# Patient Record
Sex: MALE | Race: WHITE | NOT HISPANIC OR LATINO | URBAN - METROPOLITAN AREA
[De-identification: names, ages, dates, MRNs, and addresses within clinical notes are randomized per-mention and may not be internally consistent; named-entity substitution may affect disease eponyms.]

---

## 2017-05-07 ENCOUNTER — INPATIENT (INPATIENT)
Facility: HOSPITAL | Age: 77
LOS: 2 days | Discharge: HOME | End: 2017-05-10
Admitting: FAMILY MEDICINE

## 2017-05-10 PROBLEM — Z00.00 ENCOUNTER FOR PREVENTIVE HEALTH EXAMINATION: Status: ACTIVE | Noted: 2017-05-10

## 2017-05-18 ENCOUNTER — APPOINTMENT (OUTPATIENT)
Dept: SURGERY | Facility: CLINIC | Age: 77
End: 2017-05-18

## 2017-06-15 ENCOUNTER — APPOINTMENT (OUTPATIENT)
Dept: SURGERY | Facility: CLINIC | Age: 77
End: 2017-06-15

## 2017-06-15 VITALS
SYSTOLIC BLOOD PRESSURE: 138 MMHG | HEIGHT: 66 IN | DIASTOLIC BLOOD PRESSURE: 78 MMHG | BODY MASS INDEX: 43.07 KG/M2 | WEIGHT: 268 LBS

## 2017-06-15 DIAGNOSIS — J44.9 CHRONIC OBSTRUCTIVE PULMONARY DISEASE, UNSPECIFIED: ICD-10-CM

## 2017-06-15 DIAGNOSIS — Z87.39 PERSONAL HISTORY OF OTHER DISEASES OF THE MUSCULOSKELETAL SYSTEM AND CONNECTIVE TISSUE: ICD-10-CM

## 2017-06-15 DIAGNOSIS — Z78.9 OTHER SPECIFIED HEALTH STATUS: ICD-10-CM

## 2017-06-15 DIAGNOSIS — Z80.9 FAMILY HISTORY OF MALIGNANT NEOPLASM, UNSPECIFIED: ICD-10-CM

## 2017-06-15 DIAGNOSIS — Z86.39 PERSONAL HISTORY OF OTHER ENDOCRINE, NUTRITIONAL AND METABOLIC DISEASE: ICD-10-CM

## 2017-06-15 DIAGNOSIS — Z86.79 PERSONAL HISTORY OF OTHER DISEASES OF THE CIRCULATORY SYSTEM: ICD-10-CM

## 2017-06-15 DIAGNOSIS — Z87.09 PERSONAL HISTORY OF OTHER DISEASES OF THE RESPIRATORY SYSTEM: ICD-10-CM

## 2017-06-15 DIAGNOSIS — Z87.891 PERSONAL HISTORY OF NICOTINE DEPENDENCE: ICD-10-CM

## 2017-06-15 DIAGNOSIS — Z87.898 PERSONAL HISTORY OF OTHER SPECIFIED CONDITIONS: ICD-10-CM

## 2017-06-15 DIAGNOSIS — H54.7 UNSPECIFIED VISUAL LOSS: ICD-10-CM

## 2017-06-15 DIAGNOSIS — H91.90 UNSPECIFIED HEARING LOSS, UNSPECIFIED EAR: ICD-10-CM

## 2017-06-15 DIAGNOSIS — R06.02 SHORTNESS OF BREATH: ICD-10-CM

## 2017-06-15 RX ORDER — DUTASTERIDE 0.5 MG/1
0.5 CAPSULE, LIQUID FILLED ORAL
Refills: 0 | Status: ACTIVE | COMMUNITY

## 2017-06-15 RX ORDER — ISOSORBIDE MONONITRATE 60 MG/1
60 TABLET, EXTENDED RELEASE ORAL
Refills: 0 | Status: ACTIVE | COMMUNITY

## 2017-06-15 RX ORDER — INSULIN DETEMIR 100 [IU]/ML
100 INJECTION, SOLUTION SUBCUTANEOUS
Refills: 0 | Status: ACTIVE | COMMUNITY

## 2017-06-28 DIAGNOSIS — Z91.14 PATIENT'S OTHER NONCOMPLIANCE WITH MEDICATION REGIMEN: ICD-10-CM

## 2017-06-28 DIAGNOSIS — R06.02 SHORTNESS OF BREATH: ICD-10-CM

## 2017-06-28 DIAGNOSIS — J96.00 ACUTE RESPIRATORY FAILURE, UNSPECIFIED WHETHER WITH HYPOXIA OR HYPERCAPNIA: ICD-10-CM

## 2017-06-28 DIAGNOSIS — Z79.84 LONG TERM (CURRENT) USE OF ORAL HYPOGLYCEMIC DRUGS: ICD-10-CM

## 2017-06-28 DIAGNOSIS — J44.9 CHRONIC OBSTRUCTIVE PULMONARY DISEASE, UNSPECIFIED: ICD-10-CM

## 2017-06-28 DIAGNOSIS — I50.33 ACUTE ON CHRONIC DIASTOLIC (CONGESTIVE) HEART FAILURE: ICD-10-CM

## 2017-06-28 DIAGNOSIS — I25.10 ATHEROSCLEROTIC HEART DISEASE OF NATIVE CORONARY ARTERY WITHOUT ANGINA PECTORIS: ICD-10-CM

## 2017-06-28 DIAGNOSIS — N40.0 BENIGN PROSTATIC HYPERPLASIA WITHOUT LOWER URINARY TRACT SYMPTOMS: ICD-10-CM

## 2017-06-28 DIAGNOSIS — Z91.11 PATIENT'S NONCOMPLIANCE WITH DIETARY REGIMEN: ICD-10-CM

## 2017-06-28 DIAGNOSIS — E78.5 HYPERLIPIDEMIA, UNSPECIFIED: ICD-10-CM

## 2017-06-28 DIAGNOSIS — M17.0 BILATERAL PRIMARY OSTEOARTHRITIS OF KNEE: ICD-10-CM

## 2017-06-28 DIAGNOSIS — I35.0 NONRHEUMATIC AORTIC (VALVE) STENOSIS: ICD-10-CM

## 2017-06-28 DIAGNOSIS — I25.2 OLD MYOCARDIAL INFARCTION: ICD-10-CM

## 2017-06-28 DIAGNOSIS — Z87.891 PERSONAL HISTORY OF NICOTINE DEPENDENCE: ICD-10-CM

## 2017-06-28 DIAGNOSIS — Z95.1 PRESENCE OF AORTOCORONARY BYPASS GRAFT: ICD-10-CM

## 2017-06-28 DIAGNOSIS — I11.0 HYPERTENSIVE HEART DISEASE WITH HEART FAILURE: ICD-10-CM

## 2017-06-28 DIAGNOSIS — E11.9 TYPE 2 DIABETES MELLITUS WITHOUT COMPLICATIONS: ICD-10-CM

## 2017-06-28 DIAGNOSIS — E66.2 MORBID (SEVERE) OBESITY WITH ALVEOLAR HYPOVENTILATION: ICD-10-CM

## 2017-06-28 DIAGNOSIS — Z99.81 DEPENDENCE ON SUPPLEMENTAL OXYGEN: ICD-10-CM

## 2017-09-21 ENCOUNTER — INPATIENT (INPATIENT)
Facility: HOSPITAL | Age: 77
LOS: 5 days | Discharge: HOME | End: 2017-09-27
Admitting: FAMILY MEDICINE

## 2017-09-21 DIAGNOSIS — I21.4 NON-ST ELEVATION (NSTEMI) MYOCARDIAL INFARCTION: ICD-10-CM

## 2017-09-21 DIAGNOSIS — E78.00 PURE HYPERCHOLESTEROLEMIA, UNSPECIFIED: ICD-10-CM

## 2017-09-21 DIAGNOSIS — I50.9 HEART FAILURE, UNSPECIFIED: ICD-10-CM

## 2017-09-21 DIAGNOSIS — I10 ESSENTIAL (PRIMARY) HYPERTENSION: ICD-10-CM

## 2017-09-21 DIAGNOSIS — E11.9 TYPE 2 DIABETES MELLITUS WITHOUT COMPLICATIONS: ICD-10-CM

## 2017-09-21 DIAGNOSIS — J96.20 ACUTE AND CHRONIC RESPIRATORY FAILURE, UNSPECIFIED WHETHER WITH HYPOXIA OR HYPERCAPNIA: ICD-10-CM

## 2017-09-21 DIAGNOSIS — R78.81 BACTEREMIA: ICD-10-CM

## 2017-09-21 DIAGNOSIS — R09.02 HYPOXEMIA: ICD-10-CM

## 2017-10-04 DIAGNOSIS — I21.4 NON-ST ELEVATION (NSTEMI) MYOCARDIAL INFARCTION: ICD-10-CM

## 2017-10-04 DIAGNOSIS — J44.9 CHRONIC OBSTRUCTIVE PULMONARY DISEASE, UNSPECIFIED: ICD-10-CM

## 2017-10-04 DIAGNOSIS — Z87.891 PERSONAL HISTORY OF NICOTINE DEPENDENCE: ICD-10-CM

## 2017-10-04 DIAGNOSIS — Z79.4 LONG TERM (CURRENT) USE OF INSULIN: ICD-10-CM

## 2017-10-04 DIAGNOSIS — Z95.1 PRESENCE OF AORTOCORONARY BYPASS GRAFT: ICD-10-CM

## 2017-10-04 DIAGNOSIS — I11.0 HYPERTENSIVE HEART DISEASE WITH HEART FAILURE: ICD-10-CM

## 2017-10-04 DIAGNOSIS — I35.0 NONRHEUMATIC AORTIC (VALVE) STENOSIS: ICD-10-CM

## 2017-10-04 DIAGNOSIS — E11.9 TYPE 2 DIABETES MELLITUS WITHOUT COMPLICATIONS: ICD-10-CM

## 2017-10-04 DIAGNOSIS — I50.33 ACUTE ON CHRONIC DIASTOLIC (CONGESTIVE) HEART FAILURE: ICD-10-CM

## 2017-10-04 DIAGNOSIS — N40.0 BENIGN PROSTATIC HYPERPLASIA WITHOUT LOWER URINARY TRACT SYMPTOMS: ICD-10-CM

## 2017-10-04 DIAGNOSIS — I25.10 ATHEROSCLEROTIC HEART DISEASE OF NATIVE CORONARY ARTERY WITHOUT ANGINA PECTORIS: ICD-10-CM

## 2017-10-04 DIAGNOSIS — G47.33 OBSTRUCTIVE SLEEP APNEA (ADULT) (PEDIATRIC): ICD-10-CM

## 2017-10-04 DIAGNOSIS — E66.01 MORBID (SEVERE) OBESITY DUE TO EXCESS CALORIES: ICD-10-CM

## 2018-03-20 ENCOUNTER — APPOINTMENT (OUTPATIENT)
Dept: SURGERY | Facility: CLINIC | Age: 78
End: 2018-03-20

## 2018-06-17 ENCOUNTER — EMERGENCY (EMERGENCY)
Facility: HOSPITAL | Age: 78
LOS: 0 days | Discharge: HOME | End: 2018-06-17
Attending: EMERGENCY MEDICINE | Admitting: EMERGENCY MEDICINE

## 2018-06-17 VITALS
SYSTOLIC BLOOD PRESSURE: 118 MMHG | OXYGEN SATURATION: 94 % | TEMPERATURE: 98 F | RESPIRATION RATE: 19 BRPM | WEIGHT: 259.93 LBS | HEIGHT: 66 IN | HEART RATE: 93 BPM | DIASTOLIC BLOOD PRESSURE: 82 MMHG

## 2018-06-17 DIAGNOSIS — Z79.899 OTHER LONG TERM (CURRENT) DRUG THERAPY: ICD-10-CM

## 2018-06-17 DIAGNOSIS — J44.9 CHRONIC OBSTRUCTIVE PULMONARY DISEASE, UNSPECIFIED: ICD-10-CM

## 2018-06-17 DIAGNOSIS — Z79.84 LONG TERM (CURRENT) USE OF ORAL HYPOGLYCEMIC DRUGS: ICD-10-CM

## 2018-06-17 DIAGNOSIS — R21 RASH AND OTHER NONSPECIFIC SKIN ERUPTION: ICD-10-CM

## 2018-06-17 DIAGNOSIS — Z79.4 LONG TERM (CURRENT) USE OF INSULIN: ICD-10-CM

## 2018-06-17 DIAGNOSIS — Z95.1 PRESENCE OF AORTOCORONARY BYPASS GRAFT: ICD-10-CM

## 2018-06-17 DIAGNOSIS — I11.0 HYPERTENSIVE HEART DISEASE WITH HEART FAILURE: ICD-10-CM

## 2018-06-17 DIAGNOSIS — E11.9 TYPE 2 DIABETES MELLITUS WITHOUT COMPLICATIONS: ICD-10-CM

## 2018-06-17 DIAGNOSIS — Z95.1 PRESENCE OF AORTOCORONARY BYPASS GRAFT: Chronic | ICD-10-CM

## 2018-06-17 DIAGNOSIS — Z79.82 LONG TERM (CURRENT) USE OF ASPIRIN: ICD-10-CM

## 2018-06-17 DIAGNOSIS — I50.9 HEART FAILURE, UNSPECIFIED: ICD-10-CM

## 2018-06-17 DIAGNOSIS — R19.7 DIARRHEA, UNSPECIFIED: ICD-10-CM

## 2018-06-17 DIAGNOSIS — Z95.5 PRESENCE OF CORONARY ANGIOPLASTY IMPLANT AND GRAFT: Chronic | ICD-10-CM

## 2018-06-17 DIAGNOSIS — D69.2 OTHER NONTHROMBOCYTOPENIC PURPURA: ICD-10-CM

## 2018-06-17 DIAGNOSIS — Z79.02 LONG TERM (CURRENT) USE OF ANTITHROMBOTICS/ANTIPLATELETS: ICD-10-CM

## 2018-06-17 LAB
ALBUMIN SERPL ELPH-MCNC: 3.6 G/DL — SIGNIFICANT CHANGE UP (ref 3.5–5.2)
ALP SERPL-CCNC: 55 U/L — SIGNIFICANT CHANGE UP (ref 30–115)
ALT FLD-CCNC: 20 U/L — SIGNIFICANT CHANGE UP (ref 0–41)
ANION GAP SERPL CALC-SCNC: 16 MMOL/L — HIGH (ref 7–14)
AST SERPL-CCNC: 25 U/L — SIGNIFICANT CHANGE UP (ref 0–41)
BILIRUB SERPL-MCNC: 0.7 MG/DL — SIGNIFICANT CHANGE UP (ref 0.2–1.2)
BUN SERPL-MCNC: 35 MG/DL — HIGH (ref 10–20)
CALCIUM SERPL-MCNC: 9.5 MG/DL — SIGNIFICANT CHANGE UP (ref 8.5–10.1)
CHLORIDE SERPL-SCNC: 101 MMOL/L — SIGNIFICANT CHANGE UP (ref 98–110)
CO2 SERPL-SCNC: 22 MMOL/L — SIGNIFICANT CHANGE UP (ref 17–32)
CREAT SERPL-MCNC: 1.9 MG/DL — HIGH (ref 0.7–1.5)
GLUCOSE SERPL-MCNC: 79 MG/DL — SIGNIFICANT CHANGE UP (ref 70–99)
HCT VFR BLD CALC: 50.8 % — SIGNIFICANT CHANGE UP (ref 42–52)
HGB BLD-MCNC: 16.8 G/DL — SIGNIFICANT CHANGE UP (ref 14–18)
MCHC RBC-ENTMCNC: 27.4 PG — SIGNIFICANT CHANGE UP (ref 27–31)
MCHC RBC-ENTMCNC: 33.1 G/DL — SIGNIFICANT CHANGE UP (ref 32–37)
MCV RBC AUTO: 82.7 FL — SIGNIFICANT CHANGE UP (ref 80–94)
NRBC # BLD: 0 /100 WBCS — SIGNIFICANT CHANGE UP (ref 0–0)
PLATELET # BLD AUTO: 259 K/UL — SIGNIFICANT CHANGE UP (ref 130–400)
POTASSIUM SERPL-MCNC: 5 MMOL/L — SIGNIFICANT CHANGE UP (ref 3.5–5)
POTASSIUM SERPL-SCNC: 5 MMOL/L — SIGNIFICANT CHANGE UP (ref 3.5–5)
PROT SERPL-MCNC: 7.4 G/DL — SIGNIFICANT CHANGE UP (ref 6–8)
RBC # BLD: 6.14 M/UL — HIGH (ref 4.7–6.1)
RBC # FLD: 14.1 % — SIGNIFICANT CHANGE UP (ref 11.5–14.5)
SODIUM SERPL-SCNC: 139 MMOL/L — SIGNIFICANT CHANGE UP (ref 135–146)
WBC # BLD: 7.31 K/UL — SIGNIFICANT CHANGE UP (ref 4.8–10.8)
WBC # FLD AUTO: 7.31 K/UL — SIGNIFICANT CHANGE UP (ref 4.8–10.8)

## 2018-06-17 RX ORDER — INSULIN LISPRO 100 [IU]/ML
0 INJECTION, SUSPENSION SUBCUTANEOUS
Qty: 0 | Refills: 0 | COMMUNITY

## 2018-06-17 NOTE — ED PROVIDER NOTE - PHYSICAL EXAMINATION
Physical Exam    Vital Signs: I have reviewed the initial vital signs.  Constitutional: well-nourished, appears stated age, no acute distress  Eyes: Conjunctiva pink, Sclera clear, PERRLA, EOMI.  Cardiovascular: S1 and S2, regular rate, regular rhythm, well-perfused extremities, radial pulses equal and 2+  Respiratory: unlabored respiratory effort, clear to auscultation bilaterally no wheezing, rales and rhonchi  Gastrointestinal: soft, non-tender abdomen, no pulsatile mass, normal bowl sounds  Musculoskeletal: supple neck, no lower extremity edema, no midline tenderness  Integumentary: Lower legs with diffuse purpura and some to lower arms and lower abd. non blanching  Neurologic: awake, alert, cranial nerves II-XII grossly intact, extremities’ motor and sensory functions grossly intact  Psychiatric: appropriate mood, appropriate affect

## 2018-06-17 NOTE — ED ADULT NURSE NOTE - PMH
BPH (benign prostatic hyperplasia)    CHF (congestive heart failure)    COPD (chronic obstructive pulmonary disease)    Diabetes    HTN (hypertension)

## 2018-06-17 NOTE — ED ADULT TRIAGE NOTE - CHIEF COMPLAINT QUOTE
As per patient "Thursday night I noticed I had a rash on my right leg and it just got worse, on my other leg, on my left arm and also on my stomach". denies fever, vomiting, nausea

## 2018-06-17 NOTE — ED PROVIDER NOTE - PROGRESS NOTE DETAILS
D/w patient all results and follow up with PMD and Derm and return to emergency room precautions given

## 2018-06-17 NOTE — ED PROVIDER NOTE - OBJECTIVE STATEMENT
78 year old male past medical history of CHF, COPD, Diabetes, Hypertension states that since last 4 days has rash spreading from legs to arm and abd that started as dots and now they are getting bigger. denies chest pain, shortness of breath, neck pain, headache, dizziness, fever/chills, nausea, vomiting, diarrhea. patient states he is on plavix.

## 2018-06-17 NOTE — ED PROVIDER NOTE - NS ED ROS FT
Constitutional: (-) fever  Eyes/ENT: (-) blurry vision, (-) epistaxis  Cardiovascular: (-) chest pain, (-) syncope  Respiratory: (-) cough, (-) shortness of breath  Gastrointestinal: (-) vomiting, (-) diarrhea  Musculoskeletal: (-) neck pain, (-) back pain, (-) joint pain  Integumentary: (+) rash, (-) edema  Neurological: (-) headache, (-) altered mental status  Psychiatric: (-) hallucinations  Allergic/Immunologic: (-) pruritus

## 2018-07-19 ENCOUNTER — APPOINTMENT (OUTPATIENT)
Dept: SURGERY | Facility: CLINIC | Age: 78
End: 2018-07-19
Payer: MEDICARE

## 2018-07-19 DIAGNOSIS — N50.89 OTHER SPECIFIED DISORDERS OF THE MALE GENITAL ORGANS: ICD-10-CM

## 2018-07-19 PROCEDURE — 99213 OFFICE O/P EST LOW 20 MIN: CPT

## 2018-07-28 PROBLEM — Z78.9 ALCOHOL USE: Status: INACTIVE | Noted: 2017-06-15

## 2019-03-22 ENCOUNTER — INPATIENT (INPATIENT)
Facility: HOSPITAL | Age: 79
LOS: 4 days | Discharge: HOME | End: 2019-03-27
Attending: HOSPITALIST | Admitting: HOSPITALIST

## 2019-03-22 VITALS
SYSTOLIC BLOOD PRESSURE: 119 MMHG | OXYGEN SATURATION: 90 % | RESPIRATION RATE: 20 BRPM | HEART RATE: 64 BPM | TEMPERATURE: 98 F | WEIGHT: 268.96 LBS | HEIGHT: 66 IN | DIASTOLIC BLOOD PRESSURE: 62 MMHG

## 2019-03-22 DIAGNOSIS — E11.9 TYPE 2 DIABETES MELLITUS WITHOUT COMPLICATIONS: ICD-10-CM

## 2019-03-22 DIAGNOSIS — Z95.5 PRESENCE OF CORONARY ANGIOPLASTY IMPLANT AND GRAFT: Chronic | ICD-10-CM

## 2019-03-22 DIAGNOSIS — R06.09 OTHER FORMS OF DYSPNEA: ICD-10-CM

## 2019-03-22 DIAGNOSIS — Z95.1 PRESENCE OF AORTOCORONARY BYPASS GRAFT: Chronic | ICD-10-CM

## 2019-03-22 DIAGNOSIS — Z79.899 OTHER LONG TERM (CURRENT) DRUG THERAPY: ICD-10-CM

## 2019-03-22 PROBLEM — N40.0 BENIGN PROSTATIC HYPERPLASIA WITHOUT LOWER URINARY TRACT SYMPTOMS: Chronic | Status: ACTIVE | Noted: 2018-06-17

## 2019-03-22 PROBLEM — J44.9 CHRONIC OBSTRUCTIVE PULMONARY DISEASE, UNSPECIFIED: Chronic | Status: ACTIVE | Noted: 2018-06-17

## 2019-03-22 PROBLEM — I10 ESSENTIAL (PRIMARY) HYPERTENSION: Chronic | Status: ACTIVE | Noted: 2018-06-17

## 2019-03-22 PROBLEM — I50.9 HEART FAILURE, UNSPECIFIED: Chronic | Status: ACTIVE | Noted: 2018-06-17

## 2019-03-22 LAB
ALBUMIN SERPL ELPH-MCNC: 3.9 G/DL — SIGNIFICANT CHANGE UP (ref 3.5–5.2)
ALP SERPL-CCNC: 46 U/L — SIGNIFICANT CHANGE UP (ref 30–115)
ALT FLD-CCNC: 17 U/L — SIGNIFICANT CHANGE UP (ref 0–41)
ANION GAP SERPL CALC-SCNC: 11 MMOL/L — SIGNIFICANT CHANGE UP (ref 7–14)
APTT BLD: 38.6 SEC — SIGNIFICANT CHANGE UP (ref 27–39.2)
AST SERPL-CCNC: 16 U/L — SIGNIFICANT CHANGE UP (ref 0–41)
BASE EXCESS BLDV CALC-SCNC: 3.2 MMOL/L — HIGH (ref -2–2)
BASOPHILS # BLD AUTO: 0.05 K/UL — SIGNIFICANT CHANGE UP (ref 0–0.2)
BASOPHILS NFR BLD AUTO: 0.6 % — SIGNIFICANT CHANGE UP (ref 0–1)
BILIRUB SERPL-MCNC: 0.8 MG/DL — SIGNIFICANT CHANGE UP (ref 0.2–1.2)
BUN SERPL-MCNC: 47 MG/DL — HIGH (ref 10–20)
CA-I SERPL-SCNC: 1.27 MMOL/L — SIGNIFICANT CHANGE UP (ref 1.12–1.3)
CALCIUM SERPL-MCNC: 9.9 MG/DL — SIGNIFICANT CHANGE UP (ref 8.5–10.1)
CHLORIDE SERPL-SCNC: 105 MMOL/L — SIGNIFICANT CHANGE UP (ref 98–110)
CO2 SERPL-SCNC: 29 MMOL/L — SIGNIFICANT CHANGE UP (ref 17–32)
CREAT SERPL-MCNC: 1.5 MG/DL — SIGNIFICANT CHANGE UP (ref 0.7–1.5)
EOSINOPHIL # BLD AUTO: 0.03 K/UL — SIGNIFICANT CHANGE UP (ref 0–0.7)
EOSINOPHIL NFR BLD AUTO: 0.4 % — SIGNIFICANT CHANGE UP (ref 0–8)
FLU A RESULT: NEGATIVE — SIGNIFICANT CHANGE UP
FLU A RESULT: NEGATIVE — SIGNIFICANT CHANGE UP
FLUAV AG NPH QL: NEGATIVE — SIGNIFICANT CHANGE UP
FLUBV AG NPH QL: NEGATIVE — SIGNIFICANT CHANGE UP
GAS PNL BLDV: 146 MMOL/L — HIGH (ref 136–145)
GAS PNL BLDV: SIGNIFICANT CHANGE UP
GLUCOSE BLDC GLUCOMTR-MCNC: 96 MG/DL — SIGNIFICANT CHANGE UP (ref 70–99)
GLUCOSE SERPL-MCNC: 92 MG/DL — SIGNIFICANT CHANGE UP (ref 70–99)
HCO3 BLDV-SCNC: 32 MMOL/L — HIGH (ref 22–29)
HCT VFR BLD CALC: 45.8 % — SIGNIFICANT CHANGE UP (ref 42–52)
HCT VFR BLDA CALC: 61.1 % — HIGH (ref 34–44)
HGB BLD CALC-MCNC: 19.9 G/DL — HIGH (ref 14–18)
HGB BLD-MCNC: 14.6 G/DL — SIGNIFICANT CHANGE UP (ref 14–18)
HOROWITZ INDEX BLDV+IHG-RTO: 21 — SIGNIFICANT CHANGE UP
IMM GRANULOCYTES NFR BLD AUTO: 0.1 % — SIGNIFICANT CHANGE UP (ref 0.1–0.3)
INR BLD: 1.24 RATIO — SIGNIFICANT CHANGE UP (ref 0.65–1.3)
LACTATE BLDV-MCNC: 2.2 MMOL/L — HIGH (ref 0.5–1.6)
LYMPHOCYTES # BLD AUTO: 0.91 K/UL — LOW (ref 1.2–3.4)
LYMPHOCYTES # BLD AUTO: 11.3 % — LOW (ref 20.5–51.1)
MAGNESIUM SERPL-MCNC: 2.1 MG/DL — SIGNIFICANT CHANGE UP (ref 1.8–2.4)
MCHC RBC-ENTMCNC: 27.7 PG — SIGNIFICANT CHANGE UP (ref 27–31)
MCHC RBC-ENTMCNC: 31.9 G/DL — LOW (ref 32–37)
MCV RBC AUTO: 86.7 FL — SIGNIFICANT CHANGE UP (ref 80–94)
MONOCYTES # BLD AUTO: 0.65 K/UL — HIGH (ref 0.1–0.6)
MONOCYTES NFR BLD AUTO: 8.1 % — SIGNIFICANT CHANGE UP (ref 1.7–9.3)
NEUTROPHILS # BLD AUTO: 6.4 K/UL — SIGNIFICANT CHANGE UP (ref 1.4–6.5)
NEUTROPHILS NFR BLD AUTO: 79.5 % — HIGH (ref 42.2–75.2)
NRBC # BLD: 0 /100 WBCS — SIGNIFICANT CHANGE UP (ref 0–0)
NT-PROBNP SERPL-SCNC: 1623 PG/ML — HIGH (ref 0–300)
PCO2 BLDV: 60 MMHG — HIGH (ref 41–51)
PH BLDV: 7.33 — SIGNIFICANT CHANGE UP (ref 7.26–7.43)
PLATELET # BLD AUTO: 188 K/UL — SIGNIFICANT CHANGE UP (ref 130–400)
PO2 BLDV: 25 MMHG — SIGNIFICANT CHANGE UP (ref 20–40)
POTASSIUM BLDV-SCNC: 4.9 MMOL/L — SIGNIFICANT CHANGE UP (ref 3.3–5.6)
POTASSIUM SERPL-MCNC: 4.6 MMOL/L — SIGNIFICANT CHANGE UP (ref 3.5–5)
POTASSIUM SERPL-SCNC: 4.6 MMOL/L — SIGNIFICANT CHANGE UP (ref 3.5–5)
PROT SERPL-MCNC: 7.2 G/DL — SIGNIFICANT CHANGE UP (ref 6–8)
PROTHROM AB SERPL-ACNC: 14.2 SEC — HIGH (ref 9.95–12.87)
RBC # BLD: 5.28 M/UL — SIGNIFICANT CHANGE UP (ref 4.7–6.1)
RBC # FLD: 13.8 % — SIGNIFICANT CHANGE UP (ref 11.5–14.5)
RSV RESULT: NEGATIVE — SIGNIFICANT CHANGE UP
RSV RNA RESP QL NAA+PROBE: NEGATIVE — SIGNIFICANT CHANGE UP
SAO2 % BLDV: 41 % — SIGNIFICANT CHANGE UP
SODIUM SERPL-SCNC: 145 MMOL/L — SIGNIFICANT CHANGE UP (ref 135–146)
TROPONIN T SERPL-MCNC: 0.04 NG/ML — CRITICAL HIGH
WBC # BLD: 8.05 K/UL — SIGNIFICANT CHANGE UP (ref 4.8–10.8)
WBC # FLD AUTO: 8.05 K/UL — SIGNIFICANT CHANGE UP (ref 4.8–10.8)

## 2019-03-22 RX ORDER — ALPRAZOLAM 0.25 MG
0.5 TABLET ORAL ONCE
Qty: 0 | Refills: 0 | Status: DISCONTINUED | OUTPATIENT
Start: 2019-03-22 | End: 2019-03-22

## 2019-03-22 RX ORDER — ALPRAZOLAM 0.25 MG
0.5 TABLET ORAL
Qty: 0 | Refills: 0 | Status: DISCONTINUED | OUTPATIENT
Start: 2019-03-22 | End: 2019-03-27

## 2019-03-22 RX ORDER — CHLORHEXIDINE GLUCONATE 213 G/1000ML
1 SOLUTION TOPICAL
Qty: 0 | Refills: 0 | Status: DISCONTINUED | OUTPATIENT
Start: 2019-03-22 | End: 2019-03-27

## 2019-03-22 RX ORDER — CLOPIDOGREL BISULFATE 75 MG/1
75 TABLET, FILM COATED ORAL DAILY
Qty: 0 | Refills: 0 | Status: DISCONTINUED | OUTPATIENT
Start: 2019-03-22 | End: 2019-03-27

## 2019-03-22 RX ORDER — METOPROLOL TARTRATE 50 MG
50 TABLET ORAL
Qty: 0 | Refills: 0 | Status: DISCONTINUED | OUTPATIENT
Start: 2019-03-22 | End: 2019-03-23

## 2019-03-22 RX ORDER — ATORVASTATIN CALCIUM 80 MG/1
80 TABLET, FILM COATED ORAL AT BEDTIME
Qty: 0 | Refills: 0 | Status: DISCONTINUED | OUTPATIENT
Start: 2019-03-22 | End: 2019-03-27

## 2019-03-22 RX ORDER — FENOFIBRATE,MICRONIZED 130 MG
145 CAPSULE ORAL DAILY
Qty: 0 | Refills: 0 | Status: DISCONTINUED | OUTPATIENT
Start: 2019-03-22 | End: 2019-03-27

## 2019-03-22 RX ORDER — TAMSULOSIN HYDROCHLORIDE 0.4 MG/1
0.4 CAPSULE ORAL AT BEDTIME
Qty: 0 | Refills: 0 | Status: DISCONTINUED | OUTPATIENT
Start: 2019-03-22 | End: 2019-03-27

## 2019-03-22 RX ORDER — ALPRAZOLAM 0.25 MG
1 TABLET ORAL
Qty: 0 | Refills: 0 | COMMUNITY

## 2019-03-22 RX ORDER — MONTELUKAST 4 MG/1
1 TABLET, CHEWABLE ORAL
Qty: 0 | Refills: 0 | COMMUNITY

## 2019-03-22 RX ORDER — ASPIRIN/CALCIUM CARB/MAGNESIUM 324 MG
81 TABLET ORAL DAILY
Qty: 0 | Refills: 0 | Status: DISCONTINUED | OUTPATIENT
Start: 2019-03-22 | End: 2019-03-24

## 2019-03-22 RX ORDER — FUROSEMIDE 40 MG
40 TABLET ORAL DAILY
Qty: 0 | Refills: 0 | Status: DISCONTINUED | OUTPATIENT
Start: 2019-03-22 | End: 2019-03-23

## 2019-03-22 RX ORDER — FUROSEMIDE 40 MG
40 TABLET ORAL ONCE
Qty: 0 | Refills: 0 | Status: COMPLETED | OUTPATIENT
Start: 2019-03-22 | End: 2019-03-22

## 2019-03-22 RX ORDER — ISOSORBIDE MONONITRATE 60 MG/1
30 TABLET, EXTENDED RELEASE ORAL DAILY
Qty: 0 | Refills: 0 | Status: DISCONTINUED | OUTPATIENT
Start: 2019-03-22 | End: 2019-03-27

## 2019-03-22 RX ORDER — AMLODIPINE BESYLATE 2.5 MG/1
2.5 TABLET ORAL DAILY
Qty: 0 | Refills: 0 | Status: DISCONTINUED | OUTPATIENT
Start: 2019-03-22 | End: 2019-03-27

## 2019-03-22 RX ORDER — CELECOXIB 200 MG/1
100 CAPSULE ORAL DAILY
Qty: 0 | Refills: 0 | Status: DISCONTINUED | OUTPATIENT
Start: 2019-03-22 | End: 2019-03-27

## 2019-03-22 RX ORDER — LOSARTAN POTASSIUM 100 MG/1
50 TABLET, FILM COATED ORAL
Qty: 0 | Refills: 0 | Status: DISCONTINUED | OUTPATIENT
Start: 2019-03-22 | End: 2019-03-27

## 2019-03-22 RX ORDER — HEPARIN SODIUM 5000 [USP'U]/ML
5000 INJECTION INTRAVENOUS; SUBCUTANEOUS EVERY 12 HOURS
Qty: 0 | Refills: 0 | Status: DISCONTINUED | OUTPATIENT
Start: 2019-03-22 | End: 2019-03-24

## 2019-03-22 RX ORDER — FINASTERIDE 5 MG/1
5 TABLET, FILM COATED ORAL DAILY
Qty: 0 | Refills: 0 | Status: DISCONTINUED | OUTPATIENT
Start: 2019-03-22 | End: 2019-03-27

## 2019-03-22 RX ORDER — ALBUTEROL 90 UG/1
2 AEROSOL, METERED ORAL EVERY 6 HOURS
Qty: 0 | Refills: 0 | Status: DISCONTINUED | OUTPATIENT
Start: 2019-03-22 | End: 2019-03-27

## 2019-03-22 RX ADMIN — Medication 0.5 MILLIGRAM(S): at 22:54

## 2019-03-22 RX ADMIN — Medication 40 MILLIGRAM(S): at 17:44

## 2019-03-22 RX ADMIN — HEPARIN SODIUM 5000 UNIT(S): 5000 INJECTION INTRAVENOUS; SUBCUTANEOUS at 22:54

## 2019-03-22 NOTE — ED PROVIDER NOTE - NS ED ROS FT
Constitutional: No fever, chills.  Eyes:  No visual changes  ENMT:  No neck pain  Cardiac:  No chest pain  Respiratory:  No cough. +SOB  GI:  No nausea, vomiting, diarrhea, abdominal pain.  :  No dysuria  MS:  No back pain. +leg swelling  Neuro:  No headache or lightheadedness  Skin:  No skin rash  Endocrine: h/o DM  Except as documented in the HPI,  all other systems are negative.

## 2019-03-22 NOTE — H&P ADULT - PROBLEM SELECTOR PLAN 1
Suspect on basis of CHF, for now continue lasix as IVP with cardiology consult Suspect on basis of CHF, for now continue lasix as IVP with cardiology consult.

## 2019-03-22 NOTE — ED PROVIDER NOTE - CLINICAL SUMMARY MEDICAL DECISION MAKING FREE TEXT BOX
Clinically CHF exacerbation with labs and imaging to support this.  Will admit for IV diuresis and trend troponin.  Pt aware of plan

## 2019-03-22 NOTE — H&P ADULT - HISTORY OF PRESENT ILLNESS
[age] male presents to the ER due to shortness of breath for 1 week. Worse with exertion. Denies fevers or cough. Given IV lasix in the ER but so far patient says little improvment

## 2019-03-22 NOTE — ED PROVIDER NOTE - ATTENDING CONTRIBUTION TO CARE
80 yo M PMHx noted presents with c/o increased weakness and SOB over the last 4 days, + WU, no chest pain.  no fever or chills.  Pt recently diagnosed with pneumonia.  On exam pt in NAD AAO x 3, on oxygen, Lungs with crackles bilateral, abd soft nt nd, + b/l LE edema, no calf tenderness, + wound to left ant leg, no signs of infection, dry skin.

## 2019-03-22 NOTE — ED PROVIDER NOTE - OBJECTIVE STATEMENT
78yo M with PMHx CHF, COPD on PRN home O2, CAD/stents/CABG, DM, HTN, BPH, afib/flutter, p/w SOB and generalized weakness for past 4 days. Denies fever, cough, CP. +chronic leg swelling. Grandson at home diagnosed with PNA recently.

## 2019-03-22 NOTE — ED PROVIDER NOTE - PHYSICAL EXAMINATION
Vital signs reviewed  GENERAL: Patient nontoxic appearing, NAD  HEAD: NCAT  EYES: Anicteric  ENT: MMM  NECK: Supple, non tender  RESPIRATORY: Normal respiratory effort. Speaking full sentences. Becomes SOB when transferring from stretcher to wheelchair. B/L crackles  CARDIOVASCULAR: Regular rate and rhythm  ABDOMEN: Soft. Nondistended. Nontender.   MUSCULOSKELETAL/EXTREMITIES: Brisk cap refill. 2+ radial pulses. B/L leg edema  SKIN:  Left leg wound x2 to anterior leg, no warmth/erythema, no purulent drainage  NEURO: AAOx3. No gross FND.  PSYCHIATRIC: Cooperative. Affect appropriate.

## 2019-03-22 NOTE — H&P ADULT - PROBLEM SELECTOR PLAN 2
monitor on insulin for now For now will hold patient's usual meds which includes Victoza, insulin mix and Amaryl and treat elevated FSBS with short acting insulin for now

## 2019-03-22 NOTE — H&P ADULT - PROBLEM SELECTOR PLAN 3
atient can use own Incruze and Breo but will substitute for Dutasteride and Alfuzosin  (hold Welchol for now)

## 2019-03-22 NOTE — ED ADULT TRIAGE NOTE - CHIEF COMPLAINT QUOTE
c/o SOB. pt states his grandson was sick with PNA and he feels as if he is getting sick.. Pt uses home oxygen

## 2019-03-22 NOTE — H&P ADULT - NSHPLABSRESULTS_GEN_ALL_CORE
14.6   8.05  )-----------( 188      ( 22 Mar 2019 16:45 )             45.8     03-22    145  |  105  |  47<H>  ----------------------------<  92  4.6   |  29  |  1.5    Ca    9.9      22 Mar 2019 16:45  Mg     2.1     03-22    TPro  7.2  /  Alb  3.9  /  TBili  0.8  /  DBili  x   /  AST  16  /  ALT  17  /  AlkPhos  46  03-22            PT/INR - ( 22 Mar 2019 16:45 )   PT: 14.20 sec;   INR: 1.24 ratio         PTT - ( 22 Mar 2019 16:45 )  PTT:38.6 sec  Lactate Trend    CARDIAC MARKERS ( 22 Mar 2019 16:45 )  x     / 0.04 ng/mL / x     / x     / x          CAPILLARY BLOOD GLUCOSE      POCT Blood Glucose.: 96 mg/dL (22 Mar 2019 21:13) 14.6   8.05  )-----------( 188      ( 22 Mar 2019 16:45 )             45.8     03-22    145  |  105  |  47<H>  ----------------------------<  92  4.6   |  29  |  1.5    Ca    9.9      22 Mar 2019 16:45  Mg     2.1     03-22    TPro  7.2  /  Alb  3.9  /  TBili  0.8  /  DBili  x   /  AST  16  /  ALT  17  /  AlkPhos  46  03-22            PT/INR - ( 22 Mar 2019 16:45 )   PT: 14.20 sec;   INR: 1.24 ratio         PTT - ( 22 Mar 2019 16:45 )  PTT:38.6 sec  Lactate Trend    CARDIAC MARKERS ( 22 Mar 2019 16:45 )  x     / 0.04 ng/mL / x     / x     / x          CAPILLARY BLOOD GLUCOSE      POCT Blood Glucose.: 96 mg/dL (22 Mar 2019 21:13)      EXAM:  XR CHEST PORTABLE URGENT 1V            PROCEDURE DATE:  03/22/2019      < from: Xray Chest 1 View- PORTABLE-Urgent (03.22.19 @ 15:12) >    Impression:      Pulmonary vascular congestion with bilateral opacities, increased.    NIR FLANNERY M.D., ATTENDING RADIOLOGIST  This document has been electronically signed. Mar 22 2019  4:31PM      < end of copied text >

## 2019-03-23 LAB
GLUCOSE BLDC GLUCOMTR-MCNC: 117 MG/DL — HIGH (ref 70–99)
GLUCOSE BLDC GLUCOMTR-MCNC: 135 MG/DL — HIGH (ref 70–99)
GLUCOSE BLDC GLUCOMTR-MCNC: 60 MG/DL — LOW (ref 70–99)
GLUCOSE BLDC GLUCOMTR-MCNC: 94 MG/DL — SIGNIFICANT CHANGE UP (ref 70–99)

## 2019-03-23 RX ORDER — FUROSEMIDE 40 MG
40 TABLET ORAL
Qty: 0 | Refills: 0 | Status: DISCONTINUED | OUTPATIENT
Start: 2019-03-23 | End: 2019-03-25

## 2019-03-23 RX ORDER — BUDESONIDE AND FORMOTEROL FUMARATE DIHYDRATE 160; 4.5 UG/1; UG/1
2 AEROSOL RESPIRATORY (INHALATION)
Qty: 0 | Refills: 0 | Status: DISCONTINUED | OUTPATIENT
Start: 2019-03-23 | End: 2019-03-27

## 2019-03-23 RX ORDER — METOPROLOL TARTRATE 50 MG
75 TABLET ORAL
Qty: 0 | Refills: 0 | Status: DISCONTINUED | OUTPATIENT
Start: 2019-03-23 | End: 2019-03-23

## 2019-03-23 RX ORDER — METOPROLOL TARTRATE 50 MG
75 TABLET ORAL
Qty: 0 | Refills: 0 | Status: DISCONTINUED | OUTPATIENT
Start: 2019-03-23 | End: 2019-03-27

## 2019-03-23 RX ADMIN — Medication 0.5 MILLIGRAM(S): at 22:01

## 2019-03-23 RX ADMIN — ATORVASTATIN CALCIUM 80 MILLIGRAM(S): 80 TABLET, FILM COATED ORAL at 21:24

## 2019-03-23 RX ADMIN — ISOSORBIDE MONONITRATE 30 MILLIGRAM(S): 60 TABLET, EXTENDED RELEASE ORAL at 09:39

## 2019-03-23 RX ADMIN — Medication 75 MILLIGRAM(S): at 17:31

## 2019-03-23 RX ADMIN — Medication 81 MILLIGRAM(S): at 09:39

## 2019-03-23 RX ADMIN — FINASTERIDE 5 MILLIGRAM(S): 5 TABLET, FILM COATED ORAL at 09:39

## 2019-03-23 RX ADMIN — LOSARTAN POTASSIUM 50 MILLIGRAM(S): 100 TABLET, FILM COATED ORAL at 17:31

## 2019-03-23 RX ADMIN — Medication 40 MILLIGRAM(S): at 17:32

## 2019-03-23 RX ADMIN — TAMSULOSIN HYDROCHLORIDE 0.4 MILLIGRAM(S): 0.4 CAPSULE ORAL at 21:24

## 2019-03-23 RX ADMIN — CLOPIDOGREL BISULFATE 75 MILLIGRAM(S): 75 TABLET, FILM COATED ORAL at 09:39

## 2019-03-23 RX ADMIN — HEPARIN SODIUM 5000 UNIT(S): 5000 INJECTION INTRAVENOUS; SUBCUTANEOUS at 17:31

## 2019-03-23 RX ADMIN — Medication 145 MILLIGRAM(S): at 09:38

## 2019-03-23 RX ADMIN — CHLORHEXIDINE GLUCONATE 1 APPLICATION(S): 213 SOLUTION TOPICAL at 09:41

## 2019-03-23 RX ADMIN — HEPARIN SODIUM 5000 UNIT(S): 5000 INJECTION INTRAVENOUS; SUBCUTANEOUS at 06:15

## 2019-03-23 RX ADMIN — Medication 40 MILLIGRAM(S): at 09:39

## 2019-03-23 NOTE — PROGRESS NOTE ADULT - SUBJECTIVE AND OBJECTIVE BOX
Patient is a 79y old  Male who presents with a chief complaint of     SUBJECTIVE / OVERNIGHT EVENTS:  no cp, abd pain, fever  sob much improved, no cough, orthopnea, wheeze    MEDICATIONS  (STANDING):  amLODIPine   Tablet 2.5 milliGRAM(s) Oral daily  aspirin  chewable 81 milliGRAM(s) Oral daily  atorvastatin 80 milliGRAM(s) Oral at bedtime  buDESOnide 160 MICROgram(s)/formoterol 4.5 MICROgram(s) Inhaler 2 Puff(s) Inhalation two times a day  chlorhexidine 4% Liquid 1 Application(s) Topical <User Schedule>  clopidogrel Tablet 75 milliGRAM(s) Oral daily  fenofibrate Tablet 145 milliGRAM(s) Oral daily  finasteride 5 milliGRAM(s) Oral daily  furosemide   Injectable 40 milliGRAM(s) IV Push two times a day  heparin  Injectable 5000 Unit(s) SubCutaneous every 12 hours  isosorbide   mononitrate ER Tablet (IMDUR) 30 milliGRAM(s) Oral daily  losartan 50 milliGRAM(s) Oral two times a day  metoprolol tartrate 50 milliGRAM(s) Oral two times a day  tamsulosin 0.4 milliGRAM(s) Oral at bedtime    MEDICATIONS  (PRN):  ALBUTerol    90 MICROgram(s) HFA Inhaler 2 Puff(s) Inhalation every 6 hours PRN Shortness of Breath and/or Wheezing  ALPRAZolam 0.5 milliGRAM(s) Oral two times a day PRN anxiety or insomnia  celecoxib 100 milliGRAM(s) Oral daily PRN Mild Pain (1 - 3)        CAPILLARY BLOOD GLUCOSE      POCT Blood Glucose.: 60 mg/dL (23 Mar 2019 07:59)  POCT Blood Glucose.: 96 mg/dL (22 Mar 2019 21:13)    I&O's Summary      PHYSICAL EXAM:  GENERAL: nad, a+o x3  EYES:  NECK:   CHEST/LUNG: ctab no w/r/r  HEART: rrr no m/g/r  ABDOMEN: soft nt nd  EXTREMITIES:  2+ pit edema bl  PSYCH:   NEUROLOGY:   SKIN:    LABS:                        14.6   8.05  )-----------( 188      ( 22 Mar 2019 16:45 )             45.8     03-22    145  |  105  |  47<H>  ----------------------------<  92  4.6   |  29  |  1.5    Ca    9.9      22 Mar 2019 16:45  Mg     2.1     03-22    TPro  7.2  /  Alb  3.9  /  TBili  0.8  /  DBili  x   /  AST  16  /  ALT  17  /  AlkPhos  46  03-22    PT/INR - ( 22 Mar 2019 16:45 )   PT: 14.20 sec;   INR: 1.24 ratio         PTT - ( 22 Mar 2019 16:45 )  PTT:38.6 sec  CARDIAC MARKERS ( 22 Mar 2019 16:45 )  x     / 0.04 ng/mL / x     / x     / x              RADIOLOGY & ADDITIONAL TESTS:    Imaging Personally Reviewed:  Yes    Consultant(s) Notes Reviewed:      Care Discussed with Consultants/Other Providers:    Assessment and Plan   PAST MEDICAL & SURGICAL HISTORY:  BPH (benign prostatic hyperplasia)  CHF (congestive heart failure)  COPD (chronic obstructive pulmonary disease)  Diabetes  HTN (hypertension)  H/O heart artery stent  S/P CABG x 3

## 2019-03-23 NOTE — PROGRESS NOTE ADULT - ASSESSMENT
79M PMHx DM2, CAD s/p 3v CABG 1995, s/p stent 2002, BPH, COPD, unspecified CHF, HTN here with acute decompensated heart failure.    1. Acute on chronic unspecified CHF  lasix 40 iv bid  check tte  suspect due to noncompliance (increased fluid intake for past week)  daily weights  strict i/o  keep spo2 >88  2. DM2  controlled off medications  3. COPD  albuterol prn  symbicort  4. CAD  asa, plavix  lipitor 80  5. HTN  norvasc 2.5  imdur 30  losartan 50 bid  lopressor 50 bid  6. BPH  finasteride 5  tamsulosin 0.4  7. DVT ppx  subq hep      #Progress Note Handoff:  Pending (specify):  Consults_________, Tests________, Test Results_______, Other_________  Family discussion:  Disposition: Home__x_/SNF___/Other________/Unknown at this time________

## 2019-03-23 NOTE — CONSULT NOTE ADULT - ASSESSMENT
Patient did not follow weight . He gained weight, He has YAMIL not use c-pap. He was drinking a lot of water. He is volume overloaded. He needs IV lasix. He needs daily weights Patient did not follow weight . He gained weight, He has YAMIL not use c-pap. He was drinking a lot of water. He is volume overloaded. He needs IV lasix. He needs daily weights. Add  more beta rate control  . If remain in flutter. He will need ac xarelto 15 qd

## 2019-03-23 NOTE — CONSULT NOTE ADULT - SUBJECTIVE AND OBJECTIVE BOX
CARDIOLOGY CONSULT NOTE     CHIEF COMPLAINT/REASON FOR CONSULT:    HPI:  78yo male presents to the ER due to shortness of breath for 1 week. Worse with exertion. Denies fevers or cough. He was not following weight at home. Drinking alot water      PAST MEDICAL & SURGICAL HISTORY:  BPH (benign prostatic hyperplasia)  CHF (congestive heart failure)  COPD (chronic obstructive pulmonary disease)  Diabetes  HTN (hypertension)  H/O heart artery stent  S/P CABG x 3      Cardiac Risks:   [ x]HTN, [ x] DM, [ ] Smoking, [ x] FH,  [ ] Lipids        MEDICATIONS:  MEDICATIONS  (STANDING):  amLODIPine   Tablet 2.5 milliGRAM(s) Oral daily  aspirin  chewable 81 milliGRAM(s) Oral daily  atorvastatin 80 milliGRAM(s) Oral at bedtime  buDESOnide 160 MICROgram(s)/formoterol 4.5 MICROgram(s) Inhaler 2 Puff(s) Inhalation two times a day  chlorhexidine 4% Liquid 1 Application(s) Topical <User Schedule>  clopidogrel Tablet 75 milliGRAM(s) Oral daily  fenofibrate Tablet 145 milliGRAM(s) Oral daily  finasteride 5 milliGRAM(s) Oral daily  furosemide   Injectable 40 milliGRAM(s) IV Push two times a day  heparin  Injectable 5000 Unit(s) SubCutaneous every 12 hours  isosorbide   mononitrate ER Tablet (IMDUR) 30 milliGRAM(s) Oral daily  losartan 50 milliGRAM(s) Oral two times a day  metoprolol tartrate 50 milliGRAM(s) Oral two times a day  tamsulosin 0.4 milliGRAM(s) Oral at bedtime      FAMILY HISTORY:      SOCIAL HISTORY:      [ ] Marital status    Allergies    No Known Allergies        	    REVIEW OF SYSTEMS:  CONSTITUTIONAL: No fever, weight loss, or fatigue  EYES: No eye pain, visual disturbances, or discharge  ENMT:  No difficulty hearing, tinnitus, vertigo; No sinus or throat pain  NECK: No pain or stiffness  RESPIRATORY: No cough, wheezing, chills or hemoptysis; No Shortness of Breath  CARDIOVASCULAR: See above  GASTROINTESTINAL: No abdominal or epigastric pain. No nausea, vomiting, or hematemesis; No diarrhea or constipation. No melena or hematochezia.  GENITOURINARY: No dysuria, frequency, hematuria, or incontinence  NEUROLOGICAL: No headaches, memory loss, loss of strength, numbness, or tremors  SKIN: No itching, burning, rashes, or lesions   	        PHYSICAL EXAM:  T(C): 35.8 (03-23-19 @ 06:06), Max: 36.4 (03-22-19 @ 12:47)  HR: 63 (03-23-19 @ 06:06) (63 - 73)  BP: 118/61 (03-23-19 @ 06:06) (105/53 - 121/56)  RR: 18 (03-23-19 @ 06:06) (18 - 20)  SpO2: 91% (03-22-19 @ 20:26) (90% - 91%)  Wt(kg): --  I&O's Summary      Appearance: Normal	  Psychiatry: A & O x 3, Mood & affect appropriate  HEENT:   Normal oral mucosa, PERRL, EOMI	  Lymphatic: No lymphadenopathy  Cardiovascular: Normal S1 S2,irreg No JVD, No murmurs  Respiratory: Lungs clear to auscultation	  Gastrointestinal:  Soft, Non-tender, + BS	  Skin: No rashes, No ecchymoses, No cyanosis	  Neurologic: Non-focal  Extremities: Normal range of motion, No clubbing, cyanosis or edema  Vascular: Peripheral pulses palpable 2+ bilaterally      ECG:  	aflutter rbbb lad     	  LABS:	 	    CARDIAC MARKERS:          Serum Pro-Brain Natriuretic Peptide: 1623 pg/mL (03-22 @ 16:45)                            14.6   8.05  )-----------( 188      ( 22 Mar 2019 16:45 )             45.8     03-22    145  |  105  |  47<H>  ----------------------------<  92  4.6   |  29  |  1.5    Ca    9.9      22 Mar 2019 16:45  Mg     2.1     03-22    TPro  7.2  /  Alb  3.9  /  TBili  0.8  /  DBili  x   /  AST  16  /  ALT  17  /  AlkPhos  46  03-22    PT/INR - ( 22 Mar 2019 16:45 )   PT: 14.20 sec;   INR: 1.24 ratio         PTT - ( 22 Mar 2019 16:45 )  PTT:38.6 sec  proBNP: Serum Pro-Brain Natriuretic Peptide: 1623 pg/mL (03-22 @ 16:45) CARDIOLOGY CONSULT NOTE     CHIEF COMPLAINT/REASON FOR CONSULT:    HPI:  78yo male presents to the ER due to shortness of breath for 1 week. Worse with exertion. Denies fevers or cough. He was not following weight at home. Drinking alot water      PAST MEDICAL & SURGICAL HISTORY:  BPH (benign prostatic hyperplasia)  CHF (congestive heart failure)  COPD (chronic obstructive pulmonary disease)  Diabetes  HTN (hypertension)  H/O heart artery stent  S/P CABG x 3      Cardiac Risks:   [ x]HTN, [ x] DM, [ ] Smoking, [ x] FH,  [ ] Lipids        MEDICATIONS:  MEDICATIONS  (STANDING):  amLODIPine   Tablet 2.5 milliGRAM(s) Oral daily  aspirin  chewable 81 milliGRAM(s) Oral daily  atorvastatin 80 milliGRAM(s) Oral at bedtime  buDESOnide 160 MICROgram(s)/formoterol 4.5 MICROgram(s) Inhaler 2 Puff(s) Inhalation two times a day  chlorhexidine 4% Liquid 1 Application(s) Topical <User Schedule>  clopidogrel Tablet 75 milliGRAM(s) Oral daily  fenofibrate Tablet 145 milliGRAM(s) Oral daily  finasteride 5 milliGRAM(s) Oral daily  furosemide   Injectable 40 milliGRAM(s) IV Push two times a day  heparin  Injectable 5000 Unit(s) SubCutaneous every 12 hours  isosorbide   mononitrate ER Tablet (IMDUR) 30 milliGRAM(s) Oral daily  losartan 50 milliGRAM(s) Oral two times a day  metoprolol tartrate 50 milliGRAM(s) Oral two times a day  tamsulosin 0.4 milliGRAM(s) Oral at bedtime      FAMILY HISTORY:      SOCIAL HISTORY:      [ ] Marital status    Allergies    No Known Allergies        	    REVIEW OF SYSTEMS:  CONSTITUTIONAL: No fever, weight loss, or fatigue  EYES: No eye pain, visual disturbances, or discharge  ENMT:  No difficulty hearing, tinnitus, vertigo; No sinus or throat pain  NECK: No pain or stiffness  RESPIRATORY: No cough, wheezing, chills or hemoptysis; No Shortness of Breath  CARDIOVASCULAR: See above  GASTROINTESTINAL: No abdominal or epigastric pain. No nausea, vomiting, or hematemesis; No diarrhea or constipation. No melena or hematochezia.  GENITOURINARY: No dysuria, frequency, hematuria, or incontinence  NEUROLOGICAL: No headaches, memory loss, loss of strength, numbness, or tremors  SKIN: No itching, burning, rashes, or lesions   	        PHYSICAL EXAM:  T(C): 35.8 (03-23-19 @ 06:06), Max: 36.4 (03-22-19 @ 12:47)  HR: 63 (03-23-19 @ 06:06) (63 - 73)  BP: 118/61 (03-23-19 @ 06:06) (105/53 - 121/56)  RR: 18 (03-23-19 @ 06:06) (18 - 20)  SpO2: 91% (03-22-19 @ 20:26) (90% - 91%)  Wt(kg): --  I&O's Summary      Appearance: Normal	  Psychiatry: A & O x 3, Mood & affect appropriate  HEENT:   Normal oral mucosa, PERRL, EOMI	  Lymphatic: No lymphadenopathy  Cardiovascular: Normal S1 S2,irreg No JVD, No murmurs  Respiratory: Lungs clear to auscultation	  Gastrointestinal:  Soft, Non-tender, + BS	  Skin: No rashes, No ecchymoses, No cyanosis	  Neurologic: Non-focal  Extremities: Normal range of motion, No clubbing, cyanosis 1+ edema  Vascular: Peripheral pulses palpable 2+ bilaterally      ECG:  	aflutter rbbb lad     	  LABS:	 	    CARDIAC MARKERS:          Serum Pro-Brain Natriuretic Peptide: 1623 pg/mL (03-22 @ 16:45)                            14.6   8.05  )-----------( 188      ( 22 Mar 2019 16:45 )             45.8     03-22    145  |  105  |  47<H>  ----------------------------<  92  4.6   |  29  |  1.5    Ca    9.9      22 Mar 2019 16:45  Mg     2.1     03-22    TPro  7.2  /  Alb  3.9  /  TBili  0.8  /  DBili  x   /  AST  16  /  ALT  17  /  AlkPhos  46  03-22    PT/INR - ( 22 Mar 2019 16:45 )   PT: 14.20 sec;   INR: 1.24 ratio         PTT - ( 22 Mar 2019 16:45 )  PTT:38.6 sec  proBNP: Serum Pro-Brain Natriuretic Peptide: 1623 pg/mL (03-22 @ 16:45)

## 2019-03-24 LAB
ANION GAP SERPL CALC-SCNC: 13 MMOL/L — SIGNIFICANT CHANGE UP (ref 7–14)
BUN SERPL-MCNC: 52 MG/DL — HIGH (ref 10–20)
CALCIUM SERPL-MCNC: 9.6 MG/DL — SIGNIFICANT CHANGE UP (ref 8.5–10.1)
CHLORIDE SERPL-SCNC: 101 MMOL/L — SIGNIFICANT CHANGE UP (ref 98–110)
CO2 SERPL-SCNC: 31 MMOL/L — SIGNIFICANT CHANGE UP (ref 17–32)
CREAT SERPL-MCNC: 1.9 MG/DL — HIGH (ref 0.7–1.5)
GLUCOSE BLDC GLUCOMTR-MCNC: 119 MG/DL — HIGH (ref 70–99)
GLUCOSE BLDC GLUCOMTR-MCNC: 172 MG/DL — HIGH (ref 70–99)
GLUCOSE BLDC GLUCOMTR-MCNC: 193 MG/DL — HIGH (ref 70–99)
GLUCOSE BLDC GLUCOMTR-MCNC: 83 MG/DL — SIGNIFICANT CHANGE UP (ref 70–99)
GLUCOSE SERPL-MCNC: 60 MG/DL — LOW (ref 70–99)
HCT VFR BLD CALC: 45.9 % — SIGNIFICANT CHANGE UP (ref 42–52)
HGB BLD-MCNC: 14.4 G/DL — SIGNIFICANT CHANGE UP (ref 14–18)
MCHC RBC-ENTMCNC: 27.1 PG — SIGNIFICANT CHANGE UP (ref 27–31)
MCHC RBC-ENTMCNC: 31.4 G/DL — LOW (ref 32–37)
MCV RBC AUTO: 86.3 FL — SIGNIFICANT CHANGE UP (ref 80–94)
NRBC # BLD: 0 /100 WBCS — SIGNIFICANT CHANGE UP (ref 0–0)
PLATELET # BLD AUTO: 182 K/UL — SIGNIFICANT CHANGE UP (ref 130–400)
POTASSIUM SERPL-MCNC: 4.4 MMOL/L — SIGNIFICANT CHANGE UP (ref 3.5–5)
POTASSIUM SERPL-SCNC: 4.4 MMOL/L — SIGNIFICANT CHANGE UP (ref 3.5–5)
RBC # BLD: 5.32 M/UL — SIGNIFICANT CHANGE UP (ref 4.7–6.1)
RBC # FLD: 14 % — SIGNIFICANT CHANGE UP (ref 11.5–14.5)
SODIUM SERPL-SCNC: 145 MMOL/L — SIGNIFICANT CHANGE UP (ref 135–146)
WBC # BLD: 6.52 K/UL — SIGNIFICANT CHANGE UP (ref 4.8–10.8)
WBC # FLD AUTO: 6.52 K/UL — SIGNIFICANT CHANGE UP (ref 4.8–10.8)

## 2019-03-24 RX ORDER — FLUTICASONE PROPIONATE 50 MCG
1 SPRAY, SUSPENSION NASAL
Qty: 0 | Refills: 0 | Status: DISCONTINUED | OUTPATIENT
Start: 2019-03-24 | End: 2019-03-27

## 2019-03-24 RX ORDER — RIVAROXABAN 15 MG-20MG
15 KIT ORAL EVERY 24 HOURS
Qty: 0 | Refills: 0 | Status: DISCONTINUED | OUTPATIENT
Start: 2019-03-24 | End: 2019-03-27

## 2019-03-24 RX ADMIN — Medication 145 MILLIGRAM(S): at 11:11

## 2019-03-24 RX ADMIN — AMLODIPINE BESYLATE 2.5 MILLIGRAM(S): 2.5 TABLET ORAL at 05:33

## 2019-03-24 RX ADMIN — FINASTERIDE 5 MILLIGRAM(S): 5 TABLET, FILM COATED ORAL at 11:11

## 2019-03-24 RX ADMIN — ISOSORBIDE MONONITRATE 30 MILLIGRAM(S): 60 TABLET, EXTENDED RELEASE ORAL at 11:12

## 2019-03-24 RX ADMIN — TAMSULOSIN HYDROCHLORIDE 0.4 MILLIGRAM(S): 0.4 CAPSULE ORAL at 21:37

## 2019-03-24 RX ADMIN — Medication 1 SPRAY(S): at 17:46

## 2019-03-24 RX ADMIN — Medication 40 MILLIGRAM(S): at 17:46

## 2019-03-24 RX ADMIN — CLOPIDOGREL BISULFATE 75 MILLIGRAM(S): 75 TABLET, FILM COATED ORAL at 11:12

## 2019-03-24 RX ADMIN — RIVAROXABAN 15 MILLIGRAM(S): KIT at 17:47

## 2019-03-24 RX ADMIN — Medication 40 MILLIGRAM(S): at 05:33

## 2019-03-24 RX ADMIN — Medication 75 MILLIGRAM(S): at 17:47

## 2019-03-24 RX ADMIN — LOSARTAN POTASSIUM 50 MILLIGRAM(S): 100 TABLET, FILM COATED ORAL at 17:47

## 2019-03-24 RX ADMIN — ATORVASTATIN CALCIUM 80 MILLIGRAM(S): 80 TABLET, FILM COATED ORAL at 21:37

## 2019-03-24 RX ADMIN — Medication 0.5 MILLIGRAM(S): at 22:43

## 2019-03-24 RX ADMIN — HEPARIN SODIUM 5000 UNIT(S): 5000 INJECTION INTRAVENOUS; SUBCUTANEOUS at 05:33

## 2019-03-24 NOTE — PROGRESS NOTE ADULT - SUBJECTIVE AND OBJECTIVE BOX
Patient is a 79y old  Male who presents with a chief complaint of     T(F): 96.6 (03-24-19 @ 04:58), Max: 96.8 (03-23-19 @ 14:21)  HR: 67 (03-24-19 @ 04:58)  BP: 122/58 (03-24-19 @ 04:58)  RR: 16 (03-24-19 @ 04:58)  SpO2: --    PHYSICAL EXAM:  GENERAL: NAD, well-groomed, well-developed  HEAD:  Atraumatic, Normocephalic  EYES: EOMI, PERRLA, conjunctiva and sclera clear  ENMT: No tonsillar erythema, exudates, or enlargement; Moist mucous membranes, Good dentition, No lesions  NECK: Supple, No JVD, Normal thyroid  NERVOUS SYSTEM:  Alert & Oriented X3,  Motor Strength 5/5 B/L upper and lower extremities  CHEST/LUNG: Clear to percussion bilaterally; No rales, rhonchi, wheezing, or rubs  HEART: Regular rate and rhythm; No murmurs, rubs, or gallops  ABDOMEN: Soft, Nontender, Nondistended; Bowel sounds present  EXTREMITIES:   No clubbing, cyanosis, tr- 1 + edema  LYMPH: No lymphadenopathy noted  SKIN: No rashes or lesions    labs  03-24    145  |  101  |  52<H>  ----------------------------<  60<L>  4.4   |  31  |  1.9<H>    Ca    9.6      24 Mar 2019 07:02  Mg     2.1     03-22    TPro  7.2  /  Alb  3.9  /  TBili  0.8  /  DBili  x   /  AST  16  /  ALT  17  /  AlkPhos  46  03-22                          14.4   6.52  )-----------( 182      ( 24 Mar 2019 07:02 )             45.9       PT/INR - ( 22 Mar 2019 16:45 )   PT: 14.20 sec;   INR: 1.24 ratio         PTT - ( 22 Mar 2019 16:45 )  PTT:38.6 sec        ALBUTerol    90 MICROgram(s) HFA Inhaler 2 Puff(s) Inhalation every 6 hours PRN  ALPRAZolam 0.5 milliGRAM(s) Oral two times a day PRN  amLODIPine   Tablet 2.5 milliGRAM(s) Oral daily  atorvastatin 80 milliGRAM(s) Oral at bedtime  buDESOnide 160 MICROgram(s)/formoterol 4.5 MICROgram(s) Inhaler 2 Puff(s) Inhalation two times a day  celecoxib 100 milliGRAM(s) Oral daily PRN  chlorhexidine 4% Liquid 1 Application(s) Topical <User Schedule>  clopidogrel Tablet 75 milliGRAM(s) Oral daily  fenofibrate Tablet 145 milliGRAM(s) Oral daily  finasteride 5 milliGRAM(s) Oral daily  fluticasone propionate 50 MICROgram(s)/spray Nasal Spray 1 Spray(s) Both Nostrils two times a day  furosemide   Injectable 40 milliGRAM(s) IV Push two times a day  isosorbide   mononitrate ER Tablet (IMDUR) 30 milliGRAM(s) Oral daily  losartan 50 milliGRAM(s) Oral two times a day  metoprolol tartrate 75 milliGRAM(s) Oral two times a day  rivaroxaban 15 milliGRAM(s) Oral every 24 hours  tamsulosin 0.4 milliGRAM(s) Oral at bedtime

## 2019-03-24 NOTE — PROGRESS NOTE ADULT - SUBJECTIVE AND OBJECTIVE BOX
Patient is a 79y old  Male who presents with a chief complaint of     SUBJECTIVE / OVERNIGHT EVENTS:  no cp, , abd pain, fever  sob improving, no orthopnea, pnd +le edema    MEDICATIONS  (STANDING):  amLODIPine   Tablet 2.5 milliGRAM(s) Oral daily  atorvastatin 80 milliGRAM(s) Oral at bedtime  buDESOnide 160 MICROgram(s)/formoterol 4.5 MICROgram(s) Inhaler 2 Puff(s) Inhalation two times a day  chlorhexidine 4% Liquid 1 Application(s) Topical <User Schedule>  clopidogrel Tablet 75 milliGRAM(s) Oral daily  fenofibrate Tablet 145 milliGRAM(s) Oral daily  finasteride 5 milliGRAM(s) Oral daily  fluticasone propionate 50 MICROgram(s)/spray Nasal Spray 1 Spray(s) Both Nostrils two times a day  furosemide   Injectable 40 milliGRAM(s) IV Push two times a day  isosorbide   mononitrate ER Tablet (IMDUR) 30 milliGRAM(s) Oral daily  losartan 50 milliGRAM(s) Oral two times a day  metoprolol tartrate 75 milliGRAM(s) Oral two times a day  rivaroxaban 15 milliGRAM(s) Oral every 24 hours  tamsulosin 0.4 milliGRAM(s) Oral at bedtime    MEDICATIONS  (PRN):  ALBUTerol    90 MICROgram(s) HFA Inhaler 2 Puff(s) Inhalation every 6 hours PRN Shortness of Breath and/or Wheezing  ALPRAZolam 0.5 milliGRAM(s) Oral two times a day PRN anxiety or insomnia  celecoxib 100 milliGRAM(s) Oral daily PRN Mild Pain (1 - 3)        CAPILLARY BLOOD GLUCOSE      POCT Blood Glucose.: 83 mg/dL (24 Mar 2019 07:16)  POCT Blood Glucose.: 94 mg/dL (23 Mar 2019 20:53)  POCT Blood Glucose.: 135 mg/dL (23 Mar 2019 16:10)  POCT Blood Glucose.: 117 mg/dL (23 Mar 2019 11:20)    I&O's Summary    23 Mar 2019 07:01  -  24 Mar 2019 07:00  --------------------------------------------------------  IN: 840 mL / OUT: 2200 mL / NET: -1360 mL        PHYSICAL EXAM:  GENERAL: nad, a+o x3  EYES:  NECK:   CHEST/LUNG: ctab no w/r/r  HEART: rrr no m/g/r  ABDOMEN: soft nt nd   EXTREMITIES:  2+ pit edema  PSYCH:   NEUROLOGY:   SKIN:    LABS:                        14.4   6.52  )-----------( 182      ( 24 Mar 2019 07:02 )             45.9     03-24    145  |  101  |  52<H>  ----------------------------<  60<L>  4.4   |  31  |  1.9<H>    Ca    9.6      24 Mar 2019 07:02  Mg     2.1     03-22    TPro  7.2  /  Alb  3.9  /  TBili  0.8  /  DBili  x   /  AST  16  /  ALT  17  /  AlkPhos  46  03-22    PT/INR - ( 22 Mar 2019 16:45 )   PT: 14.20 sec;   INR: 1.24 ratio         PTT - ( 22 Mar 2019 16:45 )  PTT:38.6 sec  CARDIAC MARKERS ( 22 Mar 2019 16:45 )  x     / 0.04 ng/mL / x     / x     / x              RADIOLOGY & ADDITIONAL TESTS:    Imaging Personally Reviewed:  Yes    Consultant(s) Notes Reviewed:      Care Discussed with Consultants/Other Providers:    Assessment and Plan   PAST MEDICAL & SURGICAL HISTORY:  BPH (benign prostatic hyperplasia)  CHF (congestive heart failure)  COPD (chronic obstructive pulmonary disease)  Diabetes  HTN (hypertension)  H/O heart artery stent  S/P CABG x 3

## 2019-03-24 NOTE — PROGRESS NOTE ADULT - ASSESSMENT
79M PMHx DM2, CAD s/p 3v CABG 1995, s/p stent 2002, BPH, COPD, unspecified CHF, HTN here with acute decompensated heart failure.    1. Acute on chronic unspecified CHF  lasix 40 iv bid  suspect due to noncompliance (increased fluid intake for past week)  daily weights  strict i/o  keep spo2 >88  f/u cxr today, may decrease lasix  2. DM2  controlled off medications  3. COPD  albuterol prn  symbicort  4. CAD  asa, plavix  lipitor 80  5. HTN  norvasc 2.5  imdur 30  losartan 50 bid  lopressor 50 bid  6. BPH  finasteride 5  tamsulosin 0.4  7. DVT ppx  subq hep      #Progress Note Handoff:  Pending (specify):  Consults_________, Tests________, Test Results_______, Other_________  Family discussion:  Disposition: Home__x_/SNF___/Other________/Unknown at this time________ 79M PMHx DM2, CAD s/p 3v CABG 1995, s/p stent 2002, BPH, COPD, unspecified CHF, HTN here with acute decompensated heart failure. BIRTTANI.    1. Acute on chronic unspecified CHF  lasix 40 iv bid  suspect due to noncompliance (increased fluid intake for past week)  daily weights  strict i/o  keep spo2 >88  f/u cxr today, may decrease lasix  # BRITTANI  suspect cardiorenal vs. prerenal  f/u bmp  decrease lasix pending cxr  2. DM2  controlled off medications  3. COPD  albuterol prn  symbicort  4. CAD  asa, plavix  lipitor 80  5. HTN  norvasc 2.5  imdur 30  losartan 50 bid  lopressor 50 bid  6. BPH  finasteride 5  tamsulosin 0.4  7. DVT ppx  subq hep      #Progress Note Handoff:  Pending (specify):  Consults_________, Tests________, Test Results_______, Other_________  Family discussion:  Disposition: Home__x_/SNF___/Other________/Unknown at this time________

## 2019-03-25 LAB
ANION GAP SERPL CALC-SCNC: 12 MMOL/L — SIGNIFICANT CHANGE UP (ref 7–14)
BUN SERPL-MCNC: 50 MG/DL — HIGH (ref 10–20)
CALCIUM SERPL-MCNC: 9.7 MG/DL — SIGNIFICANT CHANGE UP (ref 8.5–10.1)
CHLORIDE SERPL-SCNC: 99 MMOL/L — SIGNIFICANT CHANGE UP (ref 98–110)
CO2 SERPL-SCNC: 33 MMOL/L — HIGH (ref 17–32)
CREAT SERPL-MCNC: 1.7 MG/DL — HIGH (ref 0.7–1.5)
GLUCOSE BLDC GLUCOMTR-MCNC: 100 MG/DL — HIGH (ref 70–99)
GLUCOSE BLDC GLUCOMTR-MCNC: 134 MG/DL — HIGH (ref 70–99)
GLUCOSE BLDC GLUCOMTR-MCNC: 164 MG/DL — HIGH (ref 70–99)
GLUCOSE BLDC GLUCOMTR-MCNC: 210 MG/DL — HIGH (ref 70–99)
GLUCOSE SERPL-MCNC: 156 MG/DL — HIGH (ref 70–99)
MAGNESIUM SERPL-MCNC: 2.2 MG/DL — SIGNIFICANT CHANGE UP (ref 1.8–2.4)
PHOSPHATE SERPL-MCNC: 3.5 MG/DL — SIGNIFICANT CHANGE UP (ref 2.1–4.9)
POTASSIUM SERPL-MCNC: 4.6 MMOL/L — SIGNIFICANT CHANGE UP (ref 3.5–5)
POTASSIUM SERPL-SCNC: 4.6 MMOL/L — SIGNIFICANT CHANGE UP (ref 3.5–5)
SODIUM SERPL-SCNC: 144 MMOL/L — SIGNIFICANT CHANGE UP (ref 135–146)

## 2019-03-25 RX ORDER — FUROSEMIDE 40 MG
40 TABLET ORAL
Qty: 0 | Refills: 0 | Status: DISCONTINUED | OUTPATIENT
Start: 2019-03-25 | End: 2019-03-27

## 2019-03-25 RX ADMIN — BUDESONIDE AND FORMOTEROL FUMARATE DIHYDRATE 2 PUFF(S): 160; 4.5 AEROSOL RESPIRATORY (INHALATION) at 11:23

## 2019-03-25 RX ADMIN — ISOSORBIDE MONONITRATE 30 MILLIGRAM(S): 60 TABLET, EXTENDED RELEASE ORAL at 11:23

## 2019-03-25 RX ADMIN — Medication 1 SPRAY(S): at 17:21

## 2019-03-25 RX ADMIN — Medication 40 MILLIGRAM(S): at 05:25

## 2019-03-25 RX ADMIN — RIVAROXABAN 15 MILLIGRAM(S): KIT at 17:21

## 2019-03-25 RX ADMIN — LOSARTAN POTASSIUM 50 MILLIGRAM(S): 100 TABLET, FILM COATED ORAL at 05:27

## 2019-03-25 RX ADMIN — FINASTERIDE 5 MILLIGRAM(S): 5 TABLET, FILM COATED ORAL at 11:23

## 2019-03-25 RX ADMIN — LOSARTAN POTASSIUM 50 MILLIGRAM(S): 100 TABLET, FILM COATED ORAL at 17:21

## 2019-03-25 RX ADMIN — Medication 75 MILLIGRAM(S): at 17:21

## 2019-03-25 RX ADMIN — Medication 1 SPRAY(S): at 05:26

## 2019-03-25 RX ADMIN — TAMSULOSIN HYDROCHLORIDE 0.4 MILLIGRAM(S): 0.4 CAPSULE ORAL at 21:01

## 2019-03-25 RX ADMIN — Medication 1 APPLICATION(S): at 17:21

## 2019-03-25 RX ADMIN — ATORVASTATIN CALCIUM 80 MILLIGRAM(S): 80 TABLET, FILM COATED ORAL at 21:01

## 2019-03-25 RX ADMIN — CLOPIDOGREL BISULFATE 75 MILLIGRAM(S): 75 TABLET, FILM COATED ORAL at 11:23

## 2019-03-25 RX ADMIN — AMLODIPINE BESYLATE 2.5 MILLIGRAM(S): 2.5 TABLET ORAL at 05:26

## 2019-03-25 RX ADMIN — Medication 40 MILLIGRAM(S): at 17:33

## 2019-03-25 RX ADMIN — Medication 145 MILLIGRAM(S): at 11:23

## 2019-03-25 NOTE — PROGRESS NOTE ADULT - SUBJECTIVE AND OBJECTIVE BOX
Patient is a 79y old  Male who presents with a chief complaint of     SUBJECTIVE / OVERNIGHT EVENTS:  no cp, sob, abd pain, fever  no sob, orthopnea, pnd, cough    MEDICATIONS  (STANDING):  amLODIPine   Tablet 2.5 milliGRAM(s) Oral daily  atorvastatin 80 milliGRAM(s) Oral at bedtime  buDESOnide 160 MICROgram(s)/formoterol 4.5 MICROgram(s) Inhaler 2 Puff(s) Inhalation two times a day  chlorhexidine 4% Liquid 1 Application(s) Topical <User Schedule>  clopidogrel Tablet 75 milliGRAM(s) Oral daily  fenofibrate Tablet 145 milliGRAM(s) Oral daily  finasteride 5 milliGRAM(s) Oral daily  fluticasone propionate 50 MICROgram(s)/spray Nasal Spray 1 Spray(s) Both Nostrils two times a day  furosemide   Injectable 40 milliGRAM(s) IV Push two times a day  isosorbide   mononitrate ER Tablet (IMDUR) 30 milliGRAM(s) Oral daily  losartan 50 milliGRAM(s) Oral two times a day  metoprolol tartrate 75 milliGRAM(s) Oral two times a day  rivaroxaban 15 milliGRAM(s) Oral every 24 hours  silver sulfADIAZINE 1% Cream 1 Application(s) Topical two times a day  tamsulosin 0.4 milliGRAM(s) Oral at bedtime    MEDICATIONS  (PRN):  ALBUTerol    90 MICROgram(s) HFA Inhaler 2 Puff(s) Inhalation every 6 hours PRN Shortness of Breath and/or Wheezing  ALPRAZolam 0.5 milliGRAM(s) Oral two times a day PRN anxiety or insomnia  celecoxib 100 milliGRAM(s) Oral daily PRN Mild Pain (1 - 3)        CAPILLARY BLOOD GLUCOSE      POCT Blood Glucose.: 100 mg/dL (25 Mar 2019 08:06)  POCT Blood Glucose.: 193 mg/dL (24 Mar 2019 20:50)  POCT Blood Glucose.: 172 mg/dL (24 Mar 2019 16:34)  POCT Blood Glucose.: 119 mg/dL (24 Mar 2019 11:32)    I&O's Summary    24 Mar 2019 07:01  -  25 Mar 2019 07:00  --------------------------------------------------------  IN: 440 mL / OUT: 900 mL / NET: -460 mL        PHYSICAL EXAM:  GENERAL: nad, a+o x3  EYES:  NECK:   CHEST/LUNG: ctab no w/r/r  HEART: rrr no m/g/r  ABDOMEN: soft nt nd  EXTREMITIES:  1+ pit edema bl  PSYCH:   NEUROLOGY:   SKIN:    LABS:                        14.4   6.52  )-----------( 182      ( 24 Mar 2019 07:02 )             45.9     03-24    145  |  101  |  52<H>  ----------------------------<  60<L>  4.4   |  31  |  1.9<H>    Ca    9.6      24 Mar 2019 07:02                RADIOLOGY & ADDITIONAL TESTS:    Imaging Personally Reviewed:  Yes    Consultant(s) Notes Reviewed:      Care Discussed with Consultants/Other Providers:    Assessment and Plan   PAST MEDICAL & SURGICAL HISTORY:  BPH (benign prostatic hyperplasia)  CHF (congestive heart failure)  COPD (chronic obstructive pulmonary disease)  Diabetes  HTN (hypertension)  H/O heart artery stent  S/P CABG x 3

## 2019-03-25 NOTE — PROGRESS NOTE ADULT - ASSESSMENT
79M PMHx DM2, CAD s/p 3v CABG 1995, s/p stent 2002, BPH, COPD, unspecified CHF, HTN here with acute decompensated heart failure. BRITTANI.    1. Acute on chronic unspecified CHF  lasix 40 iv bid pending bmp  clinically much improved  daily weights  strict i/o  keep spo2 >88  # BRITTANI  suspect cardiorenal vs. prerenal  f/u bmp  decrease lasix pending cxr  2. DM2  controlled off medications  3. COPD  albuterol prn  symbicort  4. CAD  asa, plavix  lipitor 80  5. HTN  norvasc 2.5  imdur 30  losartan 50 bid  lopressor 50 bid  6. BPH  finasteride 5  tamsulosin 0.4  7. DVT ppx  subq hep      #Progress Note Handoff:  Pending (specify):  Consults_________, Tests________, Test Results_______, Other_________  Family discussion:  Disposition: Home__x_/SNF___/Other________/Unknown at this time________ 79M PMHx DM2, CAD s/p 3v CABG 1995, s/p stent 2002, BPH, COPD, unspecified CHF, HTN here with acute decompensated heart failure. BRITTANI.    1. Acute on chronic unspecified CHF  lasix 40 iv bid pending bmp  clinically much improved  daily weights  strict i/o  keep spo2 >88  # BRITTANI  suspect cardiorenal vs. prerenal  f/u bmp  2. DM2  controlled off medications  3. COPD  albuterol prn  symbicort  4. CAD  asa, plavix  lipitor 80  5. HTN  norvasc 2.5  imdur 30  losartan 50 bid  lopressor 50 bid  6. BPH  finasteride 5  tamsulosin 0.4  7. DVT ppx  subq hep      #Progress Note Handoff:  Pending (specify):  Consults_________, Tests________, Test Results_______, Other_________  Family discussion:  Disposition: Home__x_/SNF___/Other________/Unknown at this time________

## 2019-03-26 LAB
ANION GAP SERPL CALC-SCNC: 11 MMOL/L — SIGNIFICANT CHANGE UP (ref 7–14)
BUN SERPL-MCNC: 53 MG/DL — HIGH (ref 10–20)
CALCIUM SERPL-MCNC: 9.8 MG/DL — SIGNIFICANT CHANGE UP (ref 8.5–10.1)
CHLORIDE SERPL-SCNC: 100 MMOL/L — SIGNIFICANT CHANGE UP (ref 98–110)
CO2 SERPL-SCNC: 34 MMOL/L — HIGH (ref 17–32)
CREAT SERPL-MCNC: 1.8 MG/DL — HIGH (ref 0.7–1.5)
GLUCOSE BLDC GLUCOMTR-MCNC: 130 MG/DL — HIGH (ref 70–99)
GLUCOSE BLDC GLUCOMTR-MCNC: 155 MG/DL — HIGH (ref 70–99)
GLUCOSE BLDC GLUCOMTR-MCNC: 167 MG/DL — HIGH (ref 70–99)
GLUCOSE BLDC GLUCOMTR-MCNC: 180 MG/DL — HIGH (ref 70–99)
GLUCOSE SERPL-MCNC: 143 MG/DL — HIGH (ref 70–99)
HCT VFR BLD CALC: 44.6 % — SIGNIFICANT CHANGE UP (ref 42–52)
HGB BLD-MCNC: 13.9 G/DL — LOW (ref 14–18)
MAGNESIUM SERPL-MCNC: 2.3 MG/DL — SIGNIFICANT CHANGE UP (ref 1.8–2.4)
MCHC RBC-ENTMCNC: 27.2 PG — SIGNIFICANT CHANGE UP (ref 27–31)
MCHC RBC-ENTMCNC: 31.2 G/DL — LOW (ref 32–37)
MCV RBC AUTO: 87.3 FL — SIGNIFICANT CHANGE UP (ref 80–94)
NRBC # BLD: 0 /100 WBCS — SIGNIFICANT CHANGE UP (ref 0–0)
PHOSPHATE SERPL-MCNC: 4.5 MG/DL — SIGNIFICANT CHANGE UP (ref 2.1–4.9)
PLATELET # BLD AUTO: 169 K/UL — SIGNIFICANT CHANGE UP (ref 130–400)
POTASSIUM SERPL-MCNC: 4.9 MMOL/L — SIGNIFICANT CHANGE UP (ref 3.5–5)
POTASSIUM SERPL-SCNC: 4.9 MMOL/L — SIGNIFICANT CHANGE UP (ref 3.5–5)
RBC # BLD: 5.11 M/UL — SIGNIFICANT CHANGE UP (ref 4.7–6.1)
RBC # FLD: 13.7 % — SIGNIFICANT CHANGE UP (ref 11.5–14.5)
SODIUM SERPL-SCNC: 145 MMOL/L — SIGNIFICANT CHANGE UP (ref 135–146)
WBC # BLD: 7.89 K/UL — SIGNIFICANT CHANGE UP (ref 4.8–10.8)
WBC # FLD AUTO: 7.89 K/UL — SIGNIFICANT CHANGE UP (ref 4.8–10.8)

## 2019-03-26 RX ADMIN — AMLODIPINE BESYLATE 2.5 MILLIGRAM(S): 2.5 TABLET ORAL at 05:27

## 2019-03-26 RX ADMIN — Medication 75 MILLIGRAM(S): at 17:33

## 2019-03-26 RX ADMIN — ISOSORBIDE MONONITRATE 30 MILLIGRAM(S): 60 TABLET, EXTENDED RELEASE ORAL at 11:37

## 2019-03-26 RX ADMIN — Medication 1 SPRAY(S): at 05:27

## 2019-03-26 RX ADMIN — Medication 1 APPLICATION(S): at 05:28

## 2019-03-26 RX ADMIN — Medication 1 APPLICATION(S): at 17:34

## 2019-03-26 RX ADMIN — FINASTERIDE 5 MILLIGRAM(S): 5 TABLET, FILM COATED ORAL at 11:37

## 2019-03-26 RX ADMIN — Medication 40 MILLIGRAM(S): at 05:27

## 2019-03-26 RX ADMIN — Medication 0.5 MILLIGRAM(S): at 00:02

## 2019-03-26 RX ADMIN — Medication 40 MILLIGRAM(S): at 17:33

## 2019-03-26 RX ADMIN — LOSARTAN POTASSIUM 50 MILLIGRAM(S): 100 TABLET, FILM COATED ORAL at 05:27

## 2019-03-26 RX ADMIN — TAMSULOSIN HYDROCHLORIDE 0.4 MILLIGRAM(S): 0.4 CAPSULE ORAL at 22:28

## 2019-03-26 RX ADMIN — CLOPIDOGREL BISULFATE 75 MILLIGRAM(S): 75 TABLET, FILM COATED ORAL at 11:36

## 2019-03-26 RX ADMIN — Medication 1 SPRAY(S): at 17:34

## 2019-03-26 RX ADMIN — ATORVASTATIN CALCIUM 80 MILLIGRAM(S): 80 TABLET, FILM COATED ORAL at 22:28

## 2019-03-26 RX ADMIN — LOSARTAN POTASSIUM 50 MILLIGRAM(S): 100 TABLET, FILM COATED ORAL at 17:33

## 2019-03-26 RX ADMIN — RIVAROXABAN 15 MILLIGRAM(S): KIT at 17:33

## 2019-03-26 RX ADMIN — Medication 145 MILLIGRAM(S): at 11:36

## 2019-03-26 NOTE — PROGRESS NOTE ADULT - ASSESSMENT
79M PMHx DM2, CAD s/p 3v CABG 1995, s/p stent 2002, BPH, COPD, unspecified CHF, HTN here with acute decompensated heart failure. BRITTANI.    1. Acute on chronic unspecified CHF  lasix po 40 bid change to daily (home dose)  clinically much improved  daily weights  strict i/o  keep spo2 >88  # BRITTANI  SCr appears plateuaed  ?overdiuresed, some contraction alkalosis  check feurea  check us renal  2. DM2  controlled off medications  3. COPD  albuterol prn  symbicort  4. CAD  asa, plavix  lipitor 80  5. HTN  norvasc 2.5  imdur 30  losartan 50 bid  lopressor 50 bid  6. BPH  finasteride 5  tamsulosin 0.4  7. DVT ppx  subq hep      #Progress Note Handoff:  Pending (specify):  Consults_________, Tests________, Test Results_______, Other_________  Family discussion:  Disposition: Home__x_/SNF___/Other________/Unknown at this time________ 79M PMHx DM2, CAD s/p 3v CABG 1995, s/p stent 2002, BPH, COPD, unspecified CHF, HTN here with acute decompensated heart failure. BRITTANI.    1. Acute on chronic unspecified CHF  lasix po 40 bid change to daily (home dose) pending cxr  clinically much improved  daily weights  strict i/o  keep spo2 >88  # BRITTANI  SCr appears plateuaed  ?overdiuresed, some contraction alkalosis  check feurea  check us renal  2. DM2  controlled off medications  3. COPD  albuterol prn  symbicort  4. CAD  asa, plavix  lipitor 80  5. HTN  norvasc 2.5  imdur 30  losartan 50 bid  lopressor 50 bid  6. BPH  finasteride 5  tamsulosin 0.4  7. DVT ppx  subq hep      #Progress Note Handoff:  Pending (specify):  Consults_________, Tests________, Test Results_______, Other_________  Family discussion:  Disposition: Home__x_/SNF___/Other________/Unknown at this time________ 79M PMHx DM2, CAD s/p 3v CABG 1995, s/p stent 2002, BPH, COPD, HFpEF of 50%, HTN here with acute decompensated heart failure. BRITTANI.    1. Acute on chronic diastolic HF  lasix po 40 bid change to daily (home dose) pending cxr  clinically much improved  daily weights  strict i/o  keep spo2 >88  # BRITTANI  SCr appears plateuaed  ?overdiuresed, some contraction alkalosis  check feurea  check us renal  2. DM2  controlled off medications  3. COPD  albuterol prn  symbicort  4. CAD  asa, plavix  lipitor 80  5. HTN  norvasc 2.5  imdur 30  losartan 50 bid  lopressor 50 bid  6. BPH  finasteride 5  tamsulosin 0.4  7. DVT ppx  subq hep      #Progress Note Handoff:  Pending (specify):  Consults_________, Tests________, Test Results_______, Other_________  Family discussion:  Disposition: Home__x_/SNF___/Other________/Unknown at this time________ 79M PMHx DM2, CAD s/p 3v CABG 1995, s/p stent 2002, CKD II BPH, COPD, HFpEF of 50%, HTN here with acute decompensated heart failure. BRITTANI.    1. Acute on chronic diastolic HF  lasix po 40 bid change to daily (home dose) pending cxr  clinically much improved  daily weights  strict i/o  keep spo2 >88  # BRITTANI on CKD II  SCr appears plateuaed  ?overdiuresed, some contraction alkalosis  check feurea  check us renal  2. DM2  controlled off medications  3. COPD  albuterol prn  symbicort  4. CAD  asa, plavix  lipitor 80  5. HTN  norvasc 2.5  imdur 30  losartan 50 bid  lopressor 50 bid  6. BPH  finasteride 5  tamsulosin 0.4  7. DVT ppx  subq hep      #Progress Note Handoff:  Pending (specify):  Consults_________, Tests________, Test Results_______, Other_________  Family discussion:  Disposition: Home__x_/SNF___/Other________/Unknown at this time________

## 2019-03-27 ENCOUNTER — TRANSCRIPTION ENCOUNTER (OUTPATIENT)
Age: 79
End: 2019-03-27

## 2019-03-27 VITALS
TEMPERATURE: 98 F | HEART RATE: 62 BPM | DIASTOLIC BLOOD PRESSURE: 58 MMHG | RESPIRATION RATE: 16 BRPM | SYSTOLIC BLOOD PRESSURE: 118 MMHG

## 2019-03-27 LAB
ANION GAP SERPL CALC-SCNC: 11 MMOL/L — SIGNIFICANT CHANGE UP (ref 7–14)
BUN SERPL-MCNC: 56 MG/DL — HIGH (ref 10–20)
CALCIUM SERPL-MCNC: 9.2 MG/DL — SIGNIFICANT CHANGE UP (ref 8.5–10.1)
CHLORIDE SERPL-SCNC: 100 MMOL/L — SIGNIFICANT CHANGE UP (ref 98–110)
CO2 SERPL-SCNC: 31 MMOL/L — SIGNIFICANT CHANGE UP (ref 17–32)
CREAT SERPL-MCNC: 1.7 MG/DL — HIGH (ref 0.7–1.5)
GLUCOSE BLDC GLUCOMTR-MCNC: 136 MG/DL — HIGH (ref 70–99)
GLUCOSE BLDC GLUCOMTR-MCNC: 160 MG/DL — HIGH (ref 70–99)
GLUCOSE BLDC GLUCOMTR-MCNC: 161 MG/DL — HIGH (ref 70–99)
GLUCOSE SERPL-MCNC: 148 MG/DL — HIGH (ref 70–99)
MAGNESIUM SERPL-MCNC: 2.2 MG/DL — SIGNIFICANT CHANGE UP (ref 1.8–2.4)
PHOSPHATE SERPL-MCNC: 3.7 MG/DL — SIGNIFICANT CHANGE UP (ref 2.1–4.9)
POTASSIUM SERPL-MCNC: 4.8 MMOL/L — SIGNIFICANT CHANGE UP (ref 3.5–5)
POTASSIUM SERPL-SCNC: 4.8 MMOL/L — SIGNIFICANT CHANGE UP (ref 3.5–5)
SODIUM SERPL-SCNC: 142 MMOL/L — SIGNIFICANT CHANGE UP (ref 135–146)

## 2019-03-27 RX ORDER — FINASTERIDE 5 MG/1
1 TABLET, FILM COATED ORAL
Qty: 30 | Refills: 0 | OUTPATIENT
Start: 2019-03-27 | End: 2019-04-25

## 2019-03-27 RX ORDER — FLUTICASONE PROPIONATE 50 MCG
1 SPRAY, SUSPENSION NASAL
Qty: 0 | Refills: 0 | DISCHARGE
Start: 2019-03-27

## 2019-03-27 RX ORDER — CELECOXIB 200 MG/1
1 CAPSULE ORAL
Qty: 0 | Refills: 0 | DISCHARGE
Start: 2019-03-27

## 2019-03-27 RX ORDER — MELOXICAM 15 MG/1
1 TABLET ORAL
Qty: 0 | Refills: 0 | COMMUNITY

## 2019-03-27 RX ORDER — CLOPIDOGREL BISULFATE 75 MG/1
1 TABLET, FILM COATED ORAL
Qty: 0 | Refills: 0 | COMMUNITY

## 2019-03-27 RX ORDER — RIVAROXABAN 15 MG-20MG
1 KIT ORAL
Qty: 30 | Refills: 0
Start: 2019-03-27 | End: 2019-04-25

## 2019-03-27 RX ORDER — TAMSULOSIN HYDROCHLORIDE 0.4 MG/1
1 CAPSULE ORAL
Qty: 30 | Refills: 0
Start: 2019-03-27 | End: 2019-04-25

## 2019-03-27 RX ADMIN — Medication 75 MILLIGRAM(S): at 05:30

## 2019-03-27 RX ADMIN — AMLODIPINE BESYLATE 2.5 MILLIGRAM(S): 2.5 TABLET ORAL at 05:29

## 2019-03-27 RX ADMIN — LOSARTAN POTASSIUM 50 MILLIGRAM(S): 100 TABLET, FILM COATED ORAL at 05:30

## 2019-03-27 RX ADMIN — Medication 145 MILLIGRAM(S): at 11:51

## 2019-03-27 RX ADMIN — Medication 1 SPRAY(S): at 05:29

## 2019-03-27 RX ADMIN — Medication 40 MILLIGRAM(S): at 06:52

## 2019-03-27 RX ADMIN — ISOSORBIDE MONONITRATE 30 MILLIGRAM(S): 60 TABLET, EXTENDED RELEASE ORAL at 11:52

## 2019-03-27 RX ADMIN — FINASTERIDE 5 MILLIGRAM(S): 5 TABLET, FILM COATED ORAL at 11:51

## 2019-03-27 NOTE — DISCHARGE NOTE NURSING/CASE MANAGEMENT/SOCIAL WORK - NSDCDPATPORTLINK_GEN_ALL_CORE
You can access the [facility] Patient Portal, offered by [facility], by registering with the following website: [email or web address]

## 2019-03-27 NOTE — DISCHARGE NOTE PROVIDER - NSDCCPCAREPLAN_GEN_ALL_CORE_FT
PRINCIPAL DISCHARGE DIAGNOSIS  Diagnosis: CHF exacerbation  Assessment and Plan of Treatment: euvolemic      SECONDARY DISCHARGE DIAGNOSES  Diagnosis: Dyspnea on exertion  Assessment and Plan of Treatment: improved

## 2019-03-27 NOTE — DISCHARGE NOTE PROVIDER - HOSPITAL COURSE
80yo male presents to the ER due to shortness of breath for 1 week. Worse with exertion. Denies fevers or cough. Given IV lasix in the ER but so far patient says little     improvement. he was managed on tele floor with diuretics for acute exacerbation of CHF which was diastolic. Initially stated on IV lasix which was switched to PO after     sufficient diuresis. he was also found to have BRITTANI on ckd stage 3 , possibly hypervolemic in nature which responded to lasix but pleateaued around 1.7. His Po anti-    hypoglycemics were held inpatient. Daily I/O and weights were done. He was also found to have atrial flutter, cardiology was consulted and he was started on anticoagulation     with xarelto which will be continued as outpatient. Pt is now medically stable for discharge. he will f/u with Cardio as outpatient. 80yo male presents to the ER due to shortness of breath for 1 week. Worse with exertion. Denies fevers or cough. Given IV lasix in the ER but so far patient says little     improvement. he was managed on tele floor with diuretics for acute exacerbation of CHF which was diastolic. Initially stated on IV lasix which was switched to PO after     sufficient diuresis. he was also found to have BRITTANI on ckd stage 3 , possibly hypervolemic in nature which responded to lasix but pleateaued around 1.7. His Po anti-    hypoglycemics were held inpatient. Daily I/O and weights were done. He was also found to have atrial flutter, cardiology was consulted and he was started on anticoagulation     with xarelto which will be continued as outpatient. Pt is now medically stable for discharge. he will f/u with Cardio as outpatient.         Patient was seen & examined on bedside prior to discharge. About 38 mins spent on discharge disposition

## 2019-03-29 DIAGNOSIS — I25.10 ATHEROSCLEROTIC HEART DISEASE OF NATIVE CORONARY ARTERY WITHOUT ANGINA PECTORIS: ICD-10-CM

## 2019-03-29 DIAGNOSIS — Z95.5 PRESENCE OF CORONARY ANGIOPLASTY IMPLANT AND GRAFT: ICD-10-CM

## 2019-03-29 DIAGNOSIS — Z79.82 LONG TERM (CURRENT) USE OF ASPIRIN: ICD-10-CM

## 2019-03-29 DIAGNOSIS — Z79.84 LONG TERM (CURRENT) USE OF ORAL HYPOGLYCEMIC DRUGS: ICD-10-CM

## 2019-03-29 DIAGNOSIS — Z79.51 LONG TERM (CURRENT) USE OF INHALED STEROIDS: ICD-10-CM

## 2019-03-29 DIAGNOSIS — Z79.899 OTHER LONG TERM (CURRENT) DRUG THERAPY: ICD-10-CM

## 2019-03-29 DIAGNOSIS — G47.33 OBSTRUCTIVE SLEEP APNEA (ADULT) (PEDIATRIC): ICD-10-CM

## 2019-03-29 DIAGNOSIS — Z99.81 DEPENDENCE ON SUPPLEMENTAL OXYGEN: ICD-10-CM

## 2019-03-29 DIAGNOSIS — Z95.1 PRESENCE OF AORTOCORONARY BYPASS GRAFT: ICD-10-CM

## 2019-03-29 DIAGNOSIS — I48.91 UNSPECIFIED ATRIAL FIBRILLATION: ICD-10-CM

## 2019-03-29 DIAGNOSIS — N18.3 CHRONIC KIDNEY DISEASE, STAGE 3 (MODERATE): ICD-10-CM

## 2019-03-29 DIAGNOSIS — I50.33 ACUTE ON CHRONIC DIASTOLIC (CONGESTIVE) HEART FAILURE: ICD-10-CM

## 2019-03-29 DIAGNOSIS — I13.0 HYPERTENSIVE HEART AND CHRONIC KIDNEY DISEASE WITH HEART FAILURE AND STAGE 1 THROUGH STAGE 4 CHRONIC KIDNEY DISEASE, OR UNSPECIFIED CHRONIC KIDNEY DISEASE: ICD-10-CM

## 2019-03-29 DIAGNOSIS — N40.0 BENIGN PROSTATIC HYPERPLASIA WITHOUT LOWER URINARY TRACT SYMPTOMS: ICD-10-CM

## 2019-03-29 DIAGNOSIS — Z79.02 LONG TERM (CURRENT) USE OF ANTITHROMBOTICS/ANTIPLATELETS: ICD-10-CM

## 2019-03-29 DIAGNOSIS — I48.92 UNSPECIFIED ATRIAL FLUTTER: ICD-10-CM

## 2019-03-29 DIAGNOSIS — E11.22 TYPE 2 DIABETES MELLITUS WITH DIABETIC CHRONIC KIDNEY DISEASE: ICD-10-CM

## 2019-03-29 DIAGNOSIS — Z87.891 PERSONAL HISTORY OF NICOTINE DEPENDENCE: ICD-10-CM

## 2019-03-29 DIAGNOSIS — R06.02 SHORTNESS OF BREATH: ICD-10-CM

## 2019-03-29 DIAGNOSIS — N17.9 ACUTE KIDNEY FAILURE, UNSPECIFIED: ICD-10-CM

## 2019-03-29 DIAGNOSIS — J44.9 CHRONIC OBSTRUCTIVE PULMONARY DISEASE, UNSPECIFIED: ICD-10-CM

## 2019-04-06 ENCOUNTER — INPATIENT (INPATIENT)
Facility: HOSPITAL | Age: 79
LOS: 5 days | Discharge: HOME HEALTH | End: 2019-04-12
Attending: INTERNAL MEDICINE | Admitting: INTERNAL MEDICINE
Payer: MEDICARE

## 2019-04-06 VITALS
TEMPERATURE: 96 F | SYSTOLIC BLOOD PRESSURE: 149 MMHG | DIASTOLIC BLOOD PRESSURE: 68 MMHG | RESPIRATION RATE: 20 BRPM | WEIGHT: 259.93 LBS | OXYGEN SATURATION: 88 % | HEART RATE: 62 BPM | HEIGHT: 66 IN

## 2019-04-06 DIAGNOSIS — Z95.5 PRESENCE OF CORONARY ANGIOPLASTY IMPLANT AND GRAFT: Chronic | ICD-10-CM

## 2019-04-06 DIAGNOSIS — Z95.1 PRESENCE OF AORTOCORONARY BYPASS GRAFT: Chronic | ICD-10-CM

## 2019-04-06 LAB
ALBUMIN SERPL ELPH-MCNC: 3.9 G/DL — SIGNIFICANT CHANGE UP (ref 3.5–5.2)
ALP SERPL-CCNC: 42 U/L — SIGNIFICANT CHANGE UP (ref 30–115)
ALT FLD-CCNC: 19 U/L — SIGNIFICANT CHANGE UP (ref 0–41)
ANION GAP SERPL CALC-SCNC: 13 MMOL/L — SIGNIFICANT CHANGE UP (ref 7–14)
AST SERPL-CCNC: 19 U/L — SIGNIFICANT CHANGE UP (ref 0–41)
BASOPHILS # BLD AUTO: 0.05 K/UL — SIGNIFICANT CHANGE UP (ref 0–0.2)
BASOPHILS NFR BLD AUTO: 0.6 % — SIGNIFICANT CHANGE UP (ref 0–1)
BILIRUB SERPL-MCNC: 0.7 MG/DL — SIGNIFICANT CHANGE UP (ref 0.2–1.2)
BUN SERPL-MCNC: 37 MG/DL — HIGH (ref 10–20)
CALCIUM SERPL-MCNC: 9.8 MG/DL — SIGNIFICANT CHANGE UP (ref 8.5–10.1)
CHLORIDE SERPL-SCNC: 104 MMOL/L — SIGNIFICANT CHANGE UP (ref 98–110)
CK MB CFR SERPL CALC: 4.6 NG/ML — SIGNIFICANT CHANGE UP (ref 0.6–6.3)
CK SERPL-CCNC: 148 U/L — SIGNIFICANT CHANGE UP (ref 0–225)
CO2 SERPL-SCNC: 26 MMOL/L — SIGNIFICANT CHANGE UP (ref 17–32)
CREAT SERPL-MCNC: 1.4 MG/DL — SIGNIFICANT CHANGE UP (ref 0.7–1.5)
EOSINOPHIL # BLD AUTO: 0.08 K/UL — SIGNIFICANT CHANGE UP (ref 0–0.7)
EOSINOPHIL NFR BLD AUTO: 1 % — SIGNIFICANT CHANGE UP (ref 0–8)
GLUCOSE BLDC GLUCOMTR-MCNC: 134 MG/DL — HIGH (ref 70–99)
GLUCOSE SERPL-MCNC: 67 MG/DL — LOW (ref 70–99)
HCT VFR BLD CALC: 43.7 % — SIGNIFICANT CHANGE UP (ref 42–52)
HGB BLD-MCNC: 13.8 G/DL — LOW (ref 14–18)
IMM GRANULOCYTES NFR BLD AUTO: 0.2 % — SIGNIFICANT CHANGE UP (ref 0.1–0.3)
LACTATE SERPL-SCNC: 1.2 MMOL/L — SIGNIFICANT CHANGE UP (ref 0.5–2.2)
LYMPHOCYTES # BLD AUTO: 1.04 K/UL — LOW (ref 1.2–3.4)
LYMPHOCYTES # BLD AUTO: 12.9 % — LOW (ref 20.5–51.1)
MCHC RBC-ENTMCNC: 27.3 PG — SIGNIFICANT CHANGE UP (ref 27–31)
MCHC RBC-ENTMCNC: 31.6 G/DL — LOW (ref 32–37)
MCV RBC AUTO: 86.4 FL — SIGNIFICANT CHANGE UP (ref 80–94)
MONOCYTES # BLD AUTO: 0.73 K/UL — HIGH (ref 0.1–0.6)
MONOCYTES NFR BLD AUTO: 9.1 % — SIGNIFICANT CHANGE UP (ref 1.7–9.3)
NEUTROPHILS # BLD AUTO: 6.12 K/UL — SIGNIFICANT CHANGE UP (ref 1.4–6.5)
NEUTROPHILS NFR BLD AUTO: 76.2 % — HIGH (ref 42.2–75.2)
NRBC # BLD: 0 /100 WBCS — SIGNIFICANT CHANGE UP (ref 0–0)
NT-PROBNP SERPL-SCNC: 1743 PG/ML — HIGH (ref 0–300)
PLATELET # BLD AUTO: 204 K/UL — SIGNIFICANT CHANGE UP (ref 130–400)
POTASSIUM SERPL-MCNC: 4.3 MMOL/L — SIGNIFICANT CHANGE UP (ref 3.5–5)
POTASSIUM SERPL-SCNC: 4.3 MMOL/L — SIGNIFICANT CHANGE UP (ref 3.5–5)
PROT SERPL-MCNC: 7.2 G/DL — SIGNIFICANT CHANGE UP (ref 6–8)
RBC # BLD: 5.06 M/UL — SIGNIFICANT CHANGE UP (ref 4.7–6.1)
RBC # FLD: 13.8 % — SIGNIFICANT CHANGE UP (ref 11.5–14.5)
SODIUM SERPL-SCNC: 143 MMOL/L — SIGNIFICANT CHANGE UP (ref 135–146)
TROPONIN T SERPL-MCNC: 0.03 NG/ML — CRITICAL HIGH
TROPONIN T SERPL-MCNC: 0.04 NG/ML — CRITICAL HIGH
WBC # BLD: 8.04 K/UL — SIGNIFICANT CHANGE UP (ref 4.8–10.8)
WBC # FLD AUTO: 8.04 K/UL — SIGNIFICANT CHANGE UP (ref 4.8–10.8)

## 2019-04-06 PROCEDURE — 99285 EMERGENCY DEPT VISIT HI MDM: CPT

## 2019-04-06 PROCEDURE — 71045 X-RAY EXAM CHEST 1 VIEW: CPT | Mod: 26

## 2019-04-06 RX ORDER — DEXTROSE 50 % IN WATER 50 %
25 SYRINGE (ML) INTRAVENOUS ONCE
Qty: 0 | Refills: 0 | Status: DISCONTINUED | OUTPATIENT
Start: 2019-04-06 | End: 2019-04-12

## 2019-04-06 RX ORDER — RIVAROXABAN 15 MG-20MG
15 KIT ORAL EVERY 24 HOURS
Qty: 0 | Refills: 0 | Status: DISCONTINUED | OUTPATIENT
Start: 2019-04-06 | End: 2019-04-12

## 2019-04-06 RX ORDER — SODIUM CHLORIDE 9 MG/ML
1000 INJECTION INTRAMUSCULAR; INTRAVENOUS; SUBCUTANEOUS ONCE
Qty: 0 | Refills: 0 | Status: DISCONTINUED | OUTPATIENT
Start: 2019-04-06 | End: 2019-04-06

## 2019-04-06 RX ORDER — ALPRAZOLAM 0.25 MG
0.5 TABLET ORAL
Qty: 0 | Refills: 0 | Status: DISCONTINUED | OUTPATIENT
Start: 2019-04-06 | End: 2019-04-12

## 2019-04-06 RX ORDER — ALPRAZOLAM 0.25 MG
0.5 TABLET ORAL
Qty: 0 | Refills: 0 | Status: DISCONTINUED | OUTPATIENT
Start: 2019-04-06 | End: 2019-04-06

## 2019-04-06 RX ORDER — METOPROLOL TARTRATE 50 MG
50 TABLET ORAL
Qty: 0 | Refills: 0 | Status: DISCONTINUED | OUTPATIENT
Start: 2019-04-06 | End: 2019-04-12

## 2019-04-06 RX ORDER — DEXTROSE 50 % IN WATER 50 %
15 SYRINGE (ML) INTRAVENOUS ONCE
Qty: 0 | Refills: 0 | Status: DISCONTINUED | OUTPATIENT
Start: 2019-04-06 | End: 2019-04-12

## 2019-04-06 RX ORDER — INSULIN LISPRO 100/ML
VIAL (ML) SUBCUTANEOUS
Qty: 0 | Refills: 0 | Status: DISCONTINUED | OUTPATIENT
Start: 2019-04-06 | End: 2019-04-12

## 2019-04-06 RX ORDER — AMLODIPINE BESYLATE 2.5 MG/1
5 TABLET ORAL DAILY
Qty: 0 | Refills: 0 | Status: DISCONTINUED | OUTPATIENT
Start: 2019-04-06 | End: 2019-04-12

## 2019-04-06 RX ORDER — ALBUTEROL 90 UG/1
2 AEROSOL, METERED ORAL EVERY 6 HOURS
Qty: 0 | Refills: 0 | Status: DISCONTINUED | OUTPATIENT
Start: 2019-04-06 | End: 2019-04-12

## 2019-04-06 RX ORDER — DEXTROSE 50 % IN WATER 50 %
12.5 SYRINGE (ML) INTRAVENOUS ONCE
Qty: 0 | Refills: 0 | Status: DISCONTINUED | OUTPATIENT
Start: 2019-04-06 | End: 2019-04-12

## 2019-04-06 RX ORDER — ATORVASTATIN CALCIUM 80 MG/1
80 TABLET, FILM COATED ORAL AT BEDTIME
Qty: 0 | Refills: 0 | Status: DISCONTINUED | OUTPATIENT
Start: 2019-04-06 | End: 2019-04-07

## 2019-04-06 RX ORDER — METOCLOPRAMIDE HCL 10 MG
10 TABLET ORAL ONCE
Qty: 0 | Refills: 0 | Status: DISCONTINUED | OUTPATIENT
Start: 2019-04-06 | End: 2019-04-06

## 2019-04-06 RX ORDER — GLUCAGON INJECTION, SOLUTION 0.5 MG/.1ML
1 INJECTION, SOLUTION SUBCUTANEOUS ONCE
Qty: 0 | Refills: 0 | Status: DISCONTINUED | OUTPATIENT
Start: 2019-04-06 | End: 2019-04-12

## 2019-04-06 RX ORDER — ASPIRIN/CALCIUM CARB/MAGNESIUM 324 MG
81 TABLET ORAL DAILY
Qty: 0 | Refills: 0 | Status: DISCONTINUED | OUTPATIENT
Start: 2019-04-06 | End: 2019-04-12

## 2019-04-06 RX ORDER — ALFUZOSIN HYDROCHLORIDE 10 MG/1
1 TABLET, EXTENDED RELEASE ORAL
Qty: 0 | Refills: 0 | COMMUNITY

## 2019-04-06 RX ORDER — INSULIN LISPRO 100/ML
VIAL (ML) SUBCUTANEOUS AT BEDTIME
Qty: 0 | Refills: 0 | Status: DISCONTINUED | OUTPATIENT
Start: 2019-04-06 | End: 2019-04-12

## 2019-04-06 RX ORDER — FUROSEMIDE 40 MG
40 TABLET ORAL EVERY 12 HOURS
Qty: 0 | Refills: 0 | Status: DISCONTINUED | OUTPATIENT
Start: 2019-04-06 | End: 2019-04-11

## 2019-04-06 RX ORDER — FENOFIBRATE,MICRONIZED 130 MG
145 CAPSULE ORAL DAILY
Qty: 0 | Refills: 0 | Status: COMPLETED | OUTPATIENT
Start: 2019-04-06 | End: 2019-04-07

## 2019-04-06 RX ORDER — COLESEVELAM HYDROCHLORIDE 625 MG/1
0 TABLET, FILM COATED ORAL
Qty: 0 | Refills: 0 | COMMUNITY

## 2019-04-06 RX ORDER — IPRATROPIUM/ALBUTEROL SULFATE 18-103MCG
3 AEROSOL WITH ADAPTER (GRAM) INHALATION
Qty: 0 | Refills: 0 | Status: COMPLETED | OUTPATIENT
Start: 2019-04-06 | End: 2019-04-06

## 2019-04-06 RX ORDER — SODIUM CHLORIDE 9 MG/ML
1000 INJECTION, SOLUTION INTRAVENOUS
Qty: 0 | Refills: 0 | Status: DISCONTINUED | OUTPATIENT
Start: 2019-04-06 | End: 2019-04-12

## 2019-04-06 RX ORDER — FUROSEMIDE 40 MG
40 TABLET ORAL ONCE
Qty: 0 | Refills: 0 | Status: COMPLETED | OUTPATIENT
Start: 2019-04-06 | End: 2019-04-06

## 2019-04-06 RX ORDER — ISOSORBIDE MONONITRATE 60 MG/1
30 TABLET, EXTENDED RELEASE ORAL DAILY
Qty: 0 | Refills: 0 | Status: DISCONTINUED | OUTPATIENT
Start: 2019-04-06 | End: 2019-04-12

## 2019-04-06 RX ORDER — LOSARTAN POTASSIUM 100 MG/1
0 TABLET, FILM COATED ORAL
Qty: 0 | Refills: 0 | COMMUNITY

## 2019-04-06 RX ORDER — FLUTICASONE PROPIONATE 50 MCG
1 SPRAY, SUSPENSION NASAL
Qty: 0 | Refills: 0 | Status: DISCONTINUED | OUTPATIENT
Start: 2019-04-06 | End: 2019-04-12

## 2019-04-06 RX ORDER — TAMSULOSIN HYDROCHLORIDE 0.4 MG/1
0.4 CAPSULE ORAL AT BEDTIME
Qty: 0 | Refills: 0 | Status: DISCONTINUED | OUTPATIENT
Start: 2019-04-06 | End: 2019-04-12

## 2019-04-06 RX ORDER — CHLORHEXIDINE GLUCONATE 213 G/1000ML
1 SOLUTION TOPICAL EVERY 12 HOURS
Qty: 0 | Refills: 0 | Status: DISCONTINUED | OUTPATIENT
Start: 2019-04-06 | End: 2019-04-12

## 2019-04-06 RX ORDER — FINASTERIDE 5 MG/1
5 TABLET, FILM COATED ORAL DAILY
Qty: 0 | Refills: 0 | Status: DISCONTINUED | OUTPATIENT
Start: 2019-04-06 | End: 2019-04-12

## 2019-04-06 RX ADMIN — Medication 40 MILLIGRAM(S): at 22:25

## 2019-04-06 RX ADMIN — Medication 3 MILLILITER(S): at 15:45

## 2019-04-06 RX ADMIN — Medication 3 MILLILITER(S): at 15:11

## 2019-04-06 RX ADMIN — Medication 0.5 MILLIGRAM(S): at 22:25

## 2019-04-06 RX ADMIN — Medication 40 MILLIGRAM(S): at 15:46

## 2019-04-06 RX ADMIN — RIVAROXABAN 15 MILLIGRAM(S): KIT at 22:26

## 2019-04-06 RX ADMIN — Medication 3 MILLILITER(S): at 15:00

## 2019-04-06 NOTE — H&P ADULT - NSICDXFAMILYHX_GEN_ALL_CORE_FT
Admitted with sickle cell crisis
Patient adm w/ scc and HCAP on iv antibiotics and PCA  dilaudid for pain.
Patient admitted with sickle cell crisis.
Pt adm with SCC on PCA . IV antibiotics for acute chest syndrome
Pt admitted w Sickle Cell crisis and acute chest syndrome
Pt admitted w Sickle cell crisis, acute chest syndrome
Pt admitted w sickle cell crisis, acute chest syndrome
SCC. pt having a blood transfusion. was spiking fever prior to start of transfusion
patient admitted with sickle cell crisis   H+H of 5.2/14.7
patient admitted with sickle cell crisis  H+H=5.2/14.7 today, patient also had a nose bleed that ENT came and packed left nostril
pt. admitted with sickle cell crisis, has been running intermittent fevers
pt. came in with sickle cell crisis, and fever
No pertinent family history in first degree relatives

## 2019-04-06 NOTE — ED PROVIDER NOTE - OBJECTIVE STATEMENT
79 year old male with pmhx of BPH, CHF, COPD (home O2 as needed), DM, HTN, CAD s/p heart artery stent and CABG, recent admission for chf . presents with SOB worsened over last 4 days. pt admits worse with exertion. Pt denies CP, cough, fever, chills, or calf pain. + chronic calf swelling due to CHF and venous stasis

## 2019-04-06 NOTE — ED PROVIDER NOTE - PHYSICAL EXAMINATION
CONST: Well appearing in NAD  EYES: PERRL, EOMI, Sclera and conjunctiva clear.   ENT: No nasal discharge. TM's clear B/L without drainage. Oropharynx normal appearing, no erythema or exudates. No abscess or swelling. Uvula midline.   NECK: Non-tender, no meningeal signs. normal ROM. supple   CARD: Normal S1 S2; irregular rhythm, rate normal  RESP: Equal BS B/L, + mild rhonchi bilaterally, no respiratory distress, speaking full sentences  GI: Soft, non-tender, non-distended. no cva tenderness. normal BS  MS: Normal ROM in all extremities. No midline spinal tenderness. pulses 2 +. + chronic venous stasis to bilateral lower extremities, no calf tenderness  SKIN: Warm, dry, no acute rashes. Good turgor  NEURO: A&Ox3, No focal deficits. Strength 5/5 with no sensory deficits. Steady gait.

## 2019-04-06 NOTE — H&P ADULT - NSHPREVIEWOFSYSTEMS_GEN_ALL_CORE
REVIEW OF SYSTEMS:  CONSTITUTIONAL: No fever, weight loss, or fatigue  EYES: No eye pain, visual disturbances, or discharge  NECK: No pain or stiffness  RESPIRATORY: No cough, wheezing, chills or hemoptysis; No shortness of breath  CARDIOVASCULAR: No chest pain, palpitations, dizziness, or leg swelling  GASTROINTESTINAL: No abdominal or epigastric pain. No nausea, vomiting, or hematemesis; No diarrhea or constipation. No melena or hematochezia.  GENITOURINARY: No dysuria, frequency, hematuria, or incontinence  NEUROLOGICAL: No headaches, memory loss, loss of strength, numbness, or tremors  MUSCULOSKELETAL: No joint pain or swelling; No muscle, back, or extremity pain REVIEW OF SYSTEMS:  CONSTITUTIONAL: No fever, weight loss, or fatigue  EYES: No eye pain, visual disturbances, or discharge  NECK: No pain or stiffness  RESPIRATORY: dyspnea/SOB on exertion and at rest   CARDIOVASCULAR: No chest pain, palpitations, dizziness, Leg swelling present   GASTROINTESTINAL: No abdominal or epigastric pain. No nausea, vomiting, or hematemesis; No diarrhea or constipation. No melena or hematochezia.  GENITOURINARY: No dysuria, frequency, hematuria, or incontinence  NEUROLOGICAL: No headaches, memory loss, loss of strength, numbness, or tremors  MUSCULOSKELETAL: reports chronic LE swelling/edema/venous stasis

## 2019-04-06 NOTE — PATIENT PROFILE ADULT - NSPROPTRIGHTREPPHONE_GEN_A_NUR
Requested Prescriptions     Pending Prescriptions Disp Refills    methocarbamol (ROBAXIN) 750 MG tablet [Pharmacy Med Name: Ritu Whelan 750MG   TAB] 40 tablet 0     Sig: TAKE 1 TABLET BY MOUTH EVERY 6 HOURS FOR 10 DAYS     Patient had TLIF on 02/05/18
 /

## 2019-04-06 NOTE — H&P ADULT - NSHPSOCIALHISTORY_GEN_ALL_CORE
smoking:  alcohol:  illicit drug abuse: smoking: denies  alcohol: denies  illicit drug abuse: denies

## 2019-04-06 NOTE — H&P ADULT - NSHPPHYSICALEXAM_GEN_ALL_CORE
PHYSICAL EXAM:  GENERAL: dyspneic  HEAD:  Atraumatic, Normocephalic  EYES: EOMI, PERRLA, conjunctiva and sclera clear  NERVOUS SYSTEM:  Alert & Oriented X 4, Good concentration; Motor Strength 5/5 B/L upper and lower extremities; DTRs 2+ intact and symmetric  CHEST/LUNG: rales b/l  CV/HEART: irregular rhythm  GI/ABDOMEN: Soft, Nontender, Nondistended; Bowel sounds present  EXTREMITIES:  2+ Peripheral Pulses, No clubbing, cyanosis, or edema  SKIN: No rashes or lesions Vital Signs Last 24 Hrs  T(C): 36.4 (06 Apr 2019 18:25), Max: 36.4 (06 Apr 2019 18:25)  T(F): 97.6 (06 Apr 2019 18:25), Max: 97.6 (06 Apr 2019 18:25)  HR: 66 (06 Apr 2019 18:25) (62 - 66)  BP: 151/65 (06 Apr 2019 18:25) (117/58 - 151/65)  RR: 16 (06 Apr 2019 18:25) (16 - 20)  SpO2: 97% (06 Apr 2019 15:41) (88% - 97%)    PHYSICAL EXAM:  GENERAL: dyspneic but speaking comfortably in full sentences   HEAD:  Atraumatic, Normocephalic  EYES: EOMI, PERRLA, conjunctiva and sclera clear  NERVOUS SYSTEM:  Alert & Oriented X 3  CHEST/LUNG: rales b/l  CV/HEART: irregular rhythm  GI/ABDOMEN: Soft, Nontender, obese abdomen, Bowel sounds present  EXTREMITIES:  dressing LE since last Monday; changed now. chronic skin changes   SKIN: see extrem

## 2019-04-06 NOTE — H&P ADULT - ASSESSMENT
patient presented with dyspnea and admitted to low risk telemetry for decompensated CHF     1) Acute on Chronic Diastolic CHF exacerbation; last EF 50%  -admit to low risk tele  -CE, echocardiogram, daily weights and I and O  -Cardiology evaluation    2) COPD  -chronic/stable  -continue with home meds  -nebs prn and supplemental O2    3) Morbidly Obese  -needs to be compliant with meds/diet  -weight loss and diet modification with dietary input    4) DMII with hypoglycemia  -hold all oral hypoglycemics  -encourage oral intake     5) CAD/HTN/DL  -home meds    6) Atrial flutter  -on oral anticoagulant xarelto  -on BB    7) BPH  -home meds and outpatient  follow up     8) CKD stage III  -outpatient kidney monitoring and nephrology follow up     9) DVT and GI ppx    10) Chronic venous stasis/venous insufficiency   -keeps legs elevated  -ACE bandage and vascular follow up on the outside       # Progress Note Handoff  PENDING as follows  consults: Cardiology   Test: Echo, CE, and tele monitoring   Other:  Family discussion: discussed with patient   Disposition:  Home ____ or SNF _likely____ Other _____ Unknown at this time ____ patient presented with dyspnea and admitted to low risk telemetry for decompensated CHF     1) Acute on Chronic systolic CHF exacerbation; last EF 50%  -admit to low risk tele  -CE, echocardiogram, daily weights and I and O  -Cardiology evaluation  -IV lasix 40mg twice daily and monitor BMP     2) COPD/chronic resp failure/home O2 dependent   -chronic/stable  -continue with home meds  -nebs prn and supplemental O2    3) Morbidly Obese  -needs to be compliant with meds/diet  -weight loss and diet modification with dietary input    4) DMII with hypoglycemia  -hold all oral hypoglycemics and insulin  -encourage oral intake   -insulin sliding for hyperglycemia   -avoid sulfonylureas    5) CAD/CABGHTN/DL  -home meds    6) Atrial flutter  -on oral anticoagulant xarelto  -on BB    7) BPH  -home meds and outpatient  follow up     8) CKD stage III  -outpatient kidney monitoring and nephrology follow up     9) DVT and GI ppx    10) Chronic venous stasis/venous insufficiency   -open the LE dressing done last Monday   -keeps legs elevated  -ACE bandage and vascular follow up on the outside       # Progress Note Handoff  PENDING as follows  consults: Cardiology   Test: Echo, CE, and tele monitoring   Other:  Family discussion: discussed with patient   Disposition:  Home ____ or SNF _likely____ Other _____ Unknown at this time ____

## 2019-04-06 NOTE — ED PROVIDER NOTE - CLINICAL SUMMARY MEDICAL DECISION MAKING FREE TEXT BOX
Pt with [facility]. After nebulizer treatment and lasix pt still with [facility]. Will not be able to go home at this time. Will need parenteral lasix.

## 2019-04-06 NOTE — H&P ADULT - NSICDXPASTMEDICALHX_GEN_ALL_CORE_FT
PAST MEDICAL HISTORY:  BPH (benign prostatic hyperplasia)     CHF (congestive heart failure)     COPD (chronic obstructive pulmonary disease)     Diabetes     HTN (hypertension)

## 2019-04-06 NOTE — ED ADULT TRIAGE NOTE - MEANS OF ARRIVAL
[facility] Progress Note    Admit Date: 9/21/2018   LOS: 1 day     SUBJECTIVE:     Follow-up For:  ESRD evaluation and management    Overnight events:  Doing well today, no new issues or complaints.  She states her breathing has improved when compared to yesterday, she only has pain in the hips but no other pain, no chest pain, palpitations, abd pain, n/v, LE swelling.  Diarrhea has resolved and her appetite is fine.  Denies fatigue.  She thinks she is ok to wait for dialysis until tomorrow.    Scheduled Meds:   amLODIPine  10 mg Oral Daily    aspirin  81 mg Oral Daily    calcium acetate  667 mg Oral TID WM    furosemide  40 mg Intravenous Daily    gabapentin  100 mg Oral QHS    insulin detemir U-100  20 Units Subcutaneous QHS    isosorbide mononitrate  30 mg Oral Daily    pantoprazole  40 mg Oral Daily    pravastatin  40 mg Oral QHS     Continuous Infusions:  PRN Meds:acetaminophen, dextrose 50%, dextrose 50%, glucagon (human recombinant), glucose, glucose, influenza, insulin aspart U-100, sodium chloride 0.9%    Review of patient's allergies indicates:  No Known Allergies    Review of Systems  10 point ROS done and is neg besides what is listed above in HPI    OBJECTIVE:     Vital Signs (Most Recent)  Temp: 98 °F (36.7 °C) (09/24/18 0705)  Pulse: 76 (09/24/18 0705)  Resp: 18 (09/24/18 0705)  BP: 135/60 (09/24/18 0705)  SpO2: (!) 92 % (09/24/18 0743)    Vital Signs Range (Last 24H):  Temp:  [97.5 °F (36.4 °C)-98.8 °F (37.1 °C)]   Pulse:  [69-96]   Resp:  [14-20]   BP: (117-140)/(58-90)   SpO2:  [76 %-97 %]     I & O (Last 24H):    Intake/Output Summary (Last 24 hours) at 9/24/2018 0947  Last data filed at 9/24/2018 0837  Gross per 24 hour   Intake 485 ml   Output 200 ml   Net 285 ml     Physical Exam:  GEN:  appears well, comfortably lying in bed, in no acute distress, no nasal cannula in place  HEENT:  anicteric sclera, MMM  NECK:  no apparent JVD  CVS:  irregular rhythm with no murmurs or rubs  PULM:   CTAB  ABD:  soft, nontender, nondistended, normal bowel sounds  EXTR:  no edema  SKIN:  no rashes or lesions, warm and not diaphoretic  ACCESS:  RUE forearm loop AVG with good thrill, mild pulsatility    Laboratory:  CBC:   Recent Labs   Lab  09/24/18   0423   WBC  8.68   RBC  3.00*   HGB  8.4*   HCT  27.5*   PLT  341   MCV  92   MCH  28.0   MCHC  30.5*     CMP:   Recent Labs   Lab  09/24/18   0424   GLU  132*   CALCIUM  9.7   ALBUMIN  2.3*   PROT  6.8   NA  135*   K  3.9   CO2  26   CL  94*   BUN  42*   CREATININE  9.4*   ALKPHOS  57   ALT  8*   AST  10   BILITOT  0.3     Cardiac markers:   Recent Labs   Lab  09/22/18   0150   TROPONINI  0.026     ABGs: No results for input(s): PH, PCO2, PO2, HCO3, POCSATURATED, BE in the last 168 hours.  Microbiology Results (last 7 days)     ** No results found for the last 168 hours. **          Diagnostic Results:  Labs: Reviewed    ASSESSMENT/PLAN:     68 y.o. female with a history of ESRD (MWF at [facility] with Dr. Madden), HTN, CHF, DM, CVA who presents with weakness and fatigue, which had been ongoing for approximately one week, she is unsure what precipitated this event, but this could be due to excess volume, although she does not believe she has hypervolemia.     1. ESRD - as above  - dialysis MWF  - strict I/O, daily weights  - please avoid gadolinium, fleets, phos-based laxatives, NSAIDs     2. HTN/hypervolemia  - has had some significant elevations in her BP  - can continue home meds  - low Na diet essential  - would fluid restrict     3. Anemia ESRD  - will need to contact dialysis unit on Monday to see if/what dose of EPO she is on     4. MBD of ESRD  - can continue binders with meals        Aroldo Pak MD, 9/24/2018 9:47 AM  [facility] PGY-V  Pager: [phone]  Cell: [phone]   wheelchair

## 2019-04-06 NOTE — ED ADULT NURSE REASSESSMENT NOTE - NS ED NURSE REASSESS COMMENT FT1
Fs 55 @ 1400, patient AOx3, juice and lunch tray given repeat FS 76 @1430, nova villeda aware, pt in no acute distress will continue to monitor. Fs 55 @ 1400, patient AOx3, juice and lunch tray given repeat FS 75 @1430, nova villeda aware, pt in no acute distress will continue to monitor.

## 2019-04-06 NOTE — H&P ADULT - HISTORY OF PRESENT ILLNESS
79 year old male with pmhx of BPH, CHF, COPD (home O2 as needed), DM, HTN, CAD s/p heart artery stent and CABG, recent admission for chf . presents with SOB worsened over last 4 days. pt admits worse with exertion. Pt denies CP, cough, fever, chills, or calf pain. + chronic calf swelling due to CHF and venous stasis 79 year old male with pmhx of BPH, CHF, COPD (home O2 as needed), DM, HTN, CAD s/p heart artery stent and CABG, recent admission for chf . presents with SOB worsened over last 4 days. pt admits worse with exertion. Pt denies CP, cough, fever, chills, or calf pain. + chronic calf swelling due to CHF and venous stasis    At the time of admission, patient is sitting up in chair, comfortable and speaking in full sentences, on nasal canula O2. He denies any chest pain and or palpitations, reports dyspnea and orthopnea. He sees Dr. Ramirez/caridology.  -will admit to low risk tele for chf exacerbation

## 2019-04-06 NOTE — ED PROVIDER NOTE - NS ED ROS FT
Review of Systems:  	•	CONSTITUTIONAL - no fever, no diaphoresis, no chills  	•	SKIN - no rash  	•	EYES - no eye pain, no blurry vision  	•	ENT - no change in hearing, no sore throat, no ear pain or tinnitus  	•	RESPIRATORY - + shortness of breath, no cough  	•	CARDIAC - no chest pain, no palpitations  	•	GI - no abd pain, no nausea, no vomiting, no diarrhea, no constipation  	•	GENITO-URINARY - no discharge, no dysuria; no hematuria, no increased urinary frequency  	•	MUSCULOSKELETAL - no joint paint, no swelling, no redness  	•	NEUROLOGIC - no weakness, no headache, no paresthesias, no LOC  	•	PSYCH - no anxiety, non suicidal, non homicidal, no hallucination, no depression

## 2019-04-06 NOTE — H&P ADULT - NSHPLABSRESULTS_GEN_ALL_CORE
13.8   8.04  )-----------( 204      ( 06 Apr 2019 12:51 )             43.7   04-06    143  |  104  |  37<H>  ----------------------------<  67<L>  4.3   |  26  |  1.4    Ca    9.8      06 Apr 2019 12:51    TPro  7.2  /  Alb  3.9  /  TBili  0.7  /  DBili  x   /  AST  19  /  ALT  19  /  AlkPhos  42  04-06    Serum Pro-Brain Natriuretic Peptide: 1743 pg/mL (04.06.19 @ 12:51)    Xray Chest 1 View- PORTABLE-Urgent (04.06.19 @ 13:11)     Impression:      Stable right pleuraleffusion and pulmonary vascular congestion.    < end of copied text >

## 2019-04-07 LAB
ANION GAP SERPL CALC-SCNC: 12 MMOL/L — SIGNIFICANT CHANGE UP (ref 7–14)
BUN SERPL-MCNC: 37 MG/DL — HIGH (ref 10–20)
CALCIUM SERPL-MCNC: 9.6 MG/DL — SIGNIFICANT CHANGE UP (ref 8.5–10.1)
CHLORIDE SERPL-SCNC: 103 MMOL/L — SIGNIFICANT CHANGE UP (ref 98–110)
CO2 SERPL-SCNC: 33 MMOL/L — HIGH (ref 17–32)
CREAT SERPL-MCNC: 1.6 MG/DL — HIGH (ref 0.7–1.5)
GLUCOSE BLDC GLUCOMTR-MCNC: 152 MG/DL — HIGH (ref 70–99)
GLUCOSE BLDC GLUCOMTR-MCNC: 171 MG/DL — HIGH (ref 70–99)
GLUCOSE BLDC GLUCOMTR-MCNC: 202 MG/DL — HIGH (ref 70–99)
GLUCOSE BLDC GLUCOMTR-MCNC: 81 MG/DL — SIGNIFICANT CHANGE UP (ref 70–99)
GLUCOSE SERPL-MCNC: 77 MG/DL — SIGNIFICANT CHANGE UP (ref 70–99)
HCT VFR BLD CALC: 41.4 % — LOW (ref 42–52)
HGB BLD-MCNC: 13.1 G/DL — LOW (ref 14–18)
MCHC RBC-ENTMCNC: 27.2 PG — SIGNIFICANT CHANGE UP (ref 27–31)
MCHC RBC-ENTMCNC: 31.6 G/DL — LOW (ref 32–37)
MCV RBC AUTO: 86.1 FL — SIGNIFICANT CHANGE UP (ref 80–94)
NRBC # BLD: 0 /100 WBCS — SIGNIFICANT CHANGE UP (ref 0–0)
PLATELET # BLD AUTO: 185 K/UL — SIGNIFICANT CHANGE UP (ref 130–400)
POTASSIUM SERPL-MCNC: 4.4 MMOL/L — SIGNIFICANT CHANGE UP (ref 3.5–5)
POTASSIUM SERPL-SCNC: 4.4 MMOL/L — SIGNIFICANT CHANGE UP (ref 3.5–5)
RBC # BLD: 4.81 M/UL — SIGNIFICANT CHANGE UP (ref 4.7–6.1)
RBC # FLD: 14 % — SIGNIFICANT CHANGE UP (ref 11.5–14.5)
SODIUM SERPL-SCNC: 148 MMOL/L — HIGH (ref 135–146)
WBC # BLD: 7.41 K/UL — SIGNIFICANT CHANGE UP (ref 4.8–10.8)
WBC # FLD AUTO: 7.41 K/UL — SIGNIFICANT CHANGE UP (ref 4.8–10.8)

## 2019-04-07 RX ORDER — PETROLATUM,WHITE
1 JELLY (GRAM) TOPICAL DAILY
Qty: 0 | Refills: 0 | Status: DISCONTINUED | OUTPATIENT
Start: 2019-04-07 | End: 2019-04-07

## 2019-04-07 RX ORDER — LOSARTAN POTASSIUM 100 MG/1
50 TABLET, FILM COATED ORAL DAILY
Qty: 0 | Refills: 0 | Status: DISCONTINUED | OUTPATIENT
Start: 2019-04-08 | End: 2019-04-12

## 2019-04-07 RX ADMIN — Medication 50 MILLIGRAM(S): at 06:42

## 2019-04-07 RX ADMIN — Medication 40 MILLIGRAM(S): at 10:54

## 2019-04-07 RX ADMIN — Medication 50 MILLIGRAM(S): at 17:28

## 2019-04-07 RX ADMIN — FINASTERIDE 5 MILLIGRAM(S): 5 TABLET, FILM COATED ORAL at 10:58

## 2019-04-07 RX ADMIN — Medication 81 MILLIGRAM(S): at 10:58

## 2019-04-07 RX ADMIN — TAMSULOSIN HYDROCHLORIDE 0.4 MILLIGRAM(S): 0.4 CAPSULE ORAL at 21:18

## 2019-04-07 RX ADMIN — Medication 145 MILLIGRAM(S): at 10:58

## 2019-04-07 RX ADMIN — Medication 40 MILLIGRAM(S): at 20:35

## 2019-04-07 RX ADMIN — AMLODIPINE BESYLATE 5 MILLIGRAM(S): 2.5 TABLET ORAL at 06:41

## 2019-04-07 RX ADMIN — RIVAROXABAN 15 MILLIGRAM(S): KIT at 17:29

## 2019-04-07 RX ADMIN — Medication 0: at 21:35

## 2019-04-07 RX ADMIN — ISOSORBIDE MONONITRATE 30 MILLIGRAM(S): 60 TABLET, EXTENDED RELEASE ORAL at 10:58

## 2019-04-07 NOTE — CONSULT NOTE ADULT - SUBJECTIVE AND OBJECTIVE BOX
CARDIOLOGY CONSULT NOTE     CHIEF COMPLAINT/REASON FOR CONSULT:    HPI:  79 year old male with pmhx of BPH, CHF, COPD (home O2 as needed), DM, HTN, CAD s/p heart artery stent and CABG, recent admission for chf . presents with SOB worsened over last 4 days. pt admits worse with exertion. Pt denies CP, cough, fever, chills, or calf pain. + chronic calf swelling due to CHF and venous stasis    At the time of admission, patient is sitting up in chair, comfortable and speaking in full sentences, on nasal canula O2. He denies any chest pain and or palpitations, reports dyspnea and orthopnea.       PAST MEDICAL & SURGICAL HISTORY:  BPH (benign prostatic hyperplasia)  CHF (congestive heart failure)  COPD (chronic obstructive pulmonary disease)  Diabetes  HTN (hypertension)  H/O heart artery stent  S/P CABG x 3      Cardiac Risks:   [x ]HTN, x] DM, [ ] Smoking, [ ] FH,  [ ] Lipids        MEDICATIONS:  MEDICATIONS  (STANDING):  amLODIPine   Tablet 5 milliGRAM(s) Oral daily  aspirin  chewable 81 milliGRAM(s) Oral daily  chlorhexidine 4% Liquid 1 Application(s) Topical every 12 hours  dextrose 5%. 1000 milliLiter(s) (50 mL/Hr) IV Continuous <Continuous>  dextrose 50% Injectable 12.5 Gram(s) IV Push once  dextrose 50% Injectable 25 Gram(s) IV Push once  dextrose 50% Injectable 25 Gram(s) IV Push once  fenofibrate Tablet 145 milliGRAM(s) Oral daily  finasteride 5 milliGRAM(s) Oral daily  fluticasone propionate 50 MICROgram(s)/spray Nasal Spray 1 Spray(s) Both Nostrils two times a day  furosemide   Injectable 40 milliGRAM(s) IV Push every 12 hours  insulin lispro (HumaLOG) corrective regimen sliding scale   SubCutaneous three times a day before meals  insulin lispro (HumaLOG) corrective regimen sliding scale   SubCutaneous at bedtime  isosorbide   mononitrate ER Tablet (IMDUR) 30 milliGRAM(s) Oral daily  metoprolol tartrate 50 milliGRAM(s) Oral two times a day  rivaroxaban 15 milliGRAM(s) Oral every 24 hours  tamsulosin 0.4 milliGRAM(s) Oral at bedtime      FAMILY HISTORY:  No pertinent family history in first degree relatives      SOCIAL HISTORY:      [ ] Marital status    Allergies    No Known Allergies        	    REVIEW OF SYSTEMS:  CONSTITUTIONAL: No fever, weight loss, or fatigue  EYES: No eye pain, visual disturbances, or discharge  ENMT:  No difficulty hearing, tinnitus, vertigo; No sinus or throat pain  NECK: No pain or stiffness  RESPIRATORY: No cough, wheezing, chills or hemoptysis; No Shortness of Breath Decreased BS  CARDIOVASCULAR: See above  GASTROINTESTINAL: No abdominal or epigastric pain. No nausea, vomiting, or hematemesis; No diarrhea or constipation. No melena or hematochezia.  GENITOURINARY: No dysuria, frequency, hematuria, or incontinence  NEUROLOGICAL: No headaches, memory loss, loss of strength, numbness, or tremors  SKIN: No itching, burning, rashes, or lesions     PHYSICAL EXAM:  T(C): 35.8 (04-07-19 @ 05:38), Max: 36.4 (04-06-19 @ 18:25)  HR: 63 (04-07-19 @ 05:38) (62 - 66)  BP: 116/57 (04-07-19 @ 05:38) (109/55 - 151/65)  RR: 16 (04-07-19 @ 05:38) (16 - 20)  SpO2: 96% (04-06-19 @ 21:16) (88% - 97%)  Wt(kg): --  I&O's Summary      Appearance: Normal	  Psychiatry: A & O x 3, Mood & affect appropriate  HEENT:   Normal oral mucosa, PERRL, EOMI	  Lymphatic: No lymphadenopathy  Cardiovascular: Normal S1 S2,RRR, No JVD, No murmurs  Respiratory: Lungs clear to auscultation	  Gastrointestinal:  Soft, Non-tender, + BS	  Skin: No rashes, No ecchymoses, No cyanosis	  Neurologic: Non-focal  Extremities: Normal range of motion, No clubbing, cyanosis or edema  Vascular: Peripheral pulses palpable 2+ bilaterally      ECG:  	not available     	  LABS:	 	    CARDIAC MARKERS:          Serum Pro-Brain Natriuretic Peptide: 1743 pg/mL (04-06 @ 12:51)                            13.1   7.41  )-----------( 185      ( 07 Apr 2019 06:12 )             41.4     04-07    148<H>  |  103  |  37<H>  ----------------------------<  77  4.4   |  33<H>  |  1.6<H>    Ca    9.6      07 Apr 2019 06:12    TPro  7.2  /  Alb  3.9  /  TBili  0.7  /  DBili  x   /  AST  19  /  ALT  19  /  AlkPhos  42  04-06      proBNP: Serum Pro-Brain Natriuretic Peptide: 1743 pg/mL (04-06 @ 12:51)

## 2019-04-08 LAB
ANION GAP SERPL CALC-SCNC: 14 MMOL/L — SIGNIFICANT CHANGE UP (ref 7–14)
BUN SERPL-MCNC: 40 MG/DL — HIGH (ref 10–20)
CALCIUM SERPL-MCNC: 9.6 MG/DL — SIGNIFICANT CHANGE UP (ref 8.5–10.1)
CHLORIDE SERPL-SCNC: 99 MMOL/L — SIGNIFICANT CHANGE UP (ref 98–110)
CO2 SERPL-SCNC: 29 MMOL/L — SIGNIFICANT CHANGE UP (ref 17–32)
CREAT SERPL-MCNC: 1.7 MG/DL — HIGH (ref 0.7–1.5)
GLUCOSE BLDC GLUCOMTR-MCNC: 135 MG/DL — HIGH (ref 70–99)
GLUCOSE BLDC GLUCOMTR-MCNC: 143 MG/DL — HIGH (ref 70–99)
GLUCOSE BLDC GLUCOMTR-MCNC: 210 MG/DL — HIGH (ref 70–99)
GLUCOSE BLDC GLUCOMTR-MCNC: 95 MG/DL — SIGNIFICANT CHANGE UP (ref 70–99)
GLUCOSE SERPL-MCNC: 97 MG/DL — SIGNIFICANT CHANGE UP (ref 70–99)
HCT VFR BLD CALC: 42.8 % — SIGNIFICANT CHANGE UP (ref 42–52)
HGB BLD-MCNC: 13.4 G/DL — LOW (ref 14–18)
MCHC RBC-ENTMCNC: 27 PG — SIGNIFICANT CHANGE UP (ref 27–31)
MCHC RBC-ENTMCNC: 31.3 G/DL — LOW (ref 32–37)
MCV RBC AUTO: 86.3 FL — SIGNIFICANT CHANGE UP (ref 80–94)
NRBC # BLD: 0 /100 WBCS — SIGNIFICANT CHANGE UP (ref 0–0)
NT-PROBNP SERPL-SCNC: 1558 PG/ML — HIGH (ref 0–300)
PLATELET # BLD AUTO: 197 K/UL — SIGNIFICANT CHANGE UP (ref 130–400)
POTASSIUM SERPL-MCNC: 3.9 MMOL/L — SIGNIFICANT CHANGE UP (ref 3.5–5)
POTASSIUM SERPL-SCNC: 3.9 MMOL/L — SIGNIFICANT CHANGE UP (ref 3.5–5)
RBC # BLD: 4.96 M/UL — SIGNIFICANT CHANGE UP (ref 4.7–6.1)
RBC # FLD: 13.9 % — SIGNIFICANT CHANGE UP (ref 11.5–14.5)
SODIUM SERPL-SCNC: 142 MMOL/L — SIGNIFICANT CHANGE UP (ref 135–146)
WBC # BLD: 7.87 K/UL — SIGNIFICANT CHANGE UP (ref 4.8–10.8)
WBC # FLD AUTO: 7.87 K/UL — SIGNIFICANT CHANGE UP (ref 4.8–10.8)

## 2019-04-08 PROCEDURE — 71045 X-RAY EXAM CHEST 1 VIEW: CPT | Mod: 26

## 2019-04-08 RX ADMIN — TAMSULOSIN HYDROCHLORIDE 0.4 MILLIGRAM(S): 0.4 CAPSULE ORAL at 21:43

## 2019-04-08 RX ADMIN — ISOSORBIDE MONONITRATE 30 MILLIGRAM(S): 60 TABLET, EXTENDED RELEASE ORAL at 11:43

## 2019-04-08 RX ADMIN — Medication 81 MILLIGRAM(S): at 11:43

## 2019-04-08 RX ADMIN — Medication 4: at 11:39

## 2019-04-08 RX ADMIN — Medication 50 MILLIGRAM(S): at 21:43

## 2019-04-08 RX ADMIN — LOSARTAN POTASSIUM 50 MILLIGRAM(S): 100 TABLET, FILM COATED ORAL at 06:11

## 2019-04-08 RX ADMIN — RIVAROXABAN 15 MILLIGRAM(S): KIT at 17:02

## 2019-04-08 RX ADMIN — AMLODIPINE BESYLATE 5 MILLIGRAM(S): 2.5 TABLET ORAL at 06:11

## 2019-04-08 RX ADMIN — Medication 50 MILLIGRAM(S): at 06:11

## 2019-04-08 RX ADMIN — Medication 40 MILLIGRAM(S): at 21:43

## 2019-04-08 RX ADMIN — FINASTERIDE 5 MILLIGRAM(S): 5 TABLET, FILM COATED ORAL at 11:43

## 2019-04-08 RX ADMIN — Medication 1 SPRAY(S): at 17:03

## 2019-04-08 RX ADMIN — Medication 40 MILLIGRAM(S): at 09:08

## 2019-04-08 NOTE — PHYSICAL THERAPY INITIAL EVALUATION ADULT - GENERAL OBSERVATIONS, REHAB EVAL
08:30-08:52 Chart reviewed. Pt encountered sitting in chair, may be seen by Physical Therapist as confirmed with Nurse. Patient denied pain and would like to walk but "gets out of breath"

## 2019-04-08 NOTE — PHYSICAL THERAPY INITIAL EVALUATION ADULT - GAIT DEVIATIONS NOTED, PT EVAL
decreased weight-shifting ability/posture, base of support, robert/decreased robert/decreased step length

## 2019-04-08 NOTE — CONSULT NOTE ADULT - ASSESSMENT
IMPRESSION: Rehab of 78 y/o  m  rehab  for debility      PRECAUTIONS: [  xx] Cardiac  [ xx ] Respiratory  [  ] Seizures [  ] Contact Isolation  [  ] Droplet Isolation  [ FALL ] Other    Weight Bearing Status:     RECOMMENDATION:    Out of Bed to Chair     DVT/Decubiti Prophylaxis    REHAB PLAN:     [ x  ] Bedside P/T 3-5 times a week   [   ]   Bedside O/T  2-3 times a week             [   ] No Rehab Therapy Indicated                   [   ]  Speech Therapy   Conditioning/ROM                                    ADL  Bed Mobility                                               Conditioning/ROM  Transfers                                                     Bed Mobility  Sitting /Standing Balance                         Transfers                                        Gait Training                                               Sitting/Standing Balance  Stair Training [   ]Applicable                    Home equipment Eval                                                                        Splinting  [   ] Only      GOALS:   ADL   [ x  ]   Independent                    Transfers  [ x  ] Independent                          Ambulation  [  x ] Independent     [    x] With device                            [   ]  CG                                                         [   ]  CG                                                                  [   ] CG                            [    ] Min A                                                   [   ] Min A                                                              [   ] Min  A          DISCHARGE PLAN:   [   ]  Good candidate for Intensive Rehabilitation/Hospital based-4A [facility]                                             Will tolerate 3hrs Intensive Rehab Daily                                       [    ]  Short Term Rehab in Skilled Nursing Facility                                       [   xx ]  Home with Outpatient or VN services ptn  need rw  for ambulation                                           [    ]  Possible Candidate for Intensive Hospital based Rehab
Patient does not follow weight at home . He has YAMIL not use c-pap. He was drinking a lot of water. He is volume overloaded. Responding to iv Lasix. He feels much better. Pulmonary to see. If needed add methalazone
Impression:  CHF exacerbation   copd stable   YAMIL / [facility]         Plan:  from pulmonary can be d/c home on breo 100 daily aND INCRUSE AT NIGHT   NEED OUTPATIENT FOLLOW UP FOR FULL pfT   FOLLOW CARDIOLOGY   ADJUST LASIX   DVT PROPHYLAXIS   FOLLOW LYTES   CHECK POX ON RA REST AND EXERTION , PATIENT ALREADY HAS HOME OXYGEN

## 2019-04-08 NOTE — CONSULT NOTE ADULT - SUBJECTIVE AND OBJECTIVE BOX
HPI: 79 year old male with pmhx of BPH, CHF, COPD (home O2 as needed), DM, HTN, CAD s/p heart artery stent and CABG, obesity  recent admission for CHF  presents with SOB worsened over last 4 days. pt admits worse with exertion. Pt denies CP, cough, fever, chills, or calf pain. + chronic calf swelling due to CHF and venous stasis  At the time of admission, patient is sitting up in chair, comfortable and speaking in full sentences, on nasal canula O2. He denies any chest pain and or palpitations, reports dyspnea and orthopnea. He sees Dr. Ramirez/carmaulikogy.  -will admit to low risk tele for chf exacerbation      PTN  REFERRED TO ACUTE  REHAB  FOR  EVAL AND  TX   PAST MEDICAL & SURGICAL HISTORY:  BPH (benign prostatic hyperplasia)  CHF (congestive heart failure)  COPD (chronic obstructive pulmonary disease)  Diabetes  HTN (hypertension)  H/O heart artery stent  S/P CABG x 3      Hospital Course:    TODAY'S SUBJECTIVE & REVIEW OF SYMPTOMS:     Constitutional WNL   Cardio WNL   Resp WNL   GI WNL  Heme WNL  Endo WNL  Skin WNL  MSK WNL  Neuro WNL  Cognitive WNL  Psych WNL      MEDICATIONS  (STANDING):  amLODIPine   Tablet 5 milliGRAM(s) Oral daily  aspirin  chewable 81 milliGRAM(s) Oral daily  chlorhexidine 4% Liquid 1 Application(s) Topical every 12 hours  dextrose 5%. 1000 milliLiter(s) (50 mL/Hr) IV Continuous <Continuous>  dextrose 50% Injectable 12.5 Gram(s) IV Push once  dextrose 50% Injectable 25 Gram(s) IV Push once  dextrose 50% Injectable 25 Gram(s) IV Push once  finasteride 5 milliGRAM(s) Oral daily  fluticasone propionate 50 MICROgram(s)/spray Nasal Spray 1 Spray(s) Both Nostrils two times a day  furosemide   Injectable 40 milliGRAM(s) IV Push every 12 hours  insulin lispro (HumaLOG) corrective regimen sliding scale   SubCutaneous three times a day before meals  insulin lispro (HumaLOG) corrective regimen sliding scale   SubCutaneous at bedtime  isosorbide   mononitrate ER Tablet (IMDUR) 30 milliGRAM(s) Oral daily  losartan 50 milliGRAM(s) Oral daily  metoprolol tartrate 50 milliGRAM(s) Oral two times a day  rivaroxaban 15 milliGRAM(s) Oral every 24 hours  tamsulosin 0.4 milliGRAM(s) Oral at bedtime    MEDICATIONS  (PRN):  ALBUTerol    90 MICROgram(s) HFA Inhaler 2 Puff(s) Inhalation every 6 hours PRN Shortness of Breath and/or Wheezing  ALPRAZolam 0.5 milliGRAM(s) Oral two times a day PRN anxiety  dextrose 40% Gel 15 Gram(s) Oral once PRN Blood Glucose LESS THAN 70 milliGRAM(s)/deciliter  glucagon  Injectable 1 milliGRAM(s) IntraMuscular once PRN Glucose LESS THAN 70 milligrams/deciliter      FAMILY HISTORY:  No pertinent family history in first degree relatives      Allergies    No Known Allergies    Intolerances        SOCIAL HISTORY:    [  ] Etoh  [  ] Smoking  [  ] Substance abuse     Home Environment:  [  ] Home Alone  [ x ] Lives with Family SON  [  ] Home Health Aid    Dwelling:  [  ] Apartment  [ x ] Private House  [  ] Adult Home  [  ] Skilled Nursing Facility      [  ] Short Term  [  ] Long Term  [x] Stairs       Elevator [  ]    FUNCTIONAL STATUS PTA: (Check all that apply)  Ambulation: [  x ]Independent    [  ] Dependent     [  ] Non-Ambulatory  Assistive Device: [  ] SA Cane  [  ]  Q Cane  [  ] Walker  [  ]  Wheelchair  ADL : [ x ] Independent  [  ]  Dependent       Vital Signs Last 24 Hrs  T(C): 36.7 (08 Apr 2019 06:00), Max: 36.7 (08 Apr 2019 06:00)  T(F): 98.1 (08 Apr 2019 06:00), Max: 98.1 (08 Apr 2019 06:00)  HR: 100 (08 Apr 2019 06:00) (64 - 100)  BP: 121/60 (08 Apr 2019 06:00) (111/56 - 121/60)  BP(mean): --  RR: 16 (08 Apr 2019 06:00) (16 - 16)  SpO2: 91% (08 Apr 2019 08:09) (91% - 91%)      PHYSICAL EXAM: Alert & Oriented X3  GENERAL: NAD, well-groomed, well-developed  HEAD:  Atraumatic, Normocephalic  EYES: EOMI, PERRLA, conjunctiva and sclera clear  NECK: Supple, No JVD, Normal thyroid  CHEST/LUNG: Clear to percussion bilaterally; No rales, rhonchi, wheezing, or rubs  HEART: Regular rate and rhythm; No murmurs, rubs, or gallops  ABDOMEN: Soft, Nontender, Nondistended; Bowel sounds present  EXTREMITIES:  2+ Peripheral Pulses, No clubbing, cyanosis, or edema    NERVOUS SYSTEM:  Cranial Nerves 2-12 intact [ x ] Abnormal  [  ]  ROM: WFL all extremities [ x ]  Abnormal [  ]  Motor Strength: WFL all extremities  [ x ]  Abnormal [  ]  Sensation: intact to light touch [ x ] Abnormal [  ]  Reflexes: Symmetric [x  ]  Abnormal [  ]    FUNCTIONAL STATUS:  Bed Mobility: Independent [  ]  Supervision [ x ]  Needs Assistance [  ]  N/A [  ]  Transfers: Independent [x  ]  Supervision [  ]  Needs Assistance [  ]  N/A [  ]   Ambulation: Independent [  ]  Supervision [ x ]  Needs Assistance [  ]  N/A [  ]  ADL: Independent [ x ] Requires Assistance [  ] N/A [  ]  SEE PT/OT IE NOTES    LABS:                        13.4   7.87  )-----------( 197      ( 08 Apr 2019 07:22 )             42.8     04-08    142  |  99  |  40<H>  ----------------------------<  97  3.9   |  29  |  1.7<H>    Ca    9.6      08 Apr 2019 07:22    TPro  7.2  /  Alb  3.9  /  TBili  0.7  /  DBili  x   /  AST  19  /  ALT  19  /  AlkPhos  42  04-06          RADIOLOGY & ADDITIONAL STUDIES:    Assesment:

## 2019-04-08 NOTE — CONSULT NOTE ADULT - SUBJECTIVE AND OBJECTIVE BOX
Patient is a 79y old  Male who presents with a chief complaint of dyspnea/shortness of breath (08 Apr 2019 09:14)      HPI:  79 year old male with pmhx of BPH, CHF, COPD (home O2 as needed), DM, HTN, CAD s/p heart artery stent and CABG, recent admission for chf . presents with SOB worsened over last 4 days. pt admits worse with exertion. Pt denies CP, cough, fever, chills, or calf pain. + chronic calf swelling due to CHF and venous stasis    At the time of admission, patient is sitting up in chair, comfortable and speaking in full sentences, on nasal canula O2. He denies any chest pain and or palpitations, reports dyspnea and orthopnea. He sees Dr. Ramirez/caridology.  -will admit to low risk tele for chf exacerbation (06 Apr 2019 17:59)      PAST MEDICAL & SURGICAL HISTORY:  BPH (benign prostatic hyperplasia)  CHF (congestive heart failure)  COPD (chronic obstructive pulmonary disease)  Diabetes  HTN (hypertension)  H/O heart artery stent  S/P CABG x 3      FAMILY HISTORY:  No pertinent family history in first degree relatives    Family history: No family cardiovascular system   Occupation:  Alochol: Denied  Smoking: Denied  Drug Use: Denied  Marital Status:           Allergies    No Known Allergies    Intolerances        Home Medications:  alfuzosin 10 mg oral tablet, extended release: 1 tab(s) orally once a day (06 Apr 2019 18:30)  ALPRAZolam 0.5 mg oral tablet: 1 tab(s) orally 2 times a day, As Needed (06 Apr 2019 18:30)  amLODIPine: orally once a day (06 Apr 2019 18:30)  aspirin 81 mg oral tablet: 1 tab(s) orally once a day (06 Apr 2019 18:30)  atorvastatin 80 mg oral tablet: 1 tab(s) orally once a day (06 Apr 2019 18:30)  Breo Ellipta 100 mcg-25 mcg/inh inhalation powder: 1 puff(s) inhaled once a day (06 Apr 2019 18:30)  celecoxib 100 mg oral capsule: 1 cap(s) orally once a day, As Needed - 3) (06 Apr 2019 18:30)  dutasteride 0.5 mg oral capsule: 1 cap(s) orally once a day (06 Apr 2019 18:30)  fenofibrate 145 mg oral tablet: 1 tab(s) orally once a day (06 Apr 2019 18:30)  fluticasone 50 mcg/inh nasal spray: 1 spray(s) nasal 2 times a day (06 Apr 2019 18:30)  furosemide 40 mg oral tablet: 1 tab(s) orally once a day (06 Apr 2019 18:30)  glimepiride 4 mg oral tablet: 1 tab(s) orally once a day (06 Apr 2019 18:30)  HumaLOG Mix 50/50 Pen subcutaneous suspension: subcutaneous once a day (06 Apr 2019 18:30)  Incruse Ellipta 62.5 mcg/inh inhalation powder:  (06 Apr 2019 18:30)  isosorbide mononitrate 30 mg oral tablet, extended release: 1 tab(s) orally once a day (in the morning) (06 Apr 2019 18:30)  metoprolol tartrate 50 mg oral tablet: 1 tab(s) orally 2 times a day (06 Apr 2019 18:30)  Ventolin HFA 90 mcg/inh inhalation aerosol: 2 puff(s) inhaled (06 Apr 2019 18:30)  Victoza: subcutaneous once a day (06 Apr 2019 18:30)      ROS: as in HPI; All other systems reviewed are negative        PHYSICAL EXAM:  Vital Signs Last 24 Hrs  T(C): 36.7 (08 Apr 2019 06:00), Max: 36.7 (08 Apr 2019 06:00)  T(F): 98.1 (08 Apr 2019 06:00), Max: 98.1 (08 Apr 2019 06:00)  HR: 112 (08 Apr 2019 09:15) (64 - 112)  BP: 121/60 (08 Apr 2019 06:00) (111/56 - 121/60)  BP(mean): --  RR: 16 (08 Apr 2019 06:00) (16 - 16)  SpO2: 86% (08 Apr 2019 09:15) (86% - 91%)      GENERAL: NAD, well-groomed, well-developed  HEAD:  Atraumatic, Normocephalic  EYES: EOMI, PERRLA, conjunctiva and sclera clear  ENMT: No tonsillar erythema, exudates, or enlargement; Moist mucous membranes, Good dentition, No lesions  NECK: Supple, No JVD, Normal thyroid  NERVOUS SYSTEM:  Alert & Oriented X3, Good concentration; Motor Strength 5/5 B/L upper and lower extremities; DTRs 2+ intact and symmetric  CHEST/LUNG: Clear to percussion bilaterally; No rales, rhonchi, wheezing, or rubs  HEART: Regular rate and rhythm; No murmurs, rubs, or gallops  ABDOMEN: Soft, Nontender, Nondistended; Bowel sounds present  EXTREMITIES:  2+ Peripheral Pulses, No clubbing, cyanosis, or edema  LYMPH: No lymphadenopathy noted  SKIN: No rashes or lesions    HOSPITAL MEDICATIONS:  MEDICATIONS  (STANDING):  amLODIPine   Tablet 5 milliGRAM(s) Oral daily  aspirin  chewable 81 milliGRAM(s) Oral daily  chlorhexidine 4% Liquid 1 Application(s) Topical every 12 hours  dextrose 5%. 1000 milliLiter(s) (50 mL/Hr) IV Continuous <Continuous>  dextrose 50% Injectable 12.5 Gram(s) IV Push once  dextrose 50% Injectable 25 Gram(s) IV Push once  dextrose 50% Injectable 25 Gram(s) IV Push once  finasteride 5 milliGRAM(s) Oral daily  fluticasone propionate 50 MICROgram(s)/spray Nasal Spray 1 Spray(s) Both Nostrils two times a day  furosemide   Injectable 40 milliGRAM(s) IV Push every 12 hours  insulin lispro (HumaLOG) corrective regimen sliding scale   SubCutaneous three times a day before meals  insulin lispro (HumaLOG) corrective regimen sliding scale   SubCutaneous at bedtime  isosorbide   mononitrate ER Tablet (IMDUR) 30 milliGRAM(s) Oral daily  losartan 50 milliGRAM(s) Oral daily  metoprolol tartrate 50 milliGRAM(s) Oral two times a day  rivaroxaban 15 milliGRAM(s) Oral every 24 hours  tamsulosin 0.4 milliGRAM(s) Oral at bedtime    MEDICATIONS  (PRN):  ALBUTerol    90 MICROgram(s) HFA Inhaler 2 Puff(s) Inhalation every 6 hours PRN Shortness of Breath and/or Wheezing  ALPRAZolam 0.5 milliGRAM(s) Oral two times a day PRN anxiety  dextrose 40% Gel 15 Gram(s) Oral once PRN Blood Glucose LESS THAN 70 milliGRAM(s)/deciliter  glucagon  Injectable 1 milliGRAM(s) IntraMuscular once PRN Glucose LESS THAN 70 milligrams/deciliter      LABS:                        13.4   7.87  )-----------( 197      ( 08 Apr 2019 07:22 )             42.8     04-08    142  |  99  |  40<H>  ----------------------------<  97  3.9   |  29  |  1.7<H>    Ca    9.6      08 Apr 2019 07:22    TPro  7.2  /  Alb  3.9  /  TBili  0.7  /  DBili  x   /  AST  19  /  ALT  19  /  AlkPhos  42  04-06                  RADIOLOGY:  [ ] Reviewed and interpreted by me    ECHO:    Point of Care Ultrasound Findings;    PFT:      Impression:          Plan: Patient is a 79y old  Male who presents with a chief complaint of dyspnea/shortness of breath (08 Apr 2019 09:14)      HPI:  79 year old male with pmhx of BPH, CHF, COPD (home O2 as needed), DM, HTN, CAD s/p heart artery stent and CABG, recent admission for chf . presents with SOB worsened over last 4 days. pt admits worse with exertion. Pt denies CP, cough, fever, chills, or calf pain. + chronic calf swelling due to CHF and venous stasis    At the time of admission, patient is sitting up in chair, comfortable and speaking in full sentences, on nasal canula O2. He denies any chest pain and or palpitations, reports dyspnea and orthopnea. He sees Dr. Ramirez/caridology.  he used to be seen by Dr Holley on breo and incruse , he stopped incruse before   all did not tolerate the cpap machine for skylar   he feel good today at his baseline       PAST MEDICAL & SURGICAL HISTORY:  BPH (benign prostatic hyperplasia)  CHF (congestive heart failure)  COPD (chronic obstructive pulmonary disease)  Diabetes  HTN (hypertension)  H/O heart artery stent  S/P CABG x 3      FAMILY HISTORY:  No pertinent family history in first degree relatives    Family history: No family cardiovascular system   Occupation:  Alochol: Denied  Smoking:ex  Drug Use: Denied  Marital Status:           Allergies    No Known Allergies    Intolerances        Home Medications:  alfuzosin 10 mg oral tablet, extended release: 1 tab(s) orally once a day (06 Apr 2019 18:30)  ALPRAZolam 0.5 mg oral tablet: 1 tab(s) orally 2 times a day, As Needed (06 Apr 2019 18:30)  amLODIPine: orally once a day (06 Apr 2019 18:30)  aspirin 81 mg oral tablet: 1 tab(s) orally once a day (06 Apr 2019 18:30)  atorvastatin 80 mg oral tablet: 1 tab(s) orally once a day (06 Apr 2019 18:30)  Breo Ellipta 100 mcg-25 mcg/inh inhalation powder: 1 puff(s) inhaled once a day (06 Apr 2019 18:30)  celecoxib 100 mg oral capsule: 1 cap(s) orally once a day, As Needed - 3) (06 Apr 2019 18:30)  dutasteride 0.5 mg oral capsule: 1 cap(s) orally once a day (06 Apr 2019 18:30)  fenofibrate 145 mg oral tablet: 1 tab(s) orally once a day (06 Apr 2019 18:30)  fluticasone 50 mcg/inh nasal spray: 1 spray(s) nasal 2 times a day (06 Apr 2019 18:30)  furosemide 40 mg oral tablet: 1 tab(s) orally once a day (06 Apr 2019 18:30)  glimepiride 4 mg oral tablet: 1 tab(s) orally once a day (06 Apr 2019 18:30)  HumaLOG Mix 50/50 Pen subcutaneous suspension: subcutaneous once a day (06 Apr 2019 18:30)  Incruse Ellipta 62.5 mcg/inh inhalation powder:  (06 Apr 2019 18:30)  isosorbide mononitrate 30 mg oral tablet, extended release: 1 tab(s) orally once a day (in the morning) (06 Apr 2019 18:30)  metoprolol tartrate 50 mg oral tablet: 1 tab(s) orally 2 times a day (06 Apr 2019 18:30)  Ventolin HFA 90 mcg/inh inhalation aerosol: 2 puff(s) inhaled (06 Apr 2019 18:30)  Victoza: subcutaneous once a day (06 Apr 2019 18:30)      ROS: as in HPI; All other systems reviewed are negative        PHYSICAL EXAM:  Vital Signs Last 24 Hrs  T(C): 36.7 (08 Apr 2019 06:00), Max: 36.7 (08 Apr 2019 06:00)  T(F): 98.1 (08 Apr 2019 06:00), Max: 98.1 (08 Apr 2019 06:00)  HR: 112 (08 Apr 2019 09:15) (64 - 112)  BP: 121/60 (08 Apr 2019 06:00) (111/56 - 121/60)  BP(mean): --  RR: 16 (08 Apr 2019 06:00) (16 - 16)  SpO2: 86% (08 Apr 2019 09:15) (86% - 91%)      GENERAL: NAD, well-groomed, well-developed  HEAD:  Atraumatic, Normocephalic  EYES: EOMI, PERRLA, conjunctiva and sclera clear  ENMT: No tonsillar erythema, exudates, or enlargement; Moist mucous membranes, Good dentition, No lesions  NECK: Supple, No JVD, Normal thyroid  NERVOUS SYSTEM:  Alert & Oriented X3, Good concentration; Motor Strength 5/5 B/L upper and lower extremities; DTRs 2+ intact and symmetric  CHEST/LUNG: Clear to percussion bilaterally; No rales, rhonchi, wheezing, or rubs  HEART: Regular rate and rhythm; No murmurs, rubs, or gallops  ABDOMEN: Soft, Nontender, Nondistended; Bowel sounds present  EXTREMITIES:  2+ Peripheral Pulses, No clubbing, cyanosis, 1edema  LYMPH: No lymphadenopathy noted  SKIN: No rashes or lesions    HOSPITAL MEDICATIONS:  MEDICATIONS  (STANDING):  amLODIPine   Tablet 5 milliGRAM(s) Oral daily  aspirin  chewable 81 milliGRAM(s) Oral daily  chlorhexidine 4% Liquid 1 Application(s) Topical every 12 hours  dextrose 5%. 1000 milliLiter(s) (50 mL/Hr) IV Continuous <Continuous>  dextrose 50% Injectable 12.5 Gram(s) IV Push once  dextrose 50% Injectable 25 Gram(s) IV Push once  dextrose 50% Injectable 25 Gram(s) IV Push once  finasteride 5 milliGRAM(s) Oral daily  fluticasone propionate 50 MICROgram(s)/spray Nasal Spray 1 Spray(s) Both Nostrils two times a day  furosemide   Injectable 40 milliGRAM(s) IV Push every 12 hours  insulin lispro (HumaLOG) corrective regimen sliding scale   SubCutaneous three times a day before meals  insulin lispro (HumaLOG) corrective regimen sliding scale   SubCutaneous at bedtime  isosorbide   mononitrate ER Tablet (IMDUR) 30 milliGRAM(s) Oral daily  losartan 50 milliGRAM(s) Oral daily  metoprolol tartrate 50 milliGRAM(s) Oral two times a day  rivaroxaban 15 milliGRAM(s) Oral every 24 hours  tamsulosin 0.4 milliGRAM(s) Oral at bedtime    MEDICATIONS  (PRN):  ALBUTerol    90 MICROgram(s) HFA Inhaler 2 Puff(s) Inhalation every 6 hours PRN Shortness of Breath and/or Wheezing  ALPRAZolam 0.5 milliGRAM(s) Oral two times a day PRN anxiety  dextrose 40% Gel 15 Gram(s) Oral once PRN Blood Glucose LESS THAN 70 milliGRAM(s)/deciliter  glucagon  Injectable 1 milliGRAM(s) IntraMuscular once PRN Glucose LESS THAN 70 milligrams/deciliter      LABS:                        13.4   7.87  )-----------( 197      ( 08 Apr 2019 07:22 )             42.8     04-08    142  |  99  |  40<H>  ----------------------------<  97  3.9   |  29  |  1.7<H>    Ca    9.6      08 Apr 2019 07:22    TPro  7.2  /  Alb  3.9  /  TBili  0.7  /  DBili  x   /  AST  19  /  ALT  19  /  AlkPhos  42  04-06                  RADIOLOGY:  [ ] Reviewed and interpreted by me  mild congestion   ECHO:    Point of Care Ultrasound Findings;    PFT:

## 2019-04-08 NOTE — PHYSICAL THERAPY INITIAL EVALUATION ADULT - IMPAIRMENTS CONTRIBUTING TO GAIT DEVIATIONS, PT EVAL
decreased endurance with dec in spo2 on room air from 93% at rest to 85% after ambulation, increase back to 91% with Oxygen at   3 Li/min  via nasal cannula as put back on by patient, RN aware/decreased strength/impaired postural control/impaired balance

## 2019-04-08 NOTE — PROGRESS NOTE ADULT - ASSESSMENT
· Assessment	  patient presented with dyspnea and admitted to low risk telemetry for decompensated CHF     1) Acute on Chronic systolic CHF exacerbation; last EF 50%  -admitted to low risk tele and seen by cardiology; off tele as of yesterday  -place on fluid restriction; continue daily weights and strict I&Os  -IV lasix 40mg twice daily and monitor BMP   -on BB. ARB, Imdur. D/C norvasc in lieu of LE swelling/edema  -compliance education ongoing   -echo from 3/25 - normal EF, Mild MR and Mod AR  -follow up cxr    2) COPD/chronic resp failure/home O2 dependent/YAMIL  -chronic/stable  -continue with home meds  -nebs prn and supplemental O2  -outpatient Pulmonary follow up     3) Morbidly Obese  -needs to be compliant with meds/diet  -weight loss and diet modification with dietary input    4) DMII with hypoglycemia  -hold all oral hypoglycemics and standing insulin  -encourage oral intake   -insulin sliding for hyperglycemia   -avoid sulfonylureas    5) CAD/CABGHTN/DL  -home meds    6) Atrial flutter  -on oral anticoagulant xarelto  -on BB    7) BPH  -home meds and outpatient  follow up     8) CKD stage III  -daily BMP while on IV lasix  -outpatient kidney monitoring and nephrology follow up     9) DVT and GI ppx    10) Chronic venous stasis/venous insufficiency   -wound care as per nursing  -keeps legs elevated and wrapped    # Progress Note Handoff  PENDING as follows  consults: Cardiology noted   Test:  Other: ON IV LASIX  Family discussion: discussed with patient   Disposition:  Home _with home care services once clinically ready in the next 48 hrs___ or SNF ____ Other _____ Unknown at this time ____

## 2019-04-09 LAB
GLUCOSE BLDC GLUCOMTR-MCNC: 119 MG/DL — HIGH (ref 70–99)
GLUCOSE BLDC GLUCOMTR-MCNC: 142 MG/DL — HIGH (ref 70–99)
GLUCOSE BLDC GLUCOMTR-MCNC: 155 MG/DL — HIGH (ref 70–99)
GLUCOSE BLDC GLUCOMTR-MCNC: 165 MG/DL — HIGH (ref 70–99)

## 2019-04-09 RX ADMIN — RIVAROXABAN 15 MILLIGRAM(S): KIT at 16:56

## 2019-04-09 RX ADMIN — LOSARTAN POTASSIUM 50 MILLIGRAM(S): 100 TABLET, FILM COATED ORAL at 05:55

## 2019-04-09 RX ADMIN — AMLODIPINE BESYLATE 5 MILLIGRAM(S): 2.5 TABLET ORAL at 05:55

## 2019-04-09 RX ADMIN — TAMSULOSIN HYDROCHLORIDE 0.4 MILLIGRAM(S): 0.4 CAPSULE ORAL at 21:02

## 2019-04-09 RX ADMIN — Medication 40 MILLIGRAM(S): at 10:02

## 2019-04-09 RX ADMIN — Medication 81 MILLIGRAM(S): at 11:42

## 2019-04-09 RX ADMIN — FINASTERIDE 5 MILLIGRAM(S): 5 TABLET, FILM COATED ORAL at 11:42

## 2019-04-09 RX ADMIN — ISOSORBIDE MONONITRATE 30 MILLIGRAM(S): 60 TABLET, EXTENDED RELEASE ORAL at 11:42

## 2019-04-09 RX ADMIN — Medication 50 MILLIGRAM(S): at 16:56

## 2019-04-09 RX ADMIN — Medication 2: at 16:57

## 2019-04-09 RX ADMIN — Medication 50 MILLIGRAM(S): at 05:55

## 2019-04-09 NOTE — PROGRESS NOTE ADULT - ASSESSMENT
· Assessment	  patient presented with dyspnea and admitted to low risk telemetry for decompensated CHF     1) Acute on Chronic Diastolic CHF exacerbation  -IV lasix 40mg twice daily and monitor BMP   -on BB. ARB, Imdur. Norvasc discontinued  -Echo from last admission clarified: EF > 55% and Grade II diastolic dysfunction    2) COPD/chronic resp failure/home O2 dependent/YAMIL  -chronic/stable  -continue with home meds  -nebs prn and supplemental O2  -Pulmonary consult noted; outpatient pulm follow up     3) Morbidly Obese  -needs to be compliant with meds/diet  -weight loss and diet modification with dietary input    4) DMII with hypoglycemia  -hold all oral hypoglycemics and standing insulin  -encourage oral intake   -insulin sliding for hyperglycemia   -avoid sulfonylureas    5) CAD/CABGHTN/DL  -home meds    6) Atrial flutter  -on oral anticoagulant xarelto  -on BB    7) BPH  -home meds and outpatient  follow up     8) CKD stage III  -outpatient kidney monitoring and nephrology follow up     9) DVT and GI ppx    10) Chronic venous stasis/venous insufficiency   -wound care as per nursing  -keeps legs elevated    # Progress Note Handoff  PENDING as follows  consults: Cardiology noted   Test: Echo report from last admission clarified and pt with EF > 55% with Grade 2 Diastolic dysfunction  Other:   Family discussion: discussed with patient; wants to see Dr. Ramirez before d/c   Disposition:  Home _with home care services once clinically ready in the next 24-48 hrs___ or SNF ____ Other _____ Unknown at this time ____

## 2019-04-10 ENCOUNTER — TRANSCRIPTION ENCOUNTER (OUTPATIENT)
Age: 79
End: 2019-04-10

## 2019-04-10 LAB
ANION GAP SERPL CALC-SCNC: 11 MMOL/L — SIGNIFICANT CHANGE UP (ref 7–14)
BUN SERPL-MCNC: 49 MG/DL — HIGH (ref 10–20)
CALCIUM SERPL-MCNC: 9.9 MG/DL — SIGNIFICANT CHANGE UP (ref 8.5–10.1)
CHLORIDE SERPL-SCNC: 101 MMOL/L — SIGNIFICANT CHANGE UP (ref 98–110)
CO2 SERPL-SCNC: 33 MMOL/L — HIGH (ref 17–32)
CREAT SERPL-MCNC: 1.6 MG/DL — HIGH (ref 0.7–1.5)
GLUCOSE BLDC GLUCOMTR-MCNC: 127 MG/DL — HIGH (ref 70–99)
GLUCOSE BLDC GLUCOMTR-MCNC: 131 MG/DL — HIGH (ref 70–99)
GLUCOSE BLDC GLUCOMTR-MCNC: 133 MG/DL — HIGH (ref 70–99)
GLUCOSE BLDC GLUCOMTR-MCNC: 159 MG/DL — HIGH (ref 70–99)
GLUCOSE SERPL-MCNC: 139 MG/DL — HIGH (ref 70–99)
HCT VFR BLD CALC: 43.8 % — SIGNIFICANT CHANGE UP (ref 42–52)
HGB BLD-MCNC: 13.7 G/DL — LOW (ref 14–18)
MCHC RBC-ENTMCNC: 27.2 PG — SIGNIFICANT CHANGE UP (ref 27–31)
MCHC RBC-ENTMCNC: 31.3 G/DL — LOW (ref 32–37)
MCV RBC AUTO: 87.1 FL — SIGNIFICANT CHANGE UP (ref 80–94)
NRBC # BLD: 0 /100 WBCS — SIGNIFICANT CHANGE UP (ref 0–0)
PLATELET # BLD AUTO: 181 K/UL — SIGNIFICANT CHANGE UP (ref 130–400)
POTASSIUM SERPL-MCNC: 4.8 MMOL/L — SIGNIFICANT CHANGE UP (ref 3.5–5)
POTASSIUM SERPL-SCNC: 4.8 MMOL/L — SIGNIFICANT CHANGE UP (ref 3.5–5)
RBC # BLD: 5.03 M/UL — SIGNIFICANT CHANGE UP (ref 4.7–6.1)
RBC # FLD: 13.9 % — SIGNIFICANT CHANGE UP (ref 11.5–14.5)
SODIUM SERPL-SCNC: 145 MMOL/L — SIGNIFICANT CHANGE UP (ref 135–146)
WBC # BLD: 7.54 K/UL — SIGNIFICANT CHANGE UP (ref 4.8–10.8)
WBC # FLD AUTO: 7.54 K/UL — SIGNIFICANT CHANGE UP (ref 4.8–10.8)

## 2019-04-10 PROCEDURE — 71045 X-RAY EXAM CHEST 1 VIEW: CPT | Mod: 26

## 2019-04-10 RX ADMIN — Medication 81 MILLIGRAM(S): at 11:36

## 2019-04-10 RX ADMIN — TAMSULOSIN HYDROCHLORIDE 0.4 MILLIGRAM(S): 0.4 CAPSULE ORAL at 21:29

## 2019-04-10 RX ADMIN — RIVAROXABAN 15 MILLIGRAM(S): KIT at 17:00

## 2019-04-10 RX ADMIN — FINASTERIDE 5 MILLIGRAM(S): 5 TABLET, FILM COATED ORAL at 11:36

## 2019-04-10 RX ADMIN — Medication 40 MILLIGRAM(S): at 21:29

## 2019-04-10 RX ADMIN — Medication 2: at 11:36

## 2019-04-10 RX ADMIN — AMLODIPINE BESYLATE 5 MILLIGRAM(S): 2.5 TABLET ORAL at 05:32

## 2019-04-10 RX ADMIN — Medication 1 SPRAY(S): at 17:00

## 2019-04-10 RX ADMIN — LOSARTAN POTASSIUM 50 MILLIGRAM(S): 100 TABLET, FILM COATED ORAL at 05:32

## 2019-04-10 RX ADMIN — Medication 40 MILLIGRAM(S): at 08:37

## 2019-04-10 RX ADMIN — Medication 50 MILLIGRAM(S): at 05:32

## 2019-04-10 RX ADMIN — Medication 50 MILLIGRAM(S): at 17:00

## 2019-04-10 RX ADMIN — ISOSORBIDE MONONITRATE 30 MILLIGRAM(S): 60 TABLET, EXTENDED RELEASE ORAL at 11:36

## 2019-04-10 RX ADMIN — Medication 1 SPRAY(S): at 05:32

## 2019-04-10 NOTE — DISCHARGE NOTE NURSING/CASE MANAGEMENT/SOCIAL WORK - NSDCDPATPORTLINK_GEN_ALL_CORE
You can access the [facility] Portal, offered by [facility], by registering with the following website: [email or web address]

## 2019-04-11 LAB
ALBUMIN SERPL ELPH-MCNC: 4.1 G/DL — SIGNIFICANT CHANGE UP (ref 3.5–5.2)
ALP SERPL-CCNC: 45 U/L — SIGNIFICANT CHANGE UP (ref 30–115)
ALT FLD-CCNC: 19 U/L — SIGNIFICANT CHANGE UP (ref 0–41)
ANION GAP SERPL CALC-SCNC: 14 MMOL/L — SIGNIFICANT CHANGE UP (ref 7–14)
AST SERPL-CCNC: 16 U/L — SIGNIFICANT CHANGE UP (ref 0–41)
BILIRUB SERPL-MCNC: 0.7 MG/DL — SIGNIFICANT CHANGE UP (ref 0.2–1.2)
BUN SERPL-MCNC: 50 MG/DL — HIGH (ref 10–20)
CALCIUM SERPL-MCNC: 9.8 MG/DL — SIGNIFICANT CHANGE UP (ref 8.5–10.1)
CHLORIDE SERPL-SCNC: 100 MMOL/L — SIGNIFICANT CHANGE UP (ref 98–110)
CO2 SERPL-SCNC: 30 MMOL/L — SIGNIFICANT CHANGE UP (ref 17–32)
CREAT SERPL-MCNC: 1.6 MG/DL — HIGH (ref 0.7–1.5)
GLUCOSE BLDC GLUCOMTR-MCNC: 127 MG/DL — HIGH (ref 70–99)
GLUCOSE BLDC GLUCOMTR-MCNC: 143 MG/DL — HIGH (ref 70–99)
GLUCOSE BLDC GLUCOMTR-MCNC: 160 MG/DL — HIGH (ref 70–99)
GLUCOSE BLDC GLUCOMTR-MCNC: 177 MG/DL — HIGH (ref 70–99)
GLUCOSE SERPL-MCNC: 143 MG/DL — HIGH (ref 70–99)
HCT VFR BLD CALC: 44.7 % — SIGNIFICANT CHANGE UP (ref 42–52)
HGB BLD-MCNC: 14.2 G/DL — SIGNIFICANT CHANGE UP (ref 14–18)
MCHC RBC-ENTMCNC: 27.4 PG — SIGNIFICANT CHANGE UP (ref 27–31)
MCHC RBC-ENTMCNC: 31.8 G/DL — LOW (ref 32–37)
MCV RBC AUTO: 86.3 FL — SIGNIFICANT CHANGE UP (ref 80–94)
NRBC # BLD: 0 /100 WBCS — SIGNIFICANT CHANGE UP (ref 0–0)
PLATELET # BLD AUTO: 176 K/UL — SIGNIFICANT CHANGE UP (ref 130–400)
POTASSIUM SERPL-MCNC: 4.4 MMOL/L — SIGNIFICANT CHANGE UP (ref 3.5–5)
POTASSIUM SERPL-SCNC: 4.4 MMOL/L — SIGNIFICANT CHANGE UP (ref 3.5–5)
PROT SERPL-MCNC: 7 G/DL — SIGNIFICANT CHANGE UP (ref 6–8)
RBC # BLD: 5.18 M/UL — SIGNIFICANT CHANGE UP (ref 4.7–6.1)
RBC # FLD: 13.7 % — SIGNIFICANT CHANGE UP (ref 11.5–14.5)
SODIUM SERPL-SCNC: 144 MMOL/L — SIGNIFICANT CHANGE UP (ref 135–146)
WBC # BLD: 8.23 K/UL — SIGNIFICANT CHANGE UP (ref 4.8–10.8)
WBC # FLD AUTO: 8.23 K/UL — SIGNIFICANT CHANGE UP (ref 4.8–10.8)

## 2019-04-11 RX ORDER — FUROSEMIDE 40 MG
40 TABLET ORAL
Qty: 0 | Refills: 0 | Status: DISCONTINUED | OUTPATIENT
Start: 2019-04-11 | End: 2019-04-12

## 2019-04-11 RX ADMIN — AMLODIPINE BESYLATE 5 MILLIGRAM(S): 2.5 TABLET ORAL at 05:35

## 2019-04-11 RX ADMIN — Medication 1 SPRAY(S): at 05:35

## 2019-04-11 RX ADMIN — Medication 50 MILLIGRAM(S): at 05:35

## 2019-04-11 RX ADMIN — RIVAROXABAN 15 MILLIGRAM(S): KIT at 17:59

## 2019-04-11 RX ADMIN — Medication 40 MILLIGRAM(S): at 05:35

## 2019-04-11 RX ADMIN — Medication 81 MILLIGRAM(S): at 11:03

## 2019-04-11 RX ADMIN — FINASTERIDE 5 MILLIGRAM(S): 5 TABLET, FILM COATED ORAL at 11:03

## 2019-04-11 RX ADMIN — ISOSORBIDE MONONITRATE 30 MILLIGRAM(S): 60 TABLET, EXTENDED RELEASE ORAL at 11:03

## 2019-04-11 RX ADMIN — TAMSULOSIN HYDROCHLORIDE 0.4 MILLIGRAM(S): 0.4 CAPSULE ORAL at 21:21

## 2019-04-11 RX ADMIN — Medication 50 MILLIGRAM(S): at 17:59

## 2019-04-11 RX ADMIN — Medication 40 MILLIGRAM(S): at 17:59

## 2019-04-11 RX ADMIN — LOSARTAN POTASSIUM 50 MILLIGRAM(S): 100 TABLET, FILM COATED ORAL at 05:35

## 2019-04-11 RX ADMIN — Medication 1 SPRAY(S): at 17:58

## 2019-04-12 ENCOUNTER — TRANSCRIPTION ENCOUNTER (OUTPATIENT)
Age: 79
End: 2019-04-12

## 2019-04-12 VITALS
HEART RATE: 63 BPM | TEMPERATURE: 97 F | RESPIRATION RATE: 16 BRPM | DIASTOLIC BLOOD PRESSURE: 63 MMHG | SYSTOLIC BLOOD PRESSURE: 129 MMHG

## 2019-04-12 LAB
ANION GAP SERPL CALC-SCNC: 12 MMOL/L — SIGNIFICANT CHANGE UP (ref 7–14)
BUN SERPL-MCNC: 51 MG/DL — HIGH (ref 10–20)
CALCIUM SERPL-MCNC: 9.7 MG/DL — SIGNIFICANT CHANGE UP (ref 8.5–10.1)
CHLORIDE SERPL-SCNC: 102 MMOL/L — SIGNIFICANT CHANGE UP (ref 98–110)
CO2 SERPL-SCNC: 30 MMOL/L — SIGNIFICANT CHANGE UP (ref 17–32)
CREAT SERPL-MCNC: 1.6 MG/DL — HIGH (ref 0.7–1.5)
GLUCOSE BLDC GLUCOMTR-MCNC: 107 MG/DL — HIGH (ref 70–99)
GLUCOSE BLDC GLUCOMTR-MCNC: 171 MG/DL — HIGH (ref 70–99)
GLUCOSE BLDC GLUCOMTR-MCNC: 198 MG/DL — HIGH (ref 70–99)
GLUCOSE SERPL-MCNC: 152 MG/DL — HIGH (ref 70–99)
HCT VFR BLD CALC: 43 % — SIGNIFICANT CHANGE UP (ref 42–52)
HGB BLD-MCNC: 13.6 G/DL — LOW (ref 14–18)
MCHC RBC-ENTMCNC: 27.1 PG — SIGNIFICANT CHANGE UP (ref 27–31)
MCHC RBC-ENTMCNC: 31.6 G/DL — LOW (ref 32–37)
MCV RBC AUTO: 85.8 FL — SIGNIFICANT CHANGE UP (ref 80–94)
NRBC # BLD: 0 /100 WBCS — SIGNIFICANT CHANGE UP (ref 0–0)
PLATELET # BLD AUTO: 161 K/UL — SIGNIFICANT CHANGE UP (ref 130–400)
POTASSIUM SERPL-MCNC: 4.2 MMOL/L — SIGNIFICANT CHANGE UP (ref 3.5–5)
POTASSIUM SERPL-SCNC: 4.2 MMOL/L — SIGNIFICANT CHANGE UP (ref 3.5–5)
RBC # BLD: 5.01 M/UL — SIGNIFICANT CHANGE UP (ref 4.7–6.1)
RBC # FLD: 13.9 % — SIGNIFICANT CHANGE UP (ref 11.5–14.5)
SODIUM SERPL-SCNC: 144 MMOL/L — SIGNIFICANT CHANGE UP (ref 135–146)
WBC # BLD: 7.82 K/UL — SIGNIFICANT CHANGE UP (ref 4.8–10.8)
WBC # FLD AUTO: 7.82 K/UL — SIGNIFICANT CHANGE UP (ref 4.8–10.8)

## 2019-04-12 RX ORDER — FLUTICASONE FUROATE AND VILANTEROL TRIFENATATE 100; 25 UG/1; UG/1
1 POWDER RESPIRATORY (INHALATION)
Qty: 1 | Refills: 1
Start: 2019-04-12 | End: 2019-06-10

## 2019-04-12 RX ORDER — FLUTICASONE FUROATE AND VILANTEROL TRIFENATATE 100; 25 UG/1; UG/1
1 POWDER RESPIRATORY (INHALATION)
Qty: 0 | Refills: 0 | COMMUNITY

## 2019-04-12 RX ORDER — ATORVASTATIN CALCIUM 80 MG/1
1 TABLET, FILM COATED ORAL
Qty: 0 | Refills: 0 | COMMUNITY

## 2019-04-12 RX ORDER — LOSARTAN POTASSIUM 100 MG/1
1 TABLET, FILM COATED ORAL
Qty: 0 | Refills: 0 | DISCHARGE
Start: 2019-04-12

## 2019-04-12 RX ADMIN — LOSARTAN POTASSIUM 50 MILLIGRAM(S): 100 TABLET, FILM COATED ORAL at 06:26

## 2019-04-12 RX ADMIN — Medication 40 MILLIGRAM(S): at 06:26

## 2019-04-12 RX ADMIN — AMLODIPINE BESYLATE 5 MILLIGRAM(S): 2.5 TABLET ORAL at 06:26

## 2019-04-12 RX ADMIN — Medication 2: at 12:49

## 2019-04-12 RX ADMIN — Medication 50 MILLIGRAM(S): at 06:26

## 2019-04-12 RX ADMIN — FINASTERIDE 5 MILLIGRAM(S): 5 TABLET, FILM COATED ORAL at 11:21

## 2019-04-12 RX ADMIN — ISOSORBIDE MONONITRATE 30 MILLIGRAM(S): 60 TABLET, EXTENDED RELEASE ORAL at 11:21

## 2019-04-12 RX ADMIN — Medication 81 MILLIGRAM(S): at 11:21

## 2019-04-12 RX ADMIN — Medication 1 SPRAY(S): at 06:25

## 2019-04-12 NOTE — PROGRESS NOTE ADULT - SUBJECTIVE AND OBJECTIVE BOX
Patient is a 79y old  Male who presents with a chief complaint of dyspnea/shortness of breath (11 Apr 2019 13:20)      T(F): 96.5 (04-12-19 @ 05:00), Max: 98 (04-11-19 @ 14:06)  HR: 62 (04-12-19 @ 05:00)  BP: 134/63 (04-12-19 @ 05:00)  RR: 16 (04-12-19 @ 05:00)  SpO2: --    PHYSICAL EXAM:  GENERAL: NAD, well-groomed, well-developed  HEAD:  Atraumatic, Normocephalic  EYES: EOMI, PERRLA, conjunctiva and sclera clear  ENMT: No tonsillar erythema, exudates, or enlargement; Moist mucous membranes, Good dentition, No lesions  NECK: Supple, No JVD, Normal thyroid  NERVOUS SYSTEM:  Alert & Oriented X3,  Motor Strength 5/5 B/L upper and lower extremities  CHEST/LUNG: Clear to percussion bilaterally; No rales, rhonchi, wheezing, or rubs  HEART: Regular rate and rhythm; No murmurs, rubs, or gallops  ABDOMEN: Soft, Nontender, Nondistended; Bowel sounds present  EXTREMITIES:   No clubbing, cyanosis, or edema  LYMPH: No lymphadenopathy noted  SKIN: No rashes or lesions    labs  04-12    144  |  102  |  51<H>  ----------------------------<  152<H>  4.2   |  30  |  1.6<H>    Ca    9.7      12 Apr 2019 06:13    TPro  7.0  /  Alb  4.1  /  TBili  0.7  /  DBili  x   /  AST  16  /  ALT  19  /  AlkPhos  45  04-11                          13.6   7.82  )-----------( 161      ( 12 Apr 2019 06:13 )             43.0               ALBUTerol    90 MICROgram(s) HFA Inhaler 2 Puff(s) Inhalation every 6 hours PRN  ALPRAZolam 0.5 milliGRAM(s) Oral two times a day PRN  amLODIPine   Tablet 5 milliGRAM(s) Oral daily  aspirin  chewable 81 milliGRAM(s) Oral daily  chlorhexidine 4% Liquid 1 Application(s) Topical every 12 hours  dextrose 40% Gel 15 Gram(s) Oral once PRN  dextrose 5%. 1000 milliLiter(s) IV Continuous <Continuous>  dextrose 50% Injectable 12.5 Gram(s) IV Push once  dextrose 50% Injectable 25 Gram(s) IV Push once  dextrose 50% Injectable 25 Gram(s) IV Push once  finasteride 5 milliGRAM(s) Oral daily  fluticasone propionate 50 MICROgram(s)/spray Nasal Spray 1 Spray(s) Both Nostrils two times a day  furosemide    Tablet 40 milliGRAM(s) Oral two times a day  glucagon  Injectable 1 milliGRAM(s) IntraMuscular once PRN  insulin lispro (HumaLOG) corrective regimen sliding scale   SubCutaneous three times a day before meals  insulin lispro (HumaLOG) corrective regimen sliding scale   SubCutaneous at bedtime  isosorbide   mononitrate ER Tablet (IMDUR) 30 milliGRAM(s) Oral daily  losartan 50 milliGRAM(s) Oral daily  metoprolol tartrate 50 milliGRAM(s) Oral two times a day  rivaroxaban 15 milliGRAM(s) Oral every 24 hours  tamsulosin 0.4 milliGRAM(s) Oral at bedtime
Progress Note:  Provider Speciality                            Hospitalist      TAIWO ZAVALA [id number] 79y Male     CHIEF PRESENTING COMPLAINT:  Patient is a 79y old  Male who presents with a chief complaint of dyspnea/shortness of breath (09 Apr 2019 14:20)        SUBJECTIVE:  Patient was seen and examined at bedside. Reports some  improvement in  presenting complaint. No significant overnight events reported.     HISTORY OF PRESENTING ILLNESS:  HPI:  79 year old male with pmhx of BPH, CHF, COPD (home O2 as needed), DM, HTN, CAD s/p heart artery stent and CABG, recent admission for chf . presents with SOB worsened over last 4 days. pt admits worse with exertion. Pt denies CP, cough, fever, chills, or calf pain. + chronic calf swelling due to CHF and venous stasis    At the time of admission, patient is sitting up in chair, comfortable and speaking in full sentences, on nasal canula O2. He denies any chest pain and or palpitations, reports dyspnea and orthopnea. He sees Dr. Ramirez/caridology.  -will admit to low risk tele for chf exacerbation (06 Apr 2019 17:59)      PAST MEDICAL & SURGICAL HISTORY:  PAST MEDICAL & SURGICAL HISTORY:  BPH (benign prostatic hyperplasia)  CHF (congestive heart failure)  COPD (chronic obstructive pulmonary disease)  Diabetes  HTN (hypertension)  H/O heart artery stent  S/P CABG x 3      VITAL SIGNS:  Vital Signs Last 24 Hrs  T(C): 36.9 (09 Apr 2019 21:08), Max: 36.9 (09 Apr 2019 21:08)  T(F): 98.4 (09 Apr 2019 21:08), Max: 98.4 (09 Apr 2019 21:08)  HR: 63 (10 Apr 2019 05:44) (57 - 84)  BP: 132/63 (10 Apr 2019 05:44) (102/51 - 134/61)  BP(mean): --  RR: 16 (10 Apr 2019 05:44) (16 - 18)  SpO2: 93% (09 Apr 2019 16:57) (90% - 93%)    PHYSICAL EXAMINATION:  Not in acute distress  General: No pallor, or icterus, afebrile  HEENT:  LISA, EOMI, no JVD, no Bruit.  Heart: S1+S2 audible, no murmur, irregular  Lungs: bilateral  fair air entry, no wheezing, basal crepitations.  Abdomen: Soft, non-tender, non-distended , no  rigidity or guarding.  CNS: AAOx3, CN  grossly intact.  Extremities: bilateral rodney    , chronic venous skin changes      REVIEW OF SYSTEMS:  Patient denies any headache, any vision complaints, runny nose, fever, chills, sore throat. Denies chest pain,  palpitation. Denies nausea, vomiting, abdominal pain, diarrhoea, Denies urinary burning, urgency, frequency, dysuria. Denies weakness in any part of the body or numbness.   At least 10 systems were reviewed in ROS. All systems reviewed  are within normal limits except for the complaints as described in Subjective.    CONSULTS:  Consultant(s) Notes Reviewed by me.   Care Discussed with Consultants/Other Providers where required.        MEDICATIONS:  MEDICATIONS  (STANDING):  amLODIPine   Tablet 5 milliGRAM(s) Oral daily  aspirin  chewable 81 milliGRAM(s) Oral daily  chlorhexidine 4% Liquid 1 Application(s) Topical every 12 hours  dextrose 5%. 1000 milliLiter(s) (50 mL/Hr) IV Continuous <Continuous>  dextrose 50% Injectable 12.5 Gram(s) IV Push once  dextrose 50% Injectable 25 Gram(s) IV Push once  dextrose 50% Injectable 25 Gram(s) IV Push once  finasteride 5 milliGRAM(s) Oral daily  fluticasone propionate 50 MICROgram(s)/spray Nasal Spray 1 Spray(s) Both Nostrils two times a day  furosemide   Injectable 40 milliGRAM(s) IV Push every 12 hours  insulin lispro (HumaLOG) corrective regimen sliding scale   SubCutaneous three times a day before meals  insulin lispro (HumaLOG) corrective regimen sliding scale   SubCutaneous at bedtime  isosorbide   mononitrate ER Tablet (IMDUR) 30 milliGRAM(s) Oral daily  losartan 50 milliGRAM(s) Oral daily  metoprolol tartrate 50 milliGRAM(s) Oral two times a day  rivaroxaban 15 milliGRAM(s) Oral every 24 hours  tamsulosin 0.4 milliGRAM(s) Oral at bedtime    MEDICATIONS  (PRN):  ALBUTerol    90 MICROgram(s) HFA Inhaler 2 Puff(s) Inhalation every 6 hours PRN Shortness of Breath and/or Wheezing  ALPRAZolam 0.5 milliGRAM(s) Oral two times a day PRN anxiety  dextrose 40% Gel 15 Gram(s) Oral once PRN Blood Glucose LESS THAN 70 milliGRAM(s)/deciliter  glucagon  Injectable 1 milliGRAM(s) IntraMuscular once PRN Glucose LESS THAN 70 milligrams/deciliter      [facility] DATA/MICROBIOLOGY/I & O's:                        13.7   7.54  )-----------( 181      ( 10 Apr 2019 06:13 )             43.8     04-10    145  |  101  |  49<H>  ----------------------------<  139<H>  4.8   |  33<H>  |  1.6<H>    Ca    9.9      10 Apr 2019 06:13          CAPILLARY BLOOD GLUCOSE      POCT Blood Glucose.: 127 mg/dL (10 Apr 2019 07:43)  POCT Blood Glucose.: 142 mg/dL (09 Apr 2019 21:13)  POCT Blood Glucose.: 155 mg/dL (09 Apr 2019 16:23)  POCT Blood Glucose.: 165 mg/dL (09 Apr 2019 11:41)                        ASSESSMENT:    Patient is a 79y old  Male who presents with a chief complaint of dyspnea/shortness of breath secondary to decompensated CHF     ASSESSMENT:  Principal Diagnosis:  Acute on Chronic Diastolic CHF exacerbation  Morbid obesity    Associated Active Comorbid Conditions:  Chronic obstructive pulmonary disease-stable   Diabetes mellitis type 2   Atrial fibrillation , persistent  Benign Prostatic Hypertrophy   Chronic renal disease stage 3      PLAN:   Congestive heart failure - diastolic, acute on chronic -Lasix 40mg IVP Q12hrs. Echo from last admission clarified: EF > 55% and Grade II diastolic dysfunction  Strict input/output monitoring  Daily weight monitoring.  Fluid restriction to 1 L/24 hrs  O2 via NC@2l/min, keep sat>94% at all times  Afib- benjy controlled.  Metoprolol 50mg po q12hrs, anti-coagulation with xarelto to be continued  NTG 0.4mg SL q5mins x 3 doses PRN for chest pain  Coronary artery disease-continue with ARB, Imdur  Diabetes mellitis type 2- well controlled . Insulin sliding scale with coverage wih meals and QHS   Monitor Electrolytes Qdaily, Keep K+>4, Mg>2   Elevate Head end of bed >35*, aspiration prec  continue with home meds  GI Prophylaxis with  protonix 40 milligrams daily  DVT Prophylaxis with xarelto    #Progress Note Handoff  Pending :medical stabilization  Disposition:  Home when stable , anticipate in 2 days  Discussion: Discussed with patient       .
Progress Note:  Provider Speciality                            Hospitalist      TAIWO ZAVALA [id number] 79y Male     CHIEF PRESENTING COMPLAINT:  Patient is a 79y old  Male who presents with a chief complaint of dyspnea/shortness of breath (10 Apr 2019 09:44)        SUBJECTIVE:  Patient was seen and examined at bedside. Reports improvement in  presenting complaint. No significant overnight events reported.     HISTORY OF PRESENTING ILLNESS:  HPI:  79 year old male with pmhx of BPH, CHF, COPD (home O2 as needed), DM, HTN, CAD s/p heart artery stent and CABG, recent admission for chf . presents with SOB worsened over last 4 days. pt admits worse with exertion. Pt denies CP, cough, fever, chills, or calf pain. + chronic calf swelling due to CHF and venous stasis    At the time of admission, patient is sitting up in chair, comfortable and speaking in full sentences, on nasal canula O2. He denies any chest pain and or palpitations, reports dyspnea and orthopnea. He sees Dr. Ramirez/marisela[facility]kathleen.  -will admit to low risk tele for chf exacerbation (06 Apr 2019 17:59)      PAST MEDICAL & SURGICAL HISTORY:  PAST MEDICAL & SURGICAL HISTORY:  BPH (benign prostatic hyperplasia)  CHF (congestive heart failure)  COPD (chronic obstructive pulmonary disease)  Diabetes  HTN (hypertension)  H/O heart artery stent  S/P CABG x 3      VITAL SIGNS:  Vital Signs Last 24 Hrs  T(C): 35.9 (11 Apr 2019 05:28), Max: 36.4 (10 Apr 2019 21:10)  T(F): 96.6 (11 Apr 2019 05:28), Max: 97.6 (10 Apr 2019 21:10)  HR: 63 (11 Apr 2019 05:28) (62 - 80)  BP: 114/88 (11 Apr 2019 05:28) (110/59 - 136/57)  BP(mean): --  RR: 16 (11 Apr 2019 05:28) (16 - 16)  SpO2: --    PHYSICAL EXAMINATION:  Not in acute distress  General: No pallor, or icterus, afebrile  HEENT:  LISA, EOMI, no JVD, no Bruit.  Heart: S1+S2 audible, no murmur, irregular  Lungs: bilateral  fair air entry, no wheezing, basal crepitations.  Abdomen: Soft, non-tender, non-distended , no  rigidity or guarding.  CNS: AAOx3, CN  grossly intact.  Extremities: bilateral rodney    , chronic venous skin changes    REVIEW OF SYSTEMS:  Patient denies any headache, any vision complaints, runny nose, fever, chills, sore throat. Denies chest pain, palpitation. Denies nausea, vomiting, abdominal pain, diarrhoea, Denies urinary burning, urgency, frequency, dysuria. Denies weakness in any part of the body or numbness.   At least 10 systems were reviewed in ROS. All systems reviewed  are within normal limits except for the complaints as described in Subjective.    CONSULTS:  Consultant(s) Notes Reviewed by me.   Care Discussed with Consultants/Other Providers where required.        MEDICATIONS:  MEDICATIONS  (STANDING):  amLODIPine   Tablet 5 milliGRAM(s) Oral daily  aspirin  chewable 81 milliGRAM(s) Oral daily  chlorhexidine 4% Liquid 1 Application(s) Topical every 12 hours  dextrose 5%. 1000 milliLiter(s) (50 mL/Hr) IV Continuous <Continuous>  dextrose 50% Injectable 12.5 Gram(s) IV Push once  dextrose 50% Injectable 25 Gram(s) IV Push once  dextrose 50% Injectable 25 Gram(s) IV Push once  finasteride 5 milliGRAM(s) Oral daily  fluticasone propionate 50 MICROgram(s)/spray Nasal Spray 1 Spray(s) Both Nostrils two times a day  furosemide    Tablet 40 milliGRAM(s) Oral two times a day  insulin lispro (HumaLOG) corrective regimen sliding scale   SubCutaneous three times a day before meals  insulin lispro (HumaLOG) corrective regimen sliding scale   SubCutaneous at bedtime  isosorbide   mononitrate ER Tablet (IMDUR) 30 milliGRAM(s) Oral daily  losartan 50 milliGRAM(s) Oral daily  metoprolol tartrate 50 milliGRAM(s) Oral two times a day  rivaroxaban 15 milliGRAM(s) Oral every 24 hours  tamsulosin 0.4 milliGRAM(s) Oral at bedtime    MEDICATIONS  (PRN):  ALBUTerol    90 MICROgram(s) HFA Inhaler 2 Puff(s) Inhalation every 6 hours PRN Shortness of Breath and/or Wheezing  ALPRAZolam 0.5 milliGRAM(s) Oral two times a day PRN anxiety  dextrose 40% Gel 15 Gram(s) Oral once PRN Blood Glucose LESS THAN 70 milliGRAM(s)/deciliter  glucagon  Injectable 1 milliGRAM(s) IntraMuscular once PRN Glucose LESS THAN 70 milligrams/deciliter      [facility] DATA/MICROBIOLOGY/I & O's:                        14.2   8.23  )-----------( 176      ( 11 Apr 2019 06:23 )             44.7     04-11    144  |  100  |  50<H>  ----------------------------<  143<H>  4.4   |  30  |  1.6<H>    Ca    9.8      11 Apr 2019 06:23    TPro  7.0  /  Alb  4.1  /  TBili  0.7  /  DBili  x   /  AST  16  /  ALT  19  /  AlkPhos  45  04-11        CAPILLARY BLOOD GLUCOSE      POCT Blood Glucose.: 160 mg/dL (11 Apr 2019 11:54)  POCT Blood Glucose.: 143 mg/dL (11 Apr 2019 07:31)  POCT Blood Glucose.: 133 mg/dL (10 Apr 2019 21:10)  POCT Blood Glucose.: 131 mg/dL (10 Apr 2019 16:32)                04-10-19 @ 07:01  -  04-11-19 @ 07:00  --------------------------------------------------------  IN: 540 mL / OUT: 250 mL / NET: 290 mL              ASSESSMENT:    Patient is a 79y old  Male who presents with a chief complaint of dyspnea/shortness of breath secondary to decompensated CHF     ASSESSMENT:  Principal Diagnosis:  Acute on Chronic Diastolic CHF exacerbation  Morbid obesity  Suspected YAMIL/OHS    Associated Active Comorbid Conditions:  Chronic obstructive pulmonary disease-stable   Diabetes mellitis type 2   Atrial fibrillation , persistent  Benign Prostatic Hypertrophy   Chronic renal disease stage 3      PLAN:   Congestive heart failure - diastolic, acute on chronic -Lasix 40mg IVP Q12hrs. Echo from last admission clarified: EF > 55% and Grade II diastolic dysfunction  Strict input/output monitoring  Daily weight monitoring.  Fluid restriction to 1 L/24 hrs  O2 via NC@2l/min, keep sat>94% at all times  Afib- benjy controlled.  Metoprolol 50mg po q12hrs, anti-coagulation with xarelto to be continued  NTG 0.4mg SL q5mins x 3 doses PRN for chest pain  Coronary artery disease-continue with ARB, Imdur  Diabetes mellitis type 2- well controlled . Insulin sliding scale with coverage wih meals and QHS   Monitor Electrolytes Qdaily, Keep K+>4, Mg>2   Elevate Head end of bed >35*, aspiration prec  continue with home meds  GI Prophylaxis with  protonix 40 milligrams daily  DVT Prophylaxis with xarelto    #Progress Note Handoff  Pending :medical stabilization  Disposition:  Home when stable , anticipate fpr tomorrow
TAIWO ZAVALA  79y  Male      Patient is a 79y old  Male who presents with a chief complaint of dyspnea/shortness of breath (2019 08:24)      INTERVAL HPI/OVERNIGHT EVENTS:    pt seen and examined at bedside  -non-compliant with meds; educated on fluid restriction  -continue with IV diuresis (reviewed kidney function); pt wants ARB to be twice daily; explained that losartan will be given once a day as serum Cr increased from 1.4 to 1.6 and BMP to be monitored while on IV lasix  -Finger sticks still on the lower side (insulin sliding scale and no oral hypoglycemics)  oob to chair; rehab/pt   -cardiology consult noted   -LE wound care ordered     Vital Signs Last 24 Hrs  T(C): 35.8 (2019 05:38), Max: 36.4 (2019 18:25)  T(F): 96.5 (2019 05:38), Max: 97.6 (2019 18:25)  HR: 63 (2019 05:38) (62 - 66)  BP: 116/57 (2019 05:38) (109/55 - 151/65)  RR: 16 (2019 05:38) (16 - 20)  SpO2: 96% (2019 08:20) (88% - 97%)    PHYSICAL EXAM:  GENERAL: NAD, sitting up in chair   HEAD:  Atraumatic, Normocephalic  EYES: EOMI, PERRLA, conjunctiva and sclera clear  NERVOUS SYSTEM:  Alert & Oriented X 3  CHEST/LUNG: rales b/l  CV/HEART: irregular rhythm  GI/ABDOMEN: Soft, Nontender, obese abdomenBowel sounds present  EXTREMITIES:  LE chronic skin changes/venous stasis; +3/4 pitting edema   SKIN: see Extrem    LAB:                        13.1   7.41  )-----------( 185      ( 2019 06:12 )             41.4     04-07    148<H>  |  103  |  37<H>  ----------------------------<  77  4.4   |  33<H>  |  1.6<H>    Ca    9.6      2019 06:12    TPro  7.2  /  Alb  3.9  /  TBili  0.7  /  DBili  x   /  AST  19  /  ALT  19  /  AlkPhos  42  04-06    CARDIAC MARKERS ( 2019 18:33 )  x     / 0.04 ng/mL / 148 U/L / x     / 4.6 ng/mL  CARDIAC MARKERS ( 2019 12:51 )  x     / 0.03 ng/mL / x     / x     / x        Daily Height in cm: 167.64 (2019 18:25)    Daily Weight in k (2019 07:50)  CAPILLARY BLOOD GLUCOSE    POCT Blood Glucose.: 81 mg/dL (2019 07:50)  POCT Blood Glucose.: 134 mg/dL (2019 21:26)  POCT Blood Glucose.: 75 mg/dL (2019 14:26)  POCT Blood Glucose.: 55 mg/dL (2019 14:01)    LIVER FUNCTIONS - ( 2019 12:51 )  Alb: 3.9 g/dL / Pro: 7.2 g/dL / ALK PHOS: 42 U/L / ALT: 19 U/L / AST: 19 U/L / GGT: x           RADIOLOGY:    Imaging Personally Reviewed:  [Y ] YES  [ ] NO    HEALTH ISSUES - PROBLEM Dx:    MEDICATIONS  (STANDING):  amLODIPine   Tablet 5 milliGRAM(s) Oral daily  aspirin  chewable 81 milliGRAM(s) Oral daily  chlorhexidine 4% Liquid 1 Application(s) Topical every 12 hours  dextrose 5%. 1000 milliLiter(s) (50 mL/Hr) IV Continuous <Continuous>  dextrose 50% Injectable 12.5 Gram(s) IV Push once  dextrose 50% Injectable 25 Gram(s) IV Push once  dextrose 50% Injectable 25 Gram(s) IV Push once  fenofibrate Tablet 145 milliGRAM(s) Oral daily  finasteride 5 milliGRAM(s) Oral daily  fluticasone propionate 50 MICROgram(s)/spray Nasal Spray 1 Spray(s) Both Nostrils two times a day  furosemide   Injectable 40 milliGRAM(s) IV Push every 12 hours  insulin lispro (HumaLOG) corrective regimen sliding scale   SubCutaneous three times a day before meals  insulin lispro (HumaLOG) corrective regimen sliding scale   SubCutaneous at bedtime  isosorbide   mononitrate ER Tablet (IMDUR) 30 milliGRAM(s) Oral daily  metoprolol tartrate 50 milliGRAM(s) Oral two times a day  rivaroxaban 15 milliGRAM(s) Oral every 24 hours  tamsulosin 0.4 milliGRAM(s) Oral at bedtime    MEDICATIONS  (PRN):  ALBUTerol    90 MICROgram(s) HFA Inhaler 2 Puff(s) Inhalation every 6 hours PRN Shortness of Breath and/or Wheezing  ALPRAZolam 0.5 milliGRAM(s) Oral two times a day PRN anxiety  dextrose 40% Gel 15 Gram(s) Oral once PRN Blood Glucose LESS THAN 70 milliGRAM(s)/deciliter  glucagon  Injectable 1 milliGRAM(s) IntraMuscular once PRN Glucose LESS THAN 70 milligrams/deciliter
ZAVALATAIWO  79y  Male      Patient is a 79y old  Male who presents with a chief complaint of dyspnea/shortness of breath (07 Apr 2019 08:24)      INTERVAL HPI/OVERNIGHT EVENTS:    pt seen and examined at bedside- admitted in march for the same   -non-compliant with meds; educated on fluid restriction; placed on fluid restricted diet  -continue with IV diuresis today  -oob to chair; rehab/pt seeing pt today  -cardiology consult appreciated  -LE wound care ordered   -will be set up with close follow up at home for CHF management and compliance - discussed with CM    Vital Signs Last 24 Hrs  T(C): 36.7 (08 Apr 2019 06:00), Max: 36.7 (08 Apr 2019 06:00)  T(F): 98.1 (08 Apr 2019 06:00), Max: 98.1 (08 Apr 2019 06:00)  HR: 112 (08 Apr 2019 09:15) (64 - 112)  BP: 121/60 (08 Apr 2019 06:00) (111/56 - 121/60)  BP(mean): --  RR: 16 (08 Apr 2019 06:00) (16 - 16)  SpO2: 86% (08 Apr 2019 09:15) (86% - 91%) on RA    PHYSICAL EXAM:  GENERAL: NAD, sitting up in chair   HEAD:  Atraumatic, Normocephalic  EYES: EOMI, PERRLA, conjunctiva and sclera clear  NERVOUS SYSTEM:  Alert & Oriented X 3  CHEST/LUNG: rales b/l  CV/HEART: irregular rhythm  GI/ABDOMEN: Soft, Nontender, obese abdomen, Bowel sounds present  EXTREMITIES:  LE chronic skin changes/venous stasis; +3/4 pitting edema, LLE wrapped   SKIN: see Extrem    LAB:                        13.4   7.87  )-----------( 197      ( 08 Apr 2019 07:22 )             42.8     04-08    142  |  99  |  40<H>  ----------------------------<  97  3.9   |  29  |  1.7<H>    Ca    9.6      08 Apr 2019 07:22    TPro  7.2  /  Alb  3.9  /  TBili  0.7  /  DBili  x   /  AST  19  /  ALT  19  /  AlkPhos  42  04-06                              13.1   7.41  )-----------( 185      ( 07 Apr 2019 06:12 )             41.4     04-07    148<H>  |  103  |  37<H>  ----------------------------<  77  4.4   |  33<H>  |  1.6<H>    Ca    9.6      07 Apr 2019 06:12    TPro  7.2  /  Alb  3.9  /  TBili  0.7  /  DBili  x   /  AST  19  /  ALT  19  /  AlkPhos  42  04-06    CARDIAC MARKERS ( 06 Apr 2019 18:33 )  x     / 0.04 ng/mL / 148 U/L / x     / 4.6 ng/mL  CARDIAC MARKERS ( 06 Apr 2019 12:51 )  x     / 0.03 ng/mL / x     / x     / x          CAPILLARY BLOOD GLUCOSE  POCT Blood Glucose.: 95 mg/dL (08 Apr 2019 07:41)  POCT Blood Glucose.: 202 mg/dL (07 Apr 2019 21:21)  POCT Blood Glucose.: 171 mg/dL (07 Apr 2019 16:29)  POCT Blood Glucose.: 152 mg/dL (07 Apr 2019 11:57)    RADIOLOGY:  Imaging Personally Reviewed:  [Y ] YES  [ ] NO    HEALTH ISSUES - PROBLEM Dx:    MEDICATIONS  (STANDING):  amLODIPine   Tablet 5 milliGRAM(s) Oral daily  aspirin  chewable 81 milliGRAM(s) Oral daily  chlorhexidine 4% Liquid 1 Application(s) Topical every 12 hours  dextrose 5%. 1000 milliLiter(s) (50 mL/Hr) IV Continuous <Continuous>  dextrose 50% Injectable 12.5 Gram(s) IV Push once  dextrose 50% Injectable 25 Gram(s) IV Push once  dextrose 50% Injectable 25 Gram(s) IV Push once  finasteride 5 milliGRAM(s) Oral daily  fluticasone propionate 50 MICROgram(s)/spray Nasal Spray 1 Spray(s) Both Nostrils two times a day  furosemide   Injectable 40 milliGRAM(s) IV Push every 12 hours  insulin lispro (HumaLOG) corrective regimen sliding scale   SubCutaneous three times a day before meals  insulin lispro (HumaLOG) corrective regimen sliding scale   SubCutaneous at bedtime  isosorbide   mononitrate ER Tablet (IMDUR) 30 milliGRAM(s) Oral daily  losartan 50 milliGRAM(s) Oral daily  metoprolol tartrate 50 milliGRAM(s) Oral two times a day  rivaroxaban 15 milliGRAM(s) Oral every 24 hours  tamsulosin 0.4 milliGRAM(s) Oral at bedtime    MEDICATIONS  (PRN):  ALBUTerol    90 MICROgram(s) HFA Inhaler 2 Puff(s) Inhalation every 6 hours PRN Shortness of Breath and/or Wheezing  ALPRAZolam 0.5 milliGRAM(s) Oral two times a day PRN anxiety  dextrose 40% Gel 15 Gram(s) Oral once PRN Blood Glucose LESS THAN 70 milliGRAM(s)/deciliter  glucagon  Injectable 1 milliGRAM(s) IntraMuscular once PRN Glucose LESS THAN 70 milligrams/deciliter
ZAVALATAIWO CASPER  79y  Male      Patient is a 79y old  Male who presents with a chief complaint of dyspnea/shortness of breath (2019 08:24)      INTERVAL HPI/OVERNIGHT EVENTS:    pt seen and examined at bedside  -non-compliant with meds; educated on fluid restriction  -continue with IV diuresis (reviewed kidney function)  -Finger sticks still on the lower side (insulin sliding scale and no oral hypoglycemics)  oob to chair; rehab/pt   -cardiology consult noted   -LE wound care ordered and to be done once daily with an outpatient vascular follow up     Vital Signs Last 24 Hrs  T(C): 35.8 (2019 14:11), Max: 36.5 (2019 21:31)  T(F): 96.5 (2019 14:11), Max: 97.7 (2019 21:31)  HR: 84 (2019 14:11) (62 - 85)  BP: 118/62 (2019 14:11) (118/62 - 130/62)  RR: 18 (2019 09:58) (16 - 18)  SpO2: 90% (2019 09:58) (90% - 96%)    PHYSICAL EXAM:  GENERAL: NAD, sitting up in chair   HEAD:  Atraumatic, Normocephalic  EYES: EOMI, PERRLA, conjunctiva and sclera clear  NERVOUS SYSTEM:  Alert & Oriented X 3  CHEST/LUNG: rales b/l  CV/HEART: irregular rhythm  GI/ABDOMEN: Soft, Nontender, obese abdomen, Bowel sounds present  EXTREMITIES:  LE chronic skin changes/venous stasis; +3/4 pitting edema   SKIN: see Extrem    LAB:                             13.4   7.87  )-----------( 197      ( 2019 07:22 )             42.8   04-08    142  |  99  |  40<H>  ----------------------------<  97  3.9   |  29  |  1.7<H>    Ca    9.6      2019 07:22                     13.1   7.41  )-----------( 185      ( 2019 06:12 )             41.4     04-07    148<H>  |  103  |  37<H>  ----------------------------<  77  4.4   |  33<H>  |  1.6<H>    Ca    9.6      2019 06:12    TPro  7.2  /  Alb  3.9  /  TBili  0.7  /  DBili  x   /  AST  19  /  ALT  19  /  AlkPhos  42  04-06    CARDIAC MARKERS ( 2019 18:33 )  x     / 0.04 ng/mL / 148 U/L / x     / 4.6 ng/mL  CARDIAC MARKERS ( 2019 12:51 )  x     / 0.03 ng/mL / x     / x     / x        Daily Height in cm: 167.64 (2019 18:25)    Daily Weight in k (2019 07:50)  CAPILLARY BLOOD GLUCOSE    POCT Blood Glucose.: 81 mg/dL (2019 07:50)  POCT Blood Glucose.: 134 mg/dL (2019 21:26)  POCT Blood Glucose.: 75 mg/dL (2019 14:26)  POCT Blood Glucose.: 55 mg/dL (2019 14:01)    LIVER FUNCTIONS - ( 2019 12:51 )  Alb: 3.9 g/dL / Pro: 7.2 g/dL / ALK PHOS: 42 U/L / ALT: 19 U/L / AST: 19 U/L / GGT: x           RADIOLOGY:    Imaging Personally Reviewed:  [Y ] YES  [ ] NO    HEALTH ISSUES - PROBLEM Dx:    MEDICATIONS  (STANDING):  amLODIPine   Tablet 5 milliGRAM(s) Oral daily  aspirin  chewable 81 milliGRAM(s) Oral daily  chlorhexidine 4% Liquid 1 Application(s) Topical every 12 hours  dextrose 5%. 1000 milliLiter(s) (50 mL/Hr) IV Continuous <Continuous>  dextrose 50% Injectable 12.5 Gram(s) IV Push once  dextrose 50% Injectable 25 Gram(s) IV Push once  dextrose 50% Injectable 25 Gram(s) IV Push once  finasteride 5 milliGRAM(s) Oral daily  fluticasone propionate 50 MICROgram(s)/spray Nasal Spray 1 Spray(s) Both Nostrils two times a day  furosemide   Injectable 40 milliGRAM(s) IV Push every 12 hours  insulin lispro (HumaLOG) corrective regimen sliding scale   SubCutaneous three times a day before meals  insulin lispro (HumaLOG) corrective regimen sliding scale   SubCutaneous at bedtime  isosorbide   mononitrate ER Tablet (IMDUR) 30 milliGRAM(s) Oral daily  losartan 50 milliGRAM(s) Oral daily  metoprolol tartrate 50 milliGRAM(s) Oral two times a day  rivaroxaban 15 milliGRAM(s) Oral every 24 hours  tamsulosin 0.4 milliGRAM(s) Oral at bedtime    MEDICATIONS  (PRN):  ALBUTerol    90 MICROgram(s) HFA Inhaler 2 Puff(s) Inhalation every 6 hours PRN Shortness of Breath and/or Wheezing  ALPRAZolam 0.5 milliGRAM(s) Oral two times a day PRN anxiety  dextrose 40% Gel 15 Gram(s) Oral once PRN Blood Glucose LESS THAN 70 milliGRAM(s)/deciliter  glucagon  Injectable 1 milliGRAM(s) IntraMuscular once PRN Glucose LESS THAN 70 milligrams/deciliter

## 2019-04-12 NOTE — PROGRESS NOTE ADULT - REASON FOR ADMISSION
dyspnea/shortness of breath

## 2019-04-12 NOTE — DISCHARGE NOTE PROVIDER - HOSPITAL COURSE
79 year old male with pmhx of BPH, CHF, COPD (home O2 as needed), DM, HTN, CAD s/p heart artery stent and CABG, recent admission for chf . presents with SOB worsened over last 4     days. pt admits worse with exertion. Pt denies CP, cough, fever, chills, or calf pain. + chronic calf swelling due to CHF and venous stasis. At the time of admission, patient is     sitting up in chair, comfortable and speaking in full sentences, on nasal canula O2. He denies any chest pain and or palpitations, reports dyspnea and orthopnea. He was admitted     for management of  congestive heart failure exacerbation which was diastolic in origin. He was diuresed with Lasix and Lasix dose was adjusted according to the clinical     response. Regular telemonitoring was done to watch for any cardiac arrhythmias. Fluid intake was restricted to 1 L/24 hrs. Daily weight  monitoring was done. Beta blockers and     ARB were continued. Regular monitoring  of Input/Output was done to monitor for clinical response of diuretics and dosage adjustment. Cardiology was taken on board for consult     and their recommendations were followed. He was also seen by Pulmonology who recommended to continue breo and incruse inhalers. His Chronic obstructive pulmonary disease was at     basline and stable.  Patient is now clinically euvolemic and stable for discharge. Discharge diagnosis, medicines and follow up appointments discussed and ensured with the     patient prior to discharge. 79 year old male with pmhx of BPH, CHF, COPD (home O2 as needed), DM, HTN, CAD s/p heart artery stent and CABG, recent admission for chf . presents with SOB worsened over last 4     days. pt admits worse with exertion. Pt denies CP, cough, fever, chills, or calf pain. + chronic calf swelling due to CHF and venous stasis. At the time of admission, patient is     sitting up in chair, comfortable and speaking in full sentences, on nasal canula O2. He denies any chest pain and or palpitations, reports dyspnea and orthopnea. He was admitted     for management of  congestive heart failure exacerbation which was diastolic in origin. He was diuresed with Lasix and Lasix dose was adjusted according to the clinical     response. Regular telemonitoring was done to watch for any cardiac arrhythmias. Fluid intake was restricted to 1 L/24 hrs. Daily weight  monitoring was done. Beta blockers and     ARB were continued. Regular monitoring  of Input/Output was done to monitor for clinical response of diuretics and dosage adjustment. Cardiology was taken on board for consult     and their recommendations were followed. He was also seen by Pulmonology who recommended to continue breo and incruse inhalers. His Chronic obstructive pulmonary disease was at     basline and stable.  Patient is now clinically euvolemic and stable for discharge. Discharge diagnosis, medicines and follow up appointments discussed and ensured with the     patient prior to discharge.       Attending Attestation:    Patient was seen independently. Latest vital signs and labs were reviewed.  Patient is medically stable for discharge to home. About 32 mins spent on discharge disposition

## 2019-04-12 NOTE — DISCHARGE NOTE PROVIDER - NSDCFUADDINST_GEN_ALL_CORE_FT
Wound Care:  Local wound care for venous stasis ulcer  Wash daily with NS, pat dry.  Silver sulfadiazine topical, cover with dry dressing   or Silver Alginate non-adherent dressing q 48 hrs

## 2019-04-12 NOTE — DISCHARGE NOTE PROVIDER - CARE PROVIDER_API CALL
Carl Kruger)  Family Medicine  [address] [place] [address]  Phone: [phone]  Fax: [phone]  Follow Up Time:     Kirit Ramirez)  Cardiovascular Disease; Internal Medicine  [address] [place] [address]  Phone: [phone]  Fax: [phone]  Follow Up Time:

## 2019-04-12 NOTE — DISCHARGE NOTE PROVIDER - CARE PROVIDERS DIRECT ADDRESSES
,DirectAddress_Unknown,[email or web address]

## 2019-04-12 NOTE — DISCHARGE NOTE PROVIDER - NSDCCPCAREPLAN_GEN_ALL_CORE_FT
PRINCIPAL DISCHARGE DIAGNOSIS  Diagnosis: Dyspnea  Assessment and Plan of Treatment: Resolved at and its basline  Take all meds as prescribed.  F/U with cardiology as outpateint.

## 2019-04-17 DIAGNOSIS — I48.92 UNSPECIFIED ATRIAL FLUTTER: ICD-10-CM

## 2019-04-17 DIAGNOSIS — N18.3 CHRONIC KIDNEY DISEASE, STAGE 3 (MODERATE): ICD-10-CM

## 2019-04-17 DIAGNOSIS — Z99.81 DEPENDENCE ON SUPPLEMENTAL OXYGEN: ICD-10-CM

## 2019-04-17 DIAGNOSIS — I13.0 HYPERTENSIVE HEART AND CHRONIC KIDNEY DISEASE WITH HEART FAILURE AND STAGE 1 THROUGH STAGE 4 CHRONIC KIDNEY DISEASE, OR UNSPECIFIED CHRONIC KIDNEY DISEASE: ICD-10-CM

## 2019-04-17 DIAGNOSIS — Z95.5 PRESENCE OF CORONARY ANGIOPLASTY IMPLANT AND GRAFT: ICD-10-CM

## 2019-04-17 DIAGNOSIS — Z95.1 PRESENCE OF AORTOCORONARY BYPASS GRAFT: ICD-10-CM

## 2019-04-17 DIAGNOSIS — I25.10 ATHEROSCLEROTIC HEART DISEASE OF NATIVE CORONARY ARTERY WITHOUT ANGINA PECTORIS: ICD-10-CM

## 2019-04-17 DIAGNOSIS — E66.2 MORBID (SEVERE) OBESITY WITH ALVEOLAR HYPOVENTILATION: ICD-10-CM

## 2019-04-17 DIAGNOSIS — Z79.4 LONG TERM (CURRENT) USE OF INSULIN: ICD-10-CM

## 2019-04-17 DIAGNOSIS — Z91.14 PATIENT'S OTHER NONCOMPLIANCE WITH MEDICATION REGIMEN: ICD-10-CM

## 2019-04-17 DIAGNOSIS — R06.00 DYSPNEA, UNSPECIFIED: ICD-10-CM

## 2019-04-17 DIAGNOSIS — I48.1 PERSISTENT ATRIAL FIBRILLATION: ICD-10-CM

## 2019-04-17 DIAGNOSIS — E11.649 TYPE 2 DIABETES MELLITUS WITH HYPOGLYCEMIA WITHOUT COMA: ICD-10-CM

## 2019-04-17 DIAGNOSIS — Z79.82 LONG TERM (CURRENT) USE OF ASPIRIN: ICD-10-CM

## 2019-04-17 DIAGNOSIS — I87.2 VENOUS INSUFFICIENCY (CHRONIC) (PERIPHERAL): ICD-10-CM

## 2019-04-17 DIAGNOSIS — J96.10 CHRONIC RESPIRATORY FAILURE, UNSPECIFIED WHETHER WITH HYPOXIA OR HYPERCAPNIA: ICD-10-CM

## 2019-04-17 DIAGNOSIS — I50.23 ACUTE ON CHRONIC SYSTOLIC (CONGESTIVE) HEART FAILURE: ICD-10-CM

## 2019-04-17 DIAGNOSIS — N40.0 BENIGN PROSTATIC HYPERPLASIA WITHOUT LOWER URINARY TRACT SYMPTOMS: ICD-10-CM

## 2019-04-17 DIAGNOSIS — E11.22 TYPE 2 DIABETES MELLITUS WITH DIABETIC CHRONIC KIDNEY DISEASE: ICD-10-CM

## 2019-04-17 DIAGNOSIS — J44.9 CHRONIC OBSTRUCTIVE PULMONARY DISEASE, UNSPECIFIED: ICD-10-CM

## 2019-08-18 ENCOUNTER — INPATIENT (INPATIENT)
Facility: HOSPITAL | Age: 79
LOS: 4 days | Discharge: HOME HEALTH | End: 2019-08-23
Attending: INTERNAL MEDICINE | Admitting: INTERNAL MEDICINE
Payer: MEDICARE

## 2019-08-18 VITALS
DIASTOLIC BLOOD PRESSURE: 71 MMHG | HEART RATE: 139 BPM | WEIGHT: 240.08 LBS | OXYGEN SATURATION: 99 % | RESPIRATION RATE: 28 BRPM | SYSTOLIC BLOOD PRESSURE: 152 MMHG | HEIGHT: 66 IN

## 2019-08-18 DIAGNOSIS — Z95.1 PRESENCE OF AORTOCORONARY BYPASS GRAFT: Chronic | ICD-10-CM

## 2019-08-18 DIAGNOSIS — Z95.5 PRESENCE OF CORONARY ANGIOPLASTY IMPLANT AND GRAFT: Chronic | ICD-10-CM

## 2019-08-18 LAB
ALBUMIN SERPL ELPH-MCNC: 4 G/DL — SIGNIFICANT CHANGE UP (ref 3.5–5.2)
ALP SERPL-CCNC: 54 U/L — SIGNIFICANT CHANGE UP (ref 30–115)
ALT FLD-CCNC: 13 U/L — SIGNIFICANT CHANGE UP (ref 0–41)
ANION GAP SERPL CALC-SCNC: 12 MMOL/L — SIGNIFICANT CHANGE UP (ref 7–14)
APTT BLD: 38.4 SEC — SIGNIFICANT CHANGE UP (ref 27–39.2)
AST SERPL-CCNC: 15 U/L — SIGNIFICANT CHANGE UP (ref 0–41)
BASE EXCESS BLDV CALC-SCNC: 0.2 MMOL/L — SIGNIFICANT CHANGE UP (ref -2–2)
BASOPHILS # BLD AUTO: 0.07 K/UL — SIGNIFICANT CHANGE UP (ref 0–0.2)
BASOPHILS NFR BLD AUTO: 0.4 % — SIGNIFICANT CHANGE UP (ref 0–1)
BILIRUB SERPL-MCNC: 0.8 MG/DL — SIGNIFICANT CHANGE UP (ref 0.2–1.2)
BUN SERPL-MCNC: 43 MG/DL — HIGH (ref 10–20)
CA-I SERPL-SCNC: 1.23 MMOL/L — SIGNIFICANT CHANGE UP (ref 1.12–1.3)
CALCIUM SERPL-MCNC: 9.6 MG/DL — SIGNIFICANT CHANGE UP (ref 8.5–10.1)
CHLORIDE SERPL-SCNC: 104 MMOL/L — SIGNIFICANT CHANGE UP (ref 98–110)
CO2 SERPL-SCNC: 26 MMOL/L — SIGNIFICANT CHANGE UP (ref 17–32)
CREAT SERPL-MCNC: 1.5 MG/DL — SIGNIFICANT CHANGE UP (ref 0.7–1.5)
EOSINOPHIL # BLD AUTO: 0.25 K/UL — SIGNIFICANT CHANGE UP (ref 0–0.7)
EOSINOPHIL NFR BLD AUTO: 1.5 % — SIGNIFICANT CHANGE UP (ref 0–8)
GAS PNL BLDV: 145 MMOL/L — SIGNIFICANT CHANGE UP (ref 136–145)
GAS PNL BLDV: SIGNIFICANT CHANGE UP
GLUCOSE SERPL-MCNC: 190 MG/DL — HIGH (ref 70–99)
HCO3 BLDV-SCNC: 28 MMOL/L — SIGNIFICANT CHANGE UP (ref 22–29)
HCT VFR BLD CALC: 46.9 % — SIGNIFICANT CHANGE UP (ref 42–52)
HCT VFR BLDA CALC: 40.9 % — SIGNIFICANT CHANGE UP (ref 34–44)
HGB BLD CALC-MCNC: 13.4 G/DL — LOW (ref 14–18)
HGB BLD-MCNC: 14.2 G/DL — SIGNIFICANT CHANGE UP (ref 14–18)
HOROWITZ INDEX BLDV+IHG-RTO: 100 — SIGNIFICANT CHANGE UP
IMM GRANULOCYTES NFR BLD AUTO: 0.4 % — HIGH (ref 0.1–0.3)
INR BLD: 1.71 RATIO — HIGH (ref 0.65–1.3)
LACTATE BLDV-MCNC: 0.9 MMOL/L — SIGNIFICANT CHANGE UP (ref 0.5–1.6)
LYMPHOCYTES # BLD AUTO: 15.5 % — LOW (ref 20.5–51.1)
LYMPHOCYTES # BLD AUTO: 2.62 K/UL — SIGNIFICANT CHANGE UP (ref 1.2–3.4)
MAGNESIUM SERPL-MCNC: 2.1 MG/DL — SIGNIFICANT CHANGE UP (ref 1.8–2.4)
MCHC RBC-ENTMCNC: 25.5 PG — LOW (ref 27–31)
MCHC RBC-ENTMCNC: 30.3 G/DL — LOW (ref 32–37)
MCV RBC AUTO: 84.4 FL — SIGNIFICANT CHANGE UP (ref 80–94)
MONOCYTES # BLD AUTO: 1.48 K/UL — HIGH (ref 0.1–0.6)
MONOCYTES NFR BLD AUTO: 8.8 % — SIGNIFICANT CHANGE UP (ref 1.7–9.3)
NEUTROPHILS # BLD AUTO: 12.38 K/UL — HIGH (ref 1.4–6.5)
NEUTROPHILS NFR BLD AUTO: 73.4 % — SIGNIFICANT CHANGE UP (ref 42.2–75.2)
NRBC # BLD: 0 /100 WBCS — SIGNIFICANT CHANGE UP (ref 0–0)
NT-PROBNP SERPL-SCNC: 3797 PG/ML — HIGH (ref 0–300)
PCO2 BLDV: 58 MMHG — HIGH (ref 41–51)
PH BLDV: 7.29 — SIGNIFICANT CHANGE UP (ref 7.26–7.43)
PLATELET # BLD AUTO: 277 K/UL — SIGNIFICANT CHANGE UP (ref 130–400)
PO2 BLDV: 63 MMHG — HIGH (ref 20–40)
POTASSIUM BLDV-SCNC: 4.1 MMOL/L — SIGNIFICANT CHANGE UP (ref 3.3–5.6)
POTASSIUM SERPL-MCNC: 4.5 MMOL/L — SIGNIFICANT CHANGE UP (ref 3.5–5)
POTASSIUM SERPL-SCNC: 4.5 MMOL/L — SIGNIFICANT CHANGE UP (ref 3.5–5)
PROT SERPL-MCNC: 7.9 G/DL — SIGNIFICANT CHANGE UP (ref 6–8)
PROTHROM AB SERPL-ACNC: 19.6 SEC — HIGH (ref 9.95–12.87)
RBC # BLD: 5.56 M/UL — SIGNIFICANT CHANGE UP (ref 4.7–6.1)
RBC # FLD: 15.6 % — HIGH (ref 11.5–14.5)
SAO2 % BLDV: 88 % — SIGNIFICANT CHANGE UP
SODIUM SERPL-SCNC: 142 MMOL/L — SIGNIFICANT CHANGE UP (ref 135–146)
TROPONIN T SERPL-MCNC: 0.03 NG/ML — CRITICAL HIGH
WBC # BLD: 16.87 K/UL — HIGH (ref 4.8–10.8)
WBC # FLD AUTO: 16.87 K/UL — HIGH (ref 4.8–10.8)

## 2019-08-18 PROCEDURE — 71045 X-RAY EXAM CHEST 1 VIEW: CPT | Mod: 26

## 2019-08-18 PROCEDURE — 99291 CRITICAL CARE FIRST HOUR: CPT

## 2019-08-18 PROCEDURE — 99222 1ST HOSP IP/OBS MODERATE 55: CPT

## 2019-08-18 RX ORDER — DEXTROSE 50 % IN WATER 50 %
25 SYRINGE (ML) INTRAVENOUS ONCE
Refills: 0 | Status: DISCONTINUED | OUTPATIENT
Start: 2019-08-18 | End: 2019-08-23

## 2019-08-18 RX ORDER — METOPROLOL TARTRATE 50 MG
50 TABLET ORAL DAILY
Refills: 0 | Status: DISCONTINUED | OUTPATIENT
Start: 2019-08-18 | End: 2019-08-20

## 2019-08-18 RX ORDER — FENOFIBRATE,MICRONIZED 130 MG
145 CAPSULE ORAL DAILY
Refills: 0 | Status: DISCONTINUED | OUTPATIENT
Start: 2019-08-18 | End: 2019-08-23

## 2019-08-18 RX ORDER — DEXTROSE 50 % IN WATER 50 %
15 SYRINGE (ML) INTRAVENOUS ONCE
Refills: 0 | Status: DISCONTINUED | OUTPATIENT
Start: 2019-08-18 | End: 2019-08-23

## 2019-08-18 RX ORDER — CLOPIDOGREL BISULFATE 75 MG/1
75 TABLET, FILM COATED ORAL DAILY
Refills: 0 | Status: DISCONTINUED | OUTPATIENT
Start: 2019-08-18 | End: 2019-08-20

## 2019-08-18 RX ORDER — GLUCAGON INJECTION, SOLUTION 0.5 MG/.1ML
1 INJECTION, SOLUTION SUBCUTANEOUS ONCE
Refills: 0 | Status: DISCONTINUED | OUTPATIENT
Start: 2019-08-18 | End: 2019-08-23

## 2019-08-18 RX ORDER — LOSARTAN POTASSIUM 100 MG/1
50 TABLET, FILM COATED ORAL
Refills: 0 | Status: DISCONTINUED | OUTPATIENT
Start: 2019-08-18 | End: 2019-08-23

## 2019-08-18 RX ORDER — PANTOPRAZOLE SODIUM 20 MG/1
40 TABLET, DELAYED RELEASE ORAL
Refills: 0 | Status: DISCONTINUED | OUTPATIENT
Start: 2019-08-18 | End: 2019-08-23

## 2019-08-18 RX ORDER — HEPARIN SODIUM 5000 [USP'U]/ML
5000 INJECTION INTRAVENOUS; SUBCUTANEOUS EVERY 8 HOURS
Refills: 0 | Status: DISCONTINUED | OUTPATIENT
Start: 2019-08-18 | End: 2019-08-19

## 2019-08-18 RX ORDER — FUROSEMIDE 40 MG
1 TABLET ORAL
Qty: 0 | Refills: 0 | DISCHARGE

## 2019-08-18 RX ORDER — DEXTROSE 50 % IN WATER 50 %
12.5 SYRINGE (ML) INTRAVENOUS ONCE
Refills: 0 | Status: DISCONTINUED | OUTPATIENT
Start: 2019-08-18 | End: 2019-08-23

## 2019-08-18 RX ORDER — GLIMEPIRIDE 1 MG
1 TABLET ORAL
Qty: 0 | Refills: 0 | DISCHARGE

## 2019-08-18 RX ORDER — SODIUM CHLORIDE 9 MG/ML
1000 INJECTION, SOLUTION INTRAVENOUS
Refills: 0 | Status: DISCONTINUED | OUTPATIENT
Start: 2019-08-18 | End: 2019-08-23

## 2019-08-18 RX ORDER — METOPROLOL TARTRATE 50 MG
1 TABLET ORAL
Qty: 0 | Refills: 0 | DISCHARGE

## 2019-08-18 RX ORDER — AMLODIPINE BESYLATE 2.5 MG/1
5 TABLET ORAL DAILY
Refills: 0 | Status: DISCONTINUED | OUTPATIENT
Start: 2019-08-18 | End: 2019-08-23

## 2019-08-18 RX ORDER — ALFUZOSIN HYDROCHLORIDE 10 MG/1
1 TABLET, EXTENDED RELEASE ORAL
Qty: 0 | Refills: 0 | DISCHARGE

## 2019-08-18 RX ORDER — FINASTERIDE 5 MG/1
5 TABLET, FILM COATED ORAL DAILY
Refills: 0 | Status: DISCONTINUED | OUTPATIENT
Start: 2019-08-18 | End: 2019-08-23

## 2019-08-18 RX ORDER — ALPRAZOLAM 0.25 MG
0 TABLET ORAL
Qty: 0 | Refills: 0 | DISCHARGE

## 2019-08-18 RX ORDER — CHLORHEXIDINE GLUCONATE 213 G/1000ML
1 SOLUTION TOPICAL
Refills: 0 | Status: DISCONTINUED | OUTPATIENT
Start: 2019-08-18 | End: 2019-08-23

## 2019-08-18 RX ORDER — NITROGLYCERIN 6.5 MG
10 CAPSULE, EXTENDED RELEASE ORAL
Qty: 50 | Refills: 0 | Status: DISCONTINUED | OUTPATIENT
Start: 2019-08-18 | End: 2019-08-18

## 2019-08-18 RX ORDER — FUROSEMIDE 40 MG
40 TABLET ORAL EVERY 12 HOURS
Refills: 0 | Status: DISCONTINUED | OUTPATIENT
Start: 2019-08-18 | End: 2019-08-19

## 2019-08-18 RX ORDER — ASPIRIN/CALCIUM CARB/MAGNESIUM 324 MG
81 TABLET ORAL DAILY
Refills: 0 | Status: DISCONTINUED | OUTPATIENT
Start: 2019-08-18 | End: 2019-08-23

## 2019-08-18 RX ORDER — ISOSORBIDE MONONITRATE 60 MG/1
30 TABLET, EXTENDED RELEASE ORAL DAILY
Refills: 0 | Status: DISCONTINUED | OUTPATIENT
Start: 2019-08-18 | End: 2019-08-23

## 2019-08-18 RX ORDER — UMECLIDINIUM 62.5 UG/1
0 AEROSOL, POWDER ORAL
Qty: 0 | Refills: 0 | DISCHARGE

## 2019-08-18 RX ORDER — FUROSEMIDE 40 MG
40 TABLET ORAL ONCE
Refills: 0 | Status: COMPLETED | OUTPATIENT
Start: 2019-08-18 | End: 2019-08-18

## 2019-08-18 RX ORDER — ALBUTEROL 90 UG/1
2 AEROSOL, METERED ORAL
Qty: 0 | Refills: 0 | DISCHARGE

## 2019-08-18 RX ORDER — INSULIN LISPRO 100/ML
VIAL (ML) SUBCUTANEOUS
Refills: 0 | Status: DISCONTINUED | OUTPATIENT
Start: 2019-08-18 | End: 2019-08-23

## 2019-08-18 RX ORDER — AMLODIPINE BESYLATE 2.5 MG/1
0 TABLET ORAL
Qty: 0 | Refills: 0 | DISCHARGE

## 2019-08-18 RX ADMIN — Medication 3 MICROGRAM(S)/MIN: at 21:01

## 2019-08-18 RX ADMIN — Medication 40 MILLIGRAM(S): at 22:20

## 2019-08-18 RX ADMIN — Medication 10 MICROGRAM(S)/MIN: at 21:54

## 2019-08-18 RX ADMIN — Medication 125 MILLIGRAM(S): at 21:01

## 2019-08-18 NOTE — ED ADULT TRIAGE NOTE - NSTRIAGECARE_GEN_A_ER
IV Lock Initiated/Supplemental Oxygen (see orders)/Cardiac Monitoring/EKG/Medication (see EMAR) i.e. Neb, Tylenol, Motrin

## 2019-08-18 NOTE — ED ADULT NURSE NOTE - NSIMPLEMENTINTERV_GEN_ALL_ED
Implemented All Fall Risk Interventions:  [place] to call system. Call bell, personal items and telephone within reach. Instruct patient to call for assistance. Room bathroom lighting operational. Non-slip footwear when patient is off stretcher. Physically safe environment: no spills, clutter or unnecessary equipment. Stretcher in lowest position, wheels locked, appropriate side rails in place. Provide visual cue, wrist band, yellow gown, etc. Monitor gait and stability. Monitor for mental status changes and reorient to person, place, and time. Review medications for side effects contributing to fall risk. Reinforce activity limits and safety measures with patient and family.

## 2019-08-18 NOTE — ED PROVIDER NOTE - PHYSICAL EXAMINATION
PHYSICAL EXAM:    GENERAL: Alert, chronically ill appearing, in respiratory distress with EMS cpap in place. speaking in 1 word sentences  SKIN: Warm, pale and diaphoretic.   EYE: Normal lids/conjunctiva  ENT: Normal hearing  NECK: +supple. No meningismus  Pulm: Bilateral BS, increased resp effort, +diffuse wheezes and crackles.  CV: RRR, no M/R/G, 2+and = radial pulses  Abd: soft, non-tender, obese.   Mskel: no erythema, cyanosis. no calf tenderness 2+ pitting edema.   Neuro: AAOx3.

## 2019-08-18 NOTE — ED PROVIDER NOTE - PROGRESS NOTE DETAILS
Dr. Sanders -  marked improvement in ventilatory status with ED NIPPV, EMS CPAP max 5-10 mL H20 by design, initial NIPPV settings 15/5 with 100% FIO2. Initial SpO2 in ED was 63% with waveform, improved in ED with direct observation after initiation of ED NIPPV.  IV nitroglycerin started low dose as well with modest reduction in MAP, titrated down to avoid hypotension. Pt clinically improving in ED now making sentences and statements.

## 2019-08-18 NOTE — H&P ADULT - HISTORY OF PRESENT ILLNESS
trouble breathing x 2 days  sudden onset zully mehta talk  similar symtom, but worst  fever/chills, CP, SOB, cough, abd pain, n/v/d, calf pain     triple bypass 1995 1 stents 2002   leftkovic 2 weeks    compliant 80yo Male w/ PMH of CHF, COPD (home O2 as needed), DM, HTN, CAD s/p CABG and 1 stent, BPH, and recent admission for CHF (April 2019) BIBA dyspnea x 2 days that suddenly worsened today in the afternoon. Pt reports SOB worsened w/ exertion. Associated w/ clear sputum. As per ED physician, EMS gave IV lasix 40mg during transport. In ED, pt was in acute distress 2/2 dyspnea, O2 saturating low. Another IV lasix 40mg given. Nitro drip started. BIPAP applied w/ great relief. CXR shows pleural effusion and vascular congestion. BNP 3800s. Trop 0.03 noted. Pt reports breathing much better currently. Pt denies fever/chills, CP, palpitation, abd pain, N/V/D, dysuria, calf pain, or dizziness. + chronic calf swelling due to CHF and venous stasis. Pt sees cardiology, Dr. Ramirez, regularly. 78yo Male w/ PMH of CHF, COPD (home O2 as needed), DM, HTN, CAD s/p CABG and 1 stent, BPH, and recent admission for CHF (April 2019) BIBA dyspnea x 2 days that suddenly worsened today in the afternoon. Pt reports SOB worsened w/ exertion. Associated w/ clear sputum. As per ED physician, EMS gave IV lasix 40mg during transport. In ED, pt was in acute distress 2/2 dyspnea, O2 saturating low. Another IV lasix 40mg given. Nitro drip started. BIPAP applied w/ great relief. CXR shows pleural effusion and vascular congestion. BNP 3800s. Trop 0.03 noted. Pt reports breathing much better currently. Pt denies fever/chills, CP, palpitation, abd pain, N/V/D, dysuria, calf pain, or dizziness. + chronic calf swelling due to CHF and venous stasis. Pt sees cardiology, Dr. Ramirez, regularly.

## 2019-08-18 NOTE — ED PROVIDER NOTE - CLINICAL SUMMARY MEDICAL DECISION MAKING FREE TEXT BOX
79 male here for decompensated heart failure is minimally compliant with Rx and diet at home, also 'self regulates' his Rx per EMS. Family present to assist HPI. Pt was florina-intubation on arrival despite EMS with CPAP, required immediate NIPPV with plan for intubation after improved oxygenation; mental status improved with higher pressure NIPPV in ED and did not require intubation. Parenteral afterload reduction with nitroglycerin drip that was titrated down and is now off, patient markedly improved. Will admit to ICU setting. PMD Cardio is Dr. Ramirez.

## 2019-08-18 NOTE — H&P ADULT - ASSESSMENT
80yo Male w/ PMH of CHF, COPD (home O2 as needed), DM, HTN, CAD s/p CABG and 1 stent, BPH, and recent admission for CHF (April 2019) BIBA dyspnea x 2 days that suddenly worsened today in the afternoon. As per ED physician, EMS gave IV lasix 40mg during transport. In ED, pt was in acute distress 2/2 dyspnea, O2 saturating low. Another IV lasix 40mg given. Nitro drip started. BIPAP applied w/ great relief. CXR shows pleural effusion and vascular congestion. BNP 3800s. Trop 0.03 noted. Pt reports breathing much better currently.     Discussed w/ Dr. Torres. Admit to Critical Care    CHF exacerbation  - CXR shows     Elevated Troponin 78yo Male w/ PMH of CHF, COPD (home O2 as needed), DM, HTN, CAD s/p CABG and 1 stent, BPH, and recent admission for CHF (April 2019) BIBA dyspnea x 2 days that suddenly worsened today in the afternoon. As per ED physician, EMS gave IV lasix 40mg during transport. In ED, pt was in acute distress 2/2 dyspnea, O2 saturating low. Another IV lasix 40mg given. Nitro drip started. BIPAP applied w/ great relief. CXR shows pleural effusion and vascular congestion. BNP 3800s. Trop 0.03 noted. Pt reports breathing much better currently.     Discussed w/ Dr. Torres. Admit to Critical Care    CHF exacerbation  - CXR shows b/l pleural effusion and vascular congestion. BNP 3800s noted.  - s/p IV lasix and BIPAP w/ great relief.  - IV lasix BID  - BIPAP PRN. Keep BIPAP overnight  - NPO for now  - CXR in am   - cardiology consult    Elevated Troponin, r/o ACS  - Trop 0.03 noted. (Reviewed pt's previous troponin. Has been 0.03-0.04 throughout his recent admissions)  - EKG negative for acute ST changes  - Serial cardiac enzymes  - 2D echo  - cardiology consult     HTN  - c/w home meds  - tele monitoring     DM  - NPO for now.  - Hold home meds  - Fingerstick q6h. Sliding scale for now    BPH  - c/w alfuzosin 80yo Male w/ PMH of CHF, COPD (home O2 as needed), DM, HTN, CAD s/p CABG and 1 stent, BPH, and recent admission for CHF (April 2019) BIBA dyspnea x 2 days that suddenly worsened today in the afternoon. As per ED physician, EMS gave IV lasix 40mg during transport. In ED, pt was in acute distress 2/2 dyspnea, O2 saturating low. Another IV lasix 40mg given. Nitro drip started. BIPAP applied w/ great relief. CXR shows pleural effusion and vascular congestion. BNP 3800s. Trop 0.03 noted. Pt reports breathing much better currently.     Discussed w/ Dr. Torres. Admit to Critical Care    CHF exacerbation  - CXR shows b/l pleural effusion and vascular congestion. BNP 3800s noted.  - s/p IV lasix and BIPAP w/ great relief.  - IV lasix BID  - BIPAP PRN. Keep BIPAP overnight  - NPO for now  - CXR in am   - cardiology consult    Elevated Troponin, r/o ACS  - Trop 0.03 noted. (Reviewed pt's previous troponin. Has been 0.03-0.04 throughout his recent admissions)  - EKG negative for acute ST changes  - Serial cardiac enzymes  - 2D echo  - cardiology consult     HTN  - c/w home meds  - tele monitoring     DM  - NPO for now.  - Hold home meds  - Fingerstick q6h. Sliding scale for now    BPH  - c/w alfuzosin       DVT prophylaxis: Heparin subq  GI prophylaxis: PPI 78yo Male w/ PMH of CHF, COPD (home O2 as needed), DM, HTN, CAD s/p CABG and 1 stent, BPH, and recent admission for CHF (April 2019) BIBA dyspnea x 2 days that suddenly worsened today in the afternoon. As per ED physician, EMS gave IV lasix 40mg during transport. In ED, pt was in acute distress 2/2 dyspnea, O2 saturating low. Another IV lasix 40mg given. Nitro drip started. BIPAP applied w/ great relief. CXR shows pleural effusion and vascular congestion. BNP 3800s. Trop 0.03 noted. Pt reports breathing much better currently.     Discussed w/ Dr. Torres. Admit to Critical Care    CHF exacerbation  - CXR shows b/l pleural effusion and vascular congestion. BNP 3800s noted.  - s/p IV lasix and BIPAP w/ great relief.  - IV lasix BID  - BIPAP PRN. Keep BIPAP overnight  - NPO for now  - CXR in am   - cardiology consult    Elevated Troponin, r/o ACS  - Trop 0.03 noted. (Reviewed pt's previous troponin. Has been 0.03-0.04 throughout his recent admissions)  - EKG negative for acute ST changes  - Serial cardiac enzymes  - 2D echo  - cardiology consult     HTN  - c/w home meds  - tele monitoring     DM  - NPO for now.  - Hold home meds  - Fingerstick q6h. Sliding scale for now    BPH  - c/w home meds       DVT prophylaxis: Heparin subq  GI prophylaxis: PPI 80yo Male w/ PMH of CHF, COPD (home O2 as needed), DM, HTN, CAD s/p CABG and 1 stent, BPH, and recent admission for CHF (April 2019) BIBA dyspnea x 2 days that suddenly worsened today in the afternoon. As per ED physician, EMS gave IV lasix 40mg during transport. In ED, pt was in acute distress 2/2 dyspnea, O2 saturating low. Another IV lasix 40mg given. Nitro drip started. BIPAP applied w/ great relief. CXR shows pleural effusion and vascular congestion. BNP 3800s. Trop 0.03 noted. Pt reports breathing much better currently.     Discussed w/ Dr. Torres. Admit to Critical Care    CHF exacerbation  - CXR shows b/l pleural effusion and vascular congestion. BNP 3800s noted.  - s/p IV lasix and BIPAP w/ great relief.  - IV lasix BID  - BIPAP PRN. Keep BIPAP overnight  - Duonebs PRN   - NPO for now  - CXR in am   - cardiology consult    Elevated Troponin, r/o ACS  - Trop 0.03 noted. (Reviewed pt's previous troponin. Has been 0.03-0.04 throughout his recent admissions)  - EKG negative for acute ST changes  - Serial cardiac enzymes  - 2D echo  - cardiology consult     Leukocytosis  - reactive vs infectious?  - Will give IV levaquin for now  - PAN culture  - ID consult     HTN  - c/w home meds  - tele monitoring     DM  - NPO for now.  - Hold home meds  - Fingerstick q6h. Sliding scale for now    BPH  - c/w home meds       DVT prophylaxis: Heparin subq  GI prophylaxis: PPI 80yo Male w/ PMH of CHF, COPD (home O2 as needed), DM, HTN, CAD s/p CABG and 1 stent, BPH, and recent admission for CHF (April 2019) BIBA dyspnea x 2 days that suddenly worsened today in the afternoon. As per ED physician, EMS gave IV lasix 40mg during transport. In ED, pt was in acute distress 2/2 dyspnea, O2 saturating low. Another IV lasix 40mg given. Nitro drip started. BIPAP applied w/ great relief. CXR shows pleural effusion and vascular congestion. BNP 3800s. Trop 0.03 noted. Pt reports breathing much better currently.     Discussed w/ Dr. Torres. Admit to Critical Care    CHF exacerbation  - CXR shows b/l pleural effusion and vascular congestion. BNP 3800s noted.  - s/p IV lasix and BIPAP w/ great relief.  - IV lasix BID  - BIPAP PRN. Keep BIPAP overnight  - Strict I&Os. Keep I < O.   - Duonebs PRN   - NPO for now  - CXR in am   - cardiology consult    Elevated Troponin, r/o ACS  - Trop 0.03 noted. (Reviewed pt's previous troponin. Has been 0.03-0.04 throughout his recent admissions)  - EKG negative for acute ST changes  - Serial cardiac enzymes  - 2D echo  - cardiology consult     Leukocytosis  - reactive vs infectious?  - Will give IV levaquin for now  - PAN culture  - ID consult     HTN  - c/w home meds  - tele monitoring     DM  - NPO for now.  - Hold home meds  - Fingerstick q6h. Sliding scale for now    BPH  - c/w home meds       DVT prophylaxis: Heparin subq  GI prophylaxis: PPI 80yo Male w/ PMH of CHF, COPD (home O2 as needed), DM, HTN, CAD s/p CABG and 1 stent, BPH, and recent admission for CHF (April 2019) BIBA dyspnea x 2 days that suddenly worsened today in the afternoon. As per ED physician, EMS gave IV lasix 40mg during transport. In ED, pt was in acute distress 2/2 dyspnea, O2 saturating low. Another IV lasix 40mg given. Nitro drip started. BIPAP applied w/ great relief. CXR shows pleural effusion and vascular congestion. BNP 3800s. Trop 0.03 noted. Pt reports breathing much better currently.     Discussed w/ Dr. Torres. Admit to Critical Care    CHF exacerbation  - CXR shows b/l pleural effusion and vascular congestion. BNP 3800s noted.  - s/p IV lasix and BIPAP w/ great relief.  - IV lasix q12h  - BIPAP PRN. Keep BIPAP overnight  - nitro drip off  - Strict I&Os. Keep I < O.   - Duonebs PRN   - NPO for now  - CXR in am   - cardiology consult    Elevated Troponin, r/o ACS  - Trop 0.03 noted. (Reviewed pt's previous troponin. Has been 0.03-0.04 throughout his recent admissions)  - EKG negative for acute ST changes  - Serial cardiac enzymes  - 2D echo  - cardiology consult     Leukocytosis  - reactive vs infectious?  - Will give IV levaquin for now  - PAN culture  - ID consult     HTN  - c/w home meds  - tele monitoring     DM  - NPO for now.  - Hold home meds  - Fingerstick q6h. Sliding scale for now    BPH  - c/w home meds       DVT prophylaxis: Heparin subq  GI prophylaxis: PPI 78yo Male w/ PMH of CHF, COPD (home O2 as needed), DM, HTN, CAD s/p CABG and 1 stent, BPH, and recent admission for CHF (April 2019) BIBA dyspnea x 2 days that suddenly worsened today in the afternoon. As per ED physician, EMS gave IV lasix 40mg during transport. In ED, pt was in acute distress 2/2 dyspnea, O2 saturating low. Another IV lasix 40mg given. Nitro drip started. BIPAP applied w/ great relief. CXR shows pleural effusion and vascular congestion. BNP 3800s. Trop 0.03 noted. Pt reports breathing much better currently.     Discussed w/ Dr. Torres. Admit to Critical Care    CHF exacerbation  - CXR shows b/l pleural effusion and vascular congestion. BNP 3800s noted.  - s/p IV lasix and BIPAP w/ great relief.  - IV lasix q12h  - BIPAP PRN. Keep BIPAP overnight  - nitro drip off  - Strict I&Os. Keep I < O.   - Duonebs PRN   - NPO for now  - CXR in am   - cardiology consult    Elevated Troponin, r/o ACS  - Trop 0.03 noted. (Reviewed pt's previous troponin. Has been 0.03-0.04 throughout his recent admissions)  - EKG negative for acute ST changes  - Serial cardiac enzymes  - 2KG and 2D echo  - cardiology consult     Leukocytosis  - reactive vs infectious?  - Will give IV levaquin for now  - Urinalysis, PAN culture  - ID consult     HTN  - c/w home meds  - tele monitoring     DM  - NPO for now.  - Hold home PO meds  - Fingerstick q6h. Sliding scale for now    BPH  - c/w tamsulosin      DVT prophylaxis: Heparin subq  GI prophylaxis: PPI 80yo Male w/ PMH of CHF, COPD (home O2 as needed), DM, HTN, CAD s/p CABG and 1 stent, BPH, and recent admission for CHF (April 2019) BIBA dyspnea x 2 days that suddenly worsened today in the afternoon. As per ED physician, EMS gave IV lasix 40mg during transport. In ED, pt was in acute distress 2/2 dyspnea, O2 saturating low. Another IV lasix 40mg given. Nitro drip started. BIPAP applied w/ great relief. CXR shows pleural effusion and vascular congestion. BNP 3800s. Trop 0.03 noted. Pt reports breathing much better currently.     Discussed w/ Dr. Torres. Admit to Critical Care    CHF exacerbation  - CXR shows b/l pleural effusion and vascular congestion. BNP 3800s noted.  - s/p IV lasix and BIPAP w/ great relief.  - IV lasix q12h  - BIPAP PRN. Keep BIPAP overnight  - nitro drip off  - Strict I&Os. Keep I < O.   - Duonebs PRN   - NPO for now  - CXR in am   - cardiology consult    Elevated Troponin, r/o ACS  - Trop 0.03 noted. (Reviewed pt's previous troponin. Has been 0.03-0.04 throughout his recent admissions). Pt asymptomatic.  - EKG negative for acute ST changes  - Serial cardiac enzymes  - 2KG and 2D echo  - cardiology consult     Leukocytosis  - reactive vs infectious?  - Will give IV levaquin for now  - Urinalysis, PAN culture  - ID consult     COPD  - Solumedrol  - Duoneb PRN    HTN  - c/w home meds  - tele monitoring     DM  - NPO for now.  - Hold home PO meds  - Fingerstick q6h. Sliding scale for now    BPH  - tamsulosin      DVT prophylaxis: Heparin subq  GI prophylaxis: PPI

## 2019-08-18 NOTE — ED ADULT TRIAGE NOTE - CHIEF COMPLAINT QUOTE
Patient EMS notification as acute pulmonary edema. Patient has been having difficulty breathing for past two days. EMS gave nitro SL x 5 and 40mg of lasix IV as well as initiating CPAP in field. On arrival on CPAP Sp02 88%, patient placed on Bipap with rapid improvement.

## 2019-08-18 NOTE — ED PROVIDER NOTE - ATTENDING CONTRIBUTION TO CARE
I personally evaluated the patient. I reviewed the Resident’s or Physician Assistant’s note (as assigned above), and agree with the findings and plan except as documented in my note.     79 male here for acute dyspnea tonight, acute on chronic. States worst he has ever been. EMS verbal report received, pt is a limited historian due to dyspnea and extreme illness. Brought to ED by ALS unit with initiation of CPAP prior to arrival with limited improvement. Pt critically ill on arrival with full response by ED team for resuscitation.     ROS unable to obtain due to illness    PE: ill, toxic, overweight male in severe respiratory distress. HEENT: EOMI. lips cyanotic. CHEST: rales and wheezing bilaterally with limited chest excursion despite EMS CPAP device. SKIN: mottling noted, pale and diaphoretic. CV: pulses tachycardic and regular. ABD: pannus noted with paradoxical respirations. EXT: bilateral lower extremity venous stasis and edema. NEURO: arousable to tactile stimulus, cognition intact, deconditioned    Impression: acute decompensated heart failure    Plan: resuscitation, NIPPV, IV labs imaging, parenteral afterload reduction, admission to monitored / ICU bed I personally evaluated the patient. I reviewed the Resident’s or Physician Assistant’s note (as assigned above), and agree with the findings and plan except as documented in my note.     79 male here for acute dyspnea tonight, acute on chronic. States worst he has ever been. EMS verbal report received, pt is a limited historian due to dyspnea and extreme illness. Brought to ED by ALS unit with initiation of CPAP and lasix 40 mg prior to arrival with limited improvement. Pt critically ill on arrival with full response by ED team for resuscitation.     ROS unable to obtain due to illness    PE: ill, toxic, overweight male in severe respiratory distress. HEENT: EOMI. lips cyanotic. CHEST: rales and wheezing bilaterally with limited chest excursion despite EMS CPAP device. SKIN: mottling noted, pale and diaphoretic. CV: pulses tachycardic and regular. ABD: pannus noted with paradoxical respirations. EXT: bilateral lower extremity venous stasis and edema. NEURO: arousable to tactile stimulus, cognition intact, deconditioned    Impression: acute decompensated heart failure    Plan: resuscitation, NIPPV, IV labs imaging, parenteral afterload reduction, admission to monitored / ICU bed

## 2019-08-18 NOTE — H&P ADULT - NSHPLABSRESULTS_GEN_ALL_CORE
ABG - ( 18 Aug 2019 20:51 )  pH, Arterial: 7.21  pH, Blood: x     /  pCO2: 72    /  pO2: 50    / HCO3: 29    / Base Excess: -1.1  /  SaO2: 71              I&O's Detail        LABS:                        14.2   16.87 )-----------( 277      ( 18 Aug 2019 20:57 )             46.9     18 Aug 2019 20:57    142    |  104    |  43     ----------------------------<  190    4.5     |  26     |  1.5      Ca    9.6        18 Aug 2019 20:57  Mg     2.1       18 Aug 2019 20:57    TPro  7.9    /  Alb  4.0    /  TBili  0.8    /  DBili  x      /  AST  15     /  ALT  13     /  AlkPhos  54     18 Aug 2019 20:57  Amylase x     lipase x          CARDIAC MARKERS ( 18 Aug 2019 20:57 )  x     / 0.03 ng/mL / x     / x     / x          CAPILLARY BLOOD GLUCOSE      POCT Blood Glucose.: 167 mg/dL (18 Aug 2019 21:02)    PT/INR - ( 18 Aug 2019 20:57 )   PT: 19.60 sec;   INR: 1.71 ratio         PTT - ( 18 Aug 2019 20:57 )  PTT:38.4 sec    Culture        MEDICATIONS  (STANDING):  nitroglycerin  Infusion 10 MICROgram(s)/Min (3 mL/Hr) IV Continuous <Continuous>    MEDICATIONS  (PRN):        RADIOLOGY:     CXR:  TLC:  OG:  ET tube:          ECHO:

## 2019-08-18 NOTE — ED PROVIDER NOTE - CARE PLAN
Principal Discharge DX:	Acute decompensated heart failure  Secondary Diagnosis:	NSTEMI, initial episode of care  Secondary Diagnosis:	Acute on chronic respiratory failure with hypoxia and hypercapnia

## 2019-08-18 NOTE — H&P ADULT - NSHPPHYSICALEXAM_GEN_ALL_CORE
ICU Vital Signs Last 24 Hrs  T(C): --  T(F): --  HR: 68 (18 Aug 2019 22:21) (68 - 139)  BP: 110/53 (18 Aug 2019 22:21) (106/55 - 152/71)  BP(mean): --  ABP: --  ABP(mean): --  RR: 18 (18 Aug 2019 22:21) (18 - 28)  SpO2: 96% (18 Aug 2019 22:21) (96% - 100%)        Physical Examination:    General: No acute distress.  Alert, oriented, interactive, nonfocal    HEENT: Pupils equal, reactive to light.  Symmetric.    PULM: + rhonchi b/l    CVS: Regular rate and rhythm, no murmurs, rubs, or gallops    ABD: Soft, nondistended, nontender, normoactive bowel sounds, no masses    EXT: 4+ pitting edema b/l    SKIN: + chronic venous ulcers b/l    Neuro: AAO x 4

## 2019-08-18 NOTE — ED PROVIDER NOTE - OBJECTIVE STATEMENT
78 y/o M with PMH CHF, COPD on home O2 PRN, DM, HTN, CAD s/p CABG and PCI x 1, here in 4/19 for CHF exacerbation presents for SOB x 2 days, worse x hrs. +WU, +productive cough. EMS gave nitro x 5, 40 Lasix, placed CPAP all with minimal improvement. +chronic leg swelling. no CP/ab pain/fever/n/v.
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
## 2019-08-19 LAB
ALBUMIN SERPL ELPH-MCNC: 3.6 G/DL — SIGNIFICANT CHANGE UP (ref 3.5–5.2)
ALP SERPL-CCNC: 45 U/L — SIGNIFICANT CHANGE UP (ref 30–115)
ALT FLD-CCNC: 11 U/L — SIGNIFICANT CHANGE UP (ref 0–41)
ANION GAP SERPL CALC-SCNC: 13 MMOL/L — SIGNIFICANT CHANGE UP (ref 7–14)
APPEARANCE UR: CLEAR — SIGNIFICANT CHANGE UP
AST SERPL-CCNC: 16 U/L — SIGNIFICANT CHANGE UP (ref 0–41)
BASE EXCESS BLDA CALC-SCNC: 1.9 MMOL/L — SIGNIFICANT CHANGE UP (ref -2–2)
BASOPHILS # BLD AUTO: 0.01 K/UL — SIGNIFICANT CHANGE UP (ref 0–0.2)
BASOPHILS NFR BLD AUTO: 0.1 % — SIGNIFICANT CHANGE UP (ref 0–1)
BILIRUB SERPL-MCNC: 0.8 MG/DL — SIGNIFICANT CHANGE UP (ref 0.2–1.2)
BILIRUB UR-MCNC: NEGATIVE — SIGNIFICANT CHANGE UP
BUN SERPL-MCNC: 49 MG/DL — HIGH (ref 10–20)
CALCIUM SERPL-MCNC: 9.2 MG/DL — SIGNIFICANT CHANGE UP (ref 8.5–10.1)
CHLORIDE SERPL-SCNC: 106 MMOL/L — SIGNIFICANT CHANGE UP (ref 98–110)
CK MB CFR SERPL CALC: 11.5 NG/ML — HIGH (ref 0.6–6.3)
CK SERPL-CCNC: 88 U/L — SIGNIFICANT CHANGE UP (ref 0–225)
CO2 SERPL-SCNC: 26 MMOL/L — SIGNIFICANT CHANGE UP (ref 17–32)
COLOR SPEC: YELLOW — SIGNIFICANT CHANGE UP
CREAT SERPL-MCNC: 1.4 MG/DL — SIGNIFICANT CHANGE UP (ref 0.7–1.5)
DIFF PNL FLD: NEGATIVE — SIGNIFICANT CHANGE UP
EOSINOPHIL # BLD AUTO: 0 K/UL — SIGNIFICANT CHANGE UP (ref 0–0.7)
EOSINOPHIL NFR BLD AUTO: 0 % — SIGNIFICANT CHANGE UP (ref 0–8)
ESTIMATED AVERAGE GLUCOSE: 131 MG/DL — HIGH (ref 68–114)
GLUCOSE BLDC GLUCOMTR-MCNC: 151 MG/DL — HIGH (ref 70–99)
GLUCOSE BLDC GLUCOMTR-MCNC: 212 MG/DL — HIGH (ref 70–99)
GLUCOSE BLDC GLUCOMTR-MCNC: 220 MG/DL — HIGH (ref 70–99)
GLUCOSE BLDC GLUCOMTR-MCNC: 250 MG/DL — HIGH (ref 70–99)
GLUCOSE BLDC GLUCOMTR-MCNC: 265 MG/DL — HIGH (ref 70–99)
GLUCOSE SERPL-MCNC: 215 MG/DL — HIGH (ref 70–99)
GLUCOSE UR QL: NEGATIVE MG/DL — SIGNIFICANT CHANGE UP
HBA1C BLD-MCNC: 6.2 % — HIGH (ref 4–5.6)
HCO3 BLDA-SCNC: 28 MMOL/L — HIGH (ref 23–27)
HCT VFR BLD CALC: 42.5 % — SIGNIFICANT CHANGE UP (ref 42–52)
HGB BLD-MCNC: 12.9 G/DL — LOW (ref 14–18)
HOROWITZ INDEX BLDA+IHG-RTO: 40 — SIGNIFICANT CHANGE UP
IMM GRANULOCYTES NFR BLD AUTO: 0.4 % — HIGH (ref 0.1–0.3)
KETONES UR-MCNC: NEGATIVE — SIGNIFICANT CHANGE UP
LACTATE SERPL-SCNC: 0.9 MMOL/L — SIGNIFICANT CHANGE UP (ref 0.5–2.2)
LEUKOCYTE ESTERASE UR-ACNC: NEGATIVE — SIGNIFICANT CHANGE UP
LYMPHOCYTES # BLD AUTO: 0.48 K/UL — LOW (ref 1.2–3.4)
LYMPHOCYTES # BLD AUTO: 4.8 % — LOW (ref 20.5–51.1)
MAGNESIUM SERPL-MCNC: 2.1 MG/DL — SIGNIFICANT CHANGE UP (ref 1.8–2.4)
MCHC RBC-ENTMCNC: 25.9 PG — LOW (ref 27–31)
MCHC RBC-ENTMCNC: 30.4 G/DL — LOW (ref 32–37)
MCV RBC AUTO: 85.2 FL — SIGNIFICANT CHANGE UP (ref 80–94)
MONOCYTES # BLD AUTO: 0.17 K/UL — SIGNIFICANT CHANGE UP (ref 0.1–0.6)
MONOCYTES NFR BLD AUTO: 1.7 % — SIGNIFICANT CHANGE UP (ref 1.7–9.3)
NEUTROPHILS # BLD AUTO: 9.21 K/UL — HIGH (ref 1.4–6.5)
NEUTROPHILS NFR BLD AUTO: 93 % — HIGH (ref 42.2–75.2)
NITRITE UR-MCNC: NEGATIVE — SIGNIFICANT CHANGE UP
NRBC # BLD: 0 /100 WBCS — SIGNIFICANT CHANGE UP (ref 0–0)
PCO2 BLDA: 47 MMHG — HIGH (ref 38–42)
PH BLDA: 7.38 — SIGNIFICANT CHANGE UP (ref 7.38–7.42)
PH UR: 5.5 — SIGNIFICANT CHANGE UP (ref 5–8)
PHOSPHATE SERPL-MCNC: 4.3 MG/DL — SIGNIFICANT CHANGE UP (ref 2.1–4.9)
PLATELET # BLD AUTO: 201 K/UL — SIGNIFICANT CHANGE UP (ref 130–400)
PO2 BLDA: 82 MMHG — SIGNIFICANT CHANGE UP (ref 78–95)
POTASSIUM SERPL-MCNC: 4.6 MMOL/L — SIGNIFICANT CHANGE UP (ref 3.5–5)
POTASSIUM SERPL-SCNC: 4.6 MMOL/L — SIGNIFICANT CHANGE UP (ref 3.5–5)
PROT SERPL-MCNC: 7 G/DL — SIGNIFICANT CHANGE UP (ref 6–8)
PROT UR-MCNC: NEGATIVE MG/DL — SIGNIFICANT CHANGE UP
RBC # BLD: 4.99 M/UL — SIGNIFICANT CHANGE UP (ref 4.7–6.1)
RBC # FLD: 15.4 % — HIGH (ref 11.5–14.5)
SAO2 % BLDA: 96 % — SIGNIFICANT CHANGE UP (ref 94–98)
SODIUM SERPL-SCNC: 145 MMOL/L — SIGNIFICANT CHANGE UP (ref 135–146)
SP GR SPEC: 1.01 — SIGNIFICANT CHANGE UP (ref 1.01–1.03)
TROPONIN T SERPL-MCNC: 0.17 NG/ML — CRITICAL HIGH
TROPONIN T SERPL-MCNC: 0.21 NG/ML — CRITICAL HIGH
UROBILINOGEN FLD QL: 0.2 MG/DL — SIGNIFICANT CHANGE UP (ref 0.2–0.2)
WBC # BLD: 9.91 K/UL — SIGNIFICANT CHANGE UP (ref 4.8–10.8)
WBC # FLD AUTO: 9.91 K/UL — SIGNIFICANT CHANGE UP (ref 4.8–10.8)

## 2019-08-19 PROCEDURE — 99233 SBSQ HOSP IP/OBS HIGH 50: CPT

## 2019-08-19 PROCEDURE — 71045 X-RAY EXAM CHEST 1 VIEW: CPT | Mod: 26

## 2019-08-19 RX ORDER — COLLAGENASE CLOSTRIDIUM HIST. 250 UNIT/G
1 OINTMENT (GRAM) TOPICAL THREE TIMES A DAY
Refills: 0 | Status: DISCONTINUED | OUTPATIENT
Start: 2019-08-19 | End: 2019-08-19

## 2019-08-19 RX ORDER — RIVAROXABAN 15 MG-20MG
15 KIT ORAL
Refills: 0 | Status: DISCONTINUED | OUTPATIENT
Start: 2019-08-19 | End: 2019-08-23

## 2019-08-19 RX ORDER — INSULIN LISPRO 100/ML
9 VIAL (ML) SUBCUTANEOUS
Refills: 0 | Status: DISCONTINUED | OUTPATIENT
Start: 2019-08-19 | End: 2019-08-20

## 2019-08-19 RX ORDER — BUDESONIDE AND FORMOTEROL FUMARATE DIHYDRATE 160; 4.5 UG/1; UG/1
2 AEROSOL RESPIRATORY (INHALATION)
Refills: 0 | Status: DISCONTINUED | OUTPATIENT
Start: 2019-08-19 | End: 2019-08-23

## 2019-08-19 RX ORDER — FUROSEMIDE 40 MG
20 TABLET ORAL ONCE
Refills: 0 | Status: COMPLETED | OUTPATIENT
Start: 2019-08-19 | End: 2019-08-19

## 2019-08-19 RX ORDER — INSULIN GLARGINE 100 [IU]/ML
27 INJECTION, SOLUTION SUBCUTANEOUS AT BEDTIME
Refills: 0 | Status: DISCONTINUED | OUTPATIENT
Start: 2019-08-19 | End: 2019-08-20

## 2019-08-19 RX ORDER — FUROSEMIDE 40 MG
60 TABLET ORAL
Refills: 0 | Status: DISCONTINUED | OUTPATIENT
Start: 2019-08-19 | End: 2019-08-20

## 2019-08-19 RX ORDER — ZOLPIDEM TARTRATE 10 MG/1
5 TABLET ORAL AT BEDTIME
Refills: 0 | Status: DISCONTINUED | OUTPATIENT
Start: 2019-08-19 | End: 2019-08-20

## 2019-08-19 RX ADMIN — BUDESONIDE AND FORMOTEROL FUMARATE DIHYDRATE 2 PUFF(S): 160; 4.5 AEROSOL RESPIRATORY (INHALATION) at 19:17

## 2019-08-19 RX ADMIN — Medication 60 MILLIGRAM(S): at 18:03

## 2019-08-19 RX ADMIN — Medication 1 APPLICATION(S): at 18:02

## 2019-08-19 RX ADMIN — RIVAROXABAN 15 MILLIGRAM(S): KIT at 18:02

## 2019-08-19 RX ADMIN — Medication 20 MILLIGRAM(S): at 08:17

## 2019-08-19 RX ADMIN — CHLORHEXIDINE GLUCONATE 1 APPLICATION(S): 213 SOLUTION TOPICAL at 08:12

## 2019-08-19 RX ADMIN — Medication 9 UNIT(S): at 16:49

## 2019-08-19 RX ADMIN — Medication 40 MILLIGRAM(S): at 18:02

## 2019-08-19 RX ADMIN — INSULIN GLARGINE 27 UNIT(S): 100 INJECTION, SOLUTION SUBCUTANEOUS at 21:34

## 2019-08-19 RX ADMIN — Medication 40 MILLIGRAM(S): at 06:29

## 2019-08-19 RX ADMIN — CHLORHEXIDINE GLUCONATE 1 APPLICATION(S): 213 SOLUTION TOPICAL at 21:32

## 2019-08-19 RX ADMIN — Medication 3: at 16:49

## 2019-08-19 RX ADMIN — Medication 81 MILLIGRAM(S): at 12:02

## 2019-08-19 RX ADMIN — Medication 145 MILLIGRAM(S): at 12:03

## 2019-08-19 RX ADMIN — CLOPIDOGREL BISULFATE 75 MILLIGRAM(S): 75 TABLET, FILM COATED ORAL at 12:03

## 2019-08-19 RX ADMIN — ISOSORBIDE MONONITRATE 30 MILLIGRAM(S): 60 TABLET, EXTENDED RELEASE ORAL at 12:03

## 2019-08-19 RX ADMIN — ZOLPIDEM TARTRATE 5 MILLIGRAM(S): 10 TABLET ORAL at 22:34

## 2019-08-19 RX ADMIN — Medication 2: at 13:32

## 2019-08-19 RX ADMIN — LOSARTAN POTASSIUM 50 MILLIGRAM(S): 100 TABLET, FILM COATED ORAL at 18:02

## 2019-08-19 RX ADMIN — Medication 40 MILLIGRAM(S): at 12:02

## 2019-08-19 RX ADMIN — FINASTERIDE 5 MILLIGRAM(S): 5 TABLET, FILM COATED ORAL at 12:03

## 2019-08-19 RX ADMIN — HEPARIN SODIUM 5000 UNIT(S): 5000 INJECTION INTRAVENOUS; SUBCUTANEOUS at 06:29

## 2019-08-19 RX ADMIN — Medication 40 MILLIGRAM(S): at 06:30

## 2019-08-19 NOTE — CONSULT NOTE ADULT - ASSESSMENT
Patient long hx skylar non compliant c-pap. Also volume overload non compliant with diet. He was doing well he lost weight. He because lost weight decided. He no longer needs to follow diet. H gained weight . Hem had chinese food. He got sob. No chest pain. He has volume overload. Give IV lasix. Resume diet. R/O MI

## 2019-08-19 NOTE — PROGRESS NOTE ADULT - ASSESSMENT
78yo Male w/ PMH of CHF, COPD (home O2 as needed), DM, HTN, CAD s/p CABG and 1 stent, BPH, and recent admission for CHF (April 2019) BIBA dyspnea x 2 days that suddenly worsened today in the afternoon. As per ED physician, EMS gave IV lasix 40mg during transport. In ED, pt was in acute distress 2/2 dyspnea, O2 saturating low. Another IV lasix 40mg given. Nitro drip started. BIPAP applied w/ great relief. CXR shows pleural effusion and vascular congestion. BNP 3800s. Trop 0.03 noted. Pt reports breathing much better currently.     #Acute on Chronic Diastolic CHF exacerbation - Stable on BiPAP  - Echo from last admission clarified: EF > 55% and Grade II diastolic dysfunction  - CXR shows b/l pleural effusion and vascular congestion. BNP 3800s .  - BIPAP PRN. Keep BIPAP overnight  - Strict I&Os. Keep I < O. and dialy weight  - c/w IV lasix 60 q12h  - c/w losartan 50 bid, asa, plavix, metoprolol  - c/w fluid restricted diet  - f/u repeat echo  - f/u repat ABG    #Elevated Troponin possibly type 2 demand  - Trop 0.03 noted. -> 0.21  - repeat EKG no new changes. aflutter  - f/u echo  - f/u CE x1    #Leukocytosis - possibly reactive - resolved    #COPD  - c/w Solumedrol  - c/w breo and Duoneb PRN    #HTN  - c/w imdur, lasix, losartan and metoprolol  - tele monitoring     #Aflutter  - c/w metoprolol  - c/w xarelto    DM  - c/w carb consistnet  - will keep pt on basal bolus regimen    BPH  - tamsulosin    #Activitiy: OOBC  #DIet; carb consistent fluid restricted  #DVT/GI PPX: Xarelto/protonix  #Dispo: Remains acute  #Code; Full

## 2019-08-19 NOTE — CONSULT NOTE ADULT - SUBJECTIVE AND OBJECTIVE BOX
Patient is a 79y old  Male who presents with a chief complaint of CHF exacerbation (19 Aug 2019 08:01)      HPI:  78yo Male w/ PMH of CHF, COPD (home O2 as needed), DM, HTN, CAD s/p CABG and 1 stent, BPH, and recent admission for CHF (2019) BIBA dyspnea x 2 days that suddenly worsened today in the afternoon. Pt reports SOB worsened w/ exertion. Associated w/ clear sputum. As per ED physician, EMS gave IV lasix 40mg during transport. In ED, pt was in acute distress 2/2 dyspnea, O2 saturating low. Another IV lasix 40mg given. Nitro drip started. BIPAP applied w/ great relief. CXR shows pleural effusion and vascular congestion. BNP 3800s. Trop 0.03 noted. Pt reports breathing much better currently. Pt denies fever/chills, CP, palpitation, abd pain, N/V/D, dysuria, calf pain, or dizziness. + chronic calf swelling due to CHF and venous stasis. Pt sees cardiology, Dr. Ramirez, regularly. (18 Aug 2019 22:18)      PAST MEDICAL & SURGICAL HISTORY:  BPH (benign prostatic hyperplasia)  CHF (congestive heart failure)  COPD (chronic obstructive pulmonary disease)  Diabetes  HTN (hypertension)  H/O heart artery stent  S/P CABG x 3    Allergies    No Known Allergies    Intolerances      Family history : no cardiovscular family history   Home Medications:  alfuzosin 10 mg oral tablet, extended release: 1 tab(s) orally once a day (18 Aug 2019 23:08)  ALPRAZolam 0.5 mg oral tablet: 1 tab(s) orally 2 times a day, As Needed (18 Aug 2019 23:08)  amLODIPine 5 mg oral tablet: 1 tab(s) orally once a day (18 Aug 2019 23:08)  aspirin 81 mg oral tablet: 1 tab(s) orally once a day (18 Aug 2019 23:08)  clopidogrel 75 mg oral tablet: 1 tab(s) orally once a day (18 Aug 2019 23:08)  dutasteride 0.5 mg oral capsule: 1 cap(s) orally once a day (18 Aug 2019 23:08)  fenofibrate 145 mg oral tablet: 1 tab(s) orally once a day (18 Aug 2019 23:08)  furosemide 40 mg oral tablet: 1 tab(s) orally once a day (18 Aug 2019 23:08)  glimepiride 4 mg oral tablet: orally 2 times a day (18 Aug 2019 23:08)  HumaLOG Mix 50/50 Pen subcutaneous suspension: subcutaneous once a day (18 Aug 2019 23:08)  isosorbide mononitrate 30 mg oral tablet, extended release: 1 tab(s) orally once a day (in the morning) (18 Aug 2019 23:08)  losartan 50 mg oral tablet: orally 2 times a day (18 Aug 2019 23:08)  meloxicam 15 mg oral tablet: 1 tab(s) orally once a day (18 Aug 2019 23:08)  Metoprolol Succinate ER 50 mg oral tablet, extended release: orally 2 times a day (18 Aug 2019 23:08)  Victoza: subcutaneous once a day (18 Aug 2019 23:08)  Welchol 625 mg oral tablet: 3 tab(s) orally once a day (18 Aug 2019 23:08)    Occupation:  Alochol: Denied  Smoking: Denied  Drug Use: Denied  Marital Status:         ROS: as in HPI; All other systems reviewed are negative    ICU Vital Signs Last 24 Hrs  T(C): 35.8 (19 Aug 2019 07:05), Max: 36.7 (19 Aug 2019 00:09)  T(F): 96.4 (19 Aug 2019 07:05), Max: 98.1 (19 Aug 2019 00:09)  HR: 65 (19 Aug 2019 07:21) (65 - 139)  BP: 135/62 (19 Aug 2019 07:21) (106/55 - 152/71)  BP(mean): 89 (19 Aug 2019 07:21) (89 - 89)  ABP: --  ABP(mean): --  RR: 25 (19 Aug 2019 07:21) (18 - 28)  SpO2: 99% (19 Aug 2019 07:21) (96% - 100%)        Physical Examination:    General: No acute distress.  Alert, oriented, interactive, nonfocal    HEENT: Pupils equal, reactive to light.  Symmetric.    PULM: b/l crackles     CVS: Regular rate and rhythm, no murmurs, rubs, or gallops    ABD: Soft, nondistended, nontender, normoactive bowel sounds, no masses    EXT: 1 edema, nontender, no clubbing     SKIN: Warm and well perfused, no rashes noted.    Neurology : no motor or sensory deficit     Musculoskeletal : move all extremity     Lymphatic system: no Palpable node           ABG - ( 18 Aug 2019 20:51 )  pH, Arterial: 7.21  pH, Blood: x     /  pCO2: 72    /  pO2: 50    / HCO3: 29    / Base Excess: -1.1  /  SaO2: 71                  I&O's Detail    18 Aug 2019 07:01  -  19 Aug 2019 07:00  --------------------------------------------------------  IN:    IV PiggyBack: 150 mL  Total IN: 150 mL    OUT:    Voided: 675 mL  Total OUT: 675 mL    Total NET: -525 mL      19 Aug 2019 07:01  -  19 Aug 2019 08:43  --------------------------------------------------------  IN:  Total IN: 0 mL    OUT:    Voided: 200 mL  Total OUT: 200 mL    Total NET: -200 mL            LABS:                        12.9   9.91  )-----------( 201      ( 19 Aug 2019 05:51 )             42.5     19 Aug 2019 05:51    145    |  106    |  49     ----------------------------<  215    4.6     |  26     |  1.4      Ca    9.2        19 Aug 2019 05:51  Phos  4.3       19 Aug 2019 05:51  Mg     2.1       19 Aug 2019 05:51    TPro  7.0    /  Alb  3.6    /  TBili  0.8    /  DBili  x      /  AST  16     /  ALT  11     /  AlkPhos  45     19 Aug 2019 05:51  Amylase x     lipase x          CARDIAC MARKERS ( 19 Aug 2019 05:51 )  x     / 0.21 ng/mL / x     / x     / x      CARDIAC MARKERS ( 18 Aug 2019 20:57 )  x     / 0.03 ng/mL / x     / x     / x          CAPILLARY BLOOD GLUCOSE      POCT Blood Glucose.: 220 mg/dL (19 Aug 2019 07:17)    PT/INR - ( 18 Aug 2019 20:57 )   PT: 19.60 sec;   INR: 1.71 ratio         PTT - ( 18 Aug 2019 20:57 )  PTT:38.4 sec  Urinalysis Basic - ( 19 Aug 2019 02:20 )    Color: Yellow / Appearance: Clear / S.015 / pH: x  Gluc: x / Ketone: Negative  / Bili: Negative / Urobili: 0.2 mg/dL   Blood: x / Protein: Negative mg/dL / Nitrite: Negative   Leuk Esterase: Negative / RBC: x / WBC x   Sq Epi: x / Non Sq Epi: x / Bacteria: x      Culture    Lactate, Blood: 0.9 mmol/L (19 @ 05:51)      MEDICATIONS  (STANDING):  amLODIPine   Tablet 5 milliGRAM(s) Oral daily  aspirin  chewable 81 milliGRAM(s) Oral daily  chlorhexidine 4% Liquid 1 Application(s) Topical two times a day  clopidogrel Tablet 75 milliGRAM(s) Oral daily  collagenase Ointment 1 Application(s) Topical three times a day  dextrose 5%. 1000 milliLiter(s) (50 mL/Hr) IV Continuous <Continuous>  dextrose 50% Injectable 12.5 Gram(s) IV Push once  dextrose 50% Injectable 25 Gram(s) IV Push once  dextrose 50% Injectable 25 Gram(s) IV Push once  fenofibrate Tablet 145 milliGRAM(s) Oral daily  finasteride 5 milliGRAM(s) Oral daily  furosemide   Injectable 60 milliGRAM(s) IV Push two times a day  heparin  Injectable 5000 Unit(s) SubCutaneous every 8 hours  insulin lispro (HumaLOG) corrective regimen sliding scale   SubCutaneous three times a day before meals  isosorbide   mononitrate ER Tablet (IMDUR) 30 milliGRAM(s) Oral daily  losartan 50 milliGRAM(s) Oral two times a day  methylPREDNISolone sodium succinate Injectable 40 milliGRAM(s) IV Push every 6 hours  metoprolol succinate ER 50 milliGRAM(s) Oral daily  pantoprazole    Tablet 40 milliGRAM(s) Oral before breakfast    MEDICATIONS  (PRN):  dextrose 40% Gel 15 Gram(s) Oral once PRN Blood Glucose LESS THAN 70 milliGRAM(s)/deciliter  glucagon  Injectable 1 milliGRAM(s) IntraMuscular once PRN Glucose LESS THAN 70 milligrams/deciliter        RADIOLOGY: ***     CXR: when compare to  decrease b/l effusion and opacity   TLC:  OG:  ET tube:        CAM ICU:  ECHO:

## 2019-08-19 NOTE — PROGRESS NOTE ADULT - SUBJECTIVE AND OBJECTIVE BOX
SUBJECTIVE:    Patient is a 79y old Male who presents with a chief complaint of CHF exacerbation (19 Aug 2019 08:43)    Currently admitted to medicine with the primary diagnosis of Acute decompensated heart failure     Today is hospital day 1d. This morning he is resting comfortably in bed and reports no new issues or overnight events.     PAST MEDICAL & SURGICAL HISTORY  BPH (benign prostatic hyperplasia)  CHF (congestive heart failure)  COPD (chronic obstructive pulmonary disease)  Diabetes  HTN (hypertension)  H/O heart artery stent  S/P CABG x 3    SOCIAL HISTORY:  Negative for smoking/alcohol/drug use.     ALLERGIES:  No Known Allergies    MEDICATIONS:  STANDING MEDICATIONS  amLODIPine   Tablet 5 milliGRAM(s) Oral daily  aspirin  chewable 81 milliGRAM(s) Oral daily  chlorhexidine 4% Liquid 1 Application(s) Topical two times a day  clopidogrel Tablet 75 milliGRAM(s) Oral daily  dextrose 5%. 1000 milliLiter(s) IV Continuous <Continuous>  dextrose 50% Injectable 12.5 Gram(s) IV Push once  dextrose 50% Injectable 25 Gram(s) IV Push once  dextrose 50% Injectable 25 Gram(s) IV Push once  fenofibrate Tablet 145 milliGRAM(s) Oral daily  finasteride 5 milliGRAM(s) Oral daily  furosemide   Injectable 60 milliGRAM(s) IV Push two times a day  insulin lispro (HumaLOG) corrective regimen sliding scale   SubCutaneous three times a day before meals  isosorbide   mononitrate ER Tablet (IMDUR) 30 milliGRAM(s) Oral daily  losartan 50 milliGRAM(s) Oral two times a day  methylPREDNISolone sodium succinate Injectable 40 milliGRAM(s) IV Push every 6 hours  metoprolol succinate ER 50 milliGRAM(s) Oral daily  pantoprazole    Tablet 40 milliGRAM(s) Oral before breakfast  rivaroxaban 15 milliGRAM(s) Oral with dinner  silver sulfADIAZINE 1% Cream 1 Application(s) Topical two times a day    PRN MEDICATIONS  dextrose 40% Gel 15 Gram(s) Oral once PRN  glucagon  Injectable 1 milliGRAM(s) IntraMuscular once PRN    VITALS:   T(F): 96.4  HR: 65  BP: 120/60  RR: 33  SpO2: 100%    CAPILLARY BLOOD GLUCOSE      LABS:                        12.9   9.91  )-----------( 201      ( 19 Aug 2019 05:51 )             42.5     08-    145  |  106  |  49<H>  ----------------------------<  215<H>  4.6   |  26  |  1.4    Ca    9.2      19 Aug 2019 05:51  Phos  4.3       Mg     2.1         TPro  7.0  /  Alb  3.6  /  TBili  0.8  /  DBili  x   /  AST  16  /  ALT  11  /  AlkPhos  45      PT/INR - ( 18 Aug 2019 20:57 )   PT: 19.60 sec;   INR: 1.71 ratio         PTT - ( 18 Aug 2019 20:57 )  PTT:38.4 sec  Urinalysis Basic - ( 19 Aug 2019 02:20 )    Color: Yellow / Appearance: Clear / S.015 / pH: x  Gluc: x / Ketone: Negative  / Bili: Negative / Urobili: 0.2 mg/dL   Blood: x / Protein: Negative mg/dL / Nitrite: Negative   Leuk Esterase: Negative / RBC: x / WBC x   Sq Epi: x / Non Sq Epi: x / Bacteria: x      ABG - ( 19 Aug 2019 10:08 )  pH, Arterial: 7.38  pH, Blood: x     /  pCO2: 47    /  pO2: 82    / HCO3: 28    / Base Excess: 1.9   /  SaO2: 96                Lactate, Blood: 0.9 mmol/L (19 @ 05:51)  Troponin T, Serum: 0.21 ng/mL <HH> (19 @ 05:51)  Troponin T, Serum: 0.03 ng/mL <HH> (19 @ 20:57)      CARDIAC MARKERS ( 19 Aug 2019 05:51 )  x     / 0.21 ng/mL / x     / x     / x      CARDIAC MARKERS ( 18 Aug 2019 20:57 )  x     / 0.03 ng/mL / x     / x     / x          RADIOLOGY:    PHYSICAL EXAM:  GEN: No acute distress  LUNGS: reduced breath soudns b/l. b/l expiratory wheezes. bibasilar crackles  HEART: S1/S2 present. RRR.   ABD: Soft, non-tender, non-distended. Bowel sounds present  EXT: NC/NC/NE/2+PP/PARKER. +2 pitting edema w/ venous stasis changes  NEURO: AAOX3

## 2019-08-19 NOTE — CONSULT NOTE ADULT - ASSESSMENT
IMPRESSION:  ARF on CRF hypercapnia and hypoxic   CHF exacerbation   YAMIL   CAD       PLAN:    CNS: no sedation     HEENT: oral care     PULMONARY: keep on bipap repeat abg now   keep pox 92%  cxr malachi   CARDIOVASCULAR: continue lasix follow cardiology   echo   cardiology follow up   GI: GI prophylaxis.  NPO now while on bipap      RENAL: follow is and os lytes bun , cr     INFECTIOUS DISEASE: pan cx     HEMATOLOGICAL:  DVT prophylaxis.    ENDOCRINE:  Follow up FS.  Insulin protocol if needed.    MUSCULOSKELETAL:        CRITICAL CARE TIME SPENT: ***

## 2019-08-19 NOTE — CONSULT NOTE ADULT - ASSESSMENT
ASSESSMENT  79y M admitted with ACUTE DECOMPENSATED HEART FAILURE      PROBLEMS  1. Leukocytosis resolved  Off antibiotics  On methylPREDNISolone sodium succinate Injectable 40  New problem with additional W/U    2. Acute HF    3. Lactic acidosis    4. DM with normal LFTs (doubt silent cholecystiis)    PLAN  Observe off antibiotics  Rx HF

## 2019-08-19 NOTE — CONSULT NOTE ADULT - SUBJECTIVE AND OBJECTIVE BOX
CARDIOLOGY CONSULT NOTE     CHIEF COMPLAINT/REASON FOR CONSULT:    HPI:  80yo Male w/ PMH of CHF, COPD (home O2 as needed), DM, HTN, CAD s/p CABG and 1 stent, BPH, and recent admission for CHF (April 2019) BIBA dyspnea x 2 days that suddenly worsened today in the afternoon. Pt reports SOB worsened w/ exertion. Associated w/ clear sputum. As per ED physician, EMS gave IV lasix 40mg during transport. In ED, pt was in acute distress 2/2 dyspnea, O2 saturating low. Another IV lasix 40mg given. Nitro drip started. BIPAP applied w/ great relief. CXR shows pleural effusion and vascular congestion. BNP 3800s. Trop 0.03 noted. Pt reports breathing much better currently. Pt denies fever/chills, CP, palpitation, abd pain, N/V/D, dysuria, calf pain, or dizziness. + chronic calf swelling due to CHF and venous stasis.       PAST MEDICAL & SURGICAL HISTORY:  BPH (benign prostatic hyperplasia)  CHF (congestive heart failure)  COPD (chronic obstructive pulmonary disease)  Diabetes  HTN (hypertension)  H/O heart artery stent  S/P CABG x 3      Cardiac Risks:   [X ]HTN, [ ] DM, [ ] Smoking, [ ] FH,  [ ] Lipids        MEDICATIONS:  MEDICATIONS  (STANDING):  amLODIPine   Tablet 5 milliGRAM(s) Oral daily  aspirin  chewable 81 milliGRAM(s) Oral daily  chlorhexidine 4% Liquid 1 Application(s) Topical two times a day  clopidogrel Tablet 75 milliGRAM(s) Oral daily  collagenase Ointment 1 Application(s) Topical three times a day  dextrose 5%. 1000 milliLiter(s) (50 mL/Hr) IV Continuous <Continuous>  dextrose 50% Injectable 12.5 Gram(s) IV Push once  dextrose 50% Injectable 25 Gram(s) IV Push once  dextrose 50% Injectable 25 Gram(s) IV Push once  fenofibrate Tablet 145 milliGRAM(s) Oral daily  finasteride 5 milliGRAM(s) Oral daily  furosemide   Injectable 60 milliGRAM(s) IV Push two times a day  furosemide   Injectable 20 milliGRAM(s) IV Push once  heparin  Injectable 5000 Unit(s) SubCutaneous every 8 hours  insulin lispro (HumaLOG) corrective regimen sliding scale   SubCutaneous three times a day before meals  isosorbide   mononitrate ER Tablet (IMDUR) 30 milliGRAM(s) Oral daily  losartan 50 milliGRAM(s) Oral two times a day  methylPREDNISolone sodium succinate Injectable 40 milliGRAM(s) IV Push every 6 hours  metoprolol succinate ER 50 milliGRAM(s) Oral daily  pantoprazole    Tablet 40 milliGRAM(s) Oral before breakfast      FAMILY HISTORY:  No pertinent family history in first degree relatives      SOCIAL HISTORY:      [ ] Marital status   Allergies    No Known Allergies        	    REVIEW OF SYSTEMS:  CONSTITUTIONAL: No fever, weight loss, or fatigue  EYES: No eye pain, visual disturbances, or discharge  ENMT:  No difficulty hearing, tinnitus, vertigo; No sinus or throat pain  NECK: No pain or stiffness  RESPIRATORY: No cough, wheezing, chills or hemoptysis; No Shortness of Breath  CARDIOVASCULAR: No chest pain, palpitations, passing out, dizziness, tr edema  GASTROINTESTINAL: No abdominal or epigastric pain. No nausea, vomiting, or hematemesis; No diarrhea or constipation. No melena or hematochezia.  GENITOURINARY: No dysuria, frequency, hematuria, or incontinence  NEUROLOGICAL: No headaches, memory loss, loss of strength, numbness, or tremors  SKIN: No itching, burning, rashes, or lesions   	        PHYSICAL EXAM:  T(C): 35.8 (08-19-19 @ 07:05), Max: 36.7 (08-19-19 @ 00:09)  HR: 65 (08-19-19 @ 07:21) (65 - 139)  BP: 135/62 (08-19-19 @ 07:21) (106/55 - 152/71)  RR: 25 (08-19-19 @ 07:21) (18 - 28)  SpO2: 99% (08-19-19 @ 07:21) (96% - 100%)  Wt(kg): --  I&O's Summary    18 Aug 2019 07:01  -  19 Aug 2019 07:00  --------------------------------------------------------  IN: 150 mL / OUT: 675 mL / NET: -525 mL        Appearance: Normal	  Psychiatry: A & O x 3, Mood & affect appropriate  HEENT:   Normal oral mucosa, PERRL, EOMI	  Lymphatic: No lymphadenopathy  Cardiovascular: Normal S1 S2,RRR, No JVD, No murmurs  Respiratory: Lungs clear to auscultation	  Gastrointestinal:  Soft, Non-tender, + BS	  Skin: No rashes, No ecchymoses, No cyanosis	  Neurologic: Non-focal  Extremities: Normal range of motion, No clubbing, cyanosis or edema  Vascular: Peripheral pulses palpable 2+ bilaterally      ECG:  	not available    	  LABS:	 	    CARDIAC MARKERS:          Serum Pro-Brain Natriuretic Peptide: 3797 pg/mL (08-18 @ 20:57)                            12.9   9.91  )-----------( 201      ( 19 Aug 2019 05:51 )             42.5     08-19    145  |  106  |  49<H>  ----------------------------<  215<H>  4.6   |  26  |  1.4    Ca    9.2      19 Aug 2019 05:51  Phos  4.3     08-19  Mg     2.1     08-19    TPro  7.0  /  Alb  3.6  /  TBili  0.8  /  DBili  x   /  AST  16  /  ALT  11  /  AlkPhos  45  08-19    PT/INR - ( 18 Aug 2019 20:57 )   PT: 19.60 sec;   INR: 1.71 ratio         PTT - ( 18 Aug 2019 20:57 )  PTT:38.4 sec  proBNP: Serum Pro-Brain Natriuretic Peptide: 3797 pg/mL (08-18 @ 20:57)

## 2019-08-19 NOTE — PROGRESS NOTE ADULT - ATTENDING COMMENTS
Patient seen and evaluated independently medical resident note reviewed, I agree with plan and management, except as I have noted. Taper/dc  steroids

## 2019-08-19 NOTE — CONSULT NOTE ADULT - SUBJECTIVE AND OBJECTIVE BOX
TAIWO ZAVALA  79y, Male  Allergy: No Known Allergies      CHIEF COMPLAINT: CHF exacerbation (18 Aug 2019 22:18)      HPI:  78yo Male w/ PMH of CHF, COPD (home O2 as needed), DM, HTN, CAD s/p CABG and 1 stent, BPH, and recent admission for CHF (2019) BIBA dyspnea x 2 days that suddenly worsened today in the afternoon. Pt reports SOB worsened w/ exertion. Associated w/ clear sputum. As per ED physician, EMS gave IV lasix 40mg during transport. In ED, pt was in acute distress 2/2 dyspnea, O2 saturating low. Another IV lasix 40mg given. Nitro drip started. BIPAP applied w/ great relief. CXR shows pleural effusion and vascular congestion. BNP 3800s. Trop 0.03 noted. Pt reports breathing much better currently. Pt denies fever/chills, CP, palpitation, abd pain, N/V/D, dysuria, calf pain, or dizziness. + chronic calf swelling due to CHF and venous stasis. Pt sees cardiology, Dr. Ramirez, regularly. (18 Aug 2019 22:18)    FAMILY HISTORY:  No pertinent family history in first degree relatives    PAST MEDICAL & SURGICAL HISTORY:  BPH (benign prostatic hyperplasia)  CHF (congestive heart failure)  COPD (chronic obstructive pulmonary disease)  Diabetes  HTN (hypertension)  H/O heart artery stent  S/P CABG x 3      Substance Use ( x ) never used  (  ) IVDU (  ) Other:  Tobacco Usage:  (x   ) never smoked   (   ) former smoker   (   ) current smoker   Alcohol Usage: (   ) social  (   ) daily use (x   ) denies  Sexual History: NA      ROS  General: Denies fevers, chills, nightsweats, weight loss  HEENT: Denies headache, rhinorrhea, sore throat, eye pain  CV: Denies CP, palpitations  PULM: Denies SOB, cough  GI: Denies abdominal pain, diarrhea  : Denies dysuria, hematuria  MSK: Denies arthralgias  SKIN: Denies rash   NEURO: Denies paresthesias, weakness  PSYCH: Denies depression    VITALS:  T(F): 96.4, Max: 98.1 (19 @ 00:09)  HR: 65  BP: 135/62  RR: 25Vital Signs Last 24 Hrs  T(C): 35.8 (19 Aug 2019 07:05), Max: 36.7 (19 Aug 2019 00:09)  T(F): 96.4 (19 Aug 2019 07:05), Max: 98.1 (19 Aug 2019 00:09)  HR: 65 (19 Aug 2019 07:21) (65 - 139)  BP: 135/62 (19 Aug 2019 07:21) (106/55 - 152/71)  BP(mean): 89 (19 Aug 2019 07:21) (89 - 89)  RR: 25 (19 Aug 2019 07:21) (18 - 28)  SpO2: 99% (19 Aug 2019 07:21) (96% - 100%)    PHYSICAL EXAM:  Gen: NAD, resting in bed  HEENT: Normocephalic, atraumatic  Neck: supple, no lymphadenopathy  CV: Regular rate & regular rhythm  Lungs: decreased BS at bases, no fremitus  Abdomen: Soft, BS present  Ext: Warm, well perfused  Neuro: non focal, awake  Skin: no rash, no erythema    TESTS & MEASUREMENTS:                        12.9   9.91  )-----------( 201      ( 19 Aug 2019 05:51 )             42.5         145  |  106  |  49<H>  ----------------------------<  215<H>  4.6   |  26  |  1.4    Ca    9.2      19 Aug 2019 05:51  Phos  4.3       Mg     2.1         TPro  7.0  /  Alb  3.6  /  TBili  0.8  /  DBili  x   /  AST  16  /  ALT  11  /  AlkPhos  45      eGFR if Non African American: 48 mL/min/1.73M2 (19 @ 05:51)  eGFR if African American: 55 mL/min/1.73M2 (19 @ 05:51)  eGFR if Non African American: 44 mL/min/1.73M2 (19 @ 20:57)  eGFR if : 51 mL/min/1.73M2 (19 @ 20:57)    LIVER FUNCTIONS - ( 19 Aug 2019 05:51 )  Alb: 3.6 g/dL / Pro: 7.0 g/dL / ALK PHOS: 45 U/L / ALT: 11 U/L / AST: 16 U/L / GGT: x           Urinalysis Basic - ( 19 Aug 2019 02:20 )    Color: Yellow / Appearance: Clear / S.015 / pH: x  Gluc: x / Ketone: Negative  / Bili: Negative / Urobili: 0.2 mg/dL   Blood: x / Protein: Negative mg/dL / Nitrite: Negative   Leuk Esterase: Negative / RBC: x / WBC x   Sq Epi: x / Non Sq Epi: x / Bacteria: x          Lactate, Blood: 0.9 mmol/L (19 @ 05:51)  Blood Gas Venous - Lactate: 0.9 mmoL/L (19 @ 22:08)      INFECTIOUS DISEASES TESTING      RADIOLOGY & ADDITIONAL TESTS:  I have personally reviewed the last Chest xray  CXR      CT      CARDIOLOGY TESTING      MEDICATIONS  amLODIPine   Tablet 5  aspirin  chewable 81  chlorhexidine 4% Liquid 1  clopidogrel Tablet 75  collagenase Ointment 1  dextrose 5%. 1000  dextrose 50% Injectable 12.5  dextrose 50% Injectable 25  dextrose 50% Injectable 25  fenofibrate Tablet 145  finasteride 5  furosemide   Injectable 60  furosemide   Injectable 20  heparin  Injectable 5000  insulin lispro (HumaLOG) corrective regimen sliding scale   isosorbide   mononitrate ER Tablet (IMDUR) 30  losartan 50  methylPREDNISolone sodium succinate Injectable 40  metoprolol succinate ER 50  pantoprazole    Tablet 40      ANTIBIOTICS:      All available historical data has been reviewed

## 2019-08-20 DIAGNOSIS — I83.019 VARICOSE VEINS OF RIGHT LOWER EXTREMITY WITH ULCER OF UNSPECIFIED SITE: ICD-10-CM

## 2019-08-20 LAB
ANION GAP SERPL CALC-SCNC: 12 MMOL/L — SIGNIFICANT CHANGE UP (ref 7–14)
BASOPHILS # BLD AUTO: 0.01 K/UL — SIGNIFICANT CHANGE UP (ref 0–0.2)
BASOPHILS NFR BLD AUTO: 0.1 % — SIGNIFICANT CHANGE UP (ref 0–1)
BUN SERPL-MCNC: 59 MG/DL — HIGH (ref 10–20)
CALCIUM SERPL-MCNC: 9.5 MG/DL — SIGNIFICANT CHANGE UP (ref 8.5–10.1)
CHLORIDE SERPL-SCNC: 104 MMOL/L — SIGNIFICANT CHANGE UP (ref 98–110)
CK MB CFR SERPL CALC: 4.1 NG/ML — SIGNIFICANT CHANGE UP (ref 0.6–6.3)
CK SERPL-CCNC: 64 U/L — SIGNIFICANT CHANGE UP (ref 0–225)
CO2 SERPL-SCNC: 29 MMOL/L — SIGNIFICANT CHANGE UP (ref 17–32)
CREAT SERPL-MCNC: 1.5 MG/DL — SIGNIFICANT CHANGE UP (ref 0.7–1.5)
CULTURE RESULTS: SIGNIFICANT CHANGE UP
EOSINOPHIL # BLD AUTO: 0 K/UL — SIGNIFICANT CHANGE UP (ref 0–0.7)
EOSINOPHIL NFR BLD AUTO: 0 % — SIGNIFICANT CHANGE UP (ref 0–8)
ESTIMATED AVERAGE GLUCOSE: 140 MG/DL — HIGH (ref 68–114)
GLUCOSE BLDC GLUCOMTR-MCNC: 111 MG/DL — HIGH (ref 70–99)
GLUCOSE BLDC GLUCOMTR-MCNC: 228 MG/DL — HIGH (ref 70–99)
GLUCOSE BLDC GLUCOMTR-MCNC: 234 MG/DL — HIGH (ref 70–99)
GLUCOSE BLDC GLUCOMTR-MCNC: 258 MG/DL — HIGH (ref 70–99)
GLUCOSE SERPL-MCNC: 255 MG/DL — HIGH (ref 70–99)
HBA1C BLD-MCNC: 6.5 % — HIGH (ref 4–5.6)
HCT VFR BLD CALC: 42.7 % — SIGNIFICANT CHANGE UP (ref 42–52)
HGB BLD-MCNC: 13.4 G/DL — LOW (ref 14–18)
IMM GRANULOCYTES NFR BLD AUTO: 0.6 % — HIGH (ref 0.1–0.3)
LYMPHOCYTES # BLD AUTO: 0.6 K/UL — LOW (ref 1.2–3.4)
LYMPHOCYTES # BLD AUTO: 4.2 % — LOW (ref 20.5–51.1)
MAGNESIUM SERPL-MCNC: 2.3 MG/DL — SIGNIFICANT CHANGE UP (ref 1.8–2.4)
MCHC RBC-ENTMCNC: 26.2 PG — LOW (ref 27–31)
MCHC RBC-ENTMCNC: 31.4 G/DL — LOW (ref 32–37)
MCV RBC AUTO: 83.6 FL — SIGNIFICANT CHANGE UP (ref 80–94)
MONOCYTES # BLD AUTO: 0.84 K/UL — HIGH (ref 0.1–0.6)
MONOCYTES NFR BLD AUTO: 5.9 % — SIGNIFICANT CHANGE UP (ref 1.7–9.3)
NEUTROPHILS # BLD AUTO: 12.58 K/UL — HIGH (ref 1.4–6.5)
NEUTROPHILS NFR BLD AUTO: 89.2 % — HIGH (ref 42.2–75.2)
NRBC # BLD: 0 /100 WBCS — SIGNIFICANT CHANGE UP (ref 0–0)
PLATELET # BLD AUTO: 233 K/UL — SIGNIFICANT CHANGE UP (ref 130–400)
POTASSIUM SERPL-MCNC: 4.3 MMOL/L — SIGNIFICANT CHANGE UP (ref 3.5–5)
POTASSIUM SERPL-SCNC: 4.3 MMOL/L — SIGNIFICANT CHANGE UP (ref 3.5–5)
RBC # BLD: 5.11 M/UL — SIGNIFICANT CHANGE UP (ref 4.7–6.1)
RBC # FLD: 15.5 % — HIGH (ref 11.5–14.5)
SODIUM SERPL-SCNC: 145 MMOL/L — SIGNIFICANT CHANGE UP (ref 135–146)
SPECIMEN SOURCE: SIGNIFICANT CHANGE UP
TROPONIN T SERPL-MCNC: 0.11 NG/ML — CRITICAL HIGH
WBC # BLD: 14.12 K/UL — HIGH (ref 4.8–10.8)
WBC # FLD AUTO: 14.12 K/UL — HIGH (ref 4.8–10.8)

## 2019-08-20 PROCEDURE — 99233 SBSQ HOSP IP/OBS HIGH 50: CPT

## 2019-08-20 PROCEDURE — 71045 X-RAY EXAM CHEST 1 VIEW: CPT | Mod: 26

## 2019-08-20 RX ORDER — INSULIN GLARGINE 100 [IU]/ML
30 INJECTION, SOLUTION SUBCUTANEOUS AT BEDTIME
Refills: 0 | Status: DISCONTINUED | OUTPATIENT
Start: 2019-08-20 | End: 2019-08-23

## 2019-08-20 RX ORDER — FONDAPARINUX SODIUM 2.5 MG/.5ML
10 INJECTION, SOLUTION SUBCUTANEOUS DAILY
Refills: 0 | Status: DISCONTINUED | OUTPATIENT
Start: 2019-08-20 | End: 2019-08-20

## 2019-08-20 RX ORDER — METOPROLOL TARTRATE 50 MG
25 TABLET ORAL ONCE
Refills: 0 | Status: COMPLETED | OUTPATIENT
Start: 2019-08-20 | End: 2019-08-20

## 2019-08-20 RX ORDER — INSULIN LISPRO 100/ML
11 VIAL (ML) SUBCUTANEOUS
Refills: 0 | Status: DISCONTINUED | OUTPATIENT
Start: 2019-08-20 | End: 2019-08-23

## 2019-08-20 RX ORDER — FUROSEMIDE 40 MG
40 TABLET ORAL
Refills: 0 | Status: DISCONTINUED | OUTPATIENT
Start: 2019-08-20 | End: 2019-08-21

## 2019-08-20 RX ORDER — METOPROLOL TARTRATE 50 MG
75 TABLET ORAL DAILY
Refills: 0 | Status: DISCONTINUED | OUTPATIENT
Start: 2019-08-20 | End: 2019-08-23

## 2019-08-20 RX ADMIN — INSULIN GLARGINE 30 UNIT(S): 100 INJECTION, SOLUTION SUBCUTANEOUS at 21:24

## 2019-08-20 RX ADMIN — Medication 81 MILLIGRAM(S): at 11:08

## 2019-08-20 RX ADMIN — BUDESONIDE AND FORMOTEROL FUMARATE DIHYDRATE 2 PUFF(S): 160; 4.5 AEROSOL RESPIRATORY (INHALATION) at 07:39

## 2019-08-20 RX ADMIN — Medication 50 MILLIGRAM(S): at 05:21

## 2019-08-20 RX ADMIN — Medication 9 UNIT(S): at 07:37

## 2019-08-20 RX ADMIN — Medication 1 APPLICATION(S): at 05:22

## 2019-08-20 RX ADMIN — Medication 2: at 07:37

## 2019-08-20 RX ADMIN — Medication 40 MILLIGRAM(S): at 00:19

## 2019-08-20 RX ADMIN — Medication 2: at 16:36

## 2019-08-20 RX ADMIN — Medication 11 UNIT(S): at 16:36

## 2019-08-20 RX ADMIN — ZOLPIDEM TARTRATE 5 MILLIGRAM(S): 10 TABLET ORAL at 22:23

## 2019-08-20 RX ADMIN — BUDESONIDE AND FORMOTEROL FUMARATE DIHYDRATE 2 PUFF(S): 160; 4.5 AEROSOL RESPIRATORY (INHALATION) at 20:18

## 2019-08-20 RX ADMIN — PANTOPRAZOLE SODIUM 40 MILLIGRAM(S): 20 TABLET, DELAYED RELEASE ORAL at 06:16

## 2019-08-20 RX ADMIN — Medication 11 UNIT(S): at 11:40

## 2019-08-20 RX ADMIN — Medication 145 MILLIGRAM(S): at 11:07

## 2019-08-20 RX ADMIN — Medication 60 MILLIGRAM(S): at 06:17

## 2019-08-20 RX ADMIN — AMLODIPINE BESYLATE 5 MILLIGRAM(S): 2.5 TABLET ORAL at 07:22

## 2019-08-20 RX ADMIN — LOSARTAN POTASSIUM 50 MILLIGRAM(S): 100 TABLET, FILM COATED ORAL at 17:52

## 2019-08-20 RX ADMIN — LOSARTAN POTASSIUM 50 MILLIGRAM(S): 100 TABLET, FILM COATED ORAL at 05:21

## 2019-08-20 RX ADMIN — RIVAROXABAN 15 MILLIGRAM(S): KIT at 16:25

## 2019-08-20 RX ADMIN — CHLORHEXIDINE GLUCONATE 1 APPLICATION(S): 213 SOLUTION TOPICAL at 05:21

## 2019-08-20 RX ADMIN — Medication 40 MILLIGRAM(S): at 05:20

## 2019-08-20 RX ADMIN — FINASTERIDE 5 MILLIGRAM(S): 5 TABLET, FILM COATED ORAL at 11:07

## 2019-08-20 RX ADMIN — Medication 1 APPLICATION(S): at 17:56

## 2019-08-20 RX ADMIN — CHLORHEXIDINE GLUCONATE 1 APPLICATION(S): 213 SOLUTION TOPICAL at 17:52

## 2019-08-20 RX ADMIN — ISOSORBIDE MONONITRATE 30 MILLIGRAM(S): 60 TABLET, EXTENDED RELEASE ORAL at 11:08

## 2019-08-20 RX ADMIN — Medication 40 MILLIGRAM(S): at 17:53

## 2019-08-20 RX ADMIN — Medication 75 MILLIGRAM(S): at 10:45

## 2019-08-20 RX ADMIN — Medication 3: at 11:40

## 2019-08-20 NOTE — PROGRESS NOTE ADULT - SUBJECTIVE AND OBJECTIVE BOX
Patient is a 79y old  Male who presents with a chief complaint of CHF exacerbation (20 Aug 2019 07:31)      T(F): 95.6 (08-20-19 @ 07:00), Max: 98.3 (08-19-19 @ 15:01)  HR: 68 (08-20-19 @ 05:00)  BP: 157/70 (08-20-19 @ 05:00)  RR: 20 (08-20-19 @ 07:00)  SpO2: 92% (08-20-19 @ 07:58) (86% - 100%)    PHYSICAL EXAM:  GENERAL: NAD, well-groomed, well-developed  HEAD:  Atraumatic, Normocephalic  EYES: EOMI, PERRLA, conjunctiva and sclera clear  ENMT: No tonsillar erythema, exudates, or enlargement; Moist mucous membranes, Good dentition, No lesions  NECK: Supple, No JVD, Normal thyroid  NERVOUS SYSTEM:  Alert & Oriented X3,  Motor Strength 5/5 B/L upper and lower extremities  CHEST/LUNG: Clear to percussion bilaterally; No rales, rhonchi, wheezing, or rubs  HEART: IRREG No murmurs, rubs, or gallops  ABDOMEN: Soft, Nontender, Nondistended; Bowel sounds present  EXTREMITIES:   No clubbing, cyanosis, or edema  LYMPH: No lymphadenopathy noted  SKIN: No rashes or lesions    labs  08-20    145  |  104  |  59<H>  ----------------------------<  255<H>  4.3   |  29  |  1.5    Ca    9.5      20 Aug 2019 05:42  Phos  4.3     08-19  Mg     2.3     08-20    TPro  7.0  /  Alb  3.6  /  TBili  0.8  /  DBili  x   /  AST  16  /  ALT  11  /  AlkPhos  45  08-19                          13.4   14.12 )-----------( 233      ( 20 Aug 2019 05:42 )             42.7       PT/INR - ( 18 Aug 2019 20:57 )   PT: 19.60 sec;   INR: 1.71 ratio         PTT - ( 18 Aug 2019 20:57 )  PTT:38.4 sec    Creatine Kinase, Serum: 64 U/L (08-20-19 @ 05:42)  Creatine Kinase, Serum: 88 U/L (08-19-19 @ 11:27)  Troponin T, Serum: 0.17 ng/mL <HH> (08-19-19 @ 11:27)      amLODIPine   Tablet 5 milliGRAM(s) Oral daily  aspirin  chewable 81 milliGRAM(s) Oral daily  buDESOnide 160 MICROgram(s)/formoterol 4.5 MICROgram(s) Inhaler 2 Puff(s) Inhalation two times a day  chlorhexidine 4% Liquid 1 Application(s) Topical two times a day  clopidogrel Tablet 75 milliGRAM(s) Oral daily  dextrose 40% Gel 15 Gram(s) Oral once PRN  dextrose 5%. 1000 milliLiter(s) IV Continuous <Continuous>  dextrose 50% Injectable 12.5 Gram(s) IV Push once  dextrose 50% Injectable 25 Gram(s) IV Push once  dextrose 50% Injectable 25 Gram(s) IV Push once  fenofibrate Tablet 145 milliGRAM(s) Oral daily  finasteride 5 milliGRAM(s) Oral daily  furosemide   Injectable 60 milliGRAM(s) IV Push two times a day  glucagon  Injectable 1 milliGRAM(s) IntraMuscular once PRN  insulin glargine Injectable (LANTUS) 30 Unit(s) SubCutaneous at bedtime  insulin lispro (HumaLOG) corrective regimen sliding scale   SubCutaneous three times a day before meals  insulin lispro Injectable (HumaLOG) 11 Unit(s) SubCutaneous three times a day before meals  isosorbide   mononitrate ER Tablet (IMDUR) 30 milliGRAM(s) Oral daily  losartan 50 milliGRAM(s) Oral two times a day  methylPREDNISolone sodium succinate Injectable 40 milliGRAM(s) IV Push every 6 hours  metoprolol succinate ER 50 milliGRAM(s) Oral daily  pantoprazole    Tablet 40 milliGRAM(s) Oral before breakfast  rivaroxaban 15 milliGRAM(s) Oral with dinner  silver sulfADIAZINE 1% Cream 1 Application(s) Topical two times a day  zolpidem 5 milliGRAM(s) Oral at bedtime PRN

## 2019-08-20 NOTE — PROGRESS NOTE ADULT - ASSESSMENT
Patient feels much better. Now off c-pap. Chf better . Check cxr lytes. PO lasix soon. Reviewed diet again. Continue xarelto

## 2019-08-20 NOTE — PROGRESS NOTE ADULT - SUBJECTIVE AND OBJECTIVE BOX
Patient is a 79y old  Male who presents with a chief complaint of CHF exacerbation (20 Aug 2019 08:06)      Over Night Events:  Patient seen and examined feel better sitting in bed eating breakfast        ROS:  See HPI    PHYSICAL EXAM    ICU Vital Signs Last 24 Hrs  T(C): 35.3 (20 Aug 2019 07:00), Max: 36.8 (19 Aug 2019 15:01)  T(F): 95.6 (20 Aug 2019 07:00), Max: 98.3 (19 Aug 2019 15:01)  HR: 68 (20 Aug 2019 05:00) (65 - 78)  BP: 157/70 (20 Aug 2019 05:00) (113/57 - 157/70)  BP(mean): 100 (20 Aug 2019 05:00) (81 - 108)  ABP: --  ABP(mean): --  RR: 20 (20 Aug 2019 07:00) (20 - 37)  SpO2: 92% (20 Aug 2019 07:58) (86% - 100%)      General: Aox3  HEENT:    darell            Lymph Nodes: NO cervical LN   Lungs: Bilateral BS  Cardiovascular: Regular   Abdomen: Soft, Positive BS  Extremities: No clubbing   Skin: warm   Neurological:  no focal deficit   Musculoskeletal: move all ext     I&O's Detail    19 Aug 2019 07:01  -  20 Aug 2019 07:00  --------------------------------------------------------  IN:    Oral Fluid: 460 mL  Total IN: 460 mL    OUT:    Voided: 2760 mL  Total OUT: 2760 mL    Total NET: -2300 mL          LABS:                          13.4   14.12 )-----------( 233      ( 20 Aug 2019 05:42 )             42.7         20 Aug 2019 05:42    145    |  104    |  59     ----------------------------<  255    4.3     |  29     |  1.5      Ca    9.5        20 Aug 2019 05:42  Phos  4.3       19 Aug 2019 05:51  Mg     2.3       20 Aug 2019 05:42                                               PT/INR - ( 18 Aug 2019 20:57 )   PT: 19.60 sec;   INR: 1.71 ratio         PTT - ( 18 Aug 2019 20:57 )  PTT:38.4 sec                                       Urinalysis Basic - ( 19 Aug 2019 02:20 )    Color: Yellow / Appearance: Clear / S.015 / pH: x  Gluc: x / Ketone: Negative  / Bili: Negative / Urobili: 0.2 mg/dL   Blood: x / Protein: Negative mg/dL / Nitrite: Negative   Leuk Esterase: Negative / RBC: x / WBC x   Sq Epi: x / Non Sq Epi: x / Bacteria: x        Lactate, Blood: 0.9 mmol/L (19 @ 05:51)    CARDIAC MARKERS ( 20 Aug 2019 05:42 )  x     / x     / 64 U/L / x     / x      CARDIAC MARKERS ( 19 Aug 2019 11:27 )  x     / 0.17 ng/mL / 88 U/L / x     / 11.5 ng/mL  CARDIAC MARKERS ( 19 Aug 2019 05:51 )  x     / 0.21 ng/mL / x     / x     / x      CARDIAC MARKERS ( 18 Aug 2019 20:57 )  x     / 0.03 ng/mL / x     / x     / x                                                                                                                                             ABG - ( 19 Aug 2019 10:08 )  pH, Arterial: 7.38  pH, Blood: x     /  pCO2: 47    /  pO2: 82    / HCO3: 28    / Base Excess: 1.9   /  SaO2: 96                  MEDICATIONS  (STANDING):  amLODIPine   Tablet 5 milliGRAM(s) Oral daily  aspirin  chewable 81 milliGRAM(s) Oral daily  buDESOnide 160 MICROgram(s)/formoterol 4.5 MICROgram(s) Inhaler 2 Puff(s) Inhalation two times a day  chlorhexidine 4% Liquid 1 Application(s) Topical two times a day  clopidogrel Tablet 75 milliGRAM(s) Oral daily  dextrose 5%. 1000 milliLiter(s) (50 mL/Hr) IV Continuous <Continuous>  dextrose 50% Injectable 12.5 Gram(s) IV Push once  dextrose 50% Injectable 25 Gram(s) IV Push once  dextrose 50% Injectable 25 Gram(s) IV Push once  fenofibrate Tablet 145 milliGRAM(s) Oral daily  finasteride 5 milliGRAM(s) Oral daily  furosemide   Injectable 60 milliGRAM(s) IV Push two times a day  insulin glargine Injectable (LANTUS) 30 Unit(s) SubCutaneous at bedtime  insulin lispro (HumaLOG) corrective regimen sliding scale   SubCutaneous three times a day before meals  insulin lispro Injectable (HumaLOG) 11 Unit(s) SubCutaneous three times a day before meals  isosorbide   mononitrate ER Tablet (IMDUR) 30 milliGRAM(s) Oral daily  losartan 50 milliGRAM(s) Oral two times a day  methylPREDNISolone sodium succinate Injectable 40 milliGRAM(s) IV Push every 6 hours  metoprolol succinate ER 50 milliGRAM(s) Oral daily  pantoprazole    Tablet 40 milliGRAM(s) Oral before breakfast  rivaroxaban 15 milliGRAM(s) Oral with dinner  silver sulfADIAZINE 1% Cream 1 Application(s) Topical two times a day    MEDICATIONS  (PRN):  dextrose 40% Gel 15 Gram(s) Oral once PRN Blood Glucose LESS THAN 70 milliGRAM(s)/deciliter  glucagon  Injectable 1 milliGRAM(s) IntraMuscular once PRN Glucose LESS THAN 70 milligrams/deciliter  zolpidem 5 milliGRAM(s) Oral at bedtime PRN Insomnia          Xrays:  TLC:  OG:  ET tube:                                                                                    pending    ECHO:  [facility] ICU:

## 2019-08-20 NOTE — PROGRESS NOTE ADULT - ASSESSMENT
IMPRESSION:  ARF on CRF hypercapnia and hypoxic   CHF exacerbation   YAMIL   CAD       PLAN:    CNS: no sedation     HEENT: oral care     PULMONARY: keep on bipap repeat abg now   keep pox 92%  cxr today     CARDIOVASCULAR: lower lasix to 40 mg Q 12 hrs for today follow cardiology   echo   cardiology follow up   GI: GI prophylaxis.  bipap at night and as needed     RENAL: follow is and os lytes bun , cr     INFECTIOUS DISEASE: pan cx     HEMATOLOGICAL:  DVT prophylaxis.    ENDOCRINE:  Follow up FS.  Insulin protocol if needed.    MUSCULOSKELETAL:        CRITICAL CARE TIME SPENT: ***

## 2019-08-20 NOTE — PHYSICAL THERAPY INITIAL EVALUATION ADULT - GENERAL OBSERVATIONS, REHAB EVAL
09:50-10:15 Chart reviewed. Pt encountered semireclined in bed, may be seen by Physical Therapist as confirmed with Nurse. Patient denied pain at rest and ready to get up now but "hear rate goes up"; +tele; +O2 via NC; +redness/swelling BLE

## 2019-08-20 NOTE — PROGRESS NOTE ADULT - PROBLEM SELECTOR PLAN 1
needs silvadene dressings and Unaboot - follow up with Vascular Surgeon out pt  No abx needed  weight loss and Diuresis   Recall if needed

## 2019-08-20 NOTE — PROGRESS NOTE ADULT - ASSESSMENT
78yo Male w/ PMH of CHF, COPD (home O2 as needed), DM, HTN, CAD s/p CABG and 1 stent, BPH, and recent admission for CHF (April 2019) BIBA dyspnea x 2 days that suddenly worsened today in the afternoon. As per ED physician, EMS gave IV lasix 40mg during transport. In ED, pt was in acute distress 2/2 dyspnea, O2 saturating low. Another IV lasix 40mg given. Nitro drip started. BIPAP applied w/ great relief. CXR shows pleural effusion and vascular congestion. BNP 3800s. Trop 0.03 noted. Pt reports breathing much better currently.     #Acute on Chronic Diastolic CHF exacerbation - Stable on 3.5L (home O2 requirements)  - Echo from last admission clarified: EF > 55% and Grade II diastolic dysfunction  - CXR shows b/l pleural effusion and vascular congestion. BNP 3800s .  - BIPAP PRN. Keep BIPAP overnight  - Strict I&Os. Keep I < O. and dialy weight  - c/w IV lasix 60 q12h  - c/w losartan 50 bid, asa, plavix, metoprolol  - c/w fluid restricted diets  - f/u repeat echo - EF 55%. Moderate AS (AV 1.5, peak gradient 36    #Elevated Troponin possibly type 2 demand  - Trop 0.03 noted. -> 0.21  - repeat EKG no new changes. aflutter    #Leukocytosis - possibly reactive - resolved    #COPD  - c/w Solumedrol  - c/w breo and Duoneb PRN    #HTN  - c/w imdur, lasix, losartan and metoprolol  - tele monitoring     #Aflutter  - c/w metoprolol  - c/w xarelto    DM  - c/w carb consistnet  - will keep pt on basal bolus regimen    BPH  - tamsulosin    #Activitiy: OOBC  #DIet; carb consistent fluid restricted  #DVT/GI PPX: Xarelto/protonix  #Dispo: will need Pt/R consulted  #Code; Full 78yo Male w/ PMH of CHF, COPD (home O2 as needed), DM, HTN, CAD s/p CABG and 1 stent, BPH, and recent admission for CHF (April 2019) BIBA dyspnea x 2 days that suddenly worsened today in the afternoon. As per ED physician, EMS gave IV lasix 40mg during transport. In ED, pt was in acute distress 2/2 dyspnea, O2 saturating low. Another IV lasix 40mg given. Nitro drip started. BIPAP applied w/ great relief. CXR shows pleural effusion and vascular congestion. BNP 3800s. Trop 0.03 noted. Pt reports breathing much better currently.     #Acute on Chronic Diastolic CHF exacerbation - Stable on 3.5L (home O2 requirements)  - Echo from last admission clarified: EF > 55% and Grade II diastolic dysfunction  - CXR shows b/l pleural effusion and vascular congestion. BNP 3800s .  - BIPAP PRN. Keep BIPAP overnight  - Strict I&Os. Keep I < O. and dialy weight  - c/w IV lasix 40 q12h  - c/w losartan 50 bid, asa, plavix, metoprolol  - c/w fluid restricted diets  - f/u repeat echo - EF 55%. Moderate AS (AV 1.5, peak gradient 36    #Elevated Troponin possibly type 2 demand  - Trop 0.03 noted. -> 0.21  - repeat EKG no new changes. aflutter    #Leukocytosis - possibly reactive - resolved    #COPD  - d/c solumedrol  - c/w breo and Duoneb PRN    #HTN  - c/w imdur, lasix, losartan and metoprolol  - tele monitoring     #Aflutter  - c/w metoprolol  - c/w xarelto    DM  - c/w carb consistnet  - will keep pt on basal bolus regimen    BPH  - tamsulosin    #Activitiy: OOBC  #DIet; carb consistent fluid restricted  #DVT/GI PPX: Xarelto/protonix  #Dispo: will need Pt/R consulted  #Code; Full

## 2019-08-20 NOTE — PHYSICAL THERAPY INITIAL EVALUATION ADULT - GAIT DEVIATIONS NOTED, PT EVAL
decreased robert/stooped posture, dec heel strike/pushoff/decreased step length/decreased weight-shifting ability

## 2019-08-20 NOTE — PROGRESS NOTE ADULT - SUBJECTIVE AND OBJECTIVE BOX
TAIWO ZAVALA  79y, Male  Allergy: No Known Allergies      CHIEF COMPLAINT: CHF exacerbation (19 Aug 2019 10:53)      INTERVAL EVENTS/HPI  - No acute events overnight  - T(F): , Max: 98.3 (19 @ 15:01)  - Denies any worsening symptoms      ROS  10 system review - mild SOB     SOCIAL HISTORY    Substance Use ( X ) never used  (  ) IVDU (  ) Other:  Tobacco Usage:  (   ) never smoked   ( X ) former smoker   (   ) current smoker   Alcohol Usage: (X   ) social  (   ) daily use (   ) denies  Sexual History: not relevant       FH noncontributory     VITALS:  T(F): 97.9, Max: 98.3 (19 @ 15:01)  HR: 68  BP: 157/70  RR: 20Vital Signs Last 24 Hrs  T(C): 36.6 (19 Aug 2019 23:04), Max: 36.8 (19 Aug 2019 15:01)  T(F): 97.9 (19 Aug 2019 23:04), Max: 98.3 (19 Aug 2019 15:01)  HR: 68 (20 Aug 2019 05:00) (65 - 78)  BP: 157/70 (20 Aug 2019 05:00) (113/57 - 157/70)  BP(mean): 100 (20 Aug 2019 05:00) (81 - 108)  RR: 20 (20 Aug 2019 05:04) (20 - 37)  SpO2: 91% (20 Aug 2019 05:04) (86% - 100%)    PHYSICAL EXAM:  Gen: NAD, resting in bed  HEENT: Normocephalic, atraumatic  Neck: supple, no lymphadenopathy  CV: s1 s2 +  Lungs: decreased BS   Abdomen: Soft, BS present  Ext: Warm, well perfused  Neuro: non focal, awake  Skin: LE swollen with stasis changes and ulcers       TESTS & MEASUREMENTS:                        12.9   9.91  )-----------( 201      ( 19 Aug 2019 05:51 )             42.5         145  |  106  |  49<H>  ----------------------------<  215<H>  4.6   |  26  |  1.4    Ca    9.2      19 Aug 2019 05:51  Phos  4.3       Mg     2.1         TPro  7.0  /  Alb  3.6  /  TBili  0.8  /  DBili  x   /  AST  16  /  ALT  11  /  AlkPhos  45      eGFR if Non African American: 48 mL/min/1.73M2 (19 @ 05:51)  eGFR if African American: 55 mL/min/1.73M2 (19 @ 05:51)    LIVER FUNCTIONS - ( 19 Aug 2019 05:51 )  Alb: 3.6 g/dL / Pro: 7.0 g/dL / ALK PHOS: 45 U/L / ALT: 11 U/L / AST: 16 U/L / GGT: x           Urinalysis Basic - ( 19 Aug 2019 02:20 )    Color: Yellow / Appearance: Clear / S.015 / pH: x  Gluc: x / Ketone: Negative  / Bili: Negative / Urobili: 0.2 mg/dL   Blood: x / Protein: Negative mg/dL / Nitrite: Negative   Leuk Esterase: Negative / RBC: x / WBC x   Sq Epi: x / Non Sq Epi: x / Bacteria: x          Lactate, Blood: 0.9 mmol/L (19 @ 05:51)  Blood Gas Venous - Lactate: 0.9 mmoL/L (19 @ 22:08)      INFECTIOUS DISEASES TESTING      RADIOLOGY & ADDITIONAL TESTS:        CARDIOLOGY TESTING  12 Lead ECG:   Ventricular Rate 66 BPM    Atrial Rate 264 BPM    QRS Duration 142 ms    Q-T Interval 406 ms    QTC Calculation(Bezet) 425 ms    P Axis 242 degrees    R Axis -71 degrees    T Axis 248 degrees    Diagnosis Line Atrial flutter with 4:1 A-V conduction  Right bundle branch block  Left anterior fascicular block  *** Bifascicular block ***  T wave abnormality, consider inferolateral ischemia  Abnormal ECG    Confirmed by STEVE YAO MD ([id number]) on 2019 11:37:52 AM (19 @ 08:30)  12 Lead ECG:   Ventricular Rate 140 BPM    Atrial Rate 140 BPM    P-R Interval 184 ms    QRS Duration 140 ms    Q-T Interval 274 ms    QTC Calculation(Bezet) 418 ms    P Axis 79 degrees    R Axis -86 degrees    T Axis 63 degrees    Diagnosis Line Sinus tachycardia  Left axis deviation  Right bundle branch block  Abnormal ECG    Confirmed by STEVE YAO MD ([id number]) on 2019 11:51:27 AM (19 @ 20:53)      MEDICATIONS  amLODIPine   Tablet 5  aspirin  chewable 81  buDESOnide 160 MICROgram(s)/formoterol 4.5 MICROgram(s) Inhaler 2  chlorhexidine 4% Liquid 1  clopidogrel Tablet 75  dextrose 5%. 1000  dextrose 50% Injectable 12.5  dextrose 50% Injectable 25  dextrose 50% Injectable 25  fenofibrate Tablet 145  finasteride 5  furosemide   Injectable 60  insulin glargine Injectable (LANTUS) 27  insulin lispro (HumaLOG) corrective regimen sliding scale   insulin lispro Injectable (HumaLOG) 9  isosorbide   mononitrate ER Tablet (IMDUR) 30  losartan 50  methylPREDNISolone sodium succinate Injectable 40  metoprolol succinate ER 50  pantoprazole    Tablet 40  rivaroxaban 15  silver sulfADIAZINE 1% Cream 1      ANTIBIOTICS:

## 2019-08-20 NOTE — PROGRESS NOTE ADULT - SUBJECTIVE AND OBJECTIVE BOX
SUBJECTIVE:    Patient is a 79y old Male who presents with a chief complaint of CHF exacerbation (20 Aug 2019 05:43)    Currently admitted to medicine with the primary diagnosis of Acute decompensated heart failure     Today is hospital day 2d. This morning he is resting comfortably in bed and reports no new issues or overnight events. Pt tolerated bipap well overnight    PAST MEDICAL & SURGICAL HISTORY  BPH (benign prostatic hyperplasia)  CHF (congestive heart failure)  COPD (chronic obstructive pulmonary disease)  Diabetes  HTN (hypertension)  H/O heart artery stent  S/P CABG x 3    SOCIAL HISTORY:  Negative for smoking/alcohol/drug use.     ALLERGIES:  No Known Allergies    MEDICATIONS:  STANDING MEDICATIONS  amLODIPine   Tablet 5 milliGRAM(s) Oral daily  aspirin  chewable 81 milliGRAM(s) Oral daily  buDESOnide 160 MICROgram(s)/formoterol 4.5 MICROgram(s) Inhaler 2 Puff(s) Inhalation two times a day  chlorhexidine 4% Liquid 1 Application(s) Topical two times a day  clopidogrel Tablet 75 milliGRAM(s) Oral daily  dextrose 5%. 1000 milliLiter(s) IV Continuous <Continuous>  dextrose 50% Injectable 12.5 Gram(s) IV Push once  dextrose 50% Injectable 25 Gram(s) IV Push once  dextrose 50% Injectable 25 Gram(s) IV Push once  fenofibrate Tablet 145 milliGRAM(s) Oral daily  finasteride 5 milliGRAM(s) Oral daily  furosemide   Injectable 60 milliGRAM(s) IV Push two times a day  insulin glargine Injectable (LANTUS) 27 Unit(s) SubCutaneous at bedtime  insulin lispro (HumaLOG) corrective regimen sliding scale   SubCutaneous three times a day before meals  insulin lispro Injectable (HumaLOG) 9 Unit(s) SubCutaneous three times a day before meals  isosorbide   mononitrate ER Tablet (IMDUR) 30 milliGRAM(s) Oral daily  losartan 50 milliGRAM(s) Oral two times a day  methylPREDNISolone sodium succinate Injectable 40 milliGRAM(s) IV Push every 6 hours  metoprolol succinate ER 50 milliGRAM(s) Oral daily  pantoprazole    Tablet 40 milliGRAM(s) Oral before breakfast  rivaroxaban 15 milliGRAM(s) Oral with dinner  silver sulfADIAZINE 1% Cream 1 Application(s) Topical two times a day    PRN MEDICATIONS  dextrose 40% Gel 15 Gram(s) Oral once PRN  glucagon  Injectable 1 milliGRAM(s) IntraMuscular once PRN  zolpidem 5 milliGRAM(s) Oral at bedtime PRN    VITALS:   T(F): 95.6  HR: 68  BP: 157/70  RR: 20  SpO2: 91%    CAPILLARY BLOOD GLUCOSE      LABS:                        12.9   9.91  )-----------( 201      ( 19 Aug 2019 05:51 )             42.5         145  |  106  |  49<H>  ----------------------------<  215<H>  4.6   |  26  |  1.4    Ca    9.2      19 Aug 2019 05:51  Phos  4.3       Mg     2.1         TPro  7.0  /  Alb  3.6  /  TBili  0.8  /  DBili  x   /  AST  16  /  ALT  11  /  AlkPhos  45      PT/INR - ( 18 Aug 2019 20:57 )   PT: 19.60 sec;   INR: 1.71 ratio         PTT - ( 18 Aug 2019 20:57 )  PTT:38.4 sec  Urinalysis Basic - ( 19 Aug 2019 02:20 )    Color: Yellow / Appearance: Clear / S.015 / pH: x  Gluc: x / Ketone: Negative  / Bili: Negative / Urobili: 0.2 mg/dL   Blood: x / Protein: Negative mg/dL / Nitrite: Negative   Leuk Esterase: Negative / RBC: x / WBC x   Sq Epi: x / Non Sq Epi: x / Bacteria: x      ABG - ( 19 Aug 2019 10:08 )  pH, Arterial: 7.38  pH, Blood: x     /  pCO2: 47    /  pO2: 82    / HCO3: 28    / Base Excess: 1.9   /  SaO2: 96                Creatine Kinase, Serum: 88 U/L (19 @ 11:27)  Troponin T, Serum: 0.17 ng/mL <HH> (19 @ 11:27)      CARDIAC MARKERS ( 19 Aug 2019 11:27 )  x     / 0.17 ng/mL / 88 U/L / x     / 11.5 ng/mL  CARDIAC MARKERS ( 19 Aug 2019 05:51 )  x     / 0.21 ng/mL / x     / x     / x      CARDIAC MARKERS ( 18 Aug 2019 20:57 )  x     / 0.03 ng/mL / x     / x     / x          RADIOLOGY:    PHYSICAL EXAM:  GEN: No acute distress  LUNGS: reduced breath sounds b/l. no crackles or wheezes  HEART: S1/S2 present. RRR.   ABD: Soft, non-tender, non-distended. Bowel sounds present  EXT: NC/NC/NE/2+PP/PARKER. +1 pitting edema w/ venous stasis changes  NEURO: AAOX3

## 2019-08-21 LAB
ANION GAP SERPL CALC-SCNC: 11 MMOL/L — SIGNIFICANT CHANGE UP (ref 7–14)
BASOPHILS # BLD AUTO: 0.02 K/UL — SIGNIFICANT CHANGE UP (ref 0–0.2)
BASOPHILS NFR BLD AUTO: 0.1 % — SIGNIFICANT CHANGE UP (ref 0–1)
BUN SERPL-MCNC: 56 MG/DL — HIGH (ref 10–20)
CALCIUM SERPL-MCNC: 9.3 MG/DL — SIGNIFICANT CHANGE UP (ref 8.5–10.1)
CHLORIDE SERPL-SCNC: 102 MMOL/L — SIGNIFICANT CHANGE UP (ref 98–110)
CO2 SERPL-SCNC: 32 MMOL/L — SIGNIFICANT CHANGE UP (ref 17–32)
CREAT SERPL-MCNC: 1.3 MG/DL — SIGNIFICANT CHANGE UP (ref 0.7–1.5)
EOSINOPHIL # BLD AUTO: 0.02 K/UL — SIGNIFICANT CHANGE UP (ref 0–0.7)
EOSINOPHIL NFR BLD AUTO: 0.1 % — SIGNIFICANT CHANGE UP (ref 0–8)
GLUCOSE BLDC GLUCOMTR-MCNC: 113 MG/DL — HIGH (ref 70–99)
GLUCOSE BLDC GLUCOMTR-MCNC: 128 MG/DL — HIGH (ref 70–99)
GLUCOSE BLDC GLUCOMTR-MCNC: 143 MG/DL — HIGH (ref 70–99)
GLUCOSE BLDC GLUCOMTR-MCNC: 94 MG/DL — SIGNIFICANT CHANGE UP (ref 70–99)
GLUCOSE SERPL-MCNC: 98 MG/DL — SIGNIFICANT CHANGE UP (ref 70–99)
HCT VFR BLD CALC: 43.8 % — SIGNIFICANT CHANGE UP (ref 42–52)
HGB BLD-MCNC: 13.7 G/DL — LOW (ref 14–18)
IMM GRANULOCYTES NFR BLD AUTO: 0.4 % — HIGH (ref 0.1–0.3)
LYMPHOCYTES # BLD AUTO: 1.27 K/UL — SIGNIFICANT CHANGE UP (ref 1.2–3.4)
LYMPHOCYTES # BLD AUTO: 9 % — LOW (ref 20.5–51.1)
MAGNESIUM SERPL-MCNC: 2.3 MG/DL — SIGNIFICANT CHANGE UP (ref 1.8–2.4)
MCHC RBC-ENTMCNC: 25.8 PG — LOW (ref 27–31)
MCHC RBC-ENTMCNC: 31.3 G/DL — LOW (ref 32–37)
MCV RBC AUTO: 82.6 FL — SIGNIFICANT CHANGE UP (ref 80–94)
MONOCYTES # BLD AUTO: 1.17 K/UL — HIGH (ref 0.1–0.6)
MONOCYTES NFR BLD AUTO: 8.3 % — SIGNIFICANT CHANGE UP (ref 1.7–9.3)
NEUTROPHILS # BLD AUTO: 11.55 K/UL — HIGH (ref 1.4–6.5)
NEUTROPHILS NFR BLD AUTO: 82.1 % — HIGH (ref 42.2–75.2)
NRBC # BLD: 0 /100 WBCS — SIGNIFICANT CHANGE UP (ref 0–0)
PLATELET # BLD AUTO: 244 K/UL — SIGNIFICANT CHANGE UP (ref 130–400)
POTASSIUM SERPL-MCNC: 4.1 MMOL/L — SIGNIFICANT CHANGE UP (ref 3.5–5)
POTASSIUM SERPL-SCNC: 4.1 MMOL/L — SIGNIFICANT CHANGE UP (ref 3.5–5)
RBC # BLD: 5.3 M/UL — SIGNIFICANT CHANGE UP (ref 4.7–6.1)
RBC # FLD: 15.4 % — HIGH (ref 11.5–14.5)
SODIUM SERPL-SCNC: 145 MMOL/L — SIGNIFICANT CHANGE UP (ref 135–146)
WBC # BLD: 14.09 K/UL — HIGH (ref 4.8–10.8)
WBC # FLD AUTO: 14.09 K/UL — HIGH (ref 4.8–10.8)

## 2019-08-21 PROCEDURE — 71045 X-RAY EXAM CHEST 1 VIEW: CPT | Mod: 26

## 2019-08-21 PROCEDURE — 99233 SBSQ HOSP IP/OBS HIGH 50: CPT

## 2019-08-21 RX ORDER — FUROSEMIDE 40 MG
40 TABLET ORAL DAILY
Refills: 0 | Status: DISCONTINUED | OUTPATIENT
Start: 2019-08-21 | End: 2019-08-23

## 2019-08-21 RX ORDER — ZOLPIDEM TARTRATE 10 MG/1
5 TABLET ORAL ONCE
Refills: 0 | Status: DISCONTINUED | OUTPATIENT
Start: 2019-08-21 | End: 2019-08-22

## 2019-08-21 RX ADMIN — Medication 145 MILLIGRAM(S): at 11:06

## 2019-08-21 RX ADMIN — Medication 1 APPLICATION(S): at 05:32

## 2019-08-21 RX ADMIN — BUDESONIDE AND FORMOTEROL FUMARATE DIHYDRATE 2 PUFF(S): 160; 4.5 AEROSOL RESPIRATORY (INHALATION) at 07:43

## 2019-08-21 RX ADMIN — RIVAROXABAN 15 MILLIGRAM(S): KIT at 17:02

## 2019-08-21 RX ADMIN — INSULIN GLARGINE 30 UNIT(S): 100 INJECTION, SOLUTION SUBCUTANEOUS at 23:04

## 2019-08-21 RX ADMIN — Medication 1 APPLICATION(S): at 17:02

## 2019-08-21 RX ADMIN — Medication 40 MILLIGRAM(S): at 05:31

## 2019-08-21 RX ADMIN — Medication 11 UNIT(S): at 17:00

## 2019-08-21 RX ADMIN — ISOSORBIDE MONONITRATE 30 MILLIGRAM(S): 60 TABLET, EXTENDED RELEASE ORAL at 11:06

## 2019-08-21 RX ADMIN — FINASTERIDE 5 MILLIGRAM(S): 5 TABLET, FILM COATED ORAL at 11:06

## 2019-08-21 RX ADMIN — Medication 11 UNIT(S): at 08:12

## 2019-08-21 RX ADMIN — LOSARTAN POTASSIUM 50 MILLIGRAM(S): 100 TABLET, FILM COATED ORAL at 05:31

## 2019-08-21 RX ADMIN — CHLORHEXIDINE GLUCONATE 1 APPLICATION(S): 213 SOLUTION TOPICAL at 09:28

## 2019-08-21 RX ADMIN — BUDESONIDE AND FORMOTEROL FUMARATE DIHYDRATE 2 PUFF(S): 160; 4.5 AEROSOL RESPIRATORY (INHALATION) at 20:56

## 2019-08-21 RX ADMIN — Medication 11 UNIT(S): at 11:36

## 2019-08-21 RX ADMIN — LOSARTAN POTASSIUM 50 MILLIGRAM(S): 100 TABLET, FILM COATED ORAL at 17:04

## 2019-08-21 RX ADMIN — Medication 75 MILLIGRAM(S): at 05:31

## 2019-08-21 RX ADMIN — CHLORHEXIDINE GLUCONATE 1 APPLICATION(S): 213 SOLUTION TOPICAL at 17:02

## 2019-08-21 RX ADMIN — PANTOPRAZOLE SODIUM 40 MILLIGRAM(S): 20 TABLET, DELAYED RELEASE ORAL at 05:31

## 2019-08-21 RX ADMIN — Medication 81 MILLIGRAM(S): at 11:06

## 2019-08-21 RX ADMIN — AMLODIPINE BESYLATE 5 MILLIGRAM(S): 2.5 TABLET ORAL at 05:31

## 2019-08-21 NOTE — PROGRESS NOTE ADULT - SUBJECTIVE AND OBJECTIVE BOX
Patient is less sob today, no complaints      T(F): 97.5 (08-21-19 @ 11:01), Max: 97.5 (08-21-19 @ 11:01)  HR: 102 (08-21-19 @ 06:00)  BP: 154/103 (08-21-19 @ 06:00)  RR: 20  SpO2: 99% (08-21-19 @ 13:00) (94% - 99%)    PHYSICAL EXAM:  GENERAL: NAD  HEAD:  Atraumatic, Normocephalic  NERVOUS SYSTEM:  Alert & Oriented X3, no focal deficits  CHEST/LUNG:  bilateral rhonchi  HEART: Regular rate and rhythm; No murmurs, rubs, or gallops  ABDOMEN: Soft, Nontender, Nondistended; Bowel sounds present  EXTREMITIES:  b/l edema  LYMPH: No lymphadenopathy noted  SKIN: No rashes or lesions    LABS  08-21    145  |  102  |  56<H>  ----------------------------<  98  4.1   |  32  |  1.3    Ca    9.3      21 Aug 2019 05:30  Mg     2.3     08-21                            13.7   14.09 )-----------( 244      ( 21 Aug 2019 05:30 )             43.8         CARDIAC ENZYMES  Creatine Kinase, Serum: 64 (08-20 @ 05:42)  Creatine Kinase, Serum: 88 (08-19 @ 11:27)    CKMB Units: 4.1 (08-20 @ 05:42)  CKMB Units: 11.5 (08-19 @ 11:27)    Troponin T, Serum: 0.11 ng/mL (08-20-19 @ 05:42)  Troponin T, Serum: 0.17 ng/mL (08-19-19 @ 11:27)  Troponin T, Serum: 0.21 ng/mL (08-19-19 @ 05:51)  Troponin T, Serum: 0.03 ng/mL (08-18-19 @ 20:57)      Culture Results:   <10,000 CFU/mL Normal Urogenital Felipa (08-19-19)  Culture Results:   No growth to date. (08-19-19)  Culture Results:   No growth to date. (08-19-19)    RADIOLOGY  < from: Xray Chest 1 View- PORTABLE-Routine (08.21.19 @ 07:03) >  Impression:    Improved aeration of bilateral lungs. Bibasilar atelectasis.      < end of copied text >    MEDICATIONS  (STANDING):  amLODIPine   Tablet 5 milliGRAM(s) Oral daily  aspirin  chewable 81 milliGRAM(s) Oral daily  buDESOnide 160 MICROgram(s)/formoterol 4.5 MICROgram(s) Inhaler 2 Puff(s) Inhalation two times a day  chlorhexidine 4% Liquid 1 Application(s) Topical two times a day  fenofibrate Tablet 145 milliGRAM(s) Oral daily  finasteride 5 milliGRAM(s) Oral daily  furosemide    Tablet 40 milliGRAM(s) Oral daily  insulin glargine Injectable (LANTUS) 30 Unit(s) SubCutaneous at bedtime  insulin lispro (HumaLOG) corrective regimen sliding scale   SubCutaneous three times a day before meals  insulin lispro Injectable (HumaLOG) 11 Unit(s) SubCutaneous three times a day before meals  isosorbide   mononitrate ER Tablet (IMDUR) 30 milliGRAM(s) Oral daily  losartan 50 milliGRAM(s) Oral two times a day  metoprolol succinate ER 75 milliGRAM(s) Oral daily  pantoprazole    Tablet 40 milliGRAM(s) Oral before breakfast  rivaroxaban 15 milliGRAM(s) Oral with dinner  silver sulfADIAZINE 1% Cream 1 Application(s) Topical two times a day    MEDICATIONS  (PRN):  dextrose 40% Gel 15 Gram(s) Oral once PRN Blood Glucose LESS THAN 70 milliGRAM(s)/deciliter  glucagon  Injectable 1 milliGRAM(s) IntraMuscular once PRN Glucose LESS THAN 70 milligrams/deciliter

## 2019-08-21 NOTE — PROGRESS NOTE ADULT - SUBJECTIVE AND OBJECTIVE BOX
Patient is a 79y old  Male who presents with a chief complaint of CHF exacerbation (20 Aug 2019 08:13)      T(F): 96.1 (08-20-19 @ 23:04), Max: 96.6 (08-20-19 @ 11:01)  HR: 102 (08-21-19 @ 06:00)  BP: 154/103 (08-21-19 @ 06:00)  RR: 20 (08-21-19 @ 06:00)  SpO2: 97% (08-21-19 @ 06:00) (86% - 98%)    PHYSICAL EXAM:  GENERAL: NAD, well-groomed, well-developed  HEAD:  Atraumatic, Normocephalic  EYES: EOMI, PERRLA, conjunctiva and sclera clear  ENMT: No tonsillar erythema, exudates, or enlargement; Moist mucous membranes, Good dentition, No lesions  NECK: Supple, No JVD, Normal thyroid  NERVOUS SYSTEM:  Alert & Oriented X3,  Motor Strength 5/5 B/L upper and lower extremities  CHEST/LUNG: Clear to percussion bilaterally; No rales, rhonchi, wheezing, or rubs  HEART: Regular rate and rhythm; No murmurs, rubs, or gallops  ABDOMEN: Soft, Nontender, Nondistended; Bowel sounds present  EXTREMITIES:   No clubbing, cyanosis, or edema  LYMPH: No lymphadenopathy noted  SKIN: No rashes or lesions    labs  08-20    145  |  104  |  59<H>  ----------------------------<  255<H>  4.3   |  29  |  1.5    Ca    9.5      20 Aug 2019 05:42  Mg     2.3     08-20                            13.4   14.12 )-----------( 233      ( 20 Aug 2019 05:42 )             42.7       Culture - Urine (collected 19 Aug 2019 02:20)  Source: .Urine Clean Catch (Midstream)  Final Report (20 Aug 2019 14:21):    <10,000 CFU/mL Normal Urogenital Felipa    Culture - Blood (collected 19 Aug 2019 00:15)  Source: .Blood Blood  Preliminary Report (20 Aug 2019 19:01):    No growth to date.    Culture - Blood (collected 19 Aug 2019 00:15)  Source: .Blood Blood  Preliminary Report (20 Aug 2019 19:01):    No growth to date.              amLODIPine   Tablet 5 milliGRAM(s) Oral daily  aspirin  chewable 81 milliGRAM(s) Oral daily  buDESOnide 160 MICROgram(s)/formoterol 4.5 MICROgram(s) Inhaler 2 Puff(s) Inhalation two times a day  chlorhexidine 4% Liquid 1 Application(s) Topical two times a day  dextrose 40% Gel 15 Gram(s) Oral once PRN  dextrose 5%. 1000 milliLiter(s) IV Continuous <Continuous>  dextrose 50% Injectable 12.5 Gram(s) IV Push once  dextrose 50% Injectable 25 Gram(s) IV Push once  dextrose 50% Injectable 25 Gram(s) IV Push once  fenofibrate Tablet 145 milliGRAM(s) Oral daily  finasteride 5 milliGRAM(s) Oral daily  furosemide   Injectable 40 milliGRAM(s) IV Push two times a day  glucagon  Injectable 1 milliGRAM(s) IntraMuscular once PRN  insulin glargine Injectable (LANTUS) 30 Unit(s) SubCutaneous at bedtime  insulin lispro (HumaLOG) corrective regimen sliding scale   SubCutaneous three times a day before meals  insulin lispro Injectable (HumaLOG) 11 Unit(s) SubCutaneous three times a day before meals  isosorbide   mononitrate ER Tablet (IMDUR) 30 milliGRAM(s) Oral daily  losartan 50 milliGRAM(s) Oral two times a day  metoprolol succinate ER 75 milliGRAM(s) Oral daily  pantoprazole    Tablet 40 milliGRAM(s) Oral before breakfast  rivaroxaban 15 milliGRAM(s) Oral with dinner  silver sulfADIAZINE 1% Cream 1 Application(s) Topical two times a day

## 2019-08-21 NOTE — PROGRESS NOTE ADULT - ASSESSMENT
Patient feels much better. Chf better . Weight decreased PO lasix soon. Reviewed diet again. Aware no chinese food/. Ambulate if stable. Continue xarelto

## 2019-08-21 NOTE — PROGRESS NOTE ADULT - SUBJECTIVE AND OBJECTIVE BOX
SUBJECTIVE:    Patient is a 79y old Male who presents with a chief complaint of CHF exacerbation (21 Aug 2019 07:03)    Currently admitted to medicine with the primary diagnosis of Acute decompensated heart failure     Today is hospital day 3d. This morning he is resting comfortably in bed and reports no new issues or overnight events.     PAST MEDICAL & SURGICAL HISTORY  BPH (benign prostatic hyperplasia)  CHF (congestive heart failure)  COPD (chronic obstructive pulmonary disease)  Diabetes  HTN (hypertension)  H/O heart artery stent  S/P CABG x 3    SOCIAL HISTORY:  Negative for smoking/alcohol/drug use.     ALLERGIES:  No Known Allergies    MEDICATIONS:  STANDING MEDICATIONS  amLODIPine   Tablet 5 milliGRAM(s) Oral daily  aspirin  chewable 81 milliGRAM(s) Oral daily  buDESOnide 160 MICROgram(s)/formoterol 4.5 MICROgram(s) Inhaler 2 Puff(s) Inhalation two times a day  chlorhexidine 4% Liquid 1 Application(s) Topical two times a day  dextrose 5%. 1000 milliLiter(s) IV Continuous <Continuous>  dextrose 50% Injectable 12.5 Gram(s) IV Push once  dextrose 50% Injectable 25 Gram(s) IV Push once  dextrose 50% Injectable 25 Gram(s) IV Push once  fenofibrate Tablet 145 milliGRAM(s) Oral daily  finasteride 5 milliGRAM(s) Oral daily  furosemide   Injectable 40 milliGRAM(s) IV Push two times a day  insulin glargine Injectable (LANTUS) 30 Unit(s) SubCutaneous at bedtime  insulin lispro (HumaLOG) corrective regimen sliding scale   SubCutaneous three times a day before meals  insulin lispro Injectable (HumaLOG) 11 Unit(s) SubCutaneous three times a day before meals  isosorbide   mononitrate ER Tablet (IMDUR) 30 milliGRAM(s) Oral daily  losartan 50 milliGRAM(s) Oral two times a day  metoprolol succinate ER 75 milliGRAM(s) Oral daily  pantoprazole    Tablet 40 milliGRAM(s) Oral before breakfast  rivaroxaban 15 milliGRAM(s) Oral with dinner  silver sulfADIAZINE 1% Cream 1 Application(s) Topical two times a day    PRN MEDICATIONS  dextrose 40% Gel 15 Gram(s) Oral once PRN  glucagon  Injectable 1 milliGRAM(s) IntraMuscular once PRN    VITALS:   T(F): 96.7  HR: 102  BP: 154/103  RR: 20  SpO2: 97%    CAPILLARY BLOOD GLUCOSE      LABS:                        13.4   14.12 )-----------( 233      ( 20 Aug 2019 05:42 )             42.7     08-20    145  |  104  |  59<H>  ----------------------------<  255<H>  4.3   |  29  |  1.5    Ca    9.5      20 Aug 2019 05:42  Mg     2.3     08-20          ABG - ( 19 Aug 2019 10:08 )  pH, Arterial: 7.38  pH, Blood: x     /  pCO2: 47    /  pO2: 82    / HCO3: 28    / Base Excess: 1.9   /  SaO2: 96                    Culture - Urine (collected 19 Aug 2019 02:20)  Source: .Urine Clean Catch (Midstream)  Final Report (20 Aug 2019 14:21):    <10,000 CFU/mL Normal Urogenital Felipa    Culture - Blood (collected 19 Aug 2019 00:15)  Source: .Blood Blood  Preliminary Report (20 Aug 2019 19:01):    No growth to date.    Culture - Blood (collected 19 Aug 2019 00:15)  Source: .Blood Blood  Preliminary Report (20 Aug 2019 19:01):    No growth to date.      CARDIAC MARKERS ( 20 Aug 2019 05:42 )  x     / 0.11 ng/mL / 64 U/L / x     / 4.1 ng/mL  CARDIAC MARKERS ( 19 Aug 2019 11:27 )  x     / 0.17 ng/mL / 88 U/L / x     / 11.5 ng/mL      RADIOLOGY:    PHYSICAL EXAM:  GEN: No acute distress  LUNGS: Clear to auscultation bilaterally   HEART: S1/S2 present. RRR.   ABD: Soft, non-tender, non-distended. Bowel sounds present  EXT: NC/NC/NE/2+PP/PARKER  NEURO: AAOX3 SUBJECTIVE:    Patient is a 79y old Male who presents with a chief complaint of CHF exacerbation (21 Aug 2019 07:03)    Currently admitted to medicine with the primary diagnosis of Acute decompensated heart failure     Today is hospital day 3d. This morning he is resting comfortably in bed and reports no new issues or overnight events.     PAST MEDICAL & SURGICAL HISTORY  BPH (benign prostatic hyperplasia)  CHF (congestive heart failure)  COPD (chronic obstructive pulmonary disease)  Diabetes  HTN (hypertension)  H/O heart artery stent  S/P CABG x 3    SOCIAL HISTORY:  Negative for smoking/alcohol/drug use.     ALLERGIES:  No Known Allergies    MEDICATIONS:  STANDING MEDICATIONS  amLODIPine   Tablet 5 milliGRAM(s) Oral daily  aspirin  chewable 81 milliGRAM(s) Oral daily  buDESOnide 160 MICROgram(s)/formoterol 4.5 MICROgram(s) Inhaler 2 Puff(s) Inhalation two times a day  chlorhexidine 4% Liquid 1 Application(s) Topical two times a day  dextrose 5%. 1000 milliLiter(s) IV Continuous <Continuous>  dextrose 50% Injectable 12.5 Gram(s) IV Push once  dextrose 50% Injectable 25 Gram(s) IV Push once  dextrose 50% Injectable 25 Gram(s) IV Push once  fenofibrate Tablet 145 milliGRAM(s) Oral daily  finasteride 5 milliGRAM(s) Oral daily  furosemide   Injectable 40 milliGRAM(s) IV Push two times a day  insulin glargine Injectable (LANTUS) 30 Unit(s) SubCutaneous at bedtime  insulin lispro (HumaLOG) corrective regimen sliding scale   SubCutaneous three times a day before meals  insulin lispro Injectable (HumaLOG) 11 Unit(s) SubCutaneous three times a day before meals  isosorbide   mononitrate ER Tablet (IMDUR) 30 milliGRAM(s) Oral daily  losartan 50 milliGRAM(s) Oral two times a day  metoprolol succinate ER 75 milliGRAM(s) Oral daily  pantoprazole    Tablet 40 milliGRAM(s) Oral before breakfast  rivaroxaban 15 milliGRAM(s) Oral with dinner  silver sulfADIAZINE 1% Cream 1 Application(s) Topical two times a day    PRN MEDICATIONS  dextrose 40% Gel 15 Gram(s) Oral once PRN  glucagon  Injectable 1 milliGRAM(s) IntraMuscular once PRN    VITALS:   T(F): 96.7  HR: 102  BP: 154/103  RR: 20  SpO2: 97%    CAPILLARY BLOOD GLUCOSE      LABS:                        13.4   14.12 )-----------( 233      ( 20 Aug 2019 05:42 )             42.7     08-20    145  |  104  |  59<H>  ----------------------------<  255<H>  4.3   |  29  |  1.5    Ca    9.5      20 Aug 2019 05:42  Mg     2.3     08-20          ABG - ( 19 Aug 2019 10:08 )  pH, Arterial: 7.38  pH, Blood: x     /  pCO2: 47    /  pO2: 82    / HCO3: 28    / Base Excess: 1.9   /  SaO2: 96                    Culture - Urine (collected 19 Aug 2019 02:20)  Source: .Urine Clean Catch (Midstream)  Final Report (20 Aug 2019 14:21):    <10,000 CFU/mL Normal Urogenital Felipa    Culture - Blood (collected 19 Aug 2019 00:15)  Source: .Blood Blood  Preliminary Report (20 Aug 2019 19:01):    No growth to date.    Culture - Blood (collected 19 Aug 2019 00:15)  Source: .Blood Blood  Preliminary Report (20 Aug 2019 19:01):    No growth to date.      CARDIAC MARKERS ( 20 Aug 2019 05:42 )  x     / 0.11 ng/mL / 64 U/L / x     / 4.1 ng/mL  CARDIAC MARKERS ( 19 Aug 2019 11:27 )  x     / 0.17 ng/mL / 88 U/L / x     / 11.5 ng/mL      RADIOLOGY:    PHYSICAL EXAM:  GEN: No acute distress  LUNGS: reduced breath sounds b/l. no crackles or wheezes  HEART: S1/S2 present. RRR.   ABD: Soft, non-tender, non-distended. Bowel sounds present  EXT: NC/NC/NE/2+PP/PARKER. +1 pitting edema w/ venous stasis changes  NEURO: AAOX3

## 2019-08-21 NOTE — PROGRESS NOTE ADULT - SUBJECTIVE AND OBJECTIVE BOX
Patient is a 79y old  Male who presents with a chief complaint of CHF exacerbation (21 Aug 2019 07:31)      OVER NIGHT EVENTS:      PHYSICAL EXAM    ICU Vital Signs Last 24 Hrs  T(C): 35.9 (21 Aug 2019 07:00), Max: 35.9 (20 Aug 2019 11:01)  T(F): 96.7 (21 Aug 2019 07:00), Max: 96.7 (21 Aug 2019 07:00)  HR: 102 (21 Aug 2019 06:00) (67 - 139)  BP: 154/103 (21 Aug 2019 06:00) (127/63 - 154/103)  BP(mean): 122 (21 Aug 2019 06:00) (87 - 122)  RR: 20 (21 Aug 2019 07:00) (20 - 47)  SpO2: 97% (21 Aug 2019 07:00) (90% - 98%)    I&O's Detail    20 Aug 2019 07:01  -  21 Aug 2019 07:00  --------------------------------------------------------  IN:  Total IN: 0 mL    OUT:    Voided: 3750 mL  Total OUT: 3750 mL    Total NET: -3750 mL      21 Aug 2019 07:01  -  21 Aug 2019 08:04  --------------------------------------------------------  IN:  Total IN: 0 mL    OUT:    Voided: 380 mL  Total OUT: 380 mL    Total NET: -380 mL      General: Comfortable in bed  HEENT:    Lymph node: No palpable LN             Lungs:   Cardiovascular: RRR, S1S2  Abdomen: BS+ve; soft non tender  Extremities: No LE edema  Skin:  No evident Rash  Neurological:  AAOx3; No focal deficit      LABS:                          13.7   14.09 )-----------( 244      ( 21 Aug 2019 05:30 )             43.8   08-20    145  |  104  |  59<H>  ----------------------------<  255<H>  4.3   |  29  |  1.5        Ca    9.5      20 Aug 2019 05:42  Mg     2.3     08-20                                 CARDIAC MARKERS ( 20 Aug 2019 05:42 )  x     / 0.11 ng/mL / 64 U/L / x     / 4.1 ng/mL  CARDIAC MARKERS ( 19 Aug 2019 11:27 )  x     / 0.17 ng/mL / 88 U/L / x     / 11.5 ng/mL    Lactate (08-19-19 @ 05:51): 0.9  Lactate (08-18-19 @ 22:08): 0.9      Culture - Urine (collected 19 Aug 2019 02:20)  Source: .Urine Clean Catch (Midstream)  Final Report (20 Aug 2019 14:21):    <10,000 CFU/mL Normal Urogenital Felipa    Culture - Blood (collected 19 Aug 2019 00:15)  Source: .Blood Blood  Preliminary Report (20 Aug 2019 19:01):    No growth to date.    Culture - Blood (collected 19 Aug 2019 00:15)  Source: .Blood Blood  Preliminary Report (20 Aug 2019 19:01):    No growth to date.     ABG - ( 19 Aug 2019 10:08 )  pH, Arterial: 7.38  pH, Blood: x     /  pCO2: 47    /  pO2: 82    / HCO3: 28    / Base Excess: 1.9   /  SaO2: 96        Blood Gas Profile - Venous (08.18.19 @ 22:08)    pH, Venous: 7.29    pCO2, Venous: 58 mmHg    pO2, Venous: 63 mmHg    HCO3, Venous: 28 mmoL/L    Base Excess, Venous: 0.2 mmoL/L    Oxygen Saturation, Venous: 88 %    FIO2, Venous: 100      MEDICATIONS  (STANDING):  amLODIPine   Tablet 5 milliGRAM(s) Oral daily  aspirin  chewable 81 milliGRAM(s) Oral daily  buDESOnide 160 MICROgram(s)/formoterol 4.5 MICROgram(s) Inhaler 2 Puff(s) Inhalation two times a day  chlorhexidine 4% Liquid 1 Application(s) Topical two times a day  dextrose 5%. 1000 milliLiter(s) (50 mL/Hr) IV Continuous <Continuous>  dextrose 50% Injectable 12.5 Gram(s) IV Push once  dextrose 50% Injectable 25 Gram(s) IV Push once  dextrose 50% Injectable 25 Gram(s) IV Push once  fenofibrate Tablet 145 milliGRAM(s) Oral daily  finasteride 5 milliGRAM(s) Oral daily  furosemide   Injectable 40 milliGRAM(s) IV Push two times a day  insulin glargine Injectable (LANTUS) 30 Unit(s) SubCutaneous at bedtime  insulin lispro (HumaLOG) corrective regimen sliding scale   SubCutaneous three times a day before meals  insulin lispro Injectable (HumaLOG) 11 Unit(s) SubCutaneous three times a day before meals  isosorbide   mononitrate ER Tablet (IMDUR) 30 milliGRAM(s) Oral daily  losartan 50 milliGRAM(s) Oral two times a day  metoprolol succinate ER 75 milliGRAM(s) Oral daily  pantoprazole    Tablet 40 milliGRAM(s) Oral before breakfast  rivaroxaban 15 milliGRAM(s) Oral with dinner  silver sulfADIAZINE 1% Cream 1 Application(s) Topical two times a day    MEDICATIONS  (PRN):  dextrose 40% Gel 15 Gram(s) Oral once PRN Blood Glucose LESS THAN 70 milliGRAM(s)/deciliter  glucagon  Injectable 1 milliGRAM(s) IntraMuscular once PRN Glucose LESS THAN 70 milligrams/deciliter      Radiology:  Cxr: Small bilateral effusion; Increased interstitial markings Patient is a 79y old  Male who presents with a chief complaint of CHF exacerbation (21 Aug 2019 07:31)    OVER NIGHT EVENTS:  No overnight events.     PHYSICAL EXAM    ICU Vital Signs Last 24 Hrs  T(C): 35.9 (21 Aug 2019 07:00), Max: 35.9 (20 Aug 2019 11:01)  T(F): 96.7 (21 Aug 2019 07:00), Max: 96.7 (21 Aug 2019 07:00)  HR: 102 (21 Aug 2019 06:00) (67 - 139)  BP: 154/103 (21 Aug 2019 06:00) (127/63 - 154/103)  BP(mean): 122 (21 Aug 2019 06:00) (87 - 122)  RR: 20 (21 Aug 2019 07:00) (20 - 47)  SpO2: 97% (21 Aug 2019 07:00) (90% - 98%)    I&O's Detail    20 Aug 2019 07:01  -  21 Aug 2019 07:00  --------------------------------------------------------  IN:  Total IN: 0 mL    OUT:    Voided: 3750 mL  Total OUT: 3750 mL    Total NET: -3750 mL      21 Aug 2019 07:01  -  21 Aug 2019 08:04  --------------------------------------------------------  IN:  Total IN: 0 mL    OUT:    Voided: 380 mL  Total OUT: 380 mL    Total NET: -380 mL      General: Comfortable in bed  HEENT:  Oral care   Lymph node: No palpable LN             Lungs: JASON over bases   Cardiovascular: RRR, S1S2  Abdomen: BS+ve; soft non tender  Extremities: LE edema 1+   Neurological:  AAOx3; No focal deficit      LABS:                        13.7   14.09 )-----------( 244      ( 21 Aug 2019 05:30 )             43.8   08-20    145  |  104  |  59<H>  ----------------------------<  255<H>  4.3   |  29  |  1.5      Ca    9.5      20 Aug 2019 05:42  Mg     2.3     08-20                              CARDIAC MARKERS ( 20 Aug 2019 05:42 )  x     / 0.11 ng/mL / 64 U/L / x     / 4.1 ng/mL  CARDIAC MARKERS ( 19 Aug 2019 11:27 )  x     / 0.17 ng/mL / 88 U/L / x     / 11.5 ng/mL    Lactate (08-19-19 @ 05:51): 0.9  Lactate (08-18-19 @ 22:08): 0.9    Culture - Urine (collected 19 Aug 2019 02:20)  Source: .Urine Clean Catch (Midstream)  Final Report (20 Aug 2019 14:21):    <10,000 CFU/mL Normal Urogenital Felipa    Culture - Blood (collected 19 Aug 2019 00:15)  Source: .Blood Blood  Preliminary Report (20 Aug 2019 19:01):    No growth to date.    Culture - Blood (collected 19 Aug 2019 00:15)  Source: .Blood Blood  Preliminary Report (20 Aug 2019 19:01):    No growth to date.     ABG - ( 19 Aug 2019 10:08 )  pH, Arterial: 7.38  pH, Blood: x     /  pCO2: 47    /  pO2: 82    / HCO3: 28    / Base Excess: 1.9   /  SaO2: 96        Blood Gas Profile - Venous (08.18.19 @ 22:08)    pH, Venous: 7.29    pCO2, Venous: 58 mmHg    pO2, Venous: 63 mmHg    HCO3, Venous: 28 mmoL/L    Base Excess, Venous: 0.2 mmoL/L    Oxygen Saturation, Venous: 88 %    FIO2, Venous: 100    MEDICATIONS  (STANDING):  amLODIPine   Tablet 5 milliGRAM(s) Oral daily  aspirin  chewable 81 milliGRAM(s) Oral daily  buDESOnide 160 MICROgram(s)/formoterol 4.5 MICROgram(s) Inhaler 2 Puff(s) Inhalation two times a day  chlorhexidine 4% Liquid 1 Application(s) Topical two times a day  dextrose 5%. 1000 milliLiter(s) (50 mL/Hr) IV Continuous <Continuous>  dextrose 50% Injectable 12.5 Gram(s) IV Push once  dextrose 50% Injectable 25 Gram(s) IV Push once  dextrose 50% Injectable 25 Gram(s) IV Push once  fenofibrate Tablet 145 milliGRAM(s) Oral daily  finasteride 5 milliGRAM(s) Oral daily  furosemide   Injectable 40 milliGRAM(s) IV Push two times a day  insulin glargine Injectable (LANTUS) 30 Unit(s) SubCutaneous at bedtime  insulin lispro (HumaLOG) corrective regimen sliding scale   SubCutaneous three times a day before meals  insulin lispro Injectable (HumaLOG) 11 Unit(s) SubCutaneous three times a day before meals  isosorbide   mononitrate ER Tablet (IMDUR) 30 milliGRAM(s) Oral daily  losartan 50 milliGRAM(s) Oral two times a day  metoprolol succinate ER 75 milliGRAM(s) Oral daily  pantoprazole    Tablet 40 milliGRAM(s) Oral before breakfast  rivaroxaban 15 milliGRAM(s) Oral with dinner  silver sulfADIAZINE 1% Cream 1 Application(s) Topical two times a day    MEDICATIONS  (PRN):  dextrose 40% Gel 15 Gram(s) Oral once PRN Blood Glucose LESS THAN 70 milliGRAM(s)/deciliter  glucagon  Injectable 1 milliGRAM(s) IntraMuscular once PRN Glucose LESS THAN 70 milligrams/deciliter    Radiology:  Cxr: Small bilateral effusion; Increased interstitial markings

## 2019-08-21 NOTE — PROGRESS NOTE ADULT - ASSESSMENT
78yo Male w/ PMH of CHF, COPD (home O2 as needed), DM, HTN, CAD s/p CABG and 1 stent, BPH, and recent admission for CHF (April 2019) BIBA dyspnea x 2 days that suddenly worsened today in the afternoon. As per ED physician, EMS gave IV lasix 40mg during transport. In ED, pt was in acute distress 2/2 dyspnea, O2 saturating low. Another IV lasix 40mg given. Nitro drip started. BIPAP applied w/ great relief. CXR shows pleural effusion and vascular congestion. BNP 3800s. Trop 0.03 noted. Pt reports breathing much better currently.     #Acute on Chronic Diastolic CHF exacerbation - Stable on 5L (home O2 requirements si 3.5L)  - Echo from last admission clarified: EF > 55% and Grade II diastolic dysfunction  - CXR shows b/l pleural effusion and vascular congestion. BNP 3800s .  - BIPAP PRN. Keep BIPAP overnight  - Strict I&Os. Keep I < O. and dialy weight  - may transition to PO lasix today  - c/w losartan 50 bid, asa, plavix, metoprolol  - c/w fluid restricted diets  - f/u repeat echo - EF 55%. Moderate AS (AV 1.5, peak gradient 36    #Elevated Troponin possibly type 2 demand  - Trop 0.03 noted. -> 0.21  - repeat EKG no new changes. aflutter    #Leukocytosis - possibly reactive - resolved    #COPD  - d/c solumedrol  - c/w breo and Duoneb PRN    #HTN  - c/w imdur, lasix, losartan and metoprolol  - tele monitoring     #Aflutter  - c/w metoprolol  - c/w xarelto    DM  - c/w carb consistnet  - will keep pt on basal bolus regimen    BPH  - tamsulosin    #Activitiy: OOBC  #DIet; carb consistent fluid restricted  #DVT/GI PPX: Xarelto/protonix  #Dispo: PT/R onboard. Consultant pending  #Code; Full 78yo Male w/ PMH of CHF, COPD (home O2 as needed), DM, HTN, CAD s/p CABG and 1 stent, BPH, and recent admission for CHF (April 2019) BIBA dyspnea x 2 days that suddenly worsened today in the afternoon. As per ED physician, EMS gave IV lasix 40mg during transport. In ED, pt was in acute distress 2/2 dyspnea, O2 saturating low. Another IV lasix 40mg given. Nitro drip started. BIPAP applied w/ great relief. CXR shows pleural effusion and vascular congestion. BNP 3800s. Trop 0.03 noted. Pt reports breathing much better currently.     #Acute on Chronic Diastolic CHF exacerbation - Stable on 3L  - Echo from last admission clarified: EF > 55% and Grade II diastolic dysfunction  - CXR shows b/l pleural effusion and vascular congestion. BNP 3800s .  - BIPAP PRN. Keep BIPAP overnight  - Strict I&Os. Keep I < O. and dialy weight  - may transition to PO lasix today  - c/w losartan 50 bid, asa, plavix, metoprolol  - c/w fluid restricted diets  - f/u repeat echo - EF 55%. Moderate AS (AV 1.5, peak gradient 36    #Elevated Troponin possibly type 2 demand  - Trop 0.03 noted. -> 0.21  - repeat EKG no new changes. aflutter    #Leukocytosis - possibly reactive - resolved    #COPD  - d/c solumedrol  - c/w breo and Duoneb PRN    #HTN  - c/w imdur, lasix, losartan and metoprolol  - tele monitoring     #Aflutter  - c/w metoprolol  - c/w xarelto    DM  - c/w carb consistnet  - will keep pt on basal bolus regimen    BPH  - tamsulosin    #Activitiy: OOBC  #DIet; carb consistent fluid restricted  #DVT/GI PPX: Xarelto/protonix  #Dispo: PT/R onboard. Consultant pending  #Code; Full. Possible d/c tomorrow

## 2019-08-21 NOTE — PROGRESS NOTE ADULT - ASSESSMENT
80yo Male w/ PMH of CHF, COPD (home O2 as needed), DM, HTN, CAD s/p CABG and 1 stent, BPH, and recent admission for CHF (April 2019) BIBA dyspnea x 2 days that suddenly worsened today in the afternoon. As per ED physician, EMS gave IV lasix 40mg during transport. In ED, pt was in acute distress 2/2 dyspnea, O2 saturating low. Another IV lasix 40mg given. Nitro drip started. BIPAP applied w/ great relief. CXR shows pleural effusion and vascular congestion. BNP 3800s. Trop 0.03 noted. Pt reports breathing much better currently.     #Acute on Chronic Diastolic CHF exacerbation - Stable on 3L  - Echo from last admission clarified: EF > 55% and Grade II diastolic dysfunction  - CXR is improving  - may transition to PO lasix today  -  losartan 50 bid, asa, plavix, metoprolol  -  echo - EF 55%. Moderate AS (AV 1.5, peak gradient 36    #Elevated Troponin possibly type 2 demand  - Trop 0.03 noted. -> 0.21  - repeat EKG no new changes. aflutter  - cardio following    #COPD  - stable  -  Breo and Duoneb PRN    #HTN  - tatiana Lasix, losartan and metoprolol     #Aflutter  -  Metoprolol,  Xarelto    DM  -  hospital insulin protocol     BPH  - tamsulosin

## 2019-08-21 NOTE — PROGRESS NOTE ADULT - ASSESSMENT
IMPRESSION:  Acute on chronic hypercapnic respiratory failure   Pulmonary edema secondary to CHF exacerbation   YAMIL   CAD       PLAN:    CNS: no sedation     HEENT: oral care     PULMONARY: NIV PRN and qHS  keep pox > 92%    CARDIOVASCULAR: Keep I<O; follow cardiology; bipap at night and as needed     GI: GI prophylaxis.      RENAL: FU BMP today     INFECTIOUS DISEASE: pan cx     HEMATOLOGICAL:  DVT prophylaxis. on xarelto     ENDOCRINE:  Follow up FS.  Insulin protocol if needed.    MUSCULOSKELETAL: OOB to chair    Tele per cardio IMPRESSION:  Acute on chronic hypercapnic respiratory failure   Chronic hypoxic respiratory failure   Pulmonary edema secondary to CHF exacerbation   YAMIL   CAD     PLAN:    CNS: no sedation     HEENT: oral care     PULMONARY: NIV PRN and qHS; keep pox > 88%;     CARDIOVASCULAR: Keep I<O; follow cardiology; bipap at night and as needed     GI: GI prophylaxis.      RENAL: FU BMP today     INFECTIOUS DISEASE: pan cx     HEMATOLOGICAL:  DVT prophylaxis. on xarelto     ENDOCRINE:  Follow up FS.  Insulin protocol if needed.    MUSCULOSKELETAL: OOB to chair    Tele per cardio IMPRESSION:  Acute on chronic hypercapnic respiratory failure   Chronic hypoxic respiratory failure- COPD  Pulmonary edema secondary to CHF exacerbation   YAMIL   CAD     PLAN:    CNS: no sedation     HEENT: oral care     PULMONARY: NIV PRN and qHS; keep pox > 88%; c/w Symbicort     CARDIOVASCULAR: Keep I<O; follow cardiology; bipap at night and as needed     GI: GI prophylaxis.      RENAL: FU BMP today     INFECTIOUS DISEASE: pan cx     HEMATOLOGICAL:  DVT prophylaxis. on xarelto     ENDOCRINE:  Follow up FS.  Insulin protocol if needed.    MUSCULOSKELETAL: OOB to chair    Tele per cardio

## 2019-08-22 ENCOUNTER — TRANSCRIPTION ENCOUNTER (OUTPATIENT)
Age: 79
End: 2019-08-22

## 2019-08-22 LAB
GLUCOSE BLDC GLUCOMTR-MCNC: 108 MG/DL — HIGH (ref 70–99)
GLUCOSE BLDC GLUCOMTR-MCNC: 60 MG/DL — LOW (ref 70–99)
GLUCOSE BLDC GLUCOMTR-MCNC: 71 MG/DL — SIGNIFICANT CHANGE UP (ref 70–99)
GLUCOSE BLDC GLUCOMTR-MCNC: 85 MG/DL — SIGNIFICANT CHANGE UP (ref 70–99)
GLUCOSE BLDC GLUCOMTR-MCNC: 97 MG/DL — SIGNIFICANT CHANGE UP (ref 70–99)
SRA INTERP SER-IMP: SIGNIFICANT CHANGE UP

## 2019-08-22 PROCEDURE — 99233 SBSQ HOSP IP/OBS HIGH 50: CPT

## 2019-08-22 PROCEDURE — 71045 X-RAY EXAM CHEST 1 VIEW: CPT | Mod: 26

## 2019-08-22 RX ORDER — FUROSEMIDE 40 MG
1 TABLET ORAL
Qty: 30 | Refills: 0
Start: 2019-08-22 | End: 2019-09-20

## 2019-08-22 RX ORDER — METOPROLOL TARTRATE 50 MG
0 TABLET ORAL
Qty: 0 | Refills: 0 | DISCHARGE

## 2019-08-22 RX ORDER — FUROSEMIDE 40 MG
1 TABLET ORAL
Qty: 0 | Refills: 0 | DISCHARGE

## 2019-08-22 RX ORDER — CLOPIDOGREL BISULFATE 75 MG/1
1 TABLET, FILM COATED ORAL
Qty: 0 | Refills: 0 | DISCHARGE

## 2019-08-22 RX ORDER — METOPROLOL TARTRATE 50 MG
3 TABLET ORAL
Qty: 90 | Refills: 0
Start: 2019-08-22 | End: 2019-09-20

## 2019-08-22 RX ORDER — RIVAROXABAN 15 MG-20MG
1 KIT ORAL
Qty: 0 | Refills: 0 | DISCHARGE
Start: 2019-08-22

## 2019-08-22 RX ORDER — MELOXICAM 15 MG/1
1 TABLET ORAL
Qty: 0 | Refills: 0 | DISCHARGE

## 2019-08-22 RX ADMIN — Medication 81 MILLIGRAM(S): at 11:04

## 2019-08-22 RX ADMIN — AMLODIPINE BESYLATE 5 MILLIGRAM(S): 2.5 TABLET ORAL at 06:28

## 2019-08-22 RX ADMIN — Medication 145 MILLIGRAM(S): at 11:04

## 2019-08-22 RX ADMIN — Medication 1 APPLICATION(S): at 06:30

## 2019-08-22 RX ADMIN — Medication 11 UNIT(S): at 11:33

## 2019-08-22 RX ADMIN — RIVAROXABAN 15 MILLIGRAM(S): KIT at 17:00

## 2019-08-22 RX ADMIN — CHLORHEXIDINE GLUCONATE 1 APPLICATION(S): 213 SOLUTION TOPICAL at 17:00

## 2019-08-22 RX ADMIN — BUDESONIDE AND FORMOTEROL FUMARATE DIHYDRATE 2 PUFF(S): 160; 4.5 AEROSOL RESPIRATORY (INHALATION) at 07:36

## 2019-08-22 RX ADMIN — FINASTERIDE 5 MILLIGRAM(S): 5 TABLET, FILM COATED ORAL at 11:04

## 2019-08-22 RX ADMIN — LOSARTAN POTASSIUM 50 MILLIGRAM(S): 100 TABLET, FILM COATED ORAL at 17:00

## 2019-08-22 RX ADMIN — Medication 1 APPLICATION(S): at 17:01

## 2019-08-22 RX ADMIN — ZOLPIDEM TARTRATE 5 MILLIGRAM(S): 10 TABLET ORAL at 22:17

## 2019-08-22 RX ADMIN — BUDESONIDE AND FORMOTEROL FUMARATE DIHYDRATE 2 PUFF(S): 160; 4.5 AEROSOL RESPIRATORY (INHALATION) at 20:31

## 2019-08-22 RX ADMIN — LOSARTAN POTASSIUM 50 MILLIGRAM(S): 100 TABLET, FILM COATED ORAL at 06:28

## 2019-08-22 RX ADMIN — ISOSORBIDE MONONITRATE 30 MILLIGRAM(S): 60 TABLET, EXTENDED RELEASE ORAL at 11:04

## 2019-08-22 RX ADMIN — PANTOPRAZOLE SODIUM 40 MILLIGRAM(S): 20 TABLET, DELAYED RELEASE ORAL at 06:28

## 2019-08-22 RX ADMIN — Medication 75 MILLIGRAM(S): at 06:29

## 2019-08-22 RX ADMIN — Medication 40 MILLIGRAM(S): at 06:28

## 2019-08-22 NOTE — DISCHARGE NOTE PROVIDER - CARE PROVIDER_API CALL
Carl Kruger)  Family Medicine  [address] [place] [address]  Phone: [phone]  Fax: [phone]  Follow Up Time: 1 week    Kirit Ramirez)  Cardiovascular Disease; Internal Medicine  [address] [place] [address]  Phone: [phone]  Fax: [phone]  Follow Up Time: 1 week

## 2019-08-22 NOTE — PROGRESS NOTE ADULT - ASSESSMENT
Patient feels much better. Chf better . Weight decreased PO lasix . Reviewed diet again. Aware no chinese food.   Ambulate if stable. Continue xarelto. If hr increased may increase metoprolol er to 100

## 2019-08-22 NOTE — CONSULT NOTE ADULT - ASSESSMENT
IMPRESSION: Rehab of   debility, CHF, COPD    PRECAUTIONS: [ x ] Cardiac  [x  ] Respiratory  [  ] Seizures [  ] Contact Isolation  [  ] Droplet Isolation  [  ] Other    Weight Bearing Status:     RECOMMENDATION:    Out of Bed to Chair     DVT/Decubiti Prophylaxis    REHAB PLAN:     [ xx  ] Bedside P/T 3-5 times a week   [   ]   Bedside O/T  2-3 times a week             [   ] No Rehab Therapy Indicated                   [   ]  Speech Therapy   Conditioning/ROM                                    ADL  Bed Mobility                                               Conditioning/ROM  Transfers                                                     Bed Mobility  Sitting /Standing Balance                         Transfers                                        Gait Training                                               Sitting/Standing Balance  Stair Training [   ]Applicable                    Home equipment Eval                                                                        Splinting  [   ] Only      GOALS:   ADL   [ x  ]   Independent                    Transfers  [ x  ] Independent                          Ambulation  [ x  ] Independent     [ x   ] With device                            [   ]  CG                                                         [   ]  CG                                                                  [   ] CG                            [    ] Min A                                                   [   ] Min A                                                              [   ] Min  A          DISCHARGE PLAN:   [   ]  Good candidate for Intensive Rehabilitation/Hospital based-[facility]A [facility]                                             Will tolerate 3hrs Intensive Rehab Daily                                       [    ]  Short Term Rehab in Skilled Nursing Facility                                       [  xx  ]  Home with Outpatient or  services--Fri vs Sat if continues to improve.                                         [    ]  Possible Candidate for Intensive Hospital based Rehab

## 2019-08-22 NOTE — PROGRESS NOTE ADULT - SUBJECTIVE AND OBJECTIVE BOX
Patient is a 79y old  Male who presents with a chief complaint of CHF exacerbation (22 Aug 2019 09:23)      OVER NIGHT EVENTS:  No events overnight     PHYSICAL EXAM    ICU Vital Signs Last 24 Hrs  T(C): 36 (22 Aug 2019 07:33), Max: 36.4 (21 Aug 2019 11:01)  T(F): 96.8 (22 Aug 2019 07:33), Max: 97.5 (21 Aug 2019 11:01)  HR: 69 (22 Aug 2019 09:09) (69 - 76)  BP: 112/56 (22 Aug 2019 09:09) (112/56 - 160/81)  BP(mean): 98 (22 Aug 2019 07:15) (98 - 101)  RR: 18 (22 Aug 2019 09:09) (14 - 50)  SpO2: 99% (22 Aug 2019 09:09) (89% - 100%)    I&O's Detail    21 Aug 2019 07:01  -  22 Aug 2019 07:00  --------------------------------------------------------  IN:    Oral Fluid: 240 mL  Total IN: 240 mL    OUT:    Voided: 2530 mL  Total OUT: 2530 mL    Total NET: -2290 mL      22 Aug 2019 07:01  -  22 Aug 2019 09:42  --------------------------------------------------------  IN:  Total IN: 0 mL    OUT:    Voided: 40 mL  Total OUT: 40 mL    Total NET: -40 mL    General: Comfortable in bed  HEENT:  On NC  Lymph node: No palpable LN             Lungs: CTA  Cardiovascular: RRR, S1S2  Abdomen: BS+ve; soft non tender  Extremities: venous stasis changes   Skin:  No evident Rash  Neurological:  AAOx3; No focal deficit      LABS:                          13.7   14.09 )-----------( 244      ( 21 Aug 2019 05:30 )             43.8    08-21    145  |  102  |  56<H>  ----------------------------<  98  4.1   |  32  |  1.3    Ca    9.3      21 Aug 2019 05:30  Mg     2.3     08-21    Lactate (08-19-19 @ 05:51): 0.9  Lactate (08-18-19 @ 22:08): 0.9      MEDICATIONS  (STANDING):  amLODIPine   Tablet 5 milliGRAM(s) Oral daily  aspirin  chewable 81 milliGRAM(s) Oral daily  buDESOnide 160 MICROgram(s)/formoterol 4.5 MICROgram(s) Inhaler 2 Puff(s) Inhalation two times a day  chlorhexidine 4% Liquid 1 Application(s) Topical two times a day  dextrose 5%. 1000 milliLiter(s) (50 mL/Hr) IV Continuous <Continuous>  dextrose 50% Injectable 12.5 Gram(s) IV Push once  dextrose 50% Injectable 25 Gram(s) IV Push once  dextrose 50% Injectable 25 Gram(s) IV Push once  fenofibrate Tablet 145 milliGRAM(s) Oral daily  finasteride 5 milliGRAM(s) Oral daily  furosemide    Tablet 40 milliGRAM(s) Oral daily  insulin glargine Injectable (LANTUS) 30 Unit(s) SubCutaneous at bedtime  insulin lispro (HumaLOG) corrective regimen sliding scale   SubCutaneous three times a day before meals  insulin lispro Injectable (HumaLOG) 11 Unit(s) SubCutaneous three times a day before meals  isosorbide   mononitrate ER Tablet (IMDUR) 30 milliGRAM(s) Oral daily  losartan 50 milliGRAM(s) Oral two times a day  metoprolol succinate ER 75 milliGRAM(s) Oral daily  pantoprazole    Tablet 40 milliGRAM(s) Oral before breakfast  rivaroxaban 15 milliGRAM(s) Oral with dinner  silver sulfADIAZINE 1% Cream 1 Application(s) Topical two times a day    MEDICATIONS  (PRN):  dextrose 40% Gel 15 Gram(s) Oral once PRN Blood Glucose LESS THAN 70 milliGRAM(s)/deciliter  glucagon  Injectable 1 milliGRAM(s) IntraMuscular once PRN Glucose LESS THAN 70 milligrams/deciliter  zolpidem 5 milliGRAM(s) Oral once PRN Insomnia      Radiology:  Cxr: Bilateral basilar opacities stable

## 2019-08-22 NOTE — DISCHARGE NOTE PROVIDER - HOSPITAL COURSE
78yo Male w/ PMH of CHF, COPD (home O2 as needed), DM, HTN, CAD s/p CABG and 1 stent, BPH, and recent admission for CHF (April 2019) BIBA dyspnea x 2 days that suddenly worsened today in the afternoon. As per ED physician, EMS gave IV lasix 40mg during transport. In ED, pt was in acute distress 2/2 dyspnea, O2 saturating low. Another IV lasix 40mg given. Nitro drip started. BIPAP applied w/ great relief. CXR shows pleural effusion and vascular congestion. BNP 3800s. Trop 0.03 noted. Pt reports breathing much better currently.         #Acute on Chronic Diastolic CHF exacerbation - Stable on 3L    - Echo from last admission clarified: EF > 55% and Grade II diastolic dysfunction    - CXR shows b/l pleural effusion and vascular congestion. BNP 3800s .    - BIPAP PRN. Keep BIPAP overnight    - Strict I&Os. Keep I < O. and dialy weight    - may transition to PO lasix today    - c/w losartan 50 bid, asa, plavix, metoprolol    - c/w fluid restricted diets    - f/u repeat echo - EF 55%. Moderate AS (AV 1.5, peak gradient 36        #Elevated Troponin possibly type 2 demand    - Trop 0.03 noted. -> 0.21    - repeat EKG no new changes. aflutter        #Leukocytosis - possibly reactive - resolved

## 2019-08-22 NOTE — DISCHARGE NOTE PROVIDER - PROVIDER TOKENS
PROVIDER:[[id number]],FOLLOWUP:[1 week]],PROVIDER:[[id number]],FOLLOWUP:[1 week]]

## 2019-08-22 NOTE — CONSULT NOTE ADULT - SUBJECTIVE AND OBJECTIVE BOX
HPI:  78yo Male w/ PMH of CHF, COPD (home O2 as needed), DM, HTN, CAD s/p CABG and 1 stent, BPH, and recent admission for CHF (April 2019) BIBA dyspnea x 2 days that suddenly worsened today in the afternoon. Pt reports SOB worsened w/ exertion. Associated w/ clear sputum. As per ED physician, EMS gave IV lasix 40mg during transport. In ED, pt was in acute distress 2/2 dyspnea, O2 saturating low. Another IV lasix 40mg given. Nitro drip started. BIPAP applied w/ great relief. CXR shows pleural effusion and vascular congestion. BNP 3800s. Trop 0.03 noted. Pt reports breathing much better currently. Pt denies fever/chills, CP, palpitation, abd pain, N/V/D, dysuria, calf pain, or dizziness. + chronic calf swelling due to CHF and venous stasis. Pt sees cardiology, Dr. Ramirez, regularly. (18 Aug 2019 22:18)      PAST MEDICAL & SURGICAL HISTORY:  BPH (benign prostatic hyperplasia)  CHF (congestive heart failure)  COPD (chronic obstructive pulmonary disease)  Diabetes  HTN (hypertension)  H/O heart artery stent  S/P CABG x 3      Hospital Course:  Improving nicely. amb. with Pt with a walker. He admits to take out dietary salt  noncompliance with chinese food which led to his admit.  TODAY'S SUBJECTIVE & REVIEW OF SYMPTOMS:     Constitutional WNL   Cardio WNL   Resp WNL   GI WNL  Heme WNL  Endo WNL  Skin WNL  MSK WNL  Neuro WNL  Cognitive WNL  Psych WNL      MEDICATIONS  (STANDING):  amLODIPine   Tablet 5 milliGRAM(s) Oral daily  aspirin  chewable 81 milliGRAM(s) Oral daily  buDESOnide 160 MICROgram(s)/formoterol 4.5 MICROgram(s) Inhaler 2 Puff(s) Inhalation two times a day  chlorhexidine 4% Liquid 1 Application(s) Topical two times a day  dextrose 5%. 1000 milliLiter(s) (50 mL/Hr) IV Continuous <Continuous>  dextrose 50% Injectable 12.5 Gram(s) IV Push once  dextrose 50% Injectable 25 Gram(s) IV Push once  dextrose 50% Injectable 25 Gram(s) IV Push once  fenofibrate Tablet 145 milliGRAM(s) Oral daily  finasteride 5 milliGRAM(s) Oral daily  furosemide    Tablet 40 milliGRAM(s) Oral daily  insulin glargine Injectable (LANTUS) 30 Unit(s) SubCutaneous at bedtime  insulin lispro (HumaLOG) corrective regimen sliding scale   SubCutaneous three times a day before meals  insulin lispro Injectable (HumaLOG) 11 Unit(s) SubCutaneous three times a day before meals  isosorbide   mononitrate ER Tablet (IMDUR) 30 milliGRAM(s) Oral daily  losartan 50 milliGRAM(s) Oral two times a day  metoprolol succinate ER 75 milliGRAM(s) Oral daily  pantoprazole    Tablet 40 milliGRAM(s) Oral before breakfast  rivaroxaban 15 milliGRAM(s) Oral with dinner  silver sulfADIAZINE 1% Cream 1 Application(s) Topical two times a day    MEDICATIONS  (PRN):  dextrose 40% Gel 15 Gram(s) Oral once PRN Blood Glucose LESS THAN 70 milliGRAM(s)/deciliter  glucagon  Injectable 1 milliGRAM(s) IntraMuscular once PRN Glucose LESS THAN 70 milligrams/deciliter  zolpidem 5 milliGRAM(s) Oral once PRN Insomnia      FAMILY HISTORY:  No pertinent family history in first degree relatives      Allergies    No Known Allergies    Intolerances        SOCIAL HISTORY:    [  ] Etoh  [  ] Smoking  [  ] Substance abuse     Home Environment:  [  ] Home Alone  [ x ] Lives with Family-son  [  ] Home Health Aid    Dwelling:  [  ] Apartment  [  ] Private House  [  ] Adult Home  [  ] Skilled Nursing Facility      [  ] Short Term  [  ] Long Term  [  ] Stairs       Elevator [  ]    FUNCTIONAL STATUS PTA: (Check all that apply)  Ambulation: [ x  ]Independent    [  ] Dependent     [  ] Non-Ambulatory  Assistive Device: [  ] SA Cane  [  ]  Q Cane  [ x ] Walker  [  ]  Wheelchair  ADL : [  ] Independent  [  ]  Dependent       Vital Signs Last 24 Hrs  T(C): 35.9 (22 Aug 2019 15:00), Max: 36 (21 Aug 2019 23:00)  T(F): 96.6 (22 Aug 2019 15:00), Max: 96.8 (21 Aug 2019 23:00)  HR: 70 (22 Aug 2019 15:04) (69 - 71)  BP: 119/59 (22 Aug 2019 15:04) (112/56 - 149/68)  BP(mean): 82 (22 Aug 2019 15:04) (82 - 98)  RR: 18 (22 Aug 2019 15:04) (16 - 20)  SpO2: 99% (22 Aug 2019 15:04) (93% - 99%)      PHYSICAL EXAM: Alert & Oriented X3  GENERAL: NAD, well-groomed, well-developed  HEAD:  Atraumatic, Normocephalic  EYES: EOMI, PERRLA, conjunctiva and sclera clear  NECK: Supple, No JVD, Normal thyroid  CHEST/LUNG: Clear to percussion bilaterally; No rales, rhonchi, wheezing, or rubs  HEART: Regular rate and rhythm; No murmurs, rubs, or gallops  ABDOMEN: Soft, Nontender, Nondistended; Bowel sounds present  EXTREMITIES:  trace bipedal edema    NERVOUS SYSTEM:  Cranial Nerves 2-12 intact [  ] Abnormal  [  ]  ROM: WFL all extremities [  ]  Abnormal [  ]  Motor Strength: WFL all extremities  [  ]  Abnormal [  ]  Sensation: intact to light touch [  ] Abnormal [  ]  Reflexes: Symmetric [  ]  Abnormal [  ]    FUNCTIONAL STATUS:  Bed Mobility: Independent [  ]  Supervision [  ]  Needs Assistance [  ]  N/A [  ]  Transfers: Independent [  ]  Supervision [  ]  Needs Assistance [  ]  N/A [  ]   Ambulation: Independent [  ]  Supervision [  ]  Needs Assistance [  ]  N/A [  ]  ADL: Independent [  ] Requires Assistance [  ] N/A [  ]      LABS:                        13.7   14.09 )-----------( 244      ( 21 Aug 2019 05:30 )             43.8     08-21    145  |  102  |  56<H>  ----------------------------<  98  4.1   |  32  |  1.3    Ca    9.3      21 Aug 2019 05:30  Mg     2.3     08-21            RADIOLOGY & ADDITIONAL STUDIES:    Assesment:

## 2019-08-22 NOTE — PROGRESS NOTE ADULT - SUBJECTIVE AND OBJECTIVE BOX
Patient is a 79y old  Male who presents with a chief complaint of CHF exacerbation (21 Aug 2019 14:54)      T(F): 96.8 (08-22-19 @ 07:33), Max: 97.5 (08-21-19 @ 11:01)  HR: 69 (08-22-19 @ 07:15)  BP: 149/68 (08-22-19 @ 07:15)  RR: 16 (08-22-19 @ 07:33)  SpO2: 97% (08-22-19 @ 07:15) (89% - 100%)    PHYSICAL EXAM:  GENERAL: NAD, well-groomed, well-developed  HEAD:  Atraumatic, Normocephalic  EYES: EOMI, PERRLA, conjunctiva and sclera clear  ENMT: No tonsillar erythema, exudates, or enlargement; Moist mucous membranes, Good dentition, No lesions  NECK: Supple, No JVD, Normal thyroid  NERVOUS SYSTEM:  Alert & Oriented X3,  Motor Strength 5/5 B/L upper and lower extremities  CHEST/LUNG: Clear to percussion bilaterally; No rales, rhonchi, wheezing, or rubs  HEART: Regular rate and rhythm; No murmurs, rubs, or gallops  ABDOMEN: Soft, Nontender, Nondistended; Bowel sounds present  EXTREMITIES:   No clubbing, cyanosis, tr edema  LYMPH: No lymphadenopathy noted  SKIN: No rashes or lesions    labs  08-21    145  |  102  |  56<H>  ----------------------------<  98  4.1   |  32  |  1.3    Ca    9.3      21 Aug 2019 05:30  Mg     2.3     08-21                            13.7   14.09 )-----------( 244      ( 21 Aug 2019 05:30 )             43.8               amLODIPine   Tablet 5 milliGRAM(s) Oral daily  aspirin  chewable 81 milliGRAM(s) Oral daily  buDESOnide 160 MICROgram(s)/formoterol 4.5 MICROgram(s) Inhaler 2 Puff(s) Inhalation two times a day  chlorhexidine 4% Liquid 1 Application(s) Topical two times a day  dextrose 40% Gel 15 Gram(s) Oral once PRN  dextrose 5%. 1000 milliLiter(s) IV Continuous <Continuous>  dextrose 50% Injectable 12.5 Gram(s) IV Push once  dextrose 50% Injectable 25 Gram(s) IV Push once  dextrose 50% Injectable 25 Gram(s) IV Push once  fenofibrate Tablet 145 milliGRAM(s) Oral daily  finasteride 5 milliGRAM(s) Oral daily  furosemide    Tablet 40 milliGRAM(s) Oral daily  glucagon  Injectable 1 milliGRAM(s) IntraMuscular once PRN  insulin glargine Injectable (LANTUS) 30 Unit(s) SubCutaneous at bedtime  insulin lispro (HumaLOG) corrective regimen sliding scale   SubCutaneous three times a day before meals  insulin lispro Injectable (HumaLOG) 11 Unit(s) SubCutaneous three times a day before meals  isosorbide   mononitrate ER Tablet (IMDUR) 30 milliGRAM(s) Oral daily  losartan 50 milliGRAM(s) Oral two times a day  metoprolol succinate ER 75 milliGRAM(s) Oral daily  pantoprazole    Tablet 40 milliGRAM(s) Oral before breakfast  rivaroxaban 15 milliGRAM(s) Oral with dinner  silver sulfADIAZINE 1% Cream 1 Application(s) Topical two times a day  zolpidem 5 milliGRAM(s) Oral once PRN

## 2019-08-22 NOTE — PROGRESS NOTE ADULT - ASSESSMENT
78yo Male w/ PMH of CHF, COPD (home O2 as needed), DM, HTN, CAD s/p CABG and 1 stent, BPH, and recent admission for CHF (April 2019) BIBA dyspnea x 2 days that suddenly worsened today in the afternoon. As per ED physician, EMS gave IV lasix 40mg during transport. In ED, pt was in acute distress 2/2 dyspnea, O2 saturating low. Another IV lasix 40mg given. Nitro drip started. BIPAP applied w/ great relief. CXR shows pleural effusion and vascular congestion. BNP 3800s. Trop 0.03 noted. Pt  currently not sob.     #Acute on Chronic Diastolic CHF exacerbation - Stable on 3L  - Echo from last admission clarified: EF > 55% and Grade II diastolic dysfunction  - CXR is improving  - now on  PO Lasix without symptoms  -  losartan 50 bid, asa, Plavix metoprolol  -  echo - EF 55%. Moderate AS (AV 1.5, peak gradient 36  - may dc home later today after ambulation if HR is controlled, 40min spent on dc    #Elevated Troponin possibly type 2 demand  - Trop 0.03 noted. -> 0.21  - repeat EKG no new changes. aflutter  - cardio aware    #COPD  - stable  -  Breo and Duoneb PRN    #HTN  - tatiana Lasix, losartan and metoprolol     #Aflutter  -  Metoprolol,  Xarelto    DM  -  hospital insulin protocol     BPH  - tamsulosin

## 2019-08-22 NOTE — PROGRESS NOTE ADULT - SUBJECTIVE AND OBJECTIVE BOX
Patient is sitting comfortably in chair eating, no sob      T(F): 96.8 (08-22-19 @ 07:33), Max: 97.5 (08-21-19 @ 11:01)  HR: 69 (08-22-19 @ 09:09)  BP: 112/56 (08-22-19 @ 09:09)  RR: 18 (08-22-19 @ 09:09)  SpO2: 99% (08-22-19 @ 09:09) (89% - 100%)    PHYSICAL EXAM:  GENERAL: NAD  HEAD:  Atraumatic, Normocephalic  NERVOUS SYSTEM:  Alert & Oriented X3, no focal deficits  CHEST/LUNG: Clear to percussion bilaterally; No rales, rhonchi, wheezing, or rubs  HEART: Regular rate and rhythm; No murmurs, rubs, or gallops  ABDOMEN: Soft, Nontender, Nondistended; Bowel sounds present  EXTREMITIES:  b/l edema  LYMPH: No lymphadenopathy noted  SKIN: No rashes or lesions    LABS  08-21    145  |  102  |  56<H>  ----------------------------<  98  4.1   |  32  |  1.3    Ca    9.3      21 Aug 2019 05:30  Mg     2.3     08-21                            13.7   14.09 )-----------( 244      ( 21 Aug 2019 05:30 )             43.8         CARDIAC ENZYMES  Creatine Kinase, Serum: 64 (08-20 @ 05:42)  Creatine Kinase, Serum: 88 (08-19 @ 11:27)    CKMB Units: 4.1 (08-20 @ 05:42)  CKMB Units: 11.5 (08-19 @ 11:27)    Troponin T, Serum: 0.11 ng/mL (08-20-19 @ 05:42)  Troponin T, Serum: 0.17 ng/mL (08-19-19 @ 11:27)      Culture Results:   <10,000 CFU/mL Normal Urogenital Felipa (08-19-19)  Culture Results:   No growth to date. (08-19-19)  Culture Results:   No growth to date. (08-19-19)    RADIOLOGY  < from: Xray Chest 1 View- PORTABLE-Routine (08.21.19 @ 07:03) >  Impression:    Improved aeration of bilateral lungs. Bibasilar atelectasis.    < end of copied text >    MEDICATIONS  (STANDING):  amLODIPine   Tablet 5 milliGRAM(s) Oral daily  aspirin  chewable 81 milliGRAM(s) Oral daily  buDESOnide 160 MICROgram(s)/formoterol 4.5 MICROgram(s) Inhaler 2 Puff(s) Inhalation two times a day  chlorhexidine 4% Liquid 1 Application(s) Topical two times a day  fenofibrate Tablet 145 milliGRAM(s) Oral daily  finasteride 5 milliGRAM(s) Oral daily  furosemide    Tablet 40 milliGRAM(s) Oral daily  insulin glargine Injectable (LANTUS) 30 Unit(s) SubCutaneous at bedtime  insulin lispro (HumaLOG) corrective regimen sliding scale   SubCutaneous three times a day before meals  insulin lispro Injectable (HumaLOG) 11 Unit(s) SubCutaneous three times a day before meals  isosorbide   mononitrate ER Tablet (IMDUR) 30 milliGRAM(s) Oral daily  losartan 50 milliGRAM(s) Oral two times a day  metoprolol succinate ER 75 milliGRAM(s) Oral daily  pantoprazole    Tablet 40 milliGRAM(s) Oral before breakfast  rivaroxaban 15 milliGRAM(s) Oral with dinner  silver sulfADIAZINE 1% Cream 1 Application(s) Topical two times a day    MEDICATIONS  (PRN):  dextrose 40% Gel 15 Gram(s) Oral once PRN Blood Glucose LESS THAN 70 milliGRAM(s)/deciliter  glucagon  Injectable 1 milliGRAM(s) IntraMuscular once PRN Glucose LESS THAN 70 milligrams/deciliter  zolpidem 5 milliGRAM(s) Oral once PRN Insomnia

## 2019-08-22 NOTE — PROGRESS NOTE ADULT - ASSESSMENT
IMPRESSION:  Acute on chronic hypercapnic respiratory failure   Chronic hypoxic respiratory failure- COPD  Pulmonary edema secondary to CHF exacerbation   YAMIL   CAD     PLAN:    CNS: no sedation     HEENT: oral care     PULMONARY: NIV PRN and qHS; keep pox > 88%; c/w Symbicort     CARDIOVASCULAR: Keep I<O; follow cardiology; bipap at night and as needed     GI: GI prophylaxis.      RENAL: FU lytes     INFECTIOUS DISEASE: pan cx     HEMATOLOGICAL:  DVT prophylaxis. on xarelto     ENDOCRINE:  Follow up FS.  Insulin protocol if needed.    MUSCULOSKELETAL: OOB to chair    Tele or D/C home per cardio

## 2019-08-22 NOTE — DISCHARGE NOTE PROVIDER - NSDCCPCAREPLAN_GEN_ALL_CORE_FT
PRINCIPAL DISCHARGE DIAGNOSIS  Diagnosis: Acute decompensated heart failure  Assessment and Plan of Treatment: please continue taking your medications as prescribed. please avoid chinese food which caused your acute exacerbation. please monitor your weight daily and if you notice >3lb weight gain or you require higher oxygen lvels please take an extra po of lasix. If symptoms worsen please return to the ED

## 2019-08-23 ENCOUNTER — TRANSCRIPTION ENCOUNTER (OUTPATIENT)
Age: 79
End: 2019-08-23

## 2019-08-23 VITALS — OXYGEN SATURATION: 98 % | RESPIRATION RATE: 22 BRPM | HEART RATE: 68 BPM

## 2019-08-23 LAB
GLUCOSE BLDC GLUCOMTR-MCNC: 119 MG/DL — HIGH (ref 70–99)
GLUCOSE BLDC GLUCOMTR-MCNC: 122 MG/DL — HIGH (ref 70–99)
GLUCOSE BLDC GLUCOMTR-MCNC: 145 MG/DL — HIGH (ref 70–99)
GLUCOSE BLDC GLUCOMTR-MCNC: 210 MG/DL — HIGH (ref 70–99)
GLUCOSE BLDC GLUCOMTR-MCNC: 97 MG/DL — SIGNIFICANT CHANGE UP (ref 70–99)

## 2019-08-23 PROCEDURE — 99239 HOSP IP/OBS DSCHRG MGMT >30: CPT

## 2019-08-23 RX ADMIN — CHLORHEXIDINE GLUCONATE 1 APPLICATION(S): 213 SOLUTION TOPICAL at 06:19

## 2019-08-23 RX ADMIN — Medication 1 APPLICATION(S): at 17:47

## 2019-08-23 RX ADMIN — RIVAROXABAN 15 MILLIGRAM(S): KIT at 17:46

## 2019-08-23 RX ADMIN — BUDESONIDE AND FORMOTEROL FUMARATE DIHYDRATE 2 PUFF(S): 160; 4.5 AEROSOL RESPIRATORY (INHALATION) at 20:27

## 2019-08-23 RX ADMIN — Medication 145 MILLIGRAM(S): at 12:09

## 2019-08-23 RX ADMIN — LOSARTAN POTASSIUM 50 MILLIGRAM(S): 100 TABLET, FILM COATED ORAL at 06:17

## 2019-08-23 RX ADMIN — INSULIN GLARGINE 30 UNIT(S): 100 INJECTION, SOLUTION SUBCUTANEOUS at 21:11

## 2019-08-23 RX ADMIN — Medication 75 MILLIGRAM(S): at 06:17

## 2019-08-23 RX ADMIN — Medication 11 UNIT(S): at 12:14

## 2019-08-23 RX ADMIN — Medication 1 APPLICATION(S): at 06:19

## 2019-08-23 RX ADMIN — PANTOPRAZOLE SODIUM 40 MILLIGRAM(S): 20 TABLET, DELAYED RELEASE ORAL at 06:17

## 2019-08-23 RX ADMIN — Medication 40 MILLIGRAM(S): at 06:17

## 2019-08-23 RX ADMIN — CHLORHEXIDINE GLUCONATE 1 APPLICATION(S): 213 SOLUTION TOPICAL at 17:47

## 2019-08-23 RX ADMIN — BUDESONIDE AND FORMOTEROL FUMARATE DIHYDRATE 2 PUFF(S): 160; 4.5 AEROSOL RESPIRATORY (INHALATION) at 08:36

## 2019-08-23 RX ADMIN — AMLODIPINE BESYLATE 5 MILLIGRAM(S): 2.5 TABLET ORAL at 06:17

## 2019-08-23 RX ADMIN — Medication 81 MILLIGRAM(S): at 12:09

## 2019-08-23 RX ADMIN — LOSARTAN POTASSIUM 50 MILLIGRAM(S): 100 TABLET, FILM COATED ORAL at 17:46

## 2019-08-23 RX ADMIN — ISOSORBIDE MONONITRATE 30 MILLIGRAM(S): 60 TABLET, EXTENDED RELEASE ORAL at 12:09

## 2019-08-23 RX ADMIN — FINASTERIDE 5 MILLIGRAM(S): 5 TABLET, FILM COATED ORAL at 12:09

## 2019-08-23 NOTE — PROGRESS NOTE ADULT - SUBJECTIVE AND OBJECTIVE BOX
Patient is a 79y old  Male who presents with a chief complaint of CHF exacerbation (22 Aug 2019 16:44)      T(F): 96.3 (08-23-19 @ 07:10), Max: 97.3 (08-22-19 @ 23:04)  HR: 68 (08-23-19 @ 07:10)  BP: 130/65 (08-23-19 @ 07:10)  RR: 20 (08-23-19 @ 07:10)  SpO2: 97% (08-23-19 @ 07:10) (97% - 100%)    PHYSICAL EXAM:  GENERAL: NAD, well-groomed, well-developed  HEAD:  Atraumatic, Normocephalic  EYES: EOMI, PERRLA, conjunctiva and sclera clear  ENMT: No tonsillar erythema, exudates, or enlargement; Moist mucous membranes, Good dentition, No lesions  NECK: Supple, No JVD, Normal thyroid  NERVOUS SYSTEM:  Alert & Oriented X3,  Motor Strength 5/5 B/L upper and lower extremities  CHEST/LUNG: Clear to percussion bilaterally; No rales, rhonchi, wheezing, or rubs  HEART: Regular rate and rhythm; No murmurs, rubs, or gallops  ABDOMEN: Soft, Nontender, Nondistended; Bowel sounds present  EXTREMITIES:   No clubbing, cyanosis, or edema  LYMPH: No lymphadenopathy noted  SKIN: No rashes or lesions    labs                    amLODIPine   Tablet 5 milliGRAM(s) Oral daily  aspirin  chewable 81 milliGRAM(s) Oral daily  buDESOnide 160 MICROgram(s)/formoterol 4.5 MICROgram(s) Inhaler 2 Puff(s) Inhalation two times a day  chlorhexidine 4% Liquid 1 Application(s) Topical two times a day  dextrose 40% Gel 15 Gram(s) Oral once PRN  dextrose 5%. 1000 milliLiter(s) IV Continuous <Continuous>  dextrose 50% Injectable 12.5 Gram(s) IV Push once  dextrose 50% Injectable 25 Gram(s) IV Push once  dextrose 50% Injectable 25 Gram(s) IV Push once  fenofibrate Tablet 145 milliGRAM(s) Oral daily  finasteride 5 milliGRAM(s) Oral daily  furosemide    Tablet 40 milliGRAM(s) Oral daily  glucagon  Injectable 1 milliGRAM(s) IntraMuscular once PRN  insulin glargine Injectable (LANTUS) 30 Unit(s) SubCutaneous at bedtime  insulin lispro (HumaLOG) corrective regimen sliding scale   SubCutaneous three times a day before meals  insulin lispro Injectable (HumaLOG) 11 Unit(s) SubCutaneous three times a day before meals  isosorbide   mononitrate ER Tablet (IMDUR) 30 milliGRAM(s) Oral daily  losartan 50 milliGRAM(s) Oral two times a day  metoprolol succinate ER 75 milliGRAM(s) Oral daily  pantoprazole    Tablet 40 milliGRAM(s) Oral before breakfast  rivaroxaban 15 milliGRAM(s) Oral with dinner  silver sulfADIAZINE 1% Cream 1 Application(s) Topical two times a day

## 2019-08-23 NOTE — PROGRESS NOTE ADULT - ASSESSMENT
IMPRESSION:  Acute on chronic hypercapnic respiratory failure   Chronic hypoxic respiratory failure- COPD  Pulmonary edema secondary to CHF exacerbation   YAMIL   CAD     PLAN:    CNS: no sedation     HEENT: oral care     PULMONARY: NIV PRN and qHS; keep pox > 88%; c/w Symbicort     CARDIOVASCULAR: Keep I<O; follow cardiology; bipap at night and as needed     GI: GI prophylaxis.      RENAL: FU lytes     INFECTIOUS DISEASE: FU cx     HEMATOLOGICAL:  DVT prophylaxis. on xarelto     ENDOCRINE:  Follow up FS.  Insulin protocol if needed.    MUSCULOSKELETAL: OOB to chair    Tele or D/C home per cardio

## 2019-08-23 NOTE — PROGRESS NOTE ADULT - ASSESSMENT
80yo Male w/ PMH of CHF, COPD (home O2 as needed), DM, HTN, CAD s/p CABG and 1 stent, BPH, and recent admission for CHF (April 2019) BIBA dyspnea x 2 days that suddenly worsened today in the afternoon. As per ED physician, EMS gave IV lasix 40mg during transport. In ED, pt was in acute distress 2/2 dyspnea, O2 saturating low. Another IV lasix 40mg given. Nitro drip started. BIPAP applied w/ great relief. CXR shows pleural effusion and vascular congestion. BNP 3800s. Trop 0.03 noted. Pt  currently not sob.     #Acute on Chronic Diastolic CHF exacerbation - Stable on 3L  - Echo from last admission clarified: EF > 55% and Grade II diastolic dysfunction  - now on  PO Lasix without symptoms  -  losartan 50 bid, asa, Plavix metoprolol  -  echo - EF 55%. Moderate AS (AV 1.5, peak gradient 36  - may dc home later today after ambulation if HR is controlled, 40min spent on dc    #Elevated Troponin possibly type 2 demand  - Trop 0.03 noted. -> 0.21  - repeat EKG no new changes. aflutter  - cardio aware    #COPD  - stable  -  Breo and Duoneb PRN    #HTN  - Imdur Lasix, losartan and metoprolol     #Aflutter  -  Metoprolol,  Xarelto    DM  -  hospital insulin protocol     BPH  - tamsulosin

## 2019-08-23 NOTE — PROGRESS NOTE ADULT - PROVIDER SPECIALTY LIST ADULT
Cardiology
Hospitalist
Infectious Disease
Internal Medicine
Internal Medicine
Pulmonology
Internal Medicine

## 2019-08-23 NOTE — PROGRESS NOTE ADULT - SUBJECTIVE AND OBJECTIVE BOX
Patient denies sob, no complaints       T(F): 96.3 (08-23-19 @ 07:10), Max: 97.3 (08-22-19 @ 23:04)  HR: 68 (08-23-19 @ 07:10)  BP: 130/65 (08-23-19 @ 07:10)  RR: 18  SpO2: 97% (08-23-19 @ 07:10) (97% - 100%)    PHYSICAL EXAM:  GENERAL: NAD  HEAD:  Atraumatic, Normocephalic  NERVOUS SYSTEM:  Alert & Oriented X3, no focal deficits  CHEST/LUNG: Clear to percussion bilaterally  HEART: Regular rate and rhythm; No murmurs, rubs, or gallops  ABDOMEN: Soft, Nontender, Nondistended; Bowel sounds present  EXTREMITIES:  2+ Peripheral Pulses, No clubbing, cyanosis, or edema      Culture Results:   <10,000 CFU/mL Normal Urogenital Felipa (08-19-19)  Culture Results:   No growth to date. (08-19-19)  Culture Results:   No growth to date. (08-19-19)      MEDICATIONS  (STANDING):  amLODIPine   Tablet 5 milliGRAM(s) Oral daily  aspirin  chewable 81 milliGRAM(s) Oral daily  buDESOnide 160 MICROgram(s)/formoterol 4.5 MICROgram(s) Inhaler 2 Puff(s) Inhalation two times a day  chlorhexidine 4% Liquid 1 Application(s) Topical two times a day  fenofibrate Tablet 145 milliGRAM(s) Oral daily  finasteride 5 milliGRAM(s) Oral daily  furosemide    Tablet 40 milliGRAM(s) Oral daily  insulin glargine Injectable (LANTUS) 30 Unit(s) SubCutaneous at bedtime  insulin lispro (HumaLOG) corrective regimen sliding scale   SubCutaneous three times a day before meals  insulin lispro Injectable (HumaLOG) 11 Unit(s) SubCutaneous three times a day before meals  isosorbide   mononitrate ER Tablet (IMDUR) 30 milliGRAM(s) Oral daily  losartan 50 milliGRAM(s) Oral two times a day  metoprolol succinate ER 75 milliGRAM(s) Oral daily  pantoprazole    Tablet 40 milliGRAM(s) Oral before breakfast  rivaroxaban 15 milliGRAM(s) Oral with dinner  silver sulfADIAZINE 1% Cream 1 Application(s) Topical two times a day    MEDICATIONS  (PRN):  dextrose 40% Gel 15 Gram(s) Oral once PRN Blood Glucose LESS THAN 70 milliGRAM(s)/deciliter  glucagon  Injectable 1 milliGRAM(s) IntraMuscular once PRN Glucose LESS THAN 70 milligrams/deciliter

## 2019-08-23 NOTE — PROGRESS NOTE ADULT - ASSESSMENT
Patient feels much better. Chf better . Weight decreased PO lasix . Reviewed diet again. Aware no chinese food.   Ambulate if stable. Continue xarelto. If hr increased may increase metoprolol er to 100  . Follow Weight out patient none

## 2019-08-23 NOTE — PROGRESS NOTE ADULT - REASON FOR ADMISSION
CHF exacerbation

## 2019-08-23 NOTE — PROGRESS NOTE ADULT - SUBJECTIVE AND OBJECTIVE BOX
Patient is a 79y old  Male who presents with a chief complaint of CHF exacerbation (23 Aug 2019 07:51)    OVER NIGHT EVENTS:  No events    PHYSICAL EXAM    ICU Vital Signs Last 24 Hrs  T(C): 35.7 (23 Aug 2019 07:10), Max: 36.3 (22 Aug 2019 23:04)  T(F): 96.3 (23 Aug 2019 07:10), Max: 97.3 (22 Aug 2019 23:04)  HR: 68 (23 Aug 2019 07:10) (68 - 70)  BP: 130/65 (23 Aug 2019 07:10) (112/56 - 156/92)  BP(mean): 118 (23 Aug 2019 06:15) (78 - 118)  RR: 20 (23 Aug 2019 07:10) (18 - 20)  SpO2: 97% (23 Aug 2019 07:10) (97% - 100%)    I&O's Detail    22 Aug 2019 07:01  -  23 Aug 2019 07:00  --------------------------------------------------------  IN:  Total IN: 0 mL    OUT:    Voided: 40 mL  Total OUT: 40 mL    Total NET: -40 mL    General: Comfortable in bed  HEENT:  On NC  Lymph node: No palpable LN             Lungs: CTA  Cardiovascular: RRR, S1S2  Abdomen: BS+ve; soft non tender  Extremities: No LE edema  Skin: venous stasis changes   Neurological:  AAOx3; No focal deficit    LABS:        Lactate (08-19-19 @ 05:51): 0.9  Lactate (08-18-19 @ 22:08): 0.9      MEDICATIONS  (STANDING):  amLODIPine   Tablet 5 milliGRAM(s) Oral daily  aspirin  chewable 81 milliGRAM(s) Oral daily  buDESOnide 160 MICROgram(s)/formoterol 4.5 MICROgram(s) Inhaler 2 Puff(s) Inhalation two times a day  chlorhexidine 4% Liquid 1 Application(s) Topical two times a day  dextrose 5%. 1000 milliLiter(s) (50 mL/Hr) IV Continuous <Continuous>  dextrose 50% Injectable 12.5 Gram(s) IV Push once  dextrose 50% Injectable 25 Gram(s) IV Push once  dextrose 50% Injectable 25 Gram(s) IV Push once  fenofibrate Tablet 145 milliGRAM(s) Oral daily  finasteride 5 milliGRAM(s) Oral daily  furosemide    Tablet 40 milliGRAM(s) Oral daily  insulin glargine Injectable (LANTUS) 30 Unit(s) SubCutaneous at bedtime  insulin lispro (HumaLOG) corrective regimen sliding scale   SubCutaneous three times a day before meals  insulin lispro Injectable (HumaLOG) 11 Unit(s) SubCutaneous three times a day before meals  isosorbide   mononitrate ER Tablet (IMDUR) 30 milliGRAM(s) Oral daily  losartan 50 milliGRAM(s) Oral two times a day  metoprolol succinate ER 75 milliGRAM(s) Oral daily  pantoprazole    Tablet 40 milliGRAM(s) Oral before breakfast  rivaroxaban 15 milliGRAM(s) Oral with dinner  silver sulfADIAZINE 1% Cream 1 Application(s) Topical two times a day    MEDICATIONS  (PRN):  dextrose 40% Gel 15 Gram(s) Oral once PRN Blood Glucose LESS THAN 70 milliGRAM(s)/deciliter  glucagon  Injectable 1 milliGRAM(s) IntraMuscular once PRN Glucose LESS THAN 70 milligrams/deciliter      Radiology:

## 2019-08-23 NOTE — DISCHARGE NOTE NURSING/CASE MANAGEMENT/SOCIAL WORK - NSDCDPATPORTLINK_GEN_ALL_CORE
You can access the [facility] Portal, offered by [facility], by registering with the following website: [email or web address]

## 2019-08-24 LAB
CULTURE RESULTS: SIGNIFICANT CHANGE UP
CULTURE RESULTS: SIGNIFICANT CHANGE UP
SPECIMEN SOURCE: SIGNIFICANT CHANGE UP
SPECIMEN SOURCE: SIGNIFICANT CHANGE UP

## 2019-08-27 ENCOUNTER — INPATIENT (INPATIENT)
Facility: HOSPITAL | Age: 79
LOS: 2 days | Discharge: HOME HEALTH | End: 2019-08-30
Attending: INTERNAL MEDICINE | Admitting: INTERNAL MEDICINE
Payer: MEDICARE

## 2019-08-27 VITALS
RESPIRATION RATE: 19 BRPM | OXYGEN SATURATION: 99 % | SYSTOLIC BLOOD PRESSURE: 147 MMHG | HEART RATE: 63 BPM | WEIGHT: 233.91 LBS | TEMPERATURE: 95 F | DIASTOLIC BLOOD PRESSURE: 67 MMHG

## 2019-08-27 DIAGNOSIS — Z95.1 PRESENCE OF AORTOCORONARY BYPASS GRAFT: Chronic | ICD-10-CM

## 2019-08-27 DIAGNOSIS — E16.2 HYPOGLYCEMIA, UNSPECIFIED: ICD-10-CM

## 2019-08-27 DIAGNOSIS — Z95.5 PRESENCE OF CORONARY ANGIOPLASTY IMPLANT AND GRAFT: Chronic | ICD-10-CM

## 2019-08-27 LAB
ALBUMIN SERPL ELPH-MCNC: 3.9 G/DL — SIGNIFICANT CHANGE UP (ref 3.5–5.2)
ALP SERPL-CCNC: 41 U/L — SIGNIFICANT CHANGE UP (ref 30–115)
ALT FLD-CCNC: 23 U/L — SIGNIFICANT CHANGE UP (ref 0–41)
ANION GAP SERPL CALC-SCNC: 9 MMOL/L — SIGNIFICANT CHANGE UP (ref 7–14)
APPEARANCE UR: CLEAR — SIGNIFICANT CHANGE UP
APTT BLD: 27.4 SEC — SIGNIFICANT CHANGE UP (ref 27–39.2)
AST SERPL-CCNC: 42 U/L — HIGH (ref 0–41)
BASE EXCESS BLDV CALC-SCNC: 7.1 MMOL/L — HIGH (ref -2–2)
BASOPHILS # BLD AUTO: 0.05 K/UL — SIGNIFICANT CHANGE UP (ref 0–0.2)
BASOPHILS NFR BLD AUTO: 0.5 % — SIGNIFICANT CHANGE UP (ref 0–1)
BILIRUB SERPL-MCNC: 0.6 MG/DL — SIGNIFICANT CHANGE UP (ref 0.2–1.2)
BILIRUB UR-MCNC: NEGATIVE — SIGNIFICANT CHANGE UP
BUN SERPL-MCNC: 48 MG/DL — HIGH (ref 10–20)
CA-I SERPL-SCNC: 1.25 MMOL/L — SIGNIFICANT CHANGE UP (ref 1.12–1.3)
CALCIUM SERPL-MCNC: 9.4 MG/DL — SIGNIFICANT CHANGE UP (ref 8.5–10.1)
CHLORIDE SERPL-SCNC: 100 MMOL/L — SIGNIFICANT CHANGE UP (ref 98–110)
CO2 SERPL-SCNC: 33 MMOL/L — HIGH (ref 17–32)
COLOR SPEC: YELLOW — SIGNIFICANT CHANGE UP
CREAT SERPL-MCNC: 1.5 MG/DL — SIGNIFICANT CHANGE UP (ref 0.7–1.5)
DIFF PNL FLD: NEGATIVE — SIGNIFICANT CHANGE UP
EOSINOPHIL # BLD AUTO: 0.12 K/UL — SIGNIFICANT CHANGE UP (ref 0–0.7)
EOSINOPHIL NFR BLD AUTO: 1.2 % — SIGNIFICANT CHANGE UP (ref 0–8)
EPI CELLS # UR: ABNORMAL /HPF
GAS PNL BLDV: 143 MMOL/L — SIGNIFICANT CHANGE UP (ref 136–145)
GAS PNL BLDV: SIGNIFICANT CHANGE UP
GLUCOSE BLDC GLUCOMTR-MCNC: 160 MG/DL — HIGH (ref 70–99)
GLUCOSE BLDC GLUCOMTR-MCNC: 244 MG/DL — HIGH (ref 70–99)
GLUCOSE BLDC GLUCOMTR-MCNC: 250 MG/DL — HIGH (ref 70–99)
GLUCOSE BLDC GLUCOMTR-MCNC: 257 MG/DL — HIGH (ref 70–99)
GLUCOSE BLDC GLUCOMTR-MCNC: 274 MG/DL — HIGH (ref 70–99)
GLUCOSE BLDC GLUCOMTR-MCNC: 293 MG/DL — HIGH (ref 70–99)
GLUCOSE BLDC GLUCOMTR-MCNC: 297 MG/DL — HIGH (ref 70–99)
GLUCOSE BLDC GLUCOMTR-MCNC: 333 MG/DL — HIGH (ref 70–99)
GLUCOSE SERPL-MCNC: 65 MG/DL — LOW (ref 70–99)
GLUCOSE UR QL: 100 MG/DL
HCO3 BLDV-SCNC: 35 MMOL/L — HIGH (ref 22–29)
HCT VFR BLD CALC: 44.2 % — SIGNIFICANT CHANGE UP (ref 42–52)
HCT VFR BLDA CALC: 42.7 % — SIGNIFICANT CHANGE UP (ref 34–44)
HGB BLD CALC-MCNC: 13.9 G/DL — LOW (ref 14–18)
HGB BLD-MCNC: 14.1 G/DL — SIGNIFICANT CHANGE UP (ref 14–18)
HOROWITZ INDEX BLDV+IHG-RTO: 21 — SIGNIFICANT CHANGE UP
IMM GRANULOCYTES NFR BLD AUTO: 0.8 % — HIGH (ref 0.1–0.3)
INR BLD: 1.15 RATIO — SIGNIFICANT CHANGE UP (ref 0.65–1.3)
KETONES UR-MCNC: NEGATIVE — SIGNIFICANT CHANGE UP
LACTATE BLDV-MCNC: 1.5 MMOL/L — SIGNIFICANT CHANGE UP (ref 0.5–1.6)
LACTATE SERPL-SCNC: 1.3 MMOL/L — SIGNIFICANT CHANGE UP (ref 0.5–2.2)
LEUKOCYTE ESTERASE UR-ACNC: ABNORMAL
LIDOCAIN IGE QN: 14 U/L — SIGNIFICANT CHANGE UP (ref 7–60)
LYMPHOCYTES # BLD AUTO: 0.82 K/UL — LOW (ref 1.2–3.4)
LYMPHOCYTES # BLD AUTO: 8.1 % — LOW (ref 20.5–51.1)
MCHC RBC-ENTMCNC: 26.8 PG — LOW (ref 27–31)
MCHC RBC-ENTMCNC: 31.9 G/DL — LOW (ref 32–37)
MCV RBC AUTO: 83.9 FL — SIGNIFICANT CHANGE UP (ref 80–94)
MONOCYTES # BLD AUTO: 1.28 K/UL — HIGH (ref 0.1–0.6)
MONOCYTES NFR BLD AUTO: 12.7 % — HIGH (ref 1.7–9.3)
NEUTROPHILS # BLD AUTO: 7.73 K/UL — HIGH (ref 1.4–6.5)
NEUTROPHILS NFR BLD AUTO: 76.7 % — HIGH (ref 42.2–75.2)
NITRITE UR-MCNC: NEGATIVE — SIGNIFICANT CHANGE UP
NRBC # BLD: 0 /100 WBCS — SIGNIFICANT CHANGE UP (ref 0–0)
PCO2 BLDV: 65 MMHG — HIGH (ref 41–51)
PH BLDV: 7.34 — SIGNIFICANT CHANGE UP (ref 7.26–7.43)
PH UR: 7 — SIGNIFICANT CHANGE UP (ref 5–8)
PLATELET # BLD AUTO: 209 K/UL — SIGNIFICANT CHANGE UP (ref 130–400)
PO2 BLDV: 38 MMHG — SIGNIFICANT CHANGE UP (ref 20–40)
POTASSIUM BLDV-SCNC: 4.5 MMOL/L — SIGNIFICANT CHANGE UP (ref 3.3–5.6)
POTASSIUM SERPL-MCNC: 5.7 MMOL/L — HIGH (ref 3.5–5)
POTASSIUM SERPL-SCNC: 5.7 MMOL/L — HIGH (ref 3.5–5)
PROT SERPL-MCNC: 7.5 G/DL — SIGNIFICANT CHANGE UP (ref 6–8)
PROT UR-MCNC: NEGATIVE MG/DL — SIGNIFICANT CHANGE UP
PROTHROM AB SERPL-ACNC: 13.2 SEC — HIGH (ref 9.95–12.87)
RBC # BLD: 5.27 M/UL — SIGNIFICANT CHANGE UP (ref 4.7–6.1)
RBC # FLD: 15.3 % — HIGH (ref 11.5–14.5)
RBC CASTS # UR COMP ASSIST: NEGATIVE — SIGNIFICANT CHANGE UP
SAO2 % BLDV: 64 % — SIGNIFICANT CHANGE UP
SODIUM SERPL-SCNC: 142 MMOL/L — SIGNIFICANT CHANGE UP (ref 135–146)
SP GR SPEC: 1.02 — SIGNIFICANT CHANGE UP (ref 1.01–1.03)
UROBILINOGEN FLD QL: 0.2 MG/DL — SIGNIFICANT CHANGE UP (ref 0.2–0.2)
WBC # BLD: 10.08 K/UL — SIGNIFICANT CHANGE UP (ref 4.8–10.8)
WBC # FLD AUTO: 10.08 K/UL — SIGNIFICANT CHANGE UP (ref 4.8–10.8)
WBC UR QL: SIGNIFICANT CHANGE UP /HPF

## 2019-08-27 PROCEDURE — 70450 CT HEAD/BRAIN W/O DYE: CPT | Mod: 26

## 2019-08-27 PROCEDURE — 71045 X-RAY EXAM CHEST 1 VIEW: CPT | Mod: 26

## 2019-08-27 PROCEDURE — 72125 CT NECK SPINE W/O DYE: CPT | Mod: 26

## 2019-08-27 PROCEDURE — 99223 1ST HOSP IP/OBS HIGH 75: CPT

## 2019-08-27 PROCEDURE — 74177 CT ABD & PELVIS W/CONTRAST: CPT | Mod: 26

## 2019-08-27 PROCEDURE — 71260 CT THORAX DX C+: CPT | Mod: 26

## 2019-08-27 PROCEDURE — 99291 CRITICAL CARE FIRST HOUR: CPT

## 2019-08-27 RX ORDER — SODIUM CHLORIDE 9 MG/ML
1000 INJECTION, SOLUTION INTRAVENOUS
Refills: 0 | Status: DISCONTINUED | OUTPATIENT
Start: 2019-08-27 | End: 2019-08-27

## 2019-08-27 RX ORDER — LOSARTAN POTASSIUM 100 MG/1
50 TABLET, FILM COATED ORAL DAILY
Refills: 0 | Status: DISCONTINUED | OUTPATIENT
Start: 2019-08-27 | End: 2019-08-30

## 2019-08-27 RX ORDER — ZOLPIDEM TARTRATE 10 MG/1
5 TABLET ORAL AT BEDTIME
Refills: 0 | Status: DISCONTINUED | OUTPATIENT
Start: 2019-08-27 | End: 2019-08-30

## 2019-08-27 RX ORDER — FENOFIBRATE,MICRONIZED 130 MG
145 CAPSULE ORAL DAILY
Refills: 0 | Status: DISCONTINUED | OUTPATIENT
Start: 2019-08-27 | End: 2019-08-30

## 2019-08-27 RX ORDER — DEXTROSE 10 % IN WATER 10 %
1000 INTRAVENOUS SOLUTION INTRAVENOUS
Refills: 0 | Status: DISCONTINUED | OUTPATIENT
Start: 2019-08-27 | End: 2019-08-27

## 2019-08-27 RX ORDER — METOPROLOL TARTRATE 50 MG
75 TABLET ORAL DAILY
Refills: 0 | Status: DISCONTINUED | OUTPATIENT
Start: 2019-08-27 | End: 2019-08-28

## 2019-08-27 RX ORDER — INSULIN HUMAN 100 [IU]/ML
5 INJECTION, SOLUTION SUBCUTANEOUS ONCE
Refills: 0 | Status: COMPLETED | OUTPATIENT
Start: 2019-08-27 | End: 2019-08-27

## 2019-08-27 RX ORDER — AMLODIPINE BESYLATE 2.5 MG/1
5 TABLET ORAL DAILY
Refills: 0 | Status: DISCONTINUED | OUTPATIENT
Start: 2019-08-27 | End: 2019-08-30

## 2019-08-27 RX ORDER — ASPIRIN/CALCIUM CARB/MAGNESIUM 324 MG
81 TABLET ORAL DAILY
Refills: 0 | Status: DISCONTINUED | OUTPATIENT
Start: 2019-08-27 | End: 2019-08-30

## 2019-08-27 RX ORDER — FINASTERIDE 5 MG/1
5 TABLET, FILM COATED ORAL DAILY
Refills: 0 | Status: DISCONTINUED | OUTPATIENT
Start: 2019-08-27 | End: 2019-08-30

## 2019-08-27 RX ORDER — ISOSORBIDE MONONITRATE 60 MG/1
30 TABLET, EXTENDED RELEASE ORAL DAILY
Refills: 0 | Status: DISCONTINUED | OUTPATIENT
Start: 2019-08-27 | End: 2019-08-30

## 2019-08-27 RX ORDER — DEXTROSE 50 % IN WATER 50 %
25 SYRINGE (ML) INTRAVENOUS ONCE
Refills: 0 | Status: COMPLETED | OUTPATIENT
Start: 2019-08-27 | End: 2019-08-27

## 2019-08-27 RX ORDER — FUROSEMIDE 40 MG
40 TABLET ORAL DAILY
Refills: 0 | Status: DISCONTINUED | OUTPATIENT
Start: 2019-08-27 | End: 2019-08-30

## 2019-08-27 RX ORDER — ALPRAZOLAM 0.25 MG
0.5 TABLET ORAL
Refills: 0 | Status: DISCONTINUED | OUTPATIENT
Start: 2019-08-27 | End: 2019-08-30

## 2019-08-27 RX ORDER — OCTREOTIDE ACETATE 200 UG/ML
50 INJECTION, SOLUTION INTRAVENOUS; SUBCUTANEOUS ONCE
Refills: 0 | Status: COMPLETED | OUTPATIENT
Start: 2019-08-27 | End: 2019-08-27

## 2019-08-27 RX ORDER — RIVAROXABAN 15 MG-20MG
15 KIT ORAL
Refills: 0 | Status: DISCONTINUED | OUTPATIENT
Start: 2019-08-27 | End: 2019-08-30

## 2019-08-27 RX ADMIN — SODIUM CHLORIDE 75 MILLILITER(S): 9 INJECTION, SOLUTION INTRAVENOUS at 10:10

## 2019-08-27 RX ADMIN — OCTREOTIDE ACETATE 50 MICROGRAM(S): 200 INJECTION, SOLUTION INTRAVENOUS; SUBCUTANEOUS at 12:21

## 2019-08-27 RX ADMIN — AMLODIPINE BESYLATE 5 MILLIGRAM(S): 2.5 TABLET ORAL at 17:47

## 2019-08-27 RX ADMIN — Medication 145 MILLIGRAM(S): at 17:46

## 2019-08-27 RX ADMIN — INSULIN HUMAN 5 UNIT(S): 100 INJECTION, SOLUTION SUBCUTANEOUS at 23:33

## 2019-08-27 RX ADMIN — Medication 50 MILLILITER(S): at 14:14

## 2019-08-27 RX ADMIN — Medication 40 MILLIGRAM(S): at 17:46

## 2019-08-27 RX ADMIN — ISOSORBIDE MONONITRATE 30 MILLIGRAM(S): 60 TABLET, EXTENDED RELEASE ORAL at 17:46

## 2019-08-27 RX ADMIN — Medication 25 GRAM(S): at 10:17

## 2019-08-27 RX ADMIN — Medication 75 MILLIGRAM(S): at 17:47

## 2019-08-27 RX ADMIN — SODIUM CHLORIDE 50 MILLILITER(S): 9 INJECTION, SOLUTION INTRAVENOUS at 16:23

## 2019-08-27 RX ADMIN — FINASTERIDE 5 MILLIGRAM(S): 5 TABLET, FILM COATED ORAL at 17:46

## 2019-08-27 RX ADMIN — Medication 81 MILLIGRAM(S): at 17:46

## 2019-08-27 RX ADMIN — LOSARTAN POTASSIUM 50 MILLIGRAM(S): 100 TABLET, FILM COATED ORAL at 17:47

## 2019-08-27 RX ADMIN — RIVAROXABAN 15 MILLIGRAM(S): KIT at 17:47

## 2019-08-27 RX ADMIN — Medication 50 MILLILITER(S): at 11:07

## 2019-08-27 NOTE — H&P ADULT - NSHPPHYSICALEXAM_GEN_ALL_CORE
PHYSICAL EXAM:  Vital Signs Last 24 Hrs  T(C): 36.4 (27 Aug 2019 15:12), Max: 36.4 (27 Aug 2019 15:12)  T(F): 97.5 (27 Aug 2019 15:12), Max: 97.5 (27 Aug 2019 15:12)  HR: 68 (27 Aug 2019 15:12) (61 - 68)  BP: 128/64 (27 Aug 2019 15:12) (128/64 - 147/67)  RR: 18 (27 Aug 2019 15:12) (18 - 19)  SpO2: 97% (27 Aug 2019 15:12) (96% - 99%)    GENERAL: NAD  HEAD:  Atraumatic, Normocephalic  EYES: EOMI, PERRLA, conjunctiva and sclera clear  NECK: Supple, No JVD, Normal thyroid  NERVOUS SYSTEM:  Alert & Oriented X3  CHEST/LUNG: Clear to percussion bilaterally; No rales, rhonchi, wheezing, or rubs  HEART: Regular rate and rhythm; No murmurs, rubs, or gallops  ABDOMEN: Soft, Nontender, Nondistended; Bowel sounds present  EXTREMITIES:  b/l edema

## 2019-08-27 NOTE — H&P ADULT - PROBLEM SELECTOR PLAN 1
probably secondary to sulfonylurea   change D10->D5 as fs now elevated  monitor fs q1h  endocrinology consult    chronic diastolic CHF/afib-flutter/BPH/COPD/HTN/CAD  continue home meds probably secondary to sulfonylurea   change D10->D5 as fs now elevated  monitor fs q1h  family requests phone call to Dr Beasley pts endocrinologist prior to dc     chronic diastolic CHF/afib-flutter/BPH/COPD/HTN/CAD  continue home meds

## 2019-08-27 NOTE — ED PROVIDER NOTE - CLINICAL SUMMARY MEDICAL DECISION MAKING FREE TEXT BOX
Patient presented with frequent episodes of hypoglycemia, previously on sulfonylurea as originally endorsed to us, but patient later confirmed he was only on half dose. Otherwise on arrival patient neuro intact, afebrile, Hd stable. FS in 150s initially but dropped to 110s within 30 minutes of presentation, and then to 60s requiring additional D50 amp and D10 gtt. Obtained labs which were grossly unremarkable. Given concern for sulfonylurea toxicity, given subcutaneous octreotide and then spoke with toxicology who recommended q1h FS, observation in ICU, complex carbohydrate meals and they will be on consult. Patient given meal in ED with significant improvement of glucose levels. Spoke with ICU Dr. Ayers who approved admission to ICU. Patient and family agreeable with plan. HD stable at time of admission.

## 2019-08-27 NOTE — PATIENT PROFILE ADULT - HAVE YOU EXPERIENCED A TRAUMATIC EVENT?
[f rep st]



                                                                    CONSULTATION





DATE OF CONSULTATION:  04/05/2019



CHIEF COMPLAINT:  Fall.



HISTORY OF PRESENT ILLNESS:  This is a 24-year-old female who arrives at the emergency department via
 private vehicle after sustaining a fall at the [facility].  Briefly, the patient was trying a s
olo attempt, fell about 40 feet, some of which was free fall.  She states that she struck her head, d
id not lose consciousness and landed ultimately on her right ankle.  Given the significant pain and d
eformity of the right ankle, she was able to self extricate.  Rescue team was subsequently called.  S
he was taken to the base of the [place] and declined EMS transport and was subsequently brought here
 via private vehicle.  Given the mechanism, she was immediately made a full trauma activation.  I met
 the patient in the emergency department.  She was alert, oriented, protecting her airway, breathing 
appropriately with appropriate circulation in all major distributions, complaining of right ankle eugenia
n.



PAST MEDICAL HISTORY:  None.



PAST SURGICAL HISTORY:  None.



MEDICATIONS:  None current.  She does have an IUD in place.



ALLERGIES:  None.



SOCIAL HISTORY:  Denies any illicit drug use.  Recent graduate from [employer].  Is a sub
stitute teacher.  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



PHYSICAL EXAMINATION:  VITAL SIGNS:  Temperature 36.6, blood pressure 116/82, heart rate 84, she is 9
9% on room air.  CONSTITUTIONAL:  She is in mild distress and appears uncomfortable.  HEENT:  Eyes:  
Her pupils are equal, round, and reactive to light and accommodation.  Her extraocular movements are 
intact.  She has anicteric sclerae.  She has a very mild abrasion to her left temple.  Ears, nose, mo
uth, throat:  She has moist mucous membranes.  Her hearing is normal.  She has normal dentition.  CAR
DIOVASCULAR:  She has a regular rate and rhythm without any murmurs, rubs, or gallops.  RESPIRATORY: 
 She has no respiratory distress, rales or rhonchi.  She is, otherwise, clear to auscultation bilater
ally.  GI:  Her abdomen is soft, nondistended, nontender.  She has normoactive bowel sounds.  SKIN:  
She has an abrasion on her left buttock, left temple and an open laceration on her proximal right tib
ia.  MUSCULOSKELETAL:  She has a clear deformity of her right ankle.  Otherwise has full muscle stren
gth without tenderness or weakness.  NEUROLOGIC:  She is alert and oriented x3.  Her cranial nerves 2
-12 are intact.  She has no weakness, no numbness.  PSYCHIATRIC:  She is interacting appropriately.  
She is not anxious or encephalopathic.  LYMPH/HEME/IMMUNOLOGIC:  She has no cervical, groin, or supra
clavicular lymphadenopathy identified.



LABORATORY DATA:  Leukocytosis to 10.3 with a left shift.  H and H stable at 15 and 45.  Chemistry is
 unremarkable.  Beta HCG is negative.



IMAGING:  Ankle and tib-fib x-ray, the images of which were personally reviewed, which show intact an
kle mortise.  However, there is a derangement of the midfoot with several fractures including the cub
oid base of the 5th metatarsal, likely 3rd metatarsal and navicular bone.  The remainder of the tib-f
ib is within normal limits.



ASSESSMENT AND PLAN:  24-year-old female status post fall while climbing with what appears to be an i
solated right ankle injury.  Given the patient's stability and overall appropriateness, the decision 
was made to immediately downgrade this from a full trauma activation.  I see no other injury patterns
 on the patient.  She will be evaluated in the emergency department.  Orthopedics will be consulted f
or her foot injury.  Trauma services will be available if required.





Job #:  [id number]

## 2019-08-27 NOTE — ED PROVIDER NOTE - ATTENDING CONTRIBUTION TO CARE
79 year old male, pmhx CHF, COPD on home O2, HTN, CAD with CABG, BPH, afib on xarelto, presenting for recurrent episodes of hypoglycemia. Patient has been having frequent falls secondary to these episodes of hypoglycemia, most recently this AM when family found him slumped against the couch. Patient states he was admitted to ICU a few days ago for CHF exacerbation at which time he was on a sulfonylurea which was discontinued. On EMS arrival patient was found to be hypoglycemic to 30s and was given amp of D50 with improvement. On arrival to ED, patient feeling better, and denies active complaints including  fevers, headache, vision changes, weakness/numbness, confusion, URI symptoms, neck pain, chest pain, back pain, dyspnea, cough, palpitations, nausea, vomiting, abdominal pain, diarrhea, constipation, blood in stool/dark stools, urinary symptoms, penile discharge/testicular pain, leg swelling, rash, recent travel or sick contacts.    Vital Signs: I have reviewed the initial vital signs.  Constitutional: NAD, well-nourished, appears stated age, no acute distress.  HEENT: Airway patent, moist MM, no erythema/swelling/deformity of oral structures. EOMI, PERRLA.  CV: regular rate, regular rhythm, well-perfused extremities, 2+ b/l DP and radial pulses equal.  Lungs: BCTA, no increased WOB.  ABD: NTND, no guarding or rebound, no pulsatile mass, no hernias.   MSK: Neck supple, nontender, nl ROM, no stepoff. Chest nontender. Back nontender in TLS spine or to b/l bony structures or flanks. Ext nontender, nl rom, no deformity.   INTEG: Skin warm, dry, no rash.  NEURO: A&Ox3, normal strength, nl sensation throughout, normal speech.   PSYCH: Calm, cooperative, normal affect and interaction.    Neuro intact, FS in ED initially 150s. Will monitor, get labs, re-eval.

## 2019-08-27 NOTE — ED PROVIDER NOTE - PHYSICAL EXAMINATION
VITAL SIGNS: I have reviewed nursing notes and confirm.  CONSTITUTIONAL: Well-developed; well-nourished; in no acute distress.  SKIN: Skin exam is warm and dry, no acute rash. No petechiae  HEAD: Normocephalic; atraumatic.  NECK: No meningeal signs, full ROM, supple, non-tender  EYES: PERRL, EOM intact; conjunctiva and sclera clear.  ENT: No nasal discharge; airway clear. TMs clear. No exudate, petechiae or significant erythema.  CARD: S1, S2 normal; no murmurs, gallops, or rubs. Regular rate and rhythm.  RESP: No wheezes, rales or rhonchi.  ABD: Normal bowel sounds; soft; non-distended; non-tender; no hepatosplenomegaly.  EXT: Normal ROM. No clubbing, cyanosis or edema.  LYMPH: No acute cervical adenopathy.  NEURO: Grossly unremarkable. No focal deficits.  PSYCH: Cooperative, appropriate.

## 2019-08-27 NOTE — ED ADULT NURSE NOTE - NSIMPLEMENTINTERV_GEN_ALL_ED
Implemented All Fall with Harm Risk Interventions:  [place] to call system. Call bell, personal items and telephone within reach. Instruct patient to call for assistance. Room bathroom lighting operational. Non-slip footwear when patient is off stretcher. Physically safe environment: no spills, clutter or unnecessary equipment. Stretcher in lowest position, wheels locked, appropriate side rails in place. Provide visual cue, wrist band, yellow gown, etc. Monitor gait and stability. Monitor for mental status changes and reorient to person, place, and time. Review medications for side effects contributing to fall risk. Reinforce activity limits and safety measures with patient and family. Provide visual clues: red socks.

## 2019-08-27 NOTE — H&P ADULT - HISTORY OF PRESENT ILLNESS
biba for hypoglycemia.  fs 31 at scene-amp d50 given.In ED repeat fs low and given another amp of d50 and started on D10 drip. Pt reports 2 days of hypoglycemic events. He is taking Glimepiride and victoza at home for his DM.

## 2019-08-27 NOTE — ED PROVIDER NOTE - OBJECTIVE STATEMENT
pt is a 80 yo M  w/ PMH of CHF, COPD (home O2 as needed), DM, HTN, CAD s/p CABG and 1 stent, BPH, and recent admission for CHF exacerbation presenting with two days of "falls" . Per patients family in the past 2 days patient has been found down on the floor after unwitnessed falls. First episode was yesterday when son came to find him on the floor. Unclear how long he was down. Unknown if there was any head trauma .  Patient had similar episode today where son found him on the floor in front of the couch.   Patient does not remember events surrounding either of these falls.  He reports this morning he remembers waking up at 5am, he went to lie down on the couch and that is all he remembers. pt is a 78 yo M  w/ PMH of CHF, COPD (home O2 as needed), DM, HTN, CAD s/p CABG and 1 stent, BPH, and recent admission for CHF exacerbation presenting with two days of "falls" and recurrent episodes of hypoglycemia on home BG monitor. Per patients family in the past 2 days patient has been found down on the floor after unwitnessed falls. First episode was yesterday when son came to find him on the floor. Unclear how long he was down. Unknown head trauma .  Patient had similar episode today where son found him on the floor in front of the couch.   Patient does not remember events surrounding either of these falls.  He reports this morning he remembers waking up at 5am, he went to lie down on the couch and that is all he remembers.   In this same time period family notes at home patient's BG has been low on finger sticks shortly after meals ( as low as 40's).  He has been eating his usual meals. The only significant change to diet since being discharged from hospital has fluid restriction. Patient is currently on Victoza 1.8mg 1 a day. He reports he used to also be on Glimepiride , lantus and lispro but due to recent weightloss PCP stopped all other diabetic medications. pt is a 78 yo M  w/ PMH of CHF, COPD (on home O2 3.5 L), DM, HTN, CAD s/p CABG and 1 stent, BPH, and recent admission for CHF exacerbation (08/2019)  presenting with two days of recurrent episodes of hypoglycemia on home BG monitor and syncopal episodes. Per patients family, in the past 2 days patient's BG has been low on finger sticks shortly after meals ( as low as 40's). He has also had 2 episodes of unwitnessed syncopal falls, occurring in the morning for the past 2 days.  Per son at bedside, yesterday morning he came upstairs to find patient on the ground. Unclear how long he was down. Unknown head trauma .  (+) blood thinner. Patient had similar episode again this AM  and son found him on the floor in front of the couch.  Patient does not remember events surrounding either of these falls.  He reports this morning he remembers waking up at 5am,  went to lie down on the couch and that is all he remembers surrounding the event.   He reports he has been eating his usual meals. The only significant change to diet since being discharged from hospital has fluid restriction. Patient is currently on Victoza 1x a day. He reports he used to be on other diabetic medications but due to recent weightloss PCP stopped all other diabetic medications. Per chart review, patient has previously been on Glimepiride , lantus and lispro.    Denies any n/v/f/c, acute vision changes, HA , dizziness . pt is a 78 yo M  w/ PMH of CHF, COPD (on home O2 3.5 L), DM, HTN, CAD s/p CABG and 1 stent, BPH, and recent admission for CHF exacerbation (08/2019)  presenting with recurrent episodes of hypoglycemia on home BG monitor and syncopal episodes x2. Per patients family, for the past few days patient's BG has been low on finger sticks shortly after meals ( as low as 40's). He has also had 2 episodes of unwitnessed syncopal falls, occurring in the morning for the past 2 days.  Per son at bedside, yesterday morning he came upstairs to find patient on the ground. Unclear how long he was down. Unknown head trauma .  (+) blood thinner. Patient had similar episode again this AM  and son found him on the floor in front of the couch.  Patient does not remember events surrounding either of these falls.  He reports this morning he remembers waking up at 5am,  went to lie down on the couch and that is all he remembers surrounding the event.   Pt and family reports he has been eating his usual meals. The only significant change to diet since being discharged from hospital 4 days ago is oral fluid restriction. Patient is currently on Victoza 1x a day. He reports he used to be on multiple diabetic medications but due to recent weightloss PCP stopped all other diabetic medications. Per chart review, patient has previously been on Glimepiride , lantus and lispro.    Denies any n/v/f/c, acute vision changes, HA , dizziness . pt is a 78 yo M  w/ PMH of CHF, COPD (on home O2 3.5 L), DM, HTN, CAD s/p CABG and 1 stent, BPH, and recent admission for CHF exacerbation (08/2019)  presenting with recurrent episodes of hypoglycemia on home BG monitor and syncopal episodes x2. Per patients family, for the past few days patient's BG has been low on finger sticks shortly after meals ( as low as 40's). He has also had 2 episodes of unwitnessed syncopal falls, occurring in the morning for the past 2 days.  Per son at bedside, yesterday morning he came upstairs to find patient on the ground. Pt appeared confused and altered initially but returned to baseline shortly. Unclear how long he was down. Unknown if there was head trauma . Patient had similar episode again this AM  and son found him on the floor in front of the couch.  Patient does not remember events surrounding either of these falls.  He reports this morning he remembers waking up at 5am,  went to lie down on the couch and that is all he remembers surrounding the event.   Pt and family reports he has been eating his usual meals. The only significant change to diet since being discharged from hospital 4 days ago is oral fluid restriction. Patient is currently on Victoza 1x a day. He reports he used to be on multiple diabetic medications but due to recent weightloss PCP stopped all other diabetic medications. Per chart review, patient has previously been on Glimepiride , lantus and lispro.    Denies any n/v/f/c, acute vision changes, HA , dizziness . pt is a 80 yo M  w/ PMH of CHF, COPD (on home O2 3.5 L), DM, HTN, CAD s/p CABG and 1 stent, BPH, and recent admission for CHF exacerbation (08/2019)  presenting with recurrent episodes of hypoglycemia on home BG monitor and syncopal episodes x2. Per patients family, for the past few days patient's BG has been low on finger sticks shortly after meals ( as low as 40's). He has also had 2 episodes of unwitnessed syncopal falls, occurring in the morning for the past 2 days.  Per son at bedside, yesterday morning he came upstairs to find patient on the ground. Pt appeared confused and altered initially but returned to baseline shortly. Unclear how long he was down. Unknown if there was head trauma . Patient had similar episode again this AM  and son found him on the floor in front of the couch.  Patient does not remember events surrounding either of these falls.  He reports this morning he remembers waking up at 5am,  went to lie down on the couch and that is all he remembers surrounding the event.   Pt and family reports he has been eating his usual meals. The only significant change to diet since being discharged from hospital 4 days ago is oral fluid restriction. Patient is currently on Victoza 1x a day and Glimepride 4mg a day. He reports he used to be on multiple diabetic medications but was told by a physician he was ok to continue on just Victoza and half his usual dose of Glimepride. Per chart review, patient has previously been on Glimepiride , lantus and lispro.    Denies any n/v/f/c, acute vision changes, HA , dizziness . pt is a 80 yo M  w/ PMH of CHF, COPD (on home O2 3.5 L), DM, HTN, CAD s/p CABG and 1 stent, BPH, and recent admission for CHF exacerbation (08/2019)  presenting with recurrent episodes of hypoglycemia on home BG monitor and syncopal episodes x2. Per patients family, for the past few days patient's BG has been low on finger sticks shortly after meals ( as low as 40's). He has also had 2 episodes of unwitnessed syncopal falls, occurring in the morning for the past 2 days.  Per son at bedside, yesterday morning he came upstairs to find patient on the ground. Pt appeared confused and altered initially but returned to baseline shortly. Unclear how long he was down. Unknown if there was head trauma . Patient had similar episode again this AM  and son found him on the floor in front of the couch.  Patient does not remember events surrounding either of these falls.  He reports this morning he remembers waking up at 5am,  went to lie down on the couch and that is all he remembers surrounding the event.   Pt and family reports he has been eating his usual meals. The only significant change to diet since being discharged from hospital 4 days ago is oral fluid restriction. Patient is currently on Victoza 1x a day and Glimepride 4mg a day. Last took his dosage today. He reports he used to be on multiple diabetic medications but was told by a physician he was ok to continue on just Victoza and half his usual dose of Glimepride. Per chart review, patient has previously been on Victoza, Glimepiride , lantus and lispro recently     Denies any n/v/f/c, acute vision changes, HA , dizziness .

## 2019-08-27 NOTE — H&P ADULT - NSHPLABSRESULTS_GEN_ALL_CORE
LABS  08-27    142  |  100  |  48<H>  ----------------------------<  65<L>  5.7<H>   |  33<H>  |  1.5    Ca    9.4      27 Aug 2019 10:00    TPro  7.5  /  Alb  3.9  /  TBili  0.6  /  DBili  x   /  AST  42<H>  /  ALT  23  /  AlkPhos  41  08-27                          14.1   10.08 )-----------( 209      ( 27 Aug 2019 10:00 )             44.2       PT/INR - ( 27 Aug 2019 10:00 )   PT: 13.20 sec;   INR: 1.15 ratio         PTT - ( 27 Aug 2019 10:00 )  PTT:27.4 sec    EKG - A-flutter @ 64    < from: CT Chest w/ IV Cont (08.27.19 @ 11:51) >    IMPRESSION:    No evidence for acute findings in the abdomen or pelvis.    Small right pleural effusion.      < end of copied text >    < from: CT Cervical Spine No Cont (08.27.19 @ 11:54) >    IMPRESSION:    1.  No evidence of acute cervical spine fracture or subluxation.    < end of copied text >    < from: CT Abdomen and Pelvis w/ IV Cont (08.27.19 @ 11:51) >    CHEST:    LUNGS, PLEURA, AIRWAYS: Small right pleural effusion is seen. There is no   pneumothorax. No focal consolidation is seen. There is mild bibasilar   dependent atelectasis.     < end of copied text >

## 2019-08-27 NOTE — ED PROVIDER NOTE - PROGRESS NOTE DETAILS
patient refusing pelvic xray. Spoke with . Recommends monitoring him on Octreotide for 24 hrs, q 1 hr BG checks . Once cleared for scans, stop D10 , feed him complex carb meals and continue finger sticks. patient fed complex Carb diet. Tolerating well. SELENA: Most recent FS as of now 123. Was 86 despite D10 drip. Spoke with Dr. Ayers who approved admission ICU. Reconciled medication list with patient to clarify . Per patient he is taking victoza and glimepiride currently. Took it as recently as this morning. Recently cut his medication dosage in g Reconciled medication list with patient to clarify . Patient now endorsing he is taking victoza and glimepiride currently. He reports he was told to stop taking all other medications by his doctor and told to cut his usual glimepiride dose in half ( from 8 to 4). During previous interview patient reported he was only taking victoza. recent  post-prandial BG @ 244. Likely 2/2 to complex Carb meal,  Octreotide and with D10 drip.  DC'd D 10 . patient refusing pelvic xray. Patient with recurrent episodes of hypoglycemia in ED - given D50 1amp and then started on D10 gtt. Will continue to monitor.

## 2019-08-28 LAB
ANION GAP SERPL CALC-SCNC: 12 MMOL/L — SIGNIFICANT CHANGE UP (ref 7–14)
BUN SERPL-MCNC: 42 MG/DL — HIGH (ref 10–20)
CALCIUM SERPL-MCNC: 9.2 MG/DL — SIGNIFICANT CHANGE UP (ref 8.5–10.1)
CHLORIDE SERPL-SCNC: 101 MMOL/L — SIGNIFICANT CHANGE UP (ref 98–110)
CO2 SERPL-SCNC: 29 MMOL/L — SIGNIFICANT CHANGE UP (ref 17–32)
CREAT SERPL-MCNC: 1.5 MG/DL — SIGNIFICANT CHANGE UP (ref 0.7–1.5)
GLUCOSE BLDC GLUCOMTR-MCNC: 112 MG/DL — HIGH (ref 70–99)
GLUCOSE BLDC GLUCOMTR-MCNC: 113 MG/DL — HIGH (ref 70–99)
GLUCOSE BLDC GLUCOMTR-MCNC: 155 MG/DL — HIGH (ref 70–99)
GLUCOSE BLDC GLUCOMTR-MCNC: 174 MG/DL — HIGH (ref 70–99)
GLUCOSE BLDC GLUCOMTR-MCNC: 235 MG/DL — HIGH (ref 70–99)
GLUCOSE BLDC GLUCOMTR-MCNC: 236 MG/DL — HIGH (ref 70–99)
GLUCOSE BLDC GLUCOMTR-MCNC: 334 MG/DL — HIGH (ref 70–99)
GLUCOSE BLDC GLUCOMTR-MCNC: 355 MG/DL — HIGH (ref 70–99)
GLUCOSE SERPL-MCNC: 144 MG/DL — HIGH (ref 70–99)
POTASSIUM SERPL-MCNC: 4.5 MMOL/L — SIGNIFICANT CHANGE UP (ref 3.5–5)
POTASSIUM SERPL-SCNC: 4.5 MMOL/L — SIGNIFICANT CHANGE UP (ref 3.5–5)
SODIUM SERPL-SCNC: 142 MMOL/L — SIGNIFICANT CHANGE UP (ref 135–146)

## 2019-08-28 PROCEDURE — 99233 SBSQ HOSP IP/OBS HIGH 50: CPT

## 2019-08-28 PROCEDURE — 99223 1ST HOSP IP/OBS HIGH 75: CPT

## 2019-08-28 RX ORDER — DEXTROSE 50 % IN WATER 50 %
25 SYRINGE (ML) INTRAVENOUS ONCE
Refills: 0 | Status: DISCONTINUED | OUTPATIENT
Start: 2019-08-28 | End: 2019-08-30

## 2019-08-28 RX ORDER — DEXTROSE 50 % IN WATER 50 %
15 SYRINGE (ML) INTRAVENOUS ONCE
Refills: 0 | Status: DISCONTINUED | OUTPATIENT
Start: 2019-08-28 | End: 2019-08-30

## 2019-08-28 RX ORDER — GLUCAGON INJECTION, SOLUTION 0.5 MG/.1ML
1 INJECTION, SOLUTION SUBCUTANEOUS ONCE
Refills: 0 | Status: DISCONTINUED | OUTPATIENT
Start: 2019-08-28 | End: 2019-08-30

## 2019-08-28 RX ORDER — DEXTROSE 50 % IN WATER 50 %
12.5 SYRINGE (ML) INTRAVENOUS ONCE
Refills: 0 | Status: DISCONTINUED | OUTPATIENT
Start: 2019-08-28 | End: 2019-08-30

## 2019-08-28 RX ORDER — INSULIN HUMAN 100 [IU]/ML
5 INJECTION, SOLUTION SUBCUTANEOUS ONCE
Refills: 0 | Status: COMPLETED | OUTPATIENT
Start: 2019-08-28 | End: 2019-08-28

## 2019-08-28 RX ORDER — SODIUM CHLORIDE 9 MG/ML
1000 INJECTION, SOLUTION INTRAVENOUS
Refills: 0 | Status: DISCONTINUED | OUTPATIENT
Start: 2019-08-28 | End: 2019-08-30

## 2019-08-28 RX ORDER — INSULIN LISPRO 100/ML
4 VIAL (ML) SUBCUTANEOUS
Refills: 0 | Status: DISCONTINUED | OUTPATIENT
Start: 2019-08-28 | End: 2019-08-30

## 2019-08-28 RX ORDER — METOPROLOL TARTRATE 50 MG
50 TABLET ORAL DAILY
Refills: 0 | Status: DISCONTINUED | OUTPATIENT
Start: 2019-08-28 | End: 2019-08-30

## 2019-08-28 RX ORDER — INSULIN GLARGINE 100 [IU]/ML
12 INJECTION, SOLUTION SUBCUTANEOUS AT BEDTIME
Refills: 0 | Status: DISCONTINUED | OUTPATIENT
Start: 2019-08-28 | End: 2019-08-30

## 2019-08-28 RX ORDER — INSULIN LISPRO 100/ML
VIAL (ML) SUBCUTANEOUS
Refills: 0 | Status: DISCONTINUED | OUTPATIENT
Start: 2019-08-28 | End: 2019-08-30

## 2019-08-28 RX ADMIN — Medication 145 MILLIGRAM(S): at 11:14

## 2019-08-28 RX ADMIN — Medication 4 UNIT(S): at 17:19

## 2019-08-28 RX ADMIN — Medication 81 MILLIGRAM(S): at 11:14

## 2019-08-28 RX ADMIN — Medication 1 APPLICATION(S): at 21:26

## 2019-08-28 RX ADMIN — INSULIN HUMAN 5 UNIT(S): 100 INJECTION, SOLUTION SUBCUTANEOUS at 03:30

## 2019-08-28 RX ADMIN — LOSARTAN POTASSIUM 50 MILLIGRAM(S): 100 TABLET, FILM COATED ORAL at 05:39

## 2019-08-28 RX ADMIN — FINASTERIDE 5 MILLIGRAM(S): 5 TABLET, FILM COATED ORAL at 11:14

## 2019-08-28 RX ADMIN — Medication 40 MILLIGRAM(S): at 07:01

## 2019-08-28 RX ADMIN — ISOSORBIDE MONONITRATE 30 MILLIGRAM(S): 60 TABLET, EXTENDED RELEASE ORAL at 11:14

## 2019-08-28 RX ADMIN — AMLODIPINE BESYLATE 5 MILLIGRAM(S): 2.5 TABLET ORAL at 05:39

## 2019-08-28 RX ADMIN — Medication 8: at 17:21

## 2019-08-28 RX ADMIN — RIVAROXABAN 15 MILLIGRAM(S): KIT at 17:16

## 2019-08-28 NOTE — CONSULT NOTE ADULT - ASSESSMENT
79m w DM, HTN, COPD, BPH, & CAD/CABG/CHF. Pt w recurrent episodes of hypoglycemia after recent medication adjustment and currently taking Glimepiride & Victoza.    --Continue supportive care & monitoring.  --Hold oral hypoglycemic agents and diabetes medications including patient's Glimepiride & Victoza. Can restart medications 24 hours after hypoglycemia has resolved.  --Phone consultation w ED recommended feeding patient at this time and throughout hospital course. No indication for dextrose infusion as long as patient is eating. Dextrose infusion does not provide adequate caloric intake.  --Please check for possible infections as source of recurrent hypoglycemia. Please check an EKG as well.  --Check capillary blood glucose q1hr or sooner if any symptoms of hypoglycemia arise.  --If patient becomes hypoglycemic, feed patient if mental status adequate and protecting airway. If patient has altered mental status or cannot protect airway, please give d50 1-2g/kg (4 ampules in this patient)  --Since patient has had recurrence of hypoglycemia, patient already given Octreotide 50mcg SQx1 in ED. Pt requires capillary blood glucose q1hr or sooner if any symptoms of hypoglycemia arise for the next 24 hours after receiving Octreotide. If hypoglycemia recurs, Octreotide 50mcg SQ can be given as needed q6hr for up to 24 hours.  --No episodes of hypoglycemia during overnight observation.  --Electrolytes, renal function, & LFT's ok.   --Consider alternate medications for glycemic control on discharge planning as both glimepiride and victoza can both cause hypoglycemia.  --Will continue to follow. Please call with any further questions or changes in status.    Chava    [phone] (pager) 79m w DM, HTN, COPD, BPH, & CAD/CABG/CHF. Pt w recurrent episodes of hypoglycemia after recent medication adjustment and currently taking Glimepiride & Victoza.    --Continue supportive care & monitoring.  --Hold oral hypoglycemic agents and diabetes medications including patient's Glimepiride & Victoza. Can restart medications 24 hours after hypoglycemia has resolved.  --Phone consultation w ED recommended feeding patient at this time and throughout hospital course. No indication for dextrose infusion as long as patient is eating. Dextrose infusion does not provide adequate caloric intake.  --Please check for possible infections as source of recurrent hypoglycemia. Please check an EKG as well.  --Check capillary blood glucose q1hr or sooner if any symptoms of hypoglycemia arise.  --If patient becomes hypoglycemic, feed patient if mental status adequate and protecting airway. If patient has altered mental status or cannot protect airway, please give d50 1-2g/kg (4 ampules in this patient)  --Since patient has had recurrence of hypoglycemia, patient already given Octreotide 50mcg SQx1 in ED. Pt requires capillary blood glucose q1hr or sooner if any symptoms of hypoglycemia arise for the next 24 hours after receiving Octreotide. If hypoglycemia recurs, Octreotide 50mcg SQ can be given as needed q6hr for up to 24 hours.  --No episodes of hypoglycemia during overnight observation. No further need for Octreotide.  --Electrolytes, renal function, & LFT's ok.   --Consider alternate medications for glycemic control on discharge planning as both glimepiride and victoza can both cause hypoglycemia.  --Will continue to follow. Please call with any further questions or changes in status.    Chava    [phone] (pager)

## 2019-08-28 NOTE — PROGRESS NOTE ADULT - SUBJECTIVE AND OBJECTIVE BOX
SUBJECTIVE:    Patient is a 79y old Male who presents with a chief complaint of hypoglycemia (28 Aug 2019 10:28)    Currently admitted to medicine with the primary diagnosis of Hypoglycemia     Today is hospital day 1d. This morning he is resting comfortably in bed. No hypoglycemic event    PAST MEDICAL & SURGICAL HISTORY  BPH (benign prostatic hyperplasia)  CHF (congestive heart failure)  COPD (chronic obstructive pulmonary disease)  Diabetes  HTN (hypertension)  H/O heart artery stent  S/P CABG x 3    SOCIAL HISTORY:  Negative for smoking/alcohol/drug use.     Home Medications:  Home Medications:  alfuzosin 10 mg oral tablet, extended release: 1 tab(s) orally once a day (18 Aug 2019 23:08)  ALPRAZolam 0.5 mg oral tablet: 1 tab(s) orally 2 times a day, As Needed (18 Aug 2019 23:08)  amLODIPine 5 mg oral tablet: 1 tab(s) orally once a day (18 Aug 2019 23:08)  aspirin 81 mg oral tablet: 1 tab(s) orally once a day (18 Aug 2019 23:08)  dutasteride 0.5 mg oral capsule: 1 cap(s) orally once a day (18 Aug 2019 23:08)  fenofibrate 145 mg oral tablet: 1 tab(s) orally once a day (18 Aug 2019 23:08)  glimepiride 4 mg oral tablet: orally 2 times a day (18 Aug 2019 23:08)  HumaLOG Mix 50/50 Pen subcutaneous suspension: subcutaneous once a day (18 Aug 2019 23:08)  isosorbide mononitrate 30 mg oral tablet, extended release: 1 tab(s) orally once a day (in the morning) (18 Aug 2019 23:08)  losartan 50 mg oral tablet: orally 2 times a day (18 Aug 2019 23:08)  rivaroxaban 15 mg oral tablet: 1 tab(s) orally once a day (before a meal) (22 Aug 2019 07:48)  Victoza: subcutaneous once a day (18 Aug 2019 23:08)  Welchol 625 mg oral tablet: 3 tab(s) orally once a day (18 Aug 2019 23:08)      ALLERGIES:  No Known Allergies    MEDICATIONS:  STANDING MEDICATIONS  amLODIPine   Tablet 5 milliGRAM(s) Oral daily  aspirin  chewable 81 milliGRAM(s) Oral daily  dextrose 5%. 1000 milliLiter(s) IV Continuous <Continuous>  dextrose 50% Injectable 12.5 Gram(s) IV Push once  dextrose 50% Injectable 25 Gram(s) IV Push once  dextrose 50% Injectable 25 Gram(s) IV Push once  fenofibrate Tablet 145 milliGRAM(s) Oral daily  finasteride 5 milliGRAM(s) Oral daily  furosemide    Tablet 40 milliGRAM(s) Oral daily  insulin glargine Injectable (LANTUS) 12 Unit(s) SubCutaneous at bedtime  insulin lispro (HumaLOG) corrective regimen sliding scale   SubCutaneous three times a day before meals  insulin lispro Injectable (HumaLOG) 4 Unit(s) SubCutaneous three times a day before meals  isosorbide   mononitrate ER Tablet (IMDUR) 30 milliGRAM(s) Oral daily  losartan 50 milliGRAM(s) Oral daily  metoprolol succinate ER 50 milliGRAM(s) Oral daily  rivaroxaban 15 milliGRAM(s) Oral with dinner    PRN MEDICATIONS  ALPRAZolam 0.5 milliGRAM(s) Oral two times a day PRN  dextrose 40% Gel 15 Gram(s) Oral once PRN  glucagon  Injectable 1 milliGRAM(s) IntraMuscular once PRN  zolpidem 5 milliGRAM(s) Oral at bedtime PRN    VITALS:   Vital Signs Last 24 Hrs  T(C): 35.9 (28 Aug 2019 13:00), Max: 36.6 (27 Aug 2019 19:00)  T(F): 96.7 (28 Aug 2019 13:00), Max: 97.9 (27 Aug 2019 19:00)  HR: 73 (28 Aug 2019 12:59) (43 - 73)  BP: 126/62 (28 Aug 2019 12:59) (102/60 - 145/67)  BP(mean): 89 (28 Aug 2019 12:59) (74 - 90)  RR: 18 (28 Aug 2019 09:00) (16 - 22)  SpO2: 97% (28 Aug 2019 11:04) (91% - 99%)  CAPILLARY BLOOD GLUCOSE      POCT Blood Glucose.: 235 mg/dL (28 Aug 2019 11:57)  POCT Blood Glucose.: 155 mg/dL (28 Aug 2019 09:51)  POCT Blood Glucose.: 174 mg/dL (28 Aug 2019 07:05)  POCT Blood Glucose.: 113 mg/dL (28 Aug 2019 05:44)  POCT Blood Glucose.: 236 mg/dL (28 Aug 2019 03:07)  POCT Blood Glucose.: 355 mg/dL (28 Aug 2019 01:06)  POCT Blood Glucose.: 257 mg/dL (27 Aug 2019 23:10)  POCT Blood Glucose.: 274 mg/dL (27 Aug 2019 21:12)  POCT Blood Glucose.: 293 mg/dL (27 Aug 2019 20:13)  POCT Blood Glucose.: 297 mg/dL (27 Aug 2019 19:19)  POCT Blood Glucose.: 333 mg/dL (27 Aug 2019 18:11)  POCT Blood Glucose.: 250 mg/dL (27 Aug 2019 16:40)  POCT Blood Glucose.: 244 mg/dL (27 Aug 2019 15:02)      LABS:                        14.1   10.08 )-----------( 209      ( 27 Aug 2019 10:00 )             44.2     08-28    142  |  101  |  42<H>  ----------------------------<  144<H>  4.5   |  29  |  1.5    Ca    9.2      28 Aug 2019 06:06    TPro  7.5  /  Alb  3.9  /  TBili  0.6  /  DBili  x   /  AST  42<H>  /  ALT  23  /  AlkPhos  41  08-27    PT/INR - ( 27 Aug 2019 10:00 )   PT: 13.20 sec;   INR: 1.15 ratio         PTT - ( 27 Aug 2019 10:00 )  PTT:27.4 sec  Urinalysis Basic - ( 27 Aug 2019 10:36 )    Color: Yellow / Appearance: Clear / S.020 / pH: x  Gluc: x / Ketone: Negative  / Bili: Negative / Urobili: 0.2 mg/dL   Blood: x / Protein: Negative mg/dL / Nitrite: Negative   Leuk Esterase: Trace / RBC: Negative / WBC 1-2 /HPF   Sq Epi: x / Non Sq Epi: Occasional /HPF / Bacteria: x                RADIOLOGY:    PHYSICAL EXAM:  GEN: No acute distress  LUNGS: Clear to auscultation bilaterally   HEART: S1/S2 present. RRR.   ABD: Soft, non-tender, non-distended. Bowel sounds present  EXT: NC/NC/NE/2+PP/PARKER  NEURO: AAOX3 SUBJECTIVE:    Patient is a 79y old Male who presents with a chief complaint of hypoglycemia (28 Aug 2019 10:28)    Currently admitted to medicine with the primary diagnosis of Hypoglycemia     Today is hospital day 1d. This morning he is resting comfortably in bed. No hypoglycemic event    PAST MEDICAL & SURGICAL HISTORY  BPH (benign prostatic hyperplasia)  CHF (congestive heart failure)  COPD (chronic obstructive pulmonary disease)  Diabetes  HTN (hypertension)  H/O heart artery stent  S/P CABG x 3    SOCIAL HISTORY:  Negative for smoking/alcohol/drug use.     Home Medications:  Home Medications:  alfuzosin 10 mg oral tablet, extended release: 1 tab(s) orally once a day (18 Aug 2019 23:08)  ALPRAZolam 0.5 mg oral tablet: 1 tab(s) orally 2 times a day, As Needed (18 Aug 2019 23:08)  amLODIPine 5 mg oral tablet: 1 tab(s) orally once a day (18 Aug 2019 23:08)  aspirin 81 mg oral tablet: 1 tab(s) orally once a day (18 Aug 2019 23:08)  dutasteride 0.5 mg oral capsule: 1 cap(s) orally once a day (18 Aug 2019 23:08)  fenofibrate 145 mg oral tablet: 1 tab(s) orally once a day (18 Aug 2019 23:08)  glimepiride 4 mg oral tablet: orally 2 times a day (18 Aug 2019 23:08)  HumaLOG Mix 50/50 Pen subcutaneous suspension: subcutaneous once a day (18 Aug 2019 23:08)  isosorbide mononitrate 30 mg oral tablet, extended release: 1 tab(s) orally once a day (in the morning) (18 Aug 2019 23:08)  losartan 50 mg oral tablet: orally 2 times a day (18 Aug 2019 23:08)  rivaroxaban 15 mg oral tablet: 1 tab(s) orally once a day (before a meal) (22 Aug 2019 07:48)  Victoza: subcutaneous once a day (18 Aug 2019 23:08)  Welchol 625 mg oral tablet: 3 tab(s) orally once a day (18 Aug 2019 23:08)      ALLERGIES:  No Known Allergies    MEDICATIONS:  STANDING MEDICATIONS  amLODIPine   Tablet 5 milliGRAM(s) Oral daily  aspirin  chewable 81 milliGRAM(s) Oral daily  dextrose 5%. 1000 milliLiter(s) IV Continuous <Continuous>  dextrose 50% Injectable 12.5 Gram(s) IV Push once  dextrose 50% Injectable 25 Gram(s) IV Push once  dextrose 50% Injectable 25 Gram(s) IV Push once  fenofibrate Tablet 145 milliGRAM(s) Oral daily  finasteride 5 milliGRAM(s) Oral daily  furosemide    Tablet 40 milliGRAM(s) Oral daily  insulin glargine Injectable (LANTUS) 12 Unit(s) SubCutaneous at bedtime  insulin lispro (HumaLOG) corrective regimen sliding scale   SubCutaneous three times a day before meals  insulin lispro Injectable (HumaLOG) 4 Unit(s) SubCutaneous three times a day before meals  isosorbide   mononitrate ER Tablet (IMDUR) 30 milliGRAM(s) Oral daily  losartan 50 milliGRAM(s) Oral daily  metoprolol succinate ER 50 milliGRAM(s) Oral daily  rivaroxaban 15 milliGRAM(s) Oral with dinner    PRN MEDICATIONS  ALPRAZolam 0.5 milliGRAM(s) Oral two times a day PRN  dextrose 40% Gel 15 Gram(s) Oral once PRN  glucagon  Injectable 1 milliGRAM(s) IntraMuscular once PRN  zolpidem 5 milliGRAM(s) Oral at bedtime PRN    VITALS:   Vital Signs Last 24 Hrs  T(C): 35.9 (28 Aug 2019 13:00), Max: 36.6 (27 Aug 2019 19:00)  T(F): 96.7 (28 Aug 2019 13:00), Max: 97.9 (27 Aug 2019 19:00)  HR: 73 (28 Aug 2019 12:59) (43 - 73)  BP: 126/62 (28 Aug 2019 12:59) (102/60 - 145/67)  BP(mean): 89 (28 Aug 2019 12:59) (74 - 90)  RR: 18 (28 Aug 2019 09:00) (16 - 22)  SpO2: 97% (28 Aug 2019 11:04) (91% - 99%)  CAPILLARY BLOOD GLUCOSE      POCT Blood Glucose.: 235 mg/dL (28 Aug 2019 11:57)  POCT Blood Glucose.: 155 mg/dL (28 Aug 2019 09:51)  POCT Blood Glucose.: 174 mg/dL (28 Aug 2019 07:05)  POCT Blood Glucose.: 113 mg/dL (28 Aug 2019 05:44)  POCT Blood Glucose.: 236 mg/dL (28 Aug 2019 03:07)  POCT Blood Glucose.: 355 mg/dL (28 Aug 2019 01:06)  POCT Blood Glucose.: 257 mg/dL (27 Aug 2019 23:10)  POCT Blood Glucose.: 274 mg/dL (27 Aug 2019 21:12)  POCT Blood Glucose.: 293 mg/dL (27 Aug 2019 20:13)  POCT Blood Glucose.: 297 mg/dL (27 Aug 2019 19:19)  POCT Blood Glucose.: 333 mg/dL (27 Aug 2019 18:11)  POCT Blood Glucose.: 250 mg/dL (27 Aug 2019 16:40)  POCT Blood Glucose.: 244 mg/dL (27 Aug 2019 15:02)      LABS:                        14.1   10.08 )-----------( 209      ( 27 Aug 2019 10:00 )             44.2     08-28    142  |  101  |  42<H>  ----------------------------<  144<H>  4.5   |  29  |  1.5    Ca    9.2      28 Aug 2019 06:06    TPro  7.5  /  Alb  3.9  /  TBili  0.6  /  DBili  x   /  AST  42<H>  /  ALT  23  /  AlkPhos  41  08-27    PT/INR - ( 27 Aug 2019 10:00 )   PT: 13.20 sec;   INR: 1.15 ratio         PTT - ( 27 Aug 2019 10:00 )  PTT:27.4 sec  Urinalysis Basic - ( 27 Aug 2019 10:36 )    Color: Yellow / Appearance: Clear / S.020 / pH: x  Gluc: x / Ketone: Negative  / Bili: Negative / Urobili: 0.2 mg/dL   Blood: x / Protein: Negative mg/dL / Nitrite: Negative   Leuk Esterase: Trace / RBC: Negative / WBC 1-2 /HPF   Sq Epi: x / Non Sq Epi: Occasional /HPF / Bacteria: x      RADIOLOGY:    PHYSICAL EXAM:  GEN: No acute distress  LUNGS: rhonchi appreciated b/l  HEART: irregular   ABD: Soft, non-tender, non-distended. Bowel sounds present  EXT: 1+ pedal edema at baseline  NEURO: AAOX3

## 2019-08-28 NOTE — CONSULT NOTE ADULT - ASSESSMENT
IMPRESSION:  hypoglycemia   DM   CHF   CKD     PLAN:    CNS: juan carlos sedation     HEENT: oral care     PULMONARY: keep pox >92%     CARDIOVASCULAR: continue lasix and cardiac meds     GI: GI prophylaxis.  Feeding     RENAL: follow lytes     INFECTIOUS DISEASE: no abx     HEMATOLOGICAL:  DVT prophylaxis.    ENDOCRINE:  Follow up FS. off IV fluid off oral hyperglycemic meds   might start lantus will evaluate   endo consult   MUSCULOSKELETAL:        CRITICAL CARE TIME SPENT: ***

## 2019-08-28 NOTE — CONSULT NOTE ADULT - SUBJECTIVE AND OBJECTIVE BOX
Time:19 @ 10:28  [facility] 3[facility]- 1  Routine    Chief Complaint: Hypoglycemia    History of Present Illness:  79m w a hx of DM, HTN, COPD, BPH, & CAD/CABG/CHF. Pt w recent admit for CHF exacerbation. Pt presents w recurrent episodes of hypoglycemia & falls for the past few days since discharge from hospital. Symptoms are moderate, intermittent, no exacerbating/alleviating. Pt had fall & confusion on day of ED presentation. Pt reports eating usual meals but decreased liquid intake. Pt currently taking Glimepiride & Victoza but previously was on insulin as well. During ED stay patient was given Octreotide & D10 until after CT's performed to r/o Traumatic injury. Pt denies any self-harm attempt or OD, denies taking extras of his medications.      Past Medical History:  BPH (benign prostatic hyperplasia)  CHF (congestive heart failure)  COPD (chronic obstructive pulmonary disease)  Diabetes  HTN (hypertension)  CAD, H/O heart artery stent, S/P CABG x 3      Home Medications:  alfuzosin 10 mg oral tablet, extended release: 1 tab(s) orally once a day (18 Aug 2019 23:08)  ALPRAZolam 0.5 mg oral tablet: 1 tab(s) orally 2 times a day, As Needed (18 Aug 2019 23:08)  amLODIPine 5 mg oral tablet: 1 tab(s) orally once a day (18 Aug 2019 23:08)  aspirin 81 mg oral tablet: 1 tab(s) orally once a day (18 Aug 2019 23:08)  dutasteride 0.5 mg oral capsule: 1 cap(s) orally once a day (18 Aug 2019 23:08)  fenofibrate 145 mg oral tablet: 1 tab(s) orally once a day (18 Aug 2019 23:08)  glimepiride 4 mg oral tablet: orally 2 times a day (18 Aug 2019 23:08)  HumaLOG Mix 50/50 Pen subcutaneous suspension: subcutaneous once a day (18 Aug 2019 23:08)  isosorbide mononitrate 30 mg oral tablet, extended release: 1 tab(s) orally once a day (in the morning) (18 Aug 2019 23:08)  losartan 50 mg oral tablet: orally 2 times a day (18 Aug 2019 23:08)  rivaroxaban 15 mg oral tablet: 1 tab(s) orally once a day (before a meal) (22 Aug 2019 07:48)  Victoza: subcutaneous once a day (18 Aug 2019 23:08)  Welchol 625 mg oral tablet: 3 tab(s) orally once a day (18 Aug 2019 23:08)      Active Medications:  MEDICATIONS  (STANDING):  amLODIPine   Tablet 5 milliGRAM(s) Oral daily  aspirin  chewable 81 milliGRAM(s) Oral daily  fenofibrate Tablet 145 milliGRAM(s) Oral daily  finasteride 5 milliGRAM(s) Oral daily  furosemide    Tablet 40 milliGRAM(s) Oral daily  isosorbide   mononitrate ER Tablet (IMDUR) 30 milliGRAM(s) Oral daily  losartan 50 milliGRAM(s) Oral daily  metoprolol succinate ER 50 milliGRAM(s) Oral daily  rivaroxaban 15 milliGRAM(s) Oral with dinner    MEDICATIONS  (PRN):  ALPRAZolam 0.5 milliGRAM(s) Oral two times a day PRN anxiety  zolpidem 5 milliGRAM(s) Oral at bedtime PRN Insomnia      Social History:  Tobacco:   Former  EtOH:   Denies  Drugs: Denies      Review of Systems:  Constitutional:  No fever or chills.   Eyes:  Negative.   ENMT:  No nasal congestion, discharge, or throat pain.   Cardiac:  No chest pain or palpitations. Chronic b/l LE edema improved.  Respiratory:  No dyspnea, wheezing, or cough. No hemoptysis.  GI:  No vomiting, diarrhea, or abdominal pain. No melena or hematochezia.  :  No dysuria or hematuria.   Musculoskeletal:  No joint swelling, joint pain, or back pain.  Skin:  No skin rash, jaundice, or lesions.  Neuro:  No headache, loss of sensation, or focal weakness.        Vital Signs Last 24 Hrs  T(C): 36 (28 Aug 2019 09:00), Max: 36.6 (27 Aug 2019 19:00)  T(F): 96.8 (28 Aug 2019 09:00), Max: 97.9 (27 Aug 2019 19:00)  HR: 44 (28 Aug 2019 06:57) (43 - 69)  BP: 125/59 (28 Aug 2019 06:57) (102/60 - 145/67)  BP(mean): 85 (28 Aug 2019 06:57) (76 - 90)  RR: 18 (28 Aug 2019 09:00) (16 - 22)  SpO2: 98% (28 Aug 2019 06:57) (94% - 99%)    CAPILLARY BLOOD GLUCOSE  POCT Blood Glucose.: 155 mg/dL (28 Aug 2019 09:51)  POCT Blood Glucose.: 174 mg/dL (28 Aug 2019 07:05)  POCT Blood Glucose.: 113 mg/dL (28 Aug 2019 05:44)  POCT Blood Glucose.: 236 mg/dL (28 Aug 2019 03:07)  POCT Blood Glucose.: 355 mg/dL (28 Aug 2019 01:06)  POCT Blood Glucose.: 257 mg/dL (27 Aug 2019 23:10)  POCT Blood Glucose.: 274 mg/dL (27 Aug 2019 21:12)  POCT Blood Glucose.: 293 mg/dL (27 Aug 2019 20:13)  POCT Blood Glucose.: 297 mg/dL (27 Aug 2019 19:19)  POCT Blood Glucose.: 333 mg/dL (27 Aug 2019 18:11)  POCT Blood Glucose.: 250 mg/dL (27 Aug 2019 16:40)  POCT Blood Glucose.: 244 mg/dL (27 Aug 2019 15:02)  POCT Blood Glucose.: 160 mg/dL (27 Aug 2019 13:59)  POCT Blood Glucose.: 123 mg/dL (27 Aug 2019 13:05)  POCT Blood Glucose.: 84 mg/dL (27 Aug 2019 12:19)  POCT Blood Glucose.: 116 mg/dL (27 Aug 2019 10:46)      Physical Exam:  General: Awake, alert, NAD, WDWN, non-toxic appearing, NCAT  Eyes: PERRL 3mm, EOMI, no icterus, lids and conjunctivae are normal  ENT: External inspection normal, pink/moist membranes, pharynx normal  CV: S1S2, regular rate and rhythm, no murmur/gallops/rubs, no JVD, 2+ pulses b/l, b/l mild edema, no cords/homans, warm/well-perfused  Respiratory: Normal respiratory rate/effort, no respiratory distress, normal voice, speaking full sentences, lungs clear to auscultation b/l, no wheezing/rales/rhonchi, no retractions, no stridor  Abdomen: Soft abdomen, no tender/distended/guarding/rebound, no CVA tender  Musculoskeletal: FROM all 4 extremities, N/V intact, normal tone.  Neck: FROM neck, supple, no meningismus, trachea midline, no JVD  Integumentary: Color normal for race, warm and dry, no rash  Neuro: Oriented x3, CN 2-12 intact, normal motor, normal sensory  Psych: Oriented x3, mood normal, affect normal, denies SI/HI, denies AV hallucinations    GCS:  E:   4/4  V:   5/5  M:   6/6    EKG: a-flutter @64, QRS/QTC: 126/425  Labs:                        14.1   10.08 )-----------( 209      ( 27 Aug 2019 10:00 )             44.2     08-28    142  |  101  |  42<H>  ----------------------------<  144<H>  4.5   |  29  |  1.5    Ca    9.2      28 Aug 2019 06:06    TPro  7.5  /  Alb  3.9  /  TBili  0.6  /  DBili  x   /  AST  42<H>  /  ALT  23  /  AlkPhos  41  08-27    PT/INR - ( 27 Aug 2019 10:00 )   PT: 13.20 sec;   INR: 1.15 ratio    PTT - ( 27 Aug 2019 10:00 )  PTT:27.4 sec    Urinalysis Basic - ( 27 Aug 2019 10:36 )  Color: Yellow / Appearance: Clear / S.020 / pH: x  Gluc: x / Ketone: Negative  / Bili: Negative / Urobili: 0.2 mg/dL   Blood: x / Protein: Negative mg/dL / Nitrite: Negative   Leuk Esterase: Trace / RBC: Negative / WBC 1-2 /HPF   Sq Epi: x / Non Sq Epi: Occasional /HPF / Bacteria: x      Blood Gas: 7.34/65  Lactate: 1.5 Time:19 @ 10:28  [facility]- 1  Routine    Chief Complaint: Hypoglycemia    History of Present Illness:  79m w a hx of DM, HTN, COPD, BPH, & CAD/CABG/CHF. Pt w recent admit for CHF exacerbation. Pt presents w recurrent episodes of hypoglycemia & falls for the past few days since discharge from hospital. Symptoms are moderate, intermittent, no exacerbating/alleviating. Pt had fall & confusion on day of ED presentation. Pt reports eating usual meals but decreased liquid intake. Pt currently taking Glimepiride & Victoza but previously was on insulin as well. During ED stay patient was given Octreotide & D10 until after CT's performed to r/o Traumatic injury. Pt denies any self-harm attempt or OD, denies taking extras of his medications.      Past Medical History:  BPH (benign prostatic hyperplasia)  CHF (congestive heart failure)  COPD (chronic obstructive pulmonary disease)  Diabetes  HTN (hypertension)  CAD, H/O heart artery stent, S/P CABG x 3      Home Medications:  alfuzosin 10 mg oral tablet, extended release: 1 tab(s) orally once a day (18 Aug 2019 23:08)  ALPRAZolam 0.5 mg oral tablet: 1 tab(s) orally 2 times a day, As Needed (18 Aug 2019 23:08)  amLODIPine 5 mg oral tablet: 1 tab(s) orally once a day (18 Aug 2019 23:08)  aspirin 81 mg oral tablet: 1 tab(s) orally once a day (18 Aug 2019 23:08)  dutasteride 0.5 mg oral capsule: 1 cap(s) orally once a day (18 Aug 2019 23:08)  fenofibrate 145 mg oral tablet: 1 tab(s) orally once a day (18 Aug 2019 23:08)  glimepiride 4 mg oral tablet: orally 2 times a day (18 Aug 2019 23:08)  HumaLOG Mix 50/50 Pen subcutaneous suspension: subcutaneous once a day (18 Aug 2019 23:08)  isosorbide mononitrate 30 mg oral tablet, extended release: 1 tab(s) orally once a day (in the morning) (18 Aug 2019 23:08)  losartan 50 mg oral tablet: orally 2 times a day (18 Aug 2019 23:08)  rivaroxaban 15 mg oral tablet: 1 tab(s) orally once a day (before a meal) (22 Aug 2019 07:48)  Victoza: subcutaneous once a day (18 Aug 2019 23:08)  Welchol 625 mg oral tablet: 3 tab(s) orally once a day (18 Aug 2019 23:08)      Active Medications:  MEDICATIONS  (STANDING):  amLODIPine   Tablet 5 milliGRAM(s) Oral daily  aspirin  chewable 81 milliGRAM(s) Oral daily  fenofibrate Tablet 145 milliGRAM(s) Oral daily  finasteride 5 milliGRAM(s) Oral daily  furosemide    Tablet 40 milliGRAM(s) Oral daily  isosorbide   mononitrate ER Tablet (IMDUR) 30 milliGRAM(s) Oral daily  losartan 50 milliGRAM(s) Oral daily  metoprolol succinate ER 50 milliGRAM(s) Oral daily  rivaroxaban 15 milliGRAM(s) Oral with dinner    MEDICATIONS  (PRN):  ALPRAZolam 0.5 milliGRAM(s) Oral two times a day PRN anxiety  zolpidem 5 milliGRAM(s) Oral at bedtime PRN Insomnia      Social History:  Tobacco:   Former  EtOH:   Denies  Drugs: Denies      Review of Systems:  Constitutional:  No fever or chills.   Eyes:  Negative.   ENMT:  No nasal congestion, discharge, or throat pain.   Cardiac:  No chest pain or palpitations. Chronic b/l LE edema improved.  Respiratory:  No dyspnea, wheezing, or cough. No hemoptysis.  GI:  No vomiting, diarrhea, or abdominal pain. No melena or hematochezia.  :  No dysuria or hematuria.   Musculoskeletal:  No joint swelling, joint pain, or back pain.  Skin:  No skin rash, jaundice, or lesions.  Neuro:  No headache, loss of sensation, or focal weakness.        Vital Signs Last 24 Hrs  T(C): 36 (28 Aug 2019 09:00), Max: 36.6 (27 Aug 2019 19:00)  T(F): 96.8 (28 Aug 2019 09:00), Max: 97.9 (27 Aug 2019 19:00)  HR: 44 (28 Aug 2019 06:57) (43 - 69)  BP: 125/59 (28 Aug 2019 06:57) (102/60 - 145/67)  BP(mean): 85 (28 Aug 2019 06:57) (76 - 90)  RR: 18 (28 Aug 2019 09:00) (16 - 22)  SpO2: 98% (28 Aug 2019 06:57) (94% - 99%)    CAPILLARY BLOOD GLUCOSE  POCT Blood Glucose.: 155 mg/dL (28 Aug 2019 09:51)  POCT Blood Glucose.: 174 mg/dL (28 Aug 2019 07:05)  POCT Blood Glucose.: 113 mg/dL (28 Aug 2019 05:44)  POCT Blood Glucose.: 236 mg/dL (28 Aug 2019 03:07)  POCT Blood Glucose.: 355 mg/dL (28 Aug 2019 01:06)  POCT Blood Glucose.: 257 mg/dL (27 Aug 2019 23:10)  POCT Blood Glucose.: 274 mg/dL (27 Aug 2019 21:12)  POCT Blood Glucose.: 293 mg/dL (27 Aug 2019 20:13)  POCT Blood Glucose.: 297 mg/dL (27 Aug 2019 19:19)  POCT Blood Glucose.: 333 mg/dL (27 Aug 2019 18:11)  POCT Blood Glucose.: 250 mg/dL (27 Aug 2019 16:40)  POCT Blood Glucose.: 244 mg/dL (27 Aug 2019 15:02)  POCT Blood Glucose.: 160 mg/dL (27 Aug 2019 13:59)  POCT Blood Glucose.: 123 mg/dL (27 Aug 2019 13:05)  POCT Blood Glucose.: 84 mg/dL (27 Aug 2019 12:19)  POCT Blood Glucose.: 116 mg/dL (27 Aug 2019 10:46)      Physical Exam:  General: Awake, alert, NAD, WDWN, non-toxic appearing, NCAT  Eyes: PERRL 3mm, EOMI, no icterus, lids and conjunctivae are normal  ENT: External inspection normal, pink/moist membranes, pharynx normal  CV: S1S2, regular rate and rhythm, no murmur/gallops/rubs, no JVD, 2+ pulses b/l, b/l LE mild edema, no cords/homans, warm/well-perfused  Respiratory: Normal respiratory rate/effort, no respiratory distress, normal voice, speaking full sentences, lungs clear to auscultation b/l, no wheezing/rales/rhonchi, no retractions, no stridor  Abdomen: Soft abdomen, no tender/distended/guarding/rebound, no CVA tender  Musculoskeletal: FROM all 4 extremities, N/V intact, normal tone.  Neck: FROM neck, supple, no meningismus, trachea midline, no JVD  Integumentary: Color normal for race, warm and dry, no rash  Neuro: Oriented x3, CN 2-12 intact, normal motor, normal sensory  Psych: Oriented x3, mood normal, affect normal, denies SI/HI, denies AV hallucinations    GCS:  E:   4/4  V:   5/5  M:   6/6    EKG: a-flutter @64, QRS/QTC: 126/425  Labs:                        14.1   10.08 )-----------( 209      ( 27 Aug 2019 10:00 )             44.2     08-28    142  |  101  |  42<H>  ----------------------------<  144<H>  4.5   |  29  |  1.5    Ca    9.2      28 Aug 2019 06:06    TPro  7.5  /  Alb  3.9  /  TBili  0.6  /  DBili  x   /  AST  42<H>  /  ALT  23  /  AlkPhos  41  08-27    PT/INR - ( 27 Aug 2019 10:00 )   PT: 13.20 sec;   INR: 1.15 ratio    PTT - ( 27 Aug 2019 10:00 )  PTT:27.4 sec    Urinalysis Basic - ( 27 Aug 2019 10:36 )  Color: Yellow / Appearance: Clear / S.020 / pH: x  Gluc: x / Ketone: Negative  / Bili: Negative / Urobili: 0.2 mg/dL   Blood: x / Protein: Negative mg/dL / Nitrite: Negative   Leuk Esterase: Trace / RBC: Negative / WBC 1-2 /HPF   Sq Epi: x / Non Sq Epi: Occasional /HPF / Bacteria: x      Blood Gas: 7.34/65  Lactate: 1.5

## 2019-08-28 NOTE — CONSULT NOTE ADULT - SUBJECTIVE AND OBJECTIVE BOX
Patient is a 79y old  Male who presents with a chief complaint of hypoglycemia (27 Aug 2019 15:39)      HPI:  biba for hypoglycemia.  fs 31 at scene-amp d50 given.In ED repeat fs low and given another amp of d50 and started on D10 drip. Pt reports 2 days of hypoglycemic events. He is taking Glimepiride and victoza at home for his DM. (27 Aug 2019 15:39)  patient was recently d/c after been admitted for CHF exacerbation and at home glyburide ?/ patient not sure about the dose if once or twice of fluid since 8 pm FS been good to high     PAST MEDICAL & SURGICAL HISTORY:  BPH (benign prostatic hyperplasia)  CHF (congestive heart failure)  COPD (chronic obstructive pulmonary disease)  Diabetes  HTN (hypertension)  H/O heart artery stent  S/P CABG x 3    Allergies    No Known Allergies    Intolerances      Family history : no cardiovscular family history   Home Medications:  alfuzosin 10 mg oral tablet, extended release: 1 tab(s) orally once a day (18 Aug 2019 23:08)  ALPRAZolam 0.5 mg oral tablet: 1 tab(s) orally 2 times a day, As Needed (18 Aug 2019 23:08)  amLODIPine 5 mg oral tablet: 1 tab(s) orally once a day (18 Aug 2019 23:08)  aspirin 81 mg oral tablet: 1 tab(s) orally once a day (18 Aug 2019 23:08)  dutasteride 0.5 mg oral capsule: 1 cap(s) orally once a day (18 Aug 2019 23:08)  fenofibrate 145 mg oral tablet: 1 tab(s) orally once a day (18 Aug 2019 23:08)  glimepiride 4 mg oral tablet: orally 2 times a day (18 Aug 2019 23:08)  HumaLOG Mix 50/50 Pen subcutaneous suspension: subcutaneous once a day (18 Aug 2019 23:08)  isosorbide mononitrate 30 mg oral tablet, extended release: 1 tab(s) orally once a day (in the morning) (18 Aug 2019 23:08)  losartan 50 mg oral tablet: orally 2 times a day (18 Aug 2019 23:08)  rivaroxaban 15 mg oral tablet: 1 tab(s) orally once a day (before a meal) (22 Aug 2019 07:48)  Victoza: subcutaneous once a day (18 Aug 2019 23:08)  Welchol 625 mg oral tablet: 3 tab(s) orally once a day (18 Aug 2019 23:08)    Occupation:  Alochol: Denied  Smoking: Denied  Drug Use: Denied  Marital Status:         ROS: as in HPI; All other systems reviewed are negative    ICU Vital Signs Last 24 Hrs  T(C): 36 (28 Aug 2019 07:00), Max: 36.6 (27 Aug 2019 19:00)  T(F): 96.8 (28 Aug 2019 07:00), Max: 97.9 (27 Aug 2019 19:00)  HR: 44 (28 Aug 2019 06:57) (43 - 69)  BP: 125/59 (28 Aug 2019 06:57) (102/60 - 147/67)  BP(mean): 85 (28 Aug 2019 06:57) (76 - 90)  ABP: --  ABP(mean): --  RR: 18 (28 Aug 2019 04:59) (16 - 22)  SpO2: 98% (28 Aug 2019 06:57) (94% - 99%)        Physical Examination:    General: No acute distress.  Alert, oriented, interactive, nonfocal    HEENT: Pupils equal, reactive to light.  Symmetric.    PULM: Clear to auscultation bilaterally, no significant sputum production    CVS: Regular rate and rhythm, no murmurs, rubs, or gallops    ABD: Soft, nondistended, nontender, normoactive bowel sounds, no masses    EXT: No edema, nontender, no clubbing     SKIN: Warm and well perfused, no rashes noted.    Neurology : no motor or sensory deficit     Musculoskeletal : move all extremity     Lymphatic system: no Palpable node               I&O's Detail    27 Aug 2019 07:01  -  28 Aug 2019 07:00  --------------------------------------------------------  IN:    dextrose 5%.: 175 mL    Oral Fluid: 240 mL  Total IN: 415 mL    OUT:    Voided: 100 mL  Total OUT: 100 mL    Total NET: 315 mL            LABS:                        14.1   10.08 )-----------( 209      ( 27 Aug 2019 10:00 )             44.2     28 Aug 2019 06:06    142    |  101    |  42     ----------------------------<  144    4.5     |  29     |  1.5      Ca    9.2        28 Aug 2019 06:06    TPro  7.5    /  Alb  3.9    /  TBili  0.6    /  DBili  x      /  AST  42     /  ALT  23     /  AlkPhos  41     27 Aug 2019 10:00  Amylase x     lipase 14             CAPILLARY BLOOD GLUCOSE      POCT Blood Glucose.: 174 mg/dL (28 Aug 2019 07:05)    PT/INR - ( 27 Aug 2019 10:00 )   PT: 13.20 sec;   INR: 1.15 ratio         PTT - ( 27 Aug 2019 10:00 )  PTT:27.4 sec  Urinalysis Basic - ( 27 Aug 2019 10:36 )    Color: Yellow / Appearance: Clear / S.020 / pH: x  Gluc: x / Ketone: Negative  / Bili: Negative / Urobili: 0.2 mg/dL   Blood: x / Protein: Negative mg/dL / Nitrite: Negative   Leuk Esterase: Trace / RBC: Negative / WBC 1-2 /HPF   Sq Epi: x / Non Sq Epi: Occasional /HPF / Bacteria: x      Culture    Lactate, Blood: 1.3 mmol/L (19 @ 10:00)      MEDICATIONS  (STANDING):  amLODIPine   Tablet 5 milliGRAM(s) Oral daily  aspirin  chewable 81 milliGRAM(s) Oral daily  fenofibrate Tablet 145 milliGRAM(s) Oral daily  finasteride 5 milliGRAM(s) Oral daily  furosemide    Tablet 40 milliGRAM(s) Oral daily  isosorbide   mononitrate ER Tablet (IMDUR) 30 milliGRAM(s) Oral daily  losartan 50 milliGRAM(s) Oral daily  metoprolol succinate ER 75 milliGRAM(s) Oral daily  rivaroxaban 15 milliGRAM(s) Oral with dinner    MEDICATIONS  (PRN):  ALPRAZolam 0.5 milliGRAM(s) Oral two times a day PRN anxiety  zolpidem 5 milliGRAM(s) Oral at bedtime PRN Insomnia        RADIOLOGY: ***     CXR: mild congestion decreased   TLC:  OG:  ET tube:        CAM ICU:  ECHO:

## 2019-08-28 NOTE — PROGRESS NOTE ADULT - ATTENDING COMMENTS
Pt seen and examined at bedside independently. No comlaints. ON was on dextrose, became hyperglycemic and stopped. No blood sugar stable.   Bradicardia on tele 40s off BB  PHYSICAL EXAM:  GENERAL: NAD, well-developed  HEAD:  Atraumatic, Normocephalic  EYES: EOMI, PERRLA, conjunctiva and sclera clear  NECK: Supple, No JVD  CHEST/LUNG: Clear to auscultation bilaterally; No wheeze  HEART: Regular rate and rhythm; rubs, or gallops--+murmurs,   ABDOMEN: Soft, Nontender, Nondistended; Bowel sounds present  EXTREMITIES:  2+ Peripheral Pulses, No clubbing, cyanosis, +2 edema   PSYCH: AAOx3  NEUROLOGY: non-focal    I agree with the above plan   #Progress Note Handoff  Pending (specify):  pt glucose stable, pending endo, monitor tele for bradycardia (asymptomatic)   Family discussion: dw pt and agreed to plan   Disposition: Home_x__/SNF___/Other________/Unknown at this time________

## 2019-08-29 DIAGNOSIS — G47.33 OBSTRUCTIVE SLEEP APNEA (ADULT) (PEDIATRIC): ICD-10-CM

## 2019-08-29 DIAGNOSIS — R06.02 SHORTNESS OF BREATH: ICD-10-CM

## 2019-08-29 DIAGNOSIS — I25.10 ATHEROSCLEROTIC HEART DISEASE OF NATIVE CORONARY ARTERY WITHOUT ANGINA PECTORIS: ICD-10-CM

## 2019-08-29 DIAGNOSIS — Z95.5 PRESENCE OF CORONARY ANGIOPLASTY IMPLANT AND GRAFT: ICD-10-CM

## 2019-08-29 DIAGNOSIS — E87.2 ACIDOSIS: ICD-10-CM

## 2019-08-29 DIAGNOSIS — E11.622 TYPE 2 DIABETES MELLITUS WITH OTHER SKIN ULCER: ICD-10-CM

## 2019-08-29 DIAGNOSIS — J44.9 CHRONIC OBSTRUCTIVE PULMONARY DISEASE, UNSPECIFIED: ICD-10-CM

## 2019-08-29 DIAGNOSIS — Z79.4 LONG TERM (CURRENT) USE OF INSULIN: ICD-10-CM

## 2019-08-29 DIAGNOSIS — N40.0 BENIGN PROSTATIC HYPERPLASIA WITHOUT LOWER URINARY TRACT SYMPTOMS: ICD-10-CM

## 2019-08-29 DIAGNOSIS — I11.0 HYPERTENSIVE HEART DISEASE WITH HEART FAILURE: ICD-10-CM

## 2019-08-29 DIAGNOSIS — J96.21 ACUTE AND CHRONIC RESPIRATORY FAILURE WITH HYPOXIA: ICD-10-CM

## 2019-08-29 DIAGNOSIS — Z91.19 PATIENT'S NONCOMPLIANCE WITH OTHER MEDICAL TREATMENT AND REGIMEN: ICD-10-CM

## 2019-08-29 DIAGNOSIS — I48.92 UNSPECIFIED ATRIAL FLUTTER: ICD-10-CM

## 2019-08-29 DIAGNOSIS — J81.0 ACUTE PULMONARY EDEMA: ICD-10-CM

## 2019-08-29 DIAGNOSIS — Z79.82 LONG TERM (CURRENT) USE OF ASPIRIN: ICD-10-CM

## 2019-08-29 DIAGNOSIS — J96.22 ACUTE AND CHRONIC RESPIRATORY FAILURE WITH HYPERCAPNIA: ICD-10-CM

## 2019-08-29 DIAGNOSIS — Z91.11 PATIENT'S NONCOMPLIANCE WITH DIETARY REGIMEN: ICD-10-CM

## 2019-08-29 DIAGNOSIS — Z99.81 DEPENDENCE ON SUPPLEMENTAL OXYGEN: ICD-10-CM

## 2019-08-29 DIAGNOSIS — I87.8 OTHER SPECIFIED DISORDERS OF VEINS: ICD-10-CM

## 2019-08-29 DIAGNOSIS — I50.33 ACUTE ON CHRONIC DIASTOLIC (CONGESTIVE) HEART FAILURE: ICD-10-CM

## 2019-08-29 DIAGNOSIS — Z87.891 PERSONAL HISTORY OF NICOTINE DEPENDENCE: ICD-10-CM

## 2019-08-29 DIAGNOSIS — D72.829 ELEVATED WHITE BLOOD CELL COUNT, UNSPECIFIED: ICD-10-CM

## 2019-08-29 DIAGNOSIS — Z95.1 PRESENCE OF AORTOCORONARY BYPASS GRAFT: ICD-10-CM

## 2019-08-29 DIAGNOSIS — I21.A1 MYOCARDIAL INFARCTION TYPE 2: ICD-10-CM

## 2019-08-29 LAB
ANION GAP SERPL CALC-SCNC: 11 MMOL/L — SIGNIFICANT CHANGE UP (ref 7–14)
BUN SERPL-MCNC: 47 MG/DL — HIGH (ref 10–20)
CALCIUM SERPL-MCNC: 9.4 MG/DL — SIGNIFICANT CHANGE UP (ref 8.5–10.1)
CHLORIDE SERPL-SCNC: 103 MMOL/L — SIGNIFICANT CHANGE UP (ref 98–110)
CO2 SERPL-SCNC: 29 MMOL/L — SIGNIFICANT CHANGE UP (ref 17–32)
CREAT SERPL-MCNC: 1.5 MG/DL — SIGNIFICANT CHANGE UP (ref 0.7–1.5)
GLUCOSE BLDC GLUCOMTR-MCNC: 162 MG/DL — HIGH (ref 70–99)
GLUCOSE BLDC GLUCOMTR-MCNC: 172 MG/DL — HIGH (ref 70–99)
GLUCOSE BLDC GLUCOMTR-MCNC: 195 MG/DL — HIGH (ref 70–99)
GLUCOSE BLDC GLUCOMTR-MCNC: 235 MG/DL — HIGH (ref 70–99)
GLUCOSE BLDC GLUCOMTR-MCNC: 239 MG/DL — HIGH (ref 70–99)
GLUCOSE SERPL-MCNC: 194 MG/DL — HIGH (ref 70–99)
HCT VFR BLD CALC: 42.1 % — SIGNIFICANT CHANGE UP (ref 42–52)
HGB BLD-MCNC: 13.1 G/DL — LOW (ref 14–18)
MAGNESIUM SERPL-MCNC: 2.2 MG/DL — SIGNIFICANT CHANGE UP (ref 1.8–2.4)
MCHC RBC-ENTMCNC: 26.1 PG — LOW (ref 27–31)
MCHC RBC-ENTMCNC: 31.1 G/DL — LOW (ref 32–37)
MCV RBC AUTO: 83.9 FL — SIGNIFICANT CHANGE UP (ref 80–94)
NRBC # BLD: 0 /100 WBCS — SIGNIFICANT CHANGE UP (ref 0–0)
PLATELET # BLD AUTO: 183 K/UL — SIGNIFICANT CHANGE UP (ref 130–400)
POTASSIUM SERPL-MCNC: 4.5 MMOL/L — SIGNIFICANT CHANGE UP (ref 3.5–5)
POTASSIUM SERPL-SCNC: 4.5 MMOL/L — SIGNIFICANT CHANGE UP (ref 3.5–5)
RBC # BLD: 5.02 M/UL — SIGNIFICANT CHANGE UP (ref 4.7–6.1)
RBC # FLD: 15.2 % — HIGH (ref 11.5–14.5)
SODIUM SERPL-SCNC: 143 MMOL/L — SIGNIFICANT CHANGE UP (ref 135–146)
TSH SERPL-MCNC: 0.84 UIU/ML — SIGNIFICANT CHANGE UP (ref 0.27–4.2)
WBC # BLD: 7.97 K/UL — SIGNIFICANT CHANGE UP (ref 4.8–10.8)
WBC # FLD AUTO: 7.97 K/UL — SIGNIFICANT CHANGE UP (ref 4.8–10.8)

## 2019-08-29 PROCEDURE — 99233 SBSQ HOSP IP/OBS HIGH 50: CPT

## 2019-08-29 RX ORDER — BUDESONIDE AND FORMOTEROL FUMARATE DIHYDRATE 160; 4.5 UG/1; UG/1
2 AEROSOL RESPIRATORY (INHALATION)
Refills: 0 | Status: DISCONTINUED | OUTPATIENT
Start: 2019-08-29 | End: 2019-08-30

## 2019-08-29 RX ADMIN — Medication 4 UNIT(S): at 17:13

## 2019-08-29 RX ADMIN — RIVAROXABAN 15 MILLIGRAM(S): KIT at 17:16

## 2019-08-29 RX ADMIN — Medication 1 APPLICATION(S): at 17:17

## 2019-08-29 RX ADMIN — Medication 50 MILLIGRAM(S): at 05:43

## 2019-08-29 RX ADMIN — INSULIN GLARGINE 12 UNIT(S): 100 INJECTION, SOLUTION SUBCUTANEOUS at 21:18

## 2019-08-29 RX ADMIN — FINASTERIDE 5 MILLIGRAM(S): 5 TABLET, FILM COATED ORAL at 11:36

## 2019-08-29 RX ADMIN — AMLODIPINE BESYLATE 5 MILLIGRAM(S): 2.5 TABLET ORAL at 05:41

## 2019-08-29 RX ADMIN — BUDESONIDE AND FORMOTEROL FUMARATE DIHYDRATE 2 PUFF(S): 160; 4.5 AEROSOL RESPIRATORY (INHALATION) at 21:18

## 2019-08-29 RX ADMIN — ISOSORBIDE MONONITRATE 30 MILLIGRAM(S): 60 TABLET, EXTENDED RELEASE ORAL at 11:36

## 2019-08-29 RX ADMIN — Medication 2: at 08:00

## 2019-08-29 RX ADMIN — Medication 4: at 17:13

## 2019-08-29 RX ADMIN — Medication 1 APPLICATION(S): at 05:41

## 2019-08-29 RX ADMIN — Medication 81 MILLIGRAM(S): at 11:36

## 2019-08-29 RX ADMIN — Medication 145 MILLIGRAM(S): at 11:36

## 2019-08-29 RX ADMIN — Medication 4 UNIT(S): at 11:40

## 2019-08-29 RX ADMIN — Medication 2: at 11:41

## 2019-08-29 RX ADMIN — LOSARTAN POTASSIUM 50 MILLIGRAM(S): 100 TABLET, FILM COATED ORAL at 05:41

## 2019-08-29 RX ADMIN — Medication 40 MILLIGRAM(S): at 05:41

## 2019-08-29 RX ADMIN — Medication 4 UNIT(S): at 07:59

## 2019-08-29 NOTE — PROGRESS NOTE ADULT - SUBJECTIVE AND OBJECTIVE BOX
Patient is comfortable in bed, no complaints, no more hypoglycemia      T(F): 96.3 (08-29-19 @ 08:59), Max: 97.4 (08-28-19 @ 23:04)  HR: 66 (08-29-19 @ 08:59)  BP: 119/59 (08-29-19 @ 08:59)  RR: 18 (08-29-19 @ 08:59)  SpO2: 97% (08-28-19 @ 17:19) (97% - 99%)    PHYSICAL EXAM:  GENERAL: NAD  HEAD:  Atraumatic, Normocephalic  NERVOUS SYSTEM:  Alert & Oriented X3, no foal deficits  CHEST/LUNG: Clear to percussion bilaterally; No rales, rhonchi, wheezing, or rubs  HEART: Regular rate and rhythm; No murmurs, rubs, or gallops  ABDOMEN: Soft, Nontender, Nondistended; Bowel sounds present  EXTREMITIES:  2+ Peripheral Pulses, No clubbing, cyanosis, or edema  LYMPH: No lymphadenopathy noted  SKIN: No rashes or lesions    LABS  08-29    143  |  103  |  47<H>  ----------------------------<  194<H>  4.5   |  29  |  1.5    Ca    9.4      29 Aug 2019 06:15  Mg     2.2     08-29    TPro  7.5  /  Alb  3.9  /  TBili  0.6  /  DBili  x   /  AST  42<H>  /  ALT  23  /  AlkPhos  41  08-27                          13.1   7.97  )-----------( 183      ( 29 Aug 2019 06:15 )             42.1     PT/INR - ( 27 Aug 2019 10:00 )   PT: 13.20 sec;   INR: 1.15 ratio         PTT - ( 27 Aug 2019 10:00 )  PTT:27.4 sec      Culture Results:   <10,000 CFU/mL Normal Urogenital Felipa (08-19-19)  Culture Results:   No growth at 5 days. (08-19-19)  Culture Results:   No growth at 5 days. (08-19-19)    MEDICATIONS  (STANDING):  amLODIPine   Tablet 5 milliGRAM(s) Oral daily  aspirin  chewable 81 milliGRAM(s) Oral daily  buDESOnide 160 MICROgram(s)/formoterol 4.5 MICROgram(s) Inhaler 2 Puff(s) Inhalation two times a day  fenofibrate Tablet 145 milliGRAM(s) Oral daily  finasteride 5 milliGRAM(s) Oral daily  furosemide    Tablet 40 milliGRAM(s) Oral daily  insulin glargine Injectable (LANTUS) 12 Unit(s) SubCutaneous at bedtime  insulin lispro (HumaLOG) corrective regimen sliding scale   SubCutaneous three times a day before meals  insulin lispro Injectable (HumaLOG) 4 Unit(s) SubCutaneous three times a day before meals  isosorbide   mononitrate ER Tablet (IMDUR) 30 milliGRAM(s) Oral daily  losartan 50 milliGRAM(s) Oral daily  metoprolol succinate ER 50 milliGRAM(s) Oral daily  rivaroxaban 15 milliGRAM(s) Oral with dinner  silver sulfADIAZINE 1% Cream 1 Application(s) Topical two times a day    MEDICATIONS  (PRN):  ALPRAZolam 0.5 milliGRAM(s) Oral two times a day PRN anxiety  dextrose 40% Gel 15 Gram(s) Oral once PRN Blood Glucose LESS THAN 70 milliGRAM(s)/deciliter  glucagon  Injectable 1 milliGRAM(s) IntraMuscular once PRN Glucose LESS THAN 70 milligrams/deciliter  zolpidem 5 milliGRAM(s) Oral at bedtime PRN Insomnia

## 2019-08-29 NOTE — PROGRESS NOTE ADULT - PROBLEM SELECTOR PLAN 1
secondary to sulfonylurea   glimepiride DC'd  I spoke with  Dr Beasley (pts endocrinologist) today  may dc home on Victoza only and he will see pt in his office next week  pt understands plan, however he is  refusing dc today because of home situation but will be ok to leave tomorrow    will anticipate dc for am    chronic diastolic CHF/afib-flutter/BPH/COPD/HTN/CAD  continue home meds

## 2019-08-29 NOTE — PHYSICAL THERAPY INITIAL EVALUATION ADULT - IMPAIRMENTS FOUND, PT EVAL
muscle strength/poor safety awareness/posture/ROM/aerobic capacity/endurance/gait, locomotion, and balance

## 2019-08-29 NOTE — PROGRESS NOTE ADULT - SUBJECTIVE AND OBJECTIVE BOX
SUBJECTIVE:    Patient is a 79y old Male who presents with a chief complaint of hypoglycemia (29 Aug 2019 09:45)    Currently admitted to medicine with the primary diagnosis of Hypoglycemia     Today is hospital day 2d. This morning he is resting comfortably in bed and reports no new issues or overnight events.     PAST MEDICAL & SURGICAL HISTORY  BPH (benign prostatic hyperplasia)  CHF (congestive heart failure)  COPD (chronic obstructive pulmonary disease)  Diabetes  HTN (hypertension)  H/O heart artery stent  S/P CABG x 3    SOCIAL HISTORY:  Negative for smoking/alcohol/drug use.     Home Medications:  Home Medications:  alfuzosin 10 mg oral tablet, extended release: 1 tab(s) orally once a day (18 Aug 2019 23:08)  ALPRAZolam 0.5 mg oral tablet: 1 tab(s) orally 2 times a day, As Needed (18 Aug 2019 23:08)  amLODIPine 5 mg oral tablet: 1 tab(s) orally once a day (18 Aug 2019 23:08)  aspirin 81 mg oral tablet: 1 tab(s) orally once a day (18 Aug 2019 23:08)  dutasteride 0.5 mg oral capsule: 1 cap(s) orally once a day (18 Aug 2019 23:08)  fenofibrate 145 mg oral tablet: 1 tab(s) orally once a day (18 Aug 2019 23:08)  glimepiride 4 mg oral tablet: orally 2 times a day (18 Aug 2019 23:08)  HumaLOG Mix 50/50 Pen subcutaneous suspension: subcutaneous once a day (18 Aug 2019 23:08)  isosorbide mononitrate 30 mg oral tablet, extended release: 1 tab(s) orally once a day (in the morning) (18 Aug 2019 23:08)  losartan 50 mg oral tablet: orally 2 times a day (18 Aug 2019 23:08)  rivaroxaban 15 mg oral tablet: 1 tab(s) orally once a day (before a meal) (22 Aug 2019 07:48)  Victoza: subcutaneous once a day (18 Aug 2019 23:08)  Welchol 625 mg oral tablet: 3 tab(s) orally once a day (18 Aug 2019 23:08)      ALLERGIES:  No Known Allergies    MEDICATIONS:  STANDING MEDICATIONS  amLODIPine   Tablet 5 milliGRAM(s) Oral daily  aspirin  chewable 81 milliGRAM(s) Oral daily  buDESOnide 160 MICROgram(s)/formoterol 4.5 MICROgram(s) Inhaler 2 Puff(s) Inhalation two times a day  dextrose 5%. 1000 milliLiter(s) IV Continuous <Continuous>  dextrose 50% Injectable 12.5 Gram(s) IV Push once  dextrose 50% Injectable 25 Gram(s) IV Push once  dextrose 50% Injectable 25 Gram(s) IV Push once  fenofibrate Tablet 145 milliGRAM(s) Oral daily  finasteride 5 milliGRAM(s) Oral daily  furosemide    Tablet 40 milliGRAM(s) Oral daily  insulin glargine Injectable (LANTUS) 12 Unit(s) SubCutaneous at bedtime  insulin lispro (HumaLOG) corrective regimen sliding scale   SubCutaneous three times a day before meals  insulin lispro Injectable (HumaLOG) 4 Unit(s) SubCutaneous three times a day before meals  isosorbide   mononitrate ER Tablet (IMDUR) 30 milliGRAM(s) Oral daily  losartan 50 milliGRAM(s) Oral daily  metoprolol succinate ER 50 milliGRAM(s) Oral daily  rivaroxaban 15 milliGRAM(s) Oral with dinner  silver sulfADIAZINE 1% Cream 1 Application(s) Topical two times a day    PRN MEDICATIONS  ALPRAZolam 0.5 milliGRAM(s) Oral two times a day PRN  dextrose 40% Gel 15 Gram(s) Oral once PRN  glucagon  Injectable 1 milliGRAM(s) IntraMuscular once PRN  zolpidem 5 milliGRAM(s) Oral at bedtime PRN    VITALS:   Vital Signs Last 24 Hrs  T(C): 36.3 (29 Aug 2019 14:08), Max: 36.3 (28 Aug 2019 23:04)  T(F): 97.4 (29 Aug 2019 14:08), Max: 97.4 (28 Aug 2019 23:04)  HR: 67 (29 Aug 2019 14:08) (64 - 76)  BP: 103/61 (29 Aug 2019 14:08) (103/61 - 132/63)  BP(mean): 82 (29 Aug 2019 07:06) (78 - 95)  RR: 16 (29 Aug 2019 14:08) (16 - 20)  SpO2: 97% (28 Aug 2019 17:19) (97% - 97%)  CAPILLARY BLOOD GLUCOSE      POCT Blood Glucose.: 235 mg/dL (29 Aug 2019 16:46)  POCT Blood Glucose.: 162 mg/dL (29 Aug 2019 11:29)  POCT Blood Glucose.: 195 mg/dL (29 Aug 2019 07:53)  POCT Blood Glucose.: 239 mg/dL (29 Aug 2019 06:30)  POCT Blood Glucose.: 112 mg/dL (28 Aug 2019 21:07)      LABS:                        13.1   7.97  )-----------( 183      ( 29 Aug 2019 06:15 )             42.1     08-29    143  |  103  |  47<H>  ----------------------------<  194<H>  4.5   |  29  |  1.5    Ca    9.4      29 Aug 2019 06:15  Mg     2.2     08-29      RADIOLOGY:    PHYSICAL EXAM:  GEN: No acute distress  LUNGS: rhonchi appreciated b/l  HEART: irregular   ABD: Soft, non-tender, non-distended. Bowel sounds present  EXT: 1+ pedal edema at baseline  NEURO: AAOX3

## 2019-08-29 NOTE — PROGRESS NOTE ADULT - SUBJECTIVE AND OBJECTIVE BOX
Patient is a 79y old  Male who presents with a chief complaint of hypoglycemia (28 Aug 2019 14:26)      Over Night Events:  Patient seen and examined feel good not on distress FS stable no hypoglycemia       ROS:  See HPI    PHYSICAL EXAM    ICU Vital Signs Last 24 Hrs  T(C): 35.7 (29 Aug 2019 07:05), Max: 36.3 (28 Aug 2019 23:04)  T(F): 96.2 (29 Aug 2019 07:05), Max: 97.4 (28 Aug 2019 23:04)  HR: 64 (29 Aug 2019 07:06) (60 - 76)  BP: 121/57 (29 Aug 2019 07:06) (104/60 - 137/63)  BP(mean): 82 (29 Aug 2019 07:06) (74 - 95)  ABP: --  ABP(mean): --  RR: 20 (29 Aug 2019 07:05) (18 - 20)  SpO2: 97% (28 Aug 2019 17:19) (91% - 99%)      General: Aox3  HEENT:        darell        Lymph Nodes: NO cervical LN   Lungs: Bilateral BS  Cardiovascular: Regular   Abdomen: Soft, Positive BS  Extremities: No clubbing   Skin: warm   Neurological: no focal deficit   Musculoskeletal: move all ext     I&O's Detail      LABS:                          13.1   7.97  )-----------( 183      ( 29 Aug 2019 06:15 )             42.1         29 Aug 2019 06:15    143    |  103    |  47     ----------------------------<  194    4.5     |  29     |  1.5      Ca    9.4        29 Aug 2019 06:15  Mg     2.2       29 Aug 2019 06:15                                               PT/INR - ( 27 Aug 2019 10:00 )   PT: 13.20 sec;   INR: 1.15 ratio         PTT - ( 27 Aug 2019 10:00 )  PTT:27.4 sec                                       Urinalysis Basic - ( 27 Aug 2019 10:36 )    Color: Yellow / Appearance: Clear / S.020 / pH: x  Gluc: x / Ketone: Negative  / Bili: Negative / Urobili: 0.2 mg/dL   Blood: x / Protein: Negative mg/dL / Nitrite: Negative   Leuk Esterase: Trace / RBC: Negative / WBC 1-2 /HPF   Sq Epi: x / Non Sq Epi: Occasional /HPF / Bacteria: x        Lactate, Blood: 1.3 mmol/L (19 @ 10:00)                                                                                                                                               MEDICATIONS  (STANDING):  amLODIPine   Tablet 5 milliGRAM(s) Oral daily  aspirin  chewable 81 milliGRAM(s) Oral daily  dextrose 5%. 1000 milliLiter(s) (50 mL/Hr) IV Continuous <Continuous>  dextrose 50% Injectable 12.5 Gram(s) IV Push once  dextrose 50% Injectable 25 Gram(s) IV Push once  dextrose 50% Injectable 25 Gram(s) IV Push once  fenofibrate Tablet 145 milliGRAM(s) Oral daily  finasteride 5 milliGRAM(s) Oral daily  furosemide    Tablet 40 milliGRAM(s) Oral daily  insulin glargine Injectable (LANTUS) 12 Unit(s) SubCutaneous at bedtime  insulin lispro (HumaLOG) corrective regimen sliding scale   SubCutaneous three times a day before meals  insulin lispro Injectable (HumaLOG) 4 Unit(s) SubCutaneous three times a day before meals  isosorbide   mononitrate ER Tablet (IMDUR) 30 milliGRAM(s) Oral daily  losartan 50 milliGRAM(s) Oral daily  metoprolol succinate ER 50 milliGRAM(s) Oral daily  rivaroxaban 15 milliGRAM(s) Oral with dinner  silver sulfADIAZINE 1% Cream 1 Application(s) Topical two times a day    MEDICATIONS  (PRN):  ALPRAZolam 0.5 milliGRAM(s) Oral two times a day PRN anxiety  dextrose 40% Gel 15 Gram(s) Oral once PRN Blood Glucose LESS THAN 70 milliGRAM(s)/deciliter  glucagon  Injectable 1 milliGRAM(s) IntraMuscular once PRN Glucose LESS THAN 70 milligrams/deciliter  zolpidem 5 milliGRAM(s) Oral at bedtime PRN Insomnia          Xrays:  TLC:  OG:  ET tube:                                                                                       ECHO:  CAM ICU:

## 2019-08-29 NOTE — PROGRESS NOTE ADULT - ASSESSMENT
#) Hypoglycemia medication induced  - Downgrade to floor  - No Hypoglycemic episodes for 48 hour  - Check fingerstick before meals and QHS  - Cont oral feeding.  - Hold all oral hypoglycemic agents  - Pt is hyperglycemic now, will start Insulin 12/4-4-4  - Pt can be discharged on Victoza only    #) Bradycardia  - Decrease metoprolol to 50mg daily  - Monitor BP and HR    #) Hx Afib  - Cont Metoprolol and Xarelto   - HR controlled at this time    #) hx Chronic Congestive Heart failure diastolic dysfunction  - Recent admission for Decompensated HF, back to baseline now  - Cont Lasix, Aspirin and losartan    #COPD  - c/w breo and Duoneb PRN    #HTN  - c/w imdur, lasix, losartan and metoprolol  - tele monitoring     DM  - c/w carb consistnet  - will keep pt on basal bolus regimen    BPH  - tamsulosin    #Activitiy: OOBC  #DIet; carb consistent fluid restricted  #DVT/GI PPX: Xarelto/protonix  #Dispo: PT/R onboard. Consultant pending  #Code; Full. Possible d/c tomorrow

## 2019-08-29 NOTE — PROGRESS NOTE ADULT - ASSESSMENT
IMPRESSION:  hypoglycemia   DM   CHF   CKD     PLAN:    CNS: juan carlos sedation     HEENT: oral care     PULMONARY: keep pox >92%     CARDIOVASCULAR: continue lasix and cardiac meds     GI: GI prophylaxis.  Feeding     RENAL: follow lytes     INFECTIOUS DISEASE: no abx     HEMATOLOGICAL:  DVT prophylaxis.    ENDOCRINE:  Follow up FS. on lantus     endo consult for discharge medication and follow up   MUSCULOSKELETAL:  transfer to floor       CRITICAL CARE TIME SPENT: ***

## 2019-08-29 NOTE — PHYSICAL THERAPY INITIAL EVALUATION ADULT - GENERAL OBSERVATIONS, REHAB EVAL
11:10-11:30. Chart reviewed; confirmed with RN to see the pt for PT. Pt ready for PT; received sitting at the edge of the bed with no complain of pain and in no apparent distress. + hep lock, +O2 via NC. Agreeable for PT evaluation

## 2019-08-30 ENCOUNTER — TRANSCRIPTION ENCOUNTER (OUTPATIENT)
Age: 79
End: 2019-08-30

## 2019-08-30 VITALS
HEART RATE: 67 BPM | SYSTOLIC BLOOD PRESSURE: 125 MMHG | DIASTOLIC BLOOD PRESSURE: 61 MMHG | RESPIRATION RATE: 16 BRPM | TEMPERATURE: 98 F

## 2019-08-30 LAB
GLUCOSE BLDC GLUCOMTR-MCNC: 167 MG/DL — HIGH (ref 70–99)
GLUCOSE BLDC GLUCOMTR-MCNC: 213 MG/DL — HIGH (ref 70–99)
GLUCOSE BLDC GLUCOMTR-MCNC: 247 MG/DL — HIGH (ref 70–99)

## 2019-08-30 PROCEDURE — 99239 HOSP IP/OBS DSCHRG MGMT >30: CPT

## 2019-08-30 RX ORDER — INSULIN LISPRO 100 [IU]/ML
0 INJECTION, SUSPENSION SUBCUTANEOUS
Qty: 0 | Refills: 0 | DISCHARGE

## 2019-08-30 RX ORDER — GLIMEPIRIDE 1 MG
0 TABLET ORAL
Qty: 0 | Refills: 0 | DISCHARGE

## 2019-08-30 RX ADMIN — AMLODIPINE BESYLATE 5 MILLIGRAM(S): 2.5 TABLET ORAL at 05:32

## 2019-08-30 RX ADMIN — Medication 4 UNIT(S): at 08:21

## 2019-08-30 RX ADMIN — ISOSORBIDE MONONITRATE 30 MILLIGRAM(S): 60 TABLET, EXTENDED RELEASE ORAL at 11:43

## 2019-08-30 RX ADMIN — Medication 40 MILLIGRAM(S): at 05:32

## 2019-08-30 RX ADMIN — Medication 145 MILLIGRAM(S): at 11:43

## 2019-08-30 RX ADMIN — FINASTERIDE 5 MILLIGRAM(S): 5 TABLET, FILM COATED ORAL at 11:43

## 2019-08-30 RX ADMIN — Medication 4: at 16:39

## 2019-08-30 RX ADMIN — LOSARTAN POTASSIUM 50 MILLIGRAM(S): 100 TABLET, FILM COATED ORAL at 05:32

## 2019-08-30 RX ADMIN — Medication 50 MILLIGRAM(S): at 05:32

## 2019-08-30 RX ADMIN — Medication 1 APPLICATION(S): at 16:41

## 2019-08-30 RX ADMIN — Medication 4 UNIT(S): at 16:39

## 2019-08-30 RX ADMIN — Medication 4 UNIT(S): at 11:45

## 2019-08-30 RX ADMIN — Medication 4: at 11:46

## 2019-08-30 RX ADMIN — Medication 1 APPLICATION(S): at 05:51

## 2019-08-30 RX ADMIN — RIVAROXABAN 15 MILLIGRAM(S): KIT at 16:39

## 2019-08-30 RX ADMIN — Medication 2: at 08:21

## 2019-08-30 RX ADMIN — Medication 81 MILLIGRAM(S): at 11:43

## 2019-08-30 NOTE — CONSULT NOTE ADULT - SUBJECTIVE AND OBJECTIVE BOX
HPI:  80 y/o  m  biba for hypoglycemia.  fs 31 at scene-amp d50 given.In ED repeat fs low and given another amp of d50 and started on D10 drip. Pt reports 2 days of hypoglycemic events. He is taking Glimepiride and victoza at home for his DM.    PTN  REFERRED TO ACUTE  REHAB  FOR  EVAL AND  TX   PAST MEDICAL & SURGICAL HISTORY:  BPH (benign prostatic hyperplasia)  CHF (congestive heart failure)  COPD (chronic obstructive pulmonary disease)  Diabetes  HTN (hypertension)  H/O heart artery stent  S/P CABG x 3      Hospital Course:    TODAY'S SUBJECTIVE & REVIEW OF SYMPTOMS:     Constitutional WNL   Cardio WNL   Resp WNL   GI WNL  Heme WNL  Endo WNL  Skin WNL  MSK WNL  Neuro WNL  Cognitive WNL  Psych WNL      MEDICATIONS  (STANDING):  amLODIPine   Tablet 5 milliGRAM(s) Oral daily  aspirin  chewable 81 milliGRAM(s) Oral daily  buDESOnide 160 MICROgram(s)/formoterol 4.5 MICROgram(s) Inhaler 2 Puff(s) Inhalation two times a day  dextrose 5%. 1000 milliLiter(s) (50 mL/Hr) IV Continuous <Continuous>  dextrose 50% Injectable 12.5 Gram(s) IV Push once  dextrose 50% Injectable 25 Gram(s) IV Push once  dextrose 50% Injectable 25 Gram(s) IV Push once  fenofibrate Tablet 145 milliGRAM(s) Oral daily  finasteride 5 milliGRAM(s) Oral daily  furosemide    Tablet 40 milliGRAM(s) Oral daily  insulin glargine Injectable (LANTUS) 12 Unit(s) SubCutaneous at bedtime  insulin lispro (HumaLOG) corrective regimen sliding scale   SubCutaneous three times a day before meals  insulin lispro Injectable (HumaLOG) 4 Unit(s) SubCutaneous three times a day before meals  isosorbide   mononitrate ER Tablet (IMDUR) 30 milliGRAM(s) Oral daily  losartan 50 milliGRAM(s) Oral daily  metoprolol succinate ER 50 milliGRAM(s) Oral daily  rivaroxaban 15 milliGRAM(s) Oral with dinner  silver sulfADIAZINE 1% Cream 1 Application(s) Topical two times a day    MEDICATIONS  (PRN):  ALPRAZolam 0.5 milliGRAM(s) Oral two times a day PRN anxiety  dextrose 40% Gel 15 Gram(s) Oral once PRN Blood Glucose LESS THAN 70 milliGRAM(s)/deciliter  glucagon  Injectable 1 milliGRAM(s) IntraMuscular once PRN Glucose LESS THAN 70 milligrams/deciliter  zolpidem 5 milliGRAM(s) Oral at bedtime PRN Insomnia      FAMILY HISTORY:  No pertinent family history in first degree relatives      Allergies    No Known Allergies    Intolerances        SOCIAL HISTORY:    [  ] Etoh  [  ] Smoking  [  ] Substance abuse     Home Environment:  [  ] Home Alone  [ x ] Lives with Family wife  [  ] Home Health Aid    Dwelling:  [  ] Apartment  [x  ] Private House  [  ] Adult Home  [  ] Skilled Nursing Facility      [  ] Short Term  [  ] Long Term  [  x] Stairs       Elevator [  ]    FUNCTIONAL STATUS PTA: (Check all that apply)  Ambulation: [ x  ]Independent    [  ] Dependent     [  ] Non-Ambulatory  Assistive Device: [  ] SA Cane  [  ]  Q Cane  [ x ] Walker  [  ]  Wheelchair  ADL : [x  ] Independent  [  ]  Dependent       Vital Signs Last 24 Hrs  T(C): 37.2 (30 Aug 2019 06:20), Max: 37.2 (30 Aug 2019 06:20)  T(F): 99 (30 Aug 2019 06:20), Max: 99 (30 Aug 2019 06:20)  HR: 65 (30 Aug 2019 06:20) (56 - 67)  BP: 156/69 (30 Aug 2019 06:20) (103/61 - 156/69)  BP(mean): --  RR: 16 (30 Aug 2019 06:20) (16 - 18)  SpO2: 99% (29 Aug 2019 22:00) (99% - 99%)      PHYSICAL EXAM: Alert & Oriented X3  GENERAL: NAD, well-groomed, well-developed  HEAD:  Atraumatic, Normocephalic  EYES: EOMI, PERRLA, conjunctiva and sclera clear  NECK: Supple, No JVD, Normal thyroid  CHEST/LUNG: Clear to percussion bilaterally; No rales, rhonchi, wheezing, or rubs  HEART: Regular rate and rhythm; No murmurs, rubs, or gallops  ABDOMEN: Soft, Nontender, Nondistended; Bowel sounds present  EXTREMITIES:  2+ Peripheral Pulses, No clubbing, cyanosis, or edema    NERVOUS SYSTEM:  Cranial Nerves 2-12 intact [ x ] Abnormal  [  ]  ROM: WFL all extremities [ x ]  Abnormal [  ]  Motor Strength: WFL all extremities  [x  ]  Abnormal [  ]  Sensation: intact to light touch [x  ] Abnormal [  ]  Reflexes: Symmetric [ x ]  Abnormal [  ]    FUNCTIONAL STATUS:  Bed Mobility: Independent [  ]  Supervision [x  ]  Needs Assistance [  ]  N/A [  ]  Transfers: Independent [  ]  Supervision [ x ]  Needs Assistance [  ]  N/A [  ]   Ambulation: Independent [  ]  Supervision [ xx ]  Needs Assistance [  ]  N/A [  ]  ADL: Independent [x  ] Requires Assistance [  ] N/A [  ]  SEE PT/OT IE NOTES    LABS:                        13.1   7.97  )-----------( 183      ( 29 Aug 2019 06:15 )             42.1     08-29    143  |  103  |  47<H>  ----------------------------<  194<H>  4.5   |  29  |  1.5    Ca    9.4      29 Aug 2019 06:15  Mg     2.2     08-29            RADIOLOGY & ADDITIONAL STUDIES:    Assesment:

## 2019-08-30 NOTE — DISCHARGE NOTE PROVIDER - HOSPITAL COURSE
79 year old male was    biba for hypoglycemia.  fs 31 at scene-amp d50 given.In ED repeat fs low and given another amp of d50 and started on D10 drip. Pt reports 2 days of hypoglycemic events. He is taking Glimepiride and Victoza at home for his DM.         In hospital the pt was admitted to ICU on D10 drip and fs monitored q1h.     I spoke with pt endocrinologist Dr Beasley who recommended stopping Glimepiride and DCing home on Victoza only and he will see pt in his office next week

## 2019-08-30 NOTE — PROGRESS NOTE ADULT - SUBJECTIVE AND OBJECTIVE BOX
Patient is comfortable in chair no complaints      T(F): 99 (08-30-19 @ 06:20), Max: 99 (08-30-19 @ 06:20)  HR: 65 (08-30-19 @ 06:20)  BP: 156/69 (08-30-19 @ 06:20)  RR: 16 (08-30-19 @ 06:20)  SpO2: 97% (08-30-19 @ 10:05) (96% - 99%)    PHYSICAL EXAM:  GENERAL: NAD  HEAD:  Atraumatic, Normocephalic  NERVOUS SYSTEM:  Alert & Oriented X3, no focal deficits  CHEST/LUNG: Clear to percussion bilaterally; No rales, rhonchi, wheezing, or rubs  HEART: Regular rate and rhythm; No murmurs, rubs, or gallops  ABDOMEN: Soft, Nontender, Nondistended; Bowel sounds present  EXTREMITIES:  2+ Peripheral Pulses, No clubbing, cyanosis, or edema  LYMPH: No lymphadenopathy noted  SKIN: No rashes or lesions    LABS  08-29    143  |  103  |  47<H>  ----------------------------<  194<H>  4.5   |  29  |  1.5    Ca    9.4      29 Aug 2019 06:15  Mg     2.2     08-29                            13.1   7.97  )-----------( 183      ( 29 Aug 2019 06:15 )             42.1       Culture Results:   <10,000 CFU/mL Normal Urogenital Felipa (08-19-19)  Culture Results:   No growth at 5 days. (08-19-19)  Culture Results:   No growth at 5 days. (08-19-19)      MEDICATIONS  (STANDING):  amLODIPine   Tablet 5 milliGRAM(s) Oral daily  aspirin  chewable 81 milliGRAM(s) Oral daily  buDESOnide 160 MICROgram(s)/formoterol 4.5 MICROgram(s) Inhaler 2 Puff(s) Inhalation two times a day  fenofibrate Tablet 145 milliGRAM(s) Oral daily  finasteride 5 milliGRAM(s) Oral daily  furosemide    Tablet 40 milliGRAM(s) Oral daily  insulin glargine Injectable (LANTUS) 12 Unit(s) SubCutaneous at bedtime  insulin lispro (HumaLOG) corrective regimen sliding scale   SubCutaneous three times a day before meals  insulin lispro Injectable (HumaLOG) 4 Unit(s) SubCutaneous three times a day before meals  isosorbide   mononitrate ER Tablet (IMDUR) 30 milliGRAM(s) Oral daily  losartan 50 milliGRAM(s) Oral daily  metoprolol succinate ER 50 milliGRAM(s) Oral daily  rivaroxaban 15 milliGRAM(s) Oral with dinner  silver sulfADIAZINE 1% Cream 1 Application(s) Topical two times a day    MEDICATIONS  (PRN):  ALPRAZolam 0.5 milliGRAM(s) Oral two times a day PRN anxiety  dextrose 40% Gel 15 Gram(s) Oral once PRN Blood Glucose LESS THAN 70 milliGRAM(s)/deciliter  glucagon  Injectable 1 milliGRAM(s) IntraMuscular once PRN Glucose LESS THAN 70 milligrams/deciliter  zolpidem 5 milliGRAM(s) Oral at bedtime PRN Insomnia

## 2019-08-30 NOTE — PROGRESS NOTE ADULT - PROBLEM SELECTOR PLAN 1
secondary to sulfonylurea   glimepiride DC'd  I spoke with  Dr Beasley (pts endocrinologist)   may dc home on Victoza only and he will see pt in his office next week  pt understands plan,  45 min spent on DC     chronic diastolic CHF/afib-flutter/BPH/COPD/HTN/CAD  continue home meds

## 2019-08-30 NOTE — DISCHARGE NOTE NURSING/CASE MANAGEMENT/SOCIAL WORK - PATIENT PORTAL LINK FT
You can access the FollowMyHealth Patient Portal offered by [facility] by registering at the following website: [email or web address]. By joining [facility]’s FollowMyHealth portal, you will also be able to view your health information using other applications (apps) compatible with our system.

## 2019-08-30 NOTE — DISCHARGE NOTE PROVIDER - NSDCCPCAREPLAN_GEN_ALL_CORE_FT
PRINCIPAL DISCHARGE DIAGNOSIS  Diagnosis: Hypoglycemia  Assessment and Plan of Treatment:       SECONDARY DISCHARGE DIAGNOSES  Diagnosis: Frequent falls  Assessment and Plan of Treatment:

## 2019-08-30 NOTE — DISCHARGE NOTE PROVIDER - CARE PROVIDER_API CALL
Cameron Beasley)  EndocrinologyMetabDiabetes; Internal Medicine  [address] [place] [address]  Phone: [phone]  Fax: [phone]  Follow Up Time:     Carl Kruger)  Family Medicine  [address] [place] [address]  Phone: [phone]  Fax: [phone]  Follow Up Time:

## 2019-08-30 NOTE — CONSULT NOTE ADULT - ASSESSMENT
IMPRESSION: Rehab of 80 y/o  m rehab  for  debility      PRECAUTIONS: [  ] Cardiac  [  ] Respiratory  [  ] Seizures [  ] Contact Isolation  [  ] Droplet Isolation  [ FALL ] Other    Weight Bearing Status:     RECOMMENDATION:    Out of Bed to Chair     DVT/Decubiti Prophylaxis    REHAB PLAN:     [ x  ] Bedside P/T 3-5 times a week   [   ]   Bedside O/T  2-3 times a week             [   ] No Rehab Therapy Indicated                   [   ]  Speech Therapy   Conditioning/ROM                                    ADL  Bed Mobility                                               Conditioning/ROM  Transfers                                                     Bed Mobility  Sitting /Standing Balance                         Transfers                                        Gait Training                                               Sitting/Standing Balance  Stair Training [   ]Applicable                    Home equipment Eval                                                                        Splinting  [   ] Only      GOALS:   ADL   [  x ]   Independent                    Transfers  [  x ] Independent                          Ambulation  [   x] Independent     [    ] With device                            [   ]  CG                                                         [   ]  CG                                                                  [   ] CG                            [    ] Min A                                                   [   ] Min A                                                              [   ] Min  A          DISCHARGE PLAN:   [   ]  Good candidate for Intensive Rehabilitation/Hospital based-[facility]A [facility]                                             Will tolerate 3hrs Intensive Rehab Daily                                       [    ]  Short Term Rehab in Skilled Nursing Facility                                       [  xx  ]  Home with Outpatient or  services                                         [    ]  Possible Candidate for Intensive Hospital based Rehab

## 2019-09-05 NOTE — CDI QUERY NOTE - NSCDIOTHERTXTBX_GEN_ALL_CORE_HH
80yo male admitted 8/27/19 due to hypoglycemia.     Critical care consult on 8/28 documents a diagnosis of "CKD".     8/27: BUN 48, creatinine 1.5, GFR 44  8/28: BUN 42, creatinine 1.5, GFR 44  8/29: BUN 47, creatinine 1.5, GFR 44    Based on the information, could the patient's condition be further specified as:     > Chronic kidney disease, stage III  > Chronic kidney disease, stage II  > Chronic kidney disease ruled out  > Other ________________________  > Unable to determine

## 2019-09-10 DIAGNOSIS — N18.2 CHRONIC KIDNEY DISEASE, STAGE 2 (MILD): ICD-10-CM

## 2019-09-10 DIAGNOSIS — I13.0 HYPERTENSIVE HEART AND CHRONIC KIDNEY DISEASE WITH HEART FAILURE AND STAGE 1 THROUGH STAGE 4 CHRONIC KIDNEY DISEASE, OR UNSPECIFIED CHRONIC KIDNEY DISEASE: ICD-10-CM
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
## 2019-09-10 DIAGNOSIS — J44.9 CHRONIC OBSTRUCTIVE PULMONARY DISEASE, UNSPECIFIED: ICD-10-CM

## 2019-09-10 DIAGNOSIS — Z91.81 HISTORY OF FALLING: ICD-10-CM

## 2019-09-10 DIAGNOSIS — I50.32 CHRONIC DIASTOLIC (CONGESTIVE) HEART FAILURE: ICD-10-CM

## 2019-09-10 DIAGNOSIS — E11.22 TYPE 2 DIABETES MELLITUS WITH DIABETIC CHRONIC KIDNEY DISEASE: ICD-10-CM

## 2019-09-10 DIAGNOSIS — I48.91 UNSPECIFIED ATRIAL FIBRILLATION: ICD-10-CM

## 2019-09-10 DIAGNOSIS — E11.649 TYPE 2 DIABETES MELLITUS WITH HYPOGLYCEMIA WITHOUT COMA: ICD-10-CM

## 2019-09-10 DIAGNOSIS — N40.0 BENIGN PROSTATIC HYPERPLASIA WITHOUT LOWER URINARY TRACT SYMPTOMS: ICD-10-CM

## 2019-09-10 DIAGNOSIS — R29.6 REPEATED FALLS: ICD-10-CM

## 2019-09-10 DIAGNOSIS — Z95.5 PRESENCE OF CORONARY ANGIOPLASTY IMPLANT AND GRAFT: ICD-10-CM
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
## 2019-09-10 DIAGNOSIS — Z79.4 LONG TERM (CURRENT) USE OF INSULIN: ICD-10-CM

## 2019-09-10 DIAGNOSIS — E16.0 DRUG-INDUCED HYPOGLYCEMIA WITHOUT COMA: ICD-10-CM

## 2019-09-10 DIAGNOSIS — T38.3X5A ADVERSE EFFECT OF INSULIN AND ORAL HYPOGLYCEMIC [ANTIDIABETIC] DRUGS, INITIAL ENCOUNTER: ICD-10-CM

## 2019-09-10 DIAGNOSIS — R00.1 BRADYCARDIA, UNSPECIFIED: ICD-10-CM

## 2019-09-10 DIAGNOSIS — Z95.1 PRESENCE OF AORTOCORONARY BYPASS GRAFT: ICD-10-CM

## 2019-10-10 ENCOUNTER — INPATIENT (INPATIENT)
Facility: HOSPITAL | Age: 79
LOS: 20 days | Discharge: NURSING, REHAB OR LONG-TERM CARE | End: 2019-10-31
Attending: INTERNAL MEDICINE | Admitting: INTERNAL MEDICINE
Payer: MEDICARE

## 2019-10-10 VITALS
OXYGEN SATURATION: 99 % | SYSTOLIC BLOOD PRESSURE: 123 MMHG | HEART RATE: 92 BPM | RESPIRATION RATE: 40 BRPM | DIASTOLIC BLOOD PRESSURE: 75 MMHG

## 2019-10-10 DIAGNOSIS — Z95.1 PRESENCE OF AORTOCORONARY BYPASS GRAFT: Chronic | ICD-10-CM

## 2019-10-10 DIAGNOSIS — Z95.5 PRESENCE OF CORONARY ANGIOPLASTY IMPLANT AND GRAFT: Chronic | ICD-10-CM

## 2019-10-10 LAB
ALBUMIN SERPL ELPH-MCNC: 3.9 G/DL — SIGNIFICANT CHANGE UP (ref 3.5–5.2)
ALP SERPL-CCNC: 57 U/L — SIGNIFICANT CHANGE UP (ref 30–115)
ALT FLD-CCNC: 12 U/L — SIGNIFICANT CHANGE UP (ref 0–41)
ANION GAP SERPL CALC-SCNC: 14 MMOL/L — SIGNIFICANT CHANGE UP (ref 7–14)
APTT BLD: 37.9 SEC — SIGNIFICANT CHANGE UP (ref 27–39.2)
AST SERPL-CCNC: 17 U/L — SIGNIFICANT CHANGE UP (ref 0–41)
BASE EXCESS BLDV CALC-SCNC: 1.5 MMOL/L — SIGNIFICANT CHANGE UP (ref -2–2)
BASOPHILS # BLD AUTO: 0.05 K/UL — SIGNIFICANT CHANGE UP (ref 0–0.2)
BASOPHILS NFR BLD AUTO: 0.4 % — SIGNIFICANT CHANGE UP (ref 0–1)
BILIRUB SERPL-MCNC: 1.3 MG/DL — HIGH (ref 0.2–1.2)
BUN SERPL-MCNC: 28 MG/DL — HIGH (ref 10–20)
CA-I SERPL-SCNC: 1.23 MMOL/L — SIGNIFICANT CHANGE UP (ref 1.12–1.3)
CALCIUM SERPL-MCNC: 9.7 MG/DL — SIGNIFICANT CHANGE UP (ref 8.5–10.1)
CHLORIDE SERPL-SCNC: 103 MMOL/L — SIGNIFICANT CHANGE UP (ref 98–110)
CK MB CFR SERPL CALC: 6.2 NG/ML — SIGNIFICANT CHANGE UP (ref 0.6–6.3)
CK MB CFR SERPL CALC: 6.6 NG/ML — HIGH (ref 0.6–6.3)
CK SERPL-CCNC: 80 U/L — SIGNIFICANT CHANGE UP (ref 0–225)
CO2 SERPL-SCNC: 28 MMOL/L — SIGNIFICANT CHANGE UP (ref 17–32)
CREAT SERPL-MCNC: 1.3 MG/DL — SIGNIFICANT CHANGE UP (ref 0.7–1.5)
EOSINOPHIL # BLD AUTO: 0.07 K/UL — SIGNIFICANT CHANGE UP (ref 0–0.7)
EOSINOPHIL NFR BLD AUTO: 0.5 % — SIGNIFICANT CHANGE UP (ref 0–8)
GAS PNL BLDV: 144 MMOL/L — SIGNIFICANT CHANGE UP (ref 136–145)
GAS PNL BLDV: SIGNIFICANT CHANGE UP
GLUCOSE BLDC GLUCOMTR-MCNC: 139 MG/DL — HIGH (ref 70–99)
GLUCOSE BLDC GLUCOMTR-MCNC: 222 MG/DL — HIGH (ref 70–99)
GLUCOSE SERPL-MCNC: 127 MG/DL — HIGH (ref 70–99)
HCO3 BLDV-SCNC: 30 MMOL/L — HIGH (ref 22–29)
HCT VFR BLD CALC: 42.6 % — SIGNIFICANT CHANGE UP (ref 42–52)
HCT VFR BLDA CALC: 41.7 % — SIGNIFICANT CHANGE UP (ref 34–44)
HGB BLD CALC-MCNC: 13.6 G/DL — LOW (ref 14–18)
HGB BLD-MCNC: 13.1 G/DL — LOW (ref 14–18)
HOROWITZ INDEX BLDV+IHG-RTO: 100 — SIGNIFICANT CHANGE UP
IMM GRANULOCYTES NFR BLD AUTO: 0.5 % — HIGH (ref 0.1–0.3)
INR BLD: 1.48 RATIO — HIGH (ref 0.65–1.3)
LACTATE BLDV-MCNC: 1.6 MMOL/L — SIGNIFICANT CHANGE UP (ref 0.5–1.6)
LYMPHOCYTES # BLD AUTO: 1.53 K/UL — SIGNIFICANT CHANGE UP (ref 1.2–3.4)
LYMPHOCYTES # BLD AUTO: 11.9 % — LOW (ref 20.5–51.1)
MAGNESIUM SERPL-MCNC: 2 MG/DL — SIGNIFICANT CHANGE UP (ref 1.8–2.4)
MCHC RBC-ENTMCNC: 25.6 PG — LOW (ref 27–31)
MCHC RBC-ENTMCNC: 30.8 G/DL — LOW (ref 32–37)
MCV RBC AUTO: 83.4 FL — SIGNIFICANT CHANGE UP (ref 80–94)
MONOCYTES # BLD AUTO: 1.33 K/UL — HIGH (ref 0.1–0.6)
MONOCYTES NFR BLD AUTO: 10.3 % — HIGH (ref 1.7–9.3)
NEUTROPHILS # BLD AUTO: 9.86 K/UL — HIGH (ref 1.4–6.5)
NEUTROPHILS NFR BLD AUTO: 76.4 % — HIGH (ref 42.2–75.2)
NRBC # BLD: 0 /100 WBCS — SIGNIFICANT CHANGE UP (ref 0–0)
NT-PROBNP SERPL-SCNC: 5283 PG/ML — HIGH (ref 0–300)
PCO2 BLDV: 61 MMHG — HIGH (ref 41–51)
PH BLDV: 7.29 — SIGNIFICANT CHANGE UP (ref 7.26–7.43)
PLATELET # BLD AUTO: 256 K/UL — SIGNIFICANT CHANGE UP (ref 130–400)
PO2 BLDV: 26 MMHG — SIGNIFICANT CHANGE UP (ref 20–40)
POTASSIUM BLDV-SCNC: 4.6 MMOL/L — SIGNIFICANT CHANGE UP (ref 3.3–5.6)
POTASSIUM SERPL-MCNC: 4.4 MMOL/L — SIGNIFICANT CHANGE UP (ref 3.5–5)
POTASSIUM SERPL-SCNC: 4.4 MMOL/L — SIGNIFICANT CHANGE UP (ref 3.5–5)
PROT SERPL-MCNC: 7.9 G/DL — SIGNIFICANT CHANGE UP (ref 6–8)
PROTHROM AB SERPL-ACNC: 17 SEC — HIGH (ref 9.95–12.87)
RBC # BLD: 5.11 M/UL — SIGNIFICANT CHANGE UP (ref 4.7–6.1)
RBC # FLD: 15.9 % — HIGH (ref 11.5–14.5)
SAO2 % BLDV: 36 % — SIGNIFICANT CHANGE UP
SODIUM SERPL-SCNC: 145 MMOL/L — SIGNIFICANT CHANGE UP (ref 135–146)
TROPONIN T SERPL-MCNC: 0.09 NG/ML — CRITICAL HIGH
TROPONIN T SERPL-MCNC: 0.11 NG/ML — CRITICAL HIGH
TROPONIN T SERPL-MCNC: 0.14 NG/ML — CRITICAL HIGH
WBC # BLD: 12.9 K/UL — HIGH (ref 4.8–10.8)
WBC # FLD AUTO: 12.9 K/UL — HIGH (ref 4.8–10.8)

## 2019-10-10 PROCEDURE — 71045 X-RAY EXAM CHEST 1 VIEW: CPT | Mod: 26

## 2019-10-10 PROCEDURE — 99291 CRITICAL CARE FIRST HOUR: CPT

## 2019-10-10 PROCEDURE — 99223 1ST HOSP IP/OBS HIGH 75: CPT

## 2019-10-10 RX ORDER — FINASTERIDE 5 MG/1
5 TABLET, FILM COATED ORAL DAILY
Refills: 0 | Status: DISCONTINUED | OUTPATIENT
Start: 2019-10-10 | End: 2019-10-29

## 2019-10-10 RX ORDER — DEXTROSE 50 % IN WATER 50 %
25 SYRINGE (ML) INTRAVENOUS ONCE
Refills: 0 | Status: DISCONTINUED | OUTPATIENT
Start: 2019-10-10 | End: 2019-10-29

## 2019-10-10 RX ORDER — INFLUENZA VIRUS VACCINE 15; 15; 15; 15 UG/.5ML; UG/.5ML; UG/.5ML; UG/.5ML
0.5 SUSPENSION INTRAMUSCULAR ONCE
Refills: 0 | Status: DISCONTINUED | OUTPATIENT
Start: 2019-10-10 | End: 2019-10-31

## 2019-10-10 RX ORDER — AMLODIPINE BESYLATE 2.5 MG/1
5 TABLET ORAL DAILY
Refills: 0 | Status: DISCONTINUED | OUTPATIENT
Start: 2019-10-10 | End: 2019-10-17

## 2019-10-10 RX ORDER — ALPRAZOLAM 0.25 MG
0.5 TABLET ORAL ONCE
Refills: 0 | Status: DISCONTINUED | OUTPATIENT
Start: 2019-10-10 | End: 2019-10-10

## 2019-10-10 RX ORDER — TAMSULOSIN HYDROCHLORIDE 0.4 MG/1
0.4 CAPSULE ORAL AT BEDTIME
Refills: 0 | Status: DISCONTINUED | OUTPATIENT
Start: 2019-10-10 | End: 2019-10-29

## 2019-10-10 RX ORDER — ASPIRIN/CALCIUM CARB/MAGNESIUM 324 MG
81 TABLET ORAL DAILY
Refills: 0 | Status: DISCONTINUED | OUTPATIENT
Start: 2019-10-10 | End: 2019-10-29

## 2019-10-10 RX ORDER — SODIUM CHLORIDE 9 MG/ML
1000 INJECTION, SOLUTION INTRAVENOUS
Refills: 0 | Status: DISCONTINUED | OUTPATIENT
Start: 2019-10-10 | End: 2019-10-10

## 2019-10-10 RX ORDER — ENOXAPARIN SODIUM 100 MG/ML
100 INJECTION SUBCUTANEOUS
Refills: 0 | Status: DISCONTINUED | OUTPATIENT
Start: 2019-10-10 | End: 2019-10-14

## 2019-10-10 RX ORDER — BUDESONIDE AND FORMOTEROL FUMARATE DIHYDRATE 160; 4.5 UG/1; UG/1
2 AEROSOL RESPIRATORY (INHALATION)
Refills: 0 | Status: DISCONTINUED | OUTPATIENT
Start: 2019-10-10 | End: 2019-10-29

## 2019-10-10 RX ORDER — LOSARTAN POTASSIUM 100 MG/1
50 TABLET, FILM COATED ORAL DAILY
Refills: 0 | Status: DISCONTINUED | OUTPATIENT
Start: 2019-10-10 | End: 2019-10-11

## 2019-10-10 RX ORDER — DEXTROSE 50 % IN WATER 50 %
12.5 SYRINGE (ML) INTRAVENOUS ONCE
Refills: 0 | Status: DISCONTINUED | OUTPATIENT
Start: 2019-10-10 | End: 2019-10-29

## 2019-10-10 RX ORDER — METOPROLOL TARTRATE 50 MG
75 TABLET ORAL DAILY
Refills: 0 | Status: DISCONTINUED | OUTPATIENT
Start: 2019-10-10 | End: 2019-10-29

## 2019-10-10 RX ORDER — SODIUM CHLORIDE 9 MG/ML
1000 INJECTION, SOLUTION INTRAVENOUS
Refills: 0 | Status: DISCONTINUED | OUTPATIENT
Start: 2019-10-10 | End: 2019-10-29

## 2019-10-10 RX ORDER — ISOSORBIDE MONONITRATE 60 MG/1
30 TABLET, EXTENDED RELEASE ORAL DAILY
Refills: 0 | Status: DISCONTINUED | OUTPATIENT
Start: 2019-10-10 | End: 2019-10-29

## 2019-10-10 RX ORDER — ALPRAZOLAM 0.25 MG
0.5 TABLET ORAL
Refills: 0 | Status: DISCONTINUED | OUTPATIENT
Start: 2019-10-11 | End: 2019-10-18

## 2019-10-10 RX ORDER — FENOFIBRATE,MICRONIZED 130 MG
145 CAPSULE ORAL DAILY
Refills: 0 | Status: DISCONTINUED | OUTPATIENT
Start: 2019-10-10 | End: 2019-10-29

## 2019-10-10 RX ORDER — INSULIN LISPRO 100/ML
VIAL (ML) SUBCUTANEOUS
Refills: 0 | Status: DISCONTINUED | OUTPATIENT
Start: 2019-10-10 | End: 2019-10-16

## 2019-10-10 RX ORDER — LOSARTAN POTASSIUM 100 MG/1
50 TABLET, FILM COATED ORAL
Refills: 0 | Status: DISCONTINUED | OUTPATIENT
Start: 2019-10-10 | End: 2019-10-10

## 2019-10-10 RX ORDER — FUROSEMIDE 40 MG
40 TABLET ORAL
Refills: 0 | Status: DISCONTINUED | OUTPATIENT
Start: 2019-10-10 | End: 2019-10-13

## 2019-10-10 RX ORDER — LOSARTAN POTASSIUM 100 MG/1
0 TABLET, FILM COATED ORAL
Qty: 0 | Refills: 0 | DISCHARGE

## 2019-10-10 RX ORDER — DEXTROSE 50 % IN WATER 50 %
15 SYRINGE (ML) INTRAVENOUS ONCE
Refills: 0 | Status: DISCONTINUED | OUTPATIENT
Start: 2019-10-10 | End: 2019-10-29

## 2019-10-10 RX ORDER — LOSARTAN POTASSIUM 100 MG/1
25 TABLET, FILM COATED ORAL AT BEDTIME
Refills: 0 | Status: DISCONTINUED | OUTPATIENT
Start: 2019-10-10 | End: 2019-10-11

## 2019-10-10 RX ORDER — SIMVASTATIN 20 MG/1
40 TABLET, FILM COATED ORAL AT BEDTIME
Refills: 0 | Status: DISCONTINUED | OUTPATIENT
Start: 2019-10-10 | End: 2019-10-29

## 2019-10-10 RX ORDER — GLUCAGON INJECTION, SOLUTION 0.5 MG/.1ML
1 INJECTION, SOLUTION SUBCUTANEOUS ONCE
Refills: 0 | Status: DISCONTINUED | OUTPATIENT
Start: 2019-10-10 | End: 2019-10-29

## 2019-10-10 RX ORDER — FUROSEMIDE 40 MG
40 TABLET ORAL ONCE
Refills: 0 | Status: COMPLETED | OUTPATIENT
Start: 2019-10-10 | End: 2019-10-10

## 2019-10-10 RX ADMIN — LOSARTAN POTASSIUM 25 MILLIGRAM(S): 100 TABLET, FILM COATED ORAL at 21:25

## 2019-10-10 RX ADMIN — Medication 4: at 21:53

## 2019-10-10 RX ADMIN — Medication 40 MILLIGRAM(S): at 17:47

## 2019-10-10 RX ADMIN — Medication 81 MILLIGRAM(S): at 16:02

## 2019-10-10 RX ADMIN — ISOSORBIDE MONONITRATE 30 MILLIGRAM(S): 60 TABLET, EXTENDED RELEASE ORAL at 16:03

## 2019-10-10 RX ADMIN — Medication 145 MILLIGRAM(S): at 16:02

## 2019-10-10 RX ADMIN — BUDESONIDE AND FORMOTEROL FUMARATE DIHYDRATE 2 PUFF(S): 160; 4.5 AEROSOL RESPIRATORY (INHALATION) at 21:31

## 2019-10-10 RX ADMIN — AMLODIPINE BESYLATE 5 MILLIGRAM(S): 2.5 TABLET ORAL at 16:02

## 2019-10-10 RX ADMIN — Medication 75 MILLIGRAM(S): at 16:01

## 2019-10-10 RX ADMIN — LOSARTAN POTASSIUM 50 MILLIGRAM(S): 100 TABLET, FILM COATED ORAL at 16:03

## 2019-10-10 RX ADMIN — Medication 40 MILLIGRAM(S): at 11:17

## 2019-10-10 RX ADMIN — ENOXAPARIN SODIUM 100 MILLIGRAM(S): 100 INJECTION SUBCUTANEOUS at 21:25

## 2019-10-10 RX ADMIN — TAMSULOSIN HYDROCHLORIDE 0.4 MILLIGRAM(S): 0.4 CAPSULE ORAL at 21:25

## 2019-10-10 RX ADMIN — FINASTERIDE 5 MILLIGRAM(S): 5 TABLET, FILM COATED ORAL at 16:01

## 2019-10-10 RX ADMIN — Medication 1 APPLICATION(S): at 17:47

## 2019-10-10 RX ADMIN — SIMVASTATIN 40 MILLIGRAM(S): 20 TABLET, FILM COATED ORAL at 21:25

## 2019-10-10 NOTE — H&P ADULT - NSHPREVIEWOFSYSTEMS_GEN_ALL_CORE
CONSTITUTIONAL: No weakness, fevers or chills  EYES/ENT: No visual changes;  No vertigo or throat pain   NECK: No pain or stiffness  RESPIRATORY: No cough or wheezing; +ve shortness of breath  CARDIOVASCULAR: +VE chest pain; No palpitations  GASTROINTESTINAL: No abdominal or epigastric pain. No nausea, vomiting; No diarrhea or constipation  GENITOURINARY: No dysuria, frequency or hematuria  NEUROLOGICAL: No numbness or weakness  SKIN: No itching, rashes

## 2019-10-10 NOTE — ED PROVIDER NOTE - PHYSICAL EXAMINATION
CONSTITUTIONAL: Well-developed; well-nourished; in no acute distress.   SKIN: warm, dry.  HEAD: Normocephalic; atraumatic.  EYES: PERRL, EOMI, no conjunctival erythema  ENT: No nasal discharge; airway clear.  NECK: Supple; non tender.  CARD: S1, S2 normal; no murmurs, gallops, or rubs. irregular and tachycardic.   RESP: Crackles diffusely with poor air movement; RR 40s, with subcostal and intercostal retractions.   ABD: soft nondistended, nontender to palpation.   EXT: chronic venous stasis changes to lower extremities; oozing from skin, both extremities mildly erythematous.   NEURO: Alert, oriented, grossly unremarkable.  PSYCH: Cooperative, appropriate.

## 2019-10-10 NOTE — H&P ADULT - HISTORY OF PRESENT ILLNESS
Patient is a 80yo Male w/ PMH of HFpEF (EF of 55% with Grade II Diastolic dysfunction), COPD (home O2 3-3.5L as needed), DM, HTN, CAD s/p CABG and 1 stent, BPH, and recent admission for CHF (August 2019) presenting to [facility] with complaints of shortness of breath and chest pain. Patient reports he has been progressively worsening shortness of breath for the last 4-5 days prior to admission. He  Patient reports he has been compliant with all of his medications as well with a low salt diet. He denies any recent history of fevers, chills, cough. On day of admission, had severe dyspnea on exertion associated with a pressure like sensation in his chest that radiated to his left arm. He tried sublingual nitro for relief but it didn't help. Therefore, he came in for further evaluation. He was given Solumedrol 125mg by EMS. In the ED, patient was found to have evidence of bilateral pleural effusions on bedside sono done by ED staff. He received IV Lasix 40mg and was placed on BiPAP with major improvement in his symptoms. On my assessment, patient was speaking to me in full sentences on BiPAP and his chest pain has resolved. Patient is a 78yo Male w/ PMH of HFpEF (EF of 55% with Grade II Diastolic dysfunction), COPD (home O2 3-3.5L as needed), DM, HTN, CAD s/p CABG and 1 stent, BPH, and recent admission for CHF (August 2019) presenting to [facility] with complaints of shortness of breath and chest pain. Patient reports he has been progressively worsening shortness of breath for the last 4-5 days prior to admission. He also reports associated chest pain on exertion.  Patient reports he has been compliant with all of his medications as well with a low salt diet. He denies any recent history of fevers, chills, cough. On day of admission, had severe dyspnea on exertion associated with a pressure like sensation in his chest that radiated to his left arm. He tried sublingual nitro for relief but it didn't help. Therefore, he came in for further evaluation. He was given Solumedrol 125mg by EMS. In the ED, patient was found to have evidence of bilateral pleural effusions on bedside sono done by ED staff. He received IV Lasix 40mg and was placed on BiPAP with major improvement in his symptoms. On my assessment, patient was speaking to me in full sentences on BiPAP and his chest pain has resolved.

## 2019-10-10 NOTE — ED PROVIDER NOTE - OBJECTIVE STATEMENT
Patient is a 80 yo M w/ hx of a-fib on xarelto, CHF on lasix 40mg, COPD (on home O2 3.5 L), DM, HTN, CAD s/p CABG followed by Dr. Ashley WANG for SOB. Patient has had exertional SOB x 3-4 days associated with minimal cough; today he developed chest pain on exertion. Patient took one sublingual nitro at home with relief in chest pain; denies fever, denies N/V, denies abdominal pain, denies hx of blood clots. Patient did not take home meds today. EMS gave him 125mg solumedrol and one nebs treatment.

## 2019-10-10 NOTE — H&P ADULT - NSHPLABSRESULTS_GEN_ALL_CORE
13.1   12.90  )----------(  256       ( 10 Oct 2019 11:00 )               42.6    10-10    145  |  103  |  28<H>  ----------------------------<  127<H>  4.4   |  28  |  1.3    Ca    9.7      10 Oct 2019 11:00  Mg     2.0     10-10    TPro  7.9  /  Alb  3.9  /  TBili  1.3<H>  /  DBili  x   /  AST  17  /  ALT  12  /  AlkPhos  57  10-10    CARDIAC MARKERS ( 10 Oct 2019 11:00 )  x     / 0.09 ng/mL / x     / x     / x        BNP [id number]

## 2019-10-10 NOTE — H&P ADULT - ATTENDING COMMENTS
Patient seen and evaluated independently medical resident note reviewed, I agree with plan and management, except as I have noted. Acute respiratory failure secondary to acute diastolic CHF and angina

## 2019-10-10 NOTE — H&P ADULT - ASSESSMENT
Patient is a 78yo Male w/ PMH of HFpEF (EF of 55% with Grade II Diastolic dysfunction), COPD (home O2 3-3.5L as needed), DM, HTN, CAD s/p CABG and 1 stent, BPH, and recent admission for CHF (August 2019) presenting to [facility] with complaints of shortness of breath and chest pain.    #) Acute on Chronic HFpEF Exacerbation   - Patient with elevated BNP Patient is a 78yo Male w/ PMH of HFpEF (EF of 55% with Grade II Diastolic dysfunction), COPD (home O2 3-3.5L as needed), DM, HTN, CAD s/p CABG and 1 stent, BPH, and recent admission for CHF (August 2019) presenting to [facility] with complaints of shortness of breath and chest pain.    #) Acute on Chronic Diastolic CHF exacerbation - Stable on 3L  - Per previous admission, last known echo showed an EF > 55% and Grade II diastolic dysfunction  - Repeat 2D Echo  - CXR shows b/l pleural effusion and vascular congestion with elevated BNP (>5000)  - BIPAP 4 hours on/off and at night during sleep   - Strict I&Os. Keep I < O. and daily weights  - IV Lasix 40mg IV BID   - c/w fluid restricted diets    #) Chest Pain, rule out ACS in patient with hx of CABG and stent placement  - c/w losartan asa, metoprolol; patient cannot tolerate statins   - Will start on therapeutic Lovenox   - Trend Cardiac Enzymes, first set shows 0.09    #) Hx of A Fib  - On Xarelto, 15mg at home; will hold for now and start on Lovenox Patient is a 80yo Male w/ PMH of HFpEF (EF of 55% with Grade II Diastolic dysfunction), COPD (home O2 3-3.5L as needed), DM, HTN, CAD s/p CABG and 1 stent, BPH, and recent admission for CHF (August 2019) presenting to [facility] with complaints of shortness of breath and chest pain.    #) Acute on Chronic Diastolic CHF exacerbation   - Last 2D Echo on 8/19/2019: showed an EF > 55% with moderate aortic stenosis and moderate concentric left ventricular hypertrophy   - Repeat 2D Echo  - CXR shows b/l pleural effusions with evidence of vascular congestion with elevated BNP (>5000)  - BIPAP 4 hours on/off and at night during sleep   - Strict I&Os and daily weights;  Keep I < O.  - IV Lasix 40mg IV BID   - c/w fluid restricted, low sodium diet     #) Chest Pain, rule out ACS in patient with hx of CABG and stent placement  - c/w losartan asa, metoprolol; Will try patient on Simvastatin (has not tolerated Atorvastatin in past)   - Will start on therapeutic Lovenox   - Trend Cardiac Enzymes, first set shows 0.09    #) Hx of A Fib  - On Xarelto, 15mg at home; will hold for now and start on Lovenox   - Cont with Metoprolol for rate control     #) Hx of COPD  - Stable at this time, with no evidence of acute exacerbation  - Cont with Symbicort (in place of Breo)    #) Hx of Diabetes Type 2  - Fingersticks AC + HS  - Carb consistent diet Patient is a 78yo Male w/ PMH of HFpEF (EF of 55% with Grade II Diastolic dysfunction), COPD (home O2 3-3.5L as needed), DM, HTN, CAD s/p CABG and 1 stent, BPH, and recent admission for CHF (August 2019) presenting to [facility] with complaints of shortness of breath and chest pain.    #) Acute on Chronic Diastolic CHF exacerbation   - Last 2D Echo on 8/19/2019: showed an EF > 55% with moderate aortic stenosis and moderate concentric left ventricular hypertrophy   - Repeat 2D Echo  - CXR shows b/l pleural effusions with evidence of vascular congestion with elevated BNP (>5000)  - BIPAP 4 hours on/off and at night during sleep   - Strict I&Os and daily weights;  Keep I < O.  - IV Lasix 40mg IV BID   - c/w fluid restricted, low sodium diet     #) Chest Pain, rule out ACS in patient with hx of CABG and stent placement  - c/w losartan asa, metoprolol; Will try patient on Simvastatin (has not tolerated Atorvastatin in past)   - Will start on therapeutic Lovenox   - Trend Cardiac Enzymes, first set shows 0.09    #) Hx of A Fib  - On Xarelto, 15mg at home; will hold for now and start on Lovenox   - Cont with Metoprolol for rate control     #) Hx of COPD  - Stable at this time, with no evidence of acute exacerbation  - Cont with Symbicort (in place of Breo)    #) Hx of Diabetes Type 2  - Fingersticks AC + HS  - Carb consistent diet    #) Hx of BPH  - Cont with Flomax and Finasteride     #Diet: Carb consistent/DASH/Fluid restricted  #Activity: Bedrest for now  #DVT PPx: Lovenox therapeutic  #GI PPx: Not indicated  #Dispo: Acute, pending

## 2019-10-10 NOTE — ED PROVIDER NOTE - ATTENDING CONTRIBUTION TO CARE
79yr old male with  PMH of CHF, COPD (home O2 as needed), DM, HTN, CAD s/p CABG and 1 stent, BPH, and recent admission for CHF here for eval of sob. hx obtained from son and ems. pt with sob X 1 day. associated with chest pain relieved with nitro. no fever, chills. on exam pt in nodistress, s1s2 irregular, tachy, decreae bl, with poor air mvt and crackles bl, soft nt/nd, no calf swelling.

## 2019-10-10 NOTE — ED ADULT TRIAGE NOTE - CHIEF COMPLAINT QUOTE
Pt BIBA on Bipap from home for shortness of breath.  Pt took his own Nitro at home with relief.  EMS placed #20 right wrist.  Pt received one treatment and 125 solumedrol.

## 2019-10-10 NOTE — ED PROVIDER NOTE - NS ED ROS FT
Constitutional: No fevers.   Eyes:  No visual changes, eye pain or discharge.  ENMT:  No hearing changes, pain, no sore throat or runny nose, no difficulty swallowing  Cardiac:  +chest pain.   Respiratory:  +SOB. +cough.   GI:  No nausea, vomiting, diarrhea or abdominal pain.  :  No dysuria, frequency or burning.  MS:  No myalgia, muscle weakness, joint pain or back pain.  Neuro:  No headache or weakness.  No LOC.  Skin:  +chronic venous stasis ulcers on LEs.   Endocrine: No history of thyroid disease or diabetes.

## 2019-10-10 NOTE — H&P ADULT - NSHPPHYSICALEXAM_GEN_ALL_CORE
Vital Signs Last 24 Hrs  T(C): 37.4 (10 Oct 2019 11:29), Max: 37.4 (10 Oct 2019 11:29)  T(F): 99.4 (10 Oct 2019 11:29), Max: 99.4 (10 Oct 2019 11:29)  HR: 67 (10 Oct 2019 12:40) (67 - 104)  BP: 103/52 (10 Oct 2019 12:40) (103/52 - 136/60)  RR: 25 (10 Oct 2019 12:40) (25 - 40)  SpO2: 97% (10 Oct 2019 12:40) (97% - 99%)    General: elderly male in NAD; On BiPAP, speaking comfortably in full sentences   Neuro: alert and oriented, no focal deficits, moves all extremities spontaneously  HEENT: NCAT, EOMI, anicteric, mucosa moist  Respiratory: Decreased breath sounds bilaterally with slight crackling at the bases   CVS: Irregularly irregular rhythm   Abdomen: soft, nontender, nondistended  Extremities: 1+ pedal edema, sensation and movement grossly intact  Skin: venous static changes in bilateral lower ext with areas of weeping

## 2019-10-10 NOTE — H&P ADULT - NSHPSOCIALHISTORY_GEN_ALL_CORE
Ex smoker, 1/2 ppd for 4 years; quit 60 years ago  Denies EtOH use  Denies illicit drug use   Lives at home with his son

## 2019-10-10 NOTE — ED PROVIDER NOTE - CARE PLAN
Principal Discharge DX:	CHF (congestive heart failure)  Secondary Diagnosis:	SOB (shortness of breath) on exertion  Secondary Diagnosis:	Exertional chest pain

## 2019-10-10 NOTE — ED PROVIDER NOTE - PROGRESS NOTE DETAILS
pt more comftorable on bipap Bedside sono shows moderate pleural effusions bilaterally.   RR and tachycardia improved on BIPAP; patient denies chest pain. Breathing is much improved; patient comfortable. RR and tachycardia improved on BIPAP; patient denies chest pain. Breathing is much improved; patient comfortable. pt presented with acute sob, after lasix, bipap now breathing more comftorably. discussed with ICU Dr. Ayers and approved.

## 2019-10-11 LAB
ALBUMIN SERPL ELPH-MCNC: 3.5 G/DL — SIGNIFICANT CHANGE UP (ref 3.5–5.2)
ALP SERPL-CCNC: 50 U/L — SIGNIFICANT CHANGE UP (ref 30–115)
ALT FLD-CCNC: 10 U/L — SIGNIFICANT CHANGE UP (ref 0–41)
ANION GAP SERPL CALC-SCNC: 13 MMOL/L — SIGNIFICANT CHANGE UP (ref 7–14)
APTT BLD: 40.1 SEC — HIGH (ref 27–39.2)
AST SERPL-CCNC: 14 U/L — SIGNIFICANT CHANGE UP (ref 0–41)
BASOPHILS # BLD AUTO: 0.01 K/UL — SIGNIFICANT CHANGE UP (ref 0–0.2)
BASOPHILS NFR BLD AUTO: 0.1 % — SIGNIFICANT CHANGE UP (ref 0–1)
BILIRUB SERPL-MCNC: 0.8 MG/DL — SIGNIFICANT CHANGE UP (ref 0.2–1.2)
BUN SERPL-MCNC: 44 MG/DL — HIGH (ref 10–20)
CALCIUM SERPL-MCNC: 9.3 MG/DL — SIGNIFICANT CHANGE UP (ref 8.5–10.1)
CHLORIDE SERPL-SCNC: 103 MMOL/L — SIGNIFICANT CHANGE UP (ref 98–110)
CK MB CFR SERPL CALC: 4.9 NG/ML — SIGNIFICANT CHANGE UP (ref 0.6–6.3)
CK SERPL-CCNC: 46 U/L — SIGNIFICANT CHANGE UP (ref 0–225)
CO2 SERPL-SCNC: 27 MMOL/L — SIGNIFICANT CHANGE UP (ref 17–32)
CREAT SERPL-MCNC: 1.6 MG/DL — HIGH (ref 0.7–1.5)
EOSINOPHIL # BLD AUTO: 0 K/UL — SIGNIFICANT CHANGE UP (ref 0–0.7)
EOSINOPHIL NFR BLD AUTO: 0 % — SIGNIFICANT CHANGE UP (ref 0–8)
GLUCOSE BLDC GLUCOMTR-MCNC: 205 MG/DL — HIGH (ref 70–99)
GLUCOSE BLDC GLUCOMTR-MCNC: 222 MG/DL — HIGH (ref 70–99)
GLUCOSE BLDC GLUCOMTR-MCNC: 241 MG/DL — HIGH (ref 70–99)
GLUCOSE BLDC GLUCOMTR-MCNC: 245 MG/DL — HIGH (ref 70–99)
GLUCOSE BLDC GLUCOMTR-MCNC: 328 MG/DL — HIGH (ref 70–99)
GLUCOSE SERPL-MCNC: 247 MG/DL — HIGH (ref 70–99)
HCT VFR BLD CALC: 38.2 % — LOW (ref 42–52)
HGB BLD-MCNC: 11.9 G/DL — LOW (ref 14–18)
IMM GRANULOCYTES NFR BLD AUTO: 0.4 % — HIGH (ref 0.1–0.3)
INR BLD: 1.45 RATIO — HIGH (ref 0.65–1.3)
LYMPHOCYTES # BLD AUTO: 0.65 K/UL — LOW (ref 1.2–3.4)
LYMPHOCYTES # BLD AUTO: 7.6 % — LOW (ref 20.5–51.1)
MAGNESIUM SERPL-MCNC: 2.1 MG/DL — SIGNIFICANT CHANGE UP (ref 1.8–2.4)
MCHC RBC-ENTMCNC: 26 PG — LOW (ref 27–31)
MCHC RBC-ENTMCNC: 31.2 G/DL — LOW (ref 32–37)
MCV RBC AUTO: 83.4 FL — SIGNIFICANT CHANGE UP (ref 80–94)
MONOCYTES # BLD AUTO: 0.64 K/UL — HIGH (ref 0.1–0.6)
MONOCYTES NFR BLD AUTO: 7.5 % — SIGNIFICANT CHANGE UP (ref 1.7–9.3)
NEUTROPHILS # BLD AUTO: 7.21 K/UL — HIGH (ref 1.4–6.5)
NEUTROPHILS NFR BLD AUTO: 84.4 % — HIGH (ref 42.2–75.2)
NRBC # BLD: 0 /100 WBCS — SIGNIFICANT CHANGE UP (ref 0–0)
PLATELET # BLD AUTO: 222 K/UL — SIGNIFICANT CHANGE UP (ref 130–400)
POTASSIUM SERPL-MCNC: 5 MMOL/L — SIGNIFICANT CHANGE UP (ref 3.5–5)
POTASSIUM SERPL-SCNC: 5 MMOL/L — SIGNIFICANT CHANGE UP (ref 3.5–5)
PROT SERPL-MCNC: 6.8 G/DL — SIGNIFICANT CHANGE UP (ref 6–8)
PROTHROM AB SERPL-ACNC: 16.6 SEC — HIGH (ref 9.95–12.87)
RBC # BLD: 4.58 M/UL — LOW (ref 4.7–6.1)
RBC # FLD: 15.7 % — HIGH (ref 11.5–14.5)
SODIUM SERPL-SCNC: 143 MMOL/L — SIGNIFICANT CHANGE UP (ref 135–146)
TROPONIN T SERPL-MCNC: 0.09 NG/ML — CRITICAL HIGH
WBC # BLD: 8.54 K/UL — SIGNIFICANT CHANGE UP (ref 4.8–10.8)
WBC # FLD AUTO: 8.54 K/UL — SIGNIFICANT CHANGE UP (ref 4.8–10.8)

## 2019-10-11 PROCEDURE — 99233 SBSQ HOSP IP/OBS HIGH 50: CPT | Mod: 25

## 2019-10-11 PROCEDURE — 71045 X-RAY EXAM CHEST 1 VIEW: CPT | Mod: 26

## 2019-10-11 RX ADMIN — Medication 8: at 16:33

## 2019-10-11 RX ADMIN — Medication 40 MILLIGRAM(S): at 17:14

## 2019-10-11 RX ADMIN — BUDESONIDE AND FORMOTEROL FUMARATE DIHYDRATE 2 PUFF(S): 160; 4.5 AEROSOL RESPIRATORY (INHALATION) at 20:55

## 2019-10-11 RX ADMIN — ISOSORBIDE MONONITRATE 30 MILLIGRAM(S): 60 TABLET, EXTENDED RELEASE ORAL at 11:06

## 2019-10-11 RX ADMIN — Medication 1 APPLICATION(S): at 05:55

## 2019-10-11 RX ADMIN — FINASTERIDE 5 MILLIGRAM(S): 5 TABLET, FILM COATED ORAL at 17:18

## 2019-10-11 RX ADMIN — ENOXAPARIN SODIUM 100 MILLIGRAM(S): 100 INJECTION SUBCUTANEOUS at 11:05

## 2019-10-11 RX ADMIN — Medication 40 MILLIGRAM(S): at 05:54

## 2019-10-11 RX ADMIN — LOSARTAN POTASSIUM 50 MILLIGRAM(S): 100 TABLET, FILM COATED ORAL at 05:53

## 2019-10-11 RX ADMIN — AMLODIPINE BESYLATE 5 MILLIGRAM(S): 2.5 TABLET ORAL at 05:54

## 2019-10-11 RX ADMIN — Medication 145 MILLIGRAM(S): at 11:05

## 2019-10-11 RX ADMIN — TAMSULOSIN HYDROCHLORIDE 0.4 MILLIGRAM(S): 0.4 CAPSULE ORAL at 21:42

## 2019-10-11 RX ADMIN — ENOXAPARIN SODIUM 100 MILLIGRAM(S): 100 INJECTION SUBCUTANEOUS at 22:30

## 2019-10-11 RX ADMIN — Medication 1 APPLICATION(S): at 17:15

## 2019-10-11 RX ADMIN — Medication 0.5 MILLIGRAM(S): at 17:16

## 2019-10-11 RX ADMIN — Medication 75 MILLIGRAM(S): at 05:55

## 2019-10-11 RX ADMIN — BUDESONIDE AND FORMOTEROL FUMARATE DIHYDRATE 2 PUFF(S): 160; 4.5 AEROSOL RESPIRATORY (INHALATION) at 08:21

## 2019-10-11 RX ADMIN — Medication 0.5 MILLIGRAM(S): at 00:02

## 2019-10-11 RX ADMIN — Medication 81 MILLIGRAM(S): at 11:05

## 2019-10-11 RX ADMIN — SIMVASTATIN 40 MILLIGRAM(S): 20 TABLET, FILM COATED ORAL at 21:42

## 2019-10-11 RX ADMIN — Medication 4: at 08:20

## 2019-10-11 RX ADMIN — Medication 4: at 11:53

## 2019-10-11 NOTE — CONSULT NOTE ADULT - SUBJECTIVE AND OBJECTIVE BOX
CARDIOLOGY CONSULT NOTE     CHIEF COMPLAINT/REASON FOR CONSULT:    HPI:  Patient is a 80yo Male w/ PMH of HFpEF (EF of 55% with Grade II Diastolic dysfunction), COPD (home O2 3-3.5L as needed), DM, HTN, CAD s/p CABG and 1 stent, BPH, and recent admission for CHF (August 2019) presenting to [facility] with complaints of shortness of breath and chest pain. Patient reports he has been progressively worsening shortness of breath for the last 4-5 days prior to admission. He also reports associated chest pain on exertion.  Patient reports he has been compliant with all of his medications as well with a low salt diet. He denies any recent history of fevers, chills, cough. On day of admission, had severe dyspnea on exertion associated with a pressure like sensation in his chest that radiated to his left arm. He tried sublingual nitro for relief but it didn't help. Therefore, he came in for further evaluation. He was given Solumedrol 125mg by EMS. In the ED, patient was found to have evidence of bilateral pleural effusions on bedside sono done by ED staff. He received IV Lasix 40mg and was placed on BiPAP with major improvement in his symptoms. On my assessment, patient was speaking to me in full sentences on BiPAP and his chest pain has resolved. (10 Oct 2019 13:27)      PAST MEDICAL & SURGICAL HISTORY:  BPH (benign prostatic hyperplasia)  CHF (congestive heart failure)  COPD (chronic obstructive pulmonary disease)  Diabetes  HTN (hypertension)  H/O heart artery stent  S/P CABG x 3      Cardiac Risks:   [x ]HTN, [x ] DM, [ ] Smoking, [ ] FH,  [ ] Lipids        MEDICATIONS:  MEDICATIONS  (STANDING):  ALPRAZolam 0.5 milliGRAM(s) Oral two times a day  amLODIPine   Tablet 5 milliGRAM(s) Oral daily  aspirin  chewable 81 milliGRAM(s) Oral daily  buDESOnide 160 MICROgram(s)/formoterol 4.5 MICROgram(s) Inhaler 2 Puff(s) Inhalation two times a day  dextrose 5%. 1000 milliLiter(s) (50 mL/Hr) IV Continuous <Continuous>  dextrose 50% Injectable 12.5 Gram(s) IV Push once  dextrose 50% Injectable 25 Gram(s) IV Push once  dextrose 50% Injectable 25 Gram(s) IV Push once  enoxaparin Injectable 100 milliGRAM(s) SubCutaneous two times a day  fenofibrate Tablet 145 milliGRAM(s) Oral daily  finasteride 5 milliGRAM(s) Oral daily  furosemide   Injectable 40 milliGRAM(s) IV Push two times a day  influenza   Vaccine 0.5 milliLiter(s) IntraMuscular once  insulin lispro (HumaLOG) corrective regimen sliding scale   SubCutaneous three times a day before meals  isosorbide   mononitrate ER Tablet (IMDUR) 30 milliGRAM(s) Oral daily  losartan 25 milliGRAM(s) Oral at bedtime  losartan 50 milliGRAM(s) Oral daily  metoprolol succinate ER 75 milliGRAM(s) Oral daily  silver sulfADIAZINE 1% Cream 1 Application(s) Topical two times a day  simvastatin 40 milliGRAM(s) Oral at bedtime  tamsulosin 0.4 milliGRAM(s) Oral at bedtime      FAMILY HISTORY:  No pertinent family history in first degree relatives      SOCIAL HISTORY:      [ ] Marital status    Allergies    No Known Allergies    Intolerances    	    REVIEW OF SYSTEMS:  CONSTITUTIONAL: No fever, weight loss, or fatigue  EYES: No eye pain, visual disturbances, or discharge  ENMT:  No difficulty hearing, tinnitus, vertigo; No sinus or throat pain  NECK: No pain or stiffness  RESPIRATORY: No cough, wheezing, chills or hemoptysis; No Shortness of Breath  CARDIOVASCULAR: see above  GASTROINTESTINAL: No abdominal or epigastric pain. No nausea, vomiting, or hematemesis; No diarrhea or constipation. No melena or hematochezia.  GENITOURINARY: No dysuria, frequency, hematuria, or incontinence  NEUROLOGICAL: No headaches, memory loss, loss of strength, numbness, or tremors  SKIN: No itching, burning, rashes, or lesions       PHYSICAL EXAM:  T(C): 35.5 (10-11-19 @ 07:06), Max: 37.4 (10-10-19 @ 11:29)  HR: 63 (10-11-19 @ 06:00) (63 - 130)  BP: 134/66 (10-11-19 @ 06:00) (100/50 - 155/88)  RR: 22 (10-11-19 @ 07:06) (18 - 65)  SpO2: 98% (10-11-19 @ 06:00) (89% - 99%)  Wt(kg): --  I&O's Summary    10 Oct 2019 07:01  -  11 Oct 2019 07:00  --------------------------------------------------------  IN: 0 mL / OUT: 1425 mL / NET: -1425 mL    11 Oct 2019 07:01  -  11 Oct 2019 07:41  --------------------------------------------------------  IN: 0 mL / OUT: 250 mL / NET: -250 mL        Appearance: Normal	  Psychiatry: A & O x 3, Mood & affect appropriate  HEENT:   Normal oral mucosa, PERRL, EOMI	  Lymphatic: No lymphadenopathy  Cardiovascular: Normal S1 S2,irreg No JVD, No murmurs  Respiratory: Lungs clear to auscultation	  Gastrointestinal:  Soft, Non-tender, + BS	  Skin: No rashes, No ecchymoses, No cyanosis	  Neurologic: Non-focal  Extremities: Normal range of motion, No clubbing, cyanosis tr edema  Vascular: Peripheral pulses palpable 2+ bilaterally      ECG:  	afib inc rbbb lad    	  LABS:	 	    CARDIAC MARKERS:          Serum Pro-Brain Natriuretic Peptide: 5283 pg/mL (10-10 @ 11:00)                            11.9   8.54  )-----------( 222      ( 11 Oct 2019 05:45 )             38.2     10-11    143  |  103  |  44<H>  ----------------------------<  247<H>  5.0   |  27  |  1.6<H>    Ca    9.3      11 Oct 2019 05:45  Mg     2.1     10-11    TPro  6.8  /  Alb  3.5  /  TBili  0.8  /  DBili  x   /  AST  14  /  ALT  10  /  AlkPhos  50  10-11    PT/INR - ( 11 Oct 2019 05:45 )   PT: 16.60 sec;   INR: 1.45 ratio         PTT - ( 11 Oct 2019 05:45 )  PTT:40.1 sec  proBNP: Serum Pro-Brain Natriuretic Peptide: 5283 pg/mL (10-10 @ 11:00)

## 2019-10-11 NOTE — PROGRESS NOTE ADULT - SUBJECTIVE AND OBJECTIVE BOX
Patient is not sob in bed and has no chest pain, significantly improved from yesterday       T(F): 96.4 (10-11-19 @ 11:10), Max: 97.5 (10-10-19 @ 19:00)  HR: 63 (10-11-19 @ 08:59)  BP: 105/51 (10-11-19 @ 08:59)  RR: 20  SpO2: 90% (10-11-19 @ 08:59) (89% - 99%)    PHYSICAL EXAM:  GENERAL: NAD  HEAD:  Atraumatic, Normocephalic  NECK: Supple, No JVD, Normal thyroid  NERVOUS SYSTEM:  Alert & Oriented X3, no focal deficit  CHEST/LUNG: bilateral rales  HEART: Regular rate and rhythm; No murmurs, rubs, or gallops  ABDOMEN: Soft, Nontender, Nondistended; Bowel sounds present  EXTREMITIES:  b/l  edema      LABS  10-11    143  |  103  |  44<H>  ----------------------------<  247<H>  5.0   |  27  |  1.6<H>    Ca    9.3      11 Oct 2019 05:45  Mg     2.1     10-11    TPro  6.8  /  Alb  3.5  /  TBili  0.8  /  DBili  x   /  AST  14  /  ALT  10  /  AlkPhos  50  10-11                          11.9   8.54  )-----------( 222      ( 11 Oct 2019 05:45 )             38.2     PT/INR - ( 11 Oct 2019 05:45 )   PT: 16.60 sec;   INR: 1.45 ratio         PTT - ( 11 Oct 2019 05:45 )  PTT:40.1 sec    CARDIAC ENZYMES  Creatine Kinase, Serum: 46 (10-11 @ 05:45)  Creatine Kinase, Serum: 80 (10-10 @ 19:03)    CKMB Units: 4.9 (10-11 @ 05:45)  CKMB Units: 6.6 (10-10 @ 19:03)  CKMB Units: 6.2 (10-10 @ 15:20)    Troponin T, Serum: 0.09 ng/mL (10-11-19 @ 05:45)  Troponin T, Serum: 0.11 ng/mL (10-10-19 @ 19:03)  Troponin T, Serum: 0.14 ng/mL (10-10-19 @ 15:20)  Troponin T, Serum: 0.09 ng/mL (10-10-19 @ 11:00)      < from: 12 Lead ECG (10.10.19 @ 10:59) >  Diagnosis Line Atrial fibrillation with rapid ventricular response with a competing  junctional pacemaker  Pulmonary disease pattern  Incomplete right bundle branch block  Left anterior fascicular block    < end of copied text >    RADIOLOGY  < from: Xray Chest 1 View- PORTABLE-Routine (10.11.19 @ 06:26) >  Impression:      Stable bilateral opacities and effusions.    < end of copied text >    MEDICATIONS  (STANDING):  ALPRAZolam 0.5 milliGRAM(s) Oral two times a day  amLODIPine   Tablet 5 milliGRAM(s) Oral daily  aspirin  chewable 81 milliGRAM(s) Oral daily  buDESOnide 160 MICROgram(s)/formoterol 4.5 MICROgram(s) Inhaler 2 Puff(s) Inhalation two times a day  enoxaparin Injectable 100 milliGRAM(s) SubCutaneous two times a day  fenofibrate Tablet 145 milliGRAM(s) Oral daily  finasteride 5 milliGRAM(s) Oral daily  furosemide   Injectable 40 milliGRAM(s) IV Push two times a day  influenza   Vaccine 0.5 milliLiter(s) IntraMuscular once  insulin lispro (HumaLOG) corrective regimen sliding scale   SubCutaneous three times a day before meals  isosorbide   mononitrate ER Tablet (IMDUR) 30 milliGRAM(s) Oral daily  metoprolol succinate ER 75 milliGRAM(s) Oral daily  silver sulfADIAZINE 1% Cream 1 Application(s) Topical two times a day  simvastatin 40 milliGRAM(s) Oral at bedtime  tamsulosin 0.4 milliGRAM(s) Oral at bedtime    MEDICATIONS  (PRN):  dextrose 40% Gel 15 Gram(s) Oral once PRN Blood Glucose LESS THAN 70 milliGRAM(s)/deciliter  glucagon  Injectable 1 milliGRAM(s) IntraMuscular once PRN Glucose LESS THAN 70 milligrams/deciliter

## 2019-10-11 NOTE — CONSULT NOTE ADULT - SUBJECTIVE AND OBJECTIVE BOX
Patient is a 79y old  Male who presents with a chief complaint of     HPI:  Patient is a 80yo Male w/ PMH of HFpEF (EF of 55% with Grade II Diastolic dysfunction), COPD (home O2 3-3.5L as needed), DM, HTN, CAD s/p CABG and 1 stent, BPH, and recent admission for CHF (August 2019) presenting to [facility] with complaints of shortness of breath and chest pain. Patient reports he has been progressively worsening shortness of breath for the last 4-5 days prior to admission. He also reports associated chest pain on exertion.  Patient reports he has been compliant with all of his medications as well with a low salt diet. He denies any recent history of fevers, chills, cough. On day of admission, had severe dyspnea on exertion associated with a pressure like sensation in his chest that radiated to his left arm. He tried sublingual nitro for relief but it didn't help. Therefore, he came in for further evaluation. He was given Solumedrol 125mg by EMS. In the ED, patient was found to have evidence of bilateral pleural effusions on bedside sono done by ED staff. He received IV Lasix 40mg and was placed on BiPAP with major improvement in his symptoms. On my assessment, patient was speaking to me in full sentences on BiPAP and his chest pain has resolved. (10 Oct 2019 13:27)  today off bipap feel better not on distress     PAST MEDICAL & SURGICAL HISTORY:  BPH (benign prostatic hyperplasia)  CHF (congestive heart failure)  COPD (chronic obstructive pulmonary disease)  Diabetes  HTN (hypertension)  H/O heart artery stent  S/P CABG x 3    Allergies    No Known Allergies    Intolerances      Family history : no cardiovscular family history   Home Medications:  alfuzosin 10 mg oral tablet, extended release: 1 tab(s) orally once a day (10 Oct 2019 14:44)  ALPRAZolam 0.5 mg oral tablet: 1 tab(s) orally 2 times a day, As Needed (10 Oct 2019 14:44)  amLODIPine 5 mg oral tablet: 1 tab(s) orally once a day (10 Oct 2019 14:44)  aspirin 81 mg oral tablet: 1 tab(s) orally once a day (10 Oct 2019 14:44)  dutasteride 0.5 mg oral capsule: 1 cap(s) orally once a day (10 Oct 2019 14:44)  fenofibrate 145 mg oral tablet: 1 tab(s) orally once a day (10 Oct 2019 14:44)  isosorbide mononitrate 30 mg oral tablet, extended release: 1 tab(s) orally once a day (in the morning) (10 Oct 2019 14:44)  losartan 25 mg oral tablet: 1 tab(s) orally once a day (at bedtime) (10 Oct 2019 14:44)  losartan 50 mg oral tablet: 1 tab(s) orally once a day (10 Oct 2019 14:44)  rivaroxaban 15 mg oral tablet: 1 tab(s) orally once a day (before a meal) (10 Oct 2019 14:44)  Victoza: subcutaneous once a day (10 Oct 2019 14:44)  Welchol 625 mg oral tablet: 3 tab(s) orally once a day (10 Oct 2019 14:44)    Occupation:  Alochol: Denied  Smoking: Denied  Drug Use: Denied  Marital Status:         ROS: as in HPI; All other systems reviewed are negative    ICU Vital Signs Last 24 Hrs  T(C): 35.5 (11 Oct 2019 07:06), Max: 37.4 (10 Oct 2019 11:29)  T(F): 95.9 (11 Oct 2019 07:06), Max: 99.4 (10 Oct 2019 11:29)  HR: 63 (11 Oct 2019 07:06) (63 - 130)  BP: 116/58 (11 Oct 2019 07:06) (100/50 - 155/88)  BP(mean): 82 (11 Oct 2019 07:06) (71 - 94)  ABP: --  ABP(mean): --  RR: 22 (11 Oct 2019 07:06) (18 - 65)  SpO2: 95% (11 Oct 2019 07:06) (89% - 99%)        Physical Examination:    General: No acute distress.  Alert, oriented, interactive, nonfocal    HEENT: Pupils equal, reactive to light.  Symmetric.    PULM: b/l crackles     CVS: Regular rate and rhythm, no murmurs, rubs, or gallops    ABD: Soft, nondistended, nontender, normoactive bowel sounds, no masses    EXT: 1 edema, nontender, no clubbing     SKIN: Warm and well perfused, no rashes noted.    Neurology : no motor or sensory deficit     Musculoskeletal : move all extremity     Lymphatic system: no Palpable node               I&O's Detail    10 Oct 2019 07:01  -  11 Oct 2019 07:00  --------------------------------------------------------  IN:  Total IN: 0 mL    OUT:    Voided: 1425 mL  Total OUT: 1425 mL    Total NET: -1425 mL      11 Oct 2019 07:01  -  11 Oct 2019 08:30  --------------------------------------------------------  IN:  Total IN: 0 mL    OUT:    Voided: 250 mL  Total OUT: 250 mL    Total NET: -250 mL            LABS:                        11.9   8.54  )-----------( 222      ( 11 Oct 2019 05:45 )             38.2     11 Oct 2019 05:45    143    |  103    |  44     ----------------------------<  247    5.0     |  27     |  1.6      Ca    9.3        11 Oct 2019 05:45  Mg     2.1       11 Oct 2019 05:45    TPro  6.8    /  Alb  3.5    /  TBili  0.8    /  DBili  x      /  AST  14     /  ALT  10     /  AlkPhos  50     11 Oct 2019 05:45  Amylase x     lipase x          CARDIAC MARKERS ( 11 Oct 2019 05:45 )  x     / 0.09 ng/mL / 46 U/L / x     / 4.9 ng/mL  CARDIAC MARKERS ( 10 Oct 2019 19:03 )  x     / 0.11 ng/mL / 80 U/L / x     / 6.6 ng/mL  CARDIAC MARKERS ( 10 Oct 2019 15:20 )  x     / 0.14 ng/mL / x     / x     / 6.2 ng/mL  CARDIAC MARKERS ( 10 Oct 2019 11:00 )  x     / 0.09 ng/mL / x     / x     / x          CAPILLARY BLOOD GLUCOSE      POCT Blood Glucose.: 241 mg/dL (11 Oct 2019 07:35)    PT/INR - ( 11 Oct 2019 05:45 )   PT: 16.60 sec;   INR: 1.45 ratio         PTT - ( 11 Oct 2019 05:45 )  PTT:40.1 sec    Culture        MEDICATIONS  (STANDING):  ALPRAZolam 0.5 milliGRAM(s) Oral two times a day  amLODIPine   Tablet 5 milliGRAM(s) Oral daily  aspirin  chewable 81 milliGRAM(s) Oral daily  buDESOnide 160 MICROgram(s)/formoterol 4.5 MICROgram(s) Inhaler 2 Puff(s) Inhalation two times a day  dextrose 5%. 1000 milliLiter(s) (50 mL/Hr) IV Continuous <Continuous>  dextrose 50% Injectable 12.5 Gram(s) IV Push once  dextrose 50% Injectable 25 Gram(s) IV Push once  dextrose 50% Injectable 25 Gram(s) IV Push once  enoxaparin Injectable 100 milliGRAM(s) SubCutaneous two times a day  fenofibrate Tablet 145 milliGRAM(s) Oral daily  finasteride 5 milliGRAM(s) Oral daily  furosemide   Injectable 40 milliGRAM(s) IV Push two times a day  influenza   Vaccine 0.5 milliLiter(s) IntraMuscular once  insulin lispro (HumaLOG) corrective regimen sliding scale   SubCutaneous three times a day before meals  isosorbide   mononitrate ER Tablet (IMDUR) 30 milliGRAM(s) Oral daily  losartan 50 milliGRAM(s) Oral daily  metoprolol succinate ER 75 milliGRAM(s) Oral daily  silver sulfADIAZINE 1% Cream 1 Application(s) Topical two times a day  simvastatin 40 milliGRAM(s) Oral at bedtime  tamsulosin 0.4 milliGRAM(s) Oral at bedtime    MEDICATIONS  (PRN):  dextrose 40% Gel 15 Gram(s) Oral once PRN Blood Glucose LESS THAN 70 milliGRAM(s)/deciliter  glucagon  Injectable 1 milliGRAM(s) IntraMuscular once PRN Glucose LESS THAN 70 milligrams/deciliter        RADIOLOGY: ***     CXR: b/l effusion and congestion   TLC:  OG:  ET tube:        CAM ICU:  ECHO:        CRITICAL CARE TIME SPENT: ***

## 2019-10-11 NOTE — PROGRESS NOTE ADULT - SUBJECTIVE AND OBJECTIVE BOX
SUBJECTIVE:    Patient is a 79y old Male who presents with a chief complaint of   Currently admitted to medicine with the primary diagnosis of CHF (congestive heart failure)     Today is hospital day 1d. Patient feels better this morning, less short of breath. He has not had chest pain.     PAST MEDICAL & SURGICAL HISTORY  BPH (benign prostatic hyperplasia)  CHF (congestive heart failure)  COPD (chronic obstructive pulmonary disease)  Diabetes  HTN (hypertension)  H/O heart artery stent  S/P CABG x 3    SOCIAL HISTORY:  Negative for smoking/alcohol/drug use.     ALLERGIES:  No Known Allergies    MEDICATIONS:  STANDING MEDICATIONS  ALPRAZolam 0.5 milliGRAM(s) Oral two times a day  amLODIPine   Tablet 5 milliGRAM(s) Oral daily  aspirin  chewable 81 milliGRAM(s) Oral daily  buDESOnide 160 MICROgram(s)/formoterol 4.5 MICROgram(s) Inhaler 2 Puff(s) Inhalation two times a day  dextrose 5%. 1000 milliLiter(s) IV Continuous <Continuous>  dextrose 50% Injectable 12.5 Gram(s) IV Push once  dextrose 50% Injectable 25 Gram(s) IV Push once  dextrose 50% Injectable 25 Gram(s) IV Push once  enoxaparin Injectable 100 milliGRAM(s) SubCutaneous two times a day  fenofibrate Tablet 145 milliGRAM(s) Oral daily  finasteride 5 milliGRAM(s) Oral daily  furosemide   Injectable 40 milliGRAM(s) IV Push two times a day  influenza   Vaccine 0.5 milliLiter(s) IntraMuscular once  insulin lispro (HumaLOG) corrective regimen sliding scale   SubCutaneous three times a day before meals  isosorbide   mononitrate ER Tablet (IMDUR) 30 milliGRAM(s) Oral daily  losartan 50 milliGRAM(s) Oral daily  metoprolol succinate ER 75 milliGRAM(s) Oral daily  silver sulfADIAZINE 1% Cream 1 Application(s) Topical two times a day  simvastatin 40 milliGRAM(s) Oral at bedtime  tamsulosin 0.4 milliGRAM(s) Oral at bedtime    PRN MEDICATIONS  dextrose 40% Gel 15 Gram(s) Oral once PRN  glucagon  Injectable 1 milliGRAM(s) IntraMuscular once PRN    VITALS:   T(F): 95.9  HR: 63  BP: 116/58  RR: 22  SpO2: 95%    LABS:                        11.9   8.54  )-----------( 222      ( 11 Oct 2019 05:45 )             38.2     10-11    143  |  103  |  44<H>  ----------------------------<  247<H>  5.0   |  27  |  1.6<H>    Ca    9.3      11 Oct 2019 05:45  Mg     2.1     10-11    TPro  6.8  /  Alb  3.5  /  TBili  0.8  /  DBili  x   /  AST  14  /  ALT  10  /  AlkPhos  50  10-11    PT/INR - ( 11 Oct 2019 05:45 )   PT: 16.60 sec;   INR: 1.45 ratio         PTT - ( 11 Oct 2019 05:45 )  PTT:40.1 sec      Creatine Kinase, Serum: 46 U/L (10-11-19 @ 05:45)  Troponin T, Serum: 0.09 ng/mL <HH> (10-11-19 @ 05:45)  Creatine Kinase, Serum: 80 U/L (10-10-19 @ 19:03)  Troponin T, Serum: 0.11 ng/mL <HH> (10-10-19 @ 19:03)  Troponin T, Serum: 0.14 ng/mL <HH> (10-10-19 @ 15:20)  Troponin T, Serum: 0.09 ng/mL <HH> (10-10-19 @ 11:00)      CARDIAC MARKERS ( 11 Oct 2019 05:45 )  x     / 0.09 ng/mL / 46 U/L / x     / 4.9 ng/mL  CARDIAC MARKERS ( 10 Oct 2019 19:03 )  x     / 0.11 ng/mL / 80 U/L / x     / 6.6 ng/mL  CARDIAC MARKERS ( 10 Oct 2019 15:20 )  x     / 0.14 ng/mL / x     / x     / 6.2 ng/mL  CARDIAC MARKERS ( 10 Oct 2019 11:00 )  x     / 0.09 ng/mL / x     / x     / x          RADIOLOGY:    PHYSICAL EXAM:  GEN: No acute distress  LUNGS: Decreased breath sounds bilaterally   HEART: S1/S2 present. Irregularly irregular rhythm   ABD: Soft, non-tender, non-distended. Bowel sounds present  EXT: 1+ pedal edema, sensation and movement grossly intact with venous static changes in bilateral lower ext with areas of weeping  NEURO: AAOX3

## 2019-10-11 NOTE — PROGRESS NOTE ADULT - ASSESSMENT
Patient is a 80yo Male w/ PMH of HFpEF (EF of 55% with Grade II Diastolic dysfunction), COPD (home O2 3-3.5L as needed), DM, HTN, CAD s/p CABG and 1 stent, BPH, and recent admission for CHF (August 2019) presented yesterday  to [facility] with complaints of shortness of breath and exertional chest pain.    #) Acute on Chronic Diastolic CHF exacerbation / moderate  aortic stenosis   - Resolved acute respiratory failure   - BIPAP PRN  - continue IV Lasix 40mg IV q12h and negative fluid balance  - c/w fluid restricted, low sodium diet     #) Chest Pain,  in patient with hx of CABG and stent placement  - c/w losartan asa, metoprolol; Simvastatin   -  Lovenox   - cardiology following    #) Hx of A Fib  - On Xarelto, 15mg at home; will hold for now and continue  Lovenox   -  Metoprolol      #) Hx of COPD  - Stable at this time, with no evidence of acute exacerbation  - Cont with Symbicort (in place of Breo)    #) Hx of Diabetes Type 2  - Insulin sq hospital protocol   - Carb consistent diet    #) Hx of BPH  - Cont with Flomax and Finasteride     #Progress Note Handoff  Pending (specify):  Consults_________, Tests________, Test Results_______, Other_________  Family discussion:  Disposition: Home___/SNF___/Other________/Unknown at this time________

## 2019-10-11 NOTE — CONSULT NOTE ADULT - ASSESSMENT
IMPRESSION:  ARF hypoxic seconday to fluid overload CHF exacerbation   copd stable   CKD       PLAN:    CNS: no sedation     HEENT: oral care     PULMONARY: taper oxygen to keep pox > 92 %   keep on bipap at night and prn   continue symbicort   CARDIOVASCULAR:  follow cardiology , check BNP malachi   continue lasix   keep Is < OS   daily weight     GI: GI prophylaxis.  Feeding     RENAL: follow IS and OS monitor lytes bun, cr     INFECTIOUS DISEASE: no abx follow cx     HEMATOLOGICAL:  DVT prophylaxisc.on lovenox therapeutic clarify     ENDOCRINE:  Follow up FS.  Insulin protocol if needed.    tele monitoring

## 2019-10-11 NOTE — CONSULT NOTE ADULT - ASSESSMENT
Patient with history above . Severe skylar not using c-pap. Chronic increased troponin. Need r/o mi . Ambulate . Continue meds. If more pain add renaxa. If mi r/o ambulate

## 2019-10-11 NOTE — PROGRESS NOTE ADULT - ASSESSMENT
Patient is a 78yo Male w/ PMH of HFpEF (EF of 55% with Grade II Diastolic dysfunction), COPD (home O2 3-3.5L as needed), DM, HTN, CAD s/p CABG and 1 stent, BPH, and recent admission for CHF (August 2019) presenting to [facility] with complaints of shortness of breath and chest pain.    #) Acute on Chronic Diastolic CHF exacerbation   - Last 2D Echo on 8/19/2019: showed an EF > 55% with moderate aortic stenosis and moderate concentric left ventricular hypertrophy   - Repeat 2D Echo pending   - CXR with some improvement this AM   - BIPAP 4 hours on/off and at night during sleep   - Strict I&Os and daily weights;  Keep I < O.  - Cont lasix IV BID for today, will try switching to oral tomorrow   - c/w fluid restricted, low sodium diet     #) Chest Pain, rule out ACS in patient with hx of CABG and stent placement  - c/w losartan asa, metoprolol; Will try patient on Simvastatin (has not tolerated Atorvastatin in past)   - Cont with therapeutic Lovenox for now   - Cardiac Enzymes with peak at 0.14, now downtrending     #) BRITTANI on CKD stage 3  - Patient with baseline Cr of 1.3, today 1.6  - Likely in relation to diuresis  - Cont to monitor Cr  - Hold ARB for now     #) Hx of A Fib  - Last dose Xarelto on morning of 10/9; On therapeutic lovenox for now   - Cont with Metoprolol for rate control     #) Hx of COPD  - Stable at this time, with no evidence of acute exacerbation  - Cont with Symbicort (in place of Breo)    #) Hx of Diabetes Type 2  - Fingersticks AC + HS  - Carb consistent diet    #) Hx of BPH  - Cont with Flomax and Finasteride     #Diet: Carb consistent/DASH/Fluid restricted  #Activity: Bedrest for now  #DVT PPx: Lovenox therapeutic  #GI PPx: Not indicated  #Dispo: Acute, pending Patient is a 80yo Male w/ PMH of HFpEF (EF of 55% with Grade II Diastolic dysfunction), COPD (home O2 3-3.5L as needed), DM, HTN, CAD s/p CABG and 1 stent, BPH, and recent admission for CHF (August 2019) presenting to [facility] with complaints of shortness of breath and chest pain.    #) Acute on Chronic Diastolic CHF exacerbation   - Last 2D Echo on 8/19/2019: showed an EF > 55% with moderate aortic stenosis and moderate concentric left ventricular hypertrophy   - Repeat 2D Echo pending   - CXR with some improvement this AM   - BIPAP 4 hours on/off and at night during sleep   - Strict I&Os and daily weights;  Keep I < O.  - Cont lasix IV BID for today, will try switching to oral tomorrow   - c/w fluid restricted, low sodium diet     #) Chest Pain, rule out ACS in patient with hx of CABG and stent placement  - c/w losartan asa, metoprolol; Will try patient on Simvastatin (has not tolerated Atorvastatin in past)   - Cont with therapeutic Lovenox for now   - Cardiac Enzymes with peak at 0.14, now downtrending     #) BRITTANI on CKD stage 3 vs CKD stage 3  - Patient with baseline Cr of 1.3-1.5, today 1.6  - Will cont to monitor Cr  - Hold ARB for now     #) Hx of A Fib  - Last dose Xarelto on morning of 10/9; On therapeutic lovenox for now   - Cont with Metoprolol for rate control     #) Hx of COPD  - Stable at this time, with no evidence of acute exacerbation  - Cont with Symbicort (in place of Breo)    #) Hx of Diabetes Type 2  - Fingersticks AC + HS  - Carb consistent diet    #) Hx of BPH  - Cont with Flomax and Finasteride     #Diet: Carb consistent/DASH/Fluid restricted  #Activity: Bedrest for now  #DVT PPx: Lovenox therapeutic  #GI PPx: Not indicated  #Dispo: Acute, pending

## 2019-10-12 LAB
ALBUMIN SERPL ELPH-MCNC: 3.6 G/DL — SIGNIFICANT CHANGE UP (ref 3.5–5.2)
ALP SERPL-CCNC: 55 U/L — SIGNIFICANT CHANGE UP (ref 30–115)
ALT FLD-CCNC: 12 U/L — SIGNIFICANT CHANGE UP (ref 0–41)
ANION GAP SERPL CALC-SCNC: 15 MMOL/L — HIGH (ref 7–14)
AST SERPL-CCNC: 16 U/L — SIGNIFICANT CHANGE UP (ref 0–41)
BASOPHILS # BLD AUTO: 0.04 K/UL — SIGNIFICANT CHANGE UP (ref 0–0.2)
BASOPHILS NFR BLD AUTO: 0.5 % — SIGNIFICANT CHANGE UP (ref 0–1)
BILIRUB SERPL-MCNC: 0.5 MG/DL — SIGNIFICANT CHANGE UP (ref 0.2–1.2)
BUN SERPL-MCNC: 57 MG/DL — HIGH (ref 10–20)
CALCIUM SERPL-MCNC: 9.7 MG/DL — SIGNIFICANT CHANGE UP (ref 8.5–10.1)
CHLORIDE SERPL-SCNC: 101 MMOL/L — SIGNIFICANT CHANGE UP (ref 98–110)
CO2 SERPL-SCNC: 25 MMOL/L — SIGNIFICANT CHANGE UP (ref 17–32)
CREAT SERPL-MCNC: 1.6 MG/DL — HIGH (ref 0.7–1.5)
EOSINOPHIL # BLD AUTO: 0.09 K/UL — SIGNIFICANT CHANGE UP (ref 0–0.7)
EOSINOPHIL NFR BLD AUTO: 1.1 % — SIGNIFICANT CHANGE UP (ref 0–8)
GLUCOSE BLDC GLUCOMTR-MCNC: 162 MG/DL — HIGH (ref 70–99)
GLUCOSE BLDC GLUCOMTR-MCNC: 172 MG/DL — HIGH (ref 70–99)
GLUCOSE BLDC GLUCOMTR-MCNC: 215 MG/DL — HIGH (ref 70–99)
GLUCOSE BLDC GLUCOMTR-MCNC: 245 MG/DL — HIGH (ref 70–99)
GLUCOSE BLDC GLUCOMTR-MCNC: 288 MG/DL — HIGH (ref 70–99)
GLUCOSE SERPL-MCNC: 211 MG/DL — HIGH (ref 70–99)
HCT VFR BLD CALC: 43.7 % — SIGNIFICANT CHANGE UP (ref 42–52)
HGB BLD-MCNC: 13.2 G/DL — LOW (ref 14–18)
IMM GRANULOCYTES NFR BLD AUTO: 0.4 % — HIGH (ref 0.1–0.3)
LYMPHOCYTES # BLD AUTO: 1.54 K/UL — SIGNIFICANT CHANGE UP (ref 1.2–3.4)
LYMPHOCYTES # BLD AUTO: 18.3 % — LOW (ref 20.5–51.1)
MAGNESIUM SERPL-MCNC: 2.2 MG/DL — SIGNIFICANT CHANGE UP (ref 1.8–2.4)
MCHC RBC-ENTMCNC: 25.5 PG — LOW (ref 27–31)
MCHC RBC-ENTMCNC: 30.2 G/DL — LOW (ref 32–37)
MCV RBC AUTO: 84.5 FL — SIGNIFICANT CHANGE UP (ref 80–94)
MONOCYTES # BLD AUTO: 0.83 K/UL — HIGH (ref 0.1–0.6)
MONOCYTES NFR BLD AUTO: 9.9 % — HIGH (ref 1.7–9.3)
NEUTROPHILS # BLD AUTO: 5.88 K/UL — SIGNIFICANT CHANGE UP (ref 1.4–6.5)
NEUTROPHILS NFR BLD AUTO: 69.8 % — SIGNIFICANT CHANGE UP (ref 42.2–75.2)
NRBC # BLD: 0 /100 WBCS — SIGNIFICANT CHANGE UP (ref 0–0)
NT-PROBNP SERPL-SCNC: 2567 PG/ML — HIGH (ref 0–300)
PLATELET # BLD AUTO: 196 K/UL — SIGNIFICANT CHANGE UP (ref 130–400)
POTASSIUM SERPL-MCNC: 5 MMOL/L — SIGNIFICANT CHANGE UP (ref 3.5–5)
POTASSIUM SERPL-SCNC: 5 MMOL/L — SIGNIFICANT CHANGE UP (ref 3.5–5)
PROT SERPL-MCNC: 7.3 G/DL — SIGNIFICANT CHANGE UP (ref 6–8)
RBC # BLD: 5.17 M/UL — SIGNIFICANT CHANGE UP (ref 4.7–6.1)
RBC # FLD: 15.8 % — HIGH (ref 11.5–14.5)
SODIUM SERPL-SCNC: 141 MMOL/L — SIGNIFICANT CHANGE UP (ref 135–146)
WBC # BLD: 8.41 K/UL — SIGNIFICANT CHANGE UP (ref 4.8–10.8)
WBC # FLD AUTO: 8.41 K/UL — SIGNIFICANT CHANGE UP (ref 4.8–10.8)

## 2019-10-12 PROCEDURE — 99233 SBSQ HOSP IP/OBS HIGH 50: CPT

## 2019-10-12 RX ADMIN — Medication 75 MILLIGRAM(S): at 05:22

## 2019-10-12 RX ADMIN — Medication 1 APPLICATION(S): at 05:41

## 2019-10-12 RX ADMIN — Medication 40 MILLIGRAM(S): at 21:26

## 2019-10-12 RX ADMIN — BUDESONIDE AND FORMOTEROL FUMARATE DIHYDRATE 2 PUFF(S): 160; 4.5 AEROSOL RESPIRATORY (INHALATION) at 07:37

## 2019-10-12 RX ADMIN — TAMSULOSIN HYDROCHLORIDE 0.4 MILLIGRAM(S): 0.4 CAPSULE ORAL at 21:26

## 2019-10-12 RX ADMIN — Medication 1 APPLICATION(S): at 18:16

## 2019-10-12 RX ADMIN — SIMVASTATIN 40 MILLIGRAM(S): 20 TABLET, FILM COATED ORAL at 21:26

## 2019-10-12 RX ADMIN — AMLODIPINE BESYLATE 5 MILLIGRAM(S): 2.5 TABLET ORAL at 05:21

## 2019-10-12 RX ADMIN — ENOXAPARIN SODIUM 100 MILLIGRAM(S): 100 INJECTION SUBCUTANEOUS at 18:15

## 2019-10-12 RX ADMIN — Medication 0.5 MILLIGRAM(S): at 05:20

## 2019-10-12 RX ADMIN — ENOXAPARIN SODIUM 100 MILLIGRAM(S): 100 INJECTION SUBCUTANEOUS at 05:40

## 2019-10-12 RX ADMIN — FINASTERIDE 5 MILLIGRAM(S): 5 TABLET, FILM COATED ORAL at 13:06

## 2019-10-12 RX ADMIN — ISOSORBIDE MONONITRATE 30 MILLIGRAM(S): 60 TABLET, EXTENDED RELEASE ORAL at 13:05

## 2019-10-12 RX ADMIN — Medication 2: at 16:53

## 2019-10-12 RX ADMIN — Medication 0.5 MILLIGRAM(S): at 18:16

## 2019-10-12 RX ADMIN — Medication 81 MILLIGRAM(S): at 13:06

## 2019-10-12 RX ADMIN — Medication 145 MILLIGRAM(S): at 13:06

## 2019-10-12 RX ADMIN — Medication 4: at 14:16

## 2019-10-12 RX ADMIN — Medication 2: at 08:53

## 2019-10-12 RX ADMIN — BUDESONIDE AND FORMOTEROL FUMARATE DIHYDRATE 2 PUFF(S): 160; 4.5 AEROSOL RESPIRATORY (INHALATION) at 20:55

## 2019-10-12 RX ADMIN — Medication 40 MILLIGRAM(S): at 05:21

## 2019-10-12 NOTE — PROGRESS NOTE ADULT - SUBJECTIVE AND OBJECTIVE BOX
SUBJ:No chest pain or shortness of breath      MEDICATIONS  (STANDING):  ALPRAZolam 0.5 milliGRAM(s) Oral two times a day  amLODIPine   Tablet 5 milliGRAM(s) Oral daily  aspirin  chewable 81 milliGRAM(s) Oral daily  buDESOnide 160 MICROgram(s)/formoterol 4.5 MICROgram(s) Inhaler 2 Puff(s) Inhalation two times a day  dextrose 5%. 1000 milliLiter(s) (50 mL/Hr) IV Continuous <Continuous>  dextrose 50% Injectable 12.5 Gram(s) IV Push once  dextrose 50% Injectable 25 Gram(s) IV Push once  dextrose 50% Injectable 25 Gram(s) IV Push once  enoxaparin Injectable 100 milliGRAM(s) SubCutaneous two times a day  fenofibrate Tablet 145 milliGRAM(s) Oral daily  finasteride 5 milliGRAM(s) Oral daily  furosemide   Injectable 40 milliGRAM(s) IV Push two times a day  influenza   Vaccine 0.5 milliLiter(s) IntraMuscular once  insulin lispro (HumaLOG) corrective regimen sliding scale   SubCutaneous three times a day before meals  isosorbide   mononitrate ER Tablet (IMDUR) 30 milliGRAM(s) Oral daily  metoprolol succinate ER 75 milliGRAM(s) Oral daily  silver sulfADIAZINE 1% Cream 1 Application(s) Topical two times a day  simvastatin 40 milliGRAM(s) Oral at bedtime  tamsulosin 0.4 milliGRAM(s) Oral at bedtime    MEDICATIONS  (PRN):  dextrose 40% Gel 15 Gram(s) Oral once PRN Blood Glucose LESS THAN 70 milliGRAM(s)/deciliter  glucagon  Injectable 1 milliGRAM(s) IntraMuscular once PRN Glucose LESS THAN 70 milligrams/deciliter            Vital Signs Last 24 Hrs  T(C): 35.3 (12 Oct 2019 11:00), Max: 35.6 (12 Oct 2019 06:20)  T(F): 95.5 (12 Oct 2019 11:00), Max: 96 (12 Oct 2019 06:20)  HR: 118 (12 Oct 2019 07:10) (64 - 118)  BP: 130/62 (12 Oct 2019 07:10) (98/54 - 147/79)  BP(mean): 89 (12 Oct 2019 07:10) (71 - 99)  RR: 18 (12 Oct 2019 11:00) (0 - 28)  SpO2: 99% (12 Oct 2019 07:10) (96% - 100%)      ECG:NML    TTE:    LABS:                        13.2   8.41  )-----------( 196      ( 12 Oct 2019 05:56 )             43.7     10-12    141  |  101  |  57<H>  ----------------------------<  211<H>  5.0   |  25  |  1.6<H>    Ca    9.7      12 Oct 2019 05:56  Mg     2.2     10-12    TPro  7.3  /  Alb  3.6  /  TBili  0.5  /  DBili  x   /  AST  16  /  ALT  12  /  AlkPhos  55  10-12    CARDIAC MARKERS ( 11 Oct 2019 05:45 )  x     / 0.09 ng/mL / 46 U/L / x     / 4.9 ng/mL  CARDIAC MARKERS ( 10 Oct 2019 19:03 )  x     / 0.11 ng/mL / 80 U/L / x     / 6.6 ng/mL  CARDIAC MARKERS ( 10 Oct 2019 15:20 )  x     / 0.14 ng/mL / x     / x     / 6.2 ng/mL      PT/INR - ( 11 Oct 2019 05:45 )   PT: 16.60 sec;   INR: 1.45 ratio         PTT - ( 11 Oct 2019 05:45 )  PTT:40.1 sec    I&O's Summary    11 Oct 2019 07:01  -  12 Oct 2019 07:00  --------------------------------------------------------  IN: 620 mL / OUT: 1790 mL / NET: -1170 mL    12 Oct 2019 07:01  -  12 Oct 2019 12:39  --------------------------------------------------------  IN: 330 mL / OUT: 750 mL / NET: -420 mL      BNPSerum Pro-Brain Natriuretic Peptide: 2567 pg/mL (10-12 @ 11:39) 8

## 2019-10-12 NOTE — PROGRESS NOTE ADULT - ASSESSMENT
IMPRESSION:  ARF hypoxic seconday to fluid overload CHF exacerbation   copd stable   CKD       PLAN:    CNS: no sedation     HEENT: oral care     PULMONARY: taper oxygen to keep pox > 92 %   keep on bipap at night and prn   continue symbicort   CARDIOVASCULAR:  follow cardiology , check BNP malachi   continue lasix as per cardiology   keep Is < OS   daily weight     GI: GI prophylaxis.  Feeding     RENAL: follow IS and OS monitor lytes bun, cr     INFECTIOUS DISEASE: no abx follow cx     HEMATOLOGICAL:  DVT prophylaxisc.on lovenox therapeutic     ENDOCRINE:  Follow up FS.  Insulin protocol if needed.    tele monitoring

## 2019-10-12 NOTE — PROGRESS NOTE ADULT - SUBJECTIVE AND OBJECTIVE BOX
TAIWO ZAVALA  79y  Male      Patient is a 79y old  Male who presents with a chief complaint of CHF (12 Oct 2019 07:22)      INTERVAL HPI/OVERNIGHT EVENTS: patient reports marked improvement in dyspnea. urinating quite a bit after iv diuretic. not tolerating nippv well secondary to claustrophobia       REVIEW OF SYSTEMS:  as above  All other review of systems negative    T(C): 35.3 (10-12-19 @ 11:00), Max: 35.6 (10-12-19 @ 06:20)  HR: 118 (10-12-19 @ 07:10) (64 - 118)  BP: 130/62 (10-12-19 @ 07:10) (98/54 - 147/79)  RR: 18 (10-12-19 @ 11:00) (0 - 28)  SpO2: 99% (10-12-19 @ 07:10) (96% - 100%)  Wt(kg): --Vital Signs Last 24 Hrs  T(C): 35.3 (12 Oct 2019 11:00), Max: 35.6 (12 Oct 2019 06:20)  T(F): 95.5 (12 Oct 2019 11:00), Max: 96 (12 Oct 2019 06:20)  HR: 118 (12 Oct 2019 07:10) (64 - 118)  BP: 130/62 (12 Oct 2019 07:10) (98/54 - 147/79)  BP(mean): 89 (12 Oct 2019 07:10) (71 - 99)  RR: 18 (12 Oct 2019 11:00) (0 - 28)  SpO2: 99% (12 Oct 2019 07:10) (96% - 100%)      10-11-19 @ 07:01  -  10-12-19 @ 07:00  --------------------------------------------------------  IN: 620 mL / OUT: 1790 mL / NET: -1170 mL    10-12-19 @ 07:01  -  10-12-19 @ 11:36  --------------------------------------------------------  IN: 330 mL / OUT: 750 mL / NET: -420 mL        PHYSICAL EXAM:  GENERAL: NAD  PSYCH: no agitation, baseline mentation  NERVOUS SYSTEM:  Alert & Oriented X3, no new focal deficits  PULMONARY: comfortable on NC, no crackles or wheeze, chronic sounding coarse breath sounds  CARDIOVASCULAR: Regular rate and rhythm; No murmurs, rubs, or gallops  GI: Soft, Nontender, Nondistended; Bowel sounds present obese  EXTREMITIES:  2+ Peripheral Pulses, No clubbing, cyanosis, very subtle pedal edema reportedly improving, chronic pvd hyperpigmentation changes on b/l LE    Consultant(s) Notes Reviewed:  [x ] YES  [ ] NO    Discussed with Consultants/Other Providers [ x] YES     LABS                          13.2   8.41  )-----------( 196      ( 12 Oct 2019 05:56 )             43.7     10-12    141  |  101  |  57<H>  ----------------------------<  211<H>  5.0   |  25  |  1.6<H>    Ca    9.7      12 Oct 2019 05:56  Mg     2.2     10-12    TPro  7.3  /  Alb  3.6  /  TBili  0.5  /  DBili  x   /  AST  16  /  ALT  12  /  AlkPhos  55  10-12        PT/INR - ( 11 Oct 2019 05:45 )   PT: 16.60 sec;   INR: 1.45 ratio         PTT - ( 11 Oct 2019 05:45 )  PTT:40.1 sec  Lactate Trend    CARDIAC MARKERS ( 11 Oct 2019 05:45 )  x     / 0.09 ng/mL / 46 U/L / x     / 4.9 ng/mL  CARDIAC MARKERS ( 10 Oct 2019 19:03 )  x     / 0.11 ng/mL / 80 U/L / x     / 6.6 ng/mL  CARDIAC MARKERS ( 10 Oct 2019 15:20 )  x     / 0.14 ng/mL / x     / x     / 6.2 ng/mL      CAPILLARY BLOOD GLUCOSE      POCT Blood Glucose.: 288 mg/dL (12 Oct 2019 11:32)        RADIOLOGY & ADDITIONAL TESTS:    Imaging Personally Reviewed:  [ ] YES  [ ] NO    HEALTH ISSUES - PROBLEM Dx:

## 2019-10-12 NOTE — PROGRESS NOTE ADULT - SUBJECTIVE AND OBJECTIVE BOX
Patient is a 79y old  Male who presents with a chief complaint of ARF (11 Oct 2019 08:30)      Over Night Events:  Patient seen and examined feel better require less oxygen       ROS:  See HPI    PHYSICAL EXAM    ICU Vital Signs Last 24 Hrs  T(C): 35 (12 Oct 2019 07:01), Max: 35.8 (11 Oct 2019 11:10)  T(F): 95 (12 Oct 2019 07:01), Max: 96.4 (11 Oct 2019 11:10)  HR: 64 (12 Oct 2019 05:11) (63 - 66)  BP: 147/79 (12 Oct 2019 05:11) (98/54 - 147/79)  BP(mean): 99 (12 Oct 2019 05:11) (71 - 99)  ABP: --  ABP(mean): --  RR: 18 (12 Oct 2019 07:01) (0 - 32)  SpO2: 99% (12 Oct 2019 05:11) (90% - 100%)      General:Aox3  HEENT:   darell             Lymph Nodes: NO cervical LN   Lungs: Bilateral mild crackles  Cardiovascular: Regular   Abdomen: Soft, Positive BS  Extremities: No clubbing   Skin: warm   Neurological: no focal deficit   Musculoskeletal: move all ext     I&O's Detail    11 Oct 2019 07:01  -  12 Oct 2019 07:00  --------------------------------------------------------  IN:    Oral Fluid: 620 mL  Total IN: 620 mL    OUT:    Voided: 1790 mL  Total OUT: 1790 mL    Total NET: -1170 mL      12 Oct 2019 07:01  -  12 Oct 2019 07:22  --------------------------------------------------------  IN:  Total IN: 0 mL    OUT:    Voided: 300 mL  Total OUT: 300 mL    Total NET: -300 mL          LABS:                          13.2   8.41  )-----------( 196      ( 12 Oct 2019 05:56 )             43.7         11 Oct 2019 05:45    143    |  103    |  44     ----------------------------<  247    5.0     |  27     |  1.6      Ca    9.3        11 Oct 2019 05:45  Mg     2.1       11 Oct 2019 05:45                                               PT/INR - ( 11 Oct 2019 05:45 )   PT: 16.60 sec;   INR: 1.45 ratio         PTT - ( 11 Oct 2019 05:45 )  PTT:40.1 sec                                             CARDIAC MARKERS ( 11 Oct 2019 05:45 )  x     / 0.09 ng/mL / 46 U/L / x     / 4.9 ng/mL  CARDIAC MARKERS ( 10 Oct 2019 19:03 )  x     / 0.11 ng/mL / 80 U/L / x     / 6.6 ng/mL  CARDIAC MARKERS ( 10 Oct 2019 15:20 )  x     / 0.14 ng/mL / x     / x     / 6.2 ng/mL  CARDIAC MARKERS ( 10 Oct 2019 11:00 )  x     / 0.09 ng/mL / x     / x     / x                                                                                                                                                 MEDICATIONS  (STANDING):  ALPRAZolam 0.5 milliGRAM(s) Oral two times a day  amLODIPine   Tablet 5 milliGRAM(s) Oral daily  aspirin  chewable 81 milliGRAM(s) Oral daily  buDESOnide 160 MICROgram(s)/formoterol 4.5 MICROgram(s) Inhaler 2 Puff(s) Inhalation two times a day  dextrose 5%. 1000 milliLiter(s) (50 mL/Hr) IV Continuous <Continuous>  dextrose 50% Injectable 12.5 Gram(s) IV Push once  dextrose 50% Injectable 25 Gram(s) IV Push once  dextrose 50% Injectable 25 Gram(s) IV Push once  enoxaparin Injectable 100 milliGRAM(s) SubCutaneous two times a day  fenofibrate Tablet 145 milliGRAM(s) Oral daily  finasteride 5 milliGRAM(s) Oral daily  furosemide   Injectable 40 milliGRAM(s) IV Push two times a day  influenza   Vaccine 0.5 milliLiter(s) IntraMuscular once  insulin lispro (HumaLOG) corrective regimen sliding scale   SubCutaneous three times a day before meals  isosorbide   mononitrate ER Tablet (IMDUR) 30 milliGRAM(s) Oral daily  metoprolol succinate ER 75 milliGRAM(s) Oral daily  silver sulfADIAZINE 1% Cream 1 Application(s) Topical two times a day  simvastatin 40 milliGRAM(s) Oral at bedtime  tamsulosin 0.4 milliGRAM(s) Oral at bedtime    MEDICATIONS  (PRN):  dextrose 40% Gel 15 Gram(s) Oral once PRN Blood Glucose LESS THAN 70 milliGRAM(s)/deciliter  glucagon  Injectable 1 milliGRAM(s) IntraMuscular once PRN Glucose LESS THAN 70 milligrams/deciliter          Xrays:  TLC:  OG:  ET tube:                                                                                    decrease b/l effusion    ECHO:  [facility] ICU:

## 2019-10-12 NOTE — PROGRESS NOTE ADULT - ATTENDING COMMENTS
Patient seen and examined independently of resident. My addendum supersedes resident notation. Case discussed with housestaff, nursing and patient Patient seen and examined independently of resident. My addendum supersedes resident notation. Case discussed with housestaff, nursing and patient    plan discussed with icu resident, patient, and nursing- recommendations as above shared with primary icu team

## 2019-10-12 NOTE — PROGRESS NOTE ADULT - ASSESSMENT
78yo Male w/ PMH of chronic diastolic HF, COPD (home O2 3-3.5L as needed), DM2, HTN, CAD s/p CABG and 1 stent, BPH, obesity, here with WU and chest pressure, admitted with acute respiratory failure with hypoxia secondary to fluid overload from acute on chronic diastolic heart failure exacerbation    #) Acute on Chronic Diastolic CHF exacerbation / moderate  aortic stenosis   -much improved acute respiratory failure  - BIPAP PRN and qhs, although not tolerating this much  - recommend decreasing IV diuretic at this point- and transitioning to patient's prior home doseage of 40mg po lasix qam, and 20mg po lasix midday  - c/w fluid restricted, low sodium diet     #) Chest Pain,  in patient with hx of CABG and stent placement  - c/w losartan asa, metoprolol; Simvastatin   -  Lovenox   - cardiology following- reportedly 2015 cath did not demonstrate and instent restenosis or failing graft  - patient does not however that until day prior to this hospitalization he didn't have chest pressure normally associated with his HF exacerbations, will be up to discretion of cardiology team regarding further invasive diagnostic workup of underlying CAD  -patient to discuss this further with Ashley/Jose team    #) Hx of A Fib  - On Xarelto, 15mg at home; will hold for now and continue  Lovenox   -  Metoprolol   - may be reasonable to resume home noac soon from therapeutic lovenox if crcl >40    #) Hx of COPD  - Stable at this time, with no evidence of acute exacerbation  - Cont with Symbicort (in place of Breo)    #) Hx of Diabetes Type 2  - Insulin sq hospital protocol   - Carb consistent diet    #) Hx of BPH  - Cont with Flomax and Finasteride     #Functional debility  PT for gait rehab    #Progress Note Handoff  Pending (specify):  Consults_________, Tests________, Test Results_______, Other____PT eval to guage level of debility- if SNF candidate then dispo planning once transitioned to po diuretic and noac vs home care with heart failure program if going home_____  Family discussion: plan of care discussed with patient  Disposition: Unknown at this time________; likely discharge within 48-72 hours 80yo Male w/ PMH of chronic diastolic HF, COPD (home O2 3-3.5L as needed), DM2, HTN, CAD s/p CABG and 1 stent, BPH, obesity, here with WU and chest pressure, admitted with acute respiratory failure with hypoxia secondary to fluid overload from acute on chronic diastolic heart failure exacerbation    #) Acute on Chronic Diastolic CHF exacerbation / moderate  aortic stenosis   -much improved acute respiratory failure  - BIPAP PRN and qhs, although not tolerating this much  - recommend decreasing IV diuretic at this point- and transitioning to patient's prior home doseage of 40mg po lasix qam, and 20mg po lasix midday  - c/w fluid restricted, low sodium diet     #) Chest Pain,  in patient with hx of CABG and stent placement  - c/w losartan asa, metoprolol; Simvastatin   -  Lovenox   - cardiology following- reportedly 2015 cath did not demonstrate and instent restenosis or failing graft  - patient does note however that until day prior to this hospitalization he didn't have chest pressure normally associated with his HF exacerbations   will be up to discretion of cardiology team regarding further invasive diagnostic workup of underlying CAD  -patient to discuss this further with Ashley/Jose team    #) Hx of A Fib  - On Xarelto, 15mg at home; will hold for now and continue  Lovenox   -  Metoprolol   - may be reasonable to resume home noac soon from therapeutic lovenox if crcl >40    #) Hx of COPD  - Stable at this time, with no evidence of acute exacerbation  - Cont with Symbicort (in place of Breo)    #) Hx of Diabetes Type 2  - Insulin sq hospital protocol   - Carb consistent diet    #) Hx of BPH  - Cont with Flomax and Finasteride     #Functional debility  PT for gait rehab    #Progress Note Handoff  Pending (specify):  Consults_________, Tests________, Test Results_______, Other____PT eval to guage level of debility- if SNF candidate then dispo planning once transitioned to po diuretic and noac vs home care with heart failure program if going home_____  Family discussion: plan of care discussed with patient  Disposition: Unknown at this time________; likely discharge within 48-72 hours

## 2019-10-13 LAB
ALBUMIN SERPL ELPH-MCNC: 3.6 G/DL — SIGNIFICANT CHANGE UP (ref 3.5–5.2)
ALP SERPL-CCNC: 55 U/L — SIGNIFICANT CHANGE UP (ref 30–115)
ALT FLD-CCNC: 12 U/L — SIGNIFICANT CHANGE UP (ref 0–41)
ANION GAP SERPL CALC-SCNC: 11 MMOL/L — SIGNIFICANT CHANGE UP (ref 7–14)
AST SERPL-CCNC: 13 U/L — SIGNIFICANT CHANGE UP (ref 0–41)
BASOPHILS # BLD AUTO: 0.06 K/UL — SIGNIFICANT CHANGE UP (ref 0–0.2)
BASOPHILS NFR BLD AUTO: 0.7 % — SIGNIFICANT CHANGE UP (ref 0–1)
BILIRUB SERPL-MCNC: 0.4 MG/DL — SIGNIFICANT CHANGE UP (ref 0.2–1.2)
BUN SERPL-MCNC: 48 MG/DL — HIGH (ref 10–20)
CALCIUM SERPL-MCNC: 9.5 MG/DL — SIGNIFICANT CHANGE UP (ref 8.5–10.1)
CHLORIDE SERPL-SCNC: 96 MMOL/L — LOW (ref 98–110)
CO2 SERPL-SCNC: 35 MMOL/L — HIGH (ref 17–32)
CREAT SERPL-MCNC: 1.3 MG/DL — SIGNIFICANT CHANGE UP (ref 0.7–1.5)
EOSINOPHIL # BLD AUTO: 0.38 K/UL — SIGNIFICANT CHANGE UP (ref 0–0.7)
EOSINOPHIL NFR BLD AUTO: 4.6 % — SIGNIFICANT CHANGE UP (ref 0–8)
GLUCOSE BLDC GLUCOMTR-MCNC: 175 MG/DL — HIGH (ref 70–99)
GLUCOSE BLDC GLUCOMTR-MCNC: 203 MG/DL — HIGH (ref 70–99)
GLUCOSE BLDC GLUCOMTR-MCNC: 240 MG/DL — HIGH (ref 70–99)
GLUCOSE BLDC GLUCOMTR-MCNC: 268 MG/DL — HIGH (ref 70–99)
GLUCOSE BLDC GLUCOMTR-MCNC: 273 MG/DL — HIGH (ref 70–99)
GLUCOSE SERPL-MCNC: 170 MG/DL — HIGH (ref 70–99)
HCT VFR BLD CALC: 42.4 % — SIGNIFICANT CHANGE UP (ref 42–52)
HGB BLD-MCNC: 13 G/DL — LOW (ref 14–18)
IMM GRANULOCYTES NFR BLD AUTO: 0.4 % — HIGH (ref 0.1–0.3)
LYMPHOCYTES # BLD AUTO: 1.3 K/UL — SIGNIFICANT CHANGE UP (ref 1.2–3.4)
LYMPHOCYTES # BLD AUTO: 15.6 % — LOW (ref 20.5–51.1)
MAGNESIUM SERPL-MCNC: 1.9 MG/DL — SIGNIFICANT CHANGE UP (ref 1.8–2.4)
MCHC RBC-ENTMCNC: 25.6 PG — LOW (ref 27–31)
MCHC RBC-ENTMCNC: 30.7 G/DL — LOW (ref 32–37)
MCV RBC AUTO: 83.6 FL — SIGNIFICANT CHANGE UP (ref 80–94)
MONOCYTES # BLD AUTO: 1.01 K/UL — HIGH (ref 0.1–0.6)
MONOCYTES NFR BLD AUTO: 12.1 % — HIGH (ref 1.7–9.3)
NEUTROPHILS # BLD AUTO: 5.56 K/UL — SIGNIFICANT CHANGE UP (ref 1.4–6.5)
NEUTROPHILS NFR BLD AUTO: 66.6 % — SIGNIFICANT CHANGE UP (ref 42.2–75.2)
NRBC # BLD: 0 /100 WBCS — SIGNIFICANT CHANGE UP (ref 0–0)
PLATELET # BLD AUTO: 241 K/UL — SIGNIFICANT CHANGE UP (ref 130–400)
POTASSIUM SERPL-MCNC: 4.2 MMOL/L — SIGNIFICANT CHANGE UP (ref 3.5–5)
POTASSIUM SERPL-SCNC: 4.2 MMOL/L — SIGNIFICANT CHANGE UP (ref 3.5–5)
PROT SERPL-MCNC: 7 G/DL — SIGNIFICANT CHANGE UP (ref 6–8)
RBC # BLD: 5.07 M/UL — SIGNIFICANT CHANGE UP (ref 4.7–6.1)
RBC # FLD: 15.4 % — HIGH (ref 11.5–14.5)
SODIUM SERPL-SCNC: 142 MMOL/L — SIGNIFICANT CHANGE UP (ref 135–146)
WBC # BLD: 8.34 K/UL — SIGNIFICANT CHANGE UP (ref 4.8–10.8)
WBC # FLD AUTO: 8.34 K/UL — SIGNIFICANT CHANGE UP (ref 4.8–10.8)

## 2019-10-13 PROCEDURE — 99233 SBSQ HOSP IP/OBS HIGH 50: CPT

## 2019-10-13 RX ORDER — FUROSEMIDE 40 MG
20 TABLET ORAL AT BEDTIME
Refills: 0 | Status: DISCONTINUED | OUTPATIENT
Start: 2019-10-13 | End: 2019-10-16

## 2019-10-13 RX ORDER — FUROSEMIDE 40 MG
40 TABLET ORAL DAILY
Refills: 0 | Status: DISCONTINUED | OUTPATIENT
Start: 2019-10-13 | End: 2019-10-16

## 2019-10-13 RX ADMIN — ENOXAPARIN SODIUM 100 MILLIGRAM(S): 100 INJECTION SUBCUTANEOUS at 05:51

## 2019-10-13 RX ADMIN — ENOXAPARIN SODIUM 100 MILLIGRAM(S): 100 INJECTION SUBCUTANEOUS at 17:34

## 2019-10-13 RX ADMIN — ISOSORBIDE MONONITRATE 30 MILLIGRAM(S): 60 TABLET, EXTENDED RELEASE ORAL at 12:38

## 2019-10-13 RX ADMIN — Medication 0.5 MILLIGRAM(S): at 05:50

## 2019-10-13 RX ADMIN — Medication 1 APPLICATION(S): at 17:35

## 2019-10-13 RX ADMIN — Medication 81 MILLIGRAM(S): at 12:38

## 2019-10-13 RX ADMIN — Medication 75 MILLIGRAM(S): at 05:51

## 2019-10-13 RX ADMIN — FINASTERIDE 5 MILLIGRAM(S): 5 TABLET, FILM COATED ORAL at 12:38

## 2019-10-13 RX ADMIN — Medication 6: at 12:38

## 2019-10-13 RX ADMIN — TAMSULOSIN HYDROCHLORIDE 0.4 MILLIGRAM(S): 0.4 CAPSULE ORAL at 21:31

## 2019-10-13 RX ADMIN — BUDESONIDE AND FORMOTEROL FUMARATE DIHYDRATE 2 PUFF(S): 160; 4.5 AEROSOL RESPIRATORY (INHALATION) at 20:55

## 2019-10-13 RX ADMIN — Medication 2: at 08:35

## 2019-10-13 RX ADMIN — Medication 20 MILLIGRAM(S): at 21:33

## 2019-10-13 RX ADMIN — SIMVASTATIN 40 MILLIGRAM(S): 20 TABLET, FILM COATED ORAL at 21:30

## 2019-10-13 RX ADMIN — Medication 145 MILLIGRAM(S): at 12:38

## 2019-10-13 RX ADMIN — Medication 1 APPLICATION(S): at 05:51

## 2019-10-13 RX ADMIN — Medication 0.5 MILLIGRAM(S): at 17:33

## 2019-10-13 RX ADMIN — BUDESONIDE AND FORMOTEROL FUMARATE DIHYDRATE 2 PUFF(S): 160; 4.5 AEROSOL RESPIRATORY (INHALATION) at 08:35

## 2019-10-13 RX ADMIN — Medication 4: at 17:34

## 2019-10-13 RX ADMIN — AMLODIPINE BESYLATE 5 MILLIGRAM(S): 2.5 TABLET ORAL at 05:50

## 2019-10-13 RX ADMIN — Medication 40 MILLIGRAM(S): at 05:51

## 2019-10-13 NOTE — PROGRESS NOTE ADULT - ASSESSMENT
80yo Male w/ PMH of chronic diastolic HF, COPD (home O2 3-3.5L as needed), DM2, HTN, CAD s/p CABG and 1 stent, BPH, obesity, here with WU and chest pressure, admitted with acute respiratory failure with hypoxia secondary to fluid overload from acute on chronic diastolic heart failure exacerbation    #) Acute on Chronic Diastolic CHF exacerbation / moderate  aortic stenosis   -much improved acute respiratory failure  - BIPAP PRN and qhs, although not tolerating this much  - recommend decreasing IV diuretic at this point- and transitioning to patient's prior home doseage of 40mg po lasix qam, and 20mg po lasix midday  - c/w fluid restricted, low sodium diet     #) Chest Pain,  in patient with hx of CABG and stent placement  - c/w losartan asa, metoprolol; Simvastatin   -  Lovenox   - cardiology following- reportedly 2015 cath did not demonstrate and instent restenosis or failing graft  - patient does note however that until day prior to this hospitalization he didn't have chest pressure normally associated with his HF exacerbations   will be up to discretion of cardiology team regarding further invasive diagnostic workup of underlying CAD  -patient to discuss this further with Ashley/Jose team    #) Hx of A Fib  - On Xarelto, 15mg at home; will hold for now and continue  Lovenox   -  Metoprolol   - may be reasonable to resume home noac soon from therapeutic lovenox if crcl >40    #) Hx of COPD  - Stable at this time, with no evidence of acute exacerbation  - Cont with Symbicort (in place of Breo)    #) Hx of Diabetes Type 2  - Insulin sq hospital protocol   - Carb consistent diet    #) Hx of BPH  - Cont with Flomax and Finasteride     #Functional debility  PT for gait rehab    #Progress Note Handoff  Pending (specify):  Consults_________, Tests________, Test Results_______, Other____PT eval to guage level of debility- if SNF candidate then dispo planning once transitioned to po diuretic and noac vs home care with heart failure program if going home_____  Family discussion: plan of care discussed with patient  Disposition: Unknown at this time________; likely discharge within 24-48 hours if no ischemic w/u planned as inpatient

## 2019-10-13 NOTE — PROGRESS NOTE ADULT - ASSESSMENT
IMPRESSION:  ARF hypoxic seconday to fluid overload CHF exacerbation   copd stable   CKD       PLAN:    CNS: no sedation     HEENT: oral care     PULMONARY: taper oxygen to keep pox > 92 %   keep on bipap at night and prn   continue symbicort     CARDIOVASCULAR:  follow cardiology , check BNP malachi   lower  lasix to Q 24 hrs worsening kidney function    keep Is < OS   daily weight     GI: GI prophylaxis.  Feeding     RENAL: follow IS and OS monitor lytes bun, cr     INFECTIOUS DISEASE: no abx follow cx     HEMATOLOGICAL:  DVT prophylaxisc.on lovenox therapeutic     ENDOCRINE:  Follow up FS.  Insulin protocol if needed.    tele monitoring   follow morning lab

## 2019-10-13 NOTE — PROGRESS NOTE ADULT - SUBJECTIVE AND OBJECTIVE BOX
TAIWO ZAVALA  79y  Male      Patient is a 79y old  Male who presents with a chief complaint of CHF (13 Oct 2019 07:33)      INTERVAL HPI/OVERNIGHT EVENTS: currently no chest pressure. dyspnea improving. no really tolerating nippv. urinating well      REVIEW OF SYSTEMS:  as above  All other review of systems negative    T(C): 35.6 (10-13-19 @ 07:01), Max: 35.9 (10-13-19 @ 02:29)  HR: 66 (10-13-19 @ 07:01) (65 - 67)  BP: 125/59 (10-13-19 @ 07:01) (93/45 - 132/60)  RR: 18 (10-13-19 @ 07:01) (0 - 36)  SpO2: 94% (10-13-19 @ 07:01) (94% - 100%)  Wt(kg): --Vital Signs Last 24 Hrs  T(C): 35.6 (13 Oct 2019 07:01), Max: 35.9 (13 Oct 2019 02:29)  T(F): 96.1 (13 Oct 2019 07:01), Max: 96.7 (13 Oct 2019 02:29)  HR: 66 (13 Oct 2019 07:01) (65 - 67)  BP: 125/59 (13 Oct 2019 07:01) (93/45 - 132/60)  BP(mean): 85 (13 Oct 2019 07:01) (65 - 88)  RR: 18 (13 Oct 2019 07:01) (0 - 36)  SpO2: 94% (13 Oct 2019 07:01) (94% - 100%)      10-12-19 @ 07:01  -  10-13-19 @ 07:00  --------------------------------------------------------  IN: 330 mL / OUT: 2670 mL / NET: -2340 mL    10-13-19 @ 07:01  -  10-13-19 @ 10:27  --------------------------------------------------------  IN: 240 mL / OUT: 550 mL / NET: -310 mL        PHYSICAL EXAM:  GENERAL: NAD  PSYCH: no agitation, baseline mentation  HEENT thick neck  NERVOUS SYSTEM:  Alert & Oriented X3, no new focal deficits  PULMONARY: subtle basilar crackles, comfortable on NC  CARDIOVASCULAR: Regular rate and rhythm; No murmurs, rubs, or gallops CABG scar  GI: Soft, Nontender, Nondistended; Bowel sounds present obese  EXTREMITIES:  2+ Peripheral Pulses, No clubbing, cyanosis, pedal edema improving, chronic hyperpigmentation b/l LE's PVD changes    Consultant(s) Notes Reviewed:  [x ] YES  [ ] NO    Discussed with Consultants/Other Providers [ x] YES     LABS                          13.0   8.34  )-----------( 241      ( 13 Oct 2019 06:36 )             42.4     10-13    142  |  96<L>  |  48<H>  ----------------------------<  170<H>  4.2   |  35<H>  |  1.3    Ca    9.5      13 Oct 2019 06:36  Mg     1.9     10-13    TPro  7.0  /  Alb  3.6  /  TBili  0.4  /  DBili  x   /  AST  13  /  ALT  12  /  AlkPhos  55  10-13          Lactate Trend        CAPILLARY BLOOD GLUCOSE      POCT Blood Glucose.: 175 mg/dL (13 Oct 2019 08:02)        RADIOLOGY & ADDITIONAL TESTS:    Imaging Personally Reviewed:  [ ] YES  [ ] NO    HEALTH ISSUES - PROBLEM Dx:

## 2019-10-14 LAB
ALBUMIN SERPL ELPH-MCNC: 3.5 G/DL — SIGNIFICANT CHANGE UP (ref 3.5–5.2)
ALP SERPL-CCNC: 54 U/L — SIGNIFICANT CHANGE UP (ref 30–115)
ALT FLD-CCNC: 12 U/L — SIGNIFICANT CHANGE UP (ref 0–41)
ANION GAP SERPL CALC-SCNC: 9 MMOL/L — SIGNIFICANT CHANGE UP (ref 7–14)
AST SERPL-CCNC: 12 U/L — SIGNIFICANT CHANGE UP (ref 0–41)
BASOPHILS # BLD AUTO: 0.04 K/UL — SIGNIFICANT CHANGE UP (ref 0–0.2)
BASOPHILS NFR BLD AUTO: 0.5 % — SIGNIFICANT CHANGE UP (ref 0–1)
BILIRUB SERPL-MCNC: 0.5 MG/DL — SIGNIFICANT CHANGE UP (ref 0.2–1.2)
BUN SERPL-MCNC: 44 MG/DL — HIGH (ref 10–20)
CALCIUM SERPL-MCNC: 9.2 MG/DL — SIGNIFICANT CHANGE UP (ref 8.5–10.1)
CHLORIDE SERPL-SCNC: 97 MMOL/L — LOW (ref 98–110)
CO2 SERPL-SCNC: 36 MMOL/L — HIGH (ref 17–32)
CREAT SERPL-MCNC: 1.2 MG/DL — SIGNIFICANT CHANGE UP (ref 0.7–1.5)
EOSINOPHIL # BLD AUTO: 0.35 K/UL — SIGNIFICANT CHANGE UP (ref 0–0.7)
EOSINOPHIL NFR BLD AUTO: 4.8 % — SIGNIFICANT CHANGE UP (ref 0–8)
GLUCOSE BLDC GLUCOMTR-MCNC: 167 MG/DL — HIGH (ref 70–99)
GLUCOSE BLDC GLUCOMTR-MCNC: 192 MG/DL — HIGH (ref 70–99)
GLUCOSE BLDC GLUCOMTR-MCNC: 259 MG/DL — HIGH (ref 70–99)
GLUCOSE BLDC GLUCOMTR-MCNC: 259 MG/DL — HIGH (ref 70–99)
GLUCOSE SERPL-MCNC: 185 MG/DL — HIGH (ref 70–99)
HCT VFR BLD CALC: 40.1 % — LOW (ref 42–52)
HGB BLD-MCNC: 12.3 G/DL — LOW (ref 14–18)
IMM GRANULOCYTES NFR BLD AUTO: 0.3 % — SIGNIFICANT CHANGE UP (ref 0.1–0.3)
LYMPHOCYTES # BLD AUTO: 1.41 K/UL — SIGNIFICANT CHANGE UP (ref 1.2–3.4)
LYMPHOCYTES # BLD AUTO: 19.3 % — LOW (ref 20.5–51.1)
MAGNESIUM SERPL-MCNC: 2 MG/DL — SIGNIFICANT CHANGE UP (ref 1.8–2.4)
MCHC RBC-ENTMCNC: 25.7 PG — LOW (ref 27–31)
MCHC RBC-ENTMCNC: 30.7 G/DL — LOW (ref 32–37)
MCV RBC AUTO: 83.9 FL — SIGNIFICANT CHANGE UP (ref 80–94)
MONOCYTES # BLD AUTO: 0.84 K/UL — HIGH (ref 0.1–0.6)
MONOCYTES NFR BLD AUTO: 11.5 % — HIGH (ref 1.7–9.3)
NEUTROPHILS # BLD AUTO: 4.64 K/UL — SIGNIFICANT CHANGE UP (ref 1.4–6.5)
NEUTROPHILS NFR BLD AUTO: 63.6 % — SIGNIFICANT CHANGE UP (ref 42.2–75.2)
NRBC # BLD: 0 /100 WBCS — SIGNIFICANT CHANGE UP (ref 0–0)
PHOSPHATE SERPL-MCNC: 3.1 MG/DL — SIGNIFICANT CHANGE UP (ref 2.1–4.9)
PLATELET # BLD AUTO: 222 K/UL — SIGNIFICANT CHANGE UP (ref 130–400)
POTASSIUM SERPL-MCNC: 4.6 MMOL/L — SIGNIFICANT CHANGE UP (ref 3.5–5)
POTASSIUM SERPL-SCNC: 4.6 MMOL/L — SIGNIFICANT CHANGE UP (ref 3.5–5)
PROT SERPL-MCNC: 6.8 G/DL — SIGNIFICANT CHANGE UP (ref 6–8)
RBC # BLD: 4.78 M/UL — SIGNIFICANT CHANGE UP (ref 4.7–6.1)
RBC # FLD: 15.1 % — HIGH (ref 11.5–14.5)
SODIUM SERPL-SCNC: 142 MMOL/L — SIGNIFICANT CHANGE UP (ref 135–146)
WBC # BLD: 7.3 K/UL — SIGNIFICANT CHANGE UP (ref 4.8–10.8)
WBC # FLD AUTO: 7.3 K/UL — SIGNIFICANT CHANGE UP (ref 4.8–10.8)

## 2019-10-14 PROCEDURE — 71045 X-RAY EXAM CHEST 1 VIEW: CPT | Mod: 26

## 2019-10-14 PROCEDURE — 99233 SBSQ HOSP IP/OBS HIGH 50: CPT

## 2019-10-14 RX ORDER — CLOPIDOGREL BISULFATE 75 MG/1
300 TABLET, FILM COATED ORAL ONCE
Refills: 0 | Status: COMPLETED | OUTPATIENT
Start: 2019-10-14 | End: 2019-10-14

## 2019-10-14 RX ORDER — HEPARIN SODIUM 5000 [USP'U]/ML
900 INJECTION INTRAVENOUS; SUBCUTANEOUS
Qty: 25000 | Refills: 0 | Status: DISCONTINUED | OUTPATIENT
Start: 2019-10-14 | End: 2019-10-17

## 2019-10-14 RX ADMIN — Medication 81 MILLIGRAM(S): at 12:20

## 2019-10-14 RX ADMIN — ISOSORBIDE MONONITRATE 30 MILLIGRAM(S): 60 TABLET, EXTENDED RELEASE ORAL at 12:20

## 2019-10-14 RX ADMIN — HEPARIN SODIUM 9 UNIT(S)/HR: 5000 INJECTION INTRAVENOUS; SUBCUTANEOUS at 18:01

## 2019-10-14 RX ADMIN — Medication 20 MILLIGRAM(S): at 21:20

## 2019-10-14 RX ADMIN — Medication 0.5 MILLIGRAM(S): at 05:53

## 2019-10-14 RX ADMIN — BUDESONIDE AND FORMOTEROL FUMARATE DIHYDRATE 2 PUFF(S): 160; 4.5 AEROSOL RESPIRATORY (INHALATION) at 08:17

## 2019-10-14 RX ADMIN — Medication 1 APPLICATION(S): at 17:32

## 2019-10-14 RX ADMIN — AMLODIPINE BESYLATE 5 MILLIGRAM(S): 2.5 TABLET ORAL at 05:53

## 2019-10-14 RX ADMIN — Medication 0.5 MILLIGRAM(S): at 17:31

## 2019-10-14 RX ADMIN — SIMVASTATIN 40 MILLIGRAM(S): 20 TABLET, FILM COATED ORAL at 21:19

## 2019-10-14 RX ADMIN — Medication 1 APPLICATION(S): at 05:54

## 2019-10-14 RX ADMIN — CLOPIDOGREL BISULFATE 300 MILLIGRAM(S): 75 TABLET, FILM COATED ORAL at 12:20

## 2019-10-14 RX ADMIN — Medication 2: at 08:16

## 2019-10-14 RX ADMIN — Medication 75 MILLIGRAM(S): at 05:53

## 2019-10-14 RX ADMIN — Medication 40 MILLIGRAM(S): at 05:53

## 2019-10-14 RX ADMIN — Medication 6: at 12:19

## 2019-10-14 RX ADMIN — BUDESONIDE AND FORMOTEROL FUMARATE DIHYDRATE 2 PUFF(S): 160; 4.5 AEROSOL RESPIRATORY (INHALATION) at 19:35

## 2019-10-14 RX ADMIN — FINASTERIDE 5 MILLIGRAM(S): 5 TABLET, FILM COATED ORAL at 12:20

## 2019-10-14 RX ADMIN — Medication 145 MILLIGRAM(S): at 12:20

## 2019-10-14 RX ADMIN — Medication 2: at 17:07

## 2019-10-14 RX ADMIN — ENOXAPARIN SODIUM 100 MILLIGRAM(S): 100 INJECTION SUBCUTANEOUS at 06:16

## 2019-10-14 RX ADMIN — TAMSULOSIN HYDROCHLORIDE 0.4 MILLIGRAM(S): 0.4 CAPSULE ORAL at 21:19

## 2019-10-14 NOTE — PROGRESS NOTE ADULT - SUBJECTIVE AND OBJECTIVE BOX
HPI:  Patient is a 78yo Male w/ PMH of HFpEF (EF of 55% with Grade II Diastolic dysfunction), COPD (home O2 3-3.5L as needed), DM, HTN, CAD s/p CABG and 1 stent, BPH, and recent admission for CHF (August 2019) presenting to [facility] with complaints of shortness of breath and chest pain. Patient reports he has been progressively worsening shortness of breath for the last 4-5 days prior to admission. He also reports associated chest pain on exertion.  Patient reports he has been compliant with all of his medications as well with a low salt diet. He denies any recent history of fevers, chills, cough. On day of admission, had severe dyspnea on exertion associated with a pressure like sensation in his chest that radiated to his left arm. He tried sublingual nitro for relief but it didn't help. Therefore, he came in for further evaluation. He was given Solumedrol 125mg by EMS. In the ED, patient was found to have evidence of bilateral pleural effusions on bedside sono done by ED staff. He received IV Lasix 40mg and was placed on BiPAP with major improvement in his symptoms. On my assessment, patient was speaking to me in full sentences on BiPAP and his chest pain has resolved. (10 Oct 2019 13:27)        INTERVAL HPI/OVERNIGHT EVENTS:  ICU Vital Signs Last 24 Hrs  T(C): 36.2 (14 Oct 2019 11:00), Max: 36.2 (14 Oct 2019 11:00)  T(F): 97.1 (14 Oct 2019 11:00), Max: 97.1 (14 Oct 2019 11:00)  HR: 65 (14 Oct 2019 08:51) (64 - 66)  BP: 96/46 (14 Oct 2019 08:51) (91/44 - 137/65)  BP(mean): 67 (14 Oct 2019 08:51) (63 - 93)  ABP: --  ABP(mean): --  RR: 20 (14 Oct 2019 11:00) (14 - 32)  SpO2: 97% (14 Oct 2019 08:51) (94% - 100%)    I&O's Summary    13 Oct 2019 07:01  -  14 Oct 2019 07:00  --------------------------------------------------------  IN: 240 mL / OUT: 750 mL / NET: -510 mL    14 Oct 2019 07:01  -  14 Oct 2019 11:09  --------------------------------------------------------  IN: 240 mL / OUT: 500 mL / NET: -260 mL          LABS:                        12.3   7.30  )-----------( 222      ( 14 Oct 2019 05:52 )             40.1     10-14    142  |  97<L>  |  44<H>  ----------------------------<  185<H>  4.6   |  36<H>  |  1.2    Ca    9.2      14 Oct 2019 05:52  Phos  3.1     10-14  Mg     2.0     10-14    TPro  6.8  /  Alb  3.5  /  TBili  0.5  /  DBili  x   /  AST  12  /  ALT  12  /  AlkPhos  54  10-14        CAPILLARY BLOOD GLUCOSE      POCT Blood Glucose.: 167 mg/dL (14 Oct 2019 08:01)  POCT Blood Glucose.: 240 mg/dL (13 Oct 2019 23:35)  POCT Blood Glucose.: 268 mg/dL (13 Oct 2019 21:38)  POCT Blood Glucose.: 203 mg/dL (13 Oct 2019 16:55)  POCT Blood Glucose.: 273 mg/dL (13 Oct 2019 12:15)        RADIOLOGY & ADDITIONAL TESTS:    Consultant(s) Notes Reviewed:  [x ] YES  [ ] NO    MEDICATIONS  (STANDING):  ALPRAZolam 0.5 milliGRAM(s) Oral two times a day  amLODIPine   Tablet 5 milliGRAM(s) Oral daily  aspirin  chewable 81 milliGRAM(s) Oral daily  buDESOnide 160 MICROgram(s)/formoterol 4.5 MICROgram(s) Inhaler 2 Puff(s) Inhalation two times a day  clopidogrel Tablet 300 milliGRAM(s) Oral once  dextrose 5%. 1000 milliLiter(s) (50 mL/Hr) IV Continuous <Continuous>  dextrose 50% Injectable 12.5 Gram(s) IV Push once  dextrose 50% Injectable 25 Gram(s) IV Push once  dextrose 50% Injectable 25 Gram(s) IV Push once  fenofibrate Tablet 145 milliGRAM(s) Oral daily  finasteride 5 milliGRAM(s) Oral daily  furosemide    Tablet 40 milliGRAM(s) Oral daily  furosemide    Tablet 20 milliGRAM(s) Oral at bedtime  influenza   Vaccine 0.5 milliLiter(s) IntraMuscular once  insulin lispro (HumaLOG) corrective regimen sliding scale   SubCutaneous three times a day before meals  isosorbide   mononitrate ER Tablet (IMDUR) 30 milliGRAM(s) Oral daily  metoprolol succinate ER 75 milliGRAM(s) Oral daily  silver sulfADIAZINE 1% Cream 1 Application(s) Topical two times a day  simvastatin 40 milliGRAM(s) Oral at bedtime  tamsulosin 0.4 milliGRAM(s) Oral at bedtime    MEDICATIONS  (PRN):  dextrose 40% Gel 15 Gram(s) Oral once PRN Blood Glucose LESS THAN 70 milliGRAM(s)/deciliter  glucagon  Injectable 1 milliGRAM(s) IntraMuscular once PRN Glucose LESS THAN 70 milligrams/deciliter      PHYSICAL EXAM:  GENERAL: NOT IN ANY ACUTE DISTRESS   HEAD:  Atraumatic, Normocephalic  EYES: EOMI, PERRLA, conjunctiva and sclera clear  ENT: No tonsillar erythema, exudates, or enlargement; Moist mucous membranes, Good dentition, No lesions  NECK: Supple, No JVD, Normal thyroid, no enlarged nodes  NERVOUS SYSTEM:  Alert & Oriented X3, Good concentration.   CHEST/LUNG: B/L good air entry; No rales, rhonchi, or wheezing  HEART: S1S2 normal, no rubs noted.   ABDOMEN: Soft, Nontender, Nondistended; Bowel sounds present  EXTREMITIES:  2+ Peripheral Pulses, No clubbing, cyanosis, or edema  LYMPH: No lymphadenopathy noted  SKIN: No rashes or lesions    Care Discussed with Consultants/Other Providers [ x] YES  [ ] NO HPI:  Patient is a 78yo Male w/ PMH of HFpEF (EF of 55% with Grade II Diastolic dysfunction), COPD (home O2 3-3.5L as needed), DM, HTN, CAD s/p CABG and 1 stent, BPH, and recent admission for CHF (August 2019) presenting to [facility] with complaints of shortness of breath and chest pain. Patient reports he has been progressively worsening shortness of breath for the last 4-5 days prior to admission. He also reports associated chest pain on exertion.  Patient reports he has been compliant with all of his medications as well with a low salt diet. He denies any recent history of fevers, chills, cough. On day of admission, had severe dyspnea on exertion associated with a pressure like sensation in his chest that radiated to his left arm. He tried sublingual nitro for relief but it didn't help. Therefore, he came in for further evaluation. He was given Solumedrol 125mg by EMS. In the ED, patient was found to have evidence of bilateral pleural effusions on bedside sono done by ED staff. He received IV Lasix 40mg and was placed on BiPAP with major improvement in his symptoms. On my assessment, patient was speaking to me in full sentences on BiPAP and his chest pain has resolved. (10 Oct 2019 13:27)      INTERVAL HPI/OVERNIGHT EVENTS:  Seen and evaluated earlier today, has no chest, sob or palpitations and has no new complaint.     ICU Vital Signs Last 24 Hrs  T(C): 36.2 (14 Oct 2019 11:00), Max: 36.2 (14 Oct 2019 11:00)  T(F): 97.1 (14 Oct 2019 11:00), Max: 97.1 (14 Oct 2019 11:00)  HR: 65 (14 Oct 2019 08:51) (64 - 66)  BP: 96/46 (14 Oct 2019 08:51) (91/44 - 137/65)  BP(mean): 67 (14 Oct 2019 08:51) (63 - 93)  ABP: --  ABP(mean): --  RR: 20 (14 Oct 2019 11:00) (14 - 32)  SpO2: 97% (14 Oct 2019 08:51) (94% - 100%)    I&O's Summary    13 Oct 2019 07:01  -  14 Oct 2019 07:00  --------------------------------------------------------  IN: 240 mL / OUT: 750 mL / NET: -510 mL    14 Oct 2019 07:01  -  14 Oct 2019 11:09  --------------------------------------------------------  IN: 240 mL / OUT: 500 mL / NET: -260 mL          LABS:                        12.3   7.30  )-----------( 222      ( 14 Oct 2019 05:52 )             40.1     10-14    142  |  97<L>  |  44<H>  ----------------------------<  185<H>  4.6   |  36<H>  |  1.2    Ca    9.2      14 Oct 2019 05:52  Phos  3.1     10-14  Mg     2.0     10-14    TPro  6.8  /  Alb  3.5  /  TBili  0.5  /  DBili  x   /  AST  12  /  ALT  12  /  AlkPhos  54  10-14        CAPILLARY BLOOD GLUCOSE      POCT Blood Glucose.: 167 mg/dL (14 Oct 2019 08:01)  POCT Blood Glucose.: 240 mg/dL (13 Oct 2019 23:35)  POCT Blood Glucose.: 268 mg/dL (13 Oct 2019 21:38)  POCT Blood Glucose.: 203 mg/dL (13 Oct 2019 16:55)  POCT Blood Glucose.: 273 mg/dL (13 Oct 2019 12:15)        RADIOLOGY & ADDITIONAL TESTS:    Consultant(s) Notes Reviewed:  [x ] YES  [ ] NO    MEDICATIONS  (STANDING):  ALPRAZolam 0.5 milliGRAM(s) Oral two times a day  amLODIPine   Tablet 5 milliGRAM(s) Oral daily  aspirin  chewable 81 milliGRAM(s) Oral daily  buDESOnide 160 MICROgram(s)/formoterol 4.5 MICROgram(s) Inhaler 2 Puff(s) Inhalation two times a day  clopidogrel Tablet 300 milliGRAM(s) Oral once  dextrose 5%. 1000 milliLiter(s) (50 mL/Hr) IV Continuous <Continuous>  dextrose 50% Injectable 12.5 Gram(s) IV Push once  dextrose 50% Injectable 25 Gram(s) IV Push once  dextrose 50% Injectable 25 Gram(s) IV Push once  fenofibrate Tablet 145 milliGRAM(s) Oral daily  finasteride 5 milliGRAM(s) Oral daily  furosemide    Tablet 40 milliGRAM(s) Oral daily  furosemide    Tablet 20 milliGRAM(s) Oral at bedtime  influenza   Vaccine 0.5 milliLiter(s) IntraMuscular once  insulin lispro (HumaLOG) corrective regimen sliding scale   SubCutaneous three times a day before meals  isosorbide   mononitrate ER Tablet (IMDUR) 30 milliGRAM(s) Oral daily  metoprolol succinate ER 75 milliGRAM(s) Oral daily  silver sulfADIAZINE 1% Cream 1 Application(s) Topical two times a day  simvastatin 40 milliGRAM(s) Oral at bedtime  tamsulosin 0.4 milliGRAM(s) Oral at bedtime    MEDICATIONS  (PRN):  dextrose 40% Gel 15 Gram(s) Oral once PRN Blood Glucose LESS THAN 70 milliGRAM(s)/deciliter  glucagon  Injectable 1 milliGRAM(s) IntraMuscular once PRN Glucose LESS THAN 70 milligrams/deciliter      PHYSICAL EXAM:  GENERAL: NOT IN ANY ACUTE DISTRESS   HEAD:  Atraumatic, Normocephalic  EYES: EOMI, PERRLA, conjunctiva and sclera clear  ENT: No tonsillar erythema, exudates, or enlargement; Moist mucous membranes, Good dentition, No lesions  NECK: Supple, No JVD, Normal thyroid, no enlarged nodes  NERVOUS SYSTEM:  Alert & Oriented X3, Good concentration.   CHEST/LUNG: B/L good air entry; No rales, rhonchi, or wheezing  HEART: S1S2 normal, no rubs noted.   ABDOMEN: Soft, Nontender, Nondistended; Bowel sounds present  EXTREMITIES:  2+ Peripheral Pulses, No clubbing, cyanosis, or edema  LYMPH: No lymphadenopathy noted  SKIN: No rashes or lesions    Care Discussed with Consultants/Other Providers [ x] YES  [ ] NO

## 2019-10-14 NOTE — PROGRESS NOTE ADULT - ASSESSMENT
Patient is a 80yo Male w/ PMH of HFpEF (EF of 55% with Grade II Diastolic dysfunction), COPD (home O2 3-3.5L as needed), DM, HTN, CAD s/p CABG and 1 stent, BPH, and recent admission for CHF (August 2019) presenting to [facility] with complaints of shortness of breath and chest pain. Patient reports he has been progressively worsening shortness of breath for the last 4-5 days prior to admission.    1) CHF Exacerbation   Continue with Lasix 40 mg po in am and 20 mg PO in Pm  Lovenox to be discontinued now will start Heparin Drip at 6pm as per attending Dr. Christine.   Patient going for cath tomorrow in am   Follow results of Repeat Echo performed   Continue with Metoprolol ER 75 along with Aspirin 81.     2) DVT Prophylaxis   Heparin Drip     3) Disposition   Continue with CCU Monitoring   Pending Cath Tomorrow   Goals of care discussed patient is Full Code Patient is a 80yo Male w/ PMH of HFpEF (EF of 55% with Grade II Diastolic dysfunction), COPD (home O2 3-3.5L as needed), DM, HTN, CAD s/p CABG and 1 stent, BPH, and recent admission for CHF (August 2019) presenting to [facility] with complaints of shortness of breath and chest pain. Patient reports he has been progressively worsening shortness of breath for the last 4-5 days prior to admission.    1) Acute on chronic diastolic CHF Exacerbation   Continue with Lasix 40 mg po in am and 20 mg PO in Pm  Lovenox to be discontinued now will start Heparin Drip at 6pm and loading dose of plavix ordered as recommended by cardiologist.   Patient going for cath tomorrow in am   Follow results of Repeat Echo performed   Continue with Metoprolol ER 75 along with Aspirin 81. c/w plavix 75mg daily after loading does    2. HTN  c/w medications and monitor BP    3. DM2.   Continue current medications and monitor FSG    4. COPD on as needed home O2 not in exacerbation  -Monitor and c/w home med    5. BPH  C/w med    6. CAD s/p CABG  - continue cardiac med and plan for cath tomorrow NPO after midnight except for meds    DVT Prophylaxis   Heparin Drip     Disposition   Continue with CCU Monitoring   Pending Cath Tomorrow   Goals of care discussed patient is Full Code

## 2019-10-14 NOTE — PROGRESS NOTE ADULT - SUBJECTIVE AND OBJECTIVE BOX
Patient is a 79y old  Male who presents with a chief complaint of cleopatra gimenez (13 Oct 2019 10:27)        Interval Events: No overnight events. Feels much better.    REVIEW OF SYSTEMS:  Constitutional: No fevers or chills. No weight loss. No fatigue or generalized malaise.  Eyes: No itching or discharge from the eyes  ENT: No ear pain. No ear discharge. No nasal congestion. No post nasal drip. No epistaxis. No throat pain. No sore throat. No difficulty swallowing.   CV: No chest pain. No palpitations. No lightheadedness or dizziness.   Resp: No dyspnea at rest. No dyspnea on exertion. No orthopnea. No wheezing. No cough. No stridor. No sputum production. No chest pain with respiration.  GI: No nausea. No vomiting. No diarrhea.  MSK: No joint pain or pain in any extremities  Integumentary: No skin lesions. No pedal edema.  Neurological: No gross motor weakness. No sensory changes.      OBJECTIVE:  ICU Vital Signs Last 24 Hrs  T(C): 35.9 (14 Oct 2019 07:01), Max: 36.1 (13 Oct 2019 11:00)  T(F): 96.6 (14 Oct 2019 07:01), Max: 97 (13 Oct 2019 11:00)  HR: 65 (14 Oct 2019 08:51) (64 - 66)  BP: 96/46 (14 Oct 2019 08:51) (91/44 - 137/65)  BP(mean): 67 (14 Oct 2019 08:51) (63 - 93)    RR: 28 (14 Oct 2019 08:51) (14 - 32)  SpO2: 97% (14 Oct 2019 08:51) (94% - 100%)        10-13 @ 07:01  -  10-14 @ 07:00  --------------------------------------------------------  IN: 240 mL / OUT: 750 mL / NET: -510 mL    10-14 @ 07:01  -  10-14 @ 10:46  --------------------------------------------------------  IN: 240 mL / OUT: 500 mL / NET: -260 mL      CAPILLARY BLOOD GLUCOSE      POCT Blood Glucose.: 167 mg/dL (14 Oct 2019 08:01)      PHYSICAL EXAM:  General: Awake, alert, oriented X 3.   HEENT: Atraumatic, normocephalic.                 Mallampatti Grade                 No nasal congestion.                No tonsillar or pharyngeal exudates.  Lymph Nodes: No palpable lymphadenopathy neck, supraclavicular region  Neck: No JVD. No carotid bruit, no thyromegally  Respiratory: Normal chest expansion                         Normal percussion                         Normal and equal air entry                         scant rales bases.  Cardiovascular: S1 S2 normal. No murmurs, rubs or gallops.   Abdomen: Soft, non-tender, non-distended. No organomegaly.  Extremities: Warm to touch. Peripheral pulse palpable. No pedal edema.   Skin: No rashes or skin lesions, warm dry  Neurological: Motor and sensory examination equal and normal in all four extremities.  Psychiatry: Appropriate mood and affect.    HOSPITAL MEDICATIONS:  MEDICATIONS  (STANDING):  ALPRAZolam 0.5 milliGRAM(s) Oral two times a day  amLODIPine   Tablet 5 milliGRAM(s) Oral daily  aspirin  chewable 81 milliGRAM(s) Oral daily  buDESOnide 160 MICROgram(s)/formoterol 4.5 MICROgram(s) Inhaler 2 Puff(s) Inhalation two times a day  clopidogrel Tablet 300 milliGRAM(s) Oral once  dextrose 5%. 1000 milliLiter(s) (50 mL/Hr) IV Continuous <Continuous>  dextrose 50% Injectable 12.5 Gram(s) IV Push once  dextrose 50% Injectable 25 Gram(s) IV Push once  dextrose 50% Injectable 25 Gram(s) IV Push once  fenofibrate Tablet 145 milliGRAM(s) Oral daily  finasteride 5 milliGRAM(s) Oral daily  furosemide    Tablet 40 milliGRAM(s) Oral daily  furosemide    Tablet 20 milliGRAM(s) Oral at bedtime  influenza   Vaccine 0.5 milliLiter(s) IntraMuscular once  insulin lispro (HumaLOG) corrective regimen sliding scale   SubCutaneous three times a day before meals  isosorbide   mononitrate ER Tablet (IMDUR) 30 milliGRAM(s) Oral daily  metoprolol succinate ER 75 milliGRAM(s) Oral daily  silver sulfADIAZINE 1% Cream 1 Application(s) Topical two times a day  simvastatin 40 milliGRAM(s) Oral at bedtime  tamsulosin 0.4 milliGRAM(s) Oral at bedtime    MEDICATIONS  (PRN):  dextrose 40% Gel 15 Gram(s) Oral once PRN Blood Glucose LESS THAN 70 milliGRAM(s)/deciliter  glucagon  Injectable 1 milliGRAM(s) IntraMuscular once PRN Glucose LESS THAN 70 milligrams/deciliter      LABS:                        12.3   7.30  )-----------( 222      ( 14 Oct 2019 05:52 )             40.1     10-14    142  |  97<L>  |  44<H>  ----------------------------<  185<H>  4.6   |  36<H>  |  1.2    Ca    9.2      14 Oct 2019 05:52  Phos  3.1     10-14  Mg     2.0     10-14    TPro  6.8  /  Alb  3.5  /  TBili  0.5  /  DBili  x   /  AST  12  /  ALT  12  /  AlkPhos  54  10-14                      RADIOLOGY:Radiology personally reviewed.

## 2019-10-14 NOTE — PROGRESS NOTE ADULT - ASSESSMENT
Pulmonary edema  hx diastolic heart failure      Suggest:  cardio eval  repeat echo  cont diuretics  taper O2  OOB  DVT prophylaxis  disposition as per cardio

## 2019-10-14 NOTE — PROGRESS NOTE ADULT - SUBJECTIVE AND OBJECTIVE BOX
SUBJ:No chest pain or shortness of breath      MEDICATIONS  (STANDING):  ALPRAZolam 0.5 milliGRAM(s) Oral two times a day  amLODIPine   Tablet 5 milliGRAM(s) Oral daily  aspirin  chewable 81 milliGRAM(s) Oral daily  buDESOnide 160 MICROgram(s)/formoterol 4.5 MICROgram(s) Inhaler 2 Puff(s) Inhalation two times a day  dextrose 5%. 1000 milliLiter(s) (50 mL/Hr) IV Continuous <Continuous>  dextrose 50% Injectable 12.5 Gram(s) IV Push once  dextrose 50% Injectable 25 Gram(s) IV Push once  dextrose 50% Injectable 25 Gram(s) IV Push once  fenofibrate Tablet 145 milliGRAM(s) Oral daily  finasteride 5 milliGRAM(s) Oral daily  furosemide    Tablet 40 milliGRAM(s) Oral daily  furosemide    Tablet 20 milliGRAM(s) Oral at bedtime  influenza   Vaccine 0.5 milliLiter(s) IntraMuscular once  insulin lispro (HumaLOG) corrective regimen sliding scale   SubCutaneous three times a day before meals  isosorbide   mononitrate ER Tablet (IMDUR) 30 milliGRAM(s) Oral daily  metoprolol succinate ER 75 milliGRAM(s) Oral daily  silver sulfADIAZINE 1% Cream 1 Application(s) Topical two times a day  simvastatin 40 milliGRAM(s) Oral at bedtime  tamsulosin 0.4 milliGRAM(s) Oral at bedtime    MEDICATIONS  (PRN):  dextrose 40% Gel 15 Gram(s) Oral once PRN Blood Glucose LESS THAN 70 milliGRAM(s)/deciliter  glucagon  Injectable 1 milliGRAM(s) IntraMuscular once PRN Glucose LESS THAN 70 milligrams/deciliter            Vital Signs Last 24 Hrs  T(C): 36.2 (14 Oct 2019 11:00), Max: 36.2 (14 Oct 2019 11:00)  T(F): 97.1 (14 Oct 2019 11:00), Max: 97.1 (14 Oct 2019 11:00)  HR: 64 (14 Oct 2019 12:55) (64 - 66)  BP: 119/56 (14 Oct 2019 12:55) (91/44 - 137/65)  BP(mean): 80 (14 Oct 2019 12:55) (63 - 93)  RR: 29 (14 Oct 2019 12:55) (14 - 32)  SpO2: 97% (14 Oct 2019 08:51) (94% - 100%)      ECG:NML    TTE:    LABS:                        12.3   7.30  )-----------( 222      ( 14 Oct 2019 05:52 )             40.1     10-14    142  |  97<L>  |  44<H>  ----------------------------<  185<H>  4.6   |  36<H>  |  1.2    Ca    9.2      14 Oct 2019 05:52  Phos  3.1     10-14  Mg     2.0     10-14    TPro  6.8  /  Alb  3.5  /  TBili  0.5  /  DBili  x   /  AST  12  /  ALT  12  /  AlkPhos  54  10-14            I&O's Summary    13 Oct 2019 07:01  -  14 Oct 2019 07:00  --------------------------------------------------------  IN: 240 mL / OUT: 750 mL / NET: -510 mL    14 Oct 2019 07:01  -  14 Oct 2019 14:28  --------------------------------------------------------  IN: 480 mL / OUT: 700 mL / NET: -220 mL      BNP

## 2019-10-14 NOTE — PROGRESS NOTE ADULT - ATTENDING COMMENTS
Patient seen and evaluated with CCU team, plan of care discussed with patient, cardiologist, patient's son and resident. Agree with plan.

## 2019-10-15 LAB
ALBUMIN SERPL ELPH-MCNC: 3.3 G/DL — LOW (ref 3.5–5.2)
ALBUMIN SERPL ELPH-MCNC: 3.5 G/DL — SIGNIFICANT CHANGE UP (ref 3.5–5.2)
ALP SERPL-CCNC: 49 U/L — SIGNIFICANT CHANGE UP (ref 30–115)
ALP SERPL-CCNC: 50 U/L — SIGNIFICANT CHANGE UP (ref 30–115)
ALT FLD-CCNC: 12 U/L — SIGNIFICANT CHANGE UP (ref 0–41)
ALT FLD-CCNC: 17 U/L — SIGNIFICANT CHANGE UP (ref 0–41)
ANION GAP SERPL CALC-SCNC: 12 MMOL/L — SIGNIFICANT CHANGE UP (ref 7–14)
APPEARANCE UR: CLEAR — SIGNIFICANT CHANGE UP
APTT BLD: 44 SEC — HIGH (ref 27–39.2)
APTT BLD: 46.8 SEC — HIGH (ref 27–39.2)
APTT BLD: 47.7 SEC — HIGH (ref 27–39.2)
APTT BLD: 57.5 SEC — HIGH (ref 27–39.2)
AST SERPL-CCNC: 14 U/L — SIGNIFICANT CHANGE UP (ref 0–41)
AST SERPL-CCNC: 41 U/L — SIGNIFICANT CHANGE UP (ref 0–41)
BASOPHILS # BLD AUTO: 0.02 K/UL — SIGNIFICANT CHANGE UP (ref 0–0.2)
BASOPHILS NFR BLD AUTO: 0.2 % — SIGNIFICANT CHANGE UP (ref 0–1)
BILIRUB DIRECT SERPL-MCNC: 0.3 MG/DL — HIGH (ref 0–0.2)
BILIRUB INDIRECT FLD-MCNC: 0.5 MG/DL — SIGNIFICANT CHANGE UP (ref 0.2–1.2)
BILIRUB SERPL-MCNC: 0.7 MG/DL — SIGNIFICANT CHANGE UP (ref 0.2–1.2)
BILIRUB SERPL-MCNC: 0.8 MG/DL — SIGNIFICANT CHANGE UP (ref 0.2–1.2)
BILIRUB UR-MCNC: NEGATIVE — SIGNIFICANT CHANGE UP
BUN SERPL-MCNC: 42 MG/DL — HIGH (ref 10–20)
CALCIUM SERPL-MCNC: 9 MG/DL — SIGNIFICANT CHANGE UP (ref 8.5–10.1)
CHLORIDE SERPL-SCNC: 96 MMOL/L — LOW (ref 98–110)
CO2 SERPL-SCNC: 32 MMOL/L — SIGNIFICANT CHANGE UP (ref 17–32)
COLOR SPEC: YELLOW — SIGNIFICANT CHANGE UP
CREAT SERPL-MCNC: 1.3 MG/DL — SIGNIFICANT CHANGE UP (ref 0.7–1.5)
DIFF PNL FLD: NEGATIVE — SIGNIFICANT CHANGE UP
EOSINOPHIL # BLD AUTO: 0.05 K/UL — SIGNIFICANT CHANGE UP (ref 0–0.7)
EOSINOPHIL NFR BLD AUTO: 0.5 % — SIGNIFICANT CHANGE UP (ref 0–8)
ERYTHROCYTE [SEDIMENTATION RATE] IN BLOOD: 48 MM/HR — HIGH (ref 0–10)
GLUCOSE BLDC GLUCOMTR-MCNC: 176 MG/DL — HIGH (ref 70–99)
GLUCOSE BLDC GLUCOMTR-MCNC: 213 MG/DL — HIGH (ref 70–99)
GLUCOSE BLDC GLUCOMTR-MCNC: 251 MG/DL — HIGH (ref 70–99)
GLUCOSE BLDC GLUCOMTR-MCNC: 255 MG/DL — HIGH (ref 70–99)
GLUCOSE SERPL-MCNC: 194 MG/DL — HIGH (ref 70–99)
GLUCOSE UR QL: NEGATIVE MG/DL — SIGNIFICANT CHANGE UP
HCT VFR BLD CALC: 38.6 % — LOW (ref 42–52)
HCT VFR BLD CALC: 40.1 % — LOW (ref 42–52)
HGB BLD-MCNC: 12 G/DL — LOW (ref 14–18)
HGB BLD-MCNC: 12.5 G/DL — LOW (ref 14–18)
IMM GRANULOCYTES NFR BLD AUTO: 0.4 % — HIGH (ref 0.1–0.3)
INR BLD: 1.21 RATIO — SIGNIFICANT CHANGE UP (ref 0.65–1.3)
INR BLD: 1.36 RATIO — HIGH (ref 0.65–1.3)
KETONES UR-MCNC: NEGATIVE — SIGNIFICANT CHANGE UP
LEUKOCYTE ESTERASE UR-ACNC: NEGATIVE — SIGNIFICANT CHANGE UP
LYMPHOCYTES # BLD AUTO: 0.41 K/UL — LOW (ref 1.2–3.4)
LYMPHOCYTES # BLD AUTO: 4.2 % — LOW (ref 20.5–51.1)
MAGNESIUM SERPL-MCNC: 1.7 MG/DL — LOW (ref 1.8–2.4)
MCHC RBC-ENTMCNC: 25.5 PG — LOW (ref 27–31)
MCHC RBC-ENTMCNC: 26.2 PG — LOW (ref 27–31)
MCHC RBC-ENTMCNC: 31.1 G/DL — LOW (ref 32–37)
MCHC RBC-ENTMCNC: 31.2 G/DL — LOW (ref 32–37)
MCV RBC AUTO: 82 FL — SIGNIFICANT CHANGE UP (ref 80–94)
MCV RBC AUTO: 84.1 FL — SIGNIFICANT CHANGE UP (ref 80–94)
MONOCYTES # BLD AUTO: 0.65 K/UL — HIGH (ref 0.1–0.6)
MONOCYTES NFR BLD AUTO: 6.6 % — SIGNIFICANT CHANGE UP (ref 1.7–9.3)
NEUTROPHILS # BLD AUTO: 8.65 K/UL — HIGH (ref 1.4–6.5)
NEUTROPHILS NFR BLD AUTO: 88.1 % — HIGH (ref 42.2–75.2)
NITRITE UR-MCNC: NEGATIVE — SIGNIFICANT CHANGE UP
NRBC # BLD: 0 /100 WBCS — SIGNIFICANT CHANGE UP (ref 0–0)
NRBC # BLD: 0 /100 WBCS — SIGNIFICANT CHANGE UP (ref 0–0)
PH UR: 5.5 — SIGNIFICANT CHANGE UP (ref 5–8)
PLATELET # BLD AUTO: 163 K/UL — SIGNIFICANT CHANGE UP (ref 130–400)
PLATELET # BLD AUTO: 202 K/UL — SIGNIFICANT CHANGE UP (ref 130–400)
POTASSIUM SERPL-MCNC: 4.3 MMOL/L — SIGNIFICANT CHANGE UP (ref 3.5–5)
POTASSIUM SERPL-SCNC: 4.3 MMOL/L — SIGNIFICANT CHANGE UP (ref 3.5–5)
PROT SERPL-MCNC: 6.2 G/DL — SIGNIFICANT CHANGE UP (ref 6–8)
PROT SERPL-MCNC: 7.1 G/DL — SIGNIFICANT CHANGE UP (ref 6–8)
PROT UR-MCNC: NEGATIVE MG/DL — SIGNIFICANT CHANGE UP
PROTHROM AB SERPL-ACNC: 13.9 SEC — HIGH (ref 9.95–12.87)
PROTHROM AB SERPL-ACNC: 15.6 SEC — HIGH (ref 9.95–12.87)
RBC # BLD: 4.71 M/UL — SIGNIFICANT CHANGE UP (ref 4.7–6.1)
RBC # BLD: 4.77 M/UL — SIGNIFICANT CHANGE UP (ref 4.7–6.1)
RBC # FLD: 14.9 % — HIGH (ref 11.5–14.5)
RBC # FLD: 15 % — HIGH (ref 11.5–14.5)
SODIUM SERPL-SCNC: 140 MMOL/L — SIGNIFICANT CHANGE UP (ref 135–146)
SP GR SPEC: 1.01 — SIGNIFICANT CHANGE UP (ref 1.01–1.03)
UROBILINOGEN FLD QL: 0.2 MG/DL — SIGNIFICANT CHANGE UP (ref 0.2–0.2)
WBC # BLD: 8.81 K/UL — SIGNIFICANT CHANGE UP (ref 4.8–10.8)
WBC # BLD: 9.82 K/UL — SIGNIFICANT CHANGE UP (ref 4.8–10.8)
WBC # FLD AUTO: 8.81 K/UL — SIGNIFICANT CHANGE UP (ref 4.8–10.8)
WBC # FLD AUTO: 9.82 K/UL — SIGNIFICANT CHANGE UP (ref 4.8–10.8)

## 2019-10-15 PROCEDURE — 71045 X-RAY EXAM CHEST 1 VIEW: CPT | Mod: 26

## 2019-10-15 PROCEDURE — 99233 SBSQ HOSP IP/OBS HIGH 50: CPT

## 2019-10-15 PROCEDURE — 93970 EXTREMITY STUDY: CPT | Mod: 26

## 2019-10-15 RX ORDER — ACETAMINOPHEN 500 MG
650 TABLET ORAL EVERY 6 HOURS
Refills: 0 | Status: DISCONTINUED | OUTPATIENT
Start: 2019-10-15 | End: 2019-10-29

## 2019-10-15 RX ORDER — IBUPROFEN 200 MG
600 TABLET ORAL EVERY 6 HOURS
Refills: 0 | Status: DISCONTINUED | OUTPATIENT
Start: 2019-10-15 | End: 2019-10-18

## 2019-10-15 RX ORDER — NITROGLYCERIN 6.5 MG
1 CAPSULE, EXTENDED RELEASE ORAL DAILY
Refills: 0 | Status: DISCONTINUED | OUTPATIENT
Start: 2019-10-15 | End: 2019-10-29

## 2019-10-15 RX ADMIN — Medication 0.5 MILLIGRAM(S): at 05:30

## 2019-10-15 RX ADMIN — Medication 40 MILLIGRAM(S): at 05:31

## 2019-10-15 RX ADMIN — Medication 0.5 MILLIGRAM(S): at 18:10

## 2019-10-15 RX ADMIN — ISOSORBIDE MONONITRATE 30 MILLIGRAM(S): 60 TABLET, EXTENDED RELEASE ORAL at 11:03

## 2019-10-15 RX ADMIN — SIMVASTATIN 40 MILLIGRAM(S): 20 TABLET, FILM COATED ORAL at 21:50

## 2019-10-15 RX ADMIN — Medication 1 APPLICATION(S): at 18:03

## 2019-10-15 RX ADMIN — Medication 2: at 12:32

## 2019-10-15 RX ADMIN — Medication 650 MILLIGRAM(S): at 05:15

## 2019-10-15 RX ADMIN — Medication 650 MILLIGRAM(S): at 10:54

## 2019-10-15 RX ADMIN — Medication 1 APPLICATION(S): at 05:31

## 2019-10-15 RX ADMIN — Medication 145 MILLIGRAM(S): at 11:03

## 2019-10-15 RX ADMIN — Medication 81 MILLIGRAM(S): at 11:03

## 2019-10-15 RX ADMIN — Medication 1 PATCH: at 20:00

## 2019-10-15 RX ADMIN — Medication 600 MILLIGRAM(S): at 23:04

## 2019-10-15 RX ADMIN — BUDESONIDE AND FORMOTEROL FUMARATE DIHYDRATE 2 PUFF(S): 160; 4.5 AEROSOL RESPIRATORY (INHALATION) at 08:37

## 2019-10-15 RX ADMIN — Medication 20 MILLIGRAM(S): at 21:50

## 2019-10-15 RX ADMIN — Medication 650 MILLIGRAM(S): at 04:30

## 2019-10-15 RX ADMIN — Medication 1 PATCH: at 18:02

## 2019-10-15 RX ADMIN — Medication 650 MILLIGRAM(S): at 17:12

## 2019-10-15 RX ADMIN — FINASTERIDE 5 MILLIGRAM(S): 5 TABLET, FILM COATED ORAL at 11:03

## 2019-10-15 RX ADMIN — TAMSULOSIN HYDROCHLORIDE 0.4 MILLIGRAM(S): 0.4 CAPSULE ORAL at 21:50

## 2019-10-15 RX ADMIN — BUDESONIDE AND FORMOTEROL FUMARATE DIHYDRATE 2 PUFF(S): 160; 4.5 AEROSOL RESPIRATORY (INHALATION) at 21:49

## 2019-10-15 NOTE — PROGRESS NOTE ADULT - SUBJECTIVE AND OBJECTIVE BOX
Patient is a 79y old  Male who presents with a chief complaint of CHF Exacerbation (14 Oct 2019 11:09)      INTERVAL HPI/OVERNIGHT EVENTS: patient had fever of 101.87 overnight   ICU Vital Signs Last 24 Hrs  T(C): 37.7 (15 Oct 2019 07:00), Max: 38.8 (15 Oct 2019 03:07)  T(F): 99.9 (15 Oct 2019 07:00), Max: 101.8 (15 Oct 2019 03:07)  HR: 68 (15 Oct 2019 10:20) (64 - 102)  BP: 117/59 (15 Oct 2019 10:20) (87/51 - 144/66)  BP(mean): 80 (15 Oct 2019 10:20) (64 - 93)  ABP: --  ABP(mean): --  RR: 20 (15 Oct 2019 07:00) (0 - 29)  SpO2: 98% (15 Oct 2019 10:20) (92% - 100%)    I&O's Summary    14 Oct 2019 07:01  -  15 Oct 2019 07:00  --------------------------------------------------------  IN: 797 mL / OUT: 1350 mL / NET: -553 mL          LABS:                        12.0   9.82  )-----------( 163      ( 15 Oct 2019 06:06 )             38.6     10-15    140  |  96<L>  |  42<H>  ----------------------------<  194<H>  4.3   |  32  |  1.3    Ca    9.0      15 Oct 2019 06:06  Phos  3.1     10-14  Mg     1.7     10-15    TPro  6.2  /  Alb  3.3<L>  /  TBili  0.7  /  DBili  x   /  AST  14  /  ALT  12  /  AlkPhos  49  10-15    PT/INR - ( 15 Oct 2019 06:06 )   PT: 15.60 sec;   INR: 1.36 ratio         PTT - ( 15 Oct 2019 06:06 )  PTT:44.0 sec    CAPILLARY BLOOD GLUCOSE      POCT Blood Glucose.: 213 mg/dL (15 Oct 2019 07:57)  POCT Blood Glucose.: 259 mg/dL (14 Oct 2019 21:23)  POCT Blood Glucose.: 192 mg/dL (14 Oct 2019 17:03)  POCT Blood Glucose.: 259 mg/dL (14 Oct 2019 12:15)        RADIOLOGY & ADDITIONAL TESTS:    Consultant(s) Notes Reviewed:  [x ] YES  [ ] NO    MEDICATIONS  (STANDING):  ALPRAZolam 0.5 milliGRAM(s) Oral two times a day  amLODIPine   Tablet 5 milliGRAM(s) Oral daily  aspirin  chewable 81 milliGRAM(s) Oral daily  buDESOnide 160 MICROgram(s)/formoterol 4.5 MICROgram(s) Inhaler 2 Puff(s) Inhalation two times a day  dextrose 5%. 1000 milliLiter(s) (50 mL/Hr) IV Continuous <Continuous>  dextrose 50% Injectable 12.5 Gram(s) IV Push once  dextrose 50% Injectable 25 Gram(s) IV Push once  dextrose 50% Injectable 25 Gram(s) IV Push once  fenofibrate Tablet 145 milliGRAM(s) Oral daily  finasteride 5 milliGRAM(s) Oral daily  furosemide    Tablet 40 milliGRAM(s) Oral daily  furosemide    Tablet 20 milliGRAM(s) Oral at bedtime  heparin  Infusion 900 Unit(s)/Hr (10 mL/Hr) IV Continuous <Continuous>  influenza   Vaccine 0.5 milliLiter(s) IntraMuscular once  insulin lispro (HumaLOG) corrective regimen sliding scale   SubCutaneous three times a day before meals  isosorbide   mononitrate ER Tablet (IMDUR) 30 milliGRAM(s) Oral daily  metoprolol succinate ER 75 milliGRAM(s) Oral daily  silver sulfADIAZINE 1% Cream 1 Application(s) Topical two times a day  simvastatin 40 milliGRAM(s) Oral at bedtime  tamsulosin 0.4 milliGRAM(s) Oral at bedtime    MEDICATIONS  (PRN):  acetaminophen   Tablet .. 650 milliGRAM(s) Oral every 6 hours PRN Temp greater or equal to 38C (100.4F), Mild Pain (1 - 3)  dextrose 40% Gel 15 Gram(s) Oral once PRN Blood Glucose LESS THAN 70 milliGRAM(s)/deciliter  glucagon  Injectable 1 milliGRAM(s) IntraMuscular once PRN Glucose LESS THAN 70 milligrams/deciliter      PHYSICAL EXAM:  GENERAL: not in any acute distress   HEAD:  Atraumatic, Normocephalic  EYES: EOMI, PERRLA, conjunctiva and sclera clear  ENT: No tonsillar erythema, exudates, or enlargement; Moist mucous membranes, Good dentition, No lesions  NECK: Supple, No JVD, Normal thyroid, no enlarged nodes  NERVOUS SYSTEM:  Alert & Oriented X3  CHEST/LUNG: B/L good air entry; No rales, rhonchi, or wheezing  HEART: S1S2 normal, Regular rate and rhythm  ABDOMEN: Soft, Nontender, Nondistended; Bowel sounds present  EXTREMITIES:  2+ Peripheral Pulses, No clubbing, cyanosis, or edema  LYMPH: No lymphadenopathy noted  SKIN: No rashes or lesions    Care Discussed with Consultants/Other Providers [ x] YES  [ ] NO

## 2019-10-15 NOTE — PROGRESS NOTE ADULT - ASSESSMENT
Patient is a 78yo Male w/ PMH of HFpEF (EF of 55% with Grade II Diastolic dysfunction), COPD (home O2 3-3.5L as needed), DM, HTN, CAD s/p CABG and 1 stent, BPH, and recent admission for CHF (August 2019) presenting to [facility] with complaints of shortness of breath and chest pain. Patient reports he has been progressively worsening shortness of breath for the last 4-5 days prior to admission.    1) Acute on chronic diastolic CHF Exacerbation   Continue with Lasix 40 mg po in am and 20 mg PO in Pm  Patient on Heparin Drip  Cath for today was cancelled due to fever will re-visit this once fever free for 24 hours   Follow results of Repeat Echo performed   Continue with Metoprolol ER 75 along with Aspirin 81. c/w plavix 75mg daily after loading does    2. HTN  c/w medications and monitor BP    3. DM2.   Continue current medications and monitor FSG    4. COPD on as needed home O2 not in exacerbation  -Monitor and c/w home med    5. BPH  C/w med    6. CAD s/p CABG  - continue cardiac med and plan for cath once afebrile    7. Febrile   Follow with blood and urine cultures   One incident   If repeat fever will start on antibiotics pending cultures.     DVT Prophylaxis   Heparin Drip     Disposition   Continue with CCU Monitoring   Pending Cath

## 2019-10-15 NOTE — PROGRESS NOTE ADULT - SUBJECTIVE AND OBJECTIVE BOX
SUBJ:No chest pain or shortness of breath      MEDICATIONS  (STANDING):  ALPRAZolam 0.5 milliGRAM(s) Oral two times a day  amLODIPine   Tablet 5 milliGRAM(s) Oral daily  aspirin  chewable 81 milliGRAM(s) Oral daily  buDESOnide 160 MICROgram(s)/formoterol 4.5 MICROgram(s) Inhaler 2 Puff(s) Inhalation two times a day  dextrose 5%. 1000 milliLiter(s) (50 mL/Hr) IV Continuous <Continuous>  dextrose 50% Injectable 12.5 Gram(s) IV Push once  dextrose 50% Injectable 25 Gram(s) IV Push once  dextrose 50% Injectable 25 Gram(s) IV Push once  fenofibrate Tablet 145 milliGRAM(s) Oral daily  finasteride 5 milliGRAM(s) Oral daily  furosemide    Tablet 40 milliGRAM(s) Oral daily  furosemide    Tablet 20 milliGRAM(s) Oral at bedtime  heparin  Infusion 900 Unit(s)/Hr (10 mL/Hr) IV Continuous <Continuous>  ibuprofen  Tablet. 600 milliGRAM(s) Oral every 6 hours  influenza   Vaccine 0.5 milliLiter(s) IntraMuscular once  insulin lispro (HumaLOG) corrective regimen sliding scale   SubCutaneous three times a day before meals  isosorbide   mononitrate ER Tablet (IMDUR) 30 milliGRAM(s) Oral daily  metoprolol succinate ER 75 milliGRAM(s) Oral daily  nitroglycerin    Patch 0.2 mG/Hr(s) 1 patch Transdermal daily  silver sulfADIAZINE 1% Cream 1 Application(s) Topical two times a day  simvastatin 40 milliGRAM(s) Oral at bedtime  tamsulosin 0.4 milliGRAM(s) Oral at bedtime    MEDICATIONS  (PRN):  acetaminophen   Tablet .. 650 milliGRAM(s) Oral every 6 hours PRN Temp greater or equal to 38C (100.4F), Mild Pain (1 - 3)  dextrose 40% Gel 15 Gram(s) Oral once PRN Blood Glucose LESS THAN 70 milliGRAM(s)/deciliter  glucagon  Injectable 1 milliGRAM(s) IntraMuscular once PRN Glucose LESS THAN 70 milligrams/deciliter            Vital Signs Last 24 Hrs  T(C): 39.4 (15 Oct 2019 16:17), Max: 39.4 (15 Oct 2019 16:17)  T(F): 102.9 (15 Oct 2019 16:17), Max: 102.9 (15 Oct 2019 16:17)  HR: 137 (15 Oct 2019 17:02) (68 - 137)  BP: 119/61 (15 Oct 2019 17:02) (87/51 - 154/57)  BP(mean): 83 (15 Oct 2019 17:02) (64 - 93)  RR: 40 (15 Oct 2019 17:02) (0 - 43)  SpO2: 95% (15 Oct 2019 17:02) (91% - 100%)      ECG:NML    TTE:    LABS:                        12.0   9.82  )-----------( 163      ( 15 Oct 2019 06:06 )             38.6     10-15    140  |  96<L>  |  42<H>  ----------------------------<  194<H>  4.3   |  32  |  1.3    Ca    9.0      15 Oct 2019 06:06  Phos  3.1     10-14  Mg     1.7     10-15    TPro  6.2  /  Alb  3.3<L>  /  TBili  0.7  /  DBili  x   /  AST  14  /  ALT  12  /  AlkPhos  49  10-15        PT/INR - ( 15 Oct 2019 06:06 )   PT: 15.60 sec;   INR: 1.36 ratio         PTT - ( 15 Oct 2019 16:00 )  PTT:46.8 sec    I&O's Summary    14 Oct 2019 07:01  -  15 Oct 2019 07:00  --------------------------------------------------------  IN: 807 mL / OUT: 1350 mL / NET: -543 mL    15 Oct 2019 07:01  -  15 Oct 2019 18:13  --------------------------------------------------------  IN: 110 mL / OUT: 0 mL / NET: 110 mL      BNP

## 2019-10-15 NOTE — PROGRESS NOTE ADULT - SUBJECTIVE AND OBJECTIVE BOX
Patient is a 79y old  Male who presents with a chief complaint of CHF Exacerbation (15 Oct 2019 10:24)        Interval Events: Spiked fever overnight .    REVIEW OF SYSTEMS:  Constitutional: +fevers +chills. No weight loss. No fatigue or generalized malaise.  Eyes: No itching or discharge from the eyes  ENT: No ear pain. No ear discharge. No nasal congestion. No post nasal drip. No epistaxis. No throat pain. No sore throat. No difficulty swallowing.   CV: No chest pain. No palpitations. No lightheadedness or dizziness.   Resp: No dyspnea at rest. No dyspnea on exertion. No orthopnea. No wheezing. No cough. No stridor. No sputum production. No chest pain with respiration.  GI: No nausea. No vomiting. No diarrhea.  MSK: No joint pain or pain in any extremities  Integumentary: No skin lesions. No pedal edema.  Neurological: No gross motor weakness. No sensory changes.      OBJECTIVE:  ICU Vital Signs Last 24 Hrs  T(C): 37.7 (15 Oct 2019 07:00), Max: 38.8 (15 Oct 2019 03:07)  T(F): 99.9 (15 Oct 2019 07:00), Max: 101.8 (15 Oct 2019 03:07)  HR: 68 (15 Oct 2019 10:20) (64 - 102)  BP: 117/59 (15 Oct 2019 10:20) (87/51 - 144/66)  BP(mean): 80 (15 Oct 2019 10:20) (64 - 93)    RR: 20 (15 Oct 2019 07:00) (0 - 29)  SpO2: 98% (15 Oct 2019 10:20) (92% - 100%)        10-14 @ 07:01  -  10-15 @ 07:00  --------------------------------------------------------  IN: 797 mL / OUT: 1350 mL / NET: -553 mL      CAPILLARY BLOOD GLUCOSE      POCT Blood Glucose.: 213 mg/dL (15 Oct 2019 07:57)      PHYSICAL EXAM:  General: Awake, alert, oriented X 3.   HEENT: Atraumatic, normocephalic.                 Mallampatti Grade                 No nasal congestion.                No tonsillar or pharyngeal exudates.  Lymph Nodes: No palpable lymphadenopathy neck, supraclavicular region  Neck: No JVD. No carotid bruit, no thyromegally  Respiratory: Normal chest expansion                         Normal percussion                         Normal and equal air entry                         + rales.  Cardiovascular: S1 S2 normal. No murmurs, rubs or gallops.   Abdomen: Soft, non-tender, non-distended. No organomegaly.  Extremities: Warm to touch. Peripheral pulse palpable. No pedal edema.   Skin: No rashes or skin lesions, warm dry  Neurological: Motor and sensory examination equal and normal in all four extremities.  Psychiatry: Appropriate mood and affect.    HOSPITAL MEDICATIONS:  MEDICATIONS  (STANDING):  ALPRAZolam 0.5 milliGRAM(s) Oral two times a day  amLODIPine   Tablet 5 milliGRAM(s) Oral daily  aspirin  chewable 81 milliGRAM(s) Oral daily  buDESOnide 160 MICROgram(s)/formoterol 4.5 MICROgram(s) Inhaler 2 Puff(s) Inhalation two times a day  dextrose 5%. 1000 milliLiter(s) (50 mL/Hr) IV Continuous <Continuous>  dextrose 50% Injectable 12.5 Gram(s) IV Push once  dextrose 50% Injectable 25 Gram(s) IV Push once  dextrose 50% Injectable 25 Gram(s) IV Push once  fenofibrate Tablet 145 milliGRAM(s) Oral daily  finasteride 5 milliGRAM(s) Oral daily  furosemide    Tablet 40 milliGRAM(s) Oral daily  furosemide    Tablet 20 milliGRAM(s) Oral at bedtime  heparin  Infusion 900 Unit(s)/Hr (10 mL/Hr) IV Continuous <Continuous>  influenza   Vaccine 0.5 milliLiter(s) IntraMuscular once  insulin lispro (HumaLOG) corrective regimen sliding scale   SubCutaneous three times a day before meals  isosorbide   mononitrate ER Tablet (IMDUR) 30 milliGRAM(s) Oral daily  metoprolol succinate ER 75 milliGRAM(s) Oral daily  silver sulfADIAZINE 1% Cream 1 Application(s) Topical two times a day  simvastatin 40 milliGRAM(s) Oral at bedtime  tamsulosin 0.4 milliGRAM(s) Oral at bedtime    MEDICATIONS  (PRN):  acetaminophen   Tablet .. 650 milliGRAM(s) Oral every 6 hours PRN Temp greater or equal to 38C (100.4F), Mild Pain (1 - 3)  dextrose 40% Gel 15 Gram(s) Oral once PRN Blood Glucose LESS THAN 70 milliGRAM(s)/deciliter  glucagon  Injectable 1 milliGRAM(s) IntraMuscular once PRN Glucose LESS THAN 70 milligrams/deciliter      LABS:                        12.0   9.82  )-----------( 163      ( 15 Oct 2019 06:06 )             38.6     10-15    140  |  96<L>  |  42<H>  ----------------------------<  194<H>  4.3   |  32  |  1.3    Ca    9.0      15 Oct 2019 06:06  Phos  3.1     10-14  Mg     1.7     10-15    TPro  6.2  /  Alb  3.3<L>  /  TBili  0.7  /  DBili  x   /  AST  14  /  ALT  12  /  AlkPhos  49  10-15    PT/INR - ( 15 Oct 2019 06:06 )   PT: 15.60 sec;   INR: 1.36 ratio         PTT - ( 15 Oct 2019 06:06 )  PTT:44.0 sec                  RADIOLOGY:Radiology personally reviewed.

## 2019-10-15 NOTE — PROGRESS NOTE ADULT - ASSESSMENT
Pulmonary edema  hx diastolic heart failure  fever      Suggest:  fever w/u  full cultures  cardio management CATH cancelled due to temps  f/u echo  cont diuretics  taper O2  OOB  DVT prophylaxis  disposition as per cardio

## 2019-10-16 LAB
ALBUMIN SERPL ELPH-MCNC: 3.3 G/DL — LOW (ref 3.5–5.2)
ALP SERPL-CCNC: 44 U/L — SIGNIFICANT CHANGE UP (ref 30–115)
ALT FLD-CCNC: 39 U/L — SIGNIFICANT CHANGE UP (ref 0–41)
ANION GAP SERPL CALC-SCNC: 15 MMOL/L — HIGH (ref 7–14)
APTT BLD: 43.3 SEC — HIGH (ref 27–39.2)
APTT BLD: 52.9 SEC — HIGH (ref 27–39.2)
AST SERPL-CCNC: 221 U/L — HIGH (ref 0–41)
BASOPHILS # BLD AUTO: 0.02 K/UL — SIGNIFICANT CHANGE UP (ref 0–0.2)
BASOPHILS NFR BLD AUTO: 0.2 % — SIGNIFICANT CHANGE UP (ref 0–1)
BILIRUB SERPL-MCNC: 0.7 MG/DL — SIGNIFICANT CHANGE UP (ref 0.2–1.2)
BUN SERPL-MCNC: 48 MG/DL — HIGH (ref 10–20)
CALCIUM SERPL-MCNC: 8.9 MG/DL — SIGNIFICANT CHANGE UP (ref 8.5–10.1)
CHLORIDE SERPL-SCNC: 95 MMOL/L — LOW (ref 98–110)
CO2 SERPL-SCNC: 28 MMOL/L — SIGNIFICANT CHANGE UP (ref 17–32)
CREAT SERPL-MCNC: 1.4 MG/DL — SIGNIFICANT CHANGE UP (ref 0.7–1.5)
CRP SERPL-MCNC: 4.21 MG/DL — HIGH (ref 0–0.4)
CULTURE RESULTS: SIGNIFICANT CHANGE UP
EOSINOPHIL # BLD AUTO: 0 K/UL — SIGNIFICANT CHANGE UP (ref 0–0.7)
EOSINOPHIL NFR BLD AUTO: 0 % — SIGNIFICANT CHANGE UP (ref 0–8)
GLUCOSE BLDC GLUCOMTR-MCNC: 214 MG/DL — HIGH (ref 70–99)
GLUCOSE BLDC GLUCOMTR-MCNC: 261 MG/DL — HIGH (ref 70–99)
GLUCOSE BLDC GLUCOMTR-MCNC: 286 MG/DL — HIGH (ref 70–99)
GLUCOSE BLDC GLUCOMTR-MCNC: 312 MG/DL — HIGH (ref 70–99)
GLUCOSE SERPL-MCNC: 270 MG/DL — HIGH (ref 70–99)
GRAM STN FLD: SIGNIFICANT CHANGE UP
GRAM STN FLD: SIGNIFICANT CHANGE UP
HCT VFR BLD CALC: 41.6 % — LOW (ref 42–52)
HGB BLD-MCNC: 13.2 G/DL — LOW (ref 14–18)
IMM GRANULOCYTES NFR BLD AUTO: 0.7 % — HIGH (ref 0.1–0.3)
LYMPHOCYTES # BLD AUTO: 0.79 K/UL — LOW (ref 1.2–3.4)
LYMPHOCYTES # BLD AUTO: 7.8 % — LOW (ref 20.5–51.1)
MAGNESIUM SERPL-MCNC: 1.8 MG/DL — SIGNIFICANT CHANGE UP (ref 1.8–2.4)
MCHC RBC-ENTMCNC: 25.7 PG — LOW (ref 27–31)
MCHC RBC-ENTMCNC: 31.7 G/DL — LOW (ref 32–37)
MCV RBC AUTO: 80.9 FL — SIGNIFICANT CHANGE UP (ref 80–94)
METHOD TYPE: SIGNIFICANT CHANGE UP
MONOCYTES # BLD AUTO: 0.73 K/UL — HIGH (ref 0.1–0.6)
MONOCYTES NFR BLD AUTO: 7.2 % — SIGNIFICANT CHANGE UP (ref 1.7–9.3)
MSSA DNA SPEC QL NAA+PROBE: SIGNIFICANT CHANGE UP
NEUTROPHILS # BLD AUTO: 8.47 K/UL — HIGH (ref 1.4–6.5)
NEUTROPHILS NFR BLD AUTO: 84.1 % — HIGH (ref 42.2–75.2)
NRBC # BLD: 0 /100 WBCS — SIGNIFICANT CHANGE UP (ref 0–0)
PLATELET # BLD AUTO: 148 K/UL — SIGNIFICANT CHANGE UP (ref 130–400)
POTASSIUM SERPL-MCNC: 4.1 MMOL/L — SIGNIFICANT CHANGE UP (ref 3.5–5)
POTASSIUM SERPL-SCNC: 4.1 MMOL/L — SIGNIFICANT CHANGE UP (ref 3.5–5)
PROCALCITONIN SERPL-MCNC: 1.56 NG/ML — HIGH (ref 0.02–0.1)
PROT SERPL-MCNC: 6.7 G/DL — SIGNIFICANT CHANGE UP (ref 6–8)
RBC # BLD: 5.14 M/UL — SIGNIFICANT CHANGE UP (ref 4.7–6.1)
RBC # FLD: 15.8 % — HIGH (ref 11.5–14.5)
SODIUM SERPL-SCNC: 138 MMOL/L — SIGNIFICANT CHANGE UP (ref 135–146)
SPECIMEN SOURCE: SIGNIFICANT CHANGE UP
SPECIMEN SOURCE: SIGNIFICANT CHANGE UP
WBC # BLD: 10.08 K/UL — SIGNIFICANT CHANGE UP (ref 4.8–10.8)
WBC # FLD AUTO: 10.08 K/UL — SIGNIFICANT CHANGE UP (ref 4.8–10.8)

## 2019-10-16 PROCEDURE — 76705 ECHO EXAM OF ABDOMEN: CPT | Mod: 26

## 2019-10-16 PROCEDURE — 71045 X-RAY EXAM CHEST 1 VIEW: CPT | Mod: 26,76

## 2019-10-16 PROCEDURE — 99233 SBSQ HOSP IP/OBS HIGH 50: CPT

## 2019-10-16 RX ORDER — ACETAMINOPHEN 500 MG
650 TABLET ORAL EVERY 6 HOURS
Refills: 0 | Status: DISCONTINUED | OUTPATIENT
Start: 2019-10-16 | End: 2019-10-29

## 2019-10-16 RX ORDER — INSULIN LISPRO 100/ML
7 VIAL (ML) SUBCUTANEOUS
Refills: 0 | Status: DISCONTINUED | OUTPATIENT
Start: 2019-10-16 | End: 2019-10-29

## 2019-10-16 RX ORDER — VANCOMYCIN HCL 1 G
1500 VIAL (EA) INTRAVENOUS EVERY 12 HOURS
Refills: 0 | Status: DISCONTINUED | OUTPATIENT
Start: 2019-10-16 | End: 2019-10-17

## 2019-10-16 RX ORDER — VANCOMYCIN HCL 1 G
1500 VIAL (EA) INTRAVENOUS ONCE
Refills: 0 | Status: COMPLETED | OUTPATIENT
Start: 2019-10-16 | End: 2019-10-16

## 2019-10-16 RX ORDER — FUROSEMIDE 40 MG
60 TABLET ORAL EVERY 12 HOURS
Refills: 0 | Status: DISCONTINUED | OUTPATIENT
Start: 2019-10-16 | End: 2019-10-17

## 2019-10-16 RX ORDER — VANCOMYCIN HCL 1 G
VIAL (EA) INTRAVENOUS
Refills: 0 | Status: DISCONTINUED | OUTPATIENT
Start: 2019-10-16 | End: 2019-10-17

## 2019-10-16 RX ORDER — INSULIN GLARGINE 100 [IU]/ML
21 INJECTION, SOLUTION SUBCUTANEOUS AT BEDTIME
Refills: 0 | Status: DISCONTINUED | OUTPATIENT
Start: 2019-10-16 | End: 2019-10-24

## 2019-10-16 RX ORDER — CEFEPIME 1 G/1
1000 INJECTION, POWDER, FOR SOLUTION INTRAMUSCULAR; INTRAVENOUS EVERY 8 HOURS
Refills: 0 | Status: DISCONTINUED | OUTPATIENT
Start: 2019-10-16 | End: 2019-10-17

## 2019-10-16 RX ADMIN — Medication 1 APPLICATION(S): at 05:48

## 2019-10-16 RX ADMIN — CEFEPIME 100 MILLIGRAM(S): 1 INJECTION, POWDER, FOR SOLUTION INTRAMUSCULAR; INTRAVENOUS at 14:37

## 2019-10-16 RX ADMIN — Medication 300 MILLIGRAM(S): at 08:49

## 2019-10-16 RX ADMIN — AMLODIPINE BESYLATE 5 MILLIGRAM(S): 2.5 TABLET ORAL at 05:48

## 2019-10-16 RX ADMIN — Medication 1 PATCH: at 06:44

## 2019-10-16 RX ADMIN — Medication 300 MILLIGRAM(S): at 17:26

## 2019-10-16 RX ADMIN — Medication 600 MILLIGRAM(S): at 05:47

## 2019-10-16 RX ADMIN — HEPARIN SODIUM 10 UNIT(S)/HR: 5000 INJECTION INTRAVENOUS; SUBCUTANEOUS at 18:24

## 2019-10-16 RX ADMIN — SIMVASTATIN 40 MILLIGRAM(S): 20 TABLET, FILM COATED ORAL at 21:11

## 2019-10-16 RX ADMIN — Medication 0.5 MILLIGRAM(S): at 05:48

## 2019-10-16 RX ADMIN — Medication 8: at 12:00

## 2019-10-16 RX ADMIN — Medication 60 MILLIGRAM(S): at 17:26

## 2019-10-16 RX ADMIN — Medication 75 MILLIGRAM(S): at 05:47

## 2019-10-16 RX ADMIN — Medication 1 APPLICATION(S): at 17:28

## 2019-10-16 RX ADMIN — INSULIN GLARGINE 21 UNIT(S): 100 INJECTION, SOLUTION SUBCUTANEOUS at 21:53

## 2019-10-16 RX ADMIN — Medication 600 MILLIGRAM(S): at 06:45

## 2019-10-16 RX ADMIN — Medication 40 MILLIGRAM(S): at 05:48

## 2019-10-16 RX ADMIN — Medication 60 MILLIGRAM(S): at 11:04

## 2019-10-16 RX ADMIN — TAMSULOSIN HYDROCHLORIDE 0.4 MILLIGRAM(S): 0.4 CAPSULE ORAL at 21:11

## 2019-10-16 RX ADMIN — Medication 600 MILLIGRAM(S): at 00:06

## 2019-10-16 RX ADMIN — CEFEPIME 100 MILLIGRAM(S): 1 INJECTION, POWDER, FOR SOLUTION INTRAMUSCULAR; INTRAVENOUS at 21:11

## 2019-10-16 RX ADMIN — CEFEPIME 100 MILLIGRAM(S): 1 INJECTION, POWDER, FOR SOLUTION INTRAMUSCULAR; INTRAVENOUS at 08:49

## 2019-10-16 NOTE — PROGRESS NOTE ADULT - ASSESSMENT
Patient is a 80yo Male w/ PMH of HFpEF (EF of 55% with Grade II Diastolic dysfunction), COPD (home O2 3-3.5L as needed), DM, HTN, CAD s/p CABG and 1 stent, BPH, and recent admission for CHF (August 2019) presenting to [facility] with complaints of shortness of breath and chest pain. Patient reports he has been progressively worsening shortness of breath for the last 4-5 days prior to admission.    1) Acute on chronic diastolic CHF Exacerbation   Lasix Increased to 60 IV q12h   Patient on Heparin Drip  Follow results of Repeat Echo performed   Continue with Metoprolol ER 75 along with Aspirin 81. c/w plavix 75mg daily after loading does  Monitor I and O     2. HTN  stable at this time Hold Meds and monitor BP    3. DM2.   Start Insulin Follow FBG    4. COPD on as needed home O2 not in exacerbation  -Monitor and c/w home med    5. BPH  C/w med    6. CAD s/p CABG  - continue cardiac med and plan for cath once afebrile    7. Febrile   Blood Culture prelim Gram positive Cocci in Clusters   Continue with Vancomycin and Cefepime   Follow with daily blood cultures until negative   Consider antibiotic de-escalation once patient improved and Sensitivities are back.       DVT Prophylaxis   Heparin Drip     Disposition   Continue with CCU Monitoring

## 2019-10-16 NOTE — PROGRESS NOTE ADULT - SUBJECTIVE AND OBJECTIVE BOX
Patient is a 79y old  Male who presents with a chief complaint of dyspnea (15 Oct 2019 10:36)      T(F): 101.3 (10-16-19 @ 07:01), Max: 102.9 (10-15-19 @ 16:17)  HR: 77 (10-16-19 @ 07:26)  BP: 142/75 (10-16-19 @ 05:25)  RR: 30 (10-16-19 @ 07:01)  SpO2: 94% (10-16-19 @ 07:26) (85% - 100%)    PHYSICAL EXAM:  GENERAL: NAD, well-groomed, well-developed  HEAD:  Atraumatic, Normocephalic  EYES: EOMI, PERRLA, conjunctiva and sclera clear  ENMT: No tonsillar erythema, exudates, or enlargement; Moist mucous membranes, Good dentition, No lesions  NECK: Supple, No JVD, Normal thyroid  NERVOUS SYSTEM:  Alert & Oriented X3,  Motor Strength 5/5 B/L upper and lower extremities  CHEST/LUNG: Clear to percussion bilaterally; No rales, rhonchi, wheezing, or rubs  HEART: Regular rate and rhythm; No murmurs, rubs, or gallops  ABDOMEN: Soft, Nontender, Nondistended; Bowel sounds present  EXTREMITIES:   No clubbing, cyanosis, or edema  LYMPH: No lymphadenopathy noted  SKIN: No rashes or lesions    labs  10-16    138  |  95<L>  |  48<H>  ----------------------------<  270<H>  4.1   |  28  |  1.4    Ca    8.9      16 Oct 2019 05:52  Mg     1.8     10-16    TPro  6.7  /  Alb  3.3<L>  /  TBili  0.7  /  DBili  x   /  AST  221<H>  /  ALT  39  /  AlkPhos  44  10-16                          13.2   10.08 )-----------( 148      ( 16 Oct 2019 05:52 )             41.6       PT/INR - ( 15 Oct 2019 06:06 )   PT: 15.60 sec;   INR: 1.36 ratio         PTT - ( 16 Oct 2019 05:52 )  PTT:52.9 sec        acetaminophen   Tablet .. 650 milliGRAM(s) Oral every 6 hours PRN  ALPRAZolam 0.5 milliGRAM(s) Oral two times a day  amLODIPine   Tablet 5 milliGRAM(s) Oral daily  aspirin  chewable 81 milliGRAM(s) Oral daily  buDESOnide 160 MICROgram(s)/formoterol 4.5 MICROgram(s) Inhaler 2 Puff(s) Inhalation two times a day  cefepime   IVPB 1000 milliGRAM(s) IV Intermittent every 8 hours  dextrose 40% Gel 15 Gram(s) Oral once PRN  dextrose 5%. 1000 milliLiter(s) IV Continuous <Continuous>  dextrose 50% Injectable 12.5 Gram(s) IV Push once  dextrose 50% Injectable 25 Gram(s) IV Push once  dextrose 50% Injectable 25 Gram(s) IV Push once  fenofibrate Tablet 145 milliGRAM(s) Oral daily  finasteride 5 milliGRAM(s) Oral daily  furosemide    Tablet 40 milliGRAM(s) Oral daily  furosemide    Tablet 20 milliGRAM(s) Oral at bedtime  glucagon  Injectable 1 milliGRAM(s) IntraMuscular once PRN  heparin  Infusion 900 Unit(s)/Hr IV Continuous <Continuous>  ibuprofen  Tablet. 600 milliGRAM(s) Oral every 6 hours  influenza   Vaccine 0.5 milliLiter(s) IntraMuscular once  insulin lispro (HumaLOG) corrective regimen sliding scale   SubCutaneous three times a day before meals  isosorbide   mononitrate ER Tablet (IMDUR) 30 milliGRAM(s) Oral daily  metoprolol succinate ER 75 milliGRAM(s) Oral daily  nitroglycerin    Patch 0.2 mG/Hr(s) 1 patch Transdermal daily  silver sulfADIAZINE 1% Cream 1 Application(s) Topical two times a day  simvastatin 40 milliGRAM(s) Oral at bedtime  tamsulosin 0.4 milliGRAM(s) Oral at bedtime  vancomycin  IVPB 1500 milliGRAM(s) IV Intermittent once  vancomycin  IVPB 1500 milliGRAM(s) IV Intermittent every 12 hours  vancomycin  IVPB

## 2019-10-16 NOTE — PROGRESS NOTE ADULT - ASSESSMENT
Patient is a 80yo Male w/ PMH of HFpEF (EF of 55% with Grade II Diastolic dysfunction), COPD (home O2 3-3.5L as needed), DM, HTN, CAD s/p CABG and 1 stent, BPH, and recent admission for CHF (August 2019) presented  to [facility] with complaints of shortness of breath and exertional chest pain.    #) Acute on Chronic Diastolic CHF exacerbation / moderate  aortic stenosis   -  acute respiratory failure   - back on BIPAP  - continue with even to  negative fluid balance  - now with fevers and positive blood cultures, with possible gram negative pneumonia   - continue Vanco, Cefepime and check repeat BC   - no cardiac cath for now    #) Chest Pain,  in patient with hx of CABG and stent placement  - c/w losartan asa, metoprolol; Simvastatin   -  Lovenox   - cardiology following    #) Hx of A Fib  - On Xarelto, 15mg at home; will hold for now and continue  Lovenox   -  Metoprolol      #) Hx of COPD  - Stable at this time, with no evidence of acute exacerbation  - Cont with Symbicort (in place of Breo)    #) Hx of Diabetes Type 2  - Insulin sq hospital protocol   - Carb consistent diet    #) Hx of BPH  - Cont with Flomax and Finasteride

## 2019-10-16 NOTE — PROGRESS NOTE ADULT - SUBJECTIVE AND OBJECTIVE BOX
Patient is a 79y old  Male who presents with a chief complaint of dyspnea (15 Oct 2019 10:36)        Interval Events: Temps continue overnight .    REVIEW OF SYSTEMS:  Constitutional: +fevers +chills. No weight loss. No fatigue or generalized malaise.  Eyes: No itching or discharge from the eyes  ENT: No ear pain. No ear discharge. No nasal congestion. No post nasal drip. No epistaxis. No throat pain. No sore throat. No difficulty swallowing.   CV: No chest pain. No palpitations. No lightheadedness or dizziness.   Resp: No dyspnea at rest. No dyspnea on exertion. No orthopnea. No wheezing. No cough. No stridor. No sputum production. No chest pain with respiration.  GI: No nausea. No vomiting. No diarrhea.  MSK: No joint pain or pain in any extremities  Integumentary: No skin lesions. No pedal edema.  Neurological: No gross motor weakness. No sensory changes.      OBJECTIVE:  ICU Vital Signs Last 24 Hrs  T(C): 38.5 (16 Oct 2019 07:01), Max: 39.4 (15 Oct 2019 16:17)  T(F): 101.3 (16 Oct 2019 07:01), Max: 102.9 (15 Oct 2019 16:17)  HR: 77 (16 Oct 2019 07:26) (68 - 139)  BP: 95/54 (16 Oct 2019 07:26) (95/54 - 154/57)  BP(mean): 72 (16 Oct 2019 07:26) (67 - 103)    RR: 34 (16 Oct 2019 07:26) (20 - 59)  SpO2: 94% (16 Oct 2019 07:26) (85% - 100%)        10-15 @ 07:  -  10-16 @ 07:00  --------------------------------------------------------  IN: 300 mL / OUT: 400 mL / NET: -100 mL    10-16 @ 07:  -  10-16 @ 08:00  --------------------------------------------------------  IN: 10 mL / OUT: 0 mL / NET: 10 mL      CAPILLARY BLOOD GLUCOSE      POCT Blood Glucose.: 261 mg/dL (16 Oct 2019 07:58)      PHYSICAL EXAM:  General: Awake, alert, oriented X 3.   HEENT: Atraumatic, normocephalic.                 Mallampatti Grade                 No nasal congestion.                No tonsillar or pharyngeal exudates.  Lymph Nodes: No palpable lymphadenopathy neck, supraclavicular region  Neck: No JVD. No carotid bruit, no thyromegally  Respiratory: Normal chest expansion                         Normal percussion                         Normal and equal air entry                        + rales L.  Cardiovascular: S1 S2 normal. No murmurs, rubs or gallops.   Abdomen: Soft, non-tender, non-distended. No organomegaly.  Extremities: Warm to touch. Peripheral pulse palpable. No pedal edema.   Skin: No rashes or skin lesions, warm dry  Neurological: Motor and sensory examination equal and normal in all four extremities.  Psychiatry: Appropriate mood and affect.    HOSPITAL MEDICATIONS:  MEDICATIONS  (STANDING):  ALPRAZolam 0.5 milliGRAM(s) Oral two times a day  amLODIPine   Tablet 5 milliGRAM(s) Oral daily  aspirin  chewable 81 milliGRAM(s) Oral daily  buDESOnide 160 MICROgram(s)/formoterol 4.5 MICROgram(s) Inhaler 2 Puff(s) Inhalation two times a day  cefepime   IVPB 1000 milliGRAM(s) IV Intermittent every 8 hours  dextrose 5%. 1000 milliLiter(s) (50 mL/Hr) IV Continuous <Continuous>  dextrose 50% Injectable 12.5 Gram(s) IV Push once  dextrose 50% Injectable 25 Gram(s) IV Push once  dextrose 50% Injectable 25 Gram(s) IV Push once  fenofibrate Tablet 145 milliGRAM(s) Oral daily  finasteride 5 milliGRAM(s) Oral daily  furosemide    Tablet 40 milliGRAM(s) Oral daily  furosemide    Tablet 20 milliGRAM(s) Oral at bedtime  heparin  Infusion 900 Unit(s)/Hr (10 mL/Hr) IV Continuous <Continuous>  ibuprofen  Tablet. 600 milliGRAM(s) Oral every 6 hours  influenza   Vaccine 0.5 milliLiter(s) IntraMuscular once  insulin lispro (HumaLOG) corrective regimen sliding scale   SubCutaneous three times a day before meals  isosorbide   mononitrate ER Tablet (IMDUR) 30 milliGRAM(s) Oral daily  metoprolol succinate ER 75 milliGRAM(s) Oral daily  nitroglycerin    Patch 0.2 mG/Hr(s) 1 patch Transdermal daily  silver sulfADIAZINE 1% Cream 1 Application(s) Topical two times a day  simvastatin 40 milliGRAM(s) Oral at bedtime  tamsulosin 0.4 milliGRAM(s) Oral at bedtime  vancomycin  IVPB 1500 milliGRAM(s) IV Intermittent once  vancomycin  IVPB 1500 milliGRAM(s) IV Intermittent every 12 hours  vancomycin  IVPB        MEDICATIONS  (PRN):  acetaminophen   Tablet .. 650 milliGRAM(s) Oral every 6 hours PRN Temp greater or equal to 38C (100.4F), Mild Pain (1 - 3)  dextrose 40% Gel 15 Gram(s) Oral once PRN Blood Glucose LESS THAN 70 milliGRAM(s)/deciliter  glucagon  Injectable 1 milliGRAM(s) IntraMuscular once PRN Glucose LESS THAN 70 milligrams/deciliter      LABS:                        13.2   10.08 )-----------( 148      ( 16 Oct 2019 05:52 )             41.6     10-16    138  |  95<L>  |  48<H>  ----------------------------<  270<H>  4.1   |  28  |  1.4    Ca    8.9      16 Oct 2019 05:52  Mg     1.8     10-16    TPro  6.7  /  Alb  3.3<L>  /  TBili  0.7  /  DBili  x   /  AST  221<H>  /  ALT  39  /  AlkPhos  44  10-16    PT/INR - ( 15 Oct 2019 06:06 )   PT: 15.60 sec;   INR: 1.36 ratio         PTT - ( 16 Oct 2019 05:52 )  PTT:52.9 sec  Urinalysis Basic - ( 15 Oct 2019 11:30 )    Color: Yellow / Appearance: Clear / S.015 / pH: x  Gluc: x / Ketone: Negative  / Bili: Negative / Urobili: 0.2 mg/dL   Blood: x / Protein: Negative mg/dL / Nitrite: Negative   Leuk Esterase: Negative / RBC: x / WBC x   Sq Epi: x / Non Sq Epi: x / Bacteria: x                    RADIOLOGY:Radiology personally reviewed.

## 2019-10-16 NOTE — PROGRESS NOTE ADULT - SUBJECTIVE AND OBJECTIVE BOX
HPI:  Patient is a 80yo Male w/ PMH of HFpEF (EF of 55% with Grade II Diastolic dysfunction), COPD (home O2 3-3.5L as needed), DM, HTN, CAD s/p CABG and 1 stent, BPH, and recent admission for CHF (2019) presenting to [facility] with complaints of shortness of breath and chest pain. Patient reports he has been progressively worsening shortness of breath for the last 4-5 days prior to admission. He also reports associated chest pain on exertion.  Patient reports he has been compliant with all of his medications as well with a low salt diet. He denies any recent history of fevers, chills, cough. On day of admission, had severe dyspnea on exertion associated with a pressure like sensation in his chest that radiated to his left arm. He tried sublingual nitro for relief but it didn't help. Therefore, he came in for further evaluation. He was given Solumedrol 125mg by EMS. In the ED, patient was found to have evidence of bilateral pleural effusions on bedside sono done by ED staff. He received IV Lasix 40mg and was placed on BiPAP with major improvement in his symptoms. On my assessment, patient was speaking to me in full sentences on BiPAP and his chest pain has resolved. (10 Oct 2019 13:27)        INTERVAL HPI/OVERNIGHT EVENTS: Febrile over night   ICU Vital Signs Last 24 Hrs  T(C): 36.5 (16 Oct 2019 11:27), Max: 39.4 (15 Oct 2019 16:17)  T(F): 97.7 (16 Oct 2019 11:27), Max: 102.9 (15 Oct 2019 16:17)  HR: 66 (16 Oct 2019 11:27) (66 - 139)  BP: 93/54 (16 Oct 2019 11:27) (93/50 - 154/57)  BP(mean): 69 (16 Oct 2019 11:27) (69 - 103)  ABP: --  ABP(mean): --  RR: 25 (16 Oct 2019 11:27) (20 - 59)  SpO2: 96% (16 Oct 2019 11:27) (85% - 100%)    I&O's Summary    15 Oct 2019 07:01  -  16 Oct 2019 07:00  --------------------------------------------------------  IN: 300 mL / OUT: 400 mL / NET: -100 mL    16 Oct 2019 07:01  -  16 Oct 2019 12:18  --------------------------------------------------------  IN: 600 mL / OUT: 0 mL / NET: 600 mL          LABS:                        13.2   10.08 )-----------( 148      ( 16 Oct 2019 05:52 )             41.6     10-16    138  |  95<L>  |  48<H>  ----------------------------<  270<H>  4.1   |  28  |  1.4    Ca    8.9      16 Oct 2019 05:52  Mg     1.8     10-16    TPro  6.7  /  Alb  3.3<L>  /  TBili  0.7  /  DBili  x   /  AST  221<H>  /  ALT  39  /  AlkPhos  44  10-16    PT/INR - ( 15 Oct 2019 06:06 )   PT: 15.60 sec;   INR: 1.36 ratio         PTT - ( 16 Oct 2019 05:52 )  PTT:52.9 sec  Urinalysis Basic - ( 15 Oct 2019 11:30 )    Color: Yellow / Appearance: Clear / S.015 / pH: x  Gluc: x / Ketone: Negative  / Bili: Negative / Urobili: 0.2 mg/dL   Blood: x / Protein: Negative mg/dL / Nitrite: Negative   Leuk Esterase: Negative / RBC: x / WBC x   Sq Epi: x / Non Sq Epi: x / Bacteria: x      CAPILLARY BLOOD GLUCOSE      POCT Blood Glucose.: 312 mg/dL (16 Oct 2019 11:39)  POCT Blood Glucose.: 261 mg/dL (16 Oct 2019 07:58)  POCT Blood Glucose.: 251 mg/dL (15 Oct 2019 23:22)  POCT Blood Glucose.: 255 mg/dL (15 Oct 2019 16:44)  POCT Blood Glucose.: 176 mg/dL (15 Oct 2019 12:25)        RADIOLOGY & ADDITIONAL TESTS:    Consultant(s) Notes Reviewed:  [x ] YES  [ ] NO    MEDICATIONS  (STANDING):  ALPRAZolam 0.5 milliGRAM(s) Oral two times a day  amLODIPine   Tablet 5 milliGRAM(s) Oral daily  aspirin  chewable 81 milliGRAM(s) Oral daily  buDESOnide 160 MICROgram(s)/formoterol 4.5 MICROgram(s) Inhaler 2 Puff(s) Inhalation two times a day  cefepime   IVPB 1000 milliGRAM(s) IV Intermittent every 8 hours  dextrose 5%. 1000 milliLiter(s) (50 mL/Hr) IV Continuous <Continuous>  dextrose 50% Injectable 12.5 Gram(s) IV Push once  dextrose 50% Injectable 25 Gram(s) IV Push once  dextrose 50% Injectable 25 Gram(s) IV Push once  fenofibrate Tablet 145 milliGRAM(s) Oral daily  finasteride 5 milliGRAM(s) Oral daily  furosemide   Injectable 60 milliGRAM(s) IV Push every 12 hours  heparin  Infusion 900 Unit(s)/Hr (10 mL/Hr) IV Continuous <Continuous>  ibuprofen  Tablet. 600 milliGRAM(s) Oral every 6 hours  influenza   Vaccine 0.5 milliLiter(s) IntraMuscular once  insulin glargine Injectable (LANTUS) 21 Unit(s) SubCutaneous at bedtime  insulin lispro Injectable (HumaLOG) 7 Unit(s) SubCutaneous before breakfast  insulin lispro Injectable (HumaLOG) 7 Unit(s) SubCutaneous before lunch  insulin lispro Injectable (HumaLOG) 7 Unit(s) SubCutaneous before dinner  isosorbide   mononitrate ER Tablet (IMDUR) 30 milliGRAM(s) Oral daily  metoprolol succinate ER 75 milliGRAM(s) Oral daily  nitroglycerin    Patch 0.2 mG/Hr(s) 1 patch Transdermal daily  silver sulfADIAZINE 1% Cream 1 Application(s) Topical two times a day  simvastatin 40 milliGRAM(s) Oral at bedtime  tamsulosin 0.4 milliGRAM(s) Oral at bedtime  vancomycin  IVPB 1500 milliGRAM(s) IV Intermittent every 12 hours  vancomycin  IVPB        MEDICATIONS  (PRN):  acetaminophen   Tablet .. 650 milliGRAM(s) Oral every 6 hours PRN Temp greater or equal to 38C (100.4F), Mild Pain (1 - 3)  dextrose 40% Gel 15 Gram(s) Oral once PRN Blood Glucose LESS THAN 70 milliGRAM(s)/deciliter  glucagon  Injectable 1 milliGRAM(s) IntraMuscular once PRN Glucose LESS THAN 70 milligrams/deciliter      PHYSICAL EXAM:  GENERAL: Patient on BIPAP, due to respiratory distress in AM    HEAD:  Atraumatic, Normocephalic  EYES: EOMI, PERRLA, conjunctiva and sclera clear  ENT: No tonsillar erythema, exudates, or enlargement; Moist mucous membranes, Good dentition, No lesions  NECK: Supple, No JVD, Normal thyroid, no enlarged nodes  NERVOUS SYSTEM:  Alert & Oriented X3,   CHEST/LUNG: B/L good air entry, slight Crackles appreciated bilaterally at bases   HEART: S1S2 normal, no murmur   ABDOMEN: Soft, Nontender, Nondistended; Bowel sounds present  EXTREMITIES:  2+ Peripheral Pulses, No clubbing, cyanosis, or edema  LYMPH: No lymphadenopathy noted  SKIN: Left Leg chronic venous ulceration     Care Discussed with Consultants/Other Providers [ x] YES  [ ] NO

## 2019-10-16 NOTE — CONSULT NOTE ADULT - SUBJECTIVE AND OBJECTIVE BOX
Patient is a 79y old  Male who presents with a chief complaint of CHF Exacerbation (16 Oct 2019 12:17)          REVIEW OF SYSTEMS: Total of twelve systems have been reviewed with patient and found to be negative unless mentioned in HPI      PAST MEDICAL & SURGICAL HISTORY:  BPH (benign prostatic hyperplasia)  CHF (congestive heart failure)  COPD (chronic obstructive pulmonary disease)  Diabetes  HTN (hypertension)  H/O heart artery stent  S/P CABG x 3        SOCIAL HISTORY  Alcohol: Does not drink  Tobacco: Does not smoke  Illicit substance use: None      FAMILY HISTORY: Non contributory to the present illness        ALLERGIES: No Known Allergies      ICU Vital Signs Last 24 Hrs  T(C): 38.8 (16 Oct 2019 19:00), Max: 39.3 (16 Oct 2019 00:57)  T(F): 101.9 (16 Oct 2019 19:00), Max: 102.7 (16 Oct 2019 00:57)  HR: 135 (16 Oct 2019 17:27) (65 - 141)  BP: 115/68 (16 Oct 2019 17:27) (87/56 - 144/87)  BP(mean): 84 (16 Oct 2019 17:27) (65 - 111)  ABP: --  ABP(mean): --  RR: 20 (16 Oct 2019 21:00) (20 - 59)  SpO2: 94% (16 Oct 2019 17:27) (83% - 97%)        PHYSICAL EXAM:  GENERAL: Not in distress   CHEST/LUNG:  Aire ntry bilaterally  HEART: s1 and s2 present  ABDOMEN:  Nontender and  Nondistended  EXTREMITIES: No pedal  edema  CNS: Awake and Alert      LABS:                        13.2   10.08 )-----------( 148      ( 16 Oct 2019 05:52 )             41.6       10-16    138  |  95<L>  |  48<H>  ----------------------------<  270<H>  4.1   |  28  |  1.4    Ca    8.9      16 Oct 2019 05:52  Mg     1.8     10-16    TPro  6.7  /  Alb  3.3<L>  /  TBili  0.7  /  DBili  x   /  AST  221<H>  /  ALT  39  /  AlkPhos  44  10-16    PT/INR - ( 15 Oct 2019 06:06 )   PT: 15.60 sec;   INR: 1.36 ratio         PTT - ( 16 Oct 2019 19:26 )  PTT:43.3 sec        CAPILLARY BLOOD GLUCOS  POCT Blood Glucose.: 214 mg/dL (16 Oct 2019 16:17)  POCT Blood Glucose.: 312 mg/dL (16 Oct 2019 11:39)  POCT Blood Glucose.: 261 mg/dL (16 Oct 2019 07:58)  POCT Blood Glucose.: 251 mg/dL (15 Oct 2019 23:22)        Urinalysis Basic - ( 15 Oct 2019 11:30 )    Color: Yellow / Appearance: Clear / S.015 / pH: x  Gluc: x / Ketone: Negative  / Bili: Negative / Urobili: 0.2 mg/dL   Blood: x / Protein: Negative mg/dL / Nitrite: Negative   Leuk Esterase: Negative / RBC: x / WBC x   Sq Epi: x / Non Sq Epi: x / Bacteria: x        MEDICATIONS  (STANDING):  ALPRAZolam 0.5 milliGRAM(s) Oral two times a day  amLODIPine   Tablet 5 milliGRAM(s) Oral daily  aspirin  chewable 81 milliGRAM(s) Oral daily  buDESOnide 160 MICROgram(s)/formoterol 4.5 MICROgram(s) Inhaler 2 Puff(s) Inhalation two times a day  cefepime   IVPB 1000 milliGRAM(s) IV Intermittent every 8 hours  dextrose 5%. 1000 milliLiter(s) (50 mL/Hr) IV Continuous <Continuous>  dextrose 50% Injectable 12.5 Gram(s) IV Push once  dextrose 50% Injectable 25 Gram(s) IV Push once  dextrose 50% Injectable 25 Gram(s) IV Push once  fenofibrate Tablet 145 milliGRAM(s) Oral daily  finasteride 5 milliGRAM(s) Oral daily  furosemide   Injectable 60 milliGRAM(s) IV Push every 12 hours  heparin  Infusion 900 Unit(s)/Hr (10 mL/Hr) IV Continuous <Continuous>  ibuprofen  Tablet. 600 milliGRAM(s) Oral every 6 hours  influenza   Vaccine 0.5 milliLiter(s) IntraMuscular once  insulin glargine Injectable (LANTUS) 21 Unit(s) SubCutaneous at bedtime  insulin lispro Injectable (HumaLOG) 7 Unit(s) SubCutaneous before breakfast  insulin lispro Injectable (HumaLOG) 7 Unit(s) SubCutaneous before lunch  insulin lispro Injectable (HumaLOG) 7 Unit(s) SubCutaneous before dinner  isosorbide   mononitrate ER Tablet (IMDUR) 30 milliGRAM(s) Oral daily  metoprolol succinate ER 75 milliGRAM(s) Oral daily  nitroglycerin    Patch 0.2 mG/Hr(s) 1 patch Transdermal daily  silver sulfADIAZINE 1% Cream 1 Application(s) Topical two times a day  simvastatin 40 milliGRAM(s) Oral at bedtime  tamsulosin 0.4 milliGRAM(s) Oral at bedtime  vancomycin  IVPB 1500 milliGRAM(s) IV Intermittent every 12 hours  vancomycin  IVPB        MEDICATIONS  (PRN):  acetaminophen   Tablet .. 650 milliGRAM(s) Oral every 6 hours PRN Temp greater or equal to 38C (100.4F), Mild Pain (1 - 3)  acetaminophen  Suppository .. 650 milliGRAM(s) Rectal every 6 hours PRN Temp greater or equal to 38C (100.4F), Mild Pain (1 - 3)  dextrose 40% Gel 15 Gram(s) Oral once PRN Blood Glucose LESS THAN 70 milliGRAM(s)/deciliter  glucagon  Injectable 1 milliGRAM(s) IntraMuscular once PRN Glucose LESS THAN 70 milligrams/deciliter          RADIOLOGY & ADDITIONAL TESTS:    < from: Xray Chest 1 View-PORTABLE IMMEDIATE (10.16.19 @ 16:56) >  Stable bilateral airspace opacities.    < end of copied text >        FLU A B RSV Detection by PCR (10.15.19 @ 21:10)    Flu A Result: Negative: Negative results do not preclude influenza infection and  should not be used as the sole basis for treatment or  other patient management decisions.  A positive result may occur in the absence of viable virus.  By: [employer] Xpert Flu viral assay by Reverse Transcriptase  Polymerase Chain Reaction (RT-PCR).    Flu B Result: Negative    RSV Result: Negative      Culture - Urine (10.15.19 @ 11:30)    Specimen Source: .Urine Clean Catch (Midstream)    Culture Results:   <10,000 CFU/mL Normal Urogenital Felipa        Culture - Blood (10.15.19 @ 07:00)    -  Staphylococcus aureus: Detec Any isolate of Staphylococcus aureus from a blood culture is NOT considered a contaminant.    Gram Stain:   Growth in aerobic bottle: Gram Positive Cocci in Clusters  Growth in anaerobic bottle: Gram Positive Cocci in Clusters    Specimen Source: .Blood Blood    Organism: Blood Culture PCR    Culture Results:   Growth in aerobic bottle: Gram Positive Cocci in Clusters  Growth in anaerobic bottle: Gram Positive Cocci in Clusters  "Due to technical problems, Proteus sp. will Not be reported as part of  the BCID panel until further notice"  ***Blood Panel PCR results on this specimen are available  approximately 3 hours after the Gram stain result.***  Gram stain, PCR, and/or culture results may not always  correspond due to difference in methodologies.  ************************************************************  This PCR assay was performed using [employer].  The following targets are tested for: Enterococcus,  vancomycin resistant enterococci, Listeria monocytogenes,  coagulase negative staphylococci, S. aureus,  methicillin resistant S. aureus, Streptococcus agalactiae  (Group B), S. pneumoniae, S. pyogenes (Group A),  Acinetobacter baumannii, Enterobacter cloacae, E. coli,  Klebsiella oxytoca, K. pneumoniae, Proteus sp.,  Serratia marcescens, Haemophilus influenzae,  Neisseria meningitidis, Pseudomonas aeruginosa, Candida  albicans, C. glabrata, C krusei, C parapsilosis,  C. tropicalis and the KPC resistance gene.    Organism Identification: Blood Culture PCR    Method Type: PCR Patient is a 79y old  Male  w/ PMH of HFpEF (EF of 55% with Grade II Diastolic dysfunction), COPD (home O2 3-3.5L as needed), DM, HTN, CAD s/p CABG and 1 stent, BPH, and recent admission for CHF (2019) presenting to [facility] on 10/10 with complaints of shortness of breath and chest pain.  On day of admission, had severe dyspnea on exertion associated with a pressure like sensation in his chest that radiated to his left arm. He tried sublingual nitro for relief but it didn't help. patient was found to have evidence of bilateral pleural effusions, admitted to CCU for further management, He started to have fever today, tachycardia, 10/16/19, and The ID consult requested , to assist with further evaluation of sepsis and antibiotic management.        REVIEW OF SYSTEMS: Total of twelve systems have been reviewed with patient and found to be negative unless mentioned in HPI      PAST MEDICAL & SURGICAL HISTORY:  BPH (benign prostatic hyperplasia)  CHF (congestive heart failure)  COPD (chronic obstructive pulmonary disease)  Diabetes  HTN (hypertension)  H/O heart artery stent  S/P CABG x 3        SOCIAL HISTORY  Alcohol: Does not drink  Tobacco: Does not smoke  Illicit substance use: None      FAMILY HISTORY: Non contributory to the present illness        ALLERGIES: No Known Allergies      ICU Vital Signs Last 24 Hrs  T(C): 38.8 (16 Oct 2019 19:00), Max: 39.3 (16 Oct 2019 00:57)  T(F): 101.9 (16 Oct 2019 19:00), Max: 102.7 (16 Oct 2019 00:57)  HR: 135 (16 Oct 2019 17:27) (65 - 141)  BP: 115/68 (16 Oct 2019 17:27) (87/56 - 144/87)  BP(mean): 84 (16 Oct 2019 17:27) (65 - 111)  ABP: --  ABP(mean): --  RR: 20 (16 Oct 2019 21:00) (20 - 59)  SpO2: 94% (16 Oct 2019 17:27) (83% - 97%)        PHYSICAL EXAM:  GENERAL: Not in distress , on BIPAP  CHEST/LUNG:  Air entry bilaterally  HEART: s1 and s2 present  ABDOMEN:  Nontender and  Nondistended  EXTREMITIES: trace pedal  edema, excoriation marks on LE  CNS: Awake and Alert        LABS:                        13.2   10.08 )-----------( 148      ( 16 Oct 2019 05:52 )             41.6       10-16    138  |  95<L>  |  48<H>  ----------------------------<  270<H>  4.1   |  28  |  1.4    Ca    8.9      16 Oct 2019 05:52  Mg     1.8     10-16    TPro  6.7  /  Alb  3.3<L>  /  TBili  0.7  /  DBili  x   /  AST  221<H>  /  ALT  39  /  AlkPhos  44  10-16    PT/INR - ( 15 Oct 2019 06:06 )   PT: 15.60 sec;   INR: 1.36 ratio         PTT - ( 16 Oct 2019 19:26 )  PTT:43.3 sec        CAPILLARY BLOOD GLUCOS  POCT Blood Glucose.: 214 mg/dL (16 Oct 2019 16:17)  POCT Blood Glucose.: 312 mg/dL (16 Oct 2019 11:39)  POCT Blood Glucose.: 261 mg/dL (16 Oct 2019 07:58)  POCT Blood Glucose.: 251 mg/dL (15 Oct 2019 23:22)        Urinalysis Basic - ( 15 Oct 2019 11:30 )  Color: Yellow / Appearance: Clear / S.015 / pH: x  Gluc: x / Ketone: Negative  / Bili: Negative / Urobili: 0.2 mg/dL   Blood: x / Protein: Negative mg/dL / Nitrite: Negative   Leuk Esterase: Negative / RBC: x / WBC x   Sq Epi: x / Non Sq Epi: x / Bacteria: x        MEDICATIONS  (STANDING):  ALPRAZolam 0.5 milliGRAM(s) Oral two times a day  amLODIPine   Tablet 5 milliGRAM(s) Oral daily  aspirin  chewable 81 milliGRAM(s) Oral daily  buDESOnide 160 MICROgram(s)/formoterol 4.5 MICROgram(s) Inhaler 2 Puff(s) Inhalation two times a day  cefepime   IVPB 1000 milliGRAM(s) IV Intermittent every 8 hours  dextrose 5%. 1000 milliLiter(s) (50 mL/Hr) IV Continuous <Continuous>  dextrose 50% Injectable 12.5 Gram(s) IV Push once  dextrose 50% Injectable 25 Gram(s) IV Push once  dextrose 50% Injectable 25 Gram(s) IV Push once  fenofibrate Tablet 145 milliGRAM(s) Oral daily  finasteride 5 milliGRAM(s) Oral daily  furosemide   Injectable 60 milliGRAM(s) IV Push every 12 hours  heparin  Infusion 900 Unit(s)/Hr (10 mL/Hr) IV Continuous <Continuous>  ibuprofen  Tablet. 600 milliGRAM(s) Oral every 6 hours  influenza   Vaccine 0.5 milliLiter(s) IntraMuscular once  insulin glargine Injectable (LANTUS) 21 Unit(s) SubCutaneous at bedtime  insulin lispro Injectable (HumaLOG) 7 Unit(s) SubCutaneous before breakfast  insulin lispro Injectable (HumaLOG) 7 Unit(s) SubCutaneous before lunch  insulin lispro Injectable (HumaLOG) 7 Unit(s) SubCutaneous before dinner  isosorbide   mononitrate ER Tablet (IMDUR) 30 milliGRAM(s) Oral daily  metoprolol succinate ER 75 milliGRAM(s) Oral daily  nitroglycerin    Patch 0.2 mG/Hr(s) 1 patch Transdermal daily  silver sulfADIAZINE 1% Cream 1 Application(s) Topical two times a day  simvastatin 40 milliGRAM(s) Oral at bedtime  tamsulosin 0.4 milliGRAM(s) Oral at bedtime  vancomycin  IVPB 1500 milliGRAM(s) IV Intermittent every 12 hours  vancomycin  IVPB        MEDICATIONS  (PRN):  acetaminophen   Tablet .. 650 milliGRAM(s) Oral every 6 hours PRN Temp greater or equal to 38C (100.4F), Mild Pain (1 - 3)  acetaminophen  Suppository .. 650 milliGRAM(s) Rectal every 6 hours PRN Temp greater or equal to 38C (100.4F), Mild Pain (1 - 3)  dextrose 40% Gel 15 Gram(s) Oral once PRN Blood Glucose LESS THAN 70 milliGRAM(s)/deciliter  glucagon  Injectable 1 milliGRAM(s) IntraMuscular once PRN Glucose LESS THAN 70 milligrams/deciliter          RADIOLOGY & ADDITIONAL TESTS:    < from: Xray Chest 1 View-PORTABLE IMMEDIATE (10.16.19 @ 16:56) >  Stable bilateral airspace opacities.    < end of copied text >        MICROBIOLOGY DATA:      FLU A B RSV Detection by PCR (10.15.19 @ 21:10)    Flu A Result: Negative: Negative results do not preclude influenza infection and  should not be used as the sole basis for treatment or  other patient management decisions.  A positive result may occur in the absence of viable virus.  By: [employer] Xpert Flu viral assay by Reverse Transcriptase  Polymerase Chain Reaction (RT-PCR).    Flu B Result: Negative    RSV Result: Negative      Culture - Urine (10.15.19 @ 11:30)    Specimen Source: .Urine Clean Catch (Midstream)    Culture Results:   <10,000 CFU/mL Normal Urogenital Felipa        Culture - Blood (10.15.19 @ 07:00)    -  Staphylococcus aureus: Detec Any isolate of Staphylococcus aureus from a blood culture is NOT considered a contaminant.    Gram Stain:   Growth in aerobic bottle: Gram Positive Cocci in Clusters  Growth in anaerobic bottle: Gram Positive Cocci in Clusters    Specimen Source: .Blood Blood    Organism: Blood Culture PCR    Culture Results:   Growth in aerobic bottle: Gram Positive Cocci in Clusters  Growth in anaerobic bottle: Gram Positive Cocci in Clusters  "Due to technical problems, Proteus sp. will Not be reported as part of  the BCID panel until further notice"  ***Blood Panel PCR results on this specimen are available  approximately 3 hours after the Gram stain result.***  Gram stain, PCR, and/or culture results may not always  correspond due to difference in methodologies.  ************************************************************  This PCR assay was performed using [employer].  The following targets are tested for: Enterococcus,  vancomycin resistant enterococci, Listeria monocytogenes,  coagulase negative staphylococci, S. aureus,  methicillin resistant S. aureus, Streptococcus agalactiae  (Group B), S. pneumoniae, S. pyogenes (Group A),  Acinetobacter baumannii, Enterobacter cloacae, E. coli,  Klebsiella oxytoca, K. pneumoniae, Proteus sp.,  Serratia marcescens, Haemophilus influenzae,  Neisseria meningitidis, Pseudomonas aeruginosa, Candida  albicans, C. glabrata, C krusei, C parapsilosis,  C. tropicalis and the KPC resistance gene.    Organism Identification: Blood Culture PCR    Method Type: PCR

## 2019-10-16 NOTE — PROGRESS NOTE ADULT - SUBJECTIVE AND OBJECTIVE BOX
Patient has been spiking fevers, now on bipap       T(F): 101.3 (10-16-19 @ 07:01), Max: 102.9 (10-15-19 @ 16:17)  HR: 66 (10-16-19 @ 10:27)  BP: 93/50 (10-16-19 @ 10:27)  RR: 22  SpO2: 94% (10-16-19 @ 10:27) (85% - 100%)    PHYSICAL EXAM:  GENERAL: NAD, on Bipap  HEAD:  Atraumatic, Normocephalic  NERVOUS SYSTEM:  Alert & Oriented X3, no focal deficits  CHEST/LUNG:  bilateral rales  HEART: Regular rate and rhythm; No murmurs, rubs, or gallops  ABDOMEN: Soft, Nontender, Nondistended; Bowel sounds present  EXTREMITIES:  2+ Peripheral Pulses, No clubbing, cyanosis, or edema  LYMPH: No lymphadenopathy noted  SKIN: No rashes or lesions    LABS  10-16    138  |  95<L>  |  48<H>  ----------------------------<  270<H>  4.1   |  28  |  1.4    Ca    8.9      16 Oct 2019 05:52  Mg     1.8     10-16    TPro  6.7  /  Alb  3.3<L>  /  TBili  0.7  /  DBili  x   /  AST  221<H>  /  ALT  39  /  AlkPhos  44  10-16                          13.2   10.08 )-----------( 148      ( 16 Oct 2019 05:52 )             41.6     PT/INR - ( 15 Oct 2019 06:06 )   PT: 15.60 sec;   INR: 1.36 ratio         PTT - ( 16 Oct 2019 05:52 )  PTT:52.9 sec      Culture Results:   Growth in aerobic bottle: Gram Positive Cocci in Clusters  "Due to technical problems, Proteus sp. will Not be reported as part of  the BCID panel until further notice"  ***Blood Panel PCR results on this specimen are available  approximately 3 hours after the Gram stain result.***  Gram stain, PCR, and/or culture results may not always  correspond due to difference in methodologies.  ************************************************************  This PCR assay was performed using [employer].  The following targets are tested for: Enterococcus,  vancomycin resistant enterococci, Listeria monocytogenes,  coagulase negative staphylococci, S. aureus,  methicillin resistant S. aureus, Streptococcus agalactiae  (Group B), S. pneumoniae, S.pyogenes (Group A),  Acinetobacter baumannii, Enterobacter cloacae, E. coli,  Klebsiella oxytoca, K. pneumoniae, Proteus sp.,  Serratia marcescens, Haemophilus influenzae,  Neisseria meningitidis, Pseudomonas aeruginosa, Candida  albicans, C. glabrata, C krusei, C parapsilosis,  C. tropicalis and the KPC resistance gene. (10-15-19)    RADIOLOGY  < from: Xray Chest 1 View- PORTABLE-Routine (10.16.19 @ 06:59) >  Impression:      Stable bilateral opacities.      < end of copied text >    MEDICATIONS  (STANDING):  ALPRAZolam 0.5 milliGRAM(s) Oral two times a day  amLODIPine   Tablet 5 milliGRAM(s) Oral daily  aspirin  chewable 81 milliGRAM(s) Oral daily  buDESOnide 160 MICROgram(s)/formoterol 4.5 MICROgram(s) Inhaler 2 Puff(s) Inhalation two times a day  cefepime   IVPB 1000 milliGRAM(s) IV Intermittent every 8 hours  fenofibrate Tablet 145 milliGRAM(s) Oral daily  finasteride 5 milliGRAM(s) Oral daily  furosemide   Injectable 60 milliGRAM(s) IV Push every 12 hours  heparin  Infusion 900 Unit(s)/Hr (10 mL/Hr) IV Continuous <Continuous>  ibuprofen  Tablet. 600 milliGRAM(s) Oral every 6 hours  influenza   Vaccine 0.5 milliLiter(s) IntraMuscular once  insulin lispro (HumaLOG) corrective regimen sliding scale   SubCutaneous three times a day before meals  isosorbide   mononitrate ER Tablet (IMDUR) 30 milliGRAM(s) Oral daily  metoprolol succinate ER 75 milliGRAM(s) Oral daily  nitroglycerin    Patch 0.2 mG/Hr(s) 1 patch Transdermal daily  silver sulfADIAZINE 1% Cream 1 Application(s) Topical two times a day  simvastatin 40 milliGRAM(s) Oral at bedtime  tamsulosin 0.4 milliGRAM(s) Oral at bedtime  vancomycin  IVPB 1500 milliGRAM(s) IV Intermittent every 12 hours       MEDICATIONS  (PRN):  acetaminophen   Tablet .. 650 milliGRAM(s) Oral every 6 hours PRN Temp greater or equal to 38C (100.4F), Mild Pain (1 - 3)  dextrose 40% Gel 15 Gram(s) Oral once PRN Blood Glucose LESS THAN 70 milliGRAM(s)/deciliter  glucagon  Injectable 1 milliGRAM(s) IntraMuscular once PRN Glucose LESS THAN 70 milligrams/deciliter

## 2019-10-16 NOTE — CONSULT NOTE ADULT - ASSESSMENT
Patient is a 79y old  Male  w/ PMH of HFpEF (EF of 55% with Grade II Diastolic dysfunction), COPD (home O2 3-3.5L as needed), DM, HTN, CAD s/p CABG and 1 stent, BPH, and recent admission for CHF (August 2019) presenting to [facility] on 10/10 with complaints of shortness of breath and chest pain.  On day of admission, had severe dyspnea on exertion associated with a pressure like sensation in his chest that radiated to his left arm. He tried sublingual nitro for relief but it didn't help. patient was found to have evidence of bilateral pleural effusions, admitted to CCU for further management, He started to have fever today, tachycardia, 10/16/19, and The ID consult requested , to assist with further evaluation of sepsis and antibiotic management.      # Sepsis ( fever + tachycardia )  # Staph. bacteremia/septicemia - ? source    would recommend:    1. Follow up  final blood culture to check sensitivity of staph. aureus  2. Repeat Blood cultures X 2 to document clearing the blood stream  3. TTE followed by [facility] to rule out vegetation  4. Management of BIPAP as per CCU protocol  5. Continue Vancomycin and Cefepime until work up is done, keep vancomycin level between 15 to 20  d/w CCU team and patient    will follow the patient with you and make further recommendation based on the clinical course and Lab results  Thank you for the opportunity to participate in Mr. ZAVALA's care

## 2019-10-16 NOTE — PROGRESS NOTE ADULT - ASSESSMENT
Pulmonary edema  hx diastolic heart failure  fever developing LLL infiltrate       Suggest:  fever w/u  full cultures  empiric abx for GNR coverage  cardio management CATH cancelled due to temps  f/u echo  cont diuretics  taper O2  OOB  DVT prophylaxis    monitor in ICU

## 2019-10-17 DIAGNOSIS — R78.81 BACTEREMIA: ICD-10-CM

## 2019-10-17 DIAGNOSIS — A41.9 SEPSIS, UNSPECIFIED ORGANISM: ICD-10-CM

## 2019-10-17 LAB
ALBUMIN SERPL ELPH-MCNC: 3 G/DL — LOW (ref 3.5–5.2)
ALP SERPL-CCNC: 38 U/L — SIGNIFICANT CHANGE UP (ref 30–115)
ALT FLD-CCNC: 38 U/L — SIGNIFICANT CHANGE UP (ref 0–41)
ANION GAP SERPL CALC-SCNC: 10 MMOL/L — SIGNIFICANT CHANGE UP (ref 7–14)
APTT BLD: 39 SEC — SIGNIFICANT CHANGE UP (ref 27–39.2)
APTT BLD: 49.6 SEC — HIGH (ref 27–39.2)
AST SERPL-CCNC: 115 U/L — HIGH (ref 0–41)
BASE EXCESS BLDA CALC-SCNC: 7.4 MMOL/L — HIGH (ref -2–2)
BASOPHILS # BLD AUTO: 0.01 K/UL — SIGNIFICANT CHANGE UP (ref 0–0.2)
BASOPHILS NFR BLD AUTO: 0.1 % — SIGNIFICANT CHANGE UP (ref 0–1)
BILIRUB SERPL-MCNC: 0.6 MG/DL — SIGNIFICANT CHANGE UP (ref 0.2–1.2)
BUN SERPL-MCNC: 62 MG/DL — CRITICAL HIGH (ref 10–20)
C DIFF BY PCR RESULT: NEGATIVE — SIGNIFICANT CHANGE UP
C DIFF TOX GENS STL QL NAA+PROBE: SIGNIFICANT CHANGE UP
CALCIUM SERPL-MCNC: 8.5 MG/DL — SIGNIFICANT CHANGE UP (ref 8.5–10.1)
CHLORIDE SERPL-SCNC: 96 MMOL/L — LOW (ref 98–110)
CO2 SERPL-SCNC: 32 MMOL/L — SIGNIFICANT CHANGE UP (ref 17–32)
CREAT SERPL-MCNC: 2 MG/DL — HIGH (ref 0.7–1.5)
EOSINOPHIL # BLD AUTO: 0.01 K/UL — SIGNIFICANT CHANGE UP (ref 0–0.7)
EOSINOPHIL NFR BLD AUTO: 0.1 % — SIGNIFICANT CHANGE UP (ref 0–8)
GLUCOSE BLDC GLUCOMTR-MCNC: 175 MG/DL — HIGH (ref 70–99)
GLUCOSE BLDC GLUCOMTR-MCNC: 180 MG/DL — HIGH (ref 70–99)
GLUCOSE BLDC GLUCOMTR-MCNC: 226 MG/DL — HIGH (ref 70–99)
GLUCOSE BLDC GLUCOMTR-MCNC: 257 MG/DL — HIGH (ref 70–99)
GLUCOSE SERPL-MCNC: 264 MG/DL — HIGH (ref 70–99)
GRAM STN FLD: SIGNIFICANT CHANGE UP
HCO3 BLDA-SCNC: 34 MMOL/L — HIGH (ref 23–27)
HCT VFR BLD CALC: 37.6 % — LOW (ref 42–52)
HGB BLD-MCNC: 11.7 G/DL — LOW (ref 14–18)
HOROWITZ INDEX BLDA+IHG-RTO: 100 — SIGNIFICANT CHANGE UP
IMM GRANULOCYTES NFR BLD AUTO: 0.8 % — HIGH (ref 0.1–0.3)
LG PLATELETS BLD QL AUTO: SIGNIFICANT CHANGE UP
LYMPHOCYTES # BLD AUTO: 0.58 K/UL — LOW (ref 1.2–3.4)
LYMPHOCYTES # BLD AUTO: 7.4 % — LOW (ref 20.5–51.1)
MCHC RBC-ENTMCNC: 25.4 PG — LOW (ref 27–31)
MCHC RBC-ENTMCNC: 31.1 G/DL — LOW (ref 32–37)
MCV RBC AUTO: 81.7 FL — SIGNIFICANT CHANGE UP (ref 80–94)
MONOCYTES # BLD AUTO: 0.74 K/UL — HIGH (ref 0.1–0.6)
MONOCYTES NFR BLD AUTO: 9.5 % — HIGH (ref 1.7–9.3)
NEUTROPHILS # BLD AUTO: 6.41 K/UL — SIGNIFICANT CHANGE UP (ref 1.4–6.5)
NEUTROPHILS NFR BLD AUTO: 82.1 % — HIGH (ref 42.2–75.2)
NRBC # BLD: 0 /100 WBCS — SIGNIFICANT CHANGE UP (ref 0–0)
PCO2 BLDA: 53 MMHG — HIGH (ref 38–42)
PH BLDA: 7.42 — SIGNIFICANT CHANGE UP (ref 7.38–7.42)
PLAT MORPH BLD: ABNORMAL
PLATELET # BLD AUTO: 98 K/UL — LOW (ref 130–400)
PLATELET COUNT - ESTIMATE: NORMAL — SIGNIFICANT CHANGE UP
PO2 BLDA: 303 MMHG — HIGH (ref 78–95)
POTASSIUM SERPL-MCNC: 4 MMOL/L — SIGNIFICANT CHANGE UP (ref 3.5–5)
POTASSIUM SERPL-SCNC: 4 MMOL/L — SIGNIFICANT CHANGE UP (ref 3.5–5)
PROT SERPL-MCNC: 6 G/DL — SIGNIFICANT CHANGE UP (ref 6–8)
RBC # BLD: 4.6 M/UL — LOW (ref 4.7–6.1)
RBC # FLD: 15.8 % — HIGH (ref 11.5–14.5)
RBC BLD AUTO: NORMAL — SIGNIFICANT CHANGE UP
SODIUM SERPL-SCNC: 138 MMOL/L — SIGNIFICANT CHANGE UP (ref 135–146)
SPECIMEN SOURCE: SIGNIFICANT CHANGE UP
WBC # BLD: 7.81 K/UL — SIGNIFICANT CHANGE UP (ref 4.8–10.8)
WBC # FLD AUTO: 7.81 K/UL — SIGNIFICANT CHANGE UP (ref 4.8–10.8)

## 2019-10-17 PROCEDURE — 71045 X-RAY EXAM CHEST 1 VIEW: CPT | Mod: 26

## 2019-10-17 PROCEDURE — 99233 SBSQ HOSP IP/OBS HIGH 50: CPT

## 2019-10-17 RX ORDER — NAFCILLIN 10 G/100ML
2 INJECTION, POWDER, FOR SOLUTION INTRAVENOUS ONCE
Refills: 0 | Status: COMPLETED | OUTPATIENT
Start: 2019-10-17 | End: 2019-10-17

## 2019-10-17 RX ORDER — HEPARIN SODIUM 5000 [USP'U]/ML
2500 INJECTION INTRAVENOUS; SUBCUTANEOUS ONCE
Refills: 0 | Status: COMPLETED | OUTPATIENT
Start: 2019-10-17 | End: 2019-10-17

## 2019-10-17 RX ORDER — HEPARIN SODIUM 5000 [USP'U]/ML
1100 INJECTION INTRAVENOUS; SUBCUTANEOUS
Qty: 25000 | Refills: 0 | Status: DISCONTINUED | OUTPATIENT
Start: 2019-10-17 | End: 2019-10-18

## 2019-10-17 RX ORDER — MORPHINE SULFATE 50 MG/1
4 CAPSULE, EXTENDED RELEASE ORAL ONCE
Refills: 0 | Status: DISCONTINUED | OUTPATIENT
Start: 2019-10-17 | End: 2019-10-17

## 2019-10-17 RX ORDER — SODIUM CHLORIDE 9 MG/ML
500 INJECTION INTRAMUSCULAR; INTRAVENOUS; SUBCUTANEOUS ONCE
Refills: 0 | Status: DISCONTINUED | OUTPATIENT
Start: 2019-10-17 | End: 2019-10-17

## 2019-10-17 RX ORDER — NAFCILLIN 10 G/100ML
2 INJECTION, POWDER, FOR SOLUTION INTRAVENOUS EVERY 4 HOURS
Refills: 0 | Status: DISCONTINUED | OUTPATIENT
Start: 2019-10-17 | End: 2019-10-29

## 2019-10-17 RX ORDER — CLOPIDOGREL BISULFATE 75 MG/1
75 TABLET, FILM COATED ORAL DAILY
Refills: 0 | Status: DISCONTINUED | OUTPATIENT
Start: 2019-10-17 | End: 2019-10-29

## 2019-10-17 RX ORDER — NAFCILLIN 10 G/100ML
INJECTION, POWDER, FOR SOLUTION INTRAVENOUS
Refills: 0 | Status: DISCONTINUED | OUTPATIENT
Start: 2019-10-17 | End: 2019-10-29

## 2019-10-17 RX ADMIN — TAMSULOSIN HYDROCHLORIDE 0.4 MILLIGRAM(S): 0.4 CAPSULE ORAL at 21:43

## 2019-10-17 RX ADMIN — NAFCILLIN 200 GRAM(S): 10 INJECTION, POWDER, FOR SOLUTION INTRAVENOUS at 21:43

## 2019-10-17 RX ADMIN — Medication 1 APPLICATION(S): at 18:44

## 2019-10-17 RX ADMIN — Medication 145 MILLIGRAM(S): at 12:10

## 2019-10-17 RX ADMIN — Medication 0.5 MILLIGRAM(S): at 17:53

## 2019-10-17 RX ADMIN — HEPARIN SODIUM 2500 UNIT(S): 5000 INJECTION INTRAVENOUS; SUBCUTANEOUS at 20:07

## 2019-10-17 RX ADMIN — Medication 300 MILLIGRAM(S): at 05:21

## 2019-10-17 RX ADMIN — CLOPIDOGREL BISULFATE 75 MILLIGRAM(S): 75 TABLET, FILM COATED ORAL at 17:53

## 2019-10-17 RX ADMIN — Medication 1 APPLICATION(S): at 05:22

## 2019-10-17 RX ADMIN — BUDESONIDE AND FORMOTEROL FUMARATE DIHYDRATE 2 PUFF(S): 160; 4.5 AEROSOL RESPIRATORY (INHALATION) at 20:49

## 2019-10-17 RX ADMIN — CEFEPIME 100 MILLIGRAM(S): 1 INJECTION, POWDER, FOR SOLUTION INTRAMUSCULAR; INTRAVENOUS at 05:21

## 2019-10-17 RX ADMIN — NAFCILLIN 200 GRAM(S): 10 INJECTION, POWDER, FOR SOLUTION INTRAVENOUS at 17:55

## 2019-10-17 RX ADMIN — Medication 7 UNIT(S): at 12:10

## 2019-10-17 RX ADMIN — HEPARIN SODIUM 11 UNIT(S)/HR: 5000 INJECTION INTRAVENOUS; SUBCUTANEOUS at 20:10

## 2019-10-17 RX ADMIN — INSULIN GLARGINE 21 UNIT(S): 100 INJECTION, SOLUTION SUBCUTANEOUS at 21:46

## 2019-10-17 RX ADMIN — Medication 81 MILLIGRAM(S): at 12:10

## 2019-10-17 RX ADMIN — Medication 600 MILLIGRAM(S): at 00:00

## 2019-10-17 RX ADMIN — Medication 600 MILLIGRAM(S): at 17:54

## 2019-10-17 RX ADMIN — Medication 600 MILLIGRAM(S): at 00:45

## 2019-10-17 RX ADMIN — Medication 60 MILLIGRAM(S): at 05:21

## 2019-10-17 RX ADMIN — BUDESONIDE AND FORMOTEROL FUMARATE DIHYDRATE 2 PUFF(S): 160; 4.5 AEROSOL RESPIRATORY (INHALATION) at 08:04

## 2019-10-17 RX ADMIN — NAFCILLIN 200 GRAM(S): 10 INJECTION, POWDER, FOR SOLUTION INTRAVENOUS at 08:43

## 2019-10-17 RX ADMIN — FINASTERIDE 5 MILLIGRAM(S): 5 TABLET, FILM COATED ORAL at 12:10

## 2019-10-17 RX ADMIN — NAFCILLIN 200 GRAM(S): 10 INJECTION, POWDER, FOR SOLUTION INTRAVENOUS at 14:51

## 2019-10-17 RX ADMIN — SIMVASTATIN 40 MILLIGRAM(S): 20 TABLET, FILM COATED ORAL at 21:43

## 2019-10-17 NOTE — PROGRESS NOTE ADULT - SUBJECTIVE AND OBJECTIVE BOX
Patient remains on bipap, still febrile      T(F): 97.2 (10-17-19 @ 07:05), Max: 102.8 (10-16-19 @ 21:30)  HR: 66 (10-17-19 @ 07:48)  BP: 99/56 (10-17-19 @ 05:09)  RR: 20 (10-17-19 @ 07:05)  SpO2: 98% (10-17-19 @ 07:48) (83% - 99%)    PHYSICAL EXAM:  GENERAL: NAD  HEAD:  Atraumatic, Normocephalic  NERVOUS SYSTEM:  no focal deficits  CHEST/LUNG:  bilateral rales  HEART: Regular rate and rhythm; No murmurs, rubs, or gallops  ABDOMEN: Soft, Nontender, Nondistended; Bowel sounds present  EXTREMITIES:  2+ Peripheral Pulses, No clubbing, cyanosis, or edema      LABS  10-17    138  |  96<L>  |  62<HH>  ----------------------------<  264<H>  4.0   |  32  |  2.0<H>    Ca    8.5      17 Oct 2019 05:30  Mg     1.8     10-16    TPro  6.0  /  Alb  3.0<L>  /  TBili  0.6  /  DBili  x   /  AST  115<H>  /  ALT  38  /  AlkPhos  38  10-17                          11.7   7.81  )-----------( 98       ( 17 Oct 2019 05:30 )             37.6     PTT - ( 17 Oct 2019 05:30 )  PTT:49.6 sec      Culture Results:   <10,000 CFU/mL Normal Urogenital Felipa (10-15-19)  Culture Results:   Growth in aerobic and anaerobic bottles: Staphylococcus aureus  "Due to technical problems, Proteus sp. will Not be reported as part of  the BCID panel until further notice"  ***Blood Panel PCR results on this specimen are available  approximately 3 hours after the Gram stain result.***  Gram stain, PCR, and/or culture results may not always  correspond due to difference in methodologies.  ************************************************************  This PCR assay was performed using [employer].  The following targets are tested for: Enterococcus,  vancomycin resistant enterococci, Listeria monocytogenes,  coagulase negative staphylococci, S. aureus,  methicillin resistant S. aureus, Streptococcus agalactiae  (Group B), S. pneumoniae, S. pyogenes (Group A),  Acinetobacter baumannii, Enterobacter cloacae, E. coli,  Klebsiella oxytoca, K. pneumoniae, Proteus sp.,  Serratia marcescens, Haemophilus influenzae,  Neisseria meningitidis, Pseudomonas aeruginosa, Candida  albicans, C. glabrata, C krusei, C parapsilosis,  C. tropicalis and the KPC resistance gene. (10-15-19)    RADIOLOGY  < from: Xray Chest 1 View- PORTABLE-Routine (10.17.19 @ 04:37) >  IMPRESSION:      Mild worsening of bilateral opacities/effusions.    < end of copied text >    MEDICATIONS  (STANDING):  ALPRAZolam 0.5 milliGRAM(s) Oral two times a day  amLODIPine   Tablet 5 milliGRAM(s) Oral daily  aspirin  chewable 81 milliGRAM(s) Oral daily  buDESOnide 160 MICROgram(s)/formoterol 4.5 MICROgram(s) Inhaler 2 Puff(s) Inhalation two times a day  fenofibrate Tablet 145 milliGRAM(s) Oral daily  finasteride 5 milliGRAM(s) Oral daily  furosemide   Injectable 60 milliGRAM(s) IV Push every 12 hours  heparin  Infusion 900 Unit(s)/Hr (10 mL/Hr) IV Continuous <Continuous>  ibuprofen  Tablet. 600 milliGRAM(s) Oral every 6 hours  influenza   Vaccine 0.5 milliLiter(s) IntraMuscular once  insulin glargine Injectable (LANTUS) 21 Unit(s) SubCutaneous at bedtime  insulin lispro Injectable (HumaLOG) 7 Unit(s) SubCutaneous before breakfast  insulin lispro Injectable (HumaLOG) 7 Unit(s) SubCutaneous before lunch  insulin lispro Injectable (HumaLOG) 7 Unit(s) SubCutaneous before dinner  isosorbide   mononitrate ER Tablet (IMDUR) 30 milliGRAM(s) Oral daily  metoprolol succinate ER 75 milliGRAM(s) Oral daily  nafcillin  IVPB 2 Gram(s) IV Intermittent every 4 hours  nitroglycerin    Patch 0.2 mG/Hr(s) 1 patch Transdermal daily  silver sulfADIAZINE 1% Cream 1 Application(s) Topical two times a day  simvastatin 40 milliGRAM(s) Oral at bedtime  tamsulosin 0.4 milliGRAM(s) Oral at bedtime    MEDICATIONS  (PRN):  acetaminophen   Tablet .. 650 milliGRAM(s) Oral every 6 hours PRN Temp greater or equal to 38C (100.4F), Mild Pain (1 - 3)  acetaminophen  Suppository .. 650 milliGRAM(s) Rectal every 6 hours PRN Temp greater or equal to 38C (100.4F), Mild Pain (1 - 3)  dextrose 40% Gel 15 Gram(s) Oral once PRN Blood Glucose LESS THAN 70 milliGRAM(s)/deciliter  glucagon  Injectable 1 milliGRAM(s) IntraMuscular once PRN Glucose LESS THAN 70 milligrams/deciliter

## 2019-10-17 NOTE — PROGRESS NOTE ADULT - ASSESSMENT
Patient is a 78yo Male w/ PMH of HFpEF (EF of 55% with Grade II Diastolic dysfunction), COPD (home O2 3-3.5L as needed), DM, HTN, CAD s/p CABG and 1 stent, BPH, and recent admission for CHF (August 2019) presented  to [facility] with complaints of shortness of breath and exertional chest pain.    #) Acute on Chronic Diastolic CHF exacerbation / moderate  aortic stenosis   -  acute respiratory failure   - back on BIPAP  -  even  fluid balance  - now with fevers and positive blood cultures, with possible gram negative pneumonia   - continue Vanco, Cefepime and check repeat BC   - no cardiac cath for now    #) Chest Pain,  in patient with hx of CABG and stent placement  - c/w losartan asa, metoprolol; Simvastatin   -  Lovenox   - cardiology following    #) Hx of A Fib  - On Xarelto, 15mg at home; will hold for now and continue  Lovenox   -  Metoprolol      #) Hx of COPD  - Stable at this time, with no evidence of acute exacerbation  - Cont with Symbicort (in place of Breo)    #) Hx of Diabetes Type 2  - Insulin sq hospital protocol   - Carb consistent diet    #) Hx of BPH  - Cont with Flomax and Finasteride     #Progress Note Handoff  Pending (specify):  Consults_________, Tests________, Test Results___BC____, Other_________  Family discussion:  Disposition: Home___/SNF___/Other________/Unknown at this time________

## 2019-10-17 NOTE — DIETITIAN INITIAL EVALUATION ADULT. - RD TO REMAIN AVAILABLE
yes/maintain on DASH diet with 800 ml fluid restriction, add CHO consistent restriction 2/2 hx of DM

## 2019-10-17 NOTE — PROGRESS NOTE ADULT - SUBJECTIVE AND OBJECTIVE BOX
TAIWO ZAVALA  79y, Male  Allergy: No Known Allergies      CHIEF COMPLAINT: CHF Exacerbation (16 Oct 2019 12:17)      INTERVAL EVENTS/HPI  - No acute events overnight  - T(F): , Max: 102.8 (10-16-19 @ 21:30)  - Denies any worsening symptoms  - Tolerating medication    ROS  UTO     SOCIAL HISTORY - not relevant     Substance Use (  ) never used  (  ) IVDU (  ) Other:  Tobacco Usage:  (   ) never smoked   (   ) former smoker   (   ) current smoker   Alcohol Usage: (   ) social  (   ) daily use (   ) denies  Sexual History:       FH noncontributory     VITALS:  T(F): 97.9, Max: 102.8 (10-16-19 @ 21:30)  HR: 65  BP: 99/56  RR: 22Vital Signs Last 24 Hrs  T(C): 36.6 (17 Oct 2019 03:09), Max: 39.3 (16 Oct 2019 21:30)  T(F): 97.9 (17 Oct 2019 03:09), Max: 102.8 (16 Oct 2019 21:30)  HR: 65 (17 Oct 2019 05:09) (65 - 141)  BP: 99/56 (17 Oct 2019 05:09) (87/56 - 144/87)  BP(mean): 72 (17 Oct 2019 05:09) (65 - 111)  RR: 22 (17 Oct 2019 03:09) (20 - 52)  SpO2: 99% (17 Oct 2019 05:09) (83% - 99%)    PHYSICAL EXAM:  Gen: NAD, resting in bed  HEENT: Normocephalic, atraumatic  Neck: supple, no lymphadenopathy  CV: s1 s 2 +   Lungs: decreased BS at bases  Abdomen: Soft, BS present  Ext: Warm, well perfused. chronic stasis changes  Neuro: non focal  Skin: no phlebitis       TESTS & MEASUREMENTS:                        13.2   10.08 )-----------( 148      ( 16 Oct 2019 05:52 )             41.6     10-16    138  |  95<L>  |  48<H>  ----------------------------<  270<H>  4.1   |  28  |  1.4    Ca    8.9      16 Oct 2019 05:52  Mg     1.8     10-16    TPro  6.7  /  Alb  3.3<L>  /  TBili  0.7  /  DBili  x   /  AST  221<H>  /  ALT  39  /  AlkPhos  44  10-16      LIVER FUNCTIONS - ( 16 Oct 2019 05:52 )  Alb: 3.3 g/dL / Pro: 6.7 g/dL / ALK PHOS: 44 U/L / ALT: 39 U/L / AST: 221 U/L / GGT: x           Urinalysis Basic - ( 15 Oct 2019 11:30 )    Color: Yellow / Appearance: Clear / S.015 / pH: x  Gluc: x / Ketone: Negative  / Bili: Negative / Urobili: 0.2 mg/dL   Blood: x / Protein: Negative mg/dL / Nitrite: Negative   Leuk Esterase: Negative / RBC: x / WBC x   Sq Epi: x / Non Sq Epi: x / Bacteria: x        Culture - Urine (collected 10-15-19 @ 11:30)  Source: .Urine Clean Catch (Midstream)  Final Report (10-16-19 @ 21:23):    <10,000 CFU/mL Normal Urogenital Felipa    Culture - Blood (collected 10-15-19 @ 07:00)  Source: .Blood Blood  Gram Stain (10-16-19 @ 11:23):    Growth in aerobic bottle: Gram Positive Cocci in Clusters    Growth in anaerobic bottle: Gram Positive Cocci in Clusters  Preliminary Report (10-16-19 @ 11:26):    Growth in aerobic bottle: Gram Positive Cocci in Clusters    Growth in anaerobic bottle: Gram Positive Cocci in Clusters    "Due to technical problems, Proteus sp. will Not be reported as part of    the BCID panel until further notice"    ***Blood Panel PCR results on this specimen are available    approximately 3 hours after the Gram stain result.***    Gram stain, PCR, and/or culture results may not always    correspond due to difference in methodologies.    ************************************************************    This PCR assay was performed using [employer].    The following targets are tested for: Enterococcus,    vancomycin resistant enterococci, Listeria monocytogenes,    coagulase negative staphylococci, S. aureus,    methicillin resistant S. aureus, Streptococcus agalactiae    (Group B), S. pneumoniae, S. pyogenes (Group A),    Acinetobacter baumannii, Enterobacter cloacae, E. coli,    Klebsiella oxytoca, K. pneumoniae, Proteus sp.,    Serratia marcescens, Haemophilus influenzae,    Neisseria meningitidis, Pseudomonas aeruginosa, Candida    albicans, C. glabrata, C krusei, C parapsilosis,    C. tropicalis and the KPC resistance gene.  Organism: Blood Culture PCR (10-16-19 @ 12:58)  Organism: Blood Culture PCR (10-16-19 @ 12:58)      -  Staphylococcus aureus: Detec Any isolate of Staphylococcus aureus from a blood culture is NOT considered a contaminant.      Method Type: PCR            INFECTIOUS DISEASES TESTING      RADIOLOGY & ADDITIONAL TESTS:  I have personally reviewed the last Chest xray  CXR  Xray Chest 1 View- PORTABLE-Urgent:   EXAM:  XR CHEST PORTABLE URGENT 1V            PROCEDURE DATE:  10/15/2019            INTERPRETATION:  Clinical History / Reason for exam: CHF    Comparison : Chest radiograph earlier the same day.    Technique/Positioning: Portable AP view.    Findings:    Support devices: None.    Cardiac/mediastinum/hilum: Median sternotomy.    Lung parenchyma/Pleura: Bilateral airspace and interstitial opacities,   worse from the prior x-ray of the same day. Blunted bilateral   costophrenic angles.    Skeleton/soft tissues: Stable.    Impression:      Findings as above suggestive of worsening CHF compared to the prior x-ray   of the same day                  JULIANO FARNSWORTH M.D., ATTENDING RADIOLOGIST  This document has been electronically signed. Oct 16 2019  8:45AM             (10-15-19 @ 23:46)      CT      CARDIOLOGY TESTING  12 Lead ECG:   Ventricular Rate 92 BPM    Atrial Rate 277 BPM    QRS Duration 114 ms    Q-T Interval 356 ms    QTC Calculation(Bezet) 440 ms    R Axis -86 degrees    T Axis -85 degrees    Diagnosis Line Atrial flutter with variable A-V block  Left axis deviation  Incomplete right bundle branch block  Nonspecific ST-T changes    Confirmed by STEVE YAO MD ([id number]) on 10/16/2019 2:25:19 PM (10-16-19 @ 08:01)  12 Lead ECG:   Ventricular Rate 138 BPM    Atrial Rate 138 BPM    P-R Interval 152 ms    QRS Duration 120 ms    Q-T Interval 302 ms    QTC Calculation(Bezet) 457 ms    R Axis 255 degrees    T Axis 52 degrees    Diagnosis Line Sinus tachycardia with occasional Premature ventricular complexes  Right bundle branch block      Confirmed by STEVE YAO MD ([id number]) on 10/16/2019 2:25:00 PM (10-15-19 @ 17:35)      MEDICATIONS  ALPRAZolam 0.5  amLODIPine   Tablet 5  aspirin  chewable 81  buDESOnide 160 MICROgram(s)/formoterol 4.5 MICROgram(s) Inhaler 2  cefepime   IVPB 1000  dextrose 5%. 1000  dextrose 50% Injectable 12.5  dextrose 50% Injectable 25  dextrose 50% Injectable 25  fenofibrate Tablet 145  finasteride 5  furosemide   Injectable 60  heparin  Infusion 900  ibuprofen  Tablet. 600  influenza   Vaccine 0.5  insulin glargine Injectable (LANTUS) 21  insulin lispro Injectable (HumaLOG) 7  insulin lispro Injectable (HumaLOG) 7  insulin lispro Injectable (HumaLOG) 7  isosorbide   mononitrate ER Tablet (IMDUR) 30  metoprolol succinate ER 75  nitroglycerin    Patch 0.2 mG/Hr(s) 1  silver sulfADIAZINE 1% Cream 1  simvastatin 40  tamsulosin 0.4  vancomycin  IVPB 1500  vancomycin  IVPB       ANTIBIOTICS:  cefepime   IVPB 1000 milliGRAM(s) IV Intermittent every 8 hours  vancomycin  IVPB 1500 milliGRAM(s) IV Intermittent every 12 hours  vancomycin  IVPB

## 2019-10-17 NOTE — PROGRESS NOTE ADULT - ASSESSMENT
Patient is a 80yo Male w/ PMH of HFpEF (EF of 55% with Grade II Diastolic dysfunction), COPD (home O2 3-3.5L as needed), DM, HTN, CAD s/p CABG and 1 stent, BPH, and recent admission for CHF (August 2019) presenting to [facility] with complaints of shortness of breath and chest pain. Patient reports he has been progressively worsening shortness of breath for the last 4-5 days prior to admission.    1) Acute on chronic diastolic CHF Exacerbation   Lasix held for now as patient appears to be maximally diuresed   Patient on Heparin Drip  Continue with Metoprolol ER 75 along with Aspirin 81. c/w plavix 75mg daily after loading does  Monitor I and O     2. HTN  stable at this time Hold Meds and monitor BP    3. DM2.   Start Insulin Follow FBG    4. COPD on as needed home O2 not in exacerbation  -Monitor and c/w home med    5. BPH  C/w med    6. CAD s/p CABG  - continue cardiac med and plan for cath once afebrile    7. Febrile   Blood Culture prelim Gram positive Cocci in Clusters   Nafcillin as per ID recommendations   Follow with daily blood cultures until negative   Consider antibiotic de-escalation once patient improved and Sensitivities are back.       DVT Prophylaxis   Heparin Drip     Disposition   Continue with CCU Monitoring

## 2019-10-17 NOTE — PROGRESS NOTE ADULT - ASSESSMENT
Pulmonary edema  hx diastolic heart failure  fever multilobar  pneumonia from GNR      Suggest:  fever w/u  full cultures  abx per ID  cardio management CATH cancelled due to temps  f/u echo  cont diuretics as needed  taper O2  NIPPV as needed  OOB  DVT prophylaxis    monitor in ICU Pulmonary edema improved  hx diastolic heart failure  fever multilobar  pneumonia from [facility] MSSS      Suggest:  fever w/u  full cultures  abx per ID  cardio management CATH cancelled due to temps  f/u echo  hold diuretics for 24H  taper O2  NIPPV as needed  OOB  DVT prophylaxis    monitor in ICU

## 2019-10-17 NOTE — PROGRESS NOTE ADULT - SUBJECTIVE AND OBJECTIVE BOX
HPI:  Patient is a 80yo Male w/ PMH of HFpEF (EF of 55% with Grade II Diastolic dysfunction), COPD (home O2 3-3.5L as needed), DM, HTN, CAD s/p CABG and 1 stent, BPH, and recent admission for CHF (2019) presenting to [facility] with complaints of shortness of breath and chest pain. Patient reports he has been progressively worsening shortness of breath for the last 4-5 days prior to admission. He also reports associated chest pain on exertion.  Patient reports he has been compliant with all of his medications as well with a low salt diet. He denies any recent history of fevers, chills, cough. On day of admission, had severe dyspnea on exertion associated with a pressure like sensation in his chest that radiated to his left arm. He tried sublingual nitro for relief but it didn't help. Therefore, he came in for further evaluation. He was given Solumedrol 125mg by EMS. In the ED, patient was found to have evidence of bilateral pleural effusions on bedside sono done by ED staff. He received IV Lasix 40mg and was placed on BiPAP with major improvement in his symptoms. On my assessment, patient was speaking to me in full sentences on BiPAP and his chest pain has resolved. (10 Oct 2019 13:27)        INTERVAL HPI/OVERNIGHT EVENTS: no overnight events  ICU Vital Signs Last 24 Hrs  T(C): 36.2 (17 Oct 2019 07:05), Max: 39.3 (16 Oct 2019 21:30)  T(F): 97.2 (17 Oct 2019 07:05), Max: 102.8 (16 Oct 2019 21:30)  HR: 67 (17 Oct 2019 09:09) (65 - 141)  BP: 92/52 (17 Oct 2019 09:09) (87/56 - 144/87)  BP(mean): 67 (17 Oct 2019 09:09) (65 - 111)  ABP: --  ABP(mean): --  RR: 20 (17 Oct 2019 07:05) (20 - 52)  SpO2: 99% (17 Oct 2019 09:09) (83% - 99%)    I&O's Summary    16 Oct 2019 07:01  -  17 Oct 2019 07:00  --------------------------------------------------------  IN: 1370 mL / OUT: 775 mL / NET: 595 mL    17 Oct 2019 07:01  -  17 Oct 2019 10:34  --------------------------------------------------------  IN: 130 mL / OUT: 275 mL / NET: -145 mL          LABS:                        11.7   7.81  )-----------( 98       ( 17 Oct 2019 05:30 )             37.6     10-17    138  |  96<L>  |  62<HH>  ----------------------------<  264<H>  4.0   |  32  |  2.0<H>    Ca    8.5      17 Oct 2019 05:30  Mg     1.8     10-16    TPro  6.0  /  Alb  3.0<L>  /  TBili  0.6  /  DBili  x   /  AST  115<H>  /  ALT  38  /  AlkPhos  38  10-17    PTT - ( 17 Oct 2019 05:30 )  PTT:49.6 sec  Urinalysis Basic - ( 15 Oct 2019 11:30 )    Color: Yellow / Appearance: Clear / S.015 / pH: x  Gluc: x / Ketone: Negative  / Bili: Negative / Urobili: 0.2 mg/dL   Blood: x / Protein: Negative mg/dL / Nitrite: Negative   Leuk Esterase: Negative / RBC: x / WBC x   Sq Epi: x / Non Sq Epi: x / Bacteria: x      CAPILLARY BLOOD GLUCOSE      POCT Blood Glucose.: 226 mg/dL (17 Oct 2019 07:50)  POCT Blood Glucose.: 286 mg/dL (16 Oct 2019 21:51)  POCT Blood Glucose.: 214 mg/dL (16 Oct 2019 16:17)  POCT Blood Glucose.: 312 mg/dL (16 Oct 2019 11:39)    ABG - ( 17 Oct 2019 05:55 )  pH, Arterial: 7.42  pH, Blood: x     /  pCO2: 53    /  pO2: 303   / HCO3: 34    / Base Excess: 7.4   /  SaO2: x         Consultant(s) Notes Reviewed:  [x ] YES  [ ] NO    MEDICATIONS  (STANDING):  ALPRAZolam 0.5 milliGRAM(s) Oral two times a day  aspirin  chewable 81 milliGRAM(s) Oral daily  buDESOnide 160 MICROgram(s)/formoterol 4.5 MICROgram(s) Inhaler 2 Puff(s) Inhalation two times a day  dextrose 5%. 1000 milliLiter(s) (50 mL/Hr) IV Continuous <Continuous>  dextrose 50% Injectable 12.5 Gram(s) IV Push once  dextrose 50% Injectable 25 Gram(s) IV Push once  dextrose 50% Injectable 25 Gram(s) IV Push once  fenofibrate Tablet 145 milliGRAM(s) Oral daily  finasteride 5 milliGRAM(s) Oral daily  heparin  Infusion 900 Unit(s)/Hr (10 mL/Hr) IV Continuous <Continuous>  ibuprofen  Tablet. 600 milliGRAM(s) Oral every 6 hours  influenza   Vaccine 0.5 milliLiter(s) IntraMuscular once  insulin glargine Injectable (LANTUS) 21 Unit(s) SubCutaneous at bedtime  insulin lispro Injectable (HumaLOG) 7 Unit(s) SubCutaneous before breakfast  insulin lispro Injectable (HumaLOG) 7 Unit(s) SubCutaneous before lunch  insulin lispro Injectable (HumaLOG) 7 Unit(s) SubCutaneous before dinner  isosorbide   mononitrate ER Tablet (IMDUR) 30 milliGRAM(s) Oral daily  metoprolol succinate ER 75 milliGRAM(s) Oral daily  nafcillin  IVPB      nafcillin  IVPB 2 Gram(s) IV Intermittent every 4 hours  nitroglycerin    Patch 0.2 mG/Hr(s) 1 patch Transdermal daily  silver sulfADIAZINE 1% Cream 1 Application(s) Topical two times a day  simvastatin 40 milliGRAM(s) Oral at bedtime  tamsulosin 0.4 milliGRAM(s) Oral at bedtime    MEDICATIONS  (PRN):  acetaminophen   Tablet .. 650 milliGRAM(s) Oral every 6 hours PRN Temp greater or equal to 38C (100.4F), Mild Pain (1 - 3)  acetaminophen  Suppository .. 650 milliGRAM(s) Rectal every 6 hours PRN Temp greater or equal to 38C (100.4F), Mild Pain (1 - 3)  dextrose 40% Gel 15 Gram(s) Oral once PRN Blood Glucose LESS THAN 70 milliGRAM(s)/deciliter  glucagon  Injectable 1 milliGRAM(s) IntraMuscular once PRN Glucose LESS THAN 70 milligrams/deciliter      PHYSICAL EXAM:  GENERAL: not in any acute distress.   HEAD:  Atraumatic, Normocephalic  EYES: EOMI, PERRLA, conjunctiva and sclera clear  ENT: No tonsillar erythema, exudates, or enlargement; Moist mucous membranes, Good dentition, No lesions  NECK: Supple, No JVD, Normal thyroid, no enlarged nodes  NERVOUS SYSTEM:  Alert & Oriented X3.  CHEST/LUNG: Bilateral Crackles noted bilateral bases.   HEART: S1S2 normal, no murmur   ABDOMEN: Soft, Nontender, Nondistended; Bowel sounds present  EXTREMITIES:  2+ Peripheral Pulses, No clubbing, cyanosis, or edema  LYMPH: No lymphadenopathy noted  SKIN: No rashes or lesions    Care Discussed with Consultants/Other Providers [ x] YES  [ ] NO

## 2019-10-17 NOTE — PROGRESS NOTE ADULT - SUBJECTIVE AND OBJECTIVE BOX
Patient is a 79y old  Male who presents with a chief complaint of CHF Exacerbation (16 Oct 2019 12:17)      T(F): 97.9 (10-17-19 @ 03:09), Max: 102.8 (10-16-19 @ 21:30)  HR: 65 (10-17-19 @ 05:09)  BP: 99/56 (10-17-19 @ 05:09)  RR: 22 (10-17-19 @ 03:09)  SpO2: 99% (10-17-19 @ 05:09) (83% - 99%)    PHYSICAL EXAM:  GENERAL: NAD, well-groomed, well-developed  HEAD:  Atraumatic, Normocephalic  EYES: EOMI, PERRLA, conjunctiva and sclera clear  ENMT: No tonsillar erythema, exudates, or enlargement; Moist mucous membranes, Good dentition, No lesions  NECK: Supple, No JVD, Normal thyroid  NERVOUS SYSTEM:  Alert & Oriented X3,  Motor Strength 5/5 B/L upper and lower extremities  CHEST/LUNG: Clear to percussion bilaterally; No rales, rhonchi, wheezing, or rubs  HEART: Regular rate and rhythm; No murmurs, rubs, or gallops  ABDOMEN: Soft, Nontender, Nondistended; Bowel sounds present  EXTREMITIES:   No clubbing, cyanosis, or edema  LYMPH: No lymphadenopathy noted  SKIN: No rashes or lesions    labs  10-16    138  |  95<L>  |  48<H>  ----------------------------<  270<H>  4.1   |  28  |  1.4    Ca    8.9      16 Oct 2019 05:52  Mg     1.8     10-16    TPro  6.7  /  Alb  3.3<L>  /  TBili  0.7  /  DBili  x   /  AST  221<H>  /  ALT  39  /  AlkPhos  44  10-16                          13.2   10.08 )-----------( 148      ( 16 Oct 2019 05:52 )             41.6       Culture - Urine (collected 15 Oct 2019 11:30)  Source: .Urine Clean Catch (Midstream)  Final Report (16 Oct 2019 21:23):    <10,000 CFU/mL Normal Urogenital Felipa    Culture - Blood (collected 15 Oct 2019 07:00)  Source: .Blood Blood  Gram Stain (16 Oct 2019 11:23):    Growth in aerobic bottle: Gram Positive Cocci in Clusters    Growth in anaerobic bottle: Gram Positive Cocci in Clusters  Preliminary Report (16 Oct 2019 11:26):    Growth in aerobic bottle: Gram Positive Cocci in Clusters    Growth in anaerobic bottle: Gram Positive Cocci in Clusters    "Due to technical problems, Proteus sp. will Not be reported as part of    the BCID panel until further notice"    ***Blood Panel PCR results on this specimen are available    approximately 3 hours after the Gram stain result.***    Gram stain, PCR, and/or culture results may not always    correspond due to difference in methodologies.    ************************************************************    This PCR assay was performed using [employer].    The following targets are tested for: Enterococcus,    vancomycin resistant enterococci, Listeria monocytogenes,    coagulase negative staphylococci, S. aureus,    methicillin resistant S. aureus, Streptococcus agalactiae    (Group B), S. pneumoniae, S. pyogenes (Group A),    Acinetobacter baumannii, Enterobacter cloacae, E. coli,    Klebsiella oxytoca, K. pneumoniae, Proteus sp.,    Serratia marcescens, Haemophilus influenzae,    Neisseria meningitidis, Pseudomonas aeruginosa, Candida    albicans, C. glabrata, C krusei, C parapsilosis,    C. tropicalis and the KPC resistance gene.  Organism: Blood Culture PCR (16 Oct 2019 12:58)  Organism: Blood Culture PCR (16 Oct 2019 12:58)      PTT - ( 17 Oct 2019 05:30 )  PTT:49.6 sec        acetaminophen   Tablet .. 650 milliGRAM(s) Oral every 6 hours PRN  acetaminophen  Suppository .. 650 milliGRAM(s) Rectal every 6 hours PRN  ALPRAZolam 0.5 milliGRAM(s) Oral two times a day  amLODIPine   Tablet 5 milliGRAM(s) Oral daily  aspirin  chewable 81 milliGRAM(s) Oral daily  buDESOnide 160 MICROgram(s)/formoterol 4.5 MICROgram(s) Inhaler 2 Puff(s) Inhalation two times a day  cefepime   IVPB 1000 milliGRAM(s) IV Intermittent every 8 hours  dextrose 40% Gel 15 Gram(s) Oral once PRN  dextrose 5%. 1000 milliLiter(s) IV Continuous <Continuous>  dextrose 50% Injectable 12.5 Gram(s) IV Push once  dextrose 50% Injectable 25 Gram(s) IV Push once  dextrose 50% Injectable 25 Gram(s) IV Push once  fenofibrate Tablet 145 milliGRAM(s) Oral daily  finasteride 5 milliGRAM(s) Oral daily  furosemide   Injectable 60 milliGRAM(s) IV Push every 12 hours  glucagon  Injectable 1 milliGRAM(s) IntraMuscular once PRN  heparin  Infusion 900 Unit(s)/Hr IV Continuous <Continuous>  ibuprofen  Tablet. 600 milliGRAM(s) Oral every 6 hours  influenza   Vaccine 0.5 milliLiter(s) IntraMuscular once  insulin glargine Injectable (LANTUS) 21 Unit(s) SubCutaneous at bedtime  insulin lispro Injectable (HumaLOG) 7 Unit(s) SubCutaneous before breakfast  insulin lispro Injectable (HumaLOG) 7 Unit(s) SubCutaneous before lunch  insulin lispro Injectable (HumaLOG) 7 Unit(s) SubCutaneous before dinner  isosorbide   mononitrate ER Tablet (IMDUR) 30 milliGRAM(s) Oral daily  metoprolol succinate ER 75 milliGRAM(s) Oral daily  nitroglycerin    Patch 0.2 mG/Hr(s) 1 patch Transdermal daily  silver sulfADIAZINE 1% Cream 1 Application(s) Topical two times a day  simvastatin 40 milliGRAM(s) Oral at bedtime  tamsulosin 0.4 milliGRAM(s) Oral at bedtime  vancomycin  IVPB 1500 milliGRAM(s) IV Intermittent every 12 hours  vancomycin  IVPB

## 2019-10-17 NOTE — PROGRESS NOTE ADULT - SUBJECTIVE AND OBJECTIVE BOX
Patient is a 79y old  Male who presents with a chief complaint of CHF Exacerbation (16 Oct 2019 12:17)        Interval Events: Fevers overnight .    REVIEW OF SYSTEMS:  Constitutional: +fevers +chills. No weight loss. No fatigue or generalized malaise.  Eyes: No itching or discharge from the eyes  ENT: No ear pain. No ear discharge. No nasal congestion. No post nasal drip. No epistaxis. No throat pain. No sore throat. No difficulty swallowing.   CV: No chest pain. No palpitations. No lightheadedness or dizziness.   Resp: +dyspnea at rest. +dyspnea on exertion. No orthopnea. No wheezing. +cough. No stridor. No sputum production. No chest pain with respiration.  GI: No nausea. No vomiting. No diarrhea.  MSK: No joint pain or pain in any extremities  Integumentary: No skin lesions. No pedal edema.  Neurological: No gross motor weakness. No sensory changes.      OBJECTIVE:  ICU Vital Signs Last 24 Hrs  T(C): 36.2 (17 Oct 2019 07:05), Max: 39.3 (16 Oct 2019 21:30)  T(F): 97.2 (17 Oct 2019 07:05), Max: 102.8 (16 Oct 2019 21:30)  HR: 66 (17 Oct 2019 07:48) (65 - 141)  BP: 99/56 (17 Oct 2019 05:09) (87/56 - 144/87)  BP(mean): 72 (17 Oct 2019 05:09) (65 - 111)    RR: 20 (17 Oct 2019 07:05) (20 - 52)  SpO2: 98% (17 Oct 2019 07:48) (83% - 99%)        10-16 @ 07:01  -  10-17 @ 07:00  --------------------------------------------------------  IN: 1360 mL / OUT: 775 mL / NET: 585 mL      CAPILLARY BLOOD GLUCOSE      POCT Blood Glucose.: 226 mg/dL (17 Oct 2019 07:50)      PHYSICAL EXAM:  General: Awake, alert, oriented X 3.   HEENT: Atraumatic, normocephalic.                 Mallampatti Grade                 No nasal congestion.                No tonsillar or pharyngeal exudates.  Lymph Nodes: No palpable lymphadenopathy neck, supraclavicular region  Neck: No JVD. No carotid bruit, no thyromegally  Respiratory: Normal chest expansion                         Normal percussion                         Normal and equal air entry                         rales.  Cardiovascular: S1 S2 normal. No murmurs, rubs or gallops.   Abdomen: Soft, non-tender, non-distended. No organomegaly.  Extremities: Warm to touch. Peripheral pulse palpable. No pedal edema.   Skin: No rashes or skin lesions, warm dry  Neurological: Motor and sensory examination equal and normal in all four extremities.  Psychiatry: Appropriate mood and affect.    HOSPITAL MEDICATIONS:  MEDICATIONS  (STANDING):  ALPRAZolam 0.5 milliGRAM(s) Oral two times a day  amLODIPine   Tablet 5 milliGRAM(s) Oral daily  aspirin  chewable 81 milliGRAM(s) Oral daily  buDESOnide 160 MICROgram(s)/formoterol 4.5 MICROgram(s) Inhaler 2 Puff(s) Inhalation two times a day  cefepime   IVPB 1000 milliGRAM(s) IV Intermittent every 8 hours  dextrose 5%. 1000 milliLiter(s) (50 mL/Hr) IV Continuous <Continuous>  dextrose 50% Injectable 12.5 Gram(s) IV Push once  dextrose 50% Injectable 25 Gram(s) IV Push once  dextrose 50% Injectable 25 Gram(s) IV Push once  fenofibrate Tablet 145 milliGRAM(s) Oral daily  finasteride 5 milliGRAM(s) Oral daily  furosemide   Injectable 60 milliGRAM(s) IV Push every 12 hours  heparin  Infusion 900 Unit(s)/Hr (10 mL/Hr) IV Continuous <Continuous>  ibuprofen  Tablet. 600 milliGRAM(s) Oral every 6 hours  influenza   Vaccine 0.5 milliLiter(s) IntraMuscular once  insulin glargine Injectable (LANTUS) 21 Unit(s) SubCutaneous at bedtime  insulin lispro Injectable (HumaLOG) 7 Unit(s) SubCutaneous before breakfast  insulin lispro Injectable (HumaLOG) 7 Unit(s) SubCutaneous before lunch  insulin lispro Injectable (HumaLOG) 7 Unit(s) SubCutaneous before dinner  isosorbide   mononitrate ER Tablet (IMDUR) 30 milliGRAM(s) Oral daily  metoprolol succinate ER 75 milliGRAM(s) Oral daily  nitroglycerin    Patch 0.2 mG/Hr(s) 1 patch Transdermal daily  silver sulfADIAZINE 1% Cream 1 Application(s) Topical two times a day  simvastatin 40 milliGRAM(s) Oral at bedtime  tamsulosin 0.4 milliGRAM(s) Oral at bedtime  vancomycin  IVPB 1500 milliGRAM(s) IV Intermittent every 12 hours  vancomycin  IVPB        MEDICATIONS  (PRN):  acetaminophen   Tablet .. 650 milliGRAM(s) Oral every 6 hours PRN Temp greater or equal to 38C (100.4F), Mild Pain (1 - 3)  acetaminophen  Suppository .. 650 milliGRAM(s) Rectal every 6 hours PRN Temp greater or equal to 38C (100.4F), Mild Pain (1 - 3)  dextrose 40% Gel 15 Gram(s) Oral once PRN Blood Glucose LESS THAN 70 milliGRAM(s)/deciliter  glucagon  Injectable 1 milliGRAM(s) IntraMuscular once PRN Glucose LESS THAN 70 milligrams/deciliter      LABS:                        11.7   7.81  )-----------( 98       ( 17 Oct 2019 05:30 )             37.6     10-    138  |  96<L>  |  62<HH>  ----------------------------<  264<H>  4.0   |  32  |  2.0<H>    Ca    8.5      17 Oct 2019 05:30  Mg     1.8     10-16    TPro  6.0  /  Alb  3.0<L>  /  TBili  0.6  /  DBili  x   /  AST  115<H>  /  ALT  38  /  AlkPhos  38  10-    PTT - ( 17 Oct 2019 05:30 )  PTT:49.6 sec  Urinalysis Basic - ( 15 Oct 2019 11:30 )    Color: Yellow / Appearance: Clear / S.015 / pH: x  Gluc: x / Ketone: Negative  / Bili: Negative / Urobili: 0.2 mg/dL   Blood: x / Protein: Negative mg/dL / Nitrite: Negative   Leuk Esterase: Negative / RBC: x / WBC x   Sq Epi: x / Non Sq Epi: x / Bacteria: x      Arterial Blood Gas:  10-17 @ 05:55  7.42/53/303/34/--/7.4  ABG lactate: --            ABG - ( 17 Oct 2019 05:55 )  pH, Arterial: 7.42  pH, Blood: x     /  pCO2: 53    /  pO2: 303   / HCO3: 34    / Base Excess: 7.4   /  SaO2: x                   RADIOLOGY:Radiology personally reviewed. Patient is a 79y old  Male who presents with a chief complaint of CHF Exacerbation (16 Oct 2019 12:17)        Interval Events: Fevers overnight, needed increase in FIO2 on NIPPV.    REVIEW OF SYSTEMS:  Constitutional: +fevers +chills. No weight loss. No fatigue or generalized malaise.  Eyes: No itching or discharge from the eyes  ENT: No ear pain. No ear discharge. No nasal congestion. No post nasal drip. No epistaxis. No throat pain. No sore throat. No difficulty swallowing.   CV: No chest pain. No palpitations. No lightheadedness or dizziness.   Resp: +dyspnea at rest. +dyspnea on exertion. No orthopnea. No wheezing. +cough. No stridor. No sputum production. No chest pain with respiration.  GI: No nausea. No vomiting. No diarrhea.  MSK: No joint pain or pain in any extremities  Integumentary: No skin lesions. No pedal edema.  Neurological: No gross motor weakness. No sensory changes.      OBJECTIVE:  ICU Vital Signs Last 24 Hrs  T(C): 36.2 (17 Oct 2019 07:05), Max: 39.3 (16 Oct 2019 21:30)  T(F): 97.2 (17 Oct 2019 07:05), Max: 102.8 (16 Oct 2019 21:30)  HR: 66 (17 Oct 2019 07:48) (65 - 141)  BP: 99/56 (17 Oct 2019 05:09) (87/56 - 144/87)  BP(mean): 72 (17 Oct 2019 05:09) (65 - 111)    RR: 20 (17 Oct 2019 07:05) (20 - 52)  SpO2: 98% (17 Oct 2019 07:48) (83% - 99%)        10-16 @ 07:  -  10-17 @ 07:00  --------------------------------------------------------  IN: 1360 mL / OUT: 775 mL / NET: 585 mL      CAPILLARY BLOOD GLUCOSE      POCT Blood Glucose.: 226 mg/dL (17 Oct 2019 07:50)      PHYSICAL EXAM:  General: Awake, alert, oriented X 3.   HEENT: Atraumatic, normocephalic.                 Mallampatti Grade                 No nasal congestion.                No tonsillar or pharyngeal exudates.  Lymph Nodes: No palpable lymphadenopathy neck, supraclavicular region  Neck: No JVD. No carotid bruit, no thyromegally  Respiratory: Normal chest expansion                         Normal percussion                         Normal and equal air entry                         rales b/l  Cardiovascular: S1 S2 normal. No murmurs, rubs or gallops.   Abdomen: Soft, non-tender, non-distended. No organomegaly.  Extremities: Warm to touch. Peripheral pulse palpable. No pedal edema.   Skin: No rashes or skin lesions, warm dry  Neurological: Motor and sensory examination equal and normal in all four extremities.  Psychiatry: Appropriate mood and affect.    HOSPITAL MEDICATIONS:  MEDICATIONS  (STANDING):  ALPRAZolam 0.5 milliGRAM(s) Oral two times a day  amLODIPine   Tablet 5 milliGRAM(s) Oral daily  aspirin  chewable 81 milliGRAM(s) Oral daily  buDESOnide 160 MICROgram(s)/formoterol 4.5 MICROgram(s) Inhaler 2 Puff(s) Inhalation two times a day  cefepime   IVPB 1000 milliGRAM(s) IV Intermittent every 8 hours  dextrose 5%. 1000 milliLiter(s) (50 mL/Hr) IV Continuous <Continuous>  dextrose 50% Injectable 12.5 Gram(s) IV Push once  dextrose 50% Injectable 25 Gram(s) IV Push once  dextrose 50% Injectable 25 Gram(s) IV Push once  fenofibrate Tablet 145 milliGRAM(s) Oral daily  finasteride 5 milliGRAM(s) Oral daily  furosemide   Injectable 60 milliGRAM(s) IV Push every 12 hours  heparin  Infusion 900 Unit(s)/Hr (10 mL/Hr) IV Continuous <Continuous>  ibuprofen  Tablet. 600 milliGRAM(s) Oral every 6 hours  influenza   Vaccine 0.5 milliLiter(s) IntraMuscular once  insulin glargine Injectable (LANTUS) 21 Unit(s) SubCutaneous at bedtime  insulin lispro Injectable (HumaLOG) 7 Unit(s) SubCutaneous before breakfast  insulin lispro Injectable (HumaLOG) 7 Unit(s) SubCutaneous before lunch  insulin lispro Injectable (HumaLOG) 7 Unit(s) SubCutaneous before dinner  isosorbide   mononitrate ER Tablet (IMDUR) 30 milliGRAM(s) Oral daily  metoprolol succinate ER 75 milliGRAM(s) Oral daily  nitroglycerin    Patch 0.2 mG/Hr(s) 1 patch Transdermal daily  silver sulfADIAZINE 1% Cream 1 Application(s) Topical two times a day  simvastatin 40 milliGRAM(s) Oral at bedtime  tamsulosin 0.4 milliGRAM(s) Oral at bedtime  vancomycin  IVPB 1500 milliGRAM(s) IV Intermittent every 12 hours  vancomycin  IVPB        MEDICATIONS  (PRN):  acetaminophen   Tablet .. 650 milliGRAM(s) Oral every 6 hours PRN Temp greater or equal to 38C (100.4F), Mild Pain (1 - 3)  acetaminophen  Suppository .. 650 milliGRAM(s) Rectal every 6 hours PRN Temp greater or equal to 38C (100.4F), Mild Pain (1 - 3)  dextrose 40% Gel 15 Gram(s) Oral once PRN Blood Glucose LESS THAN 70 milliGRAM(s)/deciliter  glucagon  Injectable 1 milliGRAM(s) IntraMuscular once PRN Glucose LESS THAN 70 milligrams/deciliter      LABS:                        11.7   7.81  )-----------( 98       ( 17 Oct 2019 05:30 )             37.6     10-17    138  |  96<L>  |  62<HH>  ----------------------------<  264<H>  4.0   |  32  |  2.0<H>    Ca    8.5      17 Oct 2019 05:30  Mg     1.8     10-16    TPro  6.0  /  Alb  3.0<L>  /  TBili  0.6  /  DBili  x   /  AST  115<H>  /  ALT  38  /  AlkPhos  38  10-17    PTT - ( 17 Oct 2019 05:30 )  PTT:49.6 sec  Urinalysis Basic - ( 15 Oct 2019 11:30 )    Color: Yellow / Appearance: Clear / S.015 / pH: x  Gluc: x / Ketone: Negative  / Bili: Negative / Urobili: 0.2 mg/dL   Blood: x / Protein: Negative mg/dL / Nitrite: Negative   Leuk Esterase: Negative / RBC: x / WBC x   Sq Epi: x / Non Sq Epi: x / Bacteria: x      Arterial Blood Gas:  10-17 @ 05:55  7.42/53/303/34/--/7.4  ABG lactate: --            ABG - ( 17 Oct 2019 05:55 )  pH, Arterial: 7.42  pH, Blood: x     /  pCO2: 53    /  pO2: 303   / HCO3: 34    / Base Excess: 7.4   /  SaO2: x                   RADIOLOGY:Radiology personally reviewed.

## 2019-10-17 NOTE — DIETITIAN INITIAL EVALUATION ADULT. - OTHER INFO
79 year old male with hx of DM, COPD, CAD, CABG, CHF, BPH presents with SOB, chest pain on exertion found to have b/l pleural effusions, s/p IV lasix and bipap in Ed with improvement. admitted to CCU with CHF exacerbation. 10/14/19 spiked temps, cardiac cath cancelled. + blood cultures, possible gram negative PNA. pt presently tolerating po diet well. remains on bipap as needed. pt follows a low salt diabetic diet at home, son does all the food shopping and prepares all the meals. pt also weighs himself daily.

## 2019-10-18 DIAGNOSIS — A41.01 SEPSIS DUE TO METHICILLIN SUSCEPTIBLE STAPHYLOCOCCUS AUREUS: ICD-10-CM

## 2019-10-18 LAB
-  AMPICILLIN/SULBACTAM: SIGNIFICANT CHANGE UP
-  CEFAZOLIN: SIGNIFICANT CHANGE UP
-  CLINDAMYCIN: SIGNIFICANT CHANGE UP
-  ERYTHROMYCIN: SIGNIFICANT CHANGE UP
-  GENTAMICIN: SIGNIFICANT CHANGE UP
-  OXACILLIN: SIGNIFICANT CHANGE UP
-  PENICILLIN: SIGNIFICANT CHANGE UP
-  RIFAMPIN: SIGNIFICANT CHANGE UP
-  TETRACYCLINE: SIGNIFICANT CHANGE UP
-  TRIMETHOPRIM/SULFAMETHOXAZOLE: SIGNIFICANT CHANGE UP
-  VANCOMYCIN: SIGNIFICANT CHANGE UP
ALBUMIN SERPL ELPH-MCNC: 2.7 G/DL — LOW (ref 3.5–5.2)
ALP SERPL-CCNC: 38 U/L — SIGNIFICANT CHANGE UP (ref 30–115)
ALT FLD-CCNC: 30 U/L — SIGNIFICANT CHANGE UP (ref 0–41)
ANION GAP SERPL CALC-SCNC: 15 MMOL/L — HIGH (ref 7–14)
APTT BLD: 41 SEC — HIGH (ref 27–39.2)
APTT BLD: 42.7 SEC — HIGH (ref 27–39.2)
APTT BLD: 50.6 SEC — HIGH (ref 27–39.2)
AST SERPL-CCNC: 55 U/L — HIGH (ref 0–41)
BASOPHILS # BLD AUTO: 0.02 K/UL — SIGNIFICANT CHANGE UP (ref 0–0.2)
BASOPHILS NFR BLD AUTO: 0.3 % — SIGNIFICANT CHANGE UP (ref 0–1)
BILIRUB SERPL-MCNC: 1.7 MG/DL — HIGH (ref 0.2–1.2)
BUN SERPL-MCNC: 80 MG/DL — CRITICAL HIGH (ref 10–20)
CALCIUM SERPL-MCNC: 8.3 MG/DL — LOW (ref 8.5–10.1)
CHLORIDE SERPL-SCNC: 93 MMOL/L — LOW (ref 98–110)
CO2 SERPL-SCNC: 30 MMOL/L — SIGNIFICANT CHANGE UP (ref 17–32)
CREAT SERPL-MCNC: 3.2 MG/DL — HIGH (ref 0.7–1.5)
CULTURE RESULTS: SIGNIFICANT CHANGE UP
CULTURE RESULTS: SIGNIFICANT CHANGE UP
EOSINOPHIL # BLD AUTO: 0.09 K/UL — SIGNIFICANT CHANGE UP (ref 0–0.7)
EOSINOPHIL NFR BLD AUTO: 1.5 % — SIGNIFICANT CHANGE UP (ref 0–8)
GLUCOSE BLDC GLUCOMTR-MCNC: 114 MG/DL — HIGH (ref 70–99)
GLUCOSE BLDC GLUCOMTR-MCNC: 118 MG/DL — HIGH (ref 70–99)
GLUCOSE BLDC GLUCOMTR-MCNC: 121 MG/DL — HIGH (ref 70–99)
GLUCOSE BLDC GLUCOMTR-MCNC: 146 MG/DL — HIGH (ref 70–99)
GLUCOSE BLDC GLUCOMTR-MCNC: 148 MG/DL — HIGH (ref 70–99)
GLUCOSE SERPL-MCNC: 160 MG/DL — HIGH (ref 70–99)
GRAM STN FLD: SIGNIFICANT CHANGE UP
HCT VFR BLD CALC: 35.4 % — LOW (ref 42–52)
HGB BLD-MCNC: 11.3 G/DL — LOW (ref 14–18)
IMM GRANULOCYTES NFR BLD AUTO: 0.3 % — SIGNIFICANT CHANGE UP (ref 0.1–0.3)
LYMPHOCYTES # BLD AUTO: 1.01 K/UL — LOW (ref 1.2–3.4)
LYMPHOCYTES # BLD AUTO: 16.7 % — LOW (ref 20.5–51.1)
MCHC RBC-ENTMCNC: 25.6 PG — LOW (ref 27–31)
MCHC RBC-ENTMCNC: 31.9 G/DL — LOW (ref 32–37)
MCV RBC AUTO: 80.3 FL — SIGNIFICANT CHANGE UP (ref 80–94)
METHOD TYPE: SIGNIFICANT CHANGE UP
MONOCYTES # BLD AUTO: 0.8 K/UL — HIGH (ref 0.1–0.6)
MONOCYTES NFR BLD AUTO: 13.2 % — HIGH (ref 1.7–9.3)
NEUTROPHILS # BLD AUTO: 4.12 K/UL — SIGNIFICANT CHANGE UP (ref 1.4–6.5)
NEUTROPHILS NFR BLD AUTO: 68 % — SIGNIFICANT CHANGE UP (ref 42.2–75.2)
NRBC # BLD: 0 /100 WBCS — SIGNIFICANT CHANGE UP (ref 0–0)
ORGANISM # SPEC MICROSCOPIC CNT: SIGNIFICANT CHANGE UP
PLATELET # BLD AUTO: 109 K/UL — LOW (ref 130–400)
POTASSIUM SERPL-MCNC: 3.8 MMOL/L — SIGNIFICANT CHANGE UP (ref 3.5–5)
POTASSIUM SERPL-SCNC: 3.8 MMOL/L — SIGNIFICANT CHANGE UP (ref 3.5–5)
PROT SERPL-MCNC: 5.8 G/DL — LOW (ref 6–8)
RBC # BLD: 4.41 M/UL — LOW (ref 4.7–6.1)
RBC # FLD: 15.7 % — HIGH (ref 11.5–14.5)
SODIUM SERPL-SCNC: 138 MMOL/L — SIGNIFICANT CHANGE UP (ref 135–146)
SPECIMEN SOURCE: SIGNIFICANT CHANGE UP
SPECIMEN SOURCE: SIGNIFICANT CHANGE UP
WBC # BLD: 6.06 K/UL — SIGNIFICANT CHANGE UP (ref 4.8–10.8)
WBC # FLD AUTO: 6.06 K/UL — SIGNIFICANT CHANGE UP (ref 4.8–10.8)

## 2019-10-18 PROCEDURE — 99233 SBSQ HOSP IP/OBS HIGH 50: CPT

## 2019-10-18 PROCEDURE — 71045 X-RAY EXAM CHEST 1 VIEW: CPT | Mod: 26

## 2019-10-18 RX ORDER — SODIUM CHLORIDE 9 MG/ML
250 INJECTION INTRAMUSCULAR; INTRAVENOUS; SUBCUTANEOUS ONCE
Refills: 0 | Status: COMPLETED | OUTPATIENT
Start: 2019-10-18 | End: 2019-10-18

## 2019-10-18 RX ORDER — ALPRAZOLAM 0.25 MG
0.5 TABLET ORAL AT BEDTIME
Refills: 0 | Status: DISCONTINUED | OUTPATIENT
Start: 2019-10-18 | End: 2019-10-25

## 2019-10-18 RX ORDER — IBUPROFEN 200 MG
600 TABLET ORAL EVERY 6 HOURS
Refills: 0 | Status: DISCONTINUED | OUTPATIENT
Start: 2019-10-18 | End: 2019-10-18

## 2019-10-18 RX ORDER — HEPARIN SODIUM 5000 [USP'U]/ML
1300 INJECTION INTRAVENOUS; SUBCUTANEOUS
Qty: 25000 | Refills: 0 | Status: DISCONTINUED | OUTPATIENT
Start: 2019-10-18 | End: 2019-10-19

## 2019-10-18 RX ORDER — IBUPROFEN 200 MG
400 TABLET ORAL EVERY 6 HOURS
Refills: 0 | Status: DISCONTINUED | OUTPATIENT
Start: 2019-10-18 | End: 2019-10-20

## 2019-10-18 RX ORDER — FLUTICASONE PROPIONATE 50 MCG
1 SPRAY, SUSPENSION NASAL
Refills: 0 | Status: DISCONTINUED | OUTPATIENT
Start: 2019-10-18 | End: 2019-10-29

## 2019-10-18 RX ORDER — INSULIN GLARGINE 100 [IU]/ML
7 INJECTION, SOLUTION SUBCUTANEOUS ONCE
Refills: 0 | Status: COMPLETED | OUTPATIENT
Start: 2019-10-18 | End: 2019-10-18

## 2019-10-18 RX ADMIN — NAFCILLIN 200 GRAM(S): 10 INJECTION, POWDER, FOR SOLUTION INTRAVENOUS at 10:13

## 2019-10-18 RX ADMIN — NAFCILLIN 200 GRAM(S): 10 INJECTION, POWDER, FOR SOLUTION INTRAVENOUS at 15:33

## 2019-10-18 RX ADMIN — HEPARIN SODIUM 13 UNIT(S)/HR: 5000 INJECTION INTRAVENOUS; SUBCUTANEOUS at 17:32

## 2019-10-18 RX ADMIN — INSULIN GLARGINE 7 UNIT(S): 100 INJECTION, SOLUTION SUBCUTANEOUS at 23:32

## 2019-10-18 RX ADMIN — NAFCILLIN 200 GRAM(S): 10 INJECTION, POWDER, FOR SOLUTION INTRAVENOUS at 17:34

## 2019-10-18 RX ADMIN — Medication 145 MILLIGRAM(S): at 11:40

## 2019-10-18 RX ADMIN — NAFCILLIN 200 GRAM(S): 10 INJECTION, POWDER, FOR SOLUTION INTRAVENOUS at 21:54

## 2019-10-18 RX ADMIN — NAFCILLIN 200 GRAM(S): 10 INJECTION, POWDER, FOR SOLUTION INTRAVENOUS at 01:17

## 2019-10-18 RX ADMIN — Medication 0.5 MILLIGRAM(S): at 23:34

## 2019-10-18 RX ADMIN — CLOPIDOGREL BISULFATE 75 MILLIGRAM(S): 75 TABLET, FILM COATED ORAL at 11:42

## 2019-10-18 RX ADMIN — Medication 1 APPLICATION(S): at 17:44

## 2019-10-18 RX ADMIN — HEPARIN SODIUM 11 UNIT(S)/HR: 5000 INJECTION INTRAVENOUS; SUBCUTANEOUS at 01:19

## 2019-10-18 RX ADMIN — Medication 1 PATCH: at 11:39

## 2019-10-18 RX ADMIN — HEPARIN SODIUM 11 UNIT(S)/HR: 5000 INJECTION INTRAVENOUS; SUBCUTANEOUS at 06:03

## 2019-10-18 RX ADMIN — SIMVASTATIN 40 MILLIGRAM(S): 20 TABLET, FILM COATED ORAL at 21:55

## 2019-10-18 RX ADMIN — Medication 1 SPRAY(S): at 21:54

## 2019-10-18 RX ADMIN — Medication 1 PATCH: at 23:00

## 2019-10-18 RX ADMIN — Medication 1 APPLICATION(S): at 06:02

## 2019-10-18 RX ADMIN — FINASTERIDE 5 MILLIGRAM(S): 5 TABLET, FILM COATED ORAL at 11:40

## 2019-10-18 RX ADMIN — BUDESONIDE AND FORMOTEROL FUMARATE DIHYDRATE 2 PUFF(S): 160; 4.5 AEROSOL RESPIRATORY (INHALATION) at 20:00

## 2019-10-18 RX ADMIN — ISOSORBIDE MONONITRATE 30 MILLIGRAM(S): 60 TABLET, EXTENDED RELEASE ORAL at 11:39

## 2019-10-18 RX ADMIN — TAMSULOSIN HYDROCHLORIDE 0.4 MILLIGRAM(S): 0.4 CAPSULE ORAL at 21:55

## 2019-10-18 RX ADMIN — NAFCILLIN 200 GRAM(S): 10 INJECTION, POWDER, FOR SOLUTION INTRAVENOUS at 06:01

## 2019-10-18 RX ADMIN — SODIUM CHLORIDE 500 MILLILITER(S): 9 INJECTION INTRAMUSCULAR; INTRAVENOUS; SUBCUTANEOUS at 13:00

## 2019-10-18 RX ADMIN — Medication 1 PATCH: at 19:30

## 2019-10-18 RX ADMIN — Medication 75 MILLIGRAM(S): at 06:14

## 2019-10-18 RX ADMIN — Medication 81 MILLIGRAM(S): at 11:39

## 2019-10-18 NOTE — PROGRESS NOTE ADULT - ASSESSMENT
Patient is a 80yo Male w/ PMH of HFpEF (EF of 55% with Grade II Diastolic dysfunction), COPD (home O2 3-3.5L as needed), DM, HTN, CAD s/p CABG and 1 stent, BPH, and recent admission for CHF (August 2019) presenting to [facility] with complaints of shortness of breath and chest pain. Patient reports he has been progressively worsening shortness of breath for the last 4-5 days prior to admission.    1) Acute on chronic diastolic CHF Exacerbation   Lasix held for now as patient appears to be maximally diuresed   Patient on Heparin Drip adjusted today as PTT was 40's  Continue with Metoprolol ER 75 along with Aspirin 81. c/w plavix 75mg daily after loading does  Monitor I and O   Follow with Repeat PTT    2. HTN  stable at this time Hold Meds and monitor BP    3. BRITTANI   Diuretics held for 48 hours   Continue to hold  Follow with Creatinine in AM    4. DM2.   Start Insulin Follow FBG    5. COPD on as needed home O2 not in exacerbation  -Monitor and c/w home med    6. BPH  C/w med    7. CAD s/p CABG  - continue cardiac med and plan for cath once afebrile    8. Febrile   Blood Culture prelim Gram positive Cocci in Clusters   Nafcillin as per ID recommendations   Follow with daily blood cultures until negative   Consider antibiotic de-escalation once patient improved and Sensitivities are back.       DVT Prophylaxis   Heparin Drip     Disposition   Continue with CCU Monitoring

## 2019-10-18 NOTE — PROGRESS NOTE ADULT - ASSESSMENT
Patient feels better .  Much more alert. Needs o2. In greater out.  Antibiotics. In greater out. Give lasix.

## 2019-10-18 NOTE — PROGRESS NOTE ADULT - SUBJECTIVE AND OBJECTIVE BOX
Patient is a 79y old  Male who presents with a chief complaint of CHF Exacerbation (17 Oct 2019 10:33)      T(F): 97.9 (10-18-19 @ 07:10), Max: 100.2 (10-17-19 @ 17:10)  HR: 67 (10-18-19 @ 07:28)  BP: 101/58 (10-18-19 @ 06:00)  RR: 20 (10-18-19 @ 07:10)  SpO2: 100% (10-18-19 @ 07:28) (90% - 100%)    PHYSICAL EXAM:  GENERAL: NAD, well-groomed, well-developed  HEAD:  Atraumatic, Normocephalic  EYES: EOMI, PERRLA, conjunctiva and sclera clear  ENMT: No tonsillar erythema, exudates, or enlargement; Moist mucous membranes, Good dentition, No lesions  NECK: Supple, No JVD, Normal thyroid  NERVOUS SYSTEM:  Alert & Oriented X3,  Motor Strength 5/5 B/L upper and lower extremities  CHEST/LUNG: Clear to percussion bilaterally; No rales, rhonchi, wheezing, or rubs Decreased BS  HEART: Regular rate and rhythm; No murmurs, rubs, or gallops  ABDOMEN: Soft, Nontender, Nondistended; Bowel sounds present  EXTREMITIES:   No clubbing, cyanosis, or edema  LYMPH: No lymphadenopathy noted  SKIN: No rashes or lesions    labs  10-17    138  |  96<L>  |  62<HH>  ----------------------------<  264<H>  4.0   |  32  |  2.0<H>    Ca    8.5      17 Oct 2019 05:30    TPro  6.0  /  Alb  3.0<L>  /  TBili  0.6  /  DBili  x   /  AST  115<H>  /  ALT  38  /  AlkPhos  38  10-17                          11.3   6.06  )-----------( 109      ( 18 Oct 2019 05:54 )             35.4       Culture - Blood (collected 16 Oct 2019 15:11)  Source: .Blood None  Gram Stain (18 Oct 2019 00:08):    Growth in aerobic bottle: Gram Positive Cocci in Clusters    Growth in anaerobic bottle: Gram Positive Cocci in Clusters  Preliminary Report (18 Oct 2019 00:08):    Growth in aerobic bottle: Gram Positive Cocci in Clusters    Growth in anaerobic bottle: Gram Positive Cocci in Clusters    Culture - Urine (collected 15 Oct 2019 11:30)  Source: .Urine Clean Catch (Midstream)  Final Report (16 Oct 2019 21:23):    <10,000 CFU/mL Normal Urogenital Felipa      PTT - ( 18 Oct 2019 01:30 )  PTT:50.6 sec        acetaminophen   Tablet .. 650 milliGRAM(s) Oral every 6 hours PRN  acetaminophen  Suppository .. 650 milliGRAM(s) Rectal every 6 hours PRN  ALPRAZolam 0.5 milliGRAM(s) Oral two times a day  aspirin  chewable 81 milliGRAM(s) Oral daily  buDESOnide 160 MICROgram(s)/formoterol 4.5 MICROgram(s) Inhaler 2 Puff(s) Inhalation two times a day  clopidogrel Tablet 75 milliGRAM(s) Oral daily  dextrose 40% Gel 15 Gram(s) Oral once PRN  dextrose 5%. 1000 milliLiter(s) IV Continuous <Continuous>  dextrose 50% Injectable 12.5 Gram(s) IV Push once  dextrose 50% Injectable 25 Gram(s) IV Push once  dextrose 50% Injectable 25 Gram(s) IV Push once  fenofibrate Tablet 145 milliGRAM(s) Oral daily  finasteride 5 milliGRAM(s) Oral daily  glucagon  Injectable 1 milliGRAM(s) IntraMuscular once PRN  heparin  Infusion 1100 Unit(s)/Hr IV Continuous <Continuous>  ibuprofen  Tablet. 400 milliGRAM(s) Oral every 6 hours PRN  influenza   Vaccine 0.5 milliLiter(s) IntraMuscular once  insulin glargine Injectable (LANTUS) 21 Unit(s) SubCutaneous at bedtime  insulin lispro Injectable (HumaLOG) 7 Unit(s) SubCutaneous before breakfast  insulin lispro Injectable (HumaLOG) 7 Unit(s) SubCutaneous before lunch  insulin lispro Injectable (HumaLOG) 7 Unit(s) SubCutaneous before dinner  isosorbide   mononitrate ER Tablet (IMDUR) 30 milliGRAM(s) Oral daily  metoprolol succinate ER 75 milliGRAM(s) Oral daily  nafcillin  IVPB      nafcillin  IVPB 2 Gram(s) IV Intermittent every 4 hours  nitroglycerin    Patch 0.2 mG/Hr(s) 1 patch Transdermal daily  silver sulfADIAZINE 1% Cream 1 Application(s) Topical two times a day  simvastatin 40 milliGRAM(s) Oral at bedtime  tamsulosin 0.4 milliGRAM(s) Oral at bedtime

## 2019-10-18 NOTE — CHART NOTE - NSCHARTNOTEFT_GEN_A_CORE
pt 's  10/17 blood cx- gram Positive cocci in anerobic  bottle.  pt's already on proper coverage (nafcillin) , sensitivites pending

## 2019-10-18 NOTE — PROGRESS NOTE ADULT - ATTENDING COMMENTS
Pt seen and examined at bedside independently. Pt stable on Venti mask.   Exam stable. Labs stable. Pending blood cultures to be neg. May need JAYCOB and then cath.   I agree with the above plan

## 2019-10-18 NOTE — PROGRESS NOTE ADULT - SUBJECTIVE AND OBJECTIVE BOX
HPI:  Patient is a 78yo Male w/ PMH of HFpEF (EF of 55% with Grade II Diastolic dysfunction), COPD (home O2 3-3.5L as needed), DM, HTN, CAD s/p CABG and 1 stent, BPH, and recent admission for CHF (August 2019) presenting to [facility] with complaints of shortness of breath and chest pain. Patient reports he has been progressively worsening shortness of breath for the last 4-5 days prior to admission. He also reports associated chest pain on exertion.  Patient reports he has been compliant with all of his medications as well with a low salt diet. He denies any recent history of fevers, chills, cough. On day of admission, had severe dyspnea on exertion associated with a pressure like sensation in his chest that radiated to his left arm. He tried sublingual nitro for relief but it didn't help. Therefore, he came in for further evaluation. He was given Solumedrol 125mg by EMS. In the ED, patient was found to have evidence of bilateral pleural effusions on bedside sono done by ED staff. He received IV Lasix 40mg and was placed on BiPAP with major improvement in his symptoms. On my assessment, patient was speaking to me in full sentences on BiPAP and his chest pain has resolved. (10 Oct 2019 13:27)        INTERVAL HPI/OVERNIGHT EVENTS: no overnight events.   ICU Vital Signs Last 24 Hrs  T(C): 36.9 (18 Oct 2019 11:01), Max: 37.9 (17 Oct 2019 17:10)  T(F): 98.4 (18 Oct 2019 11:01), Max: 100.2 (17 Oct 2019 17:10)  HR: 67 (18 Oct 2019 11:00) (67 - 101)  BP: 103/57 (18 Oct 2019 11:00) (86/51 - 115/71)  BP(mean): 74 (18 Oct 2019 11:00) (60 - 88)  ABP: --  ABP(mean): --  RR: 20 (18 Oct 2019 11:01) (20 - 32)  SpO2: 99% (18 Oct 2019 11:00) (91% - 100%)    I&O's Summary    17 Oct 2019 07:01  -  18 Oct 2019 07:00  --------------------------------------------------------  IN: 1321 mL / OUT: 585 mL / NET: 736 mL    18 Oct 2019 07:01  -  18 Oct 2019 13:42  --------------------------------------------------------  IN: 33 mL / OUT: 0 mL / NET: 33 mL          LABS:                        11.3   6.06  )-----------( 109      ( 18 Oct 2019 05:54 )             35.4     10-18    138  |  93<L>  |  80<HH>  ----------------------------<  160<H>  3.8   |  30  |  3.2<H>    Ca    8.3<L>      18 Oct 2019 05:54    TPro  5.8<L>  /  Alb  2.7<L>  /  TBili  1.7<H>  /  DBili  x   /  AST  55<H>  /  ALT  30  /  AlkPhos  38  10-18    PT/INR - ( 18 Oct 2019 05:54 )   PT: 14.30 sec;   INR: 1.25 ratio         PTT - ( 18 Oct 2019 05:54 )  PTT:41.0 sec    CAPILLARY BLOOD GLUCOSE      POCT Blood Glucose.: 148 mg/dL (18 Oct 2019 11:28)  POCT Blood Glucose.: 146 mg/dL (18 Oct 2019 07:45)  POCT Blood Glucose.: 180 mg/dL (17 Oct 2019 21:41)  POCT Blood Glucose.: 175 mg/dL (17 Oct 2019 16:40)    ABG - ( 17 Oct 2019 05:55 )  pH, Arterial: 7.42  pH, Blood: x     /  pCO2: 53    /  pO2: 303   / HCO3: 34    / Base Excess: 7.4   /  SaO2: x                   RADIOLOGY & ADDITIONAL TESTS:    Consultant(s) Notes Reviewed:  [x ] YES  [ ] NO    MEDICATIONS  (STANDING):  ALPRAZolam 0.5 milliGRAM(s) Oral two times a day  aspirin  chewable 81 milliGRAM(s) Oral daily  buDESOnide 160 MICROgram(s)/formoterol 4.5 MICROgram(s) Inhaler 2 Puff(s) Inhalation two times a day  clopidogrel Tablet 75 milliGRAM(s) Oral daily  dextrose 5%. 1000 milliLiter(s) (50 mL/Hr) IV Continuous <Continuous>  dextrose 50% Injectable 12.5 Gram(s) IV Push once  dextrose 50% Injectable 25 Gram(s) IV Push once  dextrose 50% Injectable 25 Gram(s) IV Push once  fenofibrate Tablet 145 milliGRAM(s) Oral daily  finasteride 5 milliGRAM(s) Oral daily  heparin  Infusion 1300 Unit(s)/Hr (13 mL/Hr) IV Continuous <Continuous>  influenza   Vaccine 0.5 milliLiter(s) IntraMuscular once  insulin glargine Injectable (LANTUS) 21 Unit(s) SubCutaneous at bedtime  insulin lispro Injectable (HumaLOG) 7 Unit(s) SubCutaneous before breakfast  insulin lispro Injectable (HumaLOG) 7 Unit(s) SubCutaneous before lunch  insulin lispro Injectable (HumaLOG) 7 Unit(s) SubCutaneous before dinner  isosorbide   mononitrate ER Tablet (IMDUR) 30 milliGRAM(s) Oral daily  metoprolol succinate ER 75 milliGRAM(s) Oral daily  nafcillin  IVPB      nafcillin  IVPB 2 Gram(s) IV Intermittent every 4 hours  nitroglycerin    Patch 0.2 mG/Hr(s) 1 patch Transdermal daily  silver sulfADIAZINE 1% Cream 1 Application(s) Topical two times a day  simvastatin 40 milliGRAM(s) Oral at bedtime  sodium chloride 0.9% Bolus 250 milliLiter(s) IV Bolus once  tamsulosin 0.4 milliGRAM(s) Oral at bedtime    MEDICATIONS  (PRN):  acetaminophen   Tablet .. 650 milliGRAM(s) Oral every 6 hours PRN Temp greater or equal to 38C (100.4F), Mild Pain (1 - 3)  acetaminophen  Suppository .. 650 milliGRAM(s) Rectal every 6 hours PRN Temp greater or equal to 38C (100.4F), Mild Pain (1 - 3)  dextrose 40% Gel 15 Gram(s) Oral once PRN Blood Glucose LESS THAN 70 milliGRAM(s)/deciliter  glucagon  Injectable 1 milliGRAM(s) IntraMuscular once PRN Glucose LESS THAN 70 milligrams/deciliter  ibuprofen  Tablet. 400 milliGRAM(s) Oral every 6 hours PRN Temp greater or equal to 38C (100.4F), Mild Pain (1 - 3)      PHYSICAL EXAM:  GENERAL: no acute distress.   HEAD:  Atraumatic, Normocephalic  EYES: EOMI, PERRLA, conjunctiva and sclera clear  ENT: No tonsillar erythema, exudates, or enlargement; Moist mucous membranes, Good dentition, No lesions  NECK: Supple, No JVD, Normal thyroid, no enlarged nodes  NERVOUS SYSTEM:  Alert & Oriented X3.   CHEST/LUNG: good air entry slight crackles noted at bases.   HEART: S1S2 normal, no murmur appreciated  ABDOMEN: Soft, Nontender, Nondistended; Bowel sounds present  EXTREMITIES:  left shin venous ulceration.     Care Discussed with Consultants/Other Providers [ x] YES  [ ] NO

## 2019-10-18 NOTE — PROGRESS NOTE ADULT - SUBJECTIVE AND OBJECTIVE BOX
TAIWO ZAVALA  79y, Male  Allergy: No Known Allergies      CHIEF COMPLAINT: CHF Exacerbation (17 Oct 2019 10:33)      INTERVAL EVENTS/HPI  - No acute events overnight  - T(F): , Max: 100.2 (10-17-19 @ 17:10)  - Denies any worsening symptoms  - Tolerating medication    ROS  10 system review- neg    SOCIAL HISTORY - not relevant     Substance Use (  ) never used  (  ) IVDU (  ) Other:  Tobacco Usage:  (   ) never smoked   (   ) former smoker   (   ) current smoker   Alcohol Usage: (   ) social  (   ) daily use (   ) denies  Sexual History:       FH noncontributory     VITALS:  T(F): 97.9, Max: 100.2 (10-17-19 @ 17:10)  HR: 67  BP: 101/58  RR: 24Vital Signs Last 24 Hrs  T(C): 36.6 (18 Oct 2019 03:00), Max: 37.9 (17 Oct 2019 17:10)  T(F): 97.9 (18 Oct 2019 03:00), Max: 100.2 (17 Oct 2019 17:10)  HR: 67 (18 Oct 2019 06:00) (66 - 101)  BP: 101/58 (18 Oct 2019 06:00) (86/51 - 115/71)  BP(mean): 76 (18 Oct 2019 06:00) (60 - 88)  RR: 24 (18 Oct 2019 06:00) (14 - 27)  SpO2: 100% (18 Oct 2019 06:00) (90% - 100%)    PHYSICAL EXAM:  Gen: NAD, resting in bed  HEENT: Normocephalic, atraumatic  Neck: supple, no lymphadenopathy  CV: s1 s 2+   Lungs: decreased BS   Abdomen: Soft, BS present  Ext: Warm, well perfused  Neuro: non focal, awake  Skin: no rash, no erythema      TESTS & MEASUREMENTS:                        11.7   7.81  )-----------( 98       ( 17 Oct 2019 05:30 )             37.6     10-17    138  |  96<L>  |  62<HH>  ----------------------------<  264<H>  4.0   |  32  |  2.0<H>    Ca    8.5      17 Oct 2019 05:30    TPro  6.0  /  Alb  3.0<L>  /  TBili  0.6  /  DBili  x   /  AST  115<H>  /  ALT  38  /  AlkPhos  38  10-17      LIVER FUNCTIONS - ( 17 Oct 2019 05:30 )  Alb: 3.0 g/dL / Pro: 6.0 g/dL / ALK PHOS: 38 U/L / ALT: 38 U/L / AST: 115 U/L / GGT: x               Culture - Blood (collected 10-16-19 @ 15:11)  Source: .Blood None  Gram Stain (10-18-19 @ 00:08):    Growth in aerobic bottle: Gram Positive Cocci in Clusters    Growth in anaerobic bottle: Gram Positive Cocci in Clusters  Preliminary Report (10-18-19 @ 00:08):    Growth in aerobic bottle: Gram Positive Cocci in Clusters    Growth in anaerobic bottle: Gram Positive Cocci in Clusters    Culture - Urine (collected 10-15-19 @ 11:30)  Source: .Urine Clean Catch (Midstream)  Final Report (10-16-19 @ 21:23):    <10,000 CFU/mL Normal Urogenital Felipa    Culture - Blood (collected 10-15-19 @ 07:00)  Source: .Blood Blood  Gram Stain (10-16-19 @ 11:23):    Growth in aerobic bottle: Gram Positive Cocci in Clusters    Growth in anaerobic bottle: Gram Positive Cocci in Clusters  Preliminary Report (10-17-19 @ 09:40):    Growth in aerobic and anaerobic bottles: Staphylococcus aureus    "Due to technical problems, Proteus sp. will Not be reported as part of    the BCID panel until further notice"    ***Blood Panel PCR results on this specimen are available    approximately 3 hours after the Gram stain result.***    Gram stain, PCR, and/or culture results may not always    correspond due to difference in methodologies.    ************************************************************    This PCR assay was performed using "[employer].    The following targets are tested for: Enterococcus,    vancomycin resistant enterococci, Listeria monocytogenes,    coagulase negative staphylococci, S. aureus,    methicillin resistant S. aureus, Streptococcus agalactiae    (Group B), S. pneumoniae, S. pyogenes (Group A),    Acinetobacter baumannii, Enterobacter cloacae, E. coli,    Klebsiella oxytoca, K. pneumoniae, Proteus sp.,    Serratia marcescens, Haemophilus influenzae,    Neisseria meningitidis, Pseudomonas aeruginosa, Candida    albicans, C. glabrata, C krusei, C parapsilosis,    C. tropicalis and the KPC resistance gene.  Organism: Blood Culture PCR (10-16-19 @ 12:58)  Organism: Blood Culture PCR (10-16-19 @ 12:58)      -  Staphylococcus aureus: Detec Any isolate of Staphylococcus aureus from a blood culture is NOT considered a contaminant.      Method Type: PCR            INFECTIOUS DISEASES TESTING      RADIOLOGY & ADDITIONAL TESTS:  I have personally reviewed the last Chest xray  CXR  Xray Chest 1 View- PORTABLE-Urgent:   EXAM:  XR CHEST PORTABLE URGENT 1V            PROCEDURE DATE:  10/15/2019            INTERPRETATION:  Clinical History / Reason for exam: CHF    Comparison : Chest radiograph earlier the same day.    Technique/Positioning: Portable AP view.    Findings:    Support devices: None.    Cardiac/mediastinum/hilum: Median sternotomy.    Lung parenchyma/Pleura: Bilateral airspace and interstitial opacities,   worse from the prior x-ray of the same day. Blunted bilateral   costophrenic angles.    Skeleton/soft tissues: Stable.    Impression:      Findings as above suggestive of worsening CHF compared to the prior x-ray   of the same day                  JULIANO FARNSWORTH M.D., ATTENDING RADIOLOGIST  This document has been electronically signed. Oct 16 2019  8:45AM             (10-15-19 @ 23:46)      CT      CARDIOLOGY TESTING  12 Lead ECG:   Ventricular Rate 67 BPM    Atrial Rate 67 BPM    P-R Interval 116 ms    QRS Duration 124 ms    Q-T Interval 446 ms    QTC Calculation(Bezet) 471 ms    R Axis -74 degrees    T Axis 258 degrees    Diagnosis Line Atrial flutter with 2 to 1 block  Leftaxis deviation  Right bundle branch block  Marked ST abnormality, possible inferior subendocardial injury  Abnormal ECG    Confirmed by STEVE YAO MD ([id number]) on 10/17/2019 11:16:12 AM (10-17-19 @ 07:49)  12 Lead ECG:   Ventricular Rate 92 BPM    Atrial Rate 277 BPM    QRS Duration 114 ms    Q-T Interval 356 ms    QTC Calculation(Bezet) 440 ms    R Axis -86 degrees    T Axis -85 degrees    Diagnosis Line Atrial flutter with variable A-V block  Left axis deviation  Incomplete right bundle branch block  Nonspecific ST-T changes    Confirmed by STEVE YAO MD ([id number]) on 10/16/2019 2:25:19 PM (10-16-19 @ 08:01)      MEDICATIONS  ALPRAZolam 0.5  aspirin  chewable 81  buDESOnide 160 MICROgram(s)/formoterol 4.5 MICROgram(s) Inhaler 2  clopidogrel Tablet 75  dextrose 5%. 1000  dextrose 50% Injectable 12.5  dextrose 50% Injectable 25  dextrose 50% Injectable 25  fenofibrate Tablet 145  finasteride 5  heparin  Infusion 1100  influenza   Vaccine 0.5  insulin glargine Injectable (LANTUS) 21  insulin lispro Injectable (HumaLOG) 7  insulin lispro Injectable (HumaLOG) 7  insulin lispro Injectable (HumaLOG) 7  isosorbide   mononitrate ER Tablet (IMDUR) 30  metoprolol succinate ER 75  nafcillin  IVPB   nafcillin  IVPB 2  nitroglycerin    Patch 0.2 mG/Hr(s) 1  silver sulfADIAZINE 1% Cream 1  simvastatin 40  tamsulosin 0.4      ANTIBIOTICS:  nafcillin  IVPB      nafcillin  IVPB 2 Gram(s) IV Intermittent every 4 hours

## 2019-10-18 NOTE — PHARMACOTHERAPY INTERVENTION NOTE - COMMENTS
Angel pedro aware of cr=2mg/d, will continue motrin 600mg prn
SHAUNA pedro aware of cr=2mg/dl. PA will continue motrin 600mg q6hprn
As per MD patient on losartan q12h. Dose confirmed by MD from outside pharmacy
As per MD patient on losartan q12h. Dose confirmed by MD from outside pharmacy

## 2019-10-19 LAB
ALBUMIN SERPL ELPH-MCNC: 3.1 G/DL — LOW (ref 3.5–5.2)
ALP SERPL-CCNC: 46 U/L — SIGNIFICANT CHANGE UP (ref 30–115)
ALT FLD-CCNC: 26 U/L — SIGNIFICANT CHANGE UP (ref 0–41)
ANION GAP SERPL CALC-SCNC: 19 MMOL/L — HIGH (ref 7–14)
ANION GAP SERPL CALC-SCNC: 26 MMOL/L — HIGH (ref 7–14)
APTT BLD: 54.4 SEC — HIGH (ref 27–39.2)
APTT BLD: 66.9 SEC — HIGH (ref 27–39.2)
AST SERPL-CCNC: 40 U/L — SIGNIFICANT CHANGE UP (ref 0–41)
BASOPHILS # BLD AUTO: 0.03 K/UL — SIGNIFICANT CHANGE UP (ref 0–0.2)
BASOPHILS NFR BLD AUTO: 0.3 % — SIGNIFICANT CHANGE UP (ref 0–1)
BILIRUB SERPL-MCNC: 1.8 MG/DL — HIGH (ref 0.2–1.2)
BUN SERPL-MCNC: 99 MG/DL — CRITICAL HIGH (ref 10–20)
BUN SERPL-MCNC: 99 MG/DL — CRITICAL HIGH (ref 10–20)
CALCIUM SERPL-MCNC: 8.3 MG/DL — LOW (ref 8.5–10.1)
CALCIUM SERPL-MCNC: 8.7 MG/DL — SIGNIFICANT CHANGE UP (ref 8.5–10.1)
CHLORIDE SERPL-SCNC: 90 MMOL/L — LOW (ref 98–110)
CHLORIDE SERPL-SCNC: 91 MMOL/L — LOW (ref 98–110)
CO2 SERPL-SCNC: 20 MMOL/L — SIGNIFICANT CHANGE UP (ref 17–32)
CO2 SERPL-SCNC: 28 MMOL/L — SIGNIFICANT CHANGE UP (ref 17–32)
CREAT SERPL-MCNC: 4.8 MG/DL — CRITICAL HIGH (ref 0.7–1.5)
CREAT SERPL-MCNC: 6.2 MG/DL — CRITICAL HIGH (ref 0.7–1.5)
CULTURE RESULTS: SIGNIFICANT CHANGE UP
EOSINOPHIL # BLD AUTO: 0.14 K/UL — SIGNIFICANT CHANGE UP (ref 0–0.7)
EOSINOPHIL NFR BLD AUTO: 1.6 % — SIGNIFICANT CHANGE UP (ref 0–8)
GLUCOSE BLDC GLUCOMTR-MCNC: 104 MG/DL — HIGH (ref 70–99)
GLUCOSE BLDC GLUCOMTR-MCNC: 129 MG/DL — HIGH (ref 70–99)
GLUCOSE BLDC GLUCOMTR-MCNC: 151 MG/DL — HIGH (ref 70–99)
GLUCOSE BLDC GLUCOMTR-MCNC: 76 MG/DL — SIGNIFICANT CHANGE UP (ref 70–99)
GLUCOSE SERPL-MCNC: 128 MG/DL — HIGH (ref 70–99)
GLUCOSE SERPL-MCNC: 164 MG/DL — HIGH (ref 70–99)
HCT VFR BLD CALC: 39.1 % — LOW (ref 42–52)
HGB BLD-MCNC: 12.6 G/DL — LOW (ref 14–18)
IMM GRANULOCYTES NFR BLD AUTO: 0.3 % — SIGNIFICANT CHANGE UP (ref 0.1–0.3)
LYMPHOCYTES # BLD AUTO: 1.63 K/UL — SIGNIFICANT CHANGE UP (ref 1.2–3.4)
LYMPHOCYTES # BLD AUTO: 18.7 % — LOW (ref 20.5–51.1)
MAGNESIUM SERPL-MCNC: 2.6 MG/DL — HIGH (ref 1.8–2.4)
MCHC RBC-ENTMCNC: 25.5 PG — LOW (ref 27–31)
MCHC RBC-ENTMCNC: 32.2 G/DL — SIGNIFICANT CHANGE UP (ref 32–37)
MCV RBC AUTO: 79.1 FL — LOW (ref 80–94)
MONOCYTES # BLD AUTO: 0.96 K/UL — HIGH (ref 0.1–0.6)
MONOCYTES NFR BLD AUTO: 11 % — HIGH (ref 1.7–9.3)
NEUTROPHILS # BLD AUTO: 5.93 K/UL — SIGNIFICANT CHANGE UP (ref 1.4–6.5)
NEUTROPHILS NFR BLD AUTO: 68.1 % — SIGNIFICANT CHANGE UP (ref 42.2–75.2)
NRBC # BLD: 0 /100 WBCS — SIGNIFICANT CHANGE UP (ref 0–0)
PHOSPHATE SERPL-MCNC: 5.9 MG/DL — HIGH (ref 2.1–4.9)
PLATELET # BLD AUTO: 149 K/UL — SIGNIFICANT CHANGE UP (ref 130–400)
POTASSIUM SERPL-MCNC: 4.2 MMOL/L — SIGNIFICANT CHANGE UP (ref 3.5–5)
POTASSIUM SERPL-MCNC: 4.3 MMOL/L — SIGNIFICANT CHANGE UP (ref 3.5–5)
POTASSIUM SERPL-SCNC: 4.2 MMOL/L — SIGNIFICANT CHANGE UP (ref 3.5–5)
POTASSIUM SERPL-SCNC: 4.3 MMOL/L — SIGNIFICANT CHANGE UP (ref 3.5–5)
PROT SERPL-MCNC: 6.4 G/DL — SIGNIFICANT CHANGE UP (ref 6–8)
RBC # BLD: 4.94 M/UL — SIGNIFICANT CHANGE UP (ref 4.7–6.1)
RBC # FLD: 15.9 % — HIGH (ref 11.5–14.5)
SODIUM SERPL-SCNC: 137 MMOL/L — SIGNIFICANT CHANGE UP (ref 135–146)
SODIUM SERPL-SCNC: 137 MMOL/L — SIGNIFICANT CHANGE UP (ref 135–146)
SPECIMEN SOURCE: SIGNIFICANT CHANGE UP
WBC # BLD: 8.72 K/UL — SIGNIFICANT CHANGE UP (ref 4.8–10.8)
WBC # FLD AUTO: 8.72 K/UL — SIGNIFICANT CHANGE UP (ref 4.8–10.8)

## 2019-10-19 PROCEDURE — 76775 US EXAM ABDO BACK WALL LIM: CPT | Mod: 26

## 2019-10-19 PROCEDURE — 71045 X-RAY EXAM CHEST 1 VIEW: CPT | Mod: 26

## 2019-10-19 PROCEDURE — 99232 SBSQ HOSP IP/OBS MODERATE 35: CPT

## 2019-10-19 RX ORDER — SODIUM CHLORIDE 9 MG/ML
1000 INJECTION INTRAMUSCULAR; INTRAVENOUS; SUBCUTANEOUS
Refills: 0 | Status: DISCONTINUED | OUTPATIENT
Start: 2019-10-19 | End: 2019-10-23

## 2019-10-19 RX ORDER — HEPARIN SODIUM 5000 [USP'U]/ML
1550 INJECTION INTRAVENOUS; SUBCUTANEOUS
Qty: 25000 | Refills: 0 | Status: DISCONTINUED | OUTPATIENT
Start: 2019-10-19 | End: 2019-10-21

## 2019-10-19 RX ADMIN — BUDESONIDE AND FORMOTEROL FUMARATE DIHYDRATE 2 PUFF(S): 160; 4.5 AEROSOL RESPIRATORY (INHALATION) at 21:37

## 2019-10-19 RX ADMIN — NAFCILLIN 200 GRAM(S): 10 INJECTION, POWDER, FOR SOLUTION INTRAVENOUS at 09:15

## 2019-10-19 RX ADMIN — Medication 145 MILLIGRAM(S): at 11:19

## 2019-10-19 RX ADMIN — Medication 1 PATCH: at 19:19

## 2019-10-19 RX ADMIN — NAFCILLIN 200 GRAM(S): 10 INJECTION, POWDER, FOR SOLUTION INTRAVENOUS at 05:58

## 2019-10-19 RX ADMIN — NAFCILLIN 200 GRAM(S): 10 INJECTION, POWDER, FOR SOLUTION INTRAVENOUS at 17:33

## 2019-10-19 RX ADMIN — ISOSORBIDE MONONITRATE 30 MILLIGRAM(S): 60 TABLET, EXTENDED RELEASE ORAL at 11:19

## 2019-10-19 RX ADMIN — SODIUM CHLORIDE 50 MILLILITER(S): 9 INJECTION INTRAMUSCULAR; INTRAVENOUS; SUBCUTANEOUS at 09:48

## 2019-10-19 RX ADMIN — Medication 81 MILLIGRAM(S): at 11:19

## 2019-10-19 RX ADMIN — BUDESONIDE AND FORMOTEROL FUMARATE DIHYDRATE 2 PUFF(S): 160; 4.5 AEROSOL RESPIRATORY (INHALATION) at 08:03

## 2019-10-19 RX ADMIN — CLOPIDOGREL BISULFATE 75 MILLIGRAM(S): 75 TABLET, FILM COATED ORAL at 11:19

## 2019-10-19 RX ADMIN — Medication 1 APPLICATION(S): at 05:58

## 2019-10-19 RX ADMIN — Medication 1 PATCH: at 11:20

## 2019-10-19 RX ADMIN — NAFCILLIN 200 GRAM(S): 10 INJECTION, POWDER, FOR SOLUTION INTRAVENOUS at 13:02

## 2019-10-19 RX ADMIN — Medication 1 SPRAY(S): at 17:32

## 2019-10-19 RX ADMIN — NAFCILLIN 200 GRAM(S): 10 INJECTION, POWDER, FOR SOLUTION INTRAVENOUS at 21:37

## 2019-10-19 RX ADMIN — TAMSULOSIN HYDROCHLORIDE 0.4 MILLIGRAM(S): 0.4 CAPSULE ORAL at 21:37

## 2019-10-19 RX ADMIN — NAFCILLIN 200 GRAM(S): 10 INJECTION, POWDER, FOR SOLUTION INTRAVENOUS at 02:00

## 2019-10-19 RX ADMIN — SIMVASTATIN 40 MILLIGRAM(S): 20 TABLET, FILM COATED ORAL at 21:37

## 2019-10-19 RX ADMIN — HEPARIN SODIUM 14 UNIT(S)/HR: 5000 INJECTION INTRAVENOUS; SUBCUTANEOUS at 01:17

## 2019-10-19 RX ADMIN — Medication 7 UNIT(S): at 08:03

## 2019-10-19 RX ADMIN — Medication 1 APPLICATION(S): at 17:32

## 2019-10-19 RX ADMIN — Medication 650 MILLIGRAM(S): at 01:15

## 2019-10-19 RX ADMIN — FINASTERIDE 5 MILLIGRAM(S): 5 TABLET, FILM COATED ORAL at 11:19

## 2019-10-19 RX ADMIN — HEPARIN SODIUM 15.5 UNIT(S)/HR: 5000 INJECTION INTRAVENOUS; SUBCUTANEOUS at 09:11

## 2019-10-19 NOTE — PROGRESS NOTE ADULT - ASSESSMENT
Pulmonary edema improved  hx diastolic heart failure  fever multilobar  pneumonia from GPC MSSS  renal failure      Suggest:  abx per ID  cardio management CATH cancelled due to temps  f/u echo  hold diuretics   taper O2  NIPPV as needed  OOB  DVT prophylaxis  gentle hydration    monitor in ICU

## 2019-10-19 NOTE — CHART NOTE - NSCHARTNOTEFT_GEN_A_CORE
Spoke with Nephrology, consult was not placed specifically to Dr Rivera.  Patient is currently intravascularly euvolemic but has significant extravascular edema.  VS are stable but BP on the lower side with a MAP of 73  Creatinine worsening over 24 hours with very minimal urine output since this morning.  Will place julian and repeat labs at 7pm.  If worsening clinical status, nephro will dialyze urgently.  Nephrology aware of patient now.

## 2019-10-19 NOTE — PROGRESS NOTE ADULT - SUBJECTIVE AND OBJECTIVE BOX
Patient is a 79y old  Male who presents with a chief complaint of CHF Exacerbation (18 Oct 2019 13:38)        Interval Events: No overnight events. Feeling better in general    REVIEW OF SYSTEMS:  Constitutional: No fevers or chills. No weight loss. No fatigue or generalized malaise.  Eyes: No itching or discharge from the eyes  ENT: No ear pain. No ear discharge. No nasal congestion. No post nasal drip. No epistaxis. No throat pain. No sore throat. No difficulty swallowing.   CV: No chest pain. No palpitations. No lightheadedness or dizziness.   Resp: No dyspnea at rest. No dyspnea on exertion. No orthopnea. No wheezing. No cough. No stridor. No sputum production. No chest pain with respiration.  GI: No nausea. No vomiting. No diarrhea.  MSK: No joint pain or pain in any extremities  Integumentary: No skin lesions. No pedal edema.  Neurological: No gross motor weakness. No sensory changes.      OBJECTIVE:  ICU Vital Signs Last 24 Hrs  T(C): 36.4 (19 Oct 2019 07:05), Max: 37.3 (18 Oct 2019 23:17)  T(F): 97.5 (19 Oct 2019 07:05), Max: 99.1 (18 Oct 2019 23:17)  HR: 66 (19 Oct 2019 09:12) (65 - 68)  BP: 102/50 (19 Oct 2019 09:12) (90/54 - 120/55)  BP(mean): 72 (19 Oct 2019 09:12) (67 - 83)  RR: 26 (19 Oct 2019 09:12) (14 - 43)  SpO2: 99% (19 Oct 2019 09:12) (95% - 100%)        10-18 @ 07:01  -  10-19 @ 07:00  --------------------------------------------------------  IN: 723 mL / OUT: 225 mL / NET: 498 mL    10-19 @ 07:01  -  10-19 @ 09:31  --------------------------------------------------------  IN: 157.5 mL / OUT: 150 mL / NET: 7.5 mL      CAPILLARY BLOOD GLUCOSE      POCT Blood Glucose.: 129 mg/dL (19 Oct 2019 07:35)      PHYSICAL EXAM:  General: Awake, alert, oriented X 3.   HEENT: Atraumatic, normocephalic.                 Mallampatti Grade                 No nasal congestion.                No tonsillar or pharyngeal exudates.  Lymph Nodes: No palpable lymphadenopathy neck, supraclavicular region  Neck: No JVD. No carotid bruit, no thyromegally  Respiratory: Normal chest expansion                         Normal percussion                         Normal and equal air entry                          rales at bases  Cardiovascular: S1 S2 normal. No murmurs, rubs or gallops.   Abdomen: Soft, non-tender, non-distended. No organomegaly.  Extremities: Warm to touch. Peripheral pulse palpable. No pedal edema.   Skin: No rashes or skin lesions, warm dry  Neurological: Motor and sensory examination equal and normal in all four extremities.  Psychiatry: Appropriate mood and affect.    HOSPITAL MEDICATIONS:  MEDICATIONS  (STANDING):  aspirin  chewable 81 milliGRAM(s) Oral daily  buDESOnide 160 MICROgram(s)/formoterol 4.5 MICROgram(s) Inhaler 2 Puff(s) Inhalation two times a day  clopidogrel Tablet 75 milliGRAM(s) Oral daily  dextrose 5%. 1000 milliLiter(s) (50 mL/Hr) IV Continuous <Continuous>  dextrose 50% Injectable 12.5 Gram(s) IV Push once  dextrose 50% Injectable 25 Gram(s) IV Push once  dextrose 50% Injectable 25 Gram(s) IV Push once  fenofibrate Tablet 145 milliGRAM(s) Oral daily  finasteride 5 milliGRAM(s) Oral daily  fluticasone propionate 50 MICROgram(s)/spray Nasal Spray 1 Spray(s) Both Nostrils two times a day  heparin  Infusion 1550 Unit(s)/Hr (15.5 mL/Hr) IV Continuous <Continuous>  influenza   Vaccine 0.5 milliLiter(s) IntraMuscular once  insulin glargine Injectable (LANTUS) 21 Unit(s) SubCutaneous at bedtime  insulin lispro Injectable (HumaLOG) 7 Unit(s) SubCutaneous before breakfast  insulin lispro Injectable (HumaLOG) 7 Unit(s) SubCutaneous before lunch  insulin lispro Injectable (HumaLOG) 7 Unit(s) SubCutaneous before dinner  isosorbide   mononitrate ER Tablet (IMDUR) 30 milliGRAM(s) Oral daily  metoprolol succinate ER 75 milliGRAM(s) Oral daily  nafcillin  IVPB      nafcillin  IVPB 2 Gram(s) IV Intermittent every 4 hours  nitroglycerin    Patch 0.2 mG/Hr(s) 1 patch Transdermal daily  silver sulfADIAZINE 1% Cream 1 Application(s) Topical two times a day  simvastatin 40 milliGRAM(s) Oral at bedtime  tamsulosin 0.4 milliGRAM(s) Oral at bedtime    MEDICATIONS  (PRN):  acetaminophen   Tablet .. 650 milliGRAM(s) Oral every 6 hours PRN Temp greater or equal to 38C (100.4F), Mild Pain (1 - 3)  acetaminophen  Suppository .. 650 milliGRAM(s) Rectal every 6 hours PRN Temp greater or equal to 38C (100.4F), Mild Pain (1 - 3)  ALPRAZolam 0.5 milliGRAM(s) Oral at bedtime PRN anxiety  dextrose 40% Gel 15 Gram(s) Oral once PRN Blood Glucose LESS THAN 70 milliGRAM(s)/deciliter  glucagon  Injectable 1 milliGRAM(s) IntraMuscular once PRN Glucose LESS THAN 70 milligrams/deciliter  ibuprofen  Tablet. 400 milliGRAM(s) Oral every 6 hours PRN Temp greater or equal to 38C (100.4F), Mild Pain (1 - 3)      LABS:                        12.6   8.72  )-----------( 149      ( 19 Oct 2019 06:36 )             39.1     10-19    137  |  90<L>  |  99<HH>  ----------------------------<  164<H>  4.2   |  28  |  4.8<HH>    Ca    8.3<L>      19 Oct 2019 06:36    TPro  6.4  /  Alb  3.1<L>  /  TBili  1.8<H>  /  DBili  x   /  AST  40  /  ALT  26  /  AlkPhos  46  10-19    PT/INR - ( 18 Oct 2019 05:54 )   PT: 14.30 sec;   INR: 1.25 ratio         PTT - ( 19 Oct 2019 06:36 )  PTT:54.4 sec                  RADIOLOGY:Radiology personally reviewed.

## 2019-10-19 NOTE — PROGRESS NOTE ADULT - ASSESSMENT
Patient feels better . He is oob to chair. Needs o2.   Antibiotics. In greater out. When stable ambulate.

## 2019-10-19 NOTE — PROGRESS NOTE ADULT - SUBJECTIVE AND OBJECTIVE BOX
Patient is a 79y old  Male who presents with a chief complaint of CHF Exacerbation (18 Oct 2019 13:38)      T(F): 99.1 (10-18-19 @ 23:17), Max: 99.1 (10-18-19 @ 23:17)  HR: 66 (10-19-19 @ 05:14)  BP: 99/51 (10-19-19 @ 05:14)  RR: 22 (10-19-19 @ 05:14)  SpO2: 99% (10-19-19 @ 05:14) (95% - 100%)    PHYSICAL EXAM:  GENERAL: NAD, well-groomed, well-developed  HEAD:  Atraumatic, Normocephalic  EYES: EOMI, PERRLA, conjunctiva and sclera clear  ENMT: No tonsillar erythema, exudates, or enlargement; Moist mucous membranes, Good dentition, No lesions  NECK: Supple, No JVD, Normal thyroid  NERVOUS SYSTEM:  Alert & Oriented X3,  Motor Strength 5/5 B/L upper and lower extremities  CHEST/LUNG: Clear to percussion bilaterally; No rales, rhonchi, wheezing, or rubs Decreased BS  HEART: Regular rate and rhythm; No murmurs, rubs, or gallops  ABDOMEN: Soft, Nontender, Nondistended; Bowel sounds present  EXTREMITIES:   No clubbing, cyanosis, or edema  LYMPH: No lymphadenopathy noted  SKIN: No rashes or lesions    labs  10-18    138  |  93<L>  |  80<HH>  ----------------------------<  160<H>  3.8   |  30  |  3.2<H>    Ca    8.3<L>      18 Oct 2019 05:54    TPro  5.8<L>  /  Alb  2.7<L>  /  TBili  1.7<H>  /  DBili  x   /  AST  55<H>  /  ALT  30  /  AlkPhos  38  10-18                          12.6   8.72  )-----------( 149      ( 19 Oct 2019 06:36 )             39.1       Culture - Blood (collected 17 Oct 2019 05:15)  Source: .Blood Blood  Gram Stain (18 Oct 2019 22:50):    Growth in anaerobic bottle: Gram Positive Cocci in Clusters    Growth in aerobic bottle: Gram Positive Cocci in Clusters  Preliminary Report (18 Oct 2019 22:50):    Growth in anaerobic bottle: Gram Positive Cocci in Clusters    Growth in aerobic bottle: Gram Positive Cocci in Clusters    Culture - Blood (collected 16 Oct 2019 15:11)  Source: .Blood None  Gram Stain (18 Oct 2019 00:08):    Growth in aerobic bottle: Gram Positive Cocci in Clusters    Growth in anaerobic bottle: Gram Positive Cocci in Clusters  Final Report (18 Oct 2019 19:59):    Growth in aerobic and anaerobic bottles: Staphylococcus aureus    See previous culture [id number]      PT/INR - ( 18 Oct 2019 05:54 )   PT: 14.30 sec;   INR: 1.25 ratio         PTT - ( 19 Oct 2019 06:36 )  PTT:54.4 sec        acetaminophen   Tablet .. 650 milliGRAM(s) Oral every 6 hours PRN  acetaminophen  Suppository .. 650 milliGRAM(s) Rectal every 6 hours PRN  ALPRAZolam 0.5 milliGRAM(s) Oral at bedtime PRN  aspirin  chewable 81 milliGRAM(s) Oral daily  buDESOnide 160 MICROgram(s)/formoterol 4.5 MICROgram(s) Inhaler 2 Puff(s) Inhalation two times a day  clopidogrel Tablet 75 milliGRAM(s) Oral daily  dextrose 40% Gel 15 Gram(s) Oral once PRN  dextrose 5%. 1000 milliLiter(s) IV Continuous <Continuous>  dextrose 50% Injectable 12.5 Gram(s) IV Push once  dextrose 50% Injectable 25 Gram(s) IV Push once  dextrose 50% Injectable 25 Gram(s) IV Push once  fenofibrate Tablet 145 milliGRAM(s) Oral daily  finasteride 5 milliGRAM(s) Oral daily  fluticasone propionate 50 MICROgram(s)/spray Nasal Spray 1 Spray(s) Both Nostrils two times a day  glucagon  Injectable 1 milliGRAM(s) IntraMuscular once PRN  heparin  Infusion 1300 Unit(s)/Hr IV Continuous <Continuous>  ibuprofen  Tablet. 400 milliGRAM(s) Oral every 6 hours PRN  influenza   Vaccine 0.5 milliLiter(s) IntraMuscular once  insulin glargine Injectable (LANTUS) 21 Unit(s) SubCutaneous at bedtime  insulin lispro Injectable (HumaLOG) 7 Unit(s) SubCutaneous before breakfast  insulin lispro Injectable (HumaLOG) 7 Unit(s) SubCutaneous before lunch  insulin lispro Injectable (HumaLOG) 7 Unit(s) SubCutaneous before dinner  isosorbide   mononitrate ER Tablet (IMDUR) 30 milliGRAM(s) Oral daily  metoprolol succinate ER 75 milliGRAM(s) Oral daily  nafcillin  IVPB      nafcillin  IVPB 2 Gram(s) IV Intermittent every 4 hours  nitroglycerin    Patch 0.2 mG/Hr(s) 1 patch Transdermal daily  silver sulfADIAZINE 1% Cream 1 Application(s) Topical two times a day  simvastatin 40 milliGRAM(s) Oral at bedtime  tamsulosin 0.4 milliGRAM(s) Oral at bedtime

## 2019-10-19 NOTE — PROGRESS NOTE ADULT - SUBJECTIVE AND OBJECTIVE BOX
Chief Complaint:  Patient is a 79y old  Male who presents with a chief complaint of dyspnea (19 Oct 2019 09:31)      Interval Events:     Allergies:  No Known Allergies      Home Medications:    Hospital Medications:  acetaminophen   Tablet .. 650 milliGRAM(s) Oral every 6 hours PRN  acetaminophen  Suppository .. 650 milliGRAM(s) Rectal every 6 hours PRN  ALPRAZolam 0.5 milliGRAM(s) Oral at bedtime PRN  aspirin  chewable 81 milliGRAM(s) Oral daily  buDESOnide 160 MICROgram(s)/formoterol 4.5 MICROgram(s) Inhaler 2 Puff(s) Inhalation two times a day  clopidogrel Tablet 75 milliGRAM(s) Oral daily  dextrose 40% Gel 15 Gram(s) Oral once PRN  dextrose 5%. 1000 milliLiter(s) IV Continuous <Continuous>  dextrose 50% Injectable 12.5 Gram(s) IV Push once  dextrose 50% Injectable 25 Gram(s) IV Push once  dextrose 50% Injectable 25 Gram(s) IV Push once  fenofibrate Tablet 145 milliGRAM(s) Oral daily  finasteride 5 milliGRAM(s) Oral daily  fluticasone propionate 50 MICROgram(s)/spray Nasal Spray 1 Spray(s) Both Nostrils two times a day  glucagon  Injectable 1 milliGRAM(s) IntraMuscular once PRN  heparin  Infusion 1550 Unit(s)/Hr IV Continuous <Continuous>  ibuprofen  Tablet. 400 milliGRAM(s) Oral every 6 hours PRN  influenza   Vaccine 0.5 milliLiter(s) IntraMuscular once  insulin glargine Injectable (LANTUS) 21 Unit(s) SubCutaneous at bedtime  insulin lispro Injectable (HumaLOG) 7 Unit(s) SubCutaneous before breakfast  insulin lispro Injectable (HumaLOG) 7 Unit(s) SubCutaneous before lunch  insulin lispro Injectable (HumaLOG) 7 Unit(s) SubCutaneous before dinner  isosorbide   mononitrate ER Tablet (IMDUR) 30 milliGRAM(s) Oral daily  metoprolol succinate ER 75 milliGRAM(s) Oral daily  nafcillin  IVPB      nafcillin  IVPB 2 Gram(s) IV Intermittent every 4 hours  nitroglycerin    Patch 0.2 mG/Hr(s) 1 patch Transdermal daily  silver sulfADIAZINE 1% Cream 1 Application(s) Topical two times a day  simvastatin 40 milliGRAM(s) Oral at bedtime  sodium chloride 0.9%. 1000 milliLiter(s) IV Continuous <Continuous>  tamsulosin 0.4 milliGRAM(s) Oral at bedtime      PMHX/PSHX:  BPH (benign prostatic hyperplasia)  CHF (congestive heart failure)  COPD (chronic obstructive pulmonary disease)  Diabetes  HTN (hypertension)  H/O heart artery stent  S/P CABG x 3      Family history:  No pertinent family history in first degree relatives      ROS:   As mentioned below      PHYSICAL EXAM:   Vital Signs:  Vital Signs Last 24 Hrs  T(C): 36.4 (19 Oct 2019 07:05), Max: 37.3 (18 Oct 2019 23:17)  T(F): 97.5 (19 Oct 2019 07:05), Max: 99.1 (18 Oct 2019 23:17)  HR: 66 (19 Oct 2019 09:12) (65 - 68)  BP: 102/50 (19 Oct 2019 09:12) (90/54 - 120/55)  BP(mean): 72 (19 Oct 2019 09:12) (67 - 83)  RR: 26 (19 Oct 2019 09:12) (14 - 43)  SpO2: 99% (19 Oct 2019 09:12) (95% - 100%)  Daily     Daily     GENERAL:  NAD  HEENT:  NC/AT,  No Thyromegaly  CHEST:  CTA B/L  HEART:  S1, S2- No M, R, G  ABDOMEN:  Soft, NT, ND  EXTEREMITIES:  no cyanosis,clubbing or edema  SKIN:  NAD  NEURO:  Grossly Nr.    LABS:                        12.6   8.72  )-----------( 149      ( 19 Oct 2019 06:36 )             39.1     10-19    137  |  90<L>  |  99<HH>  ----------------------------<  164<H>  4.2   |  28  |  4.8<HH>    Ca    8.3<L>      19 Oct 2019 06:36    TPro  6.4  /  Alb  3.1<L>  /  TBili  1.8<H>  /  DBili  x   /  AST  40  /  ALT  26  /  AlkPhos  46  10-19    LIVER FUNCTIONS - ( 19 Oct 2019 06:36 )  Alb: 3.1 g/dL / Pro: 6.4 g/dL / ALK PHOS: 46 U/L / ALT: 26 U/L / AST: 40 U/L / GGT: x           PT/INR - ( 18 Oct 2019 05:54 )   PT: 14.30 sec;   INR: 1.25 ratio         PTT - ( 19 Oct 2019 06:36 )  PTT:54.4 sec        Imaging:

## 2019-10-19 NOTE — PROGRESS NOTE ADULT - ASSESSMENT
80yo Male w/ PMH of HFpEF (EF of 55% with Grade II Diastolic dysfunction), COPD (home O2 3-3.5L as needed), DM, HTN, CAD s/p CABG and 1 stent, BPH, and recent admission for CHF (August 2019) presenting to [facility] with complaints of shortness of breath and chest pain. Patient reports he has been progressively worsening shortness of breath for the last 4-5 days prior to admission.    1) Acute on chronic diastolic CHF Exacerbation   Lasix held for now as patient appears to be maximally diuresed   Patient on Heparin Drip adjusted today as PTT was 40's  Continue with Metoprolol ER 75 along with Aspirin 81. c/w plavix 75mg daily after loading does  Monitor I and O   Follow with Repeat PTT    2. HTN  stable at this time Hold Meds and monitor BP    3. BRITTANI   Diuretics held for 48 hours   Continue to hold  Follow with Creatinine in AM    4. DM2.   Start Insulin Follow FBG    5. COPD on as needed home O2 not in exacerbation  -Monitor and c/w home med    6. BPH  C/w med    7. CAD s/p CABG  - continue cardiac med and plan for cath once afebrile    8. Febrile   Blood Culture prelim Gram positive Cocci in Clusters   Nafcillin as per ID recommendations   Follow with daily blood cultures until negative   Consider antibiotic de-escalation once patient improved and Sensitivities are back.     DVT, GI prophylaxis  Disposition Planning :Continue with CCU Monitoring     Pager No.  [phone]

## 2019-10-20 LAB
ALBUMIN SERPL ELPH-MCNC: 2.8 G/DL — LOW (ref 3.5–5.2)
ALP SERPL-CCNC: 43 U/L — SIGNIFICANT CHANGE UP (ref 30–115)
ALT FLD-CCNC: 19 U/L — SIGNIFICANT CHANGE UP (ref 0–41)
ANION GAP SERPL CALC-SCNC: 23 MMOL/L — HIGH (ref 7–14)
APTT BLD: 67 SEC — HIGH (ref 27–39.2)
APTT BLD: 72.3 SEC — CRITICAL HIGH (ref 27–39.2)
AST SERPL-CCNC: 28 U/L — SIGNIFICANT CHANGE UP (ref 0–41)
BASOPHILS # BLD AUTO: 0.04 K/UL — SIGNIFICANT CHANGE UP (ref 0–0.2)
BASOPHILS NFR BLD AUTO: 0.4 % — SIGNIFICANT CHANGE UP (ref 0–1)
BILIRUB SERPL-MCNC: 1.3 MG/DL — HIGH (ref 0.2–1.2)
BUN SERPL-MCNC: 100 MG/DL — CRITICAL HIGH (ref 10–20)
CALCIUM SERPL-MCNC: 7.8 MG/DL — LOW (ref 8.5–10.1)
CHLORIDE SERPL-SCNC: 87 MMOL/L — LOW (ref 98–110)
CO2 SERPL-SCNC: 25 MMOL/L — SIGNIFICANT CHANGE UP (ref 17–32)
CREAT SERPL-MCNC: 6.4 MG/DL — CRITICAL HIGH (ref 0.7–1.5)
EOSINOPHIL # BLD AUTO: 0.14 K/UL — SIGNIFICANT CHANGE UP (ref 0–0.7)
EOSINOPHIL NFR BLD AUTO: 1.6 % — SIGNIFICANT CHANGE UP (ref 0–8)
GLUCOSE BLDC GLUCOMTR-MCNC: 111 MG/DL — HIGH (ref 70–99)
GLUCOSE BLDC GLUCOMTR-MCNC: 129 MG/DL — HIGH (ref 70–99)
GLUCOSE BLDC GLUCOMTR-MCNC: 140 MG/DL — HIGH (ref 70–99)
GLUCOSE BLDC GLUCOMTR-MCNC: 164 MG/DL — HIGH (ref 70–99)
GLUCOSE SERPL-MCNC: 119 MG/DL — HIGH (ref 70–99)
GRAM STN FLD: SIGNIFICANT CHANGE UP
HCT VFR BLD CALC: 35.5 % — LOW (ref 42–52)
HGB BLD-MCNC: 11.7 G/DL — LOW (ref 14–18)
IMM GRANULOCYTES NFR BLD AUTO: 0.7 % — HIGH (ref 0.1–0.3)
INR BLD: 1.37 RATIO — HIGH (ref 0.65–1.3)
LYMPHOCYTES # BLD AUTO: 1.48 K/UL — SIGNIFICANT CHANGE UP (ref 1.2–3.4)
LYMPHOCYTES # BLD AUTO: 16.4 % — LOW (ref 20.5–51.1)
MAGNESIUM SERPL-MCNC: 2.3 MG/DL — SIGNIFICANT CHANGE UP (ref 1.8–2.4)
MCHC RBC-ENTMCNC: 25.9 PG — LOW (ref 27–31)
MCHC RBC-ENTMCNC: 33 G/DL — SIGNIFICANT CHANGE UP (ref 32–37)
MCV RBC AUTO: 78.5 FL — LOW (ref 80–94)
MONOCYTES # BLD AUTO: 0.98 K/UL — HIGH (ref 0.1–0.6)
MONOCYTES NFR BLD AUTO: 10.9 % — HIGH (ref 1.7–9.3)
NEUTROPHILS # BLD AUTO: 6.32 K/UL — SIGNIFICANT CHANGE UP (ref 1.4–6.5)
NEUTROPHILS NFR BLD AUTO: 70 % — SIGNIFICANT CHANGE UP (ref 42.2–75.2)
NRBC # BLD: 0 /100 WBCS — SIGNIFICANT CHANGE UP (ref 0–0)
PHOSPHATE SERPL-MCNC: 5.9 MG/DL — HIGH (ref 2.1–4.9)
PLATELET # BLD AUTO: 153 K/UL — SIGNIFICANT CHANGE UP (ref 130–400)
POTASSIUM SERPL-MCNC: 3.6 MMOL/L — SIGNIFICANT CHANGE UP (ref 3.5–5)
POTASSIUM SERPL-SCNC: 3.6 MMOL/L — SIGNIFICANT CHANGE UP (ref 3.5–5)
PROT SERPL-MCNC: 5.6 G/DL — LOW (ref 6–8)
PROTHROM AB SERPL-ACNC: 15.7 SEC — HIGH (ref 9.95–12.87)
RBC # BLD: 4.52 M/UL — LOW (ref 4.7–6.1)
RBC # FLD: 15.7 % — HIGH (ref 11.5–14.5)
SODIUM SERPL-SCNC: 135 MMOL/L — SIGNIFICANT CHANGE UP (ref 135–146)
SPECIMEN SOURCE: SIGNIFICANT CHANGE UP
WBC # BLD: 9.02 K/UL — SIGNIFICANT CHANGE UP (ref 4.8–10.8)
WBC # FLD AUTO: 9.02 K/UL — SIGNIFICANT CHANGE UP (ref 4.8–10.8)

## 2019-10-20 PROCEDURE — 71045 X-RAY EXAM CHEST 1 VIEW: CPT | Mod: 26

## 2019-10-20 PROCEDURE — 99232 SBSQ HOSP IP/OBS MODERATE 35: CPT

## 2019-10-20 RX ADMIN — Medication 7 UNIT(S): at 17:17

## 2019-10-20 RX ADMIN — BUDESONIDE AND FORMOTEROL FUMARATE DIHYDRATE 2 PUFF(S): 160; 4.5 AEROSOL RESPIRATORY (INHALATION) at 21:50

## 2019-10-20 RX ADMIN — CLOPIDOGREL BISULFATE 75 MILLIGRAM(S): 75 TABLET, FILM COATED ORAL at 11:11

## 2019-10-20 RX ADMIN — SODIUM CHLORIDE 50 MILLILITER(S): 9 INJECTION INTRAMUSCULAR; INTRAVENOUS; SUBCUTANEOUS at 04:04

## 2019-10-20 RX ADMIN — NAFCILLIN 200 GRAM(S): 10 INJECTION, POWDER, FOR SOLUTION INTRAVENOUS at 21:49

## 2019-10-20 RX ADMIN — Medication 0.5 MILLIGRAM(S): at 23:35

## 2019-10-20 RX ADMIN — Medication 1 PATCH: at 23:34

## 2019-10-20 RX ADMIN — SIMVASTATIN 40 MILLIGRAM(S): 20 TABLET, FILM COATED ORAL at 21:49

## 2019-10-20 RX ADMIN — Medication 145 MILLIGRAM(S): at 11:11

## 2019-10-20 RX ADMIN — Medication 1 SPRAY(S): at 05:31

## 2019-10-20 RX ADMIN — Medication 1 PATCH: at 11:11

## 2019-10-20 RX ADMIN — Medication 1 PATCH: at 19:15

## 2019-10-20 RX ADMIN — NAFCILLIN 200 GRAM(S): 10 INJECTION, POWDER, FOR SOLUTION INTRAVENOUS at 05:30

## 2019-10-20 RX ADMIN — NAFCILLIN 200 GRAM(S): 10 INJECTION, POWDER, FOR SOLUTION INTRAVENOUS at 13:32

## 2019-10-20 RX ADMIN — Medication 75 MILLIGRAM(S): at 05:31

## 2019-10-20 RX ADMIN — Medication 0.5 MILLIGRAM(S): at 02:12

## 2019-10-20 RX ADMIN — Medication 1 APPLICATION(S): at 05:31

## 2019-10-20 RX ADMIN — NAFCILLIN 200 GRAM(S): 10 INJECTION, POWDER, FOR SOLUTION INTRAVENOUS at 17:17

## 2019-10-20 RX ADMIN — FINASTERIDE 5 MILLIGRAM(S): 5 TABLET, FILM COATED ORAL at 11:11

## 2019-10-20 RX ADMIN — TAMSULOSIN HYDROCHLORIDE 0.4 MILLIGRAM(S): 0.4 CAPSULE ORAL at 21:49

## 2019-10-20 RX ADMIN — ISOSORBIDE MONONITRATE 30 MILLIGRAM(S): 60 TABLET, EXTENDED RELEASE ORAL at 11:11

## 2019-10-20 RX ADMIN — NAFCILLIN 200 GRAM(S): 10 INJECTION, POWDER, FOR SOLUTION INTRAVENOUS at 09:52

## 2019-10-20 RX ADMIN — SODIUM CHLORIDE 50 MILLILITER(S): 9 INJECTION INTRAMUSCULAR; INTRAVENOUS; SUBCUTANEOUS at 23:03

## 2019-10-20 RX ADMIN — Medication 1 APPLICATION(S): at 17:16

## 2019-10-20 RX ADMIN — Medication 7 UNIT(S): at 12:36

## 2019-10-20 RX ADMIN — Medication 1 PATCH: at 00:36

## 2019-10-20 RX ADMIN — HEPARIN SODIUM 15.5 UNIT(S)/HR: 5000 INJECTION INTRAVENOUS; SUBCUTANEOUS at 18:41

## 2019-10-20 RX ADMIN — Medication 1 SPRAY(S): at 17:16

## 2019-10-20 RX ADMIN — BUDESONIDE AND FORMOTEROL FUMARATE DIHYDRATE 2 PUFF(S): 160; 4.5 AEROSOL RESPIRATORY (INHALATION) at 08:03

## 2019-10-20 RX ADMIN — Medication 81 MILLIGRAM(S): at 11:11

## 2019-10-20 RX ADMIN — NAFCILLIN 200 GRAM(S): 10 INJECTION, POWDER, FOR SOLUTION INTRAVENOUS at 02:12

## 2019-10-20 NOTE — CONSULT NOTE ADULT - ASSESSMENT
1)  Severe oliguric BRITTANI superimposed on what appears to be Stage 3 CKD.  Suspect due to sepsis from PNA and bacteremia, drop in BP.  Acute lung injury from PNA in and of itself can occur secondary to inflammatory injury in lungs.  Currently no evidence of volume overload, encephalopathy; nl K+ and HCO30-    2)  Likely underlying CKD.  Pt states his outpt Endo has commented that his "numbers are a little up."    Recommendations:    1)  Agree w/ gentle volume expansion    2)  Send spot urine for Na+, creat, urea nitrogen to calc FE sodium and urea.  If low, challenge w/ IVF's.  However, I suspect ATN    3)  All medications dosed appropriately for pt's renal function    4)  No indication for HD today, but may soon arise.  Encouraging the rise in Screat seems to have leveled off last 24hrs.  This may herald on upcoming recovery of renal fxn

## 2019-10-20 NOTE — PROGRESS NOTE ADULT - ASSESSMENT
No complaints. Renal function worse. In greater  than out. D/C advil , Renal to se. prognosis guarded

## 2019-10-20 NOTE — PROGRESS NOTE ADULT - ASSESSMENT
Pulmonary edema improved  hx diastolic heart failure  fever multilobar  pneumonia from GPC MSSS  renal failure with oliguria      Suggest:  abx per ID  cardio management CATH cancelled due to sepsis  renal management will need RRT  hold diuretics   taper O2  NIPPV as needed  OOB  DVT prophylaxis  gentle hydration    monitor in ICU

## 2019-10-20 NOTE — PROGRESS NOTE ADULT - SUBJECTIVE AND OBJECTIVE BOX
Patient is a 79y old  Male who presents with a chief complaint of dyspnea (19 Oct 2019 09:31)      T(F): 98 (10-19-19 @ 23:18), Max: 98.4 (10-19-19 @ 19:00)  HR: 65 (10-20-19 @ 07:08)  BP: 104/58 (10-20-19 @ 07:08)  RR: 29 (10-20-19 @ 07:08)  SpO2: 97% (10-20-19 @ 07:08) (95% - 99%)    PHYSICAL EXAM:  GENERAL: NAD, well-groomed, well-developed  HEAD:  Atraumatic, Normocephalic  EYES: EOMI, PERRLA, conjunctiva and sclera clear  ENMT: No tonsillar erythema, exudates, or enlargement; Moist mucous membranes, Good dentition, No lesions  NECK: Supple, No JVD, Normal thyroid  NERVOUS SYSTEM:  Alert & Oriented X3,  Motor Strength 5/5 B/L upper and lower extremities  CHEST/LUNG: Clear to percussion bilaterally; No rales, rhonchi, wheezing, or rubs  HEART: Regular rate and rhythm; No murmurs, rubs, or gallops  ABDOMEN: Soft, Nontender, Nondistended; Bowel sounds present  EXTREMITIES:   No clubbing, cyanosis, or edema  LYMPH: No lymphadenopathy noted  SKIN: No rashes or lesions    labs  10-19    137  |  91<L>  |  99<HH>  ----------------------------<  128<H>  4.3   |  20  |  6.2<HH>    Ca    8.7      19 Oct 2019 19:26  Phos  5.9     10-19  Mg     2.6     10-19    TPro  6.4  /  Alb  3.1<L>  /  TBili  1.8<H>  /  DBili  x   /  AST  40  /  ALT  26  /  AlkPhos  46  10-19                          11.7   9.02  )-----------( 153      ( 20 Oct 2019 05:55 )             35.5       PT/INR - ( 20 Oct 2019 05:55 )   PT: 15.70 sec;   INR: 1.37 ratio         PTT - ( 19 Oct 2019 21:58 )  PTT:66.9 sec        acetaminophen   Tablet .. 650 milliGRAM(s) Oral every 6 hours PRN  acetaminophen  Suppository .. 650 milliGRAM(s) Rectal every 6 hours PRN  ALPRAZolam 0.5 milliGRAM(s) Oral at bedtime PRN  aspirin  chewable 81 milliGRAM(s) Oral daily  buDESOnide 160 MICROgram(s)/formoterol 4.5 MICROgram(s) Inhaler 2 Puff(s) Inhalation two times a day  clopidogrel Tablet 75 milliGRAM(s) Oral daily  dextrose 40% Gel 15 Gram(s) Oral once PRN  dextrose 5%. 1000 milliLiter(s) IV Continuous <Continuous>  dextrose 50% Injectable 12.5 Gram(s) IV Push once  dextrose 50% Injectable 25 Gram(s) IV Push once  dextrose 50% Injectable 25 Gram(s) IV Push once  fenofibrate Tablet 145 milliGRAM(s) Oral daily  finasteride 5 milliGRAM(s) Oral daily  fluticasone propionate 50 MICROgram(s)/spray Nasal Spray 1 Spray(s) Both Nostrils two times a day  glucagon  Injectable 1 milliGRAM(s) IntraMuscular once PRN  heparin  Infusion 1550 Unit(s)/Hr IV Continuous <Continuous>  ibuprofen  Tablet. 400 milliGRAM(s) Oral every 6 hours PRN  influenza   Vaccine 0.5 milliLiter(s) IntraMuscular once  insulin glargine Injectable (LANTUS) 21 Unit(s) SubCutaneous at bedtime  insulin lispro Injectable (HumaLOG) 7 Unit(s) SubCutaneous before breakfast  insulin lispro Injectable (HumaLOG) 7 Unit(s) SubCutaneous before lunch  insulin lispro Injectable (HumaLOG) 7 Unit(s) SubCutaneous before dinner  isosorbide   mononitrate ER Tablet (IMDUR) 30 milliGRAM(s) Oral daily  metoprolol succinate ER 75 milliGRAM(s) Oral daily  nafcillin  IVPB      nafcillin  IVPB 2 Gram(s) IV Intermittent every 4 hours  nitroglycerin    Patch 0.2 mG/Hr(s) 1 patch Transdermal daily  silver sulfADIAZINE 1% Cream 1 Application(s) Topical two times a day  simvastatin 40 milliGRAM(s) Oral at bedtime  sodium chloride 0.9%. 1000 milliLiter(s) IV Continuous <Continuous>  tamsulosin 0.4 milliGRAM(s) Oral at bedtime

## 2019-10-20 NOTE — CONSULT NOTE ADULT - SUBJECTIVE AND OBJECTIVE BOX
[facility]  INITIAL CONSULT NOTE  --------------------------------------------------------------------------------  HPI:  78 yo white male w/ extensive PMHx as below including ASHD, DM, HTN, CHF, COPD.  Came to hospital 10/10 w/ SOB, treated for decompensated diastolic CHF.  Initial creat briefly went up to 1.6, went down to 1.2.  Was 1.5 this past August when pt admitted for CHF.  However, on 10/17, Screat started to rise markedly.  At same time, pt developed high fever, and BP dropped to 80's-90's systolic.  Eventually found to have HCAP.  Was supposed to have cardiac cath last week, but cancelled due to febrile illness.  Pt also eventually developed MSSA bacteremia        PAST HISTORY  --------------------------------------------------------------------------------  PAST MEDICAL & SURGICAL HISTORY:  BPH (benign prostatic hyperplasia)  CHF (congestive heart failure)  COPD (chronic obstructive pulmonary disease)  Diabetes  HTN (hypertension)  H/O heart artery stent  S/P CABG x 3    FAMILY HISTORY:  No pertinent family history in first degree relatives    PAST SOCIAL HISTORY:    ALLERGIES & MEDICATIONS  --------------------------------------------------------------------------------  Allergies    No Known Allergies    Intolerances      Standing Inpatient Medications  aspirin  chewable 81 milliGRAM(s) Oral daily  buDESOnide 160 MICROgram(s)/formoterol 4.5 MICROgram(s) Inhaler 2 Puff(s) Inhalation two times a day  clopidogrel Tablet 75 milliGRAM(s) Oral daily  dextrose 5%. 1000 milliLiter(s) IV Continuous <Continuous>  dextrose 50% Injectable 12.5 Gram(s) IV Push once  dextrose 50% Injectable 25 Gram(s) IV Push once  dextrose 50% Injectable 25 Gram(s) IV Push once  fenofibrate Tablet 145 milliGRAM(s) Oral daily  finasteride 5 milliGRAM(s) Oral daily  fluticasone propionate 50 MICROgram(s)/spray Nasal Spray 1 Spray(s) Both Nostrils two times a day  heparin  Infusion 1550 Unit(s)/Hr IV Continuous <Continuous>  influenza   Vaccine 0.5 milliLiter(s) IntraMuscular once  insulin glargine Injectable (LANTUS) 21 Unit(s) SubCutaneous at bedtime  insulin lispro Injectable (HumaLOG) 7 Unit(s) SubCutaneous before breakfast  insulin lispro Injectable (HumaLOG) 7 Unit(s) SubCutaneous before lunch  insulin lispro Injectable (HumaLOG) 7 Unit(s) SubCutaneous before dinner  isosorbide   mononitrate ER Tablet (IMDUR) 30 milliGRAM(s) Oral daily  metoprolol succinate ER 75 milliGRAM(s) Oral daily  nafcillin  IVPB      nafcillin  IVPB 2 Gram(s) IV Intermittent every 4 hours  nitroglycerin    Patch 0.2 mG/Hr(s) 1 patch Transdermal daily  silver sulfADIAZINE 1% Cream 1 Application(s) Topical two times a day  simvastatin 40 milliGRAM(s) Oral at bedtime  sodium chloride 0.9%. 1000 milliLiter(s) IV Continuous <Continuous>  tamsulosin 0.4 milliGRAM(s) Oral at bedtime    PRN Inpatient Medications  acetaminophen   Tablet .. 650 milliGRAM(s) Oral every 6 hours PRN  acetaminophen  Suppository .. 650 milliGRAM(s) Rectal every 6 hours PRN  ALPRAZolam 0.5 milliGRAM(s) Oral at bedtime PRN  dextrose 40% Gel 15 Gram(s) Oral once PRN  glucagon  Injectable 1 milliGRAM(s) IntraMuscular once PRN      REVIEW OF SYSTEMS  --------------------------------------------------------------------------------  Gen: No weight changes, fatigue, fevers/chills, weakness  Skin: No rashes  Head/Eyes/Ears/Mouth: No headache; Normal hearing; Normal vision w/o blurriness; No sinus pain/discomfort, sore throat  Respiratory: No dyspnea, cough, wheezing, hemoptysis  CV: No chest pain, PND, orthopnea  GI: No abdominal pain, diarrhea, constipation, nausea, vomiting, melena, hematochezia  : No increased frequency, dysuria, hematuria, nocturia  MSK: No joint pain/swelling; no back pain; no edema  Neuro: No dizziness/lightheadedness, weakness, seizures, numbness, tingling  Heme: No easy bruising or bleeding  Endo: No heat/cold intolerance  Psych: No significant nervousness, anxiety, stress, depression    All other systems were reviewed and are negative, except as noted.    VITALS/PHYSICAL EXAM  --------------------------------------------------------------------------------  T(C): 36.4 (10-20-19 @ 11:00), Max: 36.9 (10-19-19 @ 19:00)  HR: 65 (10-20-19 @ 13:47) (65 - 76)  BP: 105/86 (10-20-19 @ 12:59) (97/55 - 129/60)  RR: 20 (10-20-19 @ 11:00) (20 - 44)  SpO2: 99% (10-20-19 @ 13:47) (95% - 99%)  Wt(kg): --        10-19-19 @ 07:01  -  10-20-19 @ 07:00  --------------------------------------------------------  IN: 2087.5 mL / OUT: 335 mL / NET: 1752.5 mL    10-20-19 @ 07:01  -  10-20-19 @ 14:33  --------------------------------------------------------  IN: 844 mL / OUT: 90 mL / NET: 754 mL      Physical Exam:  	Gen: NAD, well-appearing, obese, no respiratory distress  	HEENT: PERRL, supple neck, clear oropharynx  	Pulm: CTA B/L  	CV: RRR, S1S2; no rub  	Back: No spinal or CVA tenderness; no sacral edema  	Abd: +BS, soft, nontender/nondistended  	: No suprapubic tenderness  	UE: Warm, FROM, no clubbing, intact strength; no edema  	LE: Warm, FROM, no clubbing, intact strength; no edema  	Neuro: No focal deficits  	Psych: Normal affect and mood  	Skin: Warm, without rashes  	Vascular access:    LABS/STUDIES  --------------------------------------------------------------------------------              11.7   9.02  >-----------<  153      [10-20-19 @ 05:55]              35.5     135  |  87  |  100  ----------------------------<  119      [10-20-19 @ 05:55]  3.6   |  25  |  6.4        Ca     7.8     [10-20-19 @ 05:55]      Mg     2.3     [10-20-19 @ 05:55]      Phos  5.9     [10-20-19 @ 05:55]    TPro  5.6  /  Alb  2.8  /  TBili  1.3  /  DBili  x   /  AST  28  /  ALT  19  /  AlkPhos  43  [10-20-19 @ 05:55]    PT/INR: PT 15.70, INR 1.37       [10-20-19 @ 05:55]  PTT: 72.3       [10-20-19 @ 05:55]      Creatinine Trend:  SCr 6.4 [10-20 @ 05:55]  SCr 6.2 [10-19 @ 19:26]  SCr 4.8 [10-19 @ 06:36]  SCr 3.2 [10-18 @ 05:54]  SCr 2.0 [10-17 @ 05:30]    Urinalysis - [10-15-19 @ 11:30]      Color Yellow / Appearance Clear / SG 1.015 / pH 5.5      Gluc Negative / Ketone Negative  / Bili Negative / Urobili 0.2       Blood Negative / Protein Negative / Leuk Est Negative / Nitrite Negative      RBC  / WBC  / Hyaline  / Gran  / Sq Epi  / Non Sq Epi  / Bacteria       HbA1c 6.5      [08-20-19 @ 05:42]  TSH 0.84      [08-29-19 @ 06:15]

## 2019-10-20 NOTE — PROGRESS NOTE ADULT - SUBJECTIVE AND OBJECTIVE BOX
Patient is a 79y old  Male who presents with a chief complaint of dyspnea (19 Oct 2019 09:31)        Interval Events: No overnight events.    REVIEW OF SYSTEMS:  Constitutional: No fevers or chills. No weight loss. No fatigue or generalized malaise.  Eyes: No itching or discharge from the eyes  ENT: No ear pain. No ear discharge. No nasal congestion. No post nasal drip. No epistaxis. No throat pain. No sore throat. No difficulty swallowing.   CV: No chest pain. No palpitations. No lightheadedness or dizziness.   Resp: No dyspnea at rest. No dyspnea on exertion. No orthopnea. No wheezing. No cough. No stridor. No sputum production. No chest pain with respiration.  GI: No nausea. No vomiting. No diarrhea.  MSK: No joint pain or pain in any extremities  Integumentary: No skin lesions. No pedal edema.  Neurological: No gross motor weakness. No sensory changes.      OBJECTIVE:  ICU Vital Signs Last 24 Hrs  T(C): 36 (20 Oct 2019 07:06), Max: 36.9 (19 Oct 2019 19:00)  T(F): 96.8 (20 Oct 2019 07:06), Max: 98.4 (19 Oct 2019 19:00)  HR: 65 (20 Oct 2019 07:08) (65 - 96)  BP: 104/58 (20 Oct 2019 07:08) (97/55 - 121/55)  BP(mean): 76 (20 Oct 2019 07:08) (72 - 87)  RR: 29 (20 Oct 2019 07:08) (20 - 44)  SpO2: 97% (20 Oct 2019 07:08) (95% - 99%)        10-19 @ 07:01  -  10-20 @ 07:00  --------------------------------------------------------  IN: 2087.5 mL / OUT: 335 mL / NET: 1752.5 mL    10-20 @ 07:01  -  10-20 @ 08:32  --------------------------------------------------------  IN: 0 mL / OUT: 20 mL / NET: -20 mL      CAPILLARY BLOOD GLUCOSE      POCT Blood Glucose.: 111 mg/dL (20 Oct 2019 08:00)      PHYSICAL EXAM:  General: Awake, alert, oriented X 3.   HEENT: Atraumatic, normocephalic.                 Mallampatti Grade                 No nasal congestion.                No tonsillar or pharyngeal exudates.  Lymph Nodes: No palpable lymphadenopathy neck, supraclavicular region  Neck: No JVD. No carotid bruit, no thyromegally  Respiratory: Normal chest expansion                         Normal percussion                         Normal and equal air entry                          rales bases  Cardiovascular: S1 S2 normal. No murmurs, rubs or gallops.   Abdomen: Soft, non-tender, non-distended. No organomegaly.  Extremities: Warm to touch. Peripheral pulse palpable. No pedal edema.   Skin: No rashes or skin lesions, warm dry  Neurological: Motor and sensory examination equal and normal in all four extremities.  Psychiatry: Appropriate mood and affect.    HOSPITAL MEDICATIONS:  MEDICATIONS  (STANDING):  aspirin  chewable 81 milliGRAM(s) Oral daily  buDESOnide 160 MICROgram(s)/formoterol 4.5 MICROgram(s) Inhaler 2 Puff(s) Inhalation two times a day  clopidogrel Tablet 75 milliGRAM(s) Oral daily  dextrose 5%. 1000 milliLiter(s) (50 mL/Hr) IV Continuous <Continuous>  dextrose 50% Injectable 12.5 Gram(s) IV Push once  dextrose 50% Injectable 25 Gram(s) IV Push once  dextrose 50% Injectable 25 Gram(s) IV Push once  fenofibrate Tablet 145 milliGRAM(s) Oral daily  finasteride 5 milliGRAM(s) Oral daily  fluticasone propionate 50 MICROgram(s)/spray Nasal Spray 1 Spray(s) Both Nostrils two times a day  heparin  Infusion 1550 Unit(s)/Hr (15.5 mL/Hr) IV Continuous <Continuous>  influenza   Vaccine 0.5 milliLiter(s) IntraMuscular once  insulin glargine Injectable (LANTUS) 21 Unit(s) SubCutaneous at bedtime  insulin lispro Injectable (HumaLOG) 7 Unit(s) SubCutaneous before breakfast  insulin lispro Injectable (HumaLOG) 7 Unit(s) SubCutaneous before lunch  insulin lispro Injectable (HumaLOG) 7 Unit(s) SubCutaneous before dinner  isosorbide   mononitrate ER Tablet (IMDUR) 30 milliGRAM(s) Oral daily  metoprolol succinate ER 75 milliGRAM(s) Oral daily  nafcillin  IVPB      nafcillin  IVPB 2 Gram(s) IV Intermittent every 4 hours  nitroglycerin    Patch 0.2 mG/Hr(s) 1 patch Transdermal daily  silver sulfADIAZINE 1% Cream 1 Application(s) Topical two times a day  simvastatin 40 milliGRAM(s) Oral at bedtime  sodium chloride 0.9%. 1000 milliLiter(s) (50 mL/Hr) IV Continuous <Continuous>  tamsulosin 0.4 milliGRAM(s) Oral at bedtime    MEDICATIONS  (PRN):  acetaminophen   Tablet .. 650 milliGRAM(s) Oral every 6 hours PRN Temp greater or equal to 38C (100.4F), Mild Pain (1 - 3)  acetaminophen  Suppository .. 650 milliGRAM(s) Rectal every 6 hours PRN Temp greater or equal to 38C (100.4F), Mild Pain (1 - 3)  ALPRAZolam 0.5 milliGRAM(s) Oral at bedtime PRN anxiety  dextrose 40% Gel 15 Gram(s) Oral once PRN Blood Glucose LESS THAN 70 milliGRAM(s)/deciliter  glucagon  Injectable 1 milliGRAM(s) IntraMuscular once PRN Glucose LESS THAN 70 milligrams/deciliter      LABS:                        11.7   9.02  )-----------( 153      ( 20 Oct 2019 05:55 )             35.5     10-20    135  |  87<L>  |  100<HH>  ----------------------------<  119<H>  3.6   |  25  |  6.4<HH>    Ca    7.8<L>      20 Oct 2019 05:55  Phos  5.9     10-20  Mg     2.3     10-20    TPro  5.6<L>  /  Alb  2.8<L>  /  TBili  1.3<H>  /  DBili  x   /  AST  28  /  ALT  19  /  AlkPhos  43  10-20    PT/INR - ( 20 Oct 2019 05:55 )   PT: 15.70 sec;   INR: 1.37 ratio         PTT - ( 19 Oct 2019 21:58 )  PTT:66.9 sec                  RADIOLOGY:Radiology personally reviewed. CXR pending today

## 2019-10-21 LAB
ALBUMIN SERPL ELPH-MCNC: 2.8 G/DL — LOW (ref 3.5–5.2)
ALBUMIN SERPL ELPH-MCNC: 2.9 G/DL — LOW (ref 3.5–5.2)
ALP SERPL-CCNC: 41 U/L — SIGNIFICANT CHANGE UP (ref 30–115)
ALP SERPL-CCNC: 42 U/L — SIGNIFICANT CHANGE UP (ref 30–115)
ALT FLD-CCNC: 14 U/L — SIGNIFICANT CHANGE UP (ref 0–41)
ALT FLD-CCNC: 17 U/L — SIGNIFICANT CHANGE UP (ref 0–41)
ANION GAP SERPL CALC-SCNC: 25 MMOL/L — HIGH (ref 7–14)
ANION GAP SERPL CALC-SCNC: 26 MMOL/L — HIGH (ref 7–14)
APTT BLD: 89 SEC — CRITICAL HIGH (ref 27–39.2)
APTT BLD: 90.3 SEC — CRITICAL HIGH (ref 27–39.2)
AST SERPL-CCNC: 22 U/L — SIGNIFICANT CHANGE UP (ref 0–41)
AST SERPL-CCNC: 24 U/L — SIGNIFICANT CHANGE UP (ref 0–41)
BASOPHILS # BLD AUTO: 0.04 K/UL — SIGNIFICANT CHANGE UP (ref 0–0.2)
BASOPHILS # BLD AUTO: 0.04 K/UL — SIGNIFICANT CHANGE UP (ref 0–0.2)
BASOPHILS NFR BLD AUTO: 0.4 % — SIGNIFICANT CHANGE UP (ref 0–1)
BASOPHILS NFR BLD AUTO: 0.5 % — SIGNIFICANT CHANGE UP (ref 0–1)
BILIRUB SERPL-MCNC: 1.2 MG/DL — SIGNIFICANT CHANGE UP (ref 0.2–1.2)
BILIRUB SERPL-MCNC: 1.3 MG/DL — HIGH (ref 0.2–1.2)
BUN SERPL-MCNC: 112 MG/DL — CRITICAL HIGH (ref 10–20)
BUN SERPL-MCNC: 116 MG/DL — SIGNIFICANT CHANGE UP (ref 10–20)
CALCIUM SERPL-MCNC: 7.5 MG/DL — LOW (ref 8.5–10.1)
CALCIUM SERPL-MCNC: 7.8 MG/DL — LOW (ref 8.5–10.1)
CHLORIDE SERPL-SCNC: 90 MMOL/L — LOW (ref 98–110)
CHLORIDE SERPL-SCNC: 91 MMOL/L — LOW (ref 98–110)
CK MB CFR SERPL CALC: 1.2 NG/ML — SIGNIFICANT CHANGE UP (ref 0.6–6.3)
CK SERPL-CCNC: 73 U/L — SIGNIFICANT CHANGE UP (ref 0–225)
CO2 SERPL-SCNC: 19 MMOL/L — SIGNIFICANT CHANGE UP (ref 17–32)
CO2 SERPL-SCNC: 21 MMOL/L — SIGNIFICANT CHANGE UP (ref 17–32)
CREAT SERPL-MCNC: 6.9 MG/DL — CRITICAL HIGH (ref 0.7–1.5)
CREAT SERPL-MCNC: 8.1 MG/DL — CRITICAL HIGH (ref 0.7–1.5)
EOSINOPHIL # BLD AUTO: 0.17 K/UL — SIGNIFICANT CHANGE UP (ref 0–0.7)
EOSINOPHIL # BLD AUTO: 0.21 K/UL — SIGNIFICANT CHANGE UP (ref 0–0.7)
EOSINOPHIL NFR BLD AUTO: 1.7 % — SIGNIFICANT CHANGE UP (ref 0–8)
EOSINOPHIL NFR BLD AUTO: 2.5 % — SIGNIFICANT CHANGE UP (ref 0–8)
GLUCOSE BLDC GLUCOMTR-MCNC: 117 MG/DL — HIGH (ref 70–99)
GLUCOSE BLDC GLUCOMTR-MCNC: 182 MG/DL — HIGH (ref 70–99)
GLUCOSE BLDC GLUCOMTR-MCNC: 86 MG/DL — SIGNIFICANT CHANGE UP (ref 70–99)
GLUCOSE SERPL-MCNC: 108 MG/DL — HIGH (ref 70–99)
GLUCOSE SERPL-MCNC: 53 MG/DL — LOW (ref 70–99)
GRAM STN FLD: SIGNIFICANT CHANGE UP
HCT VFR BLD CALC: 35.3 % — LOW (ref 42–52)
HCT VFR BLD CALC: 36.7 % — LOW (ref 42–52)
HGB BLD-MCNC: 11.4 G/DL — LOW (ref 14–18)
HGB BLD-MCNC: 11.9 G/DL — LOW (ref 14–18)
IMM GRANULOCYTES NFR BLD AUTO: 0.8 % — HIGH (ref 0.1–0.3)
IMM GRANULOCYTES NFR BLD AUTO: 1 % — HIGH (ref 0.1–0.3)
LYMPHOCYTES # BLD AUTO: 1.39 K/UL — SIGNIFICANT CHANGE UP (ref 1.2–3.4)
LYMPHOCYTES # BLD AUTO: 1.67 K/UL — SIGNIFICANT CHANGE UP (ref 1.2–3.4)
LYMPHOCYTES # BLD AUTO: 16.6 % — LOW (ref 20.5–51.1)
LYMPHOCYTES # BLD AUTO: 16.7 % — LOW (ref 20.5–51.1)
MAGNESIUM SERPL-MCNC: 2.5 MG/DL — HIGH (ref 1.8–2.4)
MCHC RBC-ENTMCNC: 25.3 PG — LOW (ref 27–31)
MCHC RBC-ENTMCNC: 25.5 PG — LOW (ref 27–31)
MCHC RBC-ENTMCNC: 32.3 G/DL — SIGNIFICANT CHANGE UP (ref 32–37)
MCHC RBC-ENTMCNC: 32.4 G/DL — SIGNIFICANT CHANGE UP (ref 32–37)
MCV RBC AUTO: 78.4 FL — LOW (ref 80–94)
MCV RBC AUTO: 78.6 FL — LOW (ref 80–94)
MONOCYTES # BLD AUTO: 0.88 K/UL — HIGH (ref 0.1–0.6)
MONOCYTES # BLD AUTO: 1.15 K/UL — HIGH (ref 0.1–0.6)
MONOCYTES NFR BLD AUTO: 10.6 % — HIGH (ref 1.7–9.3)
MONOCYTES NFR BLD AUTO: 11.5 % — HIGH (ref 1.7–9.3)
NEUTROPHILS # BLD AUTO: 5.75 K/UL — SIGNIFICANT CHANGE UP (ref 1.4–6.5)
NEUTROPHILS # BLD AUTO: 6.91 K/UL — HIGH (ref 1.4–6.5)
NEUTROPHILS NFR BLD AUTO: 68.8 % — SIGNIFICANT CHANGE UP (ref 42.2–75.2)
NEUTROPHILS NFR BLD AUTO: 68.9 % — SIGNIFICANT CHANGE UP (ref 42.2–75.2)
NRBC # BLD: 0 /100 WBCS — SIGNIFICANT CHANGE UP (ref 0–0)
NRBC # BLD: 0 /100 WBCS — SIGNIFICANT CHANGE UP (ref 0–0)
PHOSPHATE SERPL-MCNC: 7.8 MG/DL — HIGH (ref 2.1–4.9)
PLATELET # BLD AUTO: 180 K/UL — SIGNIFICANT CHANGE UP (ref 130–400)
PLATELET # BLD AUTO: 224 K/UL — SIGNIFICANT CHANGE UP (ref 130–400)
POTASSIUM SERPL-MCNC: 3.7 MMOL/L — SIGNIFICANT CHANGE UP (ref 3.5–5)
POTASSIUM SERPL-MCNC: 4 MMOL/L — SIGNIFICANT CHANGE UP (ref 3.5–5)
POTASSIUM SERPL-SCNC: 3.7 MMOL/L — SIGNIFICANT CHANGE UP (ref 3.5–5)
POTASSIUM SERPL-SCNC: 4 MMOL/L — SIGNIFICANT CHANGE UP (ref 3.5–5)
PROT SERPL-MCNC: 5.8 G/DL — LOW (ref 6–8)
PROT SERPL-MCNC: 6 G/DL — SIGNIFICANT CHANGE UP (ref 6–8)
RBC # BLD: 4.5 M/UL — LOW (ref 4.7–6.1)
RBC # BLD: 4.67 M/UL — LOW (ref 4.7–6.1)
RBC # FLD: 16.3 % — HIGH (ref 11.5–14.5)
RBC # FLD: 16.5 % — HIGH (ref 11.5–14.5)
SODIUM SERPL-SCNC: 136 MMOL/L — SIGNIFICANT CHANGE UP (ref 135–146)
SODIUM SERPL-SCNC: 136 MMOL/L — SIGNIFICANT CHANGE UP (ref 135–146)
WBC # BLD: 10.04 K/UL — SIGNIFICANT CHANGE UP (ref 4.8–10.8)
WBC # BLD: 8.34 K/UL — SIGNIFICANT CHANGE UP (ref 4.8–10.8)
WBC # FLD AUTO: 10.04 K/UL — SIGNIFICANT CHANGE UP (ref 4.8–10.8)
WBC # FLD AUTO: 8.34 K/UL — SIGNIFICANT CHANGE UP (ref 4.8–10.8)

## 2019-10-21 PROCEDURE — 99233 SBSQ HOSP IP/OBS HIGH 50: CPT

## 2019-10-21 PROCEDURE — 71046 X-RAY EXAM CHEST 2 VIEWS: CPT | Mod: 26

## 2019-10-21 PROCEDURE — 71045 X-RAY EXAM CHEST 1 VIEW: CPT | Mod: 26

## 2019-10-21 RX ORDER — HEPARIN SODIUM 5000 [USP'U]/ML
1500 INJECTION INTRAVENOUS; SUBCUTANEOUS
Qty: 25000 | Refills: 0 | Status: DISCONTINUED | OUTPATIENT
Start: 2019-10-21 | End: 2019-10-23

## 2019-10-21 RX ADMIN — ISOSORBIDE MONONITRATE 30 MILLIGRAM(S): 60 TABLET, EXTENDED RELEASE ORAL at 11:01

## 2019-10-21 RX ADMIN — CLOPIDOGREL BISULFATE 75 MILLIGRAM(S): 75 TABLET, FILM COATED ORAL at 11:01

## 2019-10-21 RX ADMIN — Medication 81 MILLIGRAM(S): at 11:01

## 2019-10-21 RX ADMIN — Medication 7 UNIT(S): at 11:37

## 2019-10-21 RX ADMIN — Medication 1 PATCH: at 11:01

## 2019-10-21 RX ADMIN — NAFCILLIN 200 GRAM(S): 10 INJECTION, POWDER, FOR SOLUTION INTRAVENOUS at 17:05

## 2019-10-21 RX ADMIN — Medication 1 SPRAY(S): at 17:05

## 2019-10-21 RX ADMIN — SODIUM CHLORIDE 50 MILLILITER(S): 9 INJECTION INTRAMUSCULAR; INTRAVENOUS; SUBCUTANEOUS at 21:17

## 2019-10-21 RX ADMIN — BUDESONIDE AND FORMOTEROL FUMARATE DIHYDRATE 2 PUFF(S): 160; 4.5 AEROSOL RESPIRATORY (INHALATION) at 09:38

## 2019-10-21 RX ADMIN — Medication 7 UNIT(S): at 16:33

## 2019-10-21 RX ADMIN — NAFCILLIN 200 GRAM(S): 10 INJECTION, POWDER, FOR SOLUTION INTRAVENOUS at 05:10

## 2019-10-21 RX ADMIN — TAMSULOSIN HYDROCHLORIDE 0.4 MILLIGRAM(S): 0.4 CAPSULE ORAL at 21:19

## 2019-10-21 RX ADMIN — Medication 1 APPLICATION(S): at 17:01

## 2019-10-21 RX ADMIN — SIMVASTATIN 40 MILLIGRAM(S): 20 TABLET, FILM COATED ORAL at 21:19

## 2019-10-21 RX ADMIN — Medication 1 PATCH: at 22:55

## 2019-10-21 RX ADMIN — Medication 75 MILLIGRAM(S): at 05:09

## 2019-10-21 RX ADMIN — NAFCILLIN 200 GRAM(S): 10 INJECTION, POWDER, FOR SOLUTION INTRAVENOUS at 11:01

## 2019-10-21 RX ADMIN — Medication 145 MILLIGRAM(S): at 11:01

## 2019-10-21 RX ADMIN — BUDESONIDE AND FORMOTEROL FUMARATE DIHYDRATE 2 PUFF(S): 160; 4.5 AEROSOL RESPIRATORY (INHALATION) at 20:24

## 2019-10-21 RX ADMIN — Medication 1 SPRAY(S): at 05:10

## 2019-10-21 RX ADMIN — Medication 1 APPLICATION(S): at 05:10

## 2019-10-21 RX ADMIN — NAFCILLIN 200 GRAM(S): 10 INJECTION, POWDER, FOR SOLUTION INTRAVENOUS at 02:11

## 2019-10-21 RX ADMIN — NAFCILLIN 200 GRAM(S): 10 INJECTION, POWDER, FOR SOLUTION INTRAVENOUS at 21:19

## 2019-10-21 RX ADMIN — NAFCILLIN 200 GRAM(S): 10 INJECTION, POWDER, FOR SOLUTION INTRAVENOUS at 13:51

## 2019-10-21 RX ADMIN — FINASTERIDE 5 MILLIGRAM(S): 5 TABLET, FILM COATED ORAL at 11:01

## 2019-10-21 NOTE — PROGRESS NOTE ADULT - ASSESSMENT
Patient is a 79y old  Male  w/ PMH of HFpEF (EF of 55% with Grade II Diastolic dysfunction), COPD (home O2 3-3.5L as needed), DM, HTN, CAD s/p CABG and 1 stent, BPH, and recent admission for CHF (August 2019) presenting to [facility] on 10/10 with complaints of shortness of breath and chest pain.  On day of admission, had severe dyspnea on exertion associated with a pressure like sensation in his chest that radiated to his left arm. He tried sublingual nitro for relief but it didn't help. patient was found to have evidence of bilateral pleural effusions, admitted to CCU for further management, He started to have fever today, tachycardia, 10/16/19, and The ID consult requested , to assist with further evaluation of sepsis and antibiotic management.      # Sepsis ( fever + tachycardia )  # Persistent Staph. bacteremia/septicemia - MSSA,  ? source    would recommend:    1. Repeat blood culture to document clearing the blood stream  2. Continue Nafcillin  3. TTE followed by JAYCOB to rule out vegetation  4. Management of BIPAP as per CCU protocol  5. Consider Imaging of Abd/pelvis and spine      d/w CCU team and patient      will follow the patient with you Patient is a 79y old  Male  w/ PMH of HFpEF (EF of 55% with Grade II Diastolic dysfunction), COPD (home O2 3-3.5L as needed), DM, HTN, CAD s/p CABG and 1 stent, BPH, and recent admission for CHF (August 2019) presenting to [facility] on 10/10 with complaints of shortness of breath and chest pain.  On day of admission, had severe dyspnea on exertion associated with a pressure like sensation in his chest that radiated to his left arm. He tried sublingual nitro for relief but it didn't help. patient was found to have evidence of bilateral pleural effusions, admitted to CCU for further management, He started to have fever today, tachycardia, 10/16/19, and The ID consult requested , to assist with further evaluation of sepsis and antibiotic management.      # Sepsis ( fever + tachycardia )  # Persistent Staph. bacteremia/septicemia - MSSA,  ? NO source Control  # Acute Renal failure    would recommend:    1. Repeat blood culture to document clearing the blood stream  2. Continue Nafcillin 2 g q 4  3. Please Transfer patient to [facility] for  JAYCOB to rule out vegetation in the setting of Persistent bacteremia with No implant prosthesis  4. Monitor kidney function, renal on board  5. Consider Imaging of Abd/pelvis and spine to rule out deep infection in the setting of persistent bacteremia      d/w CCU team and patient      will follow the patient with you

## 2019-10-21 NOTE — PROGRESS NOTE ADULT - SUBJECTIVE AND OBJECTIVE BOX
Patient is a 79y old  Male who presents with a chief complaint of dyspnea (20 Oct 2019 08:32)      T(F): 96.1 (10-21-19 @ 07:03), Max: 98.2 (10-20-19 @ 15:00)  HR: 63 (10-21-19 @ 05:00)  BP: 138/81 (10-21-19 @ 05:00)  RR: 21 (10-21-19 @ 07:03)  SpO2: 99% (10-21-19 @ 05:00) (95% - 99%)    PHYSICAL EXAM:  GENERAL: NAD, well-groomed, well-developed  HEAD:  Atraumatic, Normocephalic  EYES: EOMI, PERRLA, conjunctiva and sclera clear  ENMT: No tonsillar erythema, exudates, or enlargement; Moist mucous membranes, Good dentition, No lesions  NECK: Supple, No JVD, Normal thyroid  NERVOUS SYSTEM:  Alert & Oriented X3,  Motor Strength 5/5 B/L upper and lower extremities  CHEST/LUNG: Clear to percussion bilaterally; No rales, rhonchi, wheezing, or rubs  HEART: Regular rate and rhythm; No murmurs, rubs, or gallops  ABDOMEN: Soft, Nontender, Nondistended; Bowel sounds present  EXTREMITIES:   No clubbing, cyanosis, or edema  LYMPH: No lymphadenopathy noted  SKIN: No rashes or lesions    labs  10-20    135  |  87<L>  |  100<HH>  ----------------------------<  119<H>  3.6   |  25  |  6.4<HH>    Ca    7.8<L>      20 Oct 2019 05:55  Phos  5.9     10-20  Mg     2.3     10-20    TPro  5.6<L>  /  Alb  2.8<L>  /  TBili  1.3<H>  /  DBili  x   /  AST  28  /  ALT  19  /  AlkPhos  43  10-20                          11.4   8.34  )-----------( 180      ( 21 Oct 2019 05:56 )             35.3       Culture - Blood (collected 19 Oct 2019 06:36)  Source: .Blood None  Gram Stain (21 Oct 2019 02:16):    Growth in aerobic bottle: Gram Positive Cocci in Clusters    Growth in anaerobic bottle: Gram Positive Cocci in Clusters  Preliminary Report (21 Oct 2019 02:16):    Growth in aerobic bottle: Gram Positive Cocci in Clusters    Growth in anaerobic bottle: Gram Positive Cocci in Clusters      PT/INR - ( 20 Oct 2019 05:55 )   PT: 15.70 sec;   INR: 1.37 ratio         PTT - ( 20 Oct 2019 15:37 )  PTT:67.0 sec        acetaminophen   Tablet .. 650 milliGRAM(s) Oral every 6 hours PRN  acetaminophen  Suppository .. 650 milliGRAM(s) Rectal every 6 hours PRN  ALPRAZolam 0.5 milliGRAM(s) Oral at bedtime PRN  aspirin  chewable 81 milliGRAM(s) Oral daily  buDESOnide 160 MICROgram(s)/formoterol 4.5 MICROgram(s) Inhaler 2 Puff(s) Inhalation two times a day  clopidogrel Tablet 75 milliGRAM(s) Oral daily  dextrose 40% Gel 15 Gram(s) Oral once PRN  dextrose 5%. 1000 milliLiter(s) IV Continuous <Continuous>  dextrose 50% Injectable 12.5 Gram(s) IV Push once  dextrose 50% Injectable 25 Gram(s) IV Push once  dextrose 50% Injectable 25 Gram(s) IV Push once  fenofibrate Tablet 145 milliGRAM(s) Oral daily  finasteride 5 milliGRAM(s) Oral daily  fluticasone propionate 50 MICROgram(s)/spray Nasal Spray 1 Spray(s) Both Nostrils two times a day  glucagon  Injectable 1 milliGRAM(s) IntraMuscular once PRN  heparin  Infusion 1550 Unit(s)/Hr IV Continuous <Continuous>  influenza   Vaccine 0.5 milliLiter(s) IntraMuscular once  insulin glargine Injectable (LANTUS) 21 Unit(s) SubCutaneous at bedtime  insulin lispro Injectable (HumaLOG) 7 Unit(s) SubCutaneous before breakfast  insulin lispro Injectable (HumaLOG) 7 Unit(s) SubCutaneous before lunch  insulin lispro Injectable (HumaLOG) 7 Unit(s) SubCutaneous before dinner  isosorbide   mononitrate ER Tablet (IMDUR) 30 milliGRAM(s) Oral daily  metoprolol succinate ER 75 milliGRAM(s) Oral daily  nafcillin  IVPB      nafcillin  IVPB 2 Gram(s) IV Intermittent every 4 hours  nitroglycerin    Patch 0.2 mG/Hr(s) 1 patch Transdermal daily  silver sulfADIAZINE 1% Cream 1 Application(s) Topical two times a day  simvastatin 40 milliGRAM(s) Oral at bedtime  sodium chloride 0.9%. 1000 milliLiter(s) IV Continuous <Continuous>  tamsulosin 0.4 milliGRAM(s) Oral at bedtime

## 2019-10-21 NOTE — PROGRESS NOTE ADULT - ASSESSMENT
Pulmonary edema improved  hx diastolic heart failure  fever multilobar  pneumonia from GPC MSSS  renal failure with oliguria      Suggest:  abx per ID  cardio management CATH cancelled due to sepsis  follow renal?? need RRT  on gentle hydration as per renal   taper O2  NIPPV as needed  OOB  DVT prophylaxis  gentle hydration  downgrade to tele if ok by cardiology

## 2019-10-21 NOTE — PROGRESS NOTE ADULT - ASSESSMENT
1)  Severe oliguric BRITTANI superimposed on what appears to be Stage 3 CKD (creat was 1.5 mg% in August 2019).PMH: DM, HFpEF,  Suspect due to sepsis from PNA and bacteremia, drop in BP; tehrefore ATN likely vs postinfectious GN. Although the lack of proteinuria speaks against it. Currently no evidence of volume overload, encephalopathy; nl K+ and HCO30-    2)  MSSA bacteremia - likely due to PNA vs endocarditis, on NAfcillin    Recommendations:    1)  Agree w/ gentle volume expansion    2)  Send spot urine for Na+, creat, urea nitrogen to calc FE sodium and urea.  If low, challenge w/ IVF's.  However, I suspect ATN  - check C3C4, CPK, phos, iPTH  - stop Tricor    3)  All medications dosed appropriately for pt's renal function    4)  No indication for HD today, but may soon arise.     D/W Primary team

## 2019-10-21 NOTE — PROGRESS NOTE ADULT - SUBJECTIVE AND OBJECTIVE BOX
Patient is a 79y old  Male who presents with a chief complaint of dyspnea (20 Oct 2019 08:32)      Over Night Events:  Patient seen and examined still on high flow feel good pox 99%      ROS:  See HPI    PHYSICAL EXAM    ICU Vital Signs Last 24 Hrs  T(C): 35.6 (21 Oct 2019 07:03), Max: 36.8 (20 Oct 2019 15:00)  T(F): 96.1 (21 Oct 2019 07:03), Max: 98.2 (20 Oct 2019 15:00)  HR: 65 (21 Oct 2019 08:00) (63 - 76)  BP: 138/81 (21 Oct 2019 05:00) (91/55 - 138/81)  BP(mean): 102 (21 Oct 2019 05:00) (68 - 102)  ABP: --  ABP(mean): --  RR: 23 (21 Oct 2019 09:00) (20 - 23)  SpO2: 94% (21 Oct 2019 08:00) (94% - 99%)      General: AOx3  HEENT:     darell           Lymph Nodes: NO cervical LN   Lungs: Bilateral BS  Cardiovascular: Regular   Abdomen: Soft, Positive BS  Extremities: No clubbing 1 edema   Skin: warm   Neurological: no focal deficit   Musculoskeletal: move all ext     I&O's Detail    20 Oct 2019 07:01  -  21 Oct 2019 07:00  --------------------------------------------------------  IN:    heparin Infusion: 372 mL    IV PiggyBack: 600 mL    Oral Fluid: 120 mL    sodium chloride 0.9%.: 1200 mL  Total IN: 2292 mL    OUT:    Indwelling Catheter - Urethral: 370 mL  Total OUT: 370 mL    Total NET: 1922 mL      21 Oct 2019 07:01  -  21 Oct 2019 09:52  --------------------------------------------------------  IN:  Total IN: 0 mL    OUT:    Indwelling Catheter - Urethral: 65 mL  Total OUT: 65 mL    Total NET: -65 mL          LABS:                          11.4   8.34  )-----------( 180      ( 21 Oct 2019 05:56 )             35.3         21 Oct 2019 05:56    136    |  90     |  112    ----------------------------<  108    3.7     |  21     |  6.9      Ca    7.5        21 Oct 2019 05:56  Phos  5.9       20 Oct 2019 05:55  Mg     2.5       21 Oct 2019 05:56    TPro  5.8    /  Alb  2.8    /  TBili  1.3    /  DBili  x      /  AST  22     /  ALT  17     /  AlkPhos  42     21 Oct 2019 05:56  Amylase x     lipase x                                                 PT/INR - ( 20 Oct 2019 05:55 )   PT: 15.70 sec;   INR: 1.37 ratio         PTT - ( 21 Oct 2019 05:56 )  PTT:90.3 sec                                             CARDIAC MARKERS ( 21 Oct 2019 05:56 )  x     / x     / x     / x     / 1.2 ng/mL                                                        Culture - Blood (collected 19 Oct 2019 06:36)  Source: .Blood None  Gram Stain (21 Oct 2019 02:16):    Growth in aerobic bottle: Gram Positive Cocci in Clusters    Growth in anaerobic bottle: Gram Positive Cocci in Clusters  Preliminary Report (21 Oct 2019 02:16):    Growth in aerobic bottle: Gram Positive Cocci in Clusters    Growth in anaerobic bottle: Gram Positive Cocci in Clusters                                                                                           MEDICATIONS  (STANDING):  aspirin  chewable 81 milliGRAM(s) Oral daily  buDESOnide 160 MICROgram(s)/formoterol 4.5 MICROgram(s) Inhaler 2 Puff(s) Inhalation two times a day  clopidogrel Tablet 75 milliGRAM(s) Oral daily  dextrose 5%. 1000 milliLiter(s) (50 mL/Hr) IV Continuous <Continuous>  dextrose 50% Injectable 12.5 Gram(s) IV Push once  dextrose 50% Injectable 25 Gram(s) IV Push once  dextrose 50% Injectable 25 Gram(s) IV Push once  fenofibrate Tablet 145 milliGRAM(s) Oral daily  finasteride 5 milliGRAM(s) Oral daily  fluticasone propionate 50 MICROgram(s)/spray Nasal Spray 1 Spray(s) Both Nostrils two times a day  heparin  Infusion 1550 Unit(s)/Hr (15.5 mL/Hr) IV Continuous <Continuous>  influenza   Vaccine 0.5 milliLiter(s) IntraMuscular once  insulin glargine Injectable (LANTUS) 21 Unit(s) SubCutaneous at bedtime  insulin lispro Injectable (HumaLOG) 7 Unit(s) SubCutaneous before breakfast  insulin lispro Injectable (HumaLOG) 7 Unit(s) SubCutaneous before lunch  insulin lispro Injectable (HumaLOG) 7 Unit(s) SubCutaneous before dinner  isosorbide   mononitrate ER Tablet (IMDUR) 30 milliGRAM(s) Oral daily  metoprolol succinate ER 75 milliGRAM(s) Oral daily  nafcillin  IVPB      nafcillin  IVPB 2 Gram(s) IV Intermittent every 4 hours  nitroglycerin    Patch 0.2 mG/Hr(s) 1 patch Transdermal daily  silver sulfADIAZINE 1% Cream 1 Application(s) Topical two times a day  simvastatin 40 milliGRAM(s) Oral at bedtime  sodium chloride 0.9%. 1000 milliLiter(s) (50 mL/Hr) IV Continuous <Continuous>  tamsulosin 0.4 milliGRAM(s) Oral at bedtime    MEDICATIONS  (PRN):  acetaminophen   Tablet .. 650 milliGRAM(s) Oral every 6 hours PRN Temp greater or equal to 38C (100.4F), Mild Pain (1 - 3)  acetaminophen  Suppository .. 650 milliGRAM(s) Rectal every 6 hours PRN Temp greater or equal to 38C (100.4F), Mild Pain (1 - 3)  ALPRAZolam 0.5 milliGRAM(s) Oral at bedtime PRN anxiety  dextrose 40% Gel 15 Gram(s) Oral once PRN Blood Glucose LESS THAN 70 milliGRAM(s)/deciliter  glucagon  Injectable 1 milliGRAM(s) IntraMuscular once PRN Glucose LESS THAN 70 milligrams/deciliter          Xrays:  TLC:  OG:  ET tube:                                                                                    stable small b/l effusion   ECHO:  CAM ICU:

## 2019-10-21 NOTE — PROGRESS NOTE ADULT - SUBJECTIVE AND OBJECTIVE BOX
Pt seen and examined at bedside. No CP or SOB.    PAST MEDICAL & SURGICAL HISTORY:  BPH (benign prostatic hyperplasia)  CHF (congestive heart failure)  COPD (chronic obstructive pulmonary disease)  Diabetes  HTN (hypertension)  H/O heart artery stent  S/P CABG x 3      VITAL SIGNS (Last 24 hrs):  T(C): 35.6 (10-21-19 @ 07:03), Max: 36.8 (10-20-19 @ 15:00)  HR: 64 (10-21-19 @ 09:04) (63 - 66)  BP: 123/53 (10-21-19 @ 09:04) (91/55 - 138/81)  RR: 20 (10-21-19 @ 09:56) (20 - 23)  SpO2: 97% (10-21-19 @ 09:56) (94% - 99%)  Wt(kg): --  Daily     Daily     I&O's Summary    20 Oct 2019 07:01  -  21 Oct 2019 07:00  --------------------------------------------------------  IN: 2292 mL / OUT: 370 mL / NET: 1922 mL    21 Oct 2019 07:01  -  21 Oct 2019 14:16  --------------------------------------------------------  IN: 0 mL / OUT: 100 mL / NET: -100 mL        PHYSICAL EXAM:  GENERAL: NAD, well-developed  HEAD:  Atraumatic, Normocephalic  EYES: EOMI, PERRLA, conjunctiva and sclera clear  NECK: Supple, No JVD  CHEST/LUNG: mild rales BL  HEART: Regular rate and rhythm; No murmurs, rubs, or gallops  ABDOMEN: Soft, Nontender, Nondistended; Bowel sounds present  EXTREMITIES:  2+ Peripheral Pulses, No clubbing, cyanosis, or edema  PSYCH: AAOx3  NEUROLOGY: non-focal  SKIN: No rashes or lesions    Labs Reviewed  Spoke to patient in regards to abnormal labs.    CBC Full  -  ( 21 Oct 2019 05:56 )  WBC Count : 8.34 K/uL  Hemoglobin : 11.4 g/dL  Hematocrit : 35.3 %  Platelet Count - Automated : 180 K/uL  Mean Cell Volume : 78.4 fL  Mean Cell Hemoglobin : 25.3 pg  Mean Cell Hemoglobin Concentration : 32.3 g/dL  Auto Neutrophil # : 5.75 K/uL  Auto Lymphocyte # : 1.39 K/uL  Auto Monocyte # : 0.88 K/uL  Auto Eosinophil # : 0.21 K/uL  Auto Basophil # : 0.04 K/uL  Auto Neutrophil % : 68.9 %  Auto Lymphocyte % : 16.7 %  Auto Monocyte % : 10.6 %  Auto Eosinophil % : 2.5 %  Auto Basophil % : 0.5 %    BMP:    10-21 @ 05:56    Blood Urea Nitrogen - 112  Calcium - 7.5  Carbond Dioxide - 21  Chloride - 90  Creatinine - 6.9  Glucose - 108  Potassium - 3.7  Sodium - 136      Hemoglobin A1c -   PT/INR - ( 20 Oct 2019 05:55 )   PT: 15.70 sec;   INR: 1.37 ratio         PTT - ( 21 Oct 2019 05:56 )  PTT:90.3 sec  Urine Culture:  10-19 @ 06:36 Urine culture: --    Culture Results:   Growth in aerobic bottle: Staphylococcus aureus  Susceptibility to follow.  Growth in anaerobic bottle: Gram Positive Cocci in Clusters  Method Type: --  Organism: --  Organism Identification: --  Specimen Source: .Blood None  10-17 @ 05:15 Urine culture: --    Culture Results:   Growth in aerobic and anaerobic bottles: Staphylococcus aureus  See previous culture [id number]  Method Type: --  Organism: --  Organism Identification: --  Specimen Source: .Blood Blood  10-16 @ 15:11 Urine culture: --    Culture Results:   Growth in aerobic and anaerobic bottles: Staphylococcus aureus  See previous culture [id number]  Method Type: --  Organism: --  Organism Identification: --  Specimen Source: .Blood None        Imaging reviewed < from: Xray Chest 1 View- PORTABLE-Routine (10.21.19 @ 06:25) >  Cardiomegaly, bilateral opacities/pleural effusions, stable.    < end of copied text >        MEDICATIONS  (STANDING):  aspirin  chewable 81 milliGRAM(s) Oral daily  buDESOnide 160 MICROgram(s)/formoterol 4.5 MICROgram(s) Inhaler 2 Puff(s) Inhalation two times a day  clopidogrel Tablet 75 milliGRAM(s) Oral daily  dextrose 5%. 1000 milliLiter(s) (50 mL/Hr) IV Continuous <Continuous>  dextrose 50% Injectable 12.5 Gram(s) IV Push once  dextrose 50% Injectable 25 Gram(s) IV Push once  dextrose 50% Injectable 25 Gram(s) IV Push once  fenofibrate Tablet 145 milliGRAM(s) Oral daily  finasteride 5 milliGRAM(s) Oral daily  fluticasone propionate 50 MICROgram(s)/spray Nasal Spray 1 Spray(s) Both Nostrils two times a day  heparin  Infusion 1500 Unit(s)/Hr (15 mL/Hr) IV Continuous <Continuous>  influenza   Vaccine 0.5 milliLiter(s) IntraMuscular once  insulin glargine Injectable (LANTUS) 21 Unit(s) SubCutaneous at bedtime  insulin lispro Injectable (HumaLOG) 7 Unit(s) SubCutaneous before breakfast  insulin lispro Injectable (HumaLOG) 7 Unit(s) SubCutaneous before lunch  insulin lispro Injectable (HumaLOG) 7 Unit(s) SubCutaneous before dinner  isosorbide   mononitrate ER Tablet (IMDUR) 30 milliGRAM(s) Oral daily  metoprolol succinate ER 75 milliGRAM(s) Oral daily  nafcillin  IVPB      nafcillin  IVPB 2 Gram(s) IV Intermittent every 4 hours  nitroglycerin    Patch 0.2 mG/Hr(s) 1 patch Transdermal daily  silver sulfADIAZINE 1% Cream 1 Application(s) Topical two times a day  simvastatin 40 milliGRAM(s) Oral at bedtime  sodium chloride 0.9%. 1000 milliLiter(s) (50 mL/Hr) IV Continuous <Continuous>  tamsulosin 0.4 milliGRAM(s) Oral at bedtime    MEDICATIONS  (PRN):  acetaminophen   Tablet .. 650 milliGRAM(s) Oral every 6 hours PRN Temp greater or equal to 38C (100.4F), Mild Pain (1 - 3)  acetaminophen  Suppository .. 650 milliGRAM(s) Rectal every 6 hours PRN Temp greater or equal to 38C (100.4F), Mild Pain (1 - 3)  ALPRAZolam 0.5 milliGRAM(s) Oral at bedtime PRN anxiety  dextrose 40% Gel 15 Gram(s) Oral once PRN Blood Glucose LESS THAN 70 milliGRAM(s)/deciliter  glucagon  Injectable 1 milliGRAM(s) IntraMuscular once PRN Glucose LESS THAN 70 milligrams/deciliter

## 2019-10-21 NOTE — PROGRESS NOTE ADULT - SUBJECTIVE AND OBJECTIVE BOX
HPI:  Patient is a 78yo Male w/ PMH of HFpEF (EF of 55% with Grade II Diastolic dysfunction), COPD (home O2 3-3.5L as needed), DM, HTN, CAD s/p CABG and 1 stent, BPH, and recent admission for CHF (August 2019) presenting to [facility] with complaints of shortness of breath and chest pain. Patient reports he has been progressively worsening shortness of breath for the last 4-5 days prior to admission. He also reports associated chest pain on exertion.  Patient reports he has been compliant with all of his medications as well with a low salt diet. He denies any recent history of fevers, chills, cough. On day of admission, had severe dyspnea on exertion associated with a pressure like sensation in his chest that radiated to his left arm. He tried sublingual nitro for relief but it didn't help. Therefore, he came in for further evaluation. He was given Solumedrol 125mg by EMS. In the ED, patient was found to have evidence of bilateral pleural effusions on bedside sono done by ED staff. He received IV Lasix 40mg and was placed on BiPAP with major improvement in his symptoms. On my assessment, patient was speaking to me in full sentences on BiPAP and his chest pain has resolved. (10 Oct 2019 13:27)        INTERVAL HPI/OVERNIGHT EVENTS: no overnight events   ICU Vital Signs Last 24 Hrs  T(C): 35.6 (21 Oct 2019 07:03), Max: 36.8 (20 Oct 2019 15:00)  T(F): 96.1 (21 Oct 2019 07:03), Max: 98.2 (20 Oct 2019 15:00)  HR: 62 (21 Oct 2019 11:00) (62 - 66)  BP: 125/50 (21 Oct 2019 11:00) (91/55 - 138/81)  BP(mean): 76 (21 Oct 2019 09:04) (68 - 102)  ABP: --  ABP(mean): --  RR: 20 (21 Oct 2019 11:00) (20 - 23)  SpO2: 98% (21 Oct 2019 11:00) (94% - 99%)    I&O's Summary    20 Oct 2019 07:01  -  21 Oct 2019 07:00  --------------------------------------------------------  IN: 2292 mL / OUT: 370 mL / NET: 1922 mL    21 Oct 2019 07:01  -  21 Oct 2019 14:53  --------------------------------------------------------  IN: 0 mL / OUT: 150 mL / NET: -150 mL          LABS:                        11.4   8.34  )-----------( 180      ( 21 Oct 2019 05:56 )             35.3     10-21    136  |  90<L>  |  112<HH>  ----------------------------<  108<H>  3.7   |  21  |  6.9<HH>    Ca    7.5<L>      21 Oct 2019 05:56  Phos  5.9     10-20  Mg     2.5     10-21    TPro  5.8<L>  /  Alb  2.8<L>  /  TBili  1.3<H>  /  DBili  x   /  AST  22  /  ALT  17  /  AlkPhos  42  10-21    PT/INR - ( 20 Oct 2019 05:55 )   PT: 15.70 sec;   INR: 1.37 ratio         PTT - ( 21 Oct 2019 05:56 )  PTT:90.3 sec    CAPILLARY BLOOD GLUCOSE      POCT Blood Glucose.: 182 mg/dL (21 Oct 2019 11:34)  POCT Blood Glucose.: 86 mg/dL (21 Oct 2019 07:16)  POCT Blood Glucose.: 129 mg/dL (20 Oct 2019 22:37)  POCT Blood Glucose.: 140 mg/dL (20 Oct 2019 16:41)        RADIOLOGY & ADDITIONAL TESTS:    Consultant(s) Notes Reviewed:  [x ] YES  [ ] NO    MEDICATIONS  (STANDING):  aspirin  chewable 81 milliGRAM(s) Oral daily  buDESOnide 160 MICROgram(s)/formoterol 4.5 MICROgram(s) Inhaler 2 Puff(s) Inhalation two times a day  clopidogrel Tablet 75 milliGRAM(s) Oral daily  dextrose 5%. 1000 milliLiter(s) (50 mL/Hr) IV Continuous <Continuous>  dextrose 50% Injectable 12.5 Gram(s) IV Push once  dextrose 50% Injectable 25 Gram(s) IV Push once  dextrose 50% Injectable 25 Gram(s) IV Push once  fenofibrate Tablet 145 milliGRAM(s) Oral daily  finasteride 5 milliGRAM(s) Oral daily  fluticasone propionate 50 MICROgram(s)/spray Nasal Spray 1 Spray(s) Both Nostrils two times a day  heparin  Infusion 1500 Unit(s)/Hr (15 mL/Hr) IV Continuous <Continuous>  influenza   Vaccine 0.5 milliLiter(s) IntraMuscular once  insulin glargine Injectable (LANTUS) 21 Unit(s) SubCutaneous at bedtime  insulin lispro Injectable (HumaLOG) 7 Unit(s) SubCutaneous before breakfast  insulin lispro Injectable (HumaLOG) 7 Unit(s) SubCutaneous before lunch  insulin lispro Injectable (HumaLOG) 7 Unit(s) SubCutaneous before dinner  isosorbide   mononitrate ER Tablet (IMDUR) 30 milliGRAM(s) Oral daily  metoprolol succinate ER 75 milliGRAM(s) Oral daily  nafcillin  IVPB      nafcillin  IVPB 2 Gram(s) IV Intermittent every 4 hours  nitroglycerin    Patch 0.2 mG/Hr(s) 1 patch Transdermal daily  silver sulfADIAZINE 1% Cream 1 Application(s) Topical two times a day  simvastatin 40 milliGRAM(s) Oral at bedtime  sodium chloride 0.9%. 1000 milliLiter(s) (50 mL/Hr) IV Continuous <Continuous>  tamsulosin 0.4 milliGRAM(s) Oral at bedtime    MEDICATIONS  (PRN):  acetaminophen   Tablet .. 650 milliGRAM(s) Oral every 6 hours PRN Temp greater or equal to 38C (100.4F), Mild Pain (1 - 3)  acetaminophen  Suppository .. 650 milliGRAM(s) Rectal every 6 hours PRN Temp greater or equal to 38C (100.4F), Mild Pain (1 - 3)  ALPRAZolam 0.5 milliGRAM(s) Oral at bedtime PRN anxiety  dextrose 40% Gel 15 Gram(s) Oral once PRN Blood Glucose LESS THAN 70 milliGRAM(s)/deciliter  glucagon  Injectable 1 milliGRAM(s) IntraMuscular once PRN Glucose LESS THAN 70 milligrams/deciliter      PHYSICAL EXAM:  GENERAL: not in any acute distress   HEAD:  Atraumatic, Normocephalic  EYES: EOMI, PERRLA, conjunctiva and sclera clear  ENT: No tonsillar erythema, exudates, or enlargement; Moist mucous membranes, Good dentition, No lesions  NECK: Supple, No JVD, Normal thyroid, no enlarged nodes  NERVOUS SYSTEM:  Alert & Oriented X3  CHEST/LUNG: bilateral crackles noted   HEART: S1S2 normal no murmur.   ABDOMEN: Soft, Nontender, Nondistended; Bowel sounds present  EXTREMITIES:  left leg venous ulcer.   LYMPH: No lymphadenopathy noted  SKIN: No rashes or lesions    Care Discussed with Consultants/Other Providers [ x] YES  [ ] NO

## 2019-10-21 NOTE — PROGRESS NOTE ADULT - SUBJECTIVE AND OBJECTIVE BOX
Nephrology progress note    Patient is seen and examined, events over the last 24 h noted .  On genle hydration, creat cont to rise, denies SOB  Allergies:  No Known Allergies    Hospital Medications:   MEDICATIONS  (STANDING):  aspirin  chewable 81 milliGRAM(s) Oral daily  buDESOnide 160 MICROgram(s)/formoterol 4.5 MICROgram(s) Inhaler 2 Puff(s) Inhalation two times a day  clopidogrel Tablet 75 milliGRAM(s) Oral daily  dextrose 5%. 1000 milliLiter(s) (50 mL/Hr) IV Continuous <Continuous>  dextrose 50% Injectable 12.5 Gram(s) IV Push once  dextrose 50% Injectable 25 Gram(s) IV Push once  dextrose 50% Injectable 25 Gram(s) IV Push once  fenofibrate Tablet 145 milliGRAM(s) Oral daily  finasteride 5 milliGRAM(s) Oral daily  fluticasone propionate 50 MICROgram(s)/spray Nasal Spray 1 Spray(s) Both Nostrils two times a day  heparin  Infusion 1550 Unit(s)/Hr (15.5 mL/Hr) IV Continuous <Continuous>  influenza   Vaccine 0.5 milliLiter(s) IntraMuscular once  insulin glargine Injectable (LANTUS) 21 Unit(s) SubCutaneous at bedtime  insulin lispro Injectable (HumaLOG) 7 Unit(s) SubCutaneous before breakfast  insulin lispro Injectable (HumaLOG) 7 Unit(s) SubCutaneous before lunch  insulin lispro Injectable (HumaLOG) 7 Unit(s) SubCutaneous before dinner  isosorbide   mononitrate ER Tablet (IMDUR) 30 milliGRAM(s) Oral daily  metoprolol succinate ER 75 milliGRAM(s) Oral daily  nafcillin  IVPB      nafcillin  IVPB 2 Gram(s) IV Intermittent every 4 hours  nitroglycerin    Patch 0.2 mG/Hr(s) 1 patch Transdermal daily  silver sulfADIAZINE 1% Cream 1 Application(s) Topical two times a day  simvastatin 40 milliGRAM(s) Oral at bedtime  sodium chloride 0.9%. 1000 milliLiter(s) (50 mL/Hr) IV Continuous <Continuous>  tamsulosin 0.4 milliGRAM(s) Oral at bedtime        VITALS:  T(F): 96.1 (10-21-19 @ 07:03), Max: 98.2 (10-20-19 @ 15:00)  HR: 65 (10-21-19 @ 08:00)  BP: 138/81 (10-21-19 @ 05:00)  RR: 20 (10-21-19 @ 09:56)  SpO2: 97% (10-21-19 @ 09:56)  Wt(kg): --    10-19 @ :  -  10-20 @ 07:00  --------------------------------------------------------  IN: 2087.5 mL / OUT: 335 mL / NET: 1752.5 mL    10-20 @ :  -  10-21 @ 07:00  --------------------------------------------------------  IN: 2292 mL / OUT: 370 mL / NET: 1922 mL    10-21 @ 07:  -  10-21 @ 10:52  --------------------------------------------------------  IN: 0 mL / OUT: 65 mL / NET: -65 mL          PHYSICAL EXAM:  Constitutional: NAD, obese  HEENT: anicteric sclera  Respiratory: CTAB,  Cardiovascular: S1, S2, RRR  Gastrointestinal: BS+, soft, NT/ND  Extremities: 1+ peripheral edema  Neurological: A/O x 3  : Oliva julian.   Skin: No rashes  Vascular Access:    LABS:  10-21    136  |  90<L>  |  112<HH>  ----------------------------<  108<H>  3.7   |  21  |  6.9<HH>  Creatinine Trend: 6.9<--, 6.4<--, 6.2<--, 4.8<--, 3.2<--, 2.0<--    Ca    7.5<L>      21 Oct 2019 05:56  Phos  5.9     10-20  Mg     2.5     10-21    TPro  5.8<L>  /  Alb  2.8<L>  /  TBili  1.3<H>  /  DBili      /  AST  22  /  ALT  17  /  AlkPhos  42  10-                          11.4   8.34  )-----------( 180      ( 21 Oct 2019 05:56 )             35.3       Urine Studies:  Urinalysis Basic - ( 15 Oct 2019 11:30 )    Color: Yellow / Appearance: Clear / S.015 / pH:   Gluc:  / Ketone: Negative  / Bili: Negative / Urobili: 0.2 mg/dL   Blood:  / Protein: Negative mg/dL / Nitrite: Negative   Leuk Esterase: Negative / RBC:  / WBC    Sq Epi:  / Non Sq Epi:  / Bacteria:         RADIOLOGY & ADDITIONAL STUDIES:  < from: Xray Chest 1 View- PORTABLE-Routine (10.21.19 @ 06:25) >  Cardiomegaly, bilateral opacities/pleural effusions, stable.    < end of copied text >  < from: US Renal (10.19.19 @ 10:22) >    RIGHT KIDNEY: Increased in echogenicity, size measuring 11.2 cm in   length. No evidence of hydronephrosis, calculus or solid mass. Vascular   flow is demonstrated at the hilum.    LEFT KIDNEY: Increased in echogenicity, size measuring 12.6 cm in length.   No evidence of hydronephrosis, calculus or solid mass. Vascular flow is   demonstrated at the hilum. Exophytic left renal 3 cm cyst.      IMPRESSION:  No hydronephrosis bilaterally.  Left renal 3 cm cyst.    < end of copied text >

## 2019-10-21 NOTE — PROGRESS NOTE ADULT - ASSESSMENT
80yo Male w/ PMH of HFpEF (EF of 55% with Grade II Diastolic dysfunction), COPD (home O2 3-3.5L as needed), DM, HTN, CAD s/p CABG and 1 stent, BPH, and recent admission for CHF (August 2019) presenting to [facility] with complaints of shortness of breath and chest pain. Patient reports he has been progressively worsening shortness of breath for the last 4-5 days prior to admission.    1) Acute on chronic diastolic CHF Exacerbation   Pt euvolemic, effusions stable on cxr, lasix on hold given worsening renal function  Patient on Heparin Drip PTT 90s  Continue with Metoprolol ER 75 along with Aspirin 81. c/w plavix 75mg daily after loading does  Monitor I and O   Follow with Repeat PTT    2. HTN  stable     3. BRITTANI worsended since friday  today 6.9  Nephro following, possible over diaeresis vs ATN- gentle hydration for now, follow up Cr.     4. DM2.   Start Insulin Follow [facility]BG    5. COPD on as needed home O2 not in exacerbation  -Monitor and c/w home med    6. BPH  C/w med    7. CAD s/p CABG  - continue cardiac med and plan for cath once afebrile    8. Febrile   Blood Culture prelim Gram positive Cocci in Clusters   Nafcillin as per ID recommendations   Follow with daily blood cultures until negative   Consider antibiotic de-escalation once patient improved and Sensitivities are back.     DVT, GI prophylaxis  Disposition Planning :Continue with CCU Monitoring

## 2019-10-21 NOTE — PROGRESS NOTE ADULT - ASSESSMENT
Patient is a 78yo Male w/ PMH of HFpEF (EF of 55% with Grade II Diastolic dysfunction), COPD (home O2 3-3.5L as needed), DM, HTN, CAD s/p CABG and 1 stent, BPH, and recent admission for CHF (August 2019) presenting to [facility] with complaints of shortness of breath and chest pain. Patient reports he has been progressively worsening shortness of breath for the last 4-5 days prior to admission.    1) Acute on chronic diastolic CHF Exacerbation   Lasix held for now as patient appears to be maximally diuresed   Patient on Heparin Drip adjusted today. follow with PTT   Continue with Metoprolol ER 75 along with Aspirin 81. c/w plavix 75mg daily after loading does  Monitor I and O   Follow with Repeat PTT    2. HTN  stable at this time Hold Meds and monitor BP    3. BRITTANI worsening  Diuretics held for 48 hours   Continue to hold  Follow with Creatinine in AM  Renal on board for possible dialysis   Renal studies ordered.     4. DM2.   Start Insulin Follow FBG    5. COPD on as needed home O2 not in exacerbation  -Monitor and c/w home med    6. BPH  C/w med    7. CAD s/p CABG  - continue cardiac med and plan for cath once afebrile    8. Febrile   Blood Culture prelim Gram positive Cocci in Clusters   Nafcillin as per ID recommendations   Follow with daily blood cultures until negative   Consider antibiotic de-escalation once patient improved and Sensitivities are back.       DVT Prophylaxis   Heparin Drip     Disposition   Continue with CCU Monitoring

## 2019-10-21 NOTE — PROGRESS NOTE ADULT - SUBJECTIVE AND OBJECTIVE BOX
Patient is seen and examined at the bed side, is afebrile.        REVIEW OF SYSTEMS: All other review systems are negative          ICU Vital Signs Last 24 Hrs  T(C): 35.8 (21 Oct 2019 15:00), Max: 36.8 (20 Oct 2019 23:11)  T(F): 96.5 (21 Oct 2019 15:00), Max: 98.2 (20 Oct 2019 23:11)  HR: 75 (21 Oct 2019 18:30) (62 - 75)  BP: 118/55 (21 Oct 2019 15:00) (100/55 - 138/81)  BP(mean): 78 (21 Oct 2019 15:00) (74 - 102)  ABP: --  ABP(mean): --  RR: 20 (21 Oct 2019 15:00) (20 - 23)  SpO2: 93% (21 Oct 2019 18:30) (93% - 99%)          PHYSICAL EXAM:  GENERAL: Not in distress , on BIPAP  CHEST/LUNG:  Air entry bilaterally  HEART: s1 and s2 present  ABDOMEN:  Nontender and  Nondistended  EXTREMITIES: trace pedal  edema, excoriation marks on LE  CNS: Awake and Alert        LABS:                        11.9   10.04 )-----------( 224      ( 21 Oct 2019 19:08 )             36.7                           13.2   10.08 )-----------( 148      ( 16 Oct 2019 05:52 )             41.6         10-21    136  |  91<L>  |  x   ----------------------------<  53<L>  4.0   |  19  |  8.1<HH>    Ca    7.8<L>      21 Oct 2019 19:08  Phos  7.8     10-21  Mg     2.5     10-21    TPro  6.0  /  Alb  2.9<L>  /  TBili  1.2  /  DBili  x   /  AST  24  /  ALT  14  /  AlkPhos  41  10-21    10-16    138  |  95<L>  |  48<H>  ----------------------------<  270<H>  4.1   |  28  |  1.4    Ca    8.9      16 Oct 2019 05:52  Mg     1.8     10-16    TPro  6.7  /  Alb  3.3<L>  /  TBili  0.7  /  DBili  x   /  AST  221<H>  /  ALT  39  /  AlkPhos  44  10-    PT/INR - ( 15 Oct 2019 06:06 )   PT: 15.60 sec;   INR: 1.36 ratio         PTT - ( 16 Oct 2019 19:26 )  PTT:43.3 sec        CAPILLARY BLOOD GLUCOS  POCT Blood Glucose.: 214 mg/dL (16 Oct 2019 16:17)  POCT Blood Glucose.: 312 mg/dL (16 Oct 2019 11:39)  POCT Blood Glucose.: 261 mg/dL (16 Oct 2019 07:58)  POCT Blood Glucose.: 251 mg/dL (15 Oct 2019 23:22)        Urinalysis Basic - ( 15 Oct 2019 11:30 )  Color: Yellow / Appearance: Clear / S.015 / pH: x  Gluc: x / Ketone: Negative  / Bili: Negative / Urobili: 0.2 mg/dL   Blood: x / Protein: Negative mg/dL / Nitrite: Negative   Leuk Esterase: Negative / RBC: x / WBC x   Sq Epi: x / Non Sq Epi: x / Bacteria: x        MEDICATIONS  (STANDING):  aspirin  chewable 81 milliGRAM(s) Oral daily  buDESOnide 160 MICROgram(s)/formoterol 4.5 MICROgram(s) Inhaler 2 Puff(s) Inhalation two times a day  clopidogrel Tablet 75 milliGRAM(s) Oral daily  dextrose 5%. 1000 milliLiter(s) (50 mL/Hr) IV Continuous <Continuous>  dextrose 50% Injectable 12.5 Gram(s) IV Push once  dextrose 50% Injectable 25 Gram(s) IV Push once  dextrose 50% Injectable 25 Gram(s) IV Push once  fenofibrate Tablet 145 milliGRAM(s) Oral daily  finasteride 5 milliGRAM(s) Oral daily  fluticasone propionate 50 MICROgram(s)/spray Nasal Spray 1 Spray(s) Both Nostrils two times a day  heparin  Infusion 1500 Unit(s)/Hr (15 mL/Hr) IV Continuous <Continuous>  influenza   Vaccine 0.5 milliLiter(s) IntraMuscular once  insulin glargine Injectable (LANTUS) 21 Unit(s) SubCutaneous at bedtime  insulin lispro Injectable (HumaLOG) 7 Unit(s) SubCutaneous before breakfast  insulin lispro Injectable (HumaLOG) 7 Unit(s) SubCutaneous before lunch  insulin lispro Injectable (HumaLOG) 7 Unit(s) SubCutaneous before dinner  isosorbide   mononitrate ER Tablet (IMDUR) 30 milliGRAM(s) Oral daily  metoprolol succinate ER 75 milliGRAM(s) Oral daily  nafcillin  IVPB      nafcillin  IVPB 2 Gram(s) IV Intermittent every 4 hours  nitroglycerin    Patch 0.2 mG/Hr(s) 1 patch Transdermal daily  silver sulfADIAZINE 1% Cream 1 Application(s) Topical two times a day  simvastatin 40 milliGRAM(s) Oral at bedtime  sodium chloride 0.9%. 1000 milliLiter(s) (50 mL/Hr) IV Continuous <Continuous>  tamsulosin 0.4 milliGRAM(s) Oral at bedtime    MEDICATIONS  (PRN):  acetaminophen   Tablet .. 650 milliGRAM(s) Oral every 6 hours PRN Temp greater or equal to 38C (100.4F), Mild Pain (1 - 3)  acetaminophen  Suppository .. 650 milliGRAM(s) Rectal every 6 hours PRN Temp greater or equal to 38C (100.4F), Mild Pain (1 - 3)  ALPRAZolam 0.5 milliGRAM(s) Oral at bedtime PRN anxiety  dextrose 40% Gel 15 Gram(s) Oral once PRN Blood Glucose LESS THAN 70 milliGRAM(s)/deciliter  glucagon  Injectable 1 milliGRAM(s) IntraMuscular once PRN Glucose LESS THAN 70 milligrams/deciliter        RADIOLOGY & ADDITIONAL TESTS:      < from: Xray Chest 1 View- PORTABLE-Routine (10.21.19 @ 06:25) >  Cardiomegaly, bilateral opacities/pleural effusions, stable.      < end of copied text >      < from: US Renal (10.19.19 @ 10:22) >  No hydronephrosis bilaterally.  Left renal 3 cm cyst.          < end of copied text >      < from: Xray Chest 1 View-PORTABLE IMMEDIATE (10.16.19 @ 16:56) >  Stable bilateral airspace opacities.    < end of copied text >        MICROBIOLOGY DATA:    Culture - Blood in AM (10.19.19 @ 06:36)    Gram Stain:   Growth in aerobic bottle: Gram Positive Cocci in Clusters  Growth in anaerobic bottle: Gram Positive Cocci in Clusters    Specimen Source: .Blood None    Culture Results:   Growth in aerobic bottle: Staphylococcus aureus  Susceptibility to follow.  Growth in anaerobic bottle: Gram Positive Cocci in Clusters        Culture - Blood in AM (10.17.19 @ 05:15)    Gram Stain:   Growth in anaerobic bottle: Gram Positive Cocci in Clusters  Growth in aerobic bottle: Gram Positive Cocci in Clusters    Specimen Source: .Blood Blood    Culture Results:   Growth in aerobic and anaerobic bottles: Staphylococcus aureus  See previous culture [id number]        FLU A B RSV Detection by PCR (10.15.19 @ 21:10)    Flu A Result: Negative: Negative results do not preclude influenza infection and  should not be used as the sole basis for treatment or  other patient management decisions.  A positive result may occur in the absence of viable virus.  By: [employer] Flu viral assay by Reverse Transcriptase  Polymerase Chain Reaction (RT-PCR).    Flu B Result: Negative    RSV Result: Negative      Culture - Urine (10.15.19 @ 11:30)    Specimen Source: .Urine Clean Catch (Midstream)    Culture Results:   <10,000 CFU/mL Normal Urogenital Felipa        Culture - Blood (10.15.19 @ 07:00)    -  Staphylococcus aureus: Detec Any isolate of Staphylococcus aureus from a blood culture is NOT considered a contaminant.    Gram Stain:   Growth in aerobic bottle: Gram Positive Cocci in Clusters  Growth in anaerobic bottle: Gram Positive Cocci in Clusters    Specimen Source: .Blood Blood    Organism: Blood Culture PCR    Culture Results:   Growth in aerobic bottle: Gram Positive Cocci in Clusters  Growth in anaerobic bottle: Gram Positive Cocci in Clusters  "Due to technical problems, Proteus sp. will Not be reported as part of  the BCID panel until further notice"  ***Blood Panel PCR results on this specimen are available  approximately 3 hours after the Gram stain result.***  Gram stain, PCR, and/or culture results may not always  correspond due to difference in methodologies.  ************************************************************  This PCR assay was performed using [employer].  The following targets are tested for: Enterococcus,  vancomycin resistant enterococci, Listeria monocytogenes,  coagulase negative staphylococci, S. aureus,  methicillin resistant S. aureus, Streptococcus agalactiae  (Group B), S. pneumoniae, S. pyogenes (Group A),  Acinetobacter baumannii, Enterobacter cloacae, E. coli,  Klebsiella oxytoca, K. pneumoniae, Proteus sp.,  Serratia marcescens, Haemophilus influenzae,  Neisseria meningitidis, Pseudomonas aeruginosa, Candida  albicans, C. glabrata, C krusei, C parapsilosis,  C. tropicalis and the KPC resistance gene.    Organism Identification: Blood Culture PCR    Method Type: PCR Patient is seen and examined at the bed side, is afebrile. He is feeling okay, no new complaints. The repeat Blood cultures from 10/19/19 still positive and NO REPEAT BLOOD cultures are in the system to document clearing the blood stream. The kidney function worsened.         REVIEW OF SYSTEMS: All other review systems are negative          ICU Vital Signs Last 24 Hrs  T(C): 35.8 (21 Oct 2019 15:00), Max: 36.8 (20 Oct 2019 23:11)  T(F): 96.5 (21 Oct 2019 15:00), Max: 98.2 (20 Oct 2019 23:11)  HR: 75 (21 Oct 2019 18:30) (62 - 75)  BP: 118/55 (21 Oct 2019 15:00) (100/55 - 138/81)  BP(mean): 78 (21 Oct 2019 15:00) (74 - 102)  ABP: --  ABP(mean): --  RR: 20 (21 Oct 2019 15:00) (20 - 23)  SpO2: 93% (21 Oct 2019 18:30) (93% - 99%)          PHYSICAL EXAM:  GENERAL: Not in distress , on oxygen via NC  CHEST/LUNG:  Air entry bilaterally  HEART: s1 and s2 present  ABDOMEN:  Nontender and  Nondistended  EXTREMITIES: trace pedal  edema, excoriation marks on LE  CNS: Awake and Alert        LABS:                        11.9   10.04 )-----------( 224      ( 21 Oct 2019 19:08 )             36.7                           13.2   10.08 )-----------( 148      ( 16 Oct 2019 05:52 )             41.6         10-21    136  |  91<L>  |  x   ----------------------------<  53<L>  4.0   |  19  |  8.1<HH>    Ca    7.8<L>      21 Oct 2019 19:08  Phos  7.8     10-21  Mg     2.5     10    TPro  6.0  /  Alb  2.9<L>  /  TBili  1.2  /  DBili  x   /  AST  24  /  ALT  14  /  AlkPhos  41  10-21    10-16    138  |  95<L>  |  48<H>  ----------------------------<  270<H>  4.1   |  28  |  1.4    Ca    8.9      16 Oct 2019 05:52  Mg     1.8     10-16    TPro  6.7  /  Alb  3.3<L>  /  TBili  0.7  /  DBili  x   /  AST  221<H>  /  ALT  39  /  AlkPhos  44  10-16    PT/INR - ( 15 Oct 2019 06:06 )   PT: 15.60 sec;   INR: 1.36 ratio         PTT - ( 16 Oct 2019 19:26 )  PTT:43.3 sec        CAPILLARY BLOOD GLUCOS  POCT Blood Glucose.: 214 mg/dL (16 Oct 2019 16:17)  POCT Blood Glucose.: 312 mg/dL (16 Oct 2019 11:39)  POCT Blood Glucose.: 261 mg/dL (16 Oct 2019 07:58)  POCT Blood Glucose.: 251 mg/dL (15 Oct 2019 23:22)        Urinalysis Basic - ( 15 Oct 2019 11:30 )  Color: Yellow / Appearance: Clear / S.015 / pH: x  Gluc: x / Ketone: Negative  / Bili: Negative / Urobili: 0.2 mg/dL   Blood: x / Protein: Negative mg/dL / Nitrite: Negative   Leuk Esterase: Negative / RBC: x / WBC x   Sq Epi: x / Non Sq Epi: x / Bacteria: x        MEDICATIONS  (STANDING):  aspirin  chewable 81 milliGRAM(s) Oral daily  buDESOnide 160 MICROgram(s)/formoterol 4.5 MICROgram(s) Inhaler 2 Puff(s) Inhalation two times a day  clopidogrel Tablet 75 milliGRAM(s) Oral daily  dextrose 5%. 1000 milliLiter(s) (50 mL/Hr) IV Continuous <Continuous>  dextrose 50% Injectable 12.5 Gram(s) IV Push once  dextrose 50% Injectable 25 Gram(s) IV Push once  dextrose 50% Injectable 25 Gram(s) IV Push once  fenofibrate Tablet 145 milliGRAM(s) Oral daily  finasteride 5 milliGRAM(s) Oral daily  fluticasone propionate 50 MICROgram(s)/spray Nasal Spray 1 Spray(s) Both Nostrils two times a day  heparin  Infusion 1500 Unit(s)/Hr (15 mL/Hr) IV Continuous <Continuous>  influenza   Vaccine 0.5 milliLiter(s) IntraMuscular once  insulin glargine Injectable (LANTUS) 21 Unit(s) SubCutaneous at bedtime  insulin lispro Injectable (HumaLOG) 7 Unit(s) SubCutaneous before breakfast  insulin lispro Injectable (HumaLOG) 7 Unit(s) SubCutaneous before lunch  insulin lispro Injectable (HumaLOG) 7 Unit(s) SubCutaneous before dinner  isosorbide   mononitrate ER Tablet (IMDUR) 30 milliGRAM(s) Oral daily  metoprolol succinate ER 75 milliGRAM(s) Oral daily  nafcillin  IVPB      nafcillin  IVPB 2 Gram(s) IV Intermittent every 4 hours  nitroglycerin    Patch 0.2 mG/Hr(s) 1 patch Transdermal daily  silver sulfADIAZINE 1% Cream 1 Application(s) Topical two times a day  simvastatin 40 milliGRAM(s) Oral at bedtime  sodium chloride 0.9%. 1000 milliLiter(s) (50 mL/Hr) IV Continuous <Continuous>  tamsulosin 0.4 milliGRAM(s) Oral at bedtime    MEDICATIONS  (PRN):  acetaminophen   Tablet .. 650 milliGRAM(s) Oral every 6 hours PRN Temp greater or equal to 38C (100.4F), Mild Pain (1 - 3)  acetaminophen  Suppository .. 650 milliGRAM(s) Rectal every 6 hours PRN Temp greater or equal to 38C (100.4F), Mild Pain (1 - 3)  ALPRAZolam 0.5 milliGRAM(s) Oral at bedtime PRN anxiety  dextrose 40% Gel 15 Gram(s) Oral once PRN Blood Glucose LESS THAN 70 milliGRAM(s)/deciliter  glucagon  Injectable 1 milliGRAM(s) IntraMuscular once PRN Glucose LESS THAN 70 milligrams/deciliter        RADIOLOGY & ADDITIONAL TESTS:      < from: Xray Chest 1 View- PORTABLE-Routine (10.21.19 @ 06:25) >  Cardiomegaly, bilateral opacities/pleural effusions, stable.      < end of copied text >      < from: US Renal (10.19.19 @ 10:22) >  No hydronephrosis bilaterally.  Left renal 3 cm cyst.          < end of copied text >      < from: Xray Chest 1 View-PORTABLE IMMEDIATE (10.16.19 @ 16:56) >  Stable bilateral airspace opacities.    < end of copied text >        MICROBIOLOGY DATA:    Culture - Blood in AM (10.19.19 @ 06:36)    Gram Stain:   Growth in aerobic bottle: Gram Positive Cocci in Clusters  Growth in anaerobic bottle: Gram Positive Cocci in Clusters    Specimen Source: .Blood None    Culture Results:   Growth in aerobic bottle: Staphylococcus aureus  Susceptibility to follow.  Growth in anaerobic bottle: Gram Positive Cocci in Clusters        Culture - Blood in AM (10.17.19 @ 05:15)    Gram Stain:   Growth in anaerobic bottle: Gram Positive Cocci in Clusters  Growth in aerobic bottle: Gram Positive Cocci in Clusters    Specimen Source: .Blood Blood    Culture Results:   Growth in aerobic and anaerobic bottles: Staphylococcus aureus  See previous culture [id number]        FLU A B RSV Detection by PCR (10.15.19 @ 21:10)    Flu A Result: Negative: Negative results do not preclude influenza infection and  should not be used as the sole basis for treatment or  other patient management decisions.  A positive result may occur in the absence of viable virus.  By: [employer] Flu viral assay by Reverse Transcriptase  Polymerase Chain Reaction (RT-PCR).    Flu B Result: Negative    RSV Result: Negative      Culture - Urine (10.15.19 @ 11:30)    Specimen Source: .Urine Clean Catch (Midstream)    Culture Results:   <10,000 CFU/mL Normal Urogenital Felipa        Culture - Blood (10.15.19 @ 07:00)    -  Staphylococcus aureus: Detec Any isolate of Staphylococcus aureus from a blood culture is NOT considered a contaminant.    Gram Stain:   Growth in aerobic bottle: Gram Positive Cocci in Clusters  Growth in anaerobic bottle: Gram Positive Cocci in Clusters    Specimen Source: .Blood Blood    Organism: Blood Culture PCR    Culture Results:   Growth in aerobic bottle: Gram Positive Cocci in Clusters  Growth in anaerobic bottle: Gram Positive Cocci in Clusters  "Due to technical problems, Proteus sp. will Not be reported as part of  the BCID panel until further notice"  ***Blood Panel PCR results on this specimen are available  approximately 3 hours after the Gram stain result.***  Gram stain, PCR, and/or culture results may not always  correspond due to difference in methodologies.  ************************************************************  This PCR assay was performed using [employer].  The following targets are tested for: Enterococcus,  vancomycin resistant enterococci, Listeria monocytogenes,  coagulase negative staphylococci, S. aureus,  methicillin resistant S. aureus, Streptococcus agalactiae  (Group B), S. pneumoniae, S. pyogenes (Group A),  Acinetobacter baumannii, Enterobacter cloacae, E. coli,  Klebsiella oxytoca, K. pneumoniae, Proteus sp.,  Serratia marcescens, Haemophilus influenzae,  Neisseria meningitidis, Pseudomonas aeruginosa, Candida  albicans, C. glabrata, C krusei, C parapsilosis,  C. tropicalis and the KPC resistance gene.    Organism Identification: Blood Culture PCR    Method Type: PCR

## 2019-10-22 LAB
-  AMPICILLIN/SULBACTAM: SIGNIFICANT CHANGE UP
-  CEFAZOLIN: SIGNIFICANT CHANGE UP
-  CLINDAMYCIN: SIGNIFICANT CHANGE UP
-  ERYTHROMYCIN: SIGNIFICANT CHANGE UP
-  GENTAMICIN: SIGNIFICANT CHANGE UP
-  OXACILLIN: SIGNIFICANT CHANGE UP
-  PENICILLIN: SIGNIFICANT CHANGE UP
-  RIFAMPIN: SIGNIFICANT CHANGE UP
-  TETRACYCLINE: SIGNIFICANT CHANGE UP
-  TRIMETHOPRIM/SULFAMETHOXAZOLE: SIGNIFICANT CHANGE UP
-  VANCOMYCIN: SIGNIFICANT CHANGE UP
ALBUMIN SERPL ELPH-MCNC: 2.7 G/DL — LOW (ref 3.5–5.2)
ALP SERPL-CCNC: 39 U/L — SIGNIFICANT CHANGE UP (ref 30–115)
ALT FLD-CCNC: 14 U/L — SIGNIFICANT CHANGE UP (ref 0–41)
ANION GAP SERPL CALC-SCNC: 27 MMOL/L — HIGH (ref 7–14)
APTT BLD: 75.1 SEC — CRITICAL HIGH (ref 27–39.2)
AST SERPL-CCNC: 20 U/L — SIGNIFICANT CHANGE UP (ref 0–41)
BASOPHILS # BLD AUTO: 0.05 K/UL — SIGNIFICANT CHANGE UP (ref 0–0.2)
BASOPHILS NFR BLD AUTO: 0.6 % — SIGNIFICANT CHANGE UP (ref 0–1)
BILIRUB SERPL-MCNC: 1 MG/DL — SIGNIFICANT CHANGE UP (ref 0.2–1.2)
BUN SERPL-MCNC: 112 MG/DL — CRITICAL HIGH (ref 10–20)
C3 SERPL-MCNC: 132 MG/DL — SIGNIFICANT CHANGE UP (ref 81–157)
C4 SERPL-MCNC: 25 MG/DL — SIGNIFICANT CHANGE UP (ref 13–39)
CALCIUM SERPL-MCNC: 7.5 MG/DL — LOW (ref 8.5–10.1)
CALCIUM SERPL-MCNC: 7.8 MG/DL — LOW (ref 8.4–10.5)
CHLORIDE SERPL-SCNC: 90 MMOL/L — LOW (ref 98–110)
CO2 SERPL-SCNC: 20 MMOL/L — SIGNIFICANT CHANGE UP (ref 17–32)
CREAT ?TM UR-MCNC: 87 MG/DL — SIGNIFICANT CHANGE UP
CREAT SERPL-MCNC: 7.6 MG/DL — CRITICAL HIGH (ref 0.7–1.5)
CULTURE RESULTS: SIGNIFICANT CHANGE UP
EOSINOPHIL # BLD AUTO: 0.25 K/UL — SIGNIFICANT CHANGE UP (ref 0–0.7)
EOSINOPHIL NFR BLD AUTO: 3.1 % — SIGNIFICANT CHANGE UP (ref 0–8)
GLUCOSE BLDC GLUCOMTR-MCNC: 106 MG/DL — HIGH (ref 70–99)
GLUCOSE BLDC GLUCOMTR-MCNC: 110 MG/DL — HIGH (ref 70–99)
GLUCOSE BLDC GLUCOMTR-MCNC: 152 MG/DL — HIGH (ref 70–99)
GLUCOSE BLDC GLUCOMTR-MCNC: 159 MG/DL — HIGH (ref 70–99)
GLUCOSE BLDC GLUCOMTR-MCNC: 96 MG/DL — SIGNIFICANT CHANGE UP (ref 70–99)
GLUCOSE BLDC GLUCOMTR-MCNC: 99 MG/DL — SIGNIFICANT CHANGE UP (ref 70–99)
GLUCOSE SERPL-MCNC: 86 MG/DL — SIGNIFICANT CHANGE UP (ref 70–99)
HCT VFR BLD CALC: 34.9 % — LOW (ref 42–52)
HGB BLD-MCNC: 11.5 G/DL — LOW (ref 14–18)
IMM GRANULOCYTES NFR BLD AUTO: 1.2 % — HIGH (ref 0.1–0.3)
INR BLD: 1.44 RATIO — HIGH (ref 0.65–1.3)
LYMPHOCYTES # BLD AUTO: 1.13 K/UL — LOW (ref 1.2–3.4)
LYMPHOCYTES # BLD AUTO: 13.9 % — LOW (ref 20.5–51.1)
MCHC RBC-ENTMCNC: 25.8 PG — LOW (ref 27–31)
MCHC RBC-ENTMCNC: 33 G/DL — SIGNIFICANT CHANGE UP (ref 32–37)
MCV RBC AUTO: 78.3 FL — LOW (ref 80–94)
METHOD TYPE: SIGNIFICANT CHANGE UP
MONOCYTES # BLD AUTO: 0.95 K/UL — HIGH (ref 0.1–0.6)
MONOCYTES NFR BLD AUTO: 11.7 % — HIGH (ref 1.7–9.3)
NEUTROPHILS # BLD AUTO: 5.63 K/UL — SIGNIFICANT CHANGE UP (ref 1.4–6.5)
NEUTROPHILS NFR BLD AUTO: 69.5 % — SIGNIFICANT CHANGE UP (ref 42.2–75.2)
NRBC # BLD: 0 /100 WBCS — SIGNIFICANT CHANGE UP (ref 0–0)
ORGANISM # SPEC MICROSCOPIC CNT: SIGNIFICANT CHANGE UP
ORGANISM # SPEC MICROSCOPIC CNT: SIGNIFICANT CHANGE UP
OSMOLALITY UR: 319 MOS/KG — SIGNIFICANT CHANGE UP (ref 50–1400)
PLATELET # BLD AUTO: 205 K/UL — SIGNIFICANT CHANGE UP (ref 130–400)
POTASSIUM SERPL-MCNC: 3.8 MMOL/L — SIGNIFICANT CHANGE UP (ref 3.5–5)
POTASSIUM SERPL-SCNC: 3.8 MMOL/L — SIGNIFICANT CHANGE UP (ref 3.5–5)
PROT SERPL-MCNC: 6 G/DL — SIGNIFICANT CHANGE UP (ref 6–8)
PROTHROM AB SERPL-ACNC: 16.5 SEC — HIGH (ref 9.95–12.87)
PTH-INTACT FLD-MCNC: 278 PG/ML — HIGH (ref 15–65)
RBC # BLD: 4.46 M/UL — LOW (ref 4.7–6.1)
RBC # FLD: 16.3 % — HIGH (ref 11.5–14.5)
SODIUM SERPL-SCNC: 137 MMOL/L — SIGNIFICANT CHANGE UP (ref 135–146)
SODIUM UR-SCNC: 46 MMOL/L — SIGNIFICANT CHANGE UP
SPECIMEN SOURCE: SIGNIFICANT CHANGE UP
WBC # BLD: 8.11 K/UL — SIGNIFICANT CHANGE UP (ref 4.8–10.8)
WBC # FLD AUTO: 8.11 K/UL — SIGNIFICANT CHANGE UP (ref 4.8–10.8)

## 2019-10-22 PROCEDURE — 99233 SBSQ HOSP IP/OBS HIGH 50: CPT

## 2019-10-22 PROCEDURE — 71045 X-RAY EXAM CHEST 1 VIEW: CPT | Mod: 26

## 2019-10-22 RX ADMIN — NAFCILLIN 200 GRAM(S): 10 INJECTION, POWDER, FOR SOLUTION INTRAVENOUS at 05:50

## 2019-10-22 RX ADMIN — Medication 1 PATCH: at 19:51

## 2019-10-22 RX ADMIN — SODIUM CHLORIDE 50 MILLILITER(S): 9 INJECTION INTRAMUSCULAR; INTRAVENOUS; SUBCUTANEOUS at 20:00

## 2019-10-22 RX ADMIN — Medication 81 MILLIGRAM(S): at 11:01

## 2019-10-22 RX ADMIN — Medication 0.5 MILLIGRAM(S): at 23:46

## 2019-10-22 RX ADMIN — BUDESONIDE AND FORMOTEROL FUMARATE DIHYDRATE 2 PUFF(S): 160; 4.5 AEROSOL RESPIRATORY (INHALATION) at 11:29

## 2019-10-22 RX ADMIN — Medication 7 UNIT(S): at 16:42

## 2019-10-22 RX ADMIN — FINASTERIDE 5 MILLIGRAM(S): 5 TABLET, FILM COATED ORAL at 11:01

## 2019-10-22 RX ADMIN — Medication 1 APPLICATION(S): at 05:55

## 2019-10-22 RX ADMIN — NAFCILLIN 200 GRAM(S): 10 INJECTION, POWDER, FOR SOLUTION INTRAVENOUS at 17:33

## 2019-10-22 RX ADMIN — NAFCILLIN 200 GRAM(S): 10 INJECTION, POWDER, FOR SOLUTION INTRAVENOUS at 13:25

## 2019-10-22 RX ADMIN — BUDESONIDE AND FORMOTEROL FUMARATE DIHYDRATE 2 PUFF(S): 160; 4.5 AEROSOL RESPIRATORY (INHALATION) at 20:00

## 2019-10-22 RX ADMIN — Medication 1 PATCH: at 23:47

## 2019-10-22 RX ADMIN — Medication 7 UNIT(S): at 12:03

## 2019-10-22 RX ADMIN — Medication 75 MILLIGRAM(S): at 05:50

## 2019-10-22 RX ADMIN — Medication 145 MILLIGRAM(S): at 11:01

## 2019-10-22 RX ADMIN — Medication 1 PATCH: at 11:01

## 2019-10-22 RX ADMIN — SIMVASTATIN 40 MILLIGRAM(S): 20 TABLET, FILM COATED ORAL at 21:53

## 2019-10-22 RX ADMIN — CLOPIDOGREL BISULFATE 75 MILLIGRAM(S): 75 TABLET, FILM COATED ORAL at 11:01

## 2019-10-22 RX ADMIN — TAMSULOSIN HYDROCHLORIDE 0.4 MILLIGRAM(S): 0.4 CAPSULE ORAL at 21:53

## 2019-10-22 RX ADMIN — NAFCILLIN 200 GRAM(S): 10 INJECTION, POWDER, FOR SOLUTION INTRAVENOUS at 21:53

## 2019-10-22 RX ADMIN — NAFCILLIN 200 GRAM(S): 10 INJECTION, POWDER, FOR SOLUTION INTRAVENOUS at 10:54

## 2019-10-22 RX ADMIN — ISOSORBIDE MONONITRATE 30 MILLIGRAM(S): 60 TABLET, EXTENDED RELEASE ORAL at 11:01

## 2019-10-22 RX ADMIN — Medication 1 APPLICATION(S): at 17:33

## 2019-10-22 RX ADMIN — Medication 1 SPRAY(S): at 05:51

## 2019-10-22 RX ADMIN — NAFCILLIN 200 GRAM(S): 10 INJECTION, POWDER, FOR SOLUTION INTRAVENOUS at 01:43

## 2019-10-22 RX ADMIN — Medication 1 SPRAY(S): at 16:32

## 2019-10-22 NOTE — PROGRESS NOTE ADULT - SUBJECTIVE AND OBJECTIVE BOX
TAIWO ZAVALA  79y, Male  Allergy: No Known Allergies      CHIEF COMPLAINT: CHF Exacerbation (21 Oct 2019 14:52)      INTERVAL EVENTS/HPI  - No acute events overnight  - T(F): , Max: 96.5 (10-21-19 @ 15:00)  - Denies any worsening symptoms  - Tolerating medication    ROS  10 system review- neg     SOCIAL HISTORY - not relevant     Substance Use (  ) never used  (  ) IVDU (  ) Other:  Tobacco Usage:  (   ) never smoked   (   ) former smoker   (   ) current smoker   Alcohol Usage: (   ) social  (   ) daily use (   ) denies  Sexual History:       FH noncontributory     VITALS:  T(F): 96.1, Max: 96.5 (10-21-19 @ 15:00)  HR: 62  BP: 111/60  RR: 20Vital Signs Last 24 Hrs  T(C): 35.6 (21 Oct 2019 23:01), Max: 35.8 (21 Oct 2019 15:00)  T(F): 96.1 (21 Oct 2019 23:01), Max: 96.5 (21 Oct 2019 15:00)  HR: 62 (21 Oct 2019 23:13) (62 - 75)  BP: 111/60 (21 Oct 2019 23:13) (105/57 - 125/50)  BP(mean): 79 (21 Oct 2019 23:13) (75 - 79)  RR: 20 (21 Oct 2019 23:01) (20 - 23)  SpO2: 100% (21 Oct 2019 23:13) (93% - 100%)    PHYSICAL EXAM:  Gen: NAD, resting in bed  HEENT: Normocephalic, atraumatic  Neck: supple, no lymphadenopathy  CV: s1 s 2 +   Lungs: decreased BS   Abdomen: Soft, BS present. obese  Ext: Warm, well perfused  Neuro: non focal, awake  Skin: no rash, no erythema      TESTS & MEASUREMENTS:                        11.9   10.04 )-----------( 224      ( 21 Oct 2019 19:08 )             36.7     10-21    136  |  91<L>  |  116  ----------------------------<  53<L>  4.0   |  19  |  8.1<HH>    Ca    7.8<L>      21 Oct 2019 19:08  Phos  7.8     10-21  Mg     2.5     10-21    TPro  6.0  /  Alb  2.9<L>  /  TBili  1.2  /  DBili  x   /  AST  24  /  ALT  14  /  AlkPhos  41  10-21    eGFR if Non African American: 6 mL/min/1.73M2 (10-21-19 @ 19:08)  eGFR if : 7 mL/min/1.73M2 (10-21-19 @ 19:08)    LIVER FUNCTIONS - ( 21 Oct 2019 19:08 )  Alb: 2.9 g/dL / Pro: 6.0 g/dL / ALK PHOS: 41 U/L / ALT: 14 U/L / AST: 24 U/L / GGT: x               Culture - Blood (collected 10-19-19 @ 06:36)  Source: .Blood None  Gram Stain (10-21-19 @ 02:16):    Growth in aerobic bottle: Gram Positive Cocci in Clusters    Growth in anaerobic bottle: Gram Positive Cocci in Clusters  Preliminary Report (10-21-19 @ 21:18):    Growth in aerobic and anaerobic bottles: Staphylococcus aureus    Susceptibility to follow.    Culture - Blood (collected 10-17-19 @ 05:15)  Source: .Blood Blood  Gram Stain (10-18-19 @ 22:50):    Growth in anaerobic bottle: Gram Positive Cocci in Clusters    Growth in aerobic bottle: Gram Positive Cocci in Clusters  Final Report (10-19-19 @ 19:35):    Growth in aerobic and anaerobic bottles: Staphylococcus aureus    See previous culture [id number]    Culture - Blood (collected 10-16-19 @ 15:11)  Source: .Blood None  Gram Stain (10-18-19 @ 00:08):    Growth in aerobic bottle: Gram Positive Cocci in Clusters    Growth in anaerobic bottle: Gram Positive Cocci in Clusters  Final Report (10-18-19 @ 19:59):    Growth in aerobic and anaerobic bottles: Staphylococcus aureus    See previous culture [id number]    Culture - Urine (collected 10-15-19 @ 11:30)  Source: .Urine Clean Catch (Midstream)  Final Report (10-16-19 @ 21:23):    <10,000 CFU/mL Normal Urogenital Felipa    Culture - Blood (collected 10-15-19 @ 07:00)  Source: .Blood Blood  Gram Stain (10-16-19 @ 11:23):    Growth in aerobic bottle: Gram Positive Cocci in Clusters    Growth in anaerobic bottle: Gram Positive Cocci in Clusters  Final Report (10-18-19 @ 09:55):    Growth in aerobic bottle: Staphylococcus aureus    "Due to technical problems, Proteus sp. will Not be reported as part of    the BCID panel until further notice"    ***Blood Panel PCR results on this specimen are available    approximately 3 hours after the Gram stain result.***    Gram stain, PCR, and/or culture results may not always    correspond due to difference in methodologies.    ************************************************************    This PCR assay was performed using [employer].    The following targets are tested for: Enterococcus,    vancomycin resistant enterococci, Listeria monocytogenes,    coagulase negative staphylococci, S. aureus,    methicillin resistant S. aureus, Streptococcus agalactiae    (Group B), S. pneumoniae, S. pyogenes (Group A),    Acinetobacter baumannii, Enterobacter cloacae, E. coli,    Klebsiella oxytoca, K. pneumoniae, Proteus sp.,    Serratia marcescens, Haemophilus influenzae,    Neisseria meningitidis, Pseudomonas aeruginosa, Candida    albicans, C. glabrata, C krusei, C parapsilosis,    C. tropicalis and the KPC resistance gene.  Organism: Blood Culture PCR  Staphylococcus aureus (10-18-19 @ 09:55)  Organism: Staphylococcus aureus (10-18-19 @ 09:55)      -  Ampicillin/Sulbactam: S <=8/4      -  Cefazolin: S <=4      -  Clindamycin: R 0.5 This isolate is presumed to be clindamycin resistant based on detection of inducible resistance. Clindamycin may still be effective in some patients.      -  Erythromycin: R >4      -  Gentamicin: S <=1 Should not be used as monotherapy      -  Oxacillin: S <=0.25      -  Penicillin: R 8      -  RIF- Rifampin: S <=1 Should not be used as monotherapy      -  Tetra/Doxy: S <=1      -  Trimethoprim/Sulfamethoxazole: S <=0.5/9.5      -  Vancomycin: S 1      Method Type: HAYLEE  Organism: Blood Culture PCR (10-18-19 @ 09:55)      -  Staphylococcus aureus: Detec Any isolate of Staphylococcus aureus from a blood culture is NOT considered a contaminant.      Method Type: PCR            INFECTIOUS DISEASES TESTING      RADIOLOGY & ADDITIONAL TESTS:  I have personally reviewed the last Chest xray  CXR  Xray Chest 1 View- PORTABLE-Urgent:   EXAM:  XR CHEST PORTABLE URGENT 1V            PROCEDURE DATE:  10/20/2019            INTERPRETATION:  Clinical History / Reason for exam: Pneumonia    Comparison : Chest radiograph October 19, 2019.    Technique/Positioning: Single AP view of the chest.    Findings:    Support devices: None.    Cardiac/mediastinum/hilum: Stable cardiomegaly post sternotomy and CABG.    Lung parenchyma/Pleura: Improving bibasilar opacities and effusions. No   pneumothorax.    Skeleton/soft tissues: Unchanged.    Impression:      Improving bibasilar opacities and effusions.                      ELLIOT LANDAU M.D., ATTENDING RADIOLOGIST  This document has been electronically signed. Oct 20 2019 11:44AM             (10-20-19 @ 10:19)      CT      CARDIOLOGY TESTING  12 Lead ECG:   Ventricular Rate 65 BPM    Atrial Rate 260 BPM    QRS Duration 116 ms    Q-T Interval 480 ms    QTC Calculation(Bezet) 499 ms    P Axis 266 degrees    R Axis -86 degrees    T Axis 225 degrees    Diagnosis Line Atrial flutter with 4:1 A-V conduction  Left axis deviation  Pulmonary disease pattern  Right bundle branch block  ST & T wave abnormality, consider inferolateral ischemia  Abnormal ECG    Confirmed by STEVE YAO MD ([id number]) on 10/21/2019 11:11:49 AM (10-21-19 @ 07:59)  12 Lead ECG:   Ventricular Rate 65 BPM    Atrial Rate 260 BPM    QRS Duration 122 ms    Q-T Interval 484 ms    QTC Calculation(Bezet) 503 ms    P Axis 255 degrees    R Axis -87 degrees    T Axis 228 degrees    Diagnosis Line Atrial flutter with 4:1 A-V conduction  Left axis deviation  Right bundle branch block  T wave abnormality, consider inferolateral ischemia  Abnormal ECG    Confirmed by STEVE YAO MD [id number]) on 10/21/2019 11:11:41 AM (10-21-19 @ 07:59)      MEDICATIONS  aspirin  chewable 81  buDESOnide 160 MICROgram(s)/formoterol 4.5 MICROgram(s) Inhaler 2  clopidogrel Tablet 75  dextrose 5%. 1000  dextrose 50% Injectable 12.5  dextrose 50% Injectable 25  dextrose 50% Injectable 25  fenofibrate Tablet 145  finasteride 5  fluticasone propionate 50 MICROgram(s)/spray Nasal Spray 1  heparin  Infusion 1500  influenza   Vaccine 0.5  insulin glargine Injectable (LANTUS) 21  insulin lispro Injectable (HumaLOG) 7  insulin lispro Injectable (HumaLOG) 7  insulin lispro Injectable (HumaLOG) 7  isosorbide   mononitrate ER Tablet (IMDUR) 30  metoprolol succinate ER 75  nafcillin  IVPB   nafcillin  IVPB 2  nitroglycerin    Patch 0.2 mG/Hr(s) 1  silver sulfADIAZINE 1% Cream 1  simvastatin 40  sodium chloride 0.9%. 1000  tamsulosin 0.4      ANTIBIOTICS:  nafcillin  IVPB      nafcillin  IVPB 2 Gram(s) IV Intermittent every 4 hours

## 2019-10-22 NOTE — PROGRESS NOTE ADULT - ASSESSMENT
1)  Severe oliguric BRITTANI superimposed on  Stage 3 CKD (creat was 1.5 mg% in August 2019).    PMH: DM, HFpEF,    BRITTANI - Suspect due to sepsis from PNA and bacteremia, drop in BP; therefore ATN likely vs postinfectious GN. Although the lack of proteinuria speaks against it. Currently no evidence of volume overload, encephalopathy;   May need HD tomorrow, but creat is trending down today    2)  MSSA bacteremia - likely due to PNA vs endocarditis, on NAfcillin    Recommendations:    1)  Agree w/ gentle volume expansion    2)  Send spot urine for Na+, creat, urea nitrogen to calc FE sodium and urea.  If low, challenge w/ IVF's.   - check C3C4, CPK, phos, iPTH  - stop Fenofibrate    3) Start Ca Acetate 667 mg tid with meal    4) Start NA bicarb 650 po bid    4)  No indication for HD today, but probably tomorrow if kidney function cont to deteriorate    D/W Primary team

## 2019-10-22 NOTE — PROGRESS NOTE ADULT - ASSESSMENT
Pulmonary edema improved  hx diastolic heart failure  fever multilobar  pneumonia from GPC MSSS  renal failure with oliguria      Suggest:  feel comfortable   abx per ID  repeat bld cx   need JAYCOB   cardio management CATH cancelled due to sepsis  follow renal?? need RRT today bun cr decrease   on gentle hydration as per renal   taper O2  NIPPV as needed  OOB  DVT prophylaxis  gentle hydration  tele monitoring   care as per cardiology and renal   recall as needed Clear bilaterally, pupils equal, round and reactive to light.

## 2019-10-22 NOTE — PROGRESS NOTE ADULT - ASSESSMENT
No complaints.  Check labs. Watch for CHF. If stable try ambulate . REpeat echo after about 7 days. Repeat bc. Will consider JAYCOB when pulmonary status better.

## 2019-10-22 NOTE — PROGRESS NOTE ADULT - SUBJECTIVE AND OBJECTIVE BOX
HPI:  Patient is a 78yo Male w/ PMH of HFpEF (EF of 55% with Grade II Diastolic dysfunction), COPD (home O2 3-3.5L as needed), DM, HTN, CAD s/p CABG and 1 stent, BPH, and recent admission for CHF (August 2019) presenting to [facility] with complaints of shortness of breath and chest pain. Patient reports he has been progressively worsening shortness of breath for the last 4-5 days prior to admission. He also reports associated chest pain on exertion.  Patient reports he has been compliant with all of his medications as well with a low salt diet. He denies any recent history of fevers, chills, cough. On day of admission, had severe dyspnea on exertion associated with a pressure like sensation in his chest that radiated to his left arm. He tried sublingual nitro for relief but it didn't help. Therefore, he came in for further evaluation. He was given Solumedrol 125mg by EMS. In the ED, patient was found to have evidence of bilateral pleural effusions on bedside sono done by ED staff. He received IV Lasix 40mg and was placed on BiPAP with major improvement in his symptoms. On my assessment, patient was speaking to me in full sentences on BiPAP and his chest pain has resolved. (10 Oct 2019 13:27)        INTERVAL HPI/OVERNIGHT EVENTS:  Decreased Urine Output overall overnight   Patient has no other complaints sitting up in his chair in the morning watching TV.     ICU Vital Signs Last 24 Hrs  T(C): 35.4 (22 Oct 2019 07:10), Max: 35.8 (21 Oct 2019 15:00)  T(F): 95.8 (22 Oct 2019 07:10), Max: 96.5 (21 Oct 2019 15:00)  HR: 82 (22 Oct 2019 07:10) (62 - 82)  BP: 110/55 (22 Oct 2019 07:10) (103/59 - 118/55)  BP(mean): 76 (22 Oct 2019 07:10) (76 - 79)  ABP: --  ABP(mean): --  RR: 22 (22 Oct 2019 07:10) (20 - 22)  SpO2: 95% (22 Oct 2019 07:10) (93% - 100%)    I&O's Summary    21 Oct 2019 07:01  -  22 Oct 2019 07:00  --------------------------------------------------------  IN: 1876.5 mL / OUT: 400 mL / NET: 1476.5 mL    22 Oct 2019 07:01  -  22 Oct 2019 11:18  --------------------------------------------------------  IN: 192 mL / OUT: 0 mL / NET: 192 mL          LABS:                        11.5   8.11  )-----------( 205      ( 22 Oct 2019 05:33 )             34.9     10-22    137  |  90<L>  |  112<HH>  ----------------------------<  86  3.8   |  20  |  7.6<HH>    Ca    7.5<L>      22 Oct 2019 05:33  Phos  7.8     10-21  Mg     2.5     10-21    TPro  6.0  /  Alb  2.7<L>  /  TBili  1.0  /  DBili  x   /  AST  20  /  ALT  14  /  AlkPhos  39  10-22    PT/INR - ( 22 Oct 2019 05:33 )   PT: 16.50 sec;   INR: 1.44 ratio         PTT - ( 22 Oct 2019 05:33 )  PTT:75.1 sec    CAPILLARY BLOOD GLUCOSE      POCT Blood Glucose.: 99 mg/dL (22 Oct 2019 07:41)  POCT Blood Glucose.: 117 mg/dL (21 Oct 2019 21:24)  POCT Blood Glucose.: 110 mg/dL (21 Oct 2019 16:09)  POCT Blood Glucose.: 182 mg/dL (21 Oct 2019 11:34)        RADIOLOGY & ADDITIONAL TESTS:    Consultant(s) Notes Reviewed:  [x ] YES  [ ] NO    MEDICATIONS  (STANDING):  aspirin  chewable 81 milliGRAM(s) Oral daily  buDESOnide 160 MICROgram(s)/formoterol 4.5 MICROgram(s) Inhaler 2 Puff(s) Inhalation two times a day  clopidogrel Tablet 75 milliGRAM(s) Oral daily  dextrose 5%. 1000 milliLiter(s) (50 mL/Hr) IV Continuous <Continuous>  dextrose 50% Injectable 12.5 Gram(s) IV Push once  dextrose 50% Injectable 25 Gram(s) IV Push once  dextrose 50% Injectable 25 Gram(s) IV Push once  fenofibrate Tablet 145 milliGRAM(s) Oral daily  finasteride 5 milliGRAM(s) Oral daily  fluticasone propionate 50 MICROgram(s)/spray Nasal Spray 1 Spray(s) Both Nostrils two times a day  heparin  Infusion 1500 Unit(s)/Hr (14 mL/Hr) IV Continuous <Continuous>  influenza   Vaccine 0.5 milliLiter(s) IntraMuscular once  insulin glargine Injectable (LANTUS) 21 Unit(s) SubCutaneous at bedtime  insulin lispro Injectable (HumaLOG) 7 Unit(s) SubCutaneous before breakfast  insulin lispro Injectable (HumaLOG) 7 Unit(s) SubCutaneous before lunch  insulin lispro Injectable (HumaLOG) 7 Unit(s) SubCutaneous before dinner  isosorbide   mononitrate ER Tablet (IMDUR) 30 milliGRAM(s) Oral daily  metoprolol succinate ER 75 milliGRAM(s) Oral daily  nafcillin  IVPB      nafcillin  IVPB 2 Gram(s) IV Intermittent every 4 hours  nitroglycerin    Patch 0.2 mG/Hr(s) 1 patch Transdermal daily  silver sulfADIAZINE 1% Cream 1 Application(s) Topical two times a day  simvastatin 40 milliGRAM(s) Oral at bedtime  sodium chloride 0.9%. 1000 milliLiter(s) (50 mL/Hr) IV Continuous <Continuous>  tamsulosin 0.4 milliGRAM(s) Oral at bedtime    MEDICATIONS  (PRN):  acetaminophen   Tablet .. 650 milliGRAM(s) Oral every 6 hours PRN Temp greater or equal to 38C (100.4F), Mild Pain (1 - 3)  acetaminophen  Suppository .. 650 milliGRAM(s) Rectal every 6 hours PRN Temp greater or equal to 38C (100.4F), Mild Pain (1 - 3)  ALPRAZolam 0.5 milliGRAM(s) Oral at bedtime PRN anxiety  dextrose 40% Gel 15 Gram(s) Oral once PRN Blood Glucose LESS THAN 70 milliGRAM(s)/deciliter  glucagon  Injectable 1 milliGRAM(s) IntraMuscular once PRN Glucose LESS THAN 70 milligrams/deciliter      PHYSICAL EXAM:  GENERAL: not in any acute distress sitting up in a chair watching TV.   HEAD:  Atraumatic, Normocephalic  EYES: EOMI, PERRLA, conjunctiva and sclera clear  ENT: No tonsillar erythema, exudates, or enlargement; Moist mucous membranes, Good dentition, No lesions  NECK: Supple, No JVD, Normal thyroid, no enlarged nodes  NERVOUS SYSTEM:  Alert & Oriented X3.   CHEST/LUNG: good air entry bilaterally, no wheeze appreciated.   HEART: Systolic ejection murmur appreciated. Patient in Afib rate controlled   ABDOMEN: Soft, Nontender, Nondistended; Bowel sounds present  EXTREMITIES:  Left leg healing venous ulcer.   LYMPH: No lymphadenopathy noted  SKIN: No rashes or lesions    Care Discussed with Consultants/Other Providers [ x] YES  [ ] NO HPI:  Patient is a 78yo Male w/ PMH of HFpEF (EF of 55% with Grade II Diastolic dysfunction), COPD (home O2 3-3.5L as needed), DM, HTN, CAD s/p CABG and 1 stent, BPH, and recent admission for CHF (August 2019) presenting to [facility] with complaints of shortness of breath and chest pain. Patient reports he has been progressively worsening shortness of breath for the last 4-5 days prior to admission. He also reports associated chest pain on exertion.  Patient reports he has been compliant with all of his medications as well with a low salt diet. He denies any recent history of fevers, chills, cough. On day of admission, had severe dyspnea on exertion associated with a pressure like sensation in his chest that radiated to his left arm. He tried sublingual nitro for relief but it didn't help. Therefore, he came in for further evaluation. He was given Solumedrol 125mg by EMS. In the ED, patient was found to have evidence of bilateral pleural effusions on bedside sono done by ED staff. He received IV Lasix 40mg and was placed on BiPAP with major improvement in his symptoms. On my assessment, patient was speaking to me in full sentences on BiPAP and his chest pain has resolved. (10 Oct 2019 13:27)        INTERVAL HPI/OVERNIGHT EVENTS:  Seen and evaluated at bedside with son  Decreased Urine Output overall overnight   Patient has no new complaint  Denied chest pain, sob, palpitations and is afebrile    ICU Vital Signs Last 24 Hrs  T(C): 35.4 (22 Oct 2019 07:10), Max: 35.8 (21 Oct 2019 15:00)  T(F): 95.8 (22 Oct 2019 07:10), Max: 96.5 (21 Oct 2019 15:00)  HR: 82 (22 Oct 2019 07:10) (62 - 82)  BP: 110/55 (22 Oct 2019 07:10) (103/59 - 118/55)  BP(mean): 76 (22 Oct 2019 07:10) (76 - 79)  ABP: --  ABP(mean): --  RR: 22 (22 Oct 2019 07:10) (20 - 22)  SpO2: 95% (22 Oct 2019 07:10) (93% - 100%)    I&O's Summary    21 Oct 2019 07:01  -  22 Oct 2019 07:00  --------------------------------------------------------  IN: 1876.5 mL / OUT: 400 mL / NET: 1476.5 mL    22 Oct 2019 07:01  -  22 Oct 2019 11:18  --------------------------------------------------------  IN: 192 mL / OUT: 0 mL / NET: 192 mL          LABS:                        11.5   8.11  )-----------( 205      ( 22 Oct 2019 05:33 )             34.9     10-22    137  |  90<L>  |  112<HH>  ----------------------------<  86  3.8   |  20  |  7.6<HH>    Ca    7.5<L>      22 Oct 2019 05:33  Phos  7.8     10-21  Mg     2.5     10-21    TPro  6.0  /  Alb  2.7<L>  /  TBili  1.0  /  DBili  x   /  AST  20  /  ALT  14  /  AlkPhos  39  10-22    PT/INR - ( 22 Oct 2019 05:33 )   PT: 16.50 sec;   INR: 1.44 ratio         PTT - ( 22 Oct 2019 05:33 )  PTT:75.1 sec    CAPILLARY BLOOD GLUCOSE      POCT Blood Glucose.: 99 mg/dL (22 Oct 2019 07:41)  POCT Blood Glucose.: 117 mg/dL (21 Oct 2019 21:24)  POCT Blood Glucose.: 110 mg/dL (21 Oct 2019 16:09)  POCT Blood Glucose.: 182 mg/dL (21 Oct 2019 11:34)        RADIOLOGY & ADDITIONAL TESTS:    Consultant(s) Notes Reviewed:  [x ] YES  [ ] NO    MEDICATIONS  (STANDING):  aspirin  chewable 81 milliGRAM(s) Oral daily  buDESOnide 160 MICROgram(s)/formoterol 4.5 MICROgram(s) Inhaler 2 Puff(s) Inhalation two times a day  clopidogrel Tablet 75 milliGRAM(s) Oral daily  dextrose 5%. 1000 milliLiter(s) (50 mL/Hr) IV Continuous <Continuous>  dextrose 50% Injectable 12.5 Gram(s) IV Push once  dextrose 50% Injectable 25 Gram(s) IV Push once  dextrose 50% Injectable 25 Gram(s) IV Push once  fenofibrate Tablet 145 milliGRAM(s) Oral daily  finasteride 5 milliGRAM(s) Oral daily  fluticasone propionate 50 MICROgram(s)/spray Nasal Spray 1 Spray(s) Both Nostrils two times a day  heparin  Infusion 1500 Unit(s)/Hr (14 mL/Hr) IV Continuous <Continuous>  influenza   Vaccine 0.5 milliLiter(s) IntraMuscular once  insulin glargine Injectable (LANTUS) 21 Unit(s) SubCutaneous at bedtime  insulin lispro Injectable (HumaLOG) 7 Unit(s) SubCutaneous before breakfast  insulin lispro Injectable (HumaLOG) 7 Unit(s) SubCutaneous before lunch  insulin lispro Injectable (HumaLOG) 7 Unit(s) SubCutaneous before dinner  isosorbide   mononitrate ER Tablet (IMDUR) 30 milliGRAM(s) Oral daily  metoprolol succinate ER 75 milliGRAM(s) Oral daily  nafcillin  IVPB      nafcillin  IVPB 2 Gram(s) IV Intermittent every 4 hours  nitroglycerin    Patch 0.2 mG/Hr(s) 1 patch Transdermal daily  silver sulfADIAZINE 1% Cream 1 Application(s) Topical two times a day  simvastatin 40 milliGRAM(s) Oral at bedtime  sodium chloride 0.9%. 1000 milliLiter(s) (50 mL/Hr) IV Continuous <Continuous>  tamsulosin 0.4 milliGRAM(s) Oral at bedtime    MEDICATIONS  (PRN):  acetaminophen   Tablet .. 650 milliGRAM(s) Oral every 6 hours PRN Temp greater or equal to 38C (100.4F), Mild Pain (1 - 3)  acetaminophen  Suppository .. 650 milliGRAM(s) Rectal every 6 hours PRN Temp greater or equal to 38C (100.4F), Mild Pain (1 - 3)  ALPRAZolam 0.5 milliGRAM(s) Oral at bedtime PRN anxiety  dextrose 40% Gel 15 Gram(s) Oral once PRN Blood Glucose LESS THAN 70 milliGRAM(s)/deciliter  glucagon  Injectable 1 milliGRAM(s) IntraMuscular once PRN Glucose LESS THAN 70 milligrams/deciliter      PHYSICAL EXAM:  GENERAL: not in any acute distress sitting up in a chair watching TV.   HEAD:  Atraumatic, Normocephalic  EYES: EOMI, PERRLA, conjunctiva and sclera clear  ENT: No tonsillar erythema, exudates, or enlargement; Moist mucous membranes, Good dentition, No lesions  NECK: Supple, No JVD, Normal thyroid, no enlarged nodes  NERVOUS SYSTEM:  Alert & Oriented X3.   CHEST/LUNG: good air entry bilaterally, no wheeze appreciated.   HEART: Systolic ejection murmur appreciated. Patient in Afib rate controlled   ABDOMEN: Soft, Nontender, Nondistended; Bowel sounds present  EXTREMITIES:  Left leg healing venous ulcer.   LYMPH: No lymphadenopathy noted  SKIN: No rashes or lesions    Care Discussed with Consultants/Other Providers [ x] YES  [ ] NO

## 2019-10-22 NOTE — PROGRESS NOTE ADULT - ASSESSMENT
Patient is a 78yo Male w/ PMH of HFpEF (EF of 55% with Grade II Diastolic dysfunction), COPD (home O2 3-3.5L as needed), DM, HTN, CAD s/p CABG and 1 stent, BPH, and recent admission for CHF (August 2019) presenting to [facility] with complaints of shortness of breath and chest pain. Patient reports he has been progressively worsening shortness of breath for the last 4-5 days prior to admission.    1) Acute on chronic diastolic CHF Exacerbation   Lasix held for now as patient appears to be maximally diuresed   Patient on Heparin Drip adjusted today. follow with PTT   Continue with Metoprolol ER 75 along with Aspirin 81. c/w plavix 75mg daily after loading does  Monitor I and O   Follow with Repeat PTT    2. HTN  stable at this time Hold Meds and monitor BP    3. BRITTANI worsening  Diuretics held for 48 hours   Continue to hold  Follow with Creatinine in AM  Renal on board for possible dialysis tomorrow  Further renal studies ordered and pending.   Sodium Bicarbonate 650 Twice daily   Calcium acetate 667 Three times daily     4. DM2.   Start Insulin Follow FBG    5. COPD on as needed home O2 not in exacerbation  -Monitor and c/w home med    6. BPH  C/w med    7. CAD s/p CABG  - continue cardiac med and plan for cath once afebrile    8. Febrile   Blood Culture prelim Gram positive Cocci in Clusters   Nafcillin as per ID recommendations   Follow with daily blood cultures until negative   Consider antibiotic de-escalation once patient improved and Sensitivities are back.  Patient needs JAYCOB, Cardiology feels risk is greater than benefit at this moment in time.        DVT Prophylaxis   Heparin Drip     Disposition   Continue with CCU Monitoring Patient is a 80yo Male w/ PMH of HFpEF (EF of 55% with Grade II Diastolic dysfunction), COPD (home O2 3-3.5L as needed), DM, HTN, CAD s/p CABG and 1 stent, BPH, and recent admission for CHF (August 2019) presenting to [facility] with complaints of shortness of breath and chest pain. Patient reports he has been progressively worsening shortness of breath for the last 4-5 days prior to admission.    1) Acute on chronic diastolic CHF Exacerbation   Lasix held for now as patient appears to be maximally diuresed   Patient on Heparin Drip adjusted today. follow with PTT   Continue with Metoprolol ER 75 along with Aspirin 81. c/w plavix 75mg daily after loading does  Monitor I and O   Follow with Repeat PTT    2. HTN  stable at this time Hold Meds and monitor BP    3. BRITTANI worsening  Diuretics held for 48 hours   Continue to hold  Follow with Creatinine in AM  Renal on board for possible dialysis tomorrow  Further renal studies ordered and pending.   Sodium Bicarbonate 650 Twice daily   Calcium acetate 667 Three times daily     4. DM2.   Start Insulin Follow FBG    5. COPD on as needed home O2 not in exacerbation  -Monitor and c/w home med    6. BPH  C/w med    7. CAD s/p CABG  - continue cardiac med and plan for cath once afebrile    8. Bacteremia - gram positive cocci in clusters  Nafcillin as per ID recommendations   Follow with daily blood cultures until negative   Consider antibiotic de-escalation once patient improved and Sensitivities are back.  Patient needs JAYCOB, Cardiology feels risk is greater than benefit at this moment in time.        DVT Prophylaxis   Heparin Drip     Disposition   Continue with CCU Monitoring

## 2019-10-22 NOTE — PROGRESS NOTE ADULT - SUBJECTIVE AND OBJECTIVE BOX
Nephrology progress note    Patient is seen and examined, events over the last 24 h noted .  No SOB, no nausea, vomiting  Allergies:  No Known Allergies    Hospital Medications:   MEDICATIONS  (STANDING):  aspirin  chewable 81 milliGRAM(s) Oral daily  buDESOnide 160 MICROgram(s)/formoterol 4.5 MICROgram(s) Inhaler 2 Puff(s) Inhalation two times a day  clopidogrel Tablet 75 milliGRAM(s) Oral daily  dextrose 5%. 1000 milliLiter(s) (50 mL/Hr) IV Continuous <Continuous>  dextrose 50% Injectable 12.5 Gram(s) IV Push once  dextrose 50% Injectable 25 Gram(s) IV Push once  dextrose 50% Injectable 25 Gram(s) IV Push once  fenofibrate Tablet 145 milliGRAM(s) Oral daily  finasteride 5 milliGRAM(s) Oral daily  fluticasone propionate 50 MICROgram(s)/spray Nasal Spray 1 Spray(s) Both Nostrils two times a day  heparin  Infusion 1500 Unit(s)/Hr (14 mL/Hr) IV Continuous <Continuous>  influenza   Vaccine 0.5 milliLiter(s) IntraMuscular once  insulin glargine Injectable (LANTUS) 21 Unit(s) SubCutaneous at bedtime  insulin lispro Injectable (HumaLOG) 7 Unit(s) SubCutaneous before breakfast  insulin lispro Injectable (HumaLOG) 7 Unit(s) SubCutaneous before lunch  insulin lispro Injectable (HumaLOG) 7 Unit(s) SubCutaneous before dinner  isosorbide   mononitrate ER Tablet (IMDUR) 30 milliGRAM(s) Oral daily  metoprolol succinate ER 75 milliGRAM(s) Oral daily  nafcillin  IVPB      nafcillin  IVPB 2 Gram(s) IV Intermittent every 4 hours  nitroglycerin    Patch 0.2 mG/Hr(s) 1 patch Transdermal daily  silver sulfADIAZINE 1% Cream 1 Application(s) Topical two times a day  simvastatin 40 milliGRAM(s) Oral at bedtime  sodium chloride 0.9%. 1000 milliLiter(s) (50 mL/Hr) IV Continuous <Continuous>  tamsulosin 0.4 milliGRAM(s) Oral at bedtime        VITALS:  T(F): 95.8 (10-22-19 @ 07:10), Max: 96.5 (10-21-19 @ 15:00)  HR: 82 (10-22-19 @ 07:10)  BP: 110/55 (10-22-19 @ 07:10)  RR: 22 (10-22-19 @ 07:10)  SpO2: 95% (10-22-19 @ 07:10)  Wt(kg): --    10-20 @ 07:01  -  10-21 @ 07:00  --------------------------------------------------------  IN: 2292 mL / OUT: 370 mL / NET: 1922 mL    10-21 @ 07:01  -  10-22 @ 07:00  --------------------------------------------------------  IN: 1876.5 mL / OUT: 400 mL / NET: 1476.5 mL    10-22 @ 07:01  -  10-22 @ 10:33  --------------------------------------------------------  IN: 192 mL / OUT: 0 mL / NET: 192 mL          PHYSICAL EXAM:  Constitutional: NAD  HEENT: anicteric sclera  Respiratory: CTAB,  Cardiovascular: S1, S2, RRR 3/6 ESM  Gastrointestinal: BS+, soft, NT/ND  Extremities: 1+peripheral edema  Neurological: A/O x 3, no focal deficits  :  julian with hematuria  Skin: No rashes  Vascular Access:    LABS:  10-22    137  |  90<L>  |  112<HH>  ----------------------------<  86  3.8   |  20  |  7.6<HH>  Creatinine Trend: 7.6<--, 8.1<--, 6.9<--, 6.4<--, 6.2<--, 4.8<--    Ca    7.5<L>      22 Oct 2019 05:33  Phos  7.8     10-  Mg     2.5     10-    TPro  6.0  /  Alb  2.7<L>  /  TBili  1.0  /  DBili      /  AST  20  /  ALT  14  /  AlkPhos  39  10-                          11.5   8.11  )-----------( 205      ( 22 Oct 2019 05:33 )             34.9       Urine Studies:  Urinalysis Basic - ( 15 Oct 2019 11:30 )    Color: Yellow / Appearance: Clear / S.015 / pH:   Gluc:  / Ketone: Negative  / Bili: Negative / Urobili: 0.2 mg/dL   Blood:  / Protein: Negative mg/dL / Nitrite: Negative   Leuk Esterase: Negative / RBC:  / WBC    Sq Epi:  / Non Sq Epi:  / Bacteria:         RADIOLOGY & ADDITIONAL STUDIES:

## 2019-10-22 NOTE — PROGRESS NOTE ADULT - SUBJECTIVE AND OBJECTIVE BOX
Patient is a 79y old  Male who presents with a chief complaint of CHF Exacerbation (21 Oct 2019 14:52)      Over Night Events:  Patient seen and examined feel better not on distress       ROS:  See HPI    PHYSICAL EXAM    ICU Vital Signs Last 24 Hrs  T(C): 35.4 (22 Oct 2019 07:10), Max: 35.8 (21 Oct 2019 15:00)  T(F): 95.8 (22 Oct 2019 07:10), Max: 96.5 (21 Oct 2019 15:00)  HR: 82 (22 Oct 2019 07:10) (62 - 82)  BP: 110/55 (22 Oct 2019 07:10) (103/59 - 125/50)  BP(mean): 76 (22 Oct 2019 07:10) (76 - 79)  ABP: --  ABP(mean): --  RR: 22 (22 Oct 2019 07:10) (20 - 22)  SpO2: 95% (22 Oct 2019 07:10) (93% - 100%)      General: AOx3  HEENT:      darell          Lymph Nodes: NO cervical LN   Lungs: Bilateral BS  Cardiovascular: Regular   Abdomen: Soft, Positive BS  Extremities: No clubbing   Skin: warm   Neurological: no focal deficit   Musculoskeletal: move all ext     I&O's Detail    21 Oct 2019 07:01  -  22 Oct 2019 07:00  --------------------------------------------------------  IN:    heparin Infusion: 77.5 mL    heparin Infusion: 249 mL    IV PiggyBack: 400 mL    sodium chloride 0.9%.: 1150 mL  Total IN: 1876.5 mL    OUT:    Indwelling Catheter - Urethral: 400 mL  Total OUT: 400 mL    Total NET: 1476.5 mL          LABS:                          11.5   8.11  )-----------( 205      ( 22 Oct 2019 05:33 )             34.9         22 Oct 2019 05:33    137    |  90     |  112    ----------------------------<  86     3.8     |  20     |  7.6      Ca    7.5        22 Oct 2019 05:33  Phos  7.8       21 Oct 2019 19:08  Mg     2.5       21 Oct 2019 05:56    TPro  6.0    /  Alb  2.7    /  TBili  1.0    /  DBili  x      /  AST  20     /  ALT  14     /  AlkPhos  39     22 Oct 2019 05:33  Amylase x     lipase x                                                 PT/INR - ( 22 Oct 2019 05:33 )   PT: 16.50 sec;   INR: 1.44 ratio         PTT - ( 22 Oct 2019 05:33 )  PTT:75.1 sec                                             CARDIAC MARKERS ( 21 Oct 2019 19:08 )  x     / x     / 73 U/L / x     / x      CARDIAC MARKERS ( 21 Oct 2019 05:56 )  x     / x     / x     / x     / 1.2 ng/mL                                                                                                                                             MEDICATIONS  (STANDING):  aspirin  chewable 81 milliGRAM(s) Oral daily  buDESOnide 160 MICROgram(s)/formoterol 4.5 MICROgram(s) Inhaler 2 Puff(s) Inhalation two times a day  clopidogrel Tablet 75 milliGRAM(s) Oral daily  dextrose 5%. 1000 milliLiter(s) (50 mL/Hr) IV Continuous <Continuous>  dextrose 50% Injectable 12.5 Gram(s) IV Push once  dextrose 50% Injectable 25 Gram(s) IV Push once  dextrose 50% Injectable 25 Gram(s) IV Push once  fenofibrate Tablet 145 milliGRAM(s) Oral daily  finasteride 5 milliGRAM(s) Oral daily  fluticasone propionate 50 MICROgram(s)/spray Nasal Spray 1 Spray(s) Both Nostrils two times a day  heparin  Infusion 1500 Unit(s)/Hr (14 mL/Hr) IV Continuous <Continuous>  influenza   Vaccine 0.5 milliLiter(s) IntraMuscular once  insulin glargine Injectable (LANTUS) 21 Unit(s) SubCutaneous at bedtime  insulin lispro Injectable (HumaLOG) 7 Unit(s) SubCutaneous before breakfast  insulin lispro Injectable (HumaLOG) 7 Unit(s) SubCutaneous before lunch  insulin lispro Injectable (HumaLOG) 7 Unit(s) SubCutaneous before dinner  isosorbide   mononitrate ER Tablet (IMDUR) 30 milliGRAM(s) Oral daily  metoprolol succinate ER 75 milliGRAM(s) Oral daily  nafcillin  IVPB      nafcillin  IVPB 2 Gram(s) IV Intermittent every 4 hours  nitroglycerin    Patch 0.2 mG/Hr(s) 1 patch Transdermal daily  silver sulfADIAZINE 1% Cream 1 Application(s) Topical two times a day  simvastatin 40 milliGRAM(s) Oral at bedtime  sodium chloride 0.9%. 1000 milliLiter(s) (50 mL/Hr) IV Continuous <Continuous>  tamsulosin 0.4 milliGRAM(s) Oral at bedtime    MEDICATIONS  (PRN):  acetaminophen   Tablet .. 650 milliGRAM(s) Oral every 6 hours PRN Temp greater or equal to 38C (100.4F), Mild Pain (1 - 3)  acetaminophen  Suppository .. 650 milliGRAM(s) Rectal every 6 hours PRN Temp greater or equal to 38C (100.4F), Mild Pain (1 - 3)  ALPRAZolam 0.5 milliGRAM(s) Oral at bedtime PRN anxiety  dextrose 40% Gel 15 Gram(s) Oral once PRN Blood Glucose LESS THAN 70 milliGRAM(s)/deciliter  glucagon  Injectable 1 milliGRAM(s) IntraMuscular once PRN Glucose LESS THAN 70 milligrams/deciliter          Xrays:  TLC:  OG:  ET tube:                                                                                    decrease b/l effusion    ECHO:  CAM ICU:

## 2019-10-22 NOTE — PROGRESS NOTE ADULT - SUBJECTIVE AND OBJECTIVE BOX
Patient is a 79y old  Male who presents with a chief complaint of CHF Exacerbation (21 Oct 2019 14:52)      T(F): 96.1 (10-21-19 @ 23:01), Max: 96.5 (10-21-19 @ 15:00)  HR: 67 (10-22-19 @ 05:55)  BP: 103/59 (10-22-19 @ 05:55)  RR: 20 (10-21-19 @ 23:01)  SpO2: 100% (10-21-19 @ 23:13) (93% - 100%)    PHYSICAL EXAM:  GENERAL: NAD, well-groomed, well-developed  HEAD:  Atraumatic, Normocephalic  EYES: EOMI, PERRLA, conjunctiva and sclera clear  ENMT: No tonsillar erythema, exudates, or enlargement; Moist mucous membranes, Good dentition, No lesions  NECK: Supple, No JVD, Normal thyroid  NERVOUS SYSTEM:  Alert & Oriented X3,  Motor Strength 5/5 B/L upper and lower extremities  CHEST/LUNG: Clear to percussion bilaterally; No rales, rhonchi, wheezing, or rubs  HEART: Regular rate and rhythm; No murmurs, rubs, or gallops  ABDOMEN: Soft, Nontender, Nondistended; Bowel sounds present  EXTREMITIES:   No clubbing, cyanosis, or edema  LYMPH: No lymphadenopathy noted  SKIN: No rashes or lesions    labs  10-21    136  |  91<L>  |  116  ----------------------------<  53<L>  4.0   |  19  |  8.1<HH>    Ca    7.8<L>      21 Oct 2019 19:08  Phos  7.8     10-21  Mg     2.5     10-21    TPro  6.0  /  Alb  2.9<L>  /  TBili  1.2  /  DBili  x   /  AST  24  /  ALT  14  /  AlkPhos  41  10-21                          11.5   8.11  )-----------( 205      ( 22 Oct 2019 05:33 )             34.9       PT/INR - ( 22 Oct 2019 05:33 )   PT: 16.50 sec;   INR: 1.44 ratio         PTT - ( 21 Oct 2019 19:08 )  PTT:89.0 sec    Creatine Kinase, Serum: 73 U/L (10-21-19 @ 19:08)      acetaminophen   Tablet .. 650 milliGRAM(s) Oral every 6 hours PRN  acetaminophen  Suppository .. 650 milliGRAM(s) Rectal every 6 hours PRN  ALPRAZolam 0.5 milliGRAM(s) Oral at bedtime PRN  aspirin  chewable 81 milliGRAM(s) Oral daily  buDESOnide 160 MICROgram(s)/formoterol 4.5 MICROgram(s) Inhaler 2 Puff(s) Inhalation two times a day  clopidogrel Tablet 75 milliGRAM(s) Oral daily  dextrose 40% Gel 15 Gram(s) Oral once PRN  dextrose 5%. 1000 milliLiter(s) IV Continuous <Continuous>  dextrose 50% Injectable 12.5 Gram(s) IV Push once  dextrose 50% Injectable 25 Gram(s) IV Push once  dextrose 50% Injectable 25 Gram(s) IV Push once  fenofibrate Tablet 145 milliGRAM(s) Oral daily  finasteride 5 milliGRAM(s) Oral daily  fluticasone propionate 50 MICROgram(s)/spray Nasal Spray 1 Spray(s) Both Nostrils two times a day  glucagon  Injectable 1 milliGRAM(s) IntraMuscular once PRN  heparin  Infusion 1500 Unit(s)/Hr IV Continuous <Continuous>  influenza   Vaccine 0.5 milliLiter(s) IntraMuscular once  insulin glargine Injectable (LANTUS) 21 Unit(s) SubCutaneous at bedtime  insulin lispro Injectable (HumaLOG) 7 Unit(s) SubCutaneous before breakfast  insulin lispro Injectable (HumaLOG) 7 Unit(s) SubCutaneous before lunch  insulin lispro Injectable (HumaLOG) 7 Unit(s) SubCutaneous before dinner  isosorbide   mononitrate ER Tablet (IMDUR) 30 milliGRAM(s) Oral daily  metoprolol succinate ER 75 milliGRAM(s) Oral daily  nafcillin  IVPB      nafcillin  IVPB 2 Gram(s) IV Intermittent every 4 hours  nitroglycerin    Patch 0.2 mG/Hr(s) 1 patch Transdermal daily  silver sulfADIAZINE 1% Cream 1 Application(s) Topical two times a day  simvastatin 40 milliGRAM(s) Oral at bedtime  sodium chloride 0.9%. 1000 milliLiter(s) IV Continuous <Continuous>  tamsulosin 0.4 milliGRAM(s) Oral at bedtime

## 2019-10-23 LAB
ALBUMIN SERPL ELPH-MCNC: 3 G/DL — LOW (ref 3.5–5.2)
ALBUMIN SERPL ELPH-MCNC: 3 G/DL — LOW (ref 3.5–5.2)
ALP SERPL-CCNC: 43 U/L — SIGNIFICANT CHANGE UP (ref 30–115)
ALP SERPL-CCNC: 44 U/L — SIGNIFICANT CHANGE UP (ref 30–115)
ALT FLD-CCNC: 14 U/L — SIGNIFICANT CHANGE UP (ref 0–41)
ALT FLD-CCNC: 15 U/L — SIGNIFICANT CHANGE UP (ref 0–41)
ANION GAP SERPL CALC-SCNC: 25 MMOL/L — HIGH (ref 7–14)
ANION GAP SERPL CALC-SCNC: 29 MMOL/L — HIGH (ref 7–14)
APTT BLD: 72.6 SEC — CRITICAL HIGH (ref 27–39.2)
APTT BLD: 95.4 SEC — CRITICAL HIGH (ref 27–39.2)
APTT BLD: >200 SEC — CRITICAL HIGH (ref 27–39.2)
AST SERPL-CCNC: 19 U/L — SIGNIFICANT CHANGE UP (ref 0–41)
AST SERPL-CCNC: 20 U/L — SIGNIFICANT CHANGE UP (ref 0–41)
BASOPHILS # BLD AUTO: 0.05 K/UL — SIGNIFICANT CHANGE UP (ref 0–0.2)
BASOPHILS NFR BLD AUTO: 0.6 % — SIGNIFICANT CHANGE UP (ref 0–1)
BILIRUB SERPL-MCNC: 1 MG/DL — SIGNIFICANT CHANGE UP (ref 0.2–1.2)
BILIRUB SERPL-MCNC: 1.1 MG/DL — SIGNIFICANT CHANGE UP (ref 0.2–1.2)
BUN SERPL-MCNC: 127 MG/DL — SIGNIFICANT CHANGE UP (ref 10–20)
BUN SERPL-MCNC: 132 MG/DL — CRITICAL HIGH (ref 10–20)
CALCIUM SERPL-MCNC: 7.7 MG/DL — LOW (ref 8.5–10.1)
CALCIUM SERPL-MCNC: 7.7 MG/DL — LOW (ref 8.5–10.1)
CHLORIDE SERPL-SCNC: 89 MMOL/L — LOW (ref 98–110)
CHLORIDE SERPL-SCNC: 90 MMOL/L — LOW (ref 98–110)
CO2 SERPL-SCNC: 17 MMOL/L — SIGNIFICANT CHANGE UP (ref 17–32)
CO2 SERPL-SCNC: 22 MMOL/L — SIGNIFICANT CHANGE UP (ref 17–32)
CREAT SERPL-MCNC: 8.1 MG/DL — CRITICAL HIGH (ref 0.7–1.5)
CREAT SERPL-MCNC: 8.3 MG/DL — CRITICAL HIGH (ref 0.7–1.5)
EOSINOPHIL # BLD AUTO: 0.29 K/UL — SIGNIFICANT CHANGE UP (ref 0–0.7)
EOSINOPHIL NFR BLD AUTO: 3.3 % — SIGNIFICANT CHANGE UP (ref 0–8)
GLUCOSE BLDC GLUCOMTR-MCNC: 126 MG/DL — HIGH (ref 70–99)
GLUCOSE BLDC GLUCOMTR-MCNC: 165 MG/DL — HIGH (ref 70–99)
GLUCOSE BLDC GLUCOMTR-MCNC: 213 MG/DL — HIGH (ref 70–99)
GLUCOSE BLDC GLUCOMTR-MCNC: 98 MG/DL — SIGNIFICANT CHANGE UP (ref 70–99)
GLUCOSE SERPL-MCNC: 112 MG/DL — HIGH (ref 70–99)
GLUCOSE SERPL-MCNC: 208 MG/DL — HIGH (ref 70–99)
HCT VFR BLD CALC: 36.4 % — LOW (ref 42–52)
HGB BLD-MCNC: 11.9 G/DL — LOW (ref 14–18)
IMM GRANULOCYTES NFR BLD AUTO: 1.1 % — HIGH (ref 0.1–0.3)
LYMPHOCYTES # BLD AUTO: 1.46 K/UL — SIGNIFICANT CHANGE UP (ref 1.2–3.4)
LYMPHOCYTES # BLD AUTO: 16.6 % — LOW (ref 20.5–51.1)
MCHC RBC-ENTMCNC: 25.5 PG — LOW (ref 27–31)
MCHC RBC-ENTMCNC: 32.7 G/DL — SIGNIFICANT CHANGE UP (ref 32–37)
MCV RBC AUTO: 77.9 FL — LOW (ref 80–94)
MONOCYTES # BLD AUTO: 1.01 K/UL — HIGH (ref 0.1–0.6)
MONOCYTES NFR BLD AUTO: 11.5 % — HIGH (ref 1.7–9.3)
NEUTROPHILS # BLD AUTO: 5.91 K/UL — SIGNIFICANT CHANGE UP (ref 1.4–6.5)
NEUTROPHILS NFR BLD AUTO: 66.9 % — SIGNIFICANT CHANGE UP (ref 42.2–75.2)
NRBC # BLD: 0 /100 WBCS — SIGNIFICANT CHANGE UP (ref 0–0)
PLATELET # BLD AUTO: 245 K/UL — SIGNIFICANT CHANGE UP (ref 130–400)
POTASSIUM SERPL-MCNC: 4 MMOL/L — SIGNIFICANT CHANGE UP (ref 3.5–5)
POTASSIUM SERPL-MCNC: 4.2 MMOL/L — SIGNIFICANT CHANGE UP (ref 3.5–5)
POTASSIUM SERPL-SCNC: 4 MMOL/L — SIGNIFICANT CHANGE UP (ref 3.5–5)
POTASSIUM SERPL-SCNC: 4.2 MMOL/L — SIGNIFICANT CHANGE UP (ref 3.5–5)
PROT SERPL-MCNC: 6.4 G/DL — SIGNIFICANT CHANGE UP (ref 6–8)
PROT SERPL-MCNC: 6.4 G/DL — SIGNIFICANT CHANGE UP (ref 6–8)
RBC # BLD: 4.67 M/UL — LOW (ref 4.7–6.1)
RBC # FLD: 16.7 % — HIGH (ref 11.5–14.5)
SODIUM SERPL-SCNC: 136 MMOL/L — SIGNIFICANT CHANGE UP (ref 135–146)
SODIUM SERPL-SCNC: 136 MMOL/L — SIGNIFICANT CHANGE UP (ref 135–146)
WBC # BLD: 8.82 K/UL — SIGNIFICANT CHANGE UP (ref 4.8–10.8)
WBC # FLD AUTO: 8.82 K/UL — SIGNIFICANT CHANGE UP (ref 4.8–10.8)

## 2019-10-23 PROCEDURE — 36556 INSERT NON-TUNNEL CV CATH: CPT

## 2019-10-23 PROCEDURE — 99233 SBSQ HOSP IP/OBS HIGH 50: CPT | Mod: 25

## 2019-10-23 PROCEDURE — 71045 X-RAY EXAM CHEST 1 VIEW: CPT | Mod: 26,76

## 2019-10-23 RX ORDER — HEPARIN SODIUM 5000 [USP'U]/ML
1300 INJECTION INTRAVENOUS; SUBCUTANEOUS
Qty: 25000 | Refills: 0 | Status: DISCONTINUED | OUTPATIENT
Start: 2019-10-23 | End: 2019-10-23

## 2019-10-23 RX ORDER — ALPRAZOLAM 0.25 MG
0.5 TABLET ORAL ONCE
Refills: 0 | Status: DISCONTINUED | OUTPATIENT
Start: 2019-10-23 | End: 2019-10-23

## 2019-10-23 RX ORDER — HEPARIN SODIUM 5000 [USP'U]/ML
1200 INJECTION INTRAVENOUS; SUBCUTANEOUS
Qty: 25000 | Refills: 0 | Status: DISCONTINUED | OUTPATIENT
Start: 2019-10-23 | End: 2019-10-29

## 2019-10-23 RX ORDER — HEPARIN SODIUM 5000 [USP'U]/ML
1200 INJECTION INTRAVENOUS; SUBCUTANEOUS
Qty: 25000 | Refills: 0 | Status: DISCONTINUED | OUTPATIENT
Start: 2019-10-23 | End: 2019-10-23

## 2019-10-23 RX ORDER — INSULIN GLARGINE 100 [IU]/ML
10 INJECTION, SOLUTION SUBCUTANEOUS ONCE
Refills: 0 | Status: COMPLETED | OUTPATIENT
Start: 2019-10-23 | End: 2019-10-23

## 2019-10-23 RX ADMIN — Medication 75 MILLIGRAM(S): at 06:24

## 2019-10-23 RX ADMIN — Medication 1 APPLICATION(S): at 06:12

## 2019-10-23 RX ADMIN — SIMVASTATIN 40 MILLIGRAM(S): 20 TABLET, FILM COATED ORAL at 21:26

## 2019-10-23 RX ADMIN — FINASTERIDE 5 MILLIGRAM(S): 5 TABLET, FILM COATED ORAL at 12:34

## 2019-10-23 RX ADMIN — Medication 7 UNIT(S): at 12:35

## 2019-10-23 RX ADMIN — Medication 1 APPLICATION(S): at 18:40

## 2019-10-23 RX ADMIN — INSULIN GLARGINE 10 UNIT(S): 100 INJECTION, SOLUTION SUBCUTANEOUS at 22:09

## 2019-10-23 RX ADMIN — Medication 0.5 MILLIGRAM(S): at 10:30

## 2019-10-23 RX ADMIN — HEPARIN SODIUM 12 UNIT(S)/HR: 5000 INJECTION INTRAVENOUS; SUBCUTANEOUS at 12:00

## 2019-10-23 RX ADMIN — NAFCILLIN 200 GRAM(S): 10 INJECTION, POWDER, FOR SOLUTION INTRAVENOUS at 21:26

## 2019-10-23 RX ADMIN — Medication 1 PATCH: at 21:21

## 2019-10-23 RX ADMIN — CLOPIDOGREL BISULFATE 75 MILLIGRAM(S): 75 TABLET, FILM COATED ORAL at 12:34

## 2019-10-23 RX ADMIN — Medication 1 PATCH: at 12:34

## 2019-10-23 RX ADMIN — NAFCILLIN 200 GRAM(S): 10 INJECTION, POWDER, FOR SOLUTION INTRAVENOUS at 15:23

## 2019-10-23 RX ADMIN — BUDESONIDE AND FORMOTEROL FUMARATE DIHYDRATE 2 PUFF(S): 160; 4.5 AEROSOL RESPIRATORY (INHALATION) at 08:12

## 2019-10-23 RX ADMIN — NAFCILLIN 200 GRAM(S): 10 INJECTION, POWDER, FOR SOLUTION INTRAVENOUS at 06:12

## 2019-10-23 RX ADMIN — NAFCILLIN 200 GRAM(S): 10 INJECTION, POWDER, FOR SOLUTION INTRAVENOUS at 10:31

## 2019-10-23 RX ADMIN — BUDESONIDE AND FORMOTEROL FUMARATE DIHYDRATE 2 PUFF(S): 160; 4.5 AEROSOL RESPIRATORY (INHALATION) at 20:02

## 2019-10-23 RX ADMIN — NAFCILLIN 200 GRAM(S): 10 INJECTION, POWDER, FOR SOLUTION INTRAVENOUS at 18:39

## 2019-10-23 RX ADMIN — NAFCILLIN 200 GRAM(S): 10 INJECTION, POWDER, FOR SOLUTION INTRAVENOUS at 02:51

## 2019-10-23 RX ADMIN — Medication 1 SPRAY(S): at 06:10

## 2019-10-23 RX ADMIN — Medication 1 SPRAY(S): at 18:40

## 2019-10-23 RX ADMIN — Medication 81 MILLIGRAM(S): at 12:34

## 2019-10-23 RX ADMIN — Medication 145 MILLIGRAM(S): at 12:34

## 2019-10-23 RX ADMIN — TAMSULOSIN HYDROCHLORIDE 0.4 MILLIGRAM(S): 0.4 CAPSULE ORAL at 21:26

## 2019-10-23 RX ADMIN — ISOSORBIDE MONONITRATE 30 MILLIGRAM(S): 60 TABLET, EXTENDED RELEASE ORAL at 12:34

## 2019-10-23 NOTE — PROGRESS NOTE ADULT - ASSESSMENT
Patient is a 80yo Male w/ PMH of HFpEF (EF of 55% with Grade II Diastolic dysfunction), COPD (home O2 3-3.5L as needed), DM, HTN, CAD s/p CABG and 1 stent, BPH, and recent admission for CHF (August 2019) presented  to [facility] with complaints of shortness of breath and exertional chest pain.    #) Acute respiratory failure secondary to Acute on Chronic Diastolic CHF exacerbation / moderate  aortic stenosis   -  resolved  - continue with even to  negative fluid balance    #) PABLO Bacteremia   -  BC still positive check repeat today and q48h   - continue Nafcillin, ID following   - no cardiac cath / JAYCOB for now; cardiology following     #) BRITTANI   - probably ATN   - initiation of HD today   - nephrology following    #) Chest Pain (resolved) ,  in patient with hx of CABG and stent placement  - c/w losartan asa, metoprolol; Simvastatin   -  Lovenox   - cardiology following    #) Hx of A Fib  - On Xarelto, 15mg at home; IV heparin for now, check ptt and adjust   -  Metoprolol      #) Hx of COPD  - Stable at this time, with no evidence of acute exacerbation  - Cont with Symbicort (in place of Breo)    #) Hx of Diabetes Type 2  - Insulin sq hospital protocol   - Carb consistent diet    #) Hx of BPH  - Cont with Flomax and Finasteride

## 2019-10-23 NOTE — PROGRESS NOTE ADULT - SUBJECTIVE AND OBJECTIVE BOX
Patient is a 79y old  Male who presents with a chief complaint of CHF Exacerbation (22 Oct 2019 11:16)      T(F): 96.6 (10-22-19 @ 23:00), Max: 96.6 (10-22-19 @ 23:00)  HR: 63 (10-23-19 @ 06:11)  BP: 135/58 (10-23-19 @ 06:11)  RR: 37 (10-23-19 @ 06:11)  SpO2: 98% (10-23-19 @ 06:11) (95% - 100%)    PHYSICAL EXAM:  GENERAL: NAD, well-groomed, well-developed  HEAD:  Atraumatic, Normocephalic  EYES: EOMI, PERRLA, conjunctiva and sclera clear  ENMT: No tonsillar erythema, exudates, or enlargement; Moist mucous membranes, Good dentition, No lesions  NECK: Supple, No JVD, Normal thyroid  NERVOUS SYSTEM:  Alert & Oriented X3,  Motor Strength 5/5 B/L upper and lower extremities  CHEST/LUNG: Clear to percussion bilaterally; No rales, rhonchi, wheezing, or rubs  HEART: Regular rate and rhythm; No murmurs, rubs, or gallops  ABDOMEN: Soft, Nontender, Nondistended; Bowel sounds present  EXTREMITIES:   No clubbing, cyanosis, or edema  LYMPH: No lymphadenopathy noted  SKIN: No rashes or lesions    labs  10-22    137  |  90<L>  |  112<HH>  ----------------------------<  86  3.8   |  20  |  7.6<HH>    Ca    7.5<L>      22 Oct 2019 05:33  Phos  7.8     10-21    TPro  6.0  /  Alb  2.7<L>  /  TBili  1.0  /  DBili  x   /  AST  20  /  ALT  14  /  AlkPhos  39  10-22                          11.9   8.82  )-----------( 245      ( 23 Oct 2019 05:57 )             36.4       PT/INR - ( 22 Oct 2019 05:33 )   PT: 16.50 sec;   INR: 1.44 ratio         PTT - ( 22 Oct 2019 05:33 )  PTT:75.1 sec        acetaminophen   Tablet .. 650 milliGRAM(s) Oral every 6 hours PRN  acetaminophen  Suppository .. 650 milliGRAM(s) Rectal every 6 hours PRN  ALPRAZolam 0.5 milliGRAM(s) Oral at bedtime PRN  aspirin  chewable 81 milliGRAM(s) Oral daily  buDESOnide 160 MICROgram(s)/formoterol 4.5 MICROgram(s) Inhaler 2 Puff(s) Inhalation two times a day  clopidogrel Tablet 75 milliGRAM(s) Oral daily  dextrose 40% Gel 15 Gram(s) Oral once PRN  dextrose 5%. 1000 milliLiter(s) IV Continuous <Continuous>  dextrose 50% Injectable 12.5 Gram(s) IV Push once  dextrose 50% Injectable 25 Gram(s) IV Push once  dextrose 50% Injectable 25 Gram(s) IV Push once  fenofibrate Tablet 145 milliGRAM(s) Oral daily  finasteride 5 milliGRAM(s) Oral daily  fluticasone propionate 50 MICROgram(s)/spray Nasal Spray 1 Spray(s) Both Nostrils two times a day  glucagon  Injectable 1 milliGRAM(s) IntraMuscular once PRN  heparin  Infusion 1500 Unit(s)/Hr IV Continuous <Continuous>  influenza   Vaccine 0.5 milliLiter(s) IntraMuscular once  insulin glargine Injectable (LANTUS) 21 Unit(s) SubCutaneous at bedtime  insulin lispro Injectable (HumaLOG) 7 Unit(s) SubCutaneous before breakfast  insulin lispro Injectable (HumaLOG) 7 Unit(s) SubCutaneous before lunch  insulin lispro Injectable (HumaLOG) 7 Unit(s) SubCutaneous before dinner  isosorbide   mononitrate ER Tablet (IMDUR) 30 milliGRAM(s) Oral daily  metoprolol succinate ER 75 milliGRAM(s) Oral daily  nafcillin  IVPB      nafcillin  IVPB 2 Gram(s) IV Intermittent every 4 hours  nitroglycerin    Patch 0.2 mG/Hr(s) 1 patch Transdermal daily  silver sulfADIAZINE 1% Cream 1 Application(s) Topical two times a day  simvastatin 40 milliGRAM(s) Oral at bedtime  sodium chloride 0.9%. 1000 milliLiter(s) IV Continuous <Continuous>  tamsulosin 0.4 milliGRAM(s) Oral at bedtime

## 2019-10-23 NOTE — PROGRESS NOTE ADULT - ASSESSMENT
Patient is a 79y old  Male  w/ PMH of HFpEF (EF of 55% with Grade II Diastolic dysfunction), COPD (home O2 3-3.5L as needed), DM, HTN, CAD s/p CABG and 1 stent, BPH, and recent admission for CHF (August 2019) presenting to [facility] on 10/10 with complaints of shortness of breath and chest pain.  On day of admission, had severe dyspnea on exertion associated with a pressure like sensation in his chest that radiated to his left arm. He tried sublingual nitro for relief but it didn't help. patient was found to have evidence of bilateral pleural effusions, admitted to CCU for further management, He started to have fever today, tachycardia, 10/16/19, and The ID consult requested , to assist with further evaluation of sepsis and antibiotic management.      # Sepsis ( fever + tachycardia )  # Persistent Staph. bacteremia/septicemia - MSSA,  ? NO source Control -- repeat BCx NGTD as of 10/22  # Acute Renal failure    would recommend:    1. Please Transfer patient to [facility] for  JAYCOB to rule out vegetation in the setting of Persistent bacteremia with No implant prosthesis  2. Continue Nafcillin 2 g q 4  3. Continue dialysis as tolerated   4. Consider Imaging of Abd/pelvis and spine to rule out deep infection in the setting of persistent bacteremia    d/w Patient and CCU team

## 2019-10-23 NOTE — PROGRESS NOTE ADULT - SUBJECTIVE AND OBJECTIVE BOX
HPI:  Patient is a 80yo Male w/ PMH of HFpEF (EF of 55% with Grade II Diastolic dysfunction), COPD (home O2 3-3.5L as needed), DM, HTN, CAD s/p CABG and 1 stent, BPH, and recent admission for CHF (August 2019) presenting to [facility] with complaints of shortness of breath and chest pain. Patient reports he has been progressively worsening shortness of breath for the last 4-5 days prior to admission. He also reports associated chest pain on exertion.  Patient reports he has been compliant with all of his medications as well with a low salt diet. He denies any recent history of fevers, chills, cough. On day of admission, had severe dyspnea on exertion associated with a pressure like sensation in his chest that radiated to his left arm. He tried sublingual nitro for relief but it didn't help. Therefore, he came in for further evaluation. He was given Solumedrol 125mg by EMS. In the ED, patient was found to have evidence of bilateral pleural effusions on bedside sono done by ED staff. He received IV Lasix 40mg and was placed on BiPAP with major improvement in his symptoms. On my assessment, patient was speaking to me in full sentences on BiPAP and his chest pain has resolved. (10 Oct 2019 13:27)        INTERVAL HPI/OVERNIGHT EVENTS:  ICU Vital Signs Last 24 Hrs  T(C): 35.7 (23 Oct 2019 07:10), Max: 35.9 (22 Oct 2019 23:00)  T(F): 96.3 (23 Oct 2019 07:10), Max: 96.6 (22 Oct 2019 23:00)  HR: 63 (23 Oct 2019 06:11) (62 - 73)  BP: 135/58 (23 Oct 2019 06:11) (98/55 - 135/58)  BP(mean): 84 (23 Oct 2019 06:11) (84 - 84)  ABP: --  ABP(mean): --  RR: 27 (23 Oct 2019 07:10) (16 - 37)  SpO2: 98% (23 Oct 2019 06:11) (98% - 100%)    I&O's Summary    22 Oct 2019 07:01  -  23 Oct 2019 07:00  --------------------------------------------------------  IN: 2158 mL / OUT: 401 mL / NET: 1757 mL          LABS:                        11.9   8.82  )-----------( 245      ( 23 Oct 2019 05:57 )             36.4     10-23    136  |  90<L>  |  127  ----------------------------<  112<H>  4.0   |  17  |  8.1<HH>    Ca    7.7<L>      23 Oct 2019 05:57  Phos  7.8     10-21    TPro  6.4  /  Alb  3.0<L>  /  TBili  1.0  /  DBili  x   /  AST  20  /  ALT  14  /  AlkPhos  44  10-23    PT/INR - ( 22 Oct 2019 05:33 )   PT: 16.50 sec;   INR: 1.44 ratio         PTT - ( 23 Oct 2019 05:57 )  PTT:95.4 sec    CAPILLARY BLOOD GLUCOSE      POCT Blood Glucose.: 126 mg/dL (23 Oct 2019 07:44)  POCT Blood Glucose.: 96 mg/dL (22 Oct 2019 21:42)  POCT Blood Glucose.: 152 mg/dL (22 Oct 2019 18:38)  POCT Blood Glucose.: 106 mg/dL (22 Oct 2019 16:20)  POCT Blood Glucose.: 159 mg/dL (22 Oct 2019 11:51)        RADIOLOGY & ADDITIONAL TESTS:    Consultant(s) Notes Reviewed:  [x ] YES  [ ] NO    MEDICATIONS  (STANDING):  ALPRAZolam 0.5 milliGRAM(s) Oral once  aspirin  chewable 81 milliGRAM(s) Oral daily  buDESOnide 160 MICROgram(s)/formoterol 4.5 MICROgram(s) Inhaler 2 Puff(s) Inhalation two times a day  clopidogrel Tablet 75 milliGRAM(s) Oral daily  dextrose 5%. 1000 milliLiter(s) (50 mL/Hr) IV Continuous <Continuous>  dextrose 50% Injectable 12.5 Gram(s) IV Push once  dextrose 50% Injectable 25 Gram(s) IV Push once  dextrose 50% Injectable 25 Gram(s) IV Push once  fenofibrate Tablet 145 milliGRAM(s) Oral daily  finasteride 5 milliGRAM(s) Oral daily  fluticasone propionate 50 MICROgram(s)/spray Nasal Spray 1 Spray(s) Both Nostrils two times a day  heparin  Infusion 1300 Unit(s)/Hr (13 mL/Hr) IV Continuous <Continuous>  influenza   Vaccine 0.5 milliLiter(s) IntraMuscular once  insulin glargine Injectable (LANTUS) 21 Unit(s) SubCutaneous at bedtime  insulin lispro Injectable (HumaLOG) 7 Unit(s) SubCutaneous before breakfast  insulin lispro Injectable (HumaLOG) 7 Unit(s) SubCutaneous before lunch  insulin lispro Injectable (HumaLOG) 7 Unit(s) SubCutaneous before dinner  isosorbide   mononitrate ER Tablet (IMDUR) 30 milliGRAM(s) Oral daily  metoprolol succinate ER 75 milliGRAM(s) Oral daily  nafcillin  IVPB      nafcillin  IVPB 2 Gram(s) IV Intermittent every 4 hours  nitroglycerin    Patch 0.2 mG/Hr(s) 1 patch Transdermal daily  silver sulfADIAZINE 1% Cream 1 Application(s) Topical two times a day  simvastatin 40 milliGRAM(s) Oral at bedtime  sodium chloride 0.9%. 1000 milliLiter(s) (50 mL/Hr) IV Continuous <Continuous>  tamsulosin 0.4 milliGRAM(s) Oral at bedtime    MEDICATIONS  (PRN):  acetaminophen   Tablet .. 650 milliGRAM(s) Oral every 6 hours PRN Temp greater or equal to 38C (100.4F), Mild Pain (1 - 3)  acetaminophen  Suppository .. 650 milliGRAM(s) Rectal every 6 hours PRN Temp greater or equal to 38C (100.4F), Mild Pain (1 - 3)  ALPRAZolam 0.5 milliGRAM(s) Oral at bedtime PRN anxiety  dextrose 40% Gel 15 Gram(s) Oral once PRN Blood Glucose LESS THAN 70 milliGRAM(s)/deciliter  glucagon  Injectable 1 milliGRAM(s) IntraMuscular once PRN Glucose LESS THAN 70 milligrams/deciliter      PHYSICAL EXAM:  GENERAL: not in acute distress.   HEAD:  Atraumatic, Normocephalic  EYES: EOMI, PERRLA, conjunctiva and sclera clear  ENT: No tonsillar erythema, exudates, or enlargement; Moist mucous membranes, Good dentition, No lesions  NECK: Supple, No JVD, Normal thyroid, no enlarged nodes  NERVOUS SYSTEM:  Alert & Oriented X3.   CHEST/LUNG: good air entry bilaterally.   HEART: S1S2 normal afib rate controlled.   ABDOMEN: Soft, Nontender, Nondistended; Bowel sounds present  EXTREMITIES:  Left leg healing venous ulcer.   LYMPH: No lymphadenopathy noted  SKIN: No rashes or lesions    Care Discussed with Consultants/Other Providers [ x] YES  [ ] NO

## 2019-10-23 NOTE — PROGRESS NOTE ADULT - ASSESSMENT
Patient is a 80yo Male w/ PMH of HFpEF (EF of 55% with Grade II Diastolic dysfunction), COPD (home O2 3-3.5L as needed), DM, HTN, CAD s/p CABG and 1 stent, BPH, and recent admission for CHF (August 2019) presenting to [facility] with complaints of shortness of breath and chest pain. Patient reports he has been progressively worsening shortness of breath for the last 4-5 days prior to admission.    1) Acute on chronic diastolic CHF Exacerbation   Lasix held for now as patient appears to be maximally diuresed   Patient on Heparin Drip adjusted today. follow with PTT   Continue with Metoprolol ER 75 along with Aspirin 81. c/w plavix 75mg daily after loading does  Monitor I and O   Follow with Repeat PTT    2. HTN  stable at this time Hold Meds and monitor BP    3. BRITTANI worsening  Diuretics held for >48 hours   Continue to hold  Follow with Creatinine in AM  Renal on board for possible dialysis tomorrow   Sodium Bicarbonate 650 Twice daily   Calcium acetate 667 Three times daily   Will need access if decision for dialysis made.   4. DM2.   Start Insulin Follow [facility]    5. COPD on as needed home O2 not in exacerbation  -Monitor and c/w home med    6. BPH  C/w med    7. CAD s/p CABG  - continue cardiac med and plan for cath once afebrile    8. Bacteremia - gram positive cocci in clusters  Nafcillin as per ID recommendations   Follow with daily blood cultures until negative   Consider antibiotic de-escalation once patient improved and Sensitivities are back.  Patient needs JAYCOB, Cardiology feels risk is greater than benefit at this moment in time.        DVT Prophylaxis   Heparin Drip     Disposition   Continue with CCU Monitoring

## 2019-10-23 NOTE — PROGRESS NOTE ADULT - SUBJECTIVE AND OBJECTIVE BOX
Patient is sitting in chair with no sob, cp, fevers or chills      T(F): 96.3 (10-23-19 @ 07:10), Max: 96.6 (10-22-19 @ 23:00)  HR: 63 (10-23-19 @ 06:11)  BP: 135/58 (10-23-19 @ 06:11)  RR: 20  SpO2: 98% (10-23-19 @ 06:11) (98% - 100%)    PHYSICAL EXAM:  GENERAL: NAD  HEAD:  Atraumatic, Normocephalic  NERVOUS SYSTEM:  Alert & Oriented X3, no focal deficits   CHEST/LUNG:  bilateral rhonchi  HEART: Regular rate and rhythm; No murmurs, rubs, or gallops  ABDOMEN: Soft, Nontender, Nondistended; Bowel sounds present  EXTREMITIES:  b/l  edema      LABS  10-23    136  |  89<L>  |  132<HH>  ----------------------------<  208<H>  4.2   |  22  |  8.3<HH>    Ca    7.7<L>      23 Oct 2019 11:34  Phos  7.8     10-21    TPro  6.4  /  Alb  3.0<L>  /  TBili  1.1  /  DBili  x   /  AST  19  /  ALT  15  /  AlkPhos  43  10-23                          11.9   8.82  )-----------( 245      ( 23 Oct 2019 05:57 )             36.4     PT/INR - ( 22 Oct 2019 05:33 )   PT: 16.50 sec;   INR: 1.44 ratio         PTT - ( 23 Oct 2019 05:57 )  PTT:95.4 sec    CARDIAC ENZYMES  Creatine Kinase, Serum: 73 (10-21 @ 19:08)    CKMB Units: 1.2 (10-21 @ 05:56)        Culture Results:   Growth in aerobic and anaerobic bottles: Staphylococcus aureus (10-19-19)  Culture Results:   Growth in aerobic and anaerobic bottles: Staphylococcus aureus  See previous culture [id number] (10-17-19)  Culture Results:   Growth in aerobic and anaerobic bottles: Staphylococcus aureus  See previous culture [id number] (10-16-19)  Culture Results:   <10,000 CFU/mL Normal Urogenital Felipa (10-15-19)  Culture Results:   Growth in aerobic bottle: Staphylococcus aureus  "Due to technical problems, Proteus sp. will Not be reported as part of  the BCID panel until further notice"  ***Blood Panel PCR results on this specimen are available  approximately 3 hours after the Gram stain result.***  Gram stain, PCR, and/or culture results may not always  correspond due to difference in methodologies.  ************************************************************  This PCR assay was performed using [employer].  The following targets are tested for: Enterococcus,  vancomycin resistant enterococci, Listeria monocytogenes,  coagulase negative staphylococci, S. aureus,  methicillin resistant S. aureus, Streptococcus agalactiae  (Group B), S. pneumoniae, S. pyogenes (Group A),  Acinetobacter baumannii, Enterobacter cloacae, E. coli,  Klebsiella oxytoca, K. pneumoniae, Proteus sp.,  Serratia marcescens, Haemophilus influenzae,  Neisseria meningitidis, Pseudomonas aeruginosa, Candida  albicans, C. glabrata, C krusei, C parapsilosis,  C. tropicalis and the KPC resistance gene. (10-15-19)    RADIOLOGY  < from: Xray Chest 1 View- PORTABLE-Urgent (10.23.19 @ 11:51) >  Impression:      Stable small effusions and interstitial edema.    < end of copied text >    MEDICATIONS  (STANDING):  aspirin  chewable 81 milliGRAM(s) Oral daily  buDESOnide 160 MICROgram(s)/formoterol 4.5 MICROgram(s) Inhaler 2 Puff(s) Inhalation two times a day  clopidogrel Tablet 75 milliGRAM(s) Oral daily  fenofibrate Tablet 145 milliGRAM(s) Oral daily  finasteride 5 milliGRAM(s) Oral daily  fluticasone propionate 50 MICROgram(s)/spray Nasal Spray 1 Spray(s) Both Nostrils two times a day  heparin  Infusion 1200 Unit(s)/Hr (12 mL/Hr) IV Continuous <Continuous>  influenza   Vaccine 0.5 milliLiter(s) IntraMuscular once  insulin glargine Injectable (LANTUS) 21 Unit(s) SubCutaneous at bedtime  insulin lispro Injectable (HumaLOG) 7 Unit(s) SubCutaneous before breakfast  insulin lispro Injectable (HumaLOG) 7 Unit(s) SubCutaneous before lunch  insulin lispro Injectable (HumaLOG) 7 Unit(s) SubCutaneous before dinner  isosorbide   mononitrate ER Tablet (IMDUR) 30 milliGRAM(s) Oral daily  metoprolol succinate ER 75 milliGRAM(s) Oral daily  nafcillin  IVPB 2 Gram(s) IV Intermittent every 4 hours  nitroglycerin    Patch 0.2 mG/Hr(s) 1 patch Transdermal daily  silver sulfADIAZINE 1% Cream 1 Application(s) Topical two times a day  simvastatin 40 milliGRAM(s) Oral at bedtime  tamsulosin 0.4 milliGRAM(s) Oral at bedtime    MEDICATIONS  (PRN):  acetaminophen   Tablet .. 650 milliGRAM(s) Oral every 6 hours PRN Temp greater or equal to 38C (100.4F), Mild Pain (1 - 3)  acetaminophen  Suppository .. 650 milliGRAM(s) Rectal every 6 hours PRN Temp greater or equal to 38C (100.4F), Mild Pain (1 - 3)  ALPRAZolam 0.5 milliGRAM(s) Oral at bedtime PRN anxiety  dextrose 40% Gel 15 Gram(s) Oral once PRN Blood Glucose LESS THAN 70 milliGRAM(s)/deciliter  glucagon  Injectable 1 milliGRAM(s) IntraMuscular once PRN Glucose LESS THAN 70 milligrams/deciliter

## 2019-10-23 NOTE — PROGRESS NOTE ADULT - SUBJECTIVE AND OBJECTIVE BOX
Patient is seen and examined at the bed side, is afebrile. He is feeling okay, no new complaints. The repeat Blood cultures from 10/19/19 still positive and NO REPEAT BLOOD cultures are in the system to document clearing the blood stream. The kidney function worsened.         REVIEW OF SYSTEMS: All other review systems are negative          ICU Vital Signs Last 24 Hrs  T(C): 35.4 (23 Oct 2019 15:07), Max: 35.9 (22 Oct 2019 23:00)  T(F): 95.7 (23 Oct 2019 15:07), Max: 96.6 (22 Oct 2019 23:00)  HR: 64 (23 Oct 2019 18:34) (62 - 64)  BP: 136/66 (23 Oct 2019 18:34) (94/52 - 136/66)  BP(mean): 85 (23 Oct 2019 18:00) (67 - 87)  ABP: --  ABP(mean): --  RR: 18 (23 Oct 2019 18:34) (16 - 37)  SpO2: 100% (23 Oct 2019 18:34) (97% - 100%)          PHYSICAL EXAM:  GENERAL: Not in distress , on oxygen via NC  CHEST/LUNG:  Air entry bilaterally  HEART: s1 and s2 present  ABDOMEN:  Nontender and  Nondistended  EXTREMITIES: trace pedal  edema, excoriation marks on LE  CNS: Awake and Alert        LABS:                        11.9   8.82  )-----------( 245      ( 23 Oct 2019 05:57 )             36.4                           11.9   10.04 )-----------( 224      ( 21 Oct 2019 19:08 )             36.7           10    136  |  89<L>  |  132<HH>  ----------------------------<  208<H>  4.2   |  22  |  8.3<HH>    Ca    7.7<L>      23 Oct 2019 11:34    TPro  6.4  /  Alb  3.0<L>  /  TBili  1.1  /  DBili  x   /  AST  19  /  ALT  15  /  AlkPhos  43  10-    10    136  |  91<L>  |  x   ----------------------------<  53<L>  4.0   |  19  |  8.1<HH>    Ca    7.8<L>      21 Oct 2019 19:08  Phos  7.8     10-  Mg     2.5     10-    TPro  6.0  /  Alb  2.9<L>  /  TBili  1.2  /  DBili  x   /  AST  24  /  ALT  14  /  AlkPhos  41  10-    10-16    138  |  95<L>  |  48<H>  ----------------------------<  270<H>  4.1   |  28  |  1.4    Ca    8.9      16 Oct 2019 05:52  Mg     1.8     10-    TPro  6.7  /  Alb  3.3<L>  /  TBili  0.7  /  DBili  x   /  AST  221<H>  /  ALT  39  /  AlkPhos  44  10-16    PT/INR - ( 15 Oct 2019 06:06 )   PT: 15.60 sec;   INR: 1.36 ratio         PTT - ( 16 Oct 2019 19:26 )  PTT:43.3 sec        CAPILLARY BLOOD GLUCOS  POCT Blood Glucose.: 214 mg/dL (16 Oct 2019 16:17)  POCT Blood Glucose.: 312 mg/dL (16 Oct 2019 11:39)  POCT Blood Glucose.: 261 mg/dL (16 Oct 2019 07:58)  POCT Blood Glucose.: 251 mg/dL (15 Oct 2019 23:22)        Urinalysis Basic - ( 15 Oct 2019 11:30 )  Color: Yellow / Appearance: Clear / S.015 / pH: x  Gluc: x / Ketone: Negative  / Bili: Negative / Urobili: 0.2 mg/dL   Blood: x / Protein: Negative mg/dL / Nitrite: Negative   Leuk Esterase: Negative / RBC: x / WBC x   Sq Epi: x / Non Sq Epi: x / Bacteria: x      MEDICATIONS  (STANDING):  aspirin  chewable 81 milliGRAM(s) Oral daily  buDESOnide 160 MICROgram(s)/formoterol 4.5 MICROgram(s) Inhaler 2 Puff(s) Inhalation two times a day  clopidogrel Tablet 75 milliGRAM(s) Oral daily  dextrose 5%. 1000 milliLiter(s) (50 mL/Hr) IV Continuous <Continuous>  dextrose 50% Injectable 12.5 Gram(s) IV Push once  dextrose 50% Injectable 25 Gram(s) IV Push once  dextrose 50% Injectable 25 Gram(s) IV Push once  fenofibrate Tablet 145 milliGRAM(s) Oral daily  finasteride 5 milliGRAM(s) Oral daily  fluticasone propionate 50 MICROgram(s)/spray Nasal Spray 1 Spray(s) Both Nostrils two times a day  heparin  Infusion 1200 Unit(s)/Hr (12 mL/Hr) IV Continuous <Continuous>  influenza   Vaccine 0.5 milliLiter(s) IntraMuscular once  insulin glargine Injectable (LANTUS) 21 Unit(s) SubCutaneous at bedtime  insulin lispro Injectable (HumaLOG) 7 Unit(s) SubCutaneous before breakfast  insulin lispro Injectable (HumaLOG) 7 Unit(s) SubCutaneous before lunch  insulin lispro Injectable (HumaLOG) 7 Unit(s) SubCutaneous before dinner  isosorbide   mononitrate ER Tablet (IMDUR) 30 milliGRAM(s) Oral daily  metoprolol succinate ER 75 milliGRAM(s) Oral daily  nafcillin  IVPB      nafcillin  IVPB 2 Gram(s) IV Intermittent every 4 hours  nitroglycerin    Patch 0.2 mG/Hr(s) 1 patch Transdermal daily  silver sulfADIAZINE 1% Cream 1 Application(s) Topical two times a day  simvastatin 40 milliGRAM(s) Oral at bedtime  tamsulosin 0.4 milliGRAM(s) Oral at bedtime    MEDICATIONS  (PRN):  acetaminophen   Tablet .. 650 milliGRAM(s) Oral every 6 hours PRN Temp greater or equal to 38C (100.4F), Mild Pain (1 - 3)  acetaminophen  Suppository .. 650 milliGRAM(s) Rectal every 6 hours PRN Temp greater or equal to 38C (100.4F), Mild Pain (1 - 3)  ALPRAZolam 0.5 milliGRAM(s) Oral at bedtime PRN anxiety  dextrose 40% Gel 15 Gram(s) Oral once PRN Blood Glucose LESS THAN 70 milliGRAM(s)/deciliter  glucagon  Injectable 1 milliGRAM(s) IntraMuscular once PRN Glucose LESS THAN 70 milligrams/deciliter      RADIOLOGY & ADDITIONAL TESTS:      < from: Xray Chest 1 View- PORTABLE-Routine (10.21.19 @ 06:25) >  Cardiomegaly, bilateral opacities/pleural effusions, stable.      < end of copied text >      < from: US Renal (10.19.19 @ 10:22) >  No hydronephrosis bilaterally.  Left renal 3 cm cyst.          < end of copied text >      < from: Xray Chest 1 View-PORTABLE IMMEDIATE (10.16.19 @ 16:56) >  Stable bilateral airspace opacities.    < end of copied text >        MICROBIOLOGY DATA:    Culture - Blood (10.22.19 @ 05:33)    Specimen Source: .Blood None    Culture Results:   No growth to date.        Culture - Blood in AM (10.19.19 @ 06:36)    Gram Stain:   Growth in aerobic bottle: Gram Positive Cocci in Clusters  Growth in anaerobic bottle: Gram Positive Cocci in Clusters    Specimen Source: .Blood None    Culture Results:   Growth in aerobic bottle: Staphylococcus aureus  Susceptibility to follow.  Growth in anaerobic bottle: Gram Positive Cocci in Clusters        Culture - Blood in AM (10.17.19 @ 05:15)    Gram Stain:   Growth in anaerobic bottle: Gram Positive Cocci in Clusters  Growth in aerobic bottle: Gram Positive Cocci in Clusters    Specimen Source: .Blood Blood    Culture Results:   Growth in aerobic and anaerobic bottles: Staphylococcus aureus  See previous culture [id number]        FLU A B RSV Detection by PCR (10.15.19 @ 21:10)    Flu A Result: Negative: Negative results do not preclude influenza infection and  should not be used as the sole basis for treatment or  other patient management decisions.  A positive result may occur in the absence of viable virus.  By: [employer] Flu viral assay by Reverse Transcriptase  Polymerase Chain Reaction (RT-PCR).    Flu B Result: Negative    RSV Result: Negative      Culture - Urine (10.15.19 @ 11:30)    Specimen Source: .Urine Clean Catch (Midstream)    Culture Results:   <10,000 CFU/mL Normal Urogenital Felipa        Culture - Blood (10.15.19 @ 07:00)    -  Staphylococcus aureus: Detec Any isolate of Staphylococcus aureus from a blood culture is NOT considered a contaminant.    Gram Stain:   Growth in aerobic bottle: Gram Positive Cocci in Clusters  Growth in anaerobic bottle: Gram Positive Cocci in Clusters    Specimen Source: .Blood Blood    Organism: Blood Culture PCR    Culture Results:   Growth in aerobic bottle: Gram Positive Cocci in Clusters  Growth in anaerobic bottle: Gram Positive Cocci in Clusters  "Due to technical problems, Proteus sp. will Not be reported as part of  the BCID panel until further notice"  ***Blood Panel PCR results on this specimen are available  approximately 3 hours after the Gram stain result.***  Gram stain, PCR, and/or culture results may not always  correspond due to difference in methodologies.  ************************************************************  This PCR assay was performed using [employer].  The following targets are tested for: Enterococcus,  vancomycin resistant enterococci, Listeria monocytogenes,  coagulase negative staphylococci, S. aureus,  methicillin resistant S. aureus, Streptococcus agalactiae  (Group B), S. pneumoniae, S. pyogenes (Group A),  Acinetobacter baumannii, Enterobacter cloacae, E. coli,  Klebsiella oxytoca, K. pneumoniae, Proteus sp.,  Serratia marcescens, Haemophilus influenzae,  Neisseria meningitidis, Pseudomonas aeruginosa, Candida  albicans, C. glabrata, C krusei, C parapsilosis,  C. tropicalis and the KPC resistance gene.    Organism Identification: Blood Culture PCR    Method Type: PCR        Assessment and Plan:   · Assessment		  Patient is a 79y old  Male  w/ PMH of HFpEF (EF of 55% with Grade II Diastolic dysfunction), COPD (home O2 3-3.5L as needed), DM, HTN, CAD s/p CABG and 1 stent, BPH, and recent admission for CHF (2019) presenting to [facility] on 10/10 with complaints of shortness of breath and chest pain.  On day of admission, had severe dyspnea on exertion associated with a pressure like sensation in his chest that radiated to his left arm. He tried sublingual nitro for relief but it didn't help. patient was found to have evidence of bilateral pleural effusions, admitted to CCU for further management, He started to have fever today, tachycardia, 10/16/19, and The ID consult requested , to assist with further evaluation of sepsis and antibiotic management.      # Sepsis ( fever + tachycardia )  # Persistent Staph. bacteremia/septicemia - MSSA,  ? NO source Control  # Acute Renal failure    would recommend:    1. Repeat blood culture to document clearing the blood stream  2. Continue Nafcillin 2 g q 4  3. Please Transfer patient to [facility] for  JAYCOB to rule out vegetation in the setting of Persistent bacteremia with No implant prosthesis  4. Monitor kidney function, renal on board  5. Consider Imaging of Abd/pelvis and spine to rule out deep infection in the setting of persistent bacteremia Patient is seen and examined at the bed side, is afebrile. He is feeling okay, no complaints, started on dialysis, tolerating well. The repeat Blood cultures from 10/22/19 has no growth to date.      REVIEW OF SYSTEMS: All other review systems are negative          ICU Vital Signs Last 24 Hrs  T(C): 35.4 (23 Oct 2019 15:07), Max: 35.9 (22 Oct 2019 23:00)  T(F): 95.7 (23 Oct 2019 15:07), Max: 96.6 (22 Oct 2019 23:00)  HR: 64 (23 Oct 2019 18:34) (62 - 64)  BP: 136/66 (23 Oct 2019 18:34) (94/52 - 136/66)  BP(mean): 85 (23 Oct 2019 18:00) (67 - 87)  ABP: --  ABP(mean): --  RR: 18 (23 Oct 2019 18:34) (16 - 37)  SpO2: 100% (23 Oct 2019 18:34) (97% - 100%)          PHYSICAL EXAM:  GENERAL: Not in distress , on oxygen via NC  CHEST/LUNG:  Air entry bilaterally  HEART: s1 and s2 present  ABDOMEN:  Nontender and  Nondistended  EXTREMITIES: B/L pedal  edema, excoriation marks on LE  CNS: Awake and Alert        LABS:                        11.9   8.82  )-----------( 245      ( 23 Oct 2019 05:57 )             36.4                           11.9   10.04 )-----------( 224      ( 21 Oct 2019 19:08 )             36.7           10-23    136  |  89<L>  |  132<HH>  ----------------------------<  208<H>  4.2   |  22  |  8.3<HH>    Ca    7.7<L>      23 Oct 2019 11:34    TPro  6.4  /  Alb  3.0<L>  /  TBili  1.1  /  DBili  x   /  AST  19  /  ALT  15  /  AlkPhos  43  10-23    10-21    136  |  91<L>  |  x   ----------------------------<  53<L>  4.0   |  19  |  8.1<HH>    Ca    7.8<L>      21 Oct 2019 19:08  Phos  7.8     10-  Mg     2.5     10-21    TPro  6.0  /  Alb  2.9<L>  /  TBili  1.2  /  DBili  x   /  AST  24  /  ALT  14  /  AlkPhos  41  10-    10-    138  |  95<L>  |  48<H>  ----------------------------<  270<H>  4.1   |  28  |  1.4    Ca    8.9      16 Oct 2019 05:52  Mg     1.8     10-    TPro  6.7  /  Alb  3.3<L>  /  TBili  0.7  /  DBili  x   /  AST  221<H>  /  ALT  39  /  AlkPhos  44  10-    PT/INR - ( 15 Oct 2019 06:06 )   PT: 15.60 sec;   INR: 1.36 ratio         PTT - ( 16 Oct 2019 19:26 )  PTT:43.3 sec        CAPILLARY BLOOD GLUCOS  POCT Blood Glucose.: 214 mg/dL (16 Oct 2019 16:17)  POCT Blood Glucose.: 312 mg/dL (16 Oct 2019 11:39)  POCT Blood Glucose.: 261 mg/dL (16 Oct 2019 07:58)  POCT Blood Glucose.: 251 mg/dL (15 Oct 2019 23:22)        Urinalysis Basic - ( 15 Oct 2019 11:30 )  Color: Yellow / Appearance: Clear / S.015 / pH: x  Gluc: x / Ketone: Negative  / Bili: Negative / Urobili: 0.2 mg/dL   Blood: x / Protein: Negative mg/dL / Nitrite: Negative   Leuk Esterase: Negative / RBC: x / WBC x   Sq Epi: x / Non Sq Epi: x / Bacteria: x      MEDICATIONS  (STANDING):  aspirin  chewable 81 milliGRAM(s) Oral daily  buDESOnide 160 MICROgram(s)/formoterol 4.5 MICROgram(s) Inhaler 2 Puff(s) Inhalation two times a day  clopidogrel Tablet 75 milliGRAM(s) Oral daily  fenofibrate Tablet 145 milliGRAM(s) Oral daily  finasteride 5 milliGRAM(s) Oral daily  fluticasone propionate 50 MICROgram(s)/spray Nasal Spray 1 Spray(s) Both Nostrils two times a day  heparin  Infusion 1200 Unit(s)/Hr (12 mL/Hr) IV Continuous <Continuous>  influenza   Vaccine 0.5 milliLiter(s) IntraMuscular once  insulin glargine Injectable (LANTUS) 21 Unit(s) SubCutaneous at bedtime  insulin lispro Injectable (HumaLOG) 7 Unit(s) SubCutaneous before breakfast  insulin lispro Injectable (HumaLOG) 7 Unit(s) SubCutaneous before lunch  insulin lispro Injectable (HumaLOG) 7 Unit(s) SubCutaneous before dinner  isosorbide   mononitrate ER Tablet (IMDUR) 30 milliGRAM(s) Oral daily  metoprolol succinate ER 75 milliGRAM(s) Oral daily  nafcillin  IVPB      nafcillin  IVPB 2 Gram(s) IV Intermittent every 4 hours  nitroglycerin    Patch 0.2 mG/Hr(s) 1 patch Transdermal daily  silver sulfADIAZINE 1% Cream 1 Application(s) Topical two times a day  simvastatin 40 milliGRAM(s) Oral at bedtime  tamsulosin 0.4 milliGRAM(s) Oral at bedtime    MEDICATIONS  (PRN):  acetaminophen   Tablet .. 650 milliGRAM(s) Oral every 6 hours PRN Temp greater or equal to 38C (100.4F), Mild Pain (1 - 3)  acetaminophen  Suppository .. 650 milliGRAM(s) Rectal every 6 hours PRN Temp greater or equal to 38C (100.4F), Mild Pain (1 - 3)  ALPRAZolam 0.5 milliGRAM(s) Oral at bedtime PRN anxiety  dextrose 40% Gel 15 Gram(s) Oral once PRN Blood Glucose LESS THAN 70 milliGRAM(s)/deciliter  glucagon  Injectable 1 milliGRAM(s) IntraMuscular once PRN Glucose LESS THAN 70 milligrams/deciliter      RADIOLOGY & ADDITIONAL TESTS:    < from: Xray Chest 1 View- PORTABLE-Urgent (10.23.19 @ 11:51) >  Stable small effusions and interstitial edema.    < end of copied text >      < from: Xray Chest 1 View- PORTABLE-Routine (10.21.19 @ 06:25) >  Cardiomegaly, bilateral opacities/pleural effusions, stable.      < end of copied text >      < from: US Renal (10.19.19 @ 10:22) >  No hydronephrosis bilaterally.  Left renal 3 cm cyst.          < end of copied text >      < from: Xray Chest 1 View-PORTABLE IMMEDIATE (10.16.19 @ 16:56) >  Stable bilateral airspace opacities.    < end of copied text >            MICROBIOLOGY DATA:    Culture - Blood (10.22.19 @ 05:33)    Specimen Source: .Blood None    Culture Results:   No growth to date.        Culture - Blood in AM (10.19.19 @ 06:36)    Gram Stain:   Growth in aerobic bottle: Gram Positive Cocci in Clusters  Growth in anaerobic bottle: Gram Positive Cocci in Clusters    Specimen Source: .Blood None    Culture Results:   Growth in aerobic bottle: Staphylococcus aureus  Susceptibility to follow.  Growth in anaerobic bottle: Gram Positive Cocci in Clusters        Culture - Blood in AM (10.17.19 @ 05:15)    Gram Stain:   Growth in anaerobic bottle: Gram Positive Cocci in Clusters  Growth in aerobic bottle: Gram Positive Cocci in Clusters    Specimen Source: .Blood Blood    Culture Results:   Growth in aerobic and anaerobic bottles: Staphylococcus aureus  See previous culture [id number]        FLU A B RSV Detection by PCR (10.15.19 @ 21:10)    Flu A Result: Negative: Negative results do not preclude influenza infection and  should not be used as the sole basis for treatment or  other patient management decisions.  A positive result may occur in the absence of viable virus.  By: [employer] Flu viral assay by Reverse Transcriptase  Polymerase Chain Reaction (RT-PCR).    Flu B Result: Negative    RSV Result: Negative      Culture - Urine (10.15.19 @ 11:30)    Specimen Source: .Urine Clean Catch (Midstream)    Culture Results:   <10,000 CFU/mL Normal Urogenital Felipa        Culture - Blood (10.15.19 @ 07:00)    -  Staphylococcus aureus: Detec Any isolate of Staphylococcus aureus from a blood culture is NOT considered a contaminant.    Gram Stain:   Growth in aerobic bottle: Gram Positive Cocci in Clusters  Growth in anaerobic bottle: Gram Positive Cocci in Clusters    Specimen Source: .Blood Blood    Organism: Blood Culture PCR    Culture Results:   Growth in aerobic bottle: Gram Positive Cocci in Clusters  Growth in anaerobic bottle: Gram Positive Cocci in Clusters  "Due to technical problems, Proteus sp. will Not be reported as part of  the BCID panel until further notice"  ***Blood Panel PCR results on this specimen are available  approximately 3 hours after the Gram stain result.***  Gram stain, PCR, and/or culture results may not always  correspond due to difference in methodologies.  ************************************************************  This PCR assay was performed using [employer].  The following targets are tested for: Enterococcus,  vancomycin resistant enterococci, Listeria monocytogenes,  coagulase negative staphylococci, S. aureus,  methicillin resistant S. aureus, Streptococcus agalactiae  (Group B), S. pneumoniae, S. pyogenes (Group A),  Acinetobacter baumannii, Enterobacter cloacae, E. coli,  Klebsiella oxytoca, K. pneumoniae, Proteus sp.,  Serratia marcescens, Haemophilus influenzae,  Neisseria meningitidis, Pseudomonas aeruginosa, Candida  albicans, C. glabrata, C krusei, C parapsilosis,  C. tropicalis and the KPC resistance gene.    Organism Identification: Blood Culture PCR    Method Type: PCR

## 2019-10-23 NOTE — PROGRESS NOTE ADULT - ASSESSMENT
No complaints.  Check labs. Watch for CHF. If stable try ambulate . Repeat bc. Will consider JAYCOB when pulmonary status better. Possible dialysis. Continue heparin

## 2019-10-23 NOTE — PROGRESS NOTE ADULT - ASSESSMENT
1)  Oliguric ATN w/ Screat yet to peak.  No overt uremic signs or symptoms    2)  Diastolic CHF, now compensated    3)  MSSA bacteremia on Nafcillin    Recommendations:    1)  Due to severe azotemia and ongoing oliguria, will initiate acute HD    2)  [place] catheter placed in right IJ    3)  Will do 2hrs HD, 3K+ bath, Optiflux dialyzer, , 1L UF    4)  D/C IVF's as pt eating well, BRITTANI due to ATN

## 2019-10-23 NOTE — PROGRESS NOTE ADULT - SUBJECTIVE AND OBJECTIVE BOX
[facility] FOLLOW UP NOTE  --------------------------------------------------------------------------------  Chief Complaint:    24 hour events/subjective:  Events over last 24hrs noted.  Pt states he feels well, no SOB, N/V; eating well.  Remains oliguric w/ 400cc U.O. last 24hrs        PAST HISTORY  --------------------------------------------------------------------------------  No significant changes to PMH, PSH, FHx, SHx, unless otherwise noted    ALLERGIES & MEDICATIONS  --------------------------------------------------------------------------------  Allergies    No Known Allergies    Intolerances      Standing Inpatient Medications  aspirin  chewable 81 milliGRAM(s) Oral daily  buDESOnide 160 MICROgram(s)/formoterol 4.5 MICROgram(s) Inhaler 2 Puff(s) Inhalation two times a day  clopidogrel Tablet 75 milliGRAM(s) Oral daily  dextrose 5%. 1000 milliLiter(s) IV Continuous <Continuous>  dextrose 50% Injectable 12.5 Gram(s) IV Push once  dextrose 50% Injectable 25 Gram(s) IV Push once  dextrose 50% Injectable 25 Gram(s) IV Push once  fenofibrate Tablet 145 milliGRAM(s) Oral daily  finasteride 5 milliGRAM(s) Oral daily  fluticasone propionate 50 MICROgram(s)/spray Nasal Spray 1 Spray(s) Both Nostrils two times a day  heparin  Infusion 1200 Unit(s)/Hr IV Continuous <Continuous>  influenza   Vaccine 0.5 milliLiter(s) IntraMuscular once  insulin glargine Injectable (LANTUS) 21 Unit(s) SubCutaneous at bedtime  insulin lispro Injectable (HumaLOG) 7 Unit(s) SubCutaneous before breakfast  insulin lispro Injectable (HumaLOG) 7 Unit(s) SubCutaneous before lunch  insulin lispro Injectable (HumaLOG) 7 Unit(s) SubCutaneous before dinner  isosorbide   mononitrate ER Tablet (IMDUR) 30 milliGRAM(s) Oral daily  metoprolol succinate ER 75 milliGRAM(s) Oral daily  nafcillin  IVPB      nafcillin  IVPB 2 Gram(s) IV Intermittent every 4 hours  nitroglycerin    Patch 0.2 mG/Hr(s) 1 patch Transdermal daily  silver sulfADIAZINE 1% Cream 1 Application(s) Topical two times a day  simvastatin 40 milliGRAM(s) Oral at bedtime  sodium chloride 0.9%. 1000 milliLiter(s) IV Continuous <Continuous>  tamsulosin 0.4 milliGRAM(s) Oral at bedtime    PRN Inpatient Medications  acetaminophen   Tablet .. 650 milliGRAM(s) Oral every 6 hours PRN  acetaminophen  Suppository .. 650 milliGRAM(s) Rectal every 6 hours PRN  ALPRAZolam 0.5 milliGRAM(s) Oral at bedtime PRN  dextrose 40% Gel 15 Gram(s) Oral once PRN  glucagon  Injectable 1 milliGRAM(s) IntraMuscular once PRN      REVIEW OF SYSTEMS  --------------------------------------------------------------------------------  Gen: No weight changes, fatigue, fevers/chills, weakness  Skin: No rashes  Head/Eyes/Ears/Mouth: No headache; Normal hearing; Normal vision w/o blurriness; No sinus pain/discomfort, sore throat  Respiratory: No dyspnea, cough, wheezing, hemoptysis  CV: No chest pain, PND, orthopnea  GI: No abdominal pain, diarrhea, constipation, nausea, vomiting, melena, hematochezia  : No increased frequency, dysuria, hematuria, nocturia  MSK: No joint pain/swelling; no back pain; no edema  Neuro: No dizziness/lightheadedness, weakness, seizures, numbness, tingling  Heme: No easy bruising or bleeding  Endo: No heat/cold intolerance  Psych: No significant nervousness, anxiety, stress, depression    All other systems were reviewed and are negative, except as noted.    VITALS/PHYSICAL EXAM  --------------------------------------------------------------------------------  T(C): 35.7 (10-23-19 @ 07:10), Max: 35.9 (10-22-19 @ 23:00)  HR: 63 (10-23-19 @ 06:11) (62 - 73)  BP: 135/58 (10-23-19 @ 06:11) (98/55 - 135/58)  RR: 27 (10-23-19 @ 07:10) (16 - 37)  SpO2: 98% (10-23-19 @ 06:11) (98% - 100%)  Wt(kg): --        10-22-19 @ 07:01  -  10-23-19 @ 07:00  --------------------------------------------------------  IN: 2158 mL / OUT: 401 mL / NET: 1757 mL      Physical Exam:  	Gen: NAD, well-appearing  	HEENT: PERRL, supple neck, clear oropharynx  	Pulm: CTA B/L  	CV: RRR, S1S2; no rub  	Back: No spinal or CVA tenderness; no sacral edema  	Abd: +BS, soft, nontender/nondistended  	: No suprapubic tenderness  	UE: Warm, FROM, no clubbing, intact strength; no edema  	LE: Warm, FROM, no clubbing, intact strength; 1+ edema  	Neuro: No focal deficits  	Psych: Normal affect and mood  	Skin: Warm, without rashes  	Vascular access:    LABS/STUDIES  --------------------------------------------------------------------------------              11.9   8.82  >-----------<  245      [10-23-19 @ 05:57]              36.4     136  |  89  |  x   ----------------------------<  208      [10-23-19 @ 11:34]  4.2   |  22  |  8.3        Ca     7.7     [10-23-19 @ 11:34]      Phos  7.8     [10-21-19 @ 19:08]    TPro  6.4  /  Alb  3.0  /  TBili  1.1  /  DBili  x   /  AST  19  /  ALT  15  /  AlkPhos  43  [10-23-19 @ 11:34]    PT/INR: PT 16.50, INR 1.44       [10-22-19 @ 05:33]  PTT: 95.4       [10-23-19 @ 05:57]    CK 73      [10-21-19 @ 19:08]    Creatinine Trend:  SCr 8.3 [10-23 @ 11:34]  SCr 8.1 [10-23 @ 05:57]  SCr 7.6 [10-22 @ 05:33]  SCr 8.1 [10-21 @ 19:08]  SCr 6.9 [10-21 @ 05:56]    Urinalysis - [10-15-19 @ 11:30]      Color Yellow / Appearance Clear / SG 1.015 / pH 5.5      Gluc Negative / Ketone Negative  / Bili Negative / Urobili 0.2       Blood Negative / Protein Negative / Leuk Est Negative / Nitrite Negative      RBC  / WBC  / Hyaline  / Gran  / Sq Epi  / Non Sq Epi  / Bacteria     Urine Creatinine 87      [10-22-19 @ 14:31]  Urine Sodium 46.0      [10-22-19 @ 14:31]  Urine Osmolality 319      [10-22-19 @ 14:31]    PTH -- (Ca 7.8)      [10-21-19 @ 19:08]   278  HbA1c 6.5      [08-20-19 @ 05:42]  TSH 0.84      [08-29-19 @ 06:15]      C3 Complement 132      [10-21-19 @ 19:08]  C4 Complement 25      [10-21-19 @ 19:08]

## 2019-10-24 LAB
ALBUMIN SERPL ELPH-MCNC: 3.1 G/DL — LOW (ref 3.5–5.2)
ALP SERPL-CCNC: 40 U/L — SIGNIFICANT CHANGE UP (ref 30–115)
ALT FLD-CCNC: 15 U/L — SIGNIFICANT CHANGE UP (ref 0–41)
ANION GAP SERPL CALC-SCNC: 25 MMOL/L — HIGH (ref 7–14)
APTT BLD: 72.9 SEC — CRITICAL HIGH (ref 27–39.2)
APTT BLD: 73.5 SEC — CRITICAL HIGH (ref 27–39.2)
AST SERPL-CCNC: 18 U/L — SIGNIFICANT CHANGE UP (ref 0–41)
BASOPHILS # BLD AUTO: 0.04 K/UL — SIGNIFICANT CHANGE UP (ref 0–0.2)
BASOPHILS NFR BLD AUTO: 0.5 % — SIGNIFICANT CHANGE UP (ref 0–1)
BILIRUB SERPL-MCNC: 1 MG/DL — SIGNIFICANT CHANGE UP (ref 0.2–1.2)
BUN SERPL-MCNC: 101 MG/DL — CRITICAL HIGH (ref 10–20)
CALCIUM SERPL-MCNC: 8.1 MG/DL — LOW (ref 8.5–10.1)
CHLORIDE SERPL-SCNC: 93 MMOL/L — LOW (ref 98–110)
CO2 SERPL-SCNC: 22 MMOL/L — SIGNIFICANT CHANGE UP (ref 17–32)
CREAT SERPL-MCNC: 7.3 MG/DL — CRITICAL HIGH (ref 0.7–1.5)
EOSINOPHIL # BLD AUTO: 0.26 K/UL — SIGNIFICANT CHANGE UP (ref 0–0.7)
EOSINOPHIL NFR BLD AUTO: 3.1 % — SIGNIFICANT CHANGE UP (ref 0–8)
GLUCOSE BLDC GLUCOMTR-MCNC: 130 MG/DL — HIGH (ref 70–99)
GLUCOSE BLDC GLUCOMTR-MCNC: 162 MG/DL — HIGH (ref 70–99)
GLUCOSE BLDC GLUCOMTR-MCNC: 69 MG/DL — LOW (ref 70–99)
GLUCOSE BLDC GLUCOMTR-MCNC: 84 MG/DL — SIGNIFICANT CHANGE UP (ref 70–99)
GLUCOSE BLDC GLUCOMTR-MCNC: 99 MG/DL — SIGNIFICANT CHANGE UP (ref 70–99)
GLUCOSE SERPL-MCNC: 117 MG/DL — HIGH (ref 70–99)
HCT VFR BLD CALC: 33.6 % — LOW (ref 42–52)
HGB BLD-MCNC: 11.1 G/DL — LOW (ref 14–18)
IMM GRANULOCYTES NFR BLD AUTO: 1.2 % — HIGH (ref 0.1–0.3)
LYMPHOCYTES # BLD AUTO: 1.34 K/UL — SIGNIFICANT CHANGE UP (ref 1.2–3.4)
LYMPHOCYTES # BLD AUTO: 16.2 % — LOW (ref 20.5–51.1)
MCHC RBC-ENTMCNC: 25.3 PG — LOW (ref 27–31)
MCHC RBC-ENTMCNC: 33 G/DL — SIGNIFICANT CHANGE UP (ref 32–37)
MCV RBC AUTO: 76.7 FL — LOW (ref 80–94)
MONOCYTES # BLD AUTO: 1.1 K/UL — HIGH (ref 0.1–0.6)
MONOCYTES NFR BLD AUTO: 13.3 % — HIGH (ref 1.7–9.3)
MRSA PCR RESULT.: NEGATIVE — SIGNIFICANT CHANGE UP
NEUTROPHILS # BLD AUTO: 5.45 K/UL — SIGNIFICANT CHANGE UP (ref 1.4–6.5)
NEUTROPHILS NFR BLD AUTO: 65.7 % — SIGNIFICANT CHANGE UP (ref 42.2–75.2)
NRBC # BLD: 0 /100 WBCS — SIGNIFICANT CHANGE UP (ref 0–0)
PLATELET # BLD AUTO: 225 K/UL — SIGNIFICANT CHANGE UP (ref 130–400)
POTASSIUM SERPL-MCNC: 3.7 MMOL/L — SIGNIFICANT CHANGE UP (ref 3.5–5)
POTASSIUM SERPL-SCNC: 3.7 MMOL/L — SIGNIFICANT CHANGE UP (ref 3.5–5)
PROT SERPL-MCNC: 6.2 G/DL — SIGNIFICANT CHANGE UP (ref 6–8)
RBC # BLD: 4.38 M/UL — LOW (ref 4.7–6.1)
RBC # FLD: 16.7 % — HIGH (ref 11.5–14.5)
SODIUM SERPL-SCNC: 140 MMOL/L — SIGNIFICANT CHANGE UP (ref 135–146)
WBC # BLD: 8.29 K/UL — SIGNIFICANT CHANGE UP (ref 4.8–10.8)
WBC # FLD AUTO: 8.29 K/UL — SIGNIFICANT CHANGE UP (ref 4.8–10.8)

## 2019-10-24 PROCEDURE — 71045 X-RAY EXAM CHEST 1 VIEW: CPT | Mod: 26

## 2019-10-24 PROCEDURE — 99233 SBSQ HOSP IP/OBS HIGH 50: CPT

## 2019-10-24 RX ORDER — CHLORHEXIDINE GLUCONATE 213 G/1000ML
1 SOLUTION TOPICAL DAILY
Refills: 0 | Status: DISCONTINUED | OUTPATIENT
Start: 2019-10-24 | End: 2019-10-29

## 2019-10-24 RX ORDER — INSULIN GLARGINE 100 [IU]/ML
12 INJECTION, SOLUTION SUBCUTANEOUS AT BEDTIME
Refills: 0 | Status: DISCONTINUED | OUTPATIENT
Start: 2019-10-24 | End: 2019-10-29

## 2019-10-24 RX ADMIN — ISOSORBIDE MONONITRATE 30 MILLIGRAM(S): 60 TABLET, EXTENDED RELEASE ORAL at 11:42

## 2019-10-24 RX ADMIN — HEPARIN SODIUM 12 UNIT(S)/HR: 5000 INJECTION INTRAVENOUS; SUBCUTANEOUS at 11:46

## 2019-10-24 RX ADMIN — Medication 1 SPRAY(S): at 17:31

## 2019-10-24 RX ADMIN — NAFCILLIN 200 GRAM(S): 10 INJECTION, POWDER, FOR SOLUTION INTRAVENOUS at 05:31

## 2019-10-24 RX ADMIN — SIMVASTATIN 40 MILLIGRAM(S): 20 TABLET, FILM COATED ORAL at 21:24

## 2019-10-24 RX ADMIN — Medication 75 MILLIGRAM(S): at 05:32

## 2019-10-24 RX ADMIN — Medication 7 UNIT(S): at 11:44

## 2019-10-24 RX ADMIN — CLOPIDOGREL BISULFATE 75 MILLIGRAM(S): 75 TABLET, FILM COATED ORAL at 11:42

## 2019-10-24 RX ADMIN — BUDESONIDE AND FORMOTEROL FUMARATE DIHYDRATE 2 PUFF(S): 160; 4.5 AEROSOL RESPIRATORY (INHALATION) at 07:59

## 2019-10-24 RX ADMIN — NAFCILLIN 200 GRAM(S): 10 INJECTION, POWDER, FOR SOLUTION INTRAVENOUS at 21:24

## 2019-10-24 RX ADMIN — NAFCILLIN 200 GRAM(S): 10 INJECTION, POWDER, FOR SOLUTION INTRAVENOUS at 09:02

## 2019-10-24 RX ADMIN — NAFCILLIN 200 GRAM(S): 10 INJECTION, POWDER, FOR SOLUTION INTRAVENOUS at 14:12

## 2019-10-24 RX ADMIN — Medication 81 MILLIGRAM(S): at 11:41

## 2019-10-24 RX ADMIN — Medication 145 MILLIGRAM(S): at 11:42

## 2019-10-24 RX ADMIN — Medication 7 UNIT(S): at 07:58

## 2019-10-24 RX ADMIN — Medication 1 PATCH: at 11:42

## 2019-10-24 RX ADMIN — NAFCILLIN 200 GRAM(S): 10 INJECTION, POWDER, FOR SOLUTION INTRAVENOUS at 17:30

## 2019-10-24 RX ADMIN — NAFCILLIN 200 GRAM(S): 10 INJECTION, POWDER, FOR SOLUTION INTRAVENOUS at 02:52

## 2019-10-24 RX ADMIN — Medication 0.5 MILLIGRAM(S): at 21:24

## 2019-10-24 RX ADMIN — TAMSULOSIN HYDROCHLORIDE 0.4 MILLIGRAM(S): 0.4 CAPSULE ORAL at 21:24

## 2019-10-24 RX ADMIN — Medication 1 APPLICATION(S): at 06:28

## 2019-10-24 RX ADMIN — Medication 1 PATCH: at 22:00

## 2019-10-24 RX ADMIN — Medication 1 APPLICATION(S): at 17:31

## 2019-10-24 RX ADMIN — HEPARIN SODIUM 12 UNIT(S)/HR: 5000 INJECTION INTRAVENOUS; SUBCUTANEOUS at 00:00

## 2019-10-24 RX ADMIN — Medication 1 PATCH: at 21:41

## 2019-10-24 RX ADMIN — INSULIN GLARGINE 12 UNIT(S): 100 INJECTION, SOLUTION SUBCUTANEOUS at 22:09

## 2019-10-24 RX ADMIN — BUDESONIDE AND FORMOTEROL FUMARATE DIHYDRATE 2 PUFF(S): 160; 4.5 AEROSOL RESPIRATORY (INHALATION) at 19:41

## 2019-10-24 RX ADMIN — Medication 1 PATCH: at 00:18

## 2019-10-24 RX ADMIN — CHLORHEXIDINE GLUCONATE 1 APPLICATION(S): 213 SOLUTION TOPICAL at 11:42

## 2019-10-24 RX ADMIN — Medication 1 SPRAY(S): at 05:33

## 2019-10-24 RX ADMIN — FINASTERIDE 5 MILLIGRAM(S): 5 TABLET, FILM COATED ORAL at 11:42

## 2019-10-24 NOTE — CHART NOTE - NSCHARTNOTEFT_GEN_A_CORE
Registered Dietitian Limited Follow-Up:    Acute on chronic diastolic CHF Exacerbation - Lasix held for now as patient appears to be maximally diuresed. BRITTANI worsening. Receiving Renal no protein restriction diet with DASH/TLC diet modifier. Reports fair appetite with 75% po intake at this time. Labs/meds reviewed. No unintentional wt loss demonstrated this admit. No additional nutrition intervention at this time.

## 2019-10-24 NOTE — PROGRESS NOTE ADULT - ASSESSMENT
Patient is a 78yo Male w/ PMH of HFpEF (EF of 55% with Grade II Diastolic dysfunction), COPD (home O2 3-3.5L as needed), DM, HTN, CAD s/p CABG and 1 stent, BPH, and recent admission for CHF (August 2019) presenting to [facility] with complaints of shortness of breath and chest pain. Patient reports he has been progressively worsening shortness of breath for the last 4-5 days prior to admission.    1) Acute on chronic diastolic CHF Exacerbation   Lasix held for now as patient appears to be maximally diuresed   Patient on Heparin Drip adjusted today. follow with PTT   Continue with Metoprolol ER 75 along with Aspirin 81. c/w plavix 75mg daily after loading does  Monitor I and O   Follow with Repeat PTT    2. HTN  stable at this time Hold Meds and monitor BP    3. BRITTANI worsening  Diuretics held for >48 hours   Continue to hold  Follow with Creatinine in AM  Renal on board patient getting Hemodialysis.   Sodium Bicarbonate 650 Twice daily   Calcium acetate 667 Three times daily     4. DM2.   Start Insulin Follow FBG    5. COPD on as needed home O2 not in exacerbation  -Monitor and c/w home med    6. BPH  C/w med    7. CAD s/p CABG  - continue cardiac med and plan for cath once afebrile    8. Bacteremia - gram positive cocci in clusters  Nafcillin as per ID recommendations   Follow with blood cultures every 48 hours until 3 negative   Patient needs JAYCOB, Cardiology feels risk is greater than benefit at this moment in time.        DVT Prophylaxis   Heparin Drip     Disposition   Continue with CCU Monitoring

## 2019-10-24 NOTE — PROGRESS NOTE ADULT - SUBJECTIVE AND OBJECTIVE BOX
Nephrology progress note    Patient is seen and examined, events over the last 24 h noted .  Had 1st Hd yesterday - tolerated well 1L UF  Allergies:  No Known Allergies    Hospital Medications:   MEDICATIONS  (STANDING):  aspirin  chewable 81 milliGRAM(s) Oral daily  buDESOnide 160 MICROgram(s)/formoterol 4.5 MICROgram(s) Inhaler 2 Puff(s) Inhalation two times a day  chlorhexidine 4% Liquid 1 Application(s) Topical daily  clopidogrel Tablet 75 milliGRAM(s) Oral daily  dextrose 5%. 1000 milliLiter(s) (50 mL/Hr) IV Continuous <Continuous>  dextrose 50% Injectable 12.5 Gram(s) IV Push once  dextrose 50% Injectable 25 Gram(s) IV Push once  dextrose 50% Injectable 25 Gram(s) IV Push once  fenofibrate Tablet 145 milliGRAM(s) Oral daily  finasteride 5 milliGRAM(s) Oral daily  fluticasone propionate 50 MICROgram(s)/spray Nasal Spray 1 Spray(s) Both Nostrils two times a day  heparin  Infusion 1200 Unit(s)/Hr (12 mL/Hr) IV Continuous <Continuous>  influenza   Vaccine 0.5 milliLiter(s) IntraMuscular once  insulin glargine Injectable (LANTUS) 21 Unit(s) SubCutaneous at bedtime  insulin lispro Injectable (HumaLOG) 7 Unit(s) SubCutaneous before breakfast  insulin lispro Injectable (HumaLOG) 7 Unit(s) SubCutaneous before lunch  insulin lispro Injectable (HumaLOG) 7 Unit(s) SubCutaneous before dinner  isosorbide   mononitrate ER Tablet (IMDUR) 30 milliGRAM(s) Oral daily  metoprolol succinate ER 75 milliGRAM(s) Oral daily  nafcillin  IVPB      nafcillin  IVPB 2 Gram(s) IV Intermittent every 4 hours  nitroglycerin    Patch 0.2 mG/Hr(s) 1 patch Transdermal daily  silver sulfADIAZINE 1% Cream 1 Application(s) Topical two times a day  simvastatin 40 milliGRAM(s) Oral at bedtime  tamsulosin 0.4 milliGRAM(s) Oral at bedtime        VITALS:  T(F): 96.5 (10-24-19 @ 07:05), Max: 96.5 (10-24-19 @ 07:05)  HR: 77 (10-24-19 @ 07:12)  BP: 109/49 (10-24-19 @ 07:12)  RR: 65 (10-24-19 @ 07:12)  SpO2: 93% (10-24-19 @ 07:12)  Wt(kg): --    10-22 @ 07:01  -  10-23 @ 07:00  --------------------------------------------------------  IN: 2172 mL / OUT: 401 mL / NET: 1771 mL    10-23 @ 07:01  -  10-24 @ 07:00  --------------------------------------------------------  IN: 1104 mL / OUT: 1440 mL / NET: -336 mL    10-24 @ 07:01  -  10-24 @ 11:53  --------------------------------------------------------  IN: 244 mL / OUT: 0 mL / NET: 244 mL          PHYSICAL EXAM:  Constitutional: NAD  HEENT: anicteric sclera, oropharynx clear, MMM  Neck: No JVD  Respiratory: CTAB  Cardiovascular: S1, S2, RRR  Gastrointestinal: BS+, soft, NT/ND  Extremities: 1+ pedal edema  Neurological: A/O x 3, no focal deficits  : + julian.   Skin: No rashes  Vascular Access: [place] IJ    LABS:  10-24    140  |  93<L>  |  101<HH>  ----------------------------<  117<H>  3.7   |  22  |  7.3<HH>    Ca    8.1<L>      24 Oct 2019 05:53    TPro  6.2  /  Alb  3.1<L>  /  TBili  1.0  /  DBili      /  AST  18  /  ALT  15  /  AlkPhos  40  10-24                          11.1   8.29  )-----------( 225      ( 24 Oct 2019 05:53 )             33.6       Urine Studies:    Osmolality, Random Urine: 319 mos/kg (10-22 @ 14:31)  Sodium, Random Urine: 46.0 mmoL/L (10-22 @ 14:31)  Creatinine, Random Urine: 87 mg/dL (10-22 @ 14:31)    RADIOLOGY & ADDITIONAL STUDIES:  < from: Xray Chest 1 View- PORTABLE-Routine (10.24.19 @ 06:21) >    No radiographic evidence of acute cardiopulmonary disease.    < end of copied text >

## 2019-10-24 NOTE — PROGRESS NOTE ADULT - ASSESSMENT
1)  Severe oliguric BRITTANI superimposed on  Stage 3 CKD (creat was 1.5 mg% in August 2019).    PMH: DM, HFpEF,      BRITTANI - due to ATN from sepsis from PNA and bacteremia, drop in BP; therefore ATN likely vs postinfectious GN.   c3c4 normal - unlikely PIGN  2)  MSSA bacteremia - likely due to PNA vs endocarditis, on NAfcillin    -started on HD 10/23, next tx 10/25, oliguric  no need for IVF, repeat Phos, cont Phoslo    will follow

## 2019-10-24 NOTE — PROGRESS NOTE ADULT - ASSESSMENT
No complaints. He feels better.  Check labs. He tolerated dialysis. Possible dialysis today. Last BC thus far negative.   If stable try ambulate .  Physical thearpy. Continue heparin

## 2019-10-24 NOTE — PROGRESS NOTE ADULT - SUBJECTIVE AND OBJECTIVE BOX
SALLYTAIWO  79y, Male  Allergy: No Known Allergies      CHIEF COMPLAINT: CHF Exacerbation (23 Oct 2019 10:29)      INTERVAL EVENTS/HPI  - No acute events overnight  - T(F): , Max: 96.3 (10-23-19 @ 07:10)  - Denies any worsening symptoms  - Tolerating medication    ROS  10 system review - neg   SOCIAL HISTORY - not relevant     Substance Use (  ) never used  (  ) IVDU (  ) Other:  Tobacco Usage:  (   ) never smoked   (   ) former smoker   (   ) current smoker   Alcohol Usage: (   ) social  (   ) daily use (   ) denies  Sexual History:       FH noncontributory     VITALS:  T(F): 95.2, Max: 96.3 (10-23-19 @ 07:10)  HR: 64  BP: 118/57  RR: 22Vital Signs Last 24 Hrs  T(C): 35.1 (23 Oct 2019 23:07), Max: 35.7 (23 Oct 2019 07:10)  T(F): 95.2 (23 Oct 2019 23:07), Max: 96.3 (23 Oct 2019 07:10)  HR: 64 (24 Oct 2019 05:30) (62 - 64)  BP: 118/57 (24 Oct 2019 05:30) (94/52 - 136/66)  BP(mean): 80 (24 Oct 2019 05:30) (67 - 87)  RR: 22 (24 Oct 2019 05:30) (16 - 37)  SpO2: 99% (24 Oct 2019 05:30) (97% - 100%)    PHYSICAL EXAM:  Gen: NAD, resting in bed  HEENT: Normocephalic, atraumatic. R neck catheter   Neck: supple, no lymphadenopathy  CV: s1 s 2 +  Lungs: decreased BS  Abdomen: Soft, BS present  Ext: Warm, well perfused. edema ++  Neuro: non focal, awake  Skin: no rash, no erythema      TESTS & MEASUREMENTS:                        11.9   8.82  )-----------( 245      ( 23 Oct 2019 05:57 )             36.4     10-23    136  |  89<L>  |  132<HH>  ----------------------------<  208<H>  4.2   |  22  |  8.3<HH>    Ca    7.7<L>      23 Oct 2019 11:34    TPro  6.4  /  Alb  3.0<L>  /  TBili  1.1  /  DBili  x   /  AST  19  /  ALT  15  /  AlkPhos  43  10-23    eGFR if : 6 mL/min/1.73M2 (10-23-19 @ 11:34)  eGFR if Non African American: 6 mL/min/1.73M2 (10-23-19 @ 11:34)  eGFR if Non African American: 6 mL/min/1.73M2 (10-23-19 @ 05:57)  eGFR if : 7 mL/min/1.73M2 (10-23-19 @ 05:57)    LIVER FUNCTIONS - ( 23 Oct 2019 11:34 )  Alb: 3.0 g/dL / Pro: 6.4 g/dL / ALK PHOS: 43 U/L / ALT: 15 U/L / AST: 19 U/L / GGT: x               Culture - Blood (collected 10-22-19 @ 05:33)  Source: .Blood None  Preliminary Report (10-23-19 @ 18:01):    No growth to date.    Culture - Blood (collected 10-19-19 @ 06:36)  Source: .Blood None  Gram Stain (10-21-19 @ 02:16):    Growth in aerobic bottle: Gram Positive Cocci in Clusters    Growth in anaerobic bottle: Gram Positive Cocci in Clusters  Final Report (10-22-19 @ 09:54):    Growth in aerobic and anaerobic bottles: Staphylococcus aureus  Organism: Staphylococcus aureus (10-22-19 @ 09:54)  Organism: Staphylococcus aureus (10-22-19 @ 09:54)      -  Ampicillin/Sulbactam: S <=8/4      -  Cefazolin: S <=4      -  Clindamycin: R 0.5 This isolate is presumed to be clindamycin resistant based on detection of inducible resistance. Clindamycin may still be effective in some patients.      -  Erythromycin: R >4      -  Gentamicin: S <=1 Should not be used as monotherapy      -  Oxacillin: S <=0.25      -  Penicillin: R >8      -  RIF- Rifampin: S <=1 Should not be used as monotherapy      -  Tetra/Doxy: S <=1      -  Trimethoprim/Sulfamethoxazole: S <=0.5/9.5      -  Vancomycin: S 2      Method Type: HAYLEE            INFECTIOUS DISEASES TESTING      RADIOLOGY & ADDITIONAL TESTS:  I have personally reviewed the last Chest xray  CXR  Xray Chest 1 View- PORTABLE-Urgent:   EXAM:  XR CHEST PORTABLE URGENT 1V            PROCEDURE DATE:  10/23/2019            INTERPRETATION:  Clinical History / Reason for exam: Line placement    Comparison : Chest radiograph October 23, 2019 at 6:14 AM.    Technique/Positioning: Single AP view of the chest.    Findings:    Support devices: New right IJ central venous catheter in appropriate   position    Cardiac/mediastinum/hilum: Poststernotomy CABG, unchanged.    Lung parenchyma/Pleura: Stable small effusions and interstitial edema. No   pneumothorax.    Skeleton/soft tissues: Unchanged.    Impression:      Stable small effusions and interstitial edema.                      ELLIOT LANDAU M.D., ATTENDING RADIOLOGIST  This document has been electronically signed. Oct 23 2019 12:02PM             (10-23-19 @ 11:51)      CT      CARDIOLOGY TESTING  12 Lead ECG:   Ventricular Rate 62 BPM    Atrial Rate 248 BPM    QRS Duration 122 ms    Q-T Interval 506 ms    QTC Calculation(Bezet) 513 ms    P Axis 267 degrees    R Axis -76 degrees    T Axis 223 degrees    Diagnosis Line Atrial flutter with 4:1 A-V conduction  Left axis deviation  Right bundle branch block  T wave abnormality, consider inferolateral ischemia  Abnormal ECG    Confirmed by STEVE YAO MD ([id number]) on 10/23/2019 11:40:04 AM (10-23-19 @ 07:27)  12 Lead ECG:   Ventricular Rate 62 BPM    Atrial Rate 248 BPM    QRS Duration 120 ms    Q-T Interval 532 ms    QTC Calculation(Bezet) 539 ms    P Axis -89 degrees    R Axis -75 degrees    T Axis 220 degrees    Diagnosis Line Atrial flutter with 4:1 A-V conduction  Left axis deviation  Right bundle branch block  T wave abnormality, consider inferolateral ischemia  Abnormal ECG    Confirmed by STEVE YAO MD [id number]) on 10/22/2019 12:10:46 PM (10-22-19 @ 07:40)      MEDICATIONS  aspirin  chewable 81  buDESOnide 160 MICROgram(s)/formoterol 4.5 MICROgram(s) Inhaler 2  clopidogrel Tablet 75  dextrose 5%. 1000  dextrose 50% Injectable 12.5  dextrose 50% Injectable 25  dextrose 50% Injectable 25  fenofibrate Tablet 145  finasteride 5  fluticasone propionate 50 MICROgram(s)/spray Nasal Spray 1  heparin  Infusion 1200  influenza   Vaccine 0.5  insulin glargine Injectable (LANTUS) 21  insulin lispro Injectable (HumaLOG) 7  insulin lispro Injectable (HumaLOG) 7  insulin lispro Injectable (HumaLOG) 7  isosorbide   mononitrate ER Tablet (IMDUR) 30  metoprolol succinate ER 75  nafcillin  IVPB   nafcillin  IVPB 2  nitroglycerin    Patch 0.2 mG/Hr(s) 1  silver sulfADIAZINE 1% Cream 1  simvastatin 40  tamsulosin 0.4      ANTIBIOTICS:  nafcillin  IVPB      nafcillin  IVPB 2 Gram(s) IV Intermittent every 4 hours

## 2019-10-24 NOTE — PROGRESS NOTE ADULT - SUBJECTIVE AND OBJECTIVE BOX
Patient is a 79y old  Male who presents with a chief complaint of CHF exacerbation (24 Oct 2019 10:10)      T(F): 96.5 (10-24-19 @ 07:05), Max: 96.5 (10-24-19 @ 07:05)  HR: 63 (10-24-19 @ 11:52)  BP: 122/57 (10-24-19 @ 11:52)  RR: 65 (10-24-19 @ 07:12)  SpO2: 100% (10-24-19 @ 11:52) (93% - 100%)    PHYSICAL EXAM:  GENERAL: NAD  HEAD:  Atraumatic, Normocephalic  NERVOUS SYSTEM:  Alert & Oriented X3, no focal deficits   CHEST/LUNG:  bilateral rhonchi  HEART: Regular rate and rhythm; No murmurs, rubs, or gallops  ABDOMEN: Soft, Nontender, Nondistended; Bowel sounds present  EXTREMITIES:  b/l  edema    LABS  10-24    140  |  93<L>  |  101<HH>  ----------------------------<  117<H>  3.7   |  22  |  7.3<HH>    Ca    8.1<L>      24 Oct 2019 05:53    TPro  6.2  /  Alb  3.1<L>  /  TBili  1.0  /  DBili  x   /  AST  18  /  ALT  15  /  AlkPhos  40  10-24                          11.1   8.29  )-----------( 225      ( 24 Oct 2019 05:53 )             33.6     PTT - ( 24 Oct 2019 05:53 )  PTT:73.5 sec    CARDIAC ENZYMES  Creatine Kinase, Serum: 73 (10-21 @ 19:08)      Culture Results:   No growth to date. (10-22-19)  Culture Results:   Growth in aerobic and anaerobic bottles: Staphylococcus aureus (10-19-19)  Culture Results:   Growth in aerobic and anaerobic bottles: Staphylococcus aureus  See previous culture [id number] (10-17-19)  Culture Results:   Growth in aerobic and anaerobic bottles: Staphylococcus aureus  See previous culture [id number] (10-16-19)  Culture Results:   <10,000 CFU/mL Normal Urogenital Felipa (10-15-19)  Culture Results:   Growth in aerobic bottle: Staphylococcus aureus  "Due to technical problems, Proteus sp. will Not be reported as part of  the BCID panel until further notice"  ***Blood Panel PCR results on this specimen are available  approximately 3 hours after the Gram stain result.***  Gram stain, PCR, and/or culture results may not always  correspond due to difference in methodologies.  ************************************************************  This PCR assay was performed using [employer].  The following targets are tested for: Enterococcus,  vancomycin resistant enterococci, Listeria monocytogenes,  coagulase negative staphylococci, S. aureus,  methicillin resistant S. aureus, Streptococcus agalactiae  (Group B), S. pneumoniae, S. pyogenes (Group A),  Acinetobacter baumannii, Enterobacter cloacae, E. coli,  Klebsiella oxytoca, K. pneumoniae, Proteus sp.,  Serratia marcescens, Haemophilus influenzae,  Neisseria meningitidis, Pseudomonas aeruginosa, Candida  albicans, C. glabrata, C krusei, C parapsilosis,  C. tropicalis and the KPC resistance gene. (10-15-19)    RADIOLOGY  < from: Xray Chest 1 View- PORTABLE-Routine (10.24.19 @ 06:21) >  EXAM:  XR CHEST PORTABLE ROUTINE 1V            PROCEDURE DATE:  10/24/2019            INTERPRETATION:  Clinical History / Reason for exam: Dyspnea    Comparison : Chest radiograph 10/23/2019.    Technique/Positioning: Frontal.    Findings:    Support devices: Central venous line superior vena cava    Cardiac/mediastinum/hilum: Indeterminate    Lung parenchyma/Pleura: Within normal limits.  Right pleural scarring  Skeleton/soft tissues: Unremarkable.    Impression:      No radiographic evidence of acute cardiopulmonary disease.      < end of copied text >    MEDICATIONS  (STANDING):  aspirin  chewable 81 milliGRAM(s) Oral daily  buDESOnide 160 MICROgram(s)/formoterol 4.5 MICROgram(s) Inhaler 2 Puff(s) Inhalation two times a day  chlorhexidine 4% Liquid 1 Application(s) Topical daily  clopidogrel Tablet 75 milliGRAM(s) Oral daily  fenofibrate Tablet 145 milliGRAM(s) Oral daily  finasteride 5 milliGRAM(s) Oral daily  fluticasone propionate 50 MICROgram(s)/spray Nasal Spray 1 Spray(s) Both Nostrils two times a day  heparin  Infusion 1200 Unit(s)/Hr (12 mL/Hr) IV Continuous <Continuous>  influenza   Vaccine 0.5 milliLiter(s) IntraMuscular once  insulin glargine Injectable (LANTUS) 21 Unit(s) SubCutaneous at bedtime  insulin lispro Injectable (HumaLOG) 7 Unit(s) SubCutaneous before breakfast  insulin lispro Injectable (HumaLOG) 7 Unit(s) SubCutaneous before lunch  insulin lispro Injectable (HumaLOG) 7 Unit(s) SubCutaneous before dinner  isosorbide   mononitrate ER Tablet (IMDUR) 30 milliGRAM(s) Oral daily  metoprolol succinate ER 75 milliGRAM(s) Oral daily      nafcillin  IVPB 2 Gram(s) IV Intermittent every 4 hours  nitroglycerin    Patch 0.2 mG/Hr(s) 1 patch Transdermal daily  silver sulfADIAZINE 1% Cream 1 Application(s) Topical two times a day  simvastatin 40 milliGRAM(s) Oral at bedtime  tamsulosin 0.4 milliGRAM(s) Oral at bedtime    MEDICATIONS  (PRN):  acetaminophen   Tablet .. 650 milliGRAM(s) Oral every 6 hours PRN Temp greater or equal to 38C (100.4F), Mild Pain (1 - 3)  acetaminophen  Suppository .. 650 milliGRAM(s) Rectal every 6 hours PRN Temp greater or equal to 38C (100.4F), Mild Pain (1 - 3)  ALPRAZolam 0.5 milliGRAM(s) Oral at bedtime PRN anxiety  dextrose 40% Gel 15 Gram(s) Oral once PRN Blood Glucose LESS THAN 70 milliGRAM(s)/deciliter  glucagon  Injectable 1 milliGRAM(s) IntraMuscular once PRN Glucose LESS THAN 70 milligrams/deciliter

## 2019-10-24 NOTE — PROGRESS NOTE ADULT - SUBJECTIVE AND OBJECTIVE BOX
Patient is a 79y old  Male who presents with a chief complaint of CHF Exacerbation (23 Oct 2019 10:29)      Over Night Events:  Patient seen and examined no resp distress started on HD yesterday       ROS:  See HPI    PHYSICAL EXAM    ICU Vital Signs Last 24 Hrs  T(C): 35.8 (24 Oct 2019 07:05), Max: 35.8 (24 Oct 2019 07:05)  T(F): 96.5 (24 Oct 2019 07:05), Max: 96.5 (24 Oct 2019 07:05)  HR: 77 (24 Oct 2019 07:12) (62 - 77)  BP: 109/49 (24 Oct 2019 07:12) (104/53 - 136/66)  BP(mean): 70 (24 Oct 2019 07:12) (70 - 87)  ABP: --  ABP(mean): --  RR: 65 (24 Oct 2019 07:12) (16 - 65)  SpO2: 93% (24 Oct 2019 07:12) (93% - 100%)      General: AOX3  HEENT:   darell             Lymph Nodes: NO cervical LN   Lungs: Bilateral BS  Cardiovascular: Regular   Abdomen: Soft, Positive BS  Extremities: No clubbing   Skin: warm   Neurological: no focal deficit   Musculoskeletal: move all ext     I&O's Detail    23 Oct 2019 07:01  -  24 Oct 2019 07:00  --------------------------------------------------------  IN:    heparin Infusion: 84 mL    heparin Infusion: 108 mL    IV PiggyBack: 600 mL    sodium chloride 0.9%: 300 mL  Total IN: 1092 mL    OUT:    Indwelling Catheter - Urethral: 440 mL    Other: 1000 mL  Total OUT: 1440 mL    Total NET: -348 mL          LABS:                          11.1   8.29  )-----------( 225      ( 24 Oct 2019 05:53 )             33.6         24 Oct 2019 05:53    140    |  93     |  101    ----------------------------<  117    3.7     |  22     |  7.3      Ca    8.1        24 Oct 2019 05:53    TPro  6.2    /  Alb  3.1    /  TBili  1.0    /  DBili  x      /  AST  18     /  ALT  15     /  AlkPhos  40     24 Oct 2019 05:53  Amylase x     lipase x                                                 PTT - ( 24 Oct 2019 05:53 )  PTT:73.5 sec                                                                                                   Culture - Blood (collected 22 Oct 2019 05:33)  Source: .Blood None  Preliminary Report (23 Oct 2019 18:01):    No growth to date.                                                                                           MEDICATIONS  (STANDING):  aspirin  chewable 81 milliGRAM(s) Oral daily  buDESOnide 160 MICROgram(s)/formoterol 4.5 MICROgram(s) Inhaler 2 Puff(s) Inhalation two times a day  chlorhexidine 4% Liquid 1 Application(s) Topical daily  clopidogrel Tablet 75 milliGRAM(s) Oral daily  dextrose 5%. 1000 milliLiter(s) (50 mL/Hr) IV Continuous <Continuous>  dextrose 50% Injectable 12.5 Gram(s) IV Push once  dextrose 50% Injectable 25 Gram(s) IV Push once  dextrose 50% Injectable 25 Gram(s) IV Push once  fenofibrate Tablet 145 milliGRAM(s) Oral daily  finasteride 5 milliGRAM(s) Oral daily  fluticasone propionate 50 MICROgram(s)/spray Nasal Spray 1 Spray(s) Both Nostrils two times a day  heparin  Infusion 1200 Unit(s)/Hr (12 mL/Hr) IV Continuous <Continuous>  influenza   Vaccine 0.5 milliLiter(s) IntraMuscular once  insulin glargine Injectable (LANTUS) 21 Unit(s) SubCutaneous at bedtime  insulin lispro Injectable (HumaLOG) 7 Unit(s) SubCutaneous before breakfast  insulin lispro Injectable (HumaLOG) 7 Unit(s) SubCutaneous before lunch  insulin lispro Injectable (HumaLOG) 7 Unit(s) SubCutaneous before dinner  isosorbide   mononitrate ER Tablet (IMDUR) 30 milliGRAM(s) Oral daily  metoprolol succinate ER 75 milliGRAM(s) Oral daily  nafcillin  IVPB      nafcillin  IVPB 2 Gram(s) IV Intermittent every 4 hours  nitroglycerin    Patch 0.2 mG/Hr(s) 1 patch Transdermal daily  silver sulfADIAZINE 1% Cream 1 Application(s) Topical two times a day  simvastatin 40 milliGRAM(s) Oral at bedtime  tamsulosin 0.4 milliGRAM(s) Oral at bedtime    MEDICATIONS  (PRN):  acetaminophen   Tablet .. 650 milliGRAM(s) Oral every 6 hours PRN Temp greater or equal to 38C (100.4F), Mild Pain (1 - 3)  acetaminophen  Suppository .. 650 milliGRAM(s) Rectal every 6 hours PRN Temp greater or equal to 38C (100.4F), Mild Pain (1 - 3)  ALPRAZolam 0.5 milliGRAM(s) Oral at bedtime PRN anxiety  dextrose 40% Gel 15 Gram(s) Oral once PRN Blood Glucose LESS THAN 70 milliGRAM(s)/deciliter  glucagon  Injectable 1 milliGRAM(s) IntraMuscular once PRN Glucose LESS THAN 70 milligrams/deciliter          Xrays:  TLC:  OG:  ET tube:                                                                                    not able to review system down    ECHO:  CAM ICU:

## 2019-10-24 NOTE — PROGRESS NOTE ADULT - ASSESSMENT
Patient is a 80yo Male w/ PMH of HFpEF (EF of 55% with Grade II Diastolic dysfunction), COPD (home O2 3-3.5L as needed), DM, HTN, CAD s/p CABG and 1 stent, BPH, and recent admission for CHF (August 2019) presented  to [facility] with complaints of shortness of breath and exertional chest pain.    #) Acute respiratory failure secondary to Acute on Chronic Diastolic CHF exacerbation / moderate  aortic stenosis   -  resolved  - now on HD to continue with even to  negative fluid balance    #) PABLO Bacteremia   -  BC 10/22 prelim negative,  check repeat q48h   - continue Nafcillin, ID following   -  cardiology following     #) BRITTANI   - probably ATN   - initiation of HD yesterday   - nephrology following    #) Chest Pain (resolved) ,  in patient with hx of CABG and stent placement  - c/w losartan asa, metoprolol; Simvastatin   -  Lovenox   - cardiology following    #) Hx of A Fib  - On Xarelto, 15mg at home; IV heparin for now, check ptt and adjust   -  Metoprolol      #) Hx of COPD  - Stable at this time, with no evidence of acute exacerbation  - Cont with Symbicort (in place of Breo)    #) Hx of Diabetes Type 2  - Insulin sq hospital protocol   - Carb consistent diet    #) Hx of BPH  - Cont with Flomax and Finasteride

## 2019-10-24 NOTE — PROGRESS NOTE ADULT - ASSESSMENT
Pulmonary edema improved  hx diastolic heart failure  fever multilobar  pneumonia from GPC MSSS  renal failure with oliguria      Suggest:  BLD cx from 10/22 no growth for now  feel comfortable   abx per ID  follow cx    need JAYCOB   cardio management CATH cancelled due to sepsis  follow renal on HD     taper O2  NIPPV as needed  OOB  DVT prophylaxis  gentle hydration  tele monitoring   care as per cardiology and renal   recall as needed Pulmonary edema improved  hx diastolic heart failure  fever multilobar  pneumonia from [facility] MSSS  renal failure with oliguria      Suggest:  BLD cx from 10/22 no growth for now  feel comfortable   abx per ID  follow cx    need JAYCOB   cardio management CATH cancelled due to sepsis  follow renal on HD     taper O2  NIPPV as needed  OOB  DVT prophylaxis  gentle hydration  tele monitoring   care as per cardiology and renal   recall as needed   d/c julian

## 2019-10-24 NOTE — PROGRESS NOTE ADULT - SUBJECTIVE AND OBJECTIVE BOX
HPI:  Patient is a 78yo Male w/ PMH of HFpEF (EF of 55% with Grade II Diastolic dysfunction), COPD (home O2 3-3.5L as needed), DM, HTN, CAD s/p CABG and 1 stent, BPH, and recent admission for CHF (August 2019) presenting to [facility] with complaints of shortness of breath and chest pain. Patient reports he has been progressively worsening shortness of breath for the last 4-5 days prior to admission. He also reports associated chest pain on exertion.  Patient reports he has been compliant with all of his medications as well with a low salt diet. He denies any recent history of fevers, chills, cough. On day of admission, had severe dyspnea on exertion associated with a pressure like sensation in his chest that radiated to his left arm. He tried sublingual nitro for relief but it didn't help. Therefore, he came in for further evaluation. He was given Solumedrol 125mg by EMS. In the ED, patient was found to have evidence of bilateral pleural effusions on bedside sono done by ED staff. He received IV Lasix 40mg and was placed on BiPAP with major improvement in his symptoms. On my assessment, patient was speaking to me in full sentences on BiPAP and his chest pain has resolved. (10 Oct 2019 13:27)        INTERVAL HPI/OVERNIGHT EVENTS:  No overnight events patient doing well. no Complaints sitting in a chair.     ICU Vital Signs Last 24 Hrs  T(C): 35.8 (24 Oct 2019 07:05), Max: 35.8 (24 Oct 2019 07:05)  T(F): 96.5 (24 Oct 2019 07:05), Max: 96.5 (24 Oct 2019 07:05)  HR: 77 (24 Oct 2019 07:12) (62 - 77)  BP: 109/49 (24 Oct 2019 07:12) (104/53 - 136/66)  BP(mean): 70 (24 Oct 2019 07:12) (70 - 87)  ABP: --  ABP(mean): --  RR: 65 (24 Oct 2019 07:12) (16 - 65)  SpO2: 93% (24 Oct 2019 07:12) (93% - 100%)    I&O's Summary    23 Oct 2019 07:01  -  24 Oct 2019 07:00  --------------------------------------------------------  IN: 1104 mL / OUT: 1440 mL / NET: -336 mL    24 Oct 2019 07:01  -  24 Oct 2019 10:13  --------------------------------------------------------  IN: 244 mL / OUT: 0 mL / NET: 244 mL          LABS:                        11.1   8.29  )-----------( 225      ( 24 Oct 2019 05:53 )             33.6     10-24    140  |  93<L>  |  101<HH>  ----------------------------<  117<H>  3.7   |  22  |  7.3<HH>    Ca    8.1<L>      24 Oct 2019 05:53    TPro  6.2  /  Alb  3.1<L>  /  TBili  1.0  /  DBili  x   /  AST  18  /  ALT  15  /  AlkPhos  40  10-24    PTT - ( 24 Oct 2019 05:53 )  PTT:73.5 sec    CAPILLARY BLOOD GLUCOSE      POCT Blood Glucose.: 162 mg/dL (24 Oct 2019 07:38)  POCT Blood Glucose.: 165 mg/dL (23 Oct 2019 21:27)  POCT Blood Glucose.: 98 mg/dL (23 Oct 2019 16:14)  POCT Blood Glucose.: 213 mg/dL (23 Oct 2019 11:31)        RADIOLOGY & ADDITIONAL TESTS:    Consultant(s) Notes Reviewed:  [x ] YES  [ ] NO    MEDICATIONS  (STANDING):  aspirin  chewable 81 milliGRAM(s) Oral daily  buDESOnide 160 MICROgram(s)/formoterol 4.5 MICROgram(s) Inhaler 2 Puff(s) Inhalation two times a day  chlorhexidine 4% Liquid 1 Application(s) Topical daily  clopidogrel Tablet 75 milliGRAM(s) Oral daily  dextrose 5%. 1000 milliLiter(s) (50 mL/Hr) IV Continuous <Continuous>  dextrose 50% Injectable 12.5 Gram(s) IV Push once  dextrose 50% Injectable 25 Gram(s) IV Push once  dextrose 50% Injectable 25 Gram(s) IV Push once  fenofibrate Tablet 145 milliGRAM(s) Oral daily  finasteride 5 milliGRAM(s) Oral daily  fluticasone propionate 50 MICROgram(s)/spray Nasal Spray 1 Spray(s) Both Nostrils two times a day  heparin  Infusion 1200 Unit(s)/Hr (12 mL/Hr) IV Continuous <Continuous>  influenza   Vaccine 0.5 milliLiter(s) IntraMuscular once  insulin glargine Injectable (LANTUS) 21 Unit(s) SubCutaneous at bedtime  insulin lispro Injectable (HumaLOG) 7 Unit(s) SubCutaneous before breakfast  insulin lispro Injectable (HumaLOG) 7 Unit(s) SubCutaneous before lunch  insulin lispro Injectable (HumaLOG) 7 Unit(s) SubCutaneous before dinner  isosorbide   mononitrate ER Tablet (IMDUR) 30 milliGRAM(s) Oral daily  metoprolol succinate ER 75 milliGRAM(s) Oral daily  nafcillin  IVPB      nafcillin  IVPB 2 Gram(s) IV Intermittent every 4 hours  nitroglycerin    Patch 0.2 mG/Hr(s) 1 patch Transdermal daily  silver sulfADIAZINE 1% Cream 1 Application(s) Topical two times a day  simvastatin 40 milliGRAM(s) Oral at bedtime  tamsulosin 0.4 milliGRAM(s) Oral at bedtime    MEDICATIONS  (PRN):  acetaminophen   Tablet .. 650 milliGRAM(s) Oral every 6 hours PRN Temp greater or equal to 38C (100.4F), Mild Pain (1 - 3)  acetaminophen  Suppository .. 650 milliGRAM(s) Rectal every 6 hours PRN Temp greater or equal to 38C (100.4F), Mild Pain (1 - 3)  ALPRAZolam 0.5 milliGRAM(s) Oral at bedtime PRN anxiety  dextrose 40% Gel 15 Gram(s) Oral once PRN Blood Glucose LESS THAN 70 milliGRAM(s)/deciliter  glucagon  Injectable 1 milliGRAM(s) IntraMuscular once PRN Glucose LESS THAN 70 milligrams/deciliter      PHYSICAL EXAM:  GENERAL: not in any acute distress sitting up in his chair.   HEAD:  Atraumatic, Normocephalic  EYES: EOMI, PERRLA, conjunctiva and sclera clear  ENT: No tonsillar erythema, exudates, or enlargement; Moist mucous membranes, Good dentition, No lesions  NECK: Supple, No JVD, Normal thyroid, no enlarged nodes  NERVOUS SYSTEM:  Alert & Oriented X3.  CHEST/LUNG:good air entry no wheeze today   HEART: S1S2 normal, Systolic ejection murmur noted. Afib rate controlled.   ABDOMEN: Soft, Nontender, Nondistended; Bowel sounds present  EXTREMITIES: Left leg chronic venous ulcer.   LYMPH: No lymphadenopathy noted  SKIN: No rashes or lesions    Care Discussed with Consultants/Other Providers [ x] YES  [ ] NO

## 2019-10-25 LAB
ALBUMIN SERPL ELPH-MCNC: 3 G/DL — LOW (ref 3.5–5.2)
ALP SERPL-CCNC: 37 U/L — SIGNIFICANT CHANGE UP (ref 30–115)
ALT FLD-CCNC: 16 U/L — SIGNIFICANT CHANGE UP (ref 0–41)
ANION GAP SERPL CALC-SCNC: 24 MMOL/L — HIGH (ref 7–14)
APTT BLD: 77.9 SEC — CRITICAL HIGH (ref 27–39.2)
AST SERPL-CCNC: 13 U/L — SIGNIFICANT CHANGE UP (ref 0–41)
BASOPHILS # BLD AUTO: 0.06 K/UL — SIGNIFICANT CHANGE UP (ref 0–0.2)
BASOPHILS NFR BLD AUTO: 0.8 % — SIGNIFICANT CHANGE UP (ref 0–1)
BILIRUB SERPL-MCNC: 1 MG/DL — SIGNIFICANT CHANGE UP (ref 0.2–1.2)
BUN SERPL-MCNC: 101 MG/DL — CRITICAL HIGH (ref 10–20)
CALCIUM SERPL-MCNC: 8.3 MG/DL — LOW (ref 8.5–10.1)
CHLORIDE SERPL-SCNC: 93 MMOL/L — LOW (ref 98–110)
CO2 SERPL-SCNC: 22 MMOL/L — SIGNIFICANT CHANGE UP (ref 17–32)
CREAT SERPL-MCNC: 8.2 MG/DL — CRITICAL HIGH (ref 0.7–1.5)
EOSINOPHIL # BLD AUTO: 0.29 K/UL — SIGNIFICANT CHANGE UP (ref 0–0.7)
EOSINOPHIL NFR BLD AUTO: 3.7 % — SIGNIFICANT CHANGE UP (ref 0–8)
GLUCOSE BLDC GLUCOMTR-MCNC: 121 MG/DL — HIGH (ref 70–99)
GLUCOSE BLDC GLUCOMTR-MCNC: 123 MG/DL — HIGH (ref 70–99)
GLUCOSE BLDC GLUCOMTR-MCNC: 70 MG/DL — SIGNIFICANT CHANGE UP (ref 70–99)
GLUCOSE BLDC GLUCOMTR-MCNC: 87 MG/DL — SIGNIFICANT CHANGE UP (ref 70–99)
GLUCOSE BLDC GLUCOMTR-MCNC: 94 MG/DL — SIGNIFICANT CHANGE UP (ref 70–99)
GLUCOSE SERPL-MCNC: 102 MG/DL — HIGH (ref 70–99)
HAV IGM SER-ACNC: SIGNIFICANT CHANGE UP
HBV CORE AB SER-ACNC: SIGNIFICANT CHANGE UP
HBV CORE IGM SER-ACNC: SIGNIFICANT CHANGE UP
HBV CORE IGM SER-ACNC: SIGNIFICANT CHANGE UP
HBV SURFACE AB SER-ACNC: <3 MIU/ML — LOW
HBV SURFACE AB SER-ACNC: SIGNIFICANT CHANGE UP
HBV SURFACE AG SER-ACNC: SIGNIFICANT CHANGE UP
HBV SURFACE AG SER-ACNC: SIGNIFICANT CHANGE UP
HCT VFR BLD CALC: 33.1 % — LOW (ref 42–52)
HCV AB S/CO SERPL IA: 0.16 S/CO — SIGNIFICANT CHANGE UP (ref 0–0.99)
HCV AB S/CO SERPL IA: 0.16 S/CO — SIGNIFICANT CHANGE UP (ref 0–0.99)
HCV AB SERPL-IMP: SIGNIFICANT CHANGE UP
HCV AB SERPL-IMP: SIGNIFICANT CHANGE UP
HGB BLD-MCNC: 10.9 G/DL — LOW (ref 14–18)
IMM GRANULOCYTES NFR BLD AUTO: 1.4 % — HIGH (ref 0.1–0.3)
LYMPHOCYTES # BLD AUTO: 1.41 K/UL — SIGNIFICANT CHANGE UP (ref 1.2–3.4)
LYMPHOCYTES # BLD AUTO: 18.2 % — LOW (ref 20.5–51.1)
MCHC RBC-ENTMCNC: 25.5 PG — LOW (ref 27–31)
MCHC RBC-ENTMCNC: 32.9 G/DL — SIGNIFICANT CHANGE UP (ref 32–37)
MCV RBC AUTO: 77.3 FL — LOW (ref 80–94)
MONOCYTES # BLD AUTO: 1.18 K/UL — HIGH (ref 0.1–0.6)
MONOCYTES NFR BLD AUTO: 15.2 % — HIGH (ref 1.7–9.3)
NEUTROPHILS # BLD AUTO: 4.71 K/UL — SIGNIFICANT CHANGE UP (ref 1.4–6.5)
NEUTROPHILS NFR BLD AUTO: 60.7 % — SIGNIFICANT CHANGE UP (ref 42.2–75.2)
NRBC # BLD: 0 /100 WBCS — SIGNIFICANT CHANGE UP (ref 0–0)
PLATELET # BLD AUTO: 234 K/UL — SIGNIFICANT CHANGE UP (ref 130–400)
POTASSIUM SERPL-MCNC: 4.2 MMOL/L — SIGNIFICANT CHANGE UP (ref 3.5–5)
POTASSIUM SERPL-SCNC: 4.2 MMOL/L — SIGNIFICANT CHANGE UP (ref 3.5–5)
PROT SERPL-MCNC: 6.2 G/DL — SIGNIFICANT CHANGE UP (ref 6–8)
RBC # BLD: 4.28 M/UL — LOW (ref 4.7–6.1)
RBC # FLD: 17.1 % — HIGH (ref 11.5–14.5)
SODIUM SERPL-SCNC: 139 MMOL/L — SIGNIFICANT CHANGE UP (ref 135–146)
WBC # BLD: 7.76 K/UL — SIGNIFICANT CHANGE UP (ref 4.8–10.8)
WBC # FLD AUTO: 7.76 K/UL — SIGNIFICANT CHANGE UP (ref 4.8–10.8)

## 2019-10-25 PROCEDURE — 99233 SBSQ HOSP IP/OBS HIGH 50: CPT

## 2019-10-25 PROCEDURE — 71045 X-RAY EXAM CHEST 1 VIEW: CPT | Mod: 26

## 2019-10-25 RX ORDER — ALPRAZOLAM 0.25 MG
0.5 TABLET ORAL AT BEDTIME
Refills: 0 | Status: DISCONTINUED | OUTPATIENT
Start: 2019-10-25 | End: 2019-10-29

## 2019-10-25 RX ADMIN — SIMVASTATIN 40 MILLIGRAM(S): 20 TABLET, FILM COATED ORAL at 21:48

## 2019-10-25 RX ADMIN — Medication 1 PATCH: at 12:39

## 2019-10-25 RX ADMIN — Medication 7 UNIT(S): at 08:10

## 2019-10-25 RX ADMIN — NAFCILLIN 200 GRAM(S): 10 INJECTION, POWDER, FOR SOLUTION INTRAVENOUS at 02:04

## 2019-10-25 RX ADMIN — Medication 1 APPLICATION(S): at 05:46

## 2019-10-25 RX ADMIN — ISOSORBIDE MONONITRATE 30 MILLIGRAM(S): 60 TABLET, EXTENDED RELEASE ORAL at 12:39

## 2019-10-25 RX ADMIN — NAFCILLIN 200 GRAM(S): 10 INJECTION, POWDER, FOR SOLUTION INTRAVENOUS at 13:56

## 2019-10-25 RX ADMIN — NAFCILLIN 200 GRAM(S): 10 INJECTION, POWDER, FOR SOLUTION INTRAVENOUS at 17:10

## 2019-10-25 RX ADMIN — Medication 1 SPRAY(S): at 05:45

## 2019-10-25 RX ADMIN — NAFCILLIN 200 GRAM(S): 10 INJECTION, POWDER, FOR SOLUTION INTRAVENOUS at 05:44

## 2019-10-25 RX ADMIN — CLOPIDOGREL BISULFATE 75 MILLIGRAM(S): 75 TABLET, FILM COATED ORAL at 12:39

## 2019-10-25 RX ADMIN — Medication 81 MILLIGRAM(S): at 12:38

## 2019-10-25 RX ADMIN — HEPARIN SODIUM 12 UNIT(S)/HR: 5000 INJECTION INTRAVENOUS; SUBCUTANEOUS at 18:24

## 2019-10-25 RX ADMIN — NAFCILLIN 200 GRAM(S): 10 INJECTION, POWDER, FOR SOLUTION INTRAVENOUS at 09:14

## 2019-10-25 RX ADMIN — Medication 145 MILLIGRAM(S): at 12:39

## 2019-10-25 RX ADMIN — Medication 75 MILLIGRAM(S): at 05:44

## 2019-10-25 RX ADMIN — TAMSULOSIN HYDROCHLORIDE 0.4 MILLIGRAM(S): 0.4 CAPSULE ORAL at 21:48

## 2019-10-25 RX ADMIN — FINASTERIDE 5 MILLIGRAM(S): 5 TABLET, FILM COATED ORAL at 12:39

## 2019-10-25 RX ADMIN — Medication 7 UNIT(S): at 12:37

## 2019-10-25 RX ADMIN — Medication 1 SPRAY(S): at 17:14

## 2019-10-25 RX ADMIN — Medication 1 APPLICATION(S): at 17:14

## 2019-10-25 RX ADMIN — CHLORHEXIDINE GLUCONATE 1 APPLICATION(S): 213 SOLUTION TOPICAL at 12:38

## 2019-10-25 RX ADMIN — BUDESONIDE AND FORMOTEROL FUMARATE DIHYDRATE 2 PUFF(S): 160; 4.5 AEROSOL RESPIRATORY (INHALATION) at 08:11

## 2019-10-25 RX ADMIN — Medication 0.5 MILLIGRAM(S): at 09:13

## 2019-10-25 RX ADMIN — Medication 1 PATCH: at 21:41

## 2019-10-25 RX ADMIN — Medication 0.5 MILLIGRAM(S): at 22:38

## 2019-10-25 RX ADMIN — BUDESONIDE AND FORMOTEROL FUMARATE DIHYDRATE 2 PUFF(S): 160; 4.5 AEROSOL RESPIRATORY (INHALATION) at 21:49

## 2019-10-25 RX ADMIN — HEPARIN SODIUM 12 UNIT(S)/HR: 5000 INJECTION INTRAVENOUS; SUBCUTANEOUS at 05:45

## 2019-10-25 RX ADMIN — NAFCILLIN 200 GRAM(S): 10 INJECTION, POWDER, FOR SOLUTION INTRAVENOUS at 21:49

## 2019-10-25 NOTE — PROGRESS NOTE ADULT - ASSESSMENT
Pulmonary edema improved  hx diastolic heart failure  fever multilobar  pneumonia from [facility] MSSS  renal failure with oliguria      Suggest:  BLD cx from 10/22 no growth for now  feel comfortable   abx per ID  follow cx    need JAYCOB   cardio management CATH cancelled due to sepsis  follow renal on HD     taper O2  NIPPV as needed  OOB  DVT prophylaxis  gentle hydration  tele monitoring   care as per cardiology and renal   recall as needed   follow PTT

## 2019-10-25 NOTE — CONSULT NOTE ADULT - SUBJECTIVE AND OBJECTIVE BOX
HPI:  Patient is a 80yo Male w/ PMH of HFpEF (EF of 55% with Grade II Diastolic dysfunction), COPD (home O2 3-3.5L as needed), DM, HTN, CAD s/p CABG and 1 stent, BPH, and recent admission for CHF (August 2019) presenting to [facility] with complaints of shortness of breath and chest pain. Patient reports he has been progressively worsening shortness of breath for the last 4-5 days prior to admission. He also reports associated chest pain on exertion.  Patient reports he has been compliant with all of his medications as well with a low salt diet. He denies any recent history of fevers, chills, cough. On day of admission, had severe dyspnea on exertion associated with a pressure like sensation in his chest that radiated to his left arm. He tried sublingual nitro for relief but it didn't help. Therefore, he came in for further evaluation. He was given Solumedrol 125mg by EMS. In the ED, patient was found to have evidence of bilateral pleural effusions on bedside sono done by ED staff. He received IV Lasix 40mg and was placed on BiPAP with major improvement in his symptoms. On my assessment, patient was speaking to me in full sentences on BiPAP and his chest pain has resolved.     PTN  REFERRED TO ACUTE  REHAB  FOR  EVAL AND  TX   PAST MEDICAL & SURGICAL HISTORY:  BPH (benign prostatic hyperplasia)  CHF (congestive heart failure)  COPD (chronic obstructive pulmonary disease)  Diabetes  HTN (hypertension)  H/O heart artery stent  S/P CABG x 3      Hospital Course:    TODAY'S SUBJECTIVE & REVIEW OF SYMPTOMS:     Constitutional WNL   Cardio WNL   Resp WNL   GI WNL  Heme WNL  Endo WNL  Skin WNL  MSK WNL  Neuro WNL  Cognitive WNL  Psych WNL      MEDICATIONS  (STANDING):  aspirin  chewable 81 milliGRAM(s) Oral daily  buDESOnide 160 MICROgram(s)/formoterol 4.5 MICROgram(s) Inhaler 2 Puff(s) Inhalation two times a day  chlorhexidine 4% Liquid 1 Application(s) Topical daily  clopidogrel Tablet 75 milliGRAM(s) Oral daily  dextrose 5%. 1000 milliLiter(s) (50 mL/Hr) IV Continuous <Continuous>  dextrose 50% Injectable 12.5 Gram(s) IV Push once  dextrose 50% Injectable 25 Gram(s) IV Push once  dextrose 50% Injectable 25 Gram(s) IV Push once  fenofibrate Tablet 145 milliGRAM(s) Oral daily  finasteride 5 milliGRAM(s) Oral daily  fluticasone propionate 50 MICROgram(s)/spray Nasal Spray 1 Spray(s) Both Nostrils two times a day  heparin  Infusion 1200 Unit(s)/Hr (12 mL/Hr) IV Continuous <Continuous>  influenza   Vaccine 0.5 milliLiter(s) IntraMuscular once  insulin glargine Injectable (LANTUS) 12 Unit(s) SubCutaneous at bedtime  insulin lispro Injectable (HumaLOG) 7 Unit(s) SubCutaneous before breakfast  insulin lispro Injectable (HumaLOG) 7 Unit(s) SubCutaneous before lunch  insulin lispro Injectable (HumaLOG) 7 Unit(s) SubCutaneous before dinner  isosorbide   mononitrate ER Tablet (IMDUR) 30 milliGRAM(s) Oral daily  metoprolol succinate ER 75 milliGRAM(s) Oral daily  nafcillin  IVPB      nafcillin  IVPB 2 Gram(s) IV Intermittent every 4 hours  nitroglycerin    Patch 0.2 mG/Hr(s) 1 patch Transdermal daily  silver sulfADIAZINE 1% Cream 1 Application(s) Topical two times a day  simvastatin 40 milliGRAM(s) Oral at bedtime  tamsulosin 0.4 milliGRAM(s) Oral at bedtime    MEDICATIONS  (PRN):  acetaminophen   Tablet .. 650 milliGRAM(s) Oral every 6 hours PRN Temp greater or equal to 38C (100.4F), Mild Pain (1 - 3)  acetaminophen  Suppository .. 650 milliGRAM(s) Rectal every 6 hours PRN Temp greater or equal to 38C (100.4F), Mild Pain (1 - 3)  dextrose 40% Gel 15 Gram(s) Oral once PRN Blood Glucose LESS THAN 70 milliGRAM(s)/deciliter  glucagon  Injectable 1 milliGRAM(s) IntraMuscular once PRN Glucose LESS THAN 70 milligrams/deciliter      FAMILY HISTORY:  No pertinent family history in first degree relatives      Allergies    No Known Allergies    Intolerances        SOCIAL HISTORY:    [  ] Etoh  [  ] Smoking  [  ] Substance abuse     Home Environment:  [  ] Home Alone  [ xx ] Lives with Family son   [  ] Home Health Aid    Dwelling:  [  ] Apartment  [ x ] Private House  [  ] Adult Home  [  ] Skilled Nursing Facility      [  ] Short Term  [  ] Long Term  [ x ] Stairs       Elevator [  ]    FUNCTIONAL STATUS PTA: (Check all that apply)  Ambulation: [ x  ]Independent    [  ] Dependent     [  ] Non-Ambulatory  Assistive Device: [  ] SA Cane  [  ]  Q Cane  [x  ] Walker  [  ]  Wheelchair  ADL : [  x] Independent  [  ]  Dependent       Vital Signs Last 24 Hrs  T(C): 35.6 (25 Oct 2019 07:05), Max: 36.2 (24 Oct 2019 23:00)  T(F): 96 (25 Oct 2019 07:05), Max: 97.2 (24 Oct 2019 23:00)  HR: 64 (25 Oct 2019 11:16) (64 - 69)  BP: 118/64 (25 Oct 2019 11:16) (111/58 - 121/68)  BP(mean): 87 (25 Oct 2019 07:05) (86 - 87)  RR: 18 (25 Oct 2019 07:05) (18 - 18)  SpO2: 99% (25 Oct 2019 07:05) (93% - 99%)      PHYSICAL EXAM: Alert & Oriented X3 [place]  GENERAL: NAD, well-groomed, well-developed  HEAD:  Atraumatic, Normocephalic  EYES: EOMI, PERRLA, conjunctiva and sclera clear  NECK: Supple, No JVD, Normal thyroid  CHEST/LUNG: Clear to percussion bilaterally; No rales, rhonchi, wheezing, or rubs  HEART: Regular rate and rhythm; No murmurs, rubs, or gallops  ABDOMEN: Soft, Nontender, Nondistended; Bowel sounds present  EXTREMITIES:  2+ Peripheral Pulses, No clubbing, cyanosis, or edema    NERVOUS SYSTEM:  Cranial Nerves 2-12 intact [ x ] Abnormal  [  ]  ROM: WFL all extremities [  ]  Abnormal [ x ]  Motor Strength: WFL all extremities  [  ]  Abnormal [  4+/5 all ext  ]  Sensation: intact to light touch [  ] Abnormal [ x ]  Reflexes: Symmetric [  ]  Abnormal [  x]    FUNCTIONAL STATUS:  Bed Mobility: Independent [  ]  Supervision [  ]  Needs Assistance [ x ]  N/A [  ]  Transfers: Independent [  ]  Supervision [  ]  Needs Assistance [ x ]  N/A [  ]   Ambulation: Independent [  ]  Supervision [  ]  Needs Assistance [ x ]  N/A [  ]  ADL: Independent [  ] Requires Assistance [  ] N/A x ]  SEE PT/OT IE NOTES    LABS:                        10.9   7.76  )-----------( 234      ( 25 Oct 2019 05:32 )             33.1     10-25    139  |  93<L>  |  101<HH>  ----------------------------<  102<H>  4.2   |  22  |  8.2<HH>    Ca    8.3<L>      25 Oct 2019 05:32    TPro  6.2  /  Alb  3.0<L>  /  TBili  1.0  /  DBili  x   /  AST  13  /  ALT  16  /  AlkPhos  37  10-25    PTT - ( 25 Oct 2019 05:32 )  PTT:77.9 sec      RADIOLOGY & ADDITIONAL STUDIES:    Assesment:

## 2019-10-25 NOTE — PROGRESS NOTE ADULT - SUBJECTIVE AND OBJECTIVE BOX
Patient is receiving HD again today, afebrile      T(F): 96 (10-25-19 @ 07:05), Max: 97.2 (10-24-19 @ 23:00)  HR: 64 (10-25-19 @ 11:16)  BP: 118/64 (10-25-19 @ 11:16)  RR: 18 (10-25-19 @ 07:05)  SpO2: 99% (10-25-19 @ 07:05) (93% - 99%)    PHYSICAL EXAM:  GENERAL: NAD  HEAD:  Atraumatic, Normocephalic  NERVOUS SYSTEM:  Alert & Oriented X3, no focal deficits  CHEST/LUNG:  bilateral rales at basses   HEART: IRegular rate and rhythm; No murmurs, rubs, or gallops  ABDOMEN: Soft, Nontender, Nondistended; Bowel sounds present  EXTREMITIES:  b/l  edema      LABS  10-25    139  |  93<L>  |  101<HH>  ----------------------------<  102<H>  4.2   |  22  |  8.2<HH>    Ca    8.3<L>      25 Oct 2019 05:32    TPro  6.2  /  Alb  3.0<L>  /  TBili  1.0  /  DBili  x   /  AST  13  /  ALT  16  /  AlkPhos  37  10-25                          10.9   7.76  )-----------( 234      ( 25 Oct 2019 05:32 )             33.1     PTT - ( 25 Oct 2019 05:32 )  PTT:77.9 sec    Culture Results:   No growth to date. (10-23-19)  Culture Results:   No growth to date. (10-22-19)  Culture Results:   Growth in aerobic and anaerobic bottles: Staphylococcus aureus (10-19-19)  Culture Results:   Growth in aerobic and anaerobic bottles: Staphylococcus aureus  See previous culture [id number] (10-17-19)  Culture Results:   Growth in aerobic and anaerobic bottles: Staphylococcus aureus  See previous culture [id number] (10-16-19)  Culture Results:   <10,000 CFU/mL Normal Urogenital Felipa (10-15-19)  Culture Results:   Growth in aerobic bottle: Staphylococcus aureus  "Due to technical problems, Proteus sp. will Not be reported as part of  the BCID panel until further notice"  ***Blood Panel PCR results on this specimen are available  approximately 3 hours after the Gram stain result.***  Gram stain, PCR, and/or culture results may not always  correspond due to difference in methodologies.  ************************************************************  This PCR assay was performed using [employer].  The following targets are tested for: Enterococcus,  vancomycin resistant enterococci, Listeria monocytogenes,  coagulase negative staphylococci, S. aureus,  methicillin resistant S. aureus, Streptococcus agalactiae  (Group B), S. pneumoniae, S. pyogenes (Group A),  Acinetobacter baumannii, Enterobacter cloacae, E. coli,  Klebsiella oxytoca, K. pneumoniae, Proteus sp.,  Serratia marcescens, Haemophilus influenzae,  Neisseria meningitidis, Pseudomonas aeruginosa, Candida  albicans, C. glabrata, C krusei, C parapsilosis,  C. tropicalis and the KPC resistance gene. (10-15-19)    RADIOLOGY  < from: Xray Chest 1 View- PORTABLE-Routine (10.25.19 @ 07:02) >  Impression:      Bilateral opacities/pleural effusions, worsened. Stable cardiomegaly.    < from: 12 Lead ECG (10.24.19 @ 08:46) >  Diagnosis Line Atrial flutter with 4:1 A-V conduction    < end of copied text >    MEDICATIONS  (STANDING):  aspirin  chewable 81 milliGRAM(s) Oral daily  buDESOnide 160 MICROgram(s)/formoterol 4.5 MICROgram(s) Inhaler 2 Puff(s) Inhalation two times a day  chlorhexidine 4% Liquid 1 Application(s) Topical daily  clopidogrel Tablet 75 milliGRAM(s) Oral daily  fenofibrate Tablet 145 milliGRAM(s) Oral daily  finasteride 5 milliGRAM(s) Oral daily  fluticasone propionate 50 MICROgram(s)/spray Nasal Spray 1 Spray(s) Both Nostrils two times a day  heparin  Infusion 1200 Unit(s)/Hr (12 mL/Hr) IV Continuous <Continuous>  influenza   Vaccine 0.5 milliLiter(s) IntraMuscular once  insulin glargine Injectable (LANTUS) 12 Unit(s) SubCutaneous at bedtime  insulin lispro Injectable (HumaLOG) 7 Unit(s) SubCutaneous before breakfast  insulin lispro Injectable (HumaLOG) 7 Unit(s) SubCutaneous before lunch  insulin lispro Injectable (HumaLOG) 7 Unit(s) SubCutaneous before dinner  isosorbide   mononitrate ER Tablet (IMDUR) 30 milliGRAM(s) Oral daily  metoprolol succinate ER 75 milliGRAM(s) Oral daily  nafcillin  IVPB 2 Gram(s) IV Intermittent every 4 hours  nitroglycerin    Patch 0.2 mG/Hr(s) 1 patch Transdermal daily  silver sulfADIAZINE 1% Cream 1 Application(s) Topical two times a day  simvastatin 40 milliGRAM(s) Oral at bedtime  tamsulosin 0.4 milliGRAM(s) Oral at bedtime    MEDICATIONS  (PRN):  acetaminophen   Tablet .. 650 milliGRAM(s) Oral every 6 hours PRN Temp greater or equal to 38C (100.4F), Mild Pain (1 - 3)  acetaminophen  Suppository .. 650 milliGRAM(s) Rectal every 6 hours PRN Temp greater or equal to 38C (100.4F), Mild Pain (1 - 3)  dextrose 40% Gel 15 Gram(s) Oral once PRN Blood Glucose LESS THAN 70 milliGRAM(s)/deciliter  glucagon  Injectable 1 milliGRAM(s) IntraMuscular once PRN Glucose LESS THAN 70 milligrams/deciliter

## 2019-10-25 NOTE — PHYSICAL THERAPY INITIAL EVALUATION ADULT - GAIT DEVIATIONS NOTED, PT EVAL
decreased weight-shifting ability/stooped posture, dec heel strike/pushoff/decreased robert/decreased step length

## 2019-10-25 NOTE — PROGRESS NOTE ADULT - SUBJECTIVE AND OBJECTIVE BOX
HPI:  Patient is a 80yo Male w/ PMH of HFpEF (EF of 55% with Grade II Diastolic dysfunction), COPD (home O2 3-3.5L as needed), DM, HTN, CAD s/p CABG and 1 stent, BPH, and recent admission for CHF (August 2019) presenting to [facility] with complaints of shortness of breath and chest pain. Patient reports he has been progressively worsening shortness of breath for the last 4-5 days prior to admission. He also reports associated chest pain on exertion.  Patient reports he has been compliant with all of his medications as well with a low salt diet. He denies any recent history of fevers, chills, cough. On day of admission, had severe dyspnea on exertion associated with a pressure like sensation in his chest that radiated to his left arm. He tried sublingual nitro for relief but it didn't help. Therefore, he came in for further evaluation. He was given Solumedrol 125mg by EMS. In the ED, patient was found to have evidence of bilateral pleural effusions on bedside sono done by ED staff. He received IV Lasix 40mg and was placed on BiPAP with major improvement in his symptoms. On my assessment, patient was speaking to me in full sentences on BiPAP and his chest pain has resolved. (10 Oct 2019 13:27)        INTERVAL HPI/OVERNIGHT EVENTS:  No overnight Events     ICU Vital Signs Last 24 Hrs  T(C): 35.6 (25 Oct 2019 07:05), Max: 36.2 (24 Oct 2019 23:00)  T(F): 96 (25 Oct 2019 07:05), Max: 97.2 (24 Oct 2019 23:00)  HR: 69 (25 Oct 2019 08:45) (63 - 69)  BP: 121/68 (25 Oct 2019 08:45) (111/58 - 122/57)  BP(mean): 87 (25 Oct 2019 07:05) (82 - 87)  ABP: --  ABP(mean): --  RR: 18 (25 Oct 2019 07:05) (18 - 18)  SpO2: 99% (25 Oct 2019 07:05) (93% - 100%)    I&O's Summary    24 Oct 2019 07:01  -  25 Oct 2019 07:00  --------------------------------------------------------  IN: 1388 mL / OUT: 440 mL / NET: 948 mL    25 Oct 2019 07:01  -  25 Oct 2019 11:12  --------------------------------------------------------  IN: 74 mL / OUT: 0 mL / NET: 74 mL          LABS:                        10.9   7.76  )-----------( 234      ( 25 Oct 2019 05:32 )             33.1     10-25    139  |  93<L>  |  101<HH>  ----------------------------<  102<H>  4.2   |  22  |  8.2<HH>    Ca    8.3<L>      25 Oct 2019 05:32    TPro  6.2  /  Alb  3.0<L>  /  TBili  1.0  /  DBili  x   /  AST  13  /  ALT  16  /  AlkPhos  37  10-25    PTT - ( 25 Oct 2019 05:32 )  PTT:77.9 sec    CAPILLARY BLOOD GLUCOSE      POCT Blood Glucose.: 121 mg/dL (25 Oct 2019 07:38)  POCT Blood Glucose.: 94 mg/dL (25 Oct 2019 05:47)  POCT Blood Glucose.: 130 mg/dL (24 Oct 2019 21:41)  POCT Blood Glucose.: 84 mg/dL (24 Oct 2019 16:46)  POCT Blood Glucose.: 69 mg/dL (24 Oct 2019 16:34)  POCT Blood Glucose.: 99 mg/dL (24 Oct 2019 11:23)        RADIOLOGY & ADDITIONAL TESTS:    Consultant(s) Notes Reviewed:  [x ] YES  [ ] NO    MEDICATIONS  (STANDING):  aspirin  chewable 81 milliGRAM(s) Oral daily  buDESOnide 160 MICROgram(s)/formoterol 4.5 MICROgram(s) Inhaler 2 Puff(s) Inhalation two times a day  chlorhexidine 4% Liquid 1 Application(s) Topical daily  clopidogrel Tablet 75 milliGRAM(s) Oral daily  dextrose 5%. 1000 milliLiter(s) (50 mL/Hr) IV Continuous <Continuous>  dextrose 50% Injectable 12.5 Gram(s) IV Push once  dextrose 50% Injectable 25 Gram(s) IV Push once  dextrose 50% Injectable 25 Gram(s) IV Push once  fenofibrate Tablet 145 milliGRAM(s) Oral daily  finasteride 5 milliGRAM(s) Oral daily  fluticasone propionate 50 MICROgram(s)/spray Nasal Spray 1 Spray(s) Both Nostrils two times a day  heparin  Infusion 1200 Unit(s)/Hr (12 mL/Hr) IV Continuous <Continuous>  influenza   Vaccine 0.5 milliLiter(s) IntraMuscular once  insulin glargine Injectable (LANTUS) 12 Unit(s) SubCutaneous at bedtime  insulin lispro Injectable (HumaLOG) 7 Unit(s) SubCutaneous before breakfast  insulin lispro Injectable (HumaLOG) 7 Unit(s) SubCutaneous before lunch  insulin lispro Injectable (HumaLOG) 7 Unit(s) SubCutaneous before dinner  isosorbide   mononitrate ER Tablet (IMDUR) 30 milliGRAM(s) Oral daily  metoprolol succinate ER 75 milliGRAM(s) Oral daily  nafcillin  IVPB      nafcillin  IVPB 2 Gram(s) IV Intermittent every 4 hours  nitroglycerin    Patch 0.2 mG/Hr(s) 1 patch Transdermal daily  silver sulfADIAZINE 1% Cream 1 Application(s) Topical two times a day  simvastatin 40 milliGRAM(s) Oral at bedtime  tamsulosin 0.4 milliGRAM(s) Oral at bedtime    MEDICATIONS  (PRN):  acetaminophen   Tablet .. 650 milliGRAM(s) Oral every 6 hours PRN Temp greater or equal to 38C (100.4F), Mild Pain (1 - 3)  acetaminophen  Suppository .. 650 milliGRAM(s) Rectal every 6 hours PRN Temp greater or equal to 38C (100.4F), Mild Pain (1 - 3)  dextrose 40% Gel 15 Gram(s) Oral once PRN Blood Glucose LESS THAN 70 milliGRAM(s)/deciliter  glucagon  Injectable 1 milliGRAM(s) IntraMuscular once PRN Glucose LESS THAN 70 milligrams/deciliter      PHYSICAL EXAM:  GENERAL: no acute distress.   HEAD:  Atraumatic, Normocephalic  EYES: EOMI, PERRLA, conjunctiva and sclera clear  ENT: No tonsillar erythema, exudates, or enlargement; Moist mucous membranes, Good dentition, No lesions  NECK: Supple, No JVD, Normal thyroid, no enlarged nodes  NERVOUS SYSTEM:  Alert & Oriented X3.  CHEST/LUNG: B/L good air entry; No rales, rhonchi, or wheezing  HEART: S1S2 normal systolic ejection murmur noted. afib rate controlled   ABDOMEN: Soft, Nontender, Nondistended; Bowel sounds present  EXTREMITIES:  2+ Peripheral Pulses, No clubbing, cyanosis, or edema  LYMPH: No lymphadenopathy noted  SKIN: No rashes or lesions    Care Discussed with Consultants/Other Providers [ x] YES  [ ] NO

## 2019-10-25 NOTE — PROGRESS NOTE ADULT - SUBJECTIVE AND OBJECTIVE BOX
Patient is a 79y old  Male who presents with a chief complaint of CHF exacerbation (24 Oct 2019 10:10)      INTERVAL HISTORY/overnight events feel good now on HD         Vital Signs Last 24 Hrs  T(C): 35.6 (25 Oct 2019 07:05), Max: 36.2 (24 Oct 2019 23:00)  T(F): 96 (25 Oct 2019 07:05), Max: 97.2 (24 Oct 2019 23:00)  HR: 69 (25 Oct 2019 08:45) (63 - 69)  BP: 121/68 (25 Oct 2019 08:45) (111/58 - 122/57)  BP(mean): 87 (25 Oct 2019 07:05) (82 - 87)  RR: 18 (25 Oct 2019 07:05) (18 - 18)  SpO2: 99% (25 Oct 2019 07:05) (93% - 100%)  Daily     Daily Weight in k.6 (25 Oct 2019 07:05)  I&O's Summary    24 Oct 2019 07:01  -  25 Oct 2019 07:00  --------------------------------------------------------  IN: 1388 mL / OUT: 440 mL / NET: 948 mL    25 Oct 2019 07:01  -  25 Oct 2019 09:59  --------------------------------------------------------  IN: 74 mL / OUT: 0 mL / NET: 74 mL        Physical Examination:    General: No acute distress.  Alert, oriented, interactive, nonfocal    HEENT: Pupils equal, reactive to light.  Symmetric.    PULM: mild crackles     CVS: Regular rate and rhythm, no murmurs, rubs, or gallops    ABD: Soft, nondistended, nontender, normoactive bowel sounds, no masses    EXT: No edema, nontender    SKIN: Warm and well perfused, no rashes noted.    Neurology: no focal deficit     Musculoskeletal : Move all extremity         Lab Results:                        10.9   7.76  )-----------( 234      ( 25 Oct 2019 05:32 )             33.1     25 Oct 2019 05:32    139    |  93     |  101    ----------------------------<  102    4.2     |  22     |  8.2      Ca    8.3        25 Oct 2019 05:32    TPro  6.2    /  Alb  3.0    /  TBili  1.0    /  DBili  x      /  AST  13     /  ALT  16     /  AlkPhos  37     25 Oct 2019 05:32  Amylase x     lipase x        PTT - ( 25 Oct 2019 05:32 )  PTT:77.9 sec          Microbiology    Culture - Blood (collected 23 Oct 2019 15:30)  Source: .Blood None  Preliminary Report (24 Oct 2019 23:01):    No growth to date.        Medication:  MEDICATIONS  (STANDING):  aspirin  chewable 81 milliGRAM(s) Oral daily  buDESOnide 160 MICROgram(s)/formoterol 4.5 MICROgram(s) Inhaler 2 Puff(s) Inhalation two times a day  chlorhexidine 4% Liquid 1 Application(s) Topical daily  clopidogrel Tablet 75 milliGRAM(s) Oral daily  dextrose 5%. 1000 milliLiter(s) (50 mL/Hr) IV Continuous <Continuous>  dextrose 50% Injectable 12.5 Gram(s) IV Push once  dextrose 50% Injectable 25 Gram(s) IV Push once  dextrose 50% Injectable 25 Gram(s) IV Push once  fenofibrate Tablet 145 milliGRAM(s) Oral daily  finasteride 5 milliGRAM(s) Oral daily  fluticasone propionate 50 MICROgram(s)/spray Nasal Spray 1 Spray(s) Both Nostrils two times a day  heparin  Infusion 1200 Unit(s)/Hr (12 mL/Hr) IV Continuous <Continuous>  influenza   Vaccine 0.5 milliLiter(s) IntraMuscular once  insulin glargine Injectable (LANTUS) 12 Unit(s) SubCutaneous at bedtime  insulin lispro Injectable (HumaLOG) 7 Unit(s) SubCutaneous before breakfast  insulin lispro Injectable (HumaLOG) 7 Unit(s) SubCutaneous before lunch  insulin lispro Injectable (HumaLOG) 7 Unit(s) SubCutaneous before dinner  isosorbide   mononitrate ER Tablet (IMDUR) 30 milliGRAM(s) Oral daily  metoprolol succinate ER 75 milliGRAM(s) Oral daily  nafcillin  IVPB      nafcillin  IVPB 2 Gram(s) IV Intermittent every 4 hours  nitroglycerin    Patch 0.2 mG/Hr(s) 1 patch Transdermal daily  silver sulfADIAZINE 1% Cream 1 Application(s) Topical two times a day  simvastatin 40 milliGRAM(s) Oral at bedtime  tamsulosin 0.4 milliGRAM(s) Oral at bedtime    MEDICATIONS  (PRN):  acetaminophen   Tablet .. 650 milliGRAM(s) Oral every 6 hours PRN Temp greater or equal to 38C (100.4F), Mild Pain (1 - 3)  acetaminophen  Suppository .. 650 milliGRAM(s) Rectal every 6 hours PRN Temp greater or equal to 38C (100.4F), Mild Pain (1 - 3)  dextrose 40% Gel 15 Gram(s) Oral once PRN Blood Glucose LESS THAN 70 milliGRAM(s)/deciliter  glucagon  Injectable 1 milliGRAM(s) IntraMuscular once PRN Glucose LESS THAN 70 milligrams/deciliter        IMAGING STUDIES:  decrease b/l effusion

## 2019-10-25 NOTE — PROGRESS NOTE ADULT - SUBJECTIVE AND OBJECTIVE BOX
Nephrology progress note    Patient is seen and examined, events over the last 24 h noted .  Pt on HD 2nd tx. Remians oliguric. 450 cc urine yesterday  Allergies:  No Known Allergies    Hospital Medications:   MEDICATIONS  (STANDING):  aspirin  chewable 81 milliGRAM(s) Oral daily  buDESOnide 160 MICROgram(s)/formoterol 4.5 MICROgram(s) Inhaler 2 Puff(s) Inhalation two times a day  chlorhexidine 4% Liquid 1 Application(s) Topical daily  clopidogrel Tablet 75 milliGRAM(s) Oral daily  dextrose 5%. 1000 milliLiter(s) (50 mL/Hr) IV Continuous <Continuous>  dextrose 50% Injectable 12.5 Gram(s) IV Push once  dextrose 50% Injectable 25 Gram(s) IV Push once  dextrose 50% Injectable 25 Gram(s) IV Push once  fenofibrate Tablet 145 milliGRAM(s) Oral daily  finasteride 5 milliGRAM(s) Oral daily  fluticasone propionate 50 MICROgram(s)/spray Nasal Spray 1 Spray(s) Both Nostrils two times a day  heparin  Infusion 1200 Unit(s)/Hr (12 mL/Hr) IV Continuous <Continuous>  influenza   Vaccine 0.5 milliLiter(s) IntraMuscular once  insulin glargine Injectable (LANTUS) 12 Unit(s) SubCutaneous at bedtime  insulin lispro Injectable (HumaLOG) 7 Unit(s) SubCutaneous before breakfast  insulin lispro Injectable (HumaLOG) 7 Unit(s) SubCutaneous before lunch  insulin lispro Injectable (HumaLOG) 7 Unit(s) SubCutaneous before dinner  isosorbide   mononitrate ER Tablet (IMDUR) 30 milliGRAM(s) Oral daily  metoprolol succinate ER 75 milliGRAM(s) Oral daily  nafcillin  IVPB      nafcillin  IVPB 2 Gram(s) IV Intermittent every 4 hours  nitroglycerin    Patch 0.2 mG/Hr(s) 1 patch Transdermal daily  silver sulfADIAZINE 1% Cream 1 Application(s) Topical two times a day  simvastatin 40 milliGRAM(s) Oral at bedtime  tamsulosin 0.4 milliGRAM(s) Oral at bedtime        VITALS:  T(F): 96 (10-25-19 @ 07:05), Max: 97.2 (10-24-19 @ 23:00)  HR: 65 (10-25-19 @ 07:05)  BP: 121/60 (10-25-19 @ 07:05)  RR: 18 (10-25-19 @ 07:05)  SpO2: 99% (10-25-19 @ 07:05)  Wt(kg): --    10-23 @ 07:01  -  10-24 @ 07:00  --------------------------------------------------------  IN: 1104 mL / OUT: 1440 mL / NET: -336 mL    10-24 @ 07:01  -  10-25 @ 07:00  --------------------------------------------------------  IN: 1388 mL / OUT: 440 mL / NET: 948 mL    10-25 @ 07:01  -  10-25 @ 09:10  --------------------------------------------------------  IN: 24 mL / OUT: 0 mL / NET: 24 mL          PHYSICAL EXAM:  Constitutional: NAD  HEENT: anicteric sclera, oropharynx clear, MMM  Neck: No JVD  Respiratory: diminished BS at bases  Cardiovascular: S1, S2, RRR  Gastrointestinal: BS+, soft, NT/ND  Extremities: 1+ peripheral edema  Neurological: A/O x 3  : No CVA tenderness. No julian.   Skin: No rashes  Vascular Access:    LABS:  10-25    139  |  93<L>  |  101<HH>  ----------------------------<  102<H>  4.2   |  22  |  8.2<HH>  Creatinine Trend: 8.2<--, 7.3<--, 8.3<--, 8.1<--, 7.6<--, 8.1<--    Ca    8.3<L>      25 Oct 2019 05:32    TPro  6.2  /  Alb  3.0<L>  /  TBili  1.0  /  DBili      /  AST  13  /  ALT  16  /  AlkPhos  37  10-25                          10.9   7.76  )-----------( 234      ( 25 Oct 2019 05:32 )             33.1       Urine Studies:    Osmolality, Random Urine: 319 mos/kg (10-22 @ 14:31)  Sodium, Random Urine: 46.0 mmoL/L (10-22 @ 14:31)  Creatinine, Random Urine: 87 mg/dL (10-22 @ 14:31)    RADIOLOGY & ADDITIONAL STUDIES:

## 2019-10-25 NOTE — CONSULT NOTE ADULT - ASSESSMENT
IMPRESSION: Rehab of 79  y/ o m  rehab  for  debility     PRECAUTIONS: [  x] Cardiac  [ xx ] Respiratory  [  ] Seizures [  ] Contact Isolation  [  ] Droplet Isolation  [ FALL ] Other    Weight Bearing Status:     RECOMMENDATION:    Out of Bed to Chair     DVT/Decubiti Prophylaxis    REHAB PLAN:     [ xx  ] Bedside P/T 3-5 times a week   [   ]   Bedside O/T  2-3 times a week             [   ] No Rehab Therapy Indicated                   [   ]  Speech Therapy   Conditioning/ROM                                    ADL  Bed Mobility                                               Conditioning/ROM  Transfers                                                     Bed Mobility  Sitting /Standing Balance                         Transfers                                        Gait Training                                               Sitting/Standing Balance  Stair Training [   ]Applicable                    Home equipment Eval                                                                        Splinting  [   ] Only      GOALS:   ADL   [   ]   Independent                    Transfers  [   ] Independent                          Ambulation  [   ] Independent     [    ] With device                            [   ]  CG                                                         [   ]  CG                                                                  [   ] CG                            [    ] Min A                                                   [   ] Min A                                                              [   ] Min  A          DISCHARGE PLAN:   [   ]  Good candidate for Intensive Rehabilitation/Hospital based-[facility]A [facility]                                             Will tolerate 3hrs Intensive Rehab Daily                                       [    ]  Short Term Rehab in Skilled Nursing Facility                                       [  xx  ]  Home with Outpatient or VN services ptn wish  to  dc home with   home pt                                           [    ]  Possible Candidate for Intensive Hospital based Rehab

## 2019-10-25 NOTE — PROGRESS NOTE ADULT - ASSESSMENT
1)  Severe oliguric BRITTANI superimposed on  Stage 3 CKD (creat was 1.5 mg% in August 2019).    PMH: DM, HFpEF      1) BRITTANI - due to ATN from sepsis from PNA and bacteremia,   c3c4 normal - unlikely PIGN  Remains oliguric   HD today  3 hrs  UF 1.5-2 L as bobbi congested on CXR)  - may start LASix 40 IV on off HD days  Will probably need a TEsio cath if cleared by ID and BCx neg    2)  MSSA bacteremia - likely due to PNA vs endocarditis, on NAfcillin      will follow

## 2019-10-25 NOTE — PROGRESS NOTE ADULT - ASSESSMENT
Patient is a 78yo Male w/ PMH of HFpEF (EF of 55% with Grade II Diastolic dysfunction), COPD (home O2 3-3.5L as needed), DM, HTN, CAD s/p CABG and 1 stent, BPH, and recent admission for CHF (August 2019) presenting to [facility] with complaints of shortness of breath and chest pain. Patient reports he has been progressively worsening shortness of breath for the last 4-5 days prior to admission.    1) Acute on chronic diastolic CHF Exacerbation   Lasix held for now as patient appears to be maximally diuresed   Patient on Heparin Drip stable follow PTT   Continue with Metoprolol ER 75 along with Aspirin 81. c/w plavix 75mg daily after loading does  Monitor I and O   Follow with Repeat PTT    2. HTN  stable at this time Hold Meds and monitor BP    3. BRITTANI worsening  Diuretics held for >48 hours   Continue to hold  Follow with Creatinine in AM  Renal on board patient getting Hemodialysis.   Sodium Bicarbonate 650 Twice daily   Calcium acetate 667 Three times daily     4. DM2.   Start Insulin Follow FBG    5. COPD on as needed home O2 not in exacerbation  -Monitor and c/w home med    6. BPH  C/w med    7. CAD s/p CABG  - continue cardiac med and plan for cath once afebrile    8. Bacteremia - gram positive cocci in clusters  Nafcillin as per ID recommendations   Follow with blood cultures every 48 hours until 3 negative   Patient needs JAYCOB, Cardiology feels risk is greater than benefit at this moment in time.        DVT Prophylaxis   Heparin Drip     Disposition   Continue with CCU Monitoring

## 2019-10-25 NOTE — PROGRESS NOTE ADULT - ASSESSMENT
Patient is a 78yo Male w/ PMH of HFpEF (EF of 55% with Grade II Diastolic dysfunction), COPD (home O2 3-3.5L as needed), DM, HTN, CAD s/p CABG and 1 stent, BPH, and recent admission for CHF (August 2019) presented  to [facility] with complaints of shortness of breath and exertional chest pain.    #) Acute respiratory failure secondary to Acute on Chronic Diastolic CHF exacerbation / moderate  aortic stenosis   -  resolved  - now on HD to continue with even to  negative fluid balance  - possible cardiac cath after infection resolved    #) PABLO Bacteremia   -  BC 10/22and 10/23 prelim negative   - continue Nafcillin, ID following   -  cardiology following     #) BRITTANI   - probably ATN   - initiation of HD session #2 today   - continue to remove fluid during HD   - nephrology following    #) Chest Pain (resolved) ,  in patient with hx of CABG and stent placement  - c/w losartan asa, metoprolol; Simvastatin   -  Lovenox   - cardiology following    #) Hx of A Fib  - On Xarelto, 15mg at home; IV heparin for now, check ptt and adjust   -  Metoprolol      #) Hx of COPD  - Stable at this time, with no evidence of acute exacerbation  - Cont with Symbicort (in place of Breo)    #) Hx of Diabetes Type 2  - Insulin sq hospital protocol   - Carb consistent diet    #) Hx of BPH  - Cont with Flomax and Finasteride       #Progress Note Handoff  Pending (specify):  Consults____cardio/ID_____, Tests________, Test Results___BC____, Other_________  Family discussion:  Disposition: Home___/SNF___/Other________/Unknown at this time________

## 2019-10-25 NOTE — PHYSICAL THERAPY INITIAL EVALUATION ADULT - GENERAL OBSERVATIONS, REHAB EVAL
13:05-13:30 Chart reviewed. Pt encountered sitting in chair,  may be seen by Physical Therapist as confirmed with Nurse. Patient denied pain at rest and ready to walk; +tele; +Udalll (R) neck; O2 via NC; +IV RUE

## 2019-10-25 NOTE — PROGRESS NOTE ADULT - ASSESSMENT
No complaints. In greater out  Possible dialysis today. Need to take off fluid.  Last BC thus far still  negative.   Ambulate.  Physical thearpy. Continue heparin

## 2019-10-25 NOTE — PROGRESS NOTE ADULT - SUBJECTIVE AND OBJECTIVE BOX
Patient is a 79y old  Male who presents with a chief complaint of CHF exacerbation (24 Oct 2019 10:10)      T(F): 97.2 (10-24-19 @ 23:00), Max: 97.2 (10-24-19 @ 23:00)  HR: 64 (10-24-19 @ 23:00)  BP: 118/65 (10-24-19 @ 23:00)  RR: 18 (10-24-19 @ 15:00)  SpO2: 93% (10-24-19 @ 23:00) (93% - 100%)    PHYSICAL EXAM:  GENERAL: NAD, well-groomed, well-developed  HEAD:  Atraumatic, Normocephalic  EYES: EOMI, PERRLA, conjunctiva and sclera clear  ENMT: No tonsillar erythema, exudates, or enlargement; Moist mucous membranes, Good dentition, No lesions  NECK: Supple, No JVD, Normal thyroid  NERVOUS SYSTEM:  Alert & Oriented X3,  Motor Strength 5/5 B/L upper and lower extremities  CHEST/LUNG: Clear to percussion bilaterally; No rales, rhonchi, wheezing, or rubs  HEART: Regular rate and rhythm; No murmurs, rubs, or gallops  ABDOMEN: Soft, Nontender, Nondistended; Bowel sounds present  EXTREMITIES:   No clubbing, cyanosis, tr edema  LYMPH: No lymphadenopathy noted  SKIN: No rashes or lesions    labs  10-24    140  |  93<L>  |  101<HH>  ----------------------------<  117<H>  3.7   |  22  |  7.3<HH>    Ca    8.1<L>      24 Oct 2019 05:53    TPro  6.2  /  Alb  3.1<L>  /  TBili  1.0  /  DBili  x   /  AST  18  /  ALT  15  /  AlkPhos  40  10-24                          10.9   7.76  )-----------( 234      ( 25 Oct 2019 05:32 )             33.1       Culture - Blood (collected 23 Oct 2019 15:30)  Source: .Blood None  Preliminary Report (24 Oct 2019 23:01):    No growth to date.      PTT - ( 24 Oct 2019 15:49 )  PTT:72.9 sec        acetaminophen   Tablet .. 650 milliGRAM(s) Oral every 6 hours PRN  acetaminophen  Suppository .. 650 milliGRAM(s) Rectal every 6 hours PRN  ALPRAZolam 0.5 milliGRAM(s) Oral at bedtime PRN  aspirin  chewable 81 milliGRAM(s) Oral daily  buDESOnide 160 MICROgram(s)/formoterol 4.5 MICROgram(s) Inhaler 2 Puff(s) Inhalation two times a day  chlorhexidine 4% Liquid 1 Application(s) Topical daily  clopidogrel Tablet 75 milliGRAM(s) Oral daily  dextrose 40% Gel 15 Gram(s) Oral once PRN  dextrose 5%. 1000 milliLiter(s) IV Continuous <Continuous>  dextrose 50% Injectable 12.5 Gram(s) IV Push once  dextrose 50% Injectable 25 Gram(s) IV Push once  dextrose 50% Injectable 25 Gram(s) IV Push once  fenofibrate Tablet 145 milliGRAM(s) Oral daily  finasteride 5 milliGRAM(s) Oral daily  fluticasone propionate 50 MICROgram(s)/spray Nasal Spray 1 Spray(s) Both Nostrils two times a day  glucagon  Injectable 1 milliGRAM(s) IntraMuscular once PRN  heparin  Infusion 1200 Unit(s)/Hr IV Continuous <Continuous>  influenza   Vaccine 0.5 milliLiter(s) IntraMuscular once  insulin glargine Injectable (LANTUS) 12 Unit(s) SubCutaneous at bedtime  insulin lispro Injectable (HumaLOG) 7 Unit(s) SubCutaneous before breakfast  insulin lispro Injectable (HumaLOG) 7 Unit(s) SubCutaneous before lunch  insulin lispro Injectable (HumaLOG) 7 Unit(s) SubCutaneous before dinner  isosorbide   mononitrate ER Tablet (IMDUR) 30 milliGRAM(s) Oral daily  metoprolol succinate ER 75 milliGRAM(s) Oral daily  nafcillin  IVPB      nafcillin  IVPB 2 Gram(s) IV Intermittent every 4 hours  nitroglycerin    Patch 0.2 mG/Hr(s) 1 patch Transdermal daily  silver sulfADIAZINE 1% Cream 1 Application(s) Topical two times a day  simvastatin 40 milliGRAM(s) Oral at bedtime  tamsulosin 0.4 milliGRAM(s) Oral at bedtime

## 2019-10-26 LAB
ALBUMIN SERPL ELPH-MCNC: 3 G/DL — LOW (ref 3.5–5.2)
ALP SERPL-CCNC: 36 U/L — SIGNIFICANT CHANGE UP (ref 30–115)
ALT FLD-CCNC: 12 U/L — SIGNIFICANT CHANGE UP (ref 0–41)
ANION GAP SERPL CALC-SCNC: 21 MMOL/L — HIGH (ref 7–14)
APTT BLD: 62 SEC — HIGH (ref 27–39.2)
AST SERPL-CCNC: 15 U/L — SIGNIFICANT CHANGE UP (ref 0–41)
BASOPHILS # BLD AUTO: 0.06 K/UL — SIGNIFICANT CHANGE UP (ref 0–0.2)
BASOPHILS NFR BLD AUTO: 0.8 % — SIGNIFICANT CHANGE UP (ref 0–1)
BILIRUB SERPL-MCNC: 1 MG/DL — SIGNIFICANT CHANGE UP (ref 0.2–1.2)
BUN SERPL-MCNC: 75 MG/DL — CRITICAL HIGH (ref 10–20)
CALCIUM SERPL-MCNC: 8.6 MG/DL — SIGNIFICANT CHANGE UP (ref 8.5–10.1)
CHLORIDE SERPL-SCNC: 96 MMOL/L — LOW (ref 98–110)
CO2 SERPL-SCNC: 23 MMOL/L — SIGNIFICANT CHANGE UP (ref 17–32)
CREAT SERPL-MCNC: 6.7 MG/DL — CRITICAL HIGH (ref 0.7–1.5)
EOSINOPHIL # BLD AUTO: 0.2 K/UL — SIGNIFICANT CHANGE UP (ref 0–0.7)
EOSINOPHIL NFR BLD AUTO: 2.7 % — SIGNIFICANT CHANGE UP (ref 0–8)
GLUCOSE BLDC GLUCOMTR-MCNC: 133 MG/DL — HIGH (ref 70–99)
GLUCOSE BLDC GLUCOMTR-MCNC: 135 MG/DL — HIGH (ref 70–99)
GLUCOSE BLDC GLUCOMTR-MCNC: 167 MG/DL — HIGH (ref 70–99)
GLUCOSE BLDC GLUCOMTR-MCNC: 204 MG/DL — HIGH (ref 70–99)
GLUCOSE SERPL-MCNC: 121 MG/DL — HIGH (ref 70–99)
HCT VFR BLD CALC: 32.8 % — LOW (ref 42–52)
HGB BLD-MCNC: 10.8 G/DL — LOW (ref 14–18)
IMM GRANULOCYTES NFR BLD AUTO: 1.3 % — HIGH (ref 0.1–0.3)
LYMPHOCYTES # BLD AUTO: 1.28 K/UL — SIGNIFICANT CHANGE UP (ref 1.2–3.4)
LYMPHOCYTES # BLD AUTO: 17.3 % — LOW (ref 20.5–51.1)
MCHC RBC-ENTMCNC: 25.4 PG — LOW (ref 27–31)
MCHC RBC-ENTMCNC: 32.9 G/DL — SIGNIFICANT CHANGE UP (ref 32–37)
MCV RBC AUTO: 77.2 FL — LOW (ref 80–94)
MONOCYTES # BLD AUTO: 0.97 K/UL — HIGH (ref 0.1–0.6)
MONOCYTES NFR BLD AUTO: 13.1 % — HIGH (ref 1.7–9.3)
NEUTROPHILS # BLD AUTO: 4.8 K/UL — SIGNIFICANT CHANGE UP (ref 1.4–6.5)
NEUTROPHILS NFR BLD AUTO: 64.8 % — SIGNIFICANT CHANGE UP (ref 42.2–75.2)
NRBC # BLD: 0 /100 WBCS — SIGNIFICANT CHANGE UP (ref 0–0)
PLATELET # BLD AUTO: 241 K/UL — SIGNIFICANT CHANGE UP (ref 130–400)
POTASSIUM SERPL-MCNC: 3.7 MMOL/L — SIGNIFICANT CHANGE UP (ref 3.5–5)
POTASSIUM SERPL-SCNC: 3.7 MMOL/L — SIGNIFICANT CHANGE UP (ref 3.5–5)
PROT SERPL-MCNC: 6.1 G/DL — SIGNIFICANT CHANGE UP (ref 6–8)
RBC # BLD: 4.25 M/UL — LOW (ref 4.7–6.1)
RBC # FLD: 17.2 % — HIGH (ref 11.5–14.5)
SODIUM SERPL-SCNC: 140 MMOL/L — SIGNIFICANT CHANGE UP (ref 135–146)
WBC # BLD: 7.41 K/UL — SIGNIFICANT CHANGE UP (ref 4.8–10.8)
WBC # FLD AUTO: 7.41 K/UL — SIGNIFICANT CHANGE UP (ref 4.8–10.8)

## 2019-10-26 PROCEDURE — 71045 X-RAY EXAM CHEST 1 VIEW: CPT | Mod: 26

## 2019-10-26 PROCEDURE — 99232 SBSQ HOSP IP/OBS MODERATE 35: CPT

## 2019-10-26 RX ORDER — FUROSEMIDE 40 MG
40 TABLET ORAL ONCE
Refills: 0 | Status: COMPLETED | OUTPATIENT
Start: 2019-10-26 | End: 2019-10-26

## 2019-10-26 RX ADMIN — Medication 1 PATCH: at 00:15

## 2019-10-26 RX ADMIN — NAFCILLIN 200 GRAM(S): 10 INJECTION, POWDER, FOR SOLUTION INTRAVENOUS at 02:32

## 2019-10-26 RX ADMIN — Medication 40 MILLIGRAM(S): at 18:11

## 2019-10-26 RX ADMIN — Medication 75 MILLIGRAM(S): at 05:12

## 2019-10-26 RX ADMIN — Medication 1 SPRAY(S): at 05:12

## 2019-10-26 RX ADMIN — Medication 0.5 MILLIGRAM(S): at 22:22

## 2019-10-26 RX ADMIN — FINASTERIDE 5 MILLIGRAM(S): 5 TABLET, FILM COATED ORAL at 11:30

## 2019-10-26 RX ADMIN — Medication 7 UNIT(S): at 11:31

## 2019-10-26 RX ADMIN — Medication 1 PATCH: at 22:43

## 2019-10-26 RX ADMIN — NAFCILLIN 200 GRAM(S): 10 INJECTION, POWDER, FOR SOLUTION INTRAVENOUS at 09:25

## 2019-10-26 RX ADMIN — BUDESONIDE AND FORMOTEROL FUMARATE DIHYDRATE 2 PUFF(S): 160; 4.5 AEROSOL RESPIRATORY (INHALATION) at 07:51

## 2019-10-26 RX ADMIN — NAFCILLIN 200 GRAM(S): 10 INJECTION, POWDER, FOR SOLUTION INTRAVENOUS at 22:22

## 2019-10-26 RX ADMIN — NAFCILLIN 200 GRAM(S): 10 INJECTION, POWDER, FOR SOLUTION INTRAVENOUS at 05:12

## 2019-10-26 RX ADMIN — Medication 1 PATCH: at 11:30

## 2019-10-26 RX ADMIN — Medication 1 SPRAY(S): at 18:00

## 2019-10-26 RX ADMIN — CLOPIDOGREL BISULFATE 75 MILLIGRAM(S): 75 TABLET, FILM COATED ORAL at 11:30

## 2019-10-26 RX ADMIN — INSULIN GLARGINE 12 UNIT(S): 100 INJECTION, SOLUTION SUBCUTANEOUS at 22:00

## 2019-10-26 RX ADMIN — Medication 1 PATCH: at 20:18

## 2019-10-26 RX ADMIN — Medication 1 APPLICATION(S): at 05:13

## 2019-10-26 RX ADMIN — NAFCILLIN 200 GRAM(S): 10 INJECTION, POWDER, FOR SOLUTION INTRAVENOUS at 18:00

## 2019-10-26 RX ADMIN — ISOSORBIDE MONONITRATE 30 MILLIGRAM(S): 60 TABLET, EXTENDED RELEASE ORAL at 11:30

## 2019-10-26 RX ADMIN — Medication 145 MILLIGRAM(S): at 11:30

## 2019-10-26 RX ADMIN — CHLORHEXIDINE GLUCONATE 1 APPLICATION(S): 213 SOLUTION TOPICAL at 11:32

## 2019-10-26 RX ADMIN — TAMSULOSIN HYDROCHLORIDE 0.4 MILLIGRAM(S): 0.4 CAPSULE ORAL at 22:22

## 2019-10-26 RX ADMIN — Medication 81 MILLIGRAM(S): at 11:30

## 2019-10-26 RX ADMIN — BUDESONIDE AND FORMOTEROL FUMARATE DIHYDRATE 2 PUFF(S): 160; 4.5 AEROSOL RESPIRATORY (INHALATION) at 20:01

## 2019-10-26 RX ADMIN — NAFCILLIN 200 GRAM(S): 10 INJECTION, POWDER, FOR SOLUTION INTRAVENOUS at 13:33

## 2019-10-26 RX ADMIN — SIMVASTATIN 40 MILLIGRAM(S): 20 TABLET, FILM COATED ORAL at 22:22

## 2019-10-26 RX ADMIN — Medication 1 APPLICATION(S): at 18:00

## 2019-10-26 NOTE — PROGRESS NOTE ADULT - ASSESSMENT
Patient is a 80yo Male w/ PMH of HFpEF (EF of 55% with Grade II Diastolic dysfunction), COPD (home O2 3-3.5L as needed), DM, HTN, CAD s/p CABG and 1 stent, BPH, and recent admission for CHF (August 2019) presented  to [facility] with complaints of shortness of breath and exertional chest pain.    #) Acute respiratory failure secondary to Acute on Chronic Diastolic CHF exacerbation / moderate  aortic stenosis   -  resolved  - now on HD to continue with even to  negative fluid balance  - possible cardiac cath after infection resolved    #) PABLO Bacteremia   -  BC 10/22and 10/23 prelim negative   - continue Nafcillin, ID following   -  cardiology following     #) BRITTANI   - probably ATN   - initiation of HD session #2 today   - continue to remove fluid during HD   - nephrology following    #) Chest Pain (resolved) ,  in patient with hx of CABG and stent placement  - c/w losartan asa, metoprolol; Simvastatin   - heparin drip  - cardiology following    #) Hx of A Fib  - On Xarelto, 15mg at home; IV heparin for now, check ptt and adjust   -  Metoprolol      #) Hx of COPD  - Stable at this time, with no evidence of acute exacerbation  - Cont with Symbicort (in place of Breo)    #) Hx of Diabetes Type 2  - Insulin sq hospital protocol   - Carb consistent diet    #) Hx of BPH  - Cont with Flomax and Finasteride        Further care as per critical care

## 2019-10-26 NOTE — PROGRESS NOTE ADULT - ASSESSMENT
a/p:     CKD,   CHF  MSSA sepsis  multilobar pneumonia    Pt comfortable , continue HD as per renal.  continue nafcillin for MSSA, cath held in light of sepsis. May need JAYCOB.

## 2019-10-26 NOTE — PROGRESS NOTE ADULT - SUBJECTIVE AND OBJECTIVE BOX
Nephrology progress note    Patient is seen and examined, events over the last 24 h noted .  s/p HD yesterday, denies complaints  Allergies:  No Known Allergies    Hospital Medications:   MEDICATIONS  (STANDING):  ALPRAZolam 0.5 milliGRAM(s) Oral at bedtime  aspirin  chewable 81 milliGRAM(s) Oral daily  buDESOnide 160 MICROgram(s)/formoterol 4.5 MICROgram(s) Inhaler 2 Puff(s) Inhalation two times a day  chlorhexidine 4% Liquid 1 Application(s) Topical daily  clopidogrel Tablet 75 milliGRAM(s) Oral daily  dextrose 5%. 1000 milliLiter(s) (50 mL/Hr) IV Continuous <Continuous>  dextrose 50% Injectable 12.5 Gram(s) IV Push once  dextrose 50% Injectable 25 Gram(s) IV Push once  dextrose 50% Injectable 25 Gram(s) IV Push once  fenofibrate Tablet 145 milliGRAM(s) Oral daily  finasteride 5 milliGRAM(s) Oral daily  fluticasone propionate 50 MICROgram(s)/spray Nasal Spray 1 Spray(s) Both Nostrils two times a day  heparin  Infusion 1200 Unit(s)/Hr (12 mL/Hr) IV Continuous <Continuous>  influenza   Vaccine 0.5 milliLiter(s) IntraMuscular once  insulin glargine Injectable (LANTUS) 12 Unit(s) SubCutaneous at bedtime  insulin lispro Injectable (HumaLOG) 7 Unit(s) SubCutaneous before breakfast  insulin lispro Injectable (HumaLOG) 7 Unit(s) SubCutaneous before lunch  insulin lispro Injectable (HumaLOG) 7 Unit(s) SubCutaneous before dinner  isosorbide   mononitrate ER Tablet (IMDUR) 30 milliGRAM(s) Oral daily  metoprolol succinate ER 75 milliGRAM(s) Oral daily  nafcillin  IVPB      nafcillin  IVPB 2 Gram(s) IV Intermittent every 4 hours  nitroglycerin    Patch 0.2 mG/Hr(s) 1 patch Transdermal daily  silver sulfADIAZINE 1% Cream 1 Application(s) Topical two times a day  simvastatin 40 milliGRAM(s) Oral at bedtime  tamsulosin 0.4 milliGRAM(s) Oral at bedtime        VITALS:  T(F): 97.5 (10-26-19 @ 07:01), Max: 97.5 (10-26-19 @ 07:01)  HR: 65 (10-26-19 @ 07:07)  BP: 120/58 (10-26-19 @ 07:07)  RR: 20 (10-26-19 @ 07:07)  SpO2: 96% (10-25-19 @ 16:20)  Wt(kg): --    10-24 @ 07:01  -  10-25 @ 07:00  --------------------------------------------------------  IN: 1388 mL / OUT: 440 mL / NET: 948 mL    10-25 @ 07:01  -  10-26 @ 07:00  --------------------------------------------------------  IN: 1178 mL / OUT: 1620 mL / NET: -442 mL          PHYSICAL EXAM:  Constitutional: NAD  HEENT: anicteric sclera, oropharynx clear, MMM  Neck: No JVD  Respiratory: CTAB, no wheezes, rales or rhonchi  Cardiovascular: S1, S2, RRR  Gastrointestinal: BS+, soft, NT/ND  Extremities: mild peripheral edema  Neurological: A/O x 3  : No CVA tenderness. No julian.   Skin: No rashes  Vascular Access:    LABS:  10-25    139  |  93<L>  |  101<HH>  ----------------------------<  102<H>  4.2   |  22  |  8.2<HH>    Ca    8.3<L>      25 Oct 2019 05:32    TPro  6.2  /  Alb  3.0<L>  /  TBili  1.0  /  DBili      /  AST  13  /  ALT  16  /  AlkPhos  37  10-25                          10.8   7.41  )-----------( 241      ( 26 Oct 2019 05:49 )             32.8       Urine Studies:    Osmolality, Random Urine: 319 mos/kg (10-22 @ 14:31)  Sodium, Random Urine: 46.0 mmoL/L (10-22 @ 14:31)  Creatinine, Random Urine: 87 mg/dL (10-22 @ 14:31)    RADIOLOGY & ADDITIONAL STUDIES:

## 2019-10-26 NOTE — PROGRESS NOTE ADULT - SUBJECTIVE AND OBJECTIVE BOX
Patient is a 79y old  Male who presents with a chief complaint of CHF Exacerbation (25 Oct 2019 11:11)      T(F): 97.5 (10-26-19 @ 07:01), Max: 97.5 (10-26-19 @ 07:01)  HR: 83 (10-26-19 @ 05:51)  BP: 113/57 (10-26-19 @ 05:51)  RR: 20 (10-26-19 @ 07:01)  SpO2: 96% (10-25-19 @ 16:20) (96% - 96%)    PHYSICAL EXAM:  GENERAL: NAD, well-groomed, well-developed  HEAD:  Atraumatic, Normocephalic  EYES: EOMI, PERRLA, conjunctiva and sclera clear  ENMT: No tonsillar erythema, exudates, or enlargement; Moist mucous membranes, Good dentition, No lesions  NECK: Supple, No JVD, Normal thyroid  NERVOUS SYSTEM:  Alert & Oriented X3,  Motor Strength 5/5 B/L upper and lower extremities  CHEST/LUNG: Clear to percussion bilaterally; No rales, rhonchi, wheezing, or rubs  HEART: Regular rate and rhythm; No murmurs, rubs, or gallops  ABDOMEN: Soft, Nontender, Nondistended; Bowel sounds present  EXTREMITIES:   No clubbing, cyanosis, or edema  LYMPH: No lymphadenopathy noted  SKIN: No rashes or lesions    labs  10-25    139  |  93<L>  |  101<HH>  ----------------------------<  102<H>  4.2   |  22  |  8.2<HH>    Ca    8.3<L>      25 Oct 2019 05:32    TPro  6.2  /  Alb  3.0<L>  /  TBili  1.0  /  DBili  x   /  AST  13  /  ALT  16  /  AlkPhos  37  10-25                          10.8   7.41  )-----------( 241      ( 26 Oct 2019 05:49 )             32.8       Culture - Blood (collected 24 Oct 2019 05:53)  Source: .Blood None  Preliminary Report (25 Oct 2019 18:01):    No growth to date.    Culture - Blood (collected 23 Oct 2019 15:30)  Source: .Blood None  Preliminary Report (24 Oct 2019 23:01):    No growth to date.      PTT - ( 25 Oct 2019 05:32 )  PTT:77.9 sec        acetaminophen   Tablet .. 650 milliGRAM(s) Oral every 6 hours PRN  acetaminophen  Suppository .. 650 milliGRAM(s) Rectal every 6 hours PRN  ALPRAZolam 0.5 milliGRAM(s) Oral at bedtime  aspirin  chewable 81 milliGRAM(s) Oral daily  buDESOnide 160 MICROgram(s)/formoterol 4.5 MICROgram(s) Inhaler 2 Puff(s) Inhalation two times a day  chlorhexidine 4% Liquid 1 Application(s) Topical daily  clopidogrel Tablet 75 milliGRAM(s) Oral daily  dextrose 40% Gel 15 Gram(s) Oral once PRN  dextrose 5%. 1000 milliLiter(s) IV Continuous <Continuous>  dextrose 50% Injectable 12.5 Gram(s) IV Push once  dextrose 50% Injectable 25 Gram(s) IV Push once  dextrose 50% Injectable 25 Gram(s) IV Push once  fenofibrate Tablet 145 milliGRAM(s) Oral daily  finasteride 5 milliGRAM(s) Oral daily  fluticasone propionate 50 MICROgram(s)/spray Nasal Spray 1 Spray(s) Both Nostrils two times a day  glucagon  Injectable 1 milliGRAM(s) IntraMuscular once PRN  heparin  Infusion 1200 Unit(s)/Hr IV Continuous <Continuous>  influenza   Vaccine 0.5 milliLiter(s) IntraMuscular once  insulin glargine Injectable (LANTUS) 12 Unit(s) SubCutaneous at bedtime  insulin lispro Injectable (HumaLOG) 7 Unit(s) SubCutaneous before breakfast  insulin lispro Injectable (HumaLOG) 7 Unit(s) SubCutaneous before lunch  insulin lispro Injectable (HumaLOG) 7 Unit(s) SubCutaneous before dinner  isosorbide   mononitrate ER Tablet (IMDUR) 30 milliGRAM(s) Oral daily  metoprolol succinate ER 75 milliGRAM(s) Oral daily  nafcillin  IVPB      nafcillin  IVPB 2 Gram(s) IV Intermittent every 4 hours  nitroglycerin    Patch 0.2 mG/Hr(s) 1 patch Transdermal daily  silver sulfADIAZINE 1% Cream 1 Application(s) Topical two times a day  simvastatin 40 milliGRAM(s) Oral at bedtime  tamsulosin 0.4 milliGRAM(s) Oral at bedtime

## 2019-10-26 NOTE — PROGRESS NOTE ADULT - SUBJECTIVE AND OBJECTIVE BOX
CC.  CHF    HPI.  Patient reports that he feels better.  Offers no new complaints    Constitutional: No fever, fatigue or weight loss.  Skin: No rash.  Eyes: No recent vision problems or eye pain.  ENT: No congestion, ear pain, or sore throat.  Endocrine: No thyroid problems.  Cardiovascular: No chest pain or palpation.  Respiratory: No cough, shortness of breath, congestion, or wheezing.  Gastrointestinal: No abdominal pain, nausea, vomiting, or diarrhea.  Genitourinary: No dysuria.  Musculoskeletal: No joint swelling.  Neurologic: No headache.      Vital Signs Last 24 Hrs  T(C): 36.4 (26 Oct 2019 07:01), Max: 36.4 (26 Oct 2019 07:01)  T(F): 97.5 (26 Oct 2019 07:01), Max: 97.5 (26 Oct 2019 07:01)  HR: 65 (26 Oct 2019 07:07) (64 - 83)  BP: 120/58 (26 Oct 2019 07:07) (102/57 - 120/58)  BP(mean): 83 (26 Oct 2019 07:07) (74 - 83)  RR: 20 (26 Oct 2019 07:07) (20 - 25)  SpO2: 96% (25 Oct 2019 16:20) (96% - 96%)     PHYSICAL EXAM:  GENERAL: NAD  HEAD:  Atraumatic, Normocephalic  NERVOUS SYSTEM:  Alert & Oriented X3, no focal deficits  CHEST/LUNG:  bilateral rales at basses   HEART: IRegular rate and rhythm; No murmurs, rubs, or gallops  ABDOMEN: Soft, Nontender, Nondistended; Bowel sounds present  EXTREMITIES:  b/l  edema                                10.8   7.41  )-----------( 241      ( 26 Oct 2019 05:49 )             32.8     10-26    140  |  96<L>  |  75<HH>  ----------------------------<  121<H>  3.7   |  23  |  6.7<HH>    Ca    8.6      26 Oct 2019 05:49    TPro  6.1  /  Alb  3.0<L>  /  TBili  1.0  /  DBili  x   /  AST  15  /  ALT  12  /  AlkPhos  36  10-26            PTT - ( 26 Oct 2019 05:49 )  PTT:62.0 sec    MEDICATIONS  (STANDING):  ALPRAZolam 0.5 milliGRAM(s) Oral at bedtime  aspirin  chewable 81 milliGRAM(s) Oral daily  buDESOnide 160 MICROgram(s)/formoterol 4.5 MICROgram(s) Inhaler 2 Puff(s) Inhalation two times a day  chlorhexidine 4% Liquid 1 Application(s) Topical daily  clopidogrel Tablet 75 milliGRAM(s) Oral daily  dextrose 5%. 1000 milliLiter(s) (50 mL/Hr) IV Continuous <Continuous>  dextrose 50% Injectable 12.5 Gram(s) IV Push once  dextrose 50% Injectable 25 Gram(s) IV Push once  dextrose 50% Injectable 25 Gram(s) IV Push once  fenofibrate Tablet 145 milliGRAM(s) Oral daily  finasteride 5 milliGRAM(s) Oral daily  fluticasone propionate 50 MICROgram(s)/spray Nasal Spray 1 Spray(s) Both Nostrils two times a day  heparin  Infusion 1200 Unit(s)/Hr (12 mL/Hr) IV Continuous <Continuous>  influenza   Vaccine 0.5 milliLiter(s) IntraMuscular once  insulin glargine Injectable (LANTUS) 12 Unit(s) SubCutaneous at bedtime  insulin lispro Injectable (HumaLOG) 7 Unit(s) SubCutaneous before breakfast  insulin lispro Injectable (HumaLOG) 7 Unit(s) SubCutaneous before lunch  insulin lispro Injectable (HumaLOG) 7 Unit(s) SubCutaneous before dinner  isosorbide   mononitrate ER Tablet (IMDUR) 30 milliGRAM(s) Oral daily  metoprolol succinate ER 75 milliGRAM(s) Oral daily  nafcillin  IVPB      nafcillin  IVPB 2 Gram(s) IV Intermittent every 4 hours  nitroglycerin    Patch 0.2 mG/Hr(s) 1 patch Transdermal daily  silver sulfADIAZINE 1% Cream 1 Application(s) Topical two times a day  simvastatin 40 milliGRAM(s) Oral at bedtime  tamsulosin 0.4 milliGRAM(s) Oral at bedtime    MEDICATIONS  (PRN):  acetaminophen   Tablet .. 650 milliGRAM(s) Oral every 6 hours PRN Temp greater or equal to 38C (100.4F), Mild Pain (1 - 3)  acetaminophen  Suppository .. 650 milliGRAM(s) Rectal every 6 hours PRN Temp greater or equal to 38C (100.4F), Mild Pain (1 - 3)  dextrose 40% Gel 15 Gram(s) Oral once PRN Blood Glucose LESS THAN 70 milliGRAM(s)/deciliter  glucagon  Injectable 1 milliGRAM(s) IntraMuscular once PRN Glucose LESS THAN 70 milligrams/deciliter

## 2019-10-26 NOTE — PROGRESS NOTE ADULT - ASSESSMENT
1)  Severe oliguric BRITTANI superimposed on  Stage 3 CKD (creat was 1.5 mg% in August 2019).    PMH: DM, HFpEF      1) BRITTANI - due to ATN from sepsis from PNA and bacteremia,   c3c4 normal - unlikely PIGN  Remains oliguric 150 cc yesterday  HD was Friday, next on Monday    - may start LASix 40 IV on off HD days  Will need a TEsio cath if cleared by ID and BCx neg    2)  MSSA bacteremia - likely due to PNA vs endocarditis, on NAfcillin      will follow

## 2019-10-26 NOTE — PROGRESS NOTE ADULT - ASSESSMENT
No complaints. Out greater than in.  Ambulate.  Physical thearpy. Continue heparin. Ambulate  Transfer when stable

## 2019-10-27 LAB
ANION GAP SERPL CALC-SCNC: 22 MMOL/L — HIGH (ref 7–14)
APTT BLD: 60 SEC — HIGH (ref 27–39.2)
BASOPHILS # BLD AUTO: 0.07 K/UL — SIGNIFICANT CHANGE UP (ref 0–0.2)
BASOPHILS NFR BLD AUTO: 0.9 % — SIGNIFICANT CHANGE UP (ref 0–1)
BUN SERPL-MCNC: 73 MG/DL — CRITICAL HIGH (ref 10–20)
CALCIUM SERPL-MCNC: 8.7 MG/DL — SIGNIFICANT CHANGE UP (ref 8.5–10.1)
CHLORIDE SERPL-SCNC: 95 MMOL/L — LOW (ref 98–110)
CO2 SERPL-SCNC: 22 MMOL/L — SIGNIFICANT CHANGE UP (ref 17–32)
CREAT SERPL-MCNC: 7.9 MG/DL — CRITICAL HIGH (ref 0.7–1.5)
CULTURE RESULTS: SIGNIFICANT CHANGE UP
EOSINOPHIL # BLD AUTO: 0.21 K/UL — SIGNIFICANT CHANGE UP (ref 0–0.7)
EOSINOPHIL NFR BLD AUTO: 2.7 % — SIGNIFICANT CHANGE UP (ref 0–8)
GLUCOSE BLDC GLUCOMTR-MCNC: 107 MG/DL — HIGH (ref 70–99)
GLUCOSE BLDC GLUCOMTR-MCNC: 134 MG/DL — HIGH (ref 70–99)
GLUCOSE BLDC GLUCOMTR-MCNC: 150 MG/DL — HIGH (ref 70–99)
GLUCOSE BLDC GLUCOMTR-MCNC: 95 MG/DL — SIGNIFICANT CHANGE UP (ref 70–99)
GLUCOSE SERPL-MCNC: 125 MG/DL — HIGH (ref 70–99)
HCT VFR BLD CALC: 31.7 % — LOW (ref 42–52)
HGB BLD-MCNC: 10.3 G/DL — LOW (ref 14–18)
IMM GRANULOCYTES NFR BLD AUTO: 1.2 % — HIGH (ref 0.1–0.3)
INR BLD: 1.44 RATIO — HIGH (ref 0.65–1.3)
LYMPHOCYTES # BLD AUTO: 1.49 K/UL — SIGNIFICANT CHANGE UP (ref 1.2–3.4)
LYMPHOCYTES # BLD AUTO: 19.3 % — LOW (ref 20.5–51.1)
MCHC RBC-ENTMCNC: 25.2 PG — LOW (ref 27–31)
MCHC RBC-ENTMCNC: 32.5 G/DL — SIGNIFICANT CHANGE UP (ref 32–37)
MCV RBC AUTO: 77.7 FL — LOW (ref 80–94)
MONOCYTES # BLD AUTO: 1.02 K/UL — HIGH (ref 0.1–0.6)
MONOCYTES NFR BLD AUTO: 13.2 % — HIGH (ref 1.7–9.3)
NEUTROPHILS # BLD AUTO: 4.83 K/UL — SIGNIFICANT CHANGE UP (ref 1.4–6.5)
NEUTROPHILS NFR BLD AUTO: 62.7 % — SIGNIFICANT CHANGE UP (ref 42.2–75.2)
NRBC # BLD: 0 /100 WBCS — SIGNIFICANT CHANGE UP (ref 0–0)
PLATELET # BLD AUTO: 216 K/UL — SIGNIFICANT CHANGE UP (ref 130–400)
POTASSIUM SERPL-MCNC: 3.4 MMOL/L — LOW (ref 3.5–5)
POTASSIUM SERPL-SCNC: 3.4 MMOL/L — LOW (ref 3.5–5)
PROTHROM AB SERPL-ACNC: 16.5 SEC — HIGH (ref 9.95–12.87)
RBC # BLD: 4.08 M/UL — LOW (ref 4.7–6.1)
RBC # FLD: 17.5 % — HIGH (ref 11.5–14.5)
SODIUM SERPL-SCNC: 139 MMOL/L — SIGNIFICANT CHANGE UP (ref 135–146)
SPECIMEN SOURCE: SIGNIFICANT CHANGE UP
WBC # BLD: 7.71 K/UL — SIGNIFICANT CHANGE UP (ref 4.8–10.8)
WBC # FLD AUTO: 7.71 K/UL — SIGNIFICANT CHANGE UP (ref 4.8–10.8)

## 2019-10-27 PROCEDURE — 99232 SBSQ HOSP IP/OBS MODERATE 35: CPT

## 2019-10-27 PROCEDURE — 71045 X-RAY EXAM CHEST 1 VIEW: CPT | Mod: 26

## 2019-10-27 RX ADMIN — Medication 1 APPLICATION(S): at 06:25

## 2019-10-27 RX ADMIN — Medication 7 UNIT(S): at 07:48

## 2019-10-27 RX ADMIN — Medication 7 UNIT(S): at 16:54

## 2019-10-27 RX ADMIN — Medication 81 MILLIGRAM(S): at 11:32

## 2019-10-27 RX ADMIN — Medication 7 UNIT(S): at 11:43

## 2019-10-27 RX ADMIN — Medication 1 PATCH: at 23:00

## 2019-10-27 RX ADMIN — Medication 0.5 MILLIGRAM(S): at 21:54

## 2019-10-27 RX ADMIN — BUDESONIDE AND FORMOTEROL FUMARATE DIHYDRATE 2 PUFF(S): 160; 4.5 AEROSOL RESPIRATORY (INHALATION) at 07:47

## 2019-10-27 RX ADMIN — ISOSORBIDE MONONITRATE 30 MILLIGRAM(S): 60 TABLET, EXTENDED RELEASE ORAL at 11:31

## 2019-10-27 RX ADMIN — NAFCILLIN 200 GRAM(S): 10 INJECTION, POWDER, FOR SOLUTION INTRAVENOUS at 15:19

## 2019-10-27 RX ADMIN — Medication 145 MILLIGRAM(S): at 11:31

## 2019-10-27 RX ADMIN — CLOPIDOGREL BISULFATE 75 MILLIGRAM(S): 75 TABLET, FILM COATED ORAL at 11:35

## 2019-10-27 RX ADMIN — CHLORHEXIDINE GLUCONATE 1 APPLICATION(S): 213 SOLUTION TOPICAL at 11:33

## 2019-10-27 RX ADMIN — Medication 75 MILLIGRAM(S): at 06:24

## 2019-10-27 RX ADMIN — TAMSULOSIN HYDROCHLORIDE 0.4 MILLIGRAM(S): 0.4 CAPSULE ORAL at 21:55

## 2019-10-27 RX ADMIN — NAFCILLIN 200 GRAM(S): 10 INJECTION, POWDER, FOR SOLUTION INTRAVENOUS at 21:54

## 2019-10-27 RX ADMIN — BUDESONIDE AND FORMOTEROL FUMARATE DIHYDRATE 2 PUFF(S): 160; 4.5 AEROSOL RESPIRATORY (INHALATION) at 21:54

## 2019-10-27 RX ADMIN — NAFCILLIN 200 GRAM(S): 10 INJECTION, POWDER, FOR SOLUTION INTRAVENOUS at 06:23

## 2019-10-27 RX ADMIN — Medication 1 PATCH: at 11:31

## 2019-10-27 RX ADMIN — Medication 1 PATCH: at 19:30

## 2019-10-27 RX ADMIN — Medication 1 APPLICATION(S): at 17:32

## 2019-10-27 RX ADMIN — NAFCILLIN 200 GRAM(S): 10 INJECTION, POWDER, FOR SOLUTION INTRAVENOUS at 02:21

## 2019-10-27 RX ADMIN — NAFCILLIN 200 GRAM(S): 10 INJECTION, POWDER, FOR SOLUTION INTRAVENOUS at 11:04

## 2019-10-27 RX ADMIN — FINASTERIDE 5 MILLIGRAM(S): 5 TABLET, FILM COATED ORAL at 11:34

## 2019-10-27 RX ADMIN — HEPARIN SODIUM 12 UNIT(S)/HR: 5000 INJECTION INTRAVENOUS; SUBCUTANEOUS at 11:05

## 2019-10-27 RX ADMIN — Medication 1 SPRAY(S): at 06:24

## 2019-10-27 RX ADMIN — NAFCILLIN 200 GRAM(S): 10 INJECTION, POWDER, FOR SOLUTION INTRAVENOUS at 17:29

## 2019-10-27 RX ADMIN — SIMVASTATIN 40 MILLIGRAM(S): 20 TABLET, FILM COATED ORAL at 21:54

## 2019-10-27 NOTE — PROGRESS NOTE ADULT - ASSESSMENT
Patient is a 80yo Male w/ PMH of HFpEF (EF of 55% with Grade II Diastolic dysfunction), COPD (home O2 3-3.5L as needed), DM, HTN, CAD s/p CABG and 1 stent, BPH, and recent admission for CHF (August 2019) presented  to [facility] with complaints of shortness of breath and exertional chest pain.    #) Acute respiratory failure secondary to Acute on Chronic Diastolic CHF exacerbation / moderate  aortic stenosis   -  resolved  - now on HD to continue with even to  negative fluid balance  - possible cardiac cath after infection resolved    #) PABLO Bacteremia   -  BC 10/22and 10/23 prelim negative   - continue Nafcillin, ID following   -  cardiology following     #) BRITTANI   - probably ATN   - initiation of HD session #2 today   - continue to remove fluid during HD   - nephrology following    #) Chest Pain (resolved) ,  in patient with hx of CABG and stent placement  - c/w losartan asa, metoprolol; Simvastatin   - heparin drip  - cardiology following    #) Hx of A Fib  - On Xarelto, 15mg at home; IV heparin for now, check ptt and adjust   -  Metoprolol      #) Hx of COPD  - Stable at this time, with no evidence of acute exacerbation  - Cont with Symbicort (in place of Breo)    #) Hx of Diabetes Type 2  - Insulin sq hospital protocol   - Carb consistent diet    #) Hx of BPH  - Cont with Flomax and Finasteride        Further care as per critical care

## 2019-10-27 NOTE — PROGRESS NOTE ADULT - SUBJECTIVE AND OBJECTIVE BOX
Patient is a 79y old  Male who presents with a chief complaint of shortness of breath (26 Oct 2019 12:49)      T(F): 95.7 (10-27-19 @ 07:01), Max: 97.9 (10-26-19 @ 23:37)  HR: 66 (10-27-19 @ 06:25)  BP: 108/57 (10-27-19 @ 06:25)  RR: 20 (10-27-19 @ 07:01)  SpO2: 99% (10-27-19 @ 00:33) (99% - 99%)    PHYSICAL EXAM:  GENERAL: NAD, well-groomed, well-developed  HEAD:  Atraumatic, Normocephalic  EYES: EOMI, PERRLA, conjunctiva and sclera clear  ENMT: No tonsillar erythema, exudates, or enlargement; Moist mucous membranes, Good dentition, No lesions  NECK: Supple, No JVD, Normal thyroid  NERVOUS SYSTEM:  Alert & Oriented X3,  Motor Strength 5/5 B/L upper and lower extremities  CHEST/LUNG: Clear to percussion bilaterally; No rales, rhonchi, wheezing, or rubs  HEART: Irreg No murmurs, rubs, or gallops  ABDOMEN: Soft, Nontender, Nondistended; Bowel sounds present  EXTREMITIES:   No clubbing, cyanosis, tr edema  LYMPH: No lymphadenopathy noted  SKIN: No rashes or lesions    labs  10-26    140  |  96<L>  |  75<HH>  ----------------------------<  121<H>  3.7   |  23  |  6.7<HH>    Ca    8.6      26 Oct 2019 05:49    TPro  6.1  /  Alb  3.0<L>  /  TBili  1.0  /  DBili  x   /  AST  15  /  ALT  12  /  AlkPhos  36  10-26                          10.3   7.71  )-----------( 216      ( 27 Oct 2019 06:07 )             31.7       PTT - ( 27 Oct 2019 06:07 )  PTT:60.0 sec        acetaminophen   Tablet .. 650 milliGRAM(s) Oral every 6 hours PRN  acetaminophen  Suppository .. 650 milliGRAM(s) Rectal every 6 hours PRN  ALPRAZolam 0.5 milliGRAM(s) Oral at bedtime  aspirin  chewable 81 milliGRAM(s) Oral daily  buDESOnide 160 MICROgram(s)/formoterol 4.5 MICROgram(s) Inhaler 2 Puff(s) Inhalation two times a day  chlorhexidine 4% Liquid 1 Application(s) Topical daily  clopidogrel Tablet 75 milliGRAM(s) Oral daily  dextrose 40% Gel 15 Gram(s) Oral once PRN  dextrose 5%. 1000 milliLiter(s) IV Continuous <Continuous>  dextrose 50% Injectable 12.5 Gram(s) IV Push once  dextrose 50% Injectable 25 Gram(s) IV Push once  dextrose 50% Injectable 25 Gram(s) IV Push once  fenofibrate Tablet 145 milliGRAM(s) Oral daily  finasteride 5 milliGRAM(s) Oral daily  fluticasone propionate 50 MICROgram(s)/spray Nasal Spray 1 Spray(s) Both Nostrils two times a day  glucagon  Injectable 1 milliGRAM(s) IntraMuscular once PRN  heparin  Infusion 1200 Unit(s)/Hr IV Continuous <Continuous>  influenza   Vaccine 0.5 milliLiter(s) IntraMuscular once  insulin glargine Injectable (LANTUS) 12 Unit(s) SubCutaneous at bedtime  insulin lispro Injectable (HumaLOG) 7 Unit(s) SubCutaneous before breakfast  insulin lispro Injectable (HumaLOG) 7 Unit(s) SubCutaneous before lunch  insulin lispro Injectable (HumaLOG) 7 Unit(s) SubCutaneous before dinner  isosorbide   mononitrate ER Tablet (IMDUR) 30 milliGRAM(s) Oral daily  metoprolol succinate ER 75 milliGRAM(s) Oral daily  nafcillin  IVPB      nafcillin  IVPB 2 Gram(s) IV Intermittent every 4 hours  nitroglycerin    Patch 0.2 mG/Hr(s) 1 patch Transdermal daily  silver sulfADIAZINE 1% Cream 1 Application(s) Topical two times a day  simvastatin 40 milliGRAM(s) Oral at bedtime  tamsulosin 0.4 milliGRAM(s) Oral at bedtime

## 2019-10-27 NOTE — PROGRESS NOTE ADULT - SUBJECTIVE AND OBJECTIVE BOX
CC.  CHF    HPI.  Patient reports that he feels better.  Offers no new complaints    Constitutional: No fever, fatigue or weight loss.  Skin: No rash.  Eyes: No recent vision problems or eye pain.  ENT: No congestion, ear pain, or sore throat.  Endocrine: No thyroid problems.  Cardiovascular: No chest pain or palpation.  Respiratory: No cough, shortness of breath, congestion, or wheezing.  Gastrointestinal: No abdominal pain, nausea, vomiting, or diarrhea.  Genitourinary: No dysuria.  Musculoskeletal: No joint swelling.  Neurologic: No headache.      Vital Signs Last 24 Hrs  T(C): 35.4 (27 Oct 2019 07:01), Max: 36.6 (26 Oct 2019 23:37)  T(F): 95.7 (27 Oct 2019 07:01), Max: 97.9 (26 Oct 2019 23:37)  HR: 65 (27 Oct 2019 07:08) (65 - 80)  BP: 117/56 (27 Oct 2019 07:08) (108/57 - 117/56)  BP(mean): 81 (27 Oct 2019 07:08) (77 - 81)  RR: 20 (27 Oct 2019 07:01) (20 - 22)  SpO2: 97% (27 Oct 2019 07:08) (97% - 99%)     PHYSICAL EXAM:  GENERAL: NAD  HEAD:  Atraumatic, Normocephalic  NERVOUS SYSTEM:  Alert & Oriented X3, no focal deficits  CHEST/LUNG:  bilateral rales at basses   HEART: IRegular rate and rhythm; No murmurs, rubs, or gallops  ABDOMEN: Soft, Nontender, Nondistended; Bowel sounds present  EXTREMITIES:  b/l  edema                MEDICATIONS  (STANDING):  ALPRAZolam 0.5 milliGRAM(s) Oral at bedtime  aspirin  chewable 81 milliGRAM(s) Oral daily  buDESOnide 160 MICROgram(s)/formoterol 4.5 MICROgram(s) Inhaler 2 Puff(s) Inhalation two times a day  chlorhexidine 4% Liquid 1 Application(s) Topical daily  clopidogrel Tablet 75 milliGRAM(s) Oral daily  dextrose 5%. 1000 milliLiter(s) (50 mL/Hr) IV Continuous <Continuous>  dextrose 50% Injectable 12.5 Gram(s) IV Push once  dextrose 50% Injectable 25 Gram(s) IV Push once  dextrose 50% Injectable 25 Gram(s) IV Push once  fenofibrate Tablet 145 milliGRAM(s) Oral daily  finasteride 5 milliGRAM(s) Oral daily  fluticasone propionate 50 MICROgram(s)/spray Nasal Spray 1 Spray(s) Both Nostrils two times a day  heparin  Infusion 1200 Unit(s)/Hr (12 mL/Hr) IV Continuous <Continuous>  influenza   Vaccine 0.5 milliLiter(s) IntraMuscular once  insulin glargine Injectable (LANTUS) 12 Unit(s) SubCutaneous at bedtime  insulin lispro Injectable (HumaLOG) 7 Unit(s) SubCutaneous before breakfast  insulin lispro Injectable (HumaLOG) 7 Unit(s) SubCutaneous before lunch  insulin lispro Injectable (HumaLOG) 7 Unit(s) SubCutaneous before dinner  isosorbide   mononitrate ER Tablet (IMDUR) 30 milliGRAM(s) Oral daily  metoprolol succinate ER 75 milliGRAM(s) Oral daily  nafcillin  IVPB      nafcillin  IVPB 2 Gram(s) IV Intermittent every 4 hours  nitroglycerin    Patch 0.2 mG/Hr(s) 1 patch Transdermal daily  silver sulfADIAZINE 1% Cream 1 Application(s) Topical two times a day  simvastatin 40 milliGRAM(s) Oral at bedtime  tamsulosin 0.4 milliGRAM(s) Oral at bedtime    MEDICATIONS  (PRN):  acetaminophen   Tablet .. 650 milliGRAM(s) Oral every 6 hours PRN Temp greater or equal to 38C (100.4F), Mild Pain (1 - 3)  acetaminophen  Suppository .. 650 milliGRAM(s) Rectal every 6 hours PRN Temp greater or equal to 38C (100.4F), Mild Pain (1 - 3)  dextrose 40% Gel 15 Gram(s) Oral once PRN Blood Glucose LESS THAN 70 milliGRAM(s)/deciliter  glucagon  Injectable 1 milliGRAM(s) IntraMuscular once PRN Glucose LESS THAN 70 milligrams/deciliter

## 2019-10-27 NOTE — PROGRESS NOTE ADULT - SUBJECTIVE AND OBJECTIVE BOX
· Subjective and Objective: 	  CC.  CHF    HPI.  Patient reports that he feels better.  Offers no new complaints    Constitutional: No fever, fatigue or weight loss.  Skin: No rash.  Eyes: No recent vision problems or eye pain.  ENT: No congestion, ear pain, or sore throat.  Endocrine: No thyroid problems.  Cardiovascular: No chest pain or palpation.  Respiratory: No cough, shortness of breath, congestion, or wheezing.  Gastrointestinal: No abdominal pain, nausea, vomiting, or diarrhea.  Genitourinary: No dysuria.  Musculoskeletal: No joint swelling.  Neurologic: No headache.      Vital Signs Last 24 Hrs  T(C): 35.4 (27 Oct 2019 07:01), Max: 36.6 (26 Oct 2019 23:37)  T(F): 95.7 (27 Oct 2019 07:01), Max: 97.9 (26 Oct 2019 23:37)  HR: 65 (27 Oct 2019 07:08) (65 - 80)  BP: 117/56 (27 Oct 2019 07:08) (108/57 - 117/56)  BP(mean): 81 (27 Oct 2019 07:08) (77 - 81)  RR: 20 (27 Oct 2019 07:01) (20 - 22)  SpO2: 97% (27 Oct 2019 07:08) (97% - 99%)     PHYSICAL EXAM:  GENERAL: NAD  HEAD:  Atraumatic, Normocephalic  NERVOUS SYSTEM:  Alert & Oriented X3, no focal deficits  CHEST/LUNG:  bilateral rales at basses   HEART: IRegular rate and rhythm; No murmurs, rubs, or gallops  ABDOMEN: Soft, Nontender, Nondistended; Bowel sounds present  EXTREMITIES:  b/l  edema                MEDICATIONS  (STANDING):  ALPRAZolam 0.5 milliGRAM(s) Oral at bedtime  aspirin  chewable 81 milliGRAM(s) Oral daily  buDESOnide 160 MICROgram(s)/formoterol 4.5 MICROgram(s) Inhaler 2 Puff(s) Inhalation two times a day  chlorhexidine 4% Liquid 1 Application(s) Topical daily  clopidogrel Tablet 75 milliGRAM(s) Oral daily  dextrose 5%. 1000 milliLiter(s) (50 mL/Hr) IV Continuous <Continuous>  dextrose 50% Injectable 12.5 Gram(s) IV Push once  dextrose 50% Injectable 25 Gram(s) IV Push once  dextrose 50% Injectable 25 Gram(s) IV Push once  fenofibrate Tablet 145 milliGRAM(s) Oral daily  finasteride 5 milliGRAM(s) Oral daily  fluticasone propionate 50 MICROgram(s)/spray Nasal Spray 1 Spray(s) Both Nostrils two times a day  heparin  Infusion 1200 Unit(s)/Hr (12 mL/Hr) IV Continuous <Continuous>  influenza   Vaccine 0.5 milliLiter(s) IntraMuscular once  insulin glargine Injectable (LANTUS) 12 Unit(s) SubCutaneous at bedtime  insulin lispro Injectable (HumaLOG) 7 Unit(s) SubCutaneous before breakfast  insulin lispro Injectable (HumaLOG) 7 Unit(s) SubCutaneous before lunch  insulin lispro Injectable (HumaLOG) 7 Unit(s) SubCutaneous before dinner  isosorbide   mononitrate ER Tablet (IMDUR) 30 milliGRAM(s) Oral daily  metoprolol succinate ER 75 milliGRAM(s) Oral daily  nafcillin  IVPB      nafcillin  IVPB 2 Gram(s) IV Intermittent every 4 hours  nitroglycerin    Patch 0.2 mG/Hr(s) 1 patch Transdermal daily  silver sulfADIAZINE 1% Cream 1 Application(s) Topical two times a day  simvastatin 40 milliGRAM(s) Oral at bedtime  tamsulosin 0.4 milliGRAM(s) Oral at bedtime    MEDICATIONS  (PRN):  acetaminophen   Tablet .. 650 milliGRAM(s) Oral every 6 hours PRN Temp greater or equal to 38C (100.4F), Mild Pain (1 - 3)  acetaminophen  Suppository .. 650 milliGRAM(s) Rectal every 6 hours PRN Temp greater or equal to 38C (100.4F), Mild Pain (1 - 3)  dextrose 40% Gel 15 Gram(s) Oral once PRN Blood Glucose LESS THAN 70 milliGRAM(s)/deciliter  glucagon  Injectable 1 milliGRAM(s) IntraMuscular once PRN Glucose LESS THAN 70 milligrams/deciliter          Assessment and Plan:   · Assessment		  Patient is a 80yo Male w/ PMH of HFpEF (EF of 55% with Grade II Diastolic dysfunction), COPD (home O2 3-3.5L as needed), DM, HTN, CAD s/p CABG and 1 stent, BPH, and recent admission for CHF (August 2019) presented  to [facility] with complaints of shortness of breath and exertional chest pain.    #) Acute respiratory failure secondary to Acute on Chronic Diastolic CHF exacerbation / moderate  aortic stenosis   -  resolved  - now on HD to continue with even to  negative fluid balance  - possible cardiac cath after infection resolved    #) PABLO Bacteremia   -  BC 10/22and 10/23 prelim negative   - continue Nafcillin, ID following   -  cardiology following     #) BRITTANI   - probably ATN   - initiation of HD session #2 today   - continue to remove fluid during HD   - nephrology following    #) Chest Pain (resolved) ,  in patient with hx of CABG and stent placement  - c/w losartan asa, metoprolol; Simvastatin   - heparin drip  - cardiology following    #) Hx of A Fib  - On Xarelto, 15mg at home; IV heparin for now, check ptt and adjust   -  Metoprolol      #) Hx of COPD  - Stable at this time, with no evidence of acute exacerbation  - Cont with Symbicort (in place of Breo)    #) Hx of Diabetes Type 2  - Insulin sq hospital protocol   - Carb consistent diet    #) Hx of BPH  - Cont with Flomax and Finasteride

## 2019-10-27 NOTE — PROGRESS NOTE ADULT - ASSESSMENT
No complaints. In greater  Ambulate.  Physical thearpy. Continue heparin. Ambulate. Probable dialysis Monday.  Transfer when stable

## 2019-10-27 NOTE — PROGRESS NOTE ADULT - SUBJECTIVE AND OBJECTIVE BOX
Nephrology progress note    Patient was seen and examined, events over the last 24 h noted .  cr up today  Hd friday    Allergies:  No Known Allergies    Hospital Medications:   MEDICATIONS  (STANDING):  ALPRAZolam 0.5 milliGRAM(s) Oral at bedtime  aspirin  chewable 81 milliGRAM(s) Oral daily  buDESOnide 160 MICROgram(s)/formoterol 4.5 MICROgram(s) Inhaler 2 Puff(s) Inhalation two times a day  chlorhexidine 4% Liquid 1 Application(s) Topical daily  clopidogrel Tablet 75 milliGRAM(s) Oral daily  dextrose 5%. 1000 milliLiter(s) (50 mL/Hr) IV Continuous <Continuous>  dextrose 50% Injectable 12.5 Gram(s) IV Push once  dextrose 50% Injectable 25 Gram(s) IV Push once  dextrose 50% Injectable 25 Gram(s) IV Push once  fenofibrate Tablet 145 milliGRAM(s) Oral daily  finasteride 5 milliGRAM(s) Oral daily  fluticasone propionate 50 MICROgram(s)/spray Nasal Spray 1 Spray(s) Both Nostrils two times a day  heparin  Infusion 1200 Unit(s)/Hr (12 mL/Hr) IV Continuous <Continuous>  influenza   Vaccine 0.5 milliLiter(s) IntraMuscular once  insulin glargine Injectable (LANTUS) 12 Unit(s) SubCutaneous at bedtime  insulin lispro Injectable (HumaLOG) 7 Unit(s) SubCutaneous before breakfast  insulin lispro Injectable (HumaLOG) 7 Unit(s) SubCutaneous before lunch  insulin lispro Injectable (HumaLOG) 7 Unit(s) SubCutaneous before dinner  isosorbide   mononitrate ER Tablet (IMDUR) 30 milliGRAM(s) Oral daily  metoprolol succinate ER 75 milliGRAM(s) Oral daily  nafcillin  IVPB      nafcillin  IVPB 2 Gram(s) IV Intermittent every 4 hours  nitroglycerin    Patch 0.2 mG/Hr(s) 1 patch Transdermal daily  silver sulfADIAZINE 1% Cream 1 Application(s) Topical two times a day  simvastatin 40 milliGRAM(s) Oral at bedtime  tamsulosin 0.4 milliGRAM(s) Oral at bedtime        VITALS:  T(F): 95.7 (10-27-19 @ 07:01), Max: 97.9 (10-26-19 @ 23:37)  HR: 65 (10-27-19 @ 07:08)  BP: 117/56 (10-27-19 @ 07:08)  RR: 20 (10-27-19 @ 07:01)  SpO2: 97% (10-27-19 @ 07:08)  Wt(kg): --    10-25 @ 07:01  -  10-26 @ 07:00  --------------------------------------------------------  IN: 1178 mL / OUT: 1620 mL / NET: -442 mL    10-26 @ 07:01  -  10-27 @ 07:00  --------------------------------------------------------  IN: 776 mL / OUT: 100 mL / NET: 676 mL    10-27 @ 07:01  -  10-27 @ 10:14  --------------------------------------------------------  IN: 276 mL / OUT: 0 mL / NET: 276 mL          PHYSICAL EXAM:  Constitutional: NAD  HEENT: anicteric sclera, oropharynx clear, MMM  Neck: No JVD  Respiratory: CTAB, no wheezes, rales or rhonchi  Cardiovascular: S1, S2, RRR  Gastrointestinal: BS+, soft, NT/ND  Extremities: No cyanosis or clubbing. No peripheral edema  :  No julian.   Skin: No rashes    LABS:  10-27    139  |  95<L>  |  73<HH>  ----------------------------<  125<H>  3.4<L>   |  22  |  7.9<HH>    Ca    8.7      27 Oct 2019 06:07    TPro  6.1  /  Alb  3.0<L>  /  TBili  1.0  /  DBili      /  AST  15  /  ALT  12  /  AlkPhos  36  10-26                          10.3   7.71  )-----------( 216      ( 27 Oct 2019 06:07 )             31.7       Urine Studies:    Osmolality, Random Urine: 319 mos/kg (10-22 @ 14:31)  Sodium, Random Urine: 46.0 mmoL/L (10-22 @ 14:31)  Creatinine, Random Urine: 87 mg/dL (10-22 @ 14:31)    RADIOLOGY & ADDITIONAL STUDIES:

## 2019-10-28 LAB
ALBUMIN SERPL ELPH-MCNC: 3.1 G/DL — LOW (ref 3.5–5.2)
ALP SERPL-CCNC: 44 U/L — SIGNIFICANT CHANGE UP (ref 30–115)
ALT FLD-CCNC: 10 U/L — SIGNIFICANT CHANGE UP (ref 0–41)
ANION GAP SERPL CALC-SCNC: 25 MMOL/L — HIGH (ref 7–14)
APTT BLD: 65.3 SEC — HIGH (ref 27–39.2)
AST SERPL-CCNC: 15 U/L — SIGNIFICANT CHANGE UP (ref 0–41)
BASOPHILS # BLD AUTO: 0.06 K/UL — SIGNIFICANT CHANGE UP (ref 0–0.2)
BASOPHILS NFR BLD AUTO: 0.7 % — SIGNIFICANT CHANGE UP (ref 0–1)
BILIRUB SERPL-MCNC: 0.9 MG/DL — SIGNIFICANT CHANGE UP (ref 0.2–1.2)
BUN SERPL-MCNC: 80 MG/DL — CRITICAL HIGH (ref 10–20)
CALCIUM SERPL-MCNC: 9 MG/DL — SIGNIFICANT CHANGE UP (ref 8.5–10.1)
CHLORIDE SERPL-SCNC: 95 MMOL/L — LOW (ref 98–110)
CO2 SERPL-SCNC: 21 MMOL/L — SIGNIFICANT CHANGE UP (ref 17–32)
CREAT SERPL-MCNC: 8.4 MG/DL — CRITICAL HIGH (ref 0.7–1.5)
CULTURE RESULTS: SIGNIFICANT CHANGE UP
EOSINOPHIL # BLD AUTO: 0.27 K/UL — SIGNIFICANT CHANGE UP (ref 0–0.7)
EOSINOPHIL NFR BLD AUTO: 3 % — SIGNIFICANT CHANGE UP (ref 0–8)
GLUCOSE BLDC GLUCOMTR-MCNC: 103 MG/DL — HIGH (ref 70–99)
GLUCOSE BLDC GLUCOMTR-MCNC: 123 MG/DL — HIGH (ref 70–99)
GLUCOSE BLDC GLUCOMTR-MCNC: 145 MG/DL — HIGH (ref 70–99)
GLUCOSE BLDC GLUCOMTR-MCNC: 96 MG/DL — SIGNIFICANT CHANGE UP (ref 70–99)
GLUCOSE SERPL-MCNC: 114 MG/DL — HIGH (ref 70–99)
HCT VFR BLD CALC: 33.8 % — LOW (ref 42–52)
HGB BLD-MCNC: 11.2 G/DL — LOW (ref 14–18)
IMM GRANULOCYTES NFR BLD AUTO: 1.3 % — HIGH (ref 0.1–0.3)
LYMPHOCYTES # BLD AUTO: 1.71 K/UL — SIGNIFICANT CHANGE UP (ref 1.2–3.4)
LYMPHOCYTES # BLD AUTO: 19.1 % — LOW (ref 20.5–51.1)
MCHC RBC-ENTMCNC: 25.8 PG — LOW (ref 27–31)
MCHC RBC-ENTMCNC: 33.1 G/DL — SIGNIFICANT CHANGE UP (ref 32–37)
MCV RBC AUTO: 77.9 FL — LOW (ref 80–94)
MONOCYTES # BLD AUTO: 0.96 K/UL — HIGH (ref 0.1–0.6)
MONOCYTES NFR BLD AUTO: 10.7 % — HIGH (ref 1.7–9.3)
NEUTROPHILS # BLD AUTO: 5.84 K/UL — SIGNIFICANT CHANGE UP (ref 1.4–6.5)
NEUTROPHILS NFR BLD AUTO: 65.2 % — SIGNIFICANT CHANGE UP (ref 42.2–75.2)
NRBC # BLD: 0 /100 WBCS — SIGNIFICANT CHANGE UP (ref 0–0)
PLATELET # BLD AUTO: 229 K/UL — SIGNIFICANT CHANGE UP (ref 130–400)
POTASSIUM SERPL-MCNC: 3.8 MMOL/L — SIGNIFICANT CHANGE UP (ref 3.5–5)
POTASSIUM SERPL-SCNC: 3.8 MMOL/L — SIGNIFICANT CHANGE UP (ref 3.5–5)
PROT SERPL-MCNC: 6.5 G/DL — SIGNIFICANT CHANGE UP (ref 6–8)
RBC # BLD: 4.34 M/UL — LOW (ref 4.7–6.1)
RBC # FLD: 18.1 % — HIGH (ref 11.5–14.5)
SODIUM SERPL-SCNC: 141 MMOL/L — SIGNIFICANT CHANGE UP (ref 135–146)
SPECIMEN SOURCE: SIGNIFICANT CHANGE UP
WBC # BLD: 8.96 K/UL — SIGNIFICANT CHANGE UP (ref 4.8–10.8)
WBC # FLD AUTO: 8.96 K/UL — SIGNIFICANT CHANGE UP (ref 4.8–10.8)

## 2019-10-28 PROCEDURE — 99233 SBSQ HOSP IP/OBS HIGH 50: CPT

## 2019-10-28 PROCEDURE — 71045 X-RAY EXAM CHEST 1 VIEW: CPT | Mod: 26

## 2019-10-28 RX ADMIN — Medication 1 APPLICATION(S): at 05:48

## 2019-10-28 RX ADMIN — Medication 7 UNIT(S): at 12:16

## 2019-10-28 RX ADMIN — Medication 0.5 MILLIGRAM(S): at 21:47

## 2019-10-28 RX ADMIN — CHLORHEXIDINE GLUCONATE 1 APPLICATION(S): 213 SOLUTION TOPICAL at 11:22

## 2019-10-28 RX ADMIN — NAFCILLIN 200 GRAM(S): 10 INJECTION, POWDER, FOR SOLUTION INTRAVENOUS at 17:11

## 2019-10-28 RX ADMIN — BUDESONIDE AND FORMOTEROL FUMARATE DIHYDRATE 2 PUFF(S): 160; 4.5 AEROSOL RESPIRATORY (INHALATION) at 08:19

## 2019-10-28 RX ADMIN — Medication 7 UNIT(S): at 08:19

## 2019-10-28 RX ADMIN — FINASTERIDE 5 MILLIGRAM(S): 5 TABLET, FILM COATED ORAL at 11:22

## 2019-10-28 RX ADMIN — TAMSULOSIN HYDROCHLORIDE 0.4 MILLIGRAM(S): 0.4 CAPSULE ORAL at 21:48

## 2019-10-28 RX ADMIN — Medication 1 PATCH: at 19:00

## 2019-10-28 RX ADMIN — ISOSORBIDE MONONITRATE 30 MILLIGRAM(S): 60 TABLET, EXTENDED RELEASE ORAL at 11:22

## 2019-10-28 RX ADMIN — Medication 1 PATCH: at 11:22

## 2019-10-28 RX ADMIN — NAFCILLIN 200 GRAM(S): 10 INJECTION, POWDER, FOR SOLUTION INTRAVENOUS at 13:17

## 2019-10-28 RX ADMIN — NAFCILLIN 200 GRAM(S): 10 INJECTION, POWDER, FOR SOLUTION INTRAVENOUS at 21:48

## 2019-10-28 RX ADMIN — CLOPIDOGREL BISULFATE 75 MILLIGRAM(S): 75 TABLET, FILM COATED ORAL at 11:23

## 2019-10-28 RX ADMIN — Medication 1 APPLICATION(S): at 17:11

## 2019-10-28 RX ADMIN — NAFCILLIN 200 GRAM(S): 10 INJECTION, POWDER, FOR SOLUTION INTRAVENOUS at 02:19

## 2019-10-28 RX ADMIN — Medication 145 MILLIGRAM(S): at 11:23

## 2019-10-28 RX ADMIN — Medication 81 MILLIGRAM(S): at 11:22

## 2019-10-28 RX ADMIN — BUDESONIDE AND FORMOTEROL FUMARATE DIHYDRATE 2 PUFF(S): 160; 4.5 AEROSOL RESPIRATORY (INHALATION) at 20:55

## 2019-10-28 RX ADMIN — SIMVASTATIN 40 MILLIGRAM(S): 20 TABLET, FILM COATED ORAL at 21:48

## 2019-10-28 RX ADMIN — Medication 1 PATCH: at 22:22

## 2019-10-28 RX ADMIN — NAFCILLIN 200 GRAM(S): 10 INJECTION, POWDER, FOR SOLUTION INTRAVENOUS at 09:25

## 2019-10-28 RX ADMIN — Medication 75 MILLIGRAM(S): at 05:49

## 2019-10-28 RX ADMIN — NAFCILLIN 200 GRAM(S): 10 INJECTION, POWDER, FOR SOLUTION INTRAVENOUS at 05:48

## 2019-10-28 NOTE — PROGRESS NOTE ADULT - SUBJECTIVE AND OBJECTIVE BOX
SALLY TAIWO  79y, Male  Allergy: No Known Allergies      CHIEF COMPLAINT: CHF (27 Oct 2019 09:15)      INTERVAL EVENTS/HPI  - No acute events overnight  - T(F): , Max: 96.5 (10-27-19 @ 15:15)  - Denies any worsening symptoms  - Tolerating medication  - WBC Count: 8.96 K/uL (10-28-19 @ 05:42)      ROS  General: Denies fevers, chills, nightsweats, weight loss  HEENT: Denies headache, rhinorrhea, sore throat, eye pain  CV: Denies CP, palpitations  PULM: Denies SOB, cough  GI: Denies abdominal pain, diarrhea  : Denies dysuria, hematuria  MSK: Denies arthralgias  SKIN: Denies rash   NEURO: Denies paresthesias, weakness  PSYCH: Denies depression    FH non-contributory   Social Hx non-contributory    VITALS:  T(F): 95, Max: 96.5 (10-27-19 @ 15:15)  HR: 66  BP: 110/57  RR: 99Vital Signs Last 24 Hrs  T(C): 35 (28 Oct 2019 07:01), Max: 35.8 (27 Oct 2019 15:15)  T(F): 95 (28 Oct 2019 07:01), Max: 96.5 (27 Oct 2019 15:15)  HR: 66 (28 Oct 2019 08:05) (65 - 96)  BP: 110/57 (28 Oct 2019 08:05) (106/56 - 122/58)  BP(mean): 81 (28 Oct 2019 07:10) (75 - 84)  RR: 99 (28 Oct 2019 08:05) (18 - 99)  SpO2: 97% (28 Oct 2019 00:23) (97% - 98%)    PHYSICAL EXAM:  Gen: NAD, resting in bed  HEENT: Normocephalic, atraumatic  Neck: supple, no lymphadenopathy  CV: Regular rate & regular rhythm  Lungs: decreased BS at bases, no fremitus  Abdomen: Soft, BS present  Ext: Warm, well perfused  Neuro: non focal, awake  Skin: no rash, no erythema      TESTS & MEASUREMENTS:                        11.2   8.96  )-----------( 229      ( 28 Oct 2019 05:42 )             33.8     10-28    141  |  95<L>  |  80<HH>  ----------------------------<  114<H>  3.8   |  21  |  8.4<HH>    Ca    9.0      28 Oct 2019 05:42    TPro  6.5  /  Alb  3.1<L>  /  TBili  0.9  /  DBili  x   /  AST  15  /  ALT  10  /  AlkPhos  44  10-28    eGFR if Non African American: 5 mL/min/1.73M2 (10-28-19 @ 05:42)  eGFR if African American: 6 mL/min/1.73M2 (10-28-19 @ 05:42)    LIVER FUNCTIONS - ( 28 Oct 2019 05:42 )  Alb: 3.1 g/dL / Pro: 6.5 g/dL / ALK PHOS: 44 U/L / ALT: 10 U/L / AST: 15 U/L / GGT: x               Culture - Blood (collected 10-24-19 @ 05:53)  Source: .Blood None  Preliminary Report (10-25-19 @ 18:01):    No growth to date.    Culture - Blood (collected 10-23-19 @ 15:30)  Source: .Blood None  Preliminary Report (10-24-19 @ 23:01):    No growth to date.    Culture - Blood (collected 10-22-19 @ 05:33)  Source: .Blood None  Final Report (10-27-19 @ 18:00):    No growth at 5 days.            INFECTIOUS DISEASES TESTING  Hepatitis B Surface Antigen: Nonreact (10-25-19 @ 10:12)  Hepatitis C Virus Interpretation: Nonreact (10-25-19 @ 10:12)  Hepatitis B Surface Antigen: Nonreact (10-24-19 @ 15:49)  Hepatitis C Virus Interpretation: Nonreact (10-24-19 @ 15:49)  MRSA PCR Result.: Negative (10-24-19 @ 12:54)      RADIOLOGY & ADDITIONAL TESTS:  I have personally reviewed the last Chest xray  CXR      CT      CARDIOLOGY TESTING  12 Lead ECG:   Ventricular Rate 65 BPM    Atrial Rate 260 BPM    QRS Duration 124 ms    Q-T Interval 450 ms    QTC Calculation(Bezet) 468 ms    P Axis 259 degrees    R Axis -60 degrees    T Axis 215 degrees    Diagnosis Line Atrial flutter with variable A-V blockwith premature ventricular or  aberrantly conducted complexes  Left axis deviation  Right bundle branch block  T wave abnormality, consider inferolateral ischemia  Abnormal ECG    Confirmed by STEVE YAO MD ([id number]) on 10/27/2019 9:56:58 AM (10-27-19 @ 07:41)  12 Lead ECG:   Ventricular Rate 65 BPM    Atrial Rate 260 BPM    QRS Duration 124 ms    Q-T Interval 498 ms    QTC Calculation(Bezet) 517 ms    P Axis 270 degrees    R Axis -73 degrees    T Axis 238 degrees    Diagnosis Line Atrial flutter with 4:1 A-V conduction  Left axis deviation  Right bundle branch block  T wave abnormality, consider inferolateral ischemia  Abnormal ECG    Confirmed by STEVE YAO MD ([id number]) on 10/24/2019 11:27:59 AM (10-24-19 @ 08:46)      MEDICATIONS  ALPRAZolam 0.5  aspirin  chewable 81  buDESOnide 160 MICROgram(s)/formoterol 4.5 MICROgram(s) Inhaler 2  chlorhexidine 4% Liquid 1  clopidogrel Tablet 75  dextrose 5%. 1000  dextrose 50% Injectable 12.5  dextrose 50% Injectable 25  dextrose 50% Injectable 25  fenofibrate Tablet 145  finasteride 5  fluticasone propionate 50 MICROgram(s)/spray Nasal Spray 1  heparin  Infusion 1200  influenza   Vaccine 0.5  insulin glargine Injectable (LANTUS) 12  insulin lispro Injectable (HumaLOG) 7  insulin lispro Injectable (HumaLOG) 7  insulin lispro Injectable (HumaLOG) 7  isosorbide   mononitrate ER Tablet (IMDUR) 30  metoprolol succinate ER 75  nafcillin  IVPB   nafcillin  IVPB 2  nitroglycerin    Patch 0.2 mG/Hr(s) 1  silver sulfADIAZINE 1% Cream 1  simvastatin 40  tamsulosin 0.4      ANTIBIOTICS:  nafcillin  IVPB      nafcillin  IVPB 2 Gram(s) IV Intermittent every 4 hours      All available historical data has been reviewed

## 2019-10-28 NOTE — PROGRESS NOTE ADULT - SUBJECTIVE AND OBJECTIVE BOX
Nephrology progress note    Patient is seen and examined, events over the last 24 h noted .  Pt seen on HD today, no SOB, not measuring UO.  Allergies:  No Known Allergies    Hospital Medications:   MEDICATIONS  (STANDING):  ALPRAZolam 0.5 milliGRAM(s) Oral at bedtime  aspirin  chewable 81 milliGRAM(s) Oral daily  buDESOnide 160 MICROgram(s)/formoterol 4.5 MICROgram(s) Inhaler 2 Puff(s) Inhalation two times a day  chlorhexidine 4% Liquid 1 Application(s) Topical daily  clopidogrel Tablet 75 milliGRAM(s) Oral daily  dextrose 5%. 1000 milliLiter(s) (50 mL/Hr) IV Continuous <Continuous>  dextrose 50% Injectable 12.5 Gram(s) IV Push once  dextrose 50% Injectable 25 Gram(s) IV Push once  dextrose 50% Injectable 25 Gram(s) IV Push once  fenofibrate Tablet 145 milliGRAM(s) Oral daily  finasteride 5 milliGRAM(s) Oral daily  fluticasone propionate 50 MICROgram(s)/spray Nasal Spray 1 Spray(s) Both Nostrils two times a day  heparin  Infusion 1200 Unit(s)/Hr (12 mL/Hr) IV Continuous <Continuous>  influenza   Vaccine 0.5 milliLiter(s) IntraMuscular once  insulin glargine Injectable (LANTUS) 12 Unit(s) SubCutaneous at bedtime  insulin lispro Injectable (HumaLOG) 7 Unit(s) SubCutaneous before breakfast  insulin lispro Injectable (HumaLOG) 7 Unit(s) SubCutaneous before lunch  insulin lispro Injectable (HumaLOG) 7 Unit(s) SubCutaneous before dinner  isosorbide   mononitrate ER Tablet (IMDUR) 30 milliGRAM(s) Oral daily  metoprolol succinate ER 75 milliGRAM(s) Oral daily  nafcillin  IVPB      nafcillin  IVPB 2 Gram(s) IV Intermittent every 4 hours  nitroglycerin    Patch 0.2 mG/Hr(s) 1 patch Transdermal daily  silver sulfADIAZINE 1% Cream 1 Application(s) Topical two times a day  simvastatin 40 milliGRAM(s) Oral at bedtime  tamsulosin 0.4 milliGRAM(s) Oral at bedtime        VITALS:  T(F): 95 (10-28-19 @ 07:01), Max: 96.5 (10-27-19 @ 15:15)  HR: 66 (10-28-19 @ 08:05)  BP: 110/57 (10-28-19 @ 08:05)  RR: 99 (10-28-19 @ 08:05)  SpO2: 97% (10-28-19 @ 00:23)  Wt(kg): --    10-26 @ 07:01  -  10-27 @ 07:00  --------------------------------------------------------  IN: 776 mL / OUT: 100 mL / NET: 676 mL    10-27 @ 07:01  -  10-28 @ 07:00  --------------------------------------------------------  IN: 1564 mL / OUT: 0 mL / NET: 1564 mL    10-28 @ 07:01  -  10-28 @ 10:19  --------------------------------------------------------  IN: 388 mL / OUT: 0 mL / NET: 388 mL          PHYSICAL EXAM:  Constitutional: NAD  HEENT: anicteric sclera, oropharynx clear, MMM  Neck: No JVD  Respiratory: CTAB,  Cardiovascular: S1, S2, RRR  Gastrointestinal: BS+, soft, NT/ND  Extremities: Mild peripheral edema  Neurological: A/O x 3  : No CVA tenderness. No julian.   Skin: No rashes  Vascular Access:    LABS:  10-28    141  |  95<L>  |  80<HH>  ----------------------------<  114<H>  3.8   |  21  |  8.4<HH>    Ca    9.0      28 Oct 2019 05:42    TPro  6.5  /  Alb  3.1<L>  /  TBili  0.9  /  DBili      /  AST  15  /  ALT  10  /  AlkPhos  44  10-28                          11.2   8.96  )-----------( 229      ( 28 Oct 2019 05:42 )             33.8       Urine Studies:    Osmolality, Random Urine: 319 mos/kg (10-22 @ 14:31)  Sodium, Random Urine: 46.0 mmoL/L (10-22 @ 14:31)  Creatinine, Random Urine: 87 mg/dL (10-22 @ 14:31)    RADIOLOGY & ADDITIONAL STUDIES:  < from: Xray Chest 1 View- PORTABLE-Routine (10.27.19 @ 05:52) >  Improving bibasilar opacities and interstitial edema.    < end of copied text >

## 2019-10-28 NOTE — PROGRESS NOTE ADULT - ASSESSMENT
Impression     Acute chronic renal disease  CHF  r/o cardiac ischemia  MSSA sepsis  multilobar pneumonia    Suggest:  continue HD as per renal, currently running  transfuse keep HH >8.0   continue nafcillin for MSSA total 6 weeks   cath held in light of sepsis.   May need JAYCOB.

## 2019-10-28 NOTE — CONSULT NOTE ADULT - SUBJECTIVE AND OBJECTIVE BOX
ZAVALA TAIWO [id number]  79y Male    HPI:  Patient is a 78yo Male w/ PMH of HFpEF (EF of 55% with Grade II Diastolic dysfunction), COPD (home O2 3-3.5L as needed), DM, HTN, CAD s/p CABG and 1 stent, BPH, and recent admission for CHF (August 2019) presenting to [facility] with complaints of shortness of breath and chest pain. Patient reports he has been progressively worsening shortness of breath for the last 4-5 days prior to admission. He also reports associated chest pain on exertion.  Patient reports he has been compliant with all of his medications as well with a low salt diet. He denies any recent history of fevers, chills, cough. On day of admission, had severe dyspnea on exertion associated with a pressure like sensation in his chest that radiated to his left arm. He tried sublingual nitro for relief but it didn't help. Therefore, he came in for further evaluation. He was given Solumedrol 125mg by EMS. In the ED, patient was found to have evidence of bilateral pleural effusions on bedside sono done by ED staff. He received IV Lasix 40mg and was placed on BiPAP with major improvement in his symptoms. On my assessment, patient was speaking to me in full sentences on BiPAP and his chest pain has resolved. (10 Oct 2019 13:27)    Presently, pt in [facility] due to ATN from sepsis from PNA and bacteremia. Surgery consulted as per nephrology for tesio placement.      PAST MEDICAL & SURGICAL HISTORY:  BPH (benign prostatic hyperplasia)  CHF (congestive heart failure)  COPD (chronic obstructive pulmonary disease)  Diabetes  HTN (hypertension)  H/O heart artery stent  S/P CABG x 3      MEDICATIONS  (STANDING):  ALPRAZolam 0.5 milliGRAM(s) Oral at bedtime  aspirin  chewable 81 milliGRAM(s) Oral daily  buDESOnide 160 MICROgram(s)/formoterol 4.5 MICROgram(s) Inhaler 2 Puff(s) Inhalation two times a day  chlorhexidine 4% Liquid 1 Application(s) Topical daily  clopidogrel Tablet 75 milliGRAM(s) Oral daily  dextrose 5%. 1000 milliLiter(s) (50 mL/Hr) IV Continuous <Continuous>  dextrose 50% Injectable 12.5 Gram(s) IV Push once  dextrose 50% Injectable 25 Gram(s) IV Push once  dextrose 50% Injectable 25 Gram(s) IV Push once  fenofibrate Tablet 145 milliGRAM(s) Oral daily  finasteride 5 milliGRAM(s) Oral daily  fluticasone propionate 50 MICROgram(s)/spray Nasal Spray 1 Spray(s) Both Nostrils two times a day  heparin  Infusion 1200 Unit(s)/Hr (12 mL/Hr) IV Continuous <Continuous>  influenza   Vaccine 0.5 milliLiter(s) IntraMuscular once  insulin glargine Injectable (LANTUS) 12 Unit(s) SubCutaneous at bedtime  insulin lispro Injectable (HumaLOG) 7 Unit(s) SubCutaneous before breakfast  insulin lispro Injectable (HumaLOG) 7 Unit(s) SubCutaneous before lunch  insulin lispro Injectable (HumaLOG) 7 Unit(s) SubCutaneous before dinner  isosorbide   mononitrate ER Tablet (IMDUR) 30 milliGRAM(s) Oral daily  metoprolol succinate ER 75 milliGRAM(s) Oral daily  nafcillin  IVPB      nafcillin  IVPB 2 Gram(s) IV Intermittent every 4 hours  nitroglycerin    Patch 0.2 mG/Hr(s) 1 patch Transdermal daily  silver sulfADIAZINE 1% Cream 1 Application(s) Topical two times a day  simvastatin 40 milliGRAM(s) Oral at bedtime  tamsulosin 0.4 milliGRAM(s) Oral at bedtime    MEDICATIONS  (PRN):  acetaminophen   Tablet .. 650 milliGRAM(s) Oral every 6 hours PRN Temp greater or equal to 38C (100.4F), Mild Pain (1 - 3)  acetaminophen  Suppository .. 650 milliGRAM(s) Rectal every 6 hours PRN Temp greater or equal to 38C (100.4F), Mild Pain (1 - 3)  dextrose 40% Gel 15 Gram(s) Oral once PRN Blood Glucose LESS THAN 70 milliGRAM(s)/deciliter  glucagon  Injectable 1 milliGRAM(s) IntraMuscular once PRN Glucose LESS THAN 70 milligrams/deciliter      Allergies    No Known Allergies    REVIEW OF SYSTEMS    [x] A ten-point review of systems was otherwise negative except as noted.  [ ] Due to altered mental status/intubation, subjective information were not able to be obtained from the patient. History was obtained, to the extent possible, from review of the chart and collateral sources of information.      Vital Signs Last 24 Hrs  T(C): 35 (28 Oct 2019 07:01), Max: 35.8 (27 Oct 2019 15:15)  T(F): 95 (28 Oct 2019 07:01), Max: 96.5 (27 Oct 2019 15:15)  HR: 66 (28 Oct 2019 08:05) (65 - 96)  BP: 110/57 (28 Oct 2019 08:05) (106/56 - 122/58)  BP(mean): 81 (28 Oct 2019 07:10) (75 - 84)  RR: 99 (28 Oct 2019 08:05) (18 - 99)  SpO2: 97% (28 Oct 2019 00:23) (97% - 98%)    PHYSICAL EXAM:  GENERAL: NAD, well-appearing  CHEST/LUNG: Clear to auscultation bilaterally  HEART: Regular rate and rhythm  ABDOMEN: Soft, Nontender, Nondistended;   EXTREMITIES:  No clubbing, cyanosis, or edema      LABS:  Labs:  CAPILLARY BLOOD GLUCOSE      POCT Blood Glucose.: 145 mg/dL (28 Oct 2019 11:33)  POCT Blood Glucose.: 123 mg/dL (28 Oct 2019 07:31)  POCT Blood Glucose.: 95 mg/dL (27 Oct 2019 21:20)  POCT Blood Glucose.: 107 mg/dL (27 Oct 2019 16:32)                          11.2   8.96  )-----------( 229      ( 28 Oct 2019 05:42 )             33.8       Auto Immature Granulocyte %: 1.3 % (10-28-19 @ 05:42)  Auto Neutrophil %: 65.2 % (10-28-19 @ 05:42)    10-28    141  |  95<L>  |  80<HH>  ----------------------------<  114<H>  3.8   |  21  |  8.4<HH>      Calcium, Total Serum: 9.0 mg/dL (10-28-19 @ 05:42)      LFTs:             6.5  | 0.9  | 15       ------------------[44      ( 28 Oct 2019 05:42 )  3.1  | x    | 10          Lipase:x      Amylase:x             Coags:     x      ----< x       ( 28 Oct 2019 05:42 )     65.3        RADIOLOGY & ADDITIONAL STUDIES:

## 2019-10-28 NOTE — PROGRESS NOTE ADULT - SUBJECTIVE AND OBJECTIVE BOX
PGY I NOTE    LENGTH OF HOSPITAL STAY:  18d    CHIEF COMPLAINT:Patient is a 79y old  Male who presents with a chief complaint of dyspnea (28 Oct 2019 10:37)    HPI:HPI:  Patient is a 80yo Male w/ PMH of HFpEF (EF of 55% with Grade II Diastolic dysfunction), COPD (home O2 3-3.5L as needed), DM, HTN, CAD s/p CABG and 1 stent, BPH, and recent admission for CHF (August 2019) presenting to [facility] with complaints of shortness of breath and chest pain. Patient reports he has been progressively worsening shortness of breath for the last 4-5 days prior to admission. He also reports associated chest pain on exertion.  Patient reports he has been compliant with all of his medications as well with a low salt diet. He denies any recent history of fevers, chills, cough. On day of admission, had severe dyspnea on exertion associated with a pressure like sensation in his chest that radiated to his left arm. He tried sublingual nitro for relief but it didn't help. Therefore, he came in for further evaluation. He was given Solumedrol 125mg by EMS. In the ED, patient was found to have evidence of bilateral pleural effusions on bedside sono done by ED staff. He received IV Lasix 40mg and was placed on BiPAP with major improvement in his symptoms. On my assessment, patient was speaking to me in full sentences on BiPAP and his chest pain has resolved. (10 Oct 2019 13:27)    OVERNIGHT EVENTS/INTERVAL UPDATES: no overnight events. Patient receiving Hemodialysis.     PMH & PSH  PAST MEDICAL & SURGICAL HISTORY:  BPH (benign prostatic hyperplasia)  CHF (congestive heart failure)  COPD (chronic obstructive pulmonary disease)  Diabetes  HTN (hypertension)  H/O heart artery stent  S/P CABG x 3    SOCIAL HISTORY: Negative    ALLERGIES: No Known Allergies    HOME MEDICATIONS  Home Medications:  alfuzosin 10 mg oral tablet, extended release: 1 tab(s) orally once a day (10 Oct 2019 14:44)  ALPRAZolam 0.5 mg oral tablet: 1 tab(s) orally 2 times a day, As Needed (10 Oct 2019 14:44)  amLODIPine 5 mg oral tablet: 1 tab(s) orally once a day (10 Oct 2019 14:44)  aspirin 81 mg oral tablet: 1 tab(s) orally once a day (10 Oct 2019 14:44)  dutasteride 0.5 mg oral capsule: 1 cap(s) orally once a day (10 Oct 2019 14:44)  fenofibrate 145 mg oral tablet: 1 tab(s) orally once a day (10 Oct 2019 14:44)  isosorbide mononitrate 30 mg oral tablet, extended release: 1 tab(s) orally once a day (in the morning) (10 Oct 2019 14:44)  losartan 25 mg oral tablet: 1 tab(s) orally once a day (at bedtime) (10 Oct 2019 14:44)  losartan 50 mg oral tablet: 1 tab(s) orally once a day (10 Oct 2019 14:44)  rivaroxaban 15 mg oral tablet: 1 tab(s) orally once a day (before a meal) (10 Oct 2019 14:44)  Victoza: subcutaneous once a day (10 Oct 2019 14:44)  Welchol 625 mg oral tablet: 3 tab(s) orally once a day (10 Oct 2019 14:44)    PHYSICAL EXAM:  T(F): 95 (10-28-19 @ 07:01), Max: 96.5 (10-27-19 @ 15:15)  HR: 66 (10-28-19 @ 08:05)  BP: 110/57 (10-28-19 @ 08:05)  RR: 99 (10-28-19 @ 08:05)  SpO2: 97% (10-28-19 @ 00:23)  CAPILLARY BLOOD GLUCOSE      POCT Blood Glucose.: 123 mg/dL (28 Oct 2019 07:31)  POCT Blood Glucose.: 95 mg/dL (27 Oct 2019 21:20)  POCT Blood Glucose.: 107 mg/dL (27 Oct 2019 16:32)  POCT Blood Glucose.: 134 mg/dL (27 Oct 2019 11:34)      10-27-19 @ 07:01  -  10-28-19 @ 07:00  --------------------------------------------------------  IN:    heparin Infusion: 264 mL    Oral Fluid: 700 mL    Solution: 600 mL  Total IN: 1564 mL    OUT:  Total OUT: 0 mL    Total NET: 1564 mL      10-28-19 @ 07:01  -  10-28-19 @ 11:22  --------------------------------------------------------  IN:    heparin Infusion: 48 mL    Oral Fluid: 240 mL    Solution: 100 mL  Total IN: 388 mL    OUT:  Total OUT: 0 mL    Total NET: 388 mL    MEDICATIONS  STANDING MEDICATIONS  ALPRAZolam 0.5 milliGRAM(s) Oral at bedtime  aspirin  chewable 81 milliGRAM(s) Oral daily  buDESOnide 160 MICROgram(s)/formoterol 4.5 MICROgram(s) Inhaler 2 Puff(s) Inhalation two times a day  chlorhexidine 4% Liquid 1 Application(s) Topical daily  clopidogrel Tablet 75 milliGRAM(s) Oral daily  dextrose 5%. 1000 milliLiter(s) IV Continuous <Continuous>  dextrose 50% Injectable 12.5 Gram(s) IV Push once  dextrose 50% Injectable 25 Gram(s) IV Push once  dextrose 50% Injectable 25 Gram(s) IV Push once  fenofibrate Tablet 145 milliGRAM(s) Oral daily  finasteride 5 milliGRAM(s) Oral daily  fluticasone propionate 50 MICROgram(s)/spray Nasal Spray 1 Spray(s) Both Nostrils two times a day  heparin  Infusion 1200 Unit(s)/Hr IV Continuous <Continuous>  influenza   Vaccine 0.5 milliLiter(s) IntraMuscular once  insulin glargine Injectable (LANTUS) 12 Unit(s) SubCutaneous at bedtime  insulin lispro Injectable (HumaLOG) 7 Unit(s) SubCutaneous before breakfast  insulin lispro Injectable (HumaLOG) 7 Unit(s) SubCutaneous before lunch  insulin lispro Injectable (HumaLOG) 7 Unit(s) SubCutaneous before dinner  isosorbide   mononitrate ER Tablet (IMDUR) 30 milliGRAM(s) Oral daily  metoprolol succinate ER 75 milliGRAM(s) Oral daily  nafcillin  IVPB      nafcillin  IVPB 2 Gram(s) IV Intermittent every 4 hours  nitroglycerin    Patch 0.2 mG/Hr(s) 1 patch Transdermal daily  silver sulfADIAZINE 1% Cream 1 Application(s) Topical two times a day  simvastatin 40 milliGRAM(s) Oral at bedtime  tamsulosin 0.4 milliGRAM(s) Oral at bedtime    PRN MEDICATIONS  acetaminophen   Tablet .. 650 milliGRAM(s) Oral every 6 hours PRN  acetaminophen  Suppository .. 650 milliGRAM(s) Rectal every 6 hours PRN  dextrose 40% Gel 15 Gram(s) Oral once PRN  glucagon  Injectable 1 milliGRAM(s) IntraMuscular once PRN    LABS:                        11.2   8.96  )-----------( 229      ( 28 Oct 2019 05:42 )             33.8              10-28    141  |  95<L>  |  80<HH>  ----------------------------<  114<H>  3.8   |  21  |  8.4<HH>    Ca    9.0      28 Oct 2019 05:42    TPro  6.5  /  Alb  3.1<L>  /  TBili  0.9  /  DBili  x   /  AST  15  /  ALT  10  /  AlkPhos  44  10-28    LIVER FUNCTIONS - ( 28 Oct 2019 05:42 )  Alb: 3.1 g/dL / Pro: 6.5 g/dL / ALK PHOS: 44 U/L / ALT: 10 U/L / AST: 15 U/L / GGT: x                      PT/INR - ( 27 Oct 2019 06:00 )   PT: 16.50 sec;   INR: 1.44 ratio         PTT - ( 28 Oct 2019 05:42 )  PTT:65.3 sec                    PHYSICAL EXAM:  GENERAL: NAD, speaks in full sentences, no signs of respiratory distress  HEAD:  Atraumatic, Normocephalic  EYES: EOMI, PERRLA, conjunctiva and sclera clear  NECK: Supple, No JVD  CHEST/LUNG: Clear to auscultation bilaterally; No wheeze; No crackles; No accessory muscles used  HEART: afib rate controlled.   ABDOMEN: Soft, Nontender, Nondistended; Bowel sounds present; No guarding  EXTREMITIES:  Left leg healing venous ulcer.   PSYCH: AAOx3  NEUROLOGY: non-focal  SKIN: No rashes or lesions

## 2019-10-28 NOTE — PROGRESS NOTE ADULT - SUBJECTIVE AND OBJECTIVE BOX
Patient is a 79y old  Male who presents with a chief complaint of MSSA bacteremia (28 Oct 2019 09:49)        HPI:  Patient is a 80yo Male w/ PMH of HFpEF (EF of 55% with Grade II Diastolic dysfunction), COPD (home O2 3-3.5L as needed), DM, HTN, CAD s/p CABG and 1 stent, BPH, and recent admission for CHF (August 2019) presenting to [facility] with complaints of shortness of breath and chest pain. Patient reports he has been progressively worsening shortness of breath for the last 4-5 days prior to admission. He also reports associated chest pain on exertion.  Patient reports he has been compliant with all of his medications as well with a low salt diet. He denies any recent history of fevers, chills, cough. On day of admission, had severe dyspnea on exertion associated with a pressure like sensation in his chest that radiated to his left arm.  (10 Oct 2019 13:27)      Interval Events: No overnight events.    REVIEW OF SYSTEMS:     · CONSTITUTIONAL:   no fever   no chills.  no weight gain   no weight loss    · EYES:   no discharge,   no irritation,   no pain,   no redness,   no visual changes.    · ENMT:   Ears: no ear pain and no hearing problems.  Nose: no nasal congestion and no nasal drainage.  Mouth/Throat: no dysphagia,  no hoarseness and no throat pain.  Neck: no lumps, no pain, no stiffness and no swollen glands.    · CARDIOVASCULAR:   no chest pain,   +swelling  no palpitaions  no syncope    · RESPIRATORYy:   +SOB,   no wheezing ,   no respiratory difficulty  no sputum production    · GASTROINTESTINAL:   no abdominal pain,   no bloating,   no constipation,   no diarrhea,   no nausea   no vomiting.    · GENITOURINARY:   no dysuria,   no frequency,   no urgency  no hematuria.    · MUSCULOSKELETAL:   no back pain,   no gout,   no musculoskeletal pain,  no neck pain,   no weakness.    · SKIN:   no abrasions,   no jaundice,   no lesions,   no pruritis,   no rashes.    · NEURO:   no loss of consciousness,   no gait abnormality,   no headache,   no sensory deficits,   no weakness.    · PSYCHIATRIC:   no known mental health issues  no anxiety  no depression  no suicidal thoughts  no aggressive thoughts towards others      ALLERGIC/IMMUNOLOGIC:   No active allergic or immunologic issues      all other systems are negative      OBJECTIVE:  ICU Vital Signs Last 24 Hrs  T(C): 35 (28 Oct 2019 07:01), Max: 35.8 (27 Oct 2019 15:15)  T(F): 95 (28 Oct 2019 07:01), Max: 96.5 (27 Oct 2019 15:15)  HR: 66 (28 Oct 2019 08:05) (65 - 96)  BP: 110/57 (28 Oct 2019 08:05) (106/56 - 122/58)  BP(mean): 81 (28 Oct 2019 07:10) (75 - 84)    RR: 99 (28 Oct 2019 08:05) (18 - 99)  SpO2: 97% (28 Oct 2019 00:23) (97% - 98%)        10-27 @ 07:01  -  10-28 @ 07:00  --------------------------------------------------------  IN: 1564 mL / OUT: 0 mL / NET: 1564 mL    10-28 @ 07:01  -  10-28 @ 10:38  --------------------------------------------------------  IN: 388 mL / OUT: 0 mL / NET: 388 mL      CAPILLARY BLOOD GLUCOSE      POCT Blood Glucose.: 123 mg/dL (28 Oct 2019 07:31)        PHYSICAL EXAM:     · CONSTITUTIONAL:   ill appearing,   well nourished,   NAD    · ENMT:   Airway patent,   Nasal mucosa clear.  Mouth with normal mucosa.   Throat has no vesicles,   no oropharyngeal exudates and uvula is midline.   No thrush    · EYES:   Clear bilaterally,   pupils equal,   round and reactive to light.    · CARDIAC:   Normal rate,   regular rhythm.    Heart sounds S1, S2.   no thrills or bruits on palpitation  normal  cardiac impulse  normal palpable location of the heart   No murmurs, no rubs or gallops on auscultation  +edema  no significant varices  femoral arteries palpable with normal impulse, no bruits  aorta palpable with normal impuls and no bruits      CAROTID:   normal systolic impulse  no bruits    · RESPIRATORY: clear,  + rales at bases,   normal chest expansion  not tachypneic,  no retractions  use of accessory muscles  palpation of chest is normal with no fremitus  percussion of chest no hyperresonance or dullness    · GASTROINTESTINAL:  Abdomen soft,   non-tender,   no masses  no guarding,   + BS  liver normal size  spleen not palpable    GENITOURINARY  normal genitalia for sex  no edema    · MUSCULOSKELETAL:   Spine appears normal,   range of motion is not limited,  no muscle or joint tenderness  no clubbing, cyanosis  no petichiae    · NEUROLOGICAL:   Alert and oriented   no focal deficits in cranial nerve areas  no motor or sensory deficits.  pertinant DTRs normal    · SKIN:   Skin normal color for race,   warm,   dry and intact.   No evidence of rash.    · PSYCHIATRIC:   Alert and oriented to person,   place, time/situation.   normal mood and affect.   no apparent risk to self or others.    · HEME LYMPH:   no splenomegaly.  No cervical  lymphadenopathy.  no inguinal lymphadenopathy    HOSPITAL MEDICATIONS:  MEDICATIONS  (STANDING):  ALPRAZolam 0.5 milliGRAM(s) Oral at bedtime  aspirin  chewable 81 milliGRAM(s) Oral daily  buDESOnide 160 MICROgram(s)/formoterol 4.5 MICROgram(s) Inhaler 2 Puff(s) Inhalation two times a day  chlorhexidine 4% Liquid 1 Application(s) Topical daily  clopidogrel Tablet 75 milliGRAM(s) Oral daily  dextrose 5%. 1000 milliLiter(s) (50 mL/Hr) IV Continuous <Continuous>  dextrose 50% Injectable 12.5 Gram(s) IV Push once  dextrose 50% Injectable 25 Gram(s) IV Push once  dextrose 50% Injectable 25 Gram(s) IV Push once  fenofibrate Tablet 145 milliGRAM(s) Oral daily  finasteride 5 milliGRAM(s) Oral daily  fluticasone propionate 50 MICROgram(s)/spray Nasal Spray 1 Spray(s) Both Nostrils two times a day  heparin  Infusion 1200 Unit(s)/Hr (12 mL/Hr) IV Continuous <Continuous>  influenza   Vaccine 0.5 milliLiter(s) IntraMuscular once  insulin glargine Injectable (LANTUS) 12 Unit(s) SubCutaneous at bedtime  insulin lispro Injectable (HumaLOG) 7 Unit(s) SubCutaneous before breakfast  insulin lispro Injectable (HumaLOG) 7 Unit(s) SubCutaneous before lunch  insulin lispro Injectable (HumaLOG) 7 Unit(s) SubCutaneous before dinner  isosorbide   mononitrate ER Tablet (IMDUR) 30 milliGRAM(s) Oral daily  metoprolol succinate ER 75 milliGRAM(s) Oral daily  nafcillin  IVPB      nafcillin  IVPB 2 Gram(s) IV Intermittent every 4 hours  nitroglycerin    Patch 0.2 mG/Hr(s) 1 patch Transdermal daily  silver sulfADIAZINE 1% Cream 1 Application(s) Topical two times a day  simvastatin 40 milliGRAM(s) Oral at bedtime  tamsulosin 0.4 milliGRAM(s) Oral at bedtime    MEDICATIONS  (PRN):  acetaminophen   Tablet .. 650 milliGRAM(s) Oral every 6 hours PRN Temp greater or equal to 38C (100.4F), Mild Pain (1 - 3)  acetaminophen  Suppository .. 650 milliGRAM(s) Rectal every 6 hours PRN Temp greater or equal to 38C (100.4F), Mild Pain (1 - 3)  dextrose 40% Gel 15 Gram(s) Oral once PRN Blood Glucose LESS THAN 70 milliGRAM(s)/deciliter  glucagon  Injectable 1 milliGRAM(s) IntraMuscular once PRN Glucose LESS THAN 70 milligrams/deciliter      LABS:                        11.2   8.96  )-----------( 229      ( 28 Oct 2019 05:42 )             33.8     10-28    141  |  95<L>  |  80<HH>  ----------------------------<  114<H>  3.8   |  21  |  8.4<HH>    Ca    9.0      28 Oct 2019 05:42    TPro  6.5  /  Alb  3.1<L>  /  TBili  0.9  /  DBili  x   /  AST  15  /  ALT  10  /  AlkPhos  44  10-28    PT/INR - ( 27 Oct 2019 06:00 )   PT: 16.50 sec;   INR: 1.44 ratio         PTT - ( 28 Oct 2019 05:42 )  PTT:65.3 sec                  RADIOLOGY:Radiology personally reviewed.

## 2019-10-28 NOTE — CONSULT NOTE ADULT - ATTENDING COMMENTS
above noted abdomen soft neck no mass/infection pt fully examined by me and agree with above for wojciechsio in am

## 2019-10-28 NOTE — PROGRESS NOTE ADULT - SUBJECTIVE AND OBJECTIVE BOX
Patient is sitting comfortably in chair, no complaints s/p HD today      T(F): 96.5 (10-28-19 @ 15:00), Max: 96.5 (10-27-19 @ 23:13)  HR: 67 (10-28-19 @ 15:06)  BP: 115/64 (10-28-19 @ 15:06)  RR: 20 (10-28-19 @ 15:00)  SpO2: 97% (10-28-19 @ 00:23) (97% - 98%)    PHYSICAL EXAM:  GENERAL: NAD  HEAD:  Atraumatic, Normocephalic  NERVOUS SYSTEM:  Alert & Oriented X3, no focal deficit  CHEST/LUNG:  bilateral rhonchi  HEART: Regular rate and rhythm; No murmurs, rubs, or gallops  ABDOMEN: Soft, Nontender, Nondistended; Bowel sounds present  EXTREMITIES:  b/l  edema    LABS  10-28    141  |  95<L>  |  80<HH>  ----------------------------<  114<H>  3.8   |  21  |  8.4<HH>    Ca    9.0      28 Oct 2019 05:42    TPro  6.5  /  Alb  3.1<L>  /  TBili  0.9  /  DBili  x   /  AST  15  /  ALT  10  /  AlkPhos  44  10-28                          11.2   8.96  )-----------( 229      ( 28 Oct 2019 05:42 )             33.8     PT/INR - ( 27 Oct 2019 06:00 )   PT: 16.50 sec;   INR: 1.44 ratio         PTT - ( 28 Oct 2019 05:42 )  PTT:65.3 sec      Culture Results:   No growth to date. (10-24-19)  Culture Results:   No growth to date. (10-23-19)  Culture Results:   No growth at 5 days. (10-22-19)  Culture Results:   Growth in aerobic and anaerobic bottles: Staphylococcus aureus (10-19-19)  Culture Results:   Growth in aerobic and anaerobic bottles: Staphylococcus aureus  See previous culture [id number] (10-17-19)  Culture Results:   Growth in aerobic and anaerobic bottles: Staphylococcus aureus  See previous culture [id number] (10-16-19)  Culture Results:   <10,000 CFU/mL Normal Urogenital Felipa (10-15-19)  Culture Results:   Growth in aerobic bottle: Staphylococcus aureus  "Due to technical problems, Proteus sp. will Not be reported as part of  the BCID panel until further notice"  ***Blood Panel PCR results on this specimen are available  approximately 3 hours after the Gram stain result.***  Gram stain, PCR, and/or culture results may not always  correspond due to difference in methodologies.  ************************************************************  This PCR assay was performed using [employer].  The following targets are tested for: Enterococcus,  vancomycin resistant enterococci, Listeria monocytogenes,  coagulase negative staphylococci, S. aureus,  methicillin resistant S. aureus, Streptococcus agalactiae  (Group B), S. pneumoniae, S. pyogenes (Group A),  Acinetobacter baumannii, Enterobacter cloacae, E. coli,  Klebsiella oxytoca, K. pneumoniae, Proteus sp.,  Serratia marcescens, Haemophilus influenzae,  Neisseria meningitidis, Pseudomonas aeruginosa, Candida  albicans, C. glabrata, C krusei, C parapsilosis,  C. tropicalis and the KPC resistance gene. (10-15-19)    RADIOLOGY  < from: Xray Chest 1 View- PORTABLE-Routine (10.28.19 @ 06:17) >  Impression:      Bibasilar opacities and pleural effusions, unchanged.    < end of copied text >    MEDICATIONS  (STANDING):  ALPRAZolam 0.5 milliGRAM(s) Oral at bedtime  aspirin  chewable 81 milliGRAM(s) Oral daily  buDESOnide 160 MICROgram(s)/formoterol 4.5 MICROgram(s) Inhaler 2 Puff(s) Inhalation two times a day  chlorhexidine 4% Liquid 1 Application(s) Topical daily  clopidogrel Tablet 75 milliGRAM(s) Oral daily  fenofibrate Tablet 145 milliGRAM(s) Oral daily  finasteride 5 milliGRAM(s) Oral daily  fluticasone propionate 50 MICROgram(s)/spray Nasal Spray 1 Spray(s) Both Nostrils two times a day  heparin  Infusion 1200 Unit(s)/Hr (12 mL/Hr) IV Continuous <Continuous>  influenza   Vaccine 0.5 milliLiter(s) IntraMuscular once  insulin glargine Injectable (LANTUS) 12 Unit(s) SubCutaneous at bedtime  insulin lispro Injectable (HumaLOG) 7 Unit(s) SubCutaneous before breakfast  insulin lispro Injectable (HumaLOG) 7 Unit(s) SubCutaneous before lunch  insulin lispro Injectable (HumaLOG) 7 Unit(s) SubCutaneous before dinner  isosorbide   mononitrate ER Tablet (IMDUR) 30 milliGRAM(s) Oral daily  metoprolol succinate ER 75 milliGRAM(s) Oral daily  nafcillin  IVPB 2 Gram(s) IV Intermittent every 4 hours  nitroglycerin    Patch 0.2 mG/Hr(s) 1 patch Transdermal daily  silver sulfADIAZINE 1% Cream 1 Application(s) Topical two times a day  simvastatin 40 milliGRAM(s) Oral at bedtime  tamsulosin 0.4 milliGRAM(s) Oral at bedtime    MEDICATIONS  (PRN):  acetaminophen   Tablet .. 650 milliGRAM(s) Oral every 6 hours PRN Temp greater or equal to 38C (100.4F), Mild Pain (1 - 3)  acetaminophen  Suppository .. 650 milliGRAM(s) Rectal every 6 hours PRN Temp greater or equal to 38C (100.4F), Mild Pain (1 - 3)  dextrose 40% Gel 15 Gram(s) Oral once PRN Blood Glucose LESS THAN 70 milliGRAM(s)/deciliter  glucagon  Injectable 1 milliGRAM(s) IntraMuscular once PRN Glucose LESS THAN 70 milligrams/deciliter

## 2019-10-28 NOTE — CONSULT NOTE ADULT - ASSESSMENT
ASSESSMENT:  79M in severe oliguric BRITTANI superimposed on  Stage 3 CKD     PLAN:  Planning tesio placement for tomorrow  NPO @ 00:00  Preop labs   Hold hep gtt 6 hr prior to procedure  Please obtain cardiac clearance and risk stratification ASSESSMENT:  79M in severe oliguric BRITTANI superimposed on  Stage 3 CKD     PLAN:  Planning tesio placement for tomorrow  NPO @ 00:00  Preop labs   Hold hep gtt @ 02:00 prior to procedure  Please obtain cardiac clearance and risk stratification

## 2019-10-28 NOTE — PROGRESS NOTE ADULT - ASSESSMENT
No complaints. In greater out.  Ambulate.  Physical thearpy. Continue heparin. Ambulate. Probable dialysis .  BC neg. Antibiotics per ID. Transfer when stable

## 2019-10-28 NOTE — PROGRESS NOTE ADULT - ASSESSMENT
PROBLEMS & PLAN    1. Sepsis due to methicillin susceptible Staphylococcus aureus (MSSA) with SIRS  wbc 8.96  Tmax 96.5    2.  Staphylococcus aureus (MSSA) bacteremia  On nafcillin  IVPB 2 Gram(s) IV Intermittent every 4 hours  Need 6 weeks of Rx  Discussed with Cardiology & Renal: Could use Post hemodialysis Ancef 2gm Tues & Thurs; 3gm on Saturday after discharge     3. Resolved hypomagnesemia

## 2019-10-28 NOTE — PROGRESS NOTE ADULT - ASSESSMENT
Patient is a 78yo Male w/ PMH of HFpEF (EF of 55% with Grade II Diastolic dysfunction), COPD (home O2 3-3.5L as needed), DM, HTN, CAD s/p CABG and 1 stent, BPH, and recent admission for CHF (August 2019) presenting to [facility] with complaints of shortness of breath and chest pain. Patient reports he has been progressively worsening shortness of breath for the last 4-5 days prior to admission.    1) Acute on chronic diastolic CHF Exacerbation   Lasix held for now as patient appears to be maximally diuresed   Patient on Heparin Drip stable follow PTT   Continue with Metoprolol ER 75 along with Aspirin 81. c/w plavix 75mg daily after loading does  Monitor I and O   Follow with Repeat PTT    2. HTN  stable at this time Hold Meds and monitor BP    3. BRITTANI worsening  Diuretics held for >48 hours   Continue to hold  Follow with Creatinine in AM  Renal on board patient getting Hemodialysis.   Sodium Bicarbonate 650 Twice daily   Calcium acetate 667 Three times daily   Will need tessio placement for Hemodialysis     4. DM2.   Start Insulin Follow FBG    5. COPD on as needed home O2 not in exacerbation  -Monitor and c/w home med    6. BPH  C/w med    7. CAD s/p CABG  - continue cardiac med and plan for cath once afebrile    8. Bacteremia - gram positive cocci in clusters  Nafcillin as per ID recommendations   Blood cultures negative    Patient needs JAYCOB, Cardiology feels risk is greater than benefit at this moment in time.    Will likely need PICC line for continued antibiotic use.       DVT Prophylaxis   Heparin Drip     Disposition   Continue with CCU Monitoring

## 2019-10-28 NOTE — PROGRESS NOTE ADULT - SUBJECTIVE AND OBJECTIVE BOX
Patient is a 79y old  Male who presents with a chief complaint of CHF (27 Oct 2019 09:15)      T(F): 96.5 (10-27-19 @ 23:13), Max: 96.5 (10-27-19 @ 15:15)  HR: 65 (10-28-19 @ 05:47)  BP: 122/58 (10-28-19 @ 05:47)  RR: 18 (10-28-19 @ 05:47)  SpO2: 97% (10-28-19 @ 00:23) (97% - 98%)    PHYSICAL EXAM:  GENERAL: NAD, well-groomed, well-developed  HEAD:  Atraumatic, Normocephalic  EYES: EOMI, PERRLA, conjunctiva and sclera clear  ENMT: No tonsillar erythema, exudates, or enlargement; Moist mucous membranes, Good dentition, No lesions  NECK: Supple, No JVD, Normal thyroid  NERVOUS SYSTEM:  Alert & Oriented X3,  Motor Strength 5/5 B/L upper and lower extremities  CHEST/LUNG: Clear to percussion bilaterally; No rales, rhonchi, wheezing, or rubs  HEART: Regular rate and rhythm; No murmurs, rubs, or gallops  ABDOMEN: Soft, Nontender, Nondistended; Bowel sounds present  EXTREMITIES:   No clubbing, cyanosis, or edema  LYMPH: No lymphadenopathy noted  SKIN: No rashes or lesions    labs  10-27    139  |  95<L>  |  73<HH>  ----------------------------<  125<H>  3.4<L>   |  22  |  7.9<HH>    Ca    8.7      27 Oct 2019 06:07                            11.2   8.96  )-----------( 229      ( 28 Oct 2019 05:42 )             33.8       PT/INR - ( 27 Oct 2019 06:00 )   PT: 16.50 sec;   INR: 1.44 ratio         PTT - ( 28 Oct 2019 05:42 )  PTT:65.3 sec        acetaminophen   Tablet .. 650 milliGRAM(s) Oral every 6 hours PRN  acetaminophen  Suppository .. 650 milliGRAM(s) Rectal every 6 hours PRN  ALPRAZolam 0.5 milliGRAM(s) Oral at bedtime  aspirin  chewable 81 milliGRAM(s) Oral daily  buDESOnide 160 MICROgram(s)/formoterol 4.5 MICROgram(s) Inhaler 2 Puff(s) Inhalation two times a day  chlorhexidine 4% Liquid 1 Application(s) Topical daily  clopidogrel Tablet 75 milliGRAM(s) Oral daily  dextrose 40% Gel 15 Gram(s) Oral once PRN  dextrose 5%. 1000 milliLiter(s) IV Continuous <Continuous>  dextrose 50% Injectable 12.5 Gram(s) IV Push once  dextrose 50% Injectable 25 Gram(s) IV Push once  dextrose 50% Injectable 25 Gram(s) IV Push once  fenofibrate Tablet 145 milliGRAM(s) Oral daily  finasteride 5 milliGRAM(s) Oral daily  fluticasone propionate 50 MICROgram(s)/spray Nasal Spray 1 Spray(s) Both Nostrils two times a day  glucagon  Injectable 1 milliGRAM(s) IntraMuscular once PRN  heparin  Infusion 1200 Unit(s)/Hr IV Continuous <Continuous>  influenza   Vaccine 0.5 milliLiter(s) IntraMuscular once  insulin glargine Injectable (LANTUS) 12 Unit(s) SubCutaneous at bedtime  insulin lispro Injectable (HumaLOG) 7 Unit(s) SubCutaneous before breakfast  insulin lispro Injectable (HumaLOG) 7 Unit(s) SubCutaneous before lunch  insulin lispro Injectable (HumaLOG) 7 Unit(s) SubCutaneous before dinner  isosorbide   mononitrate ER Tablet (IMDUR) 30 milliGRAM(s) Oral daily  metoprolol succinate ER 75 milliGRAM(s) Oral daily  nafcillin  IVPB      nafcillin  IVPB 2 Gram(s) IV Intermittent every 4 hours  nitroglycerin    Patch 0.2 mG/Hr(s) 1 patch Transdermal daily  silver sulfADIAZINE 1% Cream 1 Application(s) Topical two times a day  simvastatin 40 milliGRAM(s) Oral at bedtime  tamsulosin 0.4 milliGRAM(s) Oral at bedtime

## 2019-10-28 NOTE — PROGRESS NOTE ADULT - ASSESSMENT
Patient is a 78yo Male w/ PMH of HFpEF (EF of 55% with Grade II Diastolic dysfunction), COPD (home O2 3-3.5L as needed), DM, HTN, CAD s/p CABG and 1 stent, BPH, and recent admission for CHF (August 2019) presented  to [facility] with complaints of shortness of breath and exertional chest pain.    #) Acute respiratory failure secondary to Acute on Chronic Diastolic CHF exacerbation / moderate  aortic stenosis   -  resolved  - now on HD to continue with even to  negative fluid balance      #) PABLO Bacteremia   -  BC 10/22and 10/23 prelim negative   - continue Nafcillin, ID following   -  cardiology following     #) BRITTANI   - probably ATN   -  HD session #3 today   - continue to remove fluid during HD   - npo after midnight for Tesio catheter placement in am   - hold IV heparin in AM for procedure     #) Chest Pain (resolved) ,  in patient with hx of CABG and stent placement  - c/w losartan asa, metoprolol; Simvastatin   -  Lovenox   - cardiology following    #) Hx of A Fib  - On Xarelto, 15mg at home; IV heparin for now, check ptt and adjust   -  Metoprolol      #) Hx of COPD  - Stable at this time, with no evidence of acute exacerbation  - Cont with Symbicort (in place of Breo)    #) Hx of Diabetes Type 2  - Insulin sq hospital protocol   - Carb consistent diet    #) Hx of BPH  - Cont with Flomax and Finasteride

## 2019-10-29 LAB
ALBUMIN SERPL ELPH-MCNC: 3.2 G/DL — LOW (ref 3.5–5.2)
ALP SERPL-CCNC: 37 U/L — SIGNIFICANT CHANGE UP (ref 30–115)
ALT FLD-CCNC: 10 U/L — SIGNIFICANT CHANGE UP (ref 0–41)
ANION GAP SERPL CALC-SCNC: 19 MMOL/L — HIGH (ref 7–14)
APTT BLD: 42.6 SEC — HIGH (ref 27–39.2)
APTT BLD: 43.4 SEC — HIGH (ref 27–39.2)
AST SERPL-CCNC: 14 U/L — SIGNIFICANT CHANGE UP (ref 0–41)
BASOPHILS # BLD AUTO: 0.07 K/UL — SIGNIFICANT CHANGE UP (ref 0–0.2)
BASOPHILS NFR BLD AUTO: 0.9 % — SIGNIFICANT CHANGE UP (ref 0–1)
BILIRUB SERPL-MCNC: 1 MG/DL — SIGNIFICANT CHANGE UP (ref 0.2–1.2)
BUN SERPL-MCNC: 56 MG/DL — HIGH (ref 10–20)
CALCIUM SERPL-MCNC: 9.1 MG/DL — SIGNIFICANT CHANGE UP (ref 8.5–10.1)
CHLORIDE SERPL-SCNC: 96 MMOL/L — LOW (ref 98–110)
CO2 SERPL-SCNC: 25 MMOL/L — SIGNIFICANT CHANGE UP (ref 17–32)
CREAT SERPL-MCNC: 7 MG/DL — CRITICAL HIGH (ref 0.7–1.5)
CULTURE RESULTS: SIGNIFICANT CHANGE UP
EOSINOPHIL # BLD AUTO: 0.25 K/UL — SIGNIFICANT CHANGE UP (ref 0–0.7)
EOSINOPHIL NFR BLD AUTO: 3.2 % — SIGNIFICANT CHANGE UP (ref 0–8)
GLUCOSE BLDC GLUCOMTR-MCNC: 103 MG/DL — HIGH (ref 70–99)
GLUCOSE BLDC GLUCOMTR-MCNC: 120 MG/DL — HIGH (ref 70–99)
GLUCOSE BLDC GLUCOMTR-MCNC: 253 MG/DL — HIGH (ref 70–99)
GLUCOSE BLDC GLUCOMTR-MCNC: 87 MG/DL — SIGNIFICANT CHANGE UP (ref 70–99)
GLUCOSE SERPL-MCNC: 119 MG/DL — HIGH (ref 70–99)
HCT VFR BLD CALC: 34.1 % — LOW (ref 42–52)
HGB BLD-MCNC: 11.1 G/DL — LOW (ref 14–18)
IMM GRANULOCYTES NFR BLD AUTO: 1.3 % — HIGH (ref 0.1–0.3)
LYMPHOCYTES # BLD AUTO: 1.21 K/UL — SIGNIFICANT CHANGE UP (ref 1.2–3.4)
LYMPHOCYTES # BLD AUTO: 15.3 % — LOW (ref 20.5–51.1)
MCHC RBC-ENTMCNC: 25.6 PG — LOW (ref 27–31)
MCHC RBC-ENTMCNC: 32.6 G/DL — SIGNIFICANT CHANGE UP (ref 32–37)
MCV RBC AUTO: 78.6 FL — LOW (ref 80–94)
MONOCYTES # BLD AUTO: 0.94 K/UL — HIGH (ref 0.1–0.6)
MONOCYTES NFR BLD AUTO: 11.9 % — HIGH (ref 1.7–9.3)
NEUTROPHILS # BLD AUTO: 5.35 K/UL — SIGNIFICANT CHANGE UP (ref 1.4–6.5)
NEUTROPHILS NFR BLD AUTO: 67.4 % — SIGNIFICANT CHANGE UP (ref 42.2–75.2)
NRBC # BLD: 0 /100 WBCS — SIGNIFICANT CHANGE UP (ref 0–0)
PLATELET # BLD AUTO: 202 K/UL — SIGNIFICANT CHANGE UP (ref 130–400)
POTASSIUM SERPL-MCNC: 4 MMOL/L — SIGNIFICANT CHANGE UP (ref 3.5–5)
POTASSIUM SERPL-SCNC: 4 MMOL/L — SIGNIFICANT CHANGE UP (ref 3.5–5)
PROT SERPL-MCNC: 6.6 G/DL — SIGNIFICANT CHANGE UP (ref 6–8)
RBC # BLD: 4.34 M/UL — LOW (ref 4.7–6.1)
RBC # FLD: 18.4 % — HIGH (ref 11.5–14.5)
SODIUM SERPL-SCNC: 140 MMOL/L — SIGNIFICANT CHANGE UP (ref 135–146)
SPECIMEN SOURCE: SIGNIFICANT CHANGE UP
WBC # BLD: 7.92 K/UL — SIGNIFICANT CHANGE UP (ref 4.8–10.8)
WBC # FLD AUTO: 7.92 K/UL — SIGNIFICANT CHANGE UP (ref 4.8–10.8)

## 2019-10-29 PROCEDURE — 71045 X-RAY EXAM CHEST 1 VIEW: CPT | Mod: 26,76

## 2019-10-29 PROCEDURE — 99233 SBSQ HOSP IP/OBS HIGH 50: CPT

## 2019-10-29 RX ORDER — ISOSORBIDE MONONITRATE 60 MG/1
30 TABLET, EXTENDED RELEASE ORAL DAILY
Refills: 0 | Status: DISCONTINUED | OUTPATIENT
Start: 2019-10-29 | End: 2019-10-31

## 2019-10-29 RX ORDER — CLOPIDOGREL BISULFATE 75 MG/1
75 TABLET, FILM COATED ORAL DAILY
Refills: 0 | Status: DISCONTINUED | OUTPATIENT
Start: 2019-10-29 | End: 2019-10-31

## 2019-10-29 RX ORDER — SODIUM CHLORIDE 9 MG/ML
1000 INJECTION, SOLUTION INTRAVENOUS
Refills: 0 | Status: DISCONTINUED | OUTPATIENT
Start: 2019-10-29 | End: 2019-10-31

## 2019-10-29 RX ORDER — ASPIRIN/CALCIUM CARB/MAGNESIUM 324 MG
81 TABLET ORAL DAILY
Refills: 0 | Status: DISCONTINUED | OUTPATIENT
Start: 2019-10-29 | End: 2019-10-31

## 2019-10-29 RX ORDER — SODIUM CHLORIDE 9 MG/ML
1000 INJECTION INTRAMUSCULAR; INTRAVENOUS; SUBCUTANEOUS
Refills: 0 | Status: DISCONTINUED | OUTPATIENT
Start: 2019-10-29 | End: 2019-10-29

## 2019-10-29 RX ORDER — SIMVASTATIN 20 MG/1
40 TABLET, FILM COATED ORAL AT BEDTIME
Refills: 0 | Status: DISCONTINUED | OUTPATIENT
Start: 2019-10-29 | End: 2019-10-31

## 2019-10-29 RX ORDER — POTASSIUM CHLORIDE 20 MEQ
20 PACKET (EA) ORAL
Refills: 0 | Status: COMPLETED | OUTPATIENT
Start: 2019-10-29 | End: 2019-10-29

## 2019-10-29 RX ORDER — INSULIN LISPRO 100/ML
7 VIAL (ML) SUBCUTANEOUS
Refills: 0 | Status: DISCONTINUED | OUTPATIENT
Start: 2019-10-29 | End: 2019-10-31

## 2019-10-29 RX ORDER — NAFCILLIN 10 G/100ML
2 INJECTION, POWDER, FOR SOLUTION INTRAVENOUS EVERY 4 HOURS
Refills: 0 | Status: DISCONTINUED | OUTPATIENT
Start: 2019-10-29 | End: 2019-10-31

## 2019-10-29 RX ORDER — FLUTICASONE PROPIONATE 50 MCG
1 SPRAY, SUSPENSION NASAL
Refills: 0 | Status: DISCONTINUED | OUTPATIENT
Start: 2019-10-29 | End: 2019-10-31

## 2019-10-29 RX ORDER — INSULIN GLARGINE 100 [IU]/ML
12 INJECTION, SOLUTION SUBCUTANEOUS AT BEDTIME
Refills: 0 | Status: DISCONTINUED | OUTPATIENT
Start: 2019-10-29 | End: 2019-10-31

## 2019-10-29 RX ORDER — BUDESONIDE AND FORMOTEROL FUMARATE DIHYDRATE 160; 4.5 UG/1; UG/1
2 AEROSOL RESPIRATORY (INHALATION)
Refills: 0 | Status: DISCONTINUED | OUTPATIENT
Start: 2019-10-29 | End: 2019-10-31

## 2019-10-29 RX ORDER — ACETAMINOPHEN 500 MG
650 TABLET ORAL EVERY 6 HOURS
Refills: 0 | Status: DISCONTINUED | OUTPATIENT
Start: 2019-10-29 | End: 2019-10-31

## 2019-10-29 RX ORDER — GLUCAGON INJECTION, SOLUTION 0.5 MG/.1ML
1 INJECTION, SOLUTION SUBCUTANEOUS ONCE
Refills: 0 | Status: DISCONTINUED | OUTPATIENT
Start: 2019-10-29 | End: 2019-10-31

## 2019-10-29 RX ORDER — CHLORHEXIDINE GLUCONATE 213 G/1000ML
1 SOLUTION TOPICAL DAILY
Refills: 0 | Status: DISCONTINUED | OUTPATIENT
Start: 2019-10-29 | End: 2019-10-31

## 2019-10-29 RX ORDER — DEXTROSE 50 % IN WATER 50 %
15 SYRINGE (ML) INTRAVENOUS ONCE
Refills: 0 | Status: DISCONTINUED | OUTPATIENT
Start: 2019-10-29 | End: 2019-10-31

## 2019-10-29 RX ORDER — TAMSULOSIN HYDROCHLORIDE 0.4 MG/1
0.4 CAPSULE ORAL AT BEDTIME
Refills: 0 | Status: DISCONTINUED | OUTPATIENT
Start: 2019-10-29 | End: 2019-10-31

## 2019-10-29 RX ORDER — DEXTROSE 50 % IN WATER 50 %
25 SYRINGE (ML) INTRAVENOUS ONCE
Refills: 0 | Status: DISCONTINUED | OUTPATIENT
Start: 2019-10-29 | End: 2019-10-31

## 2019-10-29 RX ORDER — DEXTROSE 50 % IN WATER 50 %
12.5 SYRINGE (ML) INTRAVENOUS ONCE
Refills: 0 | Status: DISCONTINUED | OUTPATIENT
Start: 2019-10-29 | End: 2019-10-31

## 2019-10-29 RX ORDER — METOPROLOL TARTRATE 50 MG
75 TABLET ORAL DAILY
Refills: 0 | Status: DISCONTINUED | OUTPATIENT
Start: 2019-10-29 | End: 2019-10-31

## 2019-10-29 RX ORDER — HEPARIN SODIUM 5000 [USP'U]/ML
1200 INJECTION INTRAVENOUS; SUBCUTANEOUS
Qty: 25000 | Refills: 0 | Status: DISCONTINUED | OUTPATIENT
Start: 2019-10-29 | End: 2019-10-31

## 2019-10-29 RX ORDER — FINASTERIDE 5 MG/1
5 TABLET, FILM COATED ORAL DAILY
Refills: 0 | Status: DISCONTINUED | OUTPATIENT
Start: 2019-10-29 | End: 2019-10-31

## 2019-10-29 RX ORDER — NITROGLYCERIN 6.5 MG
1 CAPSULE, EXTENDED RELEASE ORAL DAILY
Refills: 0 | Status: DISCONTINUED | OUTPATIENT
Start: 2019-10-29 | End: 2019-10-31

## 2019-10-29 RX ORDER — FENOFIBRATE,MICRONIZED 130 MG
145 CAPSULE ORAL DAILY
Refills: 0 | Status: DISCONTINUED | OUTPATIENT
Start: 2019-10-29 | End: 2019-10-31

## 2019-10-29 RX ORDER — ALPRAZOLAM 0.25 MG
0.5 TABLET ORAL AT BEDTIME
Refills: 0 | Status: DISCONTINUED | OUTPATIENT
Start: 2019-10-29 | End: 2019-10-31

## 2019-10-29 RX ADMIN — Medication 7 UNIT(S): at 16:28

## 2019-10-29 RX ADMIN — Medication 50 MILLIEQUIVALENT(S): at 01:33

## 2019-10-29 RX ADMIN — NAFCILLIN 200 GRAM(S): 10 INJECTION, POWDER, FOR SOLUTION INTRAVENOUS at 01:34

## 2019-10-29 RX ADMIN — HEPARIN SODIUM 12 UNIT(S)/HR: 5000 INJECTION INTRAVENOUS; SUBCUTANEOUS at 15:14

## 2019-10-29 RX ADMIN — CLOPIDOGREL BISULFATE 75 MILLIGRAM(S): 75 TABLET, FILM COATED ORAL at 12:10

## 2019-10-29 RX ADMIN — BUDESONIDE AND FORMOTEROL FUMARATE DIHYDRATE 2 PUFF(S): 160; 4.5 AEROSOL RESPIRATORY (INHALATION) at 21:10

## 2019-10-29 RX ADMIN — Medication 1 APPLICATION(S): at 06:09

## 2019-10-29 RX ADMIN — FINASTERIDE 5 MILLIGRAM(S): 5 TABLET, FILM COATED ORAL at 12:10

## 2019-10-29 RX ADMIN — NAFCILLIN 200 GRAM(S): 10 INJECTION, POWDER, FOR SOLUTION INTRAVENOUS at 17:59

## 2019-10-29 RX ADMIN — TAMSULOSIN HYDROCHLORIDE 0.4 MILLIGRAM(S): 0.4 CAPSULE ORAL at 21:37

## 2019-10-29 RX ADMIN — NAFCILLIN 200 GRAM(S): 10 INJECTION, POWDER, FOR SOLUTION INTRAVENOUS at 21:13

## 2019-10-29 RX ADMIN — ISOSORBIDE MONONITRATE 30 MILLIGRAM(S): 60 TABLET, EXTENDED RELEASE ORAL at 14:01

## 2019-10-29 RX ADMIN — SODIUM CHLORIDE 40 MILLILITER(S): 9 INJECTION INTRAMUSCULAR; INTRAVENOUS; SUBCUTANEOUS at 10:05

## 2019-10-29 RX ADMIN — Medication 1 APPLICATION(S): at 18:54

## 2019-10-29 RX ADMIN — Medication 145 MILLIGRAM(S): at 12:10

## 2019-10-29 RX ADMIN — NAFCILLIN 200 GRAM(S): 10 INJECTION, POWDER, FOR SOLUTION INTRAVENOUS at 14:02

## 2019-10-29 RX ADMIN — Medication 75 MILLIGRAM(S): at 06:08

## 2019-10-29 RX ADMIN — Medication 1 PATCH: at 13:59

## 2019-10-29 RX ADMIN — Medication 50 MILLIEQUIVALENT(S): at 04:35

## 2019-10-29 RX ADMIN — SIMVASTATIN 40 MILLIGRAM(S): 20 TABLET, FILM COATED ORAL at 21:37

## 2019-10-29 RX ADMIN — NAFCILLIN 200 GRAM(S): 10 INJECTION, POWDER, FOR SOLUTION INTRAVENOUS at 06:08

## 2019-10-29 RX ADMIN — CHLORHEXIDINE GLUCONATE 1 APPLICATION(S): 213 SOLUTION TOPICAL at 12:10

## 2019-10-29 RX ADMIN — Medication 81 MILLIGRAM(S): at 12:10

## 2019-10-29 NOTE — PROGRESS NOTE ADULT - SUBJECTIVE AND OBJECTIVE BOX
Patient is a 79y old  Male who presents with a chief complaint of CHF Exacerbation (28 Oct 2019 11:22)      T(F): 96.1 (10-29-19 @ 07:02), Max: 96.5 (10-28-19 @ 15:00)  HR: 66 (10-28-19 @ 23:15)  BP: 117/63 (10-28-19 @ 23:15)  RR: 21 (10-29-19 @ 07:02)  SpO2: 99% (10-28-19 @ 23:15) (99% - 99%)    PHYSICAL EXAM:  GENERAL: NAD, well-groomed, well-developed  HEAD:  Atraumatic, Normocephalic  EYES: EOMI, PERRLA, conjunctiva and sclera clear  ENMT: No tonsillar erythema, exudates, or enlargement; Moist mucous membranes, Good dentition, No lesions  NECK: Supple, No JVD, Normal thyroid  NERVOUS SYSTEM:  Alert & Oriented X3,  Motor Strength 5/5 B/L upper and lower extremities  CHEST/LUNG: Clear to percussion bilaterally; No rales, rhonchi, wheezing, or rubs  HEART: irreg No murmurs, rubs, or gallops  ABDOMEN: Soft, Nontender, Nondistended; Bowel sounds present  EXTREMITIES:   No clubbing, cyanosis, tr edema  LYMPH: No lymphadenopathy noted  SKIN: No rashes or lesions    labs  10-28    141  |  95<L>  |  80<HH>  ----------------------------<  114<H>  3.8   |  21  |  8.4<HH>    Ca    9.0      28 Oct 2019 05:42    TPro  6.5  /  Alb  3.1<L>  /  TBili  0.9  /  DBili  x   /  AST  15  /  ALT  10  /  AlkPhos  44  10-28                          11.1   7.92  )-----------( 202      ( 29 Oct 2019 05:56 )             34.1       PTT - ( 29 Oct 2019 05:56 )  PTT:42.6 sec        acetaminophen   Tablet .. 650 milliGRAM(s) Oral every 6 hours PRN  acetaminophen  Suppository .. 650 milliGRAM(s) Rectal every 6 hours PRN  ALPRAZolam 0.5 milliGRAM(s) Oral at bedtime  aspirin  chewable 81 milliGRAM(s) Oral daily  buDESOnide 160 MICROgram(s)/formoterol 4.5 MICROgram(s) Inhaler 2 Puff(s) Inhalation two times a day  chlorhexidine 4% Liquid 1 Application(s) Topical daily  clopidogrel Tablet 75 milliGRAM(s) Oral daily  dextrose 40% Gel 15 Gram(s) Oral once PRN  dextrose 5%. 1000 milliLiter(s) IV Continuous <Continuous>  dextrose 50% Injectable 12.5 Gram(s) IV Push once  dextrose 50% Injectable 25 Gram(s) IV Push once  dextrose 50% Injectable 25 Gram(s) IV Push once  fenofibrate Tablet 145 milliGRAM(s) Oral daily  finasteride 5 milliGRAM(s) Oral daily  fluticasone propionate 50 MICROgram(s)/spray Nasal Spray 1 Spray(s) Both Nostrils two times a day  glucagon  Injectable 1 milliGRAM(s) IntraMuscular once PRN  heparin  Infusion 1200 Unit(s)/Hr IV Continuous <Continuous>  influenza   Vaccine 0.5 milliLiter(s) IntraMuscular once  insulin glargine Injectable (LANTUS) 12 Unit(s) SubCutaneous at bedtime  insulin lispro Injectable (HumaLOG) 7 Unit(s) SubCutaneous before breakfast  insulin lispro Injectable (HumaLOG) 7 Unit(s) SubCutaneous before lunch  insulin lispro Injectable (HumaLOG) 7 Unit(s) SubCutaneous before dinner  isosorbide   mononitrate ER Tablet (IMDUR) 30 milliGRAM(s) Oral daily  metoprolol succinate ER 75 milliGRAM(s) Oral daily  nafcillin  IVPB      nafcillin  IVPB 2 Gram(s) IV Intermittent every 4 hours  nitroglycerin    Patch 0.2 mG/Hr(s) 1 patch Transdermal daily  silver sulfADIAZINE 1% Cream 1 Application(s) Topical two times a day  simvastatin 40 milliGRAM(s) Oral at bedtime  tamsulosin 0.4 milliGRAM(s) Oral at bedtime

## 2019-10-29 NOTE — PROGRESS NOTE ADULT - SUBJECTIVE AND OBJECTIVE BOX
TAIWO ZAVALA  79y, Male  Allergy: No Known Allergies      CHIEF COMPLAINT: CHF Exacerbation (28 Oct 2019 11:22)      INTERVAL EVENTS/HPI  - No acute events overnight  - T(F): , Max: 96.5 (10-28-19 @ 15:00)  - Denies any worsening symptoms  - Tolerating medication    ROS  10 system review- neg   SOCIAL HISTORY - not relevant     Substance Use (  ) never used  (  ) IVDU (  ) Other:  Tobacco Usage:  (   ) never smoked   (   ) former smoker   (   ) current smoker   Alcohol Usage: (   ) social  (   ) daily use (   ) denies  Sexual History:       FH noncontributory     VITALS:  T(F): 96.1, Max: 96.5 (10-28-19 @ 15:00)  HR: 66  BP: 117/63  RR: 18Vital Signs Last 24 Hrs  T(C): 35.6 (28 Oct 2019 23:15), Max: 35.8 (28 Oct 2019 15:00)  T(F): 96.1 (28 Oct 2019 23:15), Max: 96.5 (28 Oct 2019 15:00)  HR: 66 (28 Oct 2019 23:15) (66 - 96)  BP: 117/63 (28 Oct 2019 23:15) (110/57 - 117/63)  BP(mean): 85 (28 Oct 2019 23:15) (81 - 85)  RR: 18 (28 Oct 2019 23:15) (18 - 99)  SpO2: 99% (28 Oct 2019 23:15) (99% - 99%)    PHYSICAL EXAM:  Gen: NAD, resting in bed  HEENT: Normocephalic, atraumatic  Neck: supple, no lymphadenopathy  CV: s1s2 +   Lungs: decreased BS   Abdomen: Soft, BS present  Ext: Warm, well perfused  Neuro: non focal, awake  Skin: no rash, no erythema      TESTS & MEASUREMENTS:                        11.2   8.96  )-----------( 229      ( 28 Oct 2019 05:42 )             33.8     10-28    141  |  95<L>  |  80<HH>  ----------------------------<  114<H>  3.8   |  21  |  8.4<HH>    Ca    9.0      28 Oct 2019 05:42    TPro  6.5  /  Alb  3.1<L>  /  TBili  0.9  /  DBili  x   /  AST  15  /  ALT  10  /  AlkPhos  44  10-28      LIVER FUNCTIONS - ( 28 Oct 2019 05:42 )  Alb: 3.1 g/dL / Pro: 6.5 g/dL / ALK PHOS: 44 U/L / ALT: 10 U/L / AST: 15 U/L / GGT: x               Culture - Blood (collected 10-24-19 @ 05:53)  Source: .Blood None  Preliminary Report (10-25-19 @ 18:01):    No growth to date.    Culture - Blood (collected 10-23-19 @ 15:30)  Source: .Blood None  Final Report (10-28-19 @ 23:00):    No growth at 5 days.            INFECTIOUS DISEASES TESTING  Hepatitis B Surface Antigen: Nonreact (10-25-19 @ 10:12)  Hepatitis C Virus Interpretation: Nonreact (10-25-19 @ 10:12)  Hepatitis B Surface Antigen: Nonreact (10-24-19 @ 15:49)  Hepatitis C Virus Interpretation: Nonreact (10-24-19 @ 15:49)  MRSA PCR Result.: Negative (10-24-19 @ 12:54)      RADIOLOGY & ADDITIONAL TESTS:  I have personally reviewed the last Chest xray  CXR      CT      CARDIOLOGY TESTING  12 Lead ECG:   Ventricular Rate 65 BPM    Atrial Rate 260 BPM    QRS Duration 130 ms    Q-T Interval 502 ms    QTC Calculation(Bezet) 522 ms    P Axis -89 degrees    R Axis -55 degrees    T Axis 230 degrees    Diagnosis Line Atrial flutter with 4:1 A-V conduction  Right bundle branch block  Left anterior fascicular block  *** Bifascicular block ***  T wave abnormality, consider inferolateral ischemia  Abnormal ECG    Confirmed by STEVE YAO MD ([id number]) on 10/28/2019 10:50:11 AM (10-28-19 @ 10:42)  12 Lead ECG:   Ventricular Rate 65 BPM    Atrial Rate 260 BPM    QRS Duration 124 ms    Q-T Interval 450 ms    QTC Calculation(Bezet) 468 ms    P Axis 259 degrees    R Axis -60 degrees    T Axis 215 degrees    Diagnosis Line Atrial flutter with variable A-V blockwith premature ventricular or  aberrantly conducted complexes  Left axis deviation  Right bundle branch block  T wave abnormality, consider inferolateral ischemia  Abnormal ECG    Confirmed by STEVE YAO MD ([id number]) on 10/27/2019 9:56:58 AM (10-27-19 @ 07:41)      MEDICATIONS  ALPRAZolam 0.5  aspirin  chewable 81  buDESOnide 160 MICROgram(s)/formoterol 4.5 MICROgram(s) Inhaler 2  chlorhexidine 4% Liquid 1  clopidogrel Tablet 75  dextrose 5%. 1000  dextrose 50% Injectable 12.5  dextrose 50% Injectable 25  dextrose 50% Injectable 25  fenofibrate Tablet 145  finasteride 5  fluticasone propionate 50 MICROgram(s)/spray Nasal Spray 1  heparin  Infusion 1200  influenza   Vaccine 0.5  insulin glargine Injectable (LANTUS) 12  insulin lispro Injectable (HumaLOG) 7  insulin lispro Injectable (HumaLOG) 7  insulin lispro Injectable (HumaLOG) 7  isosorbide   mononitrate ER Tablet (IMDUR) 30  metoprolol succinate ER 75  nafcillin  IVPB   nafcillin  IVPB 2  nitroglycerin    Patch 0.2 mG/Hr(s) 1  silver sulfADIAZINE 1% Cream 1  simvastatin 40  tamsulosin 0.4      ANTIBIOTICS:  nafcillin  IVPB      nafcillin  IVPB 2 Gram(s) IV Intermittent every 4 hours

## 2019-10-29 NOTE — PROGRESS NOTE ADULT - PROBLEM SELECTOR PROBLEM 1
Bacteremia due to Staphylococcus aureus

## 2019-10-29 NOTE — PROGRESS NOTE ADULT - ASSESSMENT
No complaints. In greater out.  Ambulate.  Physical thearpy. Continue heparin. Appreciate ID. For ivelisse today. Transfer when stable

## 2019-10-29 NOTE — PROGRESS NOTE ADULT - PROBLEM SELECTOR PLAN 1
? IE   remains critically ill   awaiting pulmonary clearance prior to JAYCOB   ? HD     check blood cx Q 48 ( NOT DAILY)  till neg   continue IV Nafcillin - will likely need 6 weeks at least - NO deescalation - THIS IS NARROW SPECTRUM & geared for MSSA coverage
check NASAL MRSA/ MSSA PCR - ? CAP related bacteremia  Repeat blood cx Q 48 till neg   No change in abx   ESR - mild elevation - less in keeping with IE   Cardiac cath AFTER blood cx are negative     IV Nafcillin 2 g Q 4
MSSA   ? IE vs Pneumonia  recent admission + DM   DC all other Abx   IV Nafcillin 2 g Q4   Check blood cx Q 48 till neg   Check nasal MRSA / MSSA pcr   Ideally needs a JAYCOB - thickened AV on TTE
Repeat cx + - MSSA   REPEAT blood cx Q 48 TILL NEGATIVE   Needs a JAYCOB - TTE - thickened AV - with repeat + blood cx  Keep on IV Nafcillin 2 g Q4 for now
acute illness - clinically improving   D7 abx post Neg repeat blood cx  MSSA sepsis - ? IE   Awaiting HD catheter and eventual JAYCOB    Can change to IV Ancef 2 g Q24 ( post HD on HD days)  Neg repeat blood cx  Complete at least 5 more weeks of abx  Follow up with Dr Marquez out pt   Recall if needed

## 2019-10-29 NOTE — PROGRESS NOTE ADULT - ASSESSMENT
Patient is a 80yo Male w/ PMH of HFpEF (EF of 55% with Grade II Diastolic dysfunction), COPD (home O2 3-3.5L as needed), DM, HTN, CAD s/p CABG and 1 stent, BPH, and recent admission for CHF (August 2019) presented  to [facility] with complaints of shortness of breath and exertional chest pain.    #) Acute respiratory failure secondary to Acute on Chronic Diastolic CHF exacerbation / moderate  aortic stenosis   -  resolved  - now on HD to continue with even to  negative fluid balance      #) PABLO Bacteremia   -  BC 10/22, 10/23, 10/24  negative   - continue Nafcillin, ID following   -  cardiology following     #) BRITTANI   - probably ATN   -  HD session #3 today   - continue to remove fluid during HD   - npo  for Tesio catheter placement today   - hold IV heparin  for procedure     #) Chest Pain (resolved) ,  in patient with hx of CABG and stent placement  - c/w losartan asa, metoprolol; Simvastatin   - cardiology following    #) Hx of A Fib  - On Xarelto, 15mg at home; IV heparin for now, check ptt and adjust   -  Metoprolol      #) Hx of COPD  - Stable at this time, with no evidence of acute exacerbation  - Cont with Symbicort (in place of Breo)    #) Hx of Diabetes Type 2  - Insulin sq hospital protocol   - Carb consistent diet    #) Hx of BPH  - Cont with Flomax and Finasteride

## 2019-10-29 NOTE — CONSULT NOTE ADULT - ASSESSMENT
IMPRESSION: Rehab of 78 y/o m  rehab  for debility      PRECAUTIONS: [ x ] Cardiac  [x  ] Respiratory  [  ] Seizures [  ] Contact Isolation  [  ] Droplet Isolation  [ FALL ] Other    Weight Bearing Status:     RECOMMENDATION:    Out of Bed to Chair     DVT/Decubiti Prophylaxis    REHAB PLAN:     [  xx ] Bedside P/T 3-5 times a week   [   ]   Bedside O/T  2-3 times a week             [   ] No Rehab Therapy Indicated                   [   ]  Speech Therapy   Conditioning/ROM                                    ADL  Bed Mobility                                               Conditioning/ROM  Transfers                                                     Bed Mobility  Sitting /Standing Balance                         Transfers                                        Gait Training                                               Sitting/Standing Balance  Stair Training [   ]Applicable                    Home equipment Eval                                                                        Splinting  [   ] Only      GOALS:   ADL   [  x ]   Independent                    Transfers  [ x  ] Independent                          Ambulation  [ x  ] Independent     [  x  ] With device                            [ x  ]  CG                                                         [  x ]  CG                                                                  [ x  ] CG                            [    ] Min A                                                   [   ] Min A                                                              [   ] Min  A          DISCHARGE PLAN:   [   ]  Good candidate for Intensive Rehabilitation/Hospital based-4A [facility]                                             Will tolerate 3hrs Intensive Rehab Daily                                       [   xx ]  Short Term Rehab in Skilled Nursing Facility cont  iv  abx                                         [    ]  Home with Outpatient or VN services                                         [    ]  Possible Candidate for Intensive Hospital based Rehab

## 2019-10-29 NOTE — PROGRESS NOTE ADULT - SUBJECTIVE AND OBJECTIVE BOX
Patient is sitting comfortably in bed, no complaints      T(F): 96.1 (10-29-19 @ 07:02), Max: 96.5 (10-28-19 @ 15:00)  HR: 66 (10-28-19 @ 23:15)  BP: 117/63 (10-28-19 @ 23:15)  RR: 19  SpO2: 99% (10-28-19 @ 23:15) (99% - 99%)    PHYSICAL EXAM:  GENERAL: NAD  HEAD:  Atraumatic, Normocephalic  NERVOUS SYSTEM:  Alert & Oriented X3, no focal deficits  CHEST/LUNG: Clear to percussion bilaterally; No rales, rhonchi, wheezing, or rubs  HEART: Regular rate and rhythm; No murmurs, rubs, or gallops  ABDOMEN: Soft, Nontender, Nondistended; Bowel sounds present  EXTREMITIES:  2+ Peripheral Pulses, No clubbing, cyanosis, or edema  LYMPH: No lymphadenopathy noted  SKIN: No rashes or lesions    LABS  10-29    140  |  96<L>  |  56<H>  ----------------------------<  119<H>  4.0   |  25  |  7.0<HH>    Ca    9.1      29 Oct 2019 05:56    TPro  6.6  /  Alb  3.2<L>  /  TBili  1.0  /  DBili  x   /  AST  14  /  ALT  10  /  AlkPhos  37  10-29                          11.1   7.92  )-----------( 202      ( 29 Oct 2019 05:56 )             34.1     PTT - ( 29 Oct 2019 05:56 )  PTT:42.6 sec    Culture Results:   No growth to date. (10-24-19)  Culture Results:   No growth at 5 days. (10-23-19)  Culture Results:   No growth at 5 days. (10-22-19)  Culture Results:   Growth in aerobic and anaerobic bottles: Staphylococcus aureus (10-19-19)  Culture Results:   Growth in aerobic and anaerobic bottles: Staphylococcus aureus  See previous culture [id number] (10-17-19)  Culture Results:   Growth in aerobic and anaerobic bottles: Staphylococcus aureus  See previous culture [id number] (10-16-19)  Culture Results:   <10,000 CFU/mL Normal Urogenital Felipa (10-15-19)  Culture Results:   Growth in aerobic bottle: Staphylococcus aureus  "Due to technical problems, Proteus sp. will Not be reported as part of  the BCID panel until further notice"  ***Blood Panel PCR results on this specimen are available  approximately 3 hours after the Gram stain result.***  Gram stain, PCR, and/or culture results may not always  correspond due to difference in methodologies.  ************************************************************  This PCR assay was performed using [employer].  The following targets are tested for: Enterococcus,  vancomycin resistant enterococci, Listeria monocytogenes,  coagulase negative staphylococci, S. aureus,  methicillin resistant S. aureus, Streptococcus agalactiae  (Group B), S. pneumoniae, S. pyogenes (Group A),  Acinetobacter baumannii, Enterobacter cloacae, E. coli,  Klebsiella oxytoca, K. pneumoniae, Proteus sp.,  Serratia marcescens, Haemophilus influenzae,  Neisseria meningitidis, Pseudomonas aeruginosa, Candida  albicans, C. glabrata, C krusei, C parapsilosis,  C. tropicalis and the KPC resistance gene. (10-15-19)    RADIOLOGY  < from: Xray Chest 1 View- PORTABLE-Routine (10.28.19 @ 06:17) >  Impression:      Bibasilar opacities and pleural effusions, unchanged.    < end of copied text >    MEDICATIONS  (STANDING):  ALPRAZolam 0.5 milliGRAM(s) Oral at bedtime  aspirin  chewable 81 milliGRAM(s) Oral daily  buDESOnide 160 MICROgram(s)/formoterol 4.5 MICROgram(s) Inhaler 2 Puff(s) Inhalation two times a day  chlorhexidine 4% Liquid 1 Application(s) Topical daily  clopidogrel Tablet 75 milliGRAM(s) Oral daily  fenofibrate Tablet 145 milliGRAM(s) Oral daily  finasteride 5 milliGRAM(s) Oral daily  fluticasone propionate 50 MICROgram(s)/spray Nasal Spray 1 Spray(s) Both Nostrils two times a day  heparin  Infusion 1200 Unit(s)/Hr (12 mL/Hr) IV Continuous <Continuous>  influenza   Vaccine 0.5 milliLiter(s) IntraMuscular once  insulin glargine Injectable (LANTUS) 12 Unit(s) SubCutaneous at bedtime  insulin lispro Injectable (HumaLOG) 7 Unit(s) SubCutaneous before breakfast  insulin lispro Injectable (HumaLOG) 7 Unit(s) SubCutaneous before lunch  insulin lispro Injectable (HumaLOG) 7 Unit(s) SubCutaneous before dinner  isosorbide   mononitrate ER Tablet (IMDUR) 30 milliGRAM(s) Oral daily  metoprolol succinate ER 75 milliGRAM(s) Oral daily     nafcillin  IVPB 2 Gram(s) IV Intermittent every 4 hours  nitroglycerin    Patch 0.2 mG/Hr(s) 1 patch Transdermal daily  silver sulfADIAZINE 1% Cream 1 Application(s) Topical two times a day  simvastatin 40 milliGRAM(s) Oral at bedtime  tamsulosin 0.4 milliGRAM(s) Oral at bedtime    MEDICATIONS  (PRN):  acetaminophen   Tablet .. 650 milliGRAM(s) Oral every 6 hours PRN Temp greater or equal to 38C (100.4F), Mild Pain (1 - 3)  acetaminophen  Suppository .. 650 milliGRAM(s) Rectal every 6 hours PRN Temp greater or equal to 38C (100.4F), Mild Pain (1 - 3)  dextrose 40% Gel 15 Gram(s) Oral once PRN Blood Glucose LESS THAN 70 milliGRAM(s)/deciliter  glucagon  Injectable 1 milliGRAM(s) IntraMuscular once PRN Glucose LESS THAN 70 milligrams/deciliter

## 2019-10-29 NOTE — PROGRESS NOTE ADULT - SUBJECTIVE AND OBJECTIVE BOX
Patient is a 79y old  Male who presents with a chief complaint of CHF Exacerbation (29 Oct 2019 10:53)        HPI:  Patient is a 80yo Male w/ PMH of HFpEF (EF of 55% with Grade II Diastolic dysfunction), COPD (home O2 3-3.5L as needed), DM, HTN, CAD s/p CABG and 1 stent, BPH, and recent admission for CHF (August 2019) presenting to [facility] with complaints of shortness of breath and chest pain. Patient reports he has been progressively worsening shortness of breath for the last 4-5 days prior to admission. He also reports associated chest pain on exertion.  Patient reports he has been compliant with all of his medications as well with a low salt diet. He denies any recent history of fevers, chills, cough. On day of admission, had severe dyspnea on exertion associated with a pressure like sensation in his chest that radiated to his left arm. He tried sublingual nitro for relief but it didn't help. Therefore, he came in for further evaluation. He was given Solumedrol 125mg by EMS. In the ED, patient was found to have evidence of bilateral pleural effusions on bedside sono done by ED staff. He received IV Lasix 40mg and was placed on BiPAP with major improvement in his symptoms. On my assessment, patient was speaking to me in full sentences on BiPAP and his chest pain has resolved. (10 Oct 2019 13:27)      Interval Events: No overnight events.    REVIEW OF SYSTEMS:     · CONSTITUTIONAL:   no fever   no chills.  no weight gain   no weight loss    · EYES:   no discharge,   no irritation,   no pain,   no redness,   no visual changes.    · ENMT:   Ears: no ear pain and no hearing problems.  Nose: no nasal congestion and no nasal drainage.  Mouth/Throat: no dysphagia,  no hoarseness and no throat pain.  Neck: no lumps, no pain, no stiffness and no swollen glands.    · CARDIOVASCULAR:   no chest pain,   +swelling  no palpitaions  no syncope    · RESPIRATORYy:   +SOB,   no wheezing ,   no respiratory difficulty  no sputum production    · GASTROINTESTINAL:   no abdominal pain,   no bloating,   no constipation,   no diarrhea,   no nausea   no vomiting.    · GENITOURINARY:   no dysuria,   no frequency,   no urgency  no hematuria.    · MUSCULOSKELETAL:   no back pain,   no gout,   no musculoskeletal pain,   no neck pain,   no weakness.    · SKIN:   no abrasions,   no jaundice,   no lesions,   no pruritis,   no rashes.    · NEURO:   no loss of consciousness,   no gait abnormality,   no headache,   no sensory deficits,   no weakness.    · PSYCHIATRIC:   no known mental health issues  no anxiety  no depression  no suicidal thoughts  no aggressive thoughts towards others      ALLERGIC/IMMUNOLOGIC:   No active allergic or immunologic issues      all other systems are negative      OBJECTIVE:  ICU Vital Signs Last 24 Hrs  T(C): 36.7 (29 Oct 2019 09:55), Max: 36.7 (29 Oct 2019 09:55)  T(F): 98 (29 Oct 2019 09:55), Max: 98 (29 Oct 2019 09:55)  HR: 65 (29 Oct 2019 12:10) (64 - 68)  BP: 116/64 (29 Oct 2019 12:10) (108/61 - 130/66)  BP(mean): 85 (29 Oct 2019 12:10) (79 - 91)  RR: 16 (29 Oct 2019 10:10) (16 - 25)  SpO2: 98% (29 Oct 2019 12:10) (95% - 100%)        10-28 @ 07:01  -  10-29 @ 07:00  --------------------------------------------------------  IN: 1308 mL / OUT: 2000 mL / NET: -692 mL    10-29 @ 07:01  -  10-29 @ 12:44  --------------------------------------------------------  IN: 10 mL / OUT: 0 mL / NET: 10 mL      CAPILLARY BLOOD GLUCOSE      POCT Blood Glucose.: 103 mg/dL (29 Oct 2019 11:32)        PHYSICAL EXAM:     · CONSTITUTIONAL:   ill appearing,   well nourished,   NAD    · ENMT:   Airway patent,   Nasal mucosa clear.  Mouth with normal mucosa.   Throat has no vesicles,   no oropharyngeal exudates and uvula is midline.   No thrush    · EYES:   Clear bilaterally,   pupils equal,   round and reactive to light.    · CARDIAC:   Normal rate,   regular rhythm.    Heart sounds S1, S2.   no thrills or bruits on palpitation  normal  cardiac impulse  normal palpable location of the heart   No murmurs, no rubs or gallops on auscultation  no edema  no significant varices  femoral arteries palpable with normal impulse, no bruits  aorta palpable with normal impuls and no bruits      CAROTID:   normal systolic impulse  no bruits    · RESPIRATORY: clear,  rales at bases  normal chest expansion  not tachypneic,  no retractions  use of accessory muscles  palpation of chest is normal with no fremitus  percussion of chest no hyperresonance or dullness    · GASTROINTESTINAL:  Abdomen soft,   non-tender,   no masses  no guarding,   + BS  liver normal size  spleen not palpable    GENITOURINARY  normal genitalia for sex  no edema    · MUSCULOSKELETAL:   Spine appears normal,   range of motion is not limited,  no muscle or joint tenderness  no clubbing, cyanosis  no petechiae    · NEUROLOGICAL:   Alert and oriented   no focal deficits in cranial nerve areas  no motor or sensory deficits.  pertinant DTRs normal    · SKIN:   Skin normal color for race,   warm,   dry and intact.   No evidence of rash.    · PSYCHIATRIC:   Alert and oriented to person,   place, time/situation.   normal mood and affect.   no apparent risk to self or others.    · HEME LYMPH:   no splenomegaly.  No cervical  lymphadenopathy.  no inguinal lymphadenopathy    HOSPITAL MEDICATIONS:  MEDICATIONS  (STANDING):  ALPRAZolam 0.5 milliGRAM(s) Oral at bedtime  aspirin  chewable 81 milliGRAM(s) Oral daily  budesonide 160 MICROgram(s)/formoterol 4.5 MICROgram(s) Inhaler 2 Puff(s) Inhalation two times a day  chlorhexidine 4% Liquid 1 Application(s) Topical daily  clopidogrel Tablet 75 milliGRAM(s) Oral daily  dextrose 5%. 1000 milliLiter(s) (50 mL/Hr) IV Continuous <Continuous>  dextrose 50% Injectable 12.5 Gram(s) IV Push once  dextrose 50% Injectable 25 Gram(s) IV Push once  dextrose 50% Injectable 25 Gram(s) IV Push once  fenofibrate Tablet 145 milliGRAM(s) Oral daily  finasteride 5 milliGRAM(s) Oral daily  fluticasone propionate 50 MICROgram(s)/spray Nasal Spray 1 Spray(s) Both Nostrils two times a day  influenza   Vaccine 0.5 milliLiter(s) IntraMuscular once  insulin glargine Injectable (LANTUS) 12 Unit(s) SubCutaneous at bedtime  insulin lispro Injectable (HumaLOG) 7 Unit(s) SubCutaneous before breakfast  insulin lispro Injectable (HumaLOG) 7 Unit(s) SubCutaneous before lunch  insulin lispro Injectable (HumaLOG) 7 Unit(s) SubCutaneous before dinner  isosorbide   mononitrate ER Tablet (IMDUR) 30 milliGRAM(s) Oral daily  metoprolol succinate ER 75 milliGRAM(s) Oral daily  nafcillin  IVPB 2 Gram(s) IV Intermittent every 4 hours  nitroglycerin    Patch 0.2 mG/Hr(s) 1 patch Transdermal daily  silver sulfADIAZINE 1% Cream 1 Application(s) Topical two times a day  simvastatin 40 milliGRAM(s) Oral at bedtime  tamsulosin 0.4 milliGRAM(s) Oral at bedtime    MEDICATIONS  (PRN):  acetaminophen   Tablet .. 650 milliGRAM(s) Oral every 6 hours PRN Temp greater or equal to 38C (100.4F), Mild Pain (1 - 3)  acetaminophen  Suppository .. 650 milliGRAM(s) Rectal every 6 hours PRN Temp greater or equal to 38C (100.4F), Mild Pain (1 - 3)  dextrose 40% Gel 15 Gram(s) Oral once PRN Blood Glucose LESS THAN 70 milliGRAM(s)/deciliter  glucagon  Injectable 1 milliGRAM(s) IntraMuscular once PRN Glucose LESS THAN 70 milligrams/deciliter      LABS:                        11.1   7.92  )-----------( 202      ( 29 Oct 2019 05:56 )             34.1     10-29    140  |  96<L>  |  56<H>  ----------------------------<  119<H>  4.0   |  25  |  7.0<HH>    Ca    9.1      29 Oct 2019 05:56    TPro  6.6  /  Alb  3.2<L>  /  TBili  1.0  /  DBili  x   /  AST  14  /  ALT  10  /  AlkPhos  37  10-29    PTT - ( 29 Oct 2019 05:56 )  PTT:42.6 sec                  RADIOLOGY:Radiology personally reviewed.

## 2019-10-29 NOTE — CONSULT NOTE ADULT - CONSULT REQUESTED DATE/TIME
11-Oct-2019 07:40
11-Oct-2019 08:30
16-Oct-2019 21:39
20-Oct-2019 14:15
28-Oct-2019 13:45
25-Oct-2019 11:51
29-Oct-2019 17:29

## 2019-10-29 NOTE — PROGRESS NOTE ADULT - SUBJECTIVE AND OBJECTIVE BOX
Progress Note: General Surgery  Patient: TAIWO ZAVALA , 79y (1940)Male   MRN: [id number]  Location: [address]  Visit: 10-10-19 Inpatient  Date: 10-29-19 @ 14:58    Procedure/Diagnosis: s/p R IJ tesio placement    Vitals: T(F): 98 (10-29-19 @ 09:55), Max: 98 (10-29-19 @ 09:55)  HR: 65 (10-29-19 @ 14:00)  BP: 116/64 (10-29-19 @ 12:10) (108/61 - 130/66)  RR: 16 (10-29-19 @ 10:10)  SpO2: 99% (10-29-19 @ 14:00)    In:   10-28-19 @ 07:01  -  10-29-19 @ 07:00  --------------------------------------------------------  IN: 1308 mL    10-29-19 @ 07:01  -  10-29-19 @ 14:58  --------------------------------------------------------  IN: 10 mL      Out:   10-28-19 @ 07:01  -  10-29-19 @ 07:00  --------------------------------------------------------  OUT:    Other: 2000 mL  Total OUT: 2000 mL      10-29-19 @ 07:01  -  10-29-19 @ 14:58  --------------------------------------------------------  OUT:  Total OUT: 0 mL        Net:   10-28-19 @ 07:01  -  10-29-19 @ 07:00  --------------------------------------------------------  NET: -692 mL    10-29-19 @ 07:01  -  10-29-19 @ 14:58  --------------------------------------------------------  NET: 10 mL        Diet: Diet, DASH/TLC:   Sodium & Cholesterol Restricted  Consistent Carbohydrate Evening Snack  800mL Fluid Restriction ([id number]) (10-29-19 @ 10:28)    IV Fluids: dextrose 5%. 1000 milliLiter(s) (50 mL/Hr) IV Continuous <Continuous>      Physical Examination:  General Appearance: NAD  HEENT: EOMI, sclera non-icteric. R IJ tesio in place  Heart: RRR   Lungs: CTABL.   Abdomen:  Soft, nontender, nondistended.   MSK/Extremities: Warm & well-perfused.   Skin: Warm, dry. No jaundice.       Medications: [Standing]  ALPRAZolam 0.5 milliGRAM(s) Oral at bedtime  aspirin  chewable 81 milliGRAM(s) Oral daily  budesonide 160 MICROgram(s)/formoterol 4.5 MICROgram(s) Inhaler 2 Puff(s) Inhalation two times a day  chlorhexidine 4% Liquid 1 Application(s) Topical daily  clopidogrel Tablet 75 milliGRAM(s) Oral daily  dextrose 5%. 1000 milliLiter(s) (50 mL/Hr) IV Continuous <Continuous>  dextrose 50% Injectable 12.5 Gram(s) IV Push once  dextrose 50% Injectable 25 Gram(s) IV Push once  dextrose 50% Injectable 25 Gram(s) IV Push once  fenofibrate Tablet 145 milliGRAM(s) Oral daily  finasteride 5 milliGRAM(s) Oral daily  fluticasone propionate 50 MICROgram(s)/spray Nasal Spray 1 Spray(s) Both Nostrils two times a day  heparin  Infusion 1200 Unit(s)/Hr (12 mL/Hr) IV Continuous <Continuous>  influenza   Vaccine 0.5 milliLiter(s) IntraMuscular once  insulin glargine Injectable (LANTUS) 12 Unit(s) SubCutaneous at bedtime  insulin lispro Injectable (HumaLOG) 7 Unit(s) SubCutaneous before breakfast  insulin lispro Injectable (HumaLOG) 7 Unit(s) SubCutaneous before lunch  insulin lispro Injectable (HumaLOG) 7 Unit(s) SubCutaneous before dinner  isosorbide   mononitrate ER Tablet (IMDUR) 30 milliGRAM(s) Oral daily  metoprolol succinate ER 75 milliGRAM(s) Oral daily  nafcillin  IVPB 2 Gram(s) IV Intermittent every 4 hours  nitroglycerin    Patch 0.2 mG/Hr(s) 1 patch Transdermal daily  silver sulfADIAZINE 1% Cream 1 Application(s) Topical two times a day  simvastatin 40 milliGRAM(s) Oral at bedtime  tamsulosin 0.4 milliGRAM(s) Oral at bedtime    DVT Prophylaxis: heparin  Infusion 1200 Unit(s)/Hr IV Continuous <Continuous>    GI Prophylaxis:   Antibiotics: nafcillin  IVPB 2 Gram(s) IV Intermittent every 4 hours    Anticoagulation:   Medications:[PRN]  acetaminophen   Tablet .. 650 milliGRAM(s) Oral every 6 hours PRN  acetaminophen  Suppository .. 650 milliGRAM(s) Rectal every 6 hours PRN  dextrose 40% Gel 15 Gram(s) Oral once PRN  glucagon  Injectable 1 milliGRAM(s) IntraMuscular once PRN      Labs:                        11.1   7.92  )-----------( 202      ( 29 Oct 2019 05:56 )             34.1     10-29    140  |  96<L>  |  56<H>  ----------------------------<  119<H>  4.0   |  25  |  7.0<HH>    Ca    9.1      29 Oct 2019 05:56    TPro  6.6  /  Alb  3.2<L>  /  TBili  1.0  /  DBili  x   /  AST  14  /  ALT  10  /  AlkPhos  37  10-29    LIVER FUNCTIONS - ( 29 Oct 2019 05:56 )  Alb: 3.2 g/dL / Pro: 6.6 g/dL / ALK PHOS: 37 U/L / ALT: 10 U/L / AST: 14 U/L / GGT: x           PTT - ( 29 Oct 2019 05:56 )  PTT:42.6 sec      Imaging:     < from: Xray Chest 1 View-PORTABLE IMMEDIATE (10.29.19 @ 10:51) >  IMPRESSION:     New right IJ dialysis catheter. No pneumothorax.    Unchanged bilateral opacities and effusions.    < end of copied text >      Assessment:  79y Male patient admitted S/P R IJ tesio placement    Plan:    CXR reviewed and tesio cleared for use    Date/Time: 10-29-19 @ 14:58

## 2019-10-29 NOTE — BRIEF OPERATIVE NOTE - NSICDXBRIEFPROCEDURE_GEN_ALL_CORE_FT
PROCEDURES:  Insertion, dialysis catheter, Carroll 29-Oct-2019 10:11:42 and removal of uldall catheter Librado Del Real

## 2019-10-29 NOTE — PROGRESS NOTE ADULT - PROBLEM SELECTOR PROBLEM 2
Sepsis due to methicillin susceptible Staphylococcus aureus (MSSA) with acute hypoxic respiratory failure without septic shock
Sepsis
Sepsis due to methicillin susceptible Staphylococcus aureus (MSSA) with acute hypoxic respiratory failure without septic shock

## 2019-10-29 NOTE — PROGRESS NOTE ADULT - PROBLEM SELECTOR PLAN 2
acute illness  stable   Mx as above
acute illness    mx as above
acute illness  ? source of sepsis   Mx as above
acute illness  Mx as above
acute illness  mx as above

## 2019-10-29 NOTE — PRE-ANESTHESIA EVALUATION ADULT - NSWEIGHTCALCTOOLDRUG_GEN_A_CORE
used
The resident's documentation has been prepared under my direction and personally reviewed by me in its entirety. I confirm that the note above accurately reflects all work, treatment, procedures, and medical decision making performed by me. yunior Tai MD

## 2019-10-29 NOTE — CONSULT NOTE ADULT - SUBJECTIVE AND OBJECTIVE BOX
HPI:  79 y/ o Male w/ PMH of HFpEF (EF of 55% with Grade II Diastolic dysfunction), COPD (home O2 3-3.5L as needed), DM, HTN, CAD s/p CABG and 1 stent, BPH, and recent admission for CHF (August 2019) presenting to [facility] with complaints of shortness of breath and chest pain. Patient reports he has been progressively worsening shortness of breath for the last 4-5 days prior to admission. He also reports associated chest pain on exertion.  Patient reports he has been compliant with all of his medications as well with a low salt diet. He denies any recent history of fevers, chills, cough. On day of admission, had severe dyspnea on exertion associated with a pressure like sensation in his chest that radiated to his left arm. He tried sublingual nitro for relief but it didn't help. Therefore, he came in for further evaluation. He was given Solumedrol 125mg by EMS. In the ED, patient was found to have evidence of bilateral pleural effusions on bedside sono done by ED staff. He received IV Lasix 40mg and was placed on BiPAP with major improvement in his symptoms. On my assessment, patient was speaking to me in full sentences on BiPAP and his chest pain has resolved.   PTN  REFERRED TO ACUTE  REHAB  FOR  EVAL AND  TX   PAST MEDICAL & SURGICAL HISTORY:  BPH (benign prostatic hyperplasia)  CHF (congestive heart failure)  COPD (chronic obstructive pulmonary disease)  Diabetes  HTN (hypertension)  H/O heart artery stent  S/P CABG x 3    Hospital Course:    TODAY'S SUBJECTIVE & REVIEW OF SYMPTOMS:     Constitutional WNL   Cardio WNL   Resp WNL   GI WNL  Heme WNL  Endo WNL  Skin WNL  MSK WNL  Neuro WNL  Cognitive WNL  Psych WNL      MEDICATIONS  (STANDING):  ALPRAZolam 0.5 milliGRAM(s) Oral at bedtime  aspirin  chewable 81 milliGRAM(s) Oral daily  budesonide 160 MICROgram(s)/formoterol 4.5 MICROgram(s) Inhaler 2 Puff(s) Inhalation two times a day  chlorhexidine 4% Liquid 1 Application(s) Topical daily  clopidogrel Tablet 75 milliGRAM(s) Oral daily  dextrose 5%. 1000 milliLiter(s) (50 mL/Hr) IV Continuous <Continuous>  dextrose 50% Injectable 12.5 Gram(s) IV Push once  dextrose 50% Injectable 25 Gram(s) IV Push once  dextrose 50% Injectable 25 Gram(s) IV Push once  fenofibrate Tablet 145 milliGRAM(s) Oral daily  finasteride 5 milliGRAM(s) Oral daily  fluticasone propionate 50 MICROgram(s)/spray Nasal Spray 1 Spray(s) Both Nostrils two times a day  heparin  Infusion 1200 Unit(s)/Hr (12 mL/Hr) IV Continuous <Continuous>  influenza   Vaccine 0.5 milliLiter(s) IntraMuscular once  insulin glargine Injectable (LANTUS) 12 Unit(s) SubCutaneous at bedtime  insulin lispro Injectable (HumaLOG) 7 Unit(s) SubCutaneous before breakfast  insulin lispro Injectable (HumaLOG) 7 Unit(s) SubCutaneous before lunch  insulin lispro Injectable (HumaLOG) 7 Unit(s) SubCutaneous before dinner  isosorbide   mononitrate ER Tablet (IMDUR) 30 milliGRAM(s) Oral daily  metoprolol succinate ER 75 milliGRAM(s) Oral daily  nafcillin  IVPB 2 Gram(s) IV Intermittent every 4 hours  nitroglycerin    Patch 0.2 mG/Hr(s) 1 patch Transdermal daily  silver sulfADIAZINE 1% Cream 1 Application(s) Topical two times a day  simvastatin 40 milliGRAM(s) Oral at bedtime  tamsulosin 0.4 milliGRAM(s) Oral at bedtime    MEDICATIONS  (PRN):  acetaminophen   Tablet .. 650 milliGRAM(s) Oral every 6 hours PRN Temp greater or equal to 38C (100.4F), Mild Pain (1 - 3)  acetaminophen  Suppository .. 650 milliGRAM(s) Rectal every 6 hours PRN Temp greater or equal to 38C (100.4F), Mild Pain (1 - 3)  dextrose 40% Gel 15 Gram(s) Oral once PRN Blood Glucose LESS THAN 70 milliGRAM(s)/deciliter  glucagon  Injectable 1 milliGRAM(s) IntraMuscular once PRN Glucose LESS THAN 70 milligrams/deciliter      FAMILY HISTORY:  No pertinent family history in first degree relatives      Allergies    No Known Allergies    Intolerances        SOCIAL HISTORY:    [  ] Etoh  [  ] Smoking  [  ] Substance abuse     Home Environment:  [  ] Home Alone  [ x] Lives with Family  [  ] Home Health Aid    Dwelling:  [  ] Apartment  [ x ] Private House  [  ] Adult Home  [  ] Skilled Nursing Facility      [  ] Short Term  [  ] Long Term  [ x ] Stairs       Elevator [  ]    FUNCTIONAL STATUS PTA: (Check all that apply)  Ambulation: [  x ]Independent    [  ] Dependent     [  ] Non-Ambulatory  Assistive Device: [  ] SA Cane  [  ]  Q Cane  [  ] Walker  [x  ]  Wheelchair  ADL : [x  ] Independent  [  ]  Dependent       Vital Signs Last 24 Hrs  T(C): 35.7 (29 Oct 2019 15:00), Max: 36.7 (29 Oct 2019 09:55)  T(F): 96.2 (29 Oct 2019 15:00), Max: 98 (29 Oct 2019 09:55)  HR: 68 (29 Oct 2019 15:10) (64 - 68)  BP: 122/59 (29 Oct 2019 15:10) (108/61 - 130/66)  BP(mean): 84 (29 Oct 2019 15:10) (79 - 91)  RR: 20 (29 Oct 2019 15:00) (16 - 25)  SpO2: 100% (29 Oct 2019 15:10) (95% - 100%)      PHYSICAL EXAM: Alert & Oriented X 3  GENERAL: NAD, well-groomed, well-developed  HEAD:  Atraumatic, Normocephalic  EYES: EOMI, PERRLA, conjunctiva and sclera clear  NECK: Supple, No JVD, Normal thyroid  CHEST/LUNG: Clear to percussion bilaterally; No rales, rhonchi, wheezing, or rubs  HEART: Regular rate and rhythm; No murmurs, rubs, or gallops  ABDOMEN: Soft, Nontender, Nondistended; Bowel sounds present  EXTREMITIES:  2+ Peripheral Pulses, No clubbing, cyanosis, or edema    NERVOUS SYSTEM:  Cranial Nerves 2-12 intact [x  ] Abnormal  [  ]  ROM: WFL all extremities [ x ]  Abnormal [  ]  Motor Strength: WFL all extremities  [  ]  Abnormal [ 4/5 all  ext ]  Sensation: intact to light touch [  ] Abnormal [x  ]  Reflexes: Symmetric [  ]  Abnormal [x  ]    FUNCTIONAL STATUS:  Bed Mobility: Independent [  ]  Supervision [  ]  Needs Assistance [x  ]  N/A [  ]  Transfers: Independent [  ]  Supervision [  ]  Needs Assistance [x  ]  N/A [  ]   Ambulation: Independent [  ]  Supervision [  ]  Needs Assistance [ x ]  N/A [  ]  ADL: Independent [  ] Requires Assistance [  ] N/A [ x ]  SEE PT/OT IE NOTES    LABS:                        11.1   7.92  )-----------( 202      ( 29 Oct 2019 05:56 )             34.1     10-29    140  |  96<L>  |  56<H>  ----------------------------<  119<H>  4.0   |  25  |  7.0<HH>    Ca    9.1      29 Oct 2019 05:56    TPro  6.6  /  Alb  3.2<L>  /  TBili  1.0  /  DBili  x   /  AST  14  /  ALT  10  /  AlkPhos  37  10-29    PTT - ( 29 Oct 2019 15:30 )  PTT:43.4 sec      RADIOLOGY & ADDITIONAL STUDIES:    Assesment:

## 2019-10-29 NOTE — PROGRESS NOTE ADULT - SUBJECTIVE AND OBJECTIVE BOX
LENGTH OF HOSPITAL STAY:  19d    CHIEF COMPLAINT:Patient is a 79y old  Male who presents with a chief complaint of CHF Exacerbation (28 Oct 2019 11:22)    HPI:HPI:  Patient is a 80yo Male w/ PMH of HFpEF (EF of 55% with Grade II Diastolic dysfunction), COPD (home O2 3-3.5L as needed), DM, HTN, CAD s/p CABG and 1 stent, BPH, and recent admission for CHF (August 2019) presenting to [facility] with complaints of shortness of breath and chest pain. Patient reports he has been progressively worsening shortness of breath for the last 4-5 days prior to admission. He also reports associated chest pain on exertion.  Patient reports he has been compliant with all of his medications as well with a low salt diet. He denies any recent history of fevers, chills, cough. On day of admission, had severe dyspnea on exertion associated with a pressure like sensation in his chest that radiated to his left arm. He tried sublingual nitro for relief but it didn't help. Therefore, he came in for further evaluation. He was given Solumedrol 125mg by EMS. In the ED, patient was found to have evidence of bilateral pleural effusions on bedside sono done by ED staff. He received IV Lasix 40mg and was placed on BiPAP with major improvement in his symptoms. On my assessment, patient was speaking to me in full sentences on BiPAP and his chest pain has resolved. (10 Oct 2019 13:27)    OVERNIGHT EVENTS/INTERVAL UPDATES:    PMH & PSH  PAST MEDICAL & SURGICAL HISTORY:  BPH (benign prostatic hyperplasia)  CHF (congestive heart failure)  COPD (chronic obstructive pulmonary disease)  Diabetes  HTN (hypertension)  H/O heart artery stent  S/P CABG x 3    SOCIAL HISTORY: Negative    ALLERGIES: No Known Allergies    HOME MEDICATIONS  Home Medications:  alfuzosin 10 mg oral tablet, extended release: 1 tab(s) orally once a day (10 Oct 2019 14:44)  ALPRAZolam 0.5 mg oral tablet: 1 tab(s) orally 2 times a day, As Needed (10 Oct 2019 14:44)  amLODIPine 5 mg oral tablet: 1 tab(s) orally once a day (10 Oct 2019 14:44)  aspirin 81 mg oral tablet: 1 tab(s) orally once a day (10 Oct 2019 14:44)  dutasteride 0.5 mg oral capsule: 1 cap(s) orally once a day (10 Oct 2019 14:44)  fenofibrate 145 mg oral tablet: 1 tab(s) orally once a day (10 Oct 2019 14:44)  isosorbide mononitrate 30 mg oral tablet, extended release: 1 tab(s) orally once a day (in the morning) (10 Oct 2019 14:44)  losartan 25 mg oral tablet: 1 tab(s) orally once a day (at bedtime) (10 Oct 2019 14:44)  losartan 50 mg oral tablet: 1 tab(s) orally once a day (10 Oct 2019 14:44)  rivaroxaban 15 mg oral tablet: 1 tab(s) orally once a day (before a meal) (10 Oct 2019 14:44)  Victoza: subcutaneous once a day (10 Oct 2019 14:44)  Welchol 625 mg oral tablet: 3 tab(s) orally once a day (10 Oct 2019 14:44)    T(F): 98 (10-29-19 @ 09:55), Max: 98 (10-29-19 @ 09:55)  HR: 65 (10-29-19 @ 10:10)  BP: 125/63 (10-29-19 @ 10:10)  RR: 16 (10-29-19 @ 10:10)  SpO2: 99% (10-29-19 @ 10:10)  CAPILLARY BLOOD GLUCOSE      POCT Blood Glucose.: 120 mg/dL (29 Oct 2019 07:23)  POCT Blood Glucose.: 96 mg/dL (28 Oct 2019 21:43)  POCT Blood Glucose.: 103 mg/dL (28 Oct 2019 16:44)  POCT Blood Glucose.: 145 mg/dL (28 Oct 2019 11:33)      10-28-19 @ 07:01  -  10-29-19 @ 07:00  --------------------------------------------------------  IN:    heparin Infusion: 228 mL    Oral Fluid: 480 mL    Solution: 600 mL  Total IN: 1308 mL    OUT:    Other: 2000 mL  Total OUT: 2000 mL    Total NET: -692 mL      10-29-19 @ 07:01  -  10-29-19 @ 10:54  --------------------------------------------------------  IN:    sodium chloride 0.9%.: 10 mL  Total IN: 10 mL    OUT:  Total OUT: 0 mL    Total NET: 10 mL    MEDICATIONS  STANDING MEDICATIONS  ALPRAZolam 0.5 milliGRAM(s) Oral at bedtime  aspirin  chewable 81 milliGRAM(s) Oral daily  budesonide 160 MICROgram(s)/formoterol 4.5 MICROgram(s) Inhaler 2 Puff(s) Inhalation two times a day  chlorhexidine 4% Liquid 1 Application(s) Topical daily  clopidogrel Tablet 75 milliGRAM(s) Oral daily  dextrose 5%. 1000 milliLiter(s) IV Continuous <Continuous>  dextrose 50% Injectable 12.5 Gram(s) IV Push once  dextrose 50% Injectable 25 Gram(s) IV Push once  dextrose 50% Injectable 25 Gram(s) IV Push once  fenofibrate Tablet 145 milliGRAM(s) Oral daily  finasteride 5 milliGRAM(s) Oral daily  fluticasone propionate 50 MICROgram(s)/spray Nasal Spray 1 Spray(s) Both Nostrils two times a day  influenza   Vaccine 0.5 milliLiter(s) IntraMuscular once  insulin glargine Injectable (LANTUS) 12 Unit(s) SubCutaneous at bedtime  insulin lispro Injectable (HumaLOG) 7 Unit(s) SubCutaneous before breakfast  insulin lispro Injectable (HumaLOG) 7 Unit(s) SubCutaneous before lunch  insulin lispro Injectable (HumaLOG) 7 Unit(s) SubCutaneous before dinner  isosorbide   mononitrate ER Tablet (IMDUR) 30 milliGRAM(s) Oral daily  metoprolol succinate ER 75 milliGRAM(s) Oral daily  nafcillin  IVPB 2 Gram(s) IV Intermittent every 4 hours  nitroglycerin    Patch 0.2 mG/Hr(s) 1 patch Transdermal daily  silver sulfADIAZINE 1% Cream 1 Application(s) Topical two times a day  simvastatin 40 milliGRAM(s) Oral at bedtime  sodium chloride 0.9%. 1000 milliLiter(s) IV Continuous <Continuous>  tamsulosin 0.4 milliGRAM(s) Oral at bedtime    PRN MEDICATIONS  acetaminophen   Tablet .. 650 milliGRAM(s) Oral every 6 hours PRN  acetaminophen  Suppository .. 650 milliGRAM(s) Rectal every 6 hours PRN  dextrose 40% Gel 15 Gram(s) Oral once PRN  glucagon  Injectable 1 milliGRAM(s) IntraMuscular once PRN    LABS:                        11.1   7.92  )-----------( 202      ( 29 Oct 2019 05:56 )             34.1              10-29    140  |  96<L>  |  56<H>  ----------------------------<  119<H>  4.0   |  25  |  7.0<HH>    Ca    9.1      29 Oct 2019 05:56    TPro  6.6  /  Alb  3.2<L>  /  TBili  1.0  /  DBili  x   /  AST  14  /  ALT  10  /  AlkPhos  37  10-29    LIVER FUNCTIONS - ( 29 Oct 2019 05:56 )  Alb: 3.2 g/dL / Pro: 6.6 g/dL / ALK PHOS: 37 U/L / ALT: 10 U/L / AST: 14 U/L / GGT: x                      PTT - ( 29 Oct 2019 05:56 )  PTT:42.6 sec        PHYSICAL EXAM:  GENERAL: NAD, speaks in full sentences, no signs of respiratory distress  HEAD:  Atraumatic, Normocephalic  EYES: EOMI, PERRLA, conjunctiva and sclera clear  NECK: Supple, No JVD  CHEST/LUNG: Clear to auscultation bilaterally; No wheeze; No crackles; No accessory muscles used  HEART: Afib rate controlled   ABDOMEN: Soft, Nontender, Nondistended; Bowel sounds present; No guarding  EXTREMITIES:  Left leg healing venous ulcer.   PSYCH: AAOx3

## 2019-10-29 NOTE — PROGRESS NOTE ADULT - ASSESSMENT
Patient is a 80yo Male w/ PMH of HFpEF (EF of 55% with Grade II Diastolic dysfunction), COPD (home O2 3-3.5L as needed), DM, HTN, CAD s/p CABG and 1 stent, BPH, and recent admission for CHF (August 2019) presented  to [facility] with complaints of shortness of breath and exertional chest pain.    #) Acute respiratory failure secondary to Acute on Chronic Diastolic CHF exacerbation / moderate  aortic stenosis   -  resolved  - now on HD to continue with even to  negative fluid balance      #) PABLO Bacteremia   -  BC 10/22, 10/23, 10/24  negative   - continue Nafcillin, ID following   -  cardiology following     #) BRITTANI   - probably ATN   -  HD session today   - continue to remove fluid during HD   - npo  for Tesio catheter placement today   - hold IV heparin  for procedure     #) Chest Pain (resolved) ,  in patient with hx of CABG and stent placement  - c/w losartan asa, metoprolol; Simvastatin   - cardiology following    #) Hx of A Fib  - On Xarelto, 15mg at home; IV heparin for now, check ptt and adjust   -  Metoprolol      #) Hx of COPD  - Stable at this time, with no evidence of acute exacerbation  - Cont with Symbicort (in place of Breo)    #) Hx of Diabetes Type 2  - Insulin sq hospital protocol   - Carb consistent diet    #) Hx of BPH  - Cont with Flomax and Finasteride

## 2019-10-30 LAB
ALBUMIN SERPL ELPH-MCNC: 2.9 G/DL — LOW (ref 3.5–5.2)
ALP SERPL-CCNC: 30 U/L — SIGNIFICANT CHANGE UP (ref 30–115)
ALT FLD-CCNC: 9 U/L — SIGNIFICANT CHANGE UP (ref 0–41)
ANION GAP SERPL CALC-SCNC: 23 MMOL/L — HIGH (ref 7–14)
APTT BLD: 56.3 SEC — HIGH (ref 27–39.2)
AST SERPL-CCNC: 13 U/L — SIGNIFICANT CHANGE UP (ref 0–41)
BASOPHILS # BLD AUTO: 0.08 K/UL — SIGNIFICANT CHANGE UP (ref 0–0.2)
BASOPHILS NFR BLD AUTO: 1.2 % — HIGH (ref 0–1)
BILIRUB SERPL-MCNC: 0.8 MG/DL — SIGNIFICANT CHANGE UP (ref 0.2–1.2)
BUN SERPL-MCNC: 60 MG/DL — HIGH (ref 10–20)
CALCIUM SERPL-MCNC: 8.8 MG/DL — SIGNIFICANT CHANGE UP (ref 8.5–10.1)
CHLORIDE SERPL-SCNC: 99 MMOL/L — SIGNIFICANT CHANGE UP (ref 98–110)
CO2 SERPL-SCNC: 20 MMOL/L — SIGNIFICANT CHANGE UP (ref 17–32)
CREAT SERPL-MCNC: 7.5 MG/DL — CRITICAL HIGH (ref 0.7–1.5)
EOSINOPHIL # BLD AUTO: 0.18 K/UL — SIGNIFICANT CHANGE UP (ref 0–0.7)
EOSINOPHIL NFR BLD AUTO: 2.7 % — SIGNIFICANT CHANGE UP (ref 0–8)
GLUCOSE BLDC GLUCOMTR-MCNC: 149 MG/DL — HIGH (ref 70–99)
GLUCOSE BLDC GLUCOMTR-MCNC: 166 MG/DL — HIGH (ref 70–99)
GLUCOSE BLDC GLUCOMTR-MCNC: 173 MG/DL — HIGH (ref 70–99)
GLUCOSE BLDC GLUCOMTR-MCNC: 86 MG/DL — SIGNIFICANT CHANGE UP (ref 70–99)
GLUCOSE SERPL-MCNC: 139 MG/DL — HIGH (ref 70–99)
HCT VFR BLD CALC: 29.9 % — LOW (ref 42–52)
HGB BLD-MCNC: 9.9 G/DL — LOW (ref 14–18)
IMM GRANULOCYTES NFR BLD AUTO: 0.9 % — HIGH (ref 0.1–0.3)
LYMPHOCYTES # BLD AUTO: 1.25 K/UL — SIGNIFICANT CHANGE UP (ref 1.2–3.4)
LYMPHOCYTES # BLD AUTO: 18.4 % — LOW (ref 20.5–51.1)
MCHC RBC-ENTMCNC: 25.6 PG — LOW (ref 27–31)
MCHC RBC-ENTMCNC: 33.1 G/DL — SIGNIFICANT CHANGE UP (ref 32–37)
MCV RBC AUTO: 77.3 FL — LOW (ref 80–94)
MONOCYTES # BLD AUTO: 0.79 K/UL — HIGH (ref 0.1–0.6)
MONOCYTES NFR BLD AUTO: 11.7 % — HIGH (ref 1.7–9.3)
NEUTROPHILS # BLD AUTO: 4.42 K/UL — SIGNIFICANT CHANGE UP (ref 1.4–6.5)
NEUTROPHILS NFR BLD AUTO: 65.1 % — SIGNIFICANT CHANGE UP (ref 42.2–75.2)
NRBC # BLD: 0 /100 WBCS — SIGNIFICANT CHANGE UP (ref 0–0)
PLATELET # BLD AUTO: 184 K/UL — SIGNIFICANT CHANGE UP (ref 130–400)
POTASSIUM SERPL-MCNC: 3.6 MMOL/L — SIGNIFICANT CHANGE UP (ref 3.5–5)
POTASSIUM SERPL-SCNC: 3.6 MMOL/L — SIGNIFICANT CHANGE UP (ref 3.5–5)
PROT SERPL-MCNC: 5.9 G/DL — LOW (ref 6–8)
RBC # BLD: 3.87 M/UL — LOW (ref 4.7–6.1)
RBC # FLD: 18.6 % — HIGH (ref 11.5–14.5)
SODIUM SERPL-SCNC: 142 MMOL/L — SIGNIFICANT CHANGE UP (ref 135–146)
WBC # BLD: 6.78 K/UL — SIGNIFICANT CHANGE UP (ref 4.8–10.8)
WBC # FLD AUTO: 6.78 K/UL — SIGNIFICANT CHANGE UP (ref 4.8–10.8)

## 2019-10-30 PROCEDURE — 71045 X-RAY EXAM CHEST 1 VIEW: CPT | Mod: 26

## 2019-10-30 PROCEDURE — 99233 SBSQ HOSP IP/OBS HIGH 50: CPT

## 2019-10-30 RX ADMIN — NAFCILLIN 200 GRAM(S): 10 INJECTION, POWDER, FOR SOLUTION INTRAVENOUS at 21:26

## 2019-10-30 RX ADMIN — Medication 75 MILLIGRAM(S): at 05:00

## 2019-10-30 RX ADMIN — Medication 81 MILLIGRAM(S): at 11:37

## 2019-10-30 RX ADMIN — CLOPIDOGREL BISULFATE 75 MILLIGRAM(S): 75 TABLET, FILM COATED ORAL at 11:38

## 2019-10-30 RX ADMIN — NAFCILLIN 200 GRAM(S): 10 INJECTION, POWDER, FOR SOLUTION INTRAVENOUS at 05:01

## 2019-10-30 RX ADMIN — BUDESONIDE AND FORMOTEROL FUMARATE DIHYDRATE 2 PUFF(S): 160; 4.5 AEROSOL RESPIRATORY (INHALATION) at 11:40

## 2019-10-30 RX ADMIN — Medication 1 APPLICATION(S): at 05:02

## 2019-10-30 RX ADMIN — SIMVASTATIN 40 MILLIGRAM(S): 20 TABLET, FILM COATED ORAL at 21:26

## 2019-10-30 RX ADMIN — FINASTERIDE 5 MILLIGRAM(S): 5 TABLET, FILM COATED ORAL at 11:37

## 2019-10-30 RX ADMIN — Medication 1 PATCH: at 11:37

## 2019-10-30 RX ADMIN — Medication 1 PATCH: at 00:00

## 2019-10-30 RX ADMIN — NAFCILLIN 200 GRAM(S): 10 INJECTION, POWDER, FOR SOLUTION INTRAVENOUS at 11:31

## 2019-10-30 RX ADMIN — CHLORHEXIDINE GLUCONATE 1 APPLICATION(S): 213 SOLUTION TOPICAL at 11:39

## 2019-10-30 RX ADMIN — Medication 1 PATCH: at 23:11

## 2019-10-30 RX ADMIN — HEPARIN SODIUM 12 UNIT(S)/HR: 5000 INJECTION INTRAVENOUS; SUBCUTANEOUS at 11:40

## 2019-10-30 RX ADMIN — NAFCILLIN 200 GRAM(S): 10 INJECTION, POWDER, FOR SOLUTION INTRAVENOUS at 01:00

## 2019-10-30 RX ADMIN — Medication 145 MILLIGRAM(S): at 11:39

## 2019-10-30 RX ADMIN — Medication 1 SPRAY(S): at 05:00

## 2019-10-30 RX ADMIN — Medication 7 UNIT(S): at 11:32

## 2019-10-30 RX ADMIN — ISOSORBIDE MONONITRATE 30 MILLIGRAM(S): 60 TABLET, EXTENDED RELEASE ORAL at 11:37

## 2019-10-30 RX ADMIN — BUDESONIDE AND FORMOTEROL FUMARATE DIHYDRATE 2 PUFF(S): 160; 4.5 AEROSOL RESPIRATORY (INHALATION) at 21:33

## 2019-10-30 RX ADMIN — Medication 7 UNIT(S): at 17:14

## 2019-10-30 RX ADMIN — NAFCILLIN 200 GRAM(S): 10 INJECTION, POWDER, FOR SOLUTION INTRAVENOUS at 17:14

## 2019-10-30 RX ADMIN — NAFCILLIN 200 GRAM(S): 10 INJECTION, POWDER, FOR SOLUTION INTRAVENOUS at 14:40

## 2019-10-30 RX ADMIN — TAMSULOSIN HYDROCHLORIDE 0.4 MILLIGRAM(S): 0.4 CAPSULE ORAL at 21:26

## 2019-10-30 RX ADMIN — Medication 0.5 MILLIGRAM(S): at 21:27

## 2019-10-30 RX ADMIN — Medication 1 APPLICATION(S): at 17:15

## 2019-10-30 NOTE — PROGRESS NOTE ADULT - SUBJECTIVE AND OBJECTIVE BOX
Progress Note: General Surgery  Patient: TAIWO ZAVALA , 79y (1940)Male   MRN: [id number]  Location: [address]  Visit: 10-10-19 Inpatient  Date: 10-30-19 @ 14:59    Procedure/Diagnosis: s/p R IJ tesio placement    Events/ 24h: No acute events overnight. Pain controlled.    Vitals: T(F): 96.3 (10-30-19 @ 07:01), Max: 96.4 (10-29-19 @ 23:29)  HR: 65 (10-30-19 @ 11:35)  BP: 111/55 (10-30-19 @ 11:35) (101/55 - 141/67)  RR: 18 (10-30-19 @ 07:50)  SpO2: 100% (10-30-19 @ 11:00)    In:   10-29-19 @ 07:01  -  10-30-19 @ 07:00  --------------------------------------------------------  IN: 142 mL    10-30-19 @ 07:01  -  10-30-19 @ 14:59  --------------------------------------------------------  IN: 288 mL      Out:   10-29-19 @ 07:01  -  10-30-19 @ 07:00  --------------------------------------------------------  OUT:  Total OUT: 0 mL      10-30-19 @ 07:01  -  10-30-19 @ 14:59  --------------------------------------------------------  OUT:    Other: 2300 mL  Total OUT: 2300 mL        Net:   10-29-19 @ 07:01  -  10-30-19 @ 07:00  --------------------------------------------------------  NET: 142 mL    10-30-19 @ 07:01  -  10-30-19 @ 14:59  --------------------------------------------------------  NET: -2012 mL        Diet: Diet, DASH/TLC:   Sodium & Cholesterol Restricted  Consistent Carbohydrate Evening Snack  800mL Fluid Restriction ([id number]) (10-29-19 @ 10:28)    IV Fluids: dextrose 5%. 1000 milliLiter(s) (50 mL/Hr) IV Continuous <Continuous>      Physical Examination:  General Appearance: NAD  HEENT: EOMI, sclera non-icteric. R IJ tesio in place  Heart: RRR   Lungs: CTABL.   Abdomen:  Soft, nontender, nondistended.   MSK/Extremities: Warm & well-perfused.   Skin: Warm, dry. No jaundice.       Medications: [Standing]  ALPRAZolam 0.5 milliGRAM(s) Oral at bedtime  aspirin  chewable 81 milliGRAM(s) Oral daily  budesonide 160 MICROgram(s)/formoterol 4.5 MICROgram(s) Inhaler 2 Puff(s) Inhalation two times a day  chlorhexidine 4% Liquid 1 Application(s) Topical daily  clopidogrel Tablet 75 milliGRAM(s) Oral daily  dextrose 5%. 1000 milliLiter(s) (50 mL/Hr) IV Continuous <Continuous>  dextrose 50% Injectable 12.5 Gram(s) IV Push once  dextrose 50% Injectable 25 Gram(s) IV Push once  dextrose 50% Injectable 25 Gram(s) IV Push once  fenofibrate Tablet 145 milliGRAM(s) Oral daily  finasteride 5 milliGRAM(s) Oral daily  fluticasone propionate 50 MICROgram(s)/spray Nasal Spray 1 Spray(s) Both Nostrils two times a day  heparin  Infusion 1200 Unit(s)/Hr (12 mL/Hr) IV Continuous <Continuous>  influenza   Vaccine 0.5 milliLiter(s) IntraMuscular once  insulin glargine Injectable (LANTUS) 12 Unit(s) SubCutaneous at bedtime  insulin lispro Injectable (HumaLOG) 7 Unit(s) SubCutaneous before breakfast  insulin lispro Injectable (HumaLOG) 7 Unit(s) SubCutaneous before lunch  insulin lispro Injectable (HumaLOG) 7 Unit(s) SubCutaneous before dinner  isosorbide   mononitrate ER Tablet (IMDUR) 30 milliGRAM(s) Oral daily  metoprolol succinate ER 75 milliGRAM(s) Oral daily  nafcillin  IVPB 2 Gram(s) IV Intermittent every 4 hours  nitroglycerin    Patch 0.2 mG/Hr(s) 1 patch Transdermal daily  silver sulfADIAZINE 1% Cream 1 Application(s) Topical two times a day  simvastatin 40 milliGRAM(s) Oral at bedtime  tamsulosin 0.4 milliGRAM(s) Oral at bedtime    DVT Prophylaxis: heparin  Infusion 1200 Unit(s)/Hr IV Continuous <Continuous>    GI Prophylaxis:   Antibiotics: nafcillin  IVPB 2 Gram(s) IV Intermittent every 4 hours    Anticoagulation:   Medications:[PRN]  acetaminophen   Tablet .. 650 milliGRAM(s) Oral every 6 hours PRN  acetaminophen  Suppository .. 650 milliGRAM(s) Rectal every 6 hours PRN  dextrose 40% Gel 15 Gram(s) Oral once PRN  glucagon  Injectable 1 milliGRAM(s) IntraMuscular once PRN      Labs:                        9.9    6.78  )-----------( 184      ( 30 Oct 2019 06:08 )             29.9     10-30    142  |  99  |  60<H>  ----------------------------<  139<H>  3.6   |  20  |  7.5<HH>    Ca    8.8      30 Oct 2019 06:08    TPro  5.9<L>  /  Alb  2.9<L>  /  TBili  0.8  /  DBili  x   /  AST  13  /  ALT  9   /  AlkPhos  30  10-30    LIVER FUNCTIONS - ( 30 Oct 2019 06:08 )  Alb: 2.9 g/dL / Pro: 5.9 g/dL / ALK PHOS: 30 U/L / ALT: 9 U/L / AST: 13 U/L / GGT: x           PTT - ( 30 Oct 2019 06:08 )  PTT:56.3 sec        Urine/Micro:    Culture - Blood (collected 28 Oct 2019 05:42)  Source: .Blood None  Preliminary Report (29 Oct 2019 17:01):    No growth to date.        Imaging:     < from: Xray Chest 1 View- PORTABLE-Routine (10.30.19 @ 05:44) >  Findings:    Support devices: Right-sided dialysis catheter, median sternotomy wires   and clips are stable.    Cardiac/mediastinum/hilum: Stable.    Lung parenchyma/Pleura: Worsening pleural effusions and congestion.    Skeleton/soft tissues: Stable.    Impression:      Worsening pleural effusions and congestion.    < end of copied text >      Assessment:  79y Male patient admitted S/P R IJ tesio placement    Plan:    CXR reviewed and tesio cleared for use  Please reconsult PRN    Date/Time: 10-30-19 @ 14:59

## 2019-10-30 NOTE — PROGRESS NOTE ADULT - REASON FOR ADMISSION
CHF
shortness of breath
ARF
CHF
CHF Exacerbation
dyspnea
CHF
CHF Exacerbation
SOB
SOB
dyspnea
CHF
CHF
CHF Exacerbation
MSSA bacteremia
dyspnea
CHF Exacerbation
gimenez, chest pressure
gimenez, cp

## 2019-10-30 NOTE — PROGRESS NOTE ADULT - ASSESSMENT
Impression     Acute chronic renal disease  CHF  r/o cardiac ischemia  MSSA sepsis  multilobar pneumonia    Suggest:  continue HD as per renal, would benefit from more negative balance  transfuse keep HH >8.0   continue nafcillin for MSSA total 6 weeks   cath held in light of sepsis.   May need JAYCOB. Impression     Acute chronic renal disease  CHF  r/o cardiac ischemia  MSSA sepsis  multilobar pneumonia    Suggest:  continue HD as per renal, would benefit from more negative balance today  transfuse keep HH >8.0   continue abx per ID  cath held in light of sepsis.   cardiac management

## 2019-10-30 NOTE — PROGRESS NOTE ADULT - CARDIOVASCULAR
Regular rate & rhythm, normal S1, S2; no murmurs, gallops or rubs; no S3, S4
detailed exam

## 2019-10-30 NOTE — PROGRESS NOTE ADULT - SUBJECTIVE AND OBJECTIVE BOX
Nephrology progress note    Patient is seen and examined, events over the last 24 h noted .  s/p Tesio placement, on HD this am, denies complaints, UO not documented  Allergies:  No Known Allergies    Hospital Medications:   MEDICATIONS  (STANDING):  ALPRAZolam 0.5 milliGRAM(s) Oral at bedtime  aspirin  chewable 81 milliGRAM(s) Oral daily  budesonide 160 MICROgram(s)/formoterol 4.5 MICROgram(s) Inhaler 2 Puff(s) Inhalation two times a day  chlorhexidine 4% Liquid 1 Application(s) Topical daily  clopidogrel Tablet 75 milliGRAM(s) Oral daily  dextrose 5%. 1000 milliLiter(s) (50 mL/Hr) IV Continuous <Continuous>  dextrose 50% Injectable 12.5 Gram(s) IV Push once  dextrose 50% Injectable 25 Gram(s) IV Push once  dextrose 50% Injectable 25 Gram(s) IV Push once  fenofibrate Tablet 145 milliGRAM(s) Oral daily  finasteride 5 milliGRAM(s) Oral daily  fluticasone propionate 50 MICROgram(s)/spray Nasal Spray 1 Spray(s) Both Nostrils two times a day  heparin  Infusion 1200 Unit(s)/Hr (12 mL/Hr) IV Continuous <Continuous>  influenza   Vaccine 0.5 milliLiter(s) IntraMuscular once  insulin glargine Injectable (LANTUS) 12 Unit(s) SubCutaneous at bedtime  insulin lispro Injectable (HumaLOG) 7 Unit(s) SubCutaneous before breakfast  insulin lispro Injectable (HumaLOG) 7 Unit(s) SubCutaneous before lunch  insulin lispro Injectable (HumaLOG) 7 Unit(s) SubCutaneous before dinner  isosorbide   mononitrate ER Tablet (IMDUR) 30 milliGRAM(s) Oral daily  metoprolol succinate ER 75 milliGRAM(s) Oral daily  nafcillin  IVPB 2 Gram(s) IV Intermittent every 4 hours  nitroglycerin    Patch 0.2 mG/Hr(s) 1 patch Transdermal daily  silver sulfADIAZINE 1% Cream 1 Application(s) Topical two times a day  simvastatin 40 milliGRAM(s) Oral at bedtime  tamsulosin 0.4 milliGRAM(s) Oral at bedtime        VITALS:  T(F): 96.3 (10-30-19 @ 07:01), Max: 96.4 (10-29-19 @ 23:29)  HR: 65 (10-30-19 @ 11:35)  BP: 111/55 (10-30-19 @ 11:35)  RR: 18 (10-30-19 @ 07:50)  SpO2: 100% (10-30-19 @ 11:00)  Wt(kg): --    10-28 @ 07:01  -  10-29 @ 07:00  --------------------------------------------------------  IN: 1308 mL / OUT: 2000 mL / NET: -692 mL    10-29 @ 07:01  -  10-30 @ 07:00  --------------------------------------------------------  IN: 142 mL / OUT: 0 mL / NET: 142 mL    10-30 @ 07:01  -  10-30 @ 12:49  --------------------------------------------------------  IN: 288 mL / OUT: 2300 mL / NET: -2012 mL        Weight (kg): 106.1 (10-30 @ 10:55)    PHYSICAL EXAM:  Constitutional: NAD  HEENT: anicteric sclera, oropharynx clear, MMM  Neck: No JVD  Respiratory: CTAB  Cardiovascular: S1, S2, RRR  Gastrointestinal: BS+, soft, NT/ND  Extremities: Mild peripheral edema  Neurological: A/O x 3  : No CVA tenderness. No julian.   Skin: No rashes  Vascular Access:    LABS:  10-30    142  |  99  |  60<H>  ----------------------------<  139<H>  3.6   |  20  |  7.5<HH>    Ca    8.8      30 Oct 2019 06:08    TPro  5.9<L>  /  Alb  2.9<L>  /  TBili  0.8  /  DBili      /  AST  13  /  ALT  9   /  AlkPhos  30  10-30                          9.9    6.78  )-----------( 184      ( 30 Oct 2019 06:08 )             29.9       Urine Studies:      RADIOLOGY & ADDITIONAL STUDIES:

## 2019-10-30 NOTE — PROGRESS NOTE ADULT - SUBJECTIVE AND OBJECTIVE BOX
Patient is a 79y old  Male who presents with a chief complaint of dyspnea (29 Oct 2019 12:43)        HPI:  Patient is a 80yo Male w/ PMH of HFpEF (EF of 55% with Grade II Diastolic dysfunction), COPD (home O2 3-3.5L as needed), DM, HTN, CAD s/p CABG and 1 stent, BPH, and recent admission for CHF (August 2019) presenting to [facility] with complaints of shortness of breath and chest pain. Patient reports he has been progressively worsening shortness of breath for the last 4-5 days prior to admission. He also reports associated chest pain on exertion.  Patient reports he has been compliant with all of his medications as well with a low salt diet. He denies any recent history of fevers, chills, cough. On day of admission, had severe dyspnea on exertion associated with a pressure like sensation in his chest that radiated to his left arm. He tried sublingual nitro for relief but it didn't help. Therefore, he came in for further evaluation. He was given Solumedrol 125mg by EMS. In the ED, patient was found to have evidence of bilateral pleural effusions on bedside sono done by ED staff. He received IV Lasix 40mg and was placed on BiPAP with major improvement in his symptoms. On my assessment, patient was speaking to me in full sentences on BiPAP and his chest pain has resolved. (10 Oct 2019 13:27)      Interval Events: No overnight events.    REVIEW OF SYSTEMS:     · CONSTITUTIONAL:   no fever   no chills.  no weight gain   no weight loss    · EYES:   no discharge,   no irritation,   no pain,   no redness,   no visual changes.    · ENMT:   Ears: no ear pain and no hearing problems.  Nose: no nasal congestion and no nasal drainage.  Mouth/Throat: no dysphagia,  no hoarseness and no throat pain.  Neck: no lumps, no pain, no stiffness and no swollen glands.    · CARDIOVASCULAR:   no chest pain,   no swelling  no palpitations  no syncope    · RESPIRATORY:  no SOB,  no wheezing ,  no respiratory difficulty  no sputum production    · GASTROINTESTINAL:   no abdominal pain,   no bloating,   no constipation,   no diarrhea,   no nausea   no vomiting.    · GENITOURINARY:  no dysuria,   no frequency,   no urgency  no hematuria.    · MUSCULOSKELETAL:   no back pain,   no gout,   no musculoskeletal pain,  no neck pain,   no weakness.    · SKIN:   no abrasions,   no jaundice,   no lesions,   no pruritis,   no rashes.    · NEURO:   no loss of consciousness,   no gait abnormality,   no headache,   no sensory deficits,   no weakness.    · PSYCHIATRIC:   no known mental health issues  no anxiety  no depression  no suicidal thoughts  no aggressive thoughts towards others      ALLERGIC/IMMUNOLOGIC:   No active allergic or immunologic issues    all other systems are negative      OBJECTIVE:  ICU Vital Signs Last 24 Hrs  T(C): 35.7 (30 Oct 2019 07:01), Max: 36.7 (29 Oct 2019 09:55)  T(F): 96.3 (30 Oct 2019 07:01), Max: 98 (29 Oct 2019 09:55)  HR: 665 (30 Oct 2019 07:50) (64 - 665)  BP: 124/68 (30 Oct 2019 07:50) (101/55 - 141/67)  BP(mean): 97 (30 Oct 2019 07:05) (71 - 97)  ABP: --  ABP(mean): --  RR: 18 (30 Oct 2019 07:50) (16 - 25)  SpO2: 100% (30 Oct 2019 07:50) (95% - 100%)        10-29 @ 07:01  -  10-30 @ 07:00  --------------------------------------------------------  IN: 142 mL / OUT: 0 mL / NET: 142 mL      CAPILLARY BLOOD GLUCOSE      POCT Blood Glucose.: 149 mg/dL (30 Oct 2019 07:25)        PHYSICAL EXAM:     · CONSTITUTIONAL:   Ill appearing,   well nourished,   NAD    · ENMT:   Airway patent,   Nasal mucosa clear.  Mouth with normal mucosa.   Throat has no vesicles,  no oropharyngeal exudates and uvula is midline.  No thrush    · EYES:   Clear bilaterally,   pupils equal,   round and reactive to light.    · CARDIAC:   Normal rate,   regular rhythm.    Heart sounds S1, S2.   no thrills or bruits on palpitation  normal  cardiac impulse  normal palpable location of the heart   No murmurs, no rubs or gallops on auscultation  no edema  no significant varices  femoral arteries palpable with normal impulse, no bruits  aorta palpable with normal impulse and no bruits      CAROTID:   normal systolic impulse  no bruits    · RESPIRATORY:   clear,  no w/r/r/,   normal chest expansion  not tachypneic,  no retractions  use of accessory muscles  palpation of chest is normal with no fremitus  percussion of chest no hyperresonance or dullness    · GASTROINTESTINAL:  Abdomen soft,   non-tender,   no masses  no guarding,   + BS  liver normal size  spleen not palpable    GENITOURINARY  normal genitalia for sex  no edema    · MUSCULOSKELETAL:   Spine appears normal,   range of motion is not limited,  no muscle or joint tenderness  no clubbing, cyanosis  no petechiae    · NEUROLOGICAL:   Alert and oriented   no focal deficits in cranial nerve areas  no motor or sensory deficits.  pertinent DTRs normal    · SKIN:   Skin normal color for race,   warm,   dry and intact.   No evidence of rash.    · PSYCHIATRIC:   Alert and oriented to person,   place, time/situation.   normal mood and affect.   no apparent risk to self or others.    · HEME LYMPH:   no splenomegaly.  No cervical  lymphadenopathy.  no inguinal lymphadenopathy    HOSPITAL MEDICATIONS:  MEDICATIONS  (STANDING):  ALPRAZolam 0.5 milliGRAM(s) Oral at bedtime  aspirin  chewable 81 milliGRAM(s) Oral daily  budesonide 160 MICROgram(s)/formoterol 4.5 MICROgram(s) Inhaler 2 Puff(s) Inhalation two times a day  chlorhexidine 4% Liquid 1 Application(s) Topical daily  clopidogrel Tablet 75 milliGRAM(s) Oral daily  dextrose 5%. 1000 milliLiter(s) (50 mL/Hr) IV Continuous <Continuous>  dextrose 50% Injectable 12.5 Gram(s) IV Push once  dextrose 50% Injectable 25 Gram(s) IV Push once  dextrose 50% Injectable 25 Gram(s) IV Push once  fenofibrate Tablet 145 milliGRAM(s) Oral daily  finasteride 5 milliGRAM(s) Oral daily  fluticasone propionate 50 MICROgram(s)/spray Nasal Spray 1 Spray(s) Both Nostrils two times a day  heparin  Infusion 1200 Unit(s)/Hr (12 mL/Hr) IV Continuous <Continuous>  influenza   Vaccine 0.5 milliLiter(s) IntraMuscular once  insulin glargine Injectable (LANTUS) 12 Unit(s) SubCutaneous at bedtime  insulin lispro Injectable (HumaLOG) 7 Unit(s) SubCutaneous before breakfast  insulin lispro Injectable (HumaLOG) 7 Unit(s) SubCutaneous before lunch  insulin lispro Injectable (HumaLOG) 7 Unit(s) SubCutaneous before dinner  isosorbide   mononitrate ER Tablet (IMDUR) 30 milliGRAM(s) Oral daily  metoprolol succinate ER 75 milliGRAM(s) Oral daily  nafcillin  IVPB 2 Gram(s) IV Intermittent every 4 hours  nitroglycerin    Patch 0.2 mG/Hr(s) 1 patch Transdermal daily  silver sulfADIAZINE 1% Cream 1 Application(s) Topical two times a day  simvastatin 40 milliGRAM(s) Oral at bedtime  tamsulosin 0.4 milliGRAM(s) Oral at bedtime    MEDICATIONS  (PRN):  acetaminophen   Tablet .. 650 milliGRAM(s) Oral every 6 hours PRN Temp greater or equal to 38C (100.4F), Mild Pain (1 - 3)  acetaminophen  Suppository .. 650 milliGRAM(s) Rectal every 6 hours PRN Temp greater or equal to 38C (100.4F), Mild Pain (1 - 3)  dextrose 40% Gel 15 Gram(s) Oral once PRN Blood Glucose LESS THAN 70 milliGRAM(s)/deciliter  glucagon  Injectable 1 milliGRAM(s) IntraMuscular once PRN Glucose LESS THAN 70 milligrams/deciliter    dextrose 5% + sodium chloride 0.45%.: Solution, 1000 milliLiter(s) infuse at 75 mL/Hr  Provider's Contact #: [phone]  sodium chloride 0.9% Bolus:   500 milliLiter(s), IV Bolus, once, infuse over 1 Hr, Stop After 1 Doses  Provider's Contact #: [phone]  sodium chloride 0.9% Bolus:   250 milliLiter(s), IV Bolus, once, infuse over 30 Minute(s), Stop After 1 Doses  Provider's Contact #: [phone]  sodium chloride 0.9%.: Solution, 1000 milliLiter(s) infuse at 50 mL/Hr  Provider's Contact #: [phone]  sodium chloride 0.9%.: Solution, 1000 milliLiter(s) infuse at 40 mL/Hr  Provider's Contact #: [phone]      LABS:                        9.9    6.78  )-----------( 184      ( 30 Oct 2019 06:08 )             29.9     10-30    142  |  99  |  60<H>  ----------------------------<  139<H>  3.6   |  20  |  7.5<HH>    Ca    8.8      30 Oct 2019 06:08    TPro  5.9<L>  /  Alb  2.9<L>  /  TBili  0.8  /  DBili  x   /  AST  13  /  ALT  9   /  AlkPhos  30  10-30    PTT - ( 30 Oct 2019 06:08 )  PTT:56.3 sec                  RADIOLOGY:Radiology personally reviewed.

## 2019-10-30 NOTE — PROGRESS NOTE ADULT - ASSESSMENT
Patient is a 79y old  Male  w/ PMH of HFpEF (EF of 55% with Grade II Diastolic dysfunction), COPD (home O2 3-3.5L as needed), DM, HTN, CAD s/p CABG and 1 stent, BPH, and recent admission for CHF (August 2019) presenting to [facility] on 10/10 with complaints of shortness of breath and chest pain.  On day of admission, had severe dyspnea on exertion associated with a pressure like sensation in his chest that radiated to his left arm. He tried sublingual nitro for relief but it didn't help. patient was found to have evidence of bilateral pleural effusions, admitted to CCU for further management, He started to have fever today, tachycardia, 10/16/19, and The ID consult requested , to assist with further evaluation of sepsis and antibiotic management.      # Sepsis ( fever + tachycardia )  # Persistent Staph. bacteremia/septicemia - MSSA,  ? NO source Control -- repeat BCx NGTD as of 10/22  # Acute Renal failure    would recommend:    1. Please Transfer patient to [facility] for  JAYCOB to rule out vegetation in the setting of Persistent bacteremia with No implant prosthesis  2. Continue Nafcillin 2 g q 4  3. Continue dialysis as tolerated   4. Consider Imaging of Abd/pelvis and spine to rule out deep infection in the setting of persistent bacteremia    d/w Patient and CCU team Patient is a 79y old  Male  w/ PMH of HFpEF (EF of 55% with Grade II Diastolic dysfunction), COPD (home O2 3-3.5L as needed), DM, HTN, CAD s/p CABG and 1 stent, BPH, and recent admission for CHF (August 2019) presenting to [facility] on 10/10 with complaints of shortness of breath and chest pain.  On day of admission, had severe dyspnea on exertion associated with a pressure like sensation in his chest that radiated to his left arm. He tried sublingual nitro for relief but it didn't help. patient was found to have evidence of bilateral pleural effusions, admitted to CCU for further management, He started to have fever today, tachycardia, 10/16/19, and The ID consult requested , to assist with further evaluation of sepsis and antibiotic management.      # Sepsis ( fever + tachycardia )  # Persistent Staph. bacteremia/septicemia - MSSA,  ? NO source Control -- repeat BCx NGTD as of 10/22 -Please Transfer patient to [facility] for  JAYCOB to rule out vegetation in the setting of Persistent bacteremia with No implant prosthesis  # Acute Renal failure    would recommend:    1. Continue Nafcillin 2 g q 4, need at least 6 weeks, starting from 10/22/19  2. Continue dialysis as tolerated   3. OOB to chair and keep LE elevated     d/w Patient and CCU team

## 2019-10-30 NOTE — PROGRESS NOTE ADULT - SUBJECTIVE AND OBJECTIVE BOX
SUBJ:No chest pain or shortness of breath      MEDICATIONS  (STANDING):  ALPRAZolam 0.5 milliGRAM(s) Oral at bedtime  aspirin  chewable 81 milliGRAM(s) Oral daily  budesonide 160 MICROgram(s)/formoterol 4.5 MICROgram(s) Inhaler 2 Puff(s) Inhalation two times a day  chlorhexidine 4% Liquid 1 Application(s) Topical daily  clopidogrel Tablet 75 milliGRAM(s) Oral daily  dextrose 5%. 1000 milliLiter(s) (50 mL/Hr) IV Continuous <Continuous>  dextrose 50% Injectable 12.5 Gram(s) IV Push once  dextrose 50% Injectable 25 Gram(s) IV Push once  dextrose 50% Injectable 25 Gram(s) IV Push once  fenofibrate Tablet 145 milliGRAM(s) Oral daily  finasteride 5 milliGRAM(s) Oral daily  fluticasone propionate 50 MICROgram(s)/spray Nasal Spray 1 Spray(s) Both Nostrils two times a day  heparin  Infusion 1200 Unit(s)/Hr (12 mL/Hr) IV Continuous <Continuous>  influenza   Vaccine 0.5 milliLiter(s) IntraMuscular once  insulin glargine Injectable (LANTUS) 12 Unit(s) SubCutaneous at bedtime  insulin lispro Injectable (HumaLOG) 7 Unit(s) SubCutaneous before breakfast  insulin lispro Injectable (HumaLOG) 7 Unit(s) SubCutaneous before lunch  insulin lispro Injectable (HumaLOG) 7 Unit(s) SubCutaneous before dinner  isosorbide   mononitrate ER Tablet (IMDUR) 30 milliGRAM(s) Oral daily  metoprolol succinate ER 75 milliGRAM(s) Oral daily  nafcillin  IVPB 2 Gram(s) IV Intermittent every 4 hours  nitroglycerin    Patch 0.2 mG/Hr(s) 1 patch Transdermal daily  silver sulfADIAZINE 1% Cream 1 Application(s) Topical two times a day  simvastatin 40 milliGRAM(s) Oral at bedtime  tamsulosin 0.4 milliGRAM(s) Oral at bedtime    MEDICATIONS  (PRN):  acetaminophen   Tablet .. 650 milliGRAM(s) Oral every 6 hours PRN Temp greater or equal to 38C (100.4F), Mild Pain (1 - 3)  acetaminophen  Suppository .. 650 milliGRAM(s) Rectal every 6 hours PRN Temp greater or equal to 38C (100.4F), Mild Pain (1 - 3)  dextrose 40% Gel 15 Gram(s) Oral once PRN Blood Glucose LESS THAN 70 milliGRAM(s)/deciliter  glucagon  Injectable 1 milliGRAM(s) IntraMuscular once PRN Glucose LESS THAN 70 milligrams/deciliter            Vital Signs Last 24 Hrs  T(C): 35.7 (30 Oct 2019 07:01), Max: 35.8 (29 Oct 2019 23:29)  T(F): 96.3 (30 Oct 2019 07:01), Max: 96.4 (29 Oct 2019 23:29)  HR: 67 (30 Oct 2019 10:00) (64 - 665)  BP: 139/64 (30 Oct 2019 10:00) (101/55 - 141/67)  BP(mean): 92 (30 Oct 2019 10:00) (71 - 99)  RR: 18 (30 Oct 2019 07:50) (18 - 22)  SpO2: 98% (30 Oct 2019 10:00) (97% - 100%)      ECG:NML    TTE:    LABS:                        9.9    6.78  )-----------( 184      ( 30 Oct 2019 06:08 )             29.9     10-30    142  |  99  |  60<H>  ----------------------------<  139<H>  3.6   |  20  |  7.5<HH>    Ca    8.8      30 Oct 2019 06:08    TPro  5.9<L>  /  Alb  2.9<L>  /  TBili  0.8  /  DBili  x   /  AST  13  /  ALT  9   /  AlkPhos  30  10-30        PTT - ( 30 Oct 2019 06:08 )  PTT:56.3 sec    I&O's Summary    29 Oct 2019 07:01  -  30 Oct 2019 07:00  --------------------------------------------------------  IN: 142 mL / OUT: 0 mL / NET: 142 mL    30 Oct 2019 07:01  -  30 Oct 2019 10:34  --------------------------------------------------------  IN: 168 mL / OUT: 0 mL / NET: 168 mL      BNP

## 2019-10-30 NOTE — PROGRESS NOTE ADULT - ASSESSMENT
1)  Severe oliguric BRITTANI superimposed on  Stage 3 CKD (creat was 1.5 mg% in August 2019).    PMH: DM, HFpEF      1) BRITTANI - due to ATN from sepsis from PNA and bacteremia,   c3c4 normal - unlikely PIGN  UO not monitored pt refusing, asked him to do strict Is and os  HD for 3 hrs today  3K bath  UF 2 L   - may start LASix 40 po on off HD days    2)  MSSA bacteremia - likely due to PNA vs endocarditis, on NAfcillin-> will be switched to Ancef after HD on D/C    3) SW - to set up pt for HD at [address] [facility]    D/W  and primary team

## 2019-10-30 NOTE — PROGRESS NOTE ADULT - ASSESSMENT
Patient is a 78yo Male w/ PMH of HFpEF (EF of 55% with Grade II Diastolic dysfunction), COPD (home O2 3-3.5L as needed), DM, HTN, CAD s/p CABG and 1 stent, BPH, and recent admission for CHF (August 2019) presented  to [facility] with complaints of shortness of breath and exertional chest pain.    #) Acute respiratory failure secondary to Acute on Chronic Diastolic CHF exacerbation / moderate  aortic stenosis   -  resolved  - now on HD to continue with even to  negative fluid balance      #) PABLO Bacteremia   -  BC 10/22, 10/23, 10/24  negative   - Continue with Ancef on D/C post hemodialysis days    -  cardiology following     #) BRITTANI   - probably ATN   -  HD session today   - continue to remove fluid during HD   - Tessio Placed     #) Chest Pain (resolved) ,  in patient with hx of CABG and stent placement  - c/w losartan asa, metoprolol; Simvastatin   - cardiology following    #) Hx of A Fib  - On Xarelto, 15mg at home; IV heparin for now, check ptt and adjust   -  Metoprolol      #) Hx of COPD  - Stable at this time, with no evidence of acute exacerbation  - Cont with Symbicort (in place of Breo)    #) Hx of Diabetes Type 2  - Insulin sq hospital protocol   - Carb consistent diet    #) Hx of BPH  - Cont with Flomax and Finasteride

## 2019-10-30 NOTE — PROGRESS NOTE ADULT - SUBJECTIVE AND OBJECTIVE BOX
Patient is seen and examined at the bed side, is afebrile. He is feeling okay, no complaints, started on dialysis, tolerating well. The repeat Blood cultures from 10/22/19 has no growth to date.      REVIEW OF SYSTEMS: All other review systems are negative          ICU Vital Signs Last 24 Hrs  T(C): 35.6 (30 Oct 2019 15:00), Max: 35.8 (29 Oct 2019 23:29)  T(F): 96 (30 Oct 2019 15:00), Max: 96.4 (29 Oct 2019 23:29)  HR: 68 (30 Oct 2019 19:44) (64 - 665)  BP: 107/56 (30 Oct 2019 15:20) (101/55 - 141/67)  BP(mean): 76 (30 Oct 2019 15:20) (71 - 99)  ABP: --  ABP(mean): --  RR: 20 (30 Oct 2019 15:00) (18 - 22)  SpO2: 100% (30 Oct 2019 19:44) (97% - 100%)          PHYSICAL EXAM:  GENERAL: Not in distress , on oxygen via NC  CHEST/LUNG:  Air entry bilaterally  HEART: s1 and s2 present  ABDOMEN:  Nontender and  Nondistended  EXTREMITIES: B/L pedal  edema, excoriation marks on LE  CNS: Awake and Alert        LABS:                        9.9    6.78  )-----------( 184      ( 30 Oct 2019 06:08 )             29.9                           11.9   8.82  )-----------( 245      ( 23 Oct 2019 05:57 )             36.4                           11.9   10.04 )-----------( 224      ( 21 Oct 2019 19:08 )             36.7       10    142  |  99  |  60<H>  ----------------------------<  139<H>  3.6   |  20  |  7.5<HH>    Ca    8.8      30 Oct 2019 06:08    TPro  5.9<L>  /  Alb  2.9<L>  /  TBili  0.8  /  DBili  x   /  AST  13  /  ALT  9   /  AlkPhos  30  10-30      10-23    136  |  89<L>  |  132<HH>  ----------------------------<  208<H>  4.2   |  22  |  8.3<HH>    Ca    7.7<L>      23 Oct 2019 11:34    TPro  6.4  /  Alb  3.0<L>  /  TBili  1.1  /  DBili  x   /  AST  19  /  ALT  15  /  AlkPhos  43  10-    10-    136  |  91<L>  |  x   ----------------------------<  53<L>  4.0   |  19  |  8.1<HH>    Ca    7.8<L>      21 Oct 2019 19:08  Phos  7.8     10-21  Mg     2.5     10-21    TPro  6.0  /  Alb  2.9<L>  /  TBili  1.2  /  DBili  x   /  AST  24  /  ALT  14  /  AlkPhos  41  10-          CAPILLARY BLOOD GLUCOS  POCT Blood Glucose.: 214 mg/dL (16 Oct 2019 16:17)  POCT Blood Glucose.: 312 mg/dL (16 Oct 2019 11:39)  POCT Blood Glucose.: 261 mg/dL (16 Oct 2019 07:58)  POCT Blood Glucose.: 251 mg/dL (15 Oct 2019 23:22)        Urinalysis Basic - ( 15 Oct 2019 11:30 )  Color: Yellow / Appearance: Clear / S.015 / pH: x  Gluc: x / Ketone: Negative  / Bili: Negative / Urobili: 0.2 mg/dL   Blood: x / Protein: Negative mg/dL / Nitrite: Negative   Leuk Esterase: Negative / RBC: x / WBC x   Sq Epi: x / Non Sq Epi: x / Bacteria: x      MEDICATIONS  (STANDING):  ALPRAZolam 0.5 milliGRAM(s) Oral at bedtime  aspirin  chewable 81 milliGRAM(s) Oral daily  budesonide 160 MICROgram(s)/formoterol 4.5 MICROgram(s) Inhaler 2 Puff(s) Inhalation two times a day  chlorhexidine 4% Liquid 1 Application(s) Topical daily  clopidogrel Tablet 75 milliGRAM(s) Oral daily  dextrose 5%. 1000 milliLiter(s) (50 mL/Hr) IV Continuous <Continuous>  dextrose 50% Injectable 12.5 Gram(s) IV Push once  dextrose 50% Injectable 25 Gram(s) IV Push once  dextrose 50% Injectable 25 Gram(s) IV Push once  fenofibrate Tablet 145 milliGRAM(s) Oral daily  finasteride 5 milliGRAM(s) Oral daily  fluticasone propionate 50 MICROgram(s)/spray Nasal Spray 1 Spray(s) Both Nostrils two times a day  heparin  Infusion 1200 Unit(s)/Hr (12 mL/Hr) IV Continuous <Continuous>  influenza   Vaccine 0.5 milliLiter(s) IntraMuscular once  insulin glargine Injectable (LANTUS) 12 Unit(s) SubCutaneous at bedtime  insulin lispro Injectable (HumaLOG) 7 Unit(s) SubCutaneous before breakfast  insulin lispro Injectable (HumaLOG) 7 Unit(s) SubCutaneous before lunch  insulin lispro Injectable (HumaLOG) 7 Unit(s) SubCutaneous before dinner  isosorbide   mononitrate ER Tablet (IMDUR) 30 milliGRAM(s) Oral daily  metoprolol succinate ER 75 milliGRAM(s) Oral daily  nafcillin  IVPB 2 Gram(s) IV Intermittent every 4 hours  nitroglycerin    Patch 0.2 mG/Hr(s) 1 patch Transdermal daily  silver sulfADIAZINE 1% Cream 1 Application(s) Topical two times a day  simvastatin 40 milliGRAM(s) Oral at bedtime  tamsulosin 0.4 milliGRAM(s) Oral at bedtime    MEDICATIONS  (PRN):  acetaminophen   Tablet .. 650 milliGRAM(s) Oral every 6 hours PRN Temp greater or equal to 38C (100.4F), Mild Pain (1 - 3)  acetaminophen  Suppository .. 650 milliGRAM(s) Rectal every 6 hours PRN Temp greater or equal to 38C (100.4F), Mild Pain (1 - 3)  dextrose 40% Gel 15 Gram(s) Oral once PRN Blood Glucose LESS THAN 70 milliGRAM(s)/deciliter  glucagon  Injectable 1 milliGRAM(s) IntraMuscular once PRN Glucose LESS THAN 70 milligrams/deciliter      RADIOLOGY & ADDITIONAL TESTS:    < from: Xray Chest 1 View- PORTABLE-Urgent (10.23.19 @ 11:51) >  Stable small effusions and interstitial edema.    < end of copied text >      < from: Xray Chest 1 View- PORTABLE-Routine (10.21.19 @ 06:25) >  Cardiomegaly, bilateral opacities/pleural effusions, stable.      < end of copied text >      < from: US Renal (10.19.19 @ 10:22) >  No hydronephrosis bilaterally.  Left renal 3 cm cyst.          < end of copied text >      < from: Xray Chest 1 View-PORTABLE IMMEDIATE (10.16.19 @ 16:56) >  Stable bilateral airspace opacities.    < end of copied text >            MICROBIOLOGY DATA:    Culture - Blood (10.28.19 @ 05:42)    Specimen Source: .Blood None    Culture Results:   No growth to date.      Culture - Blood in AM (10.24.19 @ 05:53)    Specimen Source: .Blood None    Culture Results:   No growth at 5 days.        Culture - Blood (10.22.19 @ 05:33)    Specimen Source: .Blood None    Culture Results:   No growth to date.        Culture - Blood in AM (10.19.19 @ 06:36)    Gram Stain:   Growth in aerobic bottle: Gram Positive Cocci in Clusters  Growth in anaerobic bottle: Gram Positive Cocci in Clusters    Specimen Source: .Blood None    Culture Results:   Growth in aerobic bottle: Staphylococcus aureus  Susceptibility to follow.  Growth in anaerobic bottle: Gram Positive Cocci in Clusters        Culture - Blood in AM (10.17.19 @ 05:15)    Gram Stain:   Growth in anaerobic bottle: Gram Positive Cocci in Clusters  Growth in aerobic bottle: Gram Positive Cocci in Clusters    Specimen Source: .Blood Blood    Culture Results:   Growth in aerobic and anaerobic bottles: Staphylococcus aureus  See previous culture [id number]        FLU A B RSV Detection by PCR (10.15.19 @ 21:10)    Flu A Result: Negative: Negative results do not preclude influenza infection and  should not be used as the sole basis for treatment or  other patient management decisions.  A positive result may occur in the absence of viable virus.  By: [employer] Flu viral assay by Reverse Transcriptase  Polymerase Chain Reaction (RT-PCR).    Flu B Result: Negative    RSV Result: Negative      Culture - Urine (10.15.19 @ 11:30)    Specimen Source: .Urine Clean Catch (Midstream)    Culture Results:   <10,000 CFU/mL Normal Urogenital Felipa        Culture - Blood (10.15.19 @ 07:00)    -  Staphylococcus aureus: Detec Any isolate of Staphylococcus aureus from a blood culture is NOT considered a contaminant.    Gram Stain:   Growth in aerobic bottle: Gram Positive Cocci in Clusters  Growth in anaerobic bottle: Gram Positive Cocci in Clusters    Specimen Source: .Blood Blood    Organism: Blood Culture PCR    Culture Results:   Growth in aerobic bottle: Gram Positive Cocci in Clusters  Growth in anaerobic bottle: Gram Positive Cocci in Clusters  "Due to technical problems, Proteus sp. will Not be reported as part of  the BCID panel until further notice"  ***Blood Panel PCR results on this specimen are available  approximately 3 hours after the Gram stain result.***  Gram stain, PCR, and/or culture results may not always  correspond due to difference in methodologies.  ************************************************************  This PCR assay was performed using [employer].  The following targets are tested for: Enterococcus,  vancomycin resistant enterococci, Listeria monocytogenes,  coagulase negative staphylococci, S. aureus,  methicillin resistant S. aureus, Streptococcus agalactiae  (Group B), S. pneumoniae, S. pyogenes (Group A),  Acinetobacter baumannii, Enterobacter cloacae, E. coli,  Klebsiella oxytoca, K. pneumoniae, Proteus sp.,  Serratia marcescens, Haemophilus influenzae,  Neisseria meningitidis, Pseudomonas aeruginosa, Candida  albicans, C. glabrata, C krusei, C parapsilosis,  C. tropicalis and the KPC resistance gene.    Organism Identification: Blood Culture PCR    Method Type: PCR Patient is seen and examined at the bed side, is afebrile. He is feeling okay, no complaints, tolerating  dialysis well. The  Blood cultures remain Negative since 10/22/19, JAYCOB was not done. .      REVIEW OF SYSTEMS: All other review systems are negative      ICU Vital Signs Last 24 Hrs  T(C): 35.6 (30 Oct 2019 15:00), Max: 35.8 (29 Oct 2019 23:29)  T(F): 96 (30 Oct 2019 15:00), Max: 96.4 (29 Oct 2019 23:29)  HR: 68 (30 Oct 2019 19:44) (64 - 665)  BP: 107/56 (30 Oct 2019 15:20) (101/55 - 141/67)  BP(mean): 76 (30 Oct 2019 15:20) (71 - 99)  ABP: --  ABP(mean): --  RR: 20 (30 Oct 2019 15:00) (18 - 22)  SpO2: 100% (30 Oct 2019 19:44) (97% - 100%)          PHYSICAL EXAM:  GENERAL: Not in distress , on oxygen via NC  CHEST/LUNG:  Air entry bilaterally  HEART: s1 and s2 present  ABDOMEN:  Nontender and  Nondistended  EXTREMITIES: B/L pedal  edema, excoriation marks on LE  CNS: Awake and Alert        LABS:                        9.9    6.78  )-----------( 184      ( 30 Oct 2019 06:08 )             29.9                           11.9   8.82  )-----------( 245      ( 23 Oct 2019 05:57 )             36.4                           11.9   10.04 )-----------( 224      ( 21 Oct 2019 19:08 )             36.7       10    142  |  99  |  60<H>  ----------------------------<  139<H>  3.6   |  20  |  7.5<HH>    Ca    8.8      30 Oct 2019 06:08    TPro  5.9<L>  /  Alb  2.9<L>  /  TBili  0.8  /  DBili  x   /  AST  13  /  ALT  9   /  AlkPhos  30  10-30      10-23    136  |  89<L>  |  132<HH>  ----------------------------<  208<H>  4.2   |  22  |  8.3<HH>    Ca    7.7<L>      23 Oct 2019 11:34    TPro  6.4  /  Alb  3.0<L>  /  TBili  1.1  /  DBili  x   /  AST  19  /  ALT  15  /  AlkPhos  43  10-    10-    136  |  91<L>  |  x   ----------------------------<  53<L>  4.0   |  19  |  8.1<HH>    Ca    7.8<L>      21 Oct 2019 19:08  Phos  7.8     10-  Mg     2.5     10-21    TPro  6.0  /  Alb  2.9<L>  /  TBili  1.2  /  DBili  x   /  AST  24  /  ALT  14  /  AlkPhos  41  10-          CAPILLARY BLOOD GLUCOS  POCT Blood Glucose.: 214 mg/dL (16 Oct 2019 16:17)  POCT Blood Glucose.: 312 mg/dL (16 Oct 2019 11:39)  POCT Blood Glucose.: 261 mg/dL (16 Oct 2019 07:58)  POCT Blood Glucose.: 251 mg/dL (15 Oct 2019 23:22)        Urinalysis Basic - ( 15 Oct 2019 11:30 )  Color: Yellow / Appearance: Clear / S.015 / pH: x  Gluc: x / Ketone: Negative  / Bili: Negative / Urobili: 0.2 mg/dL   Blood: x / Protein: Negative mg/dL / Nitrite: Negative   Leuk Esterase: Negative / RBC: x / WBC x   Sq Epi: x / Non Sq Epi: x / Bacteria: x      MEDICATIONS  (STANDING):  ALPRAZolam 0.5 milliGRAM(s) Oral at bedtime  aspirin  chewable 81 milliGRAM(s) Oral daily  budesonide 160 MICROgram(s)/formoterol 4.5 MICROgram(s) Inhaler 2 Puff(s) Inhalation two times a day  chlorhexidine 4% Liquid 1 Application(s) Topical daily  clopidogrel Tablet 75 milliGRAM(s) Oral daily  dextrose 5%. 1000 milliLiter(s) (50 mL/Hr) IV Continuous <Continuous>  dextrose 50% Injectable 12.5 Gram(s) IV Push once  dextrose 50% Injectable 25 Gram(s) IV Push once  dextrose 50% Injectable 25 Gram(s) IV Push once  fenofibrate Tablet 145 milliGRAM(s) Oral daily  finasteride 5 milliGRAM(s) Oral daily  fluticasone propionate 50 MICROgram(s)/spray Nasal Spray 1 Spray(s) Both Nostrils two times a day  heparin  Infusion 1200 Unit(s)/Hr (12 mL/Hr) IV Continuous <Continuous>  influenza   Vaccine 0.5 milliLiter(s) IntraMuscular once  insulin glargine Injectable (LANTUS) 12 Unit(s) SubCutaneous at bedtime  insulin lispro Injectable (HumaLOG) 7 Unit(s) SubCutaneous before breakfast  insulin lispro Injectable (HumaLOG) 7 Unit(s) SubCutaneous before lunch  insulin lispro Injectable (HumaLOG) 7 Unit(s) SubCutaneous before dinner  isosorbide   mononitrate ER Tablet (IMDUR) 30 milliGRAM(s) Oral daily  metoprolol succinate ER 75 milliGRAM(s) Oral daily  nafcillin  IVPB 2 Gram(s) IV Intermittent every 4 hours  nitroglycerin    Patch 0.2 mG/Hr(s) 1 patch Transdermal daily  silver sulfADIAZINE 1% Cream 1 Application(s) Topical two times a day  simvastatin 40 milliGRAM(s) Oral at bedtime  tamsulosin 0.4 milliGRAM(s) Oral at bedtime    MEDICATIONS  (PRN):  acetaminophen   Tablet .. 650 milliGRAM(s) Oral every 6 hours PRN Temp greater or equal to 38C (100.4F), Mild Pain (1 - 3)  acetaminophen  Suppository .. 650 milliGRAM(s) Rectal every 6 hours PRN Temp greater or equal to 38C (100.4F), Mild Pain (1 - 3)  dextrose 40% Gel 15 Gram(s) Oral once PRN Blood Glucose LESS THAN 70 milliGRAM(s)/deciliter  glucagon  Injectable 1 milliGRAM(s) IntraMuscular once PRN Glucose LESS THAN 70 milligrams/deciliter      RADIOLOGY & ADDITIONAL TESTS:    < from: Xray Chest 1 View- PORTABLE-Urgent (10.23.19 @ 11:51) >  Stable small effusions and interstitial edema.    < end of copied text >      < from: Xray Chest 1 View- PORTABLE-Routine (10.21.19 @ 06:25) >  Cardiomegaly, bilateral opacities/pleural effusions, stable.      < end of copied text >      < from: US Renal (10.19.19 @ 10:22) >  No hydronephrosis bilaterally.  Left renal 3 cm cyst.          < end of copied text >      < from: Xray Chest 1 View-PORTABLE IMMEDIATE (10.16.19 @ 16:56) >  Stable bilateral airspace opacities.    < end of copied text >            MICROBIOLOGY DATA:    Culture - Blood (10.28.19 @ 05:42)    Specimen Source: .Blood None    Culture Results:   No growth to date.      Culture - Blood in AM (10.24.19 @ 05:53)    Specimen Source: .Blood None    Culture Results:   No growth at 5 days.        Culture - Blood (10.22.19 @ 05:33)    Specimen Source: .Blood None    Culture Results:   No growth to date.        Culture - Blood in AM (10.19.19 @ 06:36)    Gram Stain:   Growth in aerobic bottle: Gram Positive Cocci in Clusters  Growth in anaerobic bottle: Gram Positive Cocci in Clusters    Specimen Source: .Blood None    Culture Results:   Growth in aerobic bottle: Staphylococcus aureus  Susceptibility to follow.  Growth in anaerobic bottle: Gram Positive Cocci in Clusters        Culture - Blood in AM (10.17.19 @ 05:15)    Gram Stain:   Growth in anaerobic bottle: Gram Positive Cocci in Clusters  Growth in aerobic bottle: Gram Positive Cocci in Clusters    Specimen Source: .Blood Blood    Culture Results:   Growth in aerobic and anaerobic bottles: Staphylococcus aureus  See previous culture [id number]        FLU A B RSV Detection by PCR (10.15.19 @ 21:10)    Flu A Result: Negative: Negative results do not preclude influenza infection and  should not be used as the sole basis for treatment or  other patient management decisions.  A positive result may occur in the absence of viable virus.  By: [employer] Flu viral assay by Reverse Transcriptase  Polymerase Chain Reaction (RT-PCR).    Flu B Result: Negative    RSV Result: Negative      Culture - Urine (10.15.19 @ 11:30)    Specimen Source: .Urine Clean Catch (Midstream)    Culture Results:   <10,000 CFU/mL Normal Urogenital Felipa        Culture - Blood (10.15.19 @ 07:00)    -  Staphylococcus aureus: Detec Any isolate of Staphylococcus aureus from a blood culture is NOT considered a contaminant.    Gram Stain:   Growth in aerobic bottle: Gram Positive Cocci in Clusters  Growth in anaerobic bottle: Gram Positive Cocci in Clusters    Specimen Source: .Blood Blood    Organism: Blood Culture PCR    Culture Results:   Growth in aerobic bottle: Gram Positive Cocci in Clusters  Growth in anaerobic bottle: Gram Positive Cocci in Clusters  "Due to technical problems, Proteus sp. will Not be reported as part of  the BCID panel until further notice"  ***Blood Panel PCR results on this specimen are available  approximately 3 hours after the Gram stain result.***  Gram stain, PCR, and/or culture results may not always  correspond due to difference in methodologies.  ************************************************************  This PCR assay was performed using [employer].  The following targets are tested for: Enterococcus,  vancomycin resistant enterococci, Listeria monocytogenes,  coagulase negative staphylococci, S. aureus,  methicillin resistant S. aureus, Streptococcus agalactiae  (Group B), S. pneumoniae, S. pyogenes (Group A),  Acinetobacter baumannii, Enterobacter cloacae, E. coli,  Klebsiella oxytoca, K. pneumoniae, Proteus sp.,  Serratia marcescens, Haemophilus influenzae,  Neisseria meningitidis, Pseudomonas aeruginosa, Candida  albicans, C. glabrata, C krusei, C parapsilosis,  C. tropicalis and the KPC resistance gene.    Organism Identification: Blood Culture PCR    Method Type: PCR

## 2019-10-30 NOTE — PROGRESS NOTE ADULT - SUBJECTIVE AND OBJECTIVE BOX
LENGTH OF HOSPITAL STAY:  20d    CHIEF COMPLAINT:Patient is a 79y old  Male who presents with a chief complaint of dyspnea (30 Oct 2019 08:45)    HPI:HPI:  Patient is a 80yo Male w/ PMH of HFpEF (EF of 55% with Grade II Diastolic dysfunction), COPD (home O2 3-3.5L as needed), DM, HTN, CAD s/p CABG and 1 stent, BPH, and recent admission for CHF (August 2019) presenting to [facility] with complaints of shortness of breath and chest pain. Patient reports he has been progressively worsening shortness of breath for the last 4-5 days prior to admission. He also reports associated chest pain on exertion.  Patient reports he has been compliant with all of his medications as well with a low salt diet. He denies any recent history of fevers, chills, cough. On day of admission, had severe dyspnea on exertion associated with a pressure like sensation in his chest that radiated to his left arm. He tried sublingual nitro for relief but it didn't help. Therefore, he came in for further evaluation. He was given Solumedrol 125mg by EMS. In the ED, patient was found to have evidence of bilateral pleural effusions on bedside sono done by ED staff. He received IV Lasix 40mg and was placed on BiPAP with major improvement in his symptoms. On my assessment, patient was speaking to me in full sentences on BiPAP and his chest pain has resolved. (10 Oct 2019 13:27)    OVERNIGHT EVENTS/INTERVAL UPDATES: no overnight events noted.     PMH & PSH  PAST MEDICAL & SURGICAL HISTORY:  BPH (benign prostatic hyperplasia)  CHF (congestive heart failure)  COPD (chronic obstructive pulmonary disease)  Diabetes  HTN (hypertension)  H/O heart artery stent  S/P CABG x 3    SOCIAL HISTORY: Negative    ALLERGIES: No Known Allergies    HOME MEDICATIONS  Home Medications:  alfuzosin 10 mg oral tablet, extended release: 1 tab(s) orally once a day (10 Oct 2019 14:44)  ALPRAZolam 0.5 mg oral tablet: 1 tab(s) orally 2 times a day, As Needed (10 Oct 2019 14:44)  amLODIPine 5 mg oral tablet: 1 tab(s) orally once a day (10 Oct 2019 14:44)  aspirin 81 mg oral tablet: 1 tab(s) orally once a day (10 Oct 2019 14:44)  dutasteride 0.5 mg oral capsule: 1 cap(s) orally once a day (10 Oct 2019 14:44)  fenofibrate 145 mg oral tablet: 1 tab(s) orally once a day (10 Oct 2019 14:44)  isosorbide mononitrate 30 mg oral tablet, extended release: 1 tab(s) orally once a day (in the morning) (10 Oct 2019 14:44)  losartan 25 mg oral tablet: 1 tab(s) orally once a day (at bedtime) (10 Oct 2019 14:44)  losartan 50 mg oral tablet: 1 tab(s) orally once a day (10 Oct 2019 14:44)  rivaroxaban 15 mg oral tablet: 1 tab(s) orally once a day (before a meal) (10 Oct 2019 14:44)  Victoza: subcutaneous once a day (10 Oct 2019 14:44)  Welchol 625 mg oral tablet: 3 tab(s) orally once a day (10 Oct 2019 14:44)    PHYSICAL EXAM:  T(F): 96.3 (10-30-19 @ 07:01), Max: 96.4 (10-29-19 @ 23:29)  HR: 67 (10-30-19 @ 10:00)  BP: 139/64 (10-30-19 @ 10:00)  RR: 18 (10-30-19 @ 07:50)  SpO2: 98% (10-30-19 @ 10:00)  CAPILLARY BLOOD GLUCOSE      POCT Blood Glucose.: 149 mg/dL (30 Oct 2019 07:25)  POCT Blood Glucose.: 87 mg/dL (29 Oct 2019 20:46)  POCT Blood Glucose.: 253 mg/dL (29 Oct 2019 16:24)  POCT Blood Glucose.: 103 mg/dL (29 Oct 2019 11:32)      10-29-19 @ 07:01  -  10-30-19 @ 07:00  --------------------------------------------------------  IN:    heparin Infusion: 132 mL    sodium chloride 0.9%: 10 mL  Total IN: 142 mL    OUT:  Total OUT: 0 mL    Total NET: 142 mL      10-30-19 @ 07:01  -  10-30-19 @ 10:56  --------------------------------------------------------  IN:    heparin Infusion: 48 mL    Oral Fluid: 120 mL  Total IN: 168 mL    OUT:  Total OUT: 0 mL    Total NET: 168 mL            General: NAD  HEENT: NCAT  CV: RRR  RESP: CTAB  Abdominal: Soft, NTTP  Extremity: no c/c/e  Neuro: A&O x3    MEDICATIONS  STANDING MEDICATIONS  ALPRAZolam 0.5 milliGRAM(s) Oral at bedtime  aspirin  chewable 81 milliGRAM(s) Oral daily  budesonide 160 MICROgram(s)/formoterol 4.5 MICROgram(s) Inhaler 2 Puff(s) Inhalation two times a day  chlorhexidine 4% Liquid 1 Application(s) Topical daily  clopidogrel Tablet 75 milliGRAM(s) Oral daily  dextrose 5%. 1000 milliLiter(s) IV Continuous <Continuous>  dextrose 50% Injectable 12.5 Gram(s) IV Push once  dextrose 50% Injectable 25 Gram(s) IV Push once  dextrose 50% Injectable 25 Gram(s) IV Push once  fenofibrate Tablet 145 milliGRAM(s) Oral daily  finasteride 5 milliGRAM(s) Oral daily  fluticasone propionate 50 MICROgram(s)/spray Nasal Spray 1 Spray(s) Both Nostrils two times a day  heparin  Infusion 1200 Unit(s)/Hr IV Continuous <Continuous>  influenza   Vaccine 0.5 milliLiter(s) IntraMuscular once  insulin glargine Injectable (LANTUS) 12 Unit(s) SubCutaneous at bedtime  insulin lispro Injectable (HumaLOG) 7 Unit(s) SubCutaneous before breakfast  insulin lispro Injectable (HumaLOG) 7 Unit(s) SubCutaneous before lunch  insulin lispro Injectable (HumaLOG) 7 Unit(s) SubCutaneous before dinner  isosorbide   mononitrate ER Tablet (IMDUR) 30 milliGRAM(s) Oral daily  metoprolol succinate ER 75 milliGRAM(s) Oral daily  nafcillin  IVPB 2 Gram(s) IV Intermittent every 4 hours  nitroglycerin    Patch 0.2 mG/Hr(s) 1 patch Transdermal daily  silver sulfADIAZINE 1% Cream 1 Application(s) Topical two times a day  simvastatin 40 milliGRAM(s) Oral at bedtime  tamsulosin 0.4 milliGRAM(s) Oral at bedtime    PRN MEDICATIONS  acetaminophen   Tablet .. 650 milliGRAM(s) Oral every 6 hours PRN  acetaminophen  Suppository .. 650 milliGRAM(s) Rectal every 6 hours PRN  dextrose 40% Gel 15 Gram(s) Oral once PRN  glucagon  Injectable 1 milliGRAM(s) IntraMuscular once PRN    LABS:                        9.9    6.78  )-----------( 184      ( 30 Oct 2019 06:08 )             29.9              10-30    142  |  99  |  60<H>  ----------------------------<  139<H>  3.6   |  20  |  7.5<HH>    Ca    8.8      30 Oct 2019 06:08    TPro  5.9<L>  /  Alb  2.9<L>  /  TBili  0.8  /  DBili  x   /  AST  13  /  ALT  9   /  AlkPhos  30  10-30    LIVER FUNCTIONS - ( 30 Oct 2019 06:08 )  Alb: 2.9 g/dL / Pro: 5.9 g/dL / ALK PHOS: 30 U/L / ALT: 9 U/L / AST: 13 U/L / GGT: x                      PTT - ( 30 Oct 2019 06:08 )  PTT:56.3 sec      Culture - Blood (collected 28 Oct 2019 05:42)  Source: .Blood None  Preliminary Report (29 Oct 2019 17:01):    No growth to date.      PHYSICAL EXAM:  GENERAL: NAD, speaks in full sentences, no signs of respiratory distress  HEAD:  Atraumatic, Normocephalic  EYES: EOMI, PERRLA, conjunctiva and sclera clear  NECK: Supple, No JVD  CHEST/LUNG: Lungs clear bilaterally.   ABDOMEN: Soft, Nontender, Nondistended; Bowel sounds present; No guarding  EXTREMITIES:  Left leg healing venous ulcer.   PSYCH: AAOx3  NEUROLOGY: non-focal  SKIN: No rashes or lesions LENGTH OF HOSPITAL STAY:  20d    CHIEF COMPLAINT:Patient is a 79y old  Male who presents with a chief complaint of dyspnea (30 Oct 2019 08:45)    HPI:HPI:  Patient is a 78yo Male w/ PMH of HFpEF (EF of 55% with Grade II Diastolic dysfunction), COPD (home O2 3-3.5L as needed), DM, HTN, CAD s/p CABG and 1 stent, BPH, and recent admission for CHF (August 2019) presenting to [facility] with complaints of shortness of breath and chest pain. Patient reports he has been progressively worsening shortness of breath for the last 4-5 days prior to admission. He also reports associated chest pain on exertion.  Patient reports he has been compliant with all of his medications as well with a low salt diet. He denies any recent history of fevers, chills, cough. On day of admission, had severe dyspnea on exertion associated with a pressure like sensation in his chest that radiated to his left arm. He tried sublingual nitro for relief but it didn't help. Therefore, he came in for further evaluation. He was given Solumedrol 125mg by EMS. In the ED, patient was found to have evidence of bilateral pleural effusions on bedside sono done by ED staff. He received IV Lasix 40mg and was placed on BiPAP with major improvement in his symptoms. On my assessment, patient was speaking to me in full sentences on BiPAP and his chest pain has resolved. (10 Oct 2019 13:27)    OVERNIGHT EVENTS/INTERVAL UPDATES: no overnight events noted.     PMH & PSH  PAST MEDICAL & SURGICAL HISTORY:  BPH (benign prostatic hyperplasia)  CHF (congestive heart failure)  COPD (chronic obstructive pulmonary disease)  Diabetes  HTN (hypertension)  H/O heart artery stent  S/P CABG x 3    SOCIAL HISTORY: Negative    ALLERGIES: No Known Allergies    HOME MEDICATIONS  Home Medications:  alfuzosin 10 mg oral tablet, extended release: 1 tab(s) orally once a day (10 Oct 2019 14:44)  ALPRAZolam 0.5 mg oral tablet: 1 tab(s) orally 2 times a day, As Needed (10 Oct 2019 14:44)  amLODIPine 5 mg oral tablet: 1 tab(s) orally once a day (10 Oct 2019 14:44)  aspirin 81 mg oral tablet: 1 tab(s) orally once a day (10 Oct 2019 14:44)  dutasteride 0.5 mg oral capsule: 1 cap(s) orally once a day (10 Oct 2019 14:44)  fenofibrate 145 mg oral tablet: 1 tab(s) orally once a day (10 Oct 2019 14:44)  isosorbide mononitrate 30 mg oral tablet, extended release: 1 tab(s) orally once a day (in the morning) (10 Oct 2019 14:44)  losartan 25 mg oral tablet: 1 tab(s) orally once a day (at bedtime) (10 Oct 2019 14:44)  losartan 50 mg oral tablet: 1 tab(s) orally once a day (10 Oct 2019 14:44)  rivaroxaban 15 mg oral tablet: 1 tab(s) orally once a day (before a meal) (10 Oct 2019 14:44)  Victoza: subcutaneous once a day (10 Oct 2019 14:44)  Welchol 625 mg oral tablet: 3 tab(s) orally once a day (10 Oct 2019 14:44)    PHYSICAL EXAM:  T(F): 96.3 (10-30-19 @ 07:01), Max: 96.4 (10-29-19 @ 23:29)  HR: 67 (10-30-19 @ 10:00)  BP: 139/64 (10-30-19 @ 10:00)  RR: 18 (10-30-19 @ 07:50)  SpO2: 98% (10-30-19 @ 10:00)  CAPILLARY BLOOD GLUCOSE      POCT Blood Glucose.: 149 mg/dL (30 Oct 2019 07:25)  POCT Blood Glucose.: 87 mg/dL (29 Oct 2019 20:46)  POCT Blood Glucose.: 253 mg/dL (29 Oct 2019 16:24)  POCT Blood Glucose.: 103 mg/dL (29 Oct 2019 11:32)      10-29-19 @ 07:01  -  10-30-19 @ 07:00  --------------------------------------------------------  IN:    heparin Infusion: 132 mL    sodium chloride 0.9%: 10 mL  Total IN: 142 mL    OUT:  Total OUT: 0 mL    Total NET: 142 mL      10-30-19 @ 07:01  -  10-30-19 @ 10:56  --------------------------------------------------------  IN:    heparin Infusion: 48 mL    Oral Fluid: 120 mL  Total IN: 168 mL    OUT:  Total OUT: 0 mL    Total NET: 168 mL      MEDICATIONS  STANDING MEDICATIONS  ALPRAZolam 0.5 milliGRAM(s) Oral at bedtime  aspirin  chewable 81 milliGRAM(s) Oral daily  budesonide 160 MICROgram(s)/formoterol 4.5 MICROgram(s) Inhaler 2 Puff(s) Inhalation two times a day  chlorhexidine 4% Liquid 1 Application(s) Topical daily  clopidogrel Tablet 75 milliGRAM(s) Oral daily  dextrose 5%. 1000 milliLiter(s) IV Continuous <Continuous>  dextrose 50% Injectable 12.5 Gram(s) IV Push once  dextrose 50% Injectable 25 Gram(s) IV Push once  dextrose 50% Injectable 25 Gram(s) IV Push once  fenofibrate Tablet 145 milliGRAM(s) Oral daily  finasteride 5 milliGRAM(s) Oral daily  fluticasone propionate 50 MICROgram(s)/spray Nasal Spray 1 Spray(s) Both Nostrils two times a day  heparin  Infusion 1200 Unit(s)/Hr IV Continuous <Continuous>  influenza   Vaccine 0.5 milliLiter(s) IntraMuscular once  insulin glargine Injectable (LANTUS) 12 Unit(s) SubCutaneous at bedtime  insulin lispro Injectable (HumaLOG) 7 Unit(s) SubCutaneous before breakfast  insulin lispro Injectable (HumaLOG) 7 Unit(s) SubCutaneous before lunch  insulin lispro Injectable (HumaLOG) 7 Unit(s) SubCutaneous before dinner  isosorbide   mononitrate ER Tablet (IMDUR) 30 milliGRAM(s) Oral daily  metoprolol succinate ER 75 milliGRAM(s) Oral daily  nafcillin  IVPB 2 Gram(s) IV Intermittent every 4 hours  nitroglycerin    Patch 0.2 mG/Hr(s) 1 patch Transdermal daily  silver sulfADIAZINE 1% Cream 1 Application(s) Topical two times a day  simvastatin 40 milliGRAM(s) Oral at bedtime  tamsulosin 0.4 milliGRAM(s) Oral at bedtime    PRN MEDICATIONS  acetaminophen   Tablet .. 650 milliGRAM(s) Oral every 6 hours PRN  acetaminophen  Suppository .. 650 milliGRAM(s) Rectal every 6 hours PRN  dextrose 40% Gel 15 Gram(s) Oral once PRN  glucagon  Injectable 1 milliGRAM(s) IntraMuscular once PRN    LABS:                        9.9    6.78  )-----------( 184      ( 30 Oct 2019 06:08 )             29.9              10-30    142  |  99  |  60<H>  ----------------------------<  139<H>  3.6   |  20  |  7.5<HH>    Ca    8.8      30 Oct 2019 06:08    TPro  5.9<L>  /  Alb  2.9<L>  /  TBili  0.8  /  DBili  x   /  AST  13  /  ALT  9   /  AlkPhos  30  10-30    LIVER FUNCTIONS - ( 30 Oct 2019 06:08 )  Alb: 2.9 g/dL / Pro: 5.9 g/dL / ALK PHOS: 30 U/L / ALT: 9 U/L / AST: 13 U/L / GGT: x                      PTT - ( 30 Oct 2019 06:08 )  PTT:56.3 sec      Culture - Blood (collected 28 Oct 2019 05:42)  Source: .Blood None  Preliminary Report (29 Oct 2019 17:01):    No growth to date.      PHYSICAL EXAM:  GENERAL: NAD, speaks in full sentences, no signs of respiratory distress  HEAD:  Atraumatic, Normocephalic  EYES: EOMI, PERRLA, conjunctiva and sclera clear  NECK: Supple, No JVD  CHEST/LUNG: Lungs clear bilaterally.   ABDOMEN: Soft, Nontender, Nondistended; Bowel sounds present; No guarding  EXTREMITIES:  Left leg healing venous ulcer.   PSYCH: AAOx3  NEUROLOGY: non-focal  SKIN: No rashes or lesions

## 2019-10-31 ENCOUNTER — TRANSCRIPTION ENCOUNTER (OUTPATIENT)
Age: 79
End: 2019-10-31

## 2019-10-31 VITALS
RESPIRATION RATE: 21 BRPM | TEMPERATURE: 97 F | SYSTOLIC BLOOD PRESSURE: 116 MMHG | HEART RATE: 68 BPM | DIASTOLIC BLOOD PRESSURE: 55 MMHG

## 2019-10-31 DIAGNOSIS — I48.91 UNSPECIFIED ATRIAL FIBRILLATION: ICD-10-CM

## 2019-10-31 LAB
ALBUMIN SERPL ELPH-MCNC: 2.8 G/DL — LOW (ref 3.5–5.2)
ALP SERPL-CCNC: 31 U/L — SIGNIFICANT CHANGE UP (ref 30–115)
ALT FLD-CCNC: 8 U/L — SIGNIFICANT CHANGE UP (ref 0–41)
ANION GAP SERPL CALC-SCNC: 18 MMOL/L — HIGH (ref 7–14)
APTT BLD: 55.3 SEC — HIGH (ref 27–39.2)
AST SERPL-CCNC: 11 U/L — SIGNIFICANT CHANGE UP (ref 0–41)
BASOPHILS # BLD AUTO: 0.06 K/UL — SIGNIFICANT CHANGE UP (ref 0–0.2)
BASOPHILS NFR BLD AUTO: 0.9 % — SIGNIFICANT CHANGE UP (ref 0–1)
BILIRUB SERPL-MCNC: 0.7 MG/DL — SIGNIFICANT CHANGE UP (ref 0.2–1.2)
BUN SERPL-MCNC: 45 MG/DL — HIGH (ref 10–20)
CALCIUM SERPL-MCNC: 8.6 MG/DL — SIGNIFICANT CHANGE UP (ref 8.5–10.1)
CHLORIDE SERPL-SCNC: 96 MMOL/L — LOW (ref 98–110)
CO2 SERPL-SCNC: 25 MMOL/L — SIGNIFICANT CHANGE UP (ref 17–32)
CREAT SERPL-MCNC: 6 MG/DL — CRITICAL HIGH (ref 0.7–1.5)
EOSINOPHIL # BLD AUTO: 0.22 K/UL — SIGNIFICANT CHANGE UP (ref 0–0.7)
EOSINOPHIL NFR BLD AUTO: 3.4 % — SIGNIFICANT CHANGE UP (ref 0–8)
GLUCOSE BLDC GLUCOMTR-MCNC: 153 MG/DL — HIGH (ref 70–99)
GLUCOSE BLDC GLUCOMTR-MCNC: 171 MG/DL — HIGH (ref 70–99)
GLUCOSE SERPL-MCNC: 183 MG/DL — HIGH (ref 70–99)
HCT VFR BLD CALC: 28.9 % — LOW (ref 42–52)
HGB BLD-MCNC: 9.5 G/DL — LOW (ref 14–18)
IMM GRANULOCYTES NFR BLD AUTO: 0.6 % — HIGH (ref 0.1–0.3)
LYMPHOCYTES # BLD AUTO: 1.26 K/UL — SIGNIFICANT CHANGE UP (ref 1.2–3.4)
LYMPHOCYTES # BLD AUTO: 19.3 % — LOW (ref 20.5–51.1)
MCHC RBC-ENTMCNC: 25.5 PG — LOW (ref 27–31)
MCHC RBC-ENTMCNC: 32.9 G/DL — SIGNIFICANT CHANGE UP (ref 32–37)
MCV RBC AUTO: 77.5 FL — LOW (ref 80–94)
MONOCYTES # BLD AUTO: 0.85 K/UL — HIGH (ref 0.1–0.6)
MONOCYTES NFR BLD AUTO: 13 % — HIGH (ref 1.7–9.3)
NEUTROPHILS # BLD AUTO: 4.09 K/UL — SIGNIFICANT CHANGE UP (ref 1.4–6.5)
NEUTROPHILS NFR BLD AUTO: 62.8 % — SIGNIFICANT CHANGE UP (ref 42.2–75.2)
NRBC # BLD: 0 /100 WBCS — SIGNIFICANT CHANGE UP (ref 0–0)
PLATELET # BLD AUTO: 166 K/UL — SIGNIFICANT CHANGE UP (ref 130–400)
POTASSIUM SERPL-MCNC: 3.4 MMOL/L — LOW (ref 3.5–5)
POTASSIUM SERPL-SCNC: 3.4 MMOL/L — LOW (ref 3.5–5)
PROT SERPL-MCNC: 5.8 G/DL — LOW (ref 6–8)
RBC # BLD: 3.73 M/UL — LOW (ref 4.7–6.1)
RBC # FLD: 18.6 % — HIGH (ref 11.5–14.5)
SODIUM SERPL-SCNC: 139 MMOL/L — SIGNIFICANT CHANGE UP (ref 135–146)
WBC # BLD: 6.52 K/UL — SIGNIFICANT CHANGE UP (ref 4.8–10.8)
WBC # FLD AUTO: 6.52 K/UL — SIGNIFICANT CHANGE UP (ref 4.8–10.8)

## 2019-10-31 PROCEDURE — 71045 X-RAY EXAM CHEST 1 VIEW: CPT | Mod: 26

## 2019-10-31 PROCEDURE — 99239 HOSP IP/OBS DSCHRG MGMT >30: CPT

## 2019-10-31 RX ORDER — AMLODIPINE BESYLATE 2.5 MG/1
1 TABLET ORAL
Qty: 0 | Refills: 0 | DISCHARGE

## 2019-10-31 RX ORDER — LOSARTAN POTASSIUM 100 MG/1
1 TABLET, FILM COATED ORAL
Qty: 0 | Refills: 0 | DISCHARGE

## 2019-10-31 RX ORDER — ALPRAZOLAM 0.25 MG
1 TABLET ORAL
Qty: 0 | Refills: 0 | DISCHARGE
Start: 2019-10-31

## 2019-10-31 RX ORDER — CLOPIDOGREL BISULFATE 75 MG/1
1 TABLET, FILM COATED ORAL
Qty: 0 | Refills: 0 | DISCHARGE
Start: 2019-10-31

## 2019-10-31 RX ORDER — INSULIN GLARGINE 100 [IU]/ML
12 INJECTION, SOLUTION SUBCUTANEOUS
Qty: 0 | Refills: 0 | DISCHARGE
Start: 2019-10-31

## 2019-10-31 RX ORDER — COLESEVELAM HYDROCHLORIDE 625 MG/1
3 TABLET, FILM COATED ORAL
Qty: 0 | Refills: 0 | DISCHARGE

## 2019-10-31 RX ORDER — WARFARIN SODIUM 2.5 MG/1
5 TABLET ORAL ONCE
Refills: 0 | Status: COMPLETED | OUTPATIENT
Start: 2019-10-31 | End: 2019-10-31

## 2019-10-31 RX ORDER — ALPRAZOLAM 0.25 MG
1 TABLET ORAL
Qty: 0 | Refills: 0 | DISCHARGE

## 2019-10-31 RX ORDER — INSULIN LISPRO 100/ML
7 VIAL (ML) SUBCUTANEOUS
Qty: 0 | Refills: 0 | DISCHARGE
Start: 2019-10-31

## 2019-10-31 RX ORDER — ISOSORBIDE MONONITRATE 60 MG/1
1 TABLET, EXTENDED RELEASE ORAL
Qty: 0 | Refills: 0 | DISCHARGE

## 2019-10-31 RX ORDER — FENOFIBRATE,MICRONIZED 130 MG
1 CAPSULE ORAL
Qty: 0 | Refills: 0 | DISCHARGE
Start: 2019-10-31

## 2019-10-31 RX ORDER — SIMVASTATIN 20 MG/1
1 TABLET, FILM COATED ORAL
Qty: 0 | Refills: 0 | DISCHARGE
Start: 2019-10-31

## 2019-10-31 RX ORDER — ALFUZOSIN HYDROCHLORIDE 10 MG/1
1 TABLET, EXTENDED RELEASE ORAL
Qty: 0 | Refills: 0 | DISCHARGE

## 2019-10-31 RX ORDER — TAMSULOSIN HYDROCHLORIDE 0.4 MG/1
1 CAPSULE ORAL
Qty: 0 | Refills: 0 | DISCHARGE
Start: 2019-10-31

## 2019-10-31 RX ORDER — ASPIRIN/CALCIUM CARB/MAGNESIUM 324 MG
1 TABLET ORAL
Qty: 0 | Refills: 0 | DISCHARGE
Start: 2019-10-31

## 2019-10-31 RX ORDER — LIRAGLUTIDE 6 MG/ML
0 INJECTION SUBCUTANEOUS
Qty: 0 | Refills: 0 | DISCHARGE

## 2019-10-31 RX ORDER — FINASTERIDE 5 MG/1
1 TABLET, FILM COATED ORAL
Qty: 0 | Refills: 0 | DISCHARGE
Start: 2019-10-31

## 2019-10-31 RX ORDER — DUTASTERIDE 0.5 MG/1
1 CAPSULE, LIQUID FILLED ORAL
Qty: 0 | Refills: 0 | DISCHARGE

## 2019-10-31 RX ORDER — METOPROLOL TARTRATE 50 MG
3 TABLET ORAL
Qty: 0 | Refills: 0 | DISCHARGE
Start: 2019-10-31

## 2019-10-31 RX ORDER — BUDESONIDE AND FORMOTEROL FUMARATE DIHYDRATE 160; 4.5 UG/1; UG/1
0 AEROSOL RESPIRATORY (INHALATION)
Qty: 0 | Refills: 0 | DISCHARGE
Start: 2019-10-31

## 2019-10-31 RX ORDER — FLUTICASONE PROPIONATE 50 MCG
1 SPRAY, SUSPENSION NASAL
Qty: 0 | Refills: 0 | DISCHARGE
Start: 2019-10-31

## 2019-10-31 RX ORDER — FENOFIBRATE,MICRONIZED 130 MG
1 CAPSULE ORAL
Qty: 0 | Refills: 0 | DISCHARGE

## 2019-10-31 RX ORDER — ISOSORBIDE MONONITRATE 60 MG/1
1 TABLET, EXTENDED RELEASE ORAL
Qty: 0 | Refills: 0 | DISCHARGE
Start: 2019-10-31

## 2019-10-31 RX ORDER — ASPIRIN/CALCIUM CARB/MAGNESIUM 324 MG
1 TABLET ORAL
Qty: 0 | Refills: 0 | DISCHARGE

## 2019-10-31 RX ORDER — WARFARIN SODIUM 2.5 MG/1
1 TABLET ORAL
Qty: 0 | Refills: 0 | DISCHARGE
Start: 2019-10-31

## 2019-10-31 RX ADMIN — Medication 75 MILLIGRAM(S): at 05:14

## 2019-10-31 RX ADMIN — Medication 1 APPLICATION(S): at 05:15

## 2019-10-31 RX ADMIN — NAFCILLIN 200 GRAM(S): 10 INJECTION, POWDER, FOR SOLUTION INTRAVENOUS at 05:14

## 2019-10-31 RX ADMIN — Medication 1 PATCH: at 11:46

## 2019-10-31 RX ADMIN — Medication 81 MILLIGRAM(S): at 11:46

## 2019-10-31 RX ADMIN — FINASTERIDE 5 MILLIGRAM(S): 5 TABLET, FILM COATED ORAL at 11:46

## 2019-10-31 RX ADMIN — HEPARIN SODIUM 12 UNIT(S)/HR: 5000 INJECTION INTRAVENOUS; SUBCUTANEOUS at 07:45

## 2019-10-31 RX ADMIN — ISOSORBIDE MONONITRATE 30 MILLIGRAM(S): 60 TABLET, EXTENDED RELEASE ORAL at 11:46

## 2019-10-31 RX ADMIN — CLOPIDOGREL BISULFATE 75 MILLIGRAM(S): 75 TABLET, FILM COATED ORAL at 11:47

## 2019-10-31 RX ADMIN — CHLORHEXIDINE GLUCONATE 1 APPLICATION(S): 213 SOLUTION TOPICAL at 12:25

## 2019-10-31 RX ADMIN — NAFCILLIN 200 GRAM(S): 10 INJECTION, POWDER, FOR SOLUTION INTRAVENOUS at 14:49

## 2019-10-31 RX ADMIN — Medication 7 UNIT(S): at 11:49

## 2019-10-31 RX ADMIN — NAFCILLIN 200 GRAM(S): 10 INJECTION, POWDER, FOR SOLUTION INTRAVENOUS at 10:49

## 2019-10-31 RX ADMIN — BUDESONIDE AND FORMOTEROL FUMARATE DIHYDRATE 2 PUFF(S): 160; 4.5 AEROSOL RESPIRATORY (INHALATION) at 10:24

## 2019-10-31 RX ADMIN — WARFARIN SODIUM 5 MILLIGRAM(S): 2.5 TABLET ORAL at 14:50

## 2019-10-31 RX ADMIN — NAFCILLIN 200 GRAM(S): 10 INJECTION, POWDER, FOR SOLUTION INTRAVENOUS at 01:01

## 2019-10-31 RX ADMIN — Medication 145 MILLIGRAM(S): at 11:47

## 2019-10-31 RX ADMIN — Medication 7 UNIT(S): at 07:45

## 2019-10-31 NOTE — PROGRESS NOTE ADULT - PROVIDER SPECIALTY LIST ADULT
CCU
Cardiology
Critical Care
Critical Care
Hospitalist
Infectious Disease
Internal Medicine
Nephrology
Pulmonology
Surgery
Surgery
Nephrology
Cardiology
Cardiology
Critical Care
Hospitalist
Hospitalist
Pulmonology
CCU
Infectious Disease
Pulmonology
Hospitalist
Infectious Disease
Pulmonology
CCU
Infectious Disease
Infectious Disease

## 2019-10-31 NOTE — PROGRESS NOTE ADULT - ASSESSMENT
1)  Severe oliguric BRITTANI superimposed on  Stage 3 CKD (creat was 1.5 mg% in August 2019).    PMH: DM, HFpEF      1) BRITTANI - due to ATN from sepsis from PNA and bacteremia,   c3c4 normal - unlikely PIGN  UO not monitored pt refusing, asked him to do strict Is and os  HD due tomorrow  Accepted to HD Unit at [facility]  3K bath  UF 2 L   - may start LASix 40 po on off HD days    2)  MSSA bacteremia - likely due to PNA vs endocarditis, on NAfcillin-> will be switched to Ancef after HD on D/C    3) A/C - pt cannot be on Xarelto, needs Coumadin for Afib and GFR<15 cc/min    D/W HS    D/W SW and primary team

## 2019-10-31 NOTE — DISCHARGE NOTE PROVIDER - HOSPITAL COURSE
79 year old male presented to the ED with CHF exacerbation. While being treated his course was further complicated by BRITTANI requiring Dialysis. He now has a tessio catheter along with a resolved CHF exacerbation. He also came in with Bacteremia MSSA for which he has been getting nafcillin. He will be discharged to SNF with specific Ancef dosing post hemodialysis.

## 2019-10-31 NOTE — DISCHARGE NOTE NURSING/CASE MANAGEMENT/SOCIAL WORK - NSDCPEPTCOWAR_GEN_ALL_CORE
Coumadin/Warfarin - Dietary Advice/Coumadin/Warfarin - Potential for adverse drug reactions and interactions/Coumadin/Warfarin - Follow up monitoring/Coumadin/Warfarin - Compliance

## 2019-10-31 NOTE — DISCHARGE NOTE PROVIDER - CARE PROVIDER_API CALL
Puneet Garcia)  Cardiovascular Disease; Internal Medicine; Interventional Cardiology  [address] [place] [address]  Phone: [phone]  Fax: [phone]  Follow Up Time:     Stephanie Philippe)  Nephrology  [facility] [place] [address]  Phone: [phone]  Fax: [phone]  Follow Up Time:

## 2019-10-31 NOTE — DISCHARGE NOTE PROVIDER - NSDCCPCAREPLAN_GEN_ALL_CORE_FT
PRINCIPAL DISCHARGE DIAGNOSIS  Diagnosis: CHF (congestive heart failure)  Assessment and Plan of Treatment: resolved. Continue with Hemodialysis      SECONDARY DISCHARGE DIAGNOSES  Diagnosis: Sepsis  Assessment and Plan of Treatment: Sepsis due to methicillin susceptible Staphylococcus aureus (MSSA) with acute hypoxic respiratory failure without septic shock  Continue Antibiotic regimen with Ancef. Dosed 2 mg   on Tuesday and Thursday Post Hemodialysis along with Ancef 3 mg on Saturday Post hemodialysis    Diagnosis: Atrial fibrillation  Assessment and Plan of Treatment: continue with Coumadin 5 mg FOR NOW. fOLLOW WITH inr. Marshall therapeutic range of 2-3. Monitor PT/INR daily    Diagnosis: Exertional chest pain  Assessment and Plan of Treatment: Will follow with cardiology for Possible Cardiac Cath once infection resolved and Antibiotics Complete.

## 2019-10-31 NOTE — DISCHARGE NOTE PROVIDER - NSDCMRMEDTOKEN_GEN_ALL_CORE_FT
ALPRAZolam 0.5 mg oral tablet: 1 tab(s) orally once a day (at bedtime)  Ancef: 2 gram(s) intravenous 2 times a week tuesday and thursday post hemodialysis  Ancef: 3 gram(s) intravenous once a week saturday post hemodialysis  aspirin 81 mg oral tablet, chewable: 1 tab(s) orally once a day  budesonide-formoterol 160 mcg-4.5 mcg/inh inhalation aerosol:  inhaled   clopidogrel 75 mg oral tablet: 1 tab(s) orally once a day  fenofibrate 145 mg oral tablet: 1 tab(s) orally once a day  finasteride 5 mg oral tablet: 1 tab(s) orally once a day  fluticasone 50 mcg/inh nasal spray: 1 spray(s) nasal 2 times a day  insulin glargine: 12 unit(s) subcutaneous once a day (at bedtime)  insulin lispro: 7 unit(s) subcutaneous 3 times a day (before meals)  isosorbide mononitrate 30 mg oral tablet, extended release: 1 tab(s) orally once a day  metoprolol succinate 25 mg oral tablet, extended release: 3 tab(s) orally once a day  simvastatin 40 mg oral tablet: 1 tab(s) orally once a day (at bedtime)  tamsulosin 0.4 mg oral capsule: 1 cap(s) orally once a day (at bedtime)  warfarin 5 mg oral tablet: 1 tab(s) orally once a day Monitor INR daily for dosing ALPRAZolam 0.5 mg oral tablet: 1 tab(s) orally once a day (at bedtime)  Ancef: 2 gram(s) intravenous 2 times a week tuesday and thursday post hemodialysis  Ancef: 3 gram(s) intravenous once a week saturday post hemodialysis  aspirin 81 mg oral tablet, chewable: 1 tab(s) orally once a day  budesonide-formoterol 160 mcg-4.5 mcg/inh inhalation aerosol:  inhaled   clopidogrel 75 mg oral tablet: 1 tab(s) orally once a day  fenofibrate 145 mg oral tablet: 1 tab(s) orally once a day  finasteride 5 mg oral tablet: 1 tab(s) orally once a day  fluticasone 50 mcg/inh nasal spray: 1 spray(s) nasal 2 times a day  insulin glargine: 12 unit(s) subcutaneous once a day (at bedtime)  insulin lispro: 7 unit(s) subcutaneous 3 times a day (before meals)  isosorbide mononitrate 30 mg oral tablet, extended release: 1 tab(s) orally once a day  metoprolol succinate 25 mg oral tablet, extended release: 3 tab(s) orally once a day  silver sulfADIAZINE 1% topical cream: 1 application topically 2 times a day  simvastatin 40 mg oral tablet: 1 tab(s) orally once a day (at bedtime)  tamsulosin 0.4 mg oral capsule: 1 cap(s) orally once a day (at bedtime)  warfarin 5 mg oral tablet: 1 tab(s) orally once a day Monitor INR daily for dosing

## 2019-10-31 NOTE — CHART NOTE - NSCHARTNOTEFT_GEN_A_CORE
Registered Dietitian Follow-Up     Patient Profile Reviewed                           Yes [X]   No []     Nutrition History Previously Obtained        Yes []X  No []       Pertinent Information: 79 year old male with hx of DM, COPD, CAD, CABG, CHF, BPH presents with SOB, chest pain on exertion found to have b/l pleural effusions, s/p IV lasix and bipap in Ed with improvement. admitted to CCU with CHF exacerbation. 10/14/19 spiked temps, cardiac cath cancelled. + blood cultures, possible gram negative PNA.  10/23 s/p central line placement, HD intiated. 10/29 s/p tesio placement with removal of u dall cath. pt continues with nafcillin for treatment of PABLO bacteremia.           Diet order: DASh/TLC CHO consistent diet with 800 ml FR tolerating well > 75% of meals consumed      Anthropometrics:  - Ht. 167.6 cm   - Wt. 105.3 kgs (todays wt)  - %wt change  - BMI 36.9  - IBW      Pertinent Lab Data: 10/31/19  hgb 9.5L, hct 28.9L, k+ 3.4L, BUN 45H, creat 6.0H, gluc 183H      Pertinent Meds: nafcillin, insulin, zocor, flomax     Physical Findings:  - Appearance: pt is sleeping, unable to awaken attempted twice,  - GI function: + BS   - Tubes: n/a   - Oral/  skin: intact      Nutrition Requirements  Weight Used: 105.3 kgs      Estimated Energy Needs:  6069-5841 kcals (15-20 kcal/kg/BW)     Estimated Protein Needs:  126-147 g pro (1.2-1.4g/kg/BW)        Estimated Fluid Needs      as per MD      Nutrient Intake: adequate pt consumes > 75% of meals         [] Previous Nutrition Diagnosis:            [] Ongoing          [] Resolved    [X] No active nutrition diagnosis identified at this time     Nutrition Diagnostic #1  Problem:   Etiology:   Statement:      Nutrition Diagnostic #2  Problem:  Etiology:  Statement:     Nutrition Intervention: add renal restriction to present diet order, prior to d/c provide pt with renal diet education.(attempted today, however unable to arouse pt)     Goal/Expected Outcome: pt to tolerate po diet and continue to consume > 50% of meals within next 5-7 days      Indicator/Monitoring: po intake. labs/meds, nutrition focused physical findings. RD to f/u in 5-7 days.

## 2019-10-31 NOTE — PROGRESS NOTE ADULT - ASSESSMENT
Patient is a 78yo Male w/ PMH of HFpEF (EF of 55% with Grade II Diastolic dysfunction), COPD (home O2 3-3.5L as needed), DM, HTN, CAD s/p CABG and 1 stent, BPH, and recent admission for CHF (August 2019) presented  to [facility] with complaints of shortness of breath and exertional chest pain.    #) Acute respiratory failure secondary to Acute on Chronic Diastolic CHF exacerbation / moderate  aortic stenosis   -  resolved  - now on HD to continue with even  fluid balance  - DC to STR today 45min spent on DC      #) PABLO Bacteremia   -  BC 10/22, 10/23, 10/24  negative   - complete 5 more weeks of antibiotics post HD as per ID       #) BRITTANI   - continue HD at rehab    #) Chest Pain (resolved) ,  in patient with hx of CABG and stent placement  - c/w losartan asa, metoprolol; Simvastatin   - needs outpt cardio f/u after antibiotics completed    #) Hx of A Fib  - start coumadin 5 mg daily goal INR 2-3, may adjust as outpt  -  Metoprolol      #) Hx of COPD  - Stable at this time, with no evidence of acute exacerbation  - Cont with Symbicort (in place of Breo)    #) Hx of Diabetes Type 2  - resume home meds  - Carb consistent diet    #) Hx of BPH  - Cont with Flomax and Finasteride

## 2019-10-31 NOTE — DISCHARGE NOTE NURSING/CASE MANAGEMENT/SOCIAL WORK - PATIENT PORTAL LINK FT
You can access the FollowMyHealth Patient Portal offered by [facility] by registering at the following website: [email or web address]. By joining [employer]’s FollowMyHealth portal, you will also be able to view your health information using other applications (apps) compatible with our system.

## 2019-10-31 NOTE — PROGRESS NOTE ADULT - SUBJECTIVE AND OBJECTIVE BOX
Patient is comfortable in chair no complaints       T(F): 95.6 (10-31-19 @ 07:01), Max: 97.7 (10-30-19 @ 23:21)  HR: 66 (10-31-19 @ 07:12)  BP: 123/64 (10-31-19 @ 07:12)  RR: 20 (10-31-19 @ 07:01)  SpO2: 99% (10-30-19 @ 23:21) (99% - 100%)    PHYSICAL EXAM:  GENERAL: NAD  HEAD:  Atraumatic, Normocephalic  NERVOUS SYSTEM:  Alert & Oriented X3, no focal deficit  CHEST/LUNG: Clear to percussion bilaterally; No rales, rhonchi, wheezing, or rubs  HEART: Regular rate and rhythm; No murmurs, rubs, or gallops  ABDOMEN: Soft, Nontender, Nondistended; Bowel sounds present  EXTREMITIES:  2+ Peripheral Pulses, No clubbing, cyanosis, or edema  LYMPH: No lymphadenopathy noted  SKIN: No rashes or lesions    LABS  10-31    139  |  96<L>  |  45<H>  ----------------------------<  183<H>  3.4<L>   |  25  |  6.0<HH>    Ca    8.6      31 Oct 2019 05:51    TPro  5.8<L>  /  Alb  2.8<L>  /  TBili  0.7  /  DBili  x   /  AST  11  /  ALT  8   /  AlkPhos  31  10-31                          9.5    6.52  )-----------( 166      ( 31 Oct 2019 05:51 )             28.9     PTT - ( 31 Oct 2019 05:51 )  PTT:55.3 sec      Culture Results:   No growth to date. (10-28-19)  Culture Results:   No growth at 5 days. (10-24-19)  Culture Results:   No growth at 5 days. (10-23-19)  Culture Results:   No growth at 5 days. (10-22-19)  Culture Results:   Growth in aerobic and anaerobic bottles: Staphylococcus aureus (10-19-19)  Culture Results:   Growth in aerobic and anaerobic bottles: Staphylococcus aureus  See previous culture [id number] (10-17-19)  Culture Results:   Growth in aerobic and anaerobic bottles: Staphylococcus aureus  See previous culture [id number] (10-16-19)  Culture Results:   <10,000 CFU/mL Normal Urogenital Felipa (10-15-19)  Culture Results:   Growth in aerobic bottle: Staphylococcus aureus  "Due to technical problems, Proteus sp. will Not be reported as part of  the BCID panel until further notice"  ***Blood Panel PCR results on this specimen are available  approximately 3 hours after the Gram stain result.***  Gram stain, PCR, and/or culture results may not always  correspond due to difference in methodologies.  ************************************************************  This PCR assay was performed using [employer].  The following targets are tested for: Enterococcus,  vancomycin resistant enterococci, Listeria monocytogenes,  coagulase negative staphylococci, S. aureus,  methicillin resistant S. aureus, Streptococcus agalactiae  (Group B), S. pneumoniae, S. pyogenes (Group A),  Acinetobacter baumannii, Enterobacter cloacae, E. coli,  Klebsiella oxytoca, K. pneumoniae, Proteus sp.,  Serratia marcescens, Haemophilus influenzae,  Neisseria meningitidis, Pseudomonas aeruginosa, Candida  albicans, C. glabrata, C krusei, C parapsilosis,  C. tropicalis and the KPC resistance gene. (10-15-19)    RADIOLOGY  < from: Xray Chest 1 View- PORTABLE-Routine (10.31.19 @ 06:47) >    Lung parenchyma/Pleura: Improved lung aeration with resolving pulmonary   vascular congestion. Noted again is residual small bilateral pleural   effusions with lower lobe opacities, likely atelectasis.    < end of copied text >    MEDICATIONS  (STANDING):  ALPRAZolam 0.5 milliGRAM(s) Oral at bedtime  aspirin  chewable 81 milliGRAM(s) Oral daily  budesonide 160 MICROgram(s)/formoterol 4.5 MICROgram(s) Inhaler 2 Puff(s) Inhalation two times a day  chlorhexidine 4% Liquid 1 Application(s) Topical daily  clopidogrel Tablet 75 milliGRAM(s) Oral daily  fenofibrate Tablet 145 milliGRAM(s) Oral daily  finasteride 5 milliGRAM(s) Oral daily  fluticasone propionate 50 MICROgram(s)/spray Nasal Spray 1 Spray(s) Both Nostrils two times a day  heparin  Infusion 1200 Unit(s)/Hr (12 mL/Hr) IV Continuous <Continuous>  influenza   Vaccine 0.5 milliLiter(s) IntraMuscular once  insulin glargine Injectable (LANTUS) 12 Unit(s) SubCutaneous at bedtime  insulin lispro Injectable (HumaLOG) 7 Unit(s) SubCutaneous before breakfast  insulin lispro Injectable (HumaLOG) 7 Unit(s) SubCutaneous before lunch  insulin lispro Injectable (HumaLOG) 7 Unit(s) SubCutaneous before dinner  isosorbide   mononitrate ER Tablet (IMDUR) 30 milliGRAM(s) Oral daily  metoprolol succinate ER 75 milliGRAM(s) Oral daily  nafcillin  IVPB 2 Gram(s) IV Intermittent every 4 hours  nitroglycerin    Patch 0.2 mG/Hr(s) 1 patch Transdermal daily  silver sulfADIAZINE 1% Cream 1 Application(s) Topical two times a day  simvastatin 40 milliGRAM(s) Oral at bedtime  tamsulosin 0.4 milliGRAM(s) Oral at bedtime    MEDICATIONS  (PRN):  acetaminophen   Tablet .. 650 milliGRAM(s) Oral every 6 hours PRN Temp greater or equal to 38C (100.4F), Mild Pain (1 - 3)  acetaminophen  Suppository .. 650 milliGRAM(s) Rectal every 6 hours PRN Temp greater or equal to 38C (100.4F), Mild Pain (1 - 3)  dextrose 40% Gel 15 Gram(s) Oral once PRN Blood Glucose LESS THAN 70 milliGRAM(s)/deciliter  glucagon  Injectable 1 milliGRAM(s) IntraMuscular once PRN Glucose LESS THAN 70 milligrams/deciliter

## 2019-10-31 NOTE — PROGRESS NOTE ADULT - SUBJECTIVE AND OBJECTIVE BOX
Nephrology progress note    Patient is seen and examined, events over the last 24 h noted .  Feels good, still on IV Heparin  Allergies:  No Known Allergies    Hospital Medications:   MEDICATIONS  (STANDING):  ALPRAZolam 0.5 milliGRAM(s) Oral at bedtime  aspirin  chewable 81 milliGRAM(s) Oral daily  budesonide 160 MICROgram(s)/formoterol 4.5 MICROgram(s) Inhaler 2 Puff(s) Inhalation two times a day  chlorhexidine 4% Liquid 1 Application(s) Topical daily  clopidogrel Tablet 75 milliGRAM(s) Oral daily  dextrose 5%. 1000 milliLiter(s) (50 mL/Hr) IV Continuous <Continuous>  dextrose 50% Injectable 12.5 Gram(s) IV Push once  dextrose 50% Injectable 25 Gram(s) IV Push once  dextrose 50% Injectable 25 Gram(s) IV Push once  fenofibrate Tablet 145 milliGRAM(s) Oral daily  finasteride 5 milliGRAM(s) Oral daily  fluticasone propionate 50 MICROgram(s)/spray Nasal Spray 1 Spray(s) Both Nostrils two times a day  heparin  Infusion 1200 Unit(s)/Hr (12 mL/Hr) IV Continuous <Continuous>  influenza   Vaccine 0.5 milliLiter(s) IntraMuscular once  insulin glargine Injectable (LANTUS) 12 Unit(s) SubCutaneous at bedtime  insulin lispro Injectable (HumaLOG) 7 Unit(s) SubCutaneous before breakfast  insulin lispro Injectable (HumaLOG) 7 Unit(s) SubCutaneous before lunch  insulin lispro Injectable (HumaLOG) 7 Unit(s) SubCutaneous before dinner  isosorbide   mononitrate ER Tablet (IMDUR) 30 milliGRAM(s) Oral daily  metoprolol succinate ER 75 milliGRAM(s) Oral daily  nafcillin  IVPB 2 Gram(s) IV Intermittent every 4 hours  nitroglycerin    Patch 0.2 mG/Hr(s) 1 patch Transdermal daily  silver sulfADIAZINE 1% Cream 1 Application(s) Topical two times a day  simvastatin 40 milliGRAM(s) Oral at bedtime  tamsulosin 0.4 milliGRAM(s) Oral at bedtime        VITALS:  T(F): 95.6 (10-31-19 @ 07:01), Max: 97.7 (10-30-19 @ 23:21)  HR: 66 (10-31-19 @ 07:12)  BP: 123/64 (10-31-19 @ 07:12)  RR: 20 (10-31-19 @ 07:01)  SpO2: 99% (10-30-19 @ 23:21)  Wt(kg): --    10-29 @ 07:01  -  10-30 @ 07:00  --------------------------------------------------------  IN: 142 mL / OUT: 0 mL / NET: 142 mL    10-30 @ 07:01  -  10-31 @ 07:00  --------------------------------------------------------  IN: 632 mL / OUT: 2300 mL / NET: -1668 mL    10-31 @ 07:01  -  10-31 @ 13:25  --------------------------------------------------------  IN: 628 mL / OUT: 0 mL / NET: 628 mL          PHYSICAL EXAM:  Constitutional: NAD  HEENT: anicteric sclera, MMM  Neck: No JVD  Respiratory: CTA  Cardiovascular: S1, S2, RRR  Gastrointestinal: BS+, soft, NT/ND  Extremities: No peripheral edema  Neurological: A/O x 3  : No CVA tenderness. No julian.   Skin: No rashes  Vascular Access:    LABS:  10-31    139  |  96<L>  |  45<H>  ----------------------------<  183<H>  3.4<L>   |  25  |  6.0<HH>    Ca    8.6      31 Oct 2019 05:51    TPro  5.8<L>  /  Alb  2.8<L>  /  TBili  0.7  /  DBili      /  AST  11  /  ALT  8   /  AlkPhos  31  10-31                          9.5    6.52  )-----------( 166      ( 31 Oct 2019 05:51 )             28.9       Urine Studies:      RADIOLOGY & ADDITIONAL STUDIES:

## 2019-11-01 ENCOUNTER — OUTPATIENT (OUTPATIENT)
Dept: OUTPATIENT SERVICES | Facility: HOSPITAL | Age: 79
LOS: 1 days | Discharge: HOME | End: 2019-11-01

## 2019-11-01 DIAGNOSIS — I48.91 UNSPECIFIED ATRIAL FIBRILLATION: ICD-10-CM

## 2019-11-01 DIAGNOSIS — Z95.1 PRESENCE OF AORTOCORONARY BYPASS GRAFT: Chronic | ICD-10-CM

## 2019-11-01 DIAGNOSIS — Z95.5 PRESENCE OF CORONARY ANGIOPLASTY IMPLANT AND GRAFT: Chronic | ICD-10-CM

## 2019-11-02 ENCOUNTER — INPATIENT (INPATIENT)
Facility: HOSPITAL | Age: 79
LOS: 1 days | Discharge: NURSING, REHAB OR LONG-TERM CARE | End: 2019-11-04
Attending: HOSPITALIST | Admitting: HOSPITALIST
Payer: MEDICARE

## 2019-11-02 VITALS
OXYGEN SATURATION: 94 % | SYSTOLIC BLOOD PRESSURE: 111 MMHG | HEART RATE: 66 BPM | DIASTOLIC BLOOD PRESSURE: 54 MMHG | WEIGHT: 231.04 LBS | HEIGHT: 66 IN | RESPIRATION RATE: 18 BRPM | TEMPERATURE: 97 F

## 2019-11-02 DIAGNOSIS — E11.9 TYPE 2 DIABETES MELLITUS WITHOUT COMPLICATIONS: ICD-10-CM

## 2019-11-02 DIAGNOSIS — S09.90XA UNSPECIFIED INJURY OF HEAD, INITIAL ENCOUNTER: ICD-10-CM

## 2019-11-02 DIAGNOSIS — R55 SYNCOPE AND COLLAPSE: ICD-10-CM

## 2019-11-02 DIAGNOSIS — N17.0 ACUTE KIDNEY FAILURE WITH TUBULAR NECROSIS: ICD-10-CM

## 2019-11-02 DIAGNOSIS — I48.91 UNSPECIFIED ATRIAL FIBRILLATION: ICD-10-CM

## 2019-11-02 DIAGNOSIS — Z95.1 PRESENCE OF AORTOCORONARY BYPASS GRAFT: Chronic | ICD-10-CM

## 2019-11-02 DIAGNOSIS — Z95.5 PRESENCE OF CORONARY ANGIOPLASTY IMPLANT AND GRAFT: Chronic | ICD-10-CM

## 2019-11-02 DIAGNOSIS — Z95.5 PRESENCE OF CORONARY ANGIOPLASTY IMPLANT AND GRAFT: ICD-10-CM

## 2019-11-02 DIAGNOSIS — I50.32 CHRONIC DIASTOLIC (CONGESTIVE) HEART FAILURE: ICD-10-CM

## 2019-11-02 DIAGNOSIS — I10 ESSENTIAL (PRIMARY) HYPERTENSION: ICD-10-CM

## 2019-11-02 DIAGNOSIS — J44.9 CHRONIC OBSTRUCTIVE PULMONARY DISEASE, UNSPECIFIED: ICD-10-CM

## 2019-11-02 DIAGNOSIS — A49.01 METHICILLIN SUSCEPTIBLE STAPHYLOCOCCUS AUREUS INFECTION, UNSPECIFIED SITE: ICD-10-CM

## 2019-11-02 LAB
ALBUMIN SERPL ELPH-MCNC: 3.6 G/DL — SIGNIFICANT CHANGE UP (ref 3.5–5.2)
ALP SERPL-CCNC: 34 U/L — SIGNIFICANT CHANGE UP (ref 30–115)
ALT FLD-CCNC: 6 U/L — SIGNIFICANT CHANGE UP (ref 0–41)
ANION GAP SERPL CALC-SCNC: 20 MMOL/L — HIGH (ref 7–14)
APTT BLD: 45 SEC — HIGH (ref 27–39.2)
AST SERPL-CCNC: 16 U/L — SIGNIFICANT CHANGE UP (ref 0–41)
BASOPHILS # BLD AUTO: 0.06 K/UL — SIGNIFICANT CHANGE UP (ref 0–0.2)
BASOPHILS NFR BLD AUTO: 0.8 % — SIGNIFICANT CHANGE UP (ref 0–1)
BILIRUB SERPL-MCNC: 0.9 MG/DL — SIGNIFICANT CHANGE UP (ref 0.2–1.2)
BUN SERPL-MCNC: 38 MG/DL — HIGH (ref 10–20)
CALCIUM SERPL-MCNC: 9.3 MG/DL — SIGNIFICANT CHANGE UP (ref 8.5–10.1)
CHLORIDE SERPL-SCNC: 93 MMOL/L — LOW (ref 98–110)
CO2 SERPL-SCNC: 24 MMOL/L — SIGNIFICANT CHANGE UP (ref 17–32)
CREAT SERPL-MCNC: 6 MG/DL — CRITICAL HIGH (ref 0.7–1.5)
CULTURE RESULTS: SIGNIFICANT CHANGE UP
EOSINOPHIL # BLD AUTO: 0.03 K/UL — SIGNIFICANT CHANGE UP (ref 0–0.7)
EOSINOPHIL NFR BLD AUTO: 0.4 % — SIGNIFICANT CHANGE UP (ref 0–8)
GLUCOSE BLDC GLUCOMTR-MCNC: 146 MG/DL — HIGH (ref 70–99)
GLUCOSE BLDC GLUCOMTR-MCNC: 170 MG/DL — HIGH (ref 70–99)
GLUCOSE SERPL-MCNC: 203 MG/DL — HIGH (ref 70–99)
HCT VFR BLD CALC: 28.6 % — LOW (ref 42–52)
HGB BLD-MCNC: 9.4 G/DL — LOW (ref 14–18)
IMM GRANULOCYTES NFR BLD AUTO: 0.7 % — HIGH (ref 0.1–0.3)
INR BLD: 2.27 RATIO — HIGH (ref 0.65–1.3)
LYMPHOCYTES # BLD AUTO: 0.53 K/UL — LOW (ref 1.2–3.4)
LYMPHOCYTES # BLD AUTO: 7.3 % — LOW (ref 20.5–51.1)
MAGNESIUM SERPL-MCNC: 1.9 MG/DL — SIGNIFICANT CHANGE UP (ref 1.8–2.4)
MCHC RBC-ENTMCNC: 26 PG — LOW (ref 27–31)
MCHC RBC-ENTMCNC: 32.9 G/DL — SIGNIFICANT CHANGE UP (ref 32–37)
MCV RBC AUTO: 79 FL — LOW (ref 80–94)
MONOCYTES # BLD AUTO: 0.85 K/UL — HIGH (ref 0.1–0.6)
MONOCYTES NFR BLD AUTO: 11.6 % — HIGH (ref 1.7–9.3)
NEUTROPHILS # BLD AUTO: 5.79 K/UL — SIGNIFICANT CHANGE UP (ref 1.4–6.5)
NEUTROPHILS NFR BLD AUTO: 79.2 % — HIGH (ref 42.2–75.2)
NRBC # BLD: 0 /100 WBCS — SIGNIFICANT CHANGE UP (ref 0–0)
PLATELET # BLD AUTO: 153 K/UL — SIGNIFICANT CHANGE UP (ref 130–400)
POTASSIUM SERPL-MCNC: 3.8 MMOL/L — SIGNIFICANT CHANGE UP (ref 3.5–5)
POTASSIUM SERPL-SCNC: 3.8 MMOL/L — SIGNIFICANT CHANGE UP (ref 3.5–5)
PROT SERPL-MCNC: 6.7 G/DL — SIGNIFICANT CHANGE UP (ref 6–8)
PROTHROM AB SERPL-ACNC: 25.9 SEC — HIGH (ref 9.95–12.87)
RBC # BLD: 3.62 M/UL — LOW (ref 4.7–6.1)
RBC # FLD: 18.7 % — HIGH (ref 11.5–14.5)
SODIUM SERPL-SCNC: 137 MMOL/L — SIGNIFICANT CHANGE UP (ref 135–146)
SPECIMEN SOURCE: SIGNIFICANT CHANGE UP
TROPONIN T SERPL-MCNC: 0.43 NG/ML — CRITICAL HIGH
WBC # BLD: 7.31 K/UL — SIGNIFICANT CHANGE UP (ref 4.8–10.8)
WBC # FLD AUTO: 7.31 K/UL — SIGNIFICANT CHANGE UP (ref 4.8–10.8)

## 2019-11-02 PROCEDURE — 70450 CT HEAD/BRAIN W/O DYE: CPT | Mod: 26

## 2019-11-02 PROCEDURE — 99285 EMERGENCY DEPT VISIT HI MDM: CPT

## 2019-11-02 PROCEDURE — 71045 X-RAY EXAM CHEST 1 VIEW: CPT | Mod: 26

## 2019-11-02 PROCEDURE — 99223 1ST HOSP IP/OBS HIGH 75: CPT | Mod: AI

## 2019-11-02 RX ORDER — METOPROLOL TARTRATE 50 MG
75 TABLET ORAL DAILY
Refills: 0 | Status: DISCONTINUED | OUTPATIENT
Start: 2019-11-02 | End: 2019-11-04

## 2019-11-02 RX ORDER — BUDESONIDE AND FORMOTEROL FUMARATE DIHYDRATE 160; 4.5 UG/1; UG/1
2 AEROSOL RESPIRATORY (INHALATION)
Refills: 0 | Status: DISCONTINUED | OUTPATIENT
Start: 2019-11-02 | End: 2019-11-04

## 2019-11-02 RX ORDER — TAMSULOSIN HYDROCHLORIDE 0.4 MG/1
0.4 CAPSULE ORAL AT BEDTIME
Refills: 0 | Status: DISCONTINUED | OUTPATIENT
Start: 2019-11-02 | End: 2019-11-04

## 2019-11-02 RX ORDER — GLUCAGON INJECTION, SOLUTION 0.5 MG/.1ML
1 INJECTION, SOLUTION SUBCUTANEOUS ONCE
Refills: 0 | Status: DISCONTINUED | OUTPATIENT
Start: 2019-11-02 | End: 2019-11-04

## 2019-11-02 RX ORDER — BACITRACIN ZINC 500 UNIT/G
1 OINTMENT IN PACKET (EA) TOPICAL
Refills: 0 | Status: DISCONTINUED | OUTPATIENT
Start: 2019-11-02 | End: 2019-11-04

## 2019-11-02 RX ORDER — ASPIRIN/CALCIUM CARB/MAGNESIUM 324 MG
81 TABLET ORAL DAILY
Refills: 0 | Status: DISCONTINUED | OUTPATIENT
Start: 2019-11-02 | End: 2019-11-04

## 2019-11-02 RX ORDER — DEXTROSE 50 % IN WATER 50 %
25 SYRINGE (ML) INTRAVENOUS ONCE
Refills: 0 | Status: DISCONTINUED | OUTPATIENT
Start: 2019-11-02 | End: 2019-11-04

## 2019-11-02 RX ORDER — FLUTICASONE PROPIONATE 50 MCG
1 SPRAY, SUSPENSION NASAL
Refills: 0 | Status: DISCONTINUED | OUTPATIENT
Start: 2019-11-02 | End: 2019-11-04

## 2019-11-02 RX ORDER — DEXTROSE 50 % IN WATER 50 %
12.5 SYRINGE (ML) INTRAVENOUS ONCE
Refills: 0 | Status: DISCONTINUED | OUTPATIENT
Start: 2019-11-02 | End: 2019-11-04

## 2019-11-02 RX ORDER — ALPRAZOLAM 0.25 MG
0.5 TABLET ORAL AT BEDTIME
Refills: 0 | Status: DISCONTINUED | OUTPATIENT
Start: 2019-11-02 | End: 2019-11-04

## 2019-11-02 RX ORDER — FINASTERIDE 5 MG/1
5 TABLET, FILM COATED ORAL DAILY
Refills: 0 | Status: DISCONTINUED | OUTPATIENT
Start: 2019-11-02 | End: 2019-11-04

## 2019-11-02 RX ORDER — FENOFIBRATE,MICRONIZED 130 MG
145 CAPSULE ORAL DAILY
Refills: 0 | Status: DISCONTINUED | OUTPATIENT
Start: 2019-11-02 | End: 2019-11-04

## 2019-11-02 RX ORDER — WARFARIN SODIUM 2.5 MG/1
5 TABLET ORAL ONCE
Refills: 0 | Status: COMPLETED | OUTPATIENT
Start: 2019-11-02 | End: 2019-11-02

## 2019-11-02 RX ORDER — SIMVASTATIN 20 MG/1
40 TABLET, FILM COATED ORAL AT BEDTIME
Refills: 0 | Status: DISCONTINUED | OUTPATIENT
Start: 2019-11-02 | End: 2019-11-04

## 2019-11-02 RX ORDER — CLOPIDOGREL BISULFATE 75 MG/1
75 TABLET, FILM COATED ORAL DAILY
Refills: 0 | Status: DISCONTINUED | OUTPATIENT
Start: 2019-11-02 | End: 2019-11-04

## 2019-11-02 RX ORDER — SODIUM CHLORIDE 9 MG/ML
1000 INJECTION, SOLUTION INTRAVENOUS
Refills: 0 | Status: DISCONTINUED | OUTPATIENT
Start: 2019-11-02 | End: 2019-11-04

## 2019-11-02 RX ORDER — DEXTROSE 50 % IN WATER 50 %
15 SYRINGE (ML) INTRAVENOUS ONCE
Refills: 0 | Status: DISCONTINUED | OUTPATIENT
Start: 2019-11-02 | End: 2019-11-04

## 2019-11-02 RX ORDER — INSULIN LISPRO 100/ML
7 VIAL (ML) SUBCUTANEOUS
Refills: 0 | Status: DISCONTINUED | OUTPATIENT
Start: 2019-11-02 | End: 2019-11-04

## 2019-11-02 RX ORDER — INSULIN GLARGINE 100 [IU]/ML
12 INJECTION, SOLUTION SUBCUTANEOUS AT BEDTIME
Refills: 0 | Status: DISCONTINUED | OUTPATIENT
Start: 2019-11-02 | End: 2019-11-04

## 2019-11-02 RX ORDER — ISOSORBIDE MONONITRATE 60 MG/1
30 TABLET, EXTENDED RELEASE ORAL DAILY
Refills: 0 | Status: DISCONTINUED | OUTPATIENT
Start: 2019-11-02 | End: 2019-11-04

## 2019-11-02 RX ADMIN — BUDESONIDE AND FORMOTEROL FUMARATE DIHYDRATE 2 PUFF(S): 160; 4.5 AEROSOL RESPIRATORY (INHALATION) at 23:41

## 2019-11-02 RX ADMIN — Medication 0.5 MILLIGRAM(S): at 23:41

## 2019-11-02 RX ADMIN — WARFARIN SODIUM 5 MILLIGRAM(S): 2.5 TABLET ORAL at 23:41

## 2019-11-02 NOTE — H&P ADULT - NSHPREVIEWOFSYSTEMS_GEN_ALL_CORE
At least 10 ROS discussed with patient and except for pertinent positives in th HPI and PMH and use of glasses, are generally negative

## 2019-11-02 NOTE — H&P ADULT - PROBLEM SELECTOR PLAN 10
Plan was to give Ancef post Dialysis. Need to verify Plan was to give Ancef post Dialysis. Need to renew when scheduled (HMD is on M-W-F)

## 2019-11-02 NOTE — H&P ADULT - PROBLEM SELECTOR PLAN 7
on coumadin on coumadin. Per patient request would like to see his cardiologist (Dr Yan) to review all "blood thinners" meds

## 2019-11-02 NOTE — ED PROVIDER NOTE - PROGRESS NOTE DETAILS
case discussed with Dr Yan , cardio, Feels patient needs admission for monitoring. Evaluated by intensivists, stable for non icu tele. Dr Felix accepts

## 2019-11-02 NOTE — ED PROVIDER NOTE - ENMT, MLM
Airway patent, Nasal mucosa clear. Mouth with normal mucosa. Throat has no vesicles, no oropharyngeal exudates and uvula is midline. minor contusion to scalp

## 2019-11-02 NOTE — H&P ADULT - PROBLEM SELECTOR PLAN 8
In addition to HMD, nephrology mentioned giving lasix on "off dialysis" days. Would try to confirm In addition to HMD, nephrology mentioned giving Lasix on "off dialysis" days. Would try to confirm

## 2019-11-02 NOTE — H&P ADULT - HISTORY OF PRESENT ILLNESS
[age] male is sent from his residence to the ER following a brief loss of consciousness. Patient says at the time he was sitting when he didn't feel good and his head fell and hit the table. He soon returned to his baseline. Notes that he had dialysis last night and feels this was a contributing factor. ER PA tells me he reviewed elevated troponin level with cardiologist on call with recommendation for patient to be admitted for "low risk" telemetry 78yo male is sent from his residence (rehab at NH) to the ER following a brief loss of consciousness. Patient says at the time he was sitting when he didn't feel good (lightheaded)  and his head fell and hit the table. He soon returned to his baseline. Notes that he had dialysis last night and feels this was a contributing factor. ER PA tells me he reviewed elevated troponin level with cardiologist on call with recommendation for patient to be admitted for "low risk" telemetry. Notes that he still makes some urine

## 2019-11-02 NOTE — H&P ADULT - NSHPLABSRESULTS_GEN_ALL_CORE
9.4    7.31  )-----------( 153      ( 02 Nov 2019 16:15 )             28.6     11-02    137  |  93<L>  |  38<H>  ----------------------------<  203<H>  3.8   |  24  |  6.0<HH>    Ca    9.3      02 Nov 2019 16:15  Mg     1.9     11-02    TPro  6.7  /  Alb  3.6  /  TBili  0.9  /  DBili  x   /  AST  16  /  ALT  6   /  AlkPhos  34  11-02            PT/INR - ( 02 Nov 2019 16:15 )   PT: 25.90 sec;   INR: 2.27 ratio         PTT - ( 02 Nov 2019 16:15 )  PTT:45.0 sec  Lactate Trend    CARDIAC MARKERS ( 02 Nov 2019 16:15 )  x     / 0.43 ng/mL / x     / x     / x        CAPILLARY BLOOD GLUCOSE    POCT Blood Glucose.: 170 mg/dL (02 Nov 2019 22:54)    < from: CT Head No Cont (11.02.19 @ 16:50) >    EXAM:  CT BRAIN          PROCEDURE DATE:  11/02/2019        IMPRESSION:     Since 8/27/2019: Stable examination. No CT evidence for acute   intracranial pathology.    ANIYAH COLUNGA M.D., RESIDENT RADIOLOGIST  This document has been electronically signed.  GLADYS LR M.D., ATTENDING RADIOLOGIST  This document has been electronically signed. Nov 2 2019  6:05PM      < end of copied text > 9.4    7.31  )-----------( 153      ( 02 Nov 2019 16:15 )             28.6     11-02    137  |  93<L>  |  38<H>  ----------------------------<  203<H>  3.8   |  24  |  6.0<HH>    Ca    9.3      02 Nov 2019 16:15  Mg     1.9     11-02    TPro  6.7  /  Alb  3.6  /  TBili  0.9  /  DBili  x   /  AST  16  /  ALT  6   /  AlkPhos  34  11-02            PT/INR - ( 02 Nov 2019 16:15 )   PT: 25.90 sec;   INR: 2.27 ratio         PTT - ( 02 Nov 2019 16:15 )  PTT:45.0 sec  Lactate Trend    CARDIAC MARKERS ( 02 Nov 2019 16:15 )  x     / 0.43 ng/mL / x     / x     / x        CAPILLARY BLOOD GLUCOSE    POCT Blood Glucose.: 170 mg/dL (02 Nov 2019 22:54)    < from: CT Head No Cont (11.02.19 @ 16:50) >    EXAM:  CT BRAIN          PROCEDURE DATE:  11/02/2019        IMPRESSION:     Since 8/27/2019: Stable examination. No CT evidence for acute   intracranial pathology.    ANIYAH COLUNGA M.D., RESIDENT RADIOLOGIST  This document has been electronically signed.  GLADYS LR M.D., ATTENDING RADIOLOGIST  This document has been electronically signed. Nov 2 2019  6:05PM      < end of copied text >    EKG- unusal p axis (possible ectopic atrial rhythm), RBBB, Lateral infarct (age undetermined) 9.4    7.31  )-----------( 153      ( 02 Nov 2019 16:15 )             28.6     11-02    137  |  93<L>  |  38<H>  ----------------------------<  203<H>  3.8   |  24  |  6.0<HH>    Ca    9.3      02 Nov 2019 16:15  Mg     1.9     11-02    TPro  6.7  /  Alb  3.6  /  TBili  0.9  /  DBili  x   /  AST  16  /  ALT  6   /  AlkPhos  34  11-02            PT/INR - ( 02 Nov 2019 16:15 )   PT: 25.90 sec;   INR: 2.27 ratio         PTT - ( 02 Nov 2019 16:15 )  PTT:45.0 sec  Lactate Trend    CARDIAC MARKERS ( 02 Nov 2019 16:15 )  x     / 0.43 ng/mL / x     / x     / x        CAPILLARY BLOOD GLUCOSE    POCT Blood Glucose.: 170 mg/dL (02 Nov 2019 22:54)    < from: CT Head No Cont (11.02.19 @ 16:50) >    EXAM:  CT BRAIN          PROCEDURE DATE:  11/02/2019        IMPRESSION:     Since 8/27/2019: Stable examination. No CT evidence for acute   intracranial pathology.    ANIYAH COLUNGA M.D., RESIDENT RADIOLOGIST  This document has been electronically signed.  GLADYS LR M.D., ATTENDING RADIOLOGIST  This document has been electronically signed. Nov 2 2019  6:05PM      < end of copied text >    EKG- unusual p axis (possible ectopic atrial rhythm), RBBB, Lateral infarct (age undetermined)

## 2019-11-02 NOTE — H&P ADULT - PROBLEM SELECTOR PLAN 6
BRITTANI on prior admission was attributed to ATN from sepsis, now on dialysis. Will consult nephrology

## 2019-11-02 NOTE — ED PROVIDER NOTE - CLINICAL SUMMARY MEDICAL DECISION MAKING FREE TEXT BOX
78yo M here for syncope and head trauma. CT head negative, labs baseline for patient except for troponin which is elevated at 0.43. Likely renal in origin given patient's recent BRITTANI requiring HD. However no previous documentation of a troponin this high and in the context of syncope, case d/w cardiologist on call, recommending at a minimum trending of troponin and low risk tele if no new symptoms develop.

## 2019-11-02 NOTE — ED PROVIDER NOTE - OBJECTIVE STATEMENT
Patient sent from NH for syncope, passed out in chair, fell and hit head, +LOC, no chest pain, no SOB. Dc'd from [facility] yesterday to NH, Had dialysis last nigth. Patient states he feel fine at this time, no HA, no chest pain, no SOB<

## 2019-11-02 NOTE — H&P ADULT - PROBLEM SELECTOR PLAN 9
High troponin was reviewed by ER with cardiology and felt not to be indicative of coronary event. Will repeat Also CABG x 3. High troponin was reviewed by ER with cardiology and felt not to be indicative of coronary event. Will repeat

## 2019-11-02 NOTE — ED PROVIDER NOTE - ATTENDING CONTRIBUTION TO CARE
80yo M with PMHx Afib on coumadin, HFpEF (55%, G2DD), DM, HTN, COPD on 3-3.5L O2, CAD/CABG/stent on ASA/plavix, BPH, ESRD/HD, sent from [facility] for syncope and fall. Pt was sitting in chair when he felt lightheaded, had brief LOC, fell forward and hit the table with his head. Rapid return to baseline, no seizure-like activity reported by staff. Recent prolonged hospitalization for CHF exacerbation complicated by bacteremia and BRITTANI newly requiring HD. Pt had HD last night, feels like that was a contributing factor. Currently in ED has no acute complaints. Denies fever, chills, headache, CP, SOB, cough, palpitations, nausea, vomiting, diarrhea, abd pain, dysuria. +chronic leg swelling    Vital signs reviewed  GENERAL: Patient nontoxic appearing, NAD  HEAD: NCAT. No signs of basilar skull fx.   EYES: Anicteric  ENT: MMM  NECK: Supple, no midline TTP, normal ROM  RESPIRATORY: Normal respiratory effort. CTA B/L. No wheezing, rales, rhonchi  CARDIOVASCULAR: Regular rate and rhythm  ABDOMEN: Soft. Nondistended. Nontender. No guarding or rebound  MUSCULOSKELETAL/EXTREMITIES: Brisk cap refill. Equal radial pulses. 1+ pitting edema b/L. No midline back TTP  SKIN:  Warm and dry. Chronic venous stasis changes  NEURO: AAOx3. GCS 15. Speech clear and coherent. Answering questions appropriately. Face symmetric, no facial droop. No gross FND.

## 2019-11-02 NOTE — H&P ADULT - PROBLEM SELECTOR PLAN 1
Suspect a pre-renal cause Suspect a benign cause such as renal related cause (dehydration?) or vasovagal but will monitor on telemetry

## 2019-11-03 LAB
CK MB CFR SERPL CALC: 1.6 NG/ML — SIGNIFICANT CHANGE UP (ref 0.6–6.3)
CK SERPL-CCNC: 35 U/L — SIGNIFICANT CHANGE UP (ref 0–225)
GLUCOSE BLDC GLUCOMTR-MCNC: 137 MG/DL — HIGH (ref 70–99)
GLUCOSE BLDC GLUCOMTR-MCNC: 137 MG/DL — HIGH (ref 70–99)
GLUCOSE BLDC GLUCOMTR-MCNC: 160 MG/DL — HIGH (ref 70–99)
GLUCOSE BLDC GLUCOMTR-MCNC: 92 MG/DL — SIGNIFICANT CHANGE UP (ref 70–99)
INR BLD: 2.98 RATIO — HIGH (ref 0.65–1.3)
PROTHROM AB SERPL-ACNC: 33.9 SEC — HIGH (ref 9.95–12.87)
TROPONIN T SERPL-MCNC: 0.35 NG/ML — CRITICAL HIGH

## 2019-11-03 PROCEDURE — 99233 SBSQ HOSP IP/OBS HIGH 50: CPT

## 2019-11-03 RX ORDER — INSULIN GLARGINE 100 [IU]/ML
7 INJECTION, SOLUTION SUBCUTANEOUS ONCE
Refills: 0 | Status: COMPLETED | OUTPATIENT
Start: 2019-11-03 | End: 2019-11-03

## 2019-11-03 RX ADMIN — Medication 81 MILLIGRAM(S): at 11:19

## 2019-11-03 RX ADMIN — Medication 7 UNIT(S): at 14:02

## 2019-11-03 RX ADMIN — BUDESONIDE AND FORMOTEROL FUMARATE DIHYDRATE 2 PUFF(S): 160; 4.5 AEROSOL RESPIRATORY (INHALATION) at 08:27

## 2019-11-03 RX ADMIN — INSULIN GLARGINE 7 UNIT(S): 100 INJECTION, SOLUTION SUBCUTANEOUS at 22:49

## 2019-11-03 RX ADMIN — Medication 1 APPLICATION(S): at 05:59

## 2019-11-03 RX ADMIN — Medication 1 APPLICATION(S): at 17:29

## 2019-11-03 RX ADMIN — TAMSULOSIN HYDROCHLORIDE 0.4 MILLIGRAM(S): 0.4 CAPSULE ORAL at 21:53

## 2019-11-03 RX ADMIN — SIMVASTATIN 40 MILLIGRAM(S): 20 TABLET, FILM COATED ORAL at 21:53

## 2019-11-03 RX ADMIN — Medication 145 MILLIGRAM(S): at 11:19

## 2019-11-03 RX ADMIN — Medication 7 UNIT(S): at 08:22

## 2019-11-03 RX ADMIN — Medication 0.5 MILLIGRAM(S): at 21:53

## 2019-11-03 RX ADMIN — FINASTERIDE 5 MILLIGRAM(S): 5 TABLET, FILM COATED ORAL at 11:19

## 2019-11-03 RX ADMIN — BUDESONIDE AND FORMOTEROL FUMARATE DIHYDRATE 2 PUFF(S): 160; 4.5 AEROSOL RESPIRATORY (INHALATION) at 21:53

## 2019-11-03 RX ADMIN — Medication 75 MILLIGRAM(S): at 06:00

## 2019-11-03 RX ADMIN — CLOPIDOGREL BISULFATE 75 MILLIGRAM(S): 75 TABLET, FILM COATED ORAL at 11:19

## 2019-11-03 NOTE — PROGRESS NOTE ADULT - PROBLEM SELECTOR PLAN 7
on coumadin. Per patient request would like to see his cardiologist (Dr Yan) to review all "blood thinners" meds  hold coumadin tonight and repeat INR in am  INR 2.9 today

## 2019-11-03 NOTE — PROGRESS NOTE ADULT - PROBLEM SELECTOR PLAN 8
As per admitting hospitalist - In addition to HMD, nephrology mentioned giving Lasix on "off dialysis" days.   await for nephrology follow up

## 2019-11-03 NOTE — PROGRESS NOTE ADULT - PROBLEM SELECTOR PLAN 1
likely vasovagal  Pt was s/p a BM few minutes prior and a 3 hour HD session the night prior  keep on tele for 24hrs   check echo in am

## 2019-11-03 NOTE — CONSULT NOTE ADULT - SUBJECTIVE AND OBJECTIVE BOX
Patient is a 79y old  Male who presents with a chief complaint of syncope (03 Nov 2019 10:32)      HPI:  80yo male is sent from his residence (rehab at NH) to the ER following a brief loss of consciousness. Patient says at the time he was sitting when he didn't feel good (lightheaded)  and his head fell and hit the table. He soon returned to his baseline. Notes that he had dialysis last night and feels this was a contributing factor. ER PA tells me he reviewed elevated troponin level with cardiologist on call with recommendation for patient to be admitted for "low risk" telemetry. Notes that he still makes some urine (02 Nov 2019 19:57)      PAST MEDICAL & SURGICAL HISTORY:  BPH (benign prostatic hyperplasia)  CHF (congestive heart failure)  COPD (chronic obstructive pulmonary disease)  Diabetes  HTN (hypertension)  H/O heart artery stent  S/P CABG x 3                      PREVIOUS DIAGNOSTIC TESTING:      ECHO  FINDINGS:    STRESS  FINDINGS:    CATHETERIZATION  FINDINGS:    MEDICATIONS  (STANDING):  ALPRAZolam 0.5 milliGRAM(s) Oral at bedtime  aspirin  chewable 81 milliGRAM(s) Oral daily  BACItracin   Ointment 1 Application(s) Topical two times a day  budesonide 160 MICROgram(s)/formoterol 4.5 MICROgram(s) Inhaler 2 Puff(s) Inhalation two times a day  clopidogrel Tablet 75 milliGRAM(s) Oral daily  dextrose 5%. 1000 milliLiter(s) (50 mL/Hr) IV Continuous <Continuous>  dextrose 50% Injectable 12.5 Gram(s) IV Push once  dextrose 50% Injectable 25 Gram(s) IV Push once  dextrose 50% Injectable 25 Gram(s) IV Push once  fenofibrate Tablet 145 milliGRAM(s) Oral daily  finasteride 5 milliGRAM(s) Oral daily  insulin glargine Injectable (LANTUS) 12 Unit(s) SubCutaneous at bedtime  insulin lispro Injectable (HumaLOG) 7 Unit(s) SubCutaneous three times a day before meals  isosorbide   mononitrate ER Tablet (IMDUR) 30 milliGRAM(s) Oral daily  metoprolol succinate ER 75 milliGRAM(s) Oral daily  simvastatin 40 milliGRAM(s) Oral at bedtime  tamsulosin 0.4 milliGRAM(s) Oral at bedtime    MEDICATIONS  (PRN):  dextrose 40% Gel 15 Gram(s) Oral once PRN Blood Glucose LESS THAN 70 milliGRAM(s)/deciliter  fluticasone propionate 50 MICROgram(s)/spray Nasal Spray 1 Spray(s) Both Nostrils two times a day PRN congestion  glucagon  Injectable 1 milliGRAM(s) IntraMuscular once PRN Glucose LESS THAN 70 milligrams/deciliter      FAMILY HISTORY:  No pertinent family history in first degree relatives      SOCIAL HISTORY:    CIGARETTES:    ALCOHOL:        Vital Signs Last 24 Hrs  T(C): 35.9 (03 Nov 2019 05:52), Max: 36.3 (02 Nov 2019 21:05)  T(F): 96.7 (03 Nov 2019 05:52), Max: 97.4 (02 Nov 2019 21:05)  HR: 67 (03 Nov 2019 11:20) (65 - 99)  BP: 115/59 (03 Nov 2019 11:20) (108/59 - 131/61)  BP(mean): --  RR: 18 (03 Nov 2019 05:52) (16 - 18)  SpO2: 97% (03 Nov 2019 09:05) (94% - 100%)            INTERPRETATION OF TELEMETRY:    ECG:    I&O's Detail    03 Nov 2019 07:01  -  03 Nov 2019 12:47  --------------------------------------------------------  IN:    Oral Fluid: 360 mL  Total IN: 360 mL    OUT:    Voided: 300 mL  Total OUT: 300 mL    Total NET: 60 mL          LABS:                        9.4    7.31  )-----------( 153      ( 02 Nov 2019 16:15 )             28.6     11-02    137  |  93<L>  |  38<H>  ----------------------------<  203<H>  3.8   |  24  |  6.0<HH>    Ca    9.3      02 Nov 2019 16:15  Mg     1.9     11-02    TPro  6.7  /  Alb  3.6  /  TBili  0.9  /  DBili  x   /  AST  16  /  ALT  6   /  AlkPhos  34  11-02    CARDIAC MARKERS ( 03 Nov 2019 05:56 )  x     / 0.35 ng/mL / 35 U/L / x     / 1.6 ng/mL  CARDIAC MARKERS ( 02 Nov 2019 16:15 )  x     / 0.43 ng/mL / x     / x     / x          PT/INR - ( 03 Nov 2019 05:56 )   PT: 33.90 sec;   INR: 2.98 ratio         PTT - ( 02 Nov 2019 16:15 )  PTT:45.0 sec    I&O's Summary    03 Nov 2019 07:01  -  03 Nov 2019 12:47  --------------------------------------------------------  IN: 360 mL / OUT: 300 mL / NET: 60 mL      BNP  RADIOLOGY & ADDITIONAL STUDIES:

## 2019-11-03 NOTE — CONSULT NOTE ADULT - SUBJECTIVE AND OBJECTIVE BOX
[facility]  INITIAL CONSULT NOTE  --------------------------------------------------------------------------------  HPI:  78 yo male w/ multiple medical problems as below.  Recently in CCU with decompensated CHF and MSSA bacteremia, complicated by oliguric ATN requiring dialysis.  Pt was d/c'd 10/31 to [facility], had HD 11/1.  On 11/2, pt developed a brief episode of syncope, and therefore sent back to hospital.  Today pt states he's been feeling well, no further syncope.  States he still urinates only "a little."        PAST HISTORY  --------------------------------------------------------------------------------  PAST MEDICAL & SURGICAL HISTORY:  BPH (benign prostatic hyperplasia)  CHF (congestive heart failure)  COPD (chronic obstructive pulmonary disease)  Diabetes  HTN (hypertension)  H/O heart artery stent  S/P CABG x 3    FAMILY HISTORY:  No pertinent family history in first degree relatives    PAST SOCIAL HISTORY:    ALLERGIES & MEDICATIONS  --------------------------------------------------------------------------------  Allergies    No Known Allergies    Intolerances      Standing Inpatient Medications  ALPRAZolam 0.5 milliGRAM(s) Oral at bedtime  aspirin  chewable 81 milliGRAM(s) Oral daily  BACItracin   Ointment 1 Application(s) Topical two times a day  budesonide 160 MICROgram(s)/formoterol 4.5 MICROgram(s) Inhaler 2 Puff(s) Inhalation two times a day  clopidogrel Tablet 75 milliGRAM(s) Oral daily  dextrose 5%. 1000 milliLiter(s) IV Continuous <Continuous>  dextrose 50% Injectable 12.5 Gram(s) IV Push once  dextrose 50% Injectable 25 Gram(s) IV Push once  dextrose 50% Injectable 25 Gram(s) IV Push once  fenofibrate Tablet 145 milliGRAM(s) Oral daily  finasteride 5 milliGRAM(s) Oral daily  insulin glargine Injectable (LANTUS) 12 Unit(s) SubCutaneous at bedtime  insulin lispro Injectable (HumaLOG) 7 Unit(s) SubCutaneous three times a day before meals  isosorbide   mononitrate ER Tablet (IMDUR) 30 milliGRAM(s) Oral daily  metoprolol succinate ER 75 milliGRAM(s) Oral daily  simvastatin 40 milliGRAM(s) Oral at bedtime  tamsulosin 0.4 milliGRAM(s) Oral at bedtime    PRN Inpatient Medications  dextrose 40% Gel 15 Gram(s) Oral once PRN  fluticasone propionate 50 MICROgram(s)/spray Nasal Spray 1 Spray(s) Both Nostrils two times a day PRN  glucagon  Injectable 1 milliGRAM(s) IntraMuscular once PRN      REVIEW OF SYSTEMS  --------------------------------------------------------------------------------  Gen: No weight changes, fatigue, fevers/chills, weakness  Skin: No rashes  Head/Eyes/Ears/Mouth: No headache; Normal hearing; Normal vision w/o blurriness; No sinus pain/discomfort, sore throat  Respiratory: No dyspnea, cough, wheezing, hemoptysis  CV: No chest pain, PND, orthopnea  GI: No abdominal pain, diarrhea, constipation, nausea, vomiting, melena, hematochezia  : No increased frequency, dysuria, hematuria, nocturia  MSK: No joint pain/swelling; no back pain; no edema  Neuro: No dizziness/lightheadedness, weakness, seizures, numbness, tingling  Heme: No easy bruising or bleeding  Endo: No heat/cold intolerance  Psych: No significant nervousness, anxiety, stress, depression    All other systems were reviewed and are negative, except as noted.    VITALS/PHYSICAL EXAM  --------------------------------------------------------------------------------  T(C): 35.9 (11-03-19 @ 13:51), Max: 36.3 (11-02-19 @ 21:05)  HR: 67 (11-03-19 @ 13:51) (65 - 99)  BP: 121/59 (11-03-19 @ 13:51) (108/59 - 131/61)  RR: 16 (11-03-19 @ 13:51) (16 - 18)  SpO2: 97% (11-03-19 @ 09:05) (97% - 99%)  Wt(kg): --  Height (cm): 167.64 (11-02-19 @ 15:31)  Weight (kg): 103.6 (11-03-19 @ 05:52)  BMI (kg/m2): 36.9 (11-03-19 @ 05:52)  BSA (m2): 2.12 (11-03-19 @ 05:52)      11-03-19 @ 07:01  -  11-03-19 @ 16:24  --------------------------------------------------------  IN: 360 mL / OUT: 300 mL / NET: 60 mL      Physical Exam:  	Gen: NAD, well-appearing  	HEENT: PERRL, supple neck, clear oropharynx  	Pulm: CTA B/L  	CV: RRR, S1S2; no rub  	Back: No spinal or CVA tenderness; no sacral edema  	Abd: +BS, soft, nontender/nondistended  	: No suprapubic tenderness  	UE: Warm, FROM, no clubbing, intact strength; no edema  	LE: Warm, FROM, no clubbing, intact strength; chronic edema  	Neuro: No focal deficits  	Psych: Normal affect and mood  	Skin: Warm, without rashes  	Vascular access:    LABS/STUDIES  --------------------------------------------------------------------------------              9.4    7.31  >-----------<  153      [11-02-19 @ 16:15]              28.6     137  |  93  |  38  ----------------------------<  203      [11-02-19 @ 16:15]  3.8   |  24  |  6.0        Ca     9.3     [11-02-19 @ 16:15]      Mg     1.9     [11-02-19 @ 16:15]    TPro  6.7  /  Alb  3.6  /  TBili  0.9  /  DBili  x   /  AST  16  /  ALT  6   /  AlkPhos  34  [11-02-19 @ 16:15]    PT/INR: PT 33.90, INR 2.98       [11-03-19 @ 05:56]  PTT: 45.0       [11-02-19 @ 16:15]    Troponin 0.35      [11-03-19 @ 05:56]  CK 35      [11-03-19 @ 05:56]    Creatinine Trend:  SCr 6.0 [11-02 @ 16:15]  SCr 6.0 [10-31 @ 05:51]  SCr 7.5 [10-30 @ 06:08]  SCr 7.0 [10-29 @ 05:56]  SCr 8.4 [10-28 @ 05:42]    Urinalysis - [10-15-19 @ 11:30]      Color Yellow / Appearance Clear / SG 1.015 / pH 5.5      Gluc Negative / Ketone Negative  / Bili Negative / Urobili 0.2       Blood Negative / Protein Negative / Leuk Est Negative / Nitrite Negative      RBC  / WBC  / Hyaline  / Gran  / Sq Epi  / Non Sq Epi  / Bacteria       PTH -- (Ca 7.8)      [10-21-19 @ 19:08]   278  HbA1c 6.5      [08-20-19 @ 05:42]  TSH 0.84      [08-29-19 @ 06:15]    HBsAb <3.0      [10-25-19 @ 10:12]  HBsAb Nonreact      [10-25-19 @ 10:12]  HBsAg Nonreact      [10-25-19 @ 10:12]  HBcAb Nonreact      [10-24-19 @ 15:49]  HCV 0.16, Nonreact      [10-25-19 @ 10:12]    C3 Complement 132      [10-21-19 @ 19:08]  C4 Complement 25      [10-21-19 @ 19:08]

## 2019-11-03 NOTE — PROGRESS NOTE ADULT - SUBJECTIVE AND OBJECTIVE BOX
TAIWO ZAVALA  79y  Male      Patient is a 79y old  Male who presents with a chief complaint of syncope (02 Nov 2019 19:57)      INTERVAL HPI/OVERNIGHT EVENTS:  Patient seen and examined earlier this morning.  Sitting comfrotably in the chair. No complaints.       REVIEW OF SYSTEMS:  CONSTITUTIONAL: No fever, weight loss, or fatigue  EYES: No eye pain, visual disturbances, or discharge  ENMT:  No difficulty hearing, tinnitus, vertigo; No sinus or throat pain  NECK: No pain or stiffness  RESPIRATORY: No cough, wheezing, chills or hemoptysis; No shortness of breath  CARDIOVASCULAR: No chest pain, palpitations, dizziness, or leg swelling  GASTROINTESTINAL: No abdominal or epigastric pain. No nausea, vomiting, or hematemesis; No diarrhea or constipation. No melena or hematochezia.  GENITOURINARY: No dysuria, frequency, hematuria, or incontinence  NEUROLOGICAL: No headaches, memory loss, loss of strength, numbness, or tremors  SKIN: No itching, burning, rashes, or lesions   LYMPH NODES: No enlarged glands  ENDOCRINE: No heat or cold intolerance; No hair loss  MUSCULOSKELETAL: No joint pain or swelling; No muscle, back, or extremity pain  PSYCHIATRIC: No depression, anxiety, mood swings, or difficulty sleeping  HEME/LYMPH: No easy bruising, or bleeding gums  ALLERY AND IMMUNOLOGIC: No hives or eczema    T(C): 35.9 (11-03-19 @ 05:52), Max: 36.3 (11-02-19 @ 21:05)  HR: 65 (11-03-19 @ 05:52) (65 - 99)  BP: 128/68 (11-03-19 @ 05:52) (108/59 - 131/61)  RR: 18 (11-03-19 @ 05:52) (16 - 18)  SpO2: 97% (11-03-19 @ 09:05) (94% - 100%)    PHYSICAL EXAM:  GENERAL: NAD, well-groomed, well-developed  HEAD:  Atraumatic, Normocephalic  EYES: EOMI, PERRLA, conjunctiva and sclera clear  ENMT: No tonsillar erythema, exudates, or enlargement; Moist mucous membranes, Good dentition, No lesions  NECK: Supple, No JVD, Normal thyroid, tessio catheter to right chest  NERVOUS SYSTEM:  Alert & Oriented X3, Good concentration; Motor Strength 5/5 B/L upper and lower extremities; DTRs 2+ intact and symmetric  CHEST/LUNG: Clear to percussion bilaterally; No rales, rhonchi, wheezing, or rubs  HEART: Regular rate and rhythm; No murmurs, rubs, or gallops  ABDOMEN: Soft, Nontender, Nondistended; Bowel sounds present; obese  EXTREMITIES:  2+ Peripheral Pulses, No clubbing, cyanosis. LE pedal edema and chronic venous stasis.  LYMPH: No lymphadenopathy noted  SKIN: No rashes or lesions    Consultant(s) Notes Reviewed:  [x ] YES  [ ] NO  Care Discussed with Consultants/Other Providers [ x] YES  [ ] NO    LAB:                        9.4    7.31  )-----------( 153      ( 02 Nov 2019 16:15 )             28.6     11-02    137  |  93<L>  |  38<H>  ----------------------------<  203<H>  3.8   |  24  |  6.0<HH>    Ca    9.3      02 Nov 2019 16:15  Mg     1.9     11-02    TPro  6.7  /  Alb  3.6  /  TBili  0.9  /  DBili  x   /  AST  16  /  ALT  6   /  AlkPhos  34  11-02    LIVER FUNCTIONS - ( 02 Nov 2019 16:15 )  Alb: 3.6 g/dL / Pro: 6.7 g/dL / ALK PHOS: 34 U/L / ALT: 6 U/L / AST: 16 U/L / GGT: x           CARDIAC MARKERS ( 03 Nov 2019 05:56 )  x     / 0.35 ng/mL / 35 U/L / x     / 1.6 ng/mL  CARDIAC MARKERS ( 02 Nov 2019 16:15 )  x     / 0.43 ng/mL / x     / x     / x            Drug Dosing Weight  Height (cm): 167.64 (02 Nov 2019 15:31)  Weight (kg): 103.6 (03 Nov 2019 05:52)  BMI (kg/m2): 36.9 (03 Nov 2019 05:52)  BSA (m2): 2.12 (03 Nov 2019 05:52)  Height (cm): 167.64 (11-02-19 @ 15:31)  Weight (kg): 103.6 (11-03-19 @ 05:52)  BMI (kg/m2): 36.9 (11-03-19 @ 05:52)  BSA (m2): 2.12 (11-03-19 @ 05:52)      CAPILLARY BLOOD GLUCOSE  POCT Blood Glucose.: 137 mg/dL (03 Nov 2019 07:26)  POCT Blood Glucose.: 170 mg/dL (02 Nov 2019 22:54)  POCT Blood Glucose.: 146 mg/dL (02 Nov 2019 21:07)        RADIOLOGY & ADDITIONAL TESTS:  Imaging Personally Reviewed:  [x] YES  [ ] NO    HEALTH ISSUES - PROBLEM Dx:  MSSA (methicillin susceptible Staphylococcus aureus): MSSA (methicillin susceptible Staphylococcus aureus)  Coronary stent patent: Coronary stent patent  Chronic diastolic congestive heart failure: Chronic diastolic congestive heart failure  Atrial fibrillation: Atrial fibrillation  ATN (acute tubular necrosis): ATN (acute tubular necrosis)  COPD (chronic obstructive pulmonary disease): COPD (chronic obstructive pulmonary disease)  HTN (hypertension): HTN (hypertension)  Diabetes: Diabetes  CHI (closed head injury): CHI (closed head injury)  Syncope: Syncope          MEDS:  ALPRAZolam 0.5 milliGRAM(s) Oral at bedtime  aspirin  chewable 81 milliGRAM(s) Oral daily  BACItracin   Ointment 1 Application(s) Topical two times a day  budesonide 160 MICROgram(s)/formoterol 4.5 MICROgram(s) Inhaler 2 Puff(s) Inhalation two times a day  clopidogrel Tablet 75 milliGRAM(s) Oral daily  dextrose 40% Gel 15 Gram(s) Oral once PRN  dextrose 5%. 1000 milliLiter(s) IV Continuous <Continuous>  dextrose 50% Injectable 12.5 Gram(s) IV Push once  dextrose 50% Injectable 25 Gram(s) IV Push once  dextrose 50% Injectable 25 Gram(s) IV Push once  fenofibrate Tablet 145 milliGRAM(s) Oral daily  finasteride 5 milliGRAM(s) Oral daily  fluticasone propionate 50 MICROgram(s)/spray Nasal Spray 1 Spray(s) Both Nostrils two times a day PRN  glucagon  Injectable 1 milliGRAM(s) IntraMuscular once PRN  insulin glargine Injectable (LANTUS) 12 Unit(s) SubCutaneous at bedtime  insulin lispro Injectable (HumaLOG) 7 Unit(s) SubCutaneous three times a day before meals  isosorbide   mononitrate ER Tablet (IMDUR) 30 milliGRAM(s) Oral daily  metoprolol succinate ER 75 milliGRAM(s) Oral daily  simvastatin 40 milliGRAM(s) Oral at bedtime  tamsulosin 0.4 milliGRAM(s) Oral at bedtime      Progress Note Handoff  Pending Consults:  Pending Tests:  Pending Results:  Family Discussion:  Disposition: Home_____/SNF______/Other_____/Unknown at this time_____    Please call me with any questions at extension [phone]

## 2019-11-03 NOTE — PROVIDER CONTACT NOTE (OTHER) - SITUATION
Pt had requested to be given 5 units insulin for breakfast based on his fs of 137. Sliding scale suggested for pt. FS lunch was 137. Pt was sleeping rechecked FS was 169. Pt ate lunch. 7 units given.

## 2019-11-03 NOTE — PROGRESS NOTE ADULT - PROBLEM SELECTOR PLAN 6
BRITTANI on prior admission was attributed to ATN from sepsis, now on dialysis. Will consult nephrology to resume HD MWF

## 2019-11-03 NOTE — CONSULT NOTE ADULT - ASSESSMENT
1)  Recent development of oliguric ATN in setting of decompensated CHF and MSSA bacteremia, currently HD dependant w/ no clear indication of any recovery of renal function    2)  Brief episode of syncope at [facility] yesterday.  No documented arrhythmia.  Likely related to intense dialysis night before, perhaps w/ excessive UF    3)  MSSA bactermia on Ancef after HD    Recommendations:    1)  No changes in current treatment from Renal standpoint    2)  Agree w/ d/c back to [facility] tomorrow    3)  Will f/u with pt at [facility]

## 2019-11-03 NOTE — PROGRESS NOTE ADULT - PROBLEM SELECTOR PLAN 9
Also CABG x 3. High troponin was reviewed by ER with cardiology and felt not to be indicative of coronary event.   cardio eval pending

## 2019-11-04 ENCOUNTER — TRANSCRIPTION ENCOUNTER (OUTPATIENT)
Age: 79
End: 2019-11-04

## 2019-11-04 VITALS
RESPIRATION RATE: 18 BRPM | SYSTOLIC BLOOD PRESSURE: 105 MMHG | TEMPERATURE: 98 F | DIASTOLIC BLOOD PRESSURE: 58 MMHG | HEART RATE: 66 BPM

## 2019-11-04 LAB
ALBUMIN SERPL ELPH-MCNC: 3.3 G/DL — LOW (ref 3.5–5.2)
ALP SERPL-CCNC: 39 U/L — SIGNIFICANT CHANGE UP (ref 30–115)
ALT FLD-CCNC: <5 U/L — SIGNIFICANT CHANGE UP (ref 0–41)
ANION GAP SERPL CALC-SCNC: 19 MMOL/L — HIGH (ref 7–14)
AST SERPL-CCNC: 14 U/L — SIGNIFICANT CHANGE UP (ref 0–41)
BILIRUB SERPL-MCNC: 0.9 MG/DL — SIGNIFICANT CHANGE UP (ref 0.2–1.2)
BUN SERPL-MCNC: 54 MG/DL — HIGH (ref 10–20)
CALCIUM SERPL-MCNC: 9.1 MG/DL — SIGNIFICANT CHANGE UP (ref 8.5–10.1)
CHLORIDE SERPL-SCNC: 92 MMOL/L — LOW (ref 98–110)
CO2 SERPL-SCNC: 25 MMOL/L — SIGNIFICANT CHANGE UP (ref 17–32)
CREAT SERPL-MCNC: 6.6 MG/DL — CRITICAL HIGH (ref 0.7–1.5)
GLUCOSE BLDC GLUCOMTR-MCNC: 108 MG/DL — HIGH (ref 70–99)
GLUCOSE BLDC GLUCOMTR-MCNC: 126 MG/DL — HIGH (ref 70–99)
GLUCOSE BLDC GLUCOMTR-MCNC: 169 MG/DL — HIGH (ref 70–99)
GLUCOSE SERPL-MCNC: 132 MG/DL — HIGH (ref 70–99)
HCT VFR BLD CALC: 29.6 % — LOW (ref 42–52)
HGB BLD-MCNC: 9.6 G/DL — LOW (ref 14–18)
INR BLD: 2.13 RATIO — HIGH (ref 0.65–1.3)
MCHC RBC-ENTMCNC: 25.9 PG — LOW (ref 27–31)
MCHC RBC-ENTMCNC: 32.4 G/DL — SIGNIFICANT CHANGE UP (ref 32–37)
MCV RBC AUTO: 80 FL — SIGNIFICANT CHANGE UP (ref 80–94)
NRBC # BLD: 0 /100 WBCS — SIGNIFICANT CHANGE UP (ref 0–0)
PLATELET # BLD AUTO: 149 K/UL — SIGNIFICANT CHANGE UP (ref 130–400)
POTASSIUM SERPL-MCNC: 3.8 MMOL/L — SIGNIFICANT CHANGE UP (ref 3.5–5)
POTASSIUM SERPL-SCNC: 3.8 MMOL/L — SIGNIFICANT CHANGE UP (ref 3.5–5)
PROT SERPL-MCNC: 6.8 G/DL — SIGNIFICANT CHANGE UP (ref 6–8)
PROTHROM AB SERPL-ACNC: 24.3 SEC — HIGH (ref 9.95–12.87)
RBC # BLD: 3.7 M/UL — LOW (ref 4.7–6.1)
RBC # FLD: 18.8 % — HIGH (ref 11.5–14.5)
SODIUM SERPL-SCNC: 136 MMOL/L — SIGNIFICANT CHANGE UP (ref 135–146)
WBC # BLD: 8.99 K/UL — SIGNIFICANT CHANGE UP (ref 4.8–10.8)
WBC # FLD AUTO: 8.99 K/UL — SIGNIFICANT CHANGE UP (ref 4.8–10.8)

## 2019-11-04 PROCEDURE — 99239 HOSP IP/OBS DSCHRG MGMT >30: CPT

## 2019-11-04 RX ORDER — CEFAZOLIN SODIUM 1 G
2000 VIAL (EA) INJECTION ONCE
Refills: 0 | Status: DISCONTINUED | OUTPATIENT
Start: 2019-11-04 | End: 2019-11-04

## 2019-11-04 RX ORDER — OXYCODONE AND ACETAMINOPHEN 5; 325 MG/1; MG/1
1 TABLET ORAL ONCE
Refills: 0 | Status: DISCONTINUED | OUTPATIENT
Start: 2019-11-04 | End: 2019-11-04

## 2019-11-04 RX ORDER — LIDOCAINE 4 G/100G
1 CREAM TOPICAL DAILY
Refills: 0 | Status: DISCONTINUED | OUTPATIENT
Start: 2019-11-04 | End: 2019-11-04

## 2019-11-04 RX ORDER — WARFARIN SODIUM 2.5 MG/1
5 TABLET ORAL ONCE
Refills: 0 | Status: DISCONTINUED | OUTPATIENT
Start: 2019-11-04 | End: 2019-11-04

## 2019-11-04 RX ORDER — BACITRACIN ZINC 500 UNIT/G
1 OINTMENT IN PACKET (EA) TOPICAL
Qty: 0 | Refills: 0 | DISCHARGE
Start: 2019-11-04

## 2019-11-04 RX ORDER — LIDOCAINE 4 G/100G
1 CREAM TOPICAL
Qty: 0 | Refills: 0 | DISCHARGE
Start: 2019-11-04

## 2019-11-04 RX ADMIN — Medication 7 UNIT(S): at 08:09

## 2019-11-04 RX ADMIN — OXYCODONE AND ACETAMINOPHEN 1 TABLET(S): 5; 325 TABLET ORAL at 08:09

## 2019-11-04 RX ADMIN — FINASTERIDE 5 MILLIGRAM(S): 5 TABLET, FILM COATED ORAL at 12:38

## 2019-11-04 RX ADMIN — CLOPIDOGREL BISULFATE 75 MILLIGRAM(S): 75 TABLET, FILM COATED ORAL at 12:38

## 2019-11-04 RX ADMIN — OXYCODONE AND ACETAMINOPHEN 1 TABLET(S): 5; 325 TABLET ORAL at 09:00

## 2019-11-04 RX ADMIN — Medication 1 APPLICATION(S): at 05:29

## 2019-11-04 RX ADMIN — LIDOCAINE 1 PATCH: 4 CREAM TOPICAL at 12:39

## 2019-11-04 RX ADMIN — Medication 81 MILLIGRAM(S): at 12:38

## 2019-11-04 RX ADMIN — Medication 145 MILLIGRAM(S): at 12:38

## 2019-11-04 RX ADMIN — OXYCODONE AND ACETAMINOPHEN 1 TABLET(S): 5; 325 TABLET ORAL at 02:45

## 2019-11-04 RX ADMIN — BUDESONIDE AND FORMOTEROL FUMARATE DIHYDRATE 2 PUFF(S): 160; 4.5 AEROSOL RESPIRATORY (INHALATION) at 08:09

## 2019-11-04 RX ADMIN — OXYCODONE AND ACETAMINOPHEN 1 TABLET(S): 5; 325 TABLET ORAL at 02:01

## 2019-11-04 NOTE — PROGRESS NOTE ADULT - ASSESSMENT
1)  Recent oliguric ATN in setting of decompensated CHF and MSSA bacteremia, currently HD dependant w/ no clear indication of any recovery of renal function; although pt claims to be making more urine.    2)  Brief episode of syncope at SNF after HD with 2 L UF as usual.  No documented arrhythmia.  Possible vasovagal.    3)  MSSA bactermia on Ancef after HD    Recommendations:    1) Pt can be released from renal standpoint _ HD at [facility] with less fluid removal, only 1 L  2) Afib on Coumadin, to be monitored by PCP at [facility]

## 2019-11-04 NOTE — DISCHARGE NOTE NURSING/CASE MANAGEMENT/SOCIAL WORK - PATIENT PORTAL LINK FT
You can access the FollowMyHealth Patient Portal offered by [facility] by registering at the following website: [email or web address]. By joining [facility]’s FollowMyHealth portal, you will also be able to view your health information using other applications (apps) compatible with our system.

## 2019-11-04 NOTE — DISCHARGE NOTE PROVIDER - NSDCMRMEDTOKEN_GEN_ALL_CORE_FT
ALPRAZolam 0.5 mg oral tablet: 1 tab(s) orally once a day (at bedtime)  Ancef: 2 gram(s) intravenous 2 times a week tuesday and thursday post hemodialysis  Ancef: 3 gram(s) intravenous once a week saturday post hemodialysis  aspirin 81 mg oral tablet, chewable: 1 tab(s) orally once a day  bacitracin 500 units/g topical ointment: 1 application topically 2 times a day  budesonide-formoterol 160 mcg-4.5 mcg/inh inhalation aerosol:  inhaled   clopidogrel 75 mg oral tablet: 1 tab(s) orally once a day  fenofibrate 145 mg oral tablet: 1 tab(s) orally once a day  finasteride 5 mg oral tablet: 1 tab(s) orally once a day  fluticasone 50 mcg/inh nasal spray: 1 spray(s) nasal 2 times a day  insulin glargine: 12 unit(s) subcutaneous once a day (at bedtime)  insulin lispro: 7 unit(s) subcutaneous 3 times a day (before meals)  lidocaine 5% topical film: Apply topically to affected area once a day, As Needed  silver sulfADIAZINE 1% topical cream: 1 application topically 2 times a day  simvastatin 40 mg oral tablet: 1 tab(s) orally once a day (at bedtime)  tamsulosin 0.4 mg oral capsule: 1 cap(s) orally once a day (at bedtime)  warfarin 5 mg oral tablet: 1 tab(s) orally once a day Monitor INR daily for dosing

## 2019-11-04 NOTE — DISCHARGE NOTE NURSING/CASE MANAGEMENT/SOCIAL WORK - NSDCPEPT PROEDHF_GEN_ALL_CORE
Low salt diet/Report signs and symptoms to primary care provider/Activities as tolerated/Monitor weight daily/Call primary care provider for follow up after discharge

## 2019-11-04 NOTE — DISCHARGE NOTE PROVIDER - NSDCCPCAREPLAN_GEN_ALL_CORE_FT
PRINCIPAL DISCHARGE DIAGNOSIS  Diagnosis: Syncope  Assessment and Plan of Treatment: likely vasovagal  fall precuations  PT as tolerated  stop BP meds to avoid hypotension  follow up with PMD and cardiology

## 2019-11-04 NOTE — PROGRESS NOTE ADULT - ASSESSMENT
·  Problem: Syncope.  Plan: likely vasovagal  Pt was s/p a BM few minutes prior and a 3 hour HD session the night prior  Pt was on tele for 24hrs - no events - d/c tele  Pt had echo done two weeks ago - normal LVF and Mod AR     Problem/Plan - 2:  ·  Problem: CHI (closed head injury).  Plan: For now bacitracin to forehead abrasion.      Problem/Plan - 3:  ·  Problem: Diabetes well controlled  -decrease insulin dose as per pt's request     Problem/Plan - 4:  ·  Problem: HTN (hypertension).  Plan: BP on the lower side, will d/c bp meds for now.     Problem/Plan - 5:  ·  Problem: COPD (chronic obstructive pulmonary disease).  Plan: stable; monitor on current meds.      Problem/Plan - 6:  Problem: ATN (acute tubular necrosis). Plan: BRITTANI on prior admission was attributed to ATN from sepsis, now on dialysis.    Appreciated nephrology consult.  Await HD today     Problem/Plan - 7:  ·  Problem: Atrial fibrillation.  Plan: on coumadin. Cardio eval appreciated.      Problem/Plan - 8:  ·  Problem: Chronic diastolic congestive heart failure.  Plan: As per admitting hospitalist - In addition to HMD, nephrology mentioned giving Lasix on "off dialysis" days.   await for nephrology follow up.      Problem/Plan - 9:  ·  Problem: Coronary stent patent.  Plan: Also CABG x 3. High troponin was reviewed by ER with cardiology and felt not to be indicative of coronary event.   cardio eval appreciated     Problem/Plan - 10:  Problem: MSSA (methicillin susceptible Staphylococcus aureus). Plan; Plan is to give Ancef post Dialysis.     Problem/Plan - 11:  Problem: Acute on chronic right knee pain due to arthritis - percocet and lidoderm patch.  Patient takes tylenol #3 at SNF    Progress Note Handoff  Pending Consults: none  Pending Tests: none  Pending Results: HD session  Family Discussion: discussed with pt - in agreement with the plan  Disposition: Home_____/SNF___x___/Other_____/Unknown at this time_____    Please call me with any questions at extension [phone]

## 2019-11-04 NOTE — DISCHARGE NOTE PROVIDER - HOSPITAL COURSE
78yo male is sent from his residence (rehab at NH) to the ER following a brief loss of consciousness. Patient says at the time he was sitting when he didn't feel good (lightheaded)  and his head fell and hit the table. He soon returned to his baseline. Notes that he had dialysis last night and feels this was a contributing factor. ER PA tells me he reviewed elevated troponin level with cardiologist on call with recommendation for patient to be admitted for "low risk" telemetry. Notes that he still makes some urine.        Patient placed on tele - no events for over 24hrs.    Syncope was likely vasovagal after a BM    Pt doing well.          d/c to snf today    d/c planning took over 55 min    d/c papers done by me

## 2019-11-04 NOTE — GOALS OF CARE CONVERSATION - ADVANCED CARE PLANNING - CONVERSATION DETAILS
Discussed goals of care with pt in detail.  Discussed his diagnosis and prognosis in detail.  Patient wishes to remain a full code.

## 2019-11-04 NOTE — PROGRESS NOTE ADULT - SUBJECTIVE AND OBJECTIVE BOX
ZAVALATAIWO CASPER  79y  Male      Patient is a 79y old  Male who presents with a chief complaint of syncope (02 Nov 2019 19:57)      INTERVAL HPI/OVERNIGHT EVENTS:  Patient seen and examined earlier this morning.  Sitting comfrotably in the chair.  On tele - no events.  Evaluated by cardio - no acute intervention.  Await HD for today.  Pt complains of acute on chronic right knee pain due to arthritis.        REVIEW OF SYSTEMS:  CONSTITUTIONAL: No fever, weight loss, or fatigue  EYES: No eye pain, visual disturbances, or discharge  ENMT:  No difficulty hearing, tinnitus, vertigo; No sinus or throat pain  NECK: No pain or stiffness  RESPIRATORY: No cough, wheezing, chills or hemoptysis; No shortness of breath  CARDIOVASCULAR: No chest pain, palpitations, dizziness, or leg swelling  GASTROINTESTINAL: No abdominal or epigastric pain. No nausea, vomiting, or hematemesis; No diarrhea or constipation. No melena or hematochezia.  GENITOURINARY: No dysuria, frequency, hematuria, or incontinence  NEUROLOGICAL: No headaches, memory loss, loss of strength, numbness, or tremors  SKIN: No itching, burning, rashes, or lesions   LYMPH NODES: No enlarged glands  ENDOCRINE: No heat or cold intolerance; No hair loss  MUSCULOSKELETAL: No joint pain or swelling; No muscle, back, or extremity pain  PSYCHIATRIC: No depression, anxiety, mood swings, or difficulty sleeping  HEME/LYMPH: No easy bruising, or bleeding gums  ALLERY AND IMMUNOLOGIC: No hives or eczema    Vital Signs Last 24 Hrs  T(C): 36.9 (04 Nov 2019 05:00), Max: 36.9 (04 Nov 2019 05:00)  T(F): 98.4 (04 Nov 2019 05:00), Max: 98.4 (04 Nov 2019 05:00)  HR: 66 (04 Nov 2019 05:00) (66 - 67)  BP: 105/58 (04 Nov 2019 05:00) (105/58 - 130/64)  BP(mean): --  RR: 18 (04 Nov 2019 05:00) (16 - 18)  SpO2: 97% (03 Nov 2019 09:05) (97% - 97%)    PHYSICAL EXAM:  GENERAL: NAD, well-groomed, well-developed  HEAD:  Atraumatic, Normocephalic  EYES: EOMI, PERRLA, conjunctiva and sclera clear  ENMT: No tonsillar erythema, exudates, or enlargement; Moist mucous membranes, Good dentition, No lesions  NECK: Supple, No JVD, Normal thyroid, tessio catheter to right chest  NERVOUS SYSTEM:  Alert & Oriented X3, Good concentration; Motor Strength 5/5 B/L upper and lower extremities; DTRs 2+ intact and symmetric  CHEST/LUNG: Clear to percussion bilaterally; No rales, rhonchi, wheezing, or rubs  HEART: Regular rate and rhythm; No murmurs, rubs, or gallops  ABDOMEN: Soft, Nontender, Nondistended; Bowel sounds present; obese  EXTREMITIES:  2+ Peripheral Pulses, No clubbing, cyanosis. LE pedal edema and chronic venous stasis.  Right knee TTP +  LYMPH: No lymphadenopathy noted  SKIN: No rashes or lesions    Consultant(s) Notes Reviewed:  [x ] YES  [ ] NO  Care Discussed with Consultants/Other Providers [ x] YES  [ ] NO    LAB:                                   9.6    8.99  )-----------( 149      ( 04 Nov 2019 05:54 )             29.6     11-04    136  |  92<L>  |  54<H>  ----------------------------<  132<H>  3.8   |  25  |  6.6<HH>    Ca    9.1      04 Nov 2019 05:54  Mg     1.9     11-02    TPro  6.8  /  Alb  3.3<L>  /  TBili  0.9  /  DBili  x   /  AST  14  /  ALT  <5  /  AlkPhos  39  11-04        Drug Dosing Weight  Height (cm): 167.64 (02 Nov 2019 15:31)  Weight (kg): 103.6 (03 Nov 2019 05:52)  BMI (kg/m2): 36.9 (03 Nov 2019 05:52)  BSA (m2): 2.12 (03 Nov 2019 05:52)  Height (cm): 167.64 (11-02-19 @ 15:31)  Weight (kg): 103.6 (11-03-19 @ 05:52)  BMI (kg/m2): 36.9 (11-03-19 @ 05:52)  BSA (m2): 2.12 (11-03-19 @ 05:52)      CAPILLARY BLOOD GLUCOSE  POCT Blood Glucose.: 126 mg/dL (04 Nov 2019 07:26)  POCT Blood Glucose.: 160 mg/dL (03 Nov 2019 21:33)  POCT Blood Glucose.: 92 mg/dL (03 Nov 2019 16:42)  POCT Blood Glucose.: 137 mg/dL (03 Nov 2019 11:38)          RADIOLOGY & ADDITIONAL TESTS:  Imaging Personally Reviewed:  [x] YES  [ ] NO    HEALTH ISSUES - PROBLEM Dx:  MSSA (methicillin susceptible Staphylococcus aureus): MSSA (methicillin susceptible Staphylococcus aureus)  Coronary stent patent: Coronary stent patent  Chronic diastolic congestive heart failure: Chronic diastolic congestive heart failure  Atrial fibrillation: Atrial fibrillation  ATN (acute tubular necrosis): ATN (acute tubular necrosis)  COPD (chronic obstructive pulmonary disease): COPD (chronic obstructive pulmonary disease)  HTN (hypertension): HTN (hypertension)  Diabetes: Diabetes  CHI (closed head injury): CHI (closed head injury)  Syncope: Syncope          MEDICATIONS  (STANDING):  ALPRAZolam 0.5 milliGRAM(s) Oral at bedtime  aspirin  chewable 81 milliGRAM(s) Oral daily  BACItracin   Ointment 1 Application(s) Topical two times a day  budesonide 160 MICROgram(s)/formoterol 4.5 MICROgram(s) Inhaler 2 Puff(s) Inhalation two times a day  ceFAZolin   IVPB 2000 milliGRAM(s) IV Intermittent once  clopidogrel Tablet 75 milliGRAM(s) Oral daily  dextrose 5%. 1000 milliLiter(s) (50 mL/Hr) IV Continuous <Continuous>  dextrose 50% Injectable 12.5 Gram(s) IV Push once  dextrose 50% Injectable 25 Gram(s) IV Push once  dextrose 50% Injectable 25 Gram(s) IV Push once  fenofibrate Tablet 145 milliGRAM(s) Oral daily  finasteride 5 milliGRAM(s) Oral daily  insulin glargine Injectable (LANTUS) 12 Unit(s) SubCutaneous at bedtime  insulin lispro Injectable (HumaLOG) 7 Unit(s) SubCutaneous three times a day before meals  lidocaine   Patch 1 Patch Transdermal daily  simvastatin 40 milliGRAM(s) Oral at bedtime  tamsulosin 0.4 milliGRAM(s) Oral at bedtime  warfarin 5 milliGRAM(s) Oral once    MEDICATIONS  (PRN):  dextrose 40% Gel 15 Gram(s) Oral once PRN Blood Glucose LESS THAN 70 milliGRAM(s)/deciliter  fluticasone propionate 50 MICROgram(s)/spray Nasal Spray 1 Spray(s) Both Nostrils two times a day PRN congestion  glucagon  Injectable 1 milliGRAM(s) IntraMuscular once PRN Glucose LESS THAN 70 milligrams/deciliter

## 2019-11-04 NOTE — PROGRESS NOTE ADULT - SUBJECTIVE AND OBJECTIVE BOX
Nephrology progress note    Patient is seen and examined, events over the last 24 h noted .  Feels better; making more urine.  Allergies:  No Known Allergies    Hospital Medications:   MEDICATIONS  (STANDING):  ALPRAZolam 0.5 milliGRAM(s) Oral at bedtime  aspirin  chewable 81 milliGRAM(s) Oral daily  BACItracin   Ointment 1 Application(s) Topical two times a day  budesonide 160 MICROgram(s)/formoterol 4.5 MICROgram(s) Inhaler 2 Puff(s) Inhalation two times a day  ceFAZolin   IVPB 2000 milliGRAM(s) IV Intermittent once  clopidogrel Tablet 75 milliGRAM(s) Oral daily  dextrose 5%. 1000 milliLiter(s) (50 mL/Hr) IV Continuous <Continuous>  dextrose 50% Injectable 12.5 Gram(s) IV Push once  dextrose 50% Injectable 25 Gram(s) IV Push once  dextrose 50% Injectable 25 Gram(s) IV Push once  fenofibrate Tablet 145 milliGRAM(s) Oral daily  finasteride 5 milliGRAM(s) Oral daily  insulin glargine Injectable (LANTUS) 12 Unit(s) SubCutaneous at bedtime  insulin lispro Injectable (HumaLOG) 7 Unit(s) SubCutaneous three times a day before meals  lidocaine   Patch 1 Patch Transdermal daily  simvastatin 40 milliGRAM(s) Oral at bedtime  tamsulosin 0.4 milliGRAM(s) Oral at bedtime  warfarin 5 milliGRAM(s) Oral once        VITALS:  T(F): 98.4 (11-04-19 @ 05:00), Max: 98.4 (11-04-19 @ 05:00)  HR: 66 (11-04-19 @ 05:00)  BP: 105/58 (11-04-19 @ 05:00)  RR: 18 (11-04-19 @ 05:00)  SpO2: --  Wt(kg): --    11-03 @ 07:01  -  11-04 @ 07:00  --------------------------------------------------------  IN: 720 mL / OUT: 700 mL / NET: 20 mL          PHYSICAL EXAM:  Constitutional: NAD  HEENT: anicteric sclera, MMM  Neck: No JVD  Respiratory: CTA  Cardiovascular: S1, S2, RRR  Gastrointestinal: BS+, soft, NT/ND  Extremities: No peripheral edema  Neurological: A/O x 3  : No julian.   Skin: No rashes  Vascular Access: tesio    LABS:  11-04    136  |  92<L>  |  54<H>  ----------------------------<  132<H>  3.8   |  25  |  6.6<HH>  Creatinine Trend: 6.6<--, 6.0<--, 6.0<--, 7.5<--, 7.0<--, 8.4<--    Ca    9.1      04 Nov 2019 05:54  Mg     1.9     11-02    TPro  6.8  /  Alb  3.3<L>  /  TBili  0.9  /  DBili      /  AST  14  /  ALT  <5  /  AlkPhos  39  11-04                          9.6    8.99  )-----------( 149      ( 04 Nov 2019 05:54 )             29.6       Urine Studies:      RADIOLOGY & ADDITIONAL STUDIES:  < from: Xray Chest 1 View- PORTABLE-Urgent (11.02.19 @ 16:57) >    Impression:      No change in the bilateral basal opacities/pleural effusions and the   pulmonary vascular congestion.    < end of copied text >

## 2019-11-05 ENCOUNTER — OUTPATIENT (OUTPATIENT)
Dept: OUTPATIENT SERVICES | Facility: HOSPITAL | Age: 79
LOS: 1 days | Discharge: HOME | End: 2019-11-05

## 2019-11-05 DIAGNOSIS — R79.9 ABNORMAL FINDING OF BLOOD CHEMISTRY, UNSPECIFIED: ICD-10-CM

## 2019-11-05 DIAGNOSIS — Z95.5 PRESENCE OF CORONARY ANGIOPLASTY IMPLANT AND GRAFT: Chronic | ICD-10-CM

## 2019-11-05 DIAGNOSIS — Z95.1 PRESENCE OF AORTOCORONARY BYPASS GRAFT: Chronic | ICD-10-CM

## 2019-11-05 LAB
ESTIMATED AVERAGE GLUCOSE: 166 MG/DL — HIGH (ref 68–114)
HBA1C BLD-MCNC: 7.4 % — HIGH (ref 4–5.6)

## 2019-11-07 DIAGNOSIS — S00.81XA ABRASION OF OTHER PART OF HEAD, INITIAL ENCOUNTER: ICD-10-CM

## 2019-11-07 DIAGNOSIS — Z87.891 PERSONAL HISTORY OF NICOTINE DEPENDENCE: ICD-10-CM

## 2019-11-07 DIAGNOSIS — E11.9 TYPE 2 DIABETES MELLITUS WITHOUT COMPLICATIONS: ICD-10-CM

## 2019-11-07 DIAGNOSIS — R79.89 OTHER SPECIFIED ABNORMAL FINDINGS OF BLOOD CHEMISTRY: ICD-10-CM

## 2019-11-07 DIAGNOSIS — Z91.81 HISTORY OF FALLING: ICD-10-CM

## 2019-11-07 DIAGNOSIS — I48.91 UNSPECIFIED ATRIAL FIBRILLATION: ICD-10-CM

## 2019-11-07 DIAGNOSIS — B95.61 METHICILLIN SUSCEPTIBLE STAPHYLOCOCCUS AUREUS INFECTION AS THE CAUSE OF DISEASES CLASSIFIED ELSEWHERE: ICD-10-CM
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
## 2019-11-07 DIAGNOSIS — Z79.4 LONG TERM (CURRENT) USE OF INSULIN: ICD-10-CM

## 2019-11-07 DIAGNOSIS — R55 SYNCOPE AND COLLAPSE: ICD-10-CM

## 2019-11-07 DIAGNOSIS — Z95.5 PRESENCE OF CORONARY ANGIOPLASTY IMPLANT AND GRAFT: ICD-10-CM

## 2019-11-07 DIAGNOSIS — Z99.2 DEPENDENCE ON RENAL DIALYSIS: ICD-10-CM

## 2019-11-07 DIAGNOSIS — M13.80 OTHER SPECIFIED ARTHRITIS, UNSPECIFIED SITE: ICD-10-CM

## 2019-11-07 DIAGNOSIS — J44.9 CHRONIC OBSTRUCTIVE PULMONARY DISEASE, UNSPECIFIED: ICD-10-CM

## 2019-11-07 DIAGNOSIS — Z95.1 PRESENCE OF AORTOCORONARY BYPASS GRAFT: ICD-10-CM

## 2019-11-07 DIAGNOSIS — G89.29 OTHER CHRONIC PAIN: ICD-10-CM
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
## 2019-11-07 DIAGNOSIS — M25.561 PAIN IN RIGHT KNEE: ICD-10-CM

## 2019-11-07 DIAGNOSIS — I50.32 CHRONIC DIASTOLIC (CONGESTIVE) HEART FAILURE: ICD-10-CM
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
## 2019-11-07 DIAGNOSIS — N40.0 BENIGN PROSTATIC HYPERPLASIA WITHOUT LOWER URINARY TRACT SYMPTOMS: ICD-10-CM

## 2019-11-07 DIAGNOSIS — N17.0 ACUTE KIDNEY FAILURE WITH TUBULAR NECROSIS: ICD-10-CM

## 2019-11-07 DIAGNOSIS — Z79.01 LONG TERM (CURRENT) USE OF ANTICOAGULANTS: ICD-10-CM

## 2019-11-08 ENCOUNTER — OUTPATIENT (OUTPATIENT)
Dept: OUTPATIENT SERVICES | Facility: HOSPITAL | Age: 79
LOS: 1 days | Discharge: HOME | End: 2019-11-08

## 2019-11-08 DIAGNOSIS — Z95.5 PRESENCE OF CORONARY ANGIOPLASTY IMPLANT AND GRAFT: Chronic | ICD-10-CM

## 2019-11-08 DIAGNOSIS — Z95.1 PRESENCE OF AORTOCORONARY BYPASS GRAFT: Chronic | ICD-10-CM

## 2019-11-08 DIAGNOSIS — D68.8 OTHER SPECIFIED COAGULATION DEFECTS: ICD-10-CM

## 2019-11-08 DIAGNOSIS — D64.9 ANEMIA, UNSPECIFIED: ICD-10-CM

## 2019-11-09 LAB — C3 NEF SERPL-ACNC: SIGNIFICANT CHANGE UP

## 2019-11-11 ENCOUNTER — OUTPATIENT (OUTPATIENT)
Dept: OUTPATIENT SERVICES | Facility: HOSPITAL | Age: 79
LOS: 1 days | Discharge: HOME | End: 2019-11-11

## 2019-11-11 DIAGNOSIS — Z79.01 LONG TERM (CURRENT) USE OF ANTICOAGULANTS: ICD-10-CM

## 2019-11-11 DIAGNOSIS — I13.0 HYPERTENSIVE HEART AND CHRONIC KIDNEY DISEASE WITH HEART FAILURE AND STAGE 1 THROUGH STAGE 4 CHRONIC KIDNEY DISEASE, OR UNSPECIFIED CHRONIC KIDNEY DISEASE: ICD-10-CM

## 2019-11-11 DIAGNOSIS — N17.0 ACUTE KIDNEY FAILURE WITH TUBULAR NECROSIS: ICD-10-CM

## 2019-11-11 DIAGNOSIS — G93.40 ENCEPHALOPATHY, UNSPECIFIED: ICD-10-CM

## 2019-11-11 DIAGNOSIS — N18.3 CHRONIC KIDNEY DISEASE, STAGE 3 (MODERATE): ICD-10-CM

## 2019-11-11 DIAGNOSIS — Z79.84 LONG TERM (CURRENT) USE OF ORAL HYPOGLYCEMIC DRUGS: ICD-10-CM

## 2019-11-11 DIAGNOSIS — J15.6 PNEUMONIA DUE TO OTHER GRAM-NEGATIVE BACTERIA: ICD-10-CM

## 2019-11-11 DIAGNOSIS — N40.0 BENIGN PROSTATIC HYPERPLASIA WITHOUT LOWER URINARY TRACT SYMPTOMS: ICD-10-CM

## 2019-11-11 DIAGNOSIS — I25.119 ATHEROSCLEROTIC HEART DISEASE OF NATIVE CORONARY ARTERY WITH UNSPECIFIED ANGINA PECTORIS: ICD-10-CM

## 2019-11-11 DIAGNOSIS — F40.240 CLAUSTROPHOBIA: ICD-10-CM

## 2019-11-11 DIAGNOSIS — R79.89 OTHER SPECIFIED ABNORMAL FINDINGS OF BLOOD CHEMISTRY: ICD-10-CM

## 2019-11-11 DIAGNOSIS — R06.02 SHORTNESS OF BREATH: ICD-10-CM

## 2019-11-11 DIAGNOSIS — Z95.1 PRESENCE OF AORTOCORONARY BYPASS GRAFT: Chronic | ICD-10-CM

## 2019-11-11 DIAGNOSIS — J44.0 CHRONIC OBSTRUCTIVE PULMONARY DISEASE WITH (ACUTE) LOWER RESPIRATORY INFECTION: ICD-10-CM

## 2019-11-11 DIAGNOSIS — E11.22 TYPE 2 DIABETES MELLITUS WITH DIABETIC CHRONIC KIDNEY DISEASE: ICD-10-CM

## 2019-11-11 DIAGNOSIS — Z87.891 PERSONAL HISTORY OF NICOTINE DEPENDENCE: ICD-10-CM

## 2019-11-11 DIAGNOSIS — A41.01 SEPSIS DUE TO METHICILLIN SUSCEPTIBLE STAPHYLOCOCCUS AUREUS: ICD-10-CM

## 2019-11-11 DIAGNOSIS — J96.01 ACUTE RESPIRATORY FAILURE WITH HYPOXIA: ICD-10-CM

## 2019-11-11 DIAGNOSIS — E83.42 HYPOMAGNESEMIA: ICD-10-CM

## 2019-11-11 DIAGNOSIS — I35.0 NONRHEUMATIC AORTIC (VALVE) STENOSIS: ICD-10-CM

## 2019-11-11 DIAGNOSIS — I50.33 ACUTE ON CHRONIC DIASTOLIC (CONGESTIVE) HEART FAILURE: ICD-10-CM

## 2019-11-11 DIAGNOSIS — I48.91 UNSPECIFIED ATRIAL FIBRILLATION: ICD-10-CM

## 2019-11-11 DIAGNOSIS — Z95.5 PRESENCE OF CORONARY ANGIOPLASTY IMPLANT AND GRAFT: ICD-10-CM

## 2019-11-11 DIAGNOSIS — Z95.1 PRESENCE OF AORTOCORONARY BYPASS GRAFT: ICD-10-CM

## 2019-11-11 DIAGNOSIS — Z95.5 PRESENCE OF CORONARY ANGIOPLASTY IMPLANT AND GRAFT: Chronic | ICD-10-CM

## 2019-11-11 DIAGNOSIS — R53.81 OTHER MALAISE: ICD-10-CM

## 2019-11-12 DIAGNOSIS — Z79.899 OTHER LONG TERM (CURRENT) DRUG THERAPY: ICD-10-CM

## 2019-11-13 ENCOUNTER — OUTPATIENT (OUTPATIENT)
Dept: OUTPATIENT SERVICES | Facility: HOSPITAL | Age: 79
LOS: 1 days | Discharge: HOME | End: 2019-11-13

## 2019-11-13 DIAGNOSIS — I48.91 UNSPECIFIED ATRIAL FIBRILLATION: ICD-10-CM

## 2019-11-13 DIAGNOSIS — Z95.5 PRESENCE OF CORONARY ANGIOPLASTY IMPLANT AND GRAFT: Chronic | ICD-10-CM

## 2019-11-13 DIAGNOSIS — Z95.1 PRESENCE OF AORTOCORONARY BYPASS GRAFT: Chronic | ICD-10-CM

## 2019-11-15 ENCOUNTER — OUTPATIENT (OUTPATIENT)
Dept: OUTPATIENT SERVICES | Facility: HOSPITAL | Age: 79
LOS: 1 days | Discharge: HOME | End: 2019-11-15

## 2019-11-15 DIAGNOSIS — Z95.1 PRESENCE OF AORTOCORONARY BYPASS GRAFT: Chronic | ICD-10-CM

## 2019-11-15 DIAGNOSIS — I48.91 UNSPECIFIED ATRIAL FIBRILLATION: ICD-10-CM

## 2019-11-15 DIAGNOSIS — Z95.5 PRESENCE OF CORONARY ANGIOPLASTY IMPLANT AND GRAFT: Chronic | ICD-10-CM

## 2019-11-20 ENCOUNTER — OUTPATIENT (OUTPATIENT)
Dept: OUTPATIENT SERVICES | Facility: HOSPITAL | Age: 79
LOS: 1 days | Discharge: HOME | End: 2019-11-20

## 2019-11-20 DIAGNOSIS — I48.91 UNSPECIFIED ATRIAL FIBRILLATION: ICD-10-CM

## 2019-11-20 DIAGNOSIS — Z95.1 PRESENCE OF AORTOCORONARY BYPASS GRAFT: Chronic | ICD-10-CM

## 2019-11-20 DIAGNOSIS — Z95.5 PRESENCE OF CORONARY ANGIOPLASTY IMPLANT AND GRAFT: Chronic | ICD-10-CM

## 2019-11-25 ENCOUNTER — OUTPATIENT (OUTPATIENT)
Dept: OUTPATIENT SERVICES | Facility: HOSPITAL | Age: 79
LOS: 1 days | Discharge: HOME | End: 2019-11-25

## 2019-11-25 DIAGNOSIS — Z95.1 PRESENCE OF AORTOCORONARY BYPASS GRAFT: Chronic | ICD-10-CM

## 2019-11-25 DIAGNOSIS — Z95.5 PRESENCE OF CORONARY ANGIOPLASTY IMPLANT AND GRAFT: Chronic | ICD-10-CM

## 2019-11-25 DIAGNOSIS — I48.91 UNSPECIFIED ATRIAL FIBRILLATION: ICD-10-CM

## 2019-11-29 ENCOUNTER — OUTPATIENT (OUTPATIENT)
Dept: OUTPATIENT SERVICES | Facility: HOSPITAL | Age: 79
LOS: 1 days | Discharge: HOME | End: 2019-11-29

## 2019-11-29 DIAGNOSIS — Z95.1 PRESENCE OF AORTOCORONARY BYPASS GRAFT: Chronic | ICD-10-CM

## 2019-11-29 DIAGNOSIS — Z95.5 PRESENCE OF CORONARY ANGIOPLASTY IMPLANT AND GRAFT: Chronic | ICD-10-CM

## 2019-11-29 DIAGNOSIS — Z79.899 OTHER LONG TERM (CURRENT) DRUG THERAPY: ICD-10-CM

## 2019-12-04 ENCOUNTER — OUTPATIENT (OUTPATIENT)
Dept: OUTPATIENT SERVICES | Facility: HOSPITAL | Age: 79
LOS: 1 days | Discharge: HOME | End: 2019-12-04

## 2019-12-04 DIAGNOSIS — I48.91 UNSPECIFIED ATRIAL FIBRILLATION: ICD-10-CM

## 2019-12-04 DIAGNOSIS — Z95.1 PRESENCE OF AORTOCORONARY BYPASS GRAFT: Chronic | ICD-10-CM

## 2019-12-04 DIAGNOSIS — Z95.5 PRESENCE OF CORONARY ANGIOPLASTY IMPLANT AND GRAFT: Chronic | ICD-10-CM

## 2019-12-09 ENCOUNTER — OUTPATIENT (OUTPATIENT)
Dept: OUTPATIENT SERVICES | Facility: HOSPITAL | Age: 79
LOS: 1 days | Discharge: HOME | End: 2019-12-09

## 2019-12-09 DIAGNOSIS — I48.91 UNSPECIFIED ATRIAL FIBRILLATION: ICD-10-CM

## 2019-12-09 DIAGNOSIS — Z95.1 PRESENCE OF AORTOCORONARY BYPASS GRAFT: Chronic | ICD-10-CM

## 2019-12-09 DIAGNOSIS — Z95.5 PRESENCE OF CORONARY ANGIOPLASTY IMPLANT AND GRAFT: Chronic | ICD-10-CM

## 2019-12-13 ENCOUNTER — OUTPATIENT (OUTPATIENT)
Dept: OUTPATIENT SERVICES | Facility: HOSPITAL | Age: 79
LOS: 1 days | Discharge: HOME | End: 2019-12-13

## 2019-12-13 DIAGNOSIS — Z95.5 PRESENCE OF CORONARY ANGIOPLASTY IMPLANT AND GRAFT: Chronic | ICD-10-CM

## 2019-12-13 DIAGNOSIS — I48.91 UNSPECIFIED ATRIAL FIBRILLATION: ICD-10-CM

## 2019-12-13 DIAGNOSIS — Z95.1 PRESENCE OF AORTOCORONARY BYPASS GRAFT: Chronic | ICD-10-CM

## 2019-12-18 ENCOUNTER — OUTPATIENT (OUTPATIENT)
Dept: OUTPATIENT SERVICES | Facility: HOSPITAL | Age: 79
LOS: 1 days | Discharge: HOME | End: 2019-12-18

## 2019-12-18 DIAGNOSIS — Z79.899 OTHER LONG TERM (CURRENT) DRUG THERAPY: ICD-10-CM

## 2019-12-18 DIAGNOSIS — Z95.5 PRESENCE OF CORONARY ANGIOPLASTY IMPLANT AND GRAFT: Chronic | ICD-10-CM

## 2019-12-18 DIAGNOSIS — Z95.1 PRESENCE OF AORTOCORONARY BYPASS GRAFT: Chronic | ICD-10-CM

## 2019-12-20 ENCOUNTER — OUTPATIENT (OUTPATIENT)
Dept: OUTPATIENT SERVICES | Facility: HOSPITAL | Age: 79
LOS: 1 days | Discharge: HOME | End: 2019-12-20

## 2019-12-20 DIAGNOSIS — Z95.1 PRESENCE OF AORTOCORONARY BYPASS GRAFT: Chronic | ICD-10-CM

## 2019-12-20 DIAGNOSIS — I48.91 UNSPECIFIED ATRIAL FIBRILLATION: ICD-10-CM

## 2019-12-20 DIAGNOSIS — Z95.5 PRESENCE OF CORONARY ANGIOPLASTY IMPLANT AND GRAFT: Chronic | ICD-10-CM

## 2019-12-26 ENCOUNTER — OUTPATIENT (OUTPATIENT)
Dept: OUTPATIENT SERVICES | Facility: HOSPITAL | Age: 79
LOS: 1 days | Discharge: HOME | End: 2019-12-26

## 2019-12-26 DIAGNOSIS — Z95.5 PRESENCE OF CORONARY ANGIOPLASTY IMPLANT AND GRAFT: Chronic | ICD-10-CM

## 2019-12-26 DIAGNOSIS — Z95.1 PRESENCE OF AORTOCORONARY BYPASS GRAFT: Chronic | ICD-10-CM

## 2019-12-26 DIAGNOSIS — I48.91 UNSPECIFIED ATRIAL FIBRILLATION: ICD-10-CM

## 2020-01-30 ENCOUNTER — INPATIENT (INPATIENT)
Facility: HOSPITAL | Age: 80
LOS: 13 days | Discharge: HOME | End: 2020-02-13
Attending: INTERNAL MEDICINE | Admitting: INTERNAL MEDICINE
Payer: MEDICARE

## 2020-01-30 VITALS
WEIGHT: 237 LBS | HEIGHT: 66 IN | TEMPERATURE: 96 F | HEART RATE: 62 BPM | OXYGEN SATURATION: 93 % | SYSTOLIC BLOOD PRESSURE: 117 MMHG | RESPIRATION RATE: 18 BRPM | DIASTOLIC BLOOD PRESSURE: 66 MMHG

## 2020-01-30 DIAGNOSIS — Z95.1 PRESENCE OF AORTOCORONARY BYPASS GRAFT: Chronic | ICD-10-CM

## 2020-01-30 DIAGNOSIS — Z95.5 PRESENCE OF CORONARY ANGIOPLASTY IMPLANT AND GRAFT: Chronic | ICD-10-CM

## 2020-01-30 LAB
ALBUMIN SERPL ELPH-MCNC: 3.8 G/DL — SIGNIFICANT CHANGE UP (ref 3.5–5.2)
ALP SERPL-CCNC: 47 U/L — SIGNIFICANT CHANGE UP (ref 30–115)
ALT FLD-CCNC: 10 U/L — SIGNIFICANT CHANGE UP (ref 0–41)
ANION GAP SERPL CALC-SCNC: 20 MMOL/L — HIGH (ref 7–14)
APTT BLD: 42.5 SEC — HIGH (ref 27–39.2)
AST SERPL-CCNC: 22 U/L — SIGNIFICANT CHANGE UP (ref 0–41)
BASE EXCESS BLDV CALC-SCNC: -5.4 MMOL/L — LOW (ref -2–2)
BASOPHILS # BLD AUTO: 0.03 K/UL — SIGNIFICANT CHANGE UP (ref 0–0.2)
BASOPHILS NFR BLD AUTO: 0.3 % — SIGNIFICANT CHANGE UP (ref 0–1)
BILIRUB SERPL-MCNC: 1 MG/DL — SIGNIFICANT CHANGE UP (ref 0.2–1.2)
BUN SERPL-MCNC: 140 MG/DL — CRITICAL HIGH (ref 10–20)
CA-I SERPL-SCNC: 1.19 MMOL/L — SIGNIFICANT CHANGE UP (ref 1.12–1.3)
CALCIUM SERPL-MCNC: 9.8 MG/DL — SIGNIFICANT CHANGE UP (ref 8.5–10.1)
CHLORIDE SERPL-SCNC: 101 MMOL/L — SIGNIFICANT CHANGE UP (ref 98–110)
CK MB CFR SERPL CALC: 5.8 NG/ML — SIGNIFICANT CHANGE UP (ref 0.6–6.3)
CK SERPL-CCNC: 143 U/L — SIGNIFICANT CHANGE UP (ref 0–225)
CO2 SERPL-SCNC: 19 MMOL/L — SIGNIFICANT CHANGE UP (ref 17–32)
CREAT SERPL-MCNC: 3.6 MG/DL — HIGH (ref 0.7–1.5)
EOSINOPHIL # BLD AUTO: 0.04 K/UL — SIGNIFICANT CHANGE UP (ref 0–0.7)
EOSINOPHIL NFR BLD AUTO: 0.4 % — SIGNIFICANT CHANGE UP (ref 0–8)
GAS PNL BLDV: 139 MMOL/L — SIGNIFICANT CHANGE UP (ref 136–145)
GAS PNL BLDV: SIGNIFICANT CHANGE UP
GLUCOSE BLDC GLUCOMTR-MCNC: 130 MG/DL — HIGH (ref 70–99)
GLUCOSE BLDC GLUCOMTR-MCNC: 73 MG/DL — SIGNIFICANT CHANGE UP (ref 70–99)
GLUCOSE SERPL-MCNC: 83 MG/DL — SIGNIFICANT CHANGE UP (ref 70–99)
HCO3 BLDV-SCNC: 20 MMOL/L — LOW (ref 22–29)
HCT VFR BLD CALC: 41.5 % — LOW (ref 42–52)
HCT VFR BLDA CALC: 40.6 % — SIGNIFICANT CHANGE UP (ref 34–44)
HGB BLD CALC-MCNC: 13.3 G/DL — LOW (ref 14–18)
HGB BLD-MCNC: 13.5 G/DL — LOW (ref 14–18)
HOROWITZ INDEX BLDV+IHG-RTO: 21 — SIGNIFICANT CHANGE UP
IMM GRANULOCYTES NFR BLD AUTO: 0.3 % — SIGNIFICANT CHANGE UP (ref 0.1–0.3)
INR BLD: 2.92 RATIO — HIGH (ref 0.65–1.3)
LACTATE BLDV-MCNC: 1.6 MMOL/L — SIGNIFICANT CHANGE UP (ref 0.5–1.6)
LACTATE SERPL-SCNC: 1.6 MMOL/L — SIGNIFICANT CHANGE UP (ref 0.7–2)
LYMPHOCYTES # BLD AUTO: 0.73 K/UL — LOW (ref 1.2–3.4)
LYMPHOCYTES # BLD AUTO: 7.9 % — LOW (ref 20.5–51.1)
MCHC RBC-ENTMCNC: 27 PG — SIGNIFICANT CHANGE UP (ref 27–31)
MCHC RBC-ENTMCNC: 32.5 G/DL — SIGNIFICANT CHANGE UP (ref 32–37)
MCV RBC AUTO: 83 FL — SIGNIFICANT CHANGE UP (ref 80–94)
MONOCYTES # BLD AUTO: 0.84 K/UL — HIGH (ref 0.1–0.6)
MONOCYTES NFR BLD AUTO: 9.1 % — SIGNIFICANT CHANGE UP (ref 1.7–9.3)
NEUTROPHILS # BLD AUTO: 7.61 K/UL — HIGH (ref 1.4–6.5)
NEUTROPHILS NFR BLD AUTO: 82 % — HIGH (ref 42.2–75.2)
NRBC # BLD: 0 /100 WBCS — SIGNIFICANT CHANGE UP (ref 0–0)
NT-PROBNP SERPL-SCNC: HIGH PG/ML (ref 0–300)
PCO2 BLDV: 39 MMHG — LOW (ref 41–51)
PH BLDV: 7.32 — SIGNIFICANT CHANGE UP (ref 7.26–7.43)
PLATELET # BLD AUTO: 185 K/UL — SIGNIFICANT CHANGE UP (ref 130–400)
PO2 BLDV: 49 MMHG — HIGH (ref 20–40)
POTASSIUM BLDV-SCNC: 5.5 MMOL/L — SIGNIFICANT CHANGE UP (ref 3.3–5.6)
POTASSIUM SERPL-MCNC: 4.7 MMOL/L — SIGNIFICANT CHANGE UP (ref 3.5–5)
POTASSIUM SERPL-SCNC: 4.7 MMOL/L — SIGNIFICANT CHANGE UP (ref 3.5–5)
PROT SERPL-MCNC: 7 G/DL — SIGNIFICANT CHANGE UP (ref 6–8)
PROTHROM AB SERPL-ACNC: 33.2 SEC — HIGH (ref 9.95–12.87)
RBC # BLD: 5 M/UL — SIGNIFICANT CHANGE UP (ref 4.7–6.1)
RBC # FLD: 15.8 % — HIGH (ref 11.5–14.5)
SAO2 % BLDV: 79 % — SIGNIFICANT CHANGE UP
SODIUM SERPL-SCNC: 140 MMOL/L — SIGNIFICANT CHANGE UP (ref 135–146)
TROPONIN T SERPL-MCNC: 1.53 NG/ML — CRITICAL HIGH
TROPONIN T SERPL-MCNC: 1.86 NG/ML — CRITICAL HIGH
WBC # BLD: 9.28 K/UL — SIGNIFICANT CHANGE UP (ref 4.8–10.8)
WBC # FLD AUTO: 9.28 K/UL — SIGNIFICANT CHANGE UP (ref 4.8–10.8)

## 2020-01-30 PROCEDURE — 99223 1ST HOSP IP/OBS HIGH 75: CPT

## 2020-01-30 PROCEDURE — 93925 LOWER EXTREMITY STUDY: CPT | Mod: 26

## 2020-01-30 PROCEDURE — 99285 EMERGENCY DEPT VISIT HI MDM: CPT

## 2020-01-30 PROCEDURE — 93970 EXTREMITY STUDY: CPT | Mod: 26

## 2020-01-30 PROCEDURE — 73552 X-RAY EXAM OF FEMUR 2/>: CPT | Mod: 26,LT

## 2020-01-30 PROCEDURE — 71045 X-RAY EXAM CHEST 1 VIEW: CPT | Mod: 26

## 2020-01-30 RX ORDER — IPRATROPIUM/ALBUTEROL SULFATE 18-103MCG
3 AEROSOL WITH ADAPTER (GRAM) INHALATION EVERY 6 HOURS
Refills: 0 | Status: DISCONTINUED | OUTPATIENT
Start: 2020-01-30 | End: 2020-02-07

## 2020-01-30 RX ORDER — INSULIN GLARGINE 100 [IU]/ML
20 INJECTION, SOLUTION SUBCUTANEOUS AT BEDTIME
Refills: 0 | Status: DISCONTINUED | OUTPATIENT
Start: 2020-01-30 | End: 2020-02-07

## 2020-01-30 RX ORDER — RIVAROXABAN 15 MG-20MG
15 KIT ORAL
Refills: 0 | Status: DISCONTINUED | OUTPATIENT
Start: 2020-01-30 | End: 2020-01-30

## 2020-01-30 RX ORDER — AMLODIPINE BESYLATE 2.5 MG/1
5 TABLET ORAL DAILY
Refills: 0 | Status: DISCONTINUED | OUTPATIENT
Start: 2020-01-30 | End: 2020-02-07

## 2020-01-30 RX ORDER — FUROSEMIDE 40 MG
40 TABLET ORAL DAILY
Refills: 0 | Status: DISCONTINUED | OUTPATIENT
Start: 2020-01-30 | End: 2020-01-31

## 2020-01-30 RX ORDER — INSULIN LISPRO 100/ML
VIAL (ML) SUBCUTANEOUS
Refills: 0 | Status: DISCONTINUED | OUTPATIENT
Start: 2020-01-30 | End: 2020-02-07

## 2020-01-30 RX ORDER — GLUCAGON INJECTION, SOLUTION 0.5 MG/.1ML
1 INJECTION, SOLUTION SUBCUTANEOUS ONCE
Refills: 0 | Status: DISCONTINUED | OUTPATIENT
Start: 2020-01-30 | End: 2020-02-07

## 2020-01-30 RX ORDER — FINASTERIDE 5 MG/1
5 TABLET, FILM COATED ORAL DAILY
Refills: 0 | Status: DISCONTINUED | OUTPATIENT
Start: 2020-01-30 | End: 2020-02-07

## 2020-01-30 RX ORDER — TAMSULOSIN HYDROCHLORIDE 0.4 MG/1
0.4 CAPSULE ORAL AT BEDTIME
Refills: 0 | Status: DISCONTINUED | OUTPATIENT
Start: 2020-01-30 | End: 2020-02-07

## 2020-01-30 RX ORDER — ISOSORBIDE MONONITRATE 60 MG/1
1 TABLET, EXTENDED RELEASE ORAL
Qty: 0 | Refills: 0 | DISCHARGE

## 2020-01-30 RX ORDER — METOPROLOL TARTRATE 50 MG
50 TABLET ORAL EVERY 12 HOURS
Refills: 0 | Status: DISCONTINUED | OUTPATIENT
Start: 2020-01-30 | End: 2020-02-01

## 2020-01-30 RX ORDER — LOSARTAN POTASSIUM 100 MG/1
50 TABLET, FILM COATED ORAL DAILY
Refills: 0 | Status: DISCONTINUED | OUTPATIENT
Start: 2020-01-30 | End: 2020-01-30

## 2020-01-30 RX ORDER — CEFAZOLIN SODIUM 1 G
3 VIAL (EA) INJECTION
Qty: 0 | Refills: 0 | DISCHARGE

## 2020-01-30 RX ORDER — HEPARIN SODIUM 5000 [USP'U]/ML
5000 INJECTION INTRAVENOUS; SUBCUTANEOUS EVERY 12 HOURS
Refills: 0 | Status: DISCONTINUED | OUTPATIENT
Start: 2020-01-30 | End: 2020-02-07

## 2020-01-30 RX ORDER — FUROSEMIDE 40 MG
80 TABLET ORAL ONCE
Refills: 0 | Status: DISCONTINUED | OUTPATIENT
Start: 2020-01-30 | End: 2020-01-30

## 2020-01-30 RX ORDER — DEXTROSE 50 % IN WATER 50 %
12.5 SYRINGE (ML) INTRAVENOUS ONCE
Refills: 0 | Status: DISCONTINUED | OUTPATIENT
Start: 2020-01-30 | End: 2020-02-07

## 2020-01-30 RX ORDER — FENOFIBRATE,MICRONIZED 130 MG
145 CAPSULE ORAL DAILY
Refills: 0 | Status: DISCONTINUED | OUTPATIENT
Start: 2020-01-30 | End: 2020-02-07

## 2020-01-30 RX ORDER — CEFAZOLIN SODIUM 1 G
2 VIAL (EA) INJECTION
Qty: 0 | Refills: 0 | DISCHARGE
End: 2019-12-02

## 2020-01-30 RX ORDER — CHLORHEXIDINE GLUCONATE 213 G/1000ML
1 SOLUTION TOPICAL
Refills: 0 | Status: DISCONTINUED | OUTPATIENT
Start: 2020-01-30 | End: 2020-02-07

## 2020-01-30 RX ORDER — SIMVASTATIN 20 MG/1
40 TABLET, FILM COATED ORAL AT BEDTIME
Refills: 0 | Status: DISCONTINUED | OUTPATIENT
Start: 2020-01-30 | End: 2020-02-07

## 2020-01-30 RX ORDER — ASPIRIN/CALCIUM CARB/MAGNESIUM 324 MG
81 TABLET ORAL DAILY
Refills: 0 | Status: DISCONTINUED | OUTPATIENT
Start: 2020-01-30 | End: 2020-02-07

## 2020-01-30 RX ORDER — SODIUM CHLORIDE 9 MG/ML
1000 INJECTION, SOLUTION INTRAVENOUS
Refills: 0 | Status: DISCONTINUED | OUTPATIENT
Start: 2020-01-30 | End: 2020-02-07

## 2020-01-30 RX ORDER — FUROSEMIDE 40 MG
40 TABLET ORAL ONCE
Refills: 0 | Status: COMPLETED | OUTPATIENT
Start: 2020-01-30 | End: 2020-01-30

## 2020-01-30 RX ORDER — AMPICILLIN SODIUM AND SULBACTAM SODIUM 250; 125 MG/ML; MG/ML
1.5 INJECTION, POWDER, FOR SUSPENSION INTRAMUSCULAR; INTRAVENOUS EVERY 12 HOURS
Refills: 0 | Status: DISCONTINUED | OUTPATIENT
Start: 2020-01-30 | End: 2020-01-31

## 2020-01-30 RX ORDER — ISOSORBIDE MONONITRATE 60 MG/1
30 TABLET, EXTENDED RELEASE ORAL DAILY
Refills: 0 | Status: DISCONTINUED | OUTPATIENT
Start: 2020-01-30 | End: 2020-02-07

## 2020-01-30 RX ORDER — BUDESONIDE AND FORMOTEROL FUMARATE DIHYDRATE 160; 4.5 UG/1; UG/1
2 AEROSOL RESPIRATORY (INHALATION)
Refills: 0 | Status: DISCONTINUED | OUTPATIENT
Start: 2020-01-30 | End: 2020-02-07

## 2020-01-30 RX ORDER — ALPRAZOLAM 0.25 MG
0.5 TABLET ORAL AT BEDTIME
Refills: 0 | Status: DISCONTINUED | OUTPATIENT
Start: 2020-01-30 | End: 2020-02-06

## 2020-01-30 RX ORDER — PANTOPRAZOLE SODIUM 20 MG/1
40 TABLET, DELAYED RELEASE ORAL
Refills: 0 | Status: DISCONTINUED | OUTPATIENT
Start: 2020-01-30 | End: 2020-02-07

## 2020-01-30 RX ORDER — DEXTROSE 50 % IN WATER 50 %
25 SYRINGE (ML) INTRAVENOUS ONCE
Refills: 0 | Status: DISCONTINUED | OUTPATIENT
Start: 2020-01-30 | End: 2020-02-07

## 2020-01-30 RX ORDER — MIDODRINE HYDROCHLORIDE 2.5 MG/1
2.5 TABLET ORAL THREE TIMES A DAY
Refills: 0 | Status: DISCONTINUED | OUTPATIENT
Start: 2020-01-30 | End: 2020-01-30

## 2020-01-30 RX ORDER — SODIUM CHLORIDE 9 MG/ML
3 INJECTION INTRAMUSCULAR; INTRAVENOUS; SUBCUTANEOUS EVERY 8 HOURS
Refills: 0 | Status: DISCONTINUED | OUTPATIENT
Start: 2020-01-30 | End: 2020-02-07

## 2020-01-30 RX ORDER — CLOPIDOGREL BISULFATE 75 MG/1
75 TABLET, FILM COATED ORAL DAILY
Refills: 0 | Status: DISCONTINUED | OUTPATIENT
Start: 2020-01-31 | End: 2020-02-05

## 2020-01-30 RX ORDER — DEXTROSE 50 % IN WATER 50 %
15 SYRINGE (ML) INTRAVENOUS ONCE
Refills: 0 | Status: DISCONTINUED | OUTPATIENT
Start: 2020-01-30 | End: 2020-02-07

## 2020-01-30 RX ORDER — ACETAMINOPHEN 500 MG
975 TABLET ORAL ONCE
Refills: 0 | Status: COMPLETED | OUTPATIENT
Start: 2020-01-30 | End: 2020-01-30

## 2020-01-30 RX ADMIN — Medication 0.5 MILLIGRAM(S): at 21:42

## 2020-01-30 RX ADMIN — Medication 50 MILLIGRAM(S): at 21:46

## 2020-01-30 RX ADMIN — Medication 975 MILLIGRAM(S): at 09:50

## 2020-01-30 RX ADMIN — SODIUM CHLORIDE 3 MILLILITER(S): 9 INJECTION INTRAMUSCULAR; INTRAVENOUS; SUBCUTANEOUS at 21:44

## 2020-01-30 RX ADMIN — TAMSULOSIN HYDROCHLORIDE 0.4 MILLIGRAM(S): 0.4 CAPSULE ORAL at 21:42

## 2020-01-30 RX ADMIN — INSULIN GLARGINE 20 UNIT(S): 100 INJECTION, SOLUTION SUBCUTANEOUS at 21:44

## 2020-01-30 RX ADMIN — AMPICILLIN SODIUM AND SULBACTAM SODIUM 100 GRAM(S): 250; 125 INJECTION, POWDER, FOR SUSPENSION INTRAMUSCULAR; INTRAVENOUS at 21:42

## 2020-01-30 RX ADMIN — SIMVASTATIN 40 MILLIGRAM(S): 20 TABLET, FILM COATED ORAL at 21:42

## 2020-01-30 RX ADMIN — Medication 40 MILLIGRAM(S): at 14:01

## 2020-01-30 RX ADMIN — Medication 975 MILLIGRAM(S): at 09:51

## 2020-01-30 NOTE — PHARMACOTHERAPY INTERVENTION NOTE - COMMENTS
toprol xl 50 mg po q12h spoke w/ pa.  Patient prescription bottle reads as such, please continue xl q12h

## 2020-01-30 NOTE — CONSULT NOTE ADULT - ASSESSMENT
79 yr old male with PMHX including CHF, obesity, CKD, HTN, COPD, CABG admitted for LE cellulitis; also found to have BRITTANI and urinary retention -  called due to difficulty placing  julian by ER staff    Pt seen and examined:   - Unable to place julian by myself; Dr. Malin at bedside and under sterile technique, 16 FR julian was placed. Approx 60 cc cloudy yellow urine obtained   - Keep julian catheter for now as per medicine/renal needs   - f/u renal function (per son, baseline creat=2); f/u renal   - continue flomax   - will D/W Dr. Cline for further recommendations 79 yr old male with PMHX including CHF, obesity, CKD, HTN, COPD, CABG admitted for LE cellulitis; also found to have BRITTANI and urinary retention -  called due to difficulty placing  julian by ER staff    Pt seen and examined:   - Unable to place julian by myself; Dr. Malin at bedside and under sterile technique, 16 FR julian was placed. Approx 60 cc cloudy yellow urine obtained   - Keep julian catheter for now as per medicine/renal needs   - f/u renal function (per son, baseline creat=2); f/u renal   - continue flomax   - Will likely D/C home with julian in place for TOV in office   -  D/W Dr. Cline  - will follow

## 2020-01-30 NOTE — ED ADULT TRIAGE NOTE - CHIEF COMPLAINT QUOTE
Per EMS, patient has bilateral leg pain, a swollen left knee, and the chills. Oral temp in ER is 96.4

## 2020-01-30 NOTE — ED PROVIDER NOTE - NS ED ROS FT
Constitutional:  See HPI.   Eyes:  No visual changes, eye pain or discharge.  ENMT:  No hearing changes, pain, discharge or infections. No neck pain or stiffness.  Cardiac:  No chest pain, SOB or edema. No chest pain with exertion.  Respiratory:  No cough or respiratory distress. No hemoptysis.  GI:  No nausea, vomiting, diarrhea, abdominal pain.  :  No dysuria, frequency, hematuria  MS:  No joint pain or back pain. + Left sided leg pain, no overlying erythema  Neuro:  No LOC. No headache or weakness.    Skin:  + Bilateral Chronic venous stasis  Except as in HPI, all other review of systems is negative

## 2020-01-30 NOTE — ED PROVIDER NOTE - OBJECTIVE STATEMENT
79 y.o. M with PMH of CABG, Bypass, CKD on dialysis, chronic venous stasis on xarelto, COPD on 2L NC sent in from NH with 1 week of worsening left sided leg pain, gradual in progression, sharp in nature, new from before, unable to bear weight, no fever no chils, no SOB/CP/recent travel. Pt was recently admitted 2 months ago for cellulitis of the right leg. Denies any new swelling.

## 2020-01-30 NOTE — ED PROVIDER NOTE - PHYSICAL EXAMINATION
CONSTITUTIONAL: Well-developed; well-nourished; in no acute distress.   SKIN: warm, dry, + Bilateral Chronic venous stasis, + dressing on right side with stage 1 ulcer,   HEAD: Normocephalic; atraumatic.  EYES: PERRL, EOMI, no conjunctival erythema  ENT: No nasal discharge; airway clear.  NECK: Supple; non tender.  CARD: S1, S2 normal; no murmurs, gallops, or rubs. Regular rate and rhythm.   RESP: + 2L NC without respiratory disress, no wheezes, rales or rhonchi.  ABD: soft ntnd  EXT: + Left sided lateral lower extremity tenderness, BL chronic venous stasis, +2 pitting edema  LYMPH: No acute cervical adenopathy.  NEURO: Alert, oriented, grossly unremarkable  PSYCH: Cooperative, appropriate.

## 2020-01-30 NOTE — ED PROVIDER NOTE - PROGRESS NOTE DETAILS
Labs reviewed. Pt is retaining urine, will put in julian catheter Urology bedside, pt voided 150 cc case spoken with her Neprhologist, will diurese and see in the morning, Pt and family agrees with plan. Will Admit to medicine.

## 2020-01-30 NOTE — PATIENT PROFILE ADULT - FUNCTIONAL SCREEN CURRENT LEVEL: EATING, MLM
Post-Anesthesia Evaluation and Assessment    Patient: Delaney Main MRN: [id number]  SSN: [id number]    YOB: 1949  Age: 78 y.o. Sex: female       Cardiovascular Function/Vital Signs  Visit Vitals    /75    Pulse 77    Temp 36.8 °C (98.2 °F)    Resp 18    Ht (!) 5.5\" (0.14 m)    Wt 93.2 kg (205 lb 6 oz)    SpO2 95%    BMI 4,773.37 kg/m2       Patient is status post total IV anesthesia anesthesia for Procedure(s):  COLONOSCOPY/ 35.    Nausea/Vomiting: None    Postoperative hydration reviewed and adequate. Pain:  Pain Scale 1: Numeric (0 - 10) (07/19/17 1783)  Pain Intensity 1: 0 (07/19/17 9708)   Managed    Neurological Status: At baseline    Mental Status and Level of Consciousness: Arousable    Pulmonary Status:   O2 Device: Room air (07/19/17 1200)   Adequate oxygenation and airway patent    Complications related to anesthesia: None    Post-anesthesia assessment completed.  No concerns    Signed By: Katarina Jacobs MD     July 19, 2017 0 = independent

## 2020-01-30 NOTE — H&P ADULT - NSHPLABSRESULTS_GEN_ALL_CORE
01-30    140  |  101  |  140<HH>  ----------------------------<  83  4.7   |  19  |  3.6<H>    Ca    9.8      30 Jan 2020 11:42    TPro  7.0  /  Alb  3.8  /  TBili  1.0  /  DBili  x   /  AST  22  /  ALT  10  /  AlkPhos  47  01-30                            13.5   9.28  )-----------( 185      ( 30 Jan 2020 11:00 )             41.5     CAPILLARY BLOOD GLUCOSE        CARDIAC MARKERS ( 30 Jan 2020 11:00 )  x     / 1.86 ng/mL / x     / x     / x        < from: VA Duplex Lower Ext Vein Scan, Bilat (01.30.20 @ 11:55) >      Impression:    No evidence of deep venous thrombosis or superficial thrombophlebitis in the bilateral lower extremities.    < end of copied text >    < from: Xray Femur 2 Views, Left (01.30.20 @ 10:22) >      Impression: No acute fracture or dislocation.    < end of copied text >    < from: Xray Chest 1 View-PORTABLE IMMEDIATE (01.30.20 @ 09:57) >      IMPRESSION:      Increased vascular congestion and probable right-sided pleural effusion/consolidation. Cannot exclude underlying/associated infection given the history. Clinical correlation is necessary.    < end of copied text >

## 2020-01-30 NOTE — ED ADULT NURSE REASSESSMENT NOTE - NS ED NURSE REASSESS COMMENT FT1
No SOB, O2 via NC given  2L. (patient has O2 2 L at home uses PRN), both legs edema with hyperpigmentation, left lower leg redness and rt lower leg with posterior calf blisters and 2 open areas superficial stage II skin ulceration with serous drainage   Each area is at least 10 cm x 8 cm in size. Irrigated with NS and Bacitracin oint applied with lower leg dressing. Redness and rash to groin area , cleansed with NS and skin barrier cream applied to groin b/l and abdomina; area. sacral area is clean and skin is intact. Scrotal edema . Dr GRAY unsuccessful with julian cath insertion. Patient voided 150 ml of clear urine.  Urology at the bedside.

## 2020-01-30 NOTE — H&P ADULT - ASSESSMENT
79 y.o. M with PMH of CABG, Bypass, CKD on dialysis, chronic venous stasis on Xarelto COPD on 2L NC sent in from HOME with 1 week of worsening left sided leg pain    # BILATERAL LOWER EXT. CELLULITIS   - iv abx renal dose  - ID consult   - Arterial Doppler to assess blood flow  - Venous dop : neg for DVT   - XRAY of left femur : no fx   - PT consult     # BRITTANI ON CKD ( AS PER FAMILY CREAT BASELINE 2.0 )   - nephro consult   - trend kidney fxn  - julian placed , monitor output   - saline lock due to fluid over load at this time     # ELEVATED TROPONIN no cp / or ekg changes   - most likely due to renal disease  - tread 2 more sets   - admit for tele unit     # Chronic HF in over load   - hold PO lasix and c/w IV lasix   - monitor electrolytes   - cardio consult   - TTE ordered    # A. FIB ?? A. Flutter   - was on coumadin , now on xarelto   - hold Xarelto due to decrease GFR  - start heparin SQ 5000u q12 and await till improved GFR    # COPD  - nebs prn   - o2 via nc   - keep O2 sat 91-93    # HTN   - hold Losartan due to brittani  - midodrine and monitor vitals  - low sodium diet   c/w norvasc/ metopr.    # DM   - fs qhs/ qac   - lantus 20u at pm with SS coverage   - ada diet     Dispo: most  likely to SNF / rehab   gi prophl. : PPI daily     d/w DR. GIL

## 2020-01-30 NOTE — H&P ADULT - NSHPPHYSICALEXAM_GEN_ALL_CORE
CONSTITUTIONAL: Well-developed; well-nourished; in no acute distress.   SKIN: warm, dry, + Bilateral Chronic venous stasis, + dressing on right side with stage 1 ulcer,   HEAD: Normocephalic; atraumatic.  EYES: PERRL, EOMI, no conjunctival erythema  ENT: No nasal discharge; airway clear.  NECK: Supple; non tender.  CARD: S1, S2 normal; no murmurs, gallops, or rubs. Regular rate and rhythm.   RESP: + 2L NC without respiratory disress, no wheezes, rales or rhonchi.  ABD: soft ntnd  EXT: + Left sided lateral lower extremity tenderness, BL chronic venous stasis, +2 pitting edema  LYMP: No acute cervical adenopathy.  NEURO: Alert, oriented, grossly unremarkable  PSYCH: Cooperative, appropriate. Vital Signs Last 24 Hrs  T(C): 36.2 (30 Jan 2020 10:35), Max: 36.2 (30 Jan 2020 10:35)  T(F): 97.2 (30 Jan 2020 10:35), Max: 97.2 (30 Jan 2020 10:35)  HR: 58 (30 Jan 2020 13:58) (58 - 62)  BP: 115/57 (30 Jan 2020 13:58) (115/57 - 120/59)  RR: 20 (30 Jan 2020 13:58) (18 - 20)  SpO2: 95% (30 Jan 2020 13:58) (93% - 95%)    CONSTITUTIONAL: Well-developed; well-nourished; in no acute distress.   SKIN: warm, dry, + Bilateral Chronic venous stasis, + dressing on right side with stage 1 ulcer,   HEAD: Normocephalic; atraumatic.  EYES: PERRL, EOMI, no conjunctival erythema  ENT: No nasal discharge; airway clear.  NECK: Supple; non tender.  CARD: S1, S2 normal; no murmurs, gallops, or rubs. Regular rate and rhythm.   RESP: + 2L NC without respiratory distress, no wheezes, rales or rhonchi.  ABD: soft ntnd no RT or guarding   EXT: + Left sided lateral lower extremity tenderness, BL chronic venous stasis, +2 pitting edema, hot to touch   LYMP: No acute cervical adenopathy.  NEURO: Alert, oriented, grossly unremarkable  PSYCH: Cooperative, appropriate.

## 2020-01-30 NOTE — H&P ADULT - HISTORY OF PRESENT ILLNESS
79 y.o. M with PMH of CABG, Bypass, CKD on dialysis, chronic venous stasis on Xarelto COPD on 2L NC sent in from NH with 1 week of worsening left sided leg pain, gradual in progression, sharp in nature, new from before, unable to bear weight, no fever no chills no SOB/CP/recent travel. Pt was recently admitted 2 months ago for cellulitis of the right leg. Denies any new swelling.    er d/w nephro : lasix 40 ivp given in ER 79 y.o. M with PMH of CABG, Bypass, CKD on dialysis, chronic venous stasis on Xarelto COPD on 2L NC sent in from HOME with 1 week of worsening left sided leg pain, gradual in progression, sharp in nature, new from before, unable to bear weight, no fever no chills no SOB/CP/recent travel. Pt was recently admitted 2 months ago for cellulitis of the right leg. Denies any new swelling.  Pt. was at [facility] .    Pt did require HD in 11/19 but no HD required this month , still has right chest wall Tesio placed by Dr. Sher     pt unable to ambulate at baseline.    Patient is known to Dr. Cline, being followed for BPH. States he has not been having any difficulty urinating, nor does he currently feel like his bladder is full. He spontaneously voided approx 150 cc clear yellow urine prior to julian  placement.    Dr. Malin at bedside and under sterile technique, 16 FR julian was placed. Approx 60 cc cloudy yellow urine obtained    er d/w nephro : lasix 40 ivp given in ER

## 2020-01-30 NOTE — PATIENT PROFILE ADULT - ABILITY TO HEAR (WITH HEARING AID OR HEARING APPLIANCE IF NORMALLY USED):
Mildly to Moderately Impaired: difficulty hearing in some environments or speaker may need to increase volume or speak distinctly/no h/a

## 2020-01-30 NOTE — ED PROVIDER NOTE - CLINICAL SUMMARY MEDICAL DECISION MAKING FREE TEXT BOX
pt with above PMH recently discharged from SNF home to family returns unable to stand/support his weight, had previously refused long term placement but family unable to care for him, legs look similar to prior, no cp or sob, on exam vital signs appreciated, nontoxic, conj pink neck supple cor rrr with II/VI laly, lungs with dry crackles at bases abd +bs, snt 2+ b/l LE edema L>R with chronic venous stasis changes, has weeping/bullae, neuro nonfocal, labs and studies reviewed and d/w renal and Dr Ramirez regarding elevated trop, will admit LR tele

## 2020-01-30 NOTE — ED ADULT NURSE NOTE - OBJECTIVE STATEMENT
No SOB, O2 via NC given  2L. (patient has O2 2 L at home uses PRN), both legs edema with hyperpigmentation, left lower leg redness and rt lower leg with posterior calf blisters and 2 open areas superficial stage II skin ulceration with serous drainage   Each area is at least 10 cm x 8 cm in size. Irrigated with NS and Bacitracin oint applied with lower leg dressing

## 2020-01-30 NOTE — H&P ADULT - NSHPREVIEWOFSYSTEMS_GEN_ALL_CORE
Eyes:  No visual changes, eye pain or discharge.  ENMT:  No hearing changes, pain, discharge or infections. No neck pain or stiffness.  Cardiac:  No chest pain, SOB or edema. No chest pain with exertion.  Respiratory:  No cough or respiratory distress. No hemoptysis.  GI:  No nausea, vomiting, diarrhea, abdominal pain.  :  No dysuria, frequency, hematuria  MS:  No joint pain or back pain. + Left sided leg pain, no overlying erythema  Neuro:  No LOC. No headache or weakness.    Skin:  + Bilateral Chronic venous stasis  Except as in HPI, all other review of systems is negative

## 2020-01-31 DIAGNOSIS — N40.1 BENIGN PROSTATIC HYPERPLASIA WITH LOWER URINARY TRACT SYMPTOMS: ICD-10-CM

## 2020-01-31 DIAGNOSIS — I83.009 VARICOSE VEINS OF UNSPECIFIED LOWER EXTREMITY WITH ULCER OF UNSPECIFIED SITE: ICD-10-CM

## 2020-01-31 DIAGNOSIS — B35.1 TINEA UNGUIUM: ICD-10-CM

## 2020-01-31 LAB
ANION GAP SERPL CALC-SCNC: 16 MMOL/L — HIGH (ref 7–14)
BUN SERPL-MCNC: 131 MG/DL — CRITICAL HIGH (ref 10–20)
CALCIUM SERPL-MCNC: 9.3 MG/DL — SIGNIFICANT CHANGE UP (ref 8.5–10.1)
CHLORIDE SERPL-SCNC: 104 MMOL/L — SIGNIFICANT CHANGE UP (ref 98–110)
CK MB CFR SERPL CALC: 3.6 NG/ML — SIGNIFICANT CHANGE UP (ref 0.6–6.3)
CK SERPL-CCNC: 89 U/L — SIGNIFICANT CHANGE UP (ref 0–225)
CO2 SERPL-SCNC: 21 MMOL/L — SIGNIFICANT CHANGE UP (ref 17–32)
CREAT SERPL-MCNC: 3.1 MG/DL — HIGH (ref 0.7–1.5)
ESTIMATED AVERAGE GLUCOSE: 148 MG/DL — HIGH (ref 68–114)
GLUCOSE BLDC GLUCOMTR-MCNC: 108 MG/DL — HIGH (ref 70–99)
GLUCOSE BLDC GLUCOMTR-MCNC: 126 MG/DL — HIGH (ref 70–99)
GLUCOSE BLDC GLUCOMTR-MCNC: 155 MG/DL — HIGH (ref 70–99)
GLUCOSE SERPL-MCNC: 121 MG/DL — HIGH (ref 70–99)
HBA1C BLD-MCNC: 6.8 % — HIGH (ref 4–5.6)
HCT VFR BLD CALC: 36.2 % — LOW (ref 42–52)
HGB BLD-MCNC: 11.9 G/DL — LOW (ref 14–18)
MAGNESIUM SERPL-MCNC: 2.2 MG/DL — SIGNIFICANT CHANGE UP (ref 1.8–2.4)
MCHC RBC-ENTMCNC: 26.9 PG — LOW (ref 27–31)
MCHC RBC-ENTMCNC: 32.9 G/DL — SIGNIFICANT CHANGE UP (ref 32–37)
MCV RBC AUTO: 81.9 FL — SIGNIFICANT CHANGE UP (ref 80–94)
NRBC # BLD: 0 /100 WBCS — SIGNIFICANT CHANGE UP (ref 0–0)
PLATELET # BLD AUTO: 149 K/UL — SIGNIFICANT CHANGE UP (ref 130–400)
POTASSIUM SERPL-MCNC: 4.4 MMOL/L — SIGNIFICANT CHANGE UP (ref 3.5–5)
POTASSIUM SERPL-SCNC: 4.4 MMOL/L — SIGNIFICANT CHANGE UP (ref 3.5–5)
RBC # BLD: 4.42 M/UL — LOW (ref 4.7–6.1)
RBC # FLD: 15.9 % — HIGH (ref 11.5–14.5)
SODIUM SERPL-SCNC: 141 MMOL/L — SIGNIFICANT CHANGE UP (ref 135–146)
TROPONIN T SERPL-MCNC: 1.8 NG/ML — CRITICAL HIGH
WBC # BLD: 8.78 K/UL — SIGNIFICANT CHANGE UP (ref 4.8–10.8)
WBC # FLD AUTO: 8.78 K/UL — SIGNIFICANT CHANGE UP (ref 4.8–10.8)

## 2020-01-31 PROCEDURE — 99233 SBSQ HOSP IP/OBS HIGH 50: CPT

## 2020-01-31 PROCEDURE — 99222 1ST HOSP IP/OBS MODERATE 55: CPT

## 2020-01-31 RX ORDER — CEPHALEXIN 500 MG
250 CAPSULE ORAL EVERY 12 HOURS
Refills: 0 | Status: DISCONTINUED | OUTPATIENT
Start: 2020-01-31 | End: 2020-02-07

## 2020-01-31 RX ORDER — CEPHALEXIN 500 MG
500 CAPSULE ORAL EVERY 12 HOURS
Refills: 0 | Status: DISCONTINUED | OUTPATIENT
Start: 2020-01-31 | End: 2020-01-31

## 2020-01-31 RX ORDER — FUROSEMIDE 40 MG
80 TABLET ORAL EVERY 12 HOURS
Refills: 0 | Status: DISCONTINUED | OUTPATIENT
Start: 2020-01-31 | End: 2020-02-03

## 2020-01-31 RX ADMIN — CHLORHEXIDINE GLUCONATE 1 APPLICATION(S): 213 SOLUTION TOPICAL at 05:41

## 2020-01-31 RX ADMIN — Medication 1 APPLICATION(S): at 05:44

## 2020-01-31 RX ADMIN — ISOSORBIDE MONONITRATE 30 MILLIGRAM(S): 60 TABLET, EXTENDED RELEASE ORAL at 12:34

## 2020-01-31 RX ADMIN — CLOPIDOGREL BISULFATE 75 MILLIGRAM(S): 75 TABLET, FILM COATED ORAL at 12:34

## 2020-01-31 RX ADMIN — TAMSULOSIN HYDROCHLORIDE 0.4 MILLIGRAM(S): 0.4 CAPSULE ORAL at 21:59

## 2020-01-31 RX ADMIN — AMLODIPINE BESYLATE 5 MILLIGRAM(S): 2.5 TABLET ORAL at 05:43

## 2020-01-31 RX ADMIN — Medication 1 APPLICATION(S): at 18:42

## 2020-01-31 RX ADMIN — Medication 1 APPLICATION(S): at 12:36

## 2020-01-31 RX ADMIN — SODIUM CHLORIDE 3 MILLILITER(S): 9 INJECTION INTRAMUSCULAR; INTRAVENOUS; SUBCUTANEOUS at 22:02

## 2020-01-31 RX ADMIN — HEPARIN SODIUM 5000 UNIT(S): 5000 INJECTION INTRAVENOUS; SUBCUTANEOUS at 17:49

## 2020-01-31 RX ADMIN — SIMVASTATIN 40 MILLIGRAM(S): 20 TABLET, FILM COATED ORAL at 21:59

## 2020-01-31 RX ADMIN — Medication 40 MILLIGRAM(S): at 05:43

## 2020-01-31 RX ADMIN — FINASTERIDE 5 MILLIGRAM(S): 5 TABLET, FILM COATED ORAL at 12:34

## 2020-01-31 RX ADMIN — SODIUM CHLORIDE 3 MILLILITER(S): 9 INJECTION INTRAMUSCULAR; INTRAVENOUS; SUBCUTANEOUS at 13:52

## 2020-01-31 RX ADMIN — Medication 250 MILLIGRAM(S): at 18:42

## 2020-01-31 RX ADMIN — Medication 81 MILLIGRAM(S): at 12:34

## 2020-01-31 RX ADMIN — Medication 50 MILLIGRAM(S): at 17:49

## 2020-01-31 RX ADMIN — Medication 50 MILLIGRAM(S): at 05:43

## 2020-01-31 RX ADMIN — PANTOPRAZOLE SODIUM 40 MILLIGRAM(S): 20 TABLET, DELAYED RELEASE ORAL at 09:25

## 2020-01-31 RX ADMIN — INSULIN GLARGINE 20 UNIT(S): 100 INJECTION, SOLUTION SUBCUTANEOUS at 21:59

## 2020-01-31 RX ADMIN — Medication 2: at 17:50

## 2020-01-31 RX ADMIN — HEPARIN SODIUM 5000 UNIT(S): 5000 INJECTION INTRAVENOUS; SUBCUTANEOUS at 05:43

## 2020-01-31 RX ADMIN — Medication 80 MILLIGRAM(S): at 17:51

## 2020-01-31 RX ADMIN — Medication 145 MILLIGRAM(S): at 12:34

## 2020-01-31 RX ADMIN — Medication 0.5 MILLIGRAM(S): at 21:58

## 2020-01-31 NOTE — CONSULT NOTE ADULT - PROBLEM SELECTOR RECOMMENDATION 9
chronic   NO CELLULITIS   wound care - silvadene / ACE wrap   needs [place] - out pt vascular eval   recall if needed

## 2020-01-31 NOTE — PHYSICAL THERAPY INITIAL EVALUATION ADULT - GENERAL OBSERVATIONS, REHAB EVAL
13:30-14:00 Chart reviewed. Pt encountered semireclined in bed, may be seen by Physical Therapist as confirmed with Nurse. Patient denied pain at rest and agreed to try to stand but "legs are weak and heavy: +tele; +julian; +dressing RLE; +swelliong/discoloration BLE

## 2020-01-31 NOTE — PHYSICAL THERAPY INITIAL EVALUATION ADULT - IMPAIRED TRANSFERS: SIT/STAND, REHAB EVAL
impaired postural control/decreased endurance/impaired balance/narrow base of support/decreased strength

## 2020-01-31 NOTE — PROGRESS NOTE ADULT - ASSESSMENT
79 y.o. 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 will likely benefit from outpatient vascular follow up  wound care to RLE shin ordered  antifungal cream for suspected tinea pedis  3s telemonitoring can be discontinued, trending CE's most consistent with renal dysfunction- we have ruled out MI, echo pending, cardio eval appreciated- we discussed case together, agree that there is no acute MI or ACS  i/o, daily weight  discussed case with nephrologist. aggressive IV diuretic, metolazone primer, maintain HD catheter in case for now  o2, bronchodilators- copd is chronic and stable and NOT in acute exacerbation currently  HOLD noac for acute renal failure, use dvt ppx dosing for now- AC was for flutter history, risk of CVA low  glycemic control        #Progress Note Handoff    Pending (specify):  Consults_________, Tests________, Test Results_______, Other__PT, diuresis, trend Cr_______    Family discussion: plan of care discussed with patient who is of sound mind    Disposition: probably snf, acute for now

## 2020-01-31 NOTE — PROGRESS NOTE ADULT - ASSESSMENT
Case D/W Dr. Sher:     - will book patient for tesio removal on Monday 2/3 pending approval by nephrology   - will follow

## 2020-01-31 NOTE — CONSULT NOTE ADULT - ASSESSMENT
Patient with above history. No chest pain Troponin elevated. No MI now. Rx leg. Ac if indicated. Daily weight

## 2020-01-31 NOTE — PHYSICAL THERAPY INITIAL EVALUATION ADULT - ACTIVE RANGE OF MOTION EXAMINATION, REHAB EVAL
Both upper extremities/Both lower extremities joints required AAROM to complete with pain and swelling limiting BLE ROM

## 2020-01-31 NOTE — PHYSICAL THERAPY INITIAL EVALUATION ADULT - GAIT DEVIATIONS NOTED, PT EVAL
stooped posture, dec heel strike/pushoff , dec RAMON/decreased weight-shifting ability/decreased robert/decreased step length

## 2020-01-31 NOTE — PROGRESS NOTE ADULT - SUBJECTIVE AND OBJECTIVE BOX
S: Pt known to surgical service, Dr. Sher: he is s/p tesio placemnt in Oct 2019. Pt now taken off HD approx. 2 weeks ago and was supposed to have tesio removed as outpt; However pt now with BRITTANI and renal requesting to keep tesio and monitor renal function while on high dose diuretics.     Vital Signs Last 24 Hrs  T(C): 36.7 (31 Jan 2020 14:05), Max: 36.7 (31 Jan 2020 14:05)  T(F): 98.1 (31 Jan 2020 14:05), Max: 98.1 (31 Jan 2020 14:05)  HR: 66 (31 Jan 2020 14:05) (60 - 66)  BP: 111/56 (31 Jan 2020 14:05) (101/57 - 153/65)  BP(mean): --  RR: 16 (31 Jan 2020 14:05) (16 - 18)  SpO2: 96% (31 Jan 2020 09:56) (96% - 96%)    MEDICATIONS  (STANDING):  ALPRAZolam 0.5 milliGRAM(s) Oral at bedtime  amLODIPine   Tablet 5 milliGRAM(s) Oral daily  aspirin  chewable 81 milliGRAM(s) Oral daily  budesonide 160 MICROgram(s)/formoterol 4.5 MICROgram(s) Inhaler 2 Puff(s) Inhalation two times a day  cephalexin 250 milliGRAM(s) Oral every 12 hours  chlorhexidine 4% Liquid 1 Application(s) Topical <User Schedule>  clopidogrel Tablet 75 milliGRAM(s) Oral daily  clotrimazole 1% Cream 1 Application(s) Topical every 12 hours  dextrose 5%. 1000 milliLiter(s) (50 mL/Hr) IV Continuous <Continuous>  dextrose 50% Injectable 12.5 Gram(s) IV Push once  dextrose 50% Injectable 25 Gram(s) IV Push once  dextrose 50% Injectable 25 Gram(s) IV Push once  fenofibrate Tablet 145 milliGRAM(s) Oral daily  finasteride 5 milliGRAM(s) Oral daily  furosemide   Injectable 80 milliGRAM(s) IV Push every 12 hours  heparin  Injectable 5000 Unit(s) SubCutaneous every 12 hours  insulin glargine Injectable (LANTUS) 20 Unit(s) SubCutaneous at bedtime  insulin lispro (HumaLOG) corrective regimen sliding scale   SubCutaneous three times a day before meals  isosorbide   mononitrate ER Tablet (IMDUR) 30 milliGRAM(s) Oral daily  metolazone 5 milliGRAM(s) Oral daily  metoprolol succinate ER 50 milliGRAM(s) Oral every 12 hours  pantoprazole    Tablet 40 milliGRAM(s) Oral before breakfast  silver sulfADIAZINE 1% Cream 1 Application(s) Topical daily  simvastatin 40 milliGRAM(s) Oral at bedtime  sodium chloride 0.9% lock flush 3 milliLiter(s) IV Push every 8 hours  tamsulosin 0.4 milliGRAM(s) Oral at bedtime    MEDICATIONS  (PRN):  albuterol/ipratropium for Nebulization 3 milliLiter(s) Nebulizer every 6 hours PRN Bronchospasm  dextrose 40% Gel 15 Gram(s) Oral once PRN Blood Glucose LESS THAN 70 milliGRAM(s)/deciliter  glucagon  Injectable 1 milliGRAM(s) IntraMuscular once PRN Glucose LESS THAN 70 milligrams/deciliter      I&O's Detail    30 Jan 2020 07:01  -  31 Jan 2020 07:00  --------------------------------------------------------  IN:  Total IN: 0 mL    OUT:    Indwelling Catheter - Urethral: 400 mL    Ureteral Catheter: 700 mL    Voided: 150 mL  Total OUT: 1250 mL    Total NET: -1250 mL      31 Jan 2020 07:01  -  31 Jan 2020 18:35  --------------------------------------------------------  IN:  Total IN: 0 mL    OUT:    Ureteral Catheter: 400 mL  Total OUT: 400 mL    Total NET: -400 mL

## 2020-01-31 NOTE — PROGRESS NOTE ADULT - SUBJECTIVE AND OBJECTIVE BOX
SALLY TAIWO  79y  Male      Patient is a 79y old  Male who presents with a chief complaint of fluid overload (31 Jan 2020 10:56)      INTERVAL HPI/OVERNIGHT EVENTS: c/o L thigh pain, and b/l knee pain and trouble ambulating. no sob- breathing is baseline. no wheezing. no chest pressure or palpitations or presyncope      REVIEW OF SYSTEMS:  as above  All other review of systems negative    T(C): 36.7 (01-31-20 @ 14:05), Max: 37.2 (01-30-20 @ 17:00)  HR: 66 (01-31-20 @ 14:05) (60 - 66)  BP: 111/56 (01-31-20 @ 14:05) (101/57 - 153/65)  RR: 16 (01-31-20 @ 14:05) (16 - 18)  SpO2: 96% (01-31-20 @ 09:56) (96% - 96%)  Wt(kg): --Vital Signs Last 24 Hrs  T(C): 36.7 (31 Jan 2020 14:05), Max: 37.2 (30 Jan 2020 17:00)  T(F): 98.1 (31 Jan 2020 14:05), Max: 98.9 (30 Jan 2020 17:00)  HR: 66 (31 Jan 2020 14:05) (60 - 66)  BP: 111/56 (31 Jan 2020 14:05) (101/57 - 153/65)  BP(mean): --  RR: 16 (31 Jan 2020 14:05) (16 - 18)  SpO2: 96% (31 Jan 2020 09:56) (96% - 96%)      01-30-20 @ 07:01  -  01-31-20 @ 07:00  --------------------------------------------------------  IN: 0 mL / OUT: 1250 mL / NET: -1250 mL    01-31-20 @ 07:01  -  01-31-20 @ 14:51  --------------------------------------------------------  IN: 0 mL / OUT: 400 mL / NET: -400 mL        PHYSICAL EXAM:  GENERAL: deconditioned  PSYCH: no agitation, baseline mentation  NERVOUS SYSTEM:  Alert & Oriented X3, no new focal deficits  PULMONARY: chronic coarse breath sounds, comfortable on NC, no crackles or wheezing  CARDIOVASCULAR: systolic murmur 5/6 unchanged, regular today  GI: Soft, Nontender, Nondistended; Bowel sounds present rotund   julian light urine, minimal sediment no pyuria  EXTREMITIES:  R shin PVD ulcers, b/l PVD hyperpigmentation changes, b/l erythema/ warmth, worst at LLE and L medial quad and behind knee/calf    Consultant(s) Notes Reviewed:  [x ] YES  [ ] NO    Discussed with Consultants/Other Providers [ x] YES     LABS                          11.9   8.78  )-----------( 149      ( 31 Jan 2020 07:01 )             36.2     01-31    141  |  104  |  131<HH>  ----------------------------<  121<H>  4.4   |  21  |  3.1<H>    Ca    9.3      31 Jan 2020 07:01  Mg     2.2     01-31    TPro  7.0  /  Alb  3.8  /  TBili  1.0  /  DBili  x   /  AST  22  /  ALT  10  /  AlkPhos  47  01-30        PT/INR - ( 30 Jan 2020 11:00 )   PT: 33.20 sec;   INR: 2.92 ratio         PTT - ( 30 Jan 2020 11:00 )  PTT:42.5 sec  Lactate Trend  01-30 @ 11:00 Lactate:1.6     CARDIAC MARKERS ( 31 Jan 2020 07:01 )  x     / 1.80 ng/mL / 89 U/L / x     / 3.6 ng/mL  CARDIAC MARKERS ( 30 Jan 2020 19:13 )  x     / 1.53 ng/mL / 143 U/L / x     / 5.8 ng/mL  CARDIAC MARKERS ( 30 Jan 2020 11:00 )  x     / 1.86 ng/mL / x     / x     / x          CAPILLARY BLOOD GLUCOSE      POCT Blood Glucose.: 126 mg/dL (31 Jan 2020 11:07)        RADIOLOGY & ADDITIONAL TESTS:    Imaging Personally Reviewed:  [ ] YES  [ ] NO    HEALTH ISSUES - PROBLEM Dx:  Benign prostatic hyperplasia with urinary retention: Benign prostatic hyperplasia with urinary retention  Onychomycosis of toenail: Onychomycosis of toenail  Venous stasis ulcers: Venous stasis ulcers

## 2020-01-31 NOTE — CONSULT NOTE ADULT - ASSESSMENT
Pt with BRITTANI due to ATN (post infection a few months ago) was on HD for about 2 mos, had some kidney function recovery and was taken off HD about 2 weeks ago. Pt readmitted with inability to walk b/o swollen knees, found to have PVC on CXR,    BRITTANI - nonoliguric  cont LAsix 80 mg IV q 12 with Metolazone 5 mg qd  - no indication for HD now  - pt is comfortable in bed on NC  - maintain Najera for strict Is adn OS  - do not remove Tesio yet, need to obeserve creat for a few days on high dose diuretics  - UA    CHD/Aflutter - repeat CXR over the weekend to assess volume  no Xarelto or Coumadin please since line may need to be removed  IV or sq Coumadin    Lt LE cellulitis - would start ABx  D/W Hospitalist and Dr Baum

## 2020-01-31 NOTE — PHYSICAL THERAPY INITIAL EVALUATION ADULT - MANUAL MUSCLE TESTING RESULTS, REHAB EVAL
Both upper extremites/Both lower extremities muscle strength grossly graded 2+ to 3-/5., within available range

## 2020-02-01 LAB
ALBUMIN SERPL ELPH-MCNC: 3.2 G/DL — LOW (ref 3.5–5.2)
ALP SERPL-CCNC: 46 U/L — SIGNIFICANT CHANGE UP (ref 30–115)
ALT FLD-CCNC: 9 U/L — SIGNIFICANT CHANGE UP (ref 0–41)
ANION GAP SERPL CALC-SCNC: 15 MMOL/L — HIGH (ref 7–14)
AST SERPL-CCNC: 19 U/L — SIGNIFICANT CHANGE UP (ref 0–41)
BASOPHILS # BLD AUTO: 0.02 K/UL — SIGNIFICANT CHANGE UP (ref 0–0.2)
BASOPHILS NFR BLD AUTO: 0.2 % — SIGNIFICANT CHANGE UP (ref 0–1)
BILIRUB SERPL-MCNC: 0.8 MG/DL — SIGNIFICANT CHANGE UP (ref 0.2–1.2)
BUN SERPL-MCNC: 126 MG/DL — CRITICAL HIGH (ref 10–20)
CALCIUM SERPL-MCNC: 9 MG/DL — SIGNIFICANT CHANGE UP (ref 8.5–10.1)
CHLORIDE SERPL-SCNC: 100 MMOL/L — SIGNIFICANT CHANGE UP (ref 98–110)
CO2 SERPL-SCNC: 22 MMOL/L — SIGNIFICANT CHANGE UP (ref 17–32)
CREAT SERPL-MCNC: 3.2 MG/DL — HIGH (ref 0.7–1.5)
EOSINOPHIL # BLD AUTO: 0.21 K/UL — SIGNIFICANT CHANGE UP (ref 0–0.7)
EOSINOPHIL NFR BLD AUTO: 2.2 % — SIGNIFICANT CHANGE UP (ref 0–8)
GLUCOSE BLDC GLUCOMTR-MCNC: 102 MG/DL — HIGH (ref 70–99)
GLUCOSE BLDC GLUCOMTR-MCNC: 135 MG/DL — HIGH (ref 70–99)
GLUCOSE BLDC GLUCOMTR-MCNC: 137 MG/DL — HIGH (ref 70–99)
GLUCOSE BLDC GLUCOMTR-MCNC: 152 MG/DL — HIGH (ref 70–99)
GLUCOSE SERPL-MCNC: 175 MG/DL — HIGH (ref 70–99)
HCT VFR BLD CALC: 38.1 % — LOW (ref 42–52)
HGB BLD-MCNC: 12.6 G/DL — LOW (ref 14–18)
IMM GRANULOCYTES NFR BLD AUTO: 0.7 % — HIGH (ref 0.1–0.3)
LYMPHOCYTES # BLD AUTO: 1.03 K/UL — LOW (ref 1.2–3.4)
LYMPHOCYTES # BLD AUTO: 11 % — LOW (ref 20.5–51.1)
MAGNESIUM SERPL-MCNC: 2 MG/DL — SIGNIFICANT CHANGE UP (ref 1.8–2.4)
MCHC RBC-ENTMCNC: 27.3 PG — SIGNIFICANT CHANGE UP (ref 27–31)
MCHC RBC-ENTMCNC: 33.1 G/DL — SIGNIFICANT CHANGE UP (ref 32–37)
MCV RBC AUTO: 82.5 FL — SIGNIFICANT CHANGE UP (ref 80–94)
MONOCYTES # BLD AUTO: 0.89 K/UL — HIGH (ref 0.1–0.6)
MONOCYTES NFR BLD AUTO: 9.5 % — HIGH (ref 1.7–9.3)
NEUTROPHILS # BLD AUTO: 7.18 K/UL — HIGH (ref 1.4–6.5)
NEUTROPHILS NFR BLD AUTO: 76.4 % — HIGH (ref 42.2–75.2)
NRBC # BLD: 0 /100 WBCS — SIGNIFICANT CHANGE UP (ref 0–0)
PLATELET # BLD AUTO: 163 K/UL — SIGNIFICANT CHANGE UP (ref 130–400)
POTASSIUM SERPL-MCNC: 4.2 MMOL/L — SIGNIFICANT CHANGE UP (ref 3.5–5)
POTASSIUM SERPL-SCNC: 4.2 MMOL/L — SIGNIFICANT CHANGE UP (ref 3.5–5)
PROT SERPL-MCNC: 6.3 G/DL — SIGNIFICANT CHANGE UP (ref 6–8)
RBC # BLD: 4.62 M/UL — LOW (ref 4.7–6.1)
RBC # FLD: 15.9 % — HIGH (ref 11.5–14.5)
SODIUM SERPL-SCNC: 137 MMOL/L — SIGNIFICANT CHANGE UP (ref 135–146)
WBC # BLD: 9.4 K/UL — SIGNIFICANT CHANGE UP (ref 4.8–10.8)
WBC # FLD AUTO: 9.4 K/UL — SIGNIFICANT CHANGE UP (ref 4.8–10.8)

## 2020-02-01 PROCEDURE — 99233 SBSQ HOSP IP/OBS HIGH 50: CPT

## 2020-02-01 RX ORDER — ACETAMINOPHEN 500 MG
650 TABLET ORAL EVERY 4 HOURS
Refills: 0 | Status: DISCONTINUED | OUTPATIENT
Start: 2020-02-01 | End: 2020-02-07

## 2020-02-01 RX ORDER — METOPROLOL TARTRATE 50 MG
50 TABLET ORAL DAILY
Refills: 0 | Status: DISCONTINUED | OUTPATIENT
Start: 2020-02-01 | End: 2020-02-07

## 2020-02-01 RX ADMIN — TAMSULOSIN HYDROCHLORIDE 0.4 MILLIGRAM(S): 0.4 CAPSULE ORAL at 21:02

## 2020-02-01 RX ADMIN — Medication 250 MILLIGRAM(S): at 05:55

## 2020-02-01 RX ADMIN — CLOPIDOGREL BISULFATE 75 MILLIGRAM(S): 75 TABLET, FILM COATED ORAL at 11:51

## 2020-02-01 RX ADMIN — Medication 80 MILLIGRAM(S): at 17:42

## 2020-02-01 RX ADMIN — Medication 145 MILLIGRAM(S): at 11:51

## 2020-02-01 RX ADMIN — Medication 2: at 11:50

## 2020-02-01 RX ADMIN — Medication 50 MILLIGRAM(S): at 17:42

## 2020-02-01 RX ADMIN — ISOSORBIDE MONONITRATE 30 MILLIGRAM(S): 60 TABLET, EXTENDED RELEASE ORAL at 11:51

## 2020-02-01 RX ADMIN — Medication 650 MILLIGRAM(S): at 14:19

## 2020-02-01 RX ADMIN — Medication 1 APPLICATION(S): at 05:55

## 2020-02-01 RX ADMIN — Medication 250 MILLIGRAM(S): at 17:41

## 2020-02-01 RX ADMIN — Medication 81 MILLIGRAM(S): at 11:51

## 2020-02-01 RX ADMIN — SIMVASTATIN 40 MILLIGRAM(S): 20 TABLET, FILM COATED ORAL at 21:02

## 2020-02-01 RX ADMIN — SODIUM CHLORIDE 3 MILLILITER(S): 9 INJECTION INTRAMUSCULAR; INTRAVENOUS; SUBCUTANEOUS at 22:14

## 2020-02-01 RX ADMIN — CHLORHEXIDINE GLUCONATE 1 APPLICATION(S): 213 SOLUTION TOPICAL at 05:55

## 2020-02-01 RX ADMIN — Medication 0.5 MILLIGRAM(S): at 21:02

## 2020-02-01 RX ADMIN — Medication 50 MILLIGRAM(S): at 05:56

## 2020-02-01 RX ADMIN — BUDESONIDE AND FORMOTEROL FUMARATE DIHYDRATE 2 PUFF(S): 160; 4.5 AEROSOL RESPIRATORY (INHALATION) at 07:56

## 2020-02-01 RX ADMIN — HEPARIN SODIUM 5000 UNIT(S): 5000 INJECTION INTRAVENOUS; SUBCUTANEOUS at 17:42

## 2020-02-01 RX ADMIN — HEPARIN SODIUM 5000 UNIT(S): 5000 INJECTION INTRAVENOUS; SUBCUTANEOUS at 05:56

## 2020-02-01 RX ADMIN — Medication 1 APPLICATION(S): at 17:41

## 2020-02-01 RX ADMIN — SODIUM CHLORIDE 3 MILLILITER(S): 9 INJECTION INTRAMUSCULAR; INTRAVENOUS; SUBCUTANEOUS at 11:58

## 2020-02-01 RX ADMIN — Medication 80 MILLIGRAM(S): at 05:55

## 2020-02-01 RX ADMIN — BUDESONIDE AND FORMOTEROL FUMARATE DIHYDRATE 2 PUFF(S): 160; 4.5 AEROSOL RESPIRATORY (INHALATION) at 20:59

## 2020-02-01 RX ADMIN — SODIUM CHLORIDE 3 MILLILITER(S): 9 INJECTION INTRAMUSCULAR; INTRAVENOUS; SUBCUTANEOUS at 05:58

## 2020-02-01 RX ADMIN — AMLODIPINE BESYLATE 5 MILLIGRAM(S): 2.5 TABLET ORAL at 05:56

## 2020-02-01 RX ADMIN — FINASTERIDE 5 MILLIGRAM(S): 5 TABLET, FILM COATED ORAL at 11:51

## 2020-02-01 RX ADMIN — PANTOPRAZOLE SODIUM 40 MILLIGRAM(S): 20 TABLET, DELAYED RELEASE ORAL at 05:57

## 2020-02-01 RX ADMIN — Medication 1 APPLICATION(S): at 11:51

## 2020-02-01 NOTE — PROGRESS NOTE ADULT - SUBJECTIVE AND OBJECTIVE BOX
TAIWO ZAVALA  79y  Male      Patient is a 79y old  Male who presents with a chief complaint of b/l leg and feet pains, trouble walking (31 Jan 2020 14:50)      INTERVAL HPI/OVERNIGHT EVENTS: patient only a bit confused. says he is having some relief in left upper leg pain. has chronic b/l knee pain exacerbated by weight bearing. breathing is baseline. no chest pressure. making urine in julian. no other acute complaints      REVIEW OF SYSTEMS:  as above  All other review of systems negative    T(C): 36.2 (02-01-20 @ 05:34), Max: 36.7 (01-31-20 @ 14:05)  HR: 62 (02-01-20 @ 05:34) (62 - 66)  BP: 111/57 (02-01-20 @ 05:34) (102/57 - 111/57)  RR: 16 (02-01-20 @ 05:34) (16 - 18)  SpO2: 95% (01-31-20 @ 22:04) (95% - 95%)  Wt(kg): --Vital Signs Last 24 Hrs  T(C): 36.2 (01 Feb 2020 05:34), Max: 36.7 (31 Jan 2020 14:05)  T(F): 97.2 (01 Feb 2020 05:34), Max: 98.1 (31 Jan 2020 14:05)  HR: 62 (01 Feb 2020 05:34) (62 - 66)  BP: 111/57 (01 Feb 2020 05:34) (102/57 - 111/57)  BP(mean): --  RR: 16 (01 Feb 2020 05:34) (16 - 18)  SpO2: 95% (31 Jan 2020 22:04) (95% - 95%)      01-31-20 @ 07:01  -  02-01-20 @ 07:00  --------------------------------------------------------  IN: 0 mL / OUT: 1925 mL / NET: -1925 mL        PHYSICAL EXAM:  GENERAL: deconditioned  PSYCH: no agitation, family notes waxing/ waning mentation, patient responds appropriately, but apparently a bit slower than baseline per family  NERVOUS SYSTEM:  Alert & Oriented X3, no new focal deficits  PULMONARY: Clear to percussion bilaterally; No rales, rhonchi, wheezing, or rubs  CARDIOVASCULAR: Regular rate and rhythm; +6/6 systolic murmur, no rub/ gallop  GI: Soft, Nontender, Nondistended; Bowel sounds present rotund   R sided HD cath CDI, julian no pyuria  EXTREMITIES: b/l PVD changes, R sided venous stasis ulcer dressed, Left medial thigh and calf improving erythema and warmth; no edema currently    Consultant(s) Notes Reviewed:  [x ] YES  [ ] NO    Discussed with Consultants/Other Providers [ x] YES     LABS                          11.9   8.78  )-----------( 149      ( 31 Jan 2020 07:01 )             36.2     01-31    141  |  104  |  131<HH>  ----------------------------<  121<H>  4.4   |  21  |  3.1<H>    Ca    9.3      31 Jan 2020 07:01  Mg     2.2     01-31    TPro  7.0  /  Alb  3.8  /  TBili  1.0  /  DBili  x   /  AST  22  /  ALT  10  /  AlkPhos  47  01-30          Lactate Trend  01-30 @ 11:00 Lactate:1.6     CARDIAC MARKERS ( 31 Jan 2020 07:01 )  x     / 1.80 ng/mL / 89 U/L / x     / 3.6 ng/mL  CARDIAC MARKERS ( 30 Jan 2020 19:13 )  x     / 1.53 ng/mL / 143 U/L / x     / 5.8 ng/mL      CAPILLARY BLOOD GLUCOSE      POCT Blood Glucose.: 137 mg/dL (01 Feb 2020 07:30)        RADIOLOGY & ADDITIONAL TESTS:    Imaging Personally Reviewed:  [ ] YES  [ ] NO    HEALTH ISSUES - PROBLEM Dx:  Benign prostatic hyperplasia with urinary retention: Benign prostatic hyperplasia with urinary retention  Onychomycosis of toenail: Onychomycosis of toenail  Venous stasis ulcers: Venous stasis ulcers

## 2020-02-01 NOTE — PROGRESS NOTE ADULT - ASSESSMENT
79 y.o. 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

## 2020-02-01 NOTE — PROGRESS NOTE ADULT - SUBJECTIVE AND OBJECTIVE BOX
DIAGNOSIS:   HOSPITAL DAY #:    STATUS POST:    POST OPERATIVE DAY #:     Vital Signs Last 24 Hrs  T(C): 36.3 (01 Feb 2020 22:04), Max: 36.3 (01 Feb 2020 22:04)  T(F): 97.4 (01 Feb 2020 22:04), Max: 97.4 (01 Feb 2020 22:04)  HR: 61 (01 Feb 2020 22:04) (61 - 62)  BP: 112/59 (01 Feb 2020 22:04) (95/54 - 112/59)  BP(mean): --  RR: 18 (01 Feb 2020 22:04) (16 - 18)  SpO2: --    SUBJECTIVE: Pt seen    Pain: YES  [ ]   NO [ ]   Nausea: [ ] YES [ ] NO           Vomiting: [ ] YES [ ] NO  Flatus: [ ] YES [ ] NO             Bowel Movement: [ ] YES [ ] NO     Void: [ ]YES [ ]No      LUIS DRAINAGE: SIGNIFICANT [ ]   NOT SIGNIFICANT [ ]   NOT APPLICABLE [ ]  YES [ ] NO    General Appearance: Appears well, NAD  Neck: Supple no infection/hematoma  Chest: Equal expansion bilaterally, equal breath sounds  CV: Pulse regular presently  Abdomen: Soft [x ] YES [ ]NO  DISTENDED [ ] YES [x ] NO TENDERNESS [ ]YES [ ]NO  INCISIONS: HEALING WELL [ ] YES  [ ] NO ERYTHEMA [ ] YES [ ] NO DRAINAGE [ ] YES  [ ] NO  Extremities: Grossly symmetric, CALF TENDERNESS [ ] YES  [ ] NO      LABS:                        12.6   9.40  )-----------( 163      ( 01 Feb 2020 11:26 )             38.1     02-01    137  |  100  |  126<HH>  ----------------------------<  175<H>  4.2   |  22  |  3.2<H>    Ca    9.0      01 Feb 2020 11:26  Mg     2.0     02-01    TPro  6.3  /  Alb  3.2<L>  /  TBili  0.8  /  DBili  x   /  AST  19  /  ALT  9   /  AlkPhos  46  02-01            ASSESSMENT:     GOOD POST OP COURSE [ ]  YES  [ ] NO  CONDITION IMPROVING  []  YES  [ ]  NO          PLAN: for removal of tessio on monday discussed case with pt family    CONTINUE PRESENT MANAGEMENT  x[ ] YES  [ ] NO

## 2020-02-01 NOTE — PROGRESS NOTE ADULT - ASSESSMENT
79 y.o. 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), chronic venous stasis, Aflutter on NOAC, COPD c chronic hypoxic respiratory failure on 2-3L NC, here with BRITTANI on CKD 3/4 (now possibly 2/2 cardiorenal syndrome), + troponin likely 2/2 chronic heart failure suspected systolic and acute renal dysfunction now, lower extremity nonpurulent cellulitis on peripheral vascular disease with probable tinea pedis, also with possible metabolic encephalopathy/ uremia    on oral antibiotic now for presumed superimposed cellulitis of left upper leg  us venous duplex demonstrated no DVT or thrombophlebitis, but arterial duplex noting L external iliac moderate stenosis. will likely benefit from outpatient vascular follow up  wound care to RLE shin ordered  antifungal cream for suspected tinea pedis  3s telemonitoring has been discontinued, trending CE's most consistent with renal dysfunction- we have ruled out MI, echo pending, cardio eval appreciated- we discussed case together, agree that there is no acute MI or ACS  i/o, daily weight  previously discussed case with nephrologist. aggressive IV diuretic, metolazone primer, maintain HD catheter in case for now  f/u repeat lytes and Cr today- will make decision on HD catheter possible removal early this coming week depending on renal function  family raises concern over waxing/ waning mentation, and some slowness in responsive timing that has progressed over the last 3 weeks. suspect patient may have some mild uremic encephalopathy contributing to delirium. will monitor trend and discuss with nephrology team further  reorient if confused. patient has not had any agitated spells, and the confusion is subtle/ mild  o2, bronchodilators- copd is chronic and stable and NOT in acute exacerbation currently  HOLD noac for acute renal failure, use dvt ppx dosing for now- AC was for flutter history, risk of CVA low  glycemic control        #Progress Note Handoff    Pending (specify):  Consults_________, Tests________, Test Results_______, Other__PT, diuresis, trend Cr_______    Family discussion: plan of care discussed with patient and today with son and his wife at the bedside    Disposition: family would like to take patient home, but would likely need private hire and DME and a good deal of services for this to be possible. will continue to facilitate GOC discussions including disposition and advanced directives over the upcoming several days. GOC separate documentation to follow as plan of care is more finalized

## 2020-02-01 NOTE — PROGRESS NOTE ADULT - SUBJECTIVE AND OBJECTIVE BOX
SALLYTAIWO  79y, Male  Allergy: No Known Allergies      CHIEF COMPLAINT: leg pain (01 Feb 2020 11:28)      INTERVAL EVENTS/HPI  - No acute events overnight  - T(F): , Max: 97.2 (02-01-20 @ 05:34)  - Denies any worsening symptoms  - Tolerating medication  - WBC Count: 9.40 (02-01-20 @ 11:26)  WBC Count: 8.78 (01-31-20 @ 07:01)  - Creatinine, Serum: 3.2 (02-01-20 @ 11:26)  Creatinine, Serum: 3.1 (01-31-20 @ 07:01)       ROS  General: Denies rigors, nightsweats  HEENT: Denies headache, rhinorrhea, sore throat, eye pain  CV: Denies CP, palpitations  PULM: Denies wheezing, hemoptysis  GI: Denies hematemesis, hematochezia, melena  : Denies discharge, hematuria  MSK: Denies arthralgias, myalgias  SKIN: Denies rash, lesions  NEURO: Denies paresthesias, weakness  PSYCH: Denies depression, anxiety    VITALS:  T(F): 96.7, Max: 97.2 (02-01-20 @ 05:34)  HR: 62  BP: 95/54  RR: 16Vital Signs Last 24 Hrs  T(C): 35.9 (01 Feb 2020 13:55), Max: 36.2 (01 Feb 2020 05:34)  T(F): 96.7 (01 Feb 2020 13:55), Max: 97.2 (01 Feb 2020 05:34)  HR: 62 (01 Feb 2020 13:55) (62 - 62)  BP: 95/54 (01 Feb 2020 13:55) (95/54 - 111/57)  BP(mean): --  RR: 16 (01 Feb 2020 13:55) (16 - 18)  SpO2: 95% (31 Jan 2020 22:04) (95% - 95%)    PHYSICAL EXAM:  Gen: NAD, resting in bed  HEENT: Normocephalic, atraumatic  Neck: supple, no lymphadenopathy  CV: Regular rate & regular rhythm  Lungs: decreased BS at bases, no fremitus  Abdomen: Soft, BS present  Ext: Warm, well perfused, b/l LE edema chronic stasis changes, superificial ulcers RLE, LLE increase erythema/warmth  Neuro: non focal, awake  Skin: no rash, no erythema  Lines: no phlebitis    FH: Non-contributory  Social Hx: Non-contributory    TESTS & MEASUREMENTS:                        12.6   9.40  )-----------( 163      ( 01 Feb 2020 11:26 )             38.1     02-01    137  |  100  |  126<HH>  ----------------------------<  175<H>  4.2   |  22  |  3.2<H>    Ca    9.0      01 Feb 2020 11:26  Mg     2.0     02-01    TPro  6.3  /  Alb  3.2<L>  /  TBili  0.8  /  DBili  x   /  AST  19  /  ALT  9   /  AlkPhos  46  02-01    eGFR if Non African American: 18 mL/min/1.73M2 (02-01-20 @ 11:26)  eGFR if African American: 20 mL/min/1.73M2 (02-01-20 @ 11:26)    LIVER FUNCTIONS - ( 01 Feb 2020 11:26 )  Alb: 3.2 g/dL / Pro: 6.3 g/dL / ALK PHOS: 46 U/L / ALT: 9 U/L / AST: 19 U/L / GGT: x               Culture - Blood (collected 01-30-20 @ 11:00)  Source: .Blood Blood-Peripheral  Preliminary Report (01-31-20 @ 23:02):    No growth to date.        Blood Gas Venous - Lactate: 1.6 mmoL/L (01-30-20 @ 14:17)  Lactate, Blood: 1.6 mmol/L (01-30-20 @ 11:00)      INFECTIOUS DISEASES TESTING  MRSA PCR Result.: Negative (10-24-19 @ 12:54)      RADIOLOGY & ADDITIONAL TESTS:  I have personally reviewed the last Chest xray  CXR      CT      CARDIOLOGY TESTING  12 Lead ECG:   Ventricular Rate 62 BPM    Atrial Rate 277 BPM    QRS Duration 122 ms    Q-T Interval 430 ms    QTC Calculation(Bezet) 436 ms    R Axis 240 degrees    T Axis 34 degrees    Diagnosis Line Atrial flutter with 2 to 1 block  Right bundle branch block  Possible Inferior infarct , age undetermined  Abnormal ECG    Confirmed by STEVE YAO MD ([id number]) on 1/30/2020 11:46:19 AM (01-30-20 @ 10:04)      MEDICATIONS  ALPRAZolam 0.5  amLODIPine   Tablet 5  aspirin  chewable 81  budesonide 160 MICROgram(s)/formoterol 4.5 MICROgram(s) Inhaler 2  cephalexin 250  chlorhexidine 4% Liquid 1  clopidogrel Tablet 75  clotrimazole 1% Cream 1  dextrose 5%. 1000  dextrose 50% Injectable 12.5  dextrose 50% Injectable 25  dextrose 50% Injectable 25  fenofibrate Tablet 145  finasteride 5  furosemide   Injectable 80  heparin  Injectable 5000  insulin glargine Injectable (LANTUS) 20  insulin lispro (HumaLOG) corrective regimen sliding scale   isosorbide   mononitrate ER Tablet (IMDUR) 30  metolazone 5  metoprolol succinate ER 50  pantoprazole    Tablet 40  silver sulfADIAZINE 1% Cream 1  simvastatin 40  sodium chloride 0.9% lock flush 3  tamsulosin 0.4      ANTIBIOTICS:  cephalexin 250 milliGRAM(s) Oral every 12 hours      All available historical records have been reviewed

## 2020-02-02 LAB
ALBUMIN SERPL ELPH-MCNC: 3.2 G/DL — LOW (ref 3.5–5.2)
ALP SERPL-CCNC: 51 U/L — SIGNIFICANT CHANGE UP (ref 30–115)
ALT FLD-CCNC: 8 U/L — SIGNIFICANT CHANGE UP (ref 0–41)
ANION GAP SERPL CALC-SCNC: 16 MMOL/L — HIGH (ref 7–14)
APTT BLD: 35.4 SEC — SIGNIFICANT CHANGE UP (ref 27–39.2)
AST SERPL-CCNC: 20 U/L — SIGNIFICANT CHANGE UP (ref 0–41)
BASOPHILS # BLD AUTO: 0.02 K/UL — SIGNIFICANT CHANGE UP (ref 0–0.2)
BASOPHILS NFR BLD AUTO: 0.2 % — SIGNIFICANT CHANGE UP (ref 0–1)
BILIRUB SERPL-MCNC: 0.9 MG/DL — SIGNIFICANT CHANGE UP (ref 0.2–1.2)
BLD GP AB SCN SERPL QL: SIGNIFICANT CHANGE UP
BUN SERPL-MCNC: 121 MG/DL — CRITICAL HIGH (ref 10–20)
CALCIUM SERPL-MCNC: 8.3 MG/DL — LOW (ref 8.5–10.1)
CHLORIDE SERPL-SCNC: 97 MMOL/L — LOW (ref 98–110)
CO2 SERPL-SCNC: 25 MMOL/L — SIGNIFICANT CHANGE UP (ref 17–32)
CREAT SERPL-MCNC: 3.2 MG/DL — HIGH (ref 0.7–1.5)
EOSINOPHIL # BLD AUTO: 0.17 K/UL — SIGNIFICANT CHANGE UP (ref 0–0.7)
EOSINOPHIL NFR BLD AUTO: 2 % — SIGNIFICANT CHANGE UP (ref 0–8)
GLUCOSE BLDC GLUCOMTR-MCNC: 148 MG/DL — HIGH (ref 70–99)
GLUCOSE BLDC GLUCOMTR-MCNC: 157 MG/DL — HIGH (ref 70–99)
GLUCOSE BLDC GLUCOMTR-MCNC: 207 MG/DL — HIGH (ref 70–99)
GLUCOSE BLDC GLUCOMTR-MCNC: 94 MG/DL — SIGNIFICANT CHANGE UP (ref 70–99)
GLUCOSE SERPL-MCNC: 246 MG/DL — HIGH (ref 70–99)
HCT VFR BLD CALC: 37.1 % — LOW (ref 42–52)
HGB BLD-MCNC: 12.1 G/DL — LOW (ref 14–18)
IMM GRANULOCYTES NFR BLD AUTO: 0.2 % — SIGNIFICANT CHANGE UP (ref 0.1–0.3)
INR BLD: 1.61 RATIO — HIGH (ref 0.65–1.3)
LYMPHOCYTES # BLD AUTO: 0.96 K/UL — LOW (ref 1.2–3.4)
LYMPHOCYTES # BLD AUTO: 11.3 % — LOW (ref 20.5–51.1)
MAGNESIUM SERPL-MCNC: 1.9 MG/DL — SIGNIFICANT CHANGE UP (ref 1.8–2.4)
MCHC RBC-ENTMCNC: 26.7 PG — LOW (ref 27–31)
MCHC RBC-ENTMCNC: 32.6 G/DL — SIGNIFICANT CHANGE UP (ref 32–37)
MCV RBC AUTO: 81.7 FL — SIGNIFICANT CHANGE UP (ref 80–94)
MONOCYTES # BLD AUTO: 0.73 K/UL — HIGH (ref 0.1–0.6)
MONOCYTES NFR BLD AUTO: 8.6 % — SIGNIFICANT CHANGE UP (ref 1.7–9.3)
NEUTROPHILS # BLD AUTO: 6.57 K/UL — HIGH (ref 1.4–6.5)
NEUTROPHILS NFR BLD AUTO: 77.7 % — HIGH (ref 42.2–75.2)
NRBC # BLD: 0 /100 WBCS — SIGNIFICANT CHANGE UP (ref 0–0)
PLATELET # BLD AUTO: 152 K/UL — SIGNIFICANT CHANGE UP (ref 130–400)
POTASSIUM SERPL-MCNC: 3.6 MMOL/L — SIGNIFICANT CHANGE UP (ref 3.5–5)
POTASSIUM SERPL-SCNC: 3.6 MMOL/L — SIGNIFICANT CHANGE UP (ref 3.5–5)
PROT SERPL-MCNC: 6.4 G/DL — SIGNIFICANT CHANGE UP (ref 6–8)
PROTHROM AB SERPL-ACNC: 18.4 SEC — HIGH (ref 9.95–12.87)
RBC # BLD: 4.54 M/UL — LOW (ref 4.7–6.1)
RBC # FLD: 16 % — HIGH (ref 11.5–14.5)
SODIUM SERPL-SCNC: 138 MMOL/L — SIGNIFICANT CHANGE UP (ref 135–146)
WBC # BLD: 8.47 K/UL — SIGNIFICANT CHANGE UP (ref 4.8–10.8)
WBC # FLD AUTO: 8.47 K/UL — SIGNIFICANT CHANGE UP (ref 4.8–10.8)

## 2020-02-02 PROCEDURE — 71045 X-RAY EXAM CHEST 1 VIEW: CPT | Mod: 26

## 2020-02-02 PROCEDURE — 99233 SBSQ HOSP IP/OBS HIGH 50: CPT

## 2020-02-02 RX ADMIN — HEPARIN SODIUM 5000 UNIT(S): 5000 INJECTION INTRAVENOUS; SUBCUTANEOUS at 05:30

## 2020-02-02 RX ADMIN — SODIUM CHLORIDE 3 MILLILITER(S): 9 INJECTION INTRAMUSCULAR; INTRAVENOUS; SUBCUTANEOUS at 14:04

## 2020-02-02 RX ADMIN — Medication 1 APPLICATION(S): at 11:42

## 2020-02-02 RX ADMIN — SIMVASTATIN 40 MILLIGRAM(S): 20 TABLET, FILM COATED ORAL at 21:21

## 2020-02-02 RX ADMIN — CLOPIDOGREL BISULFATE 75 MILLIGRAM(S): 75 TABLET, FILM COATED ORAL at 11:46

## 2020-02-02 RX ADMIN — BUDESONIDE AND FORMOTEROL FUMARATE DIHYDRATE 2 PUFF(S): 160; 4.5 AEROSOL RESPIRATORY (INHALATION) at 08:25

## 2020-02-02 RX ADMIN — Medication 250 MILLIGRAM(S): at 17:10

## 2020-02-02 RX ADMIN — Medication 1 APPLICATION(S): at 05:30

## 2020-02-02 RX ADMIN — HEPARIN SODIUM 5000 UNIT(S): 5000 INJECTION INTRAVENOUS; SUBCUTANEOUS at 17:11

## 2020-02-02 RX ADMIN — Medication 0.5 MILLIGRAM(S): at 21:19

## 2020-02-02 RX ADMIN — Medication 80 MILLIGRAM(S): at 17:10

## 2020-02-02 RX ADMIN — SODIUM CHLORIDE 3 MILLILITER(S): 9 INJECTION INTRAMUSCULAR; INTRAVENOUS; SUBCUTANEOUS at 21:11

## 2020-02-02 RX ADMIN — ISOSORBIDE MONONITRATE 30 MILLIGRAM(S): 60 TABLET, EXTENDED RELEASE ORAL at 11:41

## 2020-02-02 RX ADMIN — SODIUM CHLORIDE 3 MILLILITER(S): 9 INJECTION INTRAMUSCULAR; INTRAVENOUS; SUBCUTANEOUS at 07:38

## 2020-02-02 RX ADMIN — PANTOPRAZOLE SODIUM 40 MILLIGRAM(S): 20 TABLET, DELAYED RELEASE ORAL at 05:53

## 2020-02-02 RX ADMIN — Medication 250 MILLIGRAM(S): at 05:30

## 2020-02-02 RX ADMIN — INSULIN GLARGINE 20 UNIT(S): 100 INJECTION, SOLUTION SUBCUTANEOUS at 21:21

## 2020-02-02 RX ADMIN — Medication 81 MILLIGRAM(S): at 11:40

## 2020-02-02 RX ADMIN — Medication 145 MILLIGRAM(S): at 11:41

## 2020-02-02 RX ADMIN — Medication 4: at 17:09

## 2020-02-02 RX ADMIN — AMLODIPINE BESYLATE 5 MILLIGRAM(S): 2.5 TABLET ORAL at 05:28

## 2020-02-02 RX ADMIN — FINASTERIDE 5 MILLIGRAM(S): 5 TABLET, FILM COATED ORAL at 11:41

## 2020-02-02 RX ADMIN — TAMSULOSIN HYDROCHLORIDE 0.4 MILLIGRAM(S): 0.4 CAPSULE ORAL at 21:21

## 2020-02-02 RX ADMIN — CHLORHEXIDINE GLUCONATE 1 APPLICATION(S): 213 SOLUTION TOPICAL at 05:31

## 2020-02-02 NOTE — PROGRESS NOTE ADULT - SUBJECTIVE AND OBJECTIVE BOX
SALLY TAIWO  79y  Male      Patient is a 79y old  Male who presents with a chief complaint of leg pain (01 Feb 2020 11:28)      INTERVAL HPI/OVERNIGHT EVENTS: LLE less pain, knees pain controlled. breathing is baseline. making urine in julian. no other complaints      REVIEW OF SYSTEMS:  as above  All other review of systems negative    T(C): 36.3 (02-02-20 @ 04:30), Max: 36.3 (02-01-20 @ 22:04)  HR: 56 (02-02-20 @ 05:37) (56 - 62)  BP: 105/56 (02-02-20 @ 05:37) (95/54 - 112/59)  RR: 16 (02-02-20 @ 04:30) (16 - 18)  SpO2: 97% (02-02-20 @ 05:37) (97% - 97%)  Wt(kg): --Vital Signs Last 24 Hrs  T(C): 36.3 (02 Feb 2020 04:30), Max: 36.3 (01 Feb 2020 22:04)  T(F): 97.4 (02 Feb 2020 04:30), Max: 97.4 (01 Feb 2020 22:04)  HR: 56 (02 Feb 2020 05:37) (56 - 62)  BP: 105/56 (02 Feb 2020 05:37) (95/54 - 112/59)  BP(mean): --  RR: 16 (02 Feb 2020 04:30) (16 - 18)  SpO2: 97% (02 Feb 2020 05:37) (97% - 97%)      02-01-20 @ 07:01  -  02-02-20 @ 07:00  --------------------------------------------------------  IN: 0 mL / OUT: 2950 mL / NET: -2950 mL    02-02-20 @ 07:01  -  02-02-20 @ 12:09  --------------------------------------------------------  IN: 473 mL / OUT: 0 mL / NET: 473 mL        PHYSICAL EXAM:  GENERAL: NAD deconditioned  PSYCH: no agitation, baseline mentation although with slight intermittent confusion  NERVOUS SYSTEM:  Alert & Oriented X3, no new focal deficits  PULMONARY: Clear to percussion bilaterally; No rales, rhonchi, wheezing, or rubs  CARDIOVASCULAR: Regular rate and rhythm; 5/6 systolic murmurs, No rubs, or gallops + cabg  GI: Soft, Nontender, Nondistended; Bowel sounds present rotund   julian with minimal sediment no pyuria  EXTREMITIES:  2+ Peripheral Pulses, No clubbing, cyanosis, or edema  SKIN chronic b/l LE pvd changes with R shin venous stasis ulcer dressed, L thigh erythema/ warmth subsiding    Consultant(s) Notes Reviewed:  [x ] YES  [ ] NO    Discussed with Consultants/Other Providers [ x] YES     LABS                          12.6   9.40  )-----------( 163      ( 01 Feb 2020 11:26 )             38.1     02-01    137  |  100  |  126<HH>  ----------------------------<  175<H>  4.2   |  22  |  3.2<H>    Ca    9.0      01 Feb 2020 11:26  Mg     2.0     02-01    TPro  6.3  /  Alb  3.2<L>  /  TBili  0.8  /  DBili  x   /  AST  19  /  ALT  9   /  AlkPhos  46  02-01          Lactate Trend  01-30 @ 11:00 Lactate:1.6         CAPILLARY BLOOD GLUCOSE      POCT Blood Glucose.: 148 mg/dL (02 Feb 2020 11:26)        RADIOLOGY & ADDITIONAL TESTS:    Imaging Personally Reviewed:  [ ] YES  [ ] NO    HEALTH ISSUES - PROBLEM Dx:  Benign prostatic hyperplasia with urinary retention: Benign prostatic hyperplasia with urinary retention  Onychomycosis of toenail: Onychomycosis of toenail  Venous stasis ulcers: Venous stasis ulcers

## 2020-02-02 NOTE — PROGRESS NOTE ADULT - ASSESSMENT
79 y.o. 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), chronic venous stasis, Aflutter on NOAC, COPD c chronic hypoxic respiratory failure on 2-3L NC, here with BRITTANI on CKD 3/4 (now possibly 2/2 cardiorenal syndrome), + troponin likely 2/2 chronic heart failure suspected systolic and acute renal dysfunction now, lower extremity nonpurulent cellulitis on peripheral vascular disease with probable tinea pedis, also with possible metabolic encephalopathy/ uremia    on oral antibiotic now for presumed superimposed cellulitis of left upper leg- LLE improving clinically  us venous duplex demonstrated no DVT or thrombophlebitis, but arterial duplex noting L external iliac moderate stenosis. will likely benefit from outpatient vascular follow up  wound care to RLE shin ordered  antifungal cream for suspected tinea pedis  trending CE's most consistent with renal dysfunction- we have ruled out MI, echo pending, cardio eval appreciated- we discussed case together, agree that there is no acute MI or ACS  i/o, daily weight, repeat CXR tomorrow  previously discussed case with nephrologist. aggressive IV diuretic, metolazone primer, maintain HD catheter in case for now, trending Cr/lytes  will make decision on HD catheter possible removal early this coming week depending on renal function  family raises concern over waxing/ waning mentation, and some slowness in responsive timing that has progressed over the last 3 weeks. suspect patient may have some mild uremic encephalopathy contributing to delirium. will monitor trend and discuss with nephrology team further  reorient if confused. patient has not had any agitated spells, and the confusion is subtle/ mild  o2, bronchodilators- copd is chronic and stable and NOT in acute exacerbation currently  HOLD noac for acute renal failure, use dvt ppx dosing for now- AC was for flutter history, risk of CVA low, c/w antiplatelet agents for now  glycemic control  PT for physical debility      #Progress Note Handoff    Pending (specify):  Consults_________, Tests________, Test Results_______, Other__PT, diuresis, trend Cr_______    Family discussion: plan of care discussed with patient today and previously with son and his wife at the bedside    Disposition: family would like to take patient home, but would likely need private hire and DME and a good deal of services for this to be possible. will continue to facilitate GOC discussions including disposition and advanced directives over the upcoming several days. GOC separate documentation to follow as plan of care is more finalized

## 2020-02-02 NOTE — PROGRESS NOTE ADULT - ASSESSMENT
Pt with BRITTANI due to ATN (post infection a few months ago) was on HD for about 2 mos, had some kidney function recovery and was taken off HD about 2 weeks ago. Pt readmitted with inability to walk b/o swollen knees, found to have PVC on CXR,    BRITTANI - nonoliguric  cont LAsix 80 mg IV q 12 with Metolazone 5 mg qd  - no indication for HD now, /u today's labs   - pt is comfortable in bed on NC  - maintain Najera for strict Is adn OS  - do not remove Tesio yet, need to observe creat for a few days on high dose diuretics      CHD/Aflutter - repeat CXR t assess volume  avoid to or Coumadin please since line may need to be removed      Lt LE cellulitis -  on keflex

## 2020-02-02 NOTE — PROGRESS NOTE ADULT - SUBJECTIVE AND OBJECTIVE BOX
Nephrology progress note    Patient was seen and examined, events over the last 24 h noted .    Allergies:  No Known Allergies    Hospital Medications:   MEDICATIONS  (STANDING):  ALPRAZolam 0.5 milliGRAM(s) Oral at bedtime  amLODIPine   Tablet 5 milliGRAM(s) Oral daily  aspirin  chewable 81 milliGRAM(s) Oral daily  budesonide 160 MICROgram(s)/formoterol 4.5 MICROgram(s) Inhaler 2 Puff(s) Inhalation two times a day  cephalexin 250 milliGRAM(s) Oral every 12 hours  chlorhexidine 4% Liquid 1 Application(s) Topical <User Schedule>  clopidogrel Tablet 75 milliGRAM(s) Oral daily  clotrimazole 1% Cream 1 Application(s) Topical every 12 hours  dextrose 5%. 1000 milliLiter(s) (50 mL/Hr) IV Continuous <Continuous>  dextrose 50% Injectable 12.5 Gram(s) IV Push once  dextrose 50% Injectable 25 Gram(s) IV Push once  dextrose 50% Injectable 25 Gram(s) IV Push once  fenofibrate Tablet 145 milliGRAM(s) Oral daily  finasteride 5 milliGRAM(s) Oral daily  furosemide   Injectable 80 milliGRAM(s) IV Push every 12 hours  heparin  Injectable 5000 Unit(s) SubCutaneous every 12 hours  insulin glargine Injectable (LANTUS) 20 Unit(s) SubCutaneous at bedtime  insulin lispro (HumaLOG) corrective regimen sliding scale   SubCutaneous three times a day before meals  isosorbide   mononitrate ER Tablet (IMDUR) 30 milliGRAM(s) Oral daily  metolazone 5 milliGRAM(s) Oral daily  metoprolol succinate ER 50 milliGRAM(s) Oral daily  pantoprazole    Tablet 40 milliGRAM(s) Oral before breakfast  silver sulfADIAZINE 1% Cream 1 Application(s) Topical daily  simvastatin 40 milliGRAM(s) Oral at bedtime  sodium chloride 0.9% lock flush 3 milliLiter(s) IV Push every 8 hours  tamsulosin 0.4 milliGRAM(s) Oral at bedtime        VITALS:  T(F): 97.4 (02-02-20 @ 04:30), Max: 97.4 (02-01-20 @ 22:04)  HR: 56 (02-02-20 @ 05:37)  BP: 105/56 (02-02-20 @ 05:37)  RR: 16 (02-02-20 @ 04:30)  SpO2: 97% (02-02-20 @ 05:37)  Wt(kg): --    01-31 @ 07:01  -  02-01 @ 07:00  --------------------------------------------------------  IN: 0 mL / OUT: 1925 mL / NET: -1925 mL    02-01 @ 07:01  -  02-02 @ 07:00  --------------------------------------------------------  IN: 0 mL / OUT: 2950 mL / NET: -2950 mL          PHYSICAL EXAM:  Constitutional: NAD  HEENT: anicteric sclera, oropharynx clear, dry oral mucosa  Respiratory: CTAB, no wheezes, rales or rhonchi  Cardiovascular: S1, S2, RRR  Gastrointestinal: BS+, soft, NT/ND  Extremities: Lt medial aspect of thigh - cellulitis. Mild peripheral edema, chronic stasis dermatitis  Neurological: A/O x 3, [place]  Psychiatric: Normal mood, normal affect  : No CVA tenderness. No julian.   Skin: No rashes    LABS:  02-01    137  |  100  |  126<HH>  ----------------------------<  175<H>  4.2   |  22  |  3.2<H>    Ca    9.0      01 Feb 2020 11:26  Mg     2.0     02-01    TPro  6.3  /  Alb  3.2<L>  /  TBili  0.8  /  DBili      /  AST  19  /  ALT  9   /  AlkPhos  46  02-01                          12.6   9.40  )-----------( 163      ( 01 Feb 2020 11:26 )             38.1       Urine Studies:      RADIOLOGY & ADDITIONAL STUDIES:

## 2020-02-02 NOTE — PROGRESS NOTE ADULT - SUBJECTIVE AND OBJECTIVE BOX
DIAGNOSIS:   HOSPITAL DAY #:    STATUS POST:    POST OPERATIVE DAY #:     Vital Signs Last 24 Hrs  T(C): 36.5 (02 Feb 2020 21:29), Max: 36.5 (02 Feb 2020 21:29)  T(F): 97.7 (02 Feb 2020 21:29), Max: 97.7 (02 Feb 2020 21:29)  HR: 62 (02 Feb 2020 21:29) (56 - 62)  BP: 123/61 (02 Feb 2020 21:29) (98/50 - 123/61)  BP(mean): --  RR: 16 (02 Feb 2020 21:29) (16 - 16)  SpO2: 97% (02 Feb 2020 05:37) (97% - 97%)    SUBJECTIVE: Pt seen    Pain: YES  [ ]   NO [ ]   Nausea: [ ] YES [ ] NO           Vomiting: [ ] YES [ ] NO  Flatus: [ ] YES [ ] NO             Bowel Movement: [ ] YES [ ] NO     Void: [ ]YES [ ]No      LUIS DRAINAGE: SIGNIFICANT [ ]   NOT SIGNIFICANT [ ]   NOT APPLICABLE [ ]  YES [ ] NO    General Appearance: Appears well, NAD  Neck: Supple tessio site ok no infection  Chest: Equal expansion bilaterally, equal breath sounds  CV: Pulse regular presently  Abdomen: Soft [x ] YES [ ]NO  DISTENDED [ ] YES [x ] NO TENDERNESS [ ]YES [ ]NO  INCISIONS: HEALING WELL [ ] YES  [ ] NO ERYTHEMA [ ] YES [ ] NO DRAINAGE [ ] YES  [ ] NO  Extremities: Grossly symmetric, CALF TENDERNESS [ ] YES  [ ] NO      LABS:                        12.1   8.47  )-----------( 152      ( 02 Feb 2020 13:27 )             37.1     02-02    138  |  97<L>  |  121<HH>  ----------------------------<  246<H>  3.6   |  25  |  3.2<H>    Ca    8.3<L>      02 Feb 2020 13:27  Mg     1.9     02-02    TPro  6.4  /  Alb  3.2<L>  /  TBili  0.9  /  DBili  x   /  AST  20  /  ALT  8   /  AlkPhos  51  02-02    PT/INR - ( 02 Feb 2020 13:27 )   PT: 18.40 sec;   INR: 1.61 ratio         PTT - ( 02 Feb 2020 13:27 )  PTT:35.4 sec        ASSESSMENT:     GOOD POST OP COURSE [ ]  YES  [ ] NO  CONDITION IMPROVING  x[]  YES  [ ]  NO          PLAN: will discuss case with renal    CONTINUE PRESENT MANAGEMENT  [x ] YES  [ ] NO

## 2020-02-03 LAB
ALBUMIN SERPL ELPH-MCNC: 3.1 G/DL — LOW (ref 3.5–5.2)
ALP SERPL-CCNC: 51 U/L — SIGNIFICANT CHANGE UP (ref 30–115)
ALT FLD-CCNC: 8 U/L — SIGNIFICANT CHANGE UP (ref 0–41)
ANION GAP SERPL CALC-SCNC: 17 MMOL/L — HIGH (ref 7–14)
APTT BLD: 35.2 SEC — SIGNIFICANT CHANGE UP (ref 27–39.2)
AST SERPL-CCNC: 18 U/L — SIGNIFICANT CHANGE UP (ref 0–41)
BASOPHILS # BLD AUTO: 0.01 K/UL — SIGNIFICANT CHANGE UP (ref 0–0.2)
BASOPHILS NFR BLD AUTO: 0.1 % — SIGNIFICANT CHANGE UP (ref 0–1)
BILIRUB SERPL-MCNC: 0.7 MG/DL — SIGNIFICANT CHANGE UP (ref 0.2–1.2)
BUN SERPL-MCNC: 116 MG/DL — CRITICAL HIGH (ref 10–20)
CALCIUM SERPL-MCNC: 8.9 MG/DL — SIGNIFICANT CHANGE UP (ref 8.5–10.1)
CHLORIDE SERPL-SCNC: 96 MMOL/L — LOW (ref 98–110)
CO2 SERPL-SCNC: 26 MMOL/L — SIGNIFICANT CHANGE UP (ref 17–32)
CREAT SERPL-MCNC: 2.8 MG/DL — HIGH (ref 0.7–1.5)
EOSINOPHIL # BLD AUTO: 0.21 K/UL — SIGNIFICANT CHANGE UP (ref 0–0.7)
EOSINOPHIL NFR BLD AUTO: 3.1 % — SIGNIFICANT CHANGE UP (ref 0–8)
GLUCOSE BLDC GLUCOMTR-MCNC: 125 MG/DL — HIGH (ref 70–99)
GLUCOSE BLDC GLUCOMTR-MCNC: 150 MG/DL — HIGH (ref 70–99)
GLUCOSE BLDC GLUCOMTR-MCNC: 189 MG/DL — HIGH (ref 70–99)
GLUCOSE BLDC GLUCOMTR-MCNC: 396 MG/DL — HIGH (ref 70–99)
GLUCOSE SERPL-MCNC: 213 MG/DL — HIGH (ref 70–99)
HCT VFR BLD CALC: 36.7 % — LOW (ref 42–52)
HGB BLD-MCNC: 12.2 G/DL — LOW (ref 14–18)
IMM GRANULOCYTES NFR BLD AUTO: 0.4 % — HIGH (ref 0.1–0.3)
INR BLD: 1.62 RATIO — HIGH (ref 0.65–1.3)
LYMPHOCYTES # BLD AUTO: 0.9 K/UL — LOW (ref 1.2–3.4)
LYMPHOCYTES # BLD AUTO: 13.3 % — LOW (ref 20.5–51.1)
MAGNESIUM SERPL-MCNC: 1.8 MG/DL — SIGNIFICANT CHANGE UP (ref 1.8–2.4)
MCHC RBC-ENTMCNC: 27 PG — SIGNIFICANT CHANGE UP (ref 27–31)
MCHC RBC-ENTMCNC: 33.2 G/DL — SIGNIFICANT CHANGE UP (ref 32–37)
MCV RBC AUTO: 81.2 FL — SIGNIFICANT CHANGE UP (ref 80–94)
MONOCYTES # BLD AUTO: 0.65 K/UL — HIGH (ref 0.1–0.6)
MONOCYTES NFR BLD AUTO: 9.6 % — HIGH (ref 1.7–9.3)
NEUTROPHILS # BLD AUTO: 4.97 K/UL — SIGNIFICANT CHANGE UP (ref 1.4–6.5)
NEUTROPHILS NFR BLD AUTO: 73.5 % — SIGNIFICANT CHANGE UP (ref 42.2–75.2)
NRBC # BLD: 0 /100 WBCS — SIGNIFICANT CHANGE UP (ref 0–0)
PLATELET # BLD AUTO: 140 K/UL — SIGNIFICANT CHANGE UP (ref 130–400)
POTASSIUM SERPL-MCNC: 3.2 MMOL/L — LOW (ref 3.5–5)
POTASSIUM SERPL-SCNC: 3.2 MMOL/L — LOW (ref 3.5–5)
PROT SERPL-MCNC: 6.2 G/DL — SIGNIFICANT CHANGE UP (ref 6–8)
PROTHROM AB SERPL-ACNC: 18.5 SEC — HIGH (ref 9.95–12.87)
RBC # BLD: 4.52 M/UL — LOW (ref 4.7–6.1)
RBC # FLD: 15.9 % — HIGH (ref 11.5–14.5)
SODIUM SERPL-SCNC: 139 MMOL/L — SIGNIFICANT CHANGE UP (ref 135–146)
WBC # BLD: 6.77 K/UL — SIGNIFICANT CHANGE UP (ref 4.8–10.8)
WBC # FLD AUTO: 6.77 K/UL — SIGNIFICANT CHANGE UP (ref 4.8–10.8)

## 2020-02-03 PROCEDURE — 99497 ADVNCD CARE PLAN 30 MIN: CPT

## 2020-02-03 PROCEDURE — 99233 SBSQ HOSP IP/OBS HIGH 50: CPT

## 2020-02-03 RX ORDER — FUROSEMIDE 40 MG
80 TABLET ORAL EVERY 12 HOURS
Refills: 0 | Status: DISCONTINUED | OUTPATIENT
Start: 2020-02-03 | End: 2020-02-07

## 2020-02-03 RX ORDER — ASCORBIC ACID 60 MG
500 TABLET,CHEWABLE ORAL DAILY
Refills: 0 | Status: DISCONTINUED | OUTPATIENT
Start: 2020-02-03 | End: 2020-02-07

## 2020-02-03 RX ORDER — ZINC SULFATE TAB 220 MG (50 MG ZINC EQUIVALENT) 220 (50 ZN) MG
220 TAB ORAL DAILY
Refills: 0 | Status: DISCONTINUED | OUTPATIENT
Start: 2020-02-03 | End: 2020-02-07

## 2020-02-03 RX ADMIN — PANTOPRAZOLE SODIUM 40 MILLIGRAM(S): 20 TABLET, DELAYED RELEASE ORAL at 05:44

## 2020-02-03 RX ADMIN — FINASTERIDE 5 MILLIGRAM(S): 5 TABLET, FILM COATED ORAL at 11:05

## 2020-02-03 RX ADMIN — BUDESONIDE AND FORMOTEROL FUMARATE DIHYDRATE 2 PUFF(S): 160; 4.5 AEROSOL RESPIRATORY (INHALATION) at 11:06

## 2020-02-03 RX ADMIN — Medication 0.5 MILLIGRAM(S): at 21:49

## 2020-02-03 RX ADMIN — SODIUM CHLORIDE 3 MILLILITER(S): 9 INJECTION INTRAMUSCULAR; INTRAVENOUS; SUBCUTANEOUS at 05:26

## 2020-02-03 RX ADMIN — SODIUM CHLORIDE 3 MILLILITER(S): 9 INJECTION INTRAMUSCULAR; INTRAVENOUS; SUBCUTANEOUS at 12:49

## 2020-02-03 RX ADMIN — Medication 500 MILLIGRAM(S): at 17:04

## 2020-02-03 RX ADMIN — Medication 81 MILLIGRAM(S): at 11:05

## 2020-02-03 RX ADMIN — HEPARIN SODIUM 5000 UNIT(S): 5000 INJECTION INTRAVENOUS; SUBCUTANEOUS at 05:44

## 2020-02-03 RX ADMIN — ZINC SULFATE TAB 220 MG (50 MG ZINC EQUIVALENT) 220 MILLIGRAM(S): 220 (50 ZN) TAB at 17:05

## 2020-02-03 RX ADMIN — AMLODIPINE BESYLATE 5 MILLIGRAM(S): 2.5 TABLET ORAL at 05:44

## 2020-02-03 RX ADMIN — CHLORHEXIDINE GLUCONATE 1 APPLICATION(S): 213 SOLUTION TOPICAL at 05:44

## 2020-02-03 RX ADMIN — Medication 1 APPLICATION(S): at 05:44

## 2020-02-03 RX ADMIN — TAMSULOSIN HYDROCHLORIDE 0.4 MILLIGRAM(S): 0.4 CAPSULE ORAL at 21:48

## 2020-02-03 RX ADMIN — Medication 2: at 16:57

## 2020-02-03 RX ADMIN — Medication 250 MILLIGRAM(S): at 05:44

## 2020-02-03 RX ADMIN — SIMVASTATIN 40 MILLIGRAM(S): 20 TABLET, FILM COATED ORAL at 21:48

## 2020-02-03 RX ADMIN — Medication 50 MILLIGRAM(S): at 05:44

## 2020-02-03 RX ADMIN — Medication 1 APPLICATION(S): at 11:07

## 2020-02-03 RX ADMIN — SODIUM CHLORIDE 3 MILLILITER(S): 9 INJECTION INTRAMUSCULAR; INTRAVENOUS; SUBCUTANEOUS at 21:50

## 2020-02-03 RX ADMIN — Medication 1 APPLICATION(S): at 17:06

## 2020-02-03 RX ADMIN — Medication 250 MILLIGRAM(S): at 17:06

## 2020-02-03 RX ADMIN — Medication 145 MILLIGRAM(S): at 11:06

## 2020-02-03 RX ADMIN — HEPARIN SODIUM 5000 UNIT(S): 5000 INJECTION INTRAVENOUS; SUBCUTANEOUS at 17:05

## 2020-02-03 RX ADMIN — INSULIN GLARGINE 20 UNIT(S): 100 INJECTION, SOLUTION SUBCUTANEOUS at 21:49

## 2020-02-03 RX ADMIN — BUDESONIDE AND FORMOTEROL FUMARATE DIHYDRATE 2 PUFF(S): 160; 4.5 AEROSOL RESPIRATORY (INHALATION) at 21:49

## 2020-02-03 RX ADMIN — ISOSORBIDE MONONITRATE 30 MILLIGRAM(S): 60 TABLET, EXTENDED RELEASE ORAL at 11:06

## 2020-02-03 RX ADMIN — Medication 80 MILLIGRAM(S): at 17:03

## 2020-02-03 RX ADMIN — CLOPIDOGREL BISULFATE 75 MILLIGRAM(S): 75 TABLET, FILM COATED ORAL at 11:05

## 2020-02-03 NOTE — PROGRESS NOTE ADULT - ASSESSMENT
79 y.o. 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 Continue keflex 250 mg q12h PO to complete 7 days   2. Continue  Nizoral or clotrimazole   3. Monitor Kidney function and adjust Abx accordingly     - spent more than 35 minutes on total encounter

## 2020-02-03 NOTE — PROGRESS NOTE ADULT - SUBJECTIVE AND OBJECTIVE BOX
TAIWO ZAVALA  79y  Male      Patient is a 79y old  Male who presents with a chief complaint of leg pain, kar (02 Feb 2020 12:09)      INTERVAL HPI/OVERNIGHT EVENTS: L thigh pain subsided, knee pain chronic is controlled. was in bed today but agreeable to work with PT and get out of bed. urinating in julian, agreeable for voiding trial      REVIEW OF SYSTEMS:  as above  All other review of systems negative    T(C): 35.8 (02-03-20 @ 05:11), Max: 36.5 (02-02-20 @ 21:29)  HR: 61 (02-03-20 @ 05:11) (61 - 62)  BP: 110/61 (02-03-20 @ 05:11) (110/61 - 123/61)  RR: 16 (02-03-20 @ 05:11) (16 - 16)  SpO2: --  Wt(kg): --Vital Signs Last 24 Hrs  T(C): 35.8 (03 Feb 2020 05:11), Max: 36.5 (02 Feb 2020 21:29)  T(F): 96.5 (03 Feb 2020 05:11), Max: 97.7 (02 Feb 2020 21:29)  HR: 61 (03 Feb 2020 05:11) (61 - 62)  BP: 110/61 (03 Feb 2020 05:11) (110/61 - 123/61)  BP(mean): --  RR: 16 (03 Feb 2020 05:11) (16 - 16)  SpO2: --      02-02-20 @ 07:01  -  02-03-20 @ 07:00  --------------------------------------------------------  IN: 1313 mL / OUT: 3200 mL / NET: -1887 mL        PHYSICAL EXAM:  GENERAL: NAD deconditioned  HEENT hard of hearing  PSYCH: no agitation, baseline mentation  NERVOUS SYSTEM:  Alert & Oriented X3, no new focal deficits  PULMONARY: Clear to percussion bilaterally; No rales, rhonchi, wheezing, or rubs  CARDIOVASCULAR: Regular rate and rhythm; No murmurs, rubs, or gallops  GI: Soft, Nontender, Nondistended; Bowel sounds present rotund   HD cath CDI, julian without pyuria, scant sediment  EXTREMITIES:  2+ Peripheral Pulses, No clubbing, cyanosis, or edema, R shin venous stasis ulcers, L thigh prior erythema/ warmth improved, chronic b/l LE hyperpigmentation     Consultant(s) Notes Reviewed:  [x ] YES  [ ] NO    Discussed with Consultants/Other Providers [ x] YES     LABS                          12.2   6.77  )-----------( 140      ( 03 Feb 2020 08:57 )             36.7     02-03    139  |  96<L>  |  116<HH>  ----------------------------<  213<H>  3.2<L>   |  26  |  2.8<H>    Ca    8.9      03 Feb 2020 08:57  Mg     1.8     02-03    TPro  6.2  /  Alb  3.1<L>  /  TBili  0.7  /  DBili  x   /  AST  18  /  ALT  8   /  AlkPhos  51  02-03        PT/INR - ( 03 Feb 2020 08:57 )   PT: 18.50 sec;   INR: 1.62 ratio         PTT - ( 03 Feb 2020 08:57 )  PTT:35.2 sec  Lactate Trend  01-30 @ 11:00 Lactate:1.6         CAPILLARY BLOOD GLUCOSE      POCT Blood Glucose.: 150 mg/dL (03 Feb 2020 11:22)        RADIOLOGY & ADDITIONAL TESTS:    Imaging Personally Reviewed:  [ ] YES  [ ] NO    HEALTH ISSUES - PROBLEM Dx:  Benign prostatic hyperplasia with urinary retention: Benign prostatic hyperplasia with urinary retention  Onychomycosis of toenail: Onychomycosis of toenail  Venous stasis ulcers: Venous stasis ulcers

## 2020-02-03 NOTE — CONSULT NOTE ADULT - ASSESSMENT
IMPRESSION: Rehab of 78 y/o m rehab  for  GD debility     PRECAUTIONS: [  ] Cardiac  [  ] Respiratory  [  ] Seizures [  ] Contact Isolation  [  ] Droplet Isolation  [ FALL ] Other    Weight Bearing Status:     RECOMMENDATION:    Out of Bed to Chair     DVT/Decubiti Prophylaxis    REHAB PLAN:     [  xx ] Bedside P/T 3-5 times a week   [   ]   Bedside O/T  2-3 times a week             [   ] No Rehab Therapy Indicated                   [   ]  Speech Therapy   Conditioning/ROM                                    ADL  Bed Mobility                                               Conditioning/ROM  Transfers                                                     Bed Mobility  Sitting /Standing Balance                         Transfers                                        Gait Training                                               Sitting/Standing Balance  Stair Training [   ]Applicable                    Home equipment Eval                                                                        Splinting  [   ] Only      GOALS:   ADL   [  x]   Independent                    Transfers  [ x ] Independent                          Ambulation  [  x] Independent     [    ] With device                            [   ]  CG                                                         [   ]  CG                                                                  [   ] CG                            [    ] Min A                                                   [   ] Min A                                                              [   ] Min  A          DISCHARGE PLAN:   [   ]  Good candidate for Intensive Rehabilitation/Hospital based-[facility]A [facility]                                             Will tolerate 3hrs Intensive Rehab Daily                                       [ xx   ]  Short Term Rehab in Skilled Nursing Facility                                       [    ]  Home with Outpatient or VN services                                         [    ]  Possible Candidate for Intensive Hospital based Rehab

## 2020-02-03 NOTE — DIETITIAN INITIAL EVALUATION ADULT. - ENERGY NEEDS
kcal: 8867-6438 (25-30 kcal/kg IBW) BMI considered + unsure dry wt  protein: 48-64 g (.75-1 g/kg) same as above + pressure ulcers + poor renal fxn not currently on HD considered- aim toward upper end  fluid: 1mL/kcal or per LIP

## 2020-02-03 NOTE — GOALS OF CARE CONVERSATION - ADVANCED CARE PLANNING - CONVERSATION DETAILS
hx cabg, copd on o2, here with cellulitis, worsening physical debility and recurrent renal failure    patient's wishes depend on underlying prognosis  agreeable to a TRIAL of critical care including resuscitation or intubation IF there is a reasonable chance of reversible recovery  otherwise do NOT attempt    at this point will keep patient full code in light of above wishes

## 2020-02-03 NOTE — CONSULT NOTE ADULT - SUBJECTIVE AND OBJECTIVE BOX
NEPHROLOGY CONSULTATION NOTE    Patient is a 79y Male whom presented to the hospital with inability to bend his knees and walk because of swelling. Denies SOB at rest, no CP> LAst HD was probably 2 weeks ago, was on 2x HD week regimen for 1 mos with creat repeatedly 2.1mg%. Plan was to remove tesio and go to Coumadin clinic for INR if creat remains the same range.  As per ER pt was fluided overloaded and started on IV LASix, pt is making urine, no SOB at present. HAs cellulits Lt LE medial aspect. Knees arthritic changes, not particularly swollen.    PAST MEDICAL & SURGICAL HISTORY:  BPH (benign prostatic hyperplasia)  CHF (congestive heart failure)  COPD (chronic obstructive pulmonary disease)  Diabetes  HTN (hypertension)  H/O heart artery stent  S/P CABG x 3    Allergies:  No Known Allergies    Home Medications Reviewed  Hospital Medications:   MEDICATIONS  (STANDING):  ALPRAZolam 0.5 milliGRAM(s) Oral at bedtime  amLODIPine   Tablet 5 milliGRAM(s) Oral daily  aspirin  chewable 81 milliGRAM(s) Oral daily  budesonide 160 MICROgram(s)/formoterol 4.5 MICROgram(s) Inhaler 2 Puff(s) Inhalation two times a day  chlorhexidine 4% Liquid 1 Application(s) Topical <User Schedule>  clopidogrel Tablet 75 milliGRAM(s) Oral daily  clotrimazole 1% Cream 1 Application(s) Topical every 12 hours  dextrose 5%. 1000 milliLiter(s) (50 mL/Hr) IV Continuous <Continuous>  dextrose 50% Injectable 12.5 Gram(s) IV Push once  dextrose 50% Injectable 25 Gram(s) IV Push once  dextrose 50% Injectable 25 Gram(s) IV Push once  fenofibrate Tablet 145 milliGRAM(s) Oral daily  finasteride 5 milliGRAM(s) Oral daily  furosemide   Injectable 40 milliGRAM(s) IV Push daily  heparin  Injectable 5000 Unit(s) SubCutaneous every 12 hours  insulin glargine Injectable (LANTUS) 20 Unit(s) SubCutaneous at bedtime  insulin lispro (HumaLOG) corrective regimen sliding scale   SubCutaneous three times a day before meals  isosorbide   mononitrate ER Tablet (IMDUR) 30 milliGRAM(s) Oral daily  metoprolol succinate ER 50 milliGRAM(s) Oral every 12 hours  pantoprazole    Tablet 40 milliGRAM(s) Oral before breakfast  silver sulfADIAZINE 1% Cream 1 Application(s) Topical daily  simvastatin 40 milliGRAM(s) Oral at bedtime  sodium chloride 0.9% lock flush 3 milliLiter(s) IV Push every 8 hours  tamsulosin 0.4 milliGRAM(s) Oral at bedtime      SOCIAL HISTORY:  Denies ETOH,Smoking,   FAMILY HISTORY:  No pertinent family history in first degree relatives        REVIEW OF SYSTEMS:  CONSTITUTIONAL: No weakness, fevers or chills  EYES/ENT: No visual changes;  No vertigo or throat pain   NECK: No pain or stiffness  RESPIRATORY: No cough, wheezing, hemoptysis; No shortness of breath  CARDIOVASCULAR: No chest pain or palpitations.  GASTROINTESTINAL: No abdominal or epigastric pain. No nausea, vomiting,   GENITOURINARY: No dysuria, frequency,  SKIN:stasis dermatitis, Lt thigh medial aspect hardening of skin  tenderness redness  VASCULAR: mild bilateral lower extremity edema.   All other review of systems is negative unless indicated above.    VITALS:  T(F): 97.1 (01-31-20 @ 05:34), Max: 98.9 (01-30-20 @ 17:00)  HR: 60 (01-31-20 @ 05:34)  BP: 101/57 (01-31-20 @ 05:34)  RR: 18 (01-31-20 @ 05:34)  SpO2: 96% (01-31-20 @ 09:56)    01-30 @ 07:01  -  01-31 @ 07:00  --------------------------------------------------------  IN: 0 mL / OUT: 1250 mL / NET: -1250 mL        Weight (kg): 111.2 (01-30 @ 17:00)    01-30-20 @ 07:01  -  01-31-20 @ 07:00  --------------------------------------------------------  IN: 0 mL / OUT: 1100 mL / NET: -1100 mL      I&O's Detail    30 Jan 2020 07:01  -  31 Jan 2020 07:00  --------------------------------------------------------  IN:  Total IN: 0 mL    OUT:    Indwelling Catheter - Urethral: 400 mL    Ureteral Catheter: 700 mL    Voided: 150 mL  Total OUT: 1250 mL    Total NET: -1250 mL        Creatine Kinase, Serum: 89 U/L (01-31-20 @ 07:01)  Creatine Kinase, Serum: 143 U/L (01-30-20 @ 19:13)      PHYSICAL EXAM:  Constitutional: NAD  HEENT: anicteric sclera, oropharynx clear, dry oral mucosa  Respiratory: CTAB, no wheezes, rales or rhonchi  Cardiovascular: S1, S2, RRR  Gastrointestinal: BS+, soft, NT/ND  Extremities: Lt medial aspect of thigh - cellulitis. Mild peripheral edema, chronic stasis dermatitis  Neurological: A/O x 3, [place]  Psychiatric: Normal mood, normal affect  : No CVA tenderness. No julian.   Skin: No rashes  Vascular Access: tesio    LABS:  01-31    141  |  104  |  131<HH>  ----------------------------<  121<H>  4.4   |  21  |  3.1<H>    Ca    9.3      31 Jan 2020 07:01  Mg     2.2     01-31    TPro  7.0  /  Alb  3.8  /  TBili  1.0  /  DBili      /  AST  22  /  ALT  10  /  AlkPhos  47  01-30    Creatinine Trend: 3.1 <--, 3.6 <--                        11.9   8.78  )-----------( 149      ( 31 Jan 2020 07:01 )             36.2     Urine Studies:              RADIOLOGY & ADDITIONAL STUDIES:    < from: Xray Chest 1 View-PORTABLE IMMEDIATE (01.30.20 @ 09:57) >  Increased vascular congestion and probable right-sided pleural effusion/consolidation. Cannot exclude underlying/associated infection given the history. Clinical correlation is necessary.    < end of copied text >
CARDIOLOGY CONSULT NOTE     CHIEF COMPLAINT/REASON FOR CONSULT:    HPI:  79 y.o. M with PMH of CABG, Bypass, CKD on dialysis, chronic venous stasis on Xarelto COPD on 2L NC sent in from HOME with 1 week of worsening left sided leg pain, gradual in progression, sharp in nature, new from before, unable to bear weight, no fever no chills no SOB/CP/recent travel. Pt was recently admitted 2 months ago for cellulitis of the right leg. Denies any new swelling.  Pt. was at [facility] .    Pt did require HD in 11/19 but no HD required this month , still has right chest wall Tesio placed by Dr. Sher     pt unable to ambulate at baseline.    Patient is known to Dr. Cline, being followed for BPH. States he has not been having any difficulty urinating, nor does he currently feel like his bladder is full. He spontaneously voided approx 150 cc clear yellow urine prior to julian  placement.    Dr. Malin at bedside and under sterile technique, 16 FR julian was placed. Approx 60 cc cloudy yellow urine obtained    er d/w nephro : lasix 40 ivp given in ER (30 Jan 2020 14:34)      PAST MEDICAL & SURGICAL HISTORY:  BPH (benign prostatic hyperplasia)  CHF (congestive heart failure)  COPD (chronic obstructive pulmonary disease)  Diabetes  HTN (hypertension)  H/O heart artery stent  S/P CABG x 3      Cardiac Risks:   [x ]HTN, [ ] DM, [ ] Smoking, [ ] FH,  [ ] Lipids        MEDICATIONS:  MEDICATIONS  (STANDING):  ALPRAZolam 0.5 milliGRAM(s) Oral at bedtime  amLODIPine   Tablet 5 milliGRAM(s) Oral daily  aspirin  chewable 81 milliGRAM(s) Oral daily  budesonide 160 MICROgram(s)/formoterol 4.5 MICROgram(s) Inhaler 2 Puff(s) Inhalation two times a day  chlorhexidine 4% Liquid 1 Application(s) Topical <User Schedule>  clopidogrel Tablet 75 milliGRAM(s) Oral daily  clotrimazole 1% Cream 1 Application(s) Topical every 12 hours  dextrose 5%. 1000 milliLiter(s) (50 mL/Hr) IV Continuous <Continuous>  dextrose 50% Injectable 12.5 Gram(s) IV Push once  dextrose 50% Injectable 25 Gram(s) IV Push once  dextrose 50% Injectable 25 Gram(s) IV Push once  fenofibrate Tablet 145 milliGRAM(s) Oral daily  finasteride 5 milliGRAM(s) Oral daily  furosemide   Injectable 40 milliGRAM(s) IV Push daily  heparin  Injectable 5000 Unit(s) SubCutaneous every 12 hours  insulin glargine Injectable (LANTUS) 20 Unit(s) SubCutaneous at bedtime  insulin lispro (HumaLOG) corrective regimen sliding scale   SubCutaneous three times a day before meals  isosorbide   mononitrate ER Tablet (IMDUR) 30 milliGRAM(s) Oral daily  metoprolol succinate ER 50 milliGRAM(s) Oral every 12 hours  pantoprazole    Tablet 40 milliGRAM(s) Oral before breakfast  silver sulfADIAZINE 1% Cream 1 Application(s) Topical daily  simvastatin 40 milliGRAM(s) Oral at bedtime  sodium chloride 0.9% lock flush 3 milliLiter(s) IV Push every 8 hours  tamsulosin 0.4 milliGRAM(s) Oral at bedtime      FAMILY HISTORY:  No pertinent family history in first degree relatives      SOCIAL HISTORY:      [ ] Marital status   Allergies    No Known Allergies        	    REVIEW OF SYSTEMS:  CONSTITUTIONAL: No fever, weight loss, or fatigue  EYES: No eye pain, visual disturbances, or discharge  ENMT:  No difficulty hearing, tinnitus, vertigo; No sinus or throat pain  NECK: No pain or stiffness  RESPIRATORY: No cough, wheezing, chills or hemoptysis; No Shortness of Breath  CARDIOVASCULAR: No chest pain, palpitations, passing out, dizziness, or leg swelling  GASTROINTESTINAL: No abdominal or epigastric pain. No nausea, vomiting, or hematemesis; No diarrhea or constipation. No melena or hematochezia.  GENITOURINARY: No dysuria, frequency, hematuria, or incontinence  NEUROLOGICAL: No headaches, memory loss, loss of strength, numbness, or tremors  SKIN: No itching, burning, rashes, or lesions   	      PHYSICAL EXAM:  T(C): 36.2 (01-31-20 @ 05:34), Max: 37.2 (01-30-20 @ 17:00)  HR: 60 (01-31-20 @ 05:34) (58 - 62)  BP: 101/57 (01-31-20 @ 05:34) (101/57 - 153/65)  RR: 18 (01-31-20 @ 05:34) (16 - 20)  SpO2: 96% (01-30-20 @ 17:41) (94% - 96%)  Wt(kg): --  I&O's Summary    30 Jan 2020 07:01  -  31 Jan 2020 07:00  --------------------------------------------------------  IN: 0 mL / OUT: 1250 mL / NET: -1250 mL        Appearance: Normal	  Psychiatry: A & O x 3, Mood & affect appropriate  HEENT:   Normal oral mucosa, PERRL, EOMI	  Lymphatic: No lymphadenopathy  Cardiovascular: Normal S1 S2,RRR, No JVD, No murmurs  Respiratory: Lungs clear to auscultation	  Gastrointestinal:  Soft, Non-tender, + BS	  Skin: No rashes, No ecchymoses, No cyanosis	  Neurologic: Non-focal  Extremities: Normal range of motion, No clubbing, cyanosis tr edema  Vascular: Peripheral pulses palpable 2+ bilaterally      ECG:  	< from: 12 Lead ECG (01.30.20 @ 10:04) >    Diagnosis Line Atrial flutter with 2 to 1 block  Right bundle branch block  Possible Inferior infarct , age undetermined  Abnormal ECG    Confirmed by STEVE YAO MD ([id number]) on 1/30/2020 11:46:19 AM    < end of copied text >      	  LABS:	 	    CARDIAC MARKERS:          Serum Pro-Brain Natriuretic Peptide: 12573 pg/mL (01-30 @ 11:00)                            11.9   8.78  )-----------( 149      ( 31 Jan 2020 07:01 )             36.2     01-31    141  |  104  |  131<HH>  ----------------------------<  121<H>  4.4   |  21  |  3.1<H>    Ca    9.3      31 Jan 2020 07:01  Mg     2.2     01-31    TPro  7.0  /  Alb  3.8  /  TBili  1.0  /  DBili  x   /  AST  22  /  ALT  10  /  AlkPhos  47  01-30    PT/INR - ( 30 Jan 2020 11:00 )   PT: 33.20 sec;   INR: 2.92 ratio         PTT - ( 30 Jan 2020 11:00 )  PTT:42.5 sec  proBNP: Serum Pro-Brain Natriuretic Peptide: 81147 pg/mL (01-30 @ 11:00)
HPI: 79 y.o. M with PMH of CABG, Bypass, CKD on dialysis, chronic venous stasis on Xarelto COPD on 2L NC sent in from HOME with 1 week of worsening left sided leg pain, gradual in progression, sharp in nature, new from before, unable to bear weight, no fever no chills no SOB/CP/recent travel. Pt was recently admitted 2 months ago for cellulitis of the right leg. Denies any new swelling.  Pt. was at [facility]and [place] .ptn seen and examed at  bed side NAD  Pt did require HD in 11/19 but no HD required this month , still has right chest wall Tesio placed by Dr. Sher   pt unable to ambulate at baseline.  Patient has clear yellow urine prior to julian  placement.    PTN  REFERRED TO ACUTE  REHAB  FOR  EVAL AND  TX   PAST MEDICAL & SURGICAL HISTORY:  BPH (benign prostatic hyperplasia)  CHF (congestive heart failure)  COPD (chronic obstructive pulmonary disease)  Diabetes  HTN (hypertension)  H/O heart artery stent  S/P CABG x 3      Hospital Course:    TODAY'S SUBJECTIVE & REVIEW OF SYMPTOMS:     Constitutional WNL   Cardio WNL   Resp WNL   GI WNL  Heme WNL  Endo WNL  Skin WNL  MSK WNL  Neuro WNL  Cognitive WNL  Psych WNL      MEDICATIONS  (STANDING):  ALPRAZolam 0.5 milliGRAM(s) Oral at bedtime  amLODIPine   Tablet 5 milliGRAM(s) Oral daily  aspirin  chewable 81 milliGRAM(s) Oral daily  budesonide 160 MICROgram(s)/formoterol 4.5 MICROgram(s) Inhaler 2 Puff(s) Inhalation two times a day  cephalexin 250 milliGRAM(s) Oral every 12 hours  chlorhexidine 4% Liquid 1 Application(s) Topical <User Schedule>  clopidogrel Tablet 75 milliGRAM(s) Oral daily  clotrimazole 1% Cream 1 Application(s) Topical every 12 hours  dextrose 5%. 1000 milliLiter(s) (50 mL/Hr) IV Continuous <Continuous>  dextrose 50% Injectable 12.5 Gram(s) IV Push once  dextrose 50% Injectable 25 Gram(s) IV Push once  dextrose 50% Injectable 25 Gram(s) IV Push once  fenofibrate Tablet 145 milliGRAM(s) Oral daily  finasteride 5 milliGRAM(s) Oral daily  furosemide   Injectable 80 milliGRAM(s) IV Push every 12 hours  heparin  Injectable 5000 Unit(s) SubCutaneous every 12 hours  insulin glargine Injectable (LANTUS) 20 Unit(s) SubCutaneous at bedtime  insulin lispro (HumaLOG) corrective regimen sliding scale   SubCutaneous three times a day before meals  isosorbide   mononitrate ER Tablet (IMDUR) 30 milliGRAM(s) Oral daily  metolazone 5 milliGRAM(s) Oral daily  metoprolol succinate ER 50 milliGRAM(s) Oral daily  pantoprazole    Tablet 40 milliGRAM(s) Oral before breakfast  silver sulfADIAZINE 1% Cream 1 Application(s) Topical daily  simvastatin 40 milliGRAM(s) Oral at bedtime  sodium chloride 0.9% lock flush 3 milliLiter(s) IV Push every 8 hours  tamsulosin 0.4 milliGRAM(s) Oral at bedtime    MEDICATIONS  (PRN):  acetaminophen   Tablet .. 650 milliGRAM(s) Oral every 4 hours PRN Moderate Pain (4 - 6)  albuterol/ipratropium for Nebulization 3 milliLiter(s) Nebulizer every 6 hours PRN Bronchospasm  dextrose 40% Gel 15 Gram(s) Oral once PRN Blood Glucose LESS THAN 70 milliGRAM(s)/deciliter  glucagon  Injectable 1 milliGRAM(s) IntraMuscular once PRN Glucose LESS THAN 70 milligrams/deciliter      FAMILY HISTORY:  No pertinent family history in first degree relatives      Allergies    No Known Allergies    Intolerances        SOCIAL HISTORY:    [  ] Etoh  [  ] Smoking  [  ] Substance abuse     Home Environment:  [  ] Home Alone  [ x ] Lives with Family SON  [  ] Home Health Aid    Dwelling:  [  ] Apartment  [ x ] Private House  [  ] Adult Home  [  ] Skilled Nursing Facility      [  ] Short Term  [  ] Long Term  [x  ] Stairs       Elevator [  ]    FUNCTIONAL STATUS PTA: (Check all that apply)  Ambulation: [ x  ]Independent    [  ] Dependent     [  ] Non-Ambulatory  Assistive Device: [  ] SA Cane  [  ]  Q Cane  [ x ] Walker  [  ]  Wheelchair  ADL : [x  ] Independent  [  x]  Dependent       Vital Signs Last 24 Hrs  T(C): 35.8 (03 Feb 2020 05:11), Max: 36.5 (02 Feb 2020 21:29)  T(F): 96.5 (03 Feb 2020 05:11), Max: 97.7 (02 Feb 2020 21:29)  HR: 61 (03 Feb 2020 05:11) (61 - 62)  BP: 110/61 (03 Feb 2020 05:11) (110/61 - 123/61)  BP(mean): --  RR: 16 (03 Feb 2020 05:11) (16 - 16)  SpO2: --      PHYSICAL EXAM: Alert & Oriented X3  GENERAL: NAD, well-groomed, well-developed  HEAD:  Atraumatic, Normocephalic  EYES: EOMI, PERRLA, conjunctiva and sclera clear  NECK: Supple, No JVD, Normal thyroid  CHEST/LUNG: Clear to percussion bilaterally; No rales, rhonchi, wheezing, or rubs  HEART: Regular rate and rhythm; No murmurs, rubs, or gallops  ABDOMEN: Soft, Nontender, Nondistended; Bowel sounds present  EXTREMITIES:  2+ Peripheral Pulses, No clubbing, cyanosis, or edema    NERVOUS SYSTEM:  Cranial Nerves 2-12 intact [x  ] Abnormal  [  ]  ROM: WFL all extremities [  ]  Abnormal [  x]  Motor Strength: WFL all extremities  [  ]  Abnormal [x  ]  Sensation: intact to light touch [  ] Abnormal [ x ]  Reflexes: Symmetric [  ]  Abnormal [ x ]    FUNCTIONAL STATUS:  Bed Mobility: Independent [  ]  Supervision [  ]  Needs Assistance [ x ]  N/A [  ]  Transfers: Independent [  ]  Supervision [  ]  Needs Assistance [x  ]  N/A [  ]   Ambulation: Independent [  ]  Supervision [  ]  Needs Assistance [ x ]  N/A [  ]  ADL: Independent [  ] Requires Assistance [  ] N/A [x  ]  SEE PT/OT IE NOTES    LABS:                        12.1   8.47  )-----------( 152      ( 02 Feb 2020 13:27 )             37.1     02-02    138  |  97<L>  |  121<HH>  ----------------------------<  246<H>  3.6   |  25  |  3.2<H>    Ca    8.3<L>      02 Feb 2020 13:27  Mg     1.9     02-02    TPro  6.4  /  Alb  3.2<L>  /  TBili  0.9  /  DBili  x   /  AST  20  /  ALT  8   /  AlkPhos  51  02-02    PT/INR - ( 02 Feb 2020 13:27 )   PT: 18.40 sec;   INR: 1.61 ratio         PTT - ( 02 Feb 2020 13:27 )  PTT:35.4 sec      RADIOLOGY & ADDITIONAL STUDIES:< from: Xray Femur 2 Views, Left (01.30.20 @ 10:22) >  Indication: Leg pain.    Comparison: None.    Findings:    No acute fracture or dislocation. Surgical clips are seen along the medial thigh. There are severe degenerative changes at the hip and knee joints.    Impression: No acute fracture or dislocation.        < end of copied text >      Assesment:
Podiatry Consult Note    Subjective:   TAIWO ZAVALA is a pleasant well-nourished, well developed 79y Male in no acute distress, alert awake, and oriented to person, place and time.   Patient is a 79y old  Male who presents with a chief complaint of b/l leg and feet pains, trouble walking (31 Jan 2020 14:50)    HPI:  79 y.o. M with PMH of CABG, Bypass, CKD on dialysis, chronic venous stasis on Xarelto COPD on 2L NC sent in from HOME with 1 week of worsening left sided leg pain, gradual in progression, sharp in nature, new from before, unable to bear weight, no fever no chills no SOB/CP/recent travel. Pt was recently admitted 2 months ago for cellulitis of the right leg. Denies any new swelling.  Pt. was at [facility] .    Pt did require HD in 11/19 but no HD required this month , still has right chest wall Tesio placed by Dr. Sher   pt unable to ambulate at baseline.  Patient is known to Dr. Cline, being followed for BPH. States he has not been having any difficulty urinating, nor does he currently feel like his bladder is full. He spontaneously voided approx 150 cc clear yellow urine prior to julian  placement.  Dr. Malin at bedside and under sterile technique, 16 FR julian was placed. Approx 60 cc cloudy yellow urine obtained  er d/w nephro : lasix 40 ivp given in ER (30 Jan 2020 14:34)    Past Medical History and Surgical History  PAST MEDICAL & SURGICAL HISTORY:  BPH (benign prostatic hyperplasia)  CHF (congestive heart failure)  COPD (chronic obstructive pulmonary disease)  Diabetes  HTN (hypertension)  H/O heart artery stent  S/P CABG x 3     Review of Systems:   Constitutional: No weakness, fevers or chills  Eyes / ENT: No visual changes; No vertigo or throat pain   Neck: No pain or stiffness  Respiratory: No cough, wheezing, hemoptysis; No shortness of breath  Cardiovascular: No chest pain or palpitations  Gastrointestinal: No abdominal or epigastric pain. No nausea, vomiting, or hematemesis; No diarrhea or constipation. No melena or hematochezia.  Genitourinary: No dysuria, frequency or hematuria  Neurological: No numbness or weakness  Skin: Erythema Left Leg / Stasis Ulcers on the Right Leg w/ Mild Drainage     Objective:  Vital Signs Last 24 Hrs  T(C): 36.7 (31 Jan 2020 14:05), Max: 37.2 (30 Jan 2020 17:00)  T(F): 98.1 (31 Jan 2020 14:05), Max: 98.9 (30 Jan 2020 17:00)  HR: 66 (31 Jan 2020 14:05) (60 - 66)  BP: 111/56 (31 Jan 2020 14:05) (101/57 - 153/65)  BP(mean): --  RR: 16 (31 Jan 2020 14:05) (16 - 18)  SpO2: 96% (31 Jan 2020 09:56) (96% - 96%)                        11.9   8.78  )-----------( 149      ( 31 Jan 2020 07:01 )             36.2                 01-31    141  |  104  |  131<HH>  ----------------------------<  121<H>  4.4   |  21  |  3.1<H>    Ca    9.3      31 Jan 2020 07:01  Mg     2.2     01-31    TPro  7.0  /  Alb  3.8  /  TBili  1.0  /  DBili  x   /  AST  22  /  ALT  10  /  AlkPhos  47  01-30  ___________________________________________  EXAM:  ART DUPLEX LOWER EXT BILATERAL        PROCEDURE DATE:  01/30/2020      INTERPRETATION:  Clinical History / Reason for exam: This patient is a 79-year-old male with leg pain.   Lower extremity arterial duplex ultrasound was performed to assess for arterial occlusive disease.    Left external iliac artery with velocities of 222 cm/s suggestive of moderate stenosis  The Bilateral common femoral, deep femoral, superficial femoral, popliteal, anterior tibial, arteries were visualized.     There is no evidence of significant arterial occlusive disease or arterial occlusions in the bilateral lower extremities however inadequate visualization of the tibial veins may be suggestive of severe tibial artery stenosis please correlate clinically.    Impression:    Normal arterial flow in bilateral lower extremities.  Left external iliac artery with velocities of 222 cm/s suggestive of moderate stenosis  ICD-10: I70.222    ALVARO LUCERO M.D., ATTENDING VASCULAR  This document has been electronically signed. Jan 31 2020  8:59AM  _____________________________________________________________________  _____________________________________________________________________  EXAM:  DUPLEX SCAN EXT VEINS LOWER BI        PROCEDURE DATE:  01/30/2020      INTERPRETATION:  Clinical History / Reason for exam: The patient is a 79-year-old male with left leg pain.   A venous duplex examination was performed to evaluatethe patient for deep venous thrombosis of the lower extremities.    The common femoral, great saphenous, femoral, popliteal and small saphenous veins were visualized bilaterally with no evidence of deep venous thrombosis    All veins were fully compressible.  There was presence of spontaneous flow, augmentation with distal compression and phasicity.    The left anterior tibial veins were  patent     The left posterior tibial veins were  patent      The left peroneal veins were patent.    The right calf veins were not visualized due to overlying dressings.    Impression:    No evidence of deep venous thrombosis or superficial thrombophlebitis in the bilateral lower extremities.    ICD-10:M79.605    BHAVNA CHRISTOPHER M.D., ATTENDING VASCULAR  This document has been electronically signed. Jan 30 2020  1:43PM  _____________________________________________________________________    Physical Exam - Lower Extremity Focused:   Derm:   Left Leg;   Mild Erythema and Edema w/o Drainage or Mal-Odor Noted  No Open Wounds or Ulcers Present  Xerosis Present     Right Leg  Superficial Stasis Ulcers Right Leg  Mild Drainage and Mal-Odor Present  Mild Erythema and Edema Noted  Xerosis Present on Foot     Vascular: DP and PT Pulses Diminished; Unable to Appreciate Secondary to Edema   Neuro: Protective Sensation Mildly Diminished     Assessment:   Superficial Stasis Ulcers Right Leg  Mild Erythema B/L Legs     Plan:  Chart reviewed and Patient evaluated. All Questions and Concerns Addressed and Answered  Discussed diagnosis and treatment with patient  Wound Care Orders; Apply Xeroform / DSD / Kerlix / Light Ace Wrap; If Tolerated  A-Duplex; See Report Above;   V-Duplex; See Report Above; No Abnormalities  Continue w/ Local Wound Care and Compression;   Discussed Plan w/ Dr. Quezada     Podiatry 
TAIWO ZAVALA  79y, Male  Allergy: No Known Allergies      CHIEF COMPLAINT:     HPI:  79 y.o. M with PMH of CABG, Bypass, CKD on dialysis, chronic venous stasis on Xarelto COPD on 2L NC sent in from HOME with 1 week of worsening left sided leg pain, gradual in progression, sharp in nature, new from before, unable to bear weight, no fever no chills no SOB/CP/recent travel. Pt was recently admitted 2 months ago for cellulitis of the right leg. Denies any new swelling.  Pt. was at [facility] .    Pt did require HD in 11/19 but no HD required this month , still has right chest wall Tesio placed by Dr. Sher     pt unable to ambulate at baseline.    Patient is known to Dr. Cline, being followed for BPH. States he has not been having any difficulty urinating, nor does he currently feel like his bladder is full. He spontaneously voided approx 150 cc clear yellow urine prior to julian  placement.    Dr. Malin at bedside and under sterile technique, 16 FR julian was placed. Approx 60 cc cloudy yellow urine obtained    er d/w nephro : lasix 40 ivp given in ER (30 Jan 2020 14:34)    FAMILY HISTORY:  No pertinent family history in first degree relatives    PAST MEDICAL & SURGICAL HISTORY:  BPH (benign prostatic hyperplasia)  CHF (congestive heart failure)  COPD (chronic obstructive pulmonary disease)  Diabetes  HTN (hypertension)  H/O heart artery stent  S/P CABG x 3      Substance Use (  ) never used  (  ) IVDU (  ) Other:  Tobacco Usage:  (   ) never smoked   ( X  ) former smoker   (   ) current smoker   Alcohol Usage: (   ) social  (   ) daily use (   ) denies  Sexual History:       ROS  10 system review - neg     VITALS:  T(F): 97.1, Max: 98.9 (01-30-20 @ 17:00)  HR: 60  BP: 101/57  RR: 18Vital Signs Last 24 Hrs  T(C): 36.2 (31 Jan 2020 05:34), Max: 37.2 (30 Jan 2020 17:00)  T(F): 97.1 (31 Jan 2020 05:34), Max: 98.9 (30 Jan 2020 17:00)  HR: 60 (31 Jan 2020 05:34) (58 - 62)  BP: 101/57 (31 Jan 2020 05:34) (101/57 - 153/65)  BP(mean): --  RR: 18 (31 Jan 2020 05:34) (16 - 20)  SpO2: 96% (30 Jan 2020 17:41) (93% - 96%)    PHYSICAL EXAM:  Gen: NAD, resting in bed  HEENT: Normocephalic, atraumatic  Neck: supple, no lymphadenopathy  CV: s1 s 2 +   Lungs: clear . Chest wall HD catheter  Abdomen: Soft, BS present. Julian +   Ext: Warm, well perfused. LE venous stasis with R leg ulcer . Onychomycosis  Neuro: non focal, awake  Skin: no rash    TESTS & MEASUREMENTS:                        13.5   9.28  )-----------( 185      ( 30 Jan 2020 11:00 )             41.5     01-30    140  |  101  |  140<HH>  ----------------------------<  83  4.7   |  19  |  3.6<H>    Ca    9.8      30 Jan 2020 11:42    TPro  7.0  /  Alb  3.8  /  TBili  1.0  /  DBili  x   /  AST  22  /  ALT  10  /  AlkPhos  47  01-30    eGFR if : 18 mL/min/1.73M2 (01-30-20 @ 11:42)  eGFR if Non African American: 15 mL/min/1.73M2 (01-30-20 @ 11:42)    LIVER FUNCTIONS - ( 30 Jan 2020 11:42 )  Alb: 3.8 g/dL / Pro: 7.0 g/dL / ALK PHOS: 47 U/L / ALT: 10 U/L / AST: 22 U/L / GGT: x                 Blood Gas Venous - Lactate: 1.6 mmoL/L (01-30-20 @ 14:17)  Lactate, Blood: 1.6 mmol/L (01-30-20 @ 11:00)      INFECTIOUS DISEASES TESTING  Hepatitis B Surface Antigen: Nonreact (10-25-19 @ 10:12)  Hepatitis B Surface Antigen: Nonreact (10-24-19 @ 15:49)  MRSA PCR Result.: Negative (10-24-19 @ 12:54)      RADIOLOGY & ADDITIONAL TESTS:        CARDIOLOGY TESTING  12 Lead ECG:   Ventricular Rate 62 BPM    Atrial Rate 277 BPM    QRS Duration 122 ms    Q-T Interval 430 ms    QTC Calculation(Bezet) 436 ms    R Axis 240 degrees    T Axis 34 degrees    Diagnosis Line Atrial flutter with 2 to 1 block  Right bundle branch block  Possible Inferior infarct , age undetermined  Abnormal ECG    Confirmed by STEVE YAO MD ([id number]) on 1/30/2020 11:46:19 AM (01-30-20 @ 10:04)      MEDICATIONS  ALPRAZolam 0.5  amLODIPine   Tablet 5  aspirin  chewable 81  budesonide 160 MICROgram(s)/formoterol 4.5 MICROgram(s) Inhaler 2  chlorhexidine 4% Liquid 1  clopidogrel Tablet 75  clotrimazole 1% Cream 1  dextrose 5%. 1000  dextrose 50% Injectable 12.5  dextrose 50% Injectable 25  dextrose 50% Injectable 25  fenofibrate Tablet 145  finasteride 5  furosemide   Injectable 40  heparin  Injectable 5000  insulin glargine Injectable (LANTUS) 20  insulin lispro (HumaLOG) corrective regimen sliding scale   isosorbide   mononitrate ER Tablet (IMDUR) 30  metoprolol succinate ER 50  pantoprazole    Tablet 40  simvastatin 40  sodium chloride 0.9% lock flush 3  tamsulosin 0.4      ANTIBIOTICS:
TAIWO ZAVALA [id number]  79y Male      HPI: 79 yr old male with PMHX below sent from NH due to increased pain and swelling to LE's. LAb work done in ER showed Aaliyah (Creat = 3.6) and urinary retention on bedside bladder sono. Pt has hx of being on dialysis for the past few months, now recovered and no longer requiring dialysis (last session approx 1 month ago??).  Patient is known to Dr. Cline, being followed for BPH. States he has not been having any difficulty urinating, nor does he currently feel like his bladder is full. He spontaneously voided approx 150 cc clear yellow urine prior to julian  placement.  Denies abdominal pain      PAST MEDICAL & SURGICAL HISTORY:  BPH (benign prostatic hyperplasia)  CHF (congestive heart failure)  COPD (chronic obstructive pulmonary disease)  Diabetes  HTN (hypertension)  H/O heart artery stent  S/P CABG x 3  s/p tesio catheter 10/29/19 (Dr. Sher)    Home Medications:  ALPRAZolam 0.5 mg oral tablet: 1 tab(s) orally once a day (at bedtime) (30 Jan 2020 13:19)  aspirin 81 mg oral tablet, chewable: 1 tab(s) orally once a day (30 Jan 2020 13:19)  bacitracin 500 units/g topical ointment: 1 application topically 2 times a day (30 Jan 2020 13:19)  budesonide-formoterol 160 mcg-4.5 mcg/inh inhalation aerosol:  inhaled  (30 Jan 2020 13:19)  clopidogrel 75 mg oral tablet: 1 tab(s) orally once a day (30 Jan 2020 13:19)  fenofibrate 145 mg oral tablet: 1 tab(s) orally once a day (30 Jan 2020 13:19)  finasteride 5 mg oral tablet: 1 tab(s) orally once a day (30 Jan 2020 13:19)  fluticasone 50 mcg/inh nasal spray: 1 spray(s) nasal 2 times a day (30 Jan 2020 13:19)  lidocaine 5% topical film: Apply topically to affected area once a day, As Needed (30 Jan 2020 13:19)  silver sulfADIAZINE 1% topical cream: 1 application topically 2 times a day (31 Oct 2019 15:23)  simvastatin 40 mg oral tablet: 1 tab(s) orally once a day (at bedtime) (31 Oct 2019 14:03)  tamsulosin 0.4 mg oral capsule: 1 cap(s) orally once a day (at bedtime) (31 Oct 2019 14:03)  warfarin 5 mg oral tablet: 1 tab(s) orally once a day Monitor INR daily for dosing  (31 Oct 2019 14:03)      MEDICATIONS  (STANDING):    MEDICATIONS  (PRN):      Allergies    No Known Allergies    Intolerances        REVIEW OF SYSTEMS    [ x] A ten-point review of systems was otherwise negative except as noted.  [ ] Due to altered mental status/intubation, subjective information were not able to be obtained from the patient. History was obtained, to the extent possible, from review of the chart and collateral sources of information.      Vital Signs Last 24 Hrs  T(C): 36.2 (30 Jan 2020 10:35), Max: 36.2 (30 Jan 2020 10:35)  T(F): 97.2 (30 Jan 2020 10:35), Max: 97.2 (30 Jan 2020 10:35)  HR: 58 (30 Jan 2020 13:58) (58 - 62)  BP: 115/57 (30 Jan 2020 13:58) (115/57 - 120/59)  BP(mean): --  RR: 20 (30 Jan 2020 13:58) (18 - 20)  SpO2: 95% (30 Jan 2020 13:58) (93% - 95%)    PHYSICAL EXAM:  ABDOMEN: Soft, Nontender, Nondistended; obese  : scrotal edema w/mild tenderness, no skin breakdown noted; +erythematous rash noted to B/L groin area; unable to see external genitalia 2/2 edema      LABS:                          13.5   9.28  )-----------( 185      ( 30 Jan 2020 11:00 )             41.5       Auto Neutrophil %: 82.0 % (01-30-20 @ 11:00)  Auto Immature Granulocyte %: 0.3 % (01-30-20 @ 11:00)    01-30    140  |  101  |  140<HH>  ----------------------------<  83  4.7   |  19  |  3.6<H>      Calcium, Total Serum: 9.8 mg/dL (01-30-20 @ 11:42)      LFTs:             7.0  | 1.0  | 22       ------------------[47      ( 30 Jan 2020 11:42 )  3.8  | x    | 10          Lipase:x      Amylase:x         Blood Gas Venous - Lactate: 1.6 mmoL/L (01-30-20 @ 14:17)  Lactate, Blood: 1.6 mmol/L (01-30-20 @ 11:00)      Coags:     33.20  ----< 2.92    ( 30 Jan 2020 11:00 )     42.5        CARDIAC MARKERS ( 30 Jan 2020 11:00 )  x     / 1.86 ng/mL / x     / x     / x          Serum Pro-Brain Natriuretic Peptide: 49758 pg/mL (01-30-20 @ 11:00)    RADIOLOGY & ADDITIONAL STUDIES:  VA Duplex Lower Ext Vein Scan, Mary (01.30.20 @ 11:55) >  Impression:    No evidence of deep venous thrombosis or superficial thrombophlebitis in the bilateral lower extremities.

## 2020-02-03 NOTE — DIETITIAN INITIAL EVALUATION ADULT. - OTHER INFO
Pt admitted d/t BRITTANI. Has HD cath in place, per nephro no indication for HD now and pt will not need HD is O2 sat remains >90% on po lasix and metolazone- will have tesio removed. +3 edema to the L/R legs (2/2). Skin assessment shows R calf open blisters and stage II pressure ulcers to the scrotum and upper thigh. Pt reports that he has been doing well with his meals throughout admission and that he has a good appetite. Pt denies nausea/abdominal pain with meals. Pt denies chewing/swallowing difficulties. Po intake has been recorded at % throughout LOS. NKFA/intolerances. No vitamins/supplements pta. No food preferences r/t culture/Rastafari. Pt denies any diarrhea/constipation. Unable to recall last BM, none recorded per EMR. Pt is unsure of his UBW, reports that he doesn't monitor his wt. Pt likely with fluctuations 2/2 fluid shifts. Dry wt unknown. Pt reports that he understands decreased protein requirements d/t CKD not on HD vs increased requirement when on HD- he likely will not need HD per EMR, therefore advised pt to watch protein intake, and that supplement is not indicated. Pt verbalizes understanding.

## 2020-02-03 NOTE — PROGRESS NOTE ADULT - ASSESSMENT
79 y.o. 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), chronic venous stasis, Aflutter on NOAC, COPD c chronic hypoxic respiratory failure on 2-3L NC, here with BRITTANI on CKD 3/4 (now possibly 2/2 cardiorenal syndrome), + troponin likely 2/2 chronic heart failure suspected systolic and acute renal dysfunction now, lower extremity nonpurulent cellulitis on peripheral vascular disease with probable tinea pedis, also with possible metabolic encephalopathy/ uremia    on oral antibiotic now for presumed superimposed cellulitis of left upper leg- LLE improving clinically  us venous duplex demonstrated no DVT or thrombophlebitis, but arterial duplex noting L external iliac moderate stenosis. will likely benefit from outpatient vascular follow up  wound care to RLE shin ordered  antifungal cream for suspected tinea pedis  trending CE's most consistent with renal dysfunction- we have ruled out MI, echo pending, cardio eval appreciated- we discussed case together, agree that there is no acute MI or ACS  i/o, daily weight  TOV today  monitor U/o, change to PO lasix 80mg BID with metolazone primer 5mgqd 30 min prior to am lasix dose  trend cr/ lytes/ urea  reorient if confused. patient has not had any agitated spells, and the confusion is subtle/ mild  o2, bronchodilators- copd is chronic and stable and NOT in acute exacerbation currently  HOLD noac for acute renal failure, use dvt ppx dosing for now- AC was for flutter history, risk of CVA low, c/w antiplatelet agents for now  will consider coumdin after HD catheter is removed- potentially in the next day or 2  glycemic control  PT for physical debility      #Progress Note Handoff    Pending (specify):  Consults_________, Tests________, Test Results_______, Other__PT, diuresis, trend Cr_______    Family discussion: plan of care discussed with patient today and previously with son and his wife at the bedside    Disposition: unclear at this time

## 2020-02-03 NOTE — DIETITIAN INITIAL EVALUATION ADULT. - RD TO REMAIN AVAILABLE
yes/INTERVENTION: meals and snacks, vitamin/mineral supplements, coordination of care. ME: RD to monitor diet order, energy intake, body composition, NFPF, glucose profile, renal/electrolyte profile

## 2020-02-03 NOTE — PROGRESS NOTE ADULT - SUBJECTIVE AND OBJECTIVE BOX
Patient is seen and examined at the bed side, is afebrile.      REVIEW OF SYSTEMS: All other review systems are negative           Vital Signs Last 24 Hrs  T(C): 35.8 (03 Feb 2020 21:53), Max: 36.1 (03 Feb 2020 13:56)  T(F): 96.5 (03 Feb 2020 21:53), Max: 97 (03 Feb 2020 13:56)  HR: 60 (03 Feb 2020 21:53) (60 - 61)  BP: 114/67 (03 Feb 2020 21:53) (110/61 - 114/67)  BP(mean): --  RR: 16 (03 Feb 2020 21:53) (16 - 18)  SpO2: 97% (03 Feb 2020 21:54) (97% - 97%)      PHYSICAL EXAM:  GENERAL: NAD, well-groomed, well-developed  HEAD:  Atraumatic, Normocephalic  EYES: EOMI, PERRLA, conjunctiva and sclera clear  ENMT: No tonsillar erythema, exudates, or enlargement; Moist mucous membranes, Good dentition, No lesions  NECK: Supple, No JVD, Normal thyroid  NERVOUS SYSTEM:  Alert & Oriented X3, Good concentration; Motor Strength 5/5 B/L upper and lower extremities; DTRs 2+ intact and symmetric  CHEST/LUNG: Clear to percussion bilaterally; No rales, rhonchi, wheezing, or rubs  HEART: Regular rate and rhythm; No murmurs, rubs, or gallops  ABDOMEN: Soft, Nontender, Nondistended; Bowel sounds present  EXTREMITIES:  2+ Peripheral Pulses, No clubbing, cyanosis, or edema  LYMPH: No lymphadenopathy noted  SKIN: No rashes or lesions      MEDICATIONS  (STANDING):  ALPRAZolam 0.5 milliGRAM(s) Oral at bedtime  amLODIPine   Tablet 5 milliGRAM(s) Oral daily  ascorbic acid 500 milliGRAM(s) Oral daily  aspirin  chewable 81 milliGRAM(s) Oral daily  budesonide 160 MICROgram(s)/formoterol 4.5 MICROgram(s) Inhaler 2 Puff(s) Inhalation two times a day  cephalexin 250 milliGRAM(s) Oral every 12 hours  chlorhexidine 4% Liquid 1 Application(s) Topical <User Schedule>  clopidogrel Tablet 75 milliGRAM(s) Oral daily  clotrimazole 1% Cream 1 Application(s) Topical every 12 hours  dextrose 5%. 1000 milliLiter(s) (50 mL/Hr) IV Continuous <Continuous>  dextrose 50% Injectable 12.5 Gram(s) IV Push once  dextrose 50% Injectable 25 Gram(s) IV Push once  dextrose 50% Injectable 25 Gram(s) IV Push once  fenofibrate Tablet 145 milliGRAM(s) Oral daily  finasteride 5 milliGRAM(s) Oral daily  furosemide    Tablet 80 milliGRAM(s) Oral every 12 hours  heparin  Injectable 5000 Unit(s) SubCutaneous every 12 hours  insulin glargine Injectable (LANTUS) 20 Unit(s) SubCutaneous at bedtime  insulin lispro (HumaLOG) corrective regimen sliding scale   SubCutaneous three times a day before meals  isosorbide   mononitrate ER Tablet (IMDUR) 30 milliGRAM(s) Oral daily  metolazone 5 milliGRAM(s) Oral daily  metoprolol succinate ER 50 milliGRAM(s) Oral daily  pantoprazole    Tablet 40 milliGRAM(s) Oral before breakfast  silver sulfADIAZINE 1% Cream 1 Application(s) Topical daily  simvastatin 40 milliGRAM(s) Oral at bedtime  sodium chloride 0.9% lock flush 3 milliLiter(s) IV Push every 8 hours  tamsulosin 0.4 milliGRAM(s) Oral at bedtime  zinc sulfate 220 milliGRAM(s) Oral daily    MEDICATIONS  (PRN):  acetaminophen   Tablet .. 650 milliGRAM(s) Oral every 4 hours PRN Moderate Pain (4 - 6)  albuterol/ipratropium for Nebulization 3 milliLiter(s) Nebulizer every 6 hours PRN Bronchospasm  dextrose 40% Gel 15 Gram(s) Oral once PRN Blood Glucose LESS THAN 70 milliGRAM(s)/deciliter  glucagon  Injectable 1 milliGRAM(s) IntraMuscular once PRN Glucose LESS THAN 70 milligrams/deciliter        Allergies    No Known Allergies      LABS:                        12.2   6.77  )-----------( 140      ( 03 Feb 2020 08:57 )             36.7         02-03    139  |  96<L>  |  116<HH>  ----------------------------<  213<H>  3.2<L>   |  26  |  2.8<H>    Ca    8.9      03 Feb 2020 08:57  Mg     1.8     02-03    TPro  6.2  /  Alb  3.1<L>  /  TBili  0.7  /  DBili  x   /  AST  18  /  ALT  8   /  AlkPhos  51  02-03    PT/INR - ( 03 Feb 2020 08:57 )   PT: 18.50 sec;   INR: 1.62 ratio         PTT - ( 03 Feb 2020 08:57 )  PTT:35.2 sec    CAPILLARY BLOOD GLUCOSE  POCT Blood Glucose.: 396 mg/dL (03 Feb 2020 20:59)  POCT Blood Glucose.: 189 mg/dL (03 Feb 2020 16:14)  POCT Blood Glucose.: 150 mg/dL (03 Feb 2020 11:22)  POCT Blood Glucose.: 125 mg/dL (03 Feb 2020 07:26)      RADIOLOGY & ADDITIONAL TESTS:    < from: Xray Chest 1 View- PORTABLE-Routine (02.02.20 @ 12:38) >  Decreased previous vascular congestion and right pleural effusion. No parenchymal opacity or air leak    < end of copied text >      MICROBIOLOGY DATA:      Culture - Blood (01.30.20 @ 11:00)    Specimen Source: .Blood Blood-Peripheral    Culture Results:   No growth to date. Patient is seen and examined at the bed side, is afebrile.        REVIEW OF SYSTEMS: All other review systems are negative         Vital Signs Last 24 Hrs  T(C): 35.8 (03 Feb 2020 21:53), Max: 36.1 (03 Feb 2020 13:56)  T(F): 96.5 (03 Feb 2020 21:53), Max: 97 (03 Feb 2020 13:56)  HR: 60 (03 Feb 2020 21:53) (60 - 61)  BP: 114/67 (03 Feb 2020 21:53) (110/61 - 114/67)  BP(mean): --  RR: 16 (03 Feb 2020 21:53) (16 - 18)  SpO2: 97% (03 Feb 2020 21:54) (97% - 97%)        PHYSICAL EXAM:  GENERAL: Not in distress  CHEST/LUNG: Air entry  bilaterally  HEART: s1 and s2 present   ABDOMEN:  Nontender and Nondistended  EXTREMITIES: No pedal  edema  CNS: Awake and alert        MEDICATIONS  (STANDING):  ALPRAZolam 0.5 milliGRAM(s) Oral at bedtime  amLODIPine   Tablet 5 milliGRAM(s) Oral daily  ascorbic acid 500 milliGRAM(s) Oral daily  aspirin  chewable 81 milliGRAM(s) Oral daily  budesonide 160 MICROgram(s)/formoterol 4.5 MICROgram(s) Inhaler 2 Puff(s) Inhalation two times a day  cephalexin 250 milliGRAM(s) Oral every 12 hours  chlorhexidine 4% Liquid 1 Application(s) Topical <User Schedule>  clopidogrel Tablet 75 milliGRAM(s) Oral daily  clotrimazole 1% Cream 1 Application(s) Topical every 12 hours  dextrose 5%. 1000 milliLiter(s) (50 mL/Hr) IV Continuous <Continuous>  dextrose 50% Injectable 12.5 Gram(s) IV Push once  dextrose 50% Injectable 25 Gram(s) IV Push once  dextrose 50% Injectable 25 Gram(s) IV Push once  fenofibrate Tablet 145 milliGRAM(s) Oral daily  finasteride 5 milliGRAM(s) Oral daily  furosemide    Tablet 80 milliGRAM(s) Oral every 12 hours  heparin  Injectable 5000 Unit(s) SubCutaneous every 12 hours  insulin glargine Injectable (LANTUS) 20 Unit(s) SubCutaneous at bedtime  insulin lispro (HumaLOG) corrective regimen sliding scale   SubCutaneous three times a day before meals  isosorbide   mononitrate ER Tablet (IMDUR) 30 milliGRAM(s) Oral daily  metolazone 5 milliGRAM(s) Oral daily  metoprolol succinate ER 50 milliGRAM(s) Oral daily  pantoprazole    Tablet 40 milliGRAM(s) Oral before breakfast  silver sulfADIAZINE 1% Cream 1 Application(s) Topical daily  simvastatin 40 milliGRAM(s) Oral at bedtime  sodium chloride 0.9% lock flush 3 milliLiter(s) IV Push every 8 hours  tamsulosin 0.4 milliGRAM(s) Oral at bedtime  zinc sulfate 220 milliGRAM(s) Oral daily    MEDICATIONS  (PRN):  acetaminophen   Tablet .. 650 milliGRAM(s) Oral every 4 hours PRN Moderate Pain (4 - 6)  albuterol/ipratropium for Nebulization 3 milliLiter(s) Nebulizer every 6 hours PRN Bronchospasm  dextrose 40% Gel 15 Gram(s) Oral once PRN Blood Glucose LESS THAN 70 milliGRAM(s)/deciliter  glucagon  Injectable 1 milliGRAM(s) IntraMuscular once PRN Glucose LESS THAN 70 milligrams/deciliter        Allergies    No Known Allergies        LABS:                        12.2   6.77  )-----------( 140      ( 03 Feb 2020 08:57 )             36.7         02-03    139  |  96<L>  |  116<HH>  ----------------------------<  213<H>  3.2<L>   |  26  |  2.8<H>    Ca    8.9      03 Feb 2020 08:57  Mg     1.8     02-03    TPro  6.2  /  Alb  3.1<L>  /  TBili  0.7  /  DBili  x   /  AST  18  /  ALT  8   /  AlkPhos  51  02-03    PT/INR - ( 03 Feb 2020 08:57 )   PT: 18.50 sec;   INR: 1.62 ratio      PTT - ( 03 Feb 2020 08:57 )  PTT:35.2 sec      CAPILLARY BLOOD GLUCOSE  POCT Blood Glucose.: 396 mg/dL (03 Feb 2020 20:59)  POCT Blood Glucose.: 189 mg/dL (03 Feb 2020 16:14)  POCT Blood Glucose.: 150 mg/dL (03 Feb 2020 11:22)  POCT Blood Glucose.: 125 mg/dL (03 Feb 2020 07:26)      RADIOLOGY & ADDITIONAL TESTS:    < from: Xray Chest 1 View- PORTABLE-Routine (02.02.20 @ 12:38) >  Decreased previous vascular congestion and right pleural effusion. No parenchymal opacity or air leak    < end of copied text >      MICROBIOLOGY DATA:      Culture - Blood (01.30.20 @ 11:00)    Specimen Source: .Blood Blood-Peripheral    Culture Results:   No growth to date.

## 2020-02-03 NOTE — CONSULT NOTE ADULT - CONSULT REQUESTED DATE/TIME
03-Feb-2020 09:32
30-Jan-2020 14:23
31-Jan-2020 06:19
31-Jan-2020 09:37
31-Jan-2020 15:14
31-Jan-2020 10:56

## 2020-02-03 NOTE — PROGRESS NOTE ADULT - SUBJECTIVE AND OBJECTIVE BOX
DIAGNOSIS:   HOSPITAL DAY #:    STATUS POST:    POST OPERATIVE DAY #:     Vital Signs Last 24 Hrs  T(C): 35.8 (03 Feb 2020 21:53), Max: 36.1 (03 Feb 2020 13:56)  T(F): 96.5 (03 Feb 2020 21:53), Max: 97 (03 Feb 2020 13:56)  HR: 60 (03 Feb 2020 21:53) (60 - 61)  BP: 114/67 (03 Feb 2020 21:53) (110/61 - 114/67)  BP(mean): --  RR: 16 (03 Feb 2020 21:53) (16 - 18)  SpO2: 97% (03 Feb 2020 21:54) (97% - 97%)    SUBJECTIVE: Pt seen    Pain: YES  [ ]   NO [ ]   Nausea: [ ] YES [ ] NO           Vomiting: [ ] YES [ ] NO  Flatus: [ ] YES [ ] NO             Bowel Movement: [ ] YES [ ] NO     Void: [ ]YES [ ]No      LUIS DRAINAGE: SIGNIFICANT [ ]   NOT SIGNIFICANT [ ]   NOT APPLICABLE [ ]  YES [ ] NO    General Appearance: Appears well, NAD  Neck: Supple tessio site ok   Chest: Equal expansion bilaterally, equal breath sounds  CV: Pulse regular presently  Abdomen: Soft [x ] YES [ ]NO  DISTENDED [ ] YES [x ] NO TENDERNESS [ ]YES [ ]NO  INCISIONS: HEALING WELL [ ] YES  [ ] NO ERYTHEMA [ ] YES [ ] NO DRAINAGE [ ] YES  [ ] NO  Extremities: Grossly symmetric, CALF TENDERNESS [ ] YES  [ ] NO      LABS:                        12.2   6.77  )-----------( 140      ( 03 Feb 2020 08:57 )             36.7     02-03    139  |  96<L>  |  116<HH>  ----------------------------<  213<H>  3.2<L>   |  26  |  2.8<H>    Ca    8.9      03 Feb 2020 08:57  Mg     1.8     02-03    TPro  6.2  /  Alb  3.1<L>  /  TBili  0.7  /  DBili  x   /  AST  18  /  ALT  8   /  AlkPhos  51  02-03    PT/INR - ( 03 Feb 2020 08:57 )   PT: 18.50 sec;   INR: 1.62 ratio         PTT - ( 03 Feb 2020 08:57 )  PTT:35.2 sec        ASSESSMENT:     GOOD POST OP COURSE [ ]  YES  [ ] NO  CONDITION IMPROVING  []  YES  [ ]  NO          PLAN: discussed case with DR QUIROS no need to remove tessio     CONTINUE PRESENT MANAGEMENT  [x ] YES  [ ] NO

## 2020-02-03 NOTE — PROGRESS NOTE ADULT - ASSESSMENT
Pt with BRITTANI due to ATN (post infection a few months ago) was on HD for about 2 mos, had some kidney function recovery and was taken off HD about 2 weeks ago. Pt readmitted with inability to walk b/o swollen knees, found to have PVC on CXR,    BRITTANI - nonoliguric 2.5 L UO yesterday, no SOB, CXR decreased opacitiies  change IV to po LAsix 80 mg IV q 12 with Metolazone 5 mg qd  - no indication for HD now, /u today's labs   - pt is comfortable in bed on NC  - may D/C [place], but cont strict Is and OS  - if O2 sat remains >90 % on po Lasix and Metolazone, pt will not need HD and will need to have TEsio removed  -  no uremic sx -BUN and creat will start to go down on po diuretics most likely    CHD/Aflutter - repeat CXR t assess volume  avoid to or Coumadin please since line may need to be removed    Lt LE cellulitis -  on keflex                       D/W Hospitalist Dr Abarca

## 2020-02-03 NOTE — PROGRESS NOTE ADULT - SUBJECTIVE AND OBJECTIVE BOX
Nephrology progress note    Patient is seen and examined, events over the last 24 h noted .  Denies SOB, made 2.5 L of urine, CXR clearer  Allergies:  No Known Allergies    Hospital Medications:   MEDICATIONS  (STANDING):  ALPRAZolam 0.5 milliGRAM(s) Oral at bedtime  amLODIPine   Tablet 5 milliGRAM(s) Oral daily  aspirin  chewable 81 milliGRAM(s) Oral daily  budesonide 160 MICROgram(s)/formoterol 4.5 MICROgram(s) Inhaler 2 Puff(s) Inhalation two times a day  cephalexin 250 milliGRAM(s) Oral every 12 hours  chlorhexidine 4% Liquid 1 Application(s) Topical <User Schedule>  clopidogrel Tablet 75 milliGRAM(s) Oral daily  clotrimazole 1% Cream 1 Application(s) Topical every 12 hours  dextrose 5%. 1000 milliLiter(s) (50 mL/Hr) IV Continuous <Continuous>  dextrose 50% Injectable 12.5 Gram(s) IV Push once  dextrose 50% Injectable 25 Gram(s) IV Push once  dextrose 50% Injectable 25 Gram(s) IV Push once  fenofibrate Tablet 145 milliGRAM(s) Oral daily  finasteride 5 milliGRAM(s) Oral daily  furosemide   Injectable 80 milliGRAM(s) IV Push every 12 hours  heparin  Injectable 5000 Unit(s) SubCutaneous every 12 hours  insulin glargine Injectable (LANTUS) 20 Unit(s) SubCutaneous at bedtime  insulin lispro (HumaLOG) corrective regimen sliding scale   SubCutaneous three times a day before meals  isosorbide   mononitrate ER Tablet (IMDUR) 30 milliGRAM(s) Oral daily  metolazone 5 milliGRAM(s) Oral daily  metoprolol succinate ER 50 milliGRAM(s) Oral daily  pantoprazole    Tablet 40 milliGRAM(s) Oral before breakfast  silver sulfADIAZINE 1% Cream 1 Application(s) Topical daily  simvastatin 40 milliGRAM(s) Oral at bedtime  sodium chloride 0.9% lock flush 3 milliLiter(s) IV Push every 8 hours  tamsulosin 0.4 milliGRAM(s) Oral at bedtime        VITALS:  T(F): 96.5 (02-03-20 @ 05:11), Max: 97.7 (02-02-20 @ 21:29)  HR: 61 (02-03-20 @ 05:11)  BP: 110/61 (02-03-20 @ 05:11)  RR: 16 (02-03-20 @ 05:11)  SpO2: --  Wt(kg): --    02-01 @ 07:01  -  02-02 @ 07:00  --------------------------------------------------------  IN: 0 mL / OUT: 2950 mL / NET: -2950 mL    02-02 @ 07:01  -  02-03 @ 07:00  --------------------------------------------------------  IN: 1313 mL / OUT: 3200 mL / NET: -1887 mL          PHYSICAL EXAM:  Constitutional: NAD  HEENT: anicteric sclera, oropharynx clear, MMM  Neck: No JVD  Respiratory: CTA, decreased at bases  Cardiovascular: S1, S2, RRR  Gastrointestinal: BS+, soft, NT/ND  Extremities: Mild peripheral edema  Neurological: A/O x 3, [place]  : No CVA tenderness. HAs eliel.   Skin: No rashes  Vascular Access:    LABS:  02-02    138  |  97<L>  |  121<HH>  ----------------------------<  246<H>  3.6   |  25  |  3.2<H>  Creatinine Trend: 3.2<--, 3.2<--, 3.1<--, 3.6<--    Ca    8.3<L>      02 Feb 2020 13:27  Mg     1.9     02-02    TPro  6.4  /  Alb  3.2<L>  /  TBili  0.9  /  DBili      /  AST  20  /  ALT  8   /  AlkPhos  51  02-02                          12.2   6.77  )-----------( 140      ( 03 Feb 2020 08:57 )             36.7       Urine Studies:      RADIOLOGY & ADDITIONAL STUDIES:  < from: Xray Chest 1 View- PORTABLE-Routine (02.02.20 @ 12:38) >    Decreased previous vascular congestion and right pleural effusion. No parenchymal opacity or air leak    < end of copied text >

## 2020-02-03 NOTE — DIETITIAN INITIAL EVALUATION ADULT. - PERTINENT MEDS FT
plavix, heparin, keflex, lantus, humalog, metorpolol, lasix, metolazone, norvasc, tricor, proscar, imdur, protonix, zocor, NaCl 0.9%, flomax

## 2020-02-04 DIAGNOSIS — L03.116 CELLULITIS OF LEFT LOWER LIMB: ICD-10-CM

## 2020-02-04 LAB
ALBUMIN SERPL ELPH-MCNC: 3.3 G/DL — LOW (ref 3.5–5.2)
ALP SERPL-CCNC: 51 U/L — SIGNIFICANT CHANGE UP (ref 30–115)
ALT FLD-CCNC: 7 U/L — SIGNIFICANT CHANGE UP (ref 0–41)
ANION GAP SERPL CALC-SCNC: 16 MMOL/L — HIGH (ref 7–14)
AST SERPL-CCNC: 16 U/L — SIGNIFICANT CHANGE UP (ref 0–41)
BASOPHILS # BLD AUTO: 0.03 K/UL — SIGNIFICANT CHANGE UP (ref 0–0.2)
BASOPHILS NFR BLD AUTO: 0.3 % — SIGNIFICANT CHANGE UP (ref 0–1)
BILIRUB SERPL-MCNC: 0.8 MG/DL — SIGNIFICANT CHANGE UP (ref 0.2–1.2)
BUN SERPL-MCNC: 108 MG/DL — CRITICAL HIGH (ref 10–20)
CALCIUM SERPL-MCNC: 9 MG/DL — SIGNIFICANT CHANGE UP (ref 8.5–10.1)
CHLORIDE SERPL-SCNC: 94 MMOL/L — LOW (ref 98–110)
CO2 SERPL-SCNC: 29 MMOL/L — SIGNIFICANT CHANGE UP (ref 17–32)
CREAT SERPL-MCNC: 2.8 MG/DL — HIGH (ref 0.7–1.5)
CULTURE RESULTS: SIGNIFICANT CHANGE UP
EOSINOPHIL # BLD AUTO: 0.27 K/UL — SIGNIFICANT CHANGE UP (ref 0–0.7)
EOSINOPHIL NFR BLD AUTO: 2.9 % — SIGNIFICANT CHANGE UP (ref 0–8)
GLUCOSE BLDC GLUCOMTR-MCNC: 108 MG/DL — HIGH (ref 70–99)
GLUCOSE BLDC GLUCOMTR-MCNC: 119 MG/DL — HIGH (ref 70–99)
GLUCOSE BLDC GLUCOMTR-MCNC: 129 MG/DL — HIGH (ref 70–99)
GLUCOSE BLDC GLUCOMTR-MCNC: 154 MG/DL — HIGH (ref 70–99)
GLUCOSE BLDC GLUCOMTR-MCNC: 162 MG/DL — HIGH (ref 70–99)
GLUCOSE SERPL-MCNC: 177 MG/DL — HIGH (ref 70–99)
HCT VFR BLD CALC: 39.6 % — LOW (ref 42–52)
HGB BLD-MCNC: 13 G/DL — LOW (ref 14–18)
IMM GRANULOCYTES NFR BLD AUTO: 0.5 % — HIGH (ref 0.1–0.3)
LYMPHOCYTES # BLD AUTO: 1.01 K/UL — LOW (ref 1.2–3.4)
LYMPHOCYTES # BLD AUTO: 10.7 % — LOW (ref 20.5–51.1)
MAGNESIUM SERPL-MCNC: 1.8 MG/DL — SIGNIFICANT CHANGE UP (ref 1.8–2.4)
MCHC RBC-ENTMCNC: 26.7 PG — LOW (ref 27–31)
MCHC RBC-ENTMCNC: 32.8 G/DL — SIGNIFICANT CHANGE UP (ref 32–37)
MCV RBC AUTO: 81.3 FL — SIGNIFICANT CHANGE UP (ref 80–94)
MONOCYTES # BLD AUTO: 0.92 K/UL — HIGH (ref 0.1–0.6)
MONOCYTES NFR BLD AUTO: 9.8 % — HIGH (ref 1.7–9.3)
NEUTROPHILS # BLD AUTO: 7.14 K/UL — HIGH (ref 1.4–6.5)
NEUTROPHILS NFR BLD AUTO: 75.8 % — HIGH (ref 42.2–75.2)
NRBC # BLD: 0 /100 WBCS — SIGNIFICANT CHANGE UP (ref 0–0)
PLATELET # BLD AUTO: 160 K/UL — SIGNIFICANT CHANGE UP (ref 130–400)
POTASSIUM SERPL-MCNC: 3.1 MMOL/L — LOW (ref 3.5–5)
POTASSIUM SERPL-SCNC: 3.1 MMOL/L — LOW (ref 3.5–5)
PROT SERPL-MCNC: 6.6 G/DL — SIGNIFICANT CHANGE UP (ref 6–8)
RBC # BLD: 4.87 M/UL — SIGNIFICANT CHANGE UP (ref 4.7–6.1)
RBC # FLD: 15.9 % — HIGH (ref 11.5–14.5)
SODIUM SERPL-SCNC: 139 MMOL/L — SIGNIFICANT CHANGE UP (ref 135–146)
SPECIMEN SOURCE: SIGNIFICANT CHANGE UP
WBC # BLD: 9.42 K/UL — SIGNIFICANT CHANGE UP (ref 4.8–10.8)
WBC # FLD AUTO: 9.42 K/UL — SIGNIFICANT CHANGE UP (ref 4.8–10.8)

## 2020-02-04 PROCEDURE — 99233 SBSQ HOSP IP/OBS HIGH 50: CPT

## 2020-02-04 RX ORDER — INSULIN GLARGINE 100 [IU]/ML
5 INJECTION, SOLUTION SUBCUTANEOUS ONCE
Refills: 0 | Status: COMPLETED | OUTPATIENT
Start: 2020-02-04 | End: 2020-02-04

## 2020-02-04 RX ORDER — POTASSIUM CHLORIDE 20 MEQ
20 PACKET (EA) ORAL ONCE
Refills: 0 | Status: COMPLETED | OUTPATIENT
Start: 2020-02-04 | End: 2020-02-04

## 2020-02-04 RX ORDER — ACETAMINOPHEN WITH CODEINE 300MG-30MG
1 TABLET ORAL
Refills: 0 | Status: DISCONTINUED | OUTPATIENT
Start: 2020-02-04 | End: 2020-02-07

## 2020-02-04 RX ADMIN — FINASTERIDE 5 MILLIGRAM(S): 5 TABLET, FILM COATED ORAL at 12:19

## 2020-02-04 RX ADMIN — INSULIN GLARGINE 5 UNIT(S): 100 INJECTION, SOLUTION SUBCUTANEOUS at 23:21

## 2020-02-04 RX ADMIN — Medication 1 APPLICATION(S): at 12:19

## 2020-02-04 RX ADMIN — CLOPIDOGREL BISULFATE 75 MILLIGRAM(S): 75 TABLET, FILM COATED ORAL at 12:19

## 2020-02-04 RX ADMIN — AMLODIPINE BESYLATE 5 MILLIGRAM(S): 2.5 TABLET ORAL at 05:29

## 2020-02-04 RX ADMIN — HEPARIN SODIUM 5000 UNIT(S): 5000 INJECTION INTRAVENOUS; SUBCUTANEOUS at 17:42

## 2020-02-04 RX ADMIN — Medication 500 MILLIGRAM(S): at 12:19

## 2020-02-04 RX ADMIN — SODIUM CHLORIDE 3 MILLILITER(S): 9 INJECTION INTRAMUSCULAR; INTRAVENOUS; SUBCUTANEOUS at 05:28

## 2020-02-04 RX ADMIN — Medication 50 MILLIGRAM(S): at 05:29

## 2020-02-04 RX ADMIN — CHLORHEXIDINE GLUCONATE 1 APPLICATION(S): 213 SOLUTION TOPICAL at 05:30

## 2020-02-04 RX ADMIN — Medication 2: at 17:49

## 2020-02-04 RX ADMIN — ISOSORBIDE MONONITRATE 30 MILLIGRAM(S): 60 TABLET, EXTENDED RELEASE ORAL at 12:19

## 2020-02-04 RX ADMIN — Medication 1 APPLICATION(S): at 05:30

## 2020-02-04 RX ADMIN — Medication 1 APPLICATION(S): at 17:49

## 2020-02-04 RX ADMIN — Medication 2: at 12:18

## 2020-02-04 RX ADMIN — PANTOPRAZOLE SODIUM 40 MILLIGRAM(S): 20 TABLET, DELAYED RELEASE ORAL at 05:30

## 2020-02-04 RX ADMIN — Medication 20 MILLIEQUIVALENT(S): at 17:42

## 2020-02-04 RX ADMIN — Medication 80 MILLIGRAM(S): at 17:41

## 2020-02-04 RX ADMIN — Medication 81 MILLIGRAM(S): at 12:18

## 2020-02-04 RX ADMIN — Medication 250 MILLIGRAM(S): at 05:29

## 2020-02-04 RX ADMIN — ZINC SULFATE TAB 220 MG (50 MG ZINC EQUIVALENT) 220 MILLIGRAM(S): 220 (50 ZN) TAB at 12:19

## 2020-02-04 RX ADMIN — HEPARIN SODIUM 5000 UNIT(S): 5000 INJECTION INTRAVENOUS; SUBCUTANEOUS at 05:29

## 2020-02-04 RX ADMIN — Medication 250 MILLIGRAM(S): at 17:41

## 2020-02-04 RX ADMIN — Medication 80 MILLIGRAM(S): at 05:29

## 2020-02-04 RX ADMIN — TAMSULOSIN HYDROCHLORIDE 0.4 MILLIGRAM(S): 0.4 CAPSULE ORAL at 21:40

## 2020-02-04 RX ADMIN — SODIUM CHLORIDE 3 MILLILITER(S): 9 INJECTION INTRAMUSCULAR; INTRAVENOUS; SUBCUTANEOUS at 23:43

## 2020-02-04 RX ADMIN — SODIUM CHLORIDE 3 MILLILITER(S): 9 INJECTION INTRAMUSCULAR; INTRAVENOUS; SUBCUTANEOUS at 15:21

## 2020-02-04 RX ADMIN — BUDESONIDE AND FORMOTEROL FUMARATE DIHYDRATE 2 PUFF(S): 160; 4.5 AEROSOL RESPIRATORY (INHALATION) at 09:17

## 2020-02-04 RX ADMIN — Medication 0.5 MILLIGRAM(S): at 21:40

## 2020-02-04 RX ADMIN — SIMVASTATIN 40 MILLIGRAM(S): 20 TABLET, FILM COATED ORAL at 21:40

## 2020-02-04 RX ADMIN — Medication 145 MILLIGRAM(S): at 12:19

## 2020-02-04 NOTE — PROGRESS NOTE ADULT - ASSESSMENT
Pt with BRITTANI due to ATN (post infection a few months ago) was on HD for about 2 mos, had some kidney function recovery and was taken off HD about 2 weeks ago. Pt readmitted with inability to walk b/o swollen knees, found to have PVC on CXR,    BRITTANI - nonoliguric > 2 L UO yesterday, no SOB, CXR decreased opacitiies  cont  to po LAsix 80 mg IV q 12 with Metolazone 5 mg qd  - no indication for HD now, f/u today's labs   - pt is comfortable in bed on NC  - may D/C Najera, but cont strict Is and OS  - if O2 sat remains >90 % on po Lasix and Metolazone, pt will not need HD and will need to have TEsio removed  -  no uremic sx -BUN and creat will start to go down on po diuretics most likely    CHD/Aflutter - repeat CXR t assess volume  avoid to or Coumadin please since line may need to be removed    Lt LE cellulitis -  on keflex

## 2020-02-04 NOTE — CHART NOTE - NSCHARTNOTEFT_GEN_A_CORE
FS is 119, typically receives 20nuit sof lantus.  HOwever, pt will adjust his lantus dose accordingly and pt states he would admin 5units latus.  D/W Dr Felix.

## 2020-02-04 NOTE — PROGRESS NOTE ADULT - ASSESSMENT
79 y.o. 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), chronic venous stasis, Aflutter previously on NOAC, COPD c chronic hypoxic respiratory failure on 2-3L NC, here with BRITTANI on CKD 3/4 (now possibly 2/2 cardiorenal syndrome), + troponin likely 2/2 chronic heart failure suspected systolic and acute renal dysfunction now, lower extremity nonpurulent cellulitis on peripheral vascular disease with probable tinea pedis, also with possible metabolic encephalopathy    on oral antibiotic now for presumed superimposed cellulitis of left upper leg- LLE improving clinically  us venous duplex demonstrated no DVT or thrombophlebitis, but arterial duplex noting L external iliac moderate stenosis. will likely benefit from outpatient vascular follow up  wound care to RLE shin ordered  antifungal cream for suspected tinea pedis  trending CE's most consistent with renal dysfunction- we have ruled out MI, echo pending, cardio eval appreciated- we discussed case together, agree that there is no acute MI or ACS  i/o, daily weight  completed TOV- urinating well today, Cr remaining stable, urea downtrending slightly. if okay with nephrologist will have surgeons remove HD catheter tomorrow  monitor U/o, changed to PO lasix 80mg BID with metolazone primer 5mgqd 30 min prior to am lasix dose  trend cr/ lytes/ urea  reorient if confused. patient has not had any agitated spells, and the confusion is subtle/ mild  o2, bronchodilators- copd is chronic and stable and NOT in acute exacerbation currently  HOLD noac for acute renal failure, use dvt ppx dosing for now- AC was for flutter history, risk of CVA low, c/w antiplatelet agents for now  will consider coumdin after HD catheter is removed- potentially in the next day or 2  glycemic control  more PT for physical debility      #Progress Note Handoff    Pending (specify):  Consults_________, Tests________, Test Results_______, Other__PT, now oral diuresis, trend Cr/ possible HD cath removal then start therapeutic AC with coumadin- would not bridge, cva risk with a flutter is not high_______    Family discussion: plan of care discussed with patient today and previously with son and his wife at the bedside    Disposition: likely SNF in about 72hr

## 2020-02-04 NOTE — PROGRESS NOTE ADULT - SUBJECTIVE AND OBJECTIVE BOX
Nephrology progress note    Patient was seen and examined, events over the last 24 h noted .  no labs today    Allergies:  No Known Allergies    Hospital Medications:   MEDICATIONS  (STANDING):  ALPRAZolam 0.5 milliGRAM(s) Oral at bedtime  amLODIPine   Tablet 5 milliGRAM(s) Oral daily  ascorbic acid 500 milliGRAM(s) Oral daily  aspirin  chewable 81 milliGRAM(s) Oral daily  budesonide 160 MICROgram(s)/formoterol 4.5 MICROgram(s) Inhaler 2 Puff(s) Inhalation two times a day  cephalexin 250 milliGRAM(s) Oral every 12 hours  chlorhexidine 4% Liquid 1 Application(s) Topical <User Schedule>  clopidogrel Tablet 75 milliGRAM(s) Oral daily  clotrimazole 1% Cream 1 Application(s) Topical every 12 hours  dextrose 5%. 1000 milliLiter(s) (50 mL/Hr) IV Continuous <Continuous>  dextrose 50% Injectable 12.5 Gram(s) IV Push once  dextrose 50% Injectable 25 Gram(s) IV Push once  dextrose 50% Injectable 25 Gram(s) IV Push once  fenofibrate Tablet 145 milliGRAM(s) Oral daily  finasteride 5 milliGRAM(s) Oral daily  furosemide    Tablet 80 milliGRAM(s) Oral every 12 hours  heparin  Injectable 5000 Unit(s) SubCutaneous every 12 hours  insulin glargine Injectable (LANTUS) 20 Unit(s) SubCutaneous at bedtime  insulin lispro (HumaLOG) corrective regimen sliding scale   SubCutaneous three times a day before meals  isosorbide   mononitrate ER Tablet (IMDUR) 30 milliGRAM(s) Oral daily  metolazone 5 milliGRAM(s) Oral daily  metoprolol succinate ER 50 milliGRAM(s) Oral daily  pantoprazole    Tablet 40 milliGRAM(s) Oral before breakfast  silver sulfADIAZINE 1% Cream 1 Application(s) Topical daily  simvastatin 40 milliGRAM(s) Oral at bedtime  sodium chloride 0.9% lock flush 3 milliLiter(s) IV Push every 8 hours  tamsulosin 0.4 milliGRAM(s) Oral at bedtime  zinc sulfate 220 milliGRAM(s) Oral daily        VITALS:  T(F): 96.1 (02-04-20 @ 05:06), Max: 97 (02-03-20 @ 13:56)  HR: 60 (02-04-20 @ 05:06)  BP: 118/69 (02-04-20 @ 05:06)  RR: 18 (02-04-20 @ 05:06)  SpO2: 97% (02-03-20 @ 21:54)  Wt(kg): --    02-02 @ 07:01  -  02-03 @ 07:00  --------------------------------------------------------  IN: 1313 mL / OUT: 3200 mL / NET: -1887 mL    02-03 @ 07:01  -  02-04 @ 07:00  --------------------------------------------------------  IN: 0 mL / OUT: 2000 mL / NET: -2000 mL          PHYSICAL EXAM:  Constitutional: NAD  HEENT: anicteric sclera, oropharynx clear, MMM  Neck: No JVD  Respiratory: CTA, decreased at bases  Cardiovascular: S1, S2, RRR  Gastrointestinal: BS+, soft, NT/ND  Extremities: Mild peripheral edema  Neurological: A/O x 3, [place]  : No CVA tenderness. HAs julian.   Skin: No rashes  Vascular Access:    LABS:  02-03    139  |  96<L>  |  116<HH>  ----------------------------<  213<H>  3.2<L>   |  26  |  2.8<H>    Ca    8.9      03 Feb 2020 08:57  Mg     1.8     02-03    TPro  6.2  /  Alb  3.1<L>  /  TBili  0.7  /  DBili      /  AST  18  /  ALT  8   /  AlkPhos  51  02-03                          13.0   9.42  )-----------( 160      ( 04 Feb 2020 11:55 )             39.6       Urine Studies:      RADIOLOGY & ADDITIONAL STUDIES:

## 2020-02-04 NOTE — PROGRESS NOTE ADULT - SUBJECTIVE AND OBJECTIVE BOX
DIAGNOSIS:   HOSPITAL DAY #:    STATUS POST:    POST OPERATIVE DAY #:     Vital Signs Last 24 Hrs  T(C): 36.2 (04 Feb 2020 21:02), Max: 36.2 (04 Feb 2020 21:02)  T(F): 97.2 (04 Feb 2020 21:02), Max: 97.2 (04 Feb 2020 21:02)  HR: 61 (04 Feb 2020 21:02) (60 - 61)  BP: 101/50 (04 Feb 2020 21:02) (101/50 - 132/63)  BP(mean): --  RR: 16 (04 Feb 2020 21:02) (16 - 18)  SpO2: --    SUBJECTIVE: Pt seen    Pain: YES  [ ]   NO [ ]   Nausea: [ ] YES [ ] NO           Vomiting: [ ] YES [ ] NO  Flatus: [ ] YES [ ] NO             Bowel Movement: [ ] YES [ ] NO     Void: [ ]YES [ ]No      LUIS DRAINAGE: SIGNIFICANT [ ]   NOT SIGNIFICANT [ ]   NOT APPLICABLE [ ]  YES [ ] NO    General Appearance: Appears well, NAD  Neck: Supple tessio site ok  Chest: Equal expansion bilaterally, equal breath sounds  CV: Pulse regular presently  Abdomen: Soft [x ] YES [ ]NO  DISTENDED [ ] YES [x ] NO TENDERNESS [ ]YES [ ]NO  INCISIONS: HEALING WELL [ ] YES  [ ] NO ERYTHEMA [ ] YES [ ] NO DRAINAGE [ ] YES  [ ] NO  Extremities: Grossly symmetric, CALF TENDERNESS [ ] YES  [ ] NO      LABS:                        13.0   9.42  )-----------( 160      ( 04 Feb 2020 11:55 )             39.6     02-04    139  |  94<L>  |  108<HH>  ----------------------------<  177<H>  3.1<L>   |  29  |  2.8<H>    Ca    9.0      04 Feb 2020 11:55  Mg     1.8     02-04    TPro  6.6  /  Alb  3.3<L>  /  TBili  0.8  /  DBili  x   /  AST  16  /  ALT  7   /  AlkPhos  51  02-04    PT/INR - ( 03 Feb 2020 08:57 )   PT: 18.50 sec;   INR: 1.62 ratio         PTT - ( 03 Feb 2020 08:57 )  PTT:35.2 sec        ASSESSMENT:     GOOD POST OP COURSE [ ]  YES  [ ] NO  CONDITION IMPROVING  []  YES  [ ]  NO          PLAN: discussed case with DR QUIROS    CONTINUE PRESENT MANAGEMENT  [x ] YES  [ ] NO

## 2020-02-04 NOTE — PROGRESS NOTE ADULT - SUBJECTIVE AND OBJECTIVE BOX
ZAVALATAIWO CASPER  79y  Male      Patient is a 79y old  Male who presents with a chief complaint of leg pain (03 Feb 2020 13:25)      INTERVAL HPI/OVERNIGHT EVENTS: leg pain controlled. spending most time in bed today. urinated s/p TOV yesterday      REVIEW OF SYSTEMS:  as above  All other review of systems negative    T(C): 35.7 (02-04-20 @ 14:16), Max: 35.8 (02-03-20 @ 21:53)  HR: 61 (02-04-20 @ 14:16) (60 - 61)  BP: 114/57 (02-04-20 @ 14:16) (114/57 - 132/63)  RR: 16 (02-04-20 @ 14:16) (16 - 18)  SpO2: 97% (02-03-20 @ 21:54) (97% - 97%)  Wt(kg): --Vital Signs Last 24 Hrs  T(C): 35.7 (04 Feb 2020 14:16), Max: 35.8 (03 Feb 2020 21:53)  T(F): 96.2 (04 Feb 2020 14:16), Max: 96.5 (03 Feb 2020 21:53)  HR: 61 (04 Feb 2020 14:16) (60 - 61)  BP: 114/57 (04 Feb 2020 14:16) (114/57 - 132/63)  BP(mean): --  RR: 16 (04 Feb 2020 14:16) (16 - 18)  SpO2: 97% (03 Feb 2020 21:54) (97% - 97%)      02-03-20 @ 07:01  -  02-04-20 @ 07:00  --------------------------------------------------------  IN: 0 mL / OUT: 2000 mL / NET: -2000 mL        PHYSICAL EXAM:  GENERAL: deconditioned  HEENT hard of hearing  PSYCH: no agitation, baseline mentation  NERVOUS SYSTEM:  Alert & Oriented X3, no new focal deficits  PULMONARY: Clear to percussion bilaterally; No rales, rhonchi, wheezing, or rubs  CARDIOVASCULAR: Regular rate and rhythm; 5/6 systolic murmur, no rubs, or gallops  GI: Soft, Nontender, Nondistended; Bowel sounds present rotund   no longer with julian, HD cath CDI anterior chest wall  EXTREMITIES:  chronic hyperpigmentation, RLE venous stasis ulcer dressed, L thigh erythema/warmth resolved    Consultant(s) Notes Reviewed:  [x ] YES  [ ] NO    Discussed with Consultants/Other Providers [ x] YES     LABS                          13.0   9.42  )-----------( 160      ( 04 Feb 2020 11:55 )             39.6     02-04    139  |  94<L>  |  108<HH>  ----------------------------<  177<H>  3.1<L>   |  29  |  2.8<H>    Ca    9.0      04 Feb 2020 11:55  Mg     1.8     02-04    TPro  6.6  /  Alb  3.3<L>  /  TBili  0.8  /  DBili  x   /  AST  16  /  ALT  7   /  AlkPhos  51  02-04        PT/INR - ( 03 Feb 2020 08:57 )   PT: 18.50 sec;   INR: 1.62 ratio         PTT - ( 03 Feb 2020 08:57 )  PTT:35.2 sec  Lactate Trend        CAPILLARY BLOOD GLUCOSE      POCT Blood Glucose.: 154 mg/dL (04 Feb 2020 11:29)        RADIOLOGY & ADDITIONAL TESTS:    Imaging Personally Reviewed:  [ ] YES  [ ] NO    HEALTH ISSUES - PROBLEM Dx:  Cellulitis of left lower extremity: Cellulitis of left lower extremity  Benign prostatic hyperplasia with urinary retention: Benign prostatic hyperplasia with urinary retention  Onychomycosis of toenail: Onychomycosis of toenail  Venous stasis ulcers: Venous stasis ulcers

## 2020-02-05 LAB
ANION GAP SERPL CALC-SCNC: 15 MMOL/L — HIGH (ref 7–14)
APTT BLD: 49.1 SEC — HIGH (ref 27–39.2)
BUN SERPL-MCNC: 104 MG/DL — CRITICAL HIGH (ref 10–20)
CALCIUM SERPL-MCNC: 8.8 MG/DL — SIGNIFICANT CHANGE UP (ref 8.5–10.1)
CHLORIDE SERPL-SCNC: 88 MMOL/L — LOW (ref 98–110)
CO2 SERPL-SCNC: 33 MMOL/L — HIGH (ref 17–32)
CREAT SERPL-MCNC: 2.6 MG/DL — HIGH (ref 0.7–1.5)
GLUCOSE BLDC GLUCOMTR-MCNC: 147 MG/DL — HIGH (ref 70–99)
GLUCOSE BLDC GLUCOMTR-MCNC: 155 MG/DL — HIGH (ref 70–99)
GLUCOSE BLDC GLUCOMTR-MCNC: 160 MG/DL — HIGH (ref 70–99)
GLUCOSE BLDC GLUCOMTR-MCNC: 208 MG/DL — HIGH (ref 70–99)
GLUCOSE BLDC GLUCOMTR-MCNC: 223 MG/DL — HIGH (ref 70–99)
GLUCOSE SERPL-MCNC: 249 MG/DL — HIGH (ref 70–99)
HCT VFR BLD CALC: 39.1 % — LOW (ref 42–52)
HGB BLD-MCNC: 12.6 G/DL — LOW (ref 14–18)
INR BLD: 1.63 RATIO — HIGH (ref 0.65–1.3)
MCHC RBC-ENTMCNC: 26.6 PG — LOW (ref 27–31)
MCHC RBC-ENTMCNC: 32.2 G/DL — SIGNIFICANT CHANGE UP (ref 32–37)
MCV RBC AUTO: 82.5 FL — SIGNIFICANT CHANGE UP (ref 80–94)
NRBC # BLD: 0 /100 WBCS — SIGNIFICANT CHANGE UP (ref 0–0)
PLATELET # BLD AUTO: 164 K/UL — SIGNIFICANT CHANGE UP (ref 130–400)
POTASSIUM SERPL-MCNC: 3.3 MMOL/L — LOW (ref 3.5–5)
POTASSIUM SERPL-SCNC: 3.3 MMOL/L — LOW (ref 3.5–5)
PROTHROM AB SERPL-ACNC: 18.7 SEC — HIGH (ref 9.95–12.87)
RBC # BLD: 4.74 M/UL — SIGNIFICANT CHANGE UP (ref 4.7–6.1)
RBC # FLD: 15.8 % — HIGH (ref 11.5–14.5)
SODIUM SERPL-SCNC: 136 MMOL/L — SIGNIFICANT CHANGE UP (ref 135–146)
WBC # BLD: 9.77 K/UL — SIGNIFICANT CHANGE UP (ref 4.8–10.8)
WBC # FLD AUTO: 9.77 K/UL — SIGNIFICANT CHANGE UP (ref 4.8–10.8)

## 2020-02-05 PROCEDURE — 99233 SBSQ HOSP IP/OBS HIGH 50: CPT

## 2020-02-05 RX ADMIN — ZINC SULFATE TAB 220 MG (50 MG ZINC EQUIVALENT) 220 MILLIGRAM(S): 220 (50 ZN) TAB at 13:47

## 2020-02-05 RX ADMIN — Medication 1 TABLET(S): at 17:25

## 2020-02-05 RX ADMIN — Medication 0.5 MILLIGRAM(S): at 21:29

## 2020-02-05 RX ADMIN — Medication 81 MILLIGRAM(S): at 13:48

## 2020-02-05 RX ADMIN — Medication 1 APPLICATION(S): at 17:10

## 2020-02-05 RX ADMIN — AMLODIPINE BESYLATE 5 MILLIGRAM(S): 2.5 TABLET ORAL at 05:33

## 2020-02-05 RX ADMIN — INSULIN GLARGINE 20 UNIT(S): 100 INJECTION, SOLUTION SUBCUTANEOUS at 21:29

## 2020-02-05 RX ADMIN — Medication 4: at 17:08

## 2020-02-05 RX ADMIN — Medication 250 MILLIGRAM(S): at 05:33

## 2020-02-05 RX ADMIN — HEPARIN SODIUM 5000 UNIT(S): 5000 INJECTION INTRAVENOUS; SUBCUTANEOUS at 17:10

## 2020-02-05 RX ADMIN — HEPARIN SODIUM 5000 UNIT(S): 5000 INJECTION INTRAVENOUS; SUBCUTANEOUS at 05:34

## 2020-02-05 RX ADMIN — Medication 250 MILLIGRAM(S): at 17:09

## 2020-02-05 RX ADMIN — Medication 1 APPLICATION(S): at 13:48

## 2020-02-05 RX ADMIN — Medication 50 MILLIGRAM(S): at 05:32

## 2020-02-05 RX ADMIN — Medication 80 MILLIGRAM(S): at 05:33

## 2020-02-05 RX ADMIN — SIMVASTATIN 40 MILLIGRAM(S): 20 TABLET, FILM COATED ORAL at 21:28

## 2020-02-05 RX ADMIN — TAMSULOSIN HYDROCHLORIDE 0.4 MILLIGRAM(S): 0.4 CAPSULE ORAL at 21:28

## 2020-02-05 RX ADMIN — SODIUM CHLORIDE 3 MILLILITER(S): 9 INJECTION INTRAMUSCULAR; INTRAVENOUS; SUBCUTANEOUS at 13:49

## 2020-02-05 RX ADMIN — Medication 2: at 07:59

## 2020-02-05 RX ADMIN — PANTOPRAZOLE SODIUM 40 MILLIGRAM(S): 20 TABLET, DELAYED RELEASE ORAL at 05:32

## 2020-02-05 RX ADMIN — Medication 1 TABLET(S): at 17:55

## 2020-02-05 RX ADMIN — Medication 1 APPLICATION(S): at 05:35

## 2020-02-05 RX ADMIN — Medication 500 MILLIGRAM(S): at 13:48

## 2020-02-05 RX ADMIN — SODIUM CHLORIDE 3 MILLILITER(S): 9 INJECTION INTRAMUSCULAR; INTRAVENOUS; SUBCUTANEOUS at 05:56

## 2020-02-05 RX ADMIN — ISOSORBIDE MONONITRATE 30 MILLIGRAM(S): 60 TABLET, EXTENDED RELEASE ORAL at 13:47

## 2020-02-05 RX ADMIN — FINASTERIDE 5 MILLIGRAM(S): 5 TABLET, FILM COATED ORAL at 13:47

## 2020-02-05 RX ADMIN — Medication 145 MILLIGRAM(S): at 13:48

## 2020-02-05 RX ADMIN — Medication 80 MILLIGRAM(S): at 17:09

## 2020-02-05 RX ADMIN — BUDESONIDE AND FORMOTEROL FUMARATE DIHYDRATE 2 PUFF(S): 160; 4.5 AEROSOL RESPIRATORY (INHALATION) at 07:59

## 2020-02-05 RX ADMIN — BUDESONIDE AND FORMOTEROL FUMARATE DIHYDRATE 2 PUFF(S): 160; 4.5 AEROSOL RESPIRATORY (INHALATION) at 21:28

## 2020-02-05 NOTE — PROGRESS NOTE ADULT - ASSESSMENT
Pt with BRITTANI due to ATN (post infection a few months ago) was on HD for about 2 mos, had some kidney function recovery and was taken off HD about 2 weeks ago. Pt readmitted with inability to walk b/o swollen knees, found to have PVC on CXR,    BRITTANI - creat trending down on po diuretics, UO inaccurate w/o [place]aj[place], but was >2 L with the [place] ;no SOB, CXR decreased opacitiies  cont  to po LAsix 80 mg IV q 12 with Metolazone 5 mg qd  - no indication for HD, if creat <3.0 today - > ask surgery to d/c TEsio cath  Hypokalemia - due to diuretics, relpace in small increments (pt in renal failure) and repeat    CHD/Aflutter - repeat CXR t assess volume  avoid to or Coumadin please since line may need to be removed    Lt LE cellulitis -  on keflex    please cont for 2 weeks, thigh is still swollen

## 2020-02-05 NOTE — PROGRESS NOTE ADULT - SUBJECTIVE AND OBJECTIVE BOX
TAIWO ZAVALA  79y  Male      Patient is a 79y old  Male who presents with a chief complaint of leg pain (04 Feb 2020 14:43)      INTERVAL HPI/OVERNIGHT EVENTS:  pt seen and examined at bedside this morning; sitting up in chair  -HD access removal this Friday with surgical team   -resume anticoagulation after the procedure  -Plavix on hold   -spoke to surgical PA about the plan of care     REVIEW OF SYSTEMS:  no complaints    -Vital Signs Last 24 Hrs  T(C): 36.3 (05 Feb 2020 05:00), Max: 36.3 (05 Feb 2020 05:00)  T(F): 97.3 (05 Feb 2020 05:00), Max: 97.3 (05 Feb 2020 05:00)  HR: 61 (05 Feb 2020 05:00) (61 - 61)  BP: 129/58 (05 Feb 2020 05:00) (101/50 - 129/58)  RR: 16 (05 Feb 2020 05:00) (16 - 16)  SpO2: 96% (05 Feb 2020 08:00) (96% - 96%)    PHYSICAL EXAM:  GENERAL: NAD, sitting up in chair  HEAD:  Atraumatic, Normocephalic  EYES: EOMI, PERRLA, conjunctiva and sclera clear  NERVOUS SYSTEM:  Alert & Oriented X 3  CV/HEART: Regular rate and rhythm; No murmurs, rubs, or gallops  GI/ABDOMEN: Soft, Nontender, Nondistended; Bowel sounds present  EXTREMITIES: Dressing Both LE   SKIN: chronic skin changes, with dressing both Legs   LAB:                        13.0   9.42  )-----------( 160      ( 04 Feb 2020 11:55 )             39.6     02-04    139  |  94<L>  |  108<HH>  ----------------------------<  177<H>  3.1<L>   |  29  |  2.8<H>    Ca    9.0      04 Feb 2020 11:55  Mg     1.8     02-04    TPro  6.6  /  Alb  3.3<L>  /  TBili  0.8  /  DBili  x   /  AST  16  /  ALT  7   /  AlkPhos  51  02-04    Daily     Daily   CAPILLARY BLOOD GLUCOSE    POCT Blood Glucose.: 147 mg/dL (05 Feb 2020 11:21)  POCT Blood Glucose.: 155 mg/dL (05 Feb 2020 07:38)  POCT Blood Glucose.: 223 mg/dL (05 Feb 2020 03:52)  POCT Blood Glucose.: 119 mg/dL (04 Feb 2020 21:31)  POCT Blood Glucose.: 162 mg/dL (04 Feb 2020 16:22)    LIVER FUNCTIONS - ( 04 Feb 2020 11:55 )  Alb: 3.3 g/dL / Pro: 6.6 g/dL / ALK PHOS: 51 U/L / ALT: 7 U/L / AST: 16 U/L / GGT: x             RADIOLOGY:    Imaging Personally Reviewed:  [Y ] YES  [ ] NO    HEALTH ISSUES - PROBLEM Dx:  Cellulitis of left lower extremity: Cellulitis of left lower extremity  Benign prostatic hyperplasia with urinary retention: Benign prostatic hyperplasia with urinary retention  Onychomycosis of toenail: Onychomycosis of toenail  Venous stasis ulcers: Venous stasis ulcers    MEDS:  acetaminophen   Tablet .. 650 milliGRAM(s) Oral every 4 hours PRN  acetaminophen 300 mG/codeine 30 mG 1 Tablet(s) Oral <User Schedule> PRN  albuterol/ipratropium for Nebulization 3 milliLiter(s) Nebulizer every 6 hours PRN  ALPRAZolam 0.5 milliGRAM(s) Oral at bedtime  amLODIPine   Tablet 5 milliGRAM(s) Oral daily  ascorbic acid 500 milliGRAM(s) Oral daily  aspirin  chewable 81 milliGRAM(s) Oral daily  budesonide 160 MICROgram(s)/formoterol 4.5 MICROgram(s) Inhaler 2 Puff(s) Inhalation two times a day  cephalexin 250 milliGRAM(s) Oral every 12 hours  chlorhexidine 4% Liquid 1 Application(s) Topical <User Schedule>  clotrimazole 1% Cream 1 Application(s) Topical every 12 hours  dextrose 40% Gel 15 Gram(s) Oral once PRN  dextrose 5%. 1000 milliLiter(s) IV Continuous <Continuous>  dextrose 50% Injectable 12.5 Gram(s) IV Push once  dextrose 50% Injectable 25 Gram(s) IV Push once  dextrose 50% Injectable 25 Gram(s) IV Push once  fenofibrate Tablet 145 milliGRAM(s) Oral daily  finasteride 5 milliGRAM(s) Oral daily  furosemide    Tablet 80 milliGRAM(s) Oral every 12 hours  glucagon  Injectable 1 milliGRAM(s) IntraMuscular once PRN  heparin  Injectable 5000 Unit(s) SubCutaneous every 12 hours  insulin glargine Injectable (LANTUS) 20 Unit(s) SubCutaneous at bedtime  insulin lispro (HumaLOG) corrective regimen sliding scale   SubCutaneous three times a day before meals  isosorbide   mononitrate ER Tablet (IMDUR) 30 milliGRAM(s) Oral daily  metolazone 5 milliGRAM(s) Oral daily  metoprolol succinate ER 50 milliGRAM(s) Oral daily  pantoprazole    Tablet 40 milliGRAM(s) Oral before breakfast  silver sulfADIAZINE 1% Cream 1 Application(s) Topical daily  simvastatin 40 milliGRAM(s) Oral at bedtime  sodium chloride 0.9% lock flush 3 milliLiter(s) IV Push every 8 hours  tamsulosin 0.4 milliGRAM(s) Oral at bedtime  zinc sulfate 220 milliGRAM(s) Oral daily

## 2020-02-05 NOTE — PROGRESS NOTE ADULT - SUBJECTIVE AND OBJECTIVE BOX
Nephrology progress note    Patient is seen and examined, events over the last 24 h noted .  Denies SOB, Still has some pain in Lt medial aspect of the thigh  Allergies:  No Known Allergies    Hospital Medications:   MEDICATIONS  (STANDING):  ALPRAZolam 0.5 milliGRAM(s) Oral at bedtime  amLODIPine   Tablet 5 milliGRAM(s) Oral daily  ascorbic acid 500 milliGRAM(s) Oral daily  aspirin  chewable 81 milliGRAM(s) Oral daily  budesonide 160 MICROgram(s)/formoterol 4.5 MICROgram(s) Inhaler 2 Puff(s) Inhalation two times a day  cephalexin 250 milliGRAM(s) Oral every 12 hours  chlorhexidine 4% Liquid 1 Application(s) Topical <User Schedule>  clopidogrel Tablet 75 milliGRAM(s) Oral daily  clotrimazole 1% Cream 1 Application(s) Topical every 12 hours  dextrose 5%. 1000 milliLiter(s) (50 mL/Hr) IV Continuous <Continuous>  dextrose 50% Injectable 12.5 Gram(s) IV Push once  dextrose 50% Injectable 25 Gram(s) IV Push once  dextrose 50% Injectable 25 Gram(s) IV Push once  fenofibrate Tablet 145 milliGRAM(s) Oral daily  finasteride 5 milliGRAM(s) Oral daily  furosemide    Tablet 80 milliGRAM(s) Oral every 12 hours  heparin  Injectable 5000 Unit(s) SubCutaneous every 12 hours  insulin glargine Injectable (LANTUS) 20 Unit(s) SubCutaneous at bedtime  insulin lispro (HumaLOG) corrective regimen sliding scale   SubCutaneous three times a day before meals  isosorbide   mononitrate ER Tablet (IMDUR) 30 milliGRAM(s) Oral daily  metolazone 5 milliGRAM(s) Oral daily  metoprolol succinate ER 50 milliGRAM(s) Oral daily  pantoprazole    Tablet 40 milliGRAM(s) Oral before breakfast  silver sulfADIAZINE 1% Cream 1 Application(s) Topical daily  simvastatin 40 milliGRAM(s) Oral at bedtime  sodium chloride 0.9% lock flush 3 milliLiter(s) IV Push every 8 hours  tamsulosin 0.4 milliGRAM(s) Oral at bedtime  zinc sulfate 220 milliGRAM(s) Oral daily        VITALS:  T(F): 97.3 (02-05-20 @ 05:00), Max: 97.3 (02-05-20 @ 05:00)  HR: 61 (02-05-20 @ 05:00)  BP: 129/58 (02-05-20 @ 05:00)  RR: 16 (02-05-20 @ 05:00)  SpO2: 96% (02-05-20 @ 08:00)  Wt(kg): --    02-03 @ 07:01  -  02-04 @ 07:00  --------------------------------------------------------  IN: 0 mL / OUT: 2000 mL / NET: -2000 mL    02-04 @ 07:01  -  02-05 @ 07:00  --------------------------------------------------------  IN: 0 mL / OUT: 350 mL / NET: -350 mL          PHYSICAL EXAM:  Constitutional: NAD  HEENT: anicteric sclera  Neck: No JVD  Respiratory: CTA, no crackles  Cardiovascular: S1, S2, RRR, 3/6 ESM  Gastrointestinal: BS+, soft, NT/ND  Extremities: Mild Lt thigh erythema and swelling, tenderness. No peripheral edema  Neurological: A/O x 3, [place]  : No CVA tenderness. No julian.   Skin: No rashes  Vascular Access: tesio    LABS:  02-04    139  |  94<L>  |  108<HH>  ----------------------------<  177<H>  3.1<L>   |  29  |  2.8<H>    Ca    9.0      04 Feb 2020 11:55  Mg     1.8     02-04    TPro  6.6  /  Alb  3.3<L>  /  TBili  0.8  /  DBili      /  AST  16  /  ALT  7   /  AlkPhos  51  02-04                          13.0   9.42  )-----------( 160      ( 04 Feb 2020 11:55 )             39.6       Urine Studies:      RADIOLOGY & ADDITIONAL STUDIES:

## 2020-02-05 NOTE — PROGRESS NOTE ADULT - ASSESSMENT
· Assessment	  79 y.o. 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), chronic venous stasis, Aflutter previously on NOAC, COPD c chronic hypoxic respiratory failure on 2-3L NC, here with BRITTANI on CKD 3/4 (now possibly 2/2 cardiorenal syndrome), + troponin likely 2/2 chronic heart failure suspected systolic and acute renal dysfunction now, lower extremity nonpurulent cellulitis on peripheral vascular disease with probable tinea pedis, also with possible metabolic encephalopathy    1) Left LE cellulitis   -finish course of antibiotics     2) CKD stage 4  -off HD   -removal of HD catheter this Friday   -AC and Plavix on hold     3) COPD/chronic hypoxic resp failure   -stable    4) Chronic systolic CHF  -stable on current diuretics     5) Debility/Deconditioning  -rehab/pt     6) Skin care     7) Diabetes   -continue with current sq insulin   -monitor FS    8) BPH  -on flomax + finasteride     dvt and gi ppx       # Progress Note Handoff  PENDING as follows  consults: Surgical follow up   Test: bmp pending (not ordered this am)  Family discussion: discussed with patient   Disposition: still acute     Attending Physician Dr. Hoda Andujar # [id number]

## 2020-02-05 NOTE — PROGRESS NOTE ADULT - SUBJECTIVE AND OBJECTIVE BOX
Vital Signs (24 Hrs):  T(C): 36.3 (02-05-20 @ 05:00), Max: 36.3 (02-05-20 @ 05:00)  HR: 61 (02-05-20 @ 05:00) (61 - 61)  BP: 129/58 (02-05-20 @ 05:00) (101/50 - 132/63)  RR: 16 (02-05-20 @ 05:00) (16 - 16)  SpO2: 96% (02-05-20 @ 08:00) (96% - 96%)    I&O's Summary    04 Feb 2020 07:01  -  05 Feb 2020 07:00  --------------------------------------------------------  IN: 0 mL / OUT: 350 mL / NET: -350 mL      MEDICATIONS  (STANDING):  ALPRAZolam 0.5 milliGRAM(s) Oral at bedtime  amLODIPine   Tablet 5 milliGRAM(s) Oral daily  ascorbic acid 500 milliGRAM(s) Oral daily  aspirin  chewable 81 milliGRAM(s) Oral daily  budesonide 160 MICROgram(s)/formoterol 4.5 MICROgram(s) Inhaler 2 Puff(s) Inhalation two times a day  cephalexin 250 milliGRAM(s) Oral every 12 hours  chlorhexidine 4% Liquid 1 Application(s) Topical <User Schedule>  clopidogrel Tablet 75 milliGRAM(s) Oral daily  clotrimazole 1% Cream 1 Application(s) Topical every 12 hours  dextrose 5%. 1000 milliLiter(s) (50 mL/Hr) IV Continuous <Continuous>  dextrose 50% Injectable 12.5 Gram(s) IV Push once  dextrose 50% Injectable 25 Gram(s) IV Push once  dextrose 50% Injectable 25 Gram(s) IV Push once  fenofibrate Tablet 145 milliGRAM(s) Oral daily  finasteride 5 milliGRAM(s) Oral daily  furosemide    Tablet 80 milliGRAM(s) Oral every 12 hours  heparin  Injectable 5000 Unit(s) SubCutaneous every 12 hours  insulin glargine Injectable (LANTUS) 20 Unit(s) SubCutaneous at bedtime  insulin lispro (HumaLOG) corrective regimen sliding scale   SubCutaneous three times a day before meals  isosorbide   mononitrate ER Tablet (IMDUR) 30 milliGRAM(s) Oral daily  metolazone 5 milliGRAM(s) Oral daily  metoprolol succinate ER 50 milliGRAM(s) Oral daily  pantoprazole    Tablet 40 milliGRAM(s) Oral before breakfast  silver sulfADIAZINE 1% Cream 1 Application(s) Topical daily  simvastatin 40 milliGRAM(s) Oral at bedtime  sodium chloride 0.9% lock flush 3 milliLiter(s) IV Push every 8 hours  tamsulosin 0.4 milliGRAM(s) Oral at bedtime  zinc sulfate 220 milliGRAM(s) Oral daily    MEDICATIONS  (PRN):  acetaminophen   Tablet .. 650 milliGRAM(s) Oral every 4 hours PRN Moderate Pain (4 - 6)  acetaminophen 300 mG/codeine 30 mG 1 Tablet(s) Oral <User Schedule> PRN Moderate Pain (4 - 6)  albuterol/ipratropium for Nebulization 3 milliLiter(s) Nebulizer every 6 hours PRN Bronchospasm  dextrose 40% Gel 15 Gram(s) Oral once PRN Blood Glucose LESS THAN 70 milliGRAM(s)/deciliter  glucagon  Injectable 1 milliGRAM(s) IntraMuscular once PRN Glucose LESS THAN 70 milligrams/deciliter    PHYSICAL EXAM:  GENERAL: NAD, well-developed  CHEST/LUNG: Clear to auscultation bilaterally; No wheeze  ABDOMEN: Soft, Nontender, Nondistended; Bowel sounds present  EXTREMITIES:  No clubbing, cyanosis, or edema  CNS: AAOx3                              13.0   9.42  )-----------( 160      ( 04 Feb 2020 11:55 )             39.6       02-04    139  |  94<L>  |  108<HH>  ----------------------------<  177<H>  3.1<L>   |  29  |  2.8<H>    Ca    9.0      04 Feb 2020 11:55  Mg     1.8     02-04    TPro  6.6  /  Alb  3.3<L>  /  TBili  0.8  /  DBili  x   /  AST  16  /  ALT  7   /  AlkPhos  51  02-04            Culture Results:   No growth at 5 days. (01-30 @ 11:00) HPI: Patient seen and examined at bedside, resting comfortably.  No acute complaints.      Vital Signs (24 Hrs):  T(C): 36.3 (02-05-20 @ 05:00), Max: 36.3 (02-05-20 @ 05:00)  HR: 61 (02-05-20 @ 05:00) (61 - 61)  BP: 129/58 (02-05-20 @ 05:00) (101/50 - 132/63)  RR: 16 (02-05-20 @ 05:00) (16 - 16)  SpO2: 96% (02-05-20 @ 08:00) (96% - 96%)    I&O's Summary    04 Feb 2020 07:01  -  05 Feb 2020 07:00  --------------------------------------------------------  IN: 0 mL / OUT: 350 mL / NET: -350 mL      MEDICATIONS  (STANDING):  ALPRAZolam 0.5 milliGRAM(s) Oral at bedtime  amLODIPine   Tablet 5 milliGRAM(s) Oral daily  ascorbic acid 500 milliGRAM(s) Oral daily  aspirin  chewable 81 milliGRAM(s) Oral daily  budesonide 160 MICROgram(s)/formoterol 4.5 MICROgram(s) Inhaler 2 Puff(s) Inhalation two times a day  cephalexin 250 milliGRAM(s) Oral every 12 hours  chlorhexidine 4% Liquid 1 Application(s) Topical <User Schedule>  clopidogrel Tablet 75 milliGRAM(s) Oral daily  clotrimazole 1% Cream 1 Application(s) Topical every 12 hours  dextrose 5%. 1000 milliLiter(s) (50 mL/Hr) IV Continuous <Continuous>  dextrose 50% Injectable 12.5 Gram(s) IV Push once  dextrose 50% Injectable 25 Gram(s) IV Push once  dextrose 50% Injectable 25 Gram(s) IV Push once  fenofibrate Tablet 145 milliGRAM(s) Oral daily  finasteride 5 milliGRAM(s) Oral daily  furosemide    Tablet 80 milliGRAM(s) Oral every 12 hours  heparin  Injectable 5000 Unit(s) SubCutaneous every 12 hours  insulin glargine Injectable (LANTUS) 20 Unit(s) SubCutaneous at bedtime  insulin lispro (HumaLOG) corrective regimen sliding scale   SubCutaneous three times a day before meals  isosorbide   mononitrate ER Tablet (IMDUR) 30 milliGRAM(s) Oral daily  metolazone 5 milliGRAM(s) Oral daily  metoprolol succinate ER 50 milliGRAM(s) Oral daily  pantoprazole    Tablet 40 milliGRAM(s) Oral before breakfast  silver sulfADIAZINE 1% Cream 1 Application(s) Topical daily  simvastatin 40 milliGRAM(s) Oral at bedtime  sodium chloride 0.9% lock flush 3 milliLiter(s) IV Push every 8 hours  tamsulosin 0.4 milliGRAM(s) Oral at bedtime  zinc sulfate 220 milliGRAM(s) Oral daily    MEDICATIONS  (PRN):  acetaminophen   Tablet .. 650 milliGRAM(s) Oral every 4 hours PRN Moderate Pain (4 - 6)  acetaminophen 300 mG/codeine 30 mG 1 Tablet(s) Oral <User Schedule> PRN Moderate Pain (4 - 6)  albuterol/ipratropium for Nebulization 3 milliLiter(s) Nebulizer every 6 hours PRN Bronchospasm  dextrose 40% Gel 15 Gram(s) Oral once PRN Blood Glucose LESS THAN 70 milliGRAM(s)/deciliter  glucagon  Injectable 1 milliGRAM(s) IntraMuscular once PRN Glucose LESS THAN 70 milligrams/deciliter    PHYSICAL EXAM:  GENERAL: NAD  CHEST/LUNG: + TESIO right anterior chest wall  ABDOMEN: Soft, Nontender, Nondistended; Bowel sounds present  CNS: AAOx3                              13.0   9.42  )-----------( 160      ( 04 Feb 2020 11:55 )             39.6       02-04    139  |  94<L>  |  108<HH>  ----------------------------<  177<H>  3.1<L>   |  29  |  2.8<H>    Ca    9.0      04 Feb 2020 11:55  Mg     1.8     02-04    TPro  6.6  /  Alb  3.3<L>  /  TBili  0.8  /  DBili  x   /  AST  16  /  ALT  7   /  AlkPhos  51  02-04            Culture Results:   No growth at 5 days. (01-30 @ 11:00)

## 2020-02-05 NOTE — PROGRESS NOTE ADULT - ASSESSMENT
79M s/p TESIO placement for HD, now with improving SCr:    - D/W medicine attending, no need for HD, asked to put on schedule for operative removal  - D/W Renal, no need for HD, ok to remove TESIO per note if SCr <3 (today labs pending)  - Patient likely to be scheduled for operative removal of TESIO cath on 2/7/2020  - Advise to hold Plavix if able per medicine team  - Obtain Coags (ordered for AM)  - NPO p MN on 2/6/2020  - discussed with team 79M s/p TESIO placement for HD, now with improving SCr:    - D/W medicine attending, no need for HD, asked to put on schedule for operative removal  - D/W Renal, no need for HD, ok to remove TESIO per note if SCr <3 (today labs pending)  - Patient likely to be scheduled for operative removal of TESIO cath on 2/7/2020  - Advise to hold Plavix if able per medicine team (D/W medicine attending, ok to d/c for now.  Order placed)  - Obtain Coags (ordered for AM)  - NPO p MN on 2/6/2020  - discussed with team

## 2020-02-06 LAB
ANION GAP SERPL CALC-SCNC: 16 MMOL/L — HIGH (ref 7–14)
ANION GAP SERPL CALC-SCNC: 17 MMOL/L — HIGH (ref 7–14)
APTT BLD: 38.9 SEC — SIGNIFICANT CHANGE UP (ref 27–39.2)
BLD GP AB SCN SERPL QL: SIGNIFICANT CHANGE UP
BUN SERPL-MCNC: 104 MG/DL — CRITICAL HIGH (ref 10–20)
BUN SERPL-MCNC: 105 MG/DL — CRITICAL HIGH (ref 10–20)
CALCIUM SERPL-MCNC: 9 MG/DL — SIGNIFICANT CHANGE UP (ref 8.5–10.1)
CALCIUM SERPL-MCNC: 9.1 MG/DL — SIGNIFICANT CHANGE UP (ref 8.5–10.1)
CHLORIDE SERPL-SCNC: 87 MMOL/L — LOW (ref 98–110)
CHLORIDE SERPL-SCNC: 90 MMOL/L — LOW (ref 98–110)
CO2 SERPL-SCNC: 31 MMOL/L — SIGNIFICANT CHANGE UP (ref 17–32)
CO2 SERPL-SCNC: 33 MMOL/L — HIGH (ref 17–32)
CREAT SERPL-MCNC: 2.6 MG/DL — HIGH (ref 0.7–1.5)
CREAT SERPL-MCNC: 2.6 MG/DL — HIGH (ref 0.7–1.5)
GLUCOSE BLDC GLUCOMTR-MCNC: 125 MG/DL — HIGH (ref 70–99)
GLUCOSE BLDC GLUCOMTR-MCNC: 145 MG/DL — HIGH (ref 70–99)
GLUCOSE BLDC GLUCOMTR-MCNC: 157 MG/DL — HIGH (ref 70–99)
GLUCOSE BLDC GLUCOMTR-MCNC: 167 MG/DL — HIGH (ref 70–99)
GLUCOSE SERPL-MCNC: 123 MG/DL — HIGH (ref 70–99)
GLUCOSE SERPL-MCNC: 170 MG/DL — HIGH (ref 70–99)
HCT VFR BLD CALC: 40.4 % — LOW (ref 42–52)
HGB BLD-MCNC: 13 G/DL — LOW (ref 14–18)
INR BLD: 1.43 RATIO — HIGH (ref 0.65–1.3)
MAGNESIUM SERPL-MCNC: 2 MG/DL — SIGNIFICANT CHANGE UP (ref 1.8–2.4)
MCHC RBC-ENTMCNC: 26.5 PG — LOW (ref 27–31)
MCHC RBC-ENTMCNC: 32.2 G/DL — SIGNIFICANT CHANGE UP (ref 32–37)
MCV RBC AUTO: 82.4 FL — SIGNIFICANT CHANGE UP (ref 80–94)
NRBC # BLD: 0 /100 WBCS — SIGNIFICANT CHANGE UP (ref 0–0)
PLATELET # BLD AUTO: 163 K/UL — SIGNIFICANT CHANGE UP (ref 130–400)
POTASSIUM SERPL-MCNC: 3.2 MMOL/L — LOW (ref 3.5–5)
POTASSIUM SERPL-MCNC: 3.4 MMOL/L — LOW (ref 3.5–5)
POTASSIUM SERPL-SCNC: 3.2 MMOL/L — LOW (ref 3.5–5)
POTASSIUM SERPL-SCNC: 3.4 MMOL/L — LOW (ref 3.5–5)
PROTHROM AB SERPL-ACNC: 16.4 SEC — HIGH (ref 9.95–12.87)
RBC # BLD: 4.9 M/UL — SIGNIFICANT CHANGE UP (ref 4.7–6.1)
RBC # FLD: 15.8 % — HIGH (ref 11.5–14.5)
SODIUM SERPL-SCNC: 137 MMOL/L — SIGNIFICANT CHANGE UP (ref 135–146)
SODIUM SERPL-SCNC: 137 MMOL/L — SIGNIFICANT CHANGE UP (ref 135–146)
WBC # BLD: 8.91 K/UL — SIGNIFICANT CHANGE UP (ref 4.8–10.8)
WBC # FLD AUTO: 8.91 K/UL — SIGNIFICANT CHANGE UP (ref 4.8–10.8)

## 2020-02-06 PROCEDURE — 99233 SBSQ HOSP IP/OBS HIGH 50: CPT

## 2020-02-06 RX ORDER — MAGNESIUM SULFATE 500 MG/ML
2 VIAL (ML) INJECTION ONCE
Refills: 0 | Status: COMPLETED | OUTPATIENT
Start: 2020-02-06 | End: 2020-02-06

## 2020-02-06 RX ORDER — POTASSIUM CHLORIDE 20 MEQ
20 PACKET (EA) ORAL
Refills: 0 | Status: COMPLETED | OUTPATIENT
Start: 2020-02-06 | End: 2020-02-06

## 2020-02-06 RX ORDER — POTASSIUM CHLORIDE 20 MEQ
20 PACKET (EA) ORAL
Refills: 0 | Status: COMPLETED | OUTPATIENT
Start: 2020-02-06 | End: 2020-02-07

## 2020-02-06 RX ADMIN — SIMVASTATIN 40 MILLIGRAM(S): 20 TABLET, FILM COATED ORAL at 21:29

## 2020-02-06 RX ADMIN — Medication 20 MILLIEQUIVALENT(S): at 17:18

## 2020-02-06 RX ADMIN — Medication 145 MILLIGRAM(S): at 11:40

## 2020-02-06 RX ADMIN — Medication 500 MILLIGRAM(S): at 11:40

## 2020-02-06 RX ADMIN — BUDESONIDE AND FORMOTEROL FUMARATE DIHYDRATE 2 PUFF(S): 160; 4.5 AEROSOL RESPIRATORY (INHALATION) at 11:39

## 2020-02-06 RX ADMIN — Medication 50 MILLIGRAM(S): at 05:42

## 2020-02-06 RX ADMIN — Medication 80 MILLIGRAM(S): at 05:42

## 2020-02-06 RX ADMIN — Medication 1 APPLICATION(S): at 17:22

## 2020-02-06 RX ADMIN — HEPARIN SODIUM 5000 UNIT(S): 5000 INJECTION INTRAVENOUS; SUBCUTANEOUS at 05:42

## 2020-02-06 RX ADMIN — Medication 1 APPLICATION(S): at 05:43

## 2020-02-06 RX ADMIN — AMLODIPINE BESYLATE 5 MILLIGRAM(S): 2.5 TABLET ORAL at 05:42

## 2020-02-06 RX ADMIN — Medication 0.5 MILLIGRAM(S): at 21:29

## 2020-02-06 RX ADMIN — Medication 1 APPLICATION(S): at 11:40

## 2020-02-06 RX ADMIN — SODIUM CHLORIDE 3 MILLILITER(S): 9 INJECTION INTRAMUSCULAR; INTRAVENOUS; SUBCUTANEOUS at 05:44

## 2020-02-06 RX ADMIN — ZINC SULFATE TAB 220 MG (50 MG ZINC EQUIVALENT) 220 MILLIGRAM(S): 220 (50 ZN) TAB at 11:40

## 2020-02-06 RX ADMIN — Medication 50 GRAM(S): at 11:46

## 2020-02-06 RX ADMIN — CHLORHEXIDINE GLUCONATE 1 APPLICATION(S): 213 SOLUTION TOPICAL at 05:42

## 2020-02-06 RX ADMIN — HEPARIN SODIUM 5000 UNIT(S): 5000 INJECTION INTRAVENOUS; SUBCUTANEOUS at 17:18

## 2020-02-06 RX ADMIN — Medication 2: at 12:49

## 2020-02-06 RX ADMIN — FINASTERIDE 5 MILLIGRAM(S): 5 TABLET, FILM COATED ORAL at 11:40

## 2020-02-06 RX ADMIN — Medication 81 MILLIGRAM(S): at 11:40

## 2020-02-06 RX ADMIN — Medication 250 MILLIGRAM(S): at 05:42

## 2020-02-06 RX ADMIN — PANTOPRAZOLE SODIUM 40 MILLIGRAM(S): 20 TABLET, DELAYED RELEASE ORAL at 05:42

## 2020-02-06 RX ADMIN — Medication 250 MILLIGRAM(S): at 17:18

## 2020-02-06 RX ADMIN — Medication 20 MILLIEQUIVALENT(S): at 11:40

## 2020-02-06 RX ADMIN — Medication 1 TABLET(S): at 19:48

## 2020-02-06 RX ADMIN — INSULIN GLARGINE 20 UNIT(S): 100 INJECTION, SOLUTION SUBCUTANEOUS at 21:29

## 2020-02-06 RX ADMIN — SODIUM CHLORIDE 3 MILLILITER(S): 9 INJECTION INTRAMUSCULAR; INTRAVENOUS; SUBCUTANEOUS at 14:56

## 2020-02-06 RX ADMIN — TAMSULOSIN HYDROCHLORIDE 0.4 MILLIGRAM(S): 0.4 CAPSULE ORAL at 21:28

## 2020-02-06 RX ADMIN — Medication 80 MILLIGRAM(S): at 17:18

## 2020-02-06 RX ADMIN — Medication 20 MILLIEQUIVALENT(S): at 14:58

## 2020-02-06 RX ADMIN — ISOSORBIDE MONONITRATE 30 MILLIGRAM(S): 60 TABLET, EXTENDED RELEASE ORAL at 11:40

## 2020-02-06 NOTE — PROGRESS NOTE ADULT - ASSESSMENT
Pt with BRITTANI due to ATN (post infection a few months ago) was on HD for about 2 mos, had some kidney function recovery and was taken off HD about 2 weeks ago. Pt readmitted with inability to walk b/o swollen knees, found to have PVC on CXR,    BRITTANI - creat trending down on po diuretics, UO inaccurate w/o Najera, but was >2 L with the Najera ;no SOB, CXR decreased opacitiies  cont  to po LAsix 80 mg IV q 12 with Metolazone 5 mg qd  - ask surgery to d/c TEsio cath please  Hypokalemia - due to diuretics, relpace in small increments (pt in renal failure) and repeat    CHD/Aflutter - repeat CXR t assess volume  avoid to or Coumadin please since line may need to be removed    Lt LE cellulitis -  on keflex    please cont for 2 weeks, thigh is still swollen  After tesio is d/luana can d/c pt to home with f/u with Dr Gonzalez in 1 week

## 2020-02-06 NOTE — PROGRESS NOTE ADULT - SUBJECTIVE AND OBJECTIVE BOX
Nephrology progress note    Patient is seen and examined, events over the last 24 h noted .  No SOB, awaiting tesio removal  Allergies:  No Known Allergies    Hospital Medications:   MEDICATIONS  (STANDING):  ALPRAZolam 0.5 milliGRAM(s) Oral at bedtime  amLODIPine   Tablet 5 milliGRAM(s) Oral daily  ascorbic acid 500 milliGRAM(s) Oral daily  aspirin  chewable 81 milliGRAM(s) Oral daily  budesonide 160 MICROgram(s)/formoterol 4.5 MICROgram(s) Inhaler 2 Puff(s) Inhalation two times a day  cephalexin 250 milliGRAM(s) Oral every 12 hours  chlorhexidine 4% Liquid 1 Application(s) Topical <User Schedule>  clotrimazole 1% Cream 1 Application(s) Topical every 12 hours  dextrose 5%. 1000 milliLiter(s) (50 mL/Hr) IV Continuous <Continuous>  dextrose 50% Injectable 12.5 Gram(s) IV Push once  dextrose 50% Injectable 25 Gram(s) IV Push once  dextrose 50% Injectable 25 Gram(s) IV Push once  fenofibrate Tablet 145 milliGRAM(s) Oral daily  finasteride 5 milliGRAM(s) Oral daily  furosemide    Tablet 80 milliGRAM(s) Oral every 12 hours  heparin  Injectable 5000 Unit(s) SubCutaneous every 12 hours  insulin glargine Injectable (LANTUS) 20 Unit(s) SubCutaneous at bedtime  insulin lispro (HumaLOG) corrective regimen sliding scale   SubCutaneous three times a day before meals  isosorbide   mononitrate ER Tablet (IMDUR) 30 milliGRAM(s) Oral daily  metolazone 5 milliGRAM(s) Oral daily  metoprolol succinate ER 50 milliGRAM(s) Oral daily  pantoprazole    Tablet 40 milliGRAM(s) Oral before breakfast  silver sulfADIAZINE 1% Cream 1 Application(s) Topical daily  simvastatin 40 milliGRAM(s) Oral at bedtime  sodium chloride 0.9% lock flush 3 milliLiter(s) IV Push every 8 hours  tamsulosin 0.4 milliGRAM(s) Oral at bedtime  zinc sulfate 220 milliGRAM(s) Oral daily        VITALS:  T(F): 96.6 (02-06-20 @ 05:41), Max: 96.9 (02-05-20 @ 13:16)  HR: 59 (02-06-20 @ 06:23)  BP: 109/55 (02-06-20 @ 06:23)  RR: 16 (02-06-20 @ 06:23)  SpO2: 100% (02-06-20 @ 07:59)  Wt(kg): --    02-04 @ 07:01  -  02-05 @ 07:00  --------------------------------------------------------  IN: 0 mL / OUT: 350 mL / NET: -350 mL    02-05 @ 07:01  -  02-06 @ 07:00  --------------------------------------------------------  IN: 480 mL / OUT: 600 mL / NET: -120 mL          PHYSICAL EXAM:  Constitutional: NAD  HEENT: anicteric sclera, oropharynx clear, MMM  Respiratory: CTA  Cardiovascular: S1, S2, RRR  Gastrointestinal: BS+, soft, NT/ND  Extremities: 1+ peripheral edema  Neurological: A/O x 3  : No CVA tenderness. No julian.   Skin: No rashes  Vascular Access: tesio    LABS:  02-06    137  |  87<L>  |  105<HH>  ----------------------------<  123<H>  3.2<L>   |  33<H>  |  2.6<H>    Ca    9.1      06 Feb 2020 07:35  Mg     1.7     02-06    TPro  6.6  /  Alb  3.3<L>  /  TBili  0.8  /  DBili      /  AST  16  /  ALT  7   /  AlkPhos  51  02-04                          13.0   8.91  )-----------( 163      ( 06 Feb 2020 07:35 )             40.4       Urine Studies:      RADIOLOGY & ADDITIONAL STUDIES:

## 2020-02-06 NOTE — PROGRESS NOTE ADULT - SUBJECTIVE AND OBJECTIVE BOX
ZAVALATAIWO  79y  Male      Patient is a 79y old  Male who presents with a chief complaint of leg pain (04 Feb 2020 14:43)      INTERVAL HPI/OVERNIGHT EVENTS:  pt seen and examined at bedside this morning; sitting up in chair  -HD access removal tomorrow  -Nephrology following  -replete electrolytes as needed  -resume coumadin tomorrow after the procedure     REVIEW OF SYSTEMS:  no complaints    Vital Signs Last 24 Hrs  T(C): 35.7 (06 Feb 2020 14:11), Max: 35.9 (06 Feb 2020 05:41)  T(F): 96.3 (06 Feb 2020 14:11), Max: 96.6 (06 Feb 2020 05:41)  HR: 59 (06 Feb 2020 14:11) (59 - 62)  BP: 98/54 (06 Feb 2020 14:11) (98/54 - 189/82)  RR: 16 (06 Feb 2020 14:11) (16 - 16)  SpO2: 100% (06 Feb 2020 07:59) (100% - 100%)    PHYSICAL EXAM:  GENERAL: NAD, sitting up in chair  HEAD:  Atraumatic, Normocephalic  EYES: EOMI, PERRLA, conjunctiva and sclera clear  NERVOUS SYSTEM:  Alert & Oriented X 3  CV/HEART: Regular rate and rhythm; No murmurs, rubs, or gallops  GI/ABDOMEN: Soft, Nontender, Nondistended; Bowel sounds present  EXTREMITIES: Dressing Both LE   SKIN: chronic skin changes, with dressing both Legs   LAB:                        13.0   8.91  )-----------( 163      ( 06 Feb 2020 07:35 )             40.4   02-06    137  |  87<L>  |  105<HH>  ----------------------------<  123<H>  3.2<L>   |  33<H>  |  2.6<H>    Ca    9.1      06 Feb 2020 07:35  Mg     1.7     02-06    Daily     Daily   CAPILLARY BLOOD GLUCOSE    POCT Blood Glucose.: 147 mg/dL (05 Feb 2020 11:21)  POCT Blood Glucose.: 155 mg/dL (05 Feb 2020 07:38)  POCT Blood Glucose.: 223 mg/dL (05 Feb 2020 03:52)  POCT Blood Glucose.: 119 mg/dL (04 Feb 2020 21:31)  POCT Blood Glucose.: 162 mg/dL (04 Feb 2020 16:22)    LIVER FUNCTIONS - ( 04 Feb 2020 11:55 )  Alb: 3.3 g/dL / Pro: 6.6 g/dL / ALK PHOS: 51 U/L / ALT: 7 U/L / AST: 16 U/L / GGT: x             RADIOLOGY:    Imaging Personally Reviewed:  [Y ] YES  [ ] NO    HEALTH ISSUES - PROBLEM Dx:  Cellulitis of left lower extremity: Cellulitis of left lower extremity  Benign prostatic hyperplasia with urinary retention: Benign prostatic hyperplasia with urinary retention  Onychomycosis of toenail: Onychomycosis of toenail  Venous stasis ulcers: Venous stasis ulcers      MEDICATIONS  (STANDING):  ALPRAZolam 0.5 milliGRAM(s) Oral at bedtime  amLODIPine   Tablet 5 milliGRAM(s) Oral daily  ascorbic acid 500 milliGRAM(s) Oral daily  aspirin  chewable 81 milliGRAM(s) Oral daily  budesonide 160 MICROgram(s)/formoterol 4.5 MICROgram(s) Inhaler 2 Puff(s) Inhalation two times a day  cephalexin 250 milliGRAM(s) Oral every 12 hours  chlorhexidine 4% Liquid 1 Application(s) Topical <User Schedule>  clotrimazole 1% Cream 1 Application(s) Topical every 12 hours  dextrose 5%. 1000 milliLiter(s) (50 mL/Hr) IV Continuous <Continuous>  dextrose 50% Injectable 12.5 Gram(s) IV Push once  dextrose 50% Injectable 25 Gram(s) IV Push once  dextrose 50% Injectable 25 Gram(s) IV Push once  fenofibrate Tablet 145 milliGRAM(s) Oral daily  finasteride 5 milliGRAM(s) Oral daily  furosemide    Tablet 80 milliGRAM(s) Oral every 12 hours  heparin  Injectable 5000 Unit(s) SubCutaneous every 12 hours  insulin glargine Injectable (LANTUS) 20 Unit(s) SubCutaneous at bedtime  insulin lispro (HumaLOG) corrective regimen sliding scale   SubCutaneous three times a day before meals  isosorbide   mononitrate ER Tablet (IMDUR) 30 milliGRAM(s) Oral daily  metolazone 5 milliGRAM(s) Oral daily  metoprolol succinate ER 50 milliGRAM(s) Oral daily  pantoprazole    Tablet 40 milliGRAM(s) Oral before breakfast  potassium chloride    Tablet ER 20 milliEquivalent(s) Oral every 2 hours  silver sulfADIAZINE 1% Cream 1 Application(s) Topical daily  simvastatin 40 milliGRAM(s) Oral at bedtime  sodium chloride 0.9% lock flush 3 milliLiter(s) IV Push every 8 hours  tamsulosin 0.4 milliGRAM(s) Oral at bedtime  zinc sulfate 220 milliGRAM(s) Oral daily    MEDICATIONS  (PRN):  acetaminophen   Tablet .. 650 milliGRAM(s) Oral every 4 hours PRN Moderate Pain (4 - 6)  acetaminophen 300 mG/codeine 30 mG 1 Tablet(s) Oral <User Schedule> PRN Moderate Pain (4 - 6)  albuterol/ipratropium for Nebulization 3 milliLiter(s) Nebulizer every 6 hours PRN Bronchospasm  dextrose 40% Gel 15 Gram(s) Oral once PRN Blood Glucose LESS THAN 70 milliGRAM(s)/deciliter  glucagon  Injectable 1 milliGRAM(s) IntraMuscular once PRN Glucose LESS THAN 70 milligrams/deciliter

## 2020-02-06 NOTE — CHART NOTE - NSCHARTNOTEFT_GEN_A_CORE
K 3.2  replete KCL 20meq PO X 3 doses, each 2 hrs apart  Mg 1.7 replete Mg Sulfate 2 GM IV X 1 dose  Repeat labs for 10PM

## 2020-02-06 NOTE — PROGRESS NOTE ADULT - ASSESSMENT
· Assessment	  79 y.o. 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), chronic venous stasis, Aflutter previously on NOAC, COPD c chronic hypoxic respiratory failure on 2-3L NC, here with BRITTANI on CKD 3/4 (now possibly 2/2 cardiorenal syndrome), + troponin likely 2/2 chronic heart failure suspected systolic and acute renal dysfunction now, lower extremity nonpurulent cellulitis on peripheral vascular disease with probable tinea pedis, also with possible metabolic encephalopathy    1) Left LE cellulitis   -finish course of antibiotics     2) CKD stage 4  -off HD   -removal of HD catheter tomorrow   -AC and Plavix on hold -- will resume coumadin tomorrow     3) COPD/chronic hypoxic resp failure   -stable    4) Chronic systolic CHF  -stable on current diuretics     5) Debility/Deconditioning  -rehab/pt     6) Skin care     7) Diabetes   -continue with current sq insulin   -monitor FS    8) BPH  -on flomax + finasteride     9) Hypokalemia   -replete    10) Magnesium Def  -replete     dvt and gi ppx       # Progress Note Handoff  PENDING as follows  consults: Surgical follow up   Test: K+, Mg2+  Family discussion: discussed with patient who comprehends his medical care   Disposition: no discharge yet     Attending Physician Dr. Hoda Andujar # [id number]

## 2020-02-07 LAB
ANION GAP SERPL CALC-SCNC: 16 MMOL/L — HIGH (ref 7–14)
APTT BLD: 34.8 SEC — SIGNIFICANT CHANGE UP (ref 27–39.2)
BUN SERPL-MCNC: 107 MG/DL — CRITICAL HIGH (ref 10–20)
CALCIUM SERPL-MCNC: 8.8 MG/DL — SIGNIFICANT CHANGE UP (ref 8.5–10.1)
CHLORIDE SERPL-SCNC: 90 MMOL/L — LOW (ref 98–110)
CO2 SERPL-SCNC: 32 MMOL/L — SIGNIFICANT CHANGE UP (ref 17–32)
CREAT SERPL-MCNC: 2.6 MG/DL — HIGH (ref 0.7–1.5)
GLUCOSE BLDC GLUCOMTR-MCNC: 111 MG/DL — HIGH (ref 70–99)
GLUCOSE BLDC GLUCOMTR-MCNC: 71 MG/DL — SIGNIFICANT CHANGE UP (ref 70–99)
GLUCOSE BLDC GLUCOMTR-MCNC: 71 MG/DL — SIGNIFICANT CHANGE UP (ref 70–99)
GLUCOSE BLDC GLUCOMTR-MCNC: 75 MG/DL — SIGNIFICANT CHANGE UP (ref 70–99)
GLUCOSE SERPL-MCNC: 69 MG/DL — LOW (ref 70–99)
HCT VFR BLD CALC: 37.1 % — LOW (ref 42–52)
HGB BLD-MCNC: 12.3 G/DL — LOW (ref 14–18)
INR BLD: 1.47 RATIO — HIGH (ref 0.65–1.3)
MAGNESIUM SERPL-MCNC: 2 MG/DL — SIGNIFICANT CHANGE UP (ref 1.8–2.4)
MCHC RBC-ENTMCNC: 26.7 PG — LOW (ref 27–31)
MCHC RBC-ENTMCNC: 33.2 G/DL — SIGNIFICANT CHANGE UP (ref 32–37)
MCV RBC AUTO: 80.7 FL — SIGNIFICANT CHANGE UP (ref 80–94)
NRBC # BLD: 0 /100 WBCS — SIGNIFICANT CHANGE UP (ref 0–0)
PLATELET # BLD AUTO: 164 K/UL — SIGNIFICANT CHANGE UP (ref 130–400)
POTASSIUM SERPL-MCNC: 3.5 MMOL/L — SIGNIFICANT CHANGE UP (ref 3.5–5)
POTASSIUM SERPL-SCNC: 3.5 MMOL/L — SIGNIFICANT CHANGE UP (ref 3.5–5)
PROTHROM AB SERPL-ACNC: 16.8 SEC — HIGH (ref 9.95–12.87)
RBC # BLD: 4.6 M/UL — LOW (ref 4.7–6.1)
RBC # FLD: 15.8 % — HIGH (ref 11.5–14.5)
SODIUM SERPL-SCNC: 138 MMOL/L — SIGNIFICANT CHANGE UP (ref 135–146)
WBC # BLD: 8.88 K/UL — SIGNIFICANT CHANGE UP (ref 4.8–10.8)
WBC # FLD AUTO: 8.88 K/UL — SIGNIFICANT CHANGE UP (ref 4.8–10.8)

## 2020-02-07 PROCEDURE — 99233 SBSQ HOSP IP/OBS HIGH 50: CPT

## 2020-02-07 RX ORDER — FINASTERIDE 5 MG/1
5 TABLET, FILM COATED ORAL DAILY
Refills: 0 | Status: DISCONTINUED | OUTPATIENT
Start: 2020-02-07 | End: 2020-02-13

## 2020-02-07 RX ORDER — SIMVASTATIN 20 MG/1
40 TABLET, FILM COATED ORAL AT BEDTIME
Refills: 0 | Status: DISCONTINUED | OUTPATIENT
Start: 2020-02-07 | End: 2020-02-13

## 2020-02-07 RX ORDER — INSULIN LISPRO 100/ML
VIAL (ML) SUBCUTANEOUS
Refills: 0 | Status: DISCONTINUED | OUTPATIENT
Start: 2020-02-07 | End: 2020-02-13

## 2020-02-07 RX ORDER — WARFARIN SODIUM 2.5 MG/1
5 TABLET ORAL ONCE
Refills: 0 | Status: COMPLETED | OUTPATIENT
Start: 2020-02-07 | End: 2020-02-07

## 2020-02-07 RX ORDER — BUDESONIDE AND FORMOTEROL FUMARATE DIHYDRATE 160; 4.5 UG/1; UG/1
2 AEROSOL RESPIRATORY (INHALATION)
Refills: 0 | Status: DISCONTINUED | OUTPATIENT
Start: 2020-02-07 | End: 2020-02-13

## 2020-02-07 RX ORDER — ASPIRIN/CALCIUM CARB/MAGNESIUM 324 MG
81 TABLET ORAL DAILY
Refills: 0 | Status: DISCONTINUED | OUTPATIENT
Start: 2020-02-07 | End: 2020-02-08

## 2020-02-07 RX ORDER — INSULIN GLARGINE 100 [IU]/ML
20 INJECTION, SOLUTION SUBCUTANEOUS AT BEDTIME
Refills: 0 | Status: DISCONTINUED | OUTPATIENT
Start: 2020-02-07 | End: 2020-02-13

## 2020-02-07 RX ORDER — AMLODIPINE BESYLATE 2.5 MG/1
5 TABLET ORAL DAILY
Refills: 0 | Status: DISCONTINUED | OUTPATIENT
Start: 2020-02-07 | End: 2020-02-08

## 2020-02-07 RX ORDER — FUROSEMIDE 40 MG
80 TABLET ORAL EVERY 12 HOURS
Refills: 0 | Status: DISCONTINUED | OUTPATIENT
Start: 2020-02-07 | End: 2020-02-13

## 2020-02-07 RX ORDER — FENOFIBRATE,MICRONIZED 130 MG
145 CAPSULE ORAL DAILY
Refills: 0 | Status: DISCONTINUED | OUTPATIENT
Start: 2020-02-07 | End: 2020-02-13

## 2020-02-07 RX ORDER — PANTOPRAZOLE SODIUM 20 MG/1
40 TABLET, DELAYED RELEASE ORAL
Refills: 0 | Status: DISCONTINUED | OUTPATIENT
Start: 2020-02-07 | End: 2020-02-13

## 2020-02-07 RX ORDER — TAMSULOSIN HYDROCHLORIDE 0.4 MG/1
0.4 CAPSULE ORAL AT BEDTIME
Refills: 0 | Status: DISCONTINUED | OUTPATIENT
Start: 2020-02-07 | End: 2020-02-13

## 2020-02-07 RX ORDER — SODIUM CHLORIDE 9 MG/ML
1000 INJECTION, SOLUTION INTRAVENOUS
Refills: 0 | Status: DISCONTINUED | OUTPATIENT
Start: 2020-02-07 | End: 2020-02-07

## 2020-02-07 RX ORDER — ASCORBIC ACID 60 MG
500 TABLET,CHEWABLE ORAL DAILY
Refills: 0 | Status: DISCONTINUED | OUTPATIENT
Start: 2020-02-07 | End: 2020-02-13

## 2020-02-07 RX ORDER — ACETAMINOPHEN 500 MG
325 TABLET ORAL ONCE
Refills: 0 | Status: DISCONTINUED | OUTPATIENT
Start: 2020-02-07 | End: 2020-02-07

## 2020-02-07 RX ORDER — HEPARIN SODIUM 5000 [USP'U]/ML
5000 INJECTION INTRAVENOUS; SUBCUTANEOUS EVERY 12 HOURS
Refills: 0 | Status: DISCONTINUED | OUTPATIENT
Start: 2020-02-07 | End: 2020-02-07

## 2020-02-07 RX ORDER — ZINC SULFATE TAB 220 MG (50 MG ZINC EQUIVALENT) 220 (50 ZN) MG
220 TAB ORAL DAILY
Refills: 0 | Status: DISCONTINUED | OUTPATIENT
Start: 2020-02-07 | End: 2020-02-13

## 2020-02-07 RX ORDER — CHLORHEXIDINE GLUCONATE 213 G/1000ML
1 SOLUTION TOPICAL
Refills: 0 | Status: DISCONTINUED | OUTPATIENT
Start: 2020-02-07 | End: 2020-02-13

## 2020-02-07 RX ORDER — SODIUM CHLORIDE 9 MG/ML
1000 INJECTION INTRAMUSCULAR; INTRAVENOUS; SUBCUTANEOUS
Refills: 0 | Status: DISCONTINUED | OUTPATIENT
Start: 2020-02-07 | End: 2020-02-07

## 2020-02-07 RX ORDER — ACETAMINOPHEN WITH CODEINE 300MG-30MG
1 TABLET ORAL
Refills: 0 | Status: DISCONTINUED | OUTPATIENT
Start: 2020-02-07 | End: 2020-02-13

## 2020-02-07 RX ORDER — METOPROLOL TARTRATE 50 MG
50 TABLET ORAL DAILY
Refills: 0 | Status: DISCONTINUED | OUTPATIENT
Start: 2020-02-07 | End: 2020-02-13

## 2020-02-07 RX ORDER — SODIUM CHLORIDE 9 MG/ML
3 INJECTION INTRAMUSCULAR; INTRAVENOUS; SUBCUTANEOUS EVERY 8 HOURS
Refills: 0 | Status: DISCONTINUED | OUTPATIENT
Start: 2020-02-07 | End: 2020-02-13

## 2020-02-07 RX ORDER — ISOSORBIDE MONONITRATE 60 MG/1
30 TABLET, EXTENDED RELEASE ORAL DAILY
Refills: 0 | Status: DISCONTINUED | OUTPATIENT
Start: 2020-02-07 | End: 2020-02-13

## 2020-02-07 RX ORDER — CEPHALEXIN 500 MG
250 CAPSULE ORAL EVERY 12 HOURS
Refills: 0 | Status: DISCONTINUED | OUTPATIENT
Start: 2020-02-07 | End: 2020-02-13

## 2020-02-07 RX ORDER — CLOPIDOGREL BISULFATE 75 MG/1
75 TABLET, FILM COATED ORAL DAILY
Refills: 0 | Status: DISCONTINUED | OUTPATIENT
Start: 2020-02-08 | End: 2020-02-13

## 2020-02-07 RX ORDER — HYDROMORPHONE HYDROCHLORIDE 2 MG/ML
0.5 INJECTION INTRAMUSCULAR; INTRAVENOUS; SUBCUTANEOUS
Refills: 0 | Status: DISCONTINUED | OUTPATIENT
Start: 2020-02-07 | End: 2020-02-07

## 2020-02-07 RX ADMIN — SODIUM CHLORIDE 3 MILLILITER(S): 9 INJECTION INTRAMUSCULAR; INTRAVENOUS; SUBCUTANEOUS at 12:41

## 2020-02-07 RX ADMIN — Medication 1 TABLET(S): at 03:32

## 2020-02-07 RX ADMIN — HEPARIN SODIUM 5000 UNIT(S): 5000 INJECTION INTRAVENOUS; SUBCUTANEOUS at 05:16

## 2020-02-07 RX ADMIN — PANTOPRAZOLE SODIUM 40 MILLIGRAM(S): 20 TABLET, DELAYED RELEASE ORAL at 05:11

## 2020-02-07 RX ADMIN — ZINC SULFATE TAB 220 MG (50 MG ZINC EQUIVALENT) 220 MILLIGRAM(S): 220 (50 ZN) TAB at 12:06

## 2020-02-07 RX ADMIN — SODIUM CHLORIDE 100 MILLILITER(S): 9 INJECTION INTRAMUSCULAR; INTRAVENOUS; SUBCUTANEOUS at 16:06

## 2020-02-07 RX ADMIN — Medication 81 MILLIGRAM(S): at 12:07

## 2020-02-07 RX ADMIN — Medication 20 MILLIEQUIVALENT(S): at 01:35

## 2020-02-07 RX ADMIN — Medication 145 MILLIGRAM(S): at 12:06

## 2020-02-07 RX ADMIN — Medication 250 MILLIGRAM(S): at 17:27

## 2020-02-07 RX ADMIN — TAMSULOSIN HYDROCHLORIDE 0.4 MILLIGRAM(S): 0.4 CAPSULE ORAL at 22:13

## 2020-02-07 RX ADMIN — WARFARIN SODIUM 5 MILLIGRAM(S): 2.5 TABLET ORAL at 22:13

## 2020-02-07 RX ADMIN — Medication 1 APPLICATION(S): at 05:15

## 2020-02-07 RX ADMIN — Medication 1 APPLICATION(S): at 12:06

## 2020-02-07 RX ADMIN — SODIUM CHLORIDE 50 MILLILITER(S): 9 INJECTION, SOLUTION INTRAVENOUS at 10:35

## 2020-02-07 RX ADMIN — BUDESONIDE AND FORMOTEROL FUMARATE DIHYDRATE 2 PUFF(S): 160; 4.5 AEROSOL RESPIRATORY (INHALATION) at 09:34

## 2020-02-07 RX ADMIN — Medication 250 MILLIGRAM(S): at 05:11

## 2020-02-07 RX ADMIN — Medication 80 MILLIGRAM(S): at 05:11

## 2020-02-07 RX ADMIN — BUDESONIDE AND FORMOTEROL FUMARATE DIHYDRATE 2 PUFF(S): 160; 4.5 AEROSOL RESPIRATORY (INHALATION) at 22:14

## 2020-02-07 RX ADMIN — AMLODIPINE BESYLATE 5 MILLIGRAM(S): 2.5 TABLET ORAL at 05:11

## 2020-02-07 RX ADMIN — Medication 500 MILLIGRAM(S): at 12:07

## 2020-02-07 RX ADMIN — HEPARIN SODIUM 5000 UNIT(S): 5000 INJECTION INTRAVENOUS; SUBCUTANEOUS at 18:57

## 2020-02-07 RX ADMIN — Medication 80 MILLIGRAM(S): at 17:28

## 2020-02-07 RX ADMIN — Medication 20 MILLIEQUIVALENT(S): at 05:11

## 2020-02-07 RX ADMIN — FINASTERIDE 5 MILLIGRAM(S): 5 TABLET, FILM COATED ORAL at 12:06

## 2020-02-07 RX ADMIN — SIMVASTATIN 40 MILLIGRAM(S): 20 TABLET, FILM COATED ORAL at 22:13

## 2020-02-07 RX ADMIN — SODIUM CHLORIDE 3 MILLILITER(S): 9 INJECTION INTRAMUSCULAR; INTRAVENOUS; SUBCUTANEOUS at 05:15

## 2020-02-07 RX ADMIN — ISOSORBIDE MONONITRATE 30 MILLIGRAM(S): 60 TABLET, EXTENDED RELEASE ORAL at 12:07

## 2020-02-07 RX ADMIN — ZINC SULFATE TAB 220 MG (50 MG ZINC EQUIVALENT) 220 MILLIGRAM(S): 220 (50 ZN) TAB at 22:13

## 2020-02-07 NOTE — PACU DISCHARGE NOTE - COMMENTS
79 y o male S/P Removal of Tesio Catheter, LSB/MAC without complications. VS BP 95/51 HR 58 RR 16 T 98.2 SaO2 96%.

## 2020-02-07 NOTE — PROGRESS NOTE ADULT - ASSESSMENT
Pt with BRITTANI due to ATN (post infection a few months ago) was on HD for about 2 mos, had some kidney function recovery and was taken off HD about 2 weeks PTA. Pt readmitted with inability to walk b/o swollen knees, found to have PVC on CXR,    BRITTANI - creat trending down on po diuretics, UO inaccurate w/o Najera, but was >2 L with the Najera ;no SOB, CXR decreased opacitiies  cont  to po LAsix 80 mg IV q 12 with Metolazone 5 mg qd  - surgery will d/c TEsio cath today  Hypokalemia - due to diuretics, relpace in small increments (pt in renal failure) and repeat    CHD/Aflutter - repeat CXR t assess volume  will be placed on Coumadin after Tesio removal    Lt LE cellulitis -  on keflex    please cont for 2 weeks, thigh is still swollen  After tesio is d/luana can d/c pt to home with f/u with Dr Gonzalez in 1 week

## 2020-02-07 NOTE — PROGRESS NOTE ADULT - SUBJECTIVE AND OBJECTIVE BOX
SALLYTAIWO  79y  Male      Patient is a 79y old  Male who presents with a chief complaint of leg pain (04 Feb 2020 14:43)      INTERVAL HPI/OVERNIGHT EVENTS:  pt seen and examined at bedside this morning; sitting up in chair  -HD access removal today with surgical team   -Nephrology following  -replete electrolytes as needed  -resume coumadin tonight   -oob to chair with rehab/pt     REVIEW OF SYSTEMS:  no complaints    Vital Signs Last 24 Hrs  T(C): 35.9 (07 Feb 2020 08:37), Max: 36.6 (07 Feb 2020 04:53)  T(F): 96.7 (07 Feb 2020 08:37), Max: 97.8 (07 Feb 2020 04:53)  HR: 59 (07 Feb 2020 08:37) (59 - 61)  BP: 102/57 (07 Feb 2020 08:37) (98/54 - 115/55)  RR: 16 (07 Feb 2020 08:37) (16 - 16)  SpO2: 100% (06 Feb 2020 18:40) (100% - 100%)    PHYSICAL EXAM:  GENERAL: NAD, sitting up in chair  HEAD:  Atraumatic, Normocephalic  EYES: EOMI, PERRLA, conjunctiva and sclera clear  NERVOUS SYSTEM:  Alert & Oriented X 3  CV/HEART: Regular rate and rhythm; No murmurs, rubs, or gallops  GI/ABDOMEN: Soft, Nontender, Nondistended; Bowel sounds present  EXTREMITIES: Dressing Both LE   SKIN: chronic skin changes, with dressing both Legs     LAB:                                 12.3   8.88  )-----------( 164      ( 07 Feb 2020 07:00 )             37.1   02-07    138  |  90<L>  |  107<HH>  ----------------------------<  69<L>  3.5   |  32  |  2.6<H>    Ca    8.8      07 Feb 2020 07:00  Mg     2.0     02-07      Daily     Daily   CAPILLARY BLOOD GLUCOSE    POCT Blood Glucose.: 147 mg/dL (05 Feb 2020 11:21)  POCT Blood Glucose.: 155 mg/dL (05 Feb 2020 07:38)  POCT Blood Glucose.: 223 mg/dL (05 Feb 2020 03:52)  POCT Blood Glucose.: 119 mg/dL (04 Feb 2020 21:31)  POCT Blood Glucose.: 162 mg/dL (04 Feb 2020 16:22)    LIVER FUNCTIONS - ( 04 Feb 2020 11:55 )  Alb: 3.3 g/dL / Pro: 6.6 g/dL / ALK PHOS: 51 U/L / ALT: 7 U/L / AST: 16 U/L / GGT: x             RADIOLOGY:    Imaging Personally Reviewed:  [Y ] YES  [ ] NO    HEALTH ISSUES - PROBLEM Dx:  Cellulitis of left lower extremity: Cellulitis of left lower extremity  Benign prostatic hyperplasia with urinary retention: Benign prostatic hyperplasia with urinary retention  Onychomycosis of toenail: Onychomycosis of toenail  Venous stasis ulcers: Venous stasis ulcers      MEDICATIONS  (STANDING):  amLODIPine   Tablet 5 milliGRAM(s) Oral daily  ascorbic acid 500 milliGRAM(s) Oral daily  aspirin  chewable 81 milliGRAM(s) Oral daily  budesonide 160 MICROgram(s)/formoterol 4.5 MICROgram(s) Inhaler 2 Puff(s) Inhalation two times a day  cephalexin 250 milliGRAM(s) Oral every 12 hours  chlorhexidine 4% Liquid 1 Application(s) Topical <User Schedule>  clotrimazole 1% Cream 1 Application(s) Topical every 12 hours  dextrose 5%. 1000 milliLiter(s) (50 mL/Hr) IV Continuous <Continuous>  dextrose 5%. 1000 milliLiter(s) (50 mL/Hr) IV Continuous <Continuous>  dextrose 50% Injectable 12.5 Gram(s) IV Push once  dextrose 50% Injectable 25 Gram(s) IV Push once  dextrose 50% Injectable 25 Gram(s) IV Push once  fenofibrate Tablet 145 milliGRAM(s) Oral daily  finasteride 5 milliGRAM(s) Oral daily  furosemide    Tablet 80 milliGRAM(s) Oral every 12 hours  heparin  Injectable 5000 Unit(s) SubCutaneous every 12 hours  insulin glargine Injectable (LANTUS) 20 Unit(s) SubCutaneous at bedtime  insulin lispro (HumaLOG) corrective regimen sliding scale   SubCutaneous three times a day before meals  isosorbide   mononitrate ER Tablet (IMDUR) 30 milliGRAM(s) Oral daily  metolazone 5 milliGRAM(s) Oral daily  metoprolol succinate ER 50 milliGRAM(s) Oral daily  pantoprazole    Tablet 40 milliGRAM(s) Oral before breakfast  silver sulfADIAZINE 1% Cream 1 Application(s) Topical daily  simvastatin 40 milliGRAM(s) Oral at bedtime  sodium chloride 0.9% lock flush 3 milliLiter(s) IV Push every 8 hours  tamsulosin 0.4 milliGRAM(s) Oral at bedtime  zinc sulfate 220 milliGRAM(s) Oral daily    MEDICATIONS  (PRN):  acetaminophen   Tablet .. 650 milliGRAM(s) Oral every 4 hours PRN Moderate Pain (4 - 6)  acetaminophen 300 mG/codeine 30 mG 1 Tablet(s) Oral <User Schedule> PRN Moderate Pain (4 - 6)  albuterol/ipratropium for Nebulization 3 milliLiter(s) Nebulizer every 6 hours PRN Bronchospasm  dextrose 40% Gel 15 Gram(s) Oral once PRN Blood Glucose LESS THAN 70 milliGRAM(s)/deciliter  glucagon  Injectable 1 milliGRAM(s) IntraMuscular once PRN Glucose LESS THAN 70 milligrams/deciliter

## 2020-02-07 NOTE — PROGRESS NOTE ADULT - SUBJECTIVE AND OBJECTIVE BOX
Nephrology progress note    Patient is seen and examined, events over the last 24 h noted .  NPO for Tesio removal today  Allergies:  No Known Allergies    Hospital Medications:   MEDICATIONS  (STANDING):  amLODIPine   Tablet 5 milliGRAM(s) Oral daily  ascorbic acid 500 milliGRAM(s) Oral daily  aspirin  chewable 81 milliGRAM(s) Oral daily  budesonide 160 MICROgram(s)/formoterol 4.5 MICROgram(s) Inhaler 2 Puff(s) Inhalation two times a day  cephalexin 250 milliGRAM(s) Oral every 12 hours  chlorhexidine 4% Liquid 1 Application(s) Topical <User Schedule>  clotrimazole 1% Cream 1 Application(s) Topical every 12 hours  dextrose 5%. 1000 milliLiter(s) (50 mL/Hr) IV Continuous <Continuous>  dextrose 50% Injectable 12.5 Gram(s) IV Push once  dextrose 50% Injectable 25 Gram(s) IV Push once  dextrose 50% Injectable 25 Gram(s) IV Push once  fenofibrate Tablet 145 milliGRAM(s) Oral daily  finasteride 5 milliGRAM(s) Oral daily  furosemide    Tablet 80 milliGRAM(s) Oral every 12 hours  heparin  Injectable 5000 Unit(s) SubCutaneous every 12 hours  insulin glargine Injectable (LANTUS) 20 Unit(s) SubCutaneous at bedtime  insulin lispro (HumaLOG) corrective regimen sliding scale   SubCutaneous three times a day before meals  isosorbide   mononitrate ER Tablet (IMDUR) 30 milliGRAM(s) Oral daily  metolazone 5 milliGRAM(s) Oral daily  metoprolol succinate ER 50 milliGRAM(s) Oral daily  pantoprazole    Tablet 40 milliGRAM(s) Oral before breakfast  silver sulfADIAZINE 1% Cream 1 Application(s) Topical daily  simvastatin 40 milliGRAM(s) Oral at bedtime  sodium chloride 0.9% lock flush 3 milliLiter(s) IV Push every 8 hours  tamsulosin 0.4 milliGRAM(s) Oral at bedtime  zinc sulfate 220 milliGRAM(s) Oral daily        VITALS:  T(F): 97.8 (02-07-20 @ 04:53), Max: 97.8 (02-07-20 @ 04:53)  HR: 59 (02-07-20 @ 04:53)  BP: 108/57 (02-07-20 @ 04:53)  RR: 16 (02-07-20 @ 04:53)  SpO2: 100% (02-06-20 @ 18:40)  Wt(kg): --    02-05 @ 07:01  -  02-06 @ 07:00  --------------------------------------------------------  IN: 480 mL / OUT: 600 mL / NET: -120 mL    02-06 @ 07:01  -  02-07 @ 07:00  --------------------------------------------------------  IN: 0 mL / OUT: 250 mL / NET: -250 mL          PHYSICAL EXAM:  Constitutional: NAD  Respiratory: CTA  Cardiovascular: S1, S2, RRR  Gastrointestinal: BS+, soft, NT/ND  Extremities: 1+ peripheral edema  Neurological: A/O x 3, left medial thigh still swollen  : No CVA tenderness. No julian.   Skin: No rashes  Vascular Access:    LABS:  02-06    137  |  90<L>  |  104<HH>  ----------------------------<  170<H>  3.4<L>   |  31  |  2.6<H>    Ca    9.0      06 Feb 2020 21:31  Mg     2.0     02-06                            13.0   8.91  )-----------( 163      ( 06 Feb 2020 07:35 )             40.4       Urine Studies:      RADIOLOGY & ADDITIONAL STUDIES:

## 2020-02-07 NOTE — PROGRESS NOTE ADULT - ASSESSMENT
· Assessment	  79 y.o. 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), chronic venous stasis, Aflutter previously on NOAC, COPD c chronic hypoxic respiratory failure on 2-3L NC, here with BRITTANI on CKD 3/4 (now possibly 2/2 cardiorenal syndrome), + troponin likely 2/2 chronic heart failure suspected systolic and acute renal dysfunction now, lower extremity nonpurulent cellulitis on peripheral vascular disease with probable tinea pedis, also with possible metabolic encephalopathy    1) Left LE cellulitis   -finish course of antibiotics (total 2 weeks)    2) CKD stage 4  -off HD   -removal of HD catheter today  -AC and Plavix on hold -- will resume coumadin tonight and plavix tomorrow with CBC/INR monitoring     3) COPD/chronic hypoxic resp failure   -stable    4) Chronic systolic CHF  -stable on current diuretics     5) Debility/Deconditioning  -rehab/pt     6) Skin care     7) Diabetes   -continue with current sq insulin   -monitor FS    8) BPH  -on flomax + finasteride     9) Hypokalemia   -replete    10) Magnesium Def  -replete     dvt and gi ppx       # Progress Note Handoff  PENDING as follows  consults: Surgical follow up   Test: Tesio removal   Family discussion: discussed with patient who comprehends his medical care   Disposition: no discharge yet     Attending Physician Dr. Hoda Andujar # [id number]

## 2020-02-07 NOTE — PROGRESS NOTE ADULT - SUBJECTIVE AND OBJECTIVE BOX
Patient is a 79y old  Male who presents with a chief complaint of leg pains/p  removal of  rt  ij/chest  tesio catheter , seen and  examined , in bed , no  complaints, no  active  bleeding  v/s t 96.1, bp 115/55, hr 59, rr16,   pe  rt  ij  / chest  dressing  dry  a&p: s/p removal of  rt  ij  tesio  no complaints/  no bleeding  will follow

## 2020-02-08 LAB
ANION GAP SERPL CALC-SCNC: 15 MMOL/L — HIGH (ref 7–14)
BUN SERPL-MCNC: 103 MG/DL — CRITICAL HIGH (ref 10–20)
CALCIUM SERPL-MCNC: 9.2 MG/DL — SIGNIFICANT CHANGE UP (ref 8.5–10.1)
CHLORIDE SERPL-SCNC: 87 MMOL/L — LOW (ref 98–110)
CO2 SERPL-SCNC: 33 MMOL/L — HIGH (ref 17–32)
CREAT SERPL-MCNC: 2.6 MG/DL — HIGH (ref 0.7–1.5)
GLUCOSE BLDC GLUCOMTR-MCNC: 118 MG/DL — HIGH (ref 70–99)
GLUCOSE BLDC GLUCOMTR-MCNC: 118 MG/DL — HIGH (ref 70–99)
GLUCOSE BLDC GLUCOMTR-MCNC: 172 MG/DL — HIGH (ref 70–99)
GLUCOSE BLDC GLUCOMTR-MCNC: 88 MG/DL — SIGNIFICANT CHANGE UP (ref 70–99)
GLUCOSE SERPL-MCNC: 126 MG/DL — HIGH (ref 70–99)
HCT VFR BLD CALC: 38.6 % — LOW (ref 42–52)
HGB BLD-MCNC: 12.9 G/DL — LOW (ref 14–18)
INR BLD: 1.45 RATIO — HIGH (ref 0.65–1.3)
MCHC RBC-ENTMCNC: 27.2 PG — SIGNIFICANT CHANGE UP (ref 27–31)
MCHC RBC-ENTMCNC: 33.4 G/DL — SIGNIFICANT CHANGE UP (ref 32–37)
MCV RBC AUTO: 81.3 FL — SIGNIFICANT CHANGE UP (ref 80–94)
NRBC # BLD: 0 /100 WBCS — SIGNIFICANT CHANGE UP (ref 0–0)
PLATELET # BLD AUTO: 154 K/UL — SIGNIFICANT CHANGE UP (ref 130–400)
POTASSIUM SERPL-MCNC: 3.9 MMOL/L — SIGNIFICANT CHANGE UP (ref 3.5–5)
POTASSIUM SERPL-SCNC: 3.9 MMOL/L — SIGNIFICANT CHANGE UP (ref 3.5–5)
PROTHROM AB SERPL-ACNC: 16.6 SEC — HIGH (ref 9.95–12.87)
RBC # BLD: 4.75 M/UL — SIGNIFICANT CHANGE UP (ref 4.7–6.1)
RBC # FLD: 15.8 % — HIGH (ref 11.5–14.5)
SODIUM SERPL-SCNC: 135 MMOL/L — SIGNIFICANT CHANGE UP (ref 135–146)
WBC # BLD: 8.4 K/UL — SIGNIFICANT CHANGE UP (ref 4.8–10.8)
WBC # FLD AUTO: 8.4 K/UL — SIGNIFICANT CHANGE UP (ref 4.8–10.8)

## 2020-02-08 PROCEDURE — 99233 SBSQ HOSP IP/OBS HIGH 50: CPT

## 2020-02-08 RX ORDER — WARFARIN SODIUM 2.5 MG/1
5 TABLET ORAL ONCE
Refills: 0 | Status: COMPLETED | OUTPATIENT
Start: 2020-02-08 | End: 2020-02-08

## 2020-02-08 RX ADMIN — ISOSORBIDE MONONITRATE 30 MILLIGRAM(S): 60 TABLET, EXTENDED RELEASE ORAL at 14:03

## 2020-02-08 RX ADMIN — WARFARIN SODIUM 5 MILLIGRAM(S): 2.5 TABLET ORAL at 21:22

## 2020-02-08 RX ADMIN — SIMVASTATIN 40 MILLIGRAM(S): 20 TABLET, FILM COATED ORAL at 21:22

## 2020-02-08 RX ADMIN — Medication 145 MILLIGRAM(S): at 14:03

## 2020-02-08 RX ADMIN — BUDESONIDE AND FORMOTEROL FUMARATE DIHYDRATE 2 PUFF(S): 160; 4.5 AEROSOL RESPIRATORY (INHALATION) at 21:21

## 2020-02-08 RX ADMIN — Medication 500 MILLIGRAM(S): at 14:03

## 2020-02-08 RX ADMIN — Medication 80 MILLIGRAM(S): at 17:41

## 2020-02-08 RX ADMIN — CLOPIDOGREL BISULFATE 75 MILLIGRAM(S): 75 TABLET, FILM COATED ORAL at 14:03

## 2020-02-08 RX ADMIN — SODIUM CHLORIDE 3 MILLILITER(S): 9 INJECTION INTRAMUSCULAR; INTRAVENOUS; SUBCUTANEOUS at 05:15

## 2020-02-08 RX ADMIN — TAMSULOSIN HYDROCHLORIDE 0.4 MILLIGRAM(S): 0.4 CAPSULE ORAL at 21:22

## 2020-02-08 RX ADMIN — SODIUM CHLORIDE 3 MILLILITER(S): 9 INJECTION INTRAMUSCULAR; INTRAVENOUS; SUBCUTANEOUS at 00:17

## 2020-02-08 RX ADMIN — SODIUM CHLORIDE 3 MILLILITER(S): 9 INJECTION INTRAMUSCULAR; INTRAVENOUS; SUBCUTANEOUS at 13:46

## 2020-02-08 RX ADMIN — Medication 250 MILLIGRAM(S): at 05:11

## 2020-02-08 RX ADMIN — Medication 1 APPLICATION(S): at 14:03

## 2020-02-08 RX ADMIN — BUDESONIDE AND FORMOTEROL FUMARATE DIHYDRATE 2 PUFF(S): 160; 4.5 AEROSOL RESPIRATORY (INHALATION) at 14:00

## 2020-02-08 RX ADMIN — Medication 250 MILLIGRAM(S): at 17:41

## 2020-02-08 RX ADMIN — Medication 80 MILLIGRAM(S): at 05:48

## 2020-02-08 RX ADMIN — AMLODIPINE BESYLATE 5 MILLIGRAM(S): 2.5 TABLET ORAL at 05:11

## 2020-02-08 RX ADMIN — Medication 50 MILLIGRAM(S): at 05:12

## 2020-02-08 RX ADMIN — ZINC SULFATE TAB 220 MG (50 MG ZINC EQUIVALENT) 220 MILLIGRAM(S): 220 (50 ZN) TAB at 14:03

## 2020-02-08 RX ADMIN — CHLORHEXIDINE GLUCONATE 1 APPLICATION(S): 213 SOLUTION TOPICAL at 05:12

## 2020-02-08 RX ADMIN — INSULIN GLARGINE 20 UNIT(S): 100 INJECTION, SOLUTION SUBCUTANEOUS at 21:22

## 2020-02-08 RX ADMIN — FINASTERIDE 5 MILLIGRAM(S): 5 TABLET, FILM COATED ORAL at 14:03

## 2020-02-08 RX ADMIN — PANTOPRAZOLE SODIUM 40 MILLIGRAM(S): 20 TABLET, DELAYED RELEASE ORAL at 05:13

## 2020-02-08 NOTE — PROGRESS NOTE ADULT - SUBJECTIVE AND OBJECTIVE BOX
Nephrology progress note    Patient is seen and examined, events over the last 24 h noted .  TEsio catheter removed, LE slowly increaseing  Allergies:  No Known Allergies    Hospital Medications:   MEDICATIONS  (STANDING):  amLODIPine   Tablet 5 milliGRAM(s) Oral daily  ascorbic acid 500 milliGRAM(s) Oral daily  aspirin  chewable 81 milliGRAM(s) Oral daily  budesonide 160 MICROgram(s)/formoterol 4.5 MICROgram(s) Inhaler 2 Puff(s) Inhalation two times a day  cephalexin 250 milliGRAM(s) Oral every 12 hours  chlorhexidine 4% Liquid 1 Application(s) Topical <User Schedule>  clopidogrel Tablet 75 milliGRAM(s) Oral daily  fenofibrate Tablet 145 milliGRAM(s) Oral daily  finasteride 5 milliGRAM(s) Oral daily  furosemide    Tablet 80 milliGRAM(s) Oral every 12 hours  insulin glargine Injectable (LANTUS) 20 Unit(s) SubCutaneous at bedtime  insulin lispro (HumaLOG) corrective regimen sliding scale   SubCutaneous three times a day before meals  isosorbide   mononitrate ER Tablet (IMDUR) 30 milliGRAM(s) Oral daily  metolazone 5 milliGRAM(s) Oral daily  metoprolol succinate ER 50 milliGRAM(s) Oral daily  pantoprazole    Tablet 40 milliGRAM(s) Oral before breakfast  silver sulfADIAZINE 1% Cream 1 Application(s) Topical daily  simvastatin 40 milliGRAM(s) Oral at bedtime  sodium chloride 0.9% lock flush 3 milliLiter(s) IV Push every 8 hours  tamsulosin 0.4 milliGRAM(s) Oral at bedtime  zinc sulfate 220 milliGRAM(s) Oral daily        VITALS:  T(F): 97.6 (02-08-20 @ 05:00), Max: 98.2 (02-07-20 @ 15:27)  HR: 61 (02-08-20 @ 05:08)  BP: 114/52 (02-08-20 @ 05:00)  RR: 16 (02-08-20 @ 05:00)  SpO2: 98% (02-08-20 @ 05:08)  Wt(kg): --    02-05 @ 07:01  -  02-06 @ 07:00  --------------------------------------------------------  IN: 480 mL / OUT: 600 mL / NET: -120 mL    02-06 @ 07:01  -  02-07 @ 07:00  --------------------------------------------------------  IN: 0 mL / OUT: 250 mL / NET: -250 mL    02-07 @ 07:01  -  02-08 @ 06:41  --------------------------------------------------------  IN: 0 mL / OUT: 300 mL / NET: -300 mL      Height (cm): 167.64 (02-07 @ 14:27)  Weight (kg): 111.2 (02-07 @ 14:27)  BMI (kg/m2): 39.6 (02-07 @ 14:27)  BSA (m2): 2.18 (02-07 @ 14:27)    PHYSICAL EXAM:  Constitutional: NAD  HEENT: anicteric sclera, oropharynx clear, MMM  Neck: No JVD  Respiratory: CTA, decreased at bases  Cardiovascular: S1, S2, RRR  Gastrointestinal: BS+, soft, NT/ND  Extremities: 1-2+ peripheral edema, left leg superficial skin teat  Neurological: A/O x 3, Kaw  : No CVA tenderness. No julian.   Skin: No rashes  Vascular Access: removed    LABS:  02-07    138  |  90<L>  |  107<HH>  ----------------------------<  69<L>  3.5   |  32  |  2.6<H>    Ca    8.8      07 Feb 2020 07:00  Mg     2.0     02-07                            12.3   8.88  )-----------( 164      ( 07 Feb 2020 07:00 )             37.1       Urine Studies:      RADIOLOGY & ADDITIONAL STUDIES:

## 2020-02-08 NOTE — PROGRESS NOTE ADULT - SUBJECTIVE AND OBJECTIVE BOX
TAIWO ZAVALA  79y  Male      Patient is a 79y old  Male who presents with a chief complaint of leg pain (04 Feb 2020 14:43)      INTERVAL HPI/OVERNIGHT EVENTS:    pt seen and examined at bedside this morning; sitting up in bed (watching his old movies as usual, in pleasant mood)  -HD access removed yesterday  -restarted on coumadin; will continue Plavix and d/c ASA; with CBC monitoring  -will hold off on extra dose of IV lasix recommended by Nephrology as patient's SBP is on the lower side.  -oob to chair as tolerated; pt is deconditioned and will need physical therapy to regain strength   -prognosis guarded     REVIEW OF SYSTEMS:  no complaints    Vital Signs Last 24 Hrs  T(C): 36.4 (08 Feb 2020 05:00), Max: 36.8 (07 Feb 2020 15:27)  T(F): 97.6 (08 Feb 2020 05:00), Max: 98.2 (07 Feb 2020 15:27)  HR: 61 (08 Feb 2020 05:08) (58 - 85)  BP: 114/52 (08 Feb 2020 05:00) (95/51 - 115/55)  RR: 16 (08 Feb 2020 05:00) (16 - 26)  SpO2: 98% (08 Feb 2020 05:08) (95% - 98%)    PHYSICAL EXAM:  GENERAL: NAD, sitting up in chair  HEAD:  Atraumatic, Normocephalic  EYES: EOMI, PERRLA, conjunctiva and sclera clear  NERVOUS SYSTEM:  Alert & Oriented X 3  CV/HEART: Regular rate and rhythm; No murmurs, rubs, or gallops  GI/ABDOMEN: Soft, Nontender, Nondistended; Bowel sounds present  EXTREMITIES: Dressing Both LE   SKIN: chronic skin changes, with dressing both Legs     LAB:    PT/INR - ( 08 Feb 2020 04:30 )   PT: 16.60 sec;   INR: 1.45 ratio      PTT - ( 07 Feb 2020 07:00 )  PTT:34.8 sec                        12.3   8.88  )-----------( 164      ( 07 Feb 2020 07:00 )             37.1   02-07    138  |  90<L>  |  107<HH>  ----------------------------<  69<L>  3.5   |  32  |  2.6<H>    Ca    8.8      07 Feb 2020 07:00  Mg     2.0     02-07    Daily   CAPILLARY BLOOD GLUCOSE    POCT Blood Glucose.: 147 mg/dL (05 Feb 2020 11:21)  POCT Blood Glucose.: 155 mg/dL (05 Feb 2020 07:38)  POCT Blood Glucose.: 223 mg/dL (05 Feb 2020 03:52)  POCT Blood Glucose.: 119 mg/dL (04 Feb 2020 21:31)  POCT Blood Glucose.: 162 mg/dL (04 Feb 2020 16:22)    LIVER FUNCTIONS - ( 04 Feb 2020 11:55 )  Alb: 3.3 g/dL / Pro: 6.6 g/dL / ALK PHOS: 51 U/L / ALT: 7 U/L / AST: 16 U/L / GGT: x             RADIOLOGY:    Imaging Personally Reviewed:  [Y ] YES  [ ] NO    HEALTH ISSUES - PROBLEM Dx:  Cellulitis of left lower extremity: Cellulitis of left lower extremity  Benign prostatic hyperplasia with urinary retention: Benign prostatic hyperplasia with urinary retention  Onychomycosis of toenail: Onychomycosis of toenail  Venous stasis ulcers: Venous stasis ulcers      MEDICATIONS  (STANDING):  ascorbic acid 500 milliGRAM(s) Oral daily  budesonide 160 MICROgram(s)/formoterol 4.5 MICROgram(s) Inhaler 2 Puff(s) Inhalation two times a day  cephalexin 250 milliGRAM(s) Oral every 12 hours  chlorhexidine 4% Liquid 1 Application(s) Topical <User Schedule>  clopidogrel Tablet 75 milliGRAM(s) Oral daily  fenofibrate Tablet 145 milliGRAM(s) Oral daily  finasteride 5 milliGRAM(s) Oral daily  furosemide    Tablet 80 milliGRAM(s) Oral every 12 hours  insulin glargine Injectable (LANTUS) 20 Unit(s) SubCutaneous at bedtime  insulin lispro (HumaLOG) corrective regimen sliding scale   SubCutaneous three times a day before meals  isosorbide   mononitrate ER Tablet (IMDUR) 30 milliGRAM(s) Oral daily  metolazone 5 milliGRAM(s) Oral daily  metoprolol succinate ER 50 milliGRAM(s) Oral daily  pantoprazole    Tablet 40 milliGRAM(s) Oral before breakfast  silver sulfADIAZINE 1% Cream 1 Application(s) Topical daily  simvastatin 40 milliGRAM(s) Oral at bedtime  sodium chloride 0.9% lock flush 3 milliLiter(s) IV Push every 8 hours  tamsulosin 0.4 milliGRAM(s) Oral at bedtime  warfarin 5 milliGRAM(s) Oral once  zinc sulfate 220 milliGRAM(s) Oral daily    MEDICATIONS  (PRN):  acetaminophen 300 mG/codeine 30 mG 1 Tablet(s) Oral <User Schedule> PRN Moderate Pain (4 - 6)

## 2020-02-08 NOTE — PROGRESS NOTE ADULT - ASSESSMENT
Pt with BRITTANI due to ATN (post infection a few months ago) was on HD for about 2 mos, had kidney function recovery and was taken off HD about 2 weeks PTA. Pt readmitted with inability to walk b/o swollen knees, found to have PVC on CXR,    BRITTANI - stable but pt has increased LE edema  - give LAsix 60 IV x1 today in addition to the po lasix  - needs wound care on left leg, compression stockings  cont  to po LAsix 80 mg IV q 12 with Metolazone 5 mg qd  - TEsio cath today d/luana  Hypokalemia - due to diuretics, will need a standing dose of K daily  HTN - BP rather low, I d/luana the Amlodipine    CHD/Aflutter - repeat CXR t assess volume  will be placed on Coumadin after Tesio removal    Lt LE cellulitis -  on keflex    please cont for 2 weeks, thigh is still swollen  After tesio is d/luana can d/c pt to home with f/u with Dr Gonzalez in 1 week

## 2020-02-08 NOTE — CHART NOTE - NSCHARTNOTEFT_GEN_A_CORE
Pt has an ulcer on the anterior aspect of the left lower leg.   Will rx xeroform to affected area q12.

## 2020-02-08 NOTE — PROGRESS NOTE ADULT - ASSESSMENT
· Assessment	  79 y.o. 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), chronic venous stasis, Aflutter previously on NOAC, COPD c chronic hypoxic respiratory failure on 2-3L NC, here with BRITTANI on CKD 3/4 (now possibly 2/2 cardiorenal syndrome), + troponin likely 2/2 chronic heart failure suspected systolic and acute renal dysfunction now, lower extremity nonpurulent cellulitis on peripheral vascular disease with probable tinea pedis, also with possible metabolic encephalopathy    1) Left LE cellulitis   -finish course of antibiotics (total 2 weeks)    2) CKD stage 4  -off HD   -s/p tesio removal yesterday  -coumadin and plavix resumed (ASA d/c'd) --- monitor CBC, INR    3) COPD/chronic hypoxic resp failure   -stable    4) Chronic systolic CHF  -stable on current diuretics --- HOLDING EXTRA DOSE OF LASIX AS RECOMMENDED BY NEPHRO AS PT'S SBP ON THE LOWER SIDE     5) Debility/Deconditioning  -rehab/pt     6) Skin care     7) Diabetes   -continue with current sq insulin   -monitor FS    8) BPH  -on flomax + finasteride     9) Hypokalemia   -repleted    10) Magnesium Def  -repleted    dvt and gi ppx       # Progress Note Handoff  PENDING as follows  consults: Surgical follow up   Test: BMP  Family discussion: discussed with patient who comprehends his medical care and agreeable for oob to chair as tolerated.  aware of the current plan of care including coumadin continuation  Disposition: no discharge yet     Attending Physician Dr. Hoda Andujar # [id number]

## 2020-02-09 LAB
ANION GAP SERPL CALC-SCNC: 16 MMOL/L — HIGH (ref 7–14)
BUN SERPL-MCNC: 104 MG/DL — CRITICAL HIGH (ref 10–20)
CALCIUM SERPL-MCNC: 9.2 MG/DL — SIGNIFICANT CHANGE UP (ref 8.5–10.1)
CHLORIDE SERPL-SCNC: 88 MMOL/L — LOW (ref 98–110)
CO2 SERPL-SCNC: 33 MMOL/L — HIGH (ref 17–32)
CREAT SERPL-MCNC: 3 MG/DL — HIGH (ref 0.7–1.5)
GLUCOSE BLDC GLUCOMTR-MCNC: 124 MG/DL — HIGH (ref 70–99)
GLUCOSE BLDC GLUCOMTR-MCNC: 148 MG/DL — HIGH (ref 70–99)
GLUCOSE BLDC GLUCOMTR-MCNC: 248 MG/DL — HIGH (ref 70–99)
GLUCOSE BLDC GLUCOMTR-MCNC: 97 MG/DL — SIGNIFICANT CHANGE UP (ref 70–99)
GLUCOSE SERPL-MCNC: 116 MG/DL — HIGH (ref 70–99)
HCT VFR BLD CALC: 39.8 % — LOW (ref 42–52)
HGB BLD-MCNC: 12.9 G/DL — LOW (ref 14–18)
INR BLD: 1.55 RATIO — HIGH (ref 0.65–1.3)
MAGNESIUM SERPL-MCNC: 1.8 MG/DL — SIGNIFICANT CHANGE UP (ref 1.8–2.4)
MCHC RBC-ENTMCNC: 26.8 PG — LOW (ref 27–31)
MCHC RBC-ENTMCNC: 32.4 G/DL — SIGNIFICANT CHANGE UP (ref 32–37)
MCV RBC AUTO: 82.6 FL — SIGNIFICANT CHANGE UP (ref 80–94)
NRBC # BLD: 0 /100 WBCS — SIGNIFICANT CHANGE UP (ref 0–0)
PLATELET # BLD AUTO: 162 K/UL — SIGNIFICANT CHANGE UP (ref 130–400)
POTASSIUM SERPL-MCNC: 3.4 MMOL/L — LOW (ref 3.5–5)
POTASSIUM SERPL-SCNC: 3.4 MMOL/L — LOW (ref 3.5–5)
PROTHROM AB SERPL-ACNC: 17.8 SEC — HIGH (ref 9.95–12.87)
RBC # BLD: 4.82 M/UL — SIGNIFICANT CHANGE UP (ref 4.7–6.1)
RBC # FLD: 15.6 % — HIGH (ref 11.5–14.5)
SODIUM SERPL-SCNC: 137 MMOL/L — SIGNIFICANT CHANGE UP (ref 135–146)
WBC # BLD: 6.88 K/UL — SIGNIFICANT CHANGE UP (ref 4.8–10.8)
WBC # FLD AUTO: 6.88 K/UL — SIGNIFICANT CHANGE UP (ref 4.8–10.8)

## 2020-02-09 PROCEDURE — 99233 SBSQ HOSP IP/OBS HIGH 50: CPT

## 2020-02-09 RX ORDER — POTASSIUM CHLORIDE 20 MEQ
40 PACKET (EA) ORAL ONCE
Refills: 0 | Status: COMPLETED | OUTPATIENT
Start: 2020-02-09 | End: 2020-02-09

## 2020-02-09 RX ORDER — WARFARIN SODIUM 2.5 MG/1
5 TABLET ORAL ONCE
Refills: 0 | Status: COMPLETED | OUTPATIENT
Start: 2020-02-09 | End: 2020-02-09

## 2020-02-09 RX ORDER — ALPRAZOLAM 0.25 MG
0.5 TABLET ORAL ONCE
Refills: 0 | Status: DISCONTINUED | OUTPATIENT
Start: 2020-02-09 | End: 2020-02-09

## 2020-02-09 RX ADMIN — Medication 80 MILLIGRAM(S): at 05:09

## 2020-02-09 RX ADMIN — PANTOPRAZOLE SODIUM 40 MILLIGRAM(S): 20 TABLET, DELAYED RELEASE ORAL at 05:09

## 2020-02-09 RX ADMIN — ZINC SULFATE TAB 220 MG (50 MG ZINC EQUIVALENT) 220 MILLIGRAM(S): 220 (50 ZN) TAB at 11:49

## 2020-02-09 RX ADMIN — INSULIN GLARGINE 20 UNIT(S): 100 INJECTION, SOLUTION SUBCUTANEOUS at 21:38

## 2020-02-09 RX ADMIN — CLOPIDOGREL BISULFATE 75 MILLIGRAM(S): 75 TABLET, FILM COATED ORAL at 11:50

## 2020-02-09 RX ADMIN — SIMVASTATIN 40 MILLIGRAM(S): 20 TABLET, FILM COATED ORAL at 21:38

## 2020-02-09 RX ADMIN — BUDESONIDE AND FORMOTEROL FUMARATE DIHYDRATE 2 PUFF(S): 160; 4.5 AEROSOL RESPIRATORY (INHALATION) at 11:51

## 2020-02-09 RX ADMIN — FINASTERIDE 5 MILLIGRAM(S): 5 TABLET, FILM COATED ORAL at 11:50

## 2020-02-09 RX ADMIN — Medication 40 MILLIEQUIVALENT(S): at 17:31

## 2020-02-09 RX ADMIN — SODIUM CHLORIDE 3 MILLILITER(S): 9 INJECTION INTRAMUSCULAR; INTRAVENOUS; SUBCUTANEOUS at 22:28

## 2020-02-09 RX ADMIN — WARFARIN SODIUM 5 MILLIGRAM(S): 2.5 TABLET ORAL at 21:38

## 2020-02-09 RX ADMIN — Medication 1 APPLICATION(S): at 11:51

## 2020-02-09 RX ADMIN — TAMSULOSIN HYDROCHLORIDE 0.4 MILLIGRAM(S): 0.4 CAPSULE ORAL at 21:38

## 2020-02-09 RX ADMIN — Medication 145 MILLIGRAM(S): at 11:50

## 2020-02-09 RX ADMIN — Medication 500 MILLIGRAM(S): at 11:50

## 2020-02-09 RX ADMIN — Medication 0.5 MILLIGRAM(S): at 21:38

## 2020-02-09 RX ADMIN — SODIUM CHLORIDE 3 MILLILITER(S): 9 INJECTION INTRAMUSCULAR; INTRAVENOUS; SUBCUTANEOUS at 13:44

## 2020-02-09 RX ADMIN — Medication 4: at 11:49

## 2020-02-09 RX ADMIN — ISOSORBIDE MONONITRATE 30 MILLIGRAM(S): 60 TABLET, EXTENDED RELEASE ORAL at 11:50

## 2020-02-09 RX ADMIN — Medication 250 MILLIGRAM(S): at 05:09

## 2020-02-09 RX ADMIN — SODIUM CHLORIDE 3 MILLILITER(S): 9 INJECTION INTRAMUSCULAR; INTRAVENOUS; SUBCUTANEOUS at 01:57

## 2020-02-09 RX ADMIN — Medication 250 MILLIGRAM(S): at 17:31

## 2020-02-09 RX ADMIN — Medication 80 MILLIGRAM(S): at 17:31

## 2020-02-09 NOTE — PROGRESS NOTE ADULT - ASSESSMENT
Pt with BRITTANI due to ATN (post infection a few months ago) was on HD for about 2 mos, had kidney function recovery and was taken off HD about 2 weeks PTA. Pt readmitted with inability to walk b/o swollen knees, found to have PVC on CXR,    BRITTANI - increased LE edema , extar lasix yesterday cR slightyl up   - needs wound care on left leg, compression stockings  cont  to po LAsix 80  with Metolazone 5 mg qd if Cr rising will need to cut back   - TEsio cath d/luana  Hypokalemia -replete   HTN -  BP improved off  Amlodipine    CHD/Aflutter - repeat CXR t assess volume      Lt LE cellulitis -  on keflex    please cont for 2 weeks, thigh is still swollen  If cr stable/improved ok fr d/c w/ outpt f/u

## 2020-02-09 NOTE — PROGRESS NOTE ADULT - ASSESSMENT
· Assessment	  79 y.o. 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), chronic venous stasis, Aflutter previously on NOAC, COPD c chronic hypoxic respiratory failure on 2-3L NC, here with BRITTANI on CKD 3/4 (now possibly 2/2 cardiorenal syndrome), + troponin likely 2/2 chronic heart failure suspected systolic and acute renal dysfunction now, lower extremity nonpurulent cellulitis on peripheral vascular disease with probable tinea pedis, also with possible metabolic encephalopathy    1) Left LE cellulitis   -finish course of antibiotics (total 2 weeks)    2) CKD stage 4 (KIDNEY FUNCTION WORSENING)  -off HD   -s/p tesio removal last Friday 02/07/20  -coumadin and plavix resumed (ASA d/c'd) --- monitor CBC, INR    3) COPD/chronic hypoxic resp failure   -stable    4) Chronic systolic CHF  -stable on current diuretics --- HOLDING EXTRA DOSE OF LASIX AS RECOMMENDED BY NEPHRO AS PT'S SBP ON THE LOWER SIDE AND WORSENING KIDNEY FUNCTION    5) Debility/Deconditioning  -rehab/pt     6) Skin care as per nursing     7) Diabetes   -continue with current sq insulin   -monitor FS    8) BPH  -on flomax + finasteride     9) Hypokalemia   -repleted    10) Magnesium Def  -repleted    dvt and gi ppx       # Progress Note Handoff  PENDING as follows  consults: Surgical follow up   Test: BMP  Family discussion: discussed with patient who comprehends his medical care and agreeable for oob to chair as tolerated.  aware of the current plan of care including coumadin continuation  Disposition: no discharge yet     Attending Physician Dr. Hoda Andujar # [id number] · Assessment	  79 y.o. 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), chronic venous stasis, Aflutter previously on NOAC, COPD c chronic hypoxic respiratory failure on 2-3L NC, here with BRITTANI on CKD 3/4 (now possibly 2/2 cardiorenal syndrome), + troponin likely 2/2 chronic heart failure suspected systolic and acute renal dysfunction now, lower extremity nonpurulent cellulitis on peripheral vascular disease with probable tinea pedis, also with possible metabolic encephalopathy    1) Left LE cellulitis   -finish course of antibiotics (total 2 weeks)    2) CKD stage 4 (KIDNEY FUNCTION WORSENING)  -off HD   -s/p tesio removal last Friday 02/07/20  -coumadin and plavix resumed (ASA d/c'd) --- monitor CBC, INR    3) COPD/chronic hypoxic resp failure   -stable    4) Chronic systolic CHF  -stable on current diuretics --- HOLDING EXTRA DOSE OF LASIX AS RECOMMENDED BY NEPHRO AS PT'S SBP ON THE LOWER SIDE AND WORSENING KIDNEY FUNCTION    5) Debility/Deconditioning  -rehab/pt     6) Skin care as per nursing     7) Diabetes   -continue with current sq insulin   -monitor FS    8) BPH  -on flomax + finasteride     9) Hypokalemia   -replete with oral supplement     10) Magnesium Def  -repleted    dvt and gi ppx       # Progress Note Handoff  PENDING as follows  consults: Surgical follow up   Test: BMP  Family discussion: discussed with patient who comprehends his medical care and agreeable for oob to chair as tolerated.  aware of the current plan of care including coumadin continuation  Disposition: no discharge yet     Attending Physician Dr. Hoda Andujar # [id number]

## 2020-02-09 NOTE — PROGRESS NOTE ADULT - SUBJECTIVE AND OBJECTIVE BOX
Nephrology progress note    Patient was seen and examined, events over the last 24 h noted .  Cr up today     Allergies:  No Known Allergies    Hospital Medications:   MEDICATIONS  (STANDING):  ascorbic acid 500 milliGRAM(s) Oral daily  budesonide 160 MICROgram(s)/formoterol 4.5 MICROgram(s) Inhaler 2 Puff(s) Inhalation two times a day  cephalexin 250 milliGRAM(s) Oral every 12 hours  chlorhexidine 4% Liquid 1 Application(s) Topical <User Schedule>  clopidogrel Tablet 75 milliGRAM(s) Oral daily  fenofibrate Tablet 145 milliGRAM(s) Oral daily  finasteride 5 milliGRAM(s) Oral daily  furosemide    Tablet 80 milliGRAM(s) Oral every 12 hours  insulin glargine Injectable (LANTUS) 20 Unit(s) SubCutaneous at bedtime  insulin lispro (HumaLOG) corrective regimen sliding scale   SubCutaneous three times a day before meals  isosorbide   mononitrate ER Tablet (IMDUR) 30 milliGRAM(s) Oral daily  metolazone 5 milliGRAM(s) Oral daily  metoprolol succinate ER 50 milliGRAM(s) Oral daily  pantoprazole    Tablet 40 milliGRAM(s) Oral before breakfast  silver sulfADIAZINE 1% Cream 1 Application(s) Topical daily  simvastatin 40 milliGRAM(s) Oral at bedtime  sodium chloride 0.9% lock flush 3 milliLiter(s) IV Push every 8 hours  tamsulosin 0.4 milliGRAM(s) Oral at bedtime  zinc sulfate 220 milliGRAM(s) Oral daily        VITALS:  T(F): 98.9 (02-09-20 @ 05:26), Max: 98.9 (02-09-20 @ 05:26)  HR: 58 (02-09-20 @ 05:26)  BP: 112/57 (02-09-20 @ 05:26)  RR: 16 (02-09-20 @ 05:26)  SpO2: --  Wt(kg): --    02-07 @ 07:01  -  02-08 @ 07:00  --------------------------------------------------------  IN: 0 mL / OUT: 300 mL / NET: -300 mL    02-08 @ 07:01  -  02-09 @ 07:00  --------------------------------------------------------  IN: 0 mL / OUT: 400 mL / NET: -400 mL    02-09 @ 07:01  -  02-09 @ 11:24  --------------------------------------------------------  IN: 360 mL / OUT: 300 mL / NET: 60 mL          PHYSICAL EXAM:  Constitutional: NAD  HEENT: anicteric sclera, oropharynx clear, MMM  Neck: No JVD  Respiratory: CTA, decreased at bases  Cardiovascular: S1, S2, RRR  Gastrointestinal: BS+, soft, NT/ND  Extremities: 1-2+ peripheral edema, left leg superficial skin teat  Neurological: A/O x 3, [place]  : No CVA tenderness. No julian.   Skin: No rashes  Vascular Access: removed  LABS:  02-09    137  |  88<L>  |  104<HH>  ----------------------------<  116<H>  3.4<L>   |  33<H>  |  3.0<H>    Ca    9.2      09 Feb 2020 08:46  Mg     1.8     02-09                            12.9   6.88  )-----------( 162      ( 09 Feb 2020 08:46 )             39.8       Urine Studies:      RADIOLOGY & ADDITIONAL STUDIES:

## 2020-02-09 NOTE — PROGRESS NOTE ADULT - SUBJECTIVE AND OBJECTIVE BOX
ZAVALATAIWO CASPER  79y  Male      Patient is a 79y old  Male who presents with a chief complaint of leg pain (04 Feb 2020 14:43)      INTERVAL HPI/OVERNIGHT EVENTS:    pt seen and examined at bedside this morning; sitting up in bed (watching his old movies as usual, in pleasant mood)  -HD access removed last Friday; kidney function worsening ---- will hold off on extra IV lasix dose and continue with oral diuretics  -pt needs to be oob to chair with PT/ambulation  -replete electrolytes  -Nephrology following  -no d/c yet --- monitor kidney function and for volume overload     REVIEW OF SYSTEMS:  no complaints; watching old movies on his ipad     Vital Signs Last 24 Hrs  T(C): 37.2 (09 Feb 2020 05:26), Max: 37.2 (09 Feb 2020 05:26)  T(F): 98.9 (09 Feb 2020 05:26), Max: 98.9 (09 Feb 2020 05:26)  HR: 57 (09 Feb 2020 11:45) (57 - 72)  BP: 105/57 (09 Feb 2020 11:45) (102/57 - 122/57)  RR: 16 (09 Feb 2020 05:26) (16 - 16)    PHYSICAL EXAM:  GENERAL: NAD, sitting up in chair  HEAD:  Atraumatic, Normocephalic  EYES: EOMI, PERRLA, conjunctiva and sclera clear  NERVOUS SYSTEM:  Alert & Oriented X 3  CV/HEART: Regular rate and rhythm; No murmurs, rubs, or gallops  GI/ABDOMEN: Soft, Nontender, Nondistended; Bowel sounds present  EXTREMITIES: Dressing Both LE   SKIN: chronic skin changes, with dressing both Legs     LAB:  PT/INR - ( 09 Feb 2020 08:46 )   PT: 17.80 sec;   INR: 1.55 ratio                          12.9   6.88  )-----------( 162      ( 09 Feb 2020 08:46 )             39.8   02-09    137  |  88<L>  |  104<HH>  ----------------------------<  116<H>  3.4<L>   |  33<H>  |  3.0<H>    Ca    9.2      09 Feb 2020 08:46  Mg     1.8     02-09    Daily   CAPILLARY BLOOD GLUCOSE    POCT Blood Glucose.: 147 mg/dL (05 Feb 2020 11:21)  POCT Blood Glucose.: 155 mg/dL (05 Feb 2020 07:38)  POCT Blood Glucose.: 223 mg/dL (05 Feb 2020 03:52)  POCT Blood Glucose.: 119 mg/dL (04 Feb 2020 21:31)  POCT Blood Glucose.: 162 mg/dL (04 Feb 2020 16:22)    LIVER FUNCTIONS - ( 04 Feb 2020 11:55 )  Alb: 3.3 g/dL / Pro: 6.6 g/dL / ALK PHOS: 51 U/L / ALT: 7 U/L / AST: 16 U/L / GGT: x             RADIOLOGY:    Imaging Personally Reviewed:  [Y ] YES  [ ] NO    HEALTH ISSUES - PROBLEM Dx:  Cellulitis of left lower extremity: Cellulitis of left lower extremity  Benign prostatic hyperplasia with urinary retention: Benign prostatic hyperplasia with urinary retention  Onychomycosis of toenail: Onychomycosis of toenail  Venous stasis ulcers: Venous stasis ulcers      MEDICATIONS  (STANDING):  ascorbic acid 500 milliGRAM(s) Oral daily  budesonide 160 MICROgram(s)/formoterol 4.5 MICROgram(s) Inhaler 2 Puff(s) Inhalation two times a day  cephalexin 250 milliGRAM(s) Oral every 12 hours  chlorhexidine 4% Liquid 1 Application(s) Topical <User Schedule>  clopidogrel Tablet 75 milliGRAM(s) Oral daily  fenofibrate Tablet 145 milliGRAM(s) Oral daily  finasteride 5 milliGRAM(s) Oral daily  furosemide    Tablet 80 milliGRAM(s) Oral every 12 hours  insulin glargine Injectable (LANTUS) 20 Unit(s) SubCutaneous at bedtime  insulin lispro (HumaLOG) corrective regimen sliding scale   SubCutaneous three times a day before meals  isosorbide   mononitrate ER Tablet (IMDUR) 30 milliGRAM(s) Oral daily  metolazone 5 milliGRAM(s) Oral daily  metoprolol succinate ER 50 milliGRAM(s) Oral daily  pantoprazole    Tablet 40 milliGRAM(s) Oral before breakfast  potassium chloride   Powder 40 milliEquivalent(s) Oral once  silver sulfADIAZINE 1% Cream 1 Application(s) Topical daily  simvastatin 40 milliGRAM(s) Oral at bedtime  sodium chloride 0.9% lock flush 3 milliLiter(s) IV Push every 8 hours  tamsulosin 0.4 milliGRAM(s) Oral at bedtime  warfarin 5 milliGRAM(s) Oral once  zinc sulfate 220 milliGRAM(s) Oral daily    MEDICATIONS  (PRN):  acetaminophen 300 mG/codeine 30 mG 1 Tablet(s) Oral <User Schedule> PRN Moderate Pain (4 - 6)

## 2020-02-10 LAB
ANION GAP SERPL CALC-SCNC: 16 MMOL/L — HIGH (ref 7–14)
BUN SERPL-MCNC: 104 MG/DL — CRITICAL HIGH (ref 10–20)
CALCIUM SERPL-MCNC: 9.3 MG/DL — SIGNIFICANT CHANGE UP (ref 8.5–10.1)
CHLORIDE SERPL-SCNC: 88 MMOL/L — LOW (ref 98–110)
CO2 SERPL-SCNC: 35 MMOL/L — HIGH (ref 17–32)
CREAT SERPL-MCNC: 2.5 MG/DL — HIGH (ref 0.7–1.5)
GLUCOSE BLDC GLUCOMTR-MCNC: 112 MG/DL — HIGH (ref 70–99)
GLUCOSE BLDC GLUCOMTR-MCNC: 138 MG/DL — HIGH (ref 70–99)
GLUCOSE BLDC GLUCOMTR-MCNC: 164 MG/DL — HIGH (ref 70–99)
GLUCOSE BLDC GLUCOMTR-MCNC: 179 MG/DL — HIGH (ref 70–99)
GLUCOSE BLDC GLUCOMTR-MCNC: 95 MG/DL — SIGNIFICANT CHANGE UP (ref 70–99)
GLUCOSE SERPL-MCNC: 65 MG/DL — LOW (ref 70–99)
HCT VFR BLD CALC: 38.9 % — LOW (ref 42–52)
HGB BLD-MCNC: 12.5 G/DL — LOW (ref 14–18)
INR BLD: 1.73 RATIO — HIGH (ref 0.65–1.3)
MAGNESIUM SERPL-MCNC: 2 MG/DL — SIGNIFICANT CHANGE UP (ref 1.8–2.4)
MCHC RBC-ENTMCNC: 26.3 PG — LOW (ref 27–31)
MCHC RBC-ENTMCNC: 32.1 G/DL — SIGNIFICANT CHANGE UP (ref 32–37)
MCV RBC AUTO: 81.9 FL — SIGNIFICANT CHANGE UP (ref 80–94)
NRBC # BLD: 0 /100 WBCS — SIGNIFICANT CHANGE UP (ref 0–0)
PLATELET # BLD AUTO: 173 K/UL — SIGNIFICANT CHANGE UP (ref 130–400)
POTASSIUM SERPL-MCNC: 3.4 MMOL/L — LOW (ref 3.5–5)
POTASSIUM SERPL-SCNC: 3.4 MMOL/L — LOW (ref 3.5–5)
PROTHROM AB SERPL-ACNC: 19.8 SEC — HIGH (ref 9.95–12.87)
RBC # BLD: 4.75 M/UL — SIGNIFICANT CHANGE UP (ref 4.7–6.1)
RBC # FLD: 15.6 % — HIGH (ref 11.5–14.5)
SODIUM SERPL-SCNC: 139 MMOL/L — SIGNIFICANT CHANGE UP (ref 135–146)
WBC # BLD: 6.25 K/UL — SIGNIFICANT CHANGE UP (ref 4.8–10.8)
WBC # FLD AUTO: 6.25 K/UL — SIGNIFICANT CHANGE UP (ref 4.8–10.8)

## 2020-02-10 PROCEDURE — 99233 SBSQ HOSP IP/OBS HIGH 50: CPT

## 2020-02-10 RX ORDER — WARFARIN SODIUM 2.5 MG/1
5 TABLET ORAL ONCE
Refills: 0 | Status: COMPLETED | OUTPATIENT
Start: 2020-02-10 | End: 2020-02-10

## 2020-02-10 RX ORDER — FUROSEMIDE 40 MG
1 TABLET ORAL
Qty: 0 | Refills: 0 | DISCHARGE
Start: 2020-02-10

## 2020-02-10 RX ORDER — LOSARTAN POTASSIUM 100 MG/1
1 TABLET, FILM COATED ORAL
Qty: 0 | Refills: 0 | DISCHARGE

## 2020-02-10 RX ORDER — PANTOPRAZOLE SODIUM 20 MG/1
1 TABLET, DELAYED RELEASE ORAL
Qty: 0 | Refills: 0 | DISCHARGE
Start: 2020-02-10

## 2020-02-10 RX ORDER — AMLODIPINE BESYLATE 2.5 MG/1
1 TABLET ORAL
Qty: 0 | Refills: 0 | DISCHARGE

## 2020-02-10 RX ORDER — WARFARIN SODIUM 2.5 MG/1
1 TABLET ORAL
Qty: 0 | Refills: 0 | DISCHARGE
Start: 2020-02-10

## 2020-02-10 RX ORDER — CEPHALEXIN 500 MG
1 CAPSULE ORAL
Qty: 0 | Refills: 0 | DISCHARGE
Start: 2020-02-10

## 2020-02-10 RX ORDER — FINASTERIDE 5 MG/1
1 TABLET, FILM COATED ORAL
Qty: 0 | Refills: 0 | DISCHARGE
Start: 2020-02-10

## 2020-02-10 RX ORDER — ZINC SULFATE TAB 220 MG (50 MG ZINC EQUIVALENT) 220 (50 ZN) MG
1 TAB ORAL
Qty: 0 | Refills: 0 | DISCHARGE
Start: 2020-02-10

## 2020-02-10 RX ORDER — FUROSEMIDE 40 MG
1 TABLET ORAL
Qty: 0 | Refills: 0 | DISCHARGE

## 2020-02-10 RX ORDER — FONDAPARINUX SODIUM 2.5 MG/.5ML
1 INJECTION, SOLUTION SUBCUTANEOUS
Qty: 0 | Refills: 0 | DISCHARGE

## 2020-02-10 RX ORDER — ASCORBIC ACID 60 MG
1 TABLET,CHEWABLE ORAL
Qty: 0 | Refills: 0 | DISCHARGE
Start: 2020-02-10

## 2020-02-10 RX ORDER — MIDODRINE HYDROCHLORIDE 2.5 MG/1
1 TABLET ORAL
Qty: 0 | Refills: 0 | DISCHARGE

## 2020-02-10 RX ORDER — POTASSIUM CHLORIDE 20 MEQ
40 PACKET (EA) ORAL ONCE
Refills: 0 | Status: COMPLETED | OUTPATIENT
Start: 2020-02-10 | End: 2020-02-10

## 2020-02-10 RX ADMIN — SODIUM CHLORIDE 3 MILLILITER(S): 9 INJECTION INTRAMUSCULAR; INTRAVENOUS; SUBCUTANEOUS at 21:08

## 2020-02-10 RX ADMIN — Medication 40 MILLIEQUIVALENT(S): at 13:57

## 2020-02-10 RX ADMIN — BUDESONIDE AND FORMOTEROL FUMARATE DIHYDRATE 2 PUFF(S): 160; 4.5 AEROSOL RESPIRATORY (INHALATION) at 21:09

## 2020-02-10 RX ADMIN — INSULIN GLARGINE 20 UNIT(S): 100 INJECTION, SOLUTION SUBCUTANEOUS at 21:10

## 2020-02-10 RX ADMIN — TAMSULOSIN HYDROCHLORIDE 0.4 MILLIGRAM(S): 0.4 CAPSULE ORAL at 21:09

## 2020-02-10 RX ADMIN — SIMVASTATIN 40 MILLIGRAM(S): 20 TABLET, FILM COATED ORAL at 21:09

## 2020-02-10 RX ADMIN — SODIUM CHLORIDE 3 MILLILITER(S): 9 INJECTION INTRAMUSCULAR; INTRAVENOUS; SUBCUTANEOUS at 06:11

## 2020-02-10 RX ADMIN — ZINC SULFATE TAB 220 MG (50 MG ZINC EQUIVALENT) 220 MILLIGRAM(S): 220 (50 ZN) TAB at 11:19

## 2020-02-10 RX ADMIN — Medication 1 APPLICATION(S): at 11:19

## 2020-02-10 RX ADMIN — CLOPIDOGREL BISULFATE 75 MILLIGRAM(S): 75 TABLET, FILM COATED ORAL at 11:19

## 2020-02-10 RX ADMIN — Medication 50 MILLIGRAM(S): at 06:09

## 2020-02-10 RX ADMIN — Medication 250 MILLIGRAM(S): at 17:30

## 2020-02-10 RX ADMIN — Medication 145 MILLIGRAM(S): at 11:19

## 2020-02-10 RX ADMIN — Medication 500 MILLIGRAM(S): at 11:19

## 2020-02-10 RX ADMIN — FINASTERIDE 5 MILLIGRAM(S): 5 TABLET, FILM COATED ORAL at 11:19

## 2020-02-10 RX ADMIN — SODIUM CHLORIDE 3 MILLILITER(S): 9 INJECTION INTRAMUSCULAR; INTRAVENOUS; SUBCUTANEOUS at 13:51

## 2020-02-10 RX ADMIN — PANTOPRAZOLE SODIUM 40 MILLIGRAM(S): 20 TABLET, DELAYED RELEASE ORAL at 06:09

## 2020-02-10 RX ADMIN — Medication 80 MILLIGRAM(S): at 06:09

## 2020-02-10 RX ADMIN — WARFARIN SODIUM 5 MILLIGRAM(S): 2.5 TABLET ORAL at 21:09

## 2020-02-10 RX ADMIN — CHLORHEXIDINE GLUCONATE 1 APPLICATION(S): 213 SOLUTION TOPICAL at 06:09

## 2020-02-10 RX ADMIN — Medication 80 MILLIGRAM(S): at 17:30

## 2020-02-10 RX ADMIN — ISOSORBIDE MONONITRATE 30 MILLIGRAM(S): 60 TABLET, EXTENDED RELEASE ORAL at 11:18

## 2020-02-10 RX ADMIN — Medication 250 MILLIGRAM(S): at 06:09

## 2020-02-10 NOTE — CHART NOTE - NSCHARTNOTEFT_GEN_A_CORE
Limited reassessment    Meds and labs reviewed    (2/9) +2 edema L/R legs  Skin: b/l calf blisters, scrotum maceration; previously with stage II pressure ulcers x2 which are no longer documented in chart  Wt fluctuations throughout admission are likely edema related. Ranging from 215-238 lbs, pt unsure his dry UBW.     HD access was removed on 2/7, pt renal function has been worsening per EMR.     Pt asleep in chair upon RD attempt at interview, unable to arouse. Pt continues to do well with meals. Po intake is varied % per EMR. Previously displayed understanding of decreased protein requirements as he is no longer on HD. Last BM recorded on 2/9, with no GI s/s. No further nutrition interventions at this time. Pt remains not at risk. RD to f/u within the next 7 days.

## 2020-02-10 NOTE — PROGRESS NOTE ADULT - ASSESSMENT
Pt with BRITTANI due to ATN (post infection a few months ago) was on HD for about 2 mos, had kidney function recovery and was taken off HD about 2 weeks PTA. Pt readmitted with inability to walk b/o swollen knees, found to have PVC on CXR,    BRITTANI - increased LE edema ,  cR slightyl up yesterday 2.6 -> 3 (today's pending) post lasix   - needs wound care on left leg, compression stockings  cont  to po LAsix 80  with Metolazone 5 mg qd if Cr rising will need to cut back   - TEsio cath d/luana  Hypokalemia -replete   HTN -  BP improved off  Amlodipine          Lt LE cellulitis -  on keflex    please cont for 2 weeks, thigh is still swollen  If cr stable/improved today ok for d/c from renal standpoint  w/ outpt f/u

## 2020-02-10 NOTE — PROGRESS NOTE ADULT - ASSESSMENT
· Assessment	  79 y.o. 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), chronic venous stasis, Aflutter previously on NOAC, COPD c chronic hypoxic respiratory failure on 2-3L NC, here with BRITTANI on CKD 3/4 (now possibly 2/2 cardiorenal syndrome), + troponin likely 2/2 chronic heart failure suspected systolic and acute renal dysfunction now, lower extremity nonpurulent cellulitis on peripheral vascular disease with probable tinea pedis, also with possible metabolic encephalopathy    1) Left LE cellulitis   -finish course of antibiotics (total 2 weeks)    2) CKD stage 4 (KIDNEY FUNCTION WORSENING)  -off HD --- Nephrology following; monitor kidney function on current diuretics   -s/p tesio removal last Friday 02/07/20  -coumadin and plavix resumed (ASA d/c'd) --- monitor CBC, INR    3) COPD/chronic hypoxic resp failure   -stable    4) Chronic systolic CHF  -may need extra dose of diuretics     5) Debility/Deconditioning  -rehab/pt     6) Skin care as per nursing     7) Diabetes   -continue with current sq insulin   -monitor FS    8) BPH  -on flomax + finasteride     9) Hypokalemia   -replete with oral supplement     10) Magnesium Def  -repleted    dvt and gi ppx       # Progress Note Handoff  PENDING as follows  consults: Surgical follow up   Test: BMP  Family discussion: discussed with patient who comprehends his medical care and agreeable for oob to chair as tolerated.  aware of the current plan of care including coumadin continuation  Disposition: no discharge yet     Attending Physician Dr. Hoda Andujar # [id number]

## 2020-02-10 NOTE — PROGRESS NOTE ADULT - SUBJECTIVE AND OBJECTIVE BOX
Nephrology progress note    Patient was seen and examined, events over the last 24 h noted .    Allergies:  No Known Allergies    Hospital Medications:   MEDICATIONS  (STANDING):  ascorbic acid 500 milliGRAM(s) Oral daily  budesonide 160 MICROgram(s)/formoterol 4.5 MICROgram(s) Inhaler 2 Puff(s) Inhalation two times a day  cephalexin 250 milliGRAM(s) Oral every 12 hours  chlorhexidine 4% Liquid 1 Application(s) Topical <User Schedule>  clopidogrel Tablet 75 milliGRAM(s) Oral daily  fenofibrate Tablet 145 milliGRAM(s) Oral daily  finasteride 5 milliGRAM(s) Oral daily  furosemide    Tablet 80 milliGRAM(s) Oral every 12 hours  insulin glargine Injectable (LANTUS) 20 Unit(s) SubCutaneous at bedtime  insulin lispro (HumaLOG) corrective regimen sliding scale   SubCutaneous three times a day before meals  isosorbide   mononitrate ER Tablet (IMDUR) 30 milliGRAM(s) Oral daily  metolazone 5 milliGRAM(s) Oral daily  metoprolol succinate ER 50 milliGRAM(s) Oral daily  pantoprazole    Tablet 40 milliGRAM(s) Oral before breakfast  silver sulfADIAZINE 1% Cream 1 Application(s) Topical daily  simvastatin 40 milliGRAM(s) Oral at bedtime  sodium chloride 0.9% lock flush 3 milliLiter(s) IV Push every 8 hours  tamsulosin 0.4 milliGRAM(s) Oral at bedtime  zinc sulfate 220 milliGRAM(s) Oral daily        VITALS:  T(F): 96.9 (02-10-20 @ 06:22), Max: 97.4 (02-09-20 @ 13:31)  HR: 91 (02-10-20 @ 06:22)  BP: 119/54 (02-10-20 @ 06:22)  RR: 16 (02-10-20 @ 06:22)  SpO2: --  Wt(kg): --    02-08 @ 07:01  -  02-09 @ 07:00  --------------------------------------------------------  IN: 0 mL / OUT: 400 mL / NET: -400 mL    02-09 @ 07:01  -  02-10 @ 07:00  --------------------------------------------------------  IN: 600 mL / OUT: 1200 mL / NET: -600 mL          PHYSICAL EXAM:  Constitutional: NAD  HEENT: anicteric sclera, oropharynx clear, MMM  Neck: No JVD  Respiratory: CTA, decreased at bases  Cardiovascular: S1, S2, RRR  Gastrointestinal: BS+, soft, NT/ND  Extremities: 1-2+ peripheral edema, left leg superficial skin teat  Neurological: A/O x 3, [place]  : No CVA tenderness. No julian.   Skin: No rashes  Vascular Access: removed    LABS:  02-09    137  |  88<L>  |  104<HH>  ----------------------------<  116<H>  3.4<L>   |  33<H>  |  3.0<H>    Ca    9.2      09 Feb 2020 08:46  Mg     1.8     02-09                            12.9   6.88  )-----------( 162      ( 09 Feb 2020 08:46 )             39.8       Urine Studies:      RADIOLOGY & ADDITIONAL STUDIES:

## 2020-02-10 NOTE — PROGRESS NOTE ADULT - SUBJECTIVE AND OBJECTIVE BOX
TAIWO ZAVALA  79y  Male      Patient is a 79y old  Male who presents with a chief complaint of leg pain (04 Feb 2020 14:43)      INTERVAL HPI/OVERNIGHT EVENTS:  02/09/20:  pt seen and examined at bedside this morning; sitting up in bed (watching his old movies as usual, in pleasant mood)  -HD access removed last Friday; kidney function worsening ---- will hold off on extra IV lasix dose and continue with oral diuretics  -pt needs to be oob to chair with PT/ambulation  -replete electrolytes  -Nephrology following  -no d/c yet --- monitor kidney function and for volume overload     02/10/20:  pt seen and examined at bedside this morning; sitting up in chair  -monitor kidney function on current diuretics, renal following; d/c planning to STR only if kidney function remains stable   -needs oob to chair with PT as tolerated     REVIEW OF SYSTEMS:  no complaints; watching old movies on his ipad     Vital Signs Last 24 Hrs  T(C): 36.1 (10 Feb 2020 06:22), Max: 36.3 (09 Feb 2020 13:31)  T(F): 96.9 (10 Feb 2020 06:22), Max: 97.4 (09 Feb 2020 13:31)  HR: 91 (10 Feb 2020 06:22) (58 - 91)  BP: 119/54 (10 Feb 2020 06:22) (109/51 - 119/54)  RR: 16 (10 Feb 2020 06:22) (16 - 16)    PHYSICAL EXAM:  GENERAL: NAD, sitting up in chair  HEAD:  Atraumatic, Normocephalic  EYES: EOMI, PERRLA, conjunctiva and sclera clear  NERVOUS SYSTEM:  Alert & Oriented X 3  CV/HEART: Regular rate and rhythm; No murmurs, rubs, or gallops  GI/ABDOMEN: Soft, Nontender, Nondistended; Bowel sounds present  EXTREMITIES: Dressing Both LE   SKIN: chronic skin changes, with dressing both Legs     LAB:  PT/INR - ( 10 Feb 2020 08:41 )   PT: 19.80 sec;   INR: 1.73 ratio                            12.5   6.25  )-----------( 173      ( 10 Feb 2020 08:41 )             38.9   02-10    139  |  88<L>  |  104<HH>  ----------------------------<  65<L>  3.4<L>   |  35<H>  |  2.5<H>    Ca    9.3      10 Feb 2020 08:41  Mg     2.0     02-10    Daily   CAPILLARY BLOOD GLUCOSE    POCT Blood Glucose.: 147 mg/dL (05 Feb 2020 11:21)  POCT Blood Glucose.: 155 mg/dL (05 Feb 2020 07:38)  POCT Blood Glucose.: 223 mg/dL (05 Feb 2020 03:52)  POCT Blood Glucose.: 119 mg/dL (04 Feb 2020 21:31)  POCT Blood Glucose.: 162 mg/dL (04 Feb 2020 16:22)    LIVER FUNCTIONS - ( 04 Feb 2020 11:55 )  Alb: 3.3 g/dL / Pro: 6.6 g/dL / ALK PHOS: 51 U/L / ALT: 7 U/L / AST: 16 U/L / GGT: x             RADIOLOGY:    Imaging Personally Reviewed:  [Y ] YES  [ ] NO    HEALTH ISSUES - PROBLEM Dx:  Cellulitis of left lower extremity: Cellulitis of left lower extremity  Benign prostatic hyperplasia with urinary retention: Benign prostatic hyperplasia with urinary retention  Onychomycosis of toenail: Onychomycosis of toenail  Venous stasis ulcers: Venous stasis ulcers      MEDICATIONS  (STANDING):  ascorbic acid 500 milliGRAM(s) Oral daily  budesonide 160 MICROgram(s)/formoterol 4.5 MICROgram(s) Inhaler 2 Puff(s) Inhalation two times a day  cephalexin 250 milliGRAM(s) Oral every 12 hours  chlorhexidine 4% Liquid 1 Application(s) Topical <User Schedule>  clopidogrel Tablet 75 milliGRAM(s) Oral daily  fenofibrate Tablet 145 milliGRAM(s) Oral daily  finasteride 5 milliGRAM(s) Oral daily  furosemide    Tablet 80 milliGRAM(s) Oral every 12 hours  insulin glargine Injectable (LANTUS) 20 Unit(s) SubCutaneous at bedtime  insulin lispro (HumaLOG) corrective regimen sliding scale   SubCutaneous three times a day before meals  isosorbide   mononitrate ER Tablet (IMDUR) 30 milliGRAM(s) Oral daily  metolazone 5 milliGRAM(s) Oral daily  metoprolol succinate ER 50 milliGRAM(s) Oral daily  pantoprazole    Tablet 40 milliGRAM(s) Oral before breakfast  potassium chloride   Powder 40 milliEquivalent(s) Oral once  silver sulfADIAZINE 1% Cream 1 Application(s) Topical daily  simvastatin 40 milliGRAM(s) Oral at bedtime  sodium chloride 0.9% lock flush 3 milliLiter(s) IV Push every 8 hours  tamsulosin 0.4 milliGRAM(s) Oral at bedtime  warfarin 5 milliGRAM(s) Oral once  zinc sulfate 220 milliGRAM(s) Oral daily    MEDICATIONS  (PRN):  acetaminophen 300 mG/codeine 30 mG 1 Tablet(s) Oral <User Schedule> PRN Moderate Pain (4 - 6)

## 2020-02-10 NOTE — CHART NOTE - NSCHARTNOTEFT_GEN_A_CORE
Patient seen at bedside, resting comfortably.    All questions and concerns addressed during visit.     Patient encouraged to contact PA with any further issues.     Scr decreased slightly from ystd.  Continue to trend.  Nephrology following.

## 2020-02-11 ENCOUNTER — TRANSCRIPTION ENCOUNTER (OUTPATIENT)
Age: 80
End: 2020-02-11

## 2020-02-11 LAB
ANION GAP SERPL CALC-SCNC: 18 MMOL/L — HIGH (ref 7–14)
BUN SERPL-MCNC: 104 MG/DL — CRITICAL HIGH (ref 10–20)
CALCIUM SERPL-MCNC: 9.3 MG/DL — SIGNIFICANT CHANGE UP (ref 8.5–10.1)
CHLORIDE SERPL-SCNC: 85 MMOL/L — LOW (ref 98–110)
CO2 SERPL-SCNC: 33 MMOL/L — HIGH (ref 17–32)
CREAT SERPL-MCNC: 2.7 MG/DL — HIGH (ref 0.7–1.5)
GLUCOSE BLDC GLUCOMTR-MCNC: 102 MG/DL — HIGH (ref 70–99)
GLUCOSE BLDC GLUCOMTR-MCNC: 127 MG/DL — HIGH (ref 70–99)
GLUCOSE BLDC GLUCOMTR-MCNC: 74 MG/DL — SIGNIFICANT CHANGE UP (ref 70–99)
GLUCOSE BLDC GLUCOMTR-MCNC: 93 MG/DL — SIGNIFICANT CHANGE UP (ref 70–99)
GLUCOSE SERPL-MCNC: 92 MG/DL — SIGNIFICANT CHANGE UP (ref 70–99)
INR BLD: 2.07 RATIO — HIGH (ref 0.65–1.3)
MAGNESIUM SERPL-MCNC: 1.9 MG/DL — SIGNIFICANT CHANGE UP (ref 1.8–2.4)
POTASSIUM SERPL-MCNC: 3.2 MMOL/L — LOW (ref 3.5–5)
POTASSIUM SERPL-SCNC: 3.2 MMOL/L — LOW (ref 3.5–5)
PROTHROM AB SERPL-ACNC: 23.6 SEC — HIGH (ref 9.95–12.87)
SODIUM SERPL-SCNC: 136 MMOL/L — SIGNIFICANT CHANGE UP (ref 135–146)

## 2020-02-11 PROCEDURE — 99233 SBSQ HOSP IP/OBS HIGH 50: CPT

## 2020-02-11 RX ORDER — POTASSIUM CHLORIDE 20 MEQ
40 PACKET (EA) ORAL ONCE
Refills: 0 | Status: COMPLETED | OUTPATIENT
Start: 2020-02-11 | End: 2020-02-11

## 2020-02-11 RX ORDER — WARFARIN SODIUM 2.5 MG/1
5 TABLET ORAL ONCE
Refills: 0 | Status: COMPLETED | OUTPATIENT
Start: 2020-02-11 | End: 2020-02-11

## 2020-02-11 RX ADMIN — Medication 250 MILLIGRAM(S): at 17:20

## 2020-02-11 RX ADMIN — Medication 80 MILLIGRAM(S): at 05:14

## 2020-02-11 RX ADMIN — Medication 1 APPLICATION(S): at 11:14

## 2020-02-11 RX ADMIN — ISOSORBIDE MONONITRATE 30 MILLIGRAM(S): 60 TABLET, EXTENDED RELEASE ORAL at 11:14

## 2020-02-11 RX ADMIN — TAMSULOSIN HYDROCHLORIDE 0.4 MILLIGRAM(S): 0.4 CAPSULE ORAL at 21:18

## 2020-02-11 RX ADMIN — Medication 500 MILLIGRAM(S): at 11:14

## 2020-02-11 RX ADMIN — Medication 50 MILLIGRAM(S): at 05:14

## 2020-02-11 RX ADMIN — Medication 145 MILLIGRAM(S): at 11:24

## 2020-02-11 RX ADMIN — Medication 250 MILLIGRAM(S): at 05:13

## 2020-02-11 RX ADMIN — Medication 80 MILLIGRAM(S): at 17:20

## 2020-02-11 RX ADMIN — CLOPIDOGREL BISULFATE 75 MILLIGRAM(S): 75 TABLET, FILM COATED ORAL at 11:14

## 2020-02-11 RX ADMIN — SODIUM CHLORIDE 3 MILLILITER(S): 9 INJECTION INTRAMUSCULAR; INTRAVENOUS; SUBCUTANEOUS at 11:25

## 2020-02-11 RX ADMIN — SODIUM CHLORIDE 3 MILLILITER(S): 9 INJECTION INTRAMUSCULAR; INTRAVENOUS; SUBCUTANEOUS at 05:15

## 2020-02-11 RX ADMIN — INSULIN GLARGINE 20 UNIT(S): 100 INJECTION, SOLUTION SUBCUTANEOUS at 21:18

## 2020-02-11 RX ADMIN — SIMVASTATIN 40 MILLIGRAM(S): 20 TABLET, FILM COATED ORAL at 21:18

## 2020-02-11 RX ADMIN — CHLORHEXIDINE GLUCONATE 1 APPLICATION(S): 213 SOLUTION TOPICAL at 05:14

## 2020-02-11 RX ADMIN — FINASTERIDE 5 MILLIGRAM(S): 5 TABLET, FILM COATED ORAL at 11:14

## 2020-02-11 RX ADMIN — BUDESONIDE AND FORMOTEROL FUMARATE DIHYDRATE 2 PUFF(S): 160; 4.5 AEROSOL RESPIRATORY (INHALATION) at 11:14

## 2020-02-11 RX ADMIN — ZINC SULFATE TAB 220 MG (50 MG ZINC EQUIVALENT) 220 MILLIGRAM(S): 220 (50 ZN) TAB at 11:14

## 2020-02-11 RX ADMIN — PANTOPRAZOLE SODIUM 40 MILLIGRAM(S): 20 TABLET, DELAYED RELEASE ORAL at 05:13

## 2020-02-11 RX ADMIN — Medication 40 MILLIEQUIVALENT(S): at 14:43

## 2020-02-11 RX ADMIN — WARFARIN SODIUM 5 MILLIGRAM(S): 2.5 TABLET ORAL at 22:40

## 2020-02-11 RX ADMIN — SODIUM CHLORIDE 3 MILLILITER(S): 9 INJECTION INTRAMUSCULAR; INTRAVENOUS; SUBCUTANEOUS at 21:15

## 2020-02-11 RX ADMIN — Medication 40 MILLIEQUIVALENT(S): at 14:32

## 2020-02-11 NOTE — PROGRESS NOTE ADULT - SUBJECTIVE AND OBJECTIVE BOX
ZAVALATAIWO CASPER  79y  Male      Patient is a 79y old  Male who presents with a chief complaint of leg pain (04 Feb 2020 14:43)      INTERVAL HPI/OVERNIGHT EVENTS:  02/09/20:  pt seen and examined at bedside this morning; sitting up in bed (watching his old movies as usual, in pleasant mood)  -HD access removed last Friday; kidney function worsening ---- will hold off on extra IV lasix dose and continue with oral diuretics  -pt needs to be oob to chair with PT/ambulation  -replete electrolytes  -Nephrology following  -no d/c yet --- monitor kidney function and for volume overload     02/10/20:  pt seen and examined at bedside this morning; sitting up in chair  -monitor kidney function on current diuretics, renal following; d/c planning to STR only if kidney function remains stable   -needs oob to chair with PT as tolerated     02/11/20:  pt seen and examined at bedside this morning; sitting up  -no change in clinical condition   -once Kidney function stable, ok with Nephrology for d/c planning to NH -- pt with multiple co-morbidities and high likelihood of re-admission and needs close PMD and or Nephrology follow up in the community  -encourage oob to chair with PT     REVIEW OF SYSTEMS:  no complaints; watching old movies on his ipad (everyday)      Vital Signs Last 24 Hrs  T(C): 35.6 (11 Feb 2020 05:39), Max: 36.8 (10 Feb 2020 22:27)  T(F): 96 (11 Feb 2020 05:39), Max: 98.2 (10 Feb 2020 22:27)  HR: 57 (11 Feb 2020 05:39) (57 - 80)  BP: 104/50 (11 Feb 2020 05:39) (102/54 - 131/72)  RR: 16 (11 Feb 2020 05:39) (16 - 16)    PHYSICAL EXAM:  GENERAL: NAD, sitting up in chair  HEAD:  Atraumatic, Normocephalic  EYES: EOMI, PERRLA, conjunctiva and sclera clear  NERVOUS SYSTEM:  Alert & Oriented X 3  CV/HEART: Regular rate and rhythm; No murmurs, rubs, or gallops  GI/ABDOMEN: Soft, Nontender, Nondistended; Bowel sounds present  EXTREMITIES: Dressing Both LE   SKIN: chronic skin changes, with dressing both Legs     LAB:    AM INR and BMP pending        PT/INR - ( 10 Feb 2020 08:41 )   PT: 19.80 sec;   INR: 1.73 ratio                                     12.5   6.25  )-----------( 173      ( 10 Feb 2020 08:41 )             38.9   02-10    139  |  88<L>  |  104<HH>  ----------------------------<  65<L>  3.4<L>   |  35<H>  |  2.5<H>    Ca    9.3      10 Feb 2020 08:41  Mg     2.0     02-10    Daily   CAPILLARY BLOOD GLUCOSE    POCT Blood Glucose.: 147 mg/dL (05 Feb 2020 11:21)  POCT Blood Glucose.: 155 mg/dL (05 Feb 2020 07:38)  POCT Blood Glucose.: 223 mg/dL (05 Feb 2020 03:52)  POCT Blood Glucose.: 119 mg/dL (04 Feb 2020 21:31)  POCT Blood Glucose.: 162 mg/dL (04 Feb 2020 16:22)    LIVER FUNCTIONS - ( 04 Feb 2020 11:55 )  Alb: 3.3 g/dL / Pro: 6.6 g/dL / ALK PHOS: 51 U/L / ALT: 7 U/L / AST: 16 U/L / GGT: x             RADIOLOGY:    Imaging Personally Reviewed:  [Y ] YES  [ ] NO    HEALTH ISSUES - PROBLEM Dx:  Cellulitis of left lower extremity: Cellulitis of left lower extremity  Benign prostatic hyperplasia with urinary retention: Benign prostatic hyperplasia with urinary retention  Onychomycosis of toenail: Onychomycosis of toenail  Venous stasis ulcers: Venous stasis ulcers      MEDICATIONS  (STANDING):  ascorbic acid 500 milliGRAM(s) Oral daily  budesonide 160 MICROgram(s)/formoterol 4.5 MICROgram(s) Inhaler 2 Puff(s) Inhalation two times a day  cephalexin 250 milliGRAM(s) Oral every 12 hours  chlorhexidine 4% Liquid 1 Application(s) Topical <User Schedule>  clopidogrel Tablet 75 milliGRAM(s) Oral daily  fenofibrate Tablet 145 milliGRAM(s) Oral daily  finasteride 5 milliGRAM(s) Oral daily  furosemide    Tablet 80 milliGRAM(s) Oral every 12 hours  insulin glargine Injectable (LANTUS) 20 Unit(s) SubCutaneous at bedtime  insulin lispro (HumaLOG) corrective regimen sliding scale   SubCutaneous three times a day before meals  isosorbide   mononitrate ER Tablet (IMDUR) 30 milliGRAM(s) Oral daily  metolazone 5 milliGRAM(s) Oral daily  metoprolol succinate ER 50 milliGRAM(s) Oral daily  pantoprazole    Tablet 40 milliGRAM(s) Oral before breakfast  silver sulfADIAZINE 1% Cream 1 Application(s) Topical daily  simvastatin 40 milliGRAM(s) Oral at bedtime  sodium chloride 0.9% lock flush 3 milliLiter(s) IV Push every 8 hours  tamsulosin 0.4 milliGRAM(s) Oral at bedtime  zinc sulfate 220 milliGRAM(s) Oral daily    MEDICATIONS  (PRN):  acetaminophen 300 mG/codeine 30 mG 1 Tablet(s) Oral <User Schedule> PRN Moderate Pain (4 - 6)

## 2020-02-11 NOTE — PROGRESS NOTE ADULT - SUBJECTIVE AND OBJECTIVE BOX
Nephrology progress note    Patient was seen and examined, events over the last 24 h noted .  Cr stable eager for d/c     Allergies:  No Known Allergies    Hospital Medications:   MEDICATIONS  (STANDING):  ascorbic acid 500 milliGRAM(s) Oral daily  budesonide 160 MICROgram(s)/formoterol 4.5 MICROgram(s) Inhaler 2 Puff(s) Inhalation two times a day  cephalexin 250 milliGRAM(s) Oral every 12 hours  chlorhexidine 4% Liquid 1 Application(s) Topical <User Schedule>  clopidogrel Tablet 75 milliGRAM(s) Oral daily  fenofibrate Tablet 145 milliGRAM(s) Oral daily  finasteride 5 milliGRAM(s) Oral daily  furosemide    Tablet 80 milliGRAM(s) Oral every 12 hours  insulin glargine Injectable (LANTUS) 20 Unit(s) SubCutaneous at bedtime  insulin lispro (HumaLOG) corrective regimen sliding scale   SubCutaneous three times a day before meals  isosorbide   mononitrate ER Tablet (IMDUR) 30 milliGRAM(s) Oral daily  metolazone 5 milliGRAM(s) Oral daily  metoprolol succinate ER 50 milliGRAM(s) Oral daily  pantoprazole    Tablet 40 milliGRAM(s) Oral before breakfast  silver sulfADIAZINE 1% Cream 1 Application(s) Topical daily  simvastatin 40 milliGRAM(s) Oral at bedtime  sodium chloride 0.9% lock flush 3 milliLiter(s) IV Push every 8 hours  tamsulosin 0.4 milliGRAM(s) Oral at bedtime  zinc sulfate 220 milliGRAM(s) Oral daily        VITALS:  T(F): 96 (02-11-20 @ 05:39), Max: 98.2 (02-10-20 @ 22:27)  HR: 56 (02-11-20 @ 11:19)  BP: 98/54 (02-11-20 @ 11:19)  RR: 16 (02-11-20 @ 05:39)  SpO2: --  Wt(kg): --    02-09 @ 07:01  -  02-10 @ 07:00  --------------------------------------------------------  IN: 600 mL / OUT: 1200 mL / NET: -600 mL    02-10 @ 07:01  -  02-11 @ 07:00  --------------------------------------------------------  IN: 0 mL / OUT: 400 mL / NET: -400 mL          PHYSICAL EXAM:  Constitutional: NAD  HEENT: anicteric sclera, oropharynx clear, MMM  Neck: No JVD  Respiratory: CTAB, no wheezes, rales or rhonchi  Cardiovascular: S1, S2, RRR  Gastrointestinal: BS+, soft, NT/ND  Extremities: No cyanosis or clubbing. No peripheral edema  :  No julian.   Skin: No rashes    LABS:  02-11    136  |  85<L>  |  104<HH>  ----------------------------<  92  3.2<L>   |  33<H>  |  2.7<H>    Ca    9.3      11 Feb 2020 06:29  Mg     1.9     02-11                            12.5   6.25  )-----------( 173      ( 10 Feb 2020 08:41 )             38.9       Urine Studies:      RADIOLOGY & ADDITIONAL STUDIES:

## 2020-02-11 NOTE — PROGRESS NOTE ADULT - ASSESSMENT
Pt with BRITTANI due to ATN (post infection a few months ago) was on HD for about 2 mos, had kidney function recovery and was taken off HD about 2 weeks PTA. Pt readmitted with inability to walk b/o swollen knees, found to have PVC on CXR,    BRITTANI - increased LE edema ,  cR  stable  - needs wound care on left leg, compression stockings  cont  to po LAsix 80  with Metolazone 5 mg qd   - TEsio cath d/luana  Hypokalemia -replete   HTN -  BP improved off  Amlodipine          Lt LE cellulitis -  on keflex    please cont for 2 weeks, thigh is still swollen  cr stale  ok for d/c from renal standpoint  w/ outpt f/u

## 2020-02-11 NOTE — DISCHARGE NOTE PROVIDER - CARE PROVIDER_API CALL
Stephanie Philippe)  Nephrology  [address] [place] [address]  Phone: [phone]  Fax: [phone]  Follow Up Time: Stephanie Philippe)  Nephrology  [address] [place] [address]  Phone: [phone]  Fax: [phone]  Follow Up Time: 1 week    Carl Kruger)  Family Medicine  [address] [place] [address]  Phone: [phone]  Fax: [phone]  Follow Up Time: 1 week

## 2020-02-11 NOTE — DISCHARGE NOTE PROVIDER - NSDCMRMEDTOKEN_GEN_ALL_CORE_FT
ascorbic acid 500 mg oral tablet: 1 tab(s) orally once a day  budesonide-formoterol 160 mcg-4.5 mcg/inh inhalation aerosol:  inhaled   cephalexin 250 mg oral capsule: 1 cap(s) orally every 12 hours for 7 more days  clopidogrel 75 mg oral tablet: 1 tab(s) orally once a day  fenofibrate 145 mg oral tablet: 1 tab(s) orally once a day  finasteride 5 mg oral tablet: 1 tab(s) orally once a day  fluticasone 50 mcg/inh nasal spray: 1 spray(s) nasal 2 times a day  furosemide 80 mg oral tablet: 1 tab(s) orally every 12 hours  isosorbide mononitrate 30 mg oral tablet, extended release: 1 tab(s) orally once a day (in the morning)  metOLazone 5 mg oral tablet: 1 tab(s) orally once a day  Metoprolol Succinate ER 50 mg oral tablet, extended release: 1 tab(s) orally 2 times a day  pantoprazole 40 mg oral delayed release tablet: 1 tab(s) orally once a day (before a meal)  silver sulfADIAZINE 1% topical cream: 1 application topically once a day  simvastatin 40 mg oral tablet: 1 tab(s) orally once a day (at bedtime)  tamsulosin 0.4 mg oral capsule: 1 cap(s) orally once a day (at bedtime)  warfarin 5 mg oral tablet: 1 tab(s) orally once a day ; target INR 2-3  zinc sulfate 220 mg oral capsule: 1 cap(s) orally once a day ascorbic acid 500 mg oral tablet: 1 tab(s) orally once a day  budesonide-formoterol 160 mcg-4.5 mcg/inh inhalation aerosol:  inhaled   cephalexin 250 mg oral capsule: 1 cap(s) orally every 12 hours for 7 more days  clopidogrel 75 mg oral tablet: 1 tab(s) orally once a day  fenofibrate 145 mg oral tablet: 1 tab(s) orally once a day  finasteride 5 mg oral tablet: 1 tab(s) orally once a day  fluticasone 50 mcg/inh nasal spray: 1 spray(s) nasal 2 times a day  furosemide 80 mg oral tablet: 1 tab(s) orally every 12 hours  isosorbide mononitrate 30 mg oral tablet, extended release: 1 tab(s) orally once a day (in the morning)  metOLazone 5 mg oral tablet: 1 tab(s) orally once a day  Metoprolol Succinate ER 50 mg oral tablet, extended release: 1 tab(s) orally once a day  pantoprazole 40 mg oral delayed release tablet: 1 tab(s) orally once a day (before a meal)  silver sulfADIAZINE 1% topical cream: 1 application topically once a day  simvastatin 40 mg oral tablet: 1 tab(s) orally once a day (at bedtime)  tamsulosin 0.4 mg oral capsule: 1 cap(s) orally once a day (at bedtime)  warfarin 5 mg oral tablet: 1 tab(s) orally once a day ; target INR 2-3  zinc sulfate 220 mg oral capsule: 1 cap(s) orally once a day ascorbic acid 500 mg oral tablet: 1 tab(s) orally once a day  budesonide-formoterol 160 mcg-4.5 mcg/inh inhalation aerosol:  inhaled   cephalexin 250 mg oral capsule: 1 cap(s) orally every 12 hours for 7 more days  clopidogrel 75 mg oral tablet: 1 tab(s) orally once a day  Coumadin 3 mg oral tablet: 1 tab(s) orally once a day (at bedtime)   INR goal 2-3, follow at coumadin clinic for dosing  fenofibrate 145 mg oral tablet: 1 tab(s) orally once a day  finasteride 5 mg oral tablet: 1 tab(s) orally once a day  fluticasone 50 mcg/inh nasal spray: 1 spray(s) nasal 2 times a day  furosemide 80 mg oral tablet: 1 tab(s) orally every 12 hours  isosorbide mononitrate 30 mg oral tablet, extended release: 1 tab(s) orally once a day (in the morning)  metOLazone 5 mg oral tablet: 1 tab(s) orally once a day  Metoprolol Succinate ER 50 mg oral tablet, extended release: 1 tab(s) orally once a day  pantoprazole 40 mg oral delayed release tablet: 1 tab(s) orally once a day (before a meal)  silver sulfADIAZINE 1% topical cream: 1 application topically once a day  simvastatin 40 mg oral tablet: 1 tab(s) orally once a day (at bedtime)  tamsulosin 0.4 mg oral capsule: 1 cap(s) orally once a day (at bedtime)  zinc sulfate 220 mg oral capsule: 1 cap(s) orally once a day

## 2020-02-11 NOTE — PROGRESS NOTE ADULT - ASSESSMENT
· Assessment	  79 y.o. 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), chronic venous stasis, Aflutter previously on NOAC, COPD c chronic hypoxic respiratory failure on 2-3L NC, here with BRITTANI on CKD 3/4 (now possibly 2/2 cardiorenal syndrome), + troponin likely 2/2 chronic heart failure suspected systolic and acute renal dysfunction now, lower extremity nonpurulent cellulitis on peripheral vascular disease with probable tinea pedis, also with possible metabolic encephalopathy    1) Left LE cellulitis   -finish course of antibiotics; 7 more days    2) CKD stage 4 (KIDNEY FUNCTION WORSENING)  -off HD --- Nephrology following; monitor kidney function on current diuretics   -s/p Tesio removal last Friday 02/07/20  -coumadin and Plavix resumed (ASA d/c'd)   -monitor BMP and if stable; d/c planning to STR as per Nephrology     3) COPD/chronic hypoxic resp failure   -stable    4) Chronic systolic CHF  -continue with current diuretics     5) Debility/Deconditioning  -rehab/pt     6) Skin care as per nursing     7) Diabetes   -continue with current sq insulin   -monitor FS    8) BPH  -on flomax + finasteride     9) Hypokalemia   -replete with oral supplement     10) Magnesium Def  -repleted    dvt and gi ppx       # Progress Note Handoff  PENDING as follows  consults: Nephro noted   Test: BMP  Family discussion: discussed with patient who comprehends his medical care and agreeable for oob to chair as tolerated.  aware of the current plan of care including dispo to NH  Disposition: STR once kidney function is stable and not worsening     Attending Physician Dr. Hoda Andujar # [id number]

## 2020-02-11 NOTE — DISCHARGE NOTE PROVIDER - PROVIDER TOKENS
PROVIDER:[TOKEN:[[id number]]] PROVIDER:[TOKEN:[[id number]],FOLLOWUP:[1 week]],PROVIDER:[TOKEN:[[id number]],FOLLOWUP:[1 week]]

## 2020-02-11 NOTE — CHART NOTE - NSCHARTNOTEFT_GEN_A_CORE
Called by RN, patient reporting unable to take K powder, prefers tab.  Order listed as complete but patient did not take it.  New order for KCL 40meq PO x 1 placed.

## 2020-02-11 NOTE — DISCHARGE NOTE PROVIDER - HOSPITAL COURSE
79 y.o. M with PMH of CABG, Bypass, CKD on dialysis, chronic venous stasis on Xarelto COPD on 2L NC sent in from HOME with 1 week of worsening left sided leg pain, gradual in progression, sharp in nature, new from before, unable to bear weight, no fever no chills no SOB/CP/recent travel. Pt was recently admitted 2 months ago for cellulitis of the right leg. Denies any new swelling.    [facility]        Pt did require HD in 11/19 but no HD required this month , still has right chest wall Tesio placed by Dr. Sher         pt unable to ambulate at baseline.        Patient is known to Dr. Cline, being followed for BPH.     -patient with prolonged hospitalization; anticoagulation on hold for Tesio removal by the surgical team.  tolerated the procedure, off of HD; kidney function    needs to be monitored closely with continuation of lasix 80mg twice daily and metolazone.  pt deconditioned and needs rehab/pt.    -seen and examined on the day of discharge, and is stable for d/c today         spent over 35 mins d/c planning 79 y.o. M with PMH of CABG, Bypass, CKD on dialysis, chronic venous stasis on Xarelto COPD on 2L NC sent in from HOME with 1 week of worsening left sided leg pain, gradual in progression, sharp in nature, new from before, unable to bear weight, no fever no chills no SOB/CP/recent travel. Pt was recently admitted 2 months ago for cellulitis of the right leg. Denies any new swelling.    [facility]        Pt did require HD in 11/19 but no HD required this month, tunneled catheter dc'ed.         pt unable to ambulate at baseline.        Patient is known to Dr. Cline, being followed for BPH.     -patient with prolonged hospitalization; off of HD; kidney function needs to be monitored closely with continuation of lasix 80mg twice daily and metolazone with close OP f/u with nephro. Pt deconditioned and needs rehab/pt, but he refused SNF and home services will be send at home.     Continue ABx for cellulitis and wound care at home ([facility] arranged).        spent over 35 mins d/c planning

## 2020-02-11 NOTE — DISCHARGE NOTE PROVIDER - NSDCCPCAREPLAN_GEN_ALL_CORE_FT
PRINCIPAL DISCHARGE DIAGNOSIS  Diagnosis: BRITTANI (acute kidney injury)  Assessment and Plan of Treatment: stable CKD Stage IV; needs close kidney monitoring in the community. You are on Diuretics (lasix and Metolazone) PRINCIPAL DISCHARGE DIAGNOSIS  Diagnosis: BRITTANI (acute kidney injury)  Assessment and Plan of Treatment: stable CKD Stage IV; needs close kidney monitoring in the community. You are on Diuretics (lasix and Metolazone). responded to diuretic, did not need hemodialysis      SECONDARY DISCHARGE DIAGNOSES  Diagnosis: Peripheral vascular disease  Assessment and Plan of Treatment: continue shin wound care- xeroform cover c DSD and kerlix q12hr    Diagnosis: Cellulitis  Assessment and Plan of Treatment: of LLE on PVD. complete oral keflex

## 2020-02-12 ENCOUNTER — TRANSCRIPTION ENCOUNTER (OUTPATIENT)
Age: 80
End: 2020-02-12

## 2020-02-12 LAB
ANION GAP SERPL CALC-SCNC: 18 MMOL/L — HIGH (ref 7–14)
BUN SERPL-MCNC: 107 MG/DL — CRITICAL HIGH (ref 10–20)
CALCIUM SERPL-MCNC: 9.6 MG/DL — SIGNIFICANT CHANGE UP (ref 8.5–10.1)
CHLORIDE SERPL-SCNC: 85 MMOL/L — LOW (ref 98–110)
CO2 SERPL-SCNC: 32 MMOL/L — SIGNIFICANT CHANGE UP (ref 17–32)
CREAT SERPL-MCNC: 2.6 MG/DL — HIGH (ref 0.7–1.5)
GLUCOSE BLDC GLUCOMTR-MCNC: 120 MG/DL — HIGH (ref 70–99)
GLUCOSE BLDC GLUCOMTR-MCNC: 140 MG/DL — HIGH (ref 70–99)
GLUCOSE BLDC GLUCOMTR-MCNC: 151 MG/DL — HIGH (ref 70–99)
GLUCOSE BLDC GLUCOMTR-MCNC: 51 MG/DL — LOW (ref 70–99)
GLUCOSE BLDC GLUCOMTR-MCNC: 63 MG/DL — LOW (ref 70–99)
GLUCOSE BLDC GLUCOMTR-MCNC: 85 MG/DL — SIGNIFICANT CHANGE UP (ref 70–99)
GLUCOSE SERPL-MCNC: 59 MG/DL — LOW (ref 70–99)
HCT VFR BLD CALC: 40.5 % — LOW (ref 42–52)
HGB BLD-MCNC: 13.2 G/DL — LOW (ref 14–18)
INR BLD: 2.62 RATIO — HIGH (ref 0.65–1.3)
MCHC RBC-ENTMCNC: 26.5 PG — LOW (ref 27–31)
MCHC RBC-ENTMCNC: 32.6 G/DL — SIGNIFICANT CHANGE UP (ref 32–37)
MCV RBC AUTO: 81.3 FL — SIGNIFICANT CHANGE UP (ref 80–94)
NRBC # BLD: 0 /100 WBCS — SIGNIFICANT CHANGE UP (ref 0–0)
PLATELET # BLD AUTO: 153 K/UL — SIGNIFICANT CHANGE UP (ref 130–400)
POTASSIUM SERPL-MCNC: 3.7 MMOL/L — SIGNIFICANT CHANGE UP (ref 3.5–5)
POTASSIUM SERPL-SCNC: 3.7 MMOL/L — SIGNIFICANT CHANGE UP (ref 3.5–5)
PROTHROM AB SERPL-ACNC: 29.8 SEC — HIGH (ref 9.95–12.87)
RBC # BLD: 4.98 M/UL — SIGNIFICANT CHANGE UP (ref 4.7–6.1)
RBC # FLD: 15.7 % — HIGH (ref 11.5–14.5)
SODIUM SERPL-SCNC: 135 MMOL/L — SIGNIFICANT CHANGE UP (ref 135–146)
WBC # BLD: 6.26 K/UL — SIGNIFICANT CHANGE UP (ref 4.8–10.8)
WBC # FLD AUTO: 6.26 K/UL — SIGNIFICANT CHANGE UP (ref 4.8–10.8)

## 2020-02-12 PROCEDURE — 99232 SBSQ HOSP IP/OBS MODERATE 35: CPT

## 2020-02-12 RX ORDER — ZINC SULFATE TAB 220 MG (50 MG ZINC EQUIVALENT) 220 (50 ZN) MG
1 TAB ORAL
Qty: 30 | Refills: 0
Start: 2020-02-12 | End: 2020-03-12

## 2020-02-12 RX ORDER — FUROSEMIDE 40 MG
1 TABLET ORAL
Qty: 42 | Refills: 0
Start: 2020-02-12 | End: 2020-03-03

## 2020-02-12 RX ORDER — WARFARIN SODIUM 2.5 MG/1
1 TABLET ORAL
Qty: 14 | Refills: 0
Start: 2020-02-12 | End: 2020-02-25

## 2020-02-12 RX ORDER — ASCORBIC ACID 60 MG
1 TABLET,CHEWABLE ORAL
Qty: 30 | Refills: 0
Start: 2020-02-12 | End: 2020-03-12

## 2020-02-12 RX ORDER — CEPHALEXIN 500 MG
1 CAPSULE ORAL
Qty: 12 | Refills: 0
Start: 2020-02-12 | End: 2020-02-17

## 2020-02-12 RX ORDER — METOPROLOL TARTRATE 50 MG
1 TABLET ORAL
Qty: 0 | Refills: 0 | DISCHARGE

## 2020-02-12 RX ADMIN — CLOPIDOGREL BISULFATE 75 MILLIGRAM(S): 75 TABLET, FILM COATED ORAL at 10:03

## 2020-02-12 RX ADMIN — PANTOPRAZOLE SODIUM 40 MILLIGRAM(S): 20 TABLET, DELAYED RELEASE ORAL at 08:08

## 2020-02-12 RX ADMIN — CHLORHEXIDINE GLUCONATE 1 APPLICATION(S): 213 SOLUTION TOPICAL at 05:20

## 2020-02-12 RX ADMIN — Medication 80 MILLIGRAM(S): at 17:19

## 2020-02-12 RX ADMIN — SIMVASTATIN 40 MILLIGRAM(S): 20 TABLET, FILM COATED ORAL at 21:29

## 2020-02-12 RX ADMIN — BUDESONIDE AND FORMOTEROL FUMARATE DIHYDRATE 2 PUFF(S): 160; 4.5 AEROSOL RESPIRATORY (INHALATION) at 09:49

## 2020-02-12 RX ADMIN — Medication 250 MILLIGRAM(S): at 17:19

## 2020-02-12 RX ADMIN — Medication 80 MILLIGRAM(S): at 05:21

## 2020-02-12 RX ADMIN — Medication 500 MILLIGRAM(S): at 10:03

## 2020-02-12 RX ADMIN — FINASTERIDE 5 MILLIGRAM(S): 5 TABLET, FILM COATED ORAL at 10:03

## 2020-02-12 RX ADMIN — INSULIN GLARGINE 20 UNIT(S): 100 INJECTION, SOLUTION SUBCUTANEOUS at 21:29

## 2020-02-12 RX ADMIN — Medication 250 MILLIGRAM(S): at 05:21

## 2020-02-12 RX ADMIN — Medication 1 APPLICATION(S): at 10:03

## 2020-02-12 RX ADMIN — ZINC SULFATE TAB 220 MG (50 MG ZINC EQUIVALENT) 220 MILLIGRAM(S): 220 (50 ZN) TAB at 10:05

## 2020-02-12 RX ADMIN — SODIUM CHLORIDE 3 MILLILITER(S): 9 INJECTION INTRAMUSCULAR; INTRAVENOUS; SUBCUTANEOUS at 07:13

## 2020-02-12 RX ADMIN — ISOSORBIDE MONONITRATE 30 MILLIGRAM(S): 60 TABLET, EXTENDED RELEASE ORAL at 10:03

## 2020-02-12 RX ADMIN — Medication 145 MILLIGRAM(S): at 10:03

## 2020-02-12 RX ADMIN — TAMSULOSIN HYDROCHLORIDE 0.4 MILLIGRAM(S): 0.4 CAPSULE ORAL at 21:29

## 2020-02-12 RX ADMIN — Medication 50 MILLIGRAM(S): at 05:21

## 2020-02-12 NOTE — DISCHARGE NOTE NURSING/CASE MANAGEMENT/SOCIAL WORK - PATIENT PORTAL LINK FT
You can access the FollowMyHealth Patient Portal offered by [facility] by registering at the following website: [email or web address]. By joining [facility]’s FollowMyHealth portal, you will also be able to view your health information using other applications (apps) compatible with our system.

## 2020-02-12 NOTE — PROGRESS NOTE ADULT - ASSESSMENT
79 y.o. 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), chronic venous stasis, Aflutter previously on NOAC, COPD c chronic hypoxic respiratory failure on 2-3L NC, here with BRITTANI on CKD 3/4 (now possibly 2/2 cardiorenal syndrome), + troponin likely 2/2 chronic heart failure suspected systolic and acute renal dysfunction now, lower extremity nonpurulent cellulitis on peripheral vascular disease with probable tinea pedis, also with possible metabolic encephalopathy    1) Left LE cellulitis   -finish course of antibiotics; 6 more days    2) CKD stage 4 (KIDNEY FUNCTION WORSENING)  -off HD --- Nephrology following; monitor kidney function on current diuretics   -s/p Tesio removal last Friday 02/07/20  -coumadin and Plavix resumed (ASA d/c'd)   - Cr stable high, pt will f/u with nephro in 1 week   - Coumadin clinic called, they will make an appointment    3) COPD/chronic hypoxic resp failure   -stable    4) Chronic systolic CHF  -continue with current diuretics     5) Debility/Deconditioning  -rehab/pt, pt refuses STR, wants to go home with home services    6) Skin care by nurse     7) Diabetes   -continue with current sq insulin   -monitor FS    8) BPH  -on flomax + finasteride     9) Hypokalemia - supplement given, today K+ WNL    Pt is clinically stable for discharge home today.

## 2020-02-12 NOTE — PROGRESS NOTE ADULT - SUBJECTIVE AND OBJECTIVE BOX
TAIWO ZAVALA    Patient is a 79y old  Male who presents with a chief complaint of leg pain (04 Feb 2020 14:43)    INTERVAL HPI/OVERNIGHT EVENTS: no events overnight. Pt feels fine. No complaints.    ROS: All ROS negative     PHYSICAL EXAM:  T(C): 35.2, Max: 36.8 (02-11-20 @ 22:12)  HR: 80 (60 - 80)  BP: 116/55 (116/55 - 128/64)  RR: 16 (16 - 16)  SpO2: --    GENERAL: NAD, disheveled   NECK: Supple, No JVD  PULMONARY/CHEST: No rales, rhonchi, wheezing  CARDIOVASC: Regular rate and rhythm; No murmurs  GI/ABDOMEN: Soft, Nontender, Nondistended; Bowel sounds present  EXTREMITIES: trace edema. No calf tenderness b/l.  SKIN: chronic skin changes, with dressing both Legs  NERVOUS SYSTEM:  Alert & Oriented X3, no focal deficit     LABS:                        13.2   6.26  )-----------( 153      ( 12 Feb 2020 08:12 )             40.5     02-12    135  |  85<L>  |  107<HH>  ----------------------------<  59<L>  3.7   |  32  |  2.6<H>    Ca    9.6      12 Feb 2020 08:12  Mg     1.9     02-11      PT/INR - ( 12 Feb 2020 08:12 )   PT: 29.80 sec;   INR: 2.62 ratio         CAPILLARY BLOOD GLUCOSE  POCT Blood Glucose.: 85 mg/dL (12 Feb 2020 11:33)  POCT Blood Glucose.: 120 mg/dL (12 Feb 2020 09:05)  POCT Blood Glucose.: 63 mg/dL (12 Feb 2020 07:58)  POCT Blood Glucose.: 51 mg/dL (12 Feb 2020 07:31)  POCT Blood Glucose.: 127 mg/dL (11 Feb 2020 21:17)  POCT Blood Glucose.: 102 mg/dL (11 Feb 2020 16:23)      MEDICATIONS  (STANDING):  ascorbic acid 500 milliGRAM(s) Oral daily  budesonide 160 MICROgram(s)/formoterol 4.5 MICROgram(s) Inhaler 2 Puff(s) Inhalation two times a day  cephalexin 250 milliGRAM(s) Oral every 12 hours  chlorhexidine 4% Liquid 1 Application(s) Topical <User Schedule>  clopidogrel Tablet 75 milliGRAM(s) Oral daily  fenofibrate Tablet 145 milliGRAM(s) Oral daily  finasteride 5 milliGRAM(s) Oral daily  furosemide    Tablet 80 milliGRAM(s) Oral every 12 hours  insulin glargine Injectable (LANTUS) 20 Unit(s) SubCutaneous at bedtime  insulin lispro (HumaLOG) corrective regimen sliding scale   SubCutaneous three times a day before meals  isosorbide   mononitrate ER Tablet (IMDUR) 30 milliGRAM(s) Oral daily  metolazone 5 milliGRAM(s) Oral daily  metoprolol succinate ER 50 milliGRAM(s) Oral daily  pantoprazole    Tablet 40 milliGRAM(s) Oral before breakfast  silver sulfADIAZINE 1% Cream 1 Application(s) Topical daily  simvastatin 40 milliGRAM(s) Oral at bedtime  sodium chloride 0.9% lock flush 3 milliLiter(s) IV Push every 8 hours  tamsulosin 0.4 milliGRAM(s) Oral at bedtime  zinc sulfate 220 milliGRAM(s) Oral daily    MEDICATIONS  (PRN):  acetaminophen 300 mG/codeine 30 mG 1 Tablet(s) Oral <User Schedule> PRN Moderate Pain (4 - 6)

## 2020-02-13 VITALS
TEMPERATURE: 97 F | HEART RATE: 68 BPM | SYSTOLIC BLOOD PRESSURE: 100 MMHG | DIASTOLIC BLOOD PRESSURE: 64 MMHG | RESPIRATION RATE: 16 BRPM

## 2020-02-13 LAB
APTT BLD: 45.9 SEC — HIGH (ref 27–39.2)
GLUCOSE BLDC GLUCOMTR-MCNC: 128 MG/DL — HIGH (ref 70–99)
GLUCOSE BLDC GLUCOMTR-MCNC: 132 MG/DL — HIGH (ref 70–99)
GLUCOSE BLDC GLUCOMTR-MCNC: 146 MG/DL — HIGH (ref 70–99)
GLUCOSE BLDC GLUCOMTR-MCNC: 98 MG/DL — SIGNIFICANT CHANGE UP (ref 70–99)
INR BLD: 2.97 RATIO — HIGH (ref 0.65–1.3)
PROTHROM AB SERPL-ACNC: 33.8 SEC — HIGH (ref 9.95–12.87)

## 2020-02-13 PROCEDURE — 99233 SBSQ HOSP IP/OBS HIGH 50: CPT

## 2020-02-13 RX ORDER — WARFARIN SODIUM 2.5 MG/1
1 TABLET ORAL
Qty: 10 | Refills: 0
Start: 2020-02-13 | End: 2020-02-22

## 2020-02-13 RX ADMIN — Medication 50 MILLIGRAM(S): at 05:47

## 2020-02-13 RX ADMIN — FINASTERIDE 5 MILLIGRAM(S): 5 TABLET, FILM COATED ORAL at 11:13

## 2020-02-13 RX ADMIN — Medication 145 MILLIGRAM(S): at 11:13

## 2020-02-13 RX ADMIN — CLOPIDOGREL BISULFATE 75 MILLIGRAM(S): 75 TABLET, FILM COATED ORAL at 11:13

## 2020-02-13 RX ADMIN — SODIUM CHLORIDE 3 MILLILITER(S): 9 INJECTION INTRAMUSCULAR; INTRAVENOUS; SUBCUTANEOUS at 12:33

## 2020-02-13 RX ADMIN — Medication 80 MILLIGRAM(S): at 05:47

## 2020-02-13 RX ADMIN — Medication 250 MILLIGRAM(S): at 05:47

## 2020-02-13 RX ADMIN — ZINC SULFATE TAB 220 MG (50 MG ZINC EQUIVALENT) 220 MILLIGRAM(S): 220 (50 ZN) TAB at 11:13

## 2020-02-13 RX ADMIN — BUDESONIDE AND FORMOTEROL FUMARATE DIHYDRATE 2 PUFF(S): 160; 4.5 AEROSOL RESPIRATORY (INHALATION) at 07:44

## 2020-02-13 RX ADMIN — ISOSORBIDE MONONITRATE 30 MILLIGRAM(S): 60 TABLET, EXTENDED RELEASE ORAL at 11:13

## 2020-02-13 RX ADMIN — SODIUM CHLORIDE 3 MILLILITER(S): 9 INJECTION INTRAMUSCULAR; INTRAVENOUS; SUBCUTANEOUS at 05:45

## 2020-02-13 RX ADMIN — Medication 500 MILLIGRAM(S): at 11:13

## 2020-02-13 RX ADMIN — Medication 1 APPLICATION(S): at 11:15

## 2020-02-13 NOTE — PROGRESS NOTE ADULT - ASSESSMENT
Pt with BRITTANI due to ATN (post infection a few months ago) was on HD for about 2 mos, had kidney function recovery and was taken off HD about 2 weeks PTA. Pt readmitted with inability to walk b/o swollen knees, found to have PVC on CXR,    BRITTANI - increased LE edema ,  cR  stable  - needs wound care on left leg, compression stockings  cont  to po LAsix 80  with Metolazone 5 mg qd   - TEsio cath d/luana  HTN -  BP improved         Lt LE cellulitis -  on keflex    please cont for 2 weeks, thigh is still swollen  cr stale  ok for d/c from renal standpoint  w/ outpt f/u

## 2020-02-13 NOTE — PROGRESS NOTE ADULT - NSHPATTENDINGPLANDISCUSS_GEN_ALL_CORE
PA, CM
above
medical staff
above
above

## 2020-02-13 NOTE — PROGRESS NOTE ADULT - ASSESSMENT
79 y.o. 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), chronic venous stasis, Aflutter previously on NOAC, COPD c chronic hypoxic respiratory failure on 2-3L NC, here with BRITTANI on CKD 3/4 (now possibly 2/2 cardiorenal syndrome), + troponin likely 2/2 chronic heart failure suspected systolic and acute renal dysfunction now, lower extremity nonpurulent cellulitis on peripheral vascular disease with probable tinea pedis, also with possible metabolic encephalopathy    1) Left LE cellulitis   -finish course of antibiotics; 6 more days    2) CKD stage 4   -off HD --- Nephrology following; monitor kidney function on current diuretics   -s/p Tesio removal 02/07/20  -coumadin and Plavix resumed (ASA d/c'd)   - Cr stable high, pt will f/u with nephro in 1 week   repeat bmp at that time  - Coumadin clinic f/u as outpatient    #Umu on AC  noac stopped  on coumadin now  c/w coumadin at 3mg qhs until f/u at coumadin clinic as outpatient- home care has arranged    3) COPD/chronic hypoxic resp failure   -stable    4) Chronic systolic CHF  -continue with current diuretics     5) Debility/Deconditioning  -rehab/pt, pt refuses STR, wants to go home with home services    6) Skin care by nurse     7) Diabetes   -continue with current sq insulin   -monitor FS    8) BPH  -on flomax + finasteride     9) Hypokalemia - supplement given, today K+ WNL    Pt is clinically stable for discharge home today  secondary to level of deconditioning is likely a high risk of readmission, but patient is opposed to going to SNF at this time

## 2020-02-13 NOTE — PROGRESS NOTE ADULT - SUBJECTIVE AND OBJECTIVE BOX
TAIWO ZAVALA  79y  Male      Patient is a 79y old  Male who presents with a chief complaint of leg pain and redness (11 Feb 2020 15:52)      INTERVAL HPI/OVERNIGHT EVENTS: feels well. no leg pain. tolerating wound care. urinates. no bleeding events      REVIEW OF SYSTEMS:  as above  All other review of systems negative    T(C): 36.2 (02-13-20 @ 14:40), Max: 36.2 (02-13-20 @ 14:40)  HR: 68 (02-13-20 @ 14:40) (64 - 75)  BP: 100/64 (02-13-20 @ 14:40) (90/51 - 132/62)  RR: 16 (02-13-20 @ 14:40) (16 - 16)  SpO2: --  Wt(kg): --Vital Signs Last 24 Hrs  T(C): 36.2 (13 Feb 2020 14:40), Max: 36.2 (13 Feb 2020 14:40)  T(F): 97.1 (13 Feb 2020 14:40), Max: 97.1 (13 Feb 2020 14:40)  HR: 68 (13 Feb 2020 14:40) (64 - 75)  BP: 100/64 (13 Feb 2020 14:40) (90/51 - 132/62)  BP(mean): --  RR: 16 (13 Feb 2020 14:40) (16 - 16)  SpO2: --      02-12-20 @ 07:01  -  02-13-20 @ 07:00  --------------------------------------------------------  IN: 0 mL / OUT: 600 mL / NET: -600 mL        PHYSICAL EXAM:  GENERAL: NAD, deconditioned  PSYCH: no agitation, baseline mentation  NERVOUS SYSTEM:  Alert & Oriented X3, no new focal deficits  PULMONARY: Clear to percussion bilaterally; No rales, rhonchi, wheezing, or rubs comfortable on NC  CARDIOVASCULAR: Regular rate and rhythm; No murmurs, rubs, or gallops  GI: Soft, Nontender, Nondistended; Bowel sounds present obese   no julian, no HD catheter  EXTREMITIES: chronic pvd hyperpigmentation prior L thigh erythema/warmth subsided, wounds dressed    Consultant(s) Notes Reviewed:  [x ] YES  [ ] NO    Discussed with Consultants/Other Providers [ x] YES     LABS                          13.2   6.26  )-----------( 153      ( 12 Feb 2020 08:12 )             40.5     02-12    135  |  85<L>  |  107<HH>  ----------------------------<  59<L>  3.7   |  32  |  2.6<H>    Ca    9.6      12 Feb 2020 08:12          PT/INR - ( 13 Feb 2020 10:32 )   PT: 33.80 sec;   INR: 2.97 ratio         PTT - ( 13 Feb 2020 10:32 )  PTT:45.9 sec  Lactate Trend        CAPILLARY BLOOD GLUCOSE      POCT Blood Glucose.: 128 mg/dL (13 Feb 2020 11:51)        RADIOLOGY & ADDITIONAL TESTS:    Imaging Personally Reviewed:  [ ] YES  [ ] NO    HEALTH ISSUES - PROBLEM Dx:  Cellulitis of left lower extremity: Cellulitis of left lower extremity  Benign prostatic hyperplasia with urinary retention: Benign prostatic hyperplasia with urinary retention  Onychomycosis of toenail: Onychomycosis of toenail  Venous stasis ulcers: Venous stasis ulcers          #Progress Note Handoff    Pending (specify):  Consults_________, Tests________, Test Results_______, Other_________    Family discussion:    Disposition: Home___/SNF___/Other________/Unknown at this time________

## 2020-02-13 NOTE — PROGRESS NOTE ADULT - ATTENDING COMMENTS
Patient seen and examined independently of PA. My addendum supersedes PA notation. Case discussed with housestaff, nursing and patient
Patient seen and examined independently of resident. My addendum supersedes resident notation. Case discussed with housestaff, nursing and patient
Patient seen and examined independently of resident. My addendum supersedes resident notation. Case discussed with housestaff, nursing and patient
Patient seen and examined independently of resident. My addendum supersedes resident notation. Case discussed with housestaff, nursing and patient and family at bedside
Patient seen and examined independently of resident. My addendum supersedes resident notation. Case discussed with housestaff, nursing and patient and nephrologist and cardiologist
Patient seen independently of PA  >30 minutes spent coordinating discharge planning, medicine reconciliation, follow up plan, and direct patient encounter  see discharge summary for further recommendation
above noted abdomen soft neck no infection tessio site ok
above noted will discuss case with renal

## 2020-02-13 NOTE — PROGRESS NOTE ADULT - SUBJECTIVE AND OBJECTIVE BOX
Nephrology progress note    Patient was seen and examined, events over the last 24 h noted .  cr stable  eager to go home     Allergies:  No Known Allergies    Hospital Medications:   MEDICATIONS  (STANDING):  ascorbic acid 500 milliGRAM(s) Oral daily  budesonide 160 MICROgram(s)/formoterol 4.5 MICROgram(s) Inhaler 2 Puff(s) Inhalation two times a day  cephalexin 250 milliGRAM(s) Oral every 12 hours  chlorhexidine 4% Liquid 1 Application(s) Topical <User Schedule>  clopidogrel Tablet 75 milliGRAM(s) Oral daily  fenofibrate Tablet 145 milliGRAM(s) Oral daily  finasteride 5 milliGRAM(s) Oral daily  furosemide    Tablet 80 milliGRAM(s) Oral every 12 hours  insulin glargine Injectable (LANTUS) 20 Unit(s) SubCutaneous at bedtime  insulin lispro (HumaLOG) corrective regimen sliding scale   SubCutaneous three times a day before meals  isosorbide   mononitrate ER Tablet (IMDUR) 30 milliGRAM(s) Oral daily  metolazone 5 milliGRAM(s) Oral daily  metoprolol succinate ER 50 milliGRAM(s) Oral daily  pantoprazole    Tablet 40 milliGRAM(s) Oral before breakfast  silver sulfADIAZINE 1% Cream 1 Application(s) Topical daily  simvastatin 40 milliGRAM(s) Oral at bedtime  sodium chloride 0.9% lock flush 3 milliLiter(s) IV Push every 8 hours  tamsulosin 0.4 milliGRAM(s) Oral at bedtime  zinc sulfate 220 milliGRAM(s) Oral daily        VITALS:  T(F): 96.9 (02-13-20 @ 05:29), Max: 96.9 (02-13-20 @ 05:29)  HR: 64 (02-13-20 @ 05:29)  BP: 132/62 (02-13-20 @ 05:29)  RR: 16 (02-13-20 @ 05:29)  SpO2: --  Wt(kg): --    02-11 @ 07:01  -  02-12 @ 07:00  --------------------------------------------------------  IN: 0 mL / OUT: 500 mL / NET: -500 mL    02-12 @ 07:01  -  02-13 @ 07:00  --------------------------------------------------------  IN: 0 mL / OUT: 600 mL / NET: -600 mL          PHYSICAL EXAM:  Constitutional: NAD  HEENT: anicteric sclera, oropharynx clear, MMM  Neck: No JVD  Respiratory: CTAB, no wheezes, rales or rhonchi  Cardiovascular: S1, S2, RRR  Gastrointestinal: BS+, soft, NT/ND  Extremities: No cyanosis or clubbing. ++ peripheral edema  :  No julian.   Skin: No rashes    LABS:  02-12    135  |  85<L>  |  107<HH>  ----------------------------<  59<L>  3.7   |  32  |  2.6<H>    Ca    9.6      12 Feb 2020 08:12                            13.2   6.26  )-----------( 153      ( 12 Feb 2020 08:12 )             40.5       Urine Studies:      RADIOLOGY & ADDITIONAL STUDIES:

## 2020-02-14 RX ORDER — INSULIN GLARGINE 100 [IU]/ML
20 INJECTION, SOLUTION SUBCUTANEOUS
Qty: 5 | Refills: 0
Start: 2020-02-14 | End: 2020-03-14

## 2020-02-14 RX ORDER — SIMVASTATIN 20 MG/1
1 TABLET, FILM COATED ORAL
Qty: 30 | Refills: 0
Start: 2020-02-14 | End: 2020-03-14

## 2020-02-14 RX ORDER — TAMSULOSIN HYDROCHLORIDE 0.4 MG/1
1 CAPSULE ORAL
Qty: 30 | Refills: 0
Start: 2020-02-14 | End: 2020-03-14

## 2020-02-14 RX ORDER — INSULIN LISPRO 100/ML
2 VIAL (ML) SUBCUTANEOUS
Qty: 5 | Refills: 0
Start: 2020-02-14 | End: 2020-03-14

## 2020-02-14 RX ORDER — ISOPROPYL ALCOHOL, BENZOCAINE .7; .06 ML/ML; ML/ML
1 SWAB TOPICAL
Qty: 30 | Refills: 0
Start: 2020-02-14 | End: 2020-03-14

## 2020-02-14 RX ORDER — PANTOPRAZOLE SODIUM 20 MG/1
1 TABLET, DELAYED RELEASE ORAL
Qty: 30 | Refills: 0
Start: 2020-02-14 | End: 2020-03-14

## 2020-02-18 ENCOUNTER — APPOINTMENT (OUTPATIENT)
Dept: GERIATRICS | Facility: HOME HEALTH | Age: 80
End: 2020-02-18
Payer: MEDICARE

## 2020-02-18 DIAGNOSIS — I73.9 PERIPHERAL VASCULAR DISEASE, UNSPECIFIED: ICD-10-CM

## 2020-02-18 DIAGNOSIS — N40.0 BENIGN PROSTATIC HYPERPLASIA WITHOUT LOWER URINARY TRACT SYMPMS: ICD-10-CM

## 2020-02-18 PROCEDURE — 99496 TRANSJ CARE MGMT HIGH F2F 7D: CPT

## 2020-02-19 DIAGNOSIS — I83.018 VARICOSE VEINS OF RIGHT LOWER EXTREMITY WITH ULCER OTHER PART OF LOWER LEG: ICD-10-CM

## 2020-02-19 DIAGNOSIS — N18.4 CHRONIC KIDNEY DISEASE, STAGE 4 (SEVERE): ICD-10-CM

## 2020-02-19 DIAGNOSIS — I48.92 UNSPECIFIED ATRIAL FLUTTER: ICD-10-CM

## 2020-02-19 DIAGNOSIS — I50.22 CHRONIC SYSTOLIC (CONGESTIVE) HEART FAILURE: ICD-10-CM

## 2020-02-19 DIAGNOSIS — J96.11 CHRONIC RESPIRATORY FAILURE WITH HYPOXIA: ICD-10-CM

## 2020-02-19 DIAGNOSIS — B35.1 TINEA UNGUIUM: ICD-10-CM

## 2020-02-19 DIAGNOSIS — L97.819 NON-PRESSURE CHRONIC ULCER OF OTHER PART OF RIGHT LOWER LEG WITH UNSPECIFIED SEVERITY: ICD-10-CM

## 2020-02-19 DIAGNOSIS — L97.829 NON-PRESSURE CHRONIC ULCER OF OTHER PART OF LEFT LOWER LEG WITH UNSPECIFIED SEVERITY: ICD-10-CM

## 2020-02-19 DIAGNOSIS — I70.222 ATHEROSCLEROSIS OF NATIVE ARTERIES OF EXTREMITIES WITH REST PAIN, LEFT LEG: ICD-10-CM

## 2020-02-19 DIAGNOSIS — Z95.5 PRESENCE OF CORONARY ANGIOPLASTY IMPLANT AND GRAFT: ICD-10-CM

## 2020-02-19 DIAGNOSIS — N40.1 BENIGN PROSTATIC HYPERPLASIA WITH LOWER URINARY TRACT SYMPTOMS: ICD-10-CM

## 2020-02-19 DIAGNOSIS — Z87.39 PERSONAL HISTORY OF OTHER DISEASES OF THE MUSCULOSKELETAL SYSTEM AND CONNECTIVE TISSUE: ICD-10-CM

## 2020-02-19 DIAGNOSIS — E61.2 MAGNESIUM DEFICIENCY: ICD-10-CM

## 2020-02-19 DIAGNOSIS — E87.6 HYPOKALEMIA: ICD-10-CM

## 2020-02-19 DIAGNOSIS — I45.10 UNSPECIFIED RIGHT BUNDLE-BRANCH BLOCK: ICD-10-CM

## 2020-02-19 DIAGNOSIS — L03.116 CELLULITIS OF LEFT LOWER LIMB: ICD-10-CM

## 2020-02-19 DIAGNOSIS — I13.0 HYPERTENSIVE HEART AND CHRONIC KIDNEY DISEASE WITH HEART FAILURE AND STAGE 1 THROUGH STAGE 4 CHRONIC KIDNEY DISEASE, OR UNSPECIFIED CHRONIC KIDNEY DISEASE: ICD-10-CM

## 2020-02-19 DIAGNOSIS — G93.41 METABOLIC ENCEPHALOPATHY: ICD-10-CM

## 2020-02-19 DIAGNOSIS — E11.51 TYPE 2 DIABETES MELLITUS WITH DIABETIC PERIPHERAL ANGIOPATHY WITHOUT GANGRENE: ICD-10-CM

## 2020-02-19 DIAGNOSIS — R33.8 OTHER RETENTION OF URINE: ICD-10-CM

## 2020-02-19 DIAGNOSIS — E11.22 TYPE 2 DIABETES MELLITUS WITH DIABETIC CHRONIC KIDNEY DISEASE: ICD-10-CM

## 2020-02-19 DIAGNOSIS — Z86.79 PERSONAL HISTORY OF OTHER DISEASES OF THE CIRCULATORY SYSTEM: ICD-10-CM

## 2020-02-19 DIAGNOSIS — L89.322 PRESSURE ULCER OF LEFT BUTTOCK, STAGE 2: ICD-10-CM

## 2020-02-19 DIAGNOSIS — J44.9 CHRONIC OBSTRUCTIVE PULMONARY DISEASE, UNSPECIFIED: ICD-10-CM

## 2020-02-19 DIAGNOSIS — N17.0 ACUTE KIDNEY FAILURE WITH TUBULAR NECROSIS: ICD-10-CM

## 2020-02-19 DIAGNOSIS — Z99.81 DEPENDENCE ON SUPPLEMENTAL OXYGEN: ICD-10-CM

## 2020-02-19 DIAGNOSIS — L89.892 PRESSURE ULCER OF OTHER SITE, STAGE 2: ICD-10-CM

## 2020-02-19 DIAGNOSIS — I83.028 VARICOSE VEINS OF LEFT LOWER EXTREMITY WITH ULCER OTHER PART OF LOWER LEG: ICD-10-CM

## 2020-02-19 DIAGNOSIS — Z95.1 PRESENCE OF AORTOCORONARY BYPASS GRAFT: ICD-10-CM

## 2020-02-19 DIAGNOSIS — B35.3 TINEA PEDIS: ICD-10-CM

## 2020-02-20 VITALS
TEMPERATURE: 97.7 F | HEART RATE: 62 BPM | BODY MASS INDEX: 31.83 KG/M2 | HEIGHT: 68 IN | DIASTOLIC BLOOD PRESSURE: 76 MMHG | SYSTOLIC BLOOD PRESSURE: 134 MMHG | RESPIRATION RATE: 18 BRPM | OXYGEN SATURATION: 97 % | WEIGHT: 210 LBS

## 2020-02-20 PROBLEM — I73.9 PVD (PERIPHERAL VASCULAR DISEASE): Status: ACTIVE | Noted: 2020-02-20

## 2020-02-20 PROBLEM — N40.0 BPH (BENIGN PROSTATIC HYPERPLASIA): Status: ACTIVE | Noted: 2020-02-20

## 2020-02-20 RX ORDER — MULTIVIT-MIN/FOLIC/VIT K/LYCOP 400-300MCG
500 TABLET ORAL DAILY
Qty: 90 | Refills: 3 | Status: ACTIVE | COMMUNITY
Start: 2020-02-20

## 2020-02-20 RX ORDER — GLIMEPIRIDE 4 MG/1
4 TABLET ORAL
Refills: 0 | Status: DISCONTINUED | COMMUNITY
End: 2020-02-20

## 2020-02-20 RX ORDER — INSULIN GLARGINE 100 [IU]/ML
100 INJECTION, SOLUTION SUBCUTANEOUS
Refills: 3 | Status: ACTIVE | COMMUNITY
Start: 2020-02-20

## 2020-02-20 RX ORDER — ASPIRIN ENTERIC COATED TABLETS 81 MG 81 MG/1
81 TABLET, DELAYED RELEASE ORAL
Refills: 0 | Status: DISCONTINUED | COMMUNITY
End: 2020-02-20

## 2020-02-20 RX ORDER — METOPROLOL SUCCINATE 50 MG/1
50 TABLET, EXTENDED RELEASE ORAL DAILY
Qty: 90 | Refills: 3 | Status: ACTIVE | COMMUNITY
Start: 2020-02-20

## 2020-02-20 RX ORDER — LOSARTAN POTASSIUM AND HYDROCHLOROTHIAZIDE 12.5; 5 MG/1; MG/1
50-12.5 TABLET ORAL
Refills: 0 | Status: DISCONTINUED | COMMUNITY
End: 2020-02-20

## 2020-02-20 RX ORDER — INSULIN LISPRO 100 [IU]/ML
(50-50) 100 INJECTION, SUSPENSION SUBCUTANEOUS
Refills: 0 | Status: DISCONTINUED | COMMUNITY
End: 2020-02-20

## 2020-02-20 RX ORDER — ALFUZOSIN HYDROCHLORIDE 10 MG/1
10 TABLET, EXTENDED RELEASE ORAL
Refills: 0 | Status: DISCONTINUED | COMMUNITY
End: 2020-02-20

## 2020-02-20 NOTE — HISTORY OF PRESENT ILLNESS
[FreeTextEntry1] : Pt seen and examined in the home with 2 sons present.  Pt is currently being treated for shingles of the Right side of head and neck and Right posterior scapula with Valacyclovir by Dr Del Rosario.  Pt has hx of B/L LE cellulitis and was DC'd from [facility] on Thursday after treatment for cellulitis.  VNS is currently coming in to do dressing changes on B/L LE.  Pt has extensive health history and follows with Dr Barkley for cardiology, Dr. Quintero for endocrinology, Dr Crews for pulmonary, Dr Cote for urology, Dr Mcpherson for vascular has nephrologist who he cannot remember the name right now thinks it is jennifer? The pt is on Coumadin has in home draws says after draw someone calls his daughter in law and tells her how to adjust, he was not sure if it is the Coumadin clinic or someone from dr del rosario's office.  The pt is not sure if he will continue with the home visit program but is open to trying it for now.  The pt is observed sitting on the couch is on 3L NC O2, uses walker for ambulation, he is overweight B/L LE erythema mixed with chronic skin changes and discoloration.

## 2020-02-20 NOTE — PHYSICAL EXAM
[General Appearance - Alert] : alert [General Appearance - In No Acute Distress] : in no acute distress [Sclera] : the sclera and conjunctiva were normal [General Appearance - Well Nourished] : well nourished [Normal Oral Mucosa] : normal oral mucosa [No Oral Pallor] : no oral pallor [PERRL With Normal Accommodation] : pupils were equal in size, round, and reactive to light [No Oral Cyanosis] : no oral cyanosis [Outer Ear] : the ears and nose were normal in appearance [Oropharynx] : The oropharynx was normal [Examination Of The Oral Cavity] : the lips and gums were normal [Neck Cervical Mass (___cm)] : no neck mass was observed [Thyroid Diffuse Enlargement] : the thyroid was not enlarged [Respiration, Rhythm And Depth] : normal respiratory rhythm and effort [Exaggerated Use Of Accessory Muscles For Inspiration] : no accessory muscle use [] : no respiratory distress [Auscultation Breath Sounds / Voice Sounds] : lungs were clear to auscultation bilaterally [Heart Rate And Rhythm] : heart rate was normal and rhythm regular [Heart Sounds Gallop] : no gallops [Heart Sounds Pericardial Friction Rub] : no pericardial rub [Full Pulse] : the pedal pulses are present [Bowel Sounds] : normal bowel sounds [Abdomen Tenderness] : non-tender [Abdomen Soft] : soft [Skin Turgor] : normal skin turgor [Nail Clubbing] : no clubbing  or cyanosis of the fingernails [Sensation] : the sensory exam was normal to light touch and pinprick [Impaired Insight] : insight and judgment were intact [No Focal Deficits] : no focal deficits [Oriented To Time, Place, And Person] : oriented to person, place, and time [Mood] : the mood was normal [Affect] : the affect was normal [FreeTextEntry1] : B/LE skin discoloration B/L LE erythema d/t cellulitis; shingles lesions noted on R side of head, neck and posterior neck and scapula

## 2020-02-20 NOTE — REVIEW OF SYSTEMS
[Lower Ext Edema] : lower extremity edema [Limb Swelling] : limb swelling [Incontinence] : incontinence [Difficulty Walking] : difficulty walking [Fever] : no fever [Red Eyes] : eyes not red [Chills] : no chills [Eye Pain] : no eye pain [Discharge From Eyes] : no purulent discharge from the eyes [Nosebleeds] : no nosebleeds [Earache] : no earache [Sore Throat] : no sore throat [Hoarseness] : no hoarseness [Nasal Discharge] : no nasal discharge [Chest Pain] : no chest pain [Shortness Of Breath] : no shortness of breath [SOB on Exertion] : no shortness of breath during exertion [Wheezing] : no wheezing [Cough] : no cough [Abdominal Pain] : no abdominal pain [Vomiting] : no vomiting [Skin Lesions] : no skin lesions [Skin Wound] : no skin wound [Diarrhea] : no diarrhea [Anxiety] : no anxiety [Dizziness] : no dizziness [Confused] : no confusion [Swollen Glands] : no swollen glands [Depression] : no depression [Swollen Glands In The Neck] : no swollen glands in the neck [FreeTextEntry9] : B/L LE cellulitis

## 2020-02-20 NOTE — SOCIAL HISTORY
[No falls in past year] : Patient reported no falls in the past year [Oxygen Equipment] : oxygen equipment [Walker] : walker [FreeTextEntry1] : sons/family [de-identified] : needs assistance

## 2020-02-20 NOTE — ASSESSMENT
[FreeTextEntry1] : shingles lesions noted on R side of head, neck, posterior neck and scapula pt being treated by Dr Kruger, B/L [place] erythema cellulitis chronic skin changes care for by VNS, pt on 3 L NC O2 no signs of distress, pt observed ambulating with walker encouraged pt to keep LE elevated, will have PT come to home for evaluation

## 2020-02-25 ENCOUNTER — LABORATORY RESULT (OUTPATIENT)
Age: 80
End: 2020-02-25

## 2020-03-03 ENCOUNTER — LABORATORY RESULT (OUTPATIENT)
Age: 80
End: 2020-03-03

## 2020-03-16 ENCOUNTER — LABORATORY RESULT (OUTPATIENT)
Age: 80
End: 2020-03-16

## 2020-03-17 ENCOUNTER — APPOINTMENT (OUTPATIENT)
Dept: GERIATRICS | Facility: HOME HEALTH | Age: 80
End: 2020-03-17
Payer: MEDICARE

## 2020-03-17 VITALS
BODY MASS INDEX: 26.91 KG/M2 | TEMPERATURE: 97.8 F | WEIGHT: 177 LBS | RESPIRATION RATE: 18 BRPM | DIASTOLIC BLOOD PRESSURE: 78 MMHG | SYSTOLIC BLOOD PRESSURE: 130 MMHG | OXYGEN SATURATION: 99 % | HEART RATE: 64 BPM

## 2020-03-17 DIAGNOSIS — I48.91 UNSPECIFIED ATRIAL FIBRILLATION: ICD-10-CM

## 2020-03-17 DIAGNOSIS — E78.5 HYPERLIPIDEMIA, UNSPECIFIED: ICD-10-CM

## 2020-03-17 DIAGNOSIS — L03.116 CELLULITIS OF LEFT LOWER LIMB: ICD-10-CM

## 2020-03-17 DIAGNOSIS — J44.9 CHRONIC OBSTRUCTIVE PULMONARY DISEASE, UNSPECIFIED: ICD-10-CM

## 2020-03-17 DIAGNOSIS — L03.115 CELLULITIS OF RIGHT LOWER LIMB: ICD-10-CM

## 2020-03-17 DIAGNOSIS — I50.9 HEART FAILURE, UNSPECIFIED: ICD-10-CM

## 2020-03-17 DIAGNOSIS — Z86.39 PERSONAL HISTORY OF OTHER ENDOCRINE, NUTRITIONAL AND METABOLIC DISEASE: ICD-10-CM

## 2020-03-17 PROCEDURE — 99348 HOME/RES VST EST LOW MDM 30: CPT

## 2020-03-17 RX ORDER — METOLAZONE 5 MG/1
5 TABLET ORAL DAILY
Qty: 90 | Refills: 3 | Status: ACTIVE | COMMUNITY
Start: 2020-02-20

## 2020-03-17 RX ORDER — TAMSULOSIN HYDROCHLORIDE 0.4 MG/1
0.4 CAPSULE ORAL DAILY
Qty: 90 | Refills: 3 | Status: ACTIVE | COMMUNITY
Start: 2020-02-20

## 2020-03-17 RX ORDER — FUROSEMIDE 40 MG/1
40 TABLET ORAL TWICE DAILY
Refills: 0 | Status: ACTIVE | COMMUNITY
Start: 2020-02-20

## 2020-03-17 RX ORDER — PANTOPRAZOLE 40 MG/1
40 TABLET, DELAYED RELEASE ORAL DAILY
Qty: 90 | Refills: 3 | Status: ACTIVE | COMMUNITY
Start: 2020-02-20

## 2020-03-17 NOTE — ASSESSMENT
[FreeTextEntry1] : Afib\par - stable RRR on exam \par - on coumadin INR = 2.69 - continue current dose of coumadin f/u PT/INR 2 weeks\par \par COPD / CHF\par - No SOB, No wheezing, no rales\par - Pt stable on 2L NC O2\par - Continue daily weight checks\par \par B/L LE cellulitis\par - healing no erythema\par - chronic skin changes at baseline as per family\par - dressing changes done by family; no signs of infection or drainage

## 2020-03-17 NOTE — PHYSICAL EXAM
[General Appearance - Alert] : alert [General Appearance - In No Acute Distress] : in no acute distress [General Appearance - Well Nourished] : well nourished [Sclera] : the sclera and conjunctiva were normal [PERRL With Normal Accommodation] : pupils were equal in size, round, and reactive to light [No Oral Pallor] : no oral pallor [No Oral Cyanosis] : no oral cyanosis [Outer Ear] : the ears and nose were normal in appearance [Examination Of The Oral Cavity] : the lips and gums were normal [Oropharynx] : The oropharynx was normal [Neck Appearance] : the appearance of the neck was normal [Neck Cervical Mass (___cm)] : no neck mass was observed [Jugular Venous Distention Increased] : there was no jugular-venous distention [Thyroid Diffuse Enlargement] : the thyroid was not enlarged [Respiration, Rhythm And Depth] : normal respiratory rhythm and effort [Exaggerated Use Of Accessory Muscles For Inspiration] : no accessory muscle use [Auscultation Breath Sounds / Voice Sounds] : lungs were clear to auscultation bilaterally [Heart Rate And Rhythm] : heart rate was normal and rhythm regular [Heart Sounds] : normal S1 and S2 [Heart Sounds Gallop] : no gallops [Heart Sounds Pericardial Friction Rub] : no pericardial rub [Full Pulse] : the pedal pulses are present [Bowel Sounds] : normal bowel sounds [Abdomen Soft] : soft [Abdomen Tenderness] : non-tender [Nail Clubbing] : no clubbing  or cyanosis of the fingernails [Involuntary Movements] : no involuntary movements were seen [Skin Color & Pigmentation] : normal skin color and pigmentation [Skin Turgor] : normal skin turgor [] : no rash [Skin Lesions] : no skin lesions [Sensation] : the sensory exam was normal to light touch and pinprick [No Focal Deficits] : no focal deficits [Oriented To Time, Place, And Person] : oriented to person, place, and time [Affect] : the affect was normal [FreeTextEntry1] : non distended

## 2020-03-17 NOTE — HISTORY OF PRESENT ILLNESS
[FreeTextEntry1] : The patient is a 80 year old male seen and examined in the home with his son present.  The patient was recently dc'd from [facility].  He has hx of chronic B/L LE edema which looks significantly better than his last visit in Feb.  B/L LE wounds healing and are being cared for by patients daughter in law and pt states he is able to change dressings himself at times.  He is ambulating with rolling walker and he is on 2L NC O2.  At last visit he was suffering from shingles was treated with abx and lesions have since resolved.  The pt denies chest pain or pressure, denies any acute complaints, denies SOB, denies cough, fever, or congestion.

## 2020-03-31 ENCOUNTER — LABORATORY RESULT (OUTPATIENT)
Age: 80
End: 2020-03-31

## 2020-04-14 ENCOUNTER — LABORATORY RESULT (OUTPATIENT)
Age: 80
End: 2020-04-14

## 2020-04-21 ENCOUNTER — LABORATORY RESULT (OUTPATIENT)
Age: 80
End: 2020-04-21

## 2020-04-25 ENCOUNTER — INPATIENT (INPATIENT)
Facility: HOSPITAL | Age: 80
LOS: 17 days | Discharge: HOME HEALTH | End: 2020-05-13
Attending: INTERNAL MEDICINE | Admitting: INTERNAL MEDICINE
Payer: MEDICARE

## 2020-04-25 VITALS
RESPIRATION RATE: 16 BRPM | OXYGEN SATURATION: 97 % | DIASTOLIC BLOOD PRESSURE: 63 MMHG | TEMPERATURE: 98 F | SYSTOLIC BLOOD PRESSURE: 135 MMHG | HEART RATE: 54 BPM

## 2020-04-25 DIAGNOSIS — Z95.1 PRESENCE OF AORTOCORONARY BYPASS GRAFT: Chronic | ICD-10-CM

## 2020-04-25 DIAGNOSIS — Z95.5 PRESENCE OF CORONARY ANGIOPLASTY IMPLANT AND GRAFT: Chronic | ICD-10-CM

## 2020-04-25 LAB
ALBUMIN SERPL ELPH-MCNC: 3.8 G/DL — SIGNIFICANT CHANGE UP (ref 3.5–5.2)
ALP SERPL-CCNC: 57 U/L — SIGNIFICANT CHANGE UP (ref 30–115)
ALT FLD-CCNC: 15 U/L — SIGNIFICANT CHANGE UP (ref 0–41)
ANION GAP SERPL CALC-SCNC: 20 MMOL/L — HIGH (ref 7–14)
APPEARANCE UR: CLEAR — SIGNIFICANT CHANGE UP
APTT BLD: 47.8 SEC — HIGH (ref 27–39.2)
AST SERPL-CCNC: 32 U/L — SIGNIFICANT CHANGE UP (ref 0–41)
BACTERIA # UR AUTO: ABNORMAL
BASOPHILS # BLD AUTO: 0.05 K/UL — SIGNIFICANT CHANGE UP (ref 0–0.2)
BASOPHILS NFR BLD AUTO: 0.6 % — SIGNIFICANT CHANGE UP (ref 0–1)
BILIRUB SERPL-MCNC: 0.9 MG/DL — SIGNIFICANT CHANGE UP (ref 0.2–1.2)
BILIRUB UR-MCNC: NEGATIVE — SIGNIFICANT CHANGE UP
BLD GP AB SCN SERPL QL: SIGNIFICANT CHANGE UP
BUN SERPL-MCNC: 126 MG/DL — CRITICAL HIGH (ref 10–20)
CALCIUM SERPL-MCNC: 9.7 MG/DL — SIGNIFICANT CHANGE UP (ref 8.5–10.1)
CHLORIDE SERPL-SCNC: 92 MMOL/L — LOW (ref 98–110)
CO2 SERPL-SCNC: 25 MMOL/L — SIGNIFICANT CHANGE UP (ref 17–32)
COLOR SPEC: SIGNIFICANT CHANGE UP
CREAT SERPL-MCNC: 2.8 MG/DL — HIGH (ref 0.7–1.5)
DIFF PNL FLD: NEGATIVE — SIGNIFICANT CHANGE UP
EOSINOPHIL # BLD AUTO: 0.07 K/UL — SIGNIFICANT CHANGE UP (ref 0–0.7)
EOSINOPHIL NFR BLD AUTO: 0.9 % — SIGNIFICANT CHANGE UP (ref 0–8)
EPI CELLS # UR: 1 /HPF — SIGNIFICANT CHANGE UP (ref 0–5)
ETHANOL SERPL-MCNC: <10 MG/DL — SIGNIFICANT CHANGE UP
GLUCOSE BLDC GLUCOMTR-MCNC: 149 MG/DL — HIGH (ref 70–99)
GLUCOSE SERPL-MCNC: 125 MG/DL — HIGH (ref 70–99)
GLUCOSE UR QL: NEGATIVE — SIGNIFICANT CHANGE UP
HCT VFR BLD CALC: 37.9 % — LOW (ref 42–52)
HGB BLD-MCNC: 12.3 G/DL — LOW (ref 14–18)
HYALINE CASTS # UR AUTO: 1 /LPF — SIGNIFICANT CHANGE UP (ref 0–7)
IMM GRANULOCYTES NFR BLD AUTO: 0.5 % — HIGH (ref 0.1–0.3)
INR BLD: 2.13 RATIO — HIGH (ref 0.65–1.3)
KETONES UR-MCNC: NEGATIVE — SIGNIFICANT CHANGE UP
LACTATE SERPL-SCNC: 1.8 MMOL/L — SIGNIFICANT CHANGE UP (ref 0.7–2)
LEUKOCYTE ESTERASE UR-ACNC: ABNORMAL
LIDOCAIN IGE QN: 29 U/L — SIGNIFICANT CHANGE UP (ref 7–60)
LYMPHOCYTES # BLD AUTO: 1.62 K/UL — SIGNIFICANT CHANGE UP (ref 1.2–3.4)
LYMPHOCYTES # BLD AUTO: 20.8 % — SIGNIFICANT CHANGE UP (ref 20.5–51.1)
MAGNESIUM SERPL-MCNC: 2.1 MG/DL — SIGNIFICANT CHANGE UP (ref 1.8–2.4)
MCHC RBC-ENTMCNC: 27.9 PG — SIGNIFICANT CHANGE UP (ref 27–31)
MCHC RBC-ENTMCNC: 32.5 G/DL — SIGNIFICANT CHANGE UP (ref 32–37)
MCV RBC AUTO: 85.9 FL — SIGNIFICANT CHANGE UP (ref 80–94)
MONOCYTES # BLD AUTO: 0.89 K/UL — HIGH (ref 0.1–0.6)
MONOCYTES NFR BLD AUTO: 11.4 % — HIGH (ref 1.7–9.3)
NEUTROPHILS # BLD AUTO: 5.13 K/UL — SIGNIFICANT CHANGE UP (ref 1.4–6.5)
NEUTROPHILS NFR BLD AUTO: 65.8 % — SIGNIFICANT CHANGE UP (ref 42.2–75.2)
NITRITE UR-MCNC: NEGATIVE — SIGNIFICANT CHANGE UP
NRBC # BLD: 0 /100 WBCS — SIGNIFICANT CHANGE UP (ref 0–0)
PH UR: 6.5 — SIGNIFICANT CHANGE UP (ref 5–8)
PLATELET # BLD AUTO: 171 K/UL — SIGNIFICANT CHANGE UP (ref 130–400)
POTASSIUM SERPL-MCNC: 3.3 MMOL/L — LOW (ref 3.5–5)
POTASSIUM SERPL-SCNC: 3.3 MMOL/L — LOW (ref 3.5–5)
PROT SERPL-MCNC: 7.3 G/DL — SIGNIFICANT CHANGE UP (ref 6–8)
PROT UR-MCNC: NEGATIVE — SIGNIFICANT CHANGE UP
PROTHROM AB SERPL-ACNC: 24.5 SEC — HIGH (ref 9.95–12.87)
RBC # BLD: 4.41 M/UL — LOW (ref 4.7–6.1)
RBC # FLD: 19.3 % — HIGH (ref 11.5–14.5)
RBC CASTS # UR COMP ASSIST: 0 /HPF — SIGNIFICANT CHANGE UP (ref 0–4)
SODIUM SERPL-SCNC: 137 MMOL/L — SIGNIFICANT CHANGE UP (ref 135–146)
SP GR SPEC: 1.01 — SIGNIFICANT CHANGE UP (ref 1.01–1.02)
TROPONIN T SERPL-MCNC: 0.11 NG/ML — CRITICAL HIGH
UROBILINOGEN FLD QL: SIGNIFICANT CHANGE UP
WBC # BLD: 7.8 K/UL — SIGNIFICANT CHANGE UP (ref 4.8–10.8)
WBC # FLD AUTO: 7.8 K/UL — SIGNIFICANT CHANGE UP (ref 4.8–10.8)
WBC UR QL: 8 /HPF — HIGH (ref 0–5)

## 2020-04-25 PROCEDURE — 72170 X-RAY EXAM OF PELVIS: CPT | Mod: 26

## 2020-04-25 PROCEDURE — 72125 CT NECK SPINE W/O DYE: CPT | Mod: 26

## 2020-04-25 PROCEDURE — 73110 X-RAY EXAM OF WRIST: CPT | Mod: 26,RT

## 2020-04-25 PROCEDURE — 70450 CT HEAD/BRAIN W/O DYE: CPT | Mod: 26,59

## 2020-04-25 PROCEDURE — 93010 ELECTROCARDIOGRAM REPORT: CPT

## 2020-04-25 PROCEDURE — 70498 CT ANGIOGRAPHY NECK: CPT | Mod: 26

## 2020-04-25 PROCEDURE — 99291 CRITICAL CARE FIRST HOUR: CPT

## 2020-04-25 PROCEDURE — 99221 1ST HOSP IP/OBS SF/LOW 40: CPT

## 2020-04-25 PROCEDURE — 71045 X-RAY EXAM CHEST 1 VIEW: CPT | Mod: 26

## 2020-04-25 PROCEDURE — 99212 OFFICE O/P EST SF 10 MIN: CPT

## 2020-04-25 PROCEDURE — 70496 CT ANGIOGRAPHY HEAD: CPT | Mod: 26

## 2020-04-25 RX ORDER — PHYTONADIONE (VIT K1) 5 MG
10 TABLET ORAL ONCE
Refills: 0 | Status: COMPLETED | OUTPATIENT
Start: 2020-04-25 | End: 2020-04-25

## 2020-04-25 RX ORDER — PROTHROMBIN COMPLEX CONCENTRATE (HUMAN) 25.5; 16.5; 24; 22; 22; 26 [IU]/ML; [IU]/ML; [IU]/ML; [IU]/ML; [IU]/ML; [IU]/ML
1500 POWDER, FOR SOLUTION INTRAVENOUS ONCE
Refills: 0 | Status: COMPLETED | OUTPATIENT
Start: 2020-04-25 | End: 2020-04-25

## 2020-04-25 RX ORDER — TETANUS TOXOID, REDUCED DIPHTHERIA TOXOID AND ACELLULAR PERTUSSIS VACCINE, ADSORBED 5; 2.5; 8; 8; 2.5 [IU]/.5ML; [IU]/.5ML; UG/.5ML; UG/.5ML; UG/.5ML
0.5 SUSPENSION INTRAMUSCULAR ONCE
Refills: 0 | Status: COMPLETED | OUTPATIENT
Start: 2020-04-25 | End: 2020-04-25

## 2020-04-25 RX ADMIN — Medication 102 MILLIGRAM(S): at 20:06

## 2020-04-25 RX ADMIN — TETANUS TOXOID, REDUCED DIPHTHERIA TOXOID AND ACELLULAR PERTUSSIS VACCINE, ADSORBED 0.5 MILLILITER(S): 5; 2.5; 8; 8; 2.5 SUSPENSION INTRAMUSCULAR at 16:52

## 2020-04-25 RX ADMIN — PROTHROMBIN COMPLEX CONCENTRATE (HUMAN) 400 INTERNATIONAL UNIT(S): 25.5; 16.5; 24; 22; 22; 26 POWDER, FOR SOLUTION INTRAVENOUS at 20:06

## 2020-04-25 NOTE — ED ADULT TRIAGE NOTE - CHIEF COMPLAINT QUOTE
Fall, loss of balance. Pt fell back and hit his head, denies LOC, pt on coumadin. Trauma alert called

## 2020-04-25 NOTE — CONSULT NOTE ADULT - ASSESSMENT
81 yo gentleman s/p fall sustaining RIGHT third and lateral intraventricular hemorrhage      - CTA head and neck now    - Repeat CT head wo in 6-8 hours    - Q1 hour NC    - Correct INR    - Hold Coumadin    - Normotensive BP control        - <160    No need for Keppra at this time      - Will continue to follow    Above plan per Dr Zhang

## 2020-04-25 NOTE — ED PROVIDER NOTE - PHYSICAL EXAMINATION
Physical Exam    Vital Signs: I have reviewed the initial vital signs.  Constitutional: well-nourished, appears stated age, no acute distress  Eyes: Conjunctiva pink, Sclera clear, PERRLA, EOMI.   ENT: No hemotympanum b/l.   Cardiovascular: irregular rate, irregular rhythm, well-perfused extremities, radial and pedal pulses equal and 2+ b/l  Respiratory: unlabored respiratory effort, b/l diffuse mild wheezing with crackles at the bases. no accessory muscle use.   Gastrointestinal: soft, non-tender, non-distended abdomen, no pulsatile mass, no organomegaly. no guarding. no rebound   Musculoskeletal: supple neck, b/l venous stasis ulcers to the lower extremities. no midline tenderness. no paraspinal tenderness. no spinal step offs.   Integumentary: warm, dry. b/l ecchymosis to the forearms. abrasion to the right wrist. FROM of right wrist. no obvious right wrist deformity. abrasion to the right parietal scalp with surrounding hematoma.   Neurologic: atraumatic normocephalic. a&o x3, extremities’ motor and sensory functions grossly intact Physical Exam    Vital Signs: I have reviewed the initial vital signs.  Constitutional: well-nourished, appears stated age, no acute distress  Eyes: Conjunctiva pink, Sclera clear, PERRLA, EOMI.   ENT: No hemotympanum b/l.   Cardiovascular: irregular rate, irregular rhythm, well-perfused extremities, radial and pedal pulses equal and 2+ b/l  Respiratory: unlabored respiratory effort, b/l diffuse mild wheezing with crackles at the bases. no accessory muscle use.   Gastrointestinal: soft, non-tender, non-distended abdomen, no pulsatile mass, no organomegaly. no guarding. no rebound   Musculoskeletal: supple neck, b/l venous stasis ulcers to the lower extremities. no midline tenderness. no paraspinal tenderness. no spinal step offs.   Integumentary: warm, dry. b/l ecchymosis to the forearms. abrasion to the right wrist. FROM of right wrist. no obvious right wrist deformity. abrasion to the right parietal scalp with surrounding hematoma. abrasion to left shoulder  Neurologic: atraumatic normocephalic. a&o x3, extremities’ motor and sensory functions grossly intact

## 2020-04-25 NOTE — CONSULT NOTE ADULT - ATTENDING COMMENTS
Patient seen and examined with trauma surgery team in ED and discussed management plans. Ct reviewed patient A&O. states lives with son. Multiple medical issues. Please follow up with your primary care physician regarding your hospitalization. Please follow up with your primary care physician regarding your hospitalization Dr. Kruger, card Dr. Ramirez and Vascular Dr. butler. unsure of fall denies any pain hard of hearing, Moving all ext. Abrasion R occipital area and r forearm. Chronic venous stasis changes both LE dressings in place. Ct reviewed + Right Intracebral/ventricular hemorrhage. Neuro surgery evaluation and reversal of anticoagulation.

## 2020-04-25 NOTE — CONSULT NOTE ADULT - SUBJECTIVE AND OBJECTIVE BOX
HPI:   79 y/o male with a PMH of Afib on coumadin, CABG, DM, HTN, COPD, and BPH presented to the ED after falling down in his home and hitting the back of his head. Patient unable to recollect the incident, or anything prior to or following the incident. Per report (EMS), his son witnessed the fall.  Presently patient denies any headaches, nausea, vomiting or blurry vision. Does not complain of any other pain, numbness or tingling in arms or legs. Denies SOB, chest pain or abdominal pain. Does not endorse any bowel or bladder incontinence at this time. Patient is hard of hearing in both ears    Vital Signs Last 24 Hrs  T(C): 36.7 (2020 16:25), Max: 36.7 (2020 16:25)  T(F): 98 (2020 16:25), Max: 98 (2020 16:25)  HR: 75 (2020 18:39) (54 - 75)  BP: 119/53 (2020 18:39) (119/53 - 135/63)  BP(mean): --  RR: 16 (2020 18:39) (16 - 16)  SpO2: 98% (2020 18:39) (97% - 98%)    PHYSICAL EXAM:  GENERAL: NAD, thin, resting in bed  HEAD: RIGHT posterior abrasion with associated edema.     JOSHUA COMA SCORE: E- V- M- =15        MENTAL STATUS: AAO x3; Awake; Opens eyes spontaneously; Appropriately conversant without aphasial; following simple commands  CRANIAL NERVES: PERRL. EOMI without nystagmus. Facial sensation intact V1-3 distribution b/l. Face symmetric w/ normal eye closure and smile, tongue midline. Hearing diminished bilaterally, but able to hear. Speech clear. Head turning and shoulder shrug intact.   REFLEXES: PERRL. Corneals intact b/l. Gag intact. Cough intact. Negative Victoria's b/l.   MOTOR: strength 5/5 b/l upper and lower extremities   No pronator drift  SENSATION: grossly intact to light touch all extremities    ABDOMEN: Soft, nontender  EXTREMITIES:  edema with bilateral dressings     LABS:                        12.3   7.80  )-----------( 171      ( 2020 16:45 )             37.9     04-    137  |  92<L>  |  126<HH>  ----------------------------<  125<H>  3.3<L>   |  25  |  2.8<H>    Ca    9.7      2020 16:45  Mg     2.1         TPro  7.3  /  Alb  3.8  /  TBili  0.9  /  DBili  x   /  AST  32  /  ALT  15  /  AlkPhos  57      PT/INR - ( 2020 16:45 )   PT: 24.50 sec;   INR: 2.13 ratio         PTT - ( 2020 16:45 )  PTT:47.8 sec  Urinalysis Basic - ( 2020 16:50 )    Color: Light Yellow / Appearance: Clear / S.012 / pH: x  Gluc: x / Ketone: Negative  / Bili: Negative / Urobili: <2 mg/dL   Blood: x / Protein: Negative / Nitrite: Negative   Leuk Esterase: Small / RBC: 0 /HPF / WBC 8 /HPF   Sq Epi: x / Non Sq Epi: 1 /HPF / Bacteria: Few      RADIOLOGY & ADDITIONAL TESTS:    CT head wo 20    Study demonstrates RIGHT-sided lateral and third intraventricular hemorrhage. Also noted is RIGHT posterior soft tissue hematoma.     Imaging reviewed with attending, Vicente

## 2020-04-25 NOTE — ED PROVIDER NOTE - CLINICAL SUMMARY MEDICAL DECISION MAKING FREE TEXT BOX
Patient signed out to me by Dr. Alaniz, patient has + ICH, evaluated by trauma and Nsx, patient given coumadin reversal, patient admitted to sicu, remains aaox3

## 2020-04-25 NOTE — CONSULT NOTE ADULT - ATTENDING COMMENTS
Patient seen and examined.  Imaging reviewed.    No indication for neurosurgical intervention.    Unusual interventricular hematoma appearance    CTA right vert narrowing - consider neurointerventional consult  recommend MRI +/- prior to discharge to evaluate for intraventricular cavernoma

## 2020-04-25 NOTE — ED ADULT NURSE NOTE - CHIEF COMPLAINT QUOTE
Fall, loss of balance. Pt fell back and hit his head, denies LOC, pt on coumadin. Trauma alert called Declined

## 2020-04-25 NOTE — ED PROVIDER NOTE - NS ED ROS FT
CONST: No fever, chills or bodyaches  EYES: No pain, redness, drainage or visual changes.  ENT: No ear pain or discharge, nasal discharge or congestion. No sore throat  CARD: No chest pain, palpitations  RESP: No SOB, cough, hemoptysis. No hx of asthma or COPD  GI: No abdominal pain, N/V/D  : No urinary symptoms   MS: No joint pain, back pain or extremity pain/injury  SKIN: (+) abrasions to the right wrist and right parietal scalp. with ecchymosis to b/l forearms. b/l venous statsis ulcer to the shins. No rashes  NEURO: No headache, dizziness, paresthesias or LOC

## 2020-04-25 NOTE — ED PROVIDER NOTE - PMH
Afib    BPH (benign prostatic hyperplasia)    CHF (congestive heart failure)    COPD (chronic obstructive pulmonary disease)    Diabetes    HTN (hypertension)

## 2020-04-25 NOTE — ED PROVIDER NOTE - PROGRESS NOTE DETAILS
ATTENDING NOTE: I personally evaluated the patient. I reviewed the Physician Assistant’s note (as assigned above), and agree with the findings and plan except as documented in my note.   79 y/o M with PMH of AFib on Coumadin, HTN, and COPD BIBA s/p fall.  As per son pt fell from standing. Pt does not recall events prior to or after fall but denies HA, neck/ back pain, or other complaints at this time.   NAD, (+)abrasion to R parietal scalp, otherwise NCAT. MMM. Heart RRR, no m/r/g. Lungs CTAB. Abdomen soft, no guarding or rigidity. (+)slight abrasion R wrist, no deformity, FROM, ecchymosis to b/l forearms, venus stasis ulcers to b/l shins. Neuro finger to nose normal, no pronator drift, no nystagmus, sensation intact b/l.   Plan for CT head and neck, pelvis XR, CXR, and reassess. Neurosurgery aware of patient ct head findings, will evaluate in the ED, aware coumadin will be reversed. Trauma aware of findings as well. spoke with son delbert aware of plan. spoke with neurosurg yuliet, dr. watkins states no need for keppra due to location only in the ventricle. would like repeat ct in 6-8 hours. cta of head and neck AT THE SAME TIME. q 1 neuro checks. spoke with neurosurg yuliet, dr. watkins states no need for keppra due to location only in the ventricle. would like repeat ct in 6-8 hours. keep bp systolic <160. cta of head and neck AT THE SAME TIME. q 1 neuro checks. Received sign out from Dr. Ureña. Will f/u repeat imaging and cont to monitor. Per trauma surg, question of if cause of bleed is from trauma vs htn. Neurosurg recommends cta head/neck for evaluation,

## 2020-04-25 NOTE — ED PROVIDER NOTE - ATTENDING CONTRIBUTION TO CARE
I personally evaluated the patient. I reviewed the Resident’s or Physician Assistant’s note (as assigned above), and agree with the findings and plan except as documented in my note.    see scribe

## 2020-04-25 NOTE — CONSULT NOTE ADULT - ASSESSMENT
ASSESSMENT/PLAN:  80F, PMHx Afib on Coumadin, HTN, DM, COPD, presents to ED s/p fall, +HT, +LOC, +AC. Pt does not recall the event, event was witnessed. Pt presents w/ posterior head laceration and abrasions to his Right wrist.  - f/u PAN scan   - f/u full set of labs

## 2020-04-25 NOTE — ED PROVIDER NOTE - CARE PLAN
Principal Discharge DX:	Ventricular hemorrhage  Secondary Diagnosis:	Fall  Secondary Diagnosis:	Abrasion

## 2020-04-25 NOTE — ED PROVIDER NOTE - OBJECTIVE STATEMENT
79 y/o male with a PMH of afib on coumadin, CABG, DM, HTN, COPD, and BPH presents to the ED s/p fall from standing witnessed by son. Pt denies recollection prior to fall, during, and post falling. EMS states son witnessed fall fall down in the house and hit the back of his head. pt denies chest pain, sob, abdominal pain, neck, back or pelvis pain, dizziness, numbness, tingling, urinary or bowel retention or incontinence, or visual changes.

## 2020-04-25 NOTE — CONSULT NOTE ADULT - SUBJECTIVE AND OBJECTIVE BOX
TRAUMA ACTIVATION LEVEL:  TRAUMA ALERT    MECHANISM OF INJURY:      [] Blunt  	[] MVC	[X] Fall	[] Pedestrian Struck	[] Motorcycle   [] Assault   [] Bicycle collision  [] Sports injury     [] Penetrating  	[] Gun Shot Wound 		[] Stab Wound    GCS: 15 	E: 4	V: 5	M: 6    HPI:  80F, PMHx Afib on Coumadin, HTN, DM, COPD, presents to ED s/p fall, +HT, +LOC, +AC. Pt does not recall the event, event was witnessed. Pt presents w/ posterior head laceration and abrasions to his Right wrist. Otherwise, no other complaints.     PAST MEDICAL & SURGICAL HISTORY:  Afib  BPH (benign prostatic hyperplasia)  CHF (congestive heart failure)  COPD (chronic obstructive pulmonary disease)  Diabetes  HTN (hypertension)  H/O heart artery stent  S/P CABG x 3      Allergies    No Known Allergies    Intolerances        Home Medications:  budesonide-formoterol 160 mcg-4.5 mcg/inh inhalation aerosol:  inhaled  (30 Jan 2020 13:19)  clopidogrel 75 mg oral tablet: 1 tab(s) orally once a day (30 Jan 2020 13:19)  fenofibrate 145 mg oral tablet: 1 tab(s) orally once a day (30 Jan 2020 13:19)  finasteride 5 mg oral tablet: 1 tab(s) orally once a day (10 Feb 2020 17:44)  fluticasone 50 mcg/inh nasal spray: 1 spray(s) nasal 2 times a day (30 Jan 2020 13:19)  isosorbide mononitrate 30 mg oral tablet, extended release: 1 tab(s) orally once a day (in the morning) (30 Jan 2020 15:28)  Metoprolol Succinate ER 50 mg oral tablet, extended release: 1 tab(s) orally once a day (12 Feb 2020 15:22)  pantoprazole 40 mg oral delayed release tablet: 1 tab(s) orally once a day (before a meal) (10 Feb 2020 17:44)  silver sulfADIAZINE 1% topical cream: 1 application topically once a day (10 Feb 2020 17:44)      ROS: 10-system review is otherwise negative except HPI above.      Primary Survey:    A - airway intact  B - bilateral breath sounds and good chest rise  C - palpable pulses in all extremities  D - GCS 15 on arrival, PARKER  Exposure obtained    Vital Signs Last 24 Hrs  T(C): 36.7 (25 Apr 2020 16:25), Max: 36.7 (25 Apr 2020 16:25)  T(F): 98 (25 Apr 2020 16:25), Max: 98 (25 Apr 2020 16:25)  HR: 54 (25 Apr 2020 16:25) (54 - 54)  BP: 135/63 (25 Apr 2020 16:25) (135/63 - 135/63)  BP(mean): --  RR: 16 (25 Apr 2020 16:25) (16 - 16)  SpO2: 97% (25 Apr 2020 16:25) (97% - 97%)    Secondary Survey:   General: NAD  HEENT: Posterior head laceration, EOMI, PEERLA.   Neck: Soft, midline trachea. no cspine tenderness  Chest: No chest wall tenderness. or subq  emphysema   Cardiac: S1, S2, RRR  Respiratory: Bilateral breath sounds, clear and equal bilaterally  Abdomen: Soft, non-distended, non-tender, no rebound,   Groin: Normal appearing, pelvis stable   Ext: palp radial b/l UE, b/l DP palp in Lower Extrem, abrasions to right wrist.   Back: no TTP, no palpable runoff/stepoff/deformity      FAST    Procedures:    LABS:  Labs:  CAPILLARY BLOOD GLUCOSE      POCT Blood Glucose.: 123 mg/dL (25 Apr 2020 16:37)        RADIOLOGY & ADDITIONAL STUDIES:  pending     ---------------------------------------------------------------------------------------

## 2020-04-26 LAB
ANION GAP SERPL CALC-SCNC: 17 MMOL/L — HIGH (ref 7–14)
APTT BLD: 33.4 SEC — SIGNIFICANT CHANGE UP (ref 27–39.2)
BUN SERPL-MCNC: 103 MG/DL — CRITICAL HIGH (ref 10–20)
CALCIUM SERPL-MCNC: 9.7 MG/DL — SIGNIFICANT CHANGE UP (ref 8.5–10.1)
CHLORIDE SERPL-SCNC: 93 MMOL/L — LOW (ref 98–110)
CO2 SERPL-SCNC: 28 MMOL/L — SIGNIFICANT CHANGE UP (ref 17–32)
CREAT SERPL-MCNC: 2.5 MG/DL — HIGH (ref 0.7–1.5)
GLUCOSE BLDC GLUCOMTR-MCNC: 147 MG/DL — HIGH (ref 70–99)
GLUCOSE BLDC GLUCOMTR-MCNC: 198 MG/DL — HIGH (ref 70–99)
GLUCOSE BLDC GLUCOMTR-MCNC: 213 MG/DL — HIGH (ref 70–99)
GLUCOSE BLDC GLUCOMTR-MCNC: 262 MG/DL — HIGH (ref 70–99)
GLUCOSE BLDC GLUCOMTR-MCNC: 266 MG/DL — HIGH (ref 70–99)
GLUCOSE SERPL-MCNC: 151 MG/DL — HIGH (ref 70–99)
HCT VFR BLD CALC: 33.6 % — LOW (ref 42–52)
HGB BLD-MCNC: 11.1 G/DL — LOW (ref 14–18)
INR BLD: 1.29 RATIO — SIGNIFICANT CHANGE UP (ref 0.65–1.3)
MAGNESIUM SERPL-MCNC: 2.2 MG/DL — SIGNIFICANT CHANGE UP (ref 1.8–2.4)
MCHC RBC-ENTMCNC: 28.7 PG — SIGNIFICANT CHANGE UP (ref 27–31)
MCHC RBC-ENTMCNC: 33 G/DL — SIGNIFICANT CHANGE UP (ref 32–37)
MCV RBC AUTO: 86.8 FL — SIGNIFICANT CHANGE UP (ref 80–94)
NRBC # BLD: 0 /100 WBCS — SIGNIFICANT CHANGE UP (ref 0–0)
PHOSPHATE SERPL-MCNC: 3.7 MG/DL — SIGNIFICANT CHANGE UP (ref 2.1–4.9)
PLATELET # BLD AUTO: 161 K/UL — SIGNIFICANT CHANGE UP (ref 130–400)
POTASSIUM SERPL-MCNC: 3.1 MMOL/L — LOW (ref 3.5–5)
POTASSIUM SERPL-SCNC: 3.1 MMOL/L — LOW (ref 3.5–5)
PROTHROM AB SERPL-ACNC: 14.8 SEC — HIGH (ref 9.95–12.87)
RBC # BLD: 3.87 M/UL — LOW (ref 4.7–6.1)
RBC # FLD: 19.4 % — HIGH (ref 11.5–14.5)
SODIUM SERPL-SCNC: 138 MMOL/L — SIGNIFICANT CHANGE UP (ref 135–146)
TROPONIN T SERPL-MCNC: 0.11 NG/ML — CRITICAL HIGH
TROPONIN T SERPL-MCNC: 0.12 NG/ML — CRITICAL HIGH
WBC # BLD: 6.7 K/UL — SIGNIFICANT CHANGE UP (ref 4.8–10.8)
WBC # FLD AUTO: 6.7 K/UL — SIGNIFICANT CHANGE UP (ref 4.8–10.8)

## 2020-04-26 PROCEDURE — 73030 X-RAY EXAM OF SHOULDER: CPT | Mod: 26,RT

## 2020-04-26 PROCEDURE — 93010 ELECTROCARDIOGRAM REPORT: CPT

## 2020-04-26 PROCEDURE — 93306 TTE W/DOPPLER COMPLETE: CPT | Mod: 26

## 2020-04-26 PROCEDURE — 99221 1ST HOSP IP/OBS SF/LOW 40: CPT

## 2020-04-26 PROCEDURE — 99291 CRITICAL CARE FIRST HOUR: CPT

## 2020-04-26 PROCEDURE — 93880 EXTRACRANIAL BILAT STUDY: CPT | Mod: 26

## 2020-04-26 RX ORDER — SENNA PLUS 8.6 MG/1
2 TABLET ORAL AT BEDTIME
Refills: 0 | Status: DISCONTINUED | OUTPATIENT
Start: 2020-04-26 | End: 2020-05-05

## 2020-04-26 RX ORDER — DEXTROSE 50 % IN WATER 50 %
12.5 SYRINGE (ML) INTRAVENOUS ONCE
Refills: 0 | Status: DISCONTINUED | OUTPATIENT
Start: 2020-04-26 | End: 2020-04-26

## 2020-04-26 RX ORDER — METOPROLOL TARTRATE 50 MG
50 TABLET ORAL DAILY
Refills: 0 | Status: DISCONTINUED | OUTPATIENT
Start: 2020-04-26 | End: 2020-04-26

## 2020-04-26 RX ORDER — INSULIN LISPRO 100/ML
VIAL (ML) SUBCUTANEOUS
Refills: 0 | Status: DISCONTINUED | OUTPATIENT
Start: 2020-04-26 | End: 2020-04-26

## 2020-04-26 RX ORDER — ACETAMINOPHEN 500 MG
650 TABLET ORAL EVERY 6 HOURS
Refills: 0 | Status: DISCONTINUED | OUTPATIENT
Start: 2020-04-26 | End: 2020-05-07

## 2020-04-26 RX ORDER — DEXTROSE 50 % IN WATER 50 %
25 SYRINGE (ML) INTRAVENOUS ONCE
Refills: 0 | Status: DISCONTINUED | OUTPATIENT
Start: 2020-04-26 | End: 2020-04-26

## 2020-04-26 RX ORDER — FINASTERIDE 5 MG/1
5 TABLET, FILM COATED ORAL DAILY
Refills: 0 | Status: DISCONTINUED | OUTPATIENT
Start: 2020-04-26 | End: 2020-04-26

## 2020-04-26 RX ORDER — GLUCAGON INJECTION, SOLUTION 0.5 MG/.1ML
1 INJECTION, SOLUTION SUBCUTANEOUS ONCE
Refills: 0 | Status: DISCONTINUED | OUTPATIENT
Start: 2020-04-26 | End: 2020-04-26

## 2020-04-26 RX ORDER — INSULIN GLARGINE 100 [IU]/ML
20 INJECTION, SOLUTION SUBCUTANEOUS AT BEDTIME
Refills: 0 | Status: DISCONTINUED | OUTPATIENT
Start: 2020-04-26 | End: 2020-04-26

## 2020-04-26 RX ORDER — INSULIN HUMAN 100 [IU]/ML
INJECTION, SOLUTION SUBCUTANEOUS EVERY 4 HOURS
Refills: 0 | Status: DISCONTINUED | OUTPATIENT
Start: 2020-04-26 | End: 2020-04-26

## 2020-04-26 RX ORDER — ISOSORBIDE MONONITRATE 60 MG/1
30 TABLET, EXTENDED RELEASE ORAL DAILY
Refills: 0 | Status: DISCONTINUED | OUTPATIENT
Start: 2020-04-26 | End: 2020-04-26

## 2020-04-26 RX ORDER — PANTOPRAZOLE SODIUM 20 MG/1
40 TABLET, DELAYED RELEASE ORAL
Refills: 0 | Status: DISCONTINUED | OUTPATIENT
Start: 2020-04-26 | End: 2020-04-26

## 2020-04-26 RX ORDER — SIMVASTATIN 20 MG/1
40 TABLET, FILM COATED ORAL AT BEDTIME
Refills: 0 | Status: DISCONTINUED | OUTPATIENT
Start: 2020-04-26 | End: 2020-05-13

## 2020-04-26 RX ORDER — ISOSORBIDE MONONITRATE 60 MG/1
30 TABLET, EXTENDED RELEASE ORAL DAILY
Refills: 0 | Status: DISCONTINUED | OUTPATIENT
Start: 2020-04-26 | End: 2020-05-13

## 2020-04-26 RX ORDER — FUROSEMIDE 40 MG
40 TABLET ORAL EVERY 12 HOURS
Refills: 0 | Status: DISCONTINUED | OUTPATIENT
Start: 2020-04-26 | End: 2020-04-30

## 2020-04-26 RX ORDER — TAMSULOSIN HYDROCHLORIDE 0.4 MG/1
0.4 CAPSULE ORAL AT BEDTIME
Refills: 0 | Status: DISCONTINUED | OUTPATIENT
Start: 2020-04-26 | End: 2020-05-13

## 2020-04-26 RX ORDER — POTASSIUM CHLORIDE 20 MEQ
40 PACKET (EA) ORAL EVERY 4 HOURS
Refills: 0 | Status: COMPLETED | OUTPATIENT
Start: 2020-04-26 | End: 2020-04-26

## 2020-04-26 RX ORDER — FUROSEMIDE 40 MG
80 TABLET ORAL DAILY
Refills: 0 | Status: DISCONTINUED | OUTPATIENT
Start: 2020-04-26 | End: 2020-04-26

## 2020-04-26 RX ORDER — BUDESONIDE AND FORMOTEROL FUMARATE DIHYDRATE 160; 4.5 UG/1; UG/1
2 AEROSOL RESPIRATORY (INHALATION)
Refills: 0 | Status: DISCONTINUED | OUTPATIENT
Start: 2020-04-26 | End: 2020-05-13

## 2020-04-26 RX ORDER — INSULIN HUMAN 100 [IU]/ML
INJECTION, SOLUTION SUBCUTANEOUS EVERY 6 HOURS
Refills: 0 | Status: DISCONTINUED | OUTPATIENT
Start: 2020-04-26 | End: 2020-04-29

## 2020-04-26 RX ORDER — FUROSEMIDE 40 MG
40 TABLET ORAL
Refills: 0 | Status: DISCONTINUED | OUTPATIENT
Start: 2020-04-26 | End: 2020-04-26

## 2020-04-26 RX ORDER — SODIUM CHLORIDE 9 MG/ML
1000 INJECTION, SOLUTION INTRAVENOUS
Refills: 0 | Status: DISCONTINUED | OUTPATIENT
Start: 2020-04-26 | End: 2020-04-26

## 2020-04-26 RX ORDER — POTASSIUM CHLORIDE 20 MEQ
20 PACKET (EA) ORAL
Refills: 0 | Status: COMPLETED | OUTPATIENT
Start: 2020-04-26 | End: 2020-04-26

## 2020-04-26 RX ORDER — FENOFIBRATE,MICRONIZED 130 MG
145 CAPSULE ORAL DAILY
Refills: 0 | Status: DISCONTINUED | OUTPATIENT
Start: 2020-04-26 | End: 2020-05-13

## 2020-04-26 RX ORDER — CHLORHEXIDINE GLUCONATE 213 G/1000ML
1 SOLUTION TOPICAL
Refills: 0 | Status: DISCONTINUED | OUTPATIENT
Start: 2020-04-26 | End: 2020-05-13

## 2020-04-26 RX ORDER — DEXTROSE 50 % IN WATER 50 %
15 SYRINGE (ML) INTRAVENOUS ONCE
Refills: 0 | Status: DISCONTINUED | OUTPATIENT
Start: 2020-04-26 | End: 2020-04-26

## 2020-04-26 RX ORDER — PANTOPRAZOLE SODIUM 20 MG/1
40 TABLET, DELAYED RELEASE ORAL
Refills: 0 | Status: DISCONTINUED | OUTPATIENT
Start: 2020-04-26 | End: 2020-05-13

## 2020-04-26 RX ORDER — ACETAMINOPHEN 500 MG
650 TABLET ORAL EVERY 6 HOURS
Refills: 0 | Status: DISCONTINUED | OUTPATIENT
Start: 2020-04-26 | End: 2020-04-26

## 2020-04-26 RX ORDER — INSULIN HUMAN 100 [IU]/ML
10 INJECTION, SOLUTION SUBCUTANEOUS ONCE
Refills: 0 | Status: COMPLETED | OUTPATIENT
Start: 2020-04-26 | End: 2020-04-26

## 2020-04-26 RX ORDER — OXYCODONE HYDROCHLORIDE 5 MG/1
5 TABLET ORAL EVERY 6 HOURS
Refills: 0 | Status: DISCONTINUED | OUTPATIENT
Start: 2020-04-26 | End: 2020-04-26

## 2020-04-26 RX ORDER — FINASTERIDE 5 MG/1
5 TABLET, FILM COATED ORAL DAILY
Refills: 0 | Status: DISCONTINUED | OUTPATIENT
Start: 2020-04-26 | End: 2020-05-13

## 2020-04-26 RX ADMIN — Medication 40 MILLIEQUIVALENT(S): at 11:09

## 2020-04-26 RX ADMIN — ISOSORBIDE MONONITRATE 30 MILLIGRAM(S): 60 TABLET, EXTENDED RELEASE ORAL at 12:13

## 2020-04-26 RX ADMIN — INSULIN HUMAN 2: 100 INJECTION, SOLUTION SUBCUTANEOUS at 02:53

## 2020-04-26 RX ADMIN — PANTOPRAZOLE SODIUM 40 MILLIGRAM(S): 20 TABLET, DELAYED RELEASE ORAL at 06:20

## 2020-04-26 RX ADMIN — CHLORHEXIDINE GLUCONATE 1 APPLICATION(S): 213 SOLUTION TOPICAL at 06:20

## 2020-04-26 RX ADMIN — TAMSULOSIN HYDROCHLORIDE 0.4 MILLIGRAM(S): 0.4 CAPSULE ORAL at 22:23

## 2020-04-26 RX ADMIN — Medication 145 MILLIGRAM(S): at 12:14

## 2020-04-26 RX ADMIN — SIMVASTATIN 40 MILLIGRAM(S): 20 TABLET, FILM COATED ORAL at 22:45

## 2020-04-26 RX ADMIN — BUDESONIDE AND FORMOTEROL FUMARATE DIHYDRATE 2 PUFF(S): 160; 4.5 AEROSOL RESPIRATORY (INHALATION) at 22:45

## 2020-04-26 RX ADMIN — INSULIN HUMAN 10 UNIT(S): 100 INJECTION, SOLUTION SUBCUTANEOUS at 16:29

## 2020-04-26 RX ADMIN — FINASTERIDE 5 MILLIGRAM(S): 5 TABLET, FILM COATED ORAL at 12:13

## 2020-04-26 RX ADMIN — BUDESONIDE AND FORMOTEROL FUMARATE DIHYDRATE 2 PUFF(S): 160; 4.5 AEROSOL RESPIRATORY (INHALATION) at 09:00

## 2020-04-26 RX ADMIN — Medication 40 MILLIGRAM(S): at 06:20

## 2020-04-26 RX ADMIN — Medication 40 MILLIGRAM(S): at 18:11

## 2020-04-26 RX ADMIN — SENNA PLUS 2 TABLET(S): 8.6 TABLET ORAL at 22:21

## 2020-04-26 RX ADMIN — Medication 40 MILLIEQUIVALENT(S): at 12:12

## 2020-04-26 NOTE — H&P ADULT - NSICDXPASTMEDICALHX_GEN_ALL_CORE_FT
PAST MEDICAL HISTORY:  Afib     BPH (benign prostatic hyperplasia)     CHF (congestive heart failure)     COPD (chronic obstructive pulmonary disease)     Diabetes     HTN (hypertension)

## 2020-04-26 NOTE — H&P ADULT - HISTORY OF PRESENT ILLNESS
TRAUMA ACTIVATION LEVEL:  TRAUMA ALERT    MECHANISM OF INJURY:      [] Blunt  	[] MVC	[X] Fall	[] Pedestrian Struck	[] Motorcycle   [] Assault   [] Bicycle collision  [] Sports injury     [] Penetrating  	[] Gun Shot Wound 		[] Stab Wound    GCS: 15 	E: 4	V: 5	M: 6    HPI:  80F, PMHx Afib on Coumadin, HTN, DM, COPD, presents to ED s/p fall, +HT, +LOC, +AC. Pt does not recall the event, event was witnessed. Pt presents w/ posterior head laceration and abrasions to his Right wrist. Otherwise, no other complaints.     PAST MEDICAL & SURGICAL HISTORY:  Afib  BPH (benign prostatic hyperplasia)  CHF (congestive heart failure)  COPD (chronic obstructive pulmonary disease)  Diabetes  HTN (hypertension)  H/O heart artery stent  S/P CABG x 3      Allergies    No Known Allergies    Intolerances

## 2020-04-26 NOTE — PHYSICAL THERAPY INITIAL EVALUATION ADULT - LIVES WITH, PROFILE
Son in a home with 3 steps to enter and one flight inside. pt goes to second floor once a week to shower

## 2020-04-26 NOTE — CONSULT NOTE ADULT - ASSESSMENT
IMPRESSION: Rehab of s/p syncope with heat trauma and LOC. He denies any neurologic or function deficit and exam is WNL. PT for trial of ambulation w or w/out device to assess home safety.    PRECAUTIONS: [ x ] Cardiac  [  ] Respiratory  [  ] Seizures [  ] Contact Isolation  [  ] Droplet Isolation  [  ] Other    Weight Bearing Status: wbat    RECOMMENDATION:    Out of Bed to Chair     DVT/Decubiti Prophylaxis    REHAB PLAN:     [ x  ] Bedside P/T 3-5 times a week   [   ]   Bedside O/T  2-3 times a week             [   ] No Rehab Therapy Indicated                   [   ]  Speech Therapy   Conditioning/ROM                                    ADL  Bed Mobility                                               Conditioning/ROM  Transfers                                                     Bed Mobility  Sitting /Standing Balance                         Transfers                                        Gait Training                                               Sitting/Standing Balance  Stair Training [   ]Applicable                    Home equipment Eval                                                                        Splinting  [   ] Only      GOALS:   ADL   [ x  ]   Independent                    Transfers  [  x ] Independent                          Ambulation  [ x  ] Independent     [    ] With device                            [   ]  CG                                                         [   ]  CG                                                                  [   ] CG                            [    ] Min A                                                   [   ] Min A                                                              [   ] Min  A          DISCHARGE PLAN:   [   ]  Good candidate for Intensive Rehabilitation/Hospital based-[facility]A [facility]                                             Will tolerate 3hrs Intensive Rehab Daily                                       [    ]  Short Term Rehab in Skilled Nursing Facility                                       [ x   ]  Home with Outpatient or VN services                                         [    ]  Possible Candidate for Intensive Hospital based Rehab

## 2020-04-26 NOTE — CONSULT NOTE ADULT - SUBJECTIVE AND OBJECTIVE BOX
SICU Consultation Note  ======================================================================================================  TAIWO ZAVALA  [id number]    80y Male w/ PMHx of CABGx3, BPH, HTN, Afib on Coumadin, COPD, DM and CHF presents to the ED s/p fall from standing, witnessed by patient's son. Patient denies recollection of memory prior, during and after fall. Patient denies increased incidence of falling. Patient's son states, he fell down and his the back of his head. Patient denies chest pain, SOB, fever/chills, cough. CT head showed intra-ventricular hemorrhage. CT neck showed basilar skull fracture. CTA of the head was performed to r/o aneurysm, scans were negative. Patient will be admitted to SICU for Q 1 hour neuro checks and syncope work up.       Pertinent Imaging:  CT Head No Cont (04.25.20 @ 17:44)   Impression:   Dense material noted within the right lateral and third ventricle consistent with hemorrhage within the ventricle. A call back request was submitted  Right posterior extracalvarial soft tissue hematoma.    CT Cervical Spine No Cont (04.25.20 @ 17:44)   Impression:  No CT evidence of acute osseous abnormality.  Stable trace anterolisthesis of C6 on C7 and C7 on T1.  Moderate/severe multilevel degenerative changes, stable since August 2019.  Reidentified calcified right thyroid nodule measuring 2 cm. Nonemergent outpatient ultrasound of the thyroid gland can be obtained for further evaluation.    CT Angio Neck w/ IV Cont (04.25.20 @ 22:56)   IMPRESSION:  1.  Redemonstrated density within the right lateral and third ventricles without definite evidence of active arterial contrast extravasation.  2.  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 This finding may be chronic.  3.  Diffuse vascular disease as detailed above.  4.  Nonspecific prominent 1.4 x 0.9 cm right paratracheal lymph node.        PMH  PAST MEDICAL & SURGICAL HISTORY:  Afib  BPH   CHF   COPD   Diabetes  HTN  S/P CABG x 3      Home Meds:  Home Medications:  clopidogrel 75 mg  fenofibrate 145 mg   finasteride 5 mg   furosemide 40 mg   isosorbide mononitrate 30 mg oral tablet, extended release  Metoprolol Succinate ER 50 mg  pantoprazole 40 mg      Allergies   NKDA         Advanced Directives: Full Code      ICU Vital Signs Last 24 Hrs  T(F): 97, Max: 98   HR: 63  BP: 134/60  RR: 17   SpO2: 98%       Physical Exam:   ---------------------------------------------------------------------------------------  NEURO:  A&Ox3, no focal deficits    RESPIRATORY:  Lungs clear to auscultation b/l, Normal expansion/effort    CARDIOVASCULAR:   S1/S2.  Afib     GASTROINTESTINAL:  Abdomen soft, non-tender, non-distended    MUSCULOSKELETAL:  Extremities warm, pink, well-perfused.    DERM:  No skin breakdown     :   Exam: No julian     Tubes/Lines/Drains   ----------------------------------------------------------------------------------------------------------  [x] Peripheral IV

## 2020-04-26 NOTE — H&P ADULT - ASSESSMENT
ASSESSMENT/PLAN:  80F, PMHx Afib on Coumadin, HTN, DM, COPD, presents to ED s/p fall, +HT, +LOC, +AC. Pt does not recall the event, event was witnessed. Pt presents w/ posterior head laceration and abrasions to his Right wrist.      Plan   NSX consult   NPO   Pain control   Holding Coumadin- Kcentra administered in ED   Home Medications   Syncope SANTILLAN - EKG , EEG, ECHO   SCDs for DVT PPx   GI PPX  PT /Rehab   OOBTC

## 2020-04-26 NOTE — CONSULT NOTE ADULT - ATTENDING COMMENTS
I personally provided over 30 minutes of direct critical care to this patient.  I examined the patient with the PA and resident and discussed my plan with them.    witnessed fall with IVH  GCS 15  high risk for neurologic deterioration, needs q1h ncs for 12-24h  appears to be syncope, requires workup.  suspect cardiac, possible bradycardia from medication as pt is bradycardic to 40s on monitor, however he does not exhibit hypotension.  Awaiting eeg, 24h cardiac monitoring.  Possible downgrade pm.

## 2020-04-26 NOTE — CONSULT NOTE ADULT - ASSESSMENT
Assessment & Plan    NEURO:    Dx IVH and basilar skull fracture     Pending syncope w/u     Neuro checks Q 1 hour. Monitor for changes in mental status     Repeat head CT in AM     Pain controlled w/ Tylenol     Neuro Sx following     RESP:     Hx of COPD     Oxygen insufficiency-wean off NC to RA as tolerate. Maintain O2 sats >94%.     AM CXR.       CARDS:     Hx of HTN, HLD, Afib, CABGx3, CHF.     Maintain normotension. SBP <160. Maintain MAP >65     EKG: Afib. Continue Metoprolol     Pending two more sets of CE. First troponin elevated, 0.11.     Dx CHF. Pending Echo. Continue isosorbide mononitrate, lasix and metolazone     Dx HLD. Continue fenofibrate and simvastatin     F/U  Carotid duplex       GI/NUTR:   Diet, NPO except meds.       GI Prophylaxis- Protonix     Bowel regimen- Senna       /RENAL:     Dx BPH. Continue Finasteride and Flomax.     Voiding. Monitor I&Os.     BUN/Cr 126/2.8 (baseline)     Monitor lytes, replete as needed.          HEME/ONC:     Holding HSQ as per Neuro Sx. SCD in place     On admission, INR >2. Recieved K centra and Vit K. AM PTT/PT/INR pending     Hgb 12.3. Monitor H/H     ID:   Afebrile. Trend WBC.         ENDO:    Hx of DM. Holding anti DM while NPO. ISS and FS Q 4 hours.     Pending HgbA1c.     MSK:     LINES/DRAINS:  PIV     DISPO:    SICU     D/W Dr Rodriguez Assessment & Plan    NEURO:    Dx IVH and basilar skull fracture     Pending syncope w/u     Neuro checks Q 1 hour. Monitor for changes in mental status     Repeat head CT in AM     Pain controlled w/ Tylenol     Neuro Sx following     RESP:     Hx of COPD     Oxygen insufficiency-wean off NC to RA as tolerate. Maintain O2 sats >94%.     AM CXR.       CARDS:     Hx of HTN, HLD, Afib, CABGx3, CHF.     Maintain normotension. SBP <160. Maintain MAP >65     EKG: Afib. Continue Metoprolol     Pending two more sets of CE. First troponin elevated, 0.11.     Dx CHF. Pending Echo. Continue isosorbide mononitrate, lasix and metolazone     Dx HLD. Continue fenofibrate and simvastatin     F/U  Carotid duplex       GI/NUTR:   Diet, NPO except meds.       GI Prophylaxis- Protonix     Bowel regimen- Senna       /RENAL:     Dx BPH. Continue Finasteride and Flomax.     Voiding. Monitor I&Os.     BUN/Cr 126/2.8 (baseline)     Monitor lytes, replete as needed.          HEME/ONC:     Holding HSQ as per Neuro Sx. SCD in place     On admission, INR >2. Recieved K centra and Vit K. AM PTT/PT/INR pending     Hgb 12.3. Monitor H/H     ID:   Afebrile. Trend WBC.         ENDO:    Hx of DM. Holding anti DM while NPO. ISS and FS Q 4 hours.     Pending HgbA1c.     MSK:     F/U RUE XRAY     PT/Rehab     Ambulate as tolerated      LINES/DRAINS:  PIV     DISPO:    SICU     D/W Dr Rodriguez Assessment & Plan    NEURO:    Dx IVH and basilar skull fracture     Pending syncope w/u     Neuro checks Q 1 hour. Monitor for changes in mental status     CTA: Redemonstrated density within the right lateral and third ventricles without definite evidence of active arterial contrast extravasation, right vertebral artery occlusion at the origin with reconstitution of flow at C3,      Pain controlled w/ Tylenol     Neuro Sx following     RESP:     Hx of COPD     Oxygen insufficiency-wean off NC to RA as tolerate. Maintain O2 sats >94%.     AM CXR.       CARDS:     Hx of HTN, HLD, Afib, CABGx3, CHF.     Maintain normotension. SBP <160. Maintain MAP >65     EKG: Afib. Continue Metoprolol. Bradycardic while in SICU, continue to monitor    Pending two more sets of CE. First troponin elevated, 0.11.     Dx CHF. Pending Echo. Continue isosorbide mononitrate, lasix and metolazone     Dx HLD. Continue fenofibrate and simvastatin     F/U  Carotid duplex: prelim read >80% occlusion of left carotid artery      GI/NUTR:   Diet, NPO except meds.       GI Prophylaxis- Protonix     Bowel regimen- Senna       /RENAL:     Dx BPH. Continue Finasteride and Flomax.     Voiding. Monitor I&Os.     BUN/Cr 126/2.8 (baseline)     Monitor lytes, replete as needed.          HEME/ONC:     Holding HSQ as per Neuro Sx. SCD in place     On admission, INR >2. Received K centra and Vit K.   AM PT/INR - ( 26 Apr 2020 06:41 )   PT: 14.80 sec;   INR: 1.29 ratio  PTT - ( 26 Apr 2020 06:41 )  PTT:33.4 sec    Hgb 12.3. Monitor H/H     ID:   Afebrile. Trend WBC.         ENDO:    Hx of DM. Holding anti DM while NPO. ISS and FS Q 4 hours.     Pending HgbA1c.     MSK:     F/U RUE XRAY     PT/Rehab     Ambulate as tolerated      LINES/DRAINS:  PIV     DISPO:    SICU     D/W Dr Rodriguez

## 2020-04-26 NOTE — CONSULT NOTE ADULT - ASSESSMENT
Attending to see   Discussed with Fellow Delfino Attending to see   Discussed with Fellow Delfino      4/27 10:25    LICA high grade stenosis  Planning for Left CEA on Wed 4/29  Please, obtain cardiac clearance and echocardiogram

## 2020-04-26 NOTE — H&P ADULT - NSHPPHYSICALEXAM_GEN_ALL_CORE
Secondary Survey:   General: NAD  HEENT: Posterior head laceration, EOMI, PEERLA.   Neck: Soft, midline trachea. no cspine tenderness  Chest: No chest wall tenderness. or subq  emphysema   Cardiac: S1, S2, RRR  Respiratory: Bilateral breath sounds, clear and equal bilaterally  Abdomen: Soft, non-distended, non-tender, no rebound,   Groin: Normal appearing, pelvis stable   Ext: palp radial b/l UE, b/l DP palp in Lower Extrem, abrasions to right wrist.

## 2020-04-26 NOTE — CHART NOTE - NSCHARTNOTEFT_GEN_A_CORE
SICU Transfer Note:    Transfer from: SICU  Transfer to:  ( x) Surgery    (  ) Telemetry    (  ) Medicine    (  ) Palliative    (  ) Stroke Unit    (  ) _______________      SICU COURSE:  80y Male w/ PMHx of CABGx3, BPH, HTN, Afib on Coumadin, COPD, DM and CHF presents to the ED s/p fall from standing, witnessed by patient's son. Patient denies recollection of memory prior, during and after fall. Patient denies increased incidence of falling. Patient's son states, he fell down and his the back of his head. Patient denies chest pain, SOB, fever/chills, cough. CT head showed intra-ventricular hemorrhage. CT neck showed basilar skull fracture. CTA of the head was performed to r/o aneurysm, scans were negative. Patient will be admitted to SICU for Q 1 hour neuro checks and syncope work up.       Pertinent Imaging:  CT Head No Cont (04.25.20 @ 17:44)   Impression:   Dense material noted within the right lateral and third ventricle consistent with hemorrhage within the ventricle. A call back request was submitted  Right posterior extracalvarial soft tissue hematoma.    CT Cervical Spine No Cont (04.25.20 @ 17:44)   Impression:  No CT evidence of acute osseous abnormality.  Stable trace anterolisthesis of C6 on C7 and C7 on T1.  Moderate/severe multilevel degenerative changes, stable since August 2019.  Reidentified calcified right thyroid nodule measuring 2 cm. Nonemergent outpatient ultrasound of the thyroid gland can be obtained for further evaluation.    CT Angio Neck w/ IV Cont (04.25.20 @ 22:56)   IMPRESSION:  1.  Redemonstrated density within the right lateral and third ventricles without definite evidence of active arterial contrast extravasation.  2.  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 This finding may be chronic.  3.  Diffuse vascular disease as detailed above.  4.  Nonspecific prominent 1.4 x 0.9 cm right paratracheal lymph node.        PAST MEDICAL & SURGICAL HISTORY:  Afib  BPH (benign prostatic hyperplasia)  CHF (congestive heart failure)  COPD (chronic obstructive pulmonary disease)  Diabetes  HTN (hypertension)  H/O heart artery stent  S/P CABG x 3    Allergies    No Known Allergies    Intolerances      MEDICATIONS  (STANDING):  budesonide 160 MICROgram(s)/formoterol 4.5 MICROgram(s) Inhaler 2 Puff(s) Inhalation two times a day  chlorhexidine 4% Liquid 1 Application(s) Topical <User Schedule>  fenofibrate Tablet 145 milliGRAM(s) Oral daily  finasteride 5 milliGRAM(s) Oral daily  furosemide    Tablet 40 milliGRAM(s) Oral every 12 hours  insulin regular  human corrective regimen sliding scale   IV Push every 4 hours  isosorbide   mononitrate ER Tablet (IMDUR) 30 milliGRAM(s) Oral daily  metolazone 5 milliGRAM(s) Oral daily  metoprolol succinate ER 50 milliGRAM(s) Oral daily  pantoprazole    Tablet 40 milliGRAM(s) Oral before breakfast  potassium chloride    Tablet ER 40 milliEquivalent(s) Oral every 4 hours  senna 2 Tablet(s) Oral at bedtime  simvastatin 40 milliGRAM(s) Oral at bedtime  tamsulosin 0.4 milliGRAM(s) Oral at bedtime    MEDICATIONS  (PRN):  acetaminophen   Tablet .. 650 milliGRAM(s) Oral every 6 hours PRN Mild Pain (1 - 3)        Vital Signs Last 24 Hrs  T(C): 36.4 (26 Apr 2020 02:05), Max: 36.7 (25 Apr 2020 16:25)  T(F): 97.5 (26 Apr 2020 02:05), Max: 98 (25 Apr 2020 16:25)  HR: 52 (26 Apr 2020 06:00) (50 - 75)  BP: 113/55 (26 Apr 2020 06:00) (113/55 - 135/63)  BP(mean): 86 (26 Apr 2020 02:05) (86 - 86)  RR: 16 (26 Apr 2020 02:05) (16 - 17)  SpO2: 100% (26 Apr 2020 06:00) (97% - 100%)  I&O's Summary    25 Apr 2020 07:01  -  26 Apr 2020 07:00  --------------------------------------------------------  IN: 0 mL / OUT: 150 mL / NET: -150 mL        LABS                                            11.1                  Neurophils% (auto):   x      (04-26 @ 06:41):    6.70 )-----------(161          Lymphocytes% (auto):  x                                             33.6                   Eosinphils% (auto):   x        Manual%: Neutrophils x    ; Lymphocytes x    ; Eosinophils x    ; Bands%: x    ; Blasts x                                    138    |  93     |  103                 Calcium: 9.7   / iCa: x      (04-26 @ 06:41)    ----------------------------<  151       Magnesium: 2.2                              3.1     |  28     |  2.5              Phosphorous: 3.7      TPro  7.3    /  Alb  3.8    /  TBili  0.9    /  DBili  x      /  AST  32     /  ALT  15     /  AlkPhos  57     25 Apr 2020 16:45    ( 04-26 @ 06:41 )   PT: 14.80 sec;   INR: 1.29 ratio  aPTT: 33.4 sec        ASSESSMENT/PLAN: 10y Male w/ PMHx of CABGx3, BPH, HTN, Afib on Coumadin, COPD, DM and CHF presents to the ED s/p fall from standing sustaining right lateral and third ventricle IVH and basilar skull fracture   NEURO:    Dx IVH and basilar skull fracture     Pending syncope w/u     Neuro checks Q 1 hour. 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 Maintain O2 sats >94%.     AM CXR.       CARDS:     Hx of HTN, HLD, Afib, CABGx3, CHF.     Maintain normotension. SBP <160. Maintain MAP >65     EKG: Afib. Continue Metoprolol. Bradycardic while in SICU, continue to monitor, 11am dose held    Pending two more sets of CE. First troponin elevated, 0.11.     Dx CHF. Pending Echo. Continue isosorbide mononitrate, lasix and metolazone     Dx HLD. Continue fenofibrate and simvastatin     F/U  Carotid duplex: prelim read >80% occlusion of left carotid artery      GI/NUTR:   DASH/TLC diet    GI Prophylaxis- Protonix     Bowel regimen- Senna       /RENAL:     Dx BPH. Continue Finasteride and Flomax.     Voiding. Monitor I&Os.     BUN/Cr 126/2.8 (baseline)     Monitor lytes, replete as needed.    Lytes-04-26 @ 06:41 Na 138   K 3.1 (repleted, f/u PM labs)   Phos 3.7    Mg 2.2  04-25 @ 16:45 Na 137   K 3.3   Phos ---    Mg 2.1    HEME/ONC:     Holding HSQ as per Neuro Sx. SCD in place     On admission, INR >2. Received K centra and Vit K.   AM PT/INR - ( 26 Apr 2020 06:41 )   PT: 14.80 sec;   INR: 1.29 ratio  PTT - ( 26 Apr 2020 06:41 )  PTT:33.4 sec    Hgb 12.3. Monitor H/H     ID:   Afebrile. Trend WBC.         ENDO:    Hx of DM. Holding anti DM while NPO. ISS and FS Q 4 hours.     Pending HgbA1c.     MSK:     F/U RUE XRAY     PT/Rehab     Ambulate as tolerated      LINES/DRAINS:  PIV     For Follow-Up:  Syncope workup  MRI Brain w/, w/o  NI Consult (Saba)  Hypokalemia    Dispo: downgrade to floor SICU Transfer Note:    Transfer from: SICU  Transfer to:  ( x) Surgery    (  ) Telemetry    (  ) Medicine    (  ) Palliative    (  ) Stroke Unit    (  ) _______________      SICU COURSE:  80y Male w/ PMHx of CABGx3, BPH, HTN, Afib on Coumadin, COPD, DM and CHF presents to the ED s/p fall from standing, witnessed by patient's son. Patient denies recollection of memory prior, during and after fall. Patient denies increased incidence of falling. Patient's son states, he fell down and his the back of his head. Patient denies chest pain, SOB, fever/chills, cough. CT head showed intra-ventricular hemorrhage. CT neck showed basilar skull fracture. CTA of the head was performed to r/o aneurysm, scans were negative. Patient will be admitted to SICU for Q 1 hour neuro checks and syncope work up.       Pertinent Imaging:  CT Head No Cont (04.25.20 @ 17:44)   Impression:   Dense material noted within the right lateral and third ventricle consistent with hemorrhage within the ventricle. A call back request was submitted  Right posterior extracalvarial soft tissue hematoma.    CT Cervical Spine No Cont (04.25.20 @ 17:44)   Impression:  No CT evidence of acute osseous abnormality.  Stable trace anterolisthesis of C6 on C7 and C7 on T1.  Moderate/severe multilevel degenerative changes, stable since August 2019.  Reidentified calcified right thyroid nodule measuring 2 cm. Nonemergent outpatient ultrasound of the thyroid gland can be obtained for further evaluation.    CT Angio Neck w/ IV Cont (04.25.20 @ 22:56)   IMPRESSION:  1.  Redemonstrated density within the right lateral and third ventricles without definite evidence of active arterial contrast extravasation.  2.  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 This finding may be chronic.  3.  Diffuse vascular disease as detailed above.  4.  Nonspecific prominent 1.4 x 0.9 cm right paratracheal lymph node.        PAST MEDICAL & SURGICAL HISTORY:  Afib  BPH (benign prostatic hyperplasia)  CHF (congestive heart failure)  COPD (chronic obstructive pulmonary disease)  Diabetes  HTN (hypertension)  H/O heart artery stent  S/P CABG x 3    Allergies    No Known Allergies    Intolerances      MEDICATIONS  (STANDING):  budesonide 160 MICROgram(s)/formoterol 4.5 MICROgram(s) Inhaler 2 Puff(s) Inhalation two times a day  chlorhexidine 4% Liquid 1 Application(s) Topical <User Schedule>  fenofibrate Tablet 145 milliGRAM(s) Oral daily  finasteride 5 milliGRAM(s) Oral daily  furosemide    Tablet 40 milliGRAM(s) Oral every 12 hours  insulin regular  human corrective regimen sliding scale   IV Push every 4 hours  isosorbide   mononitrate ER Tablet (IMDUR) 30 milliGRAM(s) Oral daily  metolazone 5 milliGRAM(s) Oral daily  metoprolol succinate ER 50 milliGRAM(s) Oral daily  pantoprazole    Tablet 40 milliGRAM(s) Oral before breakfast  potassium chloride    Tablet ER 40 milliEquivalent(s) Oral every 4 hours  senna 2 Tablet(s) Oral at bedtime  simvastatin 40 milliGRAM(s) Oral at bedtime  tamsulosin 0.4 milliGRAM(s) Oral at bedtime    MEDICATIONS  (PRN):  acetaminophen   Tablet .. 650 milliGRAM(s) Oral every 6 hours PRN Mild Pain (1 - 3)        Vital Signs Last 24 Hrs  T(C): 36.4 (26 Apr 2020 02:05), Max: 36.7 (25 Apr 2020 16:25)  T(F): 97.5 (26 Apr 2020 02:05), Max: 98 (25 Apr 2020 16:25)  HR: 52 (26 Apr 2020 06:00) (50 - 75)  BP: 113/55 (26 Apr 2020 06:00) (113/55 - 135/63)  BP(mean): 86 (26 Apr 2020 02:05) (86 - 86)  RR: 16 (26 Apr 2020 02:05) (16 - 17)  SpO2: 100% (26 Apr 2020 06:00) (97% - 100%)  I&O's Summary    25 Apr 2020 07:01  -  26 Apr 2020 07:00  --------------------------------------------------------  IN: 0 mL / OUT: 150 mL / NET: -150 mL        LABS                                            11.1                  Neurophils% (auto):   x      (04-26 @ 06:41):    6.70 )-----------(161          Lymphocytes% (auto):  x                                             33.6                   Eosinphils% (auto):   x        Manual%: Neutrophils x    ; Lymphocytes x    ; Eosinophils x    ; Bands%: x    ; Blasts x                                    138    |  93     |  103                 Calcium: 9.7   / iCa: x      (04-26 @ 06:41)    ----------------------------<  151       Magnesium: 2.2                              3.1     |  28     |  2.5              Phosphorous: 3.7      TPro  7.3    /  Alb  3.8    /  TBili  0.9    /  DBili  x      /  AST  32     /  ALT  15     /  AlkPhos  57     25 Apr 2020 16:45    ( 04-26 @ 06:41 )   PT: 14.80 sec;   INR: 1.29 ratio  aPTT: 33.4 sec        ASSESSMENT/PLAN: 10y Male w/ PMHx of CABGx3, BPH, HTN, Afib on Coumadin, COPD, DM and CHF presents to the ED s/p fall from standing sustaining right lateral and third ventricle IVH and basilar skull fracture   NEURO:    Dx IVH and basilar skull fracture     Pending syncope w/u     Neuro checks Q 1 hour. 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 Maintain O2 sats >94%.     AM CXR.       CARDS:     Hx of HTN, HLD, Afib, CABGx3, CHF.     Maintain normotension. SBP <160. Maintain MAP >65     EKG: Afib. Continue Metoprolol. Bradycardic while in SICU, continue to monitor, 11am dose held    Pending two more sets of CE. First troponin elevated, 0.11.     Dx CHF. Pending Echo. Continue isosorbide mononitrate, lasix and metolazone     Dx HLD. Continue fenofibrate and simvastatin     F/U  Carotid duplex: prelim read >80% occlusion of left carotid artery      GI/NUTR:   DASH/TLC diet    GI Prophylaxis- Protonix     Bowel regimen- Senna       /RENAL:     Dx BPH. Continue Finasteride and Flomax.     Voiding. Monitor I&Os.     BUN/Cr 126/2.8 (baseline)     Monitor lytes, replete as needed.    Lytes-04-26 @ 06:41 Na 138   K 3.1 (repleted, f/u PM labs)   Phos 3.7    Mg 2.2  04-25 @ 16:45 Na 137   K 3.3   Phos ---    Mg 2.1    HEME/ONC:     Holding HSQ as per Neuro Sx. SCD in place     On admission, INR >2. Received K centra and Vit K.   AM PT/INR - ( 26 Apr 2020 06:41 )   PT: 14.80 sec;   INR: 1.29 ratio  PTT - ( 26 Apr 2020 06:41 )  PTT:33.4 sec    Hgb 12.3. Monitor H/H     ID:   Afebrile. Trend WBC.         ENDO:    Hx of DM. Holding anti DM while NPO. ISS and FS Q 4 hours.     Pending HgbA1c.     MSK:     F/U RUE XRAY     PT/Rehab     Ambulate as tolerated      LINES/DRAINS:  PIV     For Follow-Up:  Syncope workup  MRI Brain w/, w/o prior to discharge  NI Consult (Saba)  Hypokalemia    Dispo: downgrade to floor  Signed out Trauma [id number] (Hai) SICU Transfer Note:    Transfer from: SICU  Transfer to:  ( x) Surgery    (  ) Telemetry    (  ) Medicine    (  ) Palliative    (  ) Stroke Unit    (  ) _______________      SICU COURSE:  80y Male w/ PMHx of CABGx3, BPH, HTN, Afib on Coumadin, COPD, DM and CHF presents to the ED s/p fall from standing, witnessed by patient's son. Patient denies recollection of memory prior, during and after fall. Patient denies increased incidence of falling. Patient's son states, he fell down and his the back of his head. Patient denies chest pain, SOB, fever/chills, cough. CT head showed intra-ventricular hemorrhage. CT neck showed basilar skull fracture. CTA of the head was performed to r/o aneurysm, scans were negative. Patient will be admitted to SICU for Q 1 hour neuro checks and syncope work up.       Pertinent Imaging:  CT Head No Cont (04.25.20 @ 17:44)   Impression:   Dense material noted within the right lateral and third ventricle consistent with hemorrhage within the ventricle. A call back request was submitted  Right posterior extracalvarial soft tissue hematoma.    CT Cervical Spine No Cont (04.25.20 @ 17:44)   Impression:  No CT evidence of acute osseous abnormality.  Stable trace anterolisthesis of C6 on C7 and C7 on T1.  Moderate/severe multilevel degenerative changes, stable since August 2019.  Reidentified calcified right thyroid nodule measuring 2 cm. Nonemergent outpatient ultrasound of the thyroid gland can be obtained for further evaluation.    CT Angio Neck w/ IV Cont (04.25.20 @ 22:56)   IMPRESSION:  1.  Redemonstrated density within the right lateral and third ventricles without definite evidence of active arterial contrast extravasation.  2.  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 This finding may be chronic.  3.  Diffuse vascular disease as detailed above.  4.  Nonspecific prominent 1.4 x 0.9 cm right paratracheal lymph node.        PAST MEDICAL & SURGICAL HISTORY:  Afib  BPH (benign prostatic hyperplasia)  CHF (congestive heart failure)  COPD (chronic obstructive pulmonary disease)  Diabetes  HTN (hypertension)  H/O heart artery stent  S/P CABG x 3    Allergies    No Known Allergies    Intolerances      MEDICATIONS  (STANDING):  budesonide 160 MICROgram(s)/formoterol 4.5 MICROgram(s) Inhaler 2 Puff(s) Inhalation two times a day  chlorhexidine 4% Liquid 1 Application(s) Topical <User Schedule>  fenofibrate Tablet 145 milliGRAM(s) Oral daily  finasteride 5 milliGRAM(s) Oral daily  furosemide    Tablet 40 milliGRAM(s) Oral every 12 hours  insulin regular  human corrective regimen sliding scale   IV Push every 4 hours  isosorbide   mononitrate ER Tablet (IMDUR) 30 milliGRAM(s) Oral daily  metolazone 5 milliGRAM(s) Oral daily  metoprolol succinate ER 50 milliGRAM(s) Oral daily  pantoprazole    Tablet 40 milliGRAM(s) Oral before breakfast  potassium chloride    Tablet ER 40 milliEquivalent(s) Oral every 4 hours  senna 2 Tablet(s) Oral at bedtime  simvastatin 40 milliGRAM(s) Oral at bedtime  tamsulosin 0.4 milliGRAM(s) Oral at bedtime    MEDICATIONS  (PRN):  acetaminophen   Tablet .. 650 milliGRAM(s) Oral every 6 hours PRN Mild Pain (1 - 3)        Vital Signs Last 24 Hrs  T(C): 36.4 (26 Apr 2020 02:05), Max: 36.7 (25 Apr 2020 16:25)  T(F): 97.5 (26 Apr 2020 02:05), Max: 98 (25 Apr 2020 16:25)  HR: 52 (26 Apr 2020 06:00) (50 - 75)  BP: 113/55 (26 Apr 2020 06:00) (113/55 - 135/63)  BP(mean): 86 (26 Apr 2020 02:05) (86 - 86)  RR: 16 (26 Apr 2020 02:05) (16 - 17)  SpO2: 100% (26 Apr 2020 06:00) (97% - 100%)  I&O's Summary    25 Apr 2020 07:01  -  26 Apr 2020 07:00  --------------------------------------------------------  IN: 0 mL / OUT: 150 mL / NET: -150 mL        LABS                                            11.1                  Neurophils% (auto):   x      (04-26 @ 06:41):    6.70 )-----------(161          Lymphocytes% (auto):  x                                             33.6                   Eosinphils% (auto):   x        Manual%: Neutrophils x    ; Lymphocytes x    ; Eosinophils x    ; Bands%: x    ; Blasts x                                    138    |  93     |  103                 Calcium: 9.7   / iCa: x      (04-26 @ 06:41)    ----------------------------<  151       Magnesium: 2.2                              3.1     |  28     |  2.5              Phosphorous: 3.7      TPro  7.3    /  Alb  3.8    /  TBili  0.9    /  DBili  x      /  AST  32     /  ALT  15     /  AlkPhos  57     25 Apr 2020 16:45    ( 04-26 @ 06:41 )   PT: 14.80 sec;   INR: 1.29 ratio  aPTT: 33.4 sec        ASSESSMENT/PLAN: 10y Male w/ PMHx of CABGx3, BPH, HTN, Afib on Coumadin, COPD, DM, CKD and CHF presents to the ED s/p fall from standing sustaining right lateral and third ventricle IVH and basilar skull fracture   NEURO:    Dx IVH and basilar skull fracture     Pending syncope w/u     Neuro checks Q 1 hour. 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 Maintain O2 sats >94%.     AM CXR.       CARDS:     Hx of HTN, HLD, Afib, CABGx3, CHF.     Maintain normotension. SBP <160. Maintain MAP >65     EKG: Afib. Continue Metoprolol. Bradycardic while in SICU, continue to monitor, 11am dose held    Pending two more sets of CE. First troponin elevated, 0.11.     Dx CHF. Pending Echo. Continue isosorbide mononitrate, lasix and metolazone     Dx HLD. Continue fenofibrate and simvastatin     F/U  Carotid duplex: prelim read >80% occlusion of left carotid artery      GI/NUTR:   DASH/TLC diet    GI Prophylaxis- Protonix     Bowel regimen- Senna       /RENAL:     Dx BPH. Continue Finasteride and Flomax.    Followed by Dr. Srinivasan for CKD, one episode of HD last month    Voiding. Monitor I&Os.     BUN/Cr 126/2.8 (baseline)     Monitor lytes, replete as needed.    Lytes-04-26 @ 06:41 Na 138   K 3.1 (repleted, f/u PM labs)   Phos 3.7    Mg 2.2  04-25 @ 16:45 Na 137   K 3.3   Phos ---    Mg 2.1    HEME/ONC:     Holding HSQ as per Neuro Sx. SCD in place     On admission, INR >2. Received K centra and Vit K.   AM PT/INR - ( 26 Apr 2020 06:41 )   PT: 14.80 sec;   INR: 1.29 ratio  PTT - ( 26 Apr 2020 06:41 )  PTT:33.4 sec    Hgb 12.3. Monitor H/H     ID:   Afebrile. Trend WBC.         ENDO:    Hx of DM. Holding anti DM while NPO. ISS and FS Q 4 hours. Followed by Dr. Beasley.    Pending HgbA1c.     MSK:     F/U RUE XRAY     PT/Rehab     Ambulate as tolerated      LINES/DRAINS:  PIV     For Follow-Up:  Syncope workup  MRI Brain w/, w/o prior to discharge  NI Consult (Saba)  Hypokalemia  Hospitalist consult for polypharmacy    Dispo: downgrade to floor  Signed out Trauma [id number] (Hai) SICU Transfer Note:    Transfer from: SICU  Transfer to:  ( x) Surgery    (  ) Telemetry    (  ) Medicine    (  ) Palliative    (  ) Stroke Unit    (  ) _______________      SICU COURSE:  80y Male w/ PMHx of CABGx3, BPH, HTN, Afib on Coumadin, intermittent aflutter w/ AVB, COPD, DM and CHF presents to the ED s/p fall from standing, witnessed by patient's son. Patient denies recollection of memory prior, during and after fall. Patient denies increased incidence of falling. Patient's son states, he fell down and his the back of his head. Patient denies chest pain, SOB, fever/chills, cough. CT head showed intra-ventricular hemorrhage. CT neck showed basilar skull fracture. CTA of the head was performed to r/o aneurysm, scans were negative. Patient will be admitted to SICU for Q 1 hour neuro checks and syncope work up.       Pertinent Imaging:  CT Head No Cont (04.25.20 @ 17:44)   Impression:   Dense material noted within the right lateral and third ventricle consistent with hemorrhage within the ventricle. A call back request was submitted  Right posterior extracalvarial soft tissue hematoma.    CT Cervical Spine No Cont (04.25.20 @ 17:44)   Impression:  No CT evidence of acute osseous abnormality.  Stable trace anterolisthesis of C6 on C7 and C7 on T1.  Moderate/severe multilevel degenerative changes, stable since August 2019.  Reidentified calcified right thyroid nodule measuring 2 cm. Nonemergent outpatient ultrasound of the thyroid gland can be obtained for further evaluation.    CT Angio Neck w/ IV Cont (04.25.20 @ 22:56)   IMPRESSION:  1.  Redemonstrated density within the right lateral and third ventricles without definite evidence of active arterial contrast extravasation.  2.  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 This finding may be chronic.  3.  Diffuse vascular disease as detailed above.  4.  Nonspecific prominent 1.4 x 0.9 cm right paratracheal lymph node.        PAST MEDICAL & SURGICAL HISTORY:  Afib  BPH (benign prostatic hyperplasia)  CHF (congestive heart failure)  COPD (chronic obstructive pulmonary disease)  Diabetes  HTN (hypertension)  H/O heart artery stent  S/P CABG x 3    Allergies    No Known Allergies    Intolerances      MEDICATIONS  (STANDING):  budesonide 160 MICROgram(s)/formoterol 4.5 MICROgram(s) Inhaler 2 Puff(s) Inhalation two times a day  chlorhexidine 4% Liquid 1 Application(s) Topical <User Schedule>  fenofibrate Tablet 145 milliGRAM(s) Oral daily  finasteride 5 milliGRAM(s) Oral daily  furosemide    Tablet 40 milliGRAM(s) Oral every 12 hours  insulin regular  human corrective regimen sliding scale   IV Push every 4 hours  isosorbide   mononitrate ER Tablet (IMDUR) 30 milliGRAM(s) Oral daily  metolazone 5 milliGRAM(s) Oral daily  metoprolol succinate ER 50 milliGRAM(s) Oral daily  pantoprazole    Tablet 40 milliGRAM(s) Oral before breakfast  potassium chloride    Tablet ER 40 milliEquivalent(s) Oral every 4 hours  senna 2 Tablet(s) Oral at bedtime  simvastatin 40 milliGRAM(s) Oral at bedtime  tamsulosin 0.4 milliGRAM(s) Oral at bedtime    MEDICATIONS  (PRN):  acetaminophen   Tablet .. 650 milliGRAM(s) Oral every 6 hours PRN Mild Pain (1 - 3)        Vital Signs Last 24 Hrs  T(C): 36.4 (26 Apr 2020 02:05), Max: 36.7 (25 Apr 2020 16:25)  T(F): 97.5 (26 Apr 2020 02:05), Max: 98 (25 Apr 2020 16:25)  HR: 52 (26 Apr 2020 06:00) (50 - 75)  BP: 113/55 (26 Apr 2020 06:00) (113/55 - 135/63)  BP(mean): 86 (26 Apr 2020 02:05) (86 - 86)  RR: 16 (26 Apr 2020 02:05) (16 - 17)  SpO2: 100% (26 Apr 2020 06:00) (97% - 100%)  I&O's Summary    25 Apr 2020 07:01  -  26 Apr 2020 07:00  --------------------------------------------------------  IN: 0 mL / OUT: 150 mL / NET: -150 mL        LABS                                            11.1                  Neurophils% (auto):   x      (04-26 @ 06:41):    6.70 )-----------(161          Lymphocytes% (auto):  x                                             33.6                   Eosinphils% (auto):   x        Manual%: Neutrophils x    ; Lymphocytes x    ; Eosinophils x    ; Bands%: x    ; Blasts x                                    138    |  93     |  103                 Calcium: 9.7   / iCa: x      (04-26 @ 06:41)    ----------------------------<  151       Magnesium: 2.2                              3.1     |  28     |  2.5              Phosphorous: 3.7      TPro  7.3    /  Alb  3.8    /  TBili  0.9    /  DBili  x      /  AST  32     /  ALT  15     /  AlkPhos  57     25 Apr 2020 16:45    ( 04-26 @ 06:41 )   PT: 14.80 sec;   INR: 1.29 ratio  aPTT: 33.4 sec        ASSESSMENT/PLAN: 10y Male w/ PMHx of CABGx3, BPH, HTN, Afib on Coumadin, COPD, DM, CKD and CHF presents to the ED s/p fall from standing sustaining right lateral and third ventricle IVH and basilar skull fracture   NEURO:    Dx IVH and basilar skull fracture     Pending syncope w/u     Neuro checks Q 1 hour. 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 Maintain O2 sats >94%.     AM CXR.       CARDS:     Hx of HTN, HLD, Afib, CABGx3, CHF.     Maintain normotension. SBP <160. Maintain MAP >65     EKG: Afib. Continue Metoprolol. Bradycardic while in SICU, continue to monitor, 11am dose held    Pending two more sets of CE. First troponin elevated, 0.11.     Dx CHF. Pending Echo. Continue isosorbide mononitrate, lasix and metolazone     Dx HLD. Continue fenofibrate and simvastatin     F/U  Carotid duplex: prelim read >80% occlusion of left carotid artery      GI/NUTR:   DASH/TLC diet    GI Prophylaxis- Protonix     Bowel regimen- Senna       /RENAL:     Dx BPH. Continue Finasteride and Flomax.    Followed by Dr. Srinivasan for CKD, one episode of HD last month    Voiding. Monitor I&Os.     BUN/Cr 126/2.8 (baseline)     Monitor lytes, replete as needed.    Lytes-04-26 @ 06:41 Na 138   K 3.1 (repleted, f/u PM labs)   Phos 3.7    Mg 2.2  04-25 @ 16:45 Na 137   K 3.3   Phos ---    Mg 2.1    HEME/ONC:     Holding HSQ as per Neuro Sx. SCD in place     On admission, INR >2. Received K centra and Vit K.   AM PT/INR - ( 26 Apr 2020 06:41 )   PT: 14.80 sec;   INR: 1.29 ratio  PTT - ( 26 Apr 2020 06:41 )  PTT:33.4 sec    Hgb 12.3. Monitor H/H     ID:   Afebrile. Trend WBC.         ENDO:    Hx of DM. Holding anti DM while NPO. ISS and FS Q 4 hours. Followed by Dr. Beasley.    Pending HgbA1c.     MSK:     F/U RUE XRAY     PT/Rehab     Ambulate as tolerated      LINES/DRAINS:  PIV     For Follow-Up:  Syncope workup  MRI Brain w/, w/o prior to discharge  NI Consult (Saba)  Hypokalemia  Hospitalist consult for polypharmacy    Dispo: downgrade to floor  Signed out Trauma [id number] ([place]) SICU Transfer Note:    Transfer from: SICU  Transfer to:  ( x) Surgery    (  ) Telemetry    (  ) Medicine    (  ) Palliative    (  ) Stroke Unit    (  ) _______________      SICU COURSE:  80y Male w/ PMHx of CABGx3, BPH, HTN, Afib on Coumadin, intermittent aflutter w/ AVB, COPD, DM and CHF presents to the ED s/p fall from standing, witnessed by patient's son. Patient denies recollection of memory prior, during and after fall. Patient denies increased incidence of falling. Patient's son states, he fell down and his the back of his head. Patient denies chest pain, SOB, fever/chills, cough. CT head showed intra-ventricular hemorrhage. CT neck showed basilar skull fracture. CTA of the head was performed to r/o aneurysm, scans were negative. Patient will be admitted to SICU for Q 1 hour neuro checks and syncope work up.       Pertinent Imaging:  CT Head No Cont (04.25.20 @ 17:44)   Impression:   Dense material noted within the right lateral and third ventricle consistent with hemorrhage within the ventricle. A call back request was submitted  Right posterior extracalvarial soft tissue hematoma.    CT Cervical Spine No Cont (04.25.20 @ 17:44)   Impression:  No CT evidence of acute osseous abnormality.  Stable trace anterolisthesis of C6 on C7 and C7 on T1.  Moderate/severe multilevel degenerative changes, stable since August 2019.  Reidentified calcified right thyroid nodule measuring 2 cm. Nonemergent outpatient ultrasound of the thyroid gland can be obtained for further evaluation.    CT Angio Neck w/ IV Cont (04.25.20 @ 22:56)   IMPRESSION:  1.  Redemonstrated density within the right lateral and third ventricles without definite evidence of active arterial contrast extravasation.  2.  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 This finding may be chronic.  3.  Diffuse vascular disease as detailed above.  4.  Nonspecific prominent 1.4 x 0.9 cm right paratracheal lymph node.        PAST MEDICAL & SURGICAL HISTORY:  Afib  BPH (benign prostatic hyperplasia)  CHF (congestive heart failure)  COPD (chronic obstructive pulmonary disease)  Diabetes  HTN (hypertension)  H/O heart artery stent  S/P CABG x 3    Allergies    No Known Allergies    Intolerances      MEDICATIONS  (STANDING):  budesonide 160 MICROgram(s)/formoterol 4.5 MICROgram(s) Inhaler 2 Puff(s) Inhalation two times a day  chlorhexidine 4% Liquid 1 Application(s) Topical <User Schedule>  fenofibrate Tablet 145 milliGRAM(s) Oral daily  finasteride 5 milliGRAM(s) Oral daily  furosemide    Tablet 40 milliGRAM(s) Oral every 12 hours  insulin regular  human corrective regimen sliding scale   IV Push every 4 hours  isosorbide   mononitrate ER Tablet (IMDUR) 30 milliGRAM(s) Oral daily  metolazone 5 milliGRAM(s) Oral daily  metoprolol succinate ER 50 milliGRAM(s) Oral daily  pantoprazole    Tablet 40 milliGRAM(s) Oral before breakfast  potassium chloride    Tablet ER 40 milliEquivalent(s) Oral every 4 hours  senna 2 Tablet(s) Oral at bedtime  simvastatin 40 milliGRAM(s) Oral at bedtime  tamsulosin 0.4 milliGRAM(s) Oral at bedtime    MEDICATIONS  (PRN):  acetaminophen   Tablet .. 650 milliGRAM(s) Oral every 6 hours PRN Mild Pain (1 - 3)        Vital Signs Last 24 Hrs  T(C): 36.4 (26 Apr 2020 02:05), Max: 36.7 (25 Apr 2020 16:25)  T(F): 97.5 (26 Apr 2020 02:05), Max: 98 (25 Apr 2020 16:25)  HR: 52 (26 Apr 2020 06:00) (50 - 75)  BP: 113/55 (26 Apr 2020 06:00) (113/55 - 135/63)  BP(mean): 86 (26 Apr 2020 02:05) (86 - 86)  RR: 16 (26 Apr 2020 02:05) (16 - 17)  SpO2: 100% (26 Apr 2020 06:00) (97% - 100%)  I&O's Summary    25 Apr 2020 07:01  -  26 Apr 2020 07:00  --------------------------------------------------------  IN: 0 mL / OUT: 150 mL / NET: -150 mL        LABS                                            11.1                  Neurophils% (auto):   x      (04-26 @ 06:41):    6.70 )-----------(161          Lymphocytes% (auto):  x                                             33.6                   Eosinphils% (auto):   x        Manual%: Neutrophils x    ; Lymphocytes x    ; Eosinophils x    ; Bands%: x    ; Blasts x                                    138    |  93     |  103                 Calcium: 9.7   / iCa: x      (04-26 @ 06:41)    ----------------------------<  151       Magnesium: 2.2                              3.1     |  28     |  2.5              Phosphorous: 3.7      TPro  7.3    /  Alb  3.8    /  TBili  0.9    /  DBili  x      /  AST  32     /  ALT  15     /  AlkPhos  57     25 Apr 2020 16:45    ( 04-26 @ 06:41 )   PT: 14.80 sec;   INR: 1.29 ratio  aPTT: 33.4 sec        ASSESSMENT/PLAN: 10y Male w/ PMHx of CABGx3, BPH, HTN, Afib vs aflutter w/ AVB on Coumadin, COPD, DM, CKD and CHF presents to the ED s/p fall from standing sustaining right lateral and third ventricle IVH and basilar skull fracture   NEURO:    Dx IVH and basilar skull fracture     Pending syncope w/u     Neuro checks Q 1 hour. 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 Maintain O2 sats >94%.     AM CXR.       CARDS:     Hx of HTN, HLD, Afib, CABGx3, CHF. Followed by Ashley.    Maintain normotension. SBP <160. Maintain MAP >65     EKG: Afib. Continue Metoprolol. Bradycardic while in SICU, continue to monitor, 11am dose held    Pending two more sets of CE. First troponin elevated, 0.11.     Dx CHF. Pending Echo. Continue isosorbide mononitrate, lasix and metolazone     Dx HLD. Continue fenofibrate and simvastatin     F/U  Carotid duplex: prelim read >80% occlusion of left carotid artery      GI/NUTR:   DASH/TLC diet    GI Prophylaxis- Protonix     Bowel regimen- Senna       /RENAL:     Dx BPH. Continue Finasteride and Flomax.    Followed by Dr. Srinivasan for CKD, 1 mo of HD for ATN 2/2 infection with R Tesio (removed)    Voiding. Monitor I&Os.     BUN/Cr 126/2.8 (baseline)     Monitor lytes, replete as needed.    Lytes-04-26 @ 06:41 Na 138   K 3.1 (repleted, f/u PM labs)   Phos 3.7    Mg 2.2  04-25 @ 16:45 Na 137   K 3.3   Phos ---    Mg 2.1    HEME/ONC:     Holding HSQ as per Neuro Sx. SCD in place     On admission, INR >2. Received K centra and Vit K.   AM PT/INR - ( 26 Apr 2020 06:41 )   PT: 14.80 sec;   INR: 1.29 ratio  PTT - ( 26 Apr 2020 06:41 )  PTT:33.4 sec    Hgb 12.3. Monitor H/H     ID:   Afebrile. Trend WBC.         ENDO:    Hx of DM. Holding anti DM while NPO. ISS and FS Q 4 hours. Followed by Dr. Beasley.    Pending HgbA1c.     MSK:     F/U RUE XRAY     PT/Rehab     Ambulate as tolerated      LINES/DRAINS:  PIV     For Follow-Up:  Syncope workup  MRI Brain w/, w/o prior to discharge  NI Consult (Saba)  Hypokalemia  Hospitalist consult for polypharmacy    Dispo: downgrade to floor  Signed out Trauma [id number] ([place]) SICU Transfer Note:    Transfer from: SICU  Transfer to:  ( x) Surgery    (  ) Telemetry    (  ) Medicine    (  ) Palliative    (  ) Stroke Unit    (  ) _______________      SICU COURSE:  80y Male w/ PMHx of CABGx3, BPH, HTN, Afib on Coumadin, intermittent aflutter w/ AVB, COPD, DM and CHF presents to the ED s/p fall from standing, witnessed by patient's son. Patient denies recollection of memory prior, during and after fall. Patient denies increased incidence of falling. Patient's son states, he fell down and his the back of his head. Patient denies chest pain, SOB, fever/chills, cough. CT head showed intra-ventricular hemorrhage. CT neck showed basilar skull fracture. CTA of the head was performed to r/o aneurysm, scans were negative. Patient will be admitted to SICU for Q 1 hour neuro checks and syncope work up.       Pertinent Imaging:  CT Head No Cont (04.25.20 @ 17:44)   Impression:   Dense material noted within the right lateral and third ventricle consistent with hemorrhage within the ventricle. A call back request was submitted  Right posterior extracalvarial soft tissue hematoma.    CT Cervical Spine No Cont (04.25.20 @ 17:44)   Impression:  No CT evidence of acute osseous abnormality.  Stable trace anterolisthesis of C6 on C7 and C7 on T1.  Moderate/severe multilevel degenerative changes, stable since August 2019.  Reidentified calcified right thyroid nodule measuring 2 cm. Nonemergent outpatient ultrasound of the thyroid gland can be obtained for further evaluation.    CT Angio Neck w/ IV Cont (04.25.20 @ 22:56)   IMPRESSION:  1.  Redemonstrated density within the right lateral and third ventricles without definite evidence of active arterial contrast extravasation.  2.  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 This finding may be chronic.  3.  Diffuse vascular disease as detailed above.  4.  Nonspecific prominent 1.4 x 0.9 cm right paratracheal lymph node.        PAST MEDICAL & SURGICAL HISTORY:  Afib  BPH (benign prostatic hyperplasia)  CHF (congestive heart failure)  COPD (chronic obstructive pulmonary disease)  Diabetes  HTN (hypertension)  H/O heart artery stent  S/P CABG x 3    Allergies    No Known Allergies    Intolerances      MEDICATIONS  (STANDING):  budesonide 160 MICROgram(s)/formoterol 4.5 MICROgram(s) Inhaler 2 Puff(s) Inhalation two times a day  chlorhexidine 4% Liquid 1 Application(s) Topical <User Schedule>  fenofibrate Tablet 145 milliGRAM(s) Oral daily  finasteride 5 milliGRAM(s) Oral daily  furosemide    Tablet 40 milliGRAM(s) Oral every 12 hours  insulin regular  human corrective regimen sliding scale   IV Push every 4 hours  isosorbide   mononitrate ER Tablet (IMDUR) 30 milliGRAM(s) Oral daily  metolazone 5 milliGRAM(s) Oral daily  metoprolol succinate ER 50 milliGRAM(s) Oral daily  pantoprazole    Tablet 40 milliGRAM(s) Oral before breakfast  potassium chloride    Tablet ER 40 milliEquivalent(s) Oral every 4 hours  senna 2 Tablet(s) Oral at bedtime  simvastatin 40 milliGRAM(s) Oral at bedtime  tamsulosin 0.4 milliGRAM(s) Oral at bedtime    MEDICATIONS  (PRN):  acetaminophen   Tablet .. 650 milliGRAM(s) Oral every 6 hours PRN Mild Pain (1 - 3)        Vital Signs Last 24 Hrs  T(C): 36.4 (26 Apr 2020 02:05), Max: 36.7 (25 Apr 2020 16:25)  T(F): 97.5 (26 Apr 2020 02:05), Max: 98 (25 Apr 2020 16:25)  HR: 52 (26 Apr 2020 06:00) (50 - 75)  BP: 113/55 (26 Apr 2020 06:00) (113/55 - 135/63)  BP(mean): 86 (26 Apr 2020 02:05) (86 - 86)  RR: 16 (26 Apr 2020 02:05) (16 - 17)  SpO2: 100% (26 Apr 2020 06:00) (97% - 100%)  I&O's Summary    25 Apr 2020 07:01  -  26 Apr 2020 07:00  --------------------------------------------------------  IN: 0 mL / OUT: 150 mL / NET: -150 mL        LABS                                            11.1                  Neurophils% (auto):   x      (04-26 @ 06:41):    6.70 )-----------(161          Lymphocytes% (auto):  x                                             33.6                   Eosinphils% (auto):   x        Manual%: Neutrophils x    ; Lymphocytes x    ; Eosinophils x    ; Bands%: x    ; Blasts x                                    138    |  93     |  103                 Calcium: 9.7   / iCa: x      (04-26 @ 06:41)    ----------------------------<  151       Magnesium: 2.2                              3.1     |  28     |  2.5              Phosphorous: 3.7      TPro  7.3    /  Alb  3.8    /  TBili  0.9    /  DBili  x      /  AST  32     /  ALT  15     /  AlkPhos  57     25 Apr 2020 16:45    ( 04-26 @ 06:41 )   PT: 14.80 sec;   INR: 1.29 ratio  aPTT: 33.4 sec        ASSESSMENT/PLAN: 10y Male w/ PMHx of CABGx3, BPH, HTN, Afib vs aflutter w/ AVB on Coumadin, COPD, DM, CKD and CHF presents to the ED s/p fall from standing sustaining right lateral and third ventricle IVH and basilar skull fracture   NEURO:    Dx IVH and basilar skull fracture     Pending syncope w/u     Neuro checks Q 1 hour. 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 Maintain O2 sats >94%.     AM CXR.       CARDS:     Hx of HTN, HLD, Afib, CABGx3, CHF. Followed by Ashley.    Maintain normotension. SBP <160. Maintain MAP >65     EKG: Afib. Continue Metoprolol. Bradycardic while in SICU, continue to monitor, 11am dose held    Pending two more sets of CE. First troponin elevated, 0.11.     Dx CHF. Pending Echo. Continue isosorbide mononitrate, lasix and metolazone     Dx HLD. Continue fenofibrate and simvastatin     F/U  Carotid duplex: prelim read >80% occlusion of left carotid artery      GI/NUTR:   DASH/TLC diet    GI Prophylaxis- Protonix     Bowel regimen- Senna       /RENAL:     Dx BPH. Continue Finasteride and Flomax.    Followed by Dr. Srinivasan for CKD, 1 mo of HD for ATN 2/2 infection with R Tesio (removed)    Voiding. Monitor I&Os.     BUN/Cr 126/2.8 (baseline)     Monitor lytes, replete as needed.    Lytes-04-26 @ 06:41 Na 138   K 3.1 (repleted, f/u PM labs)   Phos 3.7    Mg 2.2  04-25 @ 16:45 Na 137   K 3.3   Phos ---    Mg 2.1    HEME/ONC:     Holding HSQ as per Neuro Sx. SCD in place     On admission, INR >2. Received K centra and Vit K.   AM PT/INR - ( 26 Apr 2020 06:41 )   PT: 14.80 sec;   INR: 1.29 ratio  PTT - ( 26 Apr 2020 06:41 )  PTT:33.4 sec    Hgb 12.3. Monitor H/H     ID:   Afebrile. Trend WBC.         ENDO:    Hx of DM. Holding anti DM while NPO. ISS and FS Q 4 hours. Followed by Dr. Beasley.    Pending HgbA1c.     MSK:     F/U RUE XRAY     PT/Rehab     Ambulate as tolerated      LINES/DRAINS:  PIV     For Follow-Up:  Syncope workup  MRI Brain w/, w/o prior to discharge  NI Consult (Saba)  Hypokalemia  Hospitalist consult for polypharmacy (d/w Dr. Snyder)    Dispo: downgrade to floor  Signed out Trauma [id number] ([place])

## 2020-04-26 NOTE — CHART NOTE - NSCHARTNOTEFT_GEN_A_CORE
Discussed patient's current plan of care, status, and dispo with son Deangelo Okeefe at [phone]. All questions asked and answered

## 2020-04-26 NOTE — H&P ADULT - NSHPLABSRESULTS_GEN_ALL_CORE
Labs:  CAPILLARY BLOOD GLUCOSE      POCT Blood Glucose.: 149 mg/dL (2020 20:44)  POCT Blood Glucose.: 123 mg/dL (2020 16:37)                          12.3   7.80  )-----------( 171      ( 2020 16:45 )             37.9       Auto Neutrophil %: 65.8 % (20 @ 16:45)  Auto Immature Granulocyte %: 0.5 % (20 @ 16:45)        137  |  92<L>  |  126<HH>  ----------------------------<  125<H>  3.3<L>   |  25  |  2.8<H>      Calcium, Total Serum: 9.7 mg/dL (20 @ 16:45)      LFTs:             7.3  | 0.9  | 32       ------------------[57      ( 2020 16:45 )  3.8  | x    | 15          Lipase:29     Amylase:x         Lactate, Blood: 1.8 mmol/L (20 @ 16:45)      Coags:     24.50  ----< 2.13    ( 2020 16:45 )     47.8        CARDIAC MARKERS ( 2020 16:45 )  x     / 0.11 ng/mL / x     / x     / x            Alcohol, Blood: <10 mg/dL (20 @ 16:45)    Urinalysis Basic - ( 2020 16:50 )    Color: Light Yellow / Appearance: Clear / S.012 / pH: x  Gluc: x / Ketone: Negative  / Bili: Negative / Urobili: <2 mg/dL   Blood: x / Protein: Negative / Nitrite: Negative   Leuk Esterase: Small / RBC: 0 /HPF / WBC 8 /HPF   Sq Epi: x / Non Sq Epi: 1 /HPF / Bacteria: Few        < from: CT Angio Neck w/ IV Cont (20 @ 22:56) >        IMPRESSION:    1.  Redemonstrated density within the right lateral and third ventricles without definite evidence of active arterial contrast extravasation.  2.  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 This finding may be chronic.  3.  Diffuse vascular disease as detailed above.  4.  Nonspecific prominent 1.4 x 0.9 cm right paratracheal lymph node    < from: CT Cervical Spine No Cont (20 @ 17:44) >  No CT evidence of acute osseous abnormality.    Stable trace anterolisthesis of C6 on C7 and C7 on T1.    Moderate/severe multilevel degenerative changes, stable since 2019.    Reidentified calcified right thyroid nodule measuring 2 cm. Nonemergent outpatient ultrasound of the thyroid gland can be obtained for further evaluation.    < from: CT Head No Cont (20 @ 17:44) >    Impression:     Dense material noted within the right lateral and third ventricle consistent with hemorrhage within the ventricle. A call back request was submitted    Right posterior extracalvarial soft tissue hematoma.      < end of copied text >

## 2020-04-26 NOTE — CONSULT NOTE ADULT - SUBJECTIVE AND OBJECTIVE BOX
80y Male w/ PMHx of CABGx3, BPH, HTN, Afib on Coumadin, COPD, DM and CHF presents to the ED s/p fall from standing, witnessed by patient's son. Patient denies recollection of memory prior, during and after fall. Patient denies increased incidence of falling. Patient's son states, he fell down and his the back of his head. Patient denies chest pain, SOB, fever/chills, cough. CT head showed intra-ventricular hemorrhage. CT neck showed basilar skull fracture. CTA of the head was performed to r/o aneurysm, scans were negative. Patient will be admitted to SICU for Q 1 hour neuro checks and syncope work up.           CONSULT GENERAL SURGERY:  VASCULAR SUGERY CONSULT:    TAIWO ZAVALA  [id number]  [facility] [id number] B    Attending Requested :Jenni Shaver; Team [facility] Gen Surg Tap; Not Available Doctor; Estela Serrano; Jenni Shaver; Oneil Jimenez; Last Goddard; Last Goddard; Angela Shetty; Judit Quijnao; Leonel Oconnor; Latha Salas; Constantin Joel; Meenu Campos; Gisela Timmons; Gisela Timmons; Malina Blanton; Team [facility] Neuroendovascular; Camacho Valles; Team [facility] Neurovascular; Aracelis Henry; Glen Mullins; Siena Calero; Team [facility] Gen Surg Vasc  04-26-20 @ 21:13  Routine []   Stat[]  Specialty: Vascular Surgery General Surgery    Clinical Issue to be evaluated by consultant:    HPI:  Patient is a 80y Male presenting with            Past Medical History/ Surgical History:  VENTRICULAR HEMORRHAGE FALL ABRASION  ^VENTRICULAR HEMORRHAGE FALL ABRASION  No pertinent family history in first degree relatives  Handoff  MEWS Score  Afib  BPH (benign prostatic hyperplasia)  CHF (congestive heart failure)  COPD (chronic obstructive pulmonary disease)  Diabetes  HTN (hypertension)  Ventricular hemorrhage  H/O heart artery stent  S/P CABG x 3  FALL  72  Abrasion  Fall    Allergies:No Known Allergies    Medications:Alcohol Prep with Benzocaine topical pad: Apply topically to affected area 4 times a day   ascorbic acid 500 mg oral tablet: 1 tab(s) orally once a day  budesonide-formoterol 160 mcg-4.5 mcg/inh inhalation aerosol:  inhaled   clopidogrel 75 mg oral tablet: 1 tab(s) orally once a day  Coumadin 3 mg oral tablet: 1 tab(s) orally once a day (at bedtime)   INR goal 2-3, follow at coumadin clinic for dosing  fenofibrate 145 mg oral tablet: 1 tab(s) orally once a day  finasteride 5 mg oral tablet: 1 tab(s) orally once a day  fluticasone 50 mcg/inh nasal spray: 1 spray(s) nasal 2 times a day  furosemide 40 mg oral tablet: 1 tab(s) orally 2 times a day  glucometer (per patient&#x27;s insurance): Test blood sugars four times a day. Dispense #1 glucometer.  insulin glargine 100 units/mL subcutaneous solution: 20 unit(s) subcutaneous once (at bedtime)   insulin lispro 100 units/mL injectable solution: 2U if  - 200  4U if  - 250  6U if  - 300  8U if  - 350  10U if  - 400  12U iif BG &gt; Than 400  Insulin Pen Needles, 4mm: 1 application subcutaneously 4 times a day. ** Use with insulin pen **   isosorbide mononitrate 30 mg oral tablet, extended release: 1 tab(s) orally once a day (in the morning)  lancets: 1 application subcutaneously 4 times a day   metOLazone 5 mg oral tablet: 1 tab(s) orally once a day  Metoprolol Succinate ER 50 mg oral tablet, extended release: 1 tab(s) orally once a day  pantoprazole 40 mg oral delayed release tablet: 1 tab(s) orally once a day (before a meal)  Protonix 40 mg oral delayed release tablet: 1 tab(s) orally once a day   silver sulfADIAZINE 1% topical cream: 1 application topically once a day  simvastatin 40 mg oral tablet: 1 tab(s) orally once a day (at bedtime)  tamsulosin 0.4 mg oral capsule: 1 cap(s) orally once a day (at bedtime)  test strips (per patient&#x27;s insurance): 1 application subcutaneously 4 times a day. ** Compatible with patient&#x27;s glucometer **  zinc sulfate 220 mg oral capsule: 1 cap(s) orally once a day  acetaminophen   Tablet .. 650 milliGRAM(s) Oral every 6 hours PRN  budesonide 160 MICROgram(s)/formoterol 4.5 MICROgram(s) Inhaler 2 Puff(s) Inhalation two times a day  chlorhexidine 4% Liquid 1 Application(s) Topical <User Schedule>  fenofibrate Tablet 145 milliGRAM(s) Oral daily  finasteride 5 milliGRAM(s) Oral daily  furosemide    Tablet 40 milliGRAM(s) Oral every 12 hours  insulin regular  human corrective regimen sliding scale   IV Push every 6 hours  isosorbide   mononitrate ER Tablet (IMDUR) 30 milliGRAM(s) Oral daily  metolazone 5 milliGRAM(s) Oral daily  pantoprazole    Tablet 40 milliGRAM(s) Oral before breakfast  senna 2 Tablet(s) Oral at bedtime  simvastatin 40 milliGRAM(s) Oral at bedtime  tamsulosin 0.4 milliGRAM(s) Oral at bedtime      Physical Exam:  Vitals:T(C): 36.7 (04-26-20 @ 16:00), Max: 36.7 (04-26-20 @ 12:00)  HR: 49 (04-26-20 @ 16:00) (49 - 63)  BP: 115/70 (04-26-20 @ 16:00) (113/55 - 134/60)  RR: 16 (04-26-20 @ 02:05) (16 - 17)  SpO2: 100% (04-26-20 @ 16:00) (98% - 100%)    General: Alert and oriented times 3 , Not in acute distress   Heart: Regular rate and rhythm , no rubs murmurs or gallops  Lungs: Clear to auscultation , no wheezes , rales rhonci or adventicious breath sounds  Abdomen: Soft , positive bowel sounds, non tender, non distended, no peritoneal signs  Extemities:warm, capillary refill, no swelling , no edema, good motor and sensation positive pulses   Neuro: neurologically intact          < from: VA Duplex Carotid, Bilat (04.26.20 @ 08:03) >  Less than 50% stenosis of the right internal carotid artery.  Greater than 80% stenosis in the left internal carotid artery.  ICA to CCA ratio is 5.6    < from: CT Angio Neck w/ IV Cont (04.25.20 @ 22:56) >    Calcific atherosclerosis of the aortic arch and origins of the great vessels without luminal narrowing.    Calcific atherosclerosis of the bilateral carotid bulbs with no greater than 50% stenosis.    Vascular calcifications of the distal internal carotid arteries with no greater than 50% stenosis.    The anterior and middle cerebral arteries are unremarkable.    Dominant left vertebral artery. Calcifications of the left V1 segment without significant stenosis. The right vertebral artery is occluded at its origin with reconstitution of flow at C3. Calcifications of the V4 segment without significant stenosis.    The basilar, superior cerebellar, and posterior cerebral arteries are unremarkable.    Redemonstrated density within the right lateral and third ventricles which appears to have slightly changed in morphology - specifically, there no longer appears to be a component within the right anterior horn. There is an area of relative hyperattenuation within this density in the region of the right internal cerebral vein. However this area was likely present on the preceding noncontrast CT. No aneurysm or vascular malformation.    The paranasal sinuses are well-aerated. Right thyroid lobe calcifications. Palantine tonsillar calcifications. Prominent 1.4 x 0.9 cm right paratracheal lymph node. The imaged portions of the lungs are unremarkable. No acute osseous abnormality. Mediastinal clips as well as sternotomy wires are seen compatible with prior surgery.      IMPRESSION:    1.  Redemonstrated density within the right lateral and third ventricles without definite evidence of active arterial contrast extravasation.  2.  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 This finding may be chronic.  3.  Diffuse vascular disease as detailed above.  4.  Nonspecific prominent 1.4 x 0.9 cm right paratracheal lymph node.      < end of copied text >        < end of copied text >  and Plan:  Patient is a 80y Male presenting with   < from: CT Head No Cont (04.25.20 @ 17:44) >  Impression:     Dense material noted within the right lateral and third ventricle consistent with hemorrhage within the ventricle. A call back request was submitted    Right posterior extracalvarial soft tissue hematoma.

## 2020-04-26 NOTE — CONSULT NOTE ADULT - ATTENDING COMMENTS
Hi grade L ICA stenosis will need intervention.  Awaiting cards recommendation re critical AS prior to making carotid recommendation

## 2020-04-26 NOTE — H&P ADULT - ATTENDING COMMENTS
Patient seen and examined with surgery trauma team in ED after arrival and discussed management plans. Patient has intracerebral bleed on anticoagulation needs iCU  monitoring and follow up CT with angio which was done. Neuro surgery followup

## 2020-04-26 NOTE — CONSULT NOTE ADULT - SUBJECTIVE AND OBJECTIVE BOX
HPI:  TRAUMA ACTIVATION LEVEL:  TRAUMA ALERT     MECHANISM OF INJURY:      [] Blunt  	[] MVC	[X] Fall	[] Pedestrian Struck	[] Motorcycle   [] Assault   [] Bicycle collision  [] Sports injury     [] Penetrating  	[] Gun Shot Wound 		[] Stab Wound    GCS: 15 	E: 4	V: 5	M: 6    HPI:  80F, PMHx Afib on Coumadin, HTN, DM, COPD, presents to ED s/p fall, +HT, +LOC, +AC. Pt does not recall the event, event was witnessed. Pt presents w/ posterior head laceration and abrasions to his Right wrist. Otherwise, no other complaints. He denies any difficulty walking prior to admission or any complaints at this time.    PAST MEDICAL & SURGICAL HISTORY:  Afib  BPH (benign prostatic hyperplasia)  CHF (congestive heart failure)  COPD (chronic obstructive pulmonary disease)  Diabetes  HTN (hypertension)  H/O heart artery stent  S/P CABG x 3      Allergies    No Known Allergies    Intolerances (2020 00:04)      PAST MEDICAL & SURGICAL HISTORY:  Afib  BPH (benign prostatic hyperplasia)  CHF (congestive heart failure)  COPD (chronic obstructive pulmonary disease)  Diabetes  HTN (hypertension)  H/O heart artery stent  S/P CABG x 3      Hospital Course:  [place]. Seen by neurosurgery, no intervention. States he is able to stand and walk without any difficuty and wants to go home.     CT Head No Cont (20 @ 17:44)   Impression:   Dense material noted within the right lateral and third ventricle consistent with hemorrhage within the ventricle. A call back request was submitted  Right posterior extracalvarial soft tissue hematoma.    CT Cervical Spine No Cont (20 @ 17:44)   Impression:  No CT evidence of acute osseous abnormality.  Stable trace anterolisthesis of C6 on C7 and C7 on T1.  Moderate/severe multilevel degenerative changes, stable since 2019.  Reidentified calcified right thyroid nodule measuring 2 cm. Nonemergent outpatient ultrasound of the thyroid gland can be obtained for further evaluation.    CT Angio Neck w/ IV Cont (20 @ 22:56)   IMPRESSION:  1.  Redemonstrated density within the right lateral and third ventricles without definite evidence of active arterial contrast extravasation.  2.  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 This finding may be chronic.  3.  Diffuse vascular disease as detailed above.  4.  Nonspecific prominent 1.4 x 0.9 cm right paratracheal lymph node.    TODAY'S SUBJECTIVE & REVIEW OF SYMPTOMS:     Constitutional WNL   Cardio WNL   Resp WNL   GI WNL  Heme WNL  Endo WNL  Skin WNL  MSK WNL  Neuro WNL  Cognitive WNL  Psych WNL      MEDICATIONS  (STANDING):  budesonide 160 MICROgram(s)/formoterol 4.5 MICROgram(s) Inhaler 2 Puff(s) Inhalation two times a day  chlorhexidine 4% Liquid 1 Application(s) Topical <User Schedule>  fenofibrate Tablet 145 milliGRAM(s) Oral daily  finasteride 5 milliGRAM(s) Oral daily  furosemide    Tablet 40 milliGRAM(s) Oral every 12 hours  insulin regular  human corrective regimen sliding scale   IV Push every 4 hours  isosorbide   mononitrate ER Tablet (IMDUR) 30 milliGRAM(s) Oral daily  metolazone 5 milliGRAM(s) Oral daily  pantoprazole    Tablet 40 milliGRAM(s) Oral before breakfast  senna 2 Tablet(s) Oral at bedtime  simvastatin 40 milliGRAM(s) Oral at bedtime  tamsulosin 0.4 milliGRAM(s) Oral at bedtime    MEDICATIONS  (PRN):  acetaminophen   Tablet .. 650 milliGRAM(s) Oral every 6 hours PRN Mild Pain (1 - 3)      FAMILY HISTORY:  No pertinent family history in first degree relatives      Allergies    No Known Allergies    Intolerances        SOCIAL HISTORY:    [  ] Etoh  [  ] Smoking  [  ] Substance abuse     Home Environment:  [  ] Home Alone  [ x ] Lives with Family  [  ] Home Health Aid    Dwelling:  [  ] Apartment  [x  ] Private House  [  ] Adult Home  [  ] Skilled Nursing Facility      [  ] Short Term  [  ] Long Term  [x  ] Stairs       Elevator [  ]    FUNCTIONAL STATUS PTA: (Check all that apply)  Ambulation: [ x  ]Independent    [  ] Dependent     [  ] Non-Ambulatory  Assistive Device: [  ] SA Cane  [  ]  Q Cane  [  ] Walker  [  ]  Wheelchair  ADL : [ x ] Independent  [  ]  Dependent       Vital Signs Last 24 Hrs  T(C): 36.4 (2020 02:05), Max: 36.7 (2020 16:25)  T(F): 97.5 (2020 02:05), Max: 98 (2020 16:25)  HR: 52 (2020 06:00) (50 - 75)  BP: 113/55 (2020 06:00) (113/55 - 135/63)  BP(mean): 86 (2020 02:05) (86 - 86)  RR: 16 (2020 02:05) (16 - 17)  SpO2: 100% (2020 06:00) (97% - 100%)      PHYSICAL EXAM: Alert & Oriented X3 grossly, not date  GENERAL: NAD, well-groomed, well-developed  HEAD:  Atraumatic, Normocephalic  EYES: EOMI, PER, conjunctiva and sclera clear  NECK: Supple, No JVD  HEART: Regular rate and rhythm  ABDOMEN: Soft, Nontender, Nondistended;  EXTREMITIES:  2+ Peripheral Pulses, No clubbing, cyanosis, or edema    NERVOUS SYSTEM:  Cranial Nerves 2-12 intact [x  ] Abnormal  [  ]  ROM: WFL all extremities [x  ]  Abnormal [  ]  Motor Strength: WFL all extremities  [ x ]  Abnormal [  ]  Sensation: intact to light touch [ x ] Abnormal [  ]  Reflexes: Symmetric [  ]  Abnormal [  ]    FUNCTIONAL STATUS:  Bed Mobility: Independent [  ]  Supervision [x  ]  Needs Assistance [  ]  N/A [  ]  Transfers: Independent [  ]  Supervision [x  ]  Needs Assistance [  ]  N/A [  ]   Ambulation: Independent [  ]  Supervision [ x ]  Needs Assistance [  ]  N/A [  ]  ADL: Independent [  ] Requires Assistance [  ] N/A [  ] TBD      LABS:                        11.1   6.70  )-----------( 161      ( 2020 06:41 )             33.6     04-26    138  |  93<L>  |  103<HH>  ----------------------------<  151<H>  3.1<L>   |  28  |  2.5<H>    Ca    9.7      2020 06:41  Phos  3.7     04-26  Mg     2.2     04-26    TPro  7.3  /  Alb  3.8  /  TBili  0.9  /  DBili  x   /  AST  32  /  ALT  15  /  AlkPhos  57  04-25    PT/INR - ( 2020 06:41 )   PT: 14.80 sec;   INR: 1.29 ratio         PTT - ( 2020 06:41 )  PTT:33.4 sec  Urinalysis Basic - ( 2020 16:50 )    Color: Light Yellow / Appearance: Clear / S.012 / pH: x  Gluc: x / Ketone: Negative  / Bili: Negative / Urobili: <2 mg/dL   Blood: x / Protein: Negative / Nitrite: Negative   Leuk Esterase: Small / RBC: 0 /HPF / WBC 8 /HPF   Sq Epi: x / Non Sq Epi: 1 /HPF / Bacteria: Few        RADIOLOGY & ADDITIONAL STUDIES:    Assesment:

## 2020-04-27 LAB
A1C WITH ESTIMATED AVERAGE GLUCOSE RESULT: 6.4 % — HIGH (ref 4–5.6)
ALBUMIN SERPL ELPH-MCNC: 4.1 G/DL — SIGNIFICANT CHANGE UP (ref 3.5–5.2)
ALP SERPL-CCNC: 50 U/L — SIGNIFICANT CHANGE UP (ref 30–115)
ALT FLD-CCNC: 14 U/L — SIGNIFICANT CHANGE UP (ref 0–41)
ANION GAP SERPL CALC-SCNC: 14 MMOL/L — SIGNIFICANT CHANGE UP (ref 7–14)
ANION GAP SERPL CALC-SCNC: 15 MMOL/L — HIGH (ref 7–14)
ANION GAP SERPL CALC-SCNC: 19 MMOL/L — HIGH (ref 7–14)
APTT BLD: 31.8 SEC — SIGNIFICANT CHANGE UP (ref 27–39.2)
APTT BLD: 32.4 SEC — SIGNIFICANT CHANGE UP (ref 27–39.2)
AST SERPL-CCNC: 24 U/L — SIGNIFICANT CHANGE UP (ref 0–41)
BASOPHILS # BLD AUTO: 0.03 K/UL — SIGNIFICANT CHANGE UP (ref 0–0.2)
BASOPHILS # BLD AUTO: 0.04 K/UL — SIGNIFICANT CHANGE UP (ref 0–0.2)
BASOPHILS NFR BLD AUTO: 0.4 % — SIGNIFICANT CHANGE UP (ref 0–1)
BASOPHILS NFR BLD AUTO: 0.6 % — SIGNIFICANT CHANGE UP (ref 0–1)
BILIRUB DIRECT SERPL-MCNC: 0.6 MG/DL — HIGH (ref 0–0.2)
BILIRUB INDIRECT FLD-MCNC: 0.7 MG/DL — SIGNIFICANT CHANGE UP (ref 0.2–1.2)
BILIRUB SERPL-MCNC: 1.3 MG/DL — HIGH (ref 0.2–1.2)
BLD GP AB SCN SERPL QL: SIGNIFICANT CHANGE UP
BUN SERPL-MCNC: 105 MG/DL — CRITICAL HIGH (ref 10–20)
BUN SERPL-MCNC: 96 MG/DL — CRITICAL HIGH (ref 10–20)
BUN SERPL-MCNC: 97 MG/DL — CRITICAL HIGH (ref 10–20)
CALCIUM SERPL-MCNC: 9.5 MG/DL — SIGNIFICANT CHANGE UP (ref 8.5–10.1)
CALCIUM SERPL-MCNC: 9.6 MG/DL — SIGNIFICANT CHANGE UP (ref 8.5–10.1)
CALCIUM SERPL-MCNC: 9.6 MG/DL — SIGNIFICANT CHANGE UP (ref 8.5–10.1)
CHLORIDE SERPL-SCNC: 93 MMOL/L — LOW (ref 98–110)
CHLORIDE SERPL-SCNC: 94 MMOL/L — LOW (ref 98–110)
CHLORIDE SERPL-SCNC: 96 MMOL/L — LOW (ref 98–110)
CO2 SERPL-SCNC: 25 MMOL/L — SIGNIFICANT CHANGE UP (ref 17–32)
CO2 SERPL-SCNC: 31 MMOL/L — SIGNIFICANT CHANGE UP (ref 17–32)
CO2 SERPL-SCNC: 32 MMOL/L — SIGNIFICANT CHANGE UP (ref 17–32)
CREAT SERPL-MCNC: 2.4 MG/DL — HIGH (ref 0.7–1.5)
CREAT SERPL-MCNC: 2.5 MG/DL — HIGH (ref 0.7–1.5)
CREAT SERPL-MCNC: 2.7 MG/DL — HIGH (ref 0.7–1.5)
EOSINOPHIL # BLD AUTO: 0.07 K/UL — SIGNIFICANT CHANGE UP (ref 0–0.7)
EOSINOPHIL # BLD AUTO: 0.09 K/UL — SIGNIFICANT CHANGE UP (ref 0–0.7)
EOSINOPHIL NFR BLD AUTO: 1 % — SIGNIFICANT CHANGE UP (ref 0–8)
EOSINOPHIL NFR BLD AUTO: 1.4 % — SIGNIFICANT CHANGE UP (ref 0–8)
ESTIMATED AVERAGE GLUCOSE: 137 MG/DL — HIGH (ref 68–114)
GLUCOSE BLDC GLUCOMTR-MCNC: 128 MG/DL — HIGH (ref 70–99)
GLUCOSE BLDC GLUCOMTR-MCNC: 141 MG/DL — HIGH (ref 70–99)
GLUCOSE BLDC GLUCOMTR-MCNC: 182 MG/DL — HIGH (ref 70–99)
GLUCOSE BLDC GLUCOMTR-MCNC: 224 MG/DL — HIGH (ref 70–99)
GLUCOSE BLDC GLUCOMTR-MCNC: 307 MG/DL — HIGH (ref 70–99)
GLUCOSE BLDC GLUCOMTR-MCNC: 82 MG/DL — SIGNIFICANT CHANGE UP (ref 70–99)
GLUCOSE BLDC GLUCOMTR-MCNC: 96 MG/DL — SIGNIFICANT CHANGE UP (ref 70–99)
GLUCOSE SERPL-MCNC: 200 MG/DL — HIGH (ref 70–99)
GLUCOSE SERPL-MCNC: 257 MG/DL — HIGH (ref 70–99)
GLUCOSE SERPL-MCNC: 318 MG/DL — HIGH (ref 70–99)
HCT VFR BLD CALC: 35.3 % — LOW (ref 42–52)
HCT VFR BLD CALC: 37.7 % — LOW (ref 42–52)
HGB BLD-MCNC: 11.6 G/DL — LOW (ref 14–18)
HGB BLD-MCNC: 11.7 G/DL — LOW (ref 14–18)
IMM GRANULOCYTES NFR BLD AUTO: 0.3 % — SIGNIFICANT CHANGE UP (ref 0.1–0.3)
IMM GRANULOCYTES NFR BLD AUTO: 0.6 % — HIGH (ref 0.1–0.3)
INR BLD: 1.15 RATIO — SIGNIFICANT CHANGE UP (ref 0.65–1.3)
INR BLD: 1.23 RATIO — SIGNIFICANT CHANGE UP (ref 0.65–1.3)
LYMPHOCYTES # BLD AUTO: 1.04 K/UL — LOW (ref 1.2–3.4)
LYMPHOCYTES # BLD AUTO: 1.32 K/UL — SIGNIFICANT CHANGE UP (ref 1.2–3.4)
LYMPHOCYTES # BLD AUTO: 16.2 % — LOW (ref 20.5–51.1)
LYMPHOCYTES # BLD AUTO: 18.4 % — LOW (ref 20.5–51.1)
MAGNESIUM SERPL-MCNC: 2 MG/DL — SIGNIFICANT CHANGE UP (ref 1.8–2.4)
MCHC RBC-ENTMCNC: 27.7 PG — SIGNIFICANT CHANGE UP (ref 27–31)
MCHC RBC-ENTMCNC: 28.7 PG — SIGNIFICANT CHANGE UP (ref 27–31)
MCHC RBC-ENTMCNC: 31 G/DL — LOW (ref 32–37)
MCHC RBC-ENTMCNC: 32.9 G/DL — SIGNIFICANT CHANGE UP (ref 32–37)
MCV RBC AUTO: 87.4 FL — SIGNIFICANT CHANGE UP (ref 80–94)
MCV RBC AUTO: 89.3 FL — SIGNIFICANT CHANGE UP (ref 80–94)
MONOCYTES # BLD AUTO: 0.86 K/UL — HIGH (ref 0.1–0.6)
MONOCYTES # BLD AUTO: 0.9 K/UL — HIGH (ref 0.1–0.6)
MONOCYTES NFR BLD AUTO: 12.6 % — HIGH (ref 1.7–9.3)
MONOCYTES NFR BLD AUTO: 13.4 % — HIGH (ref 1.7–9.3)
NEUTROPHILS # BLD AUTO: 4.35 K/UL — SIGNIFICANT CHANGE UP (ref 1.4–6.5)
NEUTROPHILS # BLD AUTO: 4.83 K/UL — SIGNIFICANT CHANGE UP (ref 1.4–6.5)
NEUTROPHILS NFR BLD AUTO: 67.3 % — SIGNIFICANT CHANGE UP (ref 42.2–75.2)
NEUTROPHILS NFR BLD AUTO: 67.8 % — SIGNIFICANT CHANGE UP (ref 42.2–75.2)
NRBC # BLD: 0 /100 WBCS — SIGNIFICANT CHANGE UP (ref 0–0)
NRBC # BLD: 0 /100 WBCS — SIGNIFICANT CHANGE UP (ref 0–0)
PHOSPHATE SERPL-MCNC: 2.5 MG/DL — SIGNIFICANT CHANGE UP (ref 2.1–4.9)
PHOSPHATE SERPL-MCNC: 3.1 MG/DL — SIGNIFICANT CHANGE UP (ref 2.1–4.9)
PHOSPHATE SERPL-MCNC: 3.5 MG/DL — SIGNIFICANT CHANGE UP (ref 2.1–4.9)
PLATELET # BLD AUTO: 145 K/UL — SIGNIFICANT CHANGE UP (ref 130–400)
PLATELET # BLD AUTO: 180 K/UL — SIGNIFICANT CHANGE UP (ref 130–400)
POTASSIUM SERPL-MCNC: 3.8 MMOL/L — SIGNIFICANT CHANGE UP (ref 3.5–5)
POTASSIUM SERPL-MCNC: 3.8 MMOL/L — SIGNIFICANT CHANGE UP (ref 3.5–5)
POTASSIUM SERPL-MCNC: 4 MMOL/L — SIGNIFICANT CHANGE UP (ref 3.5–5)
POTASSIUM SERPL-SCNC: 3.8 MMOL/L — SIGNIFICANT CHANGE UP (ref 3.5–5)
POTASSIUM SERPL-SCNC: 3.8 MMOL/L — SIGNIFICANT CHANGE UP (ref 3.5–5)
POTASSIUM SERPL-SCNC: 4 MMOL/L — SIGNIFICANT CHANGE UP (ref 3.5–5)
PROT SERPL-MCNC: 7.4 G/DL — SIGNIFICANT CHANGE UP (ref 6–8)
PROTHROM AB SERPL-ACNC: 13.2 SEC — HIGH (ref 9.95–12.87)
PROTHROM AB SERPL-ACNC: 14.2 SEC — HIGH (ref 9.95–12.87)
RBC # BLD: 4.04 M/UL — LOW (ref 4.7–6.1)
RBC # BLD: 4.22 M/UL — LOW (ref 4.7–6.1)
RBC # FLD: 19.3 % — HIGH (ref 11.5–14.5)
RBC # FLD: 19.4 % — HIGH (ref 11.5–14.5)
SODIUM SERPL-SCNC: 139 MMOL/L — SIGNIFICANT CHANGE UP (ref 135–146)
SODIUM SERPL-SCNC: 140 MMOL/L — SIGNIFICANT CHANGE UP (ref 135–146)
SODIUM SERPL-SCNC: 140 MMOL/L — SIGNIFICANT CHANGE UP (ref 135–146)
WBC # BLD: 6.42 K/UL — SIGNIFICANT CHANGE UP (ref 4.8–10.8)
WBC # BLD: 7.17 K/UL — SIGNIFICANT CHANGE UP (ref 4.8–10.8)
WBC # FLD AUTO: 6.42 K/UL — SIGNIFICANT CHANGE UP (ref 4.8–10.8)
WBC # FLD AUTO: 7.17 K/UL — SIGNIFICANT CHANGE UP (ref 4.8–10.8)

## 2020-04-27 PROCEDURE — 99232 SBSQ HOSP IP/OBS MODERATE 35: CPT

## 2020-04-27 PROCEDURE — 70545 MR ANGIOGRAPHY HEAD W/DYE: CPT | Mod: 26,59

## 2020-04-27 PROCEDURE — 70553 MRI BRAIN STEM W/O & W/DYE: CPT | Mod: 26

## 2020-04-27 PROCEDURE — 99233 SBSQ HOSP IP/OBS HIGH 50: CPT

## 2020-04-27 PROCEDURE — 71045 X-RAY EXAM CHEST 1 VIEW: CPT | Mod: 26

## 2020-04-27 PROCEDURE — 99223 1ST HOSP IP/OBS HIGH 75: CPT

## 2020-04-27 RX ADMIN — ISOSORBIDE MONONITRATE 30 MILLIGRAM(S): 60 TABLET, EXTENDED RELEASE ORAL at 11:32

## 2020-04-27 RX ADMIN — Medication 40 MILLIGRAM(S): at 06:14

## 2020-04-27 RX ADMIN — SENNA PLUS 2 TABLET(S): 8.6 TABLET ORAL at 22:06

## 2020-04-27 RX ADMIN — Medication 40 MILLIGRAM(S): at 17:15

## 2020-04-27 RX ADMIN — PANTOPRAZOLE SODIUM 40 MILLIGRAM(S): 20 TABLET, DELAYED RELEASE ORAL at 06:14

## 2020-04-27 RX ADMIN — TAMSULOSIN HYDROCHLORIDE 0.4 MILLIGRAM(S): 0.4 CAPSULE ORAL at 22:06

## 2020-04-27 RX ADMIN — INSULIN HUMAN 11: 100 INJECTION, SOLUTION SUBCUTANEOUS at 00:07

## 2020-04-27 RX ADMIN — BUDESONIDE AND FORMOTEROL FUMARATE DIHYDRATE 2 PUFF(S): 160; 4.5 AEROSOL RESPIRATORY (INHALATION) at 09:22

## 2020-04-27 RX ADMIN — CHLORHEXIDINE GLUCONATE 1 APPLICATION(S): 213 SOLUTION TOPICAL at 06:14

## 2020-04-27 RX ADMIN — BUDESONIDE AND FORMOTEROL FUMARATE DIHYDRATE 2 PUFF(S): 160; 4.5 AEROSOL RESPIRATORY (INHALATION) at 22:06

## 2020-04-27 RX ADMIN — Medication 145 MILLIGRAM(S): at 11:32

## 2020-04-27 RX ADMIN — FINASTERIDE 5 MILLIGRAM(S): 5 TABLET, FILM COATED ORAL at 11:32

## 2020-04-27 RX ADMIN — SIMVASTATIN 40 MILLIGRAM(S): 20 TABLET, FILM COATED ORAL at 22:06

## 2020-04-27 RX ADMIN — INSULIN HUMAN 9: 100 INJECTION, SOLUTION SUBCUTANEOUS at 12:09

## 2020-04-27 NOTE — CONSULT NOTE ADULT - ATTENDING COMMENTS
A 80 years old right handed man on OAC and Plavix, is s/p witnessed fall, who was found on CTH to have right lateral ventricle IVH is consulted by NS for atypical IVH and possible underlying vascular malformation. His CTA H/N reports of right VA occlusion with reconstitution at level of C3 that appears chronic. His carotid duplex reported severe stenosis of b/l ICA origin. His INR on admission was 2.3 and reversed with Vit K 10 mg and KCentra. His GCS 15 on arrival. No NSx surgical intervention was warranted. Vascular was consulted for >80% stenosis of LICA on b/l carotid artery duplex. He is with past medical history of Afib (on Coumadin), HTN, DM, CABG x 3 (plavix). The plan briefed by the ACP in above was written after rounding on patient with our [place] Team.

## 2020-04-27 NOTE — CONSULT NOTE ADULT - ASSESSMENT
Fall with Head Trauma:   Intracranial hemorrhage:   Head CT showed right lateral and third ventricles densities without definite evidence of active arterial contrast extravasation.  Plan for Brain MRI. Neurosurgery follow up. Patient is asymptomatic.     Syncope: not sure if happened before or after the fall.   As per son he found him on the floor unconscious.   Workup showed severe aortic stenosis.   EKG showed Atrial flutter/fibrillation with slow ventricular response, RBBB  Recommend cardiology consult to evaluate severe Aortic stenosis, telemetry monitor.     Atrial flutter/fibrillation:   Coumadin on hold due to intraventricular hemorrhage.     CKD stag 4: stable.     COPD on home o2, lungs are clear.     Can be transferred to medicine if MRI is ok and cleared by neurosurgery.

## 2020-04-27 NOTE — CONSULT NOTE ADULT - ASSESSMENT
A 80 years old right handed man who presents to ED s/p witnessed fall, +HT, +LOC, +AC. Pt does not recall the event. 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 His INR on admission was 2.3 and reversed with Vit K 10 mg and KCentra. His GCS 15 on arrival. No NSx surgical intervention was warranted. Vascular was consulted for >80% stenosis of LICA on b/l carotid artery duplex. Consulted by NSx for unusual right IVH and to evaluate for any intracranial abnormalities. He is with past medical history of Afib (on Coumadin), HTN, DM, CABG x 3 (plavix).     SUGGESTIONS:  - Obtain Brain MRI & MRV with/without  - Continue management per primary team     Will continue to follow.   Case discussed and patient seen with attending physician   Mery Gould NP  [id number]

## 2020-04-27 NOTE — PROGRESS NOTE ADULT - ASSESSMENT
Assessment:  80F, PMHx Afib on Coumadin, HTN, DM, COPD, presents to ED s/p fall, +HT, +LOC, +AC. Pt does not recall the event, event was witnessed. Pt presents w/ posterior head laceration and abrasions to his Right wrist. , with the above physical exam, labs, and imaging findings.    Plan:  -Pain control as needed  -Hemodynamic monitoring as per routine  -f/u MRI brain  -neurointervention consult    Date/Time: 04-27-20 @ 02:07

## 2020-04-27 NOTE — CONSULT NOTE ADULT - SUBJECTIVE AND OBJECTIVE BOX
A 79 yo male with PMH of HTN, DM type 2, Atrial fibrillation on Coumadin and COPD was brought to ED s/p fall. Patient doesn't recall the event and he denies any loss of consciousness. As per Son patient was standing when suddenly fell back and hit his head. Patient found with posterior scalp laceration, Head CT showed intraventricular hemorrhage. Patient denies chest pain, SOB, palpitation or dizziness.     Vital Signs Last 24 Hrs  T(C): 36.3 (2020 04:50), Max: 36.7 (2020 12:00)  T(F): 97.3 (2020 04:50), Max: 98 (2020 12:00)  HR: 62 (2020 04:50) (49 - 62)  BP: 102/53 (2020 04:50) (102/53 - 120/70)  BP(mean): --  RR: 20 (2020 04:50) (18 - 20)  SpO2: 98% (2020 09:15) (98% - 100%)      20 @ 07:01  -  20 @ 07:00  --------------------------------------------------------  IN: 800 mL / OUT: 1370 mL / NET: -570 mL            Consultant(s) Notes Reviewed:  [x ] YES  [ ] NO          MEDICATIONS  (STANDING):  budesonide 160 MICROgram(s)/formoterol 4.5 MICROgram(s) Inhaler 2 Puff(s) Inhalation two times a day  chlorhexidine 4% Liquid 1 Application(s) Topical <User Schedule>  fenofibrate Tablet 145 milliGRAM(s) Oral daily  finasteride 5 milliGRAM(s) Oral daily  furosemide    Tablet 40 milliGRAM(s) Oral every 12 hours  insulin regular  human corrective regimen sliding scale   IV Push every 6 hours  isosorbide   mononitrate ER Tablet (IMDUR) 30 milliGRAM(s) Oral daily  metolazone 5 milliGRAM(s) Oral daily  pantoprazole    Tablet 40 milliGRAM(s) Oral before breakfast  senna 2 Tablet(s) Oral at bedtime  simvastatin 40 milliGRAM(s) Oral at bedtime  tamsulosin 0.4 milliGRAM(s) Oral at bedtime    MEDICATIONS  (PRN):  acetaminophen   Tablet .. 650 milliGRAM(s) Oral every 6 hours PRN Mild Pain (1 - 3)      LABS                          11.6   7.17  )-----------( 145      ( 2020 00:28 )             35.3         140  |  96<L>  |  105<HH>  ----------------------------<  257<H>  4.0   |  25  |  2.7<H>    Ca    9.6      2020 00:28  Phos  2.5       Mg     2.0         TPro  7.3  /  Alb  3.8  /  TBili  0.9  /  DBili  x   /  AST  32  /  ALT  15  /  AlkPhos  57        Urinalysis Basic - ( 2020 16:50 )    Color: Light Yellow / Appearance: Clear / S.012 / pH: x  Gluc: x / Ketone: Negative  / Bili: Negative / Urobili: <2 mg/dL   Blood: x / Protein: Negative / Nitrite: Negative   Leuk Esterase: Small / RBC: 0 /HPF / WBC 8 /HPF   Sq Epi: x / Non Sq Epi: 1 /HPF / Bacteria: Few      PT/INR - ( 2020 00:28 )   PT: 13.20 sec;   INR: 1.15 ratio         PTT - ( 2020 00:28 )  PTT:31.8 sec  Lactate Trend   @ 16:45 Lactate:1.8     CARDIAC MARKERS ( 2020 11:07 )  x     / 0.12 ng/mL / x     / x     / x      CARDIAC MARKERS ( 2020 06:41 )  x     / 0.11 ng/mL / x     / x     / x      CARDIAC MARKERS ( 2020 16:45 )  x     / 0.11 ng/mL / x     / x     / x          CAPILLARY BLOOD GLUCOSE      POCT Blood Glucose.: 128 mg/dL (2020 08:01)        RADIOLOGY & ADDITIONAL TESTS:    Imaging Personally Reviewed:  [ ] YES  [ ] NO    HEALTH ISSUES - PROBLEM Dx:    PHYSICAL EXAM:  GENERAL: NAD, well-developed  HEAD:  Atraumatic, Normocephalic  EYES: EOMI, PERRLA, conjunctiva and sclera clear  NECK: Supple, No JVD  CHEST/LUNG: Clear to auscultation bilaterally; No wheeze  HEART: Irregular rate and rhythm; S1 S2 Sm 3/6 on aortic area radiates to the neck.   ABDOMEN: Soft, Nontender, Nondistended; Bowel sounds present  EXTREMITIES:  LE chronic skin changes and wound with dressing.   PSYCH: AAOx3  NEUROLOGY: non-focal  SKIN: No rashes or lesions

## 2020-04-27 NOTE — CONSULT NOTE ADULT - ATTENDING COMMENTS
pt w/ intracranial bleed. pt w/o cp,sob. coumadin, plavix held. pt w/ félix and bblock held. follow hr. pt at risk for embolic event consult eps for consideration for alternative therapy.

## 2020-04-27 NOTE — CONSULT NOTE ADULT - SUBJECTIVE AND OBJECTIVE BOX
Patient is a 80y old  Male who presents with a chief complaint of syncope/fall (2020 21:12)      HPI:  TRAUMA ACTIVATION LEVEL:  TRAUMA ALERT    MECHANISM OF INJURY:      [] Blunt  	[] MVC	[X] Fall	[] Pedestrian Struck	[] Motorcycle   [] Assault   [] Bicycle collision  [] Sports injury     [] Penetrating  	[] Gun Shot Wound 		[] Stab Wound    GCS: 15 	E: 4	V: 5	M: 6    HPI:  80F, PMHx Afib on Coumadin, HTN, DM, COPD, presents to ED s/p fall, +HT, +LOC, +AC. Pt does not recall the event, event was witnessed. Pt presents w/ posterior head laceration and abrasions to his Right wrist. Otherwise, no other complaints.     PAST MEDICAL & SURGICAL HISTORY:  Afib  BPH (benign prostatic hyperplasia)  CHF (congestive heart failure)  COPD (chronic obstructive pulmonary disease)  Diabetes  HTN (hypertension)  H/O heart artery stent  S/P CABG x 3      Allergies    No Known Allergies    Intolerances (2020 00:04)      PAST MEDICAL & SURGICAL HISTORY:  Afib  BPH (benign prostatic hyperplasia)  CHF (congestive heart failure)  COPD (chronic obstructive pulmonary disease)  Diabetes  HTN (hypertension)  H/O heart artery stent  S/P CABG x 3                      PREVIOUS DIAGNOSTIC TESTING:      ECHO  FINDINGS:    STRESS  FINDINGS:    CATHETERIZATION  FINDINGS:    MEDICATIONS  (STANDING):  budesonide 160 MICROgram(s)/formoterol 4.5 MICROgram(s) Inhaler 2 Puff(s) Inhalation two times a day  chlorhexidine 4% Liquid 1 Application(s) Topical <User Schedule>  fenofibrate Tablet 145 milliGRAM(s) Oral daily  finasteride 5 milliGRAM(s) Oral daily  furosemide    Tablet 40 milliGRAM(s) Oral every 12 hours  insulin regular  human corrective regimen sliding scale   IV Push every 6 hours  isosorbide   mononitrate ER Tablet (IMDUR) 30 milliGRAM(s) Oral daily  metolazone 5 milliGRAM(s) Oral daily  pantoprazole    Tablet 40 milliGRAM(s) Oral before breakfast  senna 2 Tablet(s) Oral at bedtime  simvastatin 40 milliGRAM(s) Oral at bedtime  tamsulosin 0.4 milliGRAM(s) Oral at bedtime    MEDICATIONS  (PRN):  acetaminophen   Tablet .. 650 milliGRAM(s) Oral every 6 hours PRN Mild Pain (1 - 3)      FAMILY HISTORY:  No pertinent family history in first degree relatives      SOCIAL HISTORY:    CIGARETTES:    ALCOHOL:        Vital Signs Last 24 Hrs  T(C): 36.3 (2020 14:11), Max: 36.3 (2020 04:50)  T(F): 97.3 (2020 14:11), Max: 97.3 (2020 04:50)  HR: 66 (2020 14:11) (62 - 66)  BP: 109/58 (2020 14:11) (102/53 - 113/57)  BP(mean): --  RR: 19 (2020 14:11) (18 - 20)  SpO2: 98% (2020 09:15) (98% - 98%)            INTERPRETATION OF TELEMETRY:    ECG:    I&O's Detail    2020 07:01  -  2020 07:00  --------------------------------------------------------  IN:    Oral Fluid: 800 mL  Total IN: 800 mL    OUT:    Voided: 1370 mL  Total OUT: 1370 mL    Total NET: -570 mL          LABS:                        11.6   7.17  )-----------( 145      ( 2020 00:28 )             35.3     04-27    140  |  96<L>  |  105<HH>  ----------------------------<  257<H>  4.0   |  25  |  2.7<H>    Ca    9.6      2020 00:28  Phos  2.5     04-27  Mg     2.0     04-27    TPro  7.3  /  Alb  3.8  /  TBili  0.9  /  DBili  x   /  AST  32  /  ALT  15  /  AlkPhos  57  04-25    CARDIAC MARKERS ( 2020 11:07 )  x     / 0.12 ng/mL / x     / x     / x      CARDIAC MARKERS ( 2020 06:41 )  x     / 0.11 ng/mL / x     / x     / x      CARDIAC MARKERS ( 2020 16:45 )  x     / 0.11 ng/mL / x     / x     / x          PT/INR - ( 2020 00:28 )   PT: 13.20 sec;   INR: 1.15 ratio         PTT - ( 2020 00:28 )  PTT:31.8 sec  Urinalysis Basic - ( 2020 16:50 )    Color: Light Yellow / Appearance: Clear / S.012 / pH: x  Gluc: x / Ketone: Negative  / Bili: Negative / Urobili: <2 mg/dL   Blood: x / Protein: Negative / Nitrite: Negative   Leuk Esterase: Small / RBC: 0 /HPF / WBC 8 /HPF   Sq Epi: x / Non Sq Epi: 1 /HPF / Bacteria: Few      I&O's Summary    2020 07:01  -  2020 07:00  --------------------------------------------------------  IN: 800 mL / OUT: 1370 mL / NET: -570 mL      BNP  RADIOLOGY & ADDITIONAL STUDIES:

## 2020-04-27 NOTE — CONSULT NOTE ADULT - SUBJECTIVE AND OBJECTIVE BOX
NEUROENDOVASCULAR CONSULT NOTE    Consulted by Dr. Zhang for neuroendovascular evaluation of right IVH and questionable cavernoma.    Patient is a 80y old  Male who presents with a chief complaint of syncope/fall (26 Apr 2020 21:12)    HPI: This is a 80 years old right handed man with past medical history of Afib (on Coumadin), HTN, DM, COPD, CABG x 3 (plavix?), who presents to ED s/p witnessed fall, +HT, +LOC, +AC. Pt does not recall the event, event was witnessed. Pt presents w/ posterior head laceration and abrasions to his right wrist. On CTH he was found to have right ventricular hemorrhage and CTA H/N reports of right VA occlusion with reconstitution at level of C3. His INR on admission was 2.3 and reversed with Vit K 10 mg and KCentra. His GCS 15 on arrival. No NSx surgical intervention was warranted. Vascular was consulted for >80% stenosis of LICA on b/l carotid artery duplex.     PAST MEDICAL & SURGICAL HISTORY:  Afib  BPH (benign prostatic hyperplasia)  CHF (congestive heart failure)  COPD (chronic obstructive pulmonary disease)  Diabetes  HTN (hypertension)  H/O heart artery stent  S/P CABG x 3    Allergies  No Known Allergies  Intolerances (26 Apr 2020 00:04)    PAST MEDICAL & SURGICAL HISTORY:  Afib  BPH (benign prostatic hyperplasia)  CHF (congestive heart failure)  COPD (chronic obstructive pulmonary disease)  Diabetes  HTN (hypertension)  H/O heart artery stent  S/P CABG x 3    FAMILY HISTORY:  No pertinent family history in first degree relatives  Denies any personal or familial history of aneurysm or stroke    SOCIAL HISTORY:  Smoking/ETOH/Drugs: Denies to all     Allergies  No Known Allergies  Intolerances    REVIEW OF SYSTEMS  Constitutional: No fever, weight loss or fatigue  Eyes: No eye pain, visual disturbances, or discharge  ENMT: + heard of hearing  Neck: No pain or stiffness  Respiratory: No cough, wheezing, chills or hemoptysis  Cardiovascular: No chest pain, palpitations, shortness of breath, dizziness or leg swelling  Gastrointestinal: No abdominal pain. No nausea, vomiting or hematemesis; No diarrhea or constipation. Nohematochezia.  Genitourinary: No dysuria, frequency, hematuria or incontinence  Neurological: As per HPI  Skin: No itching, burning, rashes or lesions   Endocrine: No heat or cold intolerance; No hair loss  Musculoskeletal: No joint pain or swelling; No muscle, back or extremity pain  Psychiatric: No depression, anxiety, mood swings or difficulty sleeping  Heme/Lymph: No easy bruising or bleeding gums    PHYSICAL EXAM:    Vital Signs Last 24 Hrs  T(C): 36.3 (27 Apr 2020 04:50), Max: 36.7 (26 Apr 2020 16:00)  T(F): 97.3 (27 Apr 2020 04:50), Max: 98 (26 Apr 2020 16:00)  HR: 62 (27 Apr 2020 04:50) (49 - 62)  BP: 102/53 (27 Apr 2020 04:50) (102/53 - 115/70)  BP(mean): --  RR: 20 (27 Apr 2020 04:50) (18 - 20)  SpO2: 98% (27 Apr 2020 09:15) (98% - 100%)    Neurologic Exam:  Mental status: Awake, alert and oriented x 3.  Recent and remote memory intact.    Language: Follows 3 step commands. Intact naming, fluency, repetition and comprehension intact.  No dysarthria, no aphasia.  Fund of knowledge appropriate.    Cranial nerves: Pupils equally round and reactive to light, visual fields full, no nystagmus, extraocular muscles intact, V1 through V3 intact bilaterally and symmetric, face symmetric, heard of hearing  Motor: Normal bulk and tone, strength 5/5 in bilateral upper and lower extremities.   strength 5/5.  Rapid alternating movements intact and symmetric.   Sensation: Intact to light touch, proprioception, and pinprick.  No neglect.   Coordination: No dysmetria on finger-to-nose    NIH STROKE SCALE  Item	                                                        Score  1 a.	Level of Consciousness	               	0  1 b. LOC Questions	                                    0  1 c.	LOC Commands	                               	0  2.	Best Gaze	                                    0  3.	Visual	                                                0  4.	Facial Palsy	                                    0  5 a.	Motor Arm - Left	                        0  5 b.	Motor Arm - Right	                        0  6 a.	Motor Leg - Left	                        0  6 b.	Motor Leg - Right	                        0  7.	Limb Ataxia	                                    0  8.	Sensory	                                                0  9.	Language	                                    0  10.	Dysarthria	                                    0  11.	Extinction and Inattention  	            0  ___________________________________________________________________  TOTAL	                                                        0    mRS: 0 No symptoms at all    GCS:    15    ICHs: 1    MEDICATIONS  (STANDING):  budesonide 160 MICROgram(s)/formoterol 4.5 MICROgram(s) Inhaler 2 Puff(s) Inhalation two times a day  chlorhexidine 4% Liquid 1 Application(s) Topical <User Schedule>  fenofibrate Tablet 145 milliGRAM(s) Oral daily  finasteride 5 milliGRAM(s) Oral daily  furosemide    Tablet 40 milliGRAM(s) Oral every 12 hours  insulin regular  human corrective regimen sliding scale   IV Push every 6 hours  isosorbide   mononitrate ER Tablet (IMDUR) 30 milliGRAM(s) Oral daily  metolazone 5 milliGRAM(s) Oral daily  pantoprazole    Tablet 40 milliGRAM(s) Oral before breakfast  senna 2 Tablet(s) Oral at bedtime  simvastatin 40 milliGRAM(s) Oral at bedtime  tamsulosin 0.4 milliGRAM(s) Oral at bedtime    LABS                          11.6   7.17  )-----------( 145      ( 27 Apr 2020 00:28 )             35.3     04-27    140  |  96<L>  |  105<HH>  ----------------------------<  257<H>  4.0   |  25  |  2.7<H>    Ca    9.6      27 Apr 2020 00:28  Phos  2.5     04-27  Mg     2.0     04-27    TPro  7.3  /  Alb  3.8  /  TBili  0.9  /  DBili  x   /  AST  32  /  ALT  15  /  AlkPhos  57  04-25    PT/INR - ( 27 Apr 2020 00:28 )   PT: 13.20 sec;   INR: 1.15 ratio         PTT - ( 27 Apr 2020 00:28 )  PTT:31.8 sec  LIVER FUNCTIONS - ( 25 Apr 2020 16:45 )  Alb: 3.8 g/dL / Pro: 7.3 g/dL / ALK PHOS: 57 U/L / ALT: 15 U/L / AST: 32 U/L / GGT: x           CARDIAC MARKERS ( 26 Apr 2020 11:07 )  x     / 0.12 ng/mL / x     / x     / x      CARDIAC MARKERS ( 26 Apr 2020 06:41 )  x     / 0.11 ng/mL / x     / x     / x      CARDIAC MARKERS ( 25 Apr 2020 16:45 )  x     / 0.11 ng/mL / x     / x     / x          RADIOLOGY/EKG:    < from: CT Head No Cont (04.25.20 @ 17:44) >  Impression: Dense material noted within the right lateral and third ventricle consistent with hemorrhage within the ventricle. A call back request was submitted  Right posterior extracalvarial soft tissue hematoma.      < from: CT Angio Head w/ IV Cont (04.25.20 @ 22:50) >  IMPRESSION:  1.  Redemonstrated density within the right lateral and third ventricles without definite evidence of active arterial contrast extravasation.  2.  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 This finding may be chronic.  3.  Diffuse vascular disease as detailed above.  4.  Nonspecific prominent 1.4 x 0.9 cm right paratracheal lymph node.    < from: VA Duplex Carotid, Bilat (04.26.20 @ 08:03) >  Impression:  Less than 50% stenosis of the right internal carotid artery.  Greater than 80% stenosis in the left internal carotid artery.  ICA to CCA ratio is 5.6  Vascular surgery consultation is recommended if clinically indicated. NEUROENDOVASCULAR CONSULT NOTE    Consulted by Dr. Zhang for neuroendovascular evaluation of right IVH and questionable cavernoma.    Patient is a 80y old  Male who presents with a chief complaint of syncope/fall (26 Apr 2020 21:12)    HPI: This is a 80 years old right handed man with past medical history of Afib (on Coumadin), HTN, DM, COPD, CABG x 3 (plavix?), who presents to ED s/p witnessed fall, +HT, +LOC, +AC. Pt does not recall the event, event was witnessed. Pt presents w/ posterior head laceration and abrasions to his right wrist. On CTH he was found to have right ventricular hemorrhage and CTA H/N reports of right VA occlusion with reconstitution at level of C3. His INR on admission was 2.3 and reversed with Vit K 10 mg and KCentra. His GCS 15 on arrival. No NSx surgical intervention was warranted. Vascular was consulted for >80% stenosis of LICA on b/l carotid artery duplex.     PAST MEDICAL & SURGICAL HISTORY:  Afib  BPH (benign prostatic hyperplasia)  CHF (congestive heart failure)  COPD (chronic obstructive pulmonary disease)  Diabetes  HTN (hypertension)  H/O heart artery stent  S/P CABG x 3    Allergies  No Known Allergies  Intolerances (26 Apr 2020 00:04)    PAST MEDICAL & SURGICAL HISTORY:  Afib  BPH (benign prostatic hyperplasia)  CHF (congestive heart failure)  COPD (chronic obstructive pulmonary disease)  Diabetes  HTN (hypertension)  H/O heart artery stent  S/P CABG x 3    FAMILY HISTORY:  No pertinent family history in first degree relatives  Denies any personal or familial history of aneurysm or stroke    SOCIAL HISTORY:  Smoking/ETOH/Drugs: Denies to all     Allergies  No Known Allergies  Intolerances    REVIEW OF SYSTEMS  Constitutional: No fever, weight loss or fatigue  Eyes: No eye pain, visual disturbances, or discharge  ENMT: + heard of hearing  Neck: No pain or stiffness  Respiratory: No cough, wheezing, chills or hemoptysis  Cardiovascular: No chest pain, palpitations, shortness of breath, dizziness or leg swelling  Gastrointestinal: No diarrhea or constipation.  Genitourinary: No dysuria, frequency, hematuria or incontinence  Neurological: As per HPI  Skin: No itching, burning, rashes or lesions   Endocrine: No heat or cold intolerance; No hair loss  Musculoskeletal: No joint pain or swelling; No muscle, back or extremity pain  Heme/Lymph: No easy bruising or bleeding gums    PHYSICAL EXAM:    Vital Signs Last 24 Hrs  T(C): 36.3 (27 Apr 2020 04:50), Max: 36.7 (26 Apr 2020 16:00)  T(F): 97.3 (27 Apr 2020 04:50), Max: 98 (26 Apr 2020 16:00)  HR: 62 (27 Apr 2020 04:50) (49 - 62)  BP: 102/53 (27 Apr 2020 04:50) (102/53 - 115/70)  BP(mean): --  RR: 20 (27 Apr 2020 04:50) (18 - 20)  SpO2: 98% (27 Apr 2020 09:15) (98% - 100%)    Neurologic Exam:  Mental status: Awake, alert and oriented x 3 to self, place, and correct month. Cannot recall why he fell and events leading to fall. He reports "I thought I was sleeping"  Language: Follows 3 step commands. Intact naming, fluency, repetition and comprehension intact.  No dysarthria, no aphasia.  Fund of knowledge appropriate.    Cranial nerves: Pupils equally round and reactive to light, visual fields full, no nystagmus, extraocular muscles intact, V1 through V3 intact bilaterally and symmetric, face symmetric, heard of hearing  Motor: Normal bulk and tone. No drifting in all extremities. Strength 5/5 in bilateral upper and lower extremities.    Sensation: Intact to light touch, and pinprick.  No neglect.   Coordination: No dysmetria on finger-to-nose    NIH STROKE SCALE  Item	                                                        Score  1 a.	Level of Consciousness	               	0  1 b. LOC Questions	                                    0  1 c.	LOC Commands	                               	0  2.	Best Gaze	                                    0  3.	Visual	                                                0  4.	Facial Palsy	                                    0  5 a.	Motor Arm - Left	                        0  5 b.	Motor Arm - Right	                        0  6 a.	Motor Leg - Left	                        0  6 b.	Motor Leg - Right	                        0  7.	Limb Ataxia	                                    0  8.	Sensory	                                                0  9.	Language	                                    0  10.	Dysarthria	                                    0  11.	Extinction and Inattention  	            0  ___________________________________________________________________  TOTAL	                                                        0    mRS: 0 No symptoms at all    GCS:    15    ICHs: 1    MEDICATIONS  (STANDING):  budesonide 160 MICROgram(s)/formoterol 4.5 MICROgram(s) Inhaler 2 Puff(s) Inhalation two times a day  chlorhexidine 4% Liquid 1 Application(s) Topical <User Schedule>  fenofibrate Tablet 145 milliGRAM(s) Oral daily  finasteride 5 milliGRAM(s) Oral daily  furosemide    Tablet 40 milliGRAM(s) Oral every 12 hours  insulin regular  human corrective regimen sliding scale   IV Push every 6 hours  isosorbide   mononitrate ER Tablet (IMDUR) 30 milliGRAM(s) Oral daily  metolazone 5 milliGRAM(s) Oral daily  pantoprazole    Tablet 40 milliGRAM(s) Oral before breakfast  senna 2 Tablet(s) Oral at bedtime  simvastatin 40 milliGRAM(s) Oral at bedtime  tamsulosin 0.4 milliGRAM(s) Oral at bedtime    LABS                          11.6   7.17  )-----------( 145      ( 27 Apr 2020 00:28 )             35.3     04-27    140  |  96<L>  |  105<HH>  ----------------------------<  257<H>  4.0   |  25  |  2.7<H>    Ca    9.6      27 Apr 2020 00:28  Phos  2.5     04-27  Mg     2.0     04-27    TPro  7.3  /  Alb  3.8  /  TBili  0.9  /  DBili  x   /  AST  32  /  ALT  15  /  AlkPhos  57  04-25    PT/INR - ( 27 Apr 2020 00:28 )   PT: 13.20 sec;   INR: 1.15 ratio         PTT - ( 27 Apr 2020 00:28 )  PTT:31.8 sec  LIVER FUNCTIONS - ( 25 Apr 2020 16:45 )  Alb: 3.8 g/dL / Pro: 7.3 g/dL / ALK PHOS: 57 U/L / ALT: 15 U/L / AST: 32 U/L / GGT: x           CARDIAC MARKERS ( 26 Apr 2020 11:07 )  x     / 0.12 ng/mL / x     / x     / x      CARDIAC MARKERS ( 26 Apr 2020 06:41 )  x     / 0.11 ng/mL / x     / x     / x      CARDIAC MARKERS ( 25 Apr 2020 16:45 )  x     / 0.11 ng/mL / x     / x     / x          RADIOLOGY/EKG:  < from: CT Head No Cont (04.25.20 @ 17:44) >  Impression: Dense material noted within the right lateral and third ventricle consistent with hemorrhage within the ventricle. A call back request was submitted  Right posterior extracalvarial soft tissue hematoma.    < from: CT Angio Head w/ IV Cont (04.25.20 @ 22:50) >  IMPRESSION:  1.  Redemonstrated density within the right lateral and third ventricles without definite evidence of active arterial contrast extravasation.  2.  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 This finding may be chronic.  3.  Diffuse vascular disease as detailed above.  4.  Nonspecific prominent 1.4 x 0.9 cm right paratracheal lymph node.    < from: VA Duplex Carotid, Bilat (04.26.20 @ 08:03) >  Impression:  Less than 50% stenosis of the right internal carotid artery.  Greater than 80% stenosis in the left internal carotid artery.  ICA to CCA ratio is 5.6  Vascular surgery consultation is recommended if clinically indicated.

## 2020-04-27 NOTE — CHART NOTE - NSCHARTNOTEFT_GEN_A_CORE
GENERAL SURGERY FLOOR TRANSFER NOTE TO MEDICINE    HPI:   80M, PMHx Afib on coumadin, HTN, COPD, DM, presented to ED s/p fall, +HT, +LOC, +AC. Patient does not recall the event, event was witnessed by bystanders. Pt presented w/ posterior had laceration and abrasions to his Right wrist. Pt was admitted to the trauma service.       VENTRICULAR HEMORRHAGE FALL ABRASION  ^VENTRICULAR HEMORRHAGE FALL ABRASION  No pertinent family history in first degree relatives  Handoff  MEWS Score  Afib  BPH (benign prostatic hyperplasia)  CHF (congestive heart failure)  COPD (chronic obstructive pulmonary disease)  Diabetes  HTN (hypertension)  Ventricular hemorrhage  H/O heart artery stent  S/P CABG x 3  FALL  72  Abrasion  Fall    No Known Allergies    acetaminophen   Tablet .. 650 milliGRAM(s) Oral every 6 hours PRN  budesonide 160 MICROgram(s)/formoterol 4.5 MICROgram(s) Inhaler 2 Puff(s) Inhalation two times a day  chlorhexidine 4% Liquid 1 Application(s) Topical <User Schedule>  fenofibrate Tablet 145 milliGRAM(s) Oral daily  finasteride 5 milliGRAM(s) Oral daily  furosemide    Tablet 40 milliGRAM(s) Oral every 12 hours  insulin regular  human corrective regimen sliding scale   IV Push every 6 hours  isosorbide   mononitrate ER Tablet (IMDUR) 30 milliGRAM(s) Oral daily  metolazone 5 milliGRAM(s) Oral daily  pantoprazole    Tablet 40 milliGRAM(s) Oral before breakfast  senna 2 Tablet(s) Oral at bedtime  simvastatin 40 milliGRAM(s) Oral at bedtime  tamsulosin 0.4 milliGRAM(s) Oral at bedtime      OBJECTIVE:    T(C): 36.3 (04-27-20 @ 04:50), Max: 36.7 (04-26-20 @ 12:00)  HR: 62 (04-27-20 @ 04:50) (49 - 62)  BP: 102/53 (04-27-20 @ 04:50) (102/53 - 120/70)  RR: 20 (04-27-20 @ 04:50) (18 - 20)  SpO2: 98% (04-27-20 @ 09:15) (98% - 100%)  Wt(kg): --      I&O's Summary    26 Apr 2020 07:01 - 27 Apr 2020 07:00  --------------------------------------------------------  IN: 800 mL / OUT: 1370 mL / NET: -570 mL                              11.6   7.17  )-----------( 145      ( 27 Apr 2020 00:28 )             35.3       04-27    140  |  96<L>  |  105<HH>  ----------------------------<  257<H>  4.0   |  25  |  2.7<H>    Ca    9.6      27 Apr 2020 00:28  Phos  2.5     04-27  Mg     2.0     04-27    TPro  7.3  /  Alb  3.8  /  TBili  0.9  /  DBili  x   /  AST  32  /  ALT  15  /  AlkPhos  57  04-25      MEDICATIONS  (STANDING):  budesonide 160 MICROgram(s)/formoterol 4.5 MICROgram(s) Inhaler 2 Puff(s) Inhalation two times a day  chlorhexidine 4% Liquid 1 Application(s) Topical <User Schedule>  fenofibrate Tablet 145 milliGRAM(s) Oral daily  finasteride 5 milliGRAM(s) Oral daily  furosemide    Tablet 40 milliGRAM(s) Oral every 12 hours  insulin regular  human corrective regimen sliding scale   IV Push every 6 hours  isosorbide   mononitrate ER Tablet (IMDUR) 30 milliGRAM(s) Oral daily  metolazone 5 milliGRAM(s) Oral daily  pantoprazole    Tablet 40 milliGRAM(s) Oral before breakfast  senna 2 Tablet(s) Oral at bedtime  simvastatin 40 milliGRAM(s) Oral at bedtime  tamsulosin 0.4 milliGRAM(s) Oral at bedtime    MEDICATIONS  (PRN):  acetaminophen   Tablet .. 650 milliGRAM(s) Oral every 6 hours PRN Mild Pain (1 - 3)        ASSESSMENT:  80M, PMHx Afib on coumadin, HTN, COPD, DM, presented to ED s/p fall, +HT, +LOC, +AC. Patient does not recall the event, event was witnessed by bystanders. Pt presented w/ posterior had laceration and abrasions to his Right wrist. PAN scan showed CTH findings of Dean[place] material within Right lateral ventrile, and third ventricle, consistent w/ intraventicular hemorrhage. Neurosurgery recommended CTA head which showed: redemonstrated findings of CTH, without definite evidence of active arterial contrast extravasation. Also recommended MRI brain to evaluate for intraventricular cavernoma. 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 Syncope work up: Echo with EF 55-60%, with severe aortic stenosis. Carotid duplex showed greater than 80% stenosis in the left internal carotid artery.      Follow up:   - MRI brain   - EEG   - Vascular surgery: Plan for Left CEA on Wed 4/29  - Cardiology consult: for clearance (followed by Ashley)   - PM labs    Patient is cleared from trauma/surgery and needs transfer to medicine for further work up. Spoke with attending Dr. Duff for transfer of patient to his service. Dr. Duff is aware and accepts  Patient signed out to medicine resident Dr. Valadez [phone] @04-27-20 @ 11:03

## 2020-04-27 NOTE — PROGRESS NOTE ADULT - SUBJECTIVE AND OBJECTIVE BOX
Progress Note: Surgery  Patient: TAIWO ZAVALA , 80y (1940)Male   MRN: [id number]  Location: [address]  Visit: 20 Inpatient  Date: 20 @ 02:07    Procedure/Diagnosis: s/p fall  Events over 24h: No acute event overnight. No new complaint.       Vitals: T(F): 96.1 (20 @ 20:45), Max: 98 (20 @ 12:00)  HR: 62 (20 @ 20:45)  BP: 113/57 (20 @ 20:45) (113/55 - 125/60)  RR: 18 (20 @ 20:45)  SpO2: 100% (20 @ 16:00)      Diet: Diet, DASH/TLC:   Sodium & Cholesterol Restricted  Consistent Carbohydrate Evening Snack (20 @ 11:58)    IV Fluid:     In:   20 @ 07:01  -  20 @ 07:00  --------------------------------------------------------  IN: 0 mL    20 @ 07:  -  20 @ 02:07  --------------------------------------------------------  IN: 800 mL      Out:   20 @ 07:01  -  20 @ 07:00  --------------------------------------------------------  OUT:    Voided: 150 mL  Total OUT: 150 mL      20 @ 07:01  -  20 @ 02:07  --------------------------------------------------------  OUT:    Voided: 1370 mL  Total OUT: 1370 mL        Net:   20 @ 07:01  -  20 @ 07:00  --------------------------------------------------------  NET: -150 mL    20 @ 07:01  -  20 @ 02:07  --------------------------------------------------------  NET: -570 mL        Physical Examination:  General Appearance: NAD, alert and cooperative  HEENT: NCAT, WNL  Heart: S1 and S2. No murmurs. Rhythm is regular.  Lungs: Clear to auscultation BL without rales, rhonchi, wheezing, crackles or diminished breath sounds.  Abdomen: Positive bowel sounds. Soft, nondistended,       Medications: [Standing]  budesonide 160 MICROgram(s)/formoterol 4.5 MICROgram(s) Inhaler 2 Puff(s) Inhalation two times a day  chlorhexidine 4% Liquid 1 Application(s) Topical <User Schedule>  fenofibrate Tablet 145 milliGRAM(s) Oral daily  finasteride 5 milliGRAM(s) Oral daily  furosemide    Tablet 40 milliGRAM(s) Oral every 12 hours  insulin regular  human corrective regimen sliding scale   IV Push every 6 hours  isosorbide   mononitrate ER Tablet (IMDUR) 30 milliGRAM(s) Oral daily  metolazone 5 milliGRAM(s) Oral daily  pantoprazole    Tablet 40 milliGRAM(s) Oral before breakfast  senna 2 Tablet(s) Oral at bedtime  simvastatin 40 milliGRAM(s) Oral at bedtime  tamsulosin 0.4 milliGRAM(s) Oral at bedtime    Medications:[PRN]  acetaminophen   Tablet .. 650 milliGRAM(s) Oral every 6 hours PRN    Labs:                        11.1   6.70  )-----------( 161      ( 2020 06:41 )             33.6   04-    138  |  93<L>  |  103<HH>  ----------------------------<  151<H>  3.1<L>   |  28  |  2.5<H>    Ca    9.7      2020 06:41  Phos  3.7     04-  Mg     2.2     -    TPro  7.3  /  Alb  3.8  /  TBili  0.9  /  DBili  x   /  AST  32  /  ALT  15  /  AlkPhos  57  04-25  LIVER FUNCTIONS - ( 2020 16:45 )  Alb: 3.8 g/dL / Pro: 7.3 g/dL / ALK PHOS: 57 U/L / ALT: 15 U/L / AST: 32 U/L / GGT: x         PT/INR - ( 2020 06:41 )   PT: 14.80 sec;   INR: 1.29 ratio         PTT - ( 2020 06:41 )  PTT:33.4 secCARDIAC MARKERS ( 2020 11:07 )  x     / 0.12 ng/mL / x     / x     / x      CARDIAC MARKERS ( 2020 06:41 )  x     / 0.11 ng/mL / x     / x     / x      CARDIAC MARKERS ( 2020 16:45 )  x     / 0.11 ng/mL / x     / x     / x          Micro/Urine:  Urinalysis Basic - ( 2020 16:50 )    Color: Light Yellow / Appearance: Clear / S.012 / pH: x  Gluc: x / Ketone: Negative  / Bili: Negative / Urobili: <2 mg/dL   Blood: x / Protein: Negative / Nitrite: Negative   Leuk Esterase: Small / RBC: 0 /HPF / WBC 8 /HPF   Sq Epi: x / Non Sq Epi: 1 /HPF / Bacteria: Few      Imaging:  None/24h

## 2020-04-28 ENCOUNTER — LABORATORY RESULT (OUTPATIENT)
Age: 80
End: 2020-04-28

## 2020-04-28 LAB
GLUCOSE BLDC GLUCOMTR-MCNC: 121 MG/DL — HIGH (ref 70–99)
GLUCOSE BLDC GLUCOMTR-MCNC: 228 MG/DL — HIGH (ref 70–99)
GLUCOSE BLDC GLUCOMTR-MCNC: 276 MG/DL — HIGH (ref 70–99)
GLUCOSE BLDC GLUCOMTR-MCNC: 92 MG/DL — SIGNIFICANT CHANGE UP (ref 70–99)
GLUCOSE BLDC GLUCOMTR-MCNC: 96 MG/DL — SIGNIFICANT CHANGE UP (ref 70–99)

## 2020-04-28 PROCEDURE — 99233 SBSQ HOSP IP/OBS HIGH 50: CPT

## 2020-04-28 PROCEDURE — 99222 1ST HOSP IP/OBS MODERATE 55: CPT

## 2020-04-28 RX ADMIN — INSULIN HUMAN: 100 INJECTION, SOLUTION SUBCUTANEOUS at 17:22

## 2020-04-28 RX ADMIN — Medication 145 MILLIGRAM(S): at 12:10

## 2020-04-28 RX ADMIN — FINASTERIDE 5 MILLIGRAM(S): 5 TABLET, FILM COATED ORAL at 12:10

## 2020-04-28 RX ADMIN — SIMVASTATIN 40 MILLIGRAM(S): 20 TABLET, FILM COATED ORAL at 21:45

## 2020-04-28 RX ADMIN — ISOSORBIDE MONONITRATE 30 MILLIGRAM(S): 60 TABLET, EXTENDED RELEASE ORAL at 12:10

## 2020-04-28 RX ADMIN — Medication 1 APPLICATION(S): at 17:20

## 2020-04-28 RX ADMIN — PANTOPRAZOLE SODIUM 40 MILLIGRAM(S): 20 TABLET, DELAYED RELEASE ORAL at 06:02

## 2020-04-28 RX ADMIN — Medication 40 MILLIGRAM(S): at 17:20

## 2020-04-28 RX ADMIN — BUDESONIDE AND FORMOTEROL FUMARATE DIHYDRATE 2 PUFF(S): 160; 4.5 AEROSOL RESPIRATORY (INHALATION) at 21:45

## 2020-04-28 RX ADMIN — TAMSULOSIN HYDROCHLORIDE 0.4 MILLIGRAM(S): 0.4 CAPSULE ORAL at 21:45

## 2020-04-28 RX ADMIN — Medication 40 MILLIGRAM(S): at 06:02

## 2020-04-28 RX ADMIN — SENNA PLUS 2 TABLET(S): 8.6 TABLET ORAL at 21:45

## 2020-04-28 RX ADMIN — BUDESONIDE AND FORMOTEROL FUMARATE DIHYDRATE 2 PUFF(S): 160; 4.5 AEROSOL RESPIRATORY (INHALATION) at 09:00

## 2020-04-28 RX ADMIN — INSULIN HUMAN 9: 100 INJECTION, SOLUTION SUBCUTANEOUS at 00:15

## 2020-04-28 NOTE — PROGRESS NOTE ADULT - SUBJECTIVE AND OBJECTIVE BOX
NEUROENDOVASCULAR PROGRESS NOTE    Patient is a 80y old  Male who presents with a chief complaint of syncope/fall (26 Apr 2020 21:12)    HPI: This is a 80 years old right handed man with past medical history of Afib (on Coumadin), HTN, DM, COPD, CABG x 3 (plavix?), who presents to ED s/p witnessed fall, +HT, +LOC, +AC. Pt does not recall the event, event was witnessed. Pt presents w/ posterior head laceration and abrasions to his right wrist. On CTH he was found to have right ventricular hemorrhage and CTA H/N reports of right VA occlusion with reconstitution at level of C3. His INR on admission was 2.3 and reversed with Vit K 10 mg and KCentra. His GCS 15 on arrival. No NSx surgical intervention was warranted. Vascular was consulted for >80% stenosis of LICA on b/l carotid artery duplex.     Interval History: No overnight events, patient seen OOB to chair. Reviewed Brain MRI and MRV results are unremarkable.     REVIEW OF SYSTEMS  Constitutional: No fever, weight loss or fatigue  Eyes: No eye pain, visual disturbances, or discharge  ENMT: + heard of hearing  Neck: No pain or stiffness  Respiratory: No cough, wheezing, chills or hemoptysis  Cardiovascular: No chest pain, palpitations, shortness of breath, dizziness or leg swelling  Gastrointestinal: No diarrhea or constipation.  Genitourinary: No dysuria, frequency, hematuria or incontinence  Neurological: As per HPI  Skin: No itching, burning, rashes or lesions   Endocrine: No heat or cold intolerance; No hair loss  Musculoskeletal: No joint pain or swelling; No muscle, back or extremity pain  Heme/Lymph: No easy bruising or bleeding gums    PHYSICAL EXAM:    Vital Signs Last 24 Hrs  T(C): 36.2 (04-28-20 @ 04:30), Max: 36.3 (04-27-20 @ 14:11)  T(F): 97.2 (04-28-20 @ 04:30), Max: 97.3 (04-27-20 @ 14:11)  HR: 63 (04-28-20 @ 04:30) (63 - 66)  BP: 117/56 (04-28-20 @ 04:30) (109/58 - 117/56)  BP(mean): --  RR: 19 (04-28-20 @ 04:30) (19 - 19)  SpO2: 97% (04-28-20 @ 08:07) (97% - 97%)    Neurologic Exam:  Mental status: Awake, alert and oriented x 3 to self, place, and correct month. Cannot recall why he fell and events leading to fall.  Language: Follows 3 step commands. Intact naming, fluency, repetition and comprehension intact.  No dysarthria, no aphasia.  Fund of knowledge appropriate.    Cranial nerves: Pupils equally round and reactive to light, visual fields full, no nystagmus, extraocular muscles intact, V1 through V3 intact bilaterally and symmetric, face symmetric, heard of hearing  Motor: Normal bulk and tone. No drifting in all extremities. Strength 5/5 in bilateral upper and lower extremities.    Sensation: Intact to light touch, and pinprick.  No neglect.   Coordination: No dysmetria on finger-to-nose    NIH STROKE SCALE  Item	                                                        Score  1 a.	Level of Consciousness	               	0  1 b. LOC Questions	                                    0  1 c.	LOC Commands	                               	0  2.	Best Gaze	                                    0  3.	Visual	                                                0  4.	Facial Palsy	                                    0  5 a.	Motor Arm - Left	                        0  5 b.	Motor Arm - Right	                        0  6 a.	Motor Leg - Left	                        0  6 b.	Motor Leg - Right	                        0  7.	Limb Ataxia	                                    0  8.	Sensory	                                                0  9.	Language	                                    0  10.	Dysarthria	                                    0  11.	Extinction and Inattention  	            0  ___________________________________________________________________  TOTAL	                                                        0    mRS: 0 No symptoms at all    GCS:    15    ICHs: 1    MEDICATIONS  (STANDING):  budesonide 160 MICROgram(s)/formoterol 4.5 MICROgram(s) Inhaler 2 Puff(s) Inhalation two times a day  chlorhexidine 4% Liquid 1 Application(s) Topical <User Schedule>  fenofibrate Tablet 145 milliGRAM(s) Oral daily  finasteride 5 milliGRAM(s) Oral daily  furosemide    Tablet 40 milliGRAM(s) Oral every 12 hours  insulin regular  human corrective regimen sliding scale   IV Push every 6 hours  isosorbide   mononitrate ER Tablet (IMDUR) 30 milliGRAM(s) Oral daily  metolazone 5 milliGRAM(s) Oral daily  pantoprazole    Tablet 40 milliGRAM(s) Oral before breakfast  senna 2 Tablet(s) Oral at bedtime  silver sulfADIAZINE 1% Cream 1 Application(s) Topical daily  simvastatin 40 milliGRAM(s) Oral at bedtime  tamsulosin 0.4 milliGRAM(s) Oral at bedtime    LABS                        11.7   6.42  )-----------( 180      ( 27 Apr 2020 20:49 )             37.7       04-27    139  |  93<L>  |  97<HH>  ----------------------------<  318<H>  3.8   |  31  |  2.5<H>    Ca    9.5      27 Apr 2020 20:49  Phos  3.5     04-27  Mg     2.0     04-27    TPro  7.4  /  Alb  4.1  /  TBili  1.3<H>  /  DBili  0.6<H>  /  AST  24  /  ALT  14  /  AlkPhos  50  04-27    PT/INR - ( 27 Apr 2020 20:49 )   PT: 14.20 sec;   INR: 1.23 ratio    PTT - ( 27 Apr 2020 20:49 )  PTT:32.4 sec           LIVER FUNCTIONS - ( 25 Apr 2020 16:45 )  Alb: 3.8 g/dL / Pro: 7.3 g/dL / ALK PHOS: 57 U/L / ALT: 15 U/L / AST: 32 U/L / GGT: x           CARDIAC MARKERS ( 26 Apr 2020 11:07 )  x     / 0.12 ng/mL / x     / x     / x      CARDIAC MARKERS ( 26 Apr 2020 06:41 )  x     / 0.11 ng/mL / x     / x     / x      CARDIAC MARKERS ( 25 Apr 2020 16:45 )  x     / 0.11 ng/mL / x     / x     / x          RADIOLOGY/EKG:    < from: MR Venogram Head w/wo IV Cont (04.27.20 @ 16:06) >  FINDINGS:  The superior sagittal sinus is patent. The internal cerebral veins, straight sinus and intervening vein of [place] are normal. The transverse sinuses are normally patent.  The largest vein to the superior sagittal sinus is normally patent (vein of [place]). There is a patent vein of Sarah leading to the left lateral sinus    IMPRESSION: Unremarkable MRV    < from: MR Head w/wo IV Cont (04.27.20 @ 15:54) >    IMPRESSION:  1.  Stable intraventricular hematoma  2.  No associated enhancing lesions. No definite associated vascular masses.  3.  Moderate microvascular ischemic changes, more pronounced the right parietallobe    < from: CT Head No Cont (04.25.20 @ 17:44) >  Impression: Dense material noted within the right lateral and third ventricle consistent with hemorrhage within the ventricle. A call back request was submitted  Right posterior extracalvarial soft tissue hematoma.    < from: CT Angio Head w/ IV Cont (04.25.20 @ 22:50) >  IMPRESSION:  1.  Redemonstrated density within the right lateral and third ventricles without definite evidence of active arterial contrast extravasation.  2.  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 This finding may be chronic.  3.  Diffuse vascular disease as detailed above.  4.  Nonspecific prominent 1.4 x 0.9 cm right paratracheal lymph node.    < from: VA Duplex Carotid, Bilat (04.26.20 @ 08:03) >  Impression:  Less than 50% stenosis of the right internal carotid artery.  Greater than 80% stenosis in the left internal carotid artery.  ICA to CCA ratio is 5.6  Vascular surgery consultation is recommended if clinically indicated.

## 2020-04-28 NOTE — PROGRESS NOTE ADULT - SUBJECTIVE AND OBJECTIVE BOX
SUBJ:No chest pain or shortness of breath      MEDICATIONS  (STANDING):  budesonide 160 MICROgram(s)/formoterol 4.5 MICROgram(s) Inhaler 2 Puff(s) Inhalation two times a day  chlorhexidine 4% Liquid 1 Application(s) Topical <User Schedule>  fenofibrate Tablet 145 milliGRAM(s) Oral daily  finasteride 5 milliGRAM(s) Oral daily  furosemide    Tablet 40 milliGRAM(s) Oral every 12 hours  insulin regular  human corrective regimen sliding scale   IV Push every 6 hours  isosorbide   mononitrate ER Tablet (IMDUR) 30 milliGRAM(s) Oral daily  metolazone 5 milliGRAM(s) Oral daily  pantoprazole    Tablet 40 milliGRAM(s) Oral before breakfast  senna 2 Tablet(s) Oral at bedtime  silver sulfADIAZINE 1% Cream 1 Application(s) Topical daily  simvastatin 40 milliGRAM(s) Oral at bedtime  tamsulosin 0.4 milliGRAM(s) Oral at bedtime    MEDICATIONS  (PRN):  acetaminophen   Tablet .. 650 milliGRAM(s) Oral every 6 hours PRN Mild Pain (1 - 3)            Vital Signs Last 24 Hrs  T(C): 36.2 (28 Apr 2020 04:30), Max: 36.2 (28 Apr 2020 04:30)  T(F): 97.2 (28 Apr 2020 04:30), Max: 97.2 (28 Apr 2020 04:30)  HR: 63 (28 Apr 2020 04:30) (63 - 63)  BP: 117/56 (28 Apr 2020 04:30) (117/56 - 117/56)  BP(mean): --  RR: 19 (28 Apr 2020 04:30) (19 - 19)  SpO2: 97% (28 Apr 2020 08:07) (97% - 97%)      ECG:NML    TTE:    LABS:                        11.7   6.42  )-----------( 180      ( 27 Apr 2020 20:49 )             37.7     04-27    139  |  93<L>  |  97<HH>  ----------------------------<  318<H>  3.8   |  31  |  2.5<H>    Ca    9.5      27 Apr 2020 20:49  Phos  3.5     04-27  Mg     2.0     04-27    TPro  7.4  /  Alb  4.1  /  TBili  1.3<H>  /  DBili  0.6<H>  /  AST  24  /  ALT  14  /  AlkPhos  50  04-27        PT/INR - ( 27 Apr 2020 20:49 )   PT: 14.20 sec;   INR: 1.23 ratio         PTT - ( 27 Apr 2020 20:49 )  PTT:32.4 sec    I&O's Summary    BNP

## 2020-04-28 NOTE — PROGRESS NOTE ADULT - SUBJECTIVE AND OBJECTIVE BOX
MRI reviewed.  No underlying lesion appreciated.  Please have patient follow-up in neurosurgery clinic 4-6 weeks after discharge.  Will obtain a delayed scan at that point.    No indication for neurosurgical intervention or hospitalization from neurosurgery perspective at this time.

## 2020-04-28 NOTE — PROGRESS NOTE ADULT - ASSESSMENT
A 80 years old right handed man who presents to ED s/p witnessed fall, +HT, +LOC, +AC. Pt does not recall the event. 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 His INR on admission was 2.3 and reversed with Vit K 10 mg and KCentra. His GCS 15 on arrival. No NSx surgical intervention was warranted. Vascular was consulted for >80% stenosis of LICA on b/l carotid artery duplex. Consulted by NSx for unusual right IVH and to evaluate for any intracranial abnormalities. He is with past medical history of Afib (on Coumadin), HTN, DM, CABG x 3 (plavix). The recommended Brain MRI and MRV results are unremarkable and with stable right IVH.     SUGGESTIONS:  - No emergent neuroendovascular intervention is warranted at this time  - Consider repeat Brain MRI in few weeks once blood is reabsorbed. Then based on this future Brain MRI will decide if diagnostic cerebral angiogram is warranted or not  - Continue management per primary and NSx teams     We discussed this with NSx Dr. Zhang and agrees with plan.   Mery Gould NP  [phone]

## 2020-04-28 NOTE — PROGRESS NOTE ADULT - SUBJECTIVE AND OBJECTIVE BOX
SUBJECTIVE  Patient is a 80y old Male who presents with a chief complaint of syncope/fall (26 Apr 2020 21:12)  Currently admitted to medicine with the primary diagnosis of Ventricular hemorrhage      Today is hospital day 3d, and this morning pt is doing well. Pt appears mildly frustrated about being here but reports no real complaints. Wishes to finish procedure and do be Discharged.  OBJECTIVE  PAST MEDICAL & SURGICAL HISTORY  Afib  BPH (benign prostatic hyperplasia)  CHF (congestive heart failure)  COPD (chronic obstructive pulmonary disease)  Diabetes  HTN (hypertension)  H/O heart artery stent  S/P CABG x 3    ALLERGIES:  No Known Allergies    MEDICATIONS:  STANDING MEDICATIONS  budesonide 160 MICROgram(s)/formoterol 4.5 MICROgram(s) Inhaler 2 Puff(s) Inhalation two times a day  chlorhexidine 4% Liquid 1 Application(s) Topical <User Schedule>  fenofibrate Tablet 145 milliGRAM(s) Oral daily  finasteride 5 milliGRAM(s) Oral daily  furosemide    Tablet 40 milliGRAM(s) Oral every 12 hours  insulin regular  human corrective regimen sliding scale   IV Push every 6 hours  isosorbide   mononitrate ER Tablet (IMDUR) 30 milliGRAM(s) Oral daily  metolazone 5 milliGRAM(s) Oral daily  pantoprazole    Tablet 40 milliGRAM(s) Oral before breakfast  senna 2 Tablet(s) Oral at bedtime  simvastatin 40 milliGRAM(s) Oral at bedtime  tamsulosin 0.4 milliGRAM(s) Oral at bedtime    PRN MEDICATIONS  acetaminophen   Tablet .. 650 milliGRAM(s) Oral every 6 hours PRN      VITAL SIGNS: Last 24 Hours  T(C): 36.2 (28 Apr 2020 04:30), Max: 36.3 (27 Apr 2020 14:11)  T(F): 97.2 (28 Apr 2020 04:30), Max: 97.3 (27 Apr 2020 14:11)  HR: 63 (28 Apr 2020 04:30) (63 - 66)  BP: 117/56 (28 Apr 2020 04:30) (109/58 - 117/56)  BP(mean): --  RR: 19 (28 Apr 2020 04:30) (19 - 19)  SpO2: 97% (28 Apr 2020 08:07) (97% - 97%)    LABS:                        11.7   6.42  )-----------( 180      ( 27 Apr 2020 20:49 )             37.7     04-27    139  |  93<L>  |  97<HH>  ----------------------------<  318<H>  3.8   |  31  |  2.5<H>    Ca    9.5      27 Apr 2020 20:49  Phos  3.5     04-27  Mg     2.0     04-27    TPro  7.4  /  Alb  4.1  /  TBili  1.3<H>  /  DBili  0.6<H>  /  AST  24  /  ALT  14  /  AlkPhos  50  04-27    PT/INR - ( 27 Apr 2020 20:49 )   PT: 14.20 sec;   INR: 1.23 ratio         PTT - ( 27 Apr 2020 20:49 )  PTT:32.4 sec          CARDIAC MARKERS ( 26 Apr 2020 11:07 )  x     / 0.12 ng/mL / x     / x     / x          RADIOLOGY:  < from: Xray Chest 1 View- PORTABLE-Routine (04.27.20 @ 09:07) >  Impression:      Cardiomegaly without acute opacifications or effusions    < end of copied text >  < from: Transthoracic Echocardiogram (04.26.20 @ 07:05) >  Summary:   1. Left ventricular ejection fraction, by visual estimation, is 55 to 60%.   2. Technically suboptimal study.   3. LV function appears low normal on limited views.   4. Moderate mitral annular calcification.   5. Moderate mitral valve regurgitation.   6. Moderate thickening of the anterior and posterior mitral valve leaflets.   7. AV is severely calcified. Severe aortic stenosis, peak/mean PG 74/40 mmHg, ANGELA 0.6 cm^2 by continuity equation.   8. Estimated pulmonary artery systolic pressure is 52.1 mmHg assuming a right atrial pressure of 3 mmHg, which is consistent with moderate pulmonary hypertension.    < end of copied text >    < from: MR Venogram Head w/wo IV Cont (04.27.20 @ 16:06) >    IMPRESSION:    Unremarkable MRV    < end of copied text >    < from: MR Head w/wo IV Cont (04.27.20 @ 15:54) >  IMPRESSION:    1.  Stable intraventricular hematoma    2.  No associated enhancing lesions. No definite associated vascular masses.    3.  Moderate microvascular ischemic changes, more pronounced the right parietallobe    < end of copied text >      < from: VA Duplex Carotid, Bilat (04.26.20 @ 08:03) >  Impression:    Less than 50% stenosis of the right internal carotid artery.  Greater than 80% stenosis in the left internal carotid artery.  ICA to CCA ratio is 5.6  Vascular surgery consultation is recommended if clinically indicated.    < end of copied text >      PHYSICAL EXAM:    GENERAL: NAD, elderly, AAOx3  HEENT:  Atraumatic, Normocephalic.  PULMONARY: Clear to auscultation bilaterally; No wheeze  CARDIOVASCULAR: Regular rate and rhythm; No murmurs, rubs, or gallops  GASTROINTESTINAL: Soft, Nontender, Nondistended;   MUSCULOSKELETAL:  3+ edema of lower extremities b/l. Warm and moist. R LE wrapped in gauze. SUBJECTIVE  Patient is a 80y old Male who presents with a chief complaint of syncope/fall (26 Apr 2020 21:12)  Currently admitted to medicine with the primary diagnosis of Ventricular hemorrhage      Today is hospital day 3d, and this morning pt is doing well. Pt appears mildly frustrated about being here but reports no real complaints. Wishes to finish procedure and do be Discharged.  OBJECTIVE  PAST MEDICAL & SURGICAL HISTORY  Afib  BPH (benign prostatic hyperplasia)  CHF (congestive heart failure)  COPD (chronic obstructive pulmonary disease)  Diabetes  HTN (hypertension)  H/O heart artery stent  S/P CABG x 3    ALLERGIES:  No Known Allergies    MEDICATIONS:  STANDING MEDICATIONS  budesonide 160 MICROgram(s)/formoterol 4.5 MICROgram(s) Inhaler 2 Puff(s) Inhalation two times a day  chlorhexidine 4% Liquid 1 Application(s) Topical <User Schedule>  fenofibrate Tablet 145 milliGRAM(s) Oral daily  finasteride 5 milliGRAM(s) Oral daily  furosemide    Tablet 40 milliGRAM(s) Oral every 12 hours  insulin regular  human corrective regimen sliding scale   IV Push every 6 hours  isosorbide   mononitrate ER Tablet (IMDUR) 30 milliGRAM(s) Oral daily  metolazone 5 milliGRAM(s) Oral daily  pantoprazole    Tablet 40 milliGRAM(s) Oral before breakfast  senna 2 Tablet(s) Oral at bedtime  simvastatin 40 milliGRAM(s) Oral at bedtime  tamsulosin 0.4 milliGRAM(s) Oral at bedtime    PRN MEDICATIONS  acetaminophen   Tablet .. 650 milliGRAM(s) Oral every 6 hours PRN      VITAL SIGNS: Last 24 Hours  T(C): 36.2 (28 Apr 2020 04:30), Max: 36.3 (27 Apr 2020 14:11)  T(F): 97.2 (28 Apr 2020 04:30), Max: 97.3 (27 Apr 2020 14:11)  HR: 63 (28 Apr 2020 04:30) (63 - 66)  BP: 117/56 (28 Apr 2020 04:30) (109/58 - 117/56)  BP(mean): --  RR: 19 (28 Apr 2020 04:30) (19 - 19)  SpO2: 97% (28 Apr 2020 08:07) (97% - 97%)    LABS:                        11.7   6.42  )-----------( 180      ( 27 Apr 2020 20:49 )             37.7     04-27    139  |  93<L>  |  97<HH>  ----------------------------<  318<H>  3.8   |  31  |  2.5<H>    Ca    9.5      27 Apr 2020 20:49  Phos  3.5     04-27  Mg     2.0     04-27    TPro  7.4  /  Alb  4.1  /  TBili  1.3<H>  /  DBili  0.6<H>  /  AST  24  /  ALT  14  /  AlkPhos  50  04-27    PT/INR - ( 27 Apr 2020 20:49 )   PT: 14.20 sec;   INR: 1.23 ratio         PTT - ( 27 Apr 2020 20:49 )  PTT:32.4 sec          CARDIAC MARKERS ( 26 Apr 2020 11:07 )  x     / 0.12 ng/mL / x     / x     / x          RADIOLOGY:  < from: Xray Chest 1 View- PORTABLE-Routine (04.27.20 @ 09:07) >  Impression:      Cardiomegaly without acute opacifications or effusions    < end of copied text >  < from: Transthoracic Echocardiogram (04.26.20 @ 07:05) >  Summary:   1. Left ventricular ejection fraction, by visual estimation, is 55 to 60%.   2. Technically suboptimal study.   3. LV function appears low normal on limited views.   4. Moderate mitral annular calcification.   5. Moderate mitral valve regurgitation.   6. Moderate thickening of the anterior and posterior mitral valve leaflets.   7. AV is severely calcified. Severe aortic stenosis, peak/mean PG 74/40 mmHg, ANGELA 0.6 cm^2 by continuity equation.   8. Estimated pulmonary artery systolic pressure is 52.1 mmHg assuming a right atrial pressure of 3 mmHg, which is consistent with moderate pulmonary hypertension.    < end of copied text >    < from: MR Venogram Head w/wo IV Cont (04.27.20 @ 16:06) >    IMPRESSION:    Unremarkable MRV    < end of copied text >    < from: MR Head w/wo IV Cont (04.27.20 @ 15:54) >  IMPRESSION:    1.  Stable intraventricular hematoma    2.  No associated enhancing lesions. No definite associated vascular masses.    3.  Moderate microvascular ischemic changes, more pronounced the right parietallobe  MRI r  < end of copied text >      < from: VA Duplex Carotid, Bilat (04.26.20 @ 08:03) >  Impression:    Less than 50% stenosis of the right internal carotid artery.  Greater than 80% stenosis in the left internal carotid artery.  ICA to CCA ratio is 5.6  Vascular surgery consultation is recommended if clinically indicated.    < end of copied text >      PHYSICAL EXAM:    GENERAL: NAD, elderly, AAOx3  HEENT:  Atraumatic, Normocephalic.  PULMONARY: Clear to auscultation bilaterally; No wheeze  CARDIOVASCULAR: Regular rate and rhythm; No murmurs, rubs, or gallops  GASTROINTESTINAL: Soft, Nontender, Nondistended;   MUSCULOSKELETAL:  3+ edema of lower extremities b/l. Warm and moist. R LE wrapped in gauze.

## 2020-04-28 NOTE — PROGRESS NOTE ADULT - ASSESSMENT
80F, PMHx Afib on Coumadin, HTN, DM, COPD, presents to ED s/p fall, +HT, +LOC, +AC. Pt does not recall the event, event was witnessed found to have L. Carotid artery stenosis >80%. 80F, PMHx Afib on Coumadin, HTN, DM, COPD, presents to ED s/p fall, +HT, +LOC, +AC. Pt does not recall the event, event was witnessed found to have L. Carotid artery stenosis >80%.     # Fall likely 2/2 syncopal episode  - carotid duplex 80% stenosis of L internal carotid artery  - Vasc surgery: plan for Left CEA on Wed 4/29  - cardiac clearance pending  - pending EEG  - MRI unremarkable    # DM  - on Humalin sliding scale  - FS persistently >200  - will add lanus and lispro sliding scale    # HTN  - 117/56 in am  - well controlled   - c/w furosemide, metolazone    #hx of afib  - on coumadin at home  - trauma: hold Coumadin- Kcentra administered in ED   - PTT: 14.2, INR: 1.23  - holding coumadin until s/p surgery    # COPD  - no wheezing on     #Lower extremity wound  - pt requesting silvadine   - wound care 80F, PMHx Afib on Coumadin, HTN, DM, COPD, presents to ED s/p fall, +HT, +LOC, +AC. Pt does not recall the event, event was witnessed found to have L. Carotid artery stenosis >80%.     # Fall likely 2/2 syncopal episode  - carotid duplex 80% stenosis of L internal carotid artery  - Vasc surgery: plan for Left CEA on Wed 4/29  - cardiac clearance pending  - pending EEG  - MRI unremarkable    # DM  - only wishes to consume sugary drinks.  - FS persistently >200 when eating sweets  - c/w Humalin sliding scale  - diabetic diet    # HTN  - 117/56 in am  - well controlled   - c/w furosemide, metolazone, imdur    #hx of afib  - on coumadin at home  - trauma: hold Coumadin- Kcentra administered in ED   - PTT: 14.2, INR: 1.23  - holding coumadin until s/p surgery    # COPD  - no difficulty breathing  - c/w budesonide    #Lower extremity wound  - pt requesting silvadine   - wound care    DVT: pending vasc rec  GI ppx: pantoprazole  Diet: DASH/TLC  code: full

## 2020-04-28 NOTE — CHART NOTE - NSCHARTNOTEFT_GEN_A_CORE
Patient assessed and requires cardiac clearance prior to surgery. Surgery will be cancelled on 4/29/20 per Resident MD Valadez.

## 2020-04-29 LAB
ALBUMIN SERPL ELPH-MCNC: 3.8 G/DL — SIGNIFICANT CHANGE UP (ref 3.5–5.2)
ALP SERPL-CCNC: 42 U/L — SIGNIFICANT CHANGE UP (ref 30–115)
ALT FLD-CCNC: 12 U/L — SIGNIFICANT CHANGE UP (ref 0–41)
ANION GAP SERPL CALC-SCNC: 33 MMOL/L — HIGH (ref 7–14)
APTT BLD: 32 SEC — SIGNIFICANT CHANGE UP (ref 27–39.2)
APTT BLD: 40.1 SEC — HIGH (ref 27–39.2)
AST SERPL-CCNC: 21 U/L — SIGNIFICANT CHANGE UP (ref 0–41)
BASOPHILS # BLD AUTO: 0.05 K/UL — SIGNIFICANT CHANGE UP (ref 0–0.2)
BASOPHILS NFR BLD AUTO: 0.8 % — SIGNIFICANT CHANGE UP (ref 0–1)
BILIRUB SERPL-MCNC: 1.2 MG/DL — SIGNIFICANT CHANGE UP (ref 0.2–1.2)
BUN SERPL-MCNC: 92 MG/DL — CRITICAL HIGH (ref 10–20)
CALCIUM SERPL-MCNC: 9.4 MG/DL — SIGNIFICANT CHANGE UP (ref 8.5–10.1)
CHLORIDE SERPL-SCNC: 86 MMOL/L — LOW (ref 98–110)
CO2 SERPL-SCNC: 27 MMOL/L — SIGNIFICANT CHANGE UP (ref 17–32)
CREAT SERPL-MCNC: 2.6 MG/DL — HIGH (ref 0.7–1.5)
EOSINOPHIL # BLD AUTO: 0.12 K/UL — SIGNIFICANT CHANGE UP (ref 0–0.7)
EOSINOPHIL NFR BLD AUTO: 1.9 % — SIGNIFICANT CHANGE UP (ref 0–8)
GLUCOSE BLDC GLUCOMTR-MCNC: 104 MG/DL — HIGH (ref 70–99)
GLUCOSE BLDC GLUCOMTR-MCNC: 122 MG/DL — HIGH (ref 70–99)
GLUCOSE BLDC GLUCOMTR-MCNC: 133 MG/DL — HIGH (ref 70–99)
GLUCOSE BLDC GLUCOMTR-MCNC: 193 MG/DL — HIGH (ref 70–99)
GLUCOSE BLDC GLUCOMTR-MCNC: 349 MG/DL — HIGH (ref 70–99)
GLUCOSE BLDC GLUCOMTR-MCNC: 94 MG/DL — SIGNIFICANT CHANGE UP (ref 70–99)
GLUCOSE SERPL-MCNC: 100 MG/DL — HIGH (ref 70–99)
HCT VFR BLD CALC: 35.4 % — LOW (ref 42–52)
HGB BLD-MCNC: 11.5 G/DL — LOW (ref 14–18)
IMM GRANULOCYTES NFR BLD AUTO: 0.5 % — HIGH (ref 0.1–0.3)
INR BLD: 1.23 RATIO — SIGNIFICANT CHANGE UP (ref 0.65–1.3)
LYMPHOCYTES # BLD AUTO: 1.55 K/UL — SIGNIFICANT CHANGE UP (ref 1.2–3.4)
LYMPHOCYTES # BLD AUTO: 24.9 % — SIGNIFICANT CHANGE UP (ref 20.5–51.1)
MAGNESIUM SERPL-MCNC: 2 MG/DL — SIGNIFICANT CHANGE UP (ref 1.8–2.4)
MCHC RBC-ENTMCNC: 28.9 PG — SIGNIFICANT CHANGE UP (ref 27–31)
MCHC RBC-ENTMCNC: 32.5 G/DL — SIGNIFICANT CHANGE UP (ref 32–37)
MCV RBC AUTO: 88.9 FL — SIGNIFICANT CHANGE UP (ref 80–94)
MONOCYTES # BLD AUTO: 0.73 K/UL — HIGH (ref 0.1–0.6)
MONOCYTES NFR BLD AUTO: 11.7 % — HIGH (ref 1.7–9.3)
NEUTROPHILS # BLD AUTO: 3.74 K/UL — SIGNIFICANT CHANGE UP (ref 1.4–6.5)
NEUTROPHILS NFR BLD AUTO: 60.2 % — SIGNIFICANT CHANGE UP (ref 42.2–75.2)
NRBC # BLD: 0 /100 WBCS — SIGNIFICANT CHANGE UP (ref 0–0)
PLATELET # BLD AUTO: 174 K/UL — SIGNIFICANT CHANGE UP (ref 130–400)
POTASSIUM SERPL-MCNC: 3.1 MMOL/L — LOW (ref 3.5–5)
POTASSIUM SERPL-SCNC: 3.1 MMOL/L — LOW (ref 3.5–5)
PROT SERPL-MCNC: 6.8 G/DL — SIGNIFICANT CHANGE UP (ref 6–8)
PROTHROM AB SERPL-ACNC: 14.1 SEC — HIGH (ref 9.95–12.87)
RBC # BLD: 3.98 M/UL — LOW (ref 4.7–6.1)
RBC # FLD: 18.6 % — HIGH (ref 11.5–14.5)
SODIUM SERPL-SCNC: 146 MMOL/L — SIGNIFICANT CHANGE UP (ref 135–146)
WBC # BLD: 6.22 K/UL — SIGNIFICANT CHANGE UP (ref 4.8–10.8)
WBC # FLD AUTO: 6.22 K/UL — SIGNIFICANT CHANGE UP (ref 4.8–10.8)

## 2020-04-29 PROCEDURE — 93010 ELECTROCARDIOGRAM REPORT: CPT

## 2020-04-29 PROCEDURE — 99233 SBSQ HOSP IP/OBS HIGH 50: CPT

## 2020-04-29 RX ORDER — DEXTROSE 50 % IN WATER 50 %
25 SYRINGE (ML) INTRAVENOUS ONCE
Refills: 0 | Status: DISCONTINUED | OUTPATIENT
Start: 2020-04-29 | End: 2020-05-06

## 2020-04-29 RX ORDER — INSULIN LISPRO 100/ML
VIAL (ML) SUBCUTANEOUS
Refills: 0 | Status: DISCONTINUED | OUTPATIENT
Start: 2020-04-29 | End: 2020-05-13

## 2020-04-29 RX ORDER — INSULIN LISPRO 100/ML
5 VIAL (ML) SUBCUTANEOUS
Refills: 0 | Status: DISCONTINUED | OUTPATIENT
Start: 2020-04-29 | End: 2020-05-13

## 2020-04-29 RX ORDER — INSULIN LISPRO 100/ML
9 VIAL (ML) SUBCUTANEOUS ONCE
Refills: 0 | Status: COMPLETED | OUTPATIENT
Start: 2020-04-29 | End: 2020-04-29

## 2020-04-29 RX ORDER — INSULIN GLARGINE 100 [IU]/ML
20 INJECTION, SOLUTION SUBCUTANEOUS AT BEDTIME
Refills: 0 | Status: DISCONTINUED | OUTPATIENT
Start: 2020-04-29 | End: 2020-05-13

## 2020-04-29 RX ORDER — INSULIN LISPRO 100/ML
13 VIAL (ML) SUBCUTANEOUS ONCE
Refills: 0 | Status: DISCONTINUED | OUTPATIENT
Start: 2020-04-29 | End: 2020-04-29

## 2020-04-29 RX ORDER — HEPARIN SODIUM 5000 [USP'U]/ML
800 INJECTION INTRAVENOUS; SUBCUTANEOUS
Qty: 25000 | Refills: 0 | Status: DISCONTINUED | OUTPATIENT
Start: 2020-04-29 | End: 2020-04-30

## 2020-04-29 RX ORDER — FUROSEMIDE 40 MG
1 TABLET ORAL
Qty: 0 | Refills: 0 | DISCHARGE

## 2020-04-29 RX ORDER — GLUCAGON INJECTION, SOLUTION 0.5 MG/.1ML
1 INJECTION, SOLUTION SUBCUTANEOUS ONCE
Refills: 0 | Status: DISCONTINUED | OUTPATIENT
Start: 2020-04-29 | End: 2020-05-06

## 2020-04-29 RX ORDER — DEXTROSE 50 % IN WATER 50 %
15 SYRINGE (ML) INTRAVENOUS ONCE
Refills: 0 | Status: DISCONTINUED | OUTPATIENT
Start: 2020-04-29 | End: 2020-05-06

## 2020-04-29 RX ORDER — SODIUM CHLORIDE 9 MG/ML
1000 INJECTION, SOLUTION INTRAVENOUS
Refills: 0 | Status: DISCONTINUED | OUTPATIENT
Start: 2020-04-29 | End: 2020-05-06

## 2020-04-29 RX ORDER — DEXTROSE 50 % IN WATER 50 %
12.5 SYRINGE (ML) INTRAVENOUS ONCE
Refills: 0 | Status: DISCONTINUED | OUTPATIENT
Start: 2020-04-29 | End: 2020-05-06

## 2020-04-29 RX ADMIN — Medication 145 MILLIGRAM(S): at 12:33

## 2020-04-29 RX ADMIN — HEPARIN SODIUM 8 UNIT(S)/HR: 5000 INJECTION INTRAVENOUS; SUBCUTANEOUS at 13:40

## 2020-04-29 RX ADMIN — INSULIN HUMAN 3: 100 INJECTION, SOLUTION SUBCUTANEOUS at 00:12

## 2020-04-29 RX ADMIN — Medication 40 MILLIGRAM(S): at 17:24

## 2020-04-29 RX ADMIN — INSULIN GLARGINE 20 UNIT(S): 100 INJECTION, SOLUTION SUBCUTANEOUS at 21:47

## 2020-04-29 RX ADMIN — TAMSULOSIN HYDROCHLORIDE 0.4 MILLIGRAM(S): 0.4 CAPSULE ORAL at 21:48

## 2020-04-29 RX ADMIN — Medication 9 UNIT(S): at 13:34

## 2020-04-29 RX ADMIN — SENNA PLUS 2 TABLET(S): 8.6 TABLET ORAL at 21:47

## 2020-04-29 RX ADMIN — BUDESONIDE AND FORMOTEROL FUMARATE DIHYDRATE 2 PUFF(S): 160; 4.5 AEROSOL RESPIRATORY (INHALATION) at 22:33

## 2020-04-29 RX ADMIN — Medication 40 MILLIGRAM(S): at 05:52

## 2020-04-29 RX ADMIN — PANTOPRAZOLE SODIUM 40 MILLIGRAM(S): 20 TABLET, DELAYED RELEASE ORAL at 05:52

## 2020-04-29 RX ADMIN — Medication 1 APPLICATION(S): at 12:34

## 2020-04-29 RX ADMIN — ISOSORBIDE MONONITRATE 30 MILLIGRAM(S): 60 TABLET, EXTENDED RELEASE ORAL at 12:34

## 2020-04-29 RX ADMIN — SIMVASTATIN 40 MILLIGRAM(S): 20 TABLET, FILM COATED ORAL at 21:47

## 2020-04-29 RX ADMIN — FINASTERIDE 5 MILLIGRAM(S): 5 TABLET, FILM COATED ORAL at 12:33

## 2020-04-29 NOTE — CONSULT NOTE ADULT - ASSESSMENT
Patient is a 80y old  Male who presents with a chief complaint of fall with ventricular hemorrhage. He was found to have 80% L ICA stenosis.   Vascular surgery planning Left ICA endarterectomy. Structural cardiology is consulted for evaluation of severe AS.   Peak/mean PG 74/40 mmHg, ANGELA 0.6 cm^2.     Afib  BPH (benign prostatic hyperplasia)  CHF (congestive heart failure)  COPD (chronic obstructive pulmonary disease)  Diabetes  HTN (hypertension)  H/O heart artery stent  S/P CABG x 3 Patient is a 80y old  Male who presents with a chief complaint of fall with ventricular hemorrhage. He was found to have 80% L ICA stenosis.   Vascular surgery planning Left ICA endarterectomy.   Structural cardiology is consulted for evaluation of severe AS.   Peak/mean PG 74/40 mmHg, ANGELA 0.6 cm^2.     Impression:  Severe aortic stenosis  CAD s/p CABG and PCI  Afib  BPH (benign prostatic hyperplasia)  CHF (congestive heart failure)  COPD (chronic obstructive pulmonary disease)  Diabetes  HTN (hypertension) Patient is a 80y old  Male who presents with a chief complaint of fall with ventricular hemorrhage. He was found to have 80% L ICA stenosis.   Vascular surgery planning Left ICA endarterectomy.   Structural cardiology is consulted for evaluation of severe AS.   Peak/mean PG 74/40 mmHg, ANGELA 0.6 cm^2.     Impression:  Severe aortic stenosis- exertional syncope and dyspnea  CAD s/p CABG and PCI- last LHC in Feb. 2017  Afib  BPH (benign prostatic hyperplasia)  CHF (congestive heart failure)  COPD (chronic obstructive pulmonary disease)  Diabetes  HTN (hypertension)    Patient is high risk for any surgical procedure at this time due to severe symptomatic AS  Might be a candidate for TAVR if Cr stable; however, he will need CT scan chest/abdomen/pelvis (TAVR protocol) prior to that if cleared by nephrology  Nephrology evaluation Patient is a 80y old  Male who presents with a chief complaint of fall with ventricular hemorrhage. He was found to have 80% L ICA stenosis.   Vascular surgery planning Left ICA endarterectomy.   Structural cardiology is consulted for evaluation of severe AS.   Peak/mean PG 74/40 mmHg, ANGELA 0.6 cm^2.     Impression:  Severe aortic stenosis- exertional syncope and dyspnea  CAD s/p CABG and PCI- last LHC in Feb. 2017  Afib  BPH (benign prostatic hyperplasia)  CHF (congestive heart failure)  COPD (chronic obstructive pulmonary disease)  Diabetes  HTN (hypertension)    Patient is high risk for any surgical procedure at this time due to severe symptomatic AS  Might be a candidate for TAVR if Cr stable; however, he will need CT scan chest/abdomen/pelvis (TAVR protocol) prior to that if cleared by nephrology due to high risk of CIAKI  Nephrology evaluation Patient is a 80y old  Male who presents with a chief complaint of fall with ventricular hemorrhage. He was found to have 80% L ICA stenosis.   Vascular surgery planning Left ICA endarterectomy.   Structural cardiology is consulted for evaluation of severe AS.   Peak/mean PG 74/40 mmHg, ANGELA 0.6 cm^2.     Impression:  Severe aortic stenosis- exertional syncope and dyspnea  CAD s/p CABG and PCI- last LHC in Feb. 2017  Afib  BPH (benign prostatic hyperplasia)  CHF (congestive heart failure)  COPD (chronic obstructive pulmonary disease)  Diabetes  HTN (hypertension)  CKD     Patient is high risk for any surgical procedure at this time due to severe symptomatic AS  Might be a candidate for TAVR if Cr stable; however, he will need CT scan chest/abdomen/pelvis (TAVR protocol) prior to that if cleared by nephrology due to high risk of CIAKI  Nephrology evaluation

## 2020-04-29 NOTE — PROGRESS NOTE ADULT - ASSESSMENT
80F, PMHx Afib on Coumadin, HTN, DM, COPD, presents to ED s/p fall, +HT, +LOC, +AC. Pt does not recall the event, event was witnessed found to have L. Carotid artery stenosis >80%.     # Fall with head trauma  - Intracranial hemorrhage  - CTH: dense matter in right lateral ventrile and third ventricle consistent with intraventricular hemorrhage.   - MRI: stable intrventricular hematoma.   - neurosurgery cleared for AC    # syncope likely secondary to severe Aortic Stenosis  - son paulette patient unconscious   - ekg: Line Atrial flutter with variable A-V block with premature ventricular or aberrantly conducted complexes  - echo: severe aortic stenosis   - structural cardiology consulted for AS  - CT chest w/iv contrast requested by cardio  - nephro consulted for CT w/ contrast optimization    #hx of afib  - on coumadin at home  - trauma: hold Coumadin- Kcentra administered in ED   - PTT: 14.2, INR: 1.23  - holding coumadin until s/p surgery  - neurosurgery cleared for anticoagulation  - heparin started    # Carotid artery stenosis   - carotid duplex 80% stenosis of L internal carotid artery  - Vasc surgery: plan for Left CEA on Wed 4/29 canceled due to no cardiac clearance    # DM  - Lantus and humalog sliding scale  - diabetic diet    # HTN  - well controlled   - c/w furosemide, metolazone, imdur    # COPD  - no difficulty breathing  - c/w budesonide    # CKD4  - stable  - neurology consulted for optimization     #Lower extremity wound  - pt requesting silvadine   - wound care    DVT: pending vasc rec  GI ppx: pantoprazole  Diet: DASH/TLC  code: full

## 2020-04-29 NOTE — CONSULT NOTE ADULT - SUBJECTIVE AND OBJECTIVE BOX
Date of Admission: 4/25/2020    CHIEF COMPLAINT: Fall     HISTORY OF PRESENT ILLNESS:    80F, PMHx Afib on Coumadin, AS, HTN, DM, COPD, presents to ED s/p fall, +HT, +LOC, +AC. Pt does not recall the event, event was witnessed by his son.   Pt presents w/ posterior head laceration and abrasions to his Right wrist. Otherwise, no other complaints.   CT head showed intraventricular hemorrhage. Also found to have L ICA severe stenosis.     PAST MEDICAL & SURGICAL HISTORY:  Afib  BPH (benign prostatic hyperplasia)  CHF (congestive heart failure)  COPD (chronic obstructive pulmonary disease)  Diabetes  HTN (hypertension)  H/O heart artery stent  S/P CABG x 3      Allergies    No Known Allergies    Intolerances (26 Apr 2020 00:04)      PAST MEDICAL & SURGICAL HISTORY:  Afib  BPH (benign prostatic hyperplasia)  CHF (congestive heart failure)  COPD (chronic obstructive pulmonary disease)  Diabetes  HTN (hypertension)  H/O heart artery stent  S/P CABG x 3      FAMILY HISTORY:  [x] no pertinent family history of premature cardiovascular disease in first degree relatives.  Mother:   Father:   Siblings:     SOCIAL HISTORY:    [x] Non-smoker  [ ] Smoker  [ ] Alcohol    Allergies    No Known Allergies    Intolerances    	    REVIEW OF SYSTEMS:  CONSTITUTIONAL: denies fever, weight loss, or fatigue  CARDIOLOGY: see HPI  RESPIRATORY: see HPI    NEUROLOGICAL: denies weakness, no focal deficits to report.  ENDOCRINOLOGICAL: no recent change in diabetic medications.   GI: no BRBPR, no N,V, diarrhea.    PSYCHIATRY: normal mood and affect  HEENT: no nasal discharge, no ecchymosis  SKIN: no ecchymosis, no breakdown  MUSCULOSKELETAL: Full range of motion x4.     PHYSICAL EXAM:  T(C): 35.7 (04-29-20 @ 04:45), Max: 36.6 (04-28-20 @ 15:32)  HR: 63 (04-29-20 @ 04:45) (63 - 69)  BP: 109/52 (04-29-20 @ 04:45) (109/52 - 110/52)  RR: 18 (04-29-20 @ 04:45) (17 - 18)  SpO2: --  Wt(kg): --  I&O's Summary      General Appearance: well appearing, normal for age and gender. 	  Neck: normal JVP, no bruit.   Eyes: No xanthomalasia, Extra Ocular muscles intact.   Cardiovascular: irregular S1 S2, No JVD, ENMA at the RSB, No edema  Respiratory: Lungs clear to auscultation	  Psychiatry: Alert and oriented x 3, Mood & affect appropriate  Gastrointestinal:  Soft, Non-tender  Skin/Integumen: No rashes, No ecchymoses, No cyanosis	  Neurologic: Non-focal  Musculoskeletal/extremities: Normal range of motion, No clubbing, cyanosis or edema  Vascular: Peripheral pulses palpable 2+ bilaterally    LABS:	 	                          11.5   6.22  )-----------( 174      ( 29 Apr 2020 06:48 )             35.4     04-29    146  |  86<L>  |  92<HH>  ----------------------------<  100<H>  3.1<L>   |  27  |  2.6<H>    Ca    9.4      29 Apr 2020 06:48  Phos  3.5     04-27  Mg     2.0     04-29    TPro  6.8  /  Alb  3.8  /  TBili  1.2  /  DBili  x   /  AST  21  /  ALT  12  /  AlkPhos  42  04-29        PT/INR - ( 29 Apr 2020 06:48 )   PT: 14.10 sec;   INR: 1.23 ratio         PTT - ( 29 Apr 2020 06:48 )  PTT:32.0 sec    TELEMETRY EVENTS: 	    ECG:  	A-flutter at 61 bpm   RADIOLOGY:	< from: Xray Chest 1 View- PORTABLE-Routine (04.27.20 @ 09:07) >  Impression:      Cardiomegaly without acute opacifications or effusions    < end of copied text >    OTHER: 	  < from: VA Duplex Carotid, Bilat (04.26.20 @ 08:03) >  Impression:    Less than 50% stenosis of the right internal carotid artery.  Greater than 80% stenosis in the left internal carotid artery.  ICA to CCA ratio is 5.6  Vascular surgery consultation is recommended if clinically indicated.    < end of copied text >      PREVIOUS DIAGNOSTIC TESTING:    [ ] Echocardiogram:  < from: Transthoracic Echocardiogram (04.26.20 @ 07:05) >  Summary:   1. Left ventricular ejection fraction, by visual estimation, is 55 to 60%.   2. Technically suboptimal study.   3. LV function appears low normal on limited views.   4. Moderate mitral annular calcification.   5. Moderate mitral valve regurgitation.   6. Moderate thickening of the anterior and posterior mitral valve leaflets.   7. AV is severely calcified. Severe aortic stenosis, peak/mean PG 74/40 mmHg, ANGELA 0.6 cm^2 by continuity equation.   8. Estimated pulmonary artery systolic pressure is 52.1 mmHg assuming a right atrial pressure of 3 mmHg, which is consistent with moderate pulmonary hypertension.    < end of copied text >    [ ] Catheterization: Feb 2017   LIMA to LAD: luminal irregularities  SVG to LCx: mild irregularities  SVG to RV marginal: 80% in RV marginal       Home Medications:  budesonide-formoterol 160 mcg-4.5 mcg/inh inhalation aerosol:  inhaled  (30 Jan 2020 13:19)  clopidogrel 75 mg oral tablet: 1 tab(s) orally once a day (30 Jan 2020 13:19)  fenofibrate 145 mg oral tablet: 1 tab(s) orally once a day (30 Jan 2020 13:19)  finasteride 5 mg oral tablet: 1 tab(s) orally once a day (10 Feb 2020 17:44)  fluticasone 50 mcg/inh nasal spray: 1 spray(s) nasal 2 times a day (30 Jan 2020 13:19)  furosemide 40 mg oral tablet: 1 tab(s) orally 2 times a day (26 Apr 2020 00:14)  isosorbide mononitrate 30 mg oral tablet, extended release: 1 tab(s) orally once a day (in the morning) (30 Jan 2020 15:28)  Metoprolol Succinate ER 50 mg oral tablet, extended release: 1 tab(s) orally once a day (12 Feb 2020 15:22)  pantoprazole 40 mg oral delayed release tablet: 1 tab(s) orally once a day (before a meal) (10 Feb 2020 17:44)  silver sulfADIAZINE 1% topical cream: 1 application topically once a day (10 Feb 2020 17:44)    MEDICATIONS  (STANDING):  budesonide 160 MICROgram(s)/formoterol 4.5 MICROgram(s) Inhaler 2 Puff(s) Inhalation two times a day  chlorhexidine 4% Liquid 1 Application(s) Topical <User Schedule>  fenofibrate Tablet 145 milliGRAM(s) Oral daily  finasteride 5 milliGRAM(s) Oral daily  furosemide    Tablet 40 milliGRAM(s) Oral every 12 hours  insulin regular  human corrective regimen sliding scale   IV Push every 6 hours  isosorbide   mononitrate ER Tablet (IMDUR) 30 milliGRAM(s) Oral daily  metolazone 5 milliGRAM(s) Oral daily  pantoprazole    Tablet 40 milliGRAM(s) Oral before breakfast  senna 2 Tablet(s) Oral at bedtime  silver sulfADIAZINE 1% Cream 1 Application(s) Topical daily  simvastatin 40 milliGRAM(s) Oral at bedtime  tamsulosin 0.4 milliGRAM(s) Oral at bedtime    MEDICATIONS  (PRN):  acetaminophen   Tablet .. 650 milliGRAM(s) Oral every 6 hours PRN Mild Pain (1 - 3) Date of Admission: 4/25/2020    CHIEF COMPLAINT: Fall     HISTORY OF PRESENT ILLNESS:    80F, PMHx Afib on Coumadin, AS, HTN, DM, COPD, presents to ED s/p fall, +HT, +LOC, +AC. Pt does not recall the event, event was witnessed by his son.   Pt presents w/ posterior head laceration and abrasions to his Right wrist. Otherwise, no other complaints.   CT head showed intraventricular hemorrhage. Also found to have L ICA severe stenosis.   He reports increasing WU for the past 6-8 months.     PAST MEDICAL & SURGICAL HISTORY:  Afib  BPH (benign prostatic hyperplasia)  CHF (congestive heart failure)  COPD (chronic obstructive pulmonary disease)  Diabetes  HTN (hypertension)  H/O heart artery stent  S/P CABG x 3      Allergies    No Known Allergies    Intolerances (26 Apr 2020 00:04)      PAST MEDICAL & SURGICAL HISTORY:  Afib  BPH (benign prostatic hyperplasia)  CHF (congestive heart failure)  COPD (chronic obstructive pulmonary disease)  Diabetes  HTN (hypertension)  H/O heart artery stent  S/P CABG x 3      FAMILY HISTORY:  [x] no pertinent family history of premature cardiovascular disease in first degree relatives.  Mother:   Father:   Siblings:     SOCIAL HISTORY:    [x] Non-smoker  [ ] Smoker  [ ] Alcohol    Allergies    No Known Allergies    Intolerances    	    REVIEW OF SYSTEMS:  CONSTITUTIONAL: denies fever, weight loss, or fatigue  CARDIOLOGY: see HPI  RESPIRATORY: see HPI    NEUROLOGICAL: denies weakness, no focal deficits to report.  ENDOCRINOLOGICAL: no recent change in diabetic medications.   GI: no BRBPR, no N,V, diarrhea.    PSYCHIATRY: normal mood and affect  HEENT: no nasal discharge, no ecchymosis  SKIN: no ecchymosis, no breakdown  MUSCULOSKELETAL: Full range of motion x4.     PHYSICAL EXAM:  T(C): 35.7 (04-29-20 @ 04:45), Max: 36.6 (04-28-20 @ 15:32)  HR: 63 (04-29-20 @ 04:45) (63 - 69)  BP: 109/52 (04-29-20 @ 04:45) (109/52 - 110/52)  RR: 18 (04-29-20 @ 04:45) (17 - 18)  SpO2: --  Wt(kg): --  I&O's Summary      General Appearance: well appearing, normal for age and gender. 	  Neck: normal JVP, no bruit.   Eyes: No xanthomalasia, Extra Ocular muscles intact.   Cardiovascular: irregular S1 S2, No JVD, ENMA at the RSB, No edema  Respiratory: Lungs clear to auscultation	  Psychiatry: Alert and oriented x 3, Mood & affect appropriate  Gastrointestinal:  Soft, Non-tender  Skin/Integumen: No rashes, No ecchymoses, No cyanosis	  Neurologic: Non-focal  Musculoskeletal/extremities: Normal range of motion, No clubbing, cyanosis or edema  Vascular: Peripheral pulses palpable 2+ bilaterally    LABS:	 	                          11.5   6.22  )-----------( 174      ( 29 Apr 2020 06:48 )             35.4     04-29    146  |  86<L>  |  92<HH>  ----------------------------<  100<H>  3.1<L>   |  27  |  2.6<H>    Ca    9.4      29 Apr 2020 06:48  Phos  3.5     04-27  Mg     2.0     04-29    TPro  6.8  /  Alb  3.8  /  TBili  1.2  /  DBili  x   /  AST  21  /  ALT  12  /  AlkPhos  42  04-29        PT/INR - ( 29 Apr 2020 06:48 )   PT: 14.10 sec;   INR: 1.23 ratio         PTT - ( 29 Apr 2020 06:48 )  PTT:32.0 sec    TELEMETRY EVENTS: 	    ECG:  	A-flutter at 61 bpm   RADIOLOGY:	< from: Xray Chest 1 View- PORTABLE-Routine (04.27.20 @ 09:07) >  Impression:      Cardiomegaly without acute opacifications or effusions    < end of copied text >    OTHER: 	  < from: VA Duplex Carotid, Bilat (04.26.20 @ 08:03) >  Impression:    Less than 50% stenosis of the right internal carotid artery.  Greater than 80% stenosis in the left internal carotid artery.  ICA to CCA ratio is 5.6  Vascular surgery consultation is recommended if clinically indicated.    < end of copied text >      PREVIOUS DIAGNOSTIC TESTING:    [ ] Echocardiogram:  < from: Transthoracic Echocardiogram (04.26.20 @ 07:05) >  Summary:   1. Left ventricular ejection fraction, by visual estimation, is 55 to 60%.   2. Technically suboptimal study.   3. LV function appears low normal on limited views.   4. Moderate mitral annular calcification.   5. Moderate mitral valve regurgitation.   6. Moderate thickening of the anterior and posterior mitral valve leaflets.   7. AV is severely calcified. Severe aortic stenosis, peak/mean PG 74/40 mmHg, ANGELA 0.6 cm^2 by continuity equation.   8. Estimated pulmonary artery systolic pressure is 52.1 mmHg assuming a right atrial pressure of 3 mmHg, which is consistent with moderate pulmonary hypertension.    < end of copied text >    [ ] Catheterization: Feb 2017   LIMA to LAD: luminal irregularities  SVG to LCx: mild irregularities  SVG to RV marginal: 80% in RV marginal     STS risk score:   Risk of Mortality:  8.423%  Renal Failure:  25.119%  Permanent Stroke:  3.645%  Prolonged Ventilation:  24.407%  DSW Infection:  0.448%  Reoperation:  4.605%  Morbidity or Mortality:  38.069%  Short Length of Stay:  9.834%  Long Length of Stay:  26.664%      Home Medications:  budesonide-formoterol 160 mcg-4.5 mcg/inh inhalation aerosol:  inhaled  (30 Jan 2020 13:19)  clopidogrel 75 mg oral tablet: 1 tab(s) orally once a day (30 Jan 2020 13:19)  fenofibrate 145 mg oral tablet: 1 tab(s) orally once a day (30 Jan 2020 13:19)  finasteride 5 mg oral tablet: 1 tab(s) orally once a day (10 Feb 2020 17:44)  fluticasone 50 mcg/inh nasal spray: 1 spray(s) nasal 2 times a day (30 Jan 2020 13:19)  furosemide 40 mg oral tablet: 1 tab(s) orally 2 times a day (26 Apr 2020 00:14)  isosorbide mononitrate 30 mg oral tablet, extended release: 1 tab(s) orally once a day (in the morning) (30 Jan 2020 15:28)  Metoprolol Succinate ER 50 mg oral tablet, extended release: 1 tab(s) orally once a day (12 Feb 2020 15:22)  pantoprazole 40 mg oral delayed release tablet: 1 tab(s) orally once a day (before a meal) (10 Feb 2020 17:44)  silver sulfADIAZINE 1% topical cream: 1 application topically once a day (10 Feb 2020 17:44)    MEDICATIONS  (STANDING):  budesonide 160 MICROgram(s)/formoterol 4.5 MICROgram(s) Inhaler 2 Puff(s) Inhalation two times a day  chlorhexidine 4% Liquid 1 Application(s) Topical <User Schedule>  fenofibrate Tablet 145 milliGRAM(s) Oral daily  finasteride 5 milliGRAM(s) Oral daily  furosemide    Tablet 40 milliGRAM(s) Oral every 12 hours  insulin regular  human corrective regimen sliding scale   IV Push every 6 hours  isosorbide   mononitrate ER Tablet (IMDUR) 30 milliGRAM(s) Oral daily  metolazone 5 milliGRAM(s) Oral daily  pantoprazole    Tablet 40 milliGRAM(s) Oral before breakfast  senna 2 Tablet(s) Oral at bedtime  silver sulfADIAZINE 1% Cream 1 Application(s) Topical daily  simvastatin 40 milliGRAM(s) Oral at bedtime  tamsulosin 0.4 milliGRAM(s) Oral at bedtime    MEDICATIONS  (PRN):  acetaminophen   Tablet .. 650 milliGRAM(s) Oral every 6 hours PRN Mild Pain (1 - 3) Date of Admission: 4/25/2020    CHIEF COMPLAINT: Fall     HISTORY OF PRESENT ILLNESS:    80F, PMHx Afib on Coumadin, AS, HTN, DM, COPD, presents to ED s/p fall, +HT, +LOC, +AC. Pt does not recall the event, event was witnessed by his son.   Pt presents w/ posterior head laceration and abrasions to his Right wrist. Otherwise, no other complaints.   CT head showed intraventricular hemorrhage. Also found to have L ICA severe stenosis.   He reports increasing WU for the past 6-8 months.     PAST MEDICAL & SURGICAL HISTORY:  Afib  BPH (benign prostatic hyperplasia)  CHF (congestive heart failure)  COPD (chronic obstructive pulmonary disease)  Diabetes  HTN (hypertension)  H/O heart artery stent  S/P CABG x 3      Allergies    No Known Allergies    Intolerances (26 Apr 2020 00:04)      PAST MEDICAL & SURGICAL HISTORY:  Afib  BPH (benign prostatic hyperplasia)  CHF (congestive heart failure)  COPD (chronic obstructive pulmonary disease)  Diabetes  HTN (hypertension)  H/O heart artery stent  S/P CABG x 3      FAMILY HISTORY:  [x] no pertinent family history of premature cardiovascular disease in first degree relatives.  Mother:   Father:   Siblings:     SOCIAL HISTORY:    [x] Non-smoker  [ ] Smoker  [ ] Alcohol    Allergies    No Known Allergies    Intolerances    	    REVIEW OF SYSTEMS:  CONSTITUTIONAL: denies fever, weight loss, + fatigue  CARDIOLOGY: see HPI  RESPIRATORY: see HPI    NEUROLOGICAL: denies weakness, no focal deficits to report.  ENDOCRINOLOGICAL: no recent change in diabetic medications.   GI: no BRBPR, no N,V, diarrhea.    PSYCHIATRY: normal mood and affect  HEENT: no nasal discharge, no ecchymosis  SKIN: no ecchymosis, no breakdown  MUSCULOSKELETAL: Full range of motion x4.     PHYSICAL EXAM:  T(C): 35.7 (04-29-20 @ 04:45), Max: 36.6 (04-28-20 @ 15:32)  HR: 63 (04-29-20 @ 04:45) (63 - 69)  BP: 109/52 (04-29-20 @ 04:45) (109/52 - 110/52)  RR: 18 (04-29-20 @ 04:45) (17 - 18)  SpO2: --  Wt(kg): --  I&O's Summary      General Appearance: well appearing, normal for age and gender. 	  Neck: normal JVP, no bruit.   Eyes: No xanthomalasia, Extra Ocular muscles intact.   Cardiovascular: irregular S1 S2, No JVD, ENMA at the RSB, +1 ankle edema nat,   Respiratory: Lungs clear to auscultation	  Psychiatry: Alert and oriented x 3, Mood & affect appropriate  Gastrointestinal:  Soft, Non-tender  Skin/Integumen: chronic skin changes of LE, No ecchymoses, No cyanosis	  Neurologic: Non-focal  Musculoskeletal/extremities: Normal range of motion, No clubbing, cyanosis or edema  Vascular: Peripheral pulses palpable 2+ bilaterally    LABS:	 	                          11.5   6.22  )-----------( 174      ( 29 Apr 2020 06:48 )             35.4     04-29    146  |  86<L>  |  92<HH>  ----------------------------<  100<H>  3.1<L>   |  27  |  2.6<H>    Ca    9.4      29 Apr 2020 06:48  Phos  3.5     04-27  Mg     2.0     04-29    TPro  6.8  /  Alb  3.8  /  TBili  1.2  /  DBili  x   /  AST  21  /  ALT  12  /  AlkPhos  42  04-29        PT/INR - ( 29 Apr 2020 06:48 )   PT: 14.10 sec;   INR: 1.23 ratio         PTT - ( 29 Apr 2020 06:48 )  PTT:32.0 sec    TELEMETRY EVENTS: 	    ECG:  	A-flutter at 61 bpm   RADIOLOGY:	< from: Xray Chest 1 View- PORTABLE-Routine (04.27.20 @ 09:07) >  Impression:      Cardiomegaly without acute opacifications or effusions    < end of copied text >    OTHER: 	  < from: VA Duplex Carotid, Bilat (04.26.20 @ 08:03) >  Impression:    Less than 50% stenosis of the right internal carotid artery.  Greater than 80% stenosis in the left internal carotid artery.  ICA to CCA ratio is 5.6  Vascular surgery consultation is recommended if clinically indicated.    < end of copied text >      PREVIOUS DIAGNOSTIC TESTING:    [ ] Echocardiogram:  < from: Transthoracic Echocardiogram (04.26.20 @ 07:05) >  Summary:   1. Left ventricular ejection fraction, by visual estimation, is 55 to 60%.   2. Technically suboptimal study.   3. LV function appears low normal on limited views.   4. Moderate mitral annular calcification.   5. Moderate mitral valve regurgitation.   6. Moderate thickening of the anterior and posterior mitral valve leaflets.   7. AV is severely calcified. Severe aortic stenosis, peak/mean PG 74/40 mmHg, ANGELA 0.6 cm^2 by continuity equation.   8. Estimated pulmonary artery systolic pressure is 52.1 mmHg assuming a right atrial pressure of 3 mmHg, which is consistent with moderate pulmonary hypertension.    < end of copied text >    [ ] Catheterization: Feb 2017   LIMA to LAD: luminal irregularities  SVG to LCx: mild irregularities  SVG to RV marginal: 80% in RV marginal     STS risk score:   Risk of Mortality:  8.423%  Renal Failure:  25.119%  Permanent Stroke:  3.645%  Prolonged Ventilation:  24.407%  DSW Infection:  0.448%  Reoperation:  4.605%  Morbidity or Mortality:  38.069%  Short Length of Stay:  9.834%  Long Length of Stay:  26.664%      Home Medications:  budesonide-formoterol 160 mcg-4.5 mcg/inh inhalation aerosol:  inhaled  (30 Jan 2020 13:19)  clopidogrel 75 mg oral tablet: 1 tab(s) orally once a day (30 Jan 2020 13:19)  fenofibrate 145 mg oral tablet: 1 tab(s) orally once a day (30 Jan 2020 13:19)  finasteride 5 mg oral tablet: 1 tab(s) orally once a day (10 Feb 2020 17:44)  fluticasone 50 mcg/inh nasal spray: 1 spray(s) nasal 2 times a day (30 Jan 2020 13:19)  furosemide 40 mg oral tablet: 1 tab(s) orally 2 times a day (26 Apr 2020 00:14)  isosorbide mononitrate 30 mg oral tablet, extended release: 1 tab(s) orally once a day (in the morning) (30 Jan 2020 15:28)  Metoprolol Succinate ER 50 mg oral tablet, extended release: 1 tab(s) orally once a day (12 Feb 2020 15:22)  pantoprazole 40 mg oral delayed release tablet: 1 tab(s) orally once a day (before a meal) (10 Feb 2020 17:44)  silver sulfADIAZINE 1% topical cream: 1 application topically once a day (10 Feb 2020 17:44)    MEDICATIONS  (STANDING):  budesonide 160 MICROgram(s)/formoterol 4.5 MICROgram(s) Inhaler 2 Puff(s) Inhalation two times a day  chlorhexidine 4% Liquid 1 Application(s) Topical <User Schedule>  fenofibrate Tablet 145 milliGRAM(s) Oral daily  finasteride 5 milliGRAM(s) Oral daily  furosemide    Tablet 40 milliGRAM(s) Oral every 12 hours  insulin regular  human corrective regimen sliding scale   IV Push every 6 hours  isosorbide   mononitrate ER Tablet (IMDUR) 30 milliGRAM(s) Oral daily  metolazone 5 milliGRAM(s) Oral daily  pantoprazole    Tablet 40 milliGRAM(s) Oral before breakfast  senna 2 Tablet(s) Oral at bedtime  silver sulfADIAZINE 1% Cream 1 Application(s) Topical daily  simvastatin 40 milliGRAM(s) Oral at bedtime  tamsulosin 0.4 milliGRAM(s) Oral at bedtime    MEDICATIONS  (PRN):  acetaminophen   Tablet .. 650 milliGRAM(s) Oral every 6 hours PRN Mild Pain (1 - 3) Date of Admission: 4/25/2020    CHIEF COMPLAINT: Fall     HISTORY OF PRESENT ILLNESS:    80M, PMHx Afib on Coumadin, AS, HTN, DM, COPD, presents to ED s/p fall, +HT, +LOC, +AC. Pt does not recall the event, event was witnessed by his son.   Pt presents w/ posterior head laceration and abrasions to his Right wrist. Otherwise, no other complaints.   CT head showed intraventricular hemorrhage. Also found to have L ICA severe stenosis.   He reports increasing WU for the past 6-8 months.     PAST MEDICAL & SURGICAL HISTORY:  Afib  BPH (benign prostatic hyperplasia)  CHF (congestive heart failure)  COPD (chronic obstructive pulmonary disease)  Diabetes  HTN (hypertension)  H/O heart artery stent  S/P CABG x 3      Allergies    No Known Allergies    Intolerances (26 Apr 2020 00:04)      PAST MEDICAL & SURGICAL HISTORY:  Afib  BPH (benign prostatic hyperplasia)  CHF (congestive heart failure)  COPD (chronic obstructive pulmonary disease)  Diabetes  HTN (hypertension)  H/O heart artery stent  S/P CABG x 3      FAMILY HISTORY:  [x] no pertinent family history of premature cardiovascular disease in first degree relatives.  Mother:   Father:   Siblings:     SOCIAL HISTORY:    [x] Non-smoker  [ ] Smoker  [ ] Alcohol    Allergies    No Known Allergies    Intolerances    	    REVIEW OF SYSTEMS:  CONSTITUTIONAL: denies fever, weight loss, + fatigue  CARDIOLOGY: see HPI  RESPIRATORY: see HPI    NEUROLOGICAL: denies weakness, no focal deficits to report.  ENDOCRINOLOGICAL: no recent change in diabetic medications.   GI: no BRBPR, no N,V, diarrhea.    PSYCHIATRY: normal mood and affect  HEENT: no nasal discharge, no ecchymosis  SKIN: no ecchymosis, no breakdown  MUSCULOSKELETAL: Full range of motion x4.     PHYSICAL EXAM:  T(C): 35.7 (04-29-20 @ 04:45), Max: 36.6 (04-28-20 @ 15:32)  HR: 63 (04-29-20 @ 04:45) (63 - 69)  BP: 109/52 (04-29-20 @ 04:45) (109/52 - 110/52)  RR: 18 (04-29-20 @ 04:45) (17 - 18)  SpO2: --  Wt(kg): --  I&O's Summary      General Appearance: well appearing, normal for age and gender. 	  Neck: normal JVP, no bruit.   Eyes: No xanthomalasia, Extra Ocular muscles intact.   Cardiovascular: irregular S1 S2, No JVD, ENMA at the RSB, +1 ankle edema nat,   Respiratory: Lungs clear to auscultation	  Psychiatry: Alert and oriented x 3, Mood & affect appropriate  Gastrointestinal:  Soft, Non-tender  Skin/Integumen: chronic skin changes of LE, No ecchymoses, No cyanosis	  Neurologic: Non-focal  Musculoskeletal/extremities: Normal range of motion, No clubbing, cyanosis or edema  Vascular: Peripheral pulses palpable 2+ bilaterally    LABS:	 	                          11.5   6.22  )-----------( 174      ( 29 Apr 2020 06:48 )             35.4     04-29    146  |  86<L>  |  92<HH>  ----------------------------<  100<H>  3.1<L>   |  27  |  2.6<H>    Ca    9.4      29 Apr 2020 06:48  Phos  3.5     04-27  Mg     2.0     04-29    TPro  6.8  /  Alb  3.8  /  TBili  1.2  /  DBili  x   /  AST  21  /  ALT  12  /  AlkPhos  42  04-29        PT/INR - ( 29 Apr 2020 06:48 )   PT: 14.10 sec;   INR: 1.23 ratio         PTT - ( 29 Apr 2020 06:48 )  PTT:32.0 sec    TELEMETRY EVENTS: 	    ECG:  	A-flutter at 61 bpm   RADIOLOGY:	< from: Xray Chest 1 View- PORTABLE-Routine (04.27.20 @ 09:07) >  Impression:      Cardiomegaly without acute opacifications or effusions    < end of copied text >    OTHER: 	  < from: VA Duplex Carotid, Bilat (04.26.20 @ 08:03) >  Impression:    Less than 50% stenosis of the right internal carotid artery.  Greater than 80% stenosis in the left internal carotid artery.  ICA to CCA ratio is 5.6  Vascular surgery consultation is recommended if clinically indicated.    < end of copied text >      PREVIOUS DIAGNOSTIC TESTING:    [ ] Echocardiogram:  < from: Transthoracic Echocardiogram (04.26.20 @ 07:05) >  Summary:   1. Left ventricular ejection fraction, by visual estimation, is 55 to 60%.   2. Technically suboptimal study.   3. LV function appears low normal on limited views.   4. Moderate mitral annular calcification.   5. Moderate mitral valve regurgitation.   6. Moderate thickening of the anterior and posterior mitral valve leaflets.   7. AV is severely calcified. Severe aortic stenosis, peak/mean PG 74/40 mmHg, ANGELA 0.6 cm^2 by continuity equation.   8. Estimated pulmonary artery systolic pressure is 52.1 mmHg assuming a right atrial pressure of 3 mmHg, which is consistent with moderate pulmonary hypertension.    < end of copied text >    [ ] Catheterization: Feb 2017   LIMA to LAD: luminal irregularities  SVG to LCx: mild irregularities  SVG to RV marginal: 80% in RV marginal     STS risk score:   Risk of Mortality:  8.423%  Renal Failure:  25.119%  Permanent Stroke:  3.645%  Prolonged Ventilation:  24.407%  DSW Infection:  0.448%  Reoperation:  4.605%  Morbidity or Mortality:  38.069%  Short Length of Stay:  9.834%  Long Length of Stay:  26.664%      Home Medications:  budesonide-formoterol 160 mcg-4.5 mcg/inh inhalation aerosol:  inhaled  (30 Jan 2020 13:19)  clopidogrel 75 mg oral tablet: 1 tab(s) orally once a day (30 Jan 2020 13:19)  fenofibrate 145 mg oral tablet: 1 tab(s) orally once a day (30 Jan 2020 13:19)  finasteride 5 mg oral tablet: 1 tab(s) orally once a day (10 Feb 2020 17:44)  fluticasone 50 mcg/inh nasal spray: 1 spray(s) nasal 2 times a day (30 Jan 2020 13:19)  furosemide 40 mg oral tablet: 1 tab(s) orally 2 times a day (26 Apr 2020 00:14)  isosorbide mononitrate 30 mg oral tablet, extended release: 1 tab(s) orally once a day (in the morning) (30 Jan 2020 15:28)  Metoprolol Succinate ER 50 mg oral tablet, extended release: 1 tab(s) orally once a day (12 Feb 2020 15:22)  pantoprazole 40 mg oral delayed release tablet: 1 tab(s) orally once a day (before a meal) (10 Feb 2020 17:44)  silver sulfADIAZINE 1% topical cream: 1 application topically once a day (10 Feb 2020 17:44)    MEDICATIONS  (STANDING):  budesonide 160 MICROgram(s)/formoterol 4.5 MICROgram(s) Inhaler 2 Puff(s) Inhalation two times a day  chlorhexidine 4% Liquid 1 Application(s) Topical <User Schedule>  fenofibrate Tablet 145 milliGRAM(s) Oral daily  finasteride 5 milliGRAM(s) Oral daily  furosemide    Tablet 40 milliGRAM(s) Oral every 12 hours  insulin regular  human corrective regimen sliding scale   IV Push every 6 hours  isosorbide   mononitrate ER Tablet (IMDUR) 30 milliGRAM(s) Oral daily  metolazone 5 milliGRAM(s) Oral daily  pantoprazole    Tablet 40 milliGRAM(s) Oral before breakfast  senna 2 Tablet(s) Oral at bedtime  silver sulfADIAZINE 1% Cream 1 Application(s) Topical daily  simvastatin 40 milliGRAM(s) Oral at bedtime  tamsulosin 0.4 milliGRAM(s) Oral at bedtime    MEDICATIONS  (PRN):  acetaminophen   Tablet .. 650 milliGRAM(s) Oral every 6 hours PRN Mild Pain (1 - 3)

## 2020-04-29 NOTE — CONSULT NOTE ADULT - ATTENDING COMMENTS
Seen / examined and above reviewed.    Cardiac history:  CAD s/p CABG  Progressive AS  AF  Chronic Diastolic CHF    Notable comorbidities:  DM, CKD, COPD    Hospitalized with head trauma / intraventricular hemorrhage post fall (possible syncope).  Progressive exertional dyspnea over past few months.    Hemodynamics stable.    ECHO reviewed:  Technically limited.  Preserved LVSF.  BAV severely calcified / restricted.  Borderline severe AS (PV = 3.8-4.3 m/s, MG = 34-40 mmHg, DI = 0.23-0.27).  Moderate MR.    AS and TAVR, including need for CTA and risk of ANGELLA, discussed with patient and son.  They wish to proceed with evaluation.  Nephrology is following with plan for CTA on Monday.  Will then plan for discharge and expeditious elective TAVR. Seen / examined and above reviewed.    Cardiac history:  CAD s/p CABG  Progressive AS  AF  Chronic Diastolic CHF    Notable comorbidities:  DM, CKD, COPD    Hospitalized with head trauma / intraventricular hemorrhage post fall (possible syncope).  Progressive exertional dyspnea over past few months.    Hemodynamics stable.    ECHO reviewed:  Technically limited.  Preserved LVSF.  BAV severely calcified / restricted.  Borderline severe AS (PV = 3.8-4.3 m/s, MG = 34-40 mmHg, DI = 0.23-0.27).  Moderate MR.    AS and TAVR, including need for CTA and risk of ANGELLA, discussed with patient and son.  They wish to proceed with evaluation.  Nephrology is following with plan for CTA on Monday.  Will then plan for discharge and expeditious elective TAVR.    Consider stopping Imdur given possible syncope in setting of AS.

## 2020-04-29 NOTE — PROGRESS NOTE ADULT - SUBJECTIVE AND OBJECTIVE BOX
TAIWO ZAVALA  80y  Male      Patient is a 80y old  Male who presents with a chief complaint of ICH (29 Apr 2020 09:39)      INTERVAL HPI/OVERNIGHT EVENTS:  He feels ok, no chest pain, no overnight events.   Vital Signs Last 24 Hrs  T(C): 35.7 (29 Apr 2020 04:45), Max: 36.6 (28 Apr 2020 15:32)  T(F): 96.3 (29 Apr 2020 04:45), Max: 97.9 (28 Apr 2020 21:54)  HR: 63 (29 Apr 2020 04:45) (63 - 69)  BP: 109/52 (29 Apr 2020 04:45) (109/52 - 110/52)  BP(mean): --  RR: 18 (29 Apr 2020 04:45) (17 - 18)  SpO2: --            Consultant(s) Notes Reviewed:  [x ] YES  [ ] NO          MEDICATIONS  (STANDING):  budesonide 160 MICROgram(s)/formoterol 4.5 MICROgram(s) Inhaler 2 Puff(s) Inhalation two times a day  chlorhexidine 4% Liquid 1 Application(s) Topical <User Schedule>  fenofibrate Tablet 145 milliGRAM(s) Oral daily  finasteride 5 milliGRAM(s) Oral daily  furosemide    Tablet 40 milliGRAM(s) Oral every 12 hours  insulin regular  human corrective regimen sliding scale   IV Push every 6 hours  isosorbide   mononitrate ER Tablet (IMDUR) 30 milliGRAM(s) Oral daily  metolazone 5 milliGRAM(s) Oral daily  pantoprazole    Tablet 40 milliGRAM(s) Oral before breakfast  senna 2 Tablet(s) Oral at bedtime  silver sulfADIAZINE 1% Cream 1 Application(s) Topical daily  simvastatin 40 milliGRAM(s) Oral at bedtime  tamsulosin 0.4 milliGRAM(s) Oral at bedtime    MEDICATIONS  (PRN):  acetaminophen   Tablet .. 650 milliGRAM(s) Oral every 6 hours PRN Mild Pain (1 - 3)      LABS                          11.5   6.22  )-----------( 174      ( 29 Apr 2020 06:48 )             35.4     04-29    146  |  86<L>  |  92<HH>  ----------------------------<  100<H>  3.1<L>   |  27  |  2.6<H>    Ca    9.4      29 Apr 2020 06:48  Phos  3.5     04-27  Mg     2.0     04-29    TPro  6.8  /  Alb  3.8  /  TBili  1.2  /  DBili  x   /  AST  21  /  ALT  12  /  AlkPhos  42  04-29        PT/INR - ( 29 Apr 2020 06:48 )   PT: 14.10 sec;   INR: 1.23 ratio         PTT - ( 29 Apr 2020 06:48 )  PTT:32.0 sec  Lactate Trend  04-25 @ 16:45 Lactate:1.8         CAPILLARY BLOOD GLUCOSE      POCT Blood Glucose.: 122 mg/dL (29 Apr 2020 07:33)        RADIOLOGY & ADDITIONAL TESTS:    Imaging Personally Reviewed:  [ ] YES  [ ] NO    HEALTH ISSUES - PROBLEM Dx:        PHYSICAL EXAM:  GENERAL: NAD, well-developed  HEAD:  Atraumatic, Normocephalic  EYES: EOMI, PERRLA, conjunctiva and sclera clear  NECK: Supple, No JVD  CHEST/LUNG: Clear to auscultation bilaterally; No wheeze  HEART: Irregular rate and rhythm; S1 S2 Sm 3/6 on aortic area radiates to the neck.   ABDOMEN: Soft, Nontender, Nondistended; Bowel sounds present  EXTREMITIES:  LE chronic skin changes and edema 2+  PSYCH: AAOx3  NEUROLOGY: non-focal  SKIN: No rashes or lesions

## 2020-04-29 NOTE — PROGRESS NOTE ADULT - SUBJECTIVE AND OBJECTIVE BOX
SUBJECTIVE  Patient is a 80y old Male who presents with a chief complaint of ICH (29 Apr 2020 09:39)  Currently admitted to medicine with the primary diagnosis of Ventricular hemorrhage      Today is hospital day 4d, and this morning he is sitting on chair on encounter. Pt reports prefers sleeping on chair.       OBJECTIVE  PAST MEDICAL & SURGICAL HISTORY  Afib  BPH (benign prostatic hyperplasia)  CHF (congestive heart failure)  COPD (chronic obstructive pulmonary disease)  Diabetes  HTN (hypertension)  H/O heart artery stent  S/P CABG x 3    ALLERGIES:  No Known Allergies    MEDICATIONS:  STANDING MEDICATIONS  budesonide 160 MICROgram(s)/formoterol 4.5 MICROgram(s) Inhaler 2 Puff(s) Inhalation two times a day  chlorhexidine 4% Liquid 1 Application(s) Topical <User Schedule>  dextrose 5%. 1000 milliLiter(s) IV Continuous <Continuous>  dextrose 50% Injectable 12.5 Gram(s) IV Push once  dextrose 50% Injectable 25 Gram(s) IV Push once  dextrose 50% Injectable 25 Gram(s) IV Push once  fenofibrate Tablet 145 milliGRAM(s) Oral daily  finasteride 5 milliGRAM(s) Oral daily  furosemide    Tablet 40 milliGRAM(s) Oral every 12 hours  heparin  Infusion 800 Unit(s)/Hr IV Continuous <Continuous>  insulin glargine Injectable (LANTUS) 20 Unit(s) SubCutaneous at bedtime  insulin lispro (HumaLOG) corrective regimen sliding scale   SubCutaneous three times a day before meals  insulin lispro Injectable (HumaLOG) 5 Unit(s) SubCutaneous three times a day before meals  isosorbide   mononitrate ER Tablet (IMDUR) 30 milliGRAM(s) Oral daily  metolazone 5 milliGRAM(s) Oral daily  pantoprazole    Tablet 40 milliGRAM(s) Oral before breakfast  senna 2 Tablet(s) Oral at bedtime  silver sulfADIAZINE 1% Cream 1 Application(s) Topical daily  simvastatin 40 milliGRAM(s) Oral at bedtime  tamsulosin 0.4 milliGRAM(s) Oral at bedtime    PRN MEDICATIONS  acetaminophen   Tablet .. 650 milliGRAM(s) Oral every 6 hours PRN  dextrose 40% Gel 15 Gram(s) Oral once PRN  glucagon  Injectable 1 milliGRAM(s) IntraMuscular once PRN      VITAL SIGNS: Last 24 Hours  T(C): 35.7 (29 Apr 2020 04:45), Max: 36.6 (28 Apr 2020 15:32)  T(F): 96.3 (29 Apr 2020 04:45), Max: 97.9 (28 Apr 2020 21:54)  HR: 63 (29 Apr 2020 04:45) (63 - 69)  BP: 109/52 (29 Apr 2020 04:45) (109/52 - 110/52)  BP(mean): --  RR: 18 (29 Apr 2020 04:45) (17 - 18)  SpO2: --    LABS:                        11.5   6.22  )-----------( 174      ( 29 Apr 2020 06:48 )             35.4     04-29    146  |  86<L>  |  92<HH>  ----------------------------<  100<H>  3.1<L>   |  27  |  2.6<H>    Ca    9.4      29 Apr 2020 06:48  Phos  3.5     04-27  Mg     2.0     04-29    TPro  6.8  /  Alb  3.8  /  TBili  1.2  /  DBili  x   /  AST  21  /  ALT  12  /  AlkPhos  42  04-29    PT/INR - ( 29 Apr 2020 06:48 )   PT: 14.10 sec;   INR: 1.23 ratio         PTT - ( 29 Apr 2020 06:48 )  PTT:32.0 sec    RADIOLOGY:    PHYSICAL EXAM:    GENERAL: NAD, elderly, AAOx3, sitting on chair  HEENT:  Atraumatic, Normocephalic.  PULMONARY: Clear to auscultation bilaterally; No wheeze  CARDIOVASCULAR: Regular rate and rhythm;  GASTROINTESTINAL: Soft, Nontender, Nondistended;   MUSCULOSKELETAL:  3+ edema of lower extremities b/l. Warm and moist. R LE wrapped in gauze.

## 2020-04-30 ENCOUNTER — TRANSCRIPTION ENCOUNTER (OUTPATIENT)
Age: 80
End: 2020-04-30

## 2020-04-30 LAB
ALBUMIN SERPL ELPH-MCNC: 3.8 G/DL — SIGNIFICANT CHANGE UP (ref 3.5–5.2)
ALP SERPL-CCNC: 44 U/L — SIGNIFICANT CHANGE UP (ref 30–115)
ALT FLD-CCNC: 11 U/L — SIGNIFICANT CHANGE UP (ref 0–41)
ANION GAP SERPL CALC-SCNC: 14 MMOL/L — SIGNIFICANT CHANGE UP (ref 7–14)
APTT BLD: 33.2 SEC — SIGNIFICANT CHANGE UP (ref 27–39.2)
APTT BLD: 59.8 SEC — HIGH (ref 27–39.2)
AST SERPL-CCNC: 20 U/L — SIGNIFICANT CHANGE UP (ref 0–41)
BASOPHILS # BLD AUTO: 0.05 K/UL — SIGNIFICANT CHANGE UP (ref 0–0.2)
BASOPHILS NFR BLD AUTO: 0.9 % — SIGNIFICANT CHANGE UP (ref 0–1)
BILIRUB SERPL-MCNC: 0.9 MG/DL — SIGNIFICANT CHANGE UP (ref 0.2–1.2)
BUN SERPL-MCNC: 94 MG/DL — CRITICAL HIGH (ref 10–20)
CALCIUM SERPL-MCNC: 9.5 MG/DL — SIGNIFICANT CHANGE UP (ref 8.5–10.1)
CHLORIDE SERPL-SCNC: 95 MMOL/L — LOW (ref 98–110)
CO2 SERPL-SCNC: 29 MMOL/L — SIGNIFICANT CHANGE UP (ref 17–32)
CREAT SERPL-MCNC: 2.9 MG/DL — HIGH (ref 0.7–1.5)
EOSINOPHIL # BLD AUTO: 0.12 K/UL — SIGNIFICANT CHANGE UP (ref 0–0.7)
EOSINOPHIL NFR BLD AUTO: 2.2 % — SIGNIFICANT CHANGE UP (ref 0–8)
GLUCOSE BLDC GLUCOMTR-MCNC: 134 MG/DL — HIGH (ref 70–99)
GLUCOSE BLDC GLUCOMTR-MCNC: 145 MG/DL — HIGH (ref 70–99)
GLUCOSE BLDC GLUCOMTR-MCNC: 159 MG/DL — HIGH (ref 70–99)
GLUCOSE BLDC GLUCOMTR-MCNC: 178 MG/DL — HIGH (ref 70–99)
GLUCOSE SERPL-MCNC: 113 MG/DL — HIGH (ref 70–99)
HCT VFR BLD CALC: 34.9 % — LOW (ref 42–52)
HGB BLD-MCNC: 11.5 G/DL — LOW (ref 14–18)
IMM GRANULOCYTES NFR BLD AUTO: 0.5 % — HIGH (ref 0.1–0.3)
INR BLD: 1.19 RATIO — SIGNIFICANT CHANGE UP (ref 0.65–1.3)
INR BLD: 1.21 RATIO — SIGNIFICANT CHANGE UP (ref 0.65–1.3)
LYMPHOCYTES # BLD AUTO: 1.63 K/UL — SIGNIFICANT CHANGE UP (ref 1.2–3.4)
LYMPHOCYTES # BLD AUTO: 29.2 % — SIGNIFICANT CHANGE UP (ref 20.5–51.1)
MAGNESIUM SERPL-MCNC: 1.9 MG/DL — SIGNIFICANT CHANGE UP (ref 1.8–2.4)
MCHC RBC-ENTMCNC: 29 PG — SIGNIFICANT CHANGE UP (ref 27–31)
MCHC RBC-ENTMCNC: 33 G/DL — SIGNIFICANT CHANGE UP (ref 32–37)
MCV RBC AUTO: 88.1 FL — SIGNIFICANT CHANGE UP (ref 80–94)
MONOCYTES # BLD AUTO: 0.76 K/UL — HIGH (ref 0.1–0.6)
MONOCYTES NFR BLD AUTO: 13.6 % — HIGH (ref 1.7–9.3)
NEUTROPHILS # BLD AUTO: 2.99 K/UL — SIGNIFICANT CHANGE UP (ref 1.4–6.5)
NEUTROPHILS NFR BLD AUTO: 53.6 % — SIGNIFICANT CHANGE UP (ref 42.2–75.2)
NRBC # BLD: 0 /100 WBCS — SIGNIFICANT CHANGE UP (ref 0–0)
PHOSPHATE SERPL-MCNC: 3.8 MG/DL — SIGNIFICANT CHANGE UP (ref 2.1–4.9)
PLATELET # BLD AUTO: 171 K/UL — SIGNIFICANT CHANGE UP (ref 130–400)
POTASSIUM SERPL-MCNC: 3.3 MMOL/L — LOW (ref 3.5–5)
POTASSIUM SERPL-SCNC: 3.3 MMOL/L — LOW (ref 3.5–5)
PROT SERPL-MCNC: 7.2 G/DL — SIGNIFICANT CHANGE UP (ref 6–8)
PROTHROM AB SERPL-ACNC: 13.7 SEC — HIGH (ref 9.95–12.87)
PROTHROM AB SERPL-ACNC: 13.9 SEC — HIGH (ref 9.95–12.87)
RBC # BLD: 3.96 M/UL — LOW (ref 4.7–6.1)
RBC # FLD: 18.2 % — HIGH (ref 11.5–14.5)
SODIUM SERPL-SCNC: 138 MMOL/L — SIGNIFICANT CHANGE UP (ref 135–146)
WBC # BLD: 5.58 K/UL — SIGNIFICANT CHANGE UP (ref 4.8–10.8)
WBC # FLD AUTO: 5.58 K/UL — SIGNIFICANT CHANGE UP (ref 4.8–10.8)

## 2020-04-30 PROCEDURE — 99233 SBSQ HOSP IP/OBS HIGH 50: CPT

## 2020-04-30 RX ORDER — SODIUM CHLORIDE 9 MG/ML
1000 INJECTION INTRAMUSCULAR; INTRAVENOUS; SUBCUTANEOUS
Refills: 0 | Status: DISCONTINUED | OUTPATIENT
Start: 2020-04-30 | End: 2020-04-30

## 2020-04-30 RX ORDER — HEPARIN SODIUM 5000 [USP'U]/ML
1000 INJECTION INTRAVENOUS; SUBCUTANEOUS
Qty: 25000 | Refills: 0 | Status: DISCONTINUED | OUTPATIENT
Start: 2020-04-30 | End: 2020-05-01

## 2020-04-30 RX ADMIN — Medication 1 APPLICATION(S): at 11:58

## 2020-04-30 RX ADMIN — INSULIN GLARGINE 20 UNIT(S): 100 INJECTION, SOLUTION SUBCUTANEOUS at 21:41

## 2020-04-30 RX ADMIN — HEPARIN SODIUM 10 UNIT(S)/HR: 5000 INJECTION INTRAVENOUS; SUBCUTANEOUS at 01:09

## 2020-04-30 RX ADMIN — BUDESONIDE AND FORMOTEROL FUMARATE DIHYDRATE 2 PUFF(S): 160; 4.5 AEROSOL RESPIRATORY (INHALATION) at 19:54

## 2020-04-30 RX ADMIN — Medication 5 UNIT(S): at 17:40

## 2020-04-30 RX ADMIN — BUDESONIDE AND FORMOTEROL FUMARATE DIHYDRATE 2 PUFF(S): 160; 4.5 AEROSOL RESPIRATORY (INHALATION) at 08:03

## 2020-04-30 RX ADMIN — Medication 0: at 11:59

## 2020-04-30 RX ADMIN — SIMVASTATIN 40 MILLIGRAM(S): 20 TABLET, FILM COATED ORAL at 21:42

## 2020-04-30 RX ADMIN — Medication 40 MILLIGRAM(S): at 05:41

## 2020-04-30 RX ADMIN — SODIUM CHLORIDE 50 MILLILITER(S): 9 INJECTION INTRAMUSCULAR; INTRAVENOUS; SUBCUTANEOUS at 12:28

## 2020-04-30 RX ADMIN — SENNA PLUS 2 TABLET(S): 8.6 TABLET ORAL at 21:42

## 2020-04-30 RX ADMIN — Medication 145 MILLIGRAM(S): at 11:58

## 2020-04-30 RX ADMIN — Medication 1: at 16:41

## 2020-04-30 RX ADMIN — TAMSULOSIN HYDROCHLORIDE 0.4 MILLIGRAM(S): 0.4 CAPSULE ORAL at 21:42

## 2020-04-30 RX ADMIN — PANTOPRAZOLE SODIUM 40 MILLIGRAM(S): 20 TABLET, DELAYED RELEASE ORAL at 05:41

## 2020-04-30 RX ADMIN — Medication 5 UNIT(S): at 11:59

## 2020-04-30 RX ADMIN — Medication 5 UNIT(S): at 08:03

## 2020-04-30 RX ADMIN — HEPARIN SODIUM 10 UNIT(S)/HR: 5000 INJECTION INTRAVENOUS; SUBCUTANEOUS at 19:53

## 2020-04-30 RX ADMIN — FINASTERIDE 5 MILLIGRAM(S): 5 TABLET, FILM COATED ORAL at 11:58

## 2020-04-30 RX ADMIN — ISOSORBIDE MONONITRATE 30 MILLIGRAM(S): 60 TABLET, EXTENDED RELEASE ORAL at 11:58

## 2020-04-30 NOTE — PROGRESS NOTE ADULT - SUBJECTIVE AND OBJECTIVE BOX
SUBJ:No chest pain or shortness of breath      MEDICATIONS  (STANDING):  budesonide 160 MICROgram(s)/formoterol 4.5 MICROgram(s) Inhaler 2 Puff(s) Inhalation two times a day  chlorhexidine 4% Liquid 1 Application(s) Topical <User Schedule>  dextrose 5%. 1000 milliLiter(s) (50 mL/Hr) IV Continuous <Continuous>  dextrose 50% Injectable 12.5 Gram(s) IV Push once  dextrose 50% Injectable 25 Gram(s) IV Push once  dextrose 50% Injectable 25 Gram(s) IV Push once  fenofibrate Tablet 145 milliGRAM(s) Oral daily  finasteride 5 milliGRAM(s) Oral daily  heparin  Infusion 800 Unit(s)/Hr (10 mL/Hr) IV Continuous <Continuous>  insulin glargine Injectable (LANTUS) 20 Unit(s) SubCutaneous at bedtime  insulin lispro (HumaLOG) corrective regimen sliding scale   SubCutaneous three times a day before meals  insulin lispro Injectable (HumaLOG) 5 Unit(s) SubCutaneous three times a day before meals  isosorbide   mononitrate ER Tablet (IMDUR) 30 milliGRAM(s) Oral daily  metolazone 5 milliGRAM(s) Oral daily  pantoprazole    Tablet 40 milliGRAM(s) Oral before breakfast  senna 2 Tablet(s) Oral at bedtime  silver sulfADIAZINE 1% Cream 1 Application(s) Topical daily  simvastatin 40 milliGRAM(s) Oral at bedtime  sodium chloride 0.9%. 1000 milliLiter(s) (50 mL/Hr) IV Continuous <Continuous>  tamsulosin 0.4 milliGRAM(s) Oral at bedtime    MEDICATIONS  (PRN):  acetaminophen   Tablet .. 650 milliGRAM(s) Oral every 6 hours PRN Mild Pain (1 - 3)  dextrose 40% Gel 15 Gram(s) Oral once PRN Blood Glucose LESS THAN 70 milliGRAM(s)/deciliter  glucagon  Injectable 1 milliGRAM(s) IntraMuscular once PRN Glucose LESS THAN 70 milligrams/deciliter            Vital Signs Last 24 Hrs  T(C): 36.4 (30 Apr 2020 05:39), Max: 36.4 (30 Apr 2020 05:39)  T(F): 97.5 (30 Apr 2020 05:39), Max: 97.5 (30 Apr 2020 05:39)  HR: 77 (30 Apr 2020 05:39) (62 - 77)  BP: 99/52 (30 Apr 2020 05:39) (99/52 - 110/62)  BP(mean): --  RR: 18 (30 Apr 2020 05:39) (18 - 19)  SpO2: --      ECG:NML    TTE:    LABS:                        11.5   5.58  )-----------( 171      ( 30 Apr 2020 07:21 )             34.9     04-30    138  |  95<L>  |  94<HH>  ----------------------------<  113<H>  3.3<L>   |  29  |  2.9<H>    Ca    9.5      30 Apr 2020 07:21  Phos  3.8     04-30  Mg     1.9     04-30    TPro  7.2  /  Alb  3.8  /  TBili  0.9  /  DBili  x   /  AST  20  /  ALT  11  /  AlkPhos  44  04-30        PT/INR - ( 30 Apr 2020 07:21 )   PT: 13.90 sec;   INR: 1.21 ratio         PTT - ( 30 Apr 2020 07:21 )  PTT:59.8 sec    I&O's Summary    29 Apr 2020 07:01  -  30 Apr 2020 07:00  --------------------------------------------------------  IN: 0 mL / OUT: 700 mL / NET: -700 mL      BNP

## 2020-04-30 NOTE — CONSULT NOTE ADULT - ASSESSMENT
80F, PMHx Afib on Coumadin, HTN, DM, CKD 4, COPD, presents to ED s/p fall, +HT, +LOC, +AC.   Found to have L. Carotid artery stenosis >80%.     # CKD 4   - serum creatinine around baseline   - previously had HD, now off for > 1 month.   - non-oliguric   - BP on lower side. monitor BP.  Hb at goal.   - mild hypoK noted. no need to replenish at the moment. montor levels.   - Ca, Mg, phos at goal.   - found to have severe aortic stenosis. Plan for TAVR, but needing CT with contrast prior to procedure.   - Pt is high risk for developing ANGELLA and needing HD post procedure with Nathanael claculator risk for ANGELLA ~ 26.1 % and risk of need HD post contrast ~ 1.09%. if benefits outweigh risks, ok to proceed with contrast study.   - to decrease risk, stop diuretics lasix and metolazone for 24 hours. maintain  hydration with NS at 50 cc/hr for 4-6 hours before and after contrast. use minimum amount of contrast needed.   - trend creatinine   - UA noted.   - strict I/O. monitor for volume overload.    # Fall with head trauma  - Intracranial hemorrhage / stable intrventricular hematoma.   - neurosurgery notes appreciated.    # syncope likely secondary to severe Aortic Stenosis  - ekg: Line Atrial flutter with variable A-V block with premature ventricular or aberrantly conducted complexes  - echo: severe aortic stenosis   - structural cardiology notes appreciated. ? plan for TAVR    # Carotid artery stenosis   - carotid duplex 80% stenosis of L internal carotid artery  - Vasc surgery: plan for Left CEA on Wed 4/29 canceled due to no cardiac clearance      avoid nephrotoxins   will follow 80F, PMHx Afib on Coumadin, HTN, DM, CKD 4, COPD, presents to ED s/p fall, +HT, +LOC, +AC.   Found to have L. Carotid artery stenosis >80%.     # CKD 4   - serum creatinine around baseline   - previously had HD, now off for > 2 months.   - non-oliguric   - BP on lower side. monitor BP.  Hb at goal.   - mild hypoK noted. no need to replenish at the moment. montor levels.   - Ca, Mg, phos at goal.   - found to have severe aortic stenosis. Plan for TAVR, but needing CT with contrast prior to procedure.   - Pt is high risk for developing ANGELLA and needing HD post procedure with Nathanael claculator risk for ANGELLA ~ 26.1 % and risk of need HD post contrast ~ 1.09%. if benefits outweigh risks, ok to proceed with contrast study.   - to decrease risk, stop diuretics lasix and metolazone for 24 hours. maintain  hydration with NS at 50 cc/hr for 4-6 hours before and after contrast. use minimum amount of contrast needed.   - trend creatinine   - UA noted.   - strict I/O. monitor for volume overload.    # Fall with head trauma  - Intracranial hemorrhage / stable intrventricular hematoma.   - neurosurgery notes appreciated.    # syncope likely secondary to severe Aortic Stenosis  - ekg: Line Atrial flutter with variable A-V block with premature ventricular or aberrantly conducted complexes  - echo: severe aortic stenosis   - structural cardiology notes appreciated. ? plan for TAVR    # Carotid artery stenosis   - carotid duplex 80% stenosis of L internal carotid artery  - Vasc surgery: plan for Left CEA on Wed 4/29 canceled due to no cardiac clearance      avoid nephrotoxins   will follow

## 2020-04-30 NOTE — CONSULT NOTE ADULT - SUBJECTIVE AND OBJECTIVE BOX
NEPHROLOGY CONSULTATION NOTE    Pt hard of hearing, poor historian. hx taken from chart.  80F, PMHx Afib on Coumadin, HTN, DM, COPD, presents to ED s/p fall, +HT, +LOC, +AC. Pt does not recall the event, event was witnessed. Pt presents w/ posterior head laceration and abrasions to his Right wrist. Otherwise, no other complaints. (2020)  Pt reports that he developed severe BRITTANI requiring HD ~ 2 month ago. HD was stopped > 1 month ago, but he has persistent CKD with cr. ~ 2.5 - 2.8, since. Denies any n/v/d, chest pain, abdominal pain.    PAST MEDICAL & SURGICAL HISTORY:  Afib  BPH (benign prostatic hyperplasia)  CHF (congestive heart failure)  COPD (chronic obstructive pulmonary disease)  Diabetes  HTN (hypertension)  H/O heart artery stent  S/P CABG x 3    Allergies:  No Known Allergies    Home Medications:  budesonide-formoterol 160 mcg-4.5 mcg/inh inhalation aerosol:  inhaled  (2020 13:19)  fenofibrate 145 mg oral tablet: 1 tab(s) orally once a day (2020 13:19)  finasteride 5 mg oral tablet: 1 tab(s) orally once a day (10 Feb 2020 17:44)  fluticasone 50 mcg/inh nasal spray: 1 spray(s) nasal 2 times a day (2020 13:19)  furosemide 40 mg oral tablet: 1 tab(s) orally once a day (2020 16:40)  isosorbide mononitrate 30 mg oral tablet, extended release: 1 tab(s) orally once a day (in the morning) (2020 15:28)  Metoprolol Succinate ER 50 mg oral tablet, extended release: 1 tab(s) orally once a day (2020 15:22)  pantoprazole 40 mg oral delayed release tablet: 1 tab(s) orally once a day (before a meal) (10 Feb 2020 17:44)  silver sulfADIAZINE 1% topical cream: 1 application topically once a day (10 Feb 2020 17:44)    Hospital Medications:   MEDICATIONS  (STANDING):  budesonide 160 MICROgram(s)/formoterol 4.5 MICROgram(s) Inhaler 2 Puff(s) Inhalation two times a day  chlorhexidine 4% Liquid 1 Application(s) Topical <User Schedule>  fenofibrate Tablet 145 milliGRAM(s) Oral daily  finasteride 5 milliGRAM(s) Oral daily  heparin  Infusion 800 Unit(s)/Hr (10 mL/Hr) IV Continuous <Continuous>  insulin glargine Injectable (LANTUS) 20 Unit(s) SubCutaneous at bedtime  insulin lispro (HumaLOG) corrective regimen sliding scale   SubCutaneous three times a day before meals  insulin lispro Injectable (HumaLOG) 5 Unit(s) SubCutaneous three times a day before meals  isosorbide   mononitrate ER Tablet (IMDUR) 30 milliGRAM(s) Oral daily  metolazone 5 milliGRAM(s) Oral daily  pantoprazole    Tablet 40 milliGRAM(s) Oral before breakfast  senna 2 Tablet(s) Oral at bedtime  silver sulfADIAZINE 1% Cream 1 Application(s) Topical daily  simvastatin 40 milliGRAM(s) Oral at bedtime  sodium chloride 0.9%. 1000 milliLiter(s) (50 mL/Hr) IV Continuous <Continuous>  tamsulosin 0.4 milliGRAM(s) Oral at bedtime      SOCIAL HISTORY:  Denies ETOH,Smoking,   FAMILY HISTORY:  No pertinent family history in first degree relatives        REVIEW OF SYSTEMS:    All other review of systems is negative unless indicated above.    VITALS:  T(F): 97.5 (20 @ 05:39), Max: 97.5 (20 @ 05:39)  HR: 77 (20 @ 05:39)  BP: 99/52 (20 @ 05:39)  RR: 18 (20 @ 05:39)  SpO2: --     @ 07:  -   @ 07:00  --------------------------------------------------------  IN: 0 mL / OUT: 700 mL / NET: -700 mL            I&O's Detail    2020 07:01  -  2020 07:00  --------------------------------------------------------  IN:  Total IN: 0 mL    OUT:    Voided: 700 mL  Total OUT: 700 mL    Total NET: -700 mL            PHYSICAL EXAM:  Constitutional: NAD  Respiratory: CTAB, no wheezes, rales or rhonchi  Cardiovascular: S1, S2, irregular, systolic murmur +  Gastrointestinal: BS+, soft, NT/ND  Extremities: + edema, bluish discoloration of legs noted.  Neurological: A/O x 3  : No julian.     Vascular Access:    LABS:      138  |  95<L>  |  94<HH>  ----------------------------<  113<H>  3.3<L>   |  29  |  2.9<H>    Ca    9.5      2020 07:21  Phos  3.8       Mg     1.9         TPro  7.2  /  Alb  3.8  /  TBili  0.9  /  DBili      /  AST  20  /  ALT  11  /  AlkPhos  44      Creatinine Trend: 2.9 <--, 2.6 <--, 2.5 <--, 2.4 <--, 2.7 <--, 2.5 <--, 2.8 <--                        11.5   5.58  )-----------( 171      ( 2020 07:21 )             34.9     Troponin T, Serum: 0.12: Critical value: ng/mL (20 @ 11:07)      Urine Studies:  Urinalysis Basic - ( 2020 16:50 )    Color: Light Yellow / Appearance: Clear / S.012 / pH:   Gluc:  / Ketone: Negative  / Bili: Negative / Urobili: <2 mg/dL   Blood:  / Protein: Negative / Nitrite: Negative   Leuk Esterase: Small / RBC: 0 /HPF / WBC 8 /HPF   Sq Epi:  / Non Sq Epi: 1 /HPF / Bacteria: Few    RADIOLOGY & ADDITIONAL STUDIES:  < from: MR Venogram Head w/wo IV Cont (20 @ 16:06) >  IMPRESSION:    Unremarkable MRV    < end of copied text >    < from: MR Head w/wo IV Cont (20 @ 15:54) >  IMPRESSION:    1.  Stable intraventricular hematoma    2.  No associated enhancing lesions. No definite associated vascular masses.    3.  Moderate microvascular ischemic changes, more pronounced the right parietallobe    < end of copied text >    < from: Xray Chest 1 View- PORTABLE-Routine (20 @ 09:07) >  Impression:      Cardiomegaly without acute opacifications or effusions    < end of copied text >    < from: VA Duplex Carotid, Bilat (20 @ 08:03) >  Impression:    Less than 50% stenosis of the right internal carotid artery.  Greater than 80% stenosis in the left internal carotid artery.  ICA to CCA ratio is 5.6  Vascular surgery consultation is recommended if clinically indicated.    < end of copied text >    < from: CT Angio Neck w/ IV Cont (20 @ 22:56) >  IMPRESSION:    1.  Redemonstrated density within the right lateral and third ventricles without definite evidence of active arterial contrast extravasation.  2.  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 This finding may be chronic.  3.  Diffuse vascular disease as detailed above.  4.  Nonspecific prominent 1.4 x 0.9 cm right paratracheal lymph node.    < end of copied text >    < from: Xray Pelvis AP only (20 @ 17:32) >  Impression:     No evidence of acute fracture or dislocation.    < end of copied text >    < from: Transthoracic Echocardiogram (20 @ 07:05) >  Summary:   1. Left ventricular ejection fraction, by visual estimation, is 55 to 60%.   2. Technically suboptimal study.   3. LV function appears low normal on limited views.   4. Moderate mitral annular calcification.   5. Moderate mitral valve regurgitation.   6. Moderate thickening of the anterior and posterior mitral valve leaflets.   7. AV is severely calcified. Severe aortic stenosis, peak/mean PG 74/40 mmHg, ANGELA 0.6 cm^2 by continuity equation.   8. Estimated pulmonary artery systolic pressure is 52.1 mmHg assuming a right atrial pressure of 3 mmHg, which is consistent with moderate pulmonary hypertension.    < end of copied text >

## 2020-04-30 NOTE — PROGRESS NOTE ADULT - SUBJECTIVE AND OBJECTIVE BOX
TAIWO ZAVALA  80y  Male      Patient is a 80y old  Male who presents with a chief complaint of ICH (29 Apr 2020 09:39)      INTERVAL HPI/OVERNIGHT EVENTS:  He feels ok, he wants to go home.   Vital Signs Last 24 Hrs  T(C): 36.4 (30 Apr 2020 05:39), Max: 36.4 (30 Apr 2020 05:39)  T(F): 97.5 (30 Apr 2020 05:39), Max: 97.5 (30 Apr 2020 05:39)  HR: 77 (30 Apr 2020 05:39) (62 - 77)  BP: 99/52 (30 Apr 2020 05:39) (99/52 - 110/62)  BP(mean): --  RR: 18 (30 Apr 2020 05:39) (18 - 19)  SpO2: --      04-29-20 @ 07:01  -  04-30-20 @ 07:00  --------------------------------------------------------  IN: 0 mL / OUT: 700 mL / NET: -700 mL            Consultant(s) Notes Reviewed:  [x ] YES  [ ] NO          MEDICATIONS  (STANDING):  budesonide 160 MICROgram(s)/formoterol 4.5 MICROgram(s) Inhaler 2 Puff(s) Inhalation two times a day  chlorhexidine 4% Liquid 1 Application(s) Topical <User Schedule>  dextrose 5%. 1000 milliLiter(s) (50 mL/Hr) IV Continuous <Continuous>  dextrose 50% Injectable 12.5 Gram(s) IV Push once  dextrose 50% Injectable 25 Gram(s) IV Push once  dextrose 50% Injectable 25 Gram(s) IV Push once  fenofibrate Tablet 145 milliGRAM(s) Oral daily  finasteride 5 milliGRAM(s) Oral daily  heparin  Infusion 800 Unit(s)/Hr (10 mL/Hr) IV Continuous <Continuous>  insulin glargine Injectable (LANTUS) 20 Unit(s) SubCutaneous at bedtime  insulin lispro (HumaLOG) corrective regimen sliding scale   SubCutaneous three times a day before meals  insulin lispro Injectable (HumaLOG) 5 Unit(s) SubCutaneous three times a day before meals  isosorbide   mononitrate ER Tablet (IMDUR) 30 milliGRAM(s) Oral daily  pantoprazole    Tablet 40 milliGRAM(s) Oral before breakfast  senna 2 Tablet(s) Oral at bedtime  silver sulfADIAZINE 1% Cream 1 Application(s) Topical daily  simvastatin 40 milliGRAM(s) Oral at bedtime  sodium chloride 0.9%. 1000 milliLiter(s) (50 mL/Hr) IV Continuous <Continuous>  tamsulosin 0.4 milliGRAM(s) Oral at bedtime    MEDICATIONS  (PRN):  acetaminophen   Tablet .. 650 milliGRAM(s) Oral every 6 hours PRN Mild Pain (1 - 3)  dextrose 40% Gel 15 Gram(s) Oral once PRN Blood Glucose LESS THAN 70 milliGRAM(s)/deciliter  glucagon  Injectable 1 milliGRAM(s) IntraMuscular once PRN Glucose LESS THAN 70 milligrams/deciliter      LABS                          11.5   5.58  )-----------( 171      ( 30 Apr 2020 07:21 )             34.9     04-30    138  |  95<L>  |  94<HH>  ----------------------------<  113<H>  3.3<L>   |  29  |  2.9<H>    Ca    9.5      30 Apr 2020 07:21  Phos  3.8     04-30  Mg     1.9     04-30    TPro  7.2  /  Alb  3.8  /  TBili  0.9  /  DBili  x   /  AST  20  /  ALT  11  /  AlkPhos  44  04-30        PT/INR - ( 30 Apr 2020 07:21 )   PT: 13.90 sec;   INR: 1.21 ratio         PTT - ( 30 Apr 2020 07:21 )  PTT:59.8 sec  Lactate Trend  04-25 @ 16:45 Lactate:1.8         CAPILLARY BLOOD GLUCOSE      POCT Blood Glucose.: 145 mg/dL (30 Apr 2020 11:43)        RADIOLOGY & ADDITIONAL TESTS:    Imaging Personally Reviewed:  [ ] YES  [ ] NO    HEALTH ISSUES - PROBLEM Dx:        PHYSICAL EXAM:  GENERAL: NAD, well-developed  HEAD:  Atraumatic, Normocephalic  EYES: EOMI, PERRLA, conjunctiva and sclera clear  NECK: Supple, No JVD  CHEST/LUNG: Clear to auscultation bilaterally; No wheeze  HEART: Irregular rate and rhythm; S1 S2 Sm 3/6 on aortic area radiates to the neck.   ABDOMEN: Soft, Nontender, Nondistended; Bowel sounds present  EXTREMITIES:  LE chronic skin changes and edema 2+  PSYCH: AAOx3  NEUROLOGY: non-focal  SKIN: No rashes or lesions

## 2020-04-30 NOTE — DISCHARGE NOTE NURSING/CASE MANAGEMENT/SOCIAL WORK - NSDCPEPTSTRK_GEN_ALL_CORE
Call 911 for stroke/Prescribed medications/Stroke education booklet/Need for follow up after discharge/Stroke support groups for patients, families, and friends/Risk factors for stroke/Stroke warning signs and symptoms/Signs and symptoms of stroke

## 2020-04-30 NOTE — PROGRESS NOTE ADULT - ASSESSMENT
A 81 yo male with PMH of HTN, DM type 2, Atrial fibrillation on Coumadin and COPD was brought to ED s/p fall. Patient doesn't recall the event and he denies any loss of consciousness. As per Son patient was standing when suddenly fell back and hit his head. Patient found with posterior scalp laceration, Head CT showed intraventricular hemorrhage. Patient denies chest pain, SOB, palpitation or dizziness.     A/P:   Fall with Head Trauma:   Intracranial hemorrhage:   Head CT showed right lateral and third ventricles densities without definite evidence of active arterial contrast extravasation.  Brain MRI showed stable intraventricular hematoma. MRV unremarkable.  Neurosurgery is ok to resume Coumadin.     Syncope: possibly due to   Severe Aortic stenosis.   As per son he found him on the floor unconscious.   Workup showed severe aortic stenosis.   EKG showed Atrial flutter/fibrillation with slow ventricular response, RBBB  Cardiology consult for severe aortic stenosis recommended CT chest for TAVR protocol.     High grade left Internal carotid stenosis:  80%  Vascular plan for  endarterectomy after cardiac clearance.     CKD stag 4: stable.   Furosemide and Metolazone held today before the IV contrast.   Nephrology evaluated the patient for CT angio, he is at high risk for ANGELLA, he might need HD after the CT, recommended to hold Metolazone and Lasix 24 hrs before contrast, IV hydration 4-6 hrs before the CT.     Atrial flutter/fibrillation:   Coumadin on hold due to intraventricular hemorrhage.   Neurosurgery is ok to resume it. Keep on hold for now for possible surgical plan.     COPD on home o2, lungs are clear.     Patient wants to leave home, he understand the need of outpatient follow up for further evaluation.   #Progress Note Handoff:  Pending (specify):  CT chest, structure cardiology follow up.   Family discussion: with his son, update the plan for surgery  Disposition: Home once medically stable.

## 2020-04-30 NOTE — DISCHARGE NOTE NURSING/CASE MANAGEMENT/SOCIAL WORK - PATIENT PORTAL LINK FT
You can access the FollowMyHealth Patient Portal offered by [facility] by registering at the following website: [email or web address]. By joining [facility]’s FollowMyHealth portal, you will also be able to view your health information using other applications (apps) compatible with our system.

## 2020-04-30 NOTE — PROGRESS NOTE ADULT - SUBJECTIVE AND OBJECTIVE BOX
SUBJECTIVE  Patient is a 80y old Male who presents with a chief complaint of ICH (29 Apr 2020 09:39)  Currently admitted to medicine with the primary diagnosis of Ventricular hemorrhage  Last night pt was requesting heavily to go home. Pt son contacted and reported he wanted process to be expedited. Son was planning to have pt moved to [facility] for faster processing. pt continues to be medically managed.     Today is hospital day 5d, and this morning he is sitting comfortbly in chair. no overnight events  OBJECTIVE  PAST MEDICAL & SURGICAL HISTORY  Afib  BPH (benign prostatic hyperplasia)  CHF (congestive heart failure)  COPD (chronic obstructive pulmonary disease)  Diabetes  HTN (hypertension)  H/O heart artery stent  S/P CABG x 3    ALLERGIES:  No Known Allergies    MEDICATIONS:  STANDING MEDICATIONS  budesonide 160 MICROgram(s)/formoterol 4.5 MICROgram(s) Inhaler 2 Puff(s) Inhalation two times a day  chlorhexidine 4% Liquid 1 Application(s) Topical <User Schedule>  dextrose 5%. 1000 milliLiter(s) IV Continuous <Continuous>  dextrose 50% Injectable 12.5 Gram(s) IV Push once  dextrose 50% Injectable 25 Gram(s) IV Push once  dextrose 50% Injectable 25 Gram(s) IV Push once  fenofibrate Tablet 145 milliGRAM(s) Oral daily  finasteride 5 milliGRAM(s) Oral daily  heparin  Infusion 800 Unit(s)/Hr IV Continuous <Continuous>  insulin glargine Injectable (LANTUS) 20 Unit(s) SubCutaneous at bedtime  insulin lispro (HumaLOG) corrective regimen sliding scale   SubCutaneous three times a day before meals  insulin lispro Injectable (HumaLOG) 5 Unit(s) SubCutaneous three times a day before meals  isosorbide   mononitrate ER Tablet (IMDUR) 30 milliGRAM(s) Oral daily  pantoprazole    Tablet 40 milliGRAM(s) Oral before breakfast  senna 2 Tablet(s) Oral at bedtime  silver sulfADIAZINE 1% Cream 1 Application(s) Topical daily  simvastatin 40 milliGRAM(s) Oral at bedtime  tamsulosin 0.4 milliGRAM(s) Oral at bedtime    PRN MEDICATIONS  acetaminophen   Tablet .. 650 milliGRAM(s) Oral every 6 hours PRN  dextrose 40% Gel 15 Gram(s) Oral once PRN  glucagon  Injectable 1 milliGRAM(s) IntraMuscular once PRN      VITAL SIGNS: Last 24 Hours  T(C): 36.4 (30 Apr 2020 05:39), Max: 36.4 (30 Apr 2020 05:39)  T(F): 97.5 (30 Apr 2020 05:39), Max: 97.5 (30 Apr 2020 05:39)  HR: 77 (30 Apr 2020 05:39) (62 - 77)  BP: 99/52 (30 Apr 2020 05:39) (99/52 - 110/62)  BP(mean): --  RR: 18 (30 Apr 2020 05:39) (18 - 19)  SpO2: --    LABS:                        11.5   5.58  )-----------( 171      ( 30 Apr 2020 07:21 )             34.9     04-30    138  |  95<L>  |  94<HH>  ----------------------------<  113<H>  3.3<L>   |  29  |  2.9<H>    Ca    9.5      30 Apr 2020 07:21  Phos  3.8     04-30  Mg     1.9     04-30    TPro  7.2  /  Alb  3.8  /  TBili  0.9  /  DBili  x   /  AST  20  /  ALT  11  /  AlkPhos  44  04-30    PT/INR - ( 30 Apr 2020 07:21 )   PT: 13.90 sec;   INR: 1.21 ratio         PTT - ( 30 Apr 2020 07:21 )  PTT:59.8 sec              RADIOLOGY:    PHYSICAL EXAM:    GENERAL: NAD, elderly, AAOx3, sitting on chair  HEENT:  Atraumatic, Normocephalic.  PULMONARY: Clear to auscultation bilaterally; No wheeze  CARDIOVASCULAR: Regular rate and rhythm; murmur not appreciated.   GASTROINTESTINAL: Soft, Nontender, Nondistended;   MUSCULOSKELETAL:  3+ edema of lower extremities b/l. Warm and moist. R LE wrapped in gauze.

## 2020-04-30 NOTE — CONSULT NOTE ADULT - ATTENDING COMMENTS
Pt seen examined  Agree with above  Pt with CKD 4, had BRITTANI end of 2019 (PIGN, MSSA bacteremia, no bx,) was on HD for 2-3 mos, off HD now for>2 mos. PMH: DM, HTN, CHF  Admitted with s/p fall, has seevre AS. Needs CTA for TAVR  High risk for ANGELLA  Prophylaxis with holding diuretics, gentle IVF - NS 50 cc/hr recommended  Will follow

## 2020-04-30 NOTE — DISCHARGE NOTE NURSING/CASE MANAGEMENT/SOCIAL WORK - NSDCPEPTCOWAR_GEN_ALL_CORE
Warfarin/Coumadin - Follow up monitoring/Warfarin/Coumadin - Dietary Advice/Warfarin/Coumadin - Compliance/Warfarin/Coumadin - Potential for adverse drug reactions and interactions

## 2020-04-30 NOTE — PROGRESS NOTE ADULT - ASSESSMENT
80F, PMHx Afib on Coumadin, HTN, DM, COPD, presents to ED s/p fall, +HT, +LOC, +AC. Pt does not recall the event, event was witnessed found to have L. Carotid artery stenosis >80%.     # Fall with head trauma  - Intracranial hemorrhage  - CTH: dense matter in right lateral ventrile and third ventricle consistent with intraventricular hemorrhage.   - MRI: stable intrventricular hematoma.   - neurosurgery cleared for AC    # syncope likely secondary to severe Aortic Stenosis  - son paulette patient unconscious at home  - ekg: Line Atrial flutter with variable A-V block with premature ventricular or aberrantly conducted complexes  - echo: severe aortic stenosis   - structural cardiology consulted for AS, requesting CT chest/abdomen/pelvis  - nephro: pt high risk for developing ANGELLA and needing HD post procedure and post contrast. hold lasix and metolazone and hydrate NS 50cc/hr for 4-6 hours before contrast.     #hx of afib  - on coumadin at home  - trauma: hold Coumadin- Kcentra administered in ED   - PTT: 13.9 inr 1.21  - holding coumadin until s/p surgery  - neurosurgery cleared for anticoagulation  - heparin at 10 from 8    # Carotid artery stenosis   - carotid duplex 80% stenosis of L internal carotid artery  - Vasc surgery: plan for Left CEA on Wed 4/29 canceled due to no cardiac clearance    # DM  - Lantus and humalog sliding scale  - diabetic diet    # HTN  - well controlled   - c/w furosemide, metolazone, imdur    # COPD  - no difficulty breathing  - c/w budesonide    # CKD4  - stable  - neurology consulted for optimization     #Lower extremity wound  - pt requesting silvadine   - wound care    DVT: pending vasc rec  GI ppx: pantoprazole  Diet: DASH/TLC  code: full 80F, PMHx Afib on Coumadin, HTN, DM, COPD, presents to ED s/p fall, +HT, +LOC, +AC. Pt does not recall the event, event was witnessed found to have L. Carotid artery stenosis >80%.     # Fall with head trauma  - Intracranial hemorrhage  - CTH: dense matter in right lateral ventrile and third ventricle consistent with intraventricular hemorrhage.   - MRI: stable intrventricular hematoma.   - neurosurgery cleared for AC    # syncope likely secondary to severe Aortic Stenosis  - son paulette patient unconscious at home  - ekg: Line Atrial flutter with variable A-V block with premature ventricular or aberrantly conducted complexes  - echo: severe aortic stenosis   - structural cardiology consulted for AS, requesting CT chest/abdomen/pelvis  - nephro: pt high risk for developing ANGELLA and needing HD post procedure and post contrast. hold lasix and metolazone and hydrate NS 50cc/hr for 4-6 hours before contrast.   - holding lasix and metolazone  - ct tmrw    #hx of afib  - on coumadin at home  - trauma: hold Coumadin- Kcentra administered in ED   - holding coumadin until s/p surgery  - neurosurgery cleared for anticoagulation  - PTT: 13.9 inr 1.21  - heparin to follow after 4pm lab draw    # Carotid artery stenosis   - carotid duplex 80% stenosis of L internal carotid artery  - Vasc surgery: plan for Left CEA on Wed 4/29 canceled due to no cardiac clearance    # DM  - Lantus and humalog sliding scale  - diabetic diet    # HTN  - well controlled   - c/w furosemide, metolazone, imdur    # COPD  - no difficulty breathing  - c/w budesonide    # CKD4  - stable  - neurology consulted for optimization     #Lower extremity wound  - pt requesting silvadine   - wound care    DVT: pending vasc rec  GI ppx: pantoprazole  Diet: DASH/TLC  code: full

## 2020-05-01 LAB
ALBUMIN SERPL ELPH-MCNC: 3.7 G/DL — SIGNIFICANT CHANGE UP (ref 3.5–5.2)
ALP SERPL-CCNC: 45 U/L — SIGNIFICANT CHANGE UP (ref 30–115)
ALT FLD-CCNC: 10 U/L — SIGNIFICANT CHANGE UP (ref 0–41)
ANION GAP SERPL CALC-SCNC: 14 MMOL/L — SIGNIFICANT CHANGE UP (ref 7–14)
APTT BLD: 170.6 SEC — CRITICAL HIGH (ref 27–39.2)
APTT BLD: 33.4 SEC — SIGNIFICANT CHANGE UP (ref 27–39.2)
APTT BLD: 52.8 SEC — HIGH (ref 27–39.2)
APTT BLD: 67.9 SEC — HIGH (ref 27–39.2)
AST SERPL-CCNC: 19 U/L — SIGNIFICANT CHANGE UP (ref 0–41)
BASOPHILS # BLD AUTO: 0.04 K/UL — SIGNIFICANT CHANGE UP (ref 0–0.2)
BASOPHILS NFR BLD AUTO: 0.6 % — SIGNIFICANT CHANGE UP (ref 0–1)
BILIRUB SERPL-MCNC: 1 MG/DL — SIGNIFICANT CHANGE UP (ref 0.2–1.2)
BUN SERPL-MCNC: 90 MG/DL — CRITICAL HIGH (ref 10–20)
CALCIUM SERPL-MCNC: 9 MG/DL — SIGNIFICANT CHANGE UP (ref 8.5–10.1)
CHLORIDE SERPL-SCNC: 96 MMOL/L — LOW (ref 98–110)
CO2 SERPL-SCNC: 28 MMOL/L — SIGNIFICANT CHANGE UP (ref 17–32)
CREAT SERPL-MCNC: 2.6 MG/DL — HIGH (ref 0.7–1.5)
EOSINOPHIL # BLD AUTO: 0.13 K/UL — SIGNIFICANT CHANGE UP (ref 0–0.7)
EOSINOPHIL NFR BLD AUTO: 2 % — SIGNIFICANT CHANGE UP (ref 0–8)
GLUCOSE BLDC GLUCOMTR-MCNC: 128 MG/DL — HIGH (ref 70–99)
GLUCOSE BLDC GLUCOMTR-MCNC: 176 MG/DL — HIGH (ref 70–99)
GLUCOSE BLDC GLUCOMTR-MCNC: 177 MG/DL — HIGH (ref 70–99)
GLUCOSE BLDC GLUCOMTR-MCNC: 221 MG/DL — HIGH (ref 70–99)
GLUCOSE SERPL-MCNC: 95 MG/DL — SIGNIFICANT CHANGE UP (ref 70–99)
HCT VFR BLD CALC: 34.5 % — LOW (ref 42–52)
HGB BLD-MCNC: 11.1 G/DL — LOW (ref 14–18)
IMM GRANULOCYTES NFR BLD AUTO: 0.6 % — HIGH (ref 0.1–0.3)
INR BLD: 1.14 RATIO — SIGNIFICANT CHANGE UP (ref 0.65–1.3)
INR BLD: 1.15 RATIO — SIGNIFICANT CHANGE UP (ref 0.65–1.3)
INR BLD: 1.19 RATIO — SIGNIFICANT CHANGE UP (ref 0.65–1.3)
LYMPHOCYTES # BLD AUTO: 1.72 K/UL — SIGNIFICANT CHANGE UP (ref 1.2–3.4)
LYMPHOCYTES # BLD AUTO: 26.9 % — SIGNIFICANT CHANGE UP (ref 20.5–51.1)
MAGNESIUM SERPL-MCNC: 1.8 MG/DL — SIGNIFICANT CHANGE UP (ref 1.8–2.4)
MCHC RBC-ENTMCNC: 28.2 PG — SIGNIFICANT CHANGE UP (ref 27–31)
MCHC RBC-ENTMCNC: 32.2 G/DL — SIGNIFICANT CHANGE UP (ref 32–37)
MCV RBC AUTO: 87.8 FL — SIGNIFICANT CHANGE UP (ref 80–94)
MONOCYTES # BLD AUTO: 0.81 K/UL — HIGH (ref 0.1–0.6)
MONOCYTES NFR BLD AUTO: 12.7 % — HIGH (ref 1.7–9.3)
NEUTROPHILS # BLD AUTO: 3.65 K/UL — SIGNIFICANT CHANGE UP (ref 1.4–6.5)
NEUTROPHILS NFR BLD AUTO: 57.2 % — SIGNIFICANT CHANGE UP (ref 42.2–75.2)
NRBC # BLD: 0 /100 WBCS — SIGNIFICANT CHANGE UP (ref 0–0)
PLATELET # BLD AUTO: 175 K/UL — SIGNIFICANT CHANGE UP (ref 130–400)
POTASSIUM SERPL-MCNC: 3.5 MMOL/L — SIGNIFICANT CHANGE UP (ref 3.5–5)
POTASSIUM SERPL-SCNC: 3.5 MMOL/L — SIGNIFICANT CHANGE UP (ref 3.5–5)
PROT SERPL-MCNC: 6.9 G/DL — SIGNIFICANT CHANGE UP (ref 6–8)
PROTHROM AB SERPL-ACNC: 13.1 SEC — HIGH (ref 9.95–12.87)
PROTHROM AB SERPL-ACNC: 13.2 SEC — HIGH (ref 9.95–12.87)
PROTHROM AB SERPL-ACNC: 13.7 SEC — HIGH (ref 9.95–12.87)
RBC # BLD: 3.93 M/UL — LOW (ref 4.7–6.1)
RBC # FLD: 18.1 % — HIGH (ref 11.5–14.5)
SODIUM SERPL-SCNC: 138 MMOL/L — SIGNIFICANT CHANGE UP (ref 135–146)
WBC # BLD: 6.39 K/UL — SIGNIFICANT CHANGE UP (ref 4.8–10.8)
WBC # FLD AUTO: 6.39 K/UL — SIGNIFICANT CHANGE UP (ref 4.8–10.8)

## 2020-05-01 PROCEDURE — 99222 1ST HOSP IP/OBS MODERATE 55: CPT

## 2020-05-01 PROCEDURE — 99233 SBSQ HOSP IP/OBS HIGH 50: CPT

## 2020-05-01 RX ORDER — SODIUM CHLORIDE 9 MG/ML
1000 INJECTION INTRAMUSCULAR; INTRAVENOUS; SUBCUTANEOUS
Refills: 0 | Status: DISCONTINUED | OUTPATIENT
Start: 2020-05-01 | End: 2020-05-01

## 2020-05-01 RX ORDER — WARFARIN SODIUM 2.5 MG/1
3 TABLET ORAL ONCE
Refills: 0 | Status: COMPLETED | OUTPATIENT
Start: 2020-05-01 | End: 2020-05-01

## 2020-05-01 RX ADMIN — CHLORHEXIDINE GLUCONATE 1 APPLICATION(S): 213 SOLUTION TOPICAL at 05:57

## 2020-05-01 RX ADMIN — Medication 5 UNIT(S): at 11:37

## 2020-05-01 RX ADMIN — SENNA PLUS 2 TABLET(S): 8.6 TABLET ORAL at 21:12

## 2020-05-01 RX ADMIN — WARFARIN SODIUM 3 MILLIGRAM(S): 2.5 TABLET ORAL at 21:12

## 2020-05-01 RX ADMIN — HEPARIN SODIUM 12 UNIT(S)/HR: 5000 INJECTION INTRAVENOUS; SUBCUTANEOUS at 08:19

## 2020-05-01 RX ADMIN — Medication 2: at 16:48

## 2020-05-01 RX ADMIN — Medication 1 APPLICATION(S): at 11:39

## 2020-05-01 RX ADMIN — TAMSULOSIN HYDROCHLORIDE 0.4 MILLIGRAM(S): 0.4 CAPSULE ORAL at 21:12

## 2020-05-01 RX ADMIN — INSULIN GLARGINE 20 UNIT(S): 100 INJECTION, SOLUTION SUBCUTANEOUS at 21:12

## 2020-05-01 RX ADMIN — Medication 5 UNIT(S): at 16:48

## 2020-05-01 RX ADMIN — BUDESONIDE AND FORMOTEROL FUMARATE DIHYDRATE 2 PUFF(S): 160; 4.5 AEROSOL RESPIRATORY (INHALATION) at 08:19

## 2020-05-01 RX ADMIN — Medication 145 MILLIGRAM(S): at 11:39

## 2020-05-01 RX ADMIN — FINASTERIDE 5 MILLIGRAM(S): 5 TABLET, FILM COATED ORAL at 11:39

## 2020-05-01 RX ADMIN — SODIUM CHLORIDE 50 MILLILITER(S): 9 INJECTION INTRAMUSCULAR; INTRAVENOUS; SUBCUTANEOUS at 08:15

## 2020-05-01 RX ADMIN — Medication 1: at 08:18

## 2020-05-01 RX ADMIN — Medication 5 UNIT(S): at 08:18

## 2020-05-01 RX ADMIN — SIMVASTATIN 40 MILLIGRAM(S): 20 TABLET, FILM COATED ORAL at 21:12

## 2020-05-01 RX ADMIN — BUDESONIDE AND FORMOTEROL FUMARATE DIHYDRATE 2 PUFF(S): 160; 4.5 AEROSOL RESPIRATORY (INHALATION) at 21:12

## 2020-05-01 RX ADMIN — PANTOPRAZOLE SODIUM 40 MILLIGRAM(S): 20 TABLET, DELAYED RELEASE ORAL at 05:57

## 2020-05-01 RX ADMIN — ISOSORBIDE MONONITRATE 30 MILLIGRAM(S): 60 TABLET, EXTENDED RELEASE ORAL at 11:39

## 2020-05-01 RX ADMIN — HEPARIN SODIUM 12 UNIT(S)/HR: 5000 INJECTION INTRAVENOUS; SUBCUTANEOUS at 03:10

## 2020-05-01 NOTE — PROGRESS NOTE ADULT - ASSESSMENT
80F, PMHx Afib on Coumadin, HTN, DM, CKD 4, COPD, presents to ED s/p fall, +HT, +LOC, +AC.   Found to have L. Carotid artery stenosis >80%.     # CKD 4   - serum creatinine around baseline   - previously had HD, now off for > 2 months.   - non-oliguric   - BP on lower side. monitor BP.   -  Ca, Mg, phos at goal.   - found to have severe aortic stenosis. Plan for TAVR, but needing CT with contrast prior to procedure.   - Pt is high risk for developing ANGELLA and needing HD post procedure with Nathanael calculator risk for ANGELLA ~ 26.1 % and risk of need HD post contrast ~ 1.09%. if benefits outweigh risks, ok to proceed with contrast study.  to decrease risk, stop diuretics lasix and metolazone for 24 hours. maintain  hydration with NS at 50 cc/hr for 4-6 hours before and after contrast. use minimum amount of contrast needed.  # anemia chronic Hb at goal.  # Fall with head trauma  - Intracranial hemorrhage / stable intrventricular hematoma.   - neurosurgery notes appreciated.    # syncope likely secondary to severe Aortic Stenosis  - ekg: Line Atrial flutter with variable A-V block with premature ventricular or aberrantly conducted complexes  - echo: severe aortic stenosis   - structural cardiology notes appreciated. ? plan for TAVR    # Carotid artery stenosis   - carotid duplex 80% stenosis of L internal carotid artery  - Vasc surgery: plan for Left CEA on Wed 4/29 canceled due to no cardiac clearance      avoid nephrotoxins   will follow

## 2020-05-01 NOTE — PROGRESS NOTE ADULT - ASSESSMENT
A 81 yo male with PMH of HTN, DM type 2, Atrial fibrillation on Coumadin and COPD was brought to ED s/p fall. Patient doesn't recall the event and he denies any loss of consciousness. As per Son patient was standing when suddenly fell back and hit his head. Patient found with posterior scalp laceration, Head CT showed intraventricular hemorrhage. Patient denies chest pain, SOB, palpitation or dizziness.     A/P:   Fall with Head Trauma:   Intracranial hemorrhage:   Head CT showed right lateral and third ventricles densities without definite evidence of active arterial contrast extravasation.  Brain MRI showed stable intraventricular hematoma. MRV unremarkable.  Neurosurgery is ok to resume Coumadin.     Syncope: possibly due to   Severe Aortic stenosis.   As per son he found him on the floor unconscious.   Workup showed severe aortic stenosis.   EKG showed Atrial flutter/fibrillation with slow ventricular response, RBBB  Cardiology consult for severe aortic stenosis recommended CT chest, abdomen and pelvis TAVR protocol.   I called the radiology, I spoke to Dr. she is not available today, the procedure can't be done before Monday, she recommended outpatient follow up.     High grade left Internal carotid stenosis:  80%  Vascular plan for  endarterectomy after cardiac clearance. Follow up with Dr. De Guzman outpatient.     CKD stag 4: stable.   Furosemide and Metolazone should be hold 24 hrs before the procedure.   Nephrology evaluated the patient for CT angio, he is at high risk for ANGELLA, he might need HD after the CT, recommended to hold Metolazone and Lasix 24 hrs before contrast, IV hydration 4-6 hrs before the CT. (can be done outpatient, repeat Cr is 2 days).     Atrial flutter/fibrillation:   Resume Coumadin. Neurosurgery is ok.    COPD on home o2, lungs are clear.     Patient wants to leave home, he understand the need of outpatient follow up for further evaluation.   #Progress Note Handoff:  Pending (specify):  CT chest, structure cardiology follow up.   Family discussion: with his son, update the plan for surgery  Disposition: Home possibly today.

## 2020-05-01 NOTE — PROGRESS NOTE ADULT - ASSESSMENT
80F, PMHx Afib on Coumadin, HTN, DM, COPD, presents to ED s/p fall, +HT, +LOC, +AC. Pt does not recall the event, event was witnessed found to have L. Carotid artery stenosis >80%.     # Fall with head trauma  - Intracranial hemorrhage  - CTH: dense matter in right lateral ventrile and third ventricle consistent with intraventricular hemorrhage.   - MRI: stable intrventricular hematoma.   - neurosurgery: no intervention, cleared for AC    # syncope likely secondary to severe Aortic Stenosis  - son found patient unconscious at home  - ekg: Line Atrial flutter with variable A-V block with premature ventricular or aberrantly conducted complexes  - echo: severe aortic stenosis   - structural cardiology consulted for AS, requesting CT chest/abdomen/pelvis  - nephro recs appreciated  - unable to do CT- TAVR protocol until Monday  - procedure may be done outpt with appropriate hydration before proceedure    #hx of afib  - on coumadin at home  - neurosurgery ok to start coumadin  - heparin stopped  - may start home dose coumadin tonight    # Carotid artery stenosis   - carotid duplex 80% stenosis of L internal carotid artery  - Vasc surgery: plan for Left CEA on Wed 4/29 canceled due to no cardiac clearance  - possible outpt f/u    # DM  - Lantus and humalog sliding scale  - diabetic diet    # HTN  - well controlled   - c/w furosemide, metolazone, imdur    # COPD  - no difficulty breathing  - c/w budesonide    # CKD4  - stable  - Nephro CT recommendations appreciated  - hydration may be done outpt   - f/u nephro outpt    #Lower extremity wound  - wound care    DVT: pending vasc rec  GI ppx: pantoprazole  Diet: DASH/TLC  code: full

## 2020-05-01 NOTE — PROGRESS NOTE ADULT - SUBJECTIVE AND OBJECTIVE BOX
SUBJECTIVE  Patient is a 80y old Male who presents with a chief complaint of afib (01 May 2020 13:51)  Currently admitted to medicine with the primary diagnosis of Ventricular hemorrhage      Today is hospital day 6d, and this morning he is sitting comfortably in his chair in no acute distress about to enjoy breakfast. Pt explains he has been here numerous days and expresses his frustration with his situation. no events overnight.   OBJECTIVE  PAST MEDICAL & SURGICAL HISTORY  Afib  BPH (benign prostatic hyperplasia)  CHF (congestive heart failure)  COPD (chronic obstructive pulmonary disease)  Diabetes  HTN (hypertension)  H/O heart artery stent  S/P CABG x 3    ALLERGIES:  No Known Allergies    MEDICATIONS:  STANDING MEDICATIONS  budesonide 160 MICROgram(s)/formoterol 4.5 MICROgram(s) Inhaler 2 Puff(s) Inhalation two times a day  chlorhexidine 4% Liquid 1 Application(s) Topical <User Schedule>  dextrose 5%. 1000 milliLiter(s) IV Continuous <Continuous>  dextrose 50% Injectable 12.5 Gram(s) IV Push once  dextrose 50% Injectable 25 Gram(s) IV Push once  dextrose 50% Injectable 25 Gram(s) IV Push once  fenofibrate Tablet 145 milliGRAM(s) Oral daily  finasteride 5 milliGRAM(s) Oral daily  insulin glargine Injectable (LANTUS) 20 Unit(s) SubCutaneous at bedtime  insulin lispro (HumaLOG) corrective regimen sliding scale   SubCutaneous three times a day before meals  insulin lispro Injectable (HumaLOG) 5 Unit(s) SubCutaneous three times a day before meals  isosorbide   mononitrate ER Tablet (IMDUR) 30 milliGRAM(s) Oral daily  pantoprazole    Tablet 40 milliGRAM(s) Oral before breakfast  senna 2 Tablet(s) Oral at bedtime  silver sulfADIAZINE 1% Cream 1 Application(s) Topical daily  simvastatin 40 milliGRAM(s) Oral at bedtime  tamsulosin 0.4 milliGRAM(s) Oral at bedtime    PRN MEDICATIONS  acetaminophen   Tablet .. 650 milliGRAM(s) Oral every 6 hours PRN  dextrose 40% Gel 15 Gram(s) Oral once PRN  glucagon  Injectable 1 milliGRAM(s) IntraMuscular once PRN      VITAL SIGNS: Last 24 Hours  T(C): 35.6 (01 May 2020 13:58), Max: 36.6 (30 Apr 2020 14:53)  T(F): 96 (01 May 2020 13:58), Max: 97.9 (30 Apr 2020 14:53)  HR: 62 (01 May 2020 13:58) (62 - 69)  BP: 117/53 (01 May 2020 13:58) (100/59 - 117/53)  BP(mean): --  RR: 18 (01 May 2020 13:58) (18 - 19)  SpO2: 96% (30 Apr 2020 19:45) (96% - 96%)    LABS:                        11.1   6.39  )-----------( 175      ( 01 May 2020 05:49 )             34.5     05-01    138  |  96<L>  |  90<HH>  ----------------------------<  95  3.5   |  28  |  2.6<H>    Ca    9.0      01 May 2020 05:49  Phos  3.8     04-30  Mg     1.8     05-01    TPro  6.9  /  Alb  3.7  /  TBili  1.0  /  DBili  x   /  AST  19  /  ALT  10  /  AlkPhos  45  05-01    PT/INR - ( 01 May 2020 11:21 )   PT: 13.70 sec;   INR: 1.19 ratio         PTT - ( 01 May 2020 11:21 )  PTT:170.6 sec    RADIOLOGY:    PHYSICAL EXAM:    GENERAL: NAD, well-developed, AAOx3  HEENT:  Atraumatic, Normocephalic.   PULMONARY: Clear to auscultation bilaterally; No wheeze  CARDIOVASCULAR: Regular rate. audible systolic murmur  GASTROINTESTINAL: Soft, Nontender, Nondistended; Bowel sounds present  MUSCULOSKELETAL:  No clubbing, cyanosis. Edematous bilateral le, chronic skin changes.

## 2020-05-01 NOTE — PROGRESS NOTE ADULT - SUBJECTIVE AND OBJECTIVE BOX
TAIWO ZAVALA  80y  Male      Patient is a 80y old  Male who presents with a chief complaint of ICH (29 Apr 2020 09:39)      INTERVAL HPI/OVERNIGHT EVENTS:  He feels ok, no chest pain or SOB, no overnight events.   Vital Signs Last 24 Hrs  T(C): 35.7 (01 May 2020 04:41), Max: 36.6 (30 Apr 2020 14:53)  T(F): 96.3 (01 May 2020 04:41), Max: 97.9 (30 Apr 2020 14:53)  HR: 62 (01 May 2020 04:41) (62 - 69)  BP: 104/51 (01 May 2020 04:41) (100/59 - 104/51)  BP(mean): --  RR: 18 (01 May 2020 04:41) (18 - 19)  SpO2: 96% (30 Apr 2020 19:45) (96% - 96%)      04-30-20 @ 07:01  -  05-01-20 @ 07:00  --------------------------------------------------------  IN: 140 mL / OUT: 460 mL / NET: -320 mL    05-01-20 @ 07:01 - 05-01-20 @ 12:19  --------------------------------------------------------  IN: 198 mL / OUT: 0 mL / NET: 198 mL            Consultant(s) Notes Reviewed:  [x ] YES  [ ] NO          MEDICATIONS  (STANDING):  budesonide 160 MICROgram(s)/formoterol 4.5 MICROgram(s) Inhaler 2 Puff(s) Inhalation two times a day  chlorhexidine 4% Liquid 1 Application(s) Topical <User Schedule>  dextrose 5%. 1000 milliLiter(s) (50 mL/Hr) IV Continuous <Continuous>  dextrose 50% Injectable 12.5 Gram(s) IV Push once  dextrose 50% Injectable 25 Gram(s) IV Push once  dextrose 50% Injectable 25 Gram(s) IV Push once  fenofibrate Tablet 145 milliGRAM(s) Oral daily  finasteride 5 milliGRAM(s) Oral daily  heparin  Infusion 1000 Unit(s)/Hr (12 mL/Hr) IV Continuous <Continuous>  insulin glargine Injectable (LANTUS) 20 Unit(s) SubCutaneous at bedtime  insulin lispro (HumaLOG) corrective regimen sliding scale   SubCutaneous three times a day before meals  insulin lispro Injectable (HumaLOG) 5 Unit(s) SubCutaneous three times a day before meals  isosorbide   mononitrate ER Tablet (IMDUR) 30 milliGRAM(s) Oral daily  pantoprazole    Tablet 40 milliGRAM(s) Oral before breakfast  senna 2 Tablet(s) Oral at bedtime  silver sulfADIAZINE 1% Cream 1 Application(s) Topical daily  simvastatin 40 milliGRAM(s) Oral at bedtime  tamsulosin 0.4 milliGRAM(s) Oral at bedtime    MEDICATIONS  (PRN):  acetaminophen   Tablet .. 650 milliGRAM(s) Oral every 6 hours PRN Mild Pain (1 - 3)  dextrose 40% Gel 15 Gram(s) Oral once PRN Blood Glucose LESS THAN 70 milliGRAM(s)/deciliter  glucagon  Injectable 1 milliGRAM(s) IntraMuscular once PRN Glucose LESS THAN 70 milligrams/deciliter      LABS                          11.1   6.39  )-----------( 175      ( 01 May 2020 05:49 )             34.5     05-01    138  |  96<L>  |  90<HH>  ----------------------------<  95  3.5   |  28  |  2.6<H>    Ca    9.0      01 May 2020 05:49  Phos  3.8     04-30  Mg     1.8     05-01    TPro  6.9  /  Alb  3.7  /  TBili  1.0  /  DBili  x   /  AST  19  /  ALT  10  /  AlkPhos  45  05-01        PT/INR - ( 01 May 2020 01:06 )   PT: 13.20 sec;   INR: 1.15 ratio         PTT - ( 01 May 2020 05:49 )  PTT:67.9 sec  Lactate Trend        CAPILLARY BLOOD GLUCOSE      POCT Blood Glucose.: 128 mg/dL (01 May 2020 11:05)        RADIOLOGY & ADDITIONAL TESTS:    Imaging Personally Reviewed:  [ ] YES  [ ] NO    HEALTH ISSUES - PROBLEM Dx:        PHYSICAL EXAM:  GENERAL: NAD, well-developed  HEAD:  Atraumatic, Normocephalic  EYES: EOMI, PERRLA, conjunctiva and sclera clear  NECK: Supple, No JVD  CHEST/LUNG: Clear to auscultation bilaterally; No wheeze  HEART: Irregular rate and rhythm; S1 S2 Sm 3/6 on aortic area radiates to the neck.   ABDOMEN: Soft, Nontender, Nondistended; Bowel sounds present  EXTREMITIES:  LE chronic skin changes and edema 2+  PSYCH: AAOx3  NEUROLOGY: non-focal  SKIN: No rashes or lesions No

## 2020-05-01 NOTE — PROGRESS NOTE ADULT - SUBJECTIVE AND OBJECTIVE BOX
SUBJ:No chest pain or shortness of breath      MEDICATIONS  (STANDING):  budesonide 160 MICROgram(s)/formoterol 4.5 MICROgram(s) Inhaler 2 Puff(s) Inhalation two times a day  chlorhexidine 4% Liquid 1 Application(s) Topical <User Schedule>  dextrose 5%. 1000 milliLiter(s) (50 mL/Hr) IV Continuous <Continuous>  dextrose 50% Injectable 12.5 Gram(s) IV Push once  dextrose 50% Injectable 25 Gram(s) IV Push once  dextrose 50% Injectable 25 Gram(s) IV Push once  fenofibrate Tablet 145 milliGRAM(s) Oral daily  finasteride 5 milliGRAM(s) Oral daily  insulin glargine Injectable (LANTUS) 20 Unit(s) SubCutaneous at bedtime  insulin lispro (HumaLOG) corrective regimen sliding scale   SubCutaneous three times a day before meals  insulin lispro Injectable (HumaLOG) 5 Unit(s) SubCutaneous three times a day before meals  isosorbide   mononitrate ER Tablet (IMDUR) 30 milliGRAM(s) Oral daily  pantoprazole    Tablet 40 milliGRAM(s) Oral before breakfast  senna 2 Tablet(s) Oral at bedtime  silver sulfADIAZINE 1% Cream 1 Application(s) Topical daily  simvastatin 40 milliGRAM(s) Oral at bedtime  tamsulosin 0.4 milliGRAM(s) Oral at bedtime    MEDICATIONS  (PRN):  acetaminophen   Tablet .. 650 milliGRAM(s) Oral every 6 hours PRN Mild Pain (1 - 3)  dextrose 40% Gel 15 Gram(s) Oral once PRN Blood Glucose LESS THAN 70 milliGRAM(s)/deciliter  glucagon  Injectable 1 milliGRAM(s) IntraMuscular once PRN Glucose LESS THAN 70 milligrams/deciliter            Vital Signs Last 24 Hrs  T(C): 35.7 (01 May 2020 04:41), Max: 36.6 (30 Apr 2020 14:53)  T(F): 96.3 (01 May 2020 04:41), Max: 97.9 (30 Apr 2020 14:53)  HR: 62 (01 May 2020 04:41) (62 - 69)  BP: 104/51 (01 May 2020 04:41) (100/59 - 104/51)  BP(mean): --  RR: 18 (01 May 2020 04:41) (18 - 19)  SpO2: 96% (30 Apr 2020 19:45) (96% - 96%)      ECG:NML    TTE:    LABS:                        11.1   6.39  )-----------( 175      ( 01 May 2020 05:49 )             34.5     05-01    138  |  96<L>  |  90<HH>  ----------------------------<  95  3.5   |  28  |  2.6<H>    Ca    9.0      01 May 2020 05:49  Phos  3.8     04-30  Mg     1.8     05-01    TPro  6.9  /  Alb  3.7  /  TBili  1.0  /  DBili  x   /  AST  19  /  ALT  10  /  AlkPhos  45  05-01        PT/INR - ( 01 May 2020 11:21 )   PT: 13.70 sec;   INR: 1.19 ratio         PTT - ( 01 May 2020 11:21 )  PTT:170.6 sec    I&O's Summary    30 Apr 2020 07:01  -  01 May 2020 07:00  --------------------------------------------------------  IN: 140 mL / OUT: 460 mL / NET: -320 mL    01 May 2020 07:01  -  01 May 2020 13:52  --------------------------------------------------------  IN: 198 mL / OUT: 0 mL / NET: 198 mL      BNP

## 2020-05-01 NOTE — PROGRESS NOTE ADULT - SUBJECTIVE AND OBJECTIVE BOX
Nephrology progress note    THIS IS AN INCOMPLETE NOTE . FULL NOTE TO FOLLOW SHORTLY    Patient is seen and examined, events over the last 24 h noted .    Allergies:  No Known Allergies    Hospital Medications:   MEDICATIONS  (STANDING):  budesonide 160 MICROgram(s)/formoterol 4.5 MICROgram(s) Inhaler 2 Puff(s) Inhalation two times a day  chlorhexidine 4% Liquid 1 Application(s) Topical <User Schedule>  dextrose 5%. 1000 milliLiter(s) (50 mL/Hr) IV Continuous <Continuous>  dextrose 50% Injectable 12.5 Gram(s) IV Push once  dextrose 50% Injectable 25 Gram(s) IV Push once  dextrose 50% Injectable 25 Gram(s) IV Push once  fenofibrate Tablet 145 milliGRAM(s) Oral daily  finasteride 5 milliGRAM(s) Oral daily  heparin  Infusion 1000 Unit(s)/Hr (12 mL/Hr) IV Continuous <Continuous>  insulin glargine Injectable (LANTUS) 20 Unit(s) SubCutaneous at bedtime  insulin lispro (HumaLOG) corrective regimen sliding scale   SubCutaneous three times a day before meals  insulin lispro Injectable (HumaLOG) 5 Unit(s) SubCutaneous three times a day before meals  isosorbide   mononitrate ER Tablet (IMDUR) 30 milliGRAM(s) Oral daily  pantoprazole    Tablet 40 milliGRAM(s) Oral before breakfast  senna 2 Tablet(s) Oral at bedtime  silver sulfADIAZINE 1% Cream 1 Application(s) Topical daily  simvastatin 40 milliGRAM(s) Oral at bedtime  sodium chloride 0.9%. 1000 milliLiter(s) (50 mL/Hr) IV Continuous <Continuous>  tamsulosin 0.4 milliGRAM(s) Oral at bedtime        VITALS:  T(F): 96.3 (20 @ 04:41), Max: 97.9 (20 @ 14:53)  HR: 62 (20 @ 04:41)  BP: 104/51 (20 @ 04:41)  RR: 18 (20 @ 04:41)  SpO2: 96% (20 @ 19:45)  Wt(kg): --     @ 07:01  -   @ 07:00  --------------------------------------------------------  IN: 0 mL / OUT: 700 mL / NET: -700 mL    04-30 @ 07:01  -   @ 07:00  --------------------------------------------------------  IN: 140 mL / OUT: 460 mL / NET: -320 mL          PHYSICAL EXAM:  Constitutional: NAD  HEENT: anicteric sclera, oropharynx clear, MMM  Neck: No JVD  Respiratory: CTAB, no wheezes, rales or rhonchi  Cardiovascular: S1, S2, RRR  Gastrointestinal: BS+, soft, NT/ND  Extremities: No cyanosis or clubbing. No peripheral edema  :  No julian.   Skin: No rashes    LABS:      138  |  96<L>  |  90<HH>  ----------------------------<  95  3.5   |  28  |  2.6<H>  Creatinine Trend: 2.6<--, 2.9<--, 2.6<--, 2.5<--, 2.4<--, 2.7<--  Ca    9.0      01 May 2020 05:49  Phos  3.8     04-30  Mg     1.8         TPro  6.9  /  Alb  3.7  /  TBili  1.0  /  DBili      /  AST  19  /  ALT  10  /  AlkPhos  45                            11.1   6.39  )-----------( 175      ( 01 May 2020 05:49 )             34.5       Urine Studies:  Urinalysis Basic - ( 2020 16:50 )    Color: Light Yellow / Appearance: Clear / S.012 / pH:   Gluc:  / Ketone: Negative  / Bili: Negative / Urobili: <2 mg/dL   Blood:  / Protein: Negative / Nitrite: Negative   Leuk Esterase: Small / RBC: 0 /HPF / WBC 8 /HPF   Sq Epi:  / Non Sq Epi: 1 /HPF / Bacteria: Few        RADIOLOGY & ADDITIONAL STUDIES: Nephrology progress note    Patient is seen and examined, events over the last 24 h noted .  sitting in chair comfortable  No events   No complaints     Allergies:  No Known Allergies    Hospital Medications:   MEDICATIONS  (STANDING):    budesonide 160 MICROgram(s)/formoterol 4.5 MICROgram(s) Inhaler 2 Puff(s) Inhalation two times a day  dextrose 5%. 1000 milliLiter(s) (50 mL/Hr) IV Continuous <Continuous>  fenofibrate Tablet 145 milliGRAM(s) Oral daily  finasteride 5 milliGRAM(s) Oral daily  heparin  Infusion 1000 Unit(s)/Hr (12 mL/Hr) IV Continuous <Continuous>  insulin glargine Injectable (LANTUS) 20 Unit(s) SubCutaneous at bedtime  insulin lispro (HumaLOG) corrective regimen sliding scale   SubCutaneous three times a day before meals  insulin lispro Injectable (HumaLOG) 5 Unit(s) SubCutaneous three times a day before meals  isosorbide   mononitrate ER Tablet (IMDUR) 30 milliGRAM(s) Oral daily  pantoprazole    Tablet 40 milliGRAM(s) Oral before breakfast  senna 2 Tablet(s) Oral at bedtime  silver sulfADIAZINE 1% Cream 1 Application(s) Topical daily  simvastatin 40 milliGRAM(s) Oral at bedtime  sodium chloride 0.9%. 1000 milliLiter(s) (50 mL/Hr) IV Continuous <Continuous>  tamsulosin 0.4 milliGRAM(s) Oral at bedtime        VITALS:  T(F): 96.3 (20 @ 04:41), Max: 97.9 (20 @ 14:53)  HR: 62 (20 @ 04:41)  BP: 104/51 (20 @ 04:41)  RR: 18 (20 @ 04:41)  SpO2: 96% (20 @ 19:45)       @ :  -   @ 07:00  --------------------------------------------------------  IN: 0 mL / OUT: 700 mL / NET: -700 mL     @ 07: @ 07:00  --------------------------------------------------------  IN: 140 mL / OUT: 460 mL / NET: -320 mL          PHYSICAL EXAM:  Constitutional: NAD  HEENT: anicteric sclera, oropharynx clear, MMM  Neck: No JVD  Respiratory: CTAB, no wheezes, rales or rhonchi  Cardiovascular: S1, S2, RRR  Gastrointestinal: BS+, soft, NT/ND  Extremities: No cyanosis or clubbing. trace peripheral  edema   Skin: No rashes    LABS:      138  |  96<L>  |  90<HH>  ----------------------------<  95  3.5   |  28  |  2.6<H>  Creatinine Trend: 2.6<--, 2.9<--, 2.6<--, 2.5<--, 2.4<--, 2.7<--  Ca    9.0      01 May 2020 05:49  Phos  3.8     04-30  Mg     1.8         TPro  6.9  /  Alb  3.7  /  TBili  1.0  /  DBili      /  AST  19  /  ALT  10  /  AlkPhos  45                            11.1   6.39  )-----------( 175      ( 01 May 2020 05:49 )             34.5       Urine Studies:  Urinalysis Basic - ( 2020 16:50 )    Color: Light Yellow / Appearance: Clear / S.012 / pH:   Gluc:  / Ketone: Negative  / Bili: Negative / Urobili: <2 mg/dL   Blood:  / Protein: Negative / Nitrite: Negative   Leuk Esterase: Small / RBC: 0 /HPF / WBC 8 /HPF   Sq Epi:  / Non Sq Epi: 1 /HPF / Bacteria: Few        RADIOLOGY & ADDITIONAL STUDIES:

## 2020-05-02 ENCOUNTER — TRANSCRIPTION ENCOUNTER (OUTPATIENT)
Age: 80
End: 2020-05-02

## 2020-05-02 LAB
ALBUMIN SERPL ELPH-MCNC: 3.7 G/DL — SIGNIFICANT CHANGE UP (ref 3.5–5.2)
ALP SERPL-CCNC: 47 U/L — SIGNIFICANT CHANGE UP (ref 30–115)
ALT FLD-CCNC: 12 U/L — SIGNIFICANT CHANGE UP (ref 0–41)
ANION GAP SERPL CALC-SCNC: 15 MMOL/L — HIGH (ref 7–14)
APTT BLD: 31.4 SEC — SIGNIFICANT CHANGE UP (ref 27–39.2)
AST SERPL-CCNC: 21 U/L — SIGNIFICANT CHANGE UP (ref 0–41)
BILIRUB SERPL-MCNC: 0.8 MG/DL — SIGNIFICANT CHANGE UP (ref 0.2–1.2)
BUN SERPL-MCNC: 89 MG/DL — CRITICAL HIGH (ref 10–20)
CALCIUM SERPL-MCNC: 9.4 MG/DL — SIGNIFICANT CHANGE UP (ref 8.5–10.1)
CHLORIDE SERPL-SCNC: 98 MMOL/L — SIGNIFICANT CHANGE UP (ref 98–110)
CO2 SERPL-SCNC: 26 MMOL/L — SIGNIFICANT CHANGE UP (ref 17–32)
CREAT SERPL-MCNC: 2.4 MG/DL — HIGH (ref 0.7–1.5)
GLUCOSE BLDC GLUCOMTR-MCNC: 181 MG/DL — HIGH (ref 70–99)
GLUCOSE BLDC GLUCOMTR-MCNC: 249 MG/DL — HIGH (ref 70–99)
GLUCOSE BLDC GLUCOMTR-MCNC: 260 MG/DL — HIGH (ref 70–99)
GLUCOSE BLDC GLUCOMTR-MCNC: 92 MG/DL — SIGNIFICANT CHANGE UP (ref 70–99)
GLUCOSE BLDC GLUCOMTR-MCNC: 99 MG/DL — SIGNIFICANT CHANGE UP (ref 70–99)
GLUCOSE SERPL-MCNC: 68 MG/DL — LOW (ref 70–99)
HCT VFR BLD CALC: 35.4 % — LOW (ref 42–52)
HGB BLD-MCNC: 11.6 G/DL — LOW (ref 14–18)
INR BLD: 1.14 RATIO — SIGNIFICANT CHANGE UP (ref 0.65–1.3)
MCHC RBC-ENTMCNC: 29.3 PG — SIGNIFICANT CHANGE UP (ref 27–31)
MCHC RBC-ENTMCNC: 32.8 G/DL — SIGNIFICANT CHANGE UP (ref 32–37)
MCV RBC AUTO: 89.4 FL — SIGNIFICANT CHANGE UP (ref 80–94)
NRBC # BLD: 0 /100 WBCS — SIGNIFICANT CHANGE UP (ref 0–0)
PLATELET # BLD AUTO: 178 K/UL — SIGNIFICANT CHANGE UP (ref 130–400)
POTASSIUM SERPL-MCNC: 3.8 MMOL/L — SIGNIFICANT CHANGE UP (ref 3.5–5)
POTASSIUM SERPL-SCNC: 3.8 MMOL/L — SIGNIFICANT CHANGE UP (ref 3.5–5)
PROT SERPL-MCNC: 7.2 G/DL — SIGNIFICANT CHANGE UP (ref 6–8)
PROTHROM AB SERPL-ACNC: 13.1 SEC — HIGH (ref 9.95–12.87)
RBC # BLD: 3.96 M/UL — LOW (ref 4.7–6.1)
RBC # FLD: 18 % — HIGH (ref 11.5–14.5)
SODIUM SERPL-SCNC: 139 MMOL/L — SIGNIFICANT CHANGE UP (ref 135–146)
WBC # BLD: 7.63 K/UL — SIGNIFICANT CHANGE UP (ref 4.8–10.8)
WBC # FLD AUTO: 7.63 K/UL — SIGNIFICANT CHANGE UP (ref 4.8–10.8)

## 2020-05-02 PROCEDURE — 99233 SBSQ HOSP IP/OBS HIGH 50: CPT

## 2020-05-02 RX ORDER — WARFARIN SODIUM 2.5 MG/1
3 TABLET ORAL ONCE
Refills: 0 | Status: COMPLETED | OUTPATIENT
Start: 2020-05-02 | End: 2020-05-02

## 2020-05-02 RX ADMIN — WARFARIN SODIUM 3 MILLIGRAM(S): 2.5 TABLET ORAL at 21:25

## 2020-05-02 RX ADMIN — SENNA PLUS 2 TABLET(S): 8.6 TABLET ORAL at 21:25

## 2020-05-02 RX ADMIN — ISOSORBIDE MONONITRATE 30 MILLIGRAM(S): 60 TABLET, EXTENDED RELEASE ORAL at 13:26

## 2020-05-02 RX ADMIN — SIMVASTATIN 40 MILLIGRAM(S): 20 TABLET, FILM COATED ORAL at 21:25

## 2020-05-02 RX ADMIN — TAMSULOSIN HYDROCHLORIDE 0.4 MILLIGRAM(S): 0.4 CAPSULE ORAL at 21:28

## 2020-05-02 RX ADMIN — FINASTERIDE 5 MILLIGRAM(S): 5 TABLET, FILM COATED ORAL at 13:25

## 2020-05-02 RX ADMIN — Medication 145 MILLIGRAM(S): at 13:25

## 2020-05-02 RX ADMIN — CHLORHEXIDINE GLUCONATE 1 APPLICATION(S): 213 SOLUTION TOPICAL at 05:36

## 2020-05-02 RX ADMIN — Medication 5 UNIT(S): at 13:27

## 2020-05-02 RX ADMIN — Medication 1 APPLICATION(S): at 13:29

## 2020-05-02 RX ADMIN — INSULIN GLARGINE 20 UNIT(S): 100 INJECTION, SOLUTION SUBCUTANEOUS at 21:25

## 2020-05-02 RX ADMIN — Medication 3: at 13:28

## 2020-05-02 RX ADMIN — PANTOPRAZOLE SODIUM 40 MILLIGRAM(S): 20 TABLET, DELAYED RELEASE ORAL at 05:36

## 2020-05-02 RX ADMIN — BUDESONIDE AND FORMOTEROL FUMARATE DIHYDRATE 2 PUFF(S): 160; 4.5 AEROSOL RESPIRATORY (INHALATION) at 21:26

## 2020-05-02 NOTE — DISCHARGE NOTE PROVIDER - CARE PROVIDER_API CALL
Carl Kruger)  Family Medicine  [address] [place] [address]  Phone: [phone]  Fax: [phone]  Established Patient  Follow Up Time:     Kirit Ramirez)  Cardiovascular Disease; Internal Medicine  [address] [place] [address]  Phone: [phone]  Fax: [phone]  Established Patient  Follow Up Time:     Yevgeniy Maria)  Cardiovascular Disease; Internal Medicine  [address] [place] [address]  Phone: [phone]  Fax: [phone]  Follow Up Time: Carl Kruger)  Family Medicine  [address] [place] [address]  Phone: [phone]  Fax: [phone]  Established Patient  Follow Up Time:     Kirit Ramirez)  Cardiovascular Disease; Internal Medicine  [address] [place] [address]  Phone: [phone]  Fax: [phone]  Established Patient  Follow Up Time:     Yevgeniy Maria)  Cardiovascular Disease; Internal Medicine  [address] [address]  Phone: [phone]  Fax: [phone]  Follow Up Time:     Stephanie Philippe)  Nephrology  [address] [place] [address]  Phone: [phone]  Fax: [phone]  Follow Up Time:     Isacc De Guzman)  Vascular Surgery  [address] [address]  Phone: [phone]  Fax: [phone]  Follow Up Time: Carl Kruger  Family Medicine  [address] [place] [address]  Phone: [phone]  Fax: [phone]  Established Patient  Follow Up Time:     STEVE YAO  Cardiovascular Disease  [address] [place] [address]  Phone: [phone]  Fax: [phone]  Established Patient  Follow Up Time:     Yevgeniy Maria)  Cardiovascular Disease; Internal Medicine  [address] [place] [address]  Phone: [phone]  Fax: [phone]  Follow Up Time:     Stephanie Philippe)  Nephrology  [address] [place] [address]  Phone: [phone]  Fax: [phone]  Follow Up Time:     Murtaza Murphy)  Surgery; Vascular Surgery  [address] [place] [address]  Phone: [phone]  Fax: [phone]  Follow Up Time: Carl Kruger)  Family Medicine  [address] [place] [address]  Phone: [phone]  Fax: [phone]  Established Patient  Follow Up Time: 1 week    STEVE YAO  Cardiovascular Disease  [address] [place] [address]  Phone: [phone]  Fax: [phone]  Established Patient  Follow Up Time: 1 week    Yevgeniy Maria)  Cardiovascular Disease; Internal Medicine  [address] [facility] [address]  [place] [address]  Phone: [phone]  Fax: [phone]  Follow Up Time: 1 week    Stephanie Philippe  NEPHROLOGY  [address] [place] [address]  Phone: [phone]  Fax: [phone]  Follow Up Time: 1 week    Murtaza Murphy)  Surgery; Vascular Surgery  [address] [address]  Phone: [phone]  Fax: [phone]  Follow Up Time: 2 weeks Carl Kruger)  Family Medicine  [address] [place] [address]  Phone: [phone]  Fax: [phone]  Established Patient  Follow Up Time: 1-3 days    STEVE YAO  Cardiovascular Disease  [address] [place] [address]  Phone: [phone]  Fax: [phone]  Established Patient  Follow Up Time: 1 week    Yevgeniy Maria)  Cardiovascular Disease; Internal Medicine  [address] [address]  Phone: [phone]  Fax: [phone]  Follow Up Time: 1 week    Stephanie Philippe  NEPHROLOGY  [address] [place] [address]  Phone: [phone]  Fax: [phone]  Follow Up Time: 1 week    Murtaza Murphy)  Surgery; Vascular Surgery  [address] [address]  Phone: [phone]  Fax: [phone]  Follow Up Time: 2 weeks    Kvng Marquez  Infectious Disease  [address] [place] [address]  Phone: [phone]  Fax: [phone]  Follow Up Time: 1 week

## 2020-05-02 NOTE — DISCHARGE NOTE PROVIDER - CARE PROVIDERS DIRECT ADDRESSES
,DirectAddress_Unknown,[email or web address] ,[email or web address],[email or web address],DirectAddress_Unknown,[email or web address] ,DirectAddress_Unknown,DirectAddress_Unknown,[email or web address],DirectAddress_Unknown,[email or web address] ,DirectAddress_Unknown,DirectAddress_Unknown,[email or web address],DirectAddress_Unknown,[email or web address],[email or web address]

## 2020-05-02 NOTE — DISCHARGE NOTE PROVIDER - NSDCCPCAREPLAN_GEN_ALL_CORE_FT
PRINCIPAL DISCHARGE DIAGNOSIS  Diagnosis: Intracranial bleed  Assessment and Plan of Treatment: You were found on the floor by your son and brought to the hospital for evaluation. You were found to have an intracranial bleed and seen by neurosurgery. As your bleeding was intraventricular and stable, neurosurgery found you to be stable and were not in need of any intervention. Your coumadin was held for a few days but neurosurgery cleared you to start coumadin again.      SECONDARY DISCHARGE DIAGNOSES  Diagnosis: Aortic stenosis  Assessment and Plan of Treatment: Your workup for you fall revealed you had severe aortic stenosis. Please follow up with your cardiologist for continued managent. please follow up with Dr. Maria from structural cardiology for possible surgical management. You will also need to get a CT scan    Diagnosis: Carotid artery stenosis  Assessment and Plan of Treatment:     Diagnosis: Unspecified atrial fibrillation  Assessment and Plan of Treatment:     Diagnosis: Fall  Assessment and Plan of Treatment: PRINCIPAL DISCHARGE DIAGNOSIS  Diagnosis: Intracranial bleed  Assessment and Plan of Treatment: You were found on the floor by your son and brought to the hospital for evaluation. You were found to have an intracranial bleed and seen by neurosurgery. As your bleeding was intraventricular and stable, neurosurgery found you to be stable and were not in need of any intervention. Your coumadin was held for a few days but neurosurgery cleared you to start coumadin again.      SECONDARY DISCHARGE DIAGNOSES  Diagnosis: Aortic stenosis  Assessment and Plan of Treatment: Your workup for you fall revealed you had severe aortic stenosis. Please follow up with your cardiologist for continued managent. please follow up with Dr. Maria from structural cardiology for possible surgical management. You will also need to get a CT scan to evaluate the anatomy of the valve before seeing Dr. Maria    Diagnosis: Chronic kidney disease  Assessment and Plan of Treatment: You have a history of chronic kidney disease. You were seen by nephrology for your condition and were found to be overall stable. They recommended that you get bloodwork to check your kidney function when you go home and follow up with them in the office. Please continue to follow up with them two (2) days after you complete your CT scan for continued monitoring.    Diagnosis: Carotid artery stenosis  Assessment and Plan of Treatment: Your workup for fall revealed that you have >80% stenosis of your left internal carotid artery. Please follow up with vascular surgery outpatient for continued management.    Diagnosis: Unspecified atrial fibrillation  Assessment and Plan of Treatment: You had a history of atrial fibrilation on your presentiation to the hospital. You were cleared by neurosurgery to continue taking your coumadin for this condition. Please continue to follow up with you cardiologist for continued management. PRINCIPAL DISCHARGE DIAGNOSIS  Diagnosis: Intracranial bleed  Assessment and Plan of Treatment: You were found on the floor by your son and brought to the hospital for evaluation. You were found to have an intracranial bleed and seen by neurosurgery. As your bleeding was intraventricular and stable, neurosurgery found you to be stable and were not in need of any intervention. Your coumadin was held for a few days but neurosurgery cleared you to start coumadin again.      SECONDARY DISCHARGE DIAGNOSES  Diagnosis: Bacteremia  Assessment and Plan of Treatment: You had fevers and your blood culture was growing bateria. Your Urine also grew bacteria.   You will be discharged home with IV antibiotics for 2 weeks    Diagnosis: Chronic kidney disease  Assessment and Plan of Treatment: You have a history of chronic kidney disease. You were seen by nephrology for your condition and were found to be overall stable. They recommended that you get bloodwork to check your kidney function when you go home and follow up with them in the office. Please continue to follow up with them two (2) days after you complete your CT scan for continued monitoring.    Diagnosis: Unspecified atrial fibrillation  Assessment and Plan of Treatment: You had a history of atrial fibrilation on your presentiation to the hospital. You were cleared by neurosurgery to continue taking your coumadin for this condition. Please continue to follow up with you cardiologist for continued management.    Diagnosis: Carotid artery stenosis  Assessment and Plan of Treatment: Your workup for fall revealed that you have >80% stenosis of your left internal carotid artery. Please follow up with vascular surgery outpatient for continued management.    Diagnosis: Aortic stenosis  Assessment and Plan of Treatment: Your workup for you fall revealed you had severe aortic stenosis. Please follow up with your cardiologist for continued managent. please follow up with Dr. Maria from structural cardiology for possible surgical management. You will also need to get a CT scan to evaluate the anatomy of the valve before seeing Dr. Maria PRINCIPAL DISCHARGE DIAGNOSIS  Diagnosis: Intracranial bleed  Assessment and Plan of Treatment: You were found on the floor by your son and brought to the hospital for evaluation. You were found to have an intracranial bleed and seen by neurosurgery. As your bleeding was intraventricular and stable, neurosurgery found you to be stable and were not in need of any intervention. Your coumadin was held for a few days but neurosurgery cleared you to start coumadin again. Follow up with neurosurgery as outpt.      SECONDARY DISCHARGE DIAGNOSES  Diagnosis: Bacteremia  Assessment and Plan of Treatment: You had fevers and your blood culture was growing bateria. Your Urine also grew bacteria. You will be discharged home with IV antibiotics for 2 weeks until 5/23/20.    Diagnosis: Chronic kidney disease  Assessment and Plan of Treatment: You have a history of chronic kidney disease. You were seen by nephrology for your condition and were found to be overall stable. They recommended that you get bloodwork to check your kidney function when you go home and follow up with them in the office.    Diagnosis: Unspecified atrial fibrillation  Assessment and Plan of Treatment: You had a history of atrial fibrilation on your presentiation to the hospital. You were cleared by neurosurgery to continue taking your coumadin for this condition. Please continue to follow up with you cardiologist for continued management.    Diagnosis: Carotid artery stenosis  Assessment and Plan of Treatment: Your workup for fall revealed that you have >80% stenosis of your left internal carotid artery. Please follow up with vascular surgery outpatient for continued management and intervention after procedure on your aortic valve.    Diagnosis: Aortic stenosis  Assessment and Plan of Treatment: Your workup for you fall revealed you had severe aortic stenosis. Please follow up with your cardiologist for continued managent. please follow up with Dr. Maria from structural cardiology for possible surgical management. PRINCIPAL DISCHARGE DIAGNOSIS  Diagnosis: Intracranial bleed  Assessment and Plan of Treatment: You were found on the floor by your son and brought to the hospital for evaluation. You were found to have an intracranial bleed and seen by neurosurgery. As your bleeding was intraventricular and stable, neurosurgery found you to be stable and were not in need of any intervention. Your coumadin was held for a few days but neurosurgery cleared you to start coumadin again. Follow up with neurosurgery as outpt. Discuss the need for repeat MRI to exclude underlying lesion      SECONDARY DISCHARGE DIAGNOSES  Diagnosis: Thyroid nodule  Assessment and Plan of Treatment: outpt thyroid ultrasound    Diagnosis: Bacteremia  Assessment and Plan of Treatment: You had fevers and your blood culture was growing bateria. Your Urine also grew bacteria. You will be discharged home with IV antibiotics for 2 weeks until 5/23/20.    Diagnosis: Chronic kidney disease  Assessment and Plan of Treatment: You have a history of chronic kidney disease. You were seen by nephrology for your condition and were found to be overall stable. They recommended that you get bloodwork to check your kidney function when you go home and follow up with them in the office.    Diagnosis: Unspecified atrial fibrillation  Assessment and Plan of Treatment: You had a history of atrial fibrilation on your presentiation to the hospital. You were cleared by neurosurgery to continue taking your coumadin for this condition. Please continue to follow up with you cardiologist for continued management.    Diagnosis: Carotid artery stenosis  Assessment and Plan of Treatment: Your workup for fall revealed that you have >80% stenosis of your left internal carotid artery. Please follow up with vascular surgery outpatient for continued management and intervention after procedure on your aortic valve.    Diagnosis: Aortic stenosis  Assessment and Plan of Treatment: Your workup for you fall revealed you had severe aortic stenosis. Please follow up with your cardiologist for continued managent. please follow up with Dr. Maria from structural cardiology for possible surgical management. PRINCIPAL DISCHARGE DIAGNOSIS  Diagnosis: Intracranial bleed  Assessment and Plan of Treatment: You were found on the floor by your son and brought to the hospital for evaluation. You were found to have an intracranial bleed and seen by neurosurgery. As your bleeding was intraventricular and stable, neurosurgery found you to be stable and were not in need of any intervention. Your coumadin was held for a few days but neurosurgery cleared you to start coumadin again. Follow up with neurosurgery as outpt. Discuss the need for repeat MRI to exclude underlying lesion      SECONDARY DISCHARGE DIAGNOSES  Diagnosis: Thyroid nodule  Assessment and Plan of Treatment: outpt thyroid ultrasound    Diagnosis: Bacteremia  Assessment and Plan of Treatment: You had fevers and your blood culture was growing bateria. Your Urine also grew bacteria. You will be discharged home with IV antibiotics for 2 weeks until 5/23/20. Repeat labs in 1wk. Follow up with Dr. Marquez in 1wk    Diagnosis: Chronic kidney disease  Assessment and Plan of Treatment: You have a history of chronic kidney disease. You were seen by nephrology for your condition and were found to be overall stable. They recommended that you get bloodwork to check your kidney function when you go home and follow up with them in the office. Discuss with kidney doctor when to restart Metolazone.    Diagnosis: Unspecified atrial fibrillation  Assessment and Plan of Treatment: You had a history of atrial fibrilation on your presentiation to the hospital. You were cleared by neurosurgery to continue taking your coumadin for this condition. Please continue to follow up with you cardiologist for continued management.    Diagnosis: Carotid artery stenosis  Assessment and Plan of Treatment: Your workup for fall revealed that you have >80% stenosis of your left internal carotid artery. Please follow up with vascular surgery outpatient for continued management and intervention after procedure on your aortic valve.    Diagnosis: Aortic stenosis  Assessment and Plan of Treatment: Your workup for you fall revealed you had severe aortic stenosis. Please follow up with your cardiologist for continued managent. please follow up with Dr. Maria from structural cardiology for possible surgical management.

## 2020-05-02 NOTE — DISCHARGE NOTE PROVIDER - HOSPITAL COURSE
80F, PMHx Afib on Coumadin, HTN, DM, COPD, presents to ED s/p fall, +HT, +LOC, +AC. Pt does not recall the event, event was witnessed. Pt presents w/ posterior head laceration and abrasions to his Right wrist.  PAN scan showed CTH findings of Deanse material within Right lateral ventrile, and third ventricle, consistent w/ intraventicular hemorrhage. Neurosurgery recommended CTA head which showed: redemonstrated findings of CTH, without definite evidence of active arterial contrast extravasation. Also recommended MRI brain to evaluate for intraventricular cavernoma. 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 Syncope work up w/ MRI brain showed stable intraventricular bleed, EEG was unremarkable. Echo with EF 55-60%, with severe aortic stenosis. Carotid duplex showed greater than 80% stenosis in the left internal carotid artery. Vascular planned for CEA, not cleared by cardio. Cardio recommended structural for TAVR. Nephro recommended hydration before and after CT- TAVR protocal and identified a possible need for HD s/p scan and s/p proceedure. TAVR- radiologist unable to perform scan for 3 days. patient remained stable throughout stay. cardiology suggested pt can f/u outpt. radiology can do CT-TAVR protocal outpatient as well.         CKD4 was followed by nephrology. f/u outpt         Of Hx of afib: Pt seen by cardiology and rec to anticoagulate. Neurosurgery cleared pt for coumadin. Pt started on home dose of warfarin        Carotid artery stenosis, patient may f/u outpt.         DM, patient treated with lantus and humalog and diabetic diet.         HTN controlled w furosemide, metolazone, imdur 80F, PMHx Afib on Coumadin, HTN, DM, COPD, presents to ED s/p fall, +HT, +LOC, +AC. Pt does not recall the event, event was witnessed. Pt presents w/ posterior head laceration and abrasions to his Right wrist.  PAN scan showed CTH findings of Deanse material within Right lateral ventrile, and third ventricle, consistent w/ intraventicular hemorrhage. Neurosurgery recommended CTA head which showed: redemonstrated findings of CTH, without definite evidence of active arterial contrast extravasation. Also recommended MRI brain to evaluate for intraventricular cavernoma. 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 Syncope work up w/ MRI brain showed stable intraventricular bleed, EEG was unremarkable. Echo with EF 55-60%, with severe aortic stenosis. Carotid duplex showed greater than 80% stenosis in the left internal carotid artery. Vascular planned for CEA, not cleared by cardio. Cardio recommended structural for TAVR. Nephro recommended hydration before and after CT- TAVR protocal and identified a possible need for HD s/p scan and s/p proceedure. TAVR- radiologist unable to perform scan for 3 days. patient remained stable throughout stay. cardiology suggested pt can f/u outpt. radiology can do CT-TAVR protocal outpatient as well.         CKD4 was followed by nephrology. f/u outpt         Of Hx of afib: Pt seen by cardiology and rec to anticoagulate. Neurosurgery cleared pt for coumadin. Pt started on home dose of warfarin        Carotid artery stenosis, patient may f/u outpt.         DM, patient treated with lantus and humalog and diabetic diet.         HTN controlled w furosemide, metolazone, imdur        Pt will complete course of antibiotics for UTI. He may then have TAVR performed, followed by endarterectomy, all outpatient. 80F, PMHx Afib on Coumadin, HTN, DM, COPD, presents to ED s/p fall, +HT, +LOC, +AC. Pt does not recall the event, event was witnessed. Pt presents w/ posterior head laceration and abrasions to his Right wrist.  PAN scan showed CTH findings of Deanse material within Right lateral ventrile, and third ventricle, consistent w/ intraventicular hemorrhage. Neurosurgery recommended CTA head which showed: redemonstrated findings of CTH, without definite evidence of active arterial contrast extravasation. Also recommended MRI brain to evaluate for intraventricular cavernoma. 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 Syncope work up w/ MRI brain showed stable intraventricular bleed, EEG was unremarkable. Echo with EF 55-60%, with severe aortic stenosis. Carotid duplex showed greater than 80% stenosis in the left internal carotid artery. Vascular planned for CEA, not cleared by cardio. Cardio recommended structural for TAVR. Nephro recommended hydration before and after CT- TAVR protocal and identified a possible need for HD s/p scan and s/p proceedure. TAVR- radiologist unable to perform scan for 3 days. patient remained stable throughout stay. cardiology suggested pt can f/u outpt. radiology can do CT-TAVR protocal outpatient as well.         A/P:     UTI: acute pyelonephritis:     Urine cx is growing Klebsiella Pneumonia, sensitive to Zosyn, Possible discharge on Keflex.     s/p Zosyn, discharge home on Keflex to complete 7 days.     Possible acute cholecystitis.     Abdomen US showed acute cholecystitis, LFTs normal, HIDA scan is negative, surgery recommended no intervention.        Syncope: possibly due to     Severe Aortic stenosis.     As per son he found him on the floor unconscious.     Workup showed severe aortic stenosis.     EKG showed Atrial flutter/fibrillation with slow ventricular response, RBBB    Cardiology follow up outpatient for severe aortic stenosis and CT angio TAVR protocol result.         Fall with Head Trauma:     Intracranial hemorrhage:     Head CT showed right lateral and third ventricles densities without definite evidence of active arterial contrast extravasation.    Brain MRI showed stable intraventricular hematoma. MRV unremarkable.  Neurosurgery is ok to resume Coumadin.         High grade left Internal carotid stenosis:  80%    Vascular plan for  endarterectomy after cardiac clearance. Follow up with Dr. De Guzman outpatient.         BRITTANI on CKD stag 4: stable.     Cr 3.3 from 2.6 (baseline).     s/p CT julita TAVR protocol likely ANGELLA.     Metolazone and Lasix on hold. Nephrology follow up outpatient.          Atrial flutter/fibrillation:     Continue Coumadin, INR is therapeutic,  Neurosurgery is ok.        COPD on home o2, lungs are clear. 80M PMHx Afib on Coumadin, HTN, DM, COPD, presents to ED s/p fall, with LOC and head trauma In the ED he had a posterior head laceration. His CT head showed  Right lateral ventricle and third ventricle hemorrhage    Neurosurgery recommended CTA head: redemonstrated findings of CTH, without definite evidence of active arterial contrast extravasation.     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     EEG was unremarkable. Echo with EF 55-60%, with severe aortic stenosis.     Carotid duplex showed greater than 80% stenosis in the left internal carotid artery.     Vascular was planning for CEA, but patient was not cleared by cardio due to severe AS.     Cardio recommended structural for TAVR. Nephro recommended hydration before and after CT- which happened on 5/5     Patient is planned for TAVR as an outpatient and then to follow with vascular for CEA once TAVR done.     During his hospital stay he developed fever. His Ucx grew K. pneumonia. Bcx grew staph on 5/8. He was started on vancomycin and ceftriaxone and then switched to cefazolin. Echo ordered to evaluate for endocarditis     He was planned for discharge on IV cefazolin for 2 weeks at home to follow up for TAVR and then CEA

## 2020-05-02 NOTE — PROGRESS NOTE ADULT - SUBJECTIVE AND OBJECTIVE BOX
TAIWO ZAVALA  80y  Male      Patient is a 80y old  Male who presents with a chief complaint of afib (01 May 2020 13:51)      INTERVAL HPI/OVERNIGHT EVENTS:  He feels ok, no overnight events  Vital Signs Last 24 Hrs  T(C): 36.5 (02 May 2020 05:14), Max: 36.5 (02 May 2020 05:14)  T(F): 97.7 (02 May 2020 05:14), Max: 97.7 (02 May 2020 05:14)  HR: 62 (02 May 2020 05:14) (62 - 63)  BP: 117/56 (02 May 2020 05:14) (117/53 - 126/62)  BP(mean): --  RR: 18 (02 May 2020 05:14) (18 - 18)  SpO2: 95% (01 May 2020 20:10) (95% - 95%)      05-01-20 @ 07:01  -  05-02-20 @ 07:00  --------------------------------------------------------  IN: 222 mL / OUT: 0 mL / NET: 222 mL            Consultant(s) Notes Reviewed:  [x ] YES  [ ] NO          MEDICATIONS  (STANDING):  budesonide 160 MICROgram(s)/formoterol 4.5 MICROgram(s) Inhaler 2 Puff(s) Inhalation two times a day  chlorhexidine 4% Liquid 1 Application(s) Topical <User Schedule>  dextrose 5%. 1000 milliLiter(s) (50 mL/Hr) IV Continuous <Continuous>  dextrose 50% Injectable 12.5 Gram(s) IV Push once  dextrose 50% Injectable 25 Gram(s) IV Push once  dextrose 50% Injectable 25 Gram(s) IV Push once  fenofibrate Tablet 145 milliGRAM(s) Oral daily  finasteride 5 milliGRAM(s) Oral daily  insulin glargine Injectable (LANTUS) 20 Unit(s) SubCutaneous at bedtime  insulin lispro (HumaLOG) corrective regimen sliding scale   SubCutaneous three times a day before meals  insulin lispro Injectable (HumaLOG) 5 Unit(s) SubCutaneous three times a day before meals  isosorbide   mononitrate ER Tablet (IMDUR) 30 milliGRAM(s) Oral daily  pantoprazole    Tablet 40 milliGRAM(s) Oral before breakfast  senna 2 Tablet(s) Oral at bedtime  silver sulfADIAZINE 1% Cream 1 Application(s) Topical daily  simvastatin 40 milliGRAM(s) Oral at bedtime  tamsulosin 0.4 milliGRAM(s) Oral at bedtime  warfarin 3 milliGRAM(s) Oral once    MEDICATIONS  (PRN):  acetaminophen   Tablet .. 650 milliGRAM(s) Oral every 6 hours PRN Mild Pain (1 - 3)  dextrose 40% Gel 15 Gram(s) Oral once PRN Blood Glucose LESS THAN 70 milliGRAM(s)/deciliter  glucagon  Injectable 1 milliGRAM(s) IntraMuscular once PRN Glucose LESS THAN 70 milligrams/deciliter      LABS                          11.6   7.63  )-----------( 178      ( 02 May 2020 06:15 )             35.4     05-02    139  |  98  |  89<HH>  ----------------------------<  68<L>  3.8   |  26  |  2.4<H>    Ca    9.4      02 May 2020 06:15  Mg     1.8     05-01    TPro  7.2  /  Alb  3.7  /  TBili  0.8  /  DBili  x   /  AST  21  /  ALT  12  /  AlkPhos  47  05-02        PT/INR - ( 02 May 2020 06:15 )   PT: 13.10 sec;   INR: 1.14 ratio         PTT - ( 02 May 2020 06:15 )  PTT:31.4 sec  Lactate Trend        CAPILLARY BLOOD GLUCOSE      POCT Blood Glucose.: 260 mg/dL (02 May 2020 13:22)        RADIOLOGY & ADDITIONAL TESTS:    Imaging Personally Reviewed:  [ ] YES  [ ] NO    HEALTH ISSUES - PROBLEM Dx:        PHYSICAL EXAM:  GENERAL: NAD, well-developed  HEAD:  Atraumatic, Normocephalic  EYES: EOMI, PERRLA, conjunctiva and sclera clear  NECK: Supple, No JVD  CHEST/LUNG: Clear to auscultation bilaterally; No wheeze  HEART: Irregular rate and rhythm; S1 S2 Sm 3/6 on aortic area radiates to the neck.   ABDOMEN: Soft, Nontender, Nondistended; Bowel sounds present  EXTREMITIES:  LE chronic skin changes and edema 2+  PSYCH: AAOx3  NEUROLOGY: non-focal  SKIN: No rashes or lesions

## 2020-05-02 NOTE — DISCHARGE NOTE PROVIDER - PROVIDER TOKENS
PROVIDER:[TOKEN:[[id number]],ESTABLISHEDPATIENT:[T]],PROVIDER:[TOKEN:[[id number]],ESTABLISHEDPATIENT:[T]],PROVIDER:[TOKEN:[[id number]]] PROVIDER:[TOKEN:[[id number]],ESTABLISHEDPATIENT:[T]],PROVIDER:[TOKEN:[[id number]],ESTABLISHEDPATIENT:[T]],PROVIDER:[TOKEN:[[id number]]],PROVIDER:[TOKEN:[[id number]]],PROVIDER:[TOKEN:[[id number]]] PROVIDER:[TOKEN:[[id number]],ESTABLISHEDPATIENT:[T]],PROVIDER:[TOKEN:[[id number]],ESTABLISHEDPATIENT:[T]],PROVIDER:[TOKEN:[[id number]]],PROVIDER:[TOKEN:[[id number]]],PROVIDER:[TOKEN:[[id number]]] PROVIDER:[TOKEN:[[id number]],FOLLOWUP:[1 week],ESTABLISHEDPATIENT:[T]],PROVIDER:[TOKEN:[[id number]],FOLLOWUP:[1 week],ESTABLISHEDPATIENT:[T]],PROVIDER:[TOKEN:[[id number]],FOLLOWUP:[1 week]],PROVIDER:[TOKEN:[[id number]],FOLLOWUP:[1 week]],PROVIDER:[TOKEN:[[id number]],FOLLOWUP:[2 weeks]] PROVIDER:[TOKEN:[[id number]],FOLLOWUP:[1-3 days],ESTABLISHEDPATIENT:[T]],PROVIDER:[TOKEN:[[id number]],FOLLOWUP:[1 week],ESTABLISHEDPATIENT:[T]],PROVIDER:[TOKEN:[[id number]],FOLLOWUP:[1 week]],PROVIDER:[TOKEN:[[id number]],FOLLOWUP:[1 week]],PROVIDER:[TOKEN:[[id number]],FOLLOWUP:[2 weeks]],PROVIDER:[TOKEN:[[id number]],FOLLOWUP:[1 week]]

## 2020-05-02 NOTE — PROGRESS NOTE ADULT - ASSESSMENT
A 79 yo male with PMH of HTN, DM type 2, Atrial fibrillation on Coumadin and COPD was brought to ED s/p fall. Patient doesn't recall the event and he denies any loss of consciousness. As per Son patient was standing when suddenly fell back and hit his head. Patient found with posterior scalp laceration, Head CT showed intraventricular hemorrhage. Patient denies chest pain, SOB, palpitation or dizziness.     A/P:   Fall with Head Trauma:   Intracranial hemorrhage:   Head CT showed right lateral and third ventricles densities without definite evidence of active arterial contrast extravasation.  Brain MRI showed stable intraventricular hematoma. MRV unremarkable.  Neurosurgery is ok to resume Coumadin.     Syncope: possibly due to   Severe Aortic stenosis.   As per son he found him on the floor unconscious.   Workup showed severe aortic stenosis.   EKG showed Atrial flutter/fibrillation with slow ventricular response, RBBB  Cardiology consulted for severe aortic stenosis and recommended CT chest, abdomen and pelvis TAVR protocol.   I called the radiology, I spoke to  , the procedure can't be done before Monday, she recommended outpatient follow up.     High grade left Internal carotid stenosis:  80%  Vascular plan for  endarterectomy after cardiac clearance. Follow up with Dr. De Guzman outpatient.     CKD stag 4: stable.   Furosemide and Metolazone should be hold 24 hrs before the procedure.   Nephrology evaluated the patient for CT angio, he is at high risk for ANGELLA, he might need HD after the CT, recommended to hold Metolazone and Lasix 24 hrs before contrast, IV hydration 4-6 hrs before the CT. (can be done outpatient, repeat Cr is 2 days).     Atrial flutter/fibrillation:   Continue Coumadin. Neurosurgery is ok.    COPD on home o2, lungs are clear.     Patient wants to leave home, he understand the need of outpatient follow up for further evaluation.   #Progress Note Handoff:  Pending (specify):  none  Family discussion: with his son, update the plan for surgery  Disposition: Home today, patient may appeal

## 2020-05-02 NOTE — DISCHARGE NOTE PROVIDER - NSDCFUADDINST_GEN_ALL_CORE_FT
Please get you bloodwork done 1 or 2 days after your CT scan and follow up with your nephrologist Follow up with your PMD in 1-2 weeks. Please discuss with your PMD your overall condition specifically surrounding your current situation.     Follow up with Dr. Maria from the Department of cardiology in 2 weeks Please discuss at this time any further management.     Follow up with Dr. Murphy from the Department of vascular surgery after heart valve surgery Follow up with your PMD in 1 week. Please discuss with your PMD your overall condition specifically surrounding your current situation.     Follow up with Dr. Maria from the Department of cardiology in 1-2 weeks Please discuss at this time any further management.     Follow up with Dr. Murphy from the Department of vascular surgery after heart valve surgery

## 2020-05-02 NOTE — DISCHARGE NOTE PROVIDER - NSDCMRMEDTOKEN_GEN_ALL_CORE_FT
Alcohol Prep with Benzocaine topical pad: Apply topically to affected area 4 times a day   ascorbic acid 500 mg oral tablet: 1 tab(s) orally once a day  budesonide-formoterol 160 mcg-4.5 mcg/inh inhalation aerosol:  inhaled   Coumadin 3 mg oral tablet: 1 tab(s) orally once a day (at bedtime)   INR goal 2-3, follow at coumadin clinic for dosing  fenofibrate 145 mg oral tablet: 1 tab(s) orally once a day  finasteride 5 mg oral tablet: 1 tab(s) orally once a day  fluticasone 50 mcg/inh nasal spray: 1 spray(s) nasal 2 times a day  furosemide 40 mg oral tablet: 1 tab(s) orally once a day  glucometer (per patient&#x27;s insurance): Test blood sugars four times a day. Dispense #1 glucometer.  insulin glargine 100 units/mL subcutaneous solution: 20 unit(s) subcutaneous once (at bedtime)   insulin lispro 100 units/mL injectable solution: 2U if  - 200  4U if  - 250  6U if  - 300  8U if  - 350  10U if  - 400  12U iif BG &gt; Than 400  Insulin Pen Needles, 4mm: 1 application subcutaneously 4 times a day. ** Use with insulin pen **   isosorbide mononitrate 30 mg oral tablet, extended release: 1 tab(s) orally once a day (in the morning)  lancets: 1 application subcutaneously 4 times a day   metOLazone 5 mg oral tablet: 1 tab(s) orally once a day  Metoprolol Succinate ER 50 mg oral tablet, extended release: 1 tab(s) orally once a day  pantoprazole 40 mg oral delayed release tablet: 1 tab(s) orally once a day (before a meal)  Protonix 40 mg oral delayed release tablet: 1 tab(s) orally once a day   silver sulfADIAZINE 1% topical cream: 1 application topically once a day  simvastatin 40 mg oral tablet: 1 tab(s) orally once a day (at bedtime)  tamsulosin 0.4 mg oral capsule: 1 cap(s) orally once a day (at bedtime)  test strips (per patient&#x27;s insurance): 1 application subcutaneously 4 times a day. ** Compatible with patient&#x27;s glucometer **  zinc sulfate 220 mg oral capsule: 1 cap(s) orally once a day Alcohol Prep with Benzocaine topical pad: Apply topically to affected area 4 times a day   ascorbic acid 500 mg oral tablet: 1 tab(s) orally once a day  budesonide-formoterol 160 mcg-4.5 mcg/inh inhalation aerosol:  inhaled   cephalexin 500 mg oral capsule: 1 cap(s) orally 4 times a day   fenofibrate 145 mg oral tablet: 1 tab(s) orally once a day  finasteride 5 mg oral tablet: 1 tab(s) orally once a day  fluticasone 50 mcg/inh nasal spray: 1 spray(s) nasal 2 times a day  furosemide 40 mg oral tablet: 1 tab(s) orally once a day  glucometer (per patient&#x27;s insurance): Test blood sugars four times a day. Dispense #1 glucometer.  insulin glargine 100 units/mL subcutaneous solution: 20 unit(s) subcutaneous once (at bedtime)   insulin lispro 100 units/mL injectable solution: 2U if  - 200  4U if  - 250  6U if  - 300  8U if  - 350  10U if  - 400  12U iif BG &gt; Than 400  Insulin Pen Needles, 4mm: 1 application subcutaneously 4 times a day. ** Use with insulin pen **   isosorbide mononitrate 30 mg oral tablet, extended release: 1 tab(s) orally once a day (in the morning)  lancets: 1 application subcutaneously 4 times a day   metOLazone 5 mg oral tablet: 1 tab(s) orally once a day  Metoprolol Succinate ER 50 mg oral tablet, extended release: 1 tab(s) orally once a day  pantoprazole 40 mg oral delayed release tablet: 1 tab(s) orally once a day (before a meal)  Protonix 40 mg oral delayed release tablet: 1 tab(s) orally once a day   silver sulfADIAZINE 1% topical cream: 1 application topically once a day  simvastatin 40 mg oral tablet: 1 tab(s) orally once a day (at bedtime)  tamsulosin 0.4 mg oral capsule: 1 cap(s) orally once a day (at bedtime)  test strips (per patient&#x27;s insurance): 1 application subcutaneously 4 times a day. ** Compatible with patient&#x27;s glucometer **  warfarin 3 mg oral tablet: 1 tab(s) orally once a day  zinc sulfate 220 mg oral capsule: 1 cap(s) orally once a day Alcohol Prep with Benzocaine topical pad: Apply topically to affected area 4 times a day   ascorbic acid 500 mg oral tablet: 1 tab(s) orally once a day  budesonide-formoterol 160 mcg-4.5 mcg/inh inhalation aerosol:  inhaled   ceFAZolin: 2 gram(s) intravenously 2 times a day    Last date 5/23/2020  fenofibrate 145 mg oral tablet: 1 tab(s) orally once a day  finasteride 5 mg oral tablet: 1 tab(s) orally once a day  fluticasone 50 mcg/inh nasal spray: 1 spray(s) nasal 2 times a day  furosemide 40 mg oral tablet: 1 tab(s) orally once a day  glucometer (per patient&#x27;s insurance): Test blood sugars four times a day. Dispense #1 glucometer.  insulin glargine 100 units/mL subcutaneous solution: 20 unit(s) subcutaneous once (at bedtime)   insulin lispro 100 units/mL injectable solution: 2U if  - 200  4U if  - 250  6U if  - 300  8U if  - 350  10U if  - 400  12U iif BG &gt; Than 400  Insulin Pen Needles, 4mm: 1 application subcutaneously 4 times a day. ** Use with insulin pen **   isosorbide mononitrate 30 mg oral tablet, extended release: 1 tab(s) orally once a day (in the morning)  lancets: 1 application subcutaneously 4 times a day   metOLazone 5 mg oral tablet: 1 tab(s) orally once a day  Metoprolol Succinate ER 50 mg oral tablet, extended release: 1 tab(s) orally once a day  pantoprazole 40 mg oral delayed release tablet: 1 tab(s) orally once a day (before a meal)  Protonix 40 mg oral delayed release tablet: 1 tab(s) orally once a day   silver sulfADIAZINE 1% topical cream: 1 application topically once a day  simvastatin 40 mg oral tablet: 1 tab(s) orally once a day (at bedtime)  tamsulosin 0.4 mg oral capsule: 1 cap(s) orally once a day (at bedtime)  test strips (per patient&#x27;s insurance): 1 application subcutaneously 4 times a day. ** Compatible with patient&#x27;s glucometer **  warfarin 3 mg oral tablet: 1 tab(s) orally once a day  zinc sulfate 220 mg oral capsule: 1 cap(s) orally once a day Alcohol Prep with Benzocaine topical pad: Apply topically to affected area 4 times a day   ascorbic acid 500 mg oral tablet: 1 tab(s) orally once a day  budesonide-formoterol 160 mcg-4.5 mcg/inh inhalation aerosol:  inhaled   ceFAZolin: 2 gram(s) intravenously 2 times a day    Last date 5/23/2020  fenofibrate 145 mg oral tablet: 1 tab(s) orally once a day  finasteride 5 mg oral tablet: 1 tab(s) orally once a day  fluticasone 50 mcg/inh nasal spray: 1 spray(s) nasal 2 times a day  furosemide 40 mg oral tablet: 1 tab(s) orally once a day  glucometer (per patient&#x27;s insurance): Test blood sugars four times a day. Dispense #1 glucometer.  insulin glargine 100 units/mL subcutaneous solution: 20 unit(s) subcutaneous once (at bedtime)   insulin lispro 100 units/mL injectable solution: 2U if  - 200  4U if  - 250  6U if  - 300  8U if  - 350  10U if  - 400  12U iif BG &gt; Than 400  Insulin Pen Needles, 4mm: 1 application subcutaneously 4 times a day. ** Use with insulin pen **   isosorbide mononitrate 30 mg oral tablet, extended release: 1 tab(s) orally once a day (in the morning)  lancets: 1 application subcutaneously 4 times a day   Metoprolol Succinate ER 50 mg oral tablet, extended release: 1 tab(s) orally once a day  pantoprazole 40 mg oral delayed release tablet: 1 tab(s) orally once a day (before a meal)  Protonix 40 mg oral delayed release tablet: 1 tab(s) orally once a day   silver sulfADIAZINE 1% topical cream: 1 application topically once a day  simvastatin 40 mg oral tablet: 1 tab(s) orally once a day (at bedtime)  tamsulosin 0.4 mg oral capsule: 1 cap(s) orally once a day (at bedtime)  test strips (per patient&#x27;s insurance): 1 application subcutaneously 4 times a day. ** Compatible with patient&#x27;s glucometer **  warfarin 3 mg oral tablet: 1 tab(s) orally once a day  zinc sulfate 220 mg oral capsule: 1 cap(s) orally once a day Alcohol Prep with Benzocaine topical pad: Apply topically to affected area 4 times a day   ascorbic acid 500 mg oral tablet: 1 tab(s) orally once a day  budesonide-formoterol 160 mcg-4.5 mcg/inh inhalation aerosol:  inhaled   ceFAZolin: 2 gram(s) intravenously 2 times a day    Last date 5/23/2020  fenofibrate 145 mg oral tablet: 1 tab(s) orally once a day  finasteride 5 mg oral tablet: 1 tab(s) orally once a day  fluticasone 50 mcg/inh nasal spray: 1 spray(s) nasal 2 times a day  furosemide 40 mg oral tablet: 1 tab(s) orally once a day  glucometer (per patient&#x27;s insurance): Test blood sugars four times a day. Dispense #1 glucometer.  insulin glargine 100 units/mL subcutaneous solution: 20 unit(s) subcutaneous once (at bedtime)   insulin lispro 100 units/mL injectable solution: 2U if  - 200  4U if  - 250  6U if  - 300  8U if  - 350  10U if  - 400  12U iif BG &gt; Than 400  Insulin Pen Needles, 4mm: 1 application subcutaneously 4 times a day. ** Use with insulin pen **   isosorbide mononitrate 30 mg oral tablet, extended release: 1 tab(s) orally once a day (in the morning)  lancets: 1 application subcutaneously 4 times a day   Metoprolol Succinate ER 50 mg oral tablet, extended release: 1 tab(s) orally once a day  Multiple Vitamins oral tablet: 1 tab(s) orally once a day  Protonix 40 mg oral delayed release tablet: 1 tab(s) orally once a day   silver sulfADIAZINE 1% topical cream: 1 application topically once a day  simvastatin 40 mg oral tablet: 1 tab(s) orally once a day (at bedtime)  tamsulosin 0.4 mg oral capsule: 1 cap(s) orally once a day (at bedtime)  test strips (per patient&#x27;s insurance): 1 application subcutaneously 4 times a day. ** Compatible with patient&#x27;s glucometer **  warfarin 3 mg oral tablet: 1 tab(s) orally once a day  zinc sulfate 220 mg oral capsule: 1 cap(s) orally once a day Alcohol Prep with Benzocaine topical pad: Apply topically to affected area 4 times a day   ascorbic acid 500 mg oral tablet: 1 tab(s) orally once a day  budesonide-formoterol 160 mcg-4.5 mcg/inh inhalation aerosol:  inhaled   ceFAZolin: 2 gram(s) intravenously 2 times a day    Last date 5/23/2020  finasteride 5 mg oral tablet: 1 tab(s) orally once a day  fluticasone 50 mcg/inh nasal spray: 1 spray(s) nasal 2 times a day  furosemide 40 mg oral tablet: 1 tab(s) orally once a day  glucometer (per patient&#x27;s insurance): Test blood sugars four times a day. Dispense #1 glucometer.  insulin glargine 100 units/mL subcutaneous solution: 20 unit(s) subcutaneous once (at bedtime)   insulin lispro 100 units/mL injectable solution: 2U if  - 200  4U if  - 250  6U if  - 300  8U if  - 350  10U if  - 400  12U iif BG &gt; Than 400  Insulin Pen Needles, 4mm: 1 application subcutaneously 4 times a day. ** Use with insulin pen **   isosorbide mononitrate 30 mg oral tablet, extended release: 1 tab(s) orally once a day (in the morning)  lancets: 1 application subcutaneously 4 times a day   Metoprolol Succinate ER 50 mg oral tablet, extended release: 1 tab(s) orally once a day  Multiple Vitamins oral tablet: 1 tab(s) orally once a day  Protonix 40 mg oral delayed release tablet: 1 tab(s) orally once a day   silver sulfADIAZINE 1% topical cream: 1 application topically once a day  tamsulosin 0.4 mg oral capsule: 1 cap(s) orally once a day (at bedtime)  test strips (per patient&#x27;s insurance): 1 application subcutaneously 4 times a day. ** Compatible with patient&#x27;s glucometer **  warfarin 3 mg oral tablet: 1 tab(s) orally once a day  zinc sulfate 220 mg oral capsule: 1 cap(s) orally once a day

## 2020-05-02 NOTE — DISCHARGE NOTE PROVIDER - NSDCHHNEEDSERVICE_GEN_ALL_CORE
Rehabilitation services/Observation and assessment Observation and assessment/Rehabilitation services/Teaching and training/Wound care and assessment/Medication teaching and assessment

## 2020-05-03 LAB
APTT BLD: 31.2 SEC — SIGNIFICANT CHANGE UP (ref 27–39.2)
GLUCOSE BLDC GLUCOMTR-MCNC: 100 MG/DL — HIGH (ref 70–99)
GLUCOSE BLDC GLUCOMTR-MCNC: 139 MG/DL — HIGH (ref 70–99)
GLUCOSE BLDC GLUCOMTR-MCNC: 234 MG/DL — HIGH (ref 70–99)
INR BLD: 1.22 RATIO — SIGNIFICANT CHANGE UP (ref 0.65–1.3)
PROTHROM AB SERPL-ACNC: 14 SEC — HIGH (ref 9.95–12.87)

## 2020-05-03 PROCEDURE — 99232 SBSQ HOSP IP/OBS MODERATE 35: CPT

## 2020-05-03 RX ORDER — WARFARIN SODIUM 2.5 MG/1
3 TABLET ORAL ONCE
Refills: 0 | Status: COMPLETED | OUTPATIENT
Start: 2020-05-03 | End: 2020-05-03

## 2020-05-03 RX ADMIN — SIMVASTATIN 40 MILLIGRAM(S): 20 TABLET, FILM COATED ORAL at 21:45

## 2020-05-03 RX ADMIN — INSULIN GLARGINE 20 UNIT(S): 100 INJECTION, SOLUTION SUBCUTANEOUS at 21:45

## 2020-05-03 RX ADMIN — FINASTERIDE 5 MILLIGRAM(S): 5 TABLET, FILM COATED ORAL at 11:56

## 2020-05-03 RX ADMIN — BUDESONIDE AND FORMOTEROL FUMARATE DIHYDRATE 2 PUFF(S): 160; 4.5 AEROSOL RESPIRATORY (INHALATION) at 21:46

## 2020-05-03 RX ADMIN — Medication 0: at 11:57

## 2020-05-03 RX ADMIN — WARFARIN SODIUM 3 MILLIGRAM(S): 2.5 TABLET ORAL at 21:45

## 2020-05-03 RX ADMIN — TAMSULOSIN HYDROCHLORIDE 0.4 MILLIGRAM(S): 0.4 CAPSULE ORAL at 21:46

## 2020-05-03 RX ADMIN — Medication 145 MILLIGRAM(S): at 11:56

## 2020-05-03 RX ADMIN — Medication 1 APPLICATION(S): at 11:58

## 2020-05-03 RX ADMIN — ISOSORBIDE MONONITRATE 30 MILLIGRAM(S): 60 TABLET, EXTENDED RELEASE ORAL at 11:57

## 2020-05-03 RX ADMIN — Medication 5 UNIT(S): at 11:57

## 2020-05-03 NOTE — PROGRESS NOTE ADULT - SUBJECTIVE AND OBJECTIVE BOX
TAIWO ZAVALA 80y Male  MRN#: [id number]   Hospital Day: 8d    SUBJECTIVE  Patient is a 80y old Male who presents with a chief complaint of afib (01 May 2020 13:51)  Currently admitted to medicine with the primary diagnosis of Intracranial bleed    INTERVAL HPI AND OVERNIGHT EVENTS:  Patient was examined and seen at bedside.    Patient was very angry that he did not get the breakfast he wanted to day.  Refused to answer any questions regarding any symptoms he may be experienced and told me to "get the hell out"    OBJECTIVE  PAST MEDICAL & SURGICAL HISTORY  Afib  BPH (benign prostatic hyperplasia)  CHF (congestive heart failure)  COPD (chronic obstructive pulmonary disease)  Diabetes  HTN (hypertension)  H/O heart artery stent  S/P CABG x 3    ALLERGIES:  No Known Allergies    MEDICATIONS:  STANDING MEDICATIONS  budesonide 160 MICROgram(s)/formoterol 4.5 MICROgram(s) Inhaler 2 Puff(s) Inhalation two times a day  chlorhexidine 4% Liquid 1 Application(s) Topical <User Schedule>  dextrose 5%. 1000 milliLiter(s) IV Continuous <Continuous>  dextrose 50% Injectable 12.5 Gram(s) IV Push once  dextrose 50% Injectable 25 Gram(s) IV Push once  dextrose 50% Injectable 25 Gram(s) IV Push once  fenofibrate Tablet 145 milliGRAM(s) Oral daily  finasteride 5 milliGRAM(s) Oral daily  insulin glargine Injectable (LANTUS) 20 Unit(s) SubCutaneous at bedtime  insulin lispro (HumaLOG) corrective regimen sliding scale   SubCutaneous three times a day before meals  insulin lispro Injectable (HumaLOG) 5 Unit(s) SubCutaneous three times a day before meals  isosorbide   mononitrate ER Tablet (IMDUR) 30 milliGRAM(s) Oral daily  pantoprazole    Tablet 40 milliGRAM(s) Oral before breakfast  senna 2 Tablet(s) Oral at bedtime  silver sulfADIAZINE 1% Cream 1 Application(s) Topical daily  simvastatin 40 milliGRAM(s) Oral at bedtime  tamsulosin 0.4 milliGRAM(s) Oral at bedtime    PRN MEDICATIONS  acetaminophen   Tablet .. 650 milliGRAM(s) Oral every 6 hours PRN  dextrose 40% Gel 15 Gram(s) Oral once PRN  glucagon  Injectable 1 milliGRAM(s) IntraMuscular once PRN      VITAL SIGNS: Last 24 Hours  T(C): 35.6 (03 May 2020 05:09), Max: 36.8 (02 May 2020 20:37)  T(F): 96.1 (03 May 2020 05:09), Max: 98.3 (02 May 2020 20:37)  HR: 62 (03 May 2020 05:09) (61 - 85)  BP: 109/56 (03 May 2020 05:09) (109/56 - 136/58)  BP(mean): --  RR: 18 (03 May 2020 05:09) (18 - 19)  SpO2: --    LABS:                        11.6   7.63  )-----------( 178      ( 02 May 2020 06:15 )             35.4     05-02    139  |  98  |  89<HH>  ----------------------------<  68<L>  3.8   |  26  |  2.4<H>    Ca    9.4      02 May 2020 06:15    TPro  7.2  /  Alb  3.7  /  TBili  0.8  /  DBili  x   /  AST  21  /  ALT  12  /  AlkPhos  47  05-02    PT/INR - ( 03 May 2020 05:49 )   PT: 14.00 sec;   INR: 1.22 ratio         PTT - ( 03 May 2020 05:49 )  PTT:31.2 sec              RADIOLOGY:      PHYSICAL EXAM:  patient refused physical exam     ASSESSMENT & PLAN  80F, PMHx Afib on Coumadin, HTN, DM, COPD, presents to ED s/p fall, +HT, +LOC, +AC. Pt does not recall the event, event was witnessed found to have L. Carotid artery stenosis >80%.     # Fall with head trauma  - Intracranial hemorrhage  - CTH: dense matter in right lateral ventrile and third ventricle consistent with intraventricular hemorrhage.   - MRI: stable intrventricular hematoma.   - neurosurgery: no intervention, cleared for AC  -c/w couamdin 3mg PO daily. INR 1.22     # syncope likely secondary to severe Aortic Stenosis  - son found patient unconscious at home  - ekg: Line Atrial flutter with variable A-V block with premature ventricular or aberrantly conducted complexes  - echo: severe aortic stenosis   - structural cardiology consulted for AS, requesting CT chest/abdomen/pelvis.   - nephro recs appreciated  - unable to do CT- TAVR protocol until Monday.  Outpatient follow up recommended.   - procedure may be done outpt with appropriate hydration before procedure    #hx of afib  - on coumadin at home  - Had two doses of coumadin 3mg PO daily   - INR: 1.22-->3mg coumadin ordered for tonight.   - neurosurgery ok to start coumadin      # Carotid artery stenosis   - carotid duplex 80% stenosis of L internal carotid artery  - [facility] surgery: plan for Left CEA on Wed 4/29 canceled due to no cardiac clearance  - possible outpt f/u    # DM  - Lantus 20U daily and 5U lispro TID before meails  - Keep FS<180  ADjust insulin as neede.       # HTN  - well controlled (109/56)  - c/w furosemide, metolazone, imdur    # COPD  - no difficulty breathing  - c/w budesonide    # CKD4  - stable  - SCr 2.4 5/2/2020  -High risk for ANGELLA. Diuretics to be held 24 hours before CT angio and may need HD after procedure.   -Will be done on outpatient basis as procedure cannot be done before monday.   - hydration may be done outpt   - f/u nephro outpt    #Lower extremity wound  - wound care    DVT: pending [facility] rec  GI ppx: pantoprazole  Diet: DASH/TLC  code: full      Dispo:

## 2020-05-03 NOTE — PROGRESS NOTE ADULT - SUBJECTIVE AND OBJECTIVE BOX
SALLY TAIWO  80y  Male      Patient is a 80y old  Male who presents with a chief complaint of fall (03 May 2020 09:28)      INTERVAL HPI/OVERNIGHT EVENTS:  he feels ok, he refused to leave the hospital, his son didn't take the patient.   Vital Signs Last 24 Hrs  T(C): 35.6 (03 May 2020 05:09), Max: 36.8 (02 May 2020 20:37)  T(F): 96.1 (03 May 2020 05:09), Max: 98.3 (02 May 2020 20:37)  HR: 62 (03 May 2020 05:09) (61 - 85)  BP: 109/56 (03 May 2020 05:09) (109/56 - 136/58)  BP(mean): --  RR: 18 (03 May 2020 05:09) (18 - 19)  SpO2: --      05-02-20 @ 07:01  -  05-03-20 @ 07:00  --------------------------------------------------------  IN: 0 mL / OUT: 325 mL / NET: -325 mL            Consultant(s) Notes Reviewed:  [x ] YES  [ ] NO          MEDICATIONS  (STANDING):  budesonide 160 MICROgram(s)/formoterol 4.5 MICROgram(s) Inhaler 2 Puff(s) Inhalation two times a day  chlorhexidine 4% Liquid 1 Application(s) Topical <User Schedule>  dextrose 5%. 1000 milliLiter(s) (50 mL/Hr) IV Continuous <Continuous>  dextrose 50% Injectable 12.5 Gram(s) IV Push once  dextrose 50% Injectable 25 Gram(s) IV Push once  dextrose 50% Injectable 25 Gram(s) IV Push once  fenofibrate Tablet 145 milliGRAM(s) Oral daily  finasteride 5 milliGRAM(s) Oral daily  insulin glargine Injectable (LANTUS) 20 Unit(s) SubCutaneous at bedtime  insulin lispro (HumaLOG) corrective regimen sliding scale   SubCutaneous three times a day before meals  insulin lispro Injectable (HumaLOG) 5 Unit(s) SubCutaneous three times a day before meals  isosorbide   mononitrate ER Tablet (IMDUR) 30 milliGRAM(s) Oral daily  pantoprazole    Tablet 40 milliGRAM(s) Oral before breakfast  senna 2 Tablet(s) Oral at bedtime  silver sulfADIAZINE 1% Cream 1 Application(s) Topical daily  simvastatin 40 milliGRAM(s) Oral at bedtime  tamsulosin 0.4 milliGRAM(s) Oral at bedtime  warfarin 3 milliGRAM(s) Oral once    MEDICATIONS  (PRN):  acetaminophen   Tablet .. 650 milliGRAM(s) Oral every 6 hours PRN Mild Pain (1 - 3)  dextrose 40% Gel 15 Gram(s) Oral once PRN Blood Glucose LESS THAN 70 milliGRAM(s)/deciliter  glucagon  Injectable 1 milliGRAM(s) IntraMuscular once PRN Glucose LESS THAN 70 milligrams/deciliter      LABS                          11.6   7.63  )-----------( 178      ( 02 May 2020 06:15 )             35.4     05-02    139  |  98  |  89<HH>  ----------------------------<  68<L>  3.8   |  26  |  2.4<H>    Ca    9.4      02 May 2020 06:15    TPro  7.2  /  Alb  3.7  /  TBili  0.8  /  DBili  x   /  AST  21  /  ALT  12  /  AlkPhos  47  05-02        PT/INR - ( 03 May 2020 05:49 )   PT: 14.00 sec;   INR: 1.22 ratio         PTT - ( 03 May 2020 05:49 )  PTT:31.2 sec  Lactate Trend        CAPILLARY BLOOD GLUCOSE      POCT Blood Glucose.: 139 mg/dL (03 May 2020 11:33)        RADIOLOGY & ADDITIONAL TESTS:    Imaging Personally Reviewed:  [ ] YES  [ ] NO    HEALTH ISSUES - PROBLEM Dx:        PHYSICAL EXAM:  GENERAL: NAD, well-developed  HEAD:  Atraumatic, Normocephalic  EYES: EOMI, PERRLA, conjunctiva and sclera clear  NECK: Supple, No JVD  CHEST/LUNG: Clear to auscultation bilaterally; No wheeze  HEART: Irregular rate and rhythm; S1 S2 Sm 3/6 on aortic area radiates to the neck.   ABDOMEN: Soft, Nontender, Nondistended; Bowel sounds present  EXTREMITIES:  LE chronic skin changes and edema 2+  PSYCH: AAOx3  NEUROLOGY: non-focal  SKIN: No rashes or lesions

## 2020-05-03 NOTE — PROGRESS NOTE ADULT - ASSESSMENT
A 79 yo male with PMH of HTN, DM type 2, Atrial fibrillation on Coumadin and COPD was brought to ED s/p fall. Patient doesn't recall the event and he denies any loss of consciousness. As per Son patient was standing when suddenly fell back and hit his head. Patient found with posterior scalp laceration, Head CT showed intraventricular hemorrhage. Patient denies chest pain, SOB, palpitation or dizziness.     A/P:   Fall with Head Trauma:   Intracranial hemorrhage:   Head CT showed right lateral and third ventricles densities without definite evidence of active arterial contrast extravasation.  Brain MRI showed stable intraventricular hematoma. MRV unremarkable.  Neurosurgery is ok to resume Coumadin.     Syncope: possibly due to   Severe Aortic stenosis.   As per son he found him on the floor unconscious.   Workup showed severe aortic stenosis.   EKG showed Atrial flutter/fibrillation with slow ventricular response, RBBB  Cardiology consulted for severe aortic stenosis and recommended CT chest, abdomen and pelvis TAVR protocol. Possible tomorrow.   Patient refused to do it outpatient. His son refused to take the patient home, he is concern of getting corona virus.     High grade left Internal carotid stenosis:  80%  Vascular plan for  endarterectomy after cardiac clearance. Follow up with Dr. De Guzman outpatient.     CKD stag 4: stable.   Furosemide and Metolazone on hold for CT TAVR protocol.   Nephrology evaluated the patient for CT angio, he is at high risk for ANGELLA, he might need HD after the CT, recommended to hold Metolazone and Lasix 24 hrs before contrast, IV hydration 4-6 hrs before the CT. (can be done outpatient, repeat Cr is 2 days).     Atrial flutter/fibrillation:   Continue Coumadin. Neurosurgery is ok.    COPD on home o2, lungs are clear.     Patient wants to leave home, he understand the need of outpatient follow up for further evaluation.   #Progress Note Handoff:  Pending (specify):  none  Family discussion: with his son, update the plan for surgery  Disposition: He was discharged home yesterday, his son will appeal.

## 2020-05-04 LAB
ALBUMIN SERPL ELPH-MCNC: 3.6 G/DL — SIGNIFICANT CHANGE UP (ref 3.5–5.2)
ALP SERPL-CCNC: 48 U/L — SIGNIFICANT CHANGE UP (ref 30–115)
ALT FLD-CCNC: 12 U/L — SIGNIFICANT CHANGE UP (ref 0–41)
ANION GAP SERPL CALC-SCNC: 13 MMOL/L — SIGNIFICANT CHANGE UP (ref 7–14)
APTT BLD: 30.3 SEC — SIGNIFICANT CHANGE UP (ref 27–39.2)
AST SERPL-CCNC: 24 U/L — SIGNIFICANT CHANGE UP (ref 0–41)
BASOPHILS # BLD AUTO: 0.04 K/UL — SIGNIFICANT CHANGE UP (ref 0–0.2)
BASOPHILS NFR BLD AUTO: 0.3 % — SIGNIFICANT CHANGE UP (ref 0–1)
BILIRUB SERPL-MCNC: 1 MG/DL — SIGNIFICANT CHANGE UP (ref 0.2–1.2)
BUN SERPL-MCNC: 85 MG/DL — CRITICAL HIGH (ref 10–20)
CALCIUM SERPL-MCNC: 9.1 MG/DL — SIGNIFICANT CHANGE UP (ref 8.5–10.1)
CHLORIDE SERPL-SCNC: 100 MMOL/L — SIGNIFICANT CHANGE UP (ref 98–110)
CO2 SERPL-SCNC: 22 MMOL/L — SIGNIFICANT CHANGE UP (ref 17–32)
CREAT SERPL-MCNC: 2.5 MG/DL — HIGH (ref 0.7–1.5)
EOSINOPHIL # BLD AUTO: 0.01 K/UL — SIGNIFICANT CHANGE UP (ref 0–0.7)
EOSINOPHIL NFR BLD AUTO: 0.1 % — SIGNIFICANT CHANGE UP (ref 0–8)
GLUCOSE BLDC GLUCOMTR-MCNC: 151 MG/DL — HIGH (ref 70–99)
GLUCOSE BLDC GLUCOMTR-MCNC: 90 MG/DL — SIGNIFICANT CHANGE UP (ref 70–99)
GLUCOSE BLDC GLUCOMTR-MCNC: 92 MG/DL — SIGNIFICANT CHANGE UP (ref 70–99)
GLUCOSE BLDC GLUCOMTR-MCNC: 96 MG/DL — SIGNIFICANT CHANGE UP (ref 70–99)
GLUCOSE SERPL-MCNC: 77 MG/DL — SIGNIFICANT CHANGE UP (ref 70–99)
HCT VFR BLD CALC: 33.7 % — LOW (ref 42–52)
HGB BLD-MCNC: 10.8 G/DL — LOW (ref 14–18)
IMM GRANULOCYTES NFR BLD AUTO: 1 % — HIGH (ref 0.1–0.3)
INR BLD: 1.32 RATIO — HIGH (ref 0.65–1.3)
LYMPHOCYTES # BLD AUTO: 0.51 K/UL — LOW (ref 1.2–3.4)
LYMPHOCYTES # BLD AUTO: 3.5 % — LOW (ref 20.5–51.1)
MAGNESIUM SERPL-MCNC: 1.7 MG/DL — LOW (ref 1.8–2.4)
MCHC RBC-ENTMCNC: 28.5 PG — SIGNIFICANT CHANGE UP (ref 27–31)
MCHC RBC-ENTMCNC: 32 G/DL — SIGNIFICANT CHANGE UP (ref 32–37)
MCV RBC AUTO: 88.9 FL — SIGNIFICANT CHANGE UP (ref 80–94)
MONOCYTES # BLD AUTO: 1.45 K/UL — HIGH (ref 0.1–0.6)
MONOCYTES NFR BLD AUTO: 10 % — HIGH (ref 1.7–9.3)
NEUTROPHILS # BLD AUTO: 12.31 K/UL — HIGH (ref 1.4–6.5)
NEUTROPHILS NFR BLD AUTO: 85.1 % — HIGH (ref 42.2–75.2)
NRBC # BLD: 0 /100 WBCS — SIGNIFICANT CHANGE UP (ref 0–0)
PLATELET # BLD AUTO: 163 K/UL — SIGNIFICANT CHANGE UP (ref 130–400)
POTASSIUM SERPL-MCNC: 3.7 MMOL/L — SIGNIFICANT CHANGE UP (ref 3.5–5)
POTASSIUM SERPL-SCNC: 3.7 MMOL/L — SIGNIFICANT CHANGE UP (ref 3.5–5)
PROT SERPL-MCNC: 6.8 G/DL — SIGNIFICANT CHANGE UP (ref 6–8)
PROTHROM AB SERPL-ACNC: 15.2 SEC — HIGH (ref 9.95–12.87)
RBC # BLD: 3.79 M/UL — LOW (ref 4.7–6.1)
RBC # FLD: 18.2 % — HIGH (ref 11.5–14.5)
SODIUM SERPL-SCNC: 135 MMOL/L — SIGNIFICANT CHANGE UP (ref 135–146)
WBC # BLD: 14.47 K/UL — HIGH (ref 4.8–10.8)
WBC # FLD AUTO: 14.47 K/UL — HIGH (ref 4.8–10.8)

## 2020-05-04 PROCEDURE — 74174 CTA ABD&PLVS W/CONTRAST: CPT | Mod: 26

## 2020-05-04 PROCEDURE — 75574 CT ANGIO HRT W/3D IMAGE: CPT | Mod: 26

## 2020-05-04 PROCEDURE — 99233 SBSQ HOSP IP/OBS HIGH 50: CPT

## 2020-05-04 RX ORDER — SODIUM CHLORIDE 9 MG/ML
1000 INJECTION INTRAMUSCULAR; INTRAVENOUS; SUBCUTANEOUS
Refills: 0 | Status: DISCONTINUED | OUTPATIENT
Start: 2020-05-04 | End: 2020-05-04

## 2020-05-04 RX ORDER — WARFARIN SODIUM 2.5 MG/1
3 TABLET ORAL ONCE
Refills: 0 | Status: COMPLETED | OUTPATIENT
Start: 2020-05-04 | End: 2020-05-04

## 2020-05-04 RX ADMIN — Medication 650 MILLIGRAM(S): at 00:38

## 2020-05-04 RX ADMIN — WARFARIN SODIUM 3 MILLIGRAM(S): 2.5 TABLET ORAL at 21:22

## 2020-05-04 RX ADMIN — Medication 1 APPLICATION(S): at 11:36

## 2020-05-04 RX ADMIN — SIMVASTATIN 40 MILLIGRAM(S): 20 TABLET, FILM COATED ORAL at 21:21

## 2020-05-04 RX ADMIN — Medication 5 UNIT(S): at 17:01

## 2020-05-04 RX ADMIN — Medication 1: at 17:02

## 2020-05-04 RX ADMIN — SENNA PLUS 2 TABLET(S): 8.6 TABLET ORAL at 21:21

## 2020-05-04 RX ADMIN — TAMSULOSIN HYDROCHLORIDE 0.4 MILLIGRAM(S): 0.4 CAPSULE ORAL at 21:21

## 2020-05-04 RX ADMIN — FINASTERIDE 5 MILLIGRAM(S): 5 TABLET, FILM COATED ORAL at 11:36

## 2020-05-04 RX ADMIN — BUDESONIDE AND FORMOTEROL FUMARATE DIHYDRATE 2 PUFF(S): 160; 4.5 AEROSOL RESPIRATORY (INHALATION) at 08:01

## 2020-05-04 RX ADMIN — Medication 145 MILLIGRAM(S): at 11:36

## 2020-05-04 RX ADMIN — BUDESONIDE AND FORMOTEROL FUMARATE DIHYDRATE 2 PUFF(S): 160; 4.5 AEROSOL RESPIRATORY (INHALATION) at 21:22

## 2020-05-04 RX ADMIN — PANTOPRAZOLE SODIUM 40 MILLIGRAM(S): 20 TABLET, DELAYED RELEASE ORAL at 05:48

## 2020-05-04 RX ADMIN — ISOSORBIDE MONONITRATE 30 MILLIGRAM(S): 60 TABLET, EXTENDED RELEASE ORAL at 11:36

## 2020-05-04 RX ADMIN — SODIUM CHLORIDE 50 MILLILITER(S): 9 INJECTION INTRAMUSCULAR; INTRAVENOUS; SUBCUTANEOUS at 08:02

## 2020-05-04 NOTE — PROGRESS NOTE ADULT - ASSESSMENT
80F, PMHx Afib on Coumadin, HTN, DM, CKD 4, COPD, presents to ED s/p fall, +HT, +LOC, +AC.   Found to have L. Carotid artery stenosis >80%.     # CKD 4   - serum creatinine at baseline   - previously had HD, now off for > 2 months.   - non-oliguric   - BP on lower side. monitor BP.   -  Ca, Mg, phos at goal.   - found to have severe aortic stenosis. Plan for TAVR,   # anemia chronic Hb at goal.  # Fall with head trauma  - Intracranial hemorrhage / stable intrventricular hematoma.   - neurosurgery notes appreciated.    # syncope likely secondary to severe Aortic Stenosis  - ekg: Line Atrial flutter with variable A-V block with premature ventricular or aberrantly conducted complexes  - echo: severe aortic stenosis   - structural cardiology notes appreciated. ? plan for TAVR    # Carotid artery stenosis   - carotid duplex 80% stenosis of L internal carotid artery  - Vasc surgery: plan for Left CEA on Wed 4/29 canceled due to no cardiac clearance      avoid nephrotoxins   will follow

## 2020-05-04 NOTE — PROGRESS NOTE ADULT - SUBJECTIVE AND OBJECTIVE BOX
SUBJECTIVE  Patient is a 80y old Male who presents with a chief complaint of fall (03 May 2020 09:28)  Currently admitted to medicine with the primary diagnosis of Intracranial bleed      Today is hospital day 9d, and this morning he is   OBJECTIVE  PAST MEDICAL & SURGICAL HISTORY  Afib  BPH (benign prostatic hyperplasia)  CHF (congestive heart failure)  COPD (chronic obstructive pulmonary disease)  Diabetes  HTN (hypertension)  H/O heart artery stent  S/P CABG x 3    ALLERGIES:  No Known Allergies    MEDICATIONS:  STANDING MEDICATIONS  budesonide 160 MICROgram(s)/formoterol 4.5 MICROgram(s) Inhaler 2 Puff(s) Inhalation two times a day  chlorhexidine 4% Liquid 1 Application(s) Topical <User Schedule>  dextrose 5%. 1000 milliLiter(s) IV Continuous <Continuous>  dextrose 50% Injectable 12.5 Gram(s) IV Push once  dextrose 50% Injectable 25 Gram(s) IV Push once  dextrose 50% Injectable 25 Gram(s) IV Push once  fenofibrate Tablet 145 milliGRAM(s) Oral daily  finasteride 5 milliGRAM(s) Oral daily  insulin glargine Injectable (LANTUS) 20 Unit(s) SubCutaneous at bedtime  insulin lispro (HumaLOG) corrective regimen sliding scale   SubCutaneous three times a day before meals  insulin lispro Injectable (HumaLOG) 5 Unit(s) SubCutaneous three times a day before meals  isosorbide   mononitrate ER Tablet (IMDUR) 30 milliGRAM(s) Oral daily  pantoprazole    Tablet 40 milliGRAM(s) Oral before breakfast  senna 2 Tablet(s) Oral at bedtime  silver sulfADIAZINE 1% Cream 1 Application(s) Topical daily  simvastatin 40 milliGRAM(s) Oral at bedtime  sodium chloride 0.9%. 1000 milliLiter(s) IV Continuous <Continuous>  tamsulosin 0.4 milliGRAM(s) Oral at bedtime    PRN MEDICATIONS  acetaminophen   Tablet .. 650 milliGRAM(s) Oral every 6 hours PRN  dextrose 40% Gel 15 Gram(s) Oral once PRN  glucagon  Injectable 1 milliGRAM(s) IntraMuscular once PRN      VITAL SIGNS: Last 24 Hours  T(C): 37.6 (04 May 2020 05:16), Max: 37.7 (04 May 2020 00:32)  T(F): 99.6 (04 May 2020 05:16), Max: 99.8 (04 May 2020 00:32)  HR: 87 (04 May 2020 05:16) (62 - 87)  BP: 100/55 (04 May 2020 05:16) (100/55 - 126/62)  BP(mean): --  RR: 18 (04 May 2020 05:16) (18 - 18)  SpO2: 98% (03 May 2020 20:02) (98% - 98%)    LABS:                        10.8   14.47 )-----------( 163      ( 04 May 2020 06:20 )             33.7     05-04    135  |  100  |  85<HH>  ----------------------------<  77  3.7   |  22  |  2.5<H>    Ca    9.1      04 May 2020 06:20  Mg     1.7     05-04    TPro  6.8  /  Alb  3.6  /  TBili  1.0  /  DBili  x   /  AST  24  /  ALT  12  /  AlkPhos  48  05-04    PT/INR - ( 04 May 2020 06:20 )   PT: 15.20 sec;   INR: 1.32 ratio         PTT - ( 04 May 2020 06:20 )  PTT:30.3 sec              RADIOLOGY:    PHYSICAL EXAM:    GENERAL: NAD, well-developed, AAOx3  HEENT:  Atraumatic, Normocephalic.   PULMONARY: Clear to auscultation bilaterally; No wheeze  CARDIOVASCULAR: Regular rate. audible systolic murmur  GASTROINTESTINAL: Soft, Nontender, Nondistended; Bowel sounds present  MUSCULOSKELETAL:  No clubbing, cyanosis. Edematous bilateral le, chronic skin changes.

## 2020-05-04 NOTE — PROGRESS NOTE ADULT - ASSESSMENT
80F, PMHx Afib on Coumadin, HTN, DM, COPD, presents to ED s/p fall, +HT, +LOC, +AC. Pt does not recall the event, event was witnessed found to have L. Carotid artery stenosis >80%.     # Fall with head trauma  - Intracranial hemorrhage  - CTH: dense matter in right lateral ventrile and third ventricle consistent with intraventricular hemorrhage.   - MRI: stable intrventricular hematoma.   - neurosurgery: no intervention, cleared for AC  -c/w couamdin 3mg PO daily. INR 1.22     # syncope likely secondary to severe Aortic Stenosis  - son found patient unconscious at home  - ekg: Line Atrial flutter with variable A-V block with premature ventricular or aberrantly conducted complexes  - echo: severe aortic stenosis   - structural cardiology consulted for AS, requesting CT chest/abdomen/pelvis.   - nephro recs appreciated  - planned CT TAVR protocol today    #hx of afib  - on coumadin at home  - Had two doses of coumadin 3mg PO daily   - INR: 1.22-->3mg coumadin ordered for tonight.   - neurosurgery ok to start coumadin    # Carotid artery stenosis   - carotid duplex 80% stenosis of L internal carotid artery  - Vasc surgery: plan for Left CEA on Wed 4/29 canceled due to no cardiac clearance  - possible outpt f/u    # DM  - Lantus 20U daily and 5U lispro TID before meails  - Keep FS<180  ADjust insulin as neede.       # HTN  - well controlled (109/56)  - c/w furosemide, metolazone, imdur    # COPD  - no difficulty breathing  - c/w budesonide    # CKD4  - stable  - SCr 2.4 5/2/2020  -High risk for ANGELLA. Diuretics to be held 24 hours before CT angio and may need HD after procedure.   -Will be done on outpatient basis as procedure cannot be done before monday.   - hydration may be done outpt   - f/u nephro outpt    #Lower extremity wound  - wound care    DVT: pending vasc rec  GI ppx: pantoprazole  Diet: DASH/TLC  code: full      Dispo: home

## 2020-05-04 NOTE — PROGRESS NOTE ADULT - SUBJECTIVE AND OBJECTIVE BOX
Nephrology progress note    THIS IS AN INCOMPLETE NOTE . FULL NOTE TO FOLLOW SHORTLY    Patient is seen and examined, events over the last 24 h noted .    Allergies:  No Known Allergies    Hospital Medications:   MEDICATIONS  (STANDING):  budesonide 160 MICROgram(s)/formoterol 4.5 MICROgram(s) Inhaler 2 Puff(s) Inhalation two times a day  chlorhexidine 4% Liquid 1 Application(s) Topical <User Schedule>  dextrose 5%. 1000 milliLiter(s) (50 mL/Hr) IV Continuous <Continuous>  dextrose 50% Injectable 12.5 Gram(s) IV Push once  dextrose 50% Injectable 25 Gram(s) IV Push once  dextrose 50% Injectable 25 Gram(s) IV Push once  fenofibrate Tablet 145 milliGRAM(s) Oral daily  finasteride 5 milliGRAM(s) Oral daily  insulin glargine Injectable (LANTUS) 20 Unit(s) SubCutaneous at bedtime  insulin lispro (HumaLOG) corrective regimen sliding scale   SubCutaneous three times a day before meals  insulin lispro Injectable (HumaLOG) 5 Unit(s) SubCutaneous three times a day before meals  isosorbide   mononitrate ER Tablet (IMDUR) 30 milliGRAM(s) Oral daily  pantoprazole    Tablet 40 milliGRAM(s) Oral before breakfast  senna 2 Tablet(s) Oral at bedtime  silver sulfADIAZINE 1% Cream 1 Application(s) Topical daily  simvastatin 40 milliGRAM(s) Oral at bedtime  sodium chloride 0.9%. 1000 milliLiter(s) (50 mL/Hr) IV Continuous <Continuous>  tamsulosin 0.4 milliGRAM(s) Oral at bedtime        VITALS:  T(F): 99.6 (05-04-20 @ 05:16), Max: 99.8 (05-04-20 @ 00:32)  HR: 87 (05-04-20 @ 05:16)  BP: 100/55 (05-04-20 @ 05:16)  RR: 18 (05-04-20 @ 05:16)  SpO2: 98% (05-03-20 @ 20:02)  Wt(kg): --    05-02 @ 07:01  -  05-03 @ 07:00  --------------------------------------------------------  IN: 0 mL / OUT: 325 mL / NET: -325 mL          PHYSICAL EXAM:  Constitutional: NAD  HEENT: anicteric sclera, oropharynx clear, MMM  Neck: No JVD  Respiratory: CTAB, no wheezes, rales or rhonchi  Cardiovascular: S1, S2, RRR  Gastrointestinal: BS+, soft, NT/ND  Extremities: No cyanosis or clubbing. No peripheral edema  :  No julian.   Skin: No rashes    LABS:  05-04    135  |  100  |  85<HH>  ----------------------------<  77  3.7   |  22  |  2.5<H>    Ca    9.1      04 May 2020 06:20  Mg     1.7     05-04    TPro  6.8  /  Alb  3.6  /  TBili  1.0  /  DBili      /  AST  24  /  ALT  12  /  AlkPhos  48  05-04                          10.8   14.47 )-----------( 163      ( 04 May 2020 06:20 )             33.7       Urine Studies:      RADIOLOGY & ADDITIONAL STUDIES: Nephrology progress note  Patient is seen and examined, events over the last 24 h noted .  lying in bed comfortable     Allergies:  No Known Allergies    Hospital Medications:   MEDICATIONS  (STANDING):  budesonide 160 MICROgram(s)/formoterol 4.5 MICROgram(s) Inhaler 2 Puff(s) Inhalation two times a day  chlorhexidine 4% Liquid 1 Application(s) Topical <User Schedule>  dextrose 5%. 1000 milliLiter(s) (50 mL/Hr) IV Continuous <Continuous>  fenofibrate Tablet 145 milliGRAM(s) Oral daily  finasteride 5 milliGRAM(s) Oral daily  insulin glargine Injectable (LANTUS) 20 Unit(s) SubCutaneous at bedtime  insulin lispro (HumaLOG) corrective regimen sliding scale   SubCutaneous three times a day before meals  insulin lispro Injectable (HumaLOG) 5 Unit(s) SubCutaneous three times a day before meals  isosorbide   mononitrate ER Tablet (IMDUR) 30 milliGRAM(s) Oral daily  pantoprazole    Tablet 40 milliGRAM(s) Oral before breakfast  senna 2 Tablet(s) Oral at bedtime  silver sulfADIAZINE 1% Cream 1 Application(s) Topical daily  simvastatin 40 milliGRAM(s) Oral at bedtime  sodium chloride 0.9%. 1000 milliLiter(s) (50 mL/Hr) IV Continuous <Continuous>  tamsulosin 0.4 milliGRAM(s) Oral at bedtime        VITALS:  T(F): 99.6 (05-04-20 @ 05:16), Max: 99.8 (05-04-20 @ 00:32)  HR: 87 (05-04-20 @ 05:16)  BP: 100/55 (05-04-20 @ 05:16)  RR: 18 (05-04-20 @ 05:16)  SpO2: 98% (05-03-20 @ 20:02)      05-02 @ 07:01  -  05-03 @ 07:00  --------------------------------------------------------  IN: 0 mL / OUT: 325 mL / NET: -325 mL          PHYSICAL EXAM:  Constitutional: NAD  HEENT: anicteric sclera, oropharynx clear, MMM  Neck: No JVD  Respiratory: CTAB, no wheezes, rales or rhonchi  Cardiovascular: S1, S2, RRR  Gastrointestinal: BS+, soft, NT/ND  Extremities: No cyanosis or clubbing. No peripheral edema  :  No julian.   Skin: No rashes    LABS:  05-04    135  |  100  |  85<HH>  ----------------------------<  77  3.7   |  22  |  2.5<H>  Creatinine Trend: 2.5<--, 2.4<--, 2.6<--, 2.9<--, 2.6<--, 2.5<--  Ca    9.1      04 May 2020 06:20  Mg     1.7     05-04    TPro  6.8  /  Alb  3.6  /  TBili  1.0  /  DBili      /  AST  24  /  ALT  12  /  AlkPhos  48  05-04                          10.8   14.47 )-----------( 163      ( 04 May 2020 06:20 )             33.7       Urine Studies:      RADIOLOGY & ADDITIONAL STUDIES:

## 2020-05-04 NOTE — PROGRESS NOTE ADULT - SUBJECTIVE AND OBJECTIVE BOX
TAIWO ZAVALA  80y  Male      Patient is a 80y old  Male who presents with a chief complaint of fall (04 May 2020 13:55)      INTERVAL HPI/OVERNIGHT EVENTS:  He feels ok, no overnight events, no diarrhea, no cough or fever.   Vital Signs Last 24 Hrs  T(C): 37.6 (04 May 2020 05:16), Max: 37.7 (04 May 2020 00:32)  T(F): 99.6 (04 May 2020 05:16), Max: 99.8 (04 May 2020 00:32)  HR: 87 (04 May 2020 05:16) (85 - 87)  BP: 100/55 (04 May 2020 05:16) (100/55 - 126/62)  BP(mean): --  RR: 18 (04 May 2020 05:16) (18 - 18)  SpO2: 98% (03 May 2020 20:02) (98% - 98%)            Consultant(s) Notes Reviewed:  [x ] YES  [ ] NO          MEDICATIONS  (STANDING):  budesonide 160 MICROgram(s)/formoterol 4.5 MICROgram(s) Inhaler 2 Puff(s) Inhalation two times a day  chlorhexidine 4% Liquid 1 Application(s) Topical <User Schedule>  dextrose 5%. 1000 milliLiter(s) (50 mL/Hr) IV Continuous <Continuous>  dextrose 50% Injectable 12.5 Gram(s) IV Push once  dextrose 50% Injectable 25 Gram(s) IV Push once  dextrose 50% Injectable 25 Gram(s) IV Push once  fenofibrate Tablet 145 milliGRAM(s) Oral daily  finasteride 5 milliGRAM(s) Oral daily  insulin glargine Injectable (LANTUS) 20 Unit(s) SubCutaneous at bedtime  insulin lispro (HumaLOG) corrective regimen sliding scale   SubCutaneous three times a day before meals  insulin lispro Injectable (HumaLOG) 5 Unit(s) SubCutaneous three times a day before meals  isosorbide   mononitrate ER Tablet (IMDUR) 30 milliGRAM(s) Oral daily  pantoprazole    Tablet 40 milliGRAM(s) Oral before breakfast  senna 2 Tablet(s) Oral at bedtime  silver sulfADIAZINE 1% Cream 1 Application(s) Topical daily  simvastatin 40 milliGRAM(s) Oral at bedtime  sodium chloride 0.9%. 1000 milliLiter(s) (50 mL/Hr) IV Continuous <Continuous>  tamsulosin 0.4 milliGRAM(s) Oral at bedtime  warfarin 3 milliGRAM(s) Oral once    MEDICATIONS  (PRN):  acetaminophen   Tablet .. 650 milliGRAM(s) Oral every 6 hours PRN Mild Pain (1 - 3)  dextrose 40% Gel 15 Gram(s) Oral once PRN Blood Glucose LESS THAN 70 milliGRAM(s)/deciliter  glucagon  Injectable 1 milliGRAM(s) IntraMuscular once PRN Glucose LESS THAN 70 milligrams/deciliter      LABS                          10.8   14.47 )-----------( 163      ( 04 May 2020 06:20 )             33.7     05-04    135  |  100  |  85<HH>  ----------------------------<  77  3.7   |  22  |  2.5<H>    Ca    9.1      04 May 2020 06:20  Mg     1.7     05-04    TPro  6.8  /  Alb  3.6  /  TBili  1.0  /  DBili  x   /  AST  24  /  ALT  12  /  AlkPhos  48  05-04        PT/INR - ( 04 May 2020 06:20 )   PT: 15.20 sec;   INR: 1.32 ratio         PTT - ( 04 May 2020 06:20 )  PTT:30.3 sec  Lactate Trend        CAPILLARY BLOOD GLUCOSE      POCT Blood Glucose.: 96 mg/dL (04 May 2020 11:19)        RADIOLOGY & ADDITIONAL TESTS:    Imaging Personally Reviewed:  [ ] YES  [ ] NO    HEALTH ISSUES - PROBLEM Dx:          PHYSICAL EXAM:  GENERAL: NAD, well-developed  HEAD:  Atraumatic, Normocephalic  EYES: EOMI, PERRLA, conjunctiva and sclera clear  NECK: Supple, No JVD  CHEST/LUNG: Clear to auscultation bilaterally; No wheeze  HEART: Irregular rate and rhythm; S1 S2 Sm 3/6 on aortic area radiates to the neck.   ABDOMEN: Soft, Nontender, Nondistended; Bowel sounds present  EXTREMITIES:  LE chronic skin changes and edema 2+  PSYCH: AAOx3  NEUROLOGY: non-focal  SKIN: No rashes or lesions

## 2020-05-04 NOTE — PROGRESS NOTE ADULT - ASSESSMENT
A 79 yo male with PMH of HTN, DM type 2, Atrial fibrillation on Coumadin and COPD was brought to ED s/p fall. Patient doesn't recall the event and he denies any loss of consciousness. As per Son patient was standing when suddenly fell back and hit his head. Patient found with posterior scalp laceration, Head CT showed intraventricular hemorrhage. Patient denies chest pain, SOB, palpitation or dizziness.     A/P:   Fall with Head Trauma:   Intracranial hemorrhage:   Head CT showed right lateral and third ventricles densities without definite evidence of active arterial contrast extravasation.  Brain MRI showed stable intraventricular hematoma. MRV unremarkable.  Neurosurgery is ok to resume Coumadin.     Syncope: possibly due to   Severe Aortic stenosis.   As per son he found him on the floor unconscious.   Workup showed severe aortic stenosis.   EKG showed Atrial flutter/fibrillation with slow ventricular response, RBBB  Cardiology consulted for severe aortic stenosis and recommended CT chest, abdomen and pelvis TAVR protocol. Possible today.   Patient refused to do it outpatient. His son refused to take the patient home, he is concern of getting corona virus.     High grade left Internal carotid stenosis:  80%  Vascular plan for  endarterectomy after cardiac clearance. Follow up with Dr. De Guzman outpatient.     Leukocytosis: no diarrhea, no fever, monitor.   CKD stag 4: stable.   Furosemide and Metolazone on hold for CT TAVR protocol.   Nephrology evaluated the patient for CT angio, he is at high risk for ANGELLA, he might need HD after the CT, recommended to hold Metolazone and Lasix 24 hrs before contrast, IV hydration 4-6 hrs before the CT, monitor BMP tomorrow.      Atrial flutter/fibrillation:   Continue Coumadin. Neurosurgery is ok.    COPD on home o2, lungs are clear.     Patient wants to leave home, he understand the need of outpatient follow up for further evaluation.   #Progress Note Handoff:  Pending (specify):  none  Family discussion: with his son, update the plan for surgery  Disposition: He was discharged home yesterday, his son will appeal

## 2020-05-04 NOTE — PROGRESS NOTE ADULT - SUBJECTIVE AND OBJECTIVE BOX
SUBJ:No chest pain or shortness of breath      MEDICATIONS  (STANDING):  budesonide 160 MICROgram(s)/formoterol 4.5 MICROgram(s) Inhaler 2 Puff(s) Inhalation two times a day  chlorhexidine 4% Liquid 1 Application(s) Topical <User Schedule>  dextrose 5%. 1000 milliLiter(s) (50 mL/Hr) IV Continuous <Continuous>  dextrose 50% Injectable 12.5 Gram(s) IV Push once  dextrose 50% Injectable 25 Gram(s) IV Push once  dextrose 50% Injectable 25 Gram(s) IV Push once  fenofibrate Tablet 145 milliGRAM(s) Oral daily  finasteride 5 milliGRAM(s) Oral daily  insulin glargine Injectable (LANTUS) 20 Unit(s) SubCutaneous at bedtime  insulin lispro (HumaLOG) corrective regimen sliding scale   SubCutaneous three times a day before meals  insulin lispro Injectable (HumaLOG) 5 Unit(s) SubCutaneous three times a day before meals  isosorbide   mononitrate ER Tablet (IMDUR) 30 milliGRAM(s) Oral daily  pantoprazole    Tablet 40 milliGRAM(s) Oral before breakfast  senna 2 Tablet(s) Oral at bedtime  silver sulfADIAZINE 1% Cream 1 Application(s) Topical daily  simvastatin 40 milliGRAM(s) Oral at bedtime  sodium chloride 0.9%. 1000 milliLiter(s) (50 mL/Hr) IV Continuous <Continuous>  tamsulosin 0.4 milliGRAM(s) Oral at bedtime  warfarin 3 milliGRAM(s) Oral once    MEDICATIONS  (PRN):  acetaminophen   Tablet .. 650 milliGRAM(s) Oral every 6 hours PRN Mild Pain (1 - 3)  dextrose 40% Gel 15 Gram(s) Oral once PRN Blood Glucose LESS THAN 70 milliGRAM(s)/deciliter  glucagon  Injectable 1 milliGRAM(s) IntraMuscular once PRN Glucose LESS THAN 70 milligrams/deciliter            Vital Signs Last 24 Hrs  T(C): 37.6 (04 May 2020 05:16), Max: 37.7 (04 May 2020 00:32)  T(F): 99.6 (04 May 2020 05:16), Max: 99.8 (04 May 2020 00:32)  HR: 87 (04 May 2020 05:16) (85 - 87)  BP: 100/55 (04 May 2020 05:16) (100/55 - 126/62)  BP(mean): --  RR: 18 (04 May 2020 05:16) (18 - 18)  SpO2: 98% (03 May 2020 20:02) (98% - 98%)      ECG:NML    TTE:    LABS:                        10.8   14.47 )-----------( 163      ( 04 May 2020 06:20 )             33.7     05-04    135  |  100  |  85<HH>  ----------------------------<  77  3.7   |  22  |  2.5<H>    Ca    9.1      04 May 2020 06:20  Mg     1.7     05-04    TPro  6.8  /  Alb  3.6  /  TBili  1.0  /  DBili  x   /  AST  24  /  ALT  12  /  AlkPhos  48  05-04        PT/INR - ( 04 May 2020 06:20 )   PT: 15.20 sec;   INR: 1.32 ratio         PTT - ( 04 May 2020 06:20 )  PTT:30.3 sec    I&O's Summary    BNP

## 2020-05-05 LAB
ALBUMIN SERPL ELPH-MCNC: 3.5 G/DL — SIGNIFICANT CHANGE UP (ref 3.5–5.2)
ALP SERPL-CCNC: 39 U/L — SIGNIFICANT CHANGE UP (ref 30–115)
ALT FLD-CCNC: 16 U/L — SIGNIFICANT CHANGE UP (ref 0–41)
ANION GAP SERPL CALC-SCNC: 15 MMOL/L — HIGH (ref 7–14)
APTT BLD: 33.4 SEC — SIGNIFICANT CHANGE UP (ref 27–39.2)
AST SERPL-CCNC: 39 U/L — SIGNIFICANT CHANGE UP (ref 0–41)
BILIRUB SERPL-MCNC: 1 MG/DL — SIGNIFICANT CHANGE UP (ref 0.2–1.2)
BUN SERPL-MCNC: 84 MG/DL — CRITICAL HIGH (ref 10–20)
CALCIUM SERPL-MCNC: 9.1 MG/DL — SIGNIFICANT CHANGE UP (ref 8.5–10.1)
CHLORIDE SERPL-SCNC: 99 MMOL/L — SIGNIFICANT CHANGE UP (ref 98–110)
CO2 SERPL-SCNC: 23 MMOL/L — SIGNIFICANT CHANGE UP (ref 17–32)
CREAT SERPL-MCNC: 2.6 MG/DL — HIGH (ref 0.7–1.5)
D DIMER BLD IA.RAPID-MCNC: 423 NG/ML DDU — HIGH (ref 0–230)
GLUCOSE BLDC GLUCOMTR-MCNC: 112 MG/DL — HIGH (ref 70–99)
GLUCOSE BLDC GLUCOMTR-MCNC: 135 MG/DL — HIGH (ref 70–99)
GLUCOSE BLDC GLUCOMTR-MCNC: 170 MG/DL — HIGH (ref 70–99)
GLUCOSE BLDC GLUCOMTR-MCNC: 177 MG/DL — HIGH (ref 70–99)
GLUCOSE BLDC GLUCOMTR-MCNC: 198 MG/DL — HIGH (ref 70–99)
GLUCOSE SERPL-MCNC: 107 MG/DL — HIGH (ref 70–99)
HCT VFR BLD CALC: 33.8 % — LOW (ref 42–52)
HGB BLD-MCNC: 11.1 G/DL — LOW (ref 14–18)
INR BLD: 1.43 RATIO — HIGH (ref 0.65–1.3)
MCHC RBC-ENTMCNC: 29.4 PG — SIGNIFICANT CHANGE UP (ref 27–31)
MCHC RBC-ENTMCNC: 32.8 G/DL — SIGNIFICANT CHANGE UP (ref 32–37)
MCV RBC AUTO: 89.4 FL — SIGNIFICANT CHANGE UP (ref 80–94)
NRBC # BLD: 0 /100 WBCS — SIGNIFICANT CHANGE UP (ref 0–0)
PLATELET # BLD AUTO: 138 K/UL — SIGNIFICANT CHANGE UP (ref 130–400)
POTASSIUM SERPL-MCNC: 3.7 MMOL/L — SIGNIFICANT CHANGE UP (ref 3.5–5)
POTASSIUM SERPL-SCNC: 3.7 MMOL/L — SIGNIFICANT CHANGE UP (ref 3.5–5)
PROT SERPL-MCNC: 6.8 G/DL — SIGNIFICANT CHANGE UP (ref 6–8)
PROTHROM AB SERPL-ACNC: 16.5 SEC — HIGH (ref 9.95–12.87)
RBC # BLD: 3.78 M/UL — LOW (ref 4.7–6.1)
RBC # FLD: 18.2 % — HIGH (ref 11.5–14.5)
SODIUM SERPL-SCNC: 137 MMOL/L — SIGNIFICANT CHANGE UP (ref 135–146)
WBC # BLD: 8.99 K/UL — SIGNIFICANT CHANGE UP (ref 4.8–10.8)
WBC # FLD AUTO: 8.99 K/UL — SIGNIFICANT CHANGE UP (ref 4.8–10.8)

## 2020-05-05 PROCEDURE — 99233 SBSQ HOSP IP/OBS HIGH 50: CPT

## 2020-05-05 RX ORDER — FUROSEMIDE 40 MG
40 TABLET ORAL
Refills: 0 | Status: DISCONTINUED | OUTPATIENT
Start: 2020-05-05 | End: 2020-05-07

## 2020-05-05 RX ORDER — WARFARIN SODIUM 2.5 MG/1
3 TABLET ORAL ONCE
Refills: 0 | Status: COMPLETED | OUTPATIENT
Start: 2020-05-05 | End: 2020-05-05

## 2020-05-05 RX ORDER — ACETAMINOPHEN 500 MG
650 TABLET ORAL EVERY 6 HOURS
Refills: 0 | Status: DISCONTINUED | OUTPATIENT
Start: 2020-05-05 | End: 2020-05-07

## 2020-05-05 RX ADMIN — BUDESONIDE AND FORMOTEROL FUMARATE DIHYDRATE 2 PUFF(S): 160; 4.5 AEROSOL RESPIRATORY (INHALATION) at 09:08

## 2020-05-05 RX ADMIN — Medication 650 MILLIGRAM(S): at 05:02

## 2020-05-05 RX ADMIN — FINASTERIDE 5 MILLIGRAM(S): 5 TABLET, FILM COATED ORAL at 11:26

## 2020-05-05 RX ADMIN — PANTOPRAZOLE SODIUM 40 MILLIGRAM(S): 20 TABLET, DELAYED RELEASE ORAL at 05:27

## 2020-05-05 RX ADMIN — INSULIN GLARGINE 20 UNIT(S): 100 INJECTION, SOLUTION SUBCUTANEOUS at 22:47

## 2020-05-05 RX ADMIN — Medication 650 MILLIGRAM(S): at 20:51

## 2020-05-05 RX ADMIN — Medication 5 UNIT(S): at 09:09

## 2020-05-05 RX ADMIN — Medication 5 UNIT(S): at 18:03

## 2020-05-05 RX ADMIN — Medication 145 MILLIGRAM(S): at 11:27

## 2020-05-05 RX ADMIN — Medication 40 MILLIGRAM(S): at 18:05

## 2020-05-05 RX ADMIN — ISOSORBIDE MONONITRATE 30 MILLIGRAM(S): 60 TABLET, EXTENDED RELEASE ORAL at 11:27

## 2020-05-05 RX ADMIN — WARFARIN SODIUM 3 MILLIGRAM(S): 2.5 TABLET ORAL at 22:51

## 2020-05-05 RX ADMIN — Medication 1: at 09:09

## 2020-05-05 RX ADMIN — TAMSULOSIN HYDROCHLORIDE 0.4 MILLIGRAM(S): 0.4 CAPSULE ORAL at 22:52

## 2020-05-05 RX ADMIN — SIMVASTATIN 40 MILLIGRAM(S): 20 TABLET, FILM COATED ORAL at 22:52

## 2020-05-05 RX ADMIN — Medication 1: at 18:03

## 2020-05-05 NOTE — PHARMACOTHERAPY INTERVENTION NOTE - NSPHARMCOMMMONITOR
Lab/Test Recommended PAST MEDICAL HISTORY:  Anxiety and depression     Asthma     Chronic fatigue     COPD (chronic obstructive pulmonary disease)     GERD (gastroesophageal reflux disease)     HLD (hyperlipidemia)

## 2020-05-05 NOTE — PROGRESS NOTE ADULT - SUBJECTIVE AND OBJECTIVE BOX
TAIWO ZAVALA  80y  Male      Patient is a 80y old  Male who presents with a chief complaint of fall (04 May 2020 13:55)      INTERVAL HPI/OVERNIGHT EVENTS:  He has fever this morning, mild diarrhea, no cough, no urinary symptoms he feels fine.   Vital Signs Last 24 Hrs  T(C): 37.7 (05 May 2020 09:38), Max: 38.4 (05 May 2020 05:23)  T(F): 99.8 (05 May 2020 09:38), Max: 101.2 (05 May 2020 05:23)  HR: 84 (05 May 2020 05:23) (84 - 84)  BP: 109/69 (05 May 2020 05:23) (109/59 - 109/69)  BP(mean): --  RR: 18 (05 May 2020 05:23) (18 - 18)  SpO2: 98% (04 May 2020 19:45) (98% - 98%)            Consultant(s) Notes Reviewed:  [x ] YES  [ ] NO          MEDICATIONS  (STANDING):  budesonide 160 MICROgram(s)/formoterol 4.5 MICROgram(s) Inhaler 2 Puff(s) Inhalation two times a day  chlorhexidine 4% Liquid 1 Application(s) Topical <User Schedule>  dextrose 5%. 1000 milliLiter(s) (50 mL/Hr) IV Continuous <Continuous>  dextrose 50% Injectable 12.5 Gram(s) IV Push once  dextrose 50% Injectable 25 Gram(s) IV Push once  dextrose 50% Injectable 25 Gram(s) IV Push once  fenofibrate Tablet 145 milliGRAM(s) Oral daily  finasteride 5 milliGRAM(s) Oral daily  furosemide    Tablet 40 milliGRAM(s) Oral two times a day  insulin glargine Injectable (LANTUS) 20 Unit(s) SubCutaneous at bedtime  insulin lispro (HumaLOG) corrective regimen sliding scale   SubCutaneous three times a day before meals  insulin lispro Injectable (HumaLOG) 5 Unit(s) SubCutaneous three times a day before meals  isosorbide   mononitrate ER Tablet (IMDUR) 30 milliGRAM(s) Oral daily  metolazone 5 milliGRAM(s) Oral daily  pantoprazole    Tablet 40 milliGRAM(s) Oral before breakfast  silver sulfADIAZINE 1% Cream 1 Application(s) Topical daily  simvastatin 40 milliGRAM(s) Oral at bedtime  tamsulosin 0.4 milliGRAM(s) Oral at bedtime    MEDICATIONS  (PRN):  acetaminophen   Tablet .. 650 milliGRAM(s) Oral every 6 hours PRN Mild Pain (1 - 3)  dextrose 40% Gel 15 Gram(s) Oral once PRN Blood Glucose LESS THAN 70 milliGRAM(s)/deciliter  glucagon  Injectable 1 milliGRAM(s) IntraMuscular once PRN Glucose LESS THAN 70 milligrams/deciliter      LABS                          11.1   8.99  )-----------( 138      ( 05 May 2020 06:02 )             33.8     05-05    137  |  99  |  84<HH>  ----------------------------<  107<H>  3.7   |  23  |  2.6<H>    Ca    9.1      05 May 2020 06:02  Mg     1.7     05-04    TPro  6.8  /  Alb  3.5  /  TBili  1.0  /  DBili  x   /  AST  39  /  ALT  16  /  AlkPhos  39  05-05        PT/INR - ( 05 May 2020 06:02 )   PT: 16.50 sec;   INR: 1.43 ratio         PTT - ( 05 May 2020 06:02 )  PTT:33.4 sec  Lactate Trend        CAPILLARY BLOOD GLUCOSE      POCT Blood Glucose.: 170 mg/dL (05 May 2020 08:25)        RADIOLOGY & ADDITIONAL TESTS:    Imaging Personally Reviewed:  [ ] YES  [ ] NO    HEALTH ISSUES - PROBLEM Dx:          PHYSICAL EXAM:  GENERAL: NAD, well-developed  HEAD:  Atraumatic, Normocephalic  EYES: EOMI, PERRLA, conjunctiva and sclera clear  NECK: Supple, No JVD  CHEST/LUNG: Clear to auscultation bilaterally; No wheeze  HEART: Irregular rate and rhythm; S1 S2 Sm 3/6 on aortic area radiates to the neck.   ABDOMEN: Soft, Nontender, Nondistended; Bowel sounds present  EXTREMITIES:  LE chronic skin changes and edema 2+  PSYCH: AAOx3  NEUROLOGY: non-focal  SKIN: No rashes or lesions

## 2020-05-05 NOTE — PROGRESS NOTE ADULT - SUBJECTIVE AND OBJECTIVE BOX
Nephrology progress note    THIS IS AN INCOMPLETE NOTE . FULL NOTE TO FOLLOW SHORTLY    Patient is seen and examined, events over the last 24 h noted .    Allergies:  No Known Allergies    Hospital Medications:   MEDICATIONS  (STANDING):  budesonide 160 MICROgram(s)/formoterol 4.5 MICROgram(s) Inhaler 2 Puff(s) Inhalation two times a day  chlorhexidine 4% Liquid 1 Application(s) Topical <User Schedule>  dextrose 5%. 1000 milliLiter(s) (50 mL/Hr) IV Continuous <Continuous>  dextrose 50% Injectable 12.5 Gram(s) IV Push once  dextrose 50% Injectable 25 Gram(s) IV Push once  dextrose 50% Injectable 25 Gram(s) IV Push once  fenofibrate Tablet 145 milliGRAM(s) Oral daily  finasteride 5 milliGRAM(s) Oral daily  furosemide    Tablet 40 milliGRAM(s) Oral two times a day  insulin glargine Injectable (LANTUS) 20 Unit(s) SubCutaneous at bedtime  insulin lispro (HumaLOG) corrective regimen sliding scale   SubCutaneous three times a day before meals  insulin lispro Injectable (HumaLOG) 5 Unit(s) SubCutaneous three times a day before meals  isosorbide   mononitrate ER Tablet (IMDUR) 30 milliGRAM(s) Oral daily  metolazone 5 milliGRAM(s) Oral daily  pantoprazole    Tablet 40 milliGRAM(s) Oral before breakfast  senna 2 Tablet(s) Oral at bedtime  silver sulfADIAZINE 1% Cream 1 Application(s) Topical daily  simvastatin 40 milliGRAM(s) Oral at bedtime  tamsulosin 0.4 milliGRAM(s) Oral at bedtime        VITALS:  T(F): 101.2 (05-05-20 @ 05:23), Max: 101.2 (05-05-20 @ 05:23)  HR: 84 (05-05-20 @ 05:23)  BP: 109/69 (05-05-20 @ 05:23)  RR: 18 (05-05-20 @ 05:23)  SpO2: 98% (05-04-20 @ 19:45)  Wt(kg): --        PHYSICAL EXAM:  Constitutional: NAD  HEENT: anicteric sclera, oropharynx clear, MMM  Neck: No JVD  Respiratory: CTAB, no wheezes, rales or rhonchi  Cardiovascular: S1, S2, RRR  Gastrointestinal: BS+, soft, NT/ND  Extremities: No cyanosis or clubbing. No peripheral edema  :  No julian.   Skin: No rashes    LABS:  05-05    137  |  99  |  x   ----------------------------<  107<H>  3.7   |  23  |  2.6<H>    Ca    9.1      05 May 2020 06:02  Mg     1.7     05-04    TPro  6.8  /  Alb  3.5  /  TBili  1.0  /  DBili      /  AST  39  /  ALT  16  /  AlkPhos  39  05-05                          11.1   8.99  )-----------( 138      ( 05 May 2020 06:02 )             33.8       Urine Studies:      RADIOLOGY & ADDITIONAL STUDIES: Nephrology progress note  Patient is seen and examined, events over the last 24 h noted .  sitting in chair comfortable     Allergies:  No Known Allergies    Hospital Medications:   MEDICATIONS  (STANDING):    budesonide 160 MICROgram(s)/formoterol 4.5 MICROgram(s) Inhaler 2 Puff(s) Inhalation two times a day  dextrose 5%. 1000 milliLiter(s) (50 mL/Hr) IV Continuous <Continuous>  fenofibrate Tablet 145 milliGRAM(s) Oral daily  finasteride 5 milliGRAM(s) Oral daily  furosemide    Tablet 40 milliGRAM(s) Oral two times a day  insulin glargine Injectable (LANTUS) 20 Unit(s) SubCutaneous at bedtime  insulin lispro (HumaLOG) corrective regimen sliding scale   SubCutaneous three times a day before meals  insulin lispro Injectable (HumaLOG) 5 Unit(s) SubCutaneous three times a day before meals  isosorbide   mononitrate ER Tablet (IMDUR) 30 milliGRAM(s) Oral daily  metolazone 5 milliGRAM(s) Oral daily  pantoprazole    Tablet 40 milliGRAM(s) Oral before breakfast  senna 2 Tablet(s) Oral at bedtime  silver sulfADIAZINE 1% Cream 1 Application(s) Topical daily  simvastatin 40 milliGRAM(s) Oral at bedtime  tamsulosin 0.4 milliGRAM(s) Oral at bedtime        VITALS:  T(F): 101.2 (05-05-20 @ 05:23), Max: 101.2 (05-05-20 @ 05:23)  HR: 84 (05-05-20 @ 05:23)  BP: 109/69 (05-05-20 @ 05:23)  RR: 18 (05-05-20 @ 05:23)  SpO2: 98% (05-04-20 @ 19:45)          PHYSICAL EXAM:  Constitutional: NAD  HEENT: anicteric sclera, oropharynx clear, MMM  Neck: No JVD  Respiratory: CTAB, no wheezes, rales or rhonchi  Cardiovascular: S1, S2, RRR  Gastrointestinal: BS+, soft, NT/ND  Extremities: No cyanosis or clubbing. trace peripheral edema   :  No julian.   Skin: No rashes    LABS:  05-05    137  |  99  |  x   ----------------------------<  107<H>  3.7   |  23  |  2.6<H>    Ca    9.1      05 May 2020 06:02  Mg     1.7     05-04    TPro  6.8  /  Alb  3.5  /  TBili  1.0  /  DBili      /  AST  39  /  ALT  16  /  AlkPhos  39  05-05                          11.1   8.99  )-----------( 138      ( 05 May 2020 06:02 )             33.8       Urine Studies:      RADIOLOGY & ADDITIONAL STUDIES:

## 2020-05-05 NOTE — PROGRESS NOTE ADULT - ASSESSMENT
A 81 yo male with PMH of HTN, DM type 2, Atrial fibrillation on Coumadin and COPD was brought to ED s/p fall. Patient doesn't recall the event and he denies any loss of consciousness. As per Son patient was standing when suddenly fell back and hit his head. Patient found with posterior scalp laceration, Head CT showed intraventricular hemorrhage. Patient denies chest pain, SOB, palpitation or dizziness.     A/P:   Fever: leukocytosis resolved, mild lymphocytopenia, patient has mild diarrhea on Senna,   Chest CT yesterday showed pulmonary edema.   Send SARS-CoV-2 to rule out COVID-19. Check UA, if negative will send further work up, blood cx, if diarrhea continued after holding laxative will send C.diff     Syncope: possibly due to   Severe Aortic stenosis.   As per son he found him on the floor unconscious.   Workup showed severe aortic stenosis.   EKG showed Atrial flutter/fibrillation with slow ventricular response, RBBB  Cardiology follow up for severe aortic stenosis and CT angio TAVR protocol result.     Fall with Head Trauma:   Intracranial hemorrhage:   Head CT showed right lateral and third ventricles densities without definite evidence of active arterial contrast extravasation.  Brain MRI showed stable intraventricular hematoma. MRV unremarkable.  Neurosurgery is ok to resume Coumadin.     High grade left Internal carotid stenosis:  80%  Vascular plan for  endarterectomy after cardiac clearance. Follow up with Dr. De Guzman outpatient.     CKD stag 4: stable.   s/p CT julita TAVR protocol   Close monitor for renal function for ANGELLA.   Resume Metolazone and Lasix tomorrow. Nephrology follow up. .      Atrial flutter/fibrillation:   Continue Coumadin, give 4mg today. Neurosurgery is ok.    COPD on home o2, lungs are clear.     Patient wants to leave home, he understand the need of outpatient follow up for further evaluation.   #Progress Note Handoff:  Pending (specify):  monitor fever.   Family discussion: with his son,   Disposition:  home possible in 24 hrs

## 2020-05-05 NOTE — CHART NOTE - NSCHARTNOTEFT_GEN_A_CORE
80F, PMHx Afib on Coumadin, HTN, DM, COPD, presents to ED s/p fall, +HT, +LOC, +AC. Pt does not recall the event, event was witnessed. Pt presents w/ posterior head laceration and abrasions to his Right wrist.  PAN scan showed CTH findings of Deanse material within Right lateral ventrile, and third ventricle, consistent w/ intraventicular hemorrhage. Neurosurgery recommended CTA head which showed: redemonstrated findings of CTH, without definite evidence of active arterial contrast extravasation. Also recommended MRI brain to evaluate for intraventricular cavernoma. 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 Syncope work up w/ MRI brain showed stable intraventricular bleed, EEG was unremarkable. Echo with EF 55-60%, with severe aortic stenosis. Carotid duplex showed greater than 80% stenosis in the left internal carotid artery. Vascular planned for CEA, not cleared by cardio. Cardio recommended structural for TAVR. Nephro recommended hydration before and after CT- TAVR protocal and identified a possible need for HD s/p scan and s/p proceedure. CT-TAVR protocol done 5/4 @ 1pm, f/u renal fxn. diarrhea yesterday. febrile to 101.2 in am. given hospitalized 10 days, must r/o covid     CKD4 was followed by nephrology. f/u outpt     Of Hx of afib: Pt seen by cardiology and rec to anticoagulate. Neurosurgery cleared pt for coumadin. Pt started on home dose of warfarin    Carotid artery stenosis, patient may f/u outpt.     DM, patient treated with lantus and humalog and diabetic diet.     HTN controlled w furosemide, metolazone, imdur    Todo:  - 3mg coumadin QD  - f/u inr  - bmp 1 or 2 more days, monitor for renal failure s/p iv contrast  - f/u renal  - refer pt to structural cardiology outpt  - refer pt 80F, PMHx Afib on Coumadin, HTN, DM, COPD, presents to ED s/p fall, +HT, +LOC, +AC. Pt does not recall the event, event was witnessed. Pt presents w/ posterior head laceration and abrasions to his Right wrist.  PAN scan showed CTH findings of Deanse material within Right lateral ventrile, and third ventricle, consistent w/ intraventicular hemorrhage. Neurosurgery recommended CTA head which showed: redemonstrated findings of CTH, without definite evidence of active arterial contrast extravasation. Also recommended MRI brain to evaluate for intraventricular cavernoma. 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 Syncope work up w/ MRI brain showed stable intraventricular bleed, EEG was unremarkable. Echo with EF 55-60%, with severe aortic stenosis. Carotid duplex showed greater than 80% stenosis in the left internal carotid artery. Vascular planned for CEA, not cleared by cardio. Cardio recommended structural for TAVR. Nephro recommended hydration before and after CT- TAVR protocal and identified a possible need for HD s/p scan and s/p proceedure. CT-TAVR protocol done 5/4 @ 1pm, f/u renal fxn. 3x loose stools last night. stopped senna. febrile to 101.2 in am. given hospitalized 10 days, must r/o covid     CKD4 was followed by nephrology. f/u outpt     Of Hx of afib: Pt seen by cardiology and rec to anticoagulate. Neurosurgery cleared pt for coumadin. Pt started on home dose of warfarin    Carotid artery stenosis, patient may f/u outpt.     DM, patient treated with lantus and humalog and diabetic diet.     HTN controlled w furosemide, metolazone, imdur    Todo:  - 3mg coumadin QD  - f/u inr  - bmp 1 or 2 more days, monitor for renal failure s/p iv contrast  - f/u renal  - refer pt to structural cardiology outpt    signout given to --- at --- 80F, PMHx Afib on Coumadin, HTN, DM, COPD, presents to ED s/p fall, +HT, +LOC, +AC. Pt does not recall the event, event was witnessed. Pt presents w/ posterior head laceration and abrasions to his Right wrist.  PAN scan showed CTH findings of Deanse material within Right lateral ventrile, and third ventricle, consistent w/ intraventicular hemorrhage. Neurosurgery recommended CTA head which showed: redemonstrated findings of CTH, without definite evidence of active arterial contrast extravasation. Also recommended MRI brain to evaluate for intraventricular cavernoma. 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 Syncope work up w/ MRI brain showed stable intraventricular bleed, EEG was unremarkable. Echo with EF 55-60%, with severe aortic stenosis. Carotid duplex showed greater than 80% stenosis in the left internal carotid artery. Vascular planned for CEA, not cleared by cardio. Cardio recommended structural for TAVR. Nephro recommended hydration before and after CT- TAVR protocal and identified a possible need for HD s/p scan and s/p proceedure. CT-TAVR protocol done 5/4 @ 1pm, f/u renal fxn. 3x loose stools last night. stopped senna. febrile to 101.2 in am. given hospitalized 10 days, must r/o covid     CKD4 was followed by nephrology. f/u outpt     Of Hx of afib: Pt seen by cardiology and rec to anticoagulate. Neurosurgery cleared pt for coumadin. Pt started on home dose of warfarin    Carotid artery stenosis, patient may f/u outpt.     DM, patient treated with lantus and humalog and diabetic diet.     HTN controlled w furosemide, metolazone, imdur    Todo:  - 3mg coumadin QD  - f/u inr  - bmp 1 or 2 more days, monitor for renal failure s/p iv contrast  - f/u renal  - have pt f/u  structural cardiology outpt  - have pt f/u vascular outpt   - have pt f/u nephro outpt    Discharge note has been started    signout given to Dr. mayo @ 11:18AM

## 2020-05-06 LAB
ALBUMIN SERPL ELPH-MCNC: 3 G/DL — LOW (ref 3.5–5.2)
ALP SERPL-CCNC: 32 U/L — SIGNIFICANT CHANGE UP (ref 30–115)
ALT FLD-CCNC: 20 U/L — SIGNIFICANT CHANGE UP (ref 0–41)
ANION GAP SERPL CALC-SCNC: 17 MMOL/L — HIGH (ref 7–14)
APPEARANCE UR: ABNORMAL
AST SERPL-CCNC: 43 U/L — HIGH (ref 0–41)
BACTERIA # UR AUTO: ABNORMAL
BILIRUB DIRECT SERPL-MCNC: 0.6 MG/DL — HIGH (ref 0–0.2)
BILIRUB INDIRECT FLD-MCNC: 0.3 MG/DL — SIGNIFICANT CHANGE UP (ref 0.2–1.2)
BILIRUB SERPL-MCNC: 0.9 MG/DL — SIGNIFICANT CHANGE UP (ref 0.2–1.2)
BILIRUB UR-MCNC: NEGATIVE — SIGNIFICANT CHANGE UP
BUN SERPL-MCNC: 92 MG/DL — CRITICAL HIGH (ref 10–20)
CALCIUM SERPL-MCNC: 9.5 MG/DL — SIGNIFICANT CHANGE UP (ref 8.5–10.1)
CHLORIDE SERPL-SCNC: 98 MMOL/L — SIGNIFICANT CHANGE UP (ref 98–110)
CO2 SERPL-SCNC: 23 MMOL/L — SIGNIFICANT CHANGE UP (ref 17–32)
COLOR SPEC: YELLOW — SIGNIFICANT CHANGE UP
CREAT SERPL-MCNC: 2.8 MG/DL — HIGH (ref 0.7–1.5)
CRP SERPL-MCNC: 6.92 MG/DL — HIGH (ref 0–0.4)
DIFF PNL FLD: SIGNIFICANT CHANGE UP
EPI CELLS # UR: 0 /HPF — SIGNIFICANT CHANGE UP (ref 0–5)
FERRITIN SERPL-MCNC: 640 NG/ML — HIGH (ref 30–400)
GLUCOSE BLDC GLUCOMTR-MCNC: 109 MG/DL — HIGH (ref 70–99)
GLUCOSE BLDC GLUCOMTR-MCNC: 118 MG/DL — HIGH (ref 70–99)
GLUCOSE BLDC GLUCOMTR-MCNC: 143 MG/DL — HIGH (ref 70–99)
GLUCOSE BLDC GLUCOMTR-MCNC: 147 MG/DL — HIGH (ref 70–99)
GLUCOSE BLDC GLUCOMTR-MCNC: 85 MG/DL — SIGNIFICANT CHANGE UP (ref 70–99)
GLUCOSE SERPL-MCNC: 74 MG/DL — SIGNIFICANT CHANGE UP (ref 70–99)
GLUCOSE UR QL: NEGATIVE — SIGNIFICANT CHANGE UP
HCT VFR BLD CALC: 39.3 % — LOW (ref 42–52)
HGB BLD-MCNC: 12.1 G/DL — LOW (ref 14–18)
HYALINE CASTS # UR AUTO: 1 /LPF — SIGNIFICANT CHANGE UP (ref 0–7)
INR BLD: 1.42 RATIO — HIGH (ref 0.65–1.3)
KETONES UR-MCNC: NEGATIVE — SIGNIFICANT CHANGE UP
LEUKOCYTE ESTERASE UR-ACNC: ABNORMAL
MAGNESIUM SERPL-MCNC: 2.1 MG/DL — SIGNIFICANT CHANGE UP (ref 1.8–2.4)
MCHC RBC-ENTMCNC: 28.3 PG — SIGNIFICANT CHANGE UP (ref 27–31)
MCHC RBC-ENTMCNC: 30.8 G/DL — LOW (ref 32–37)
MCV RBC AUTO: 91.8 FL — SIGNIFICANT CHANGE UP (ref 80–94)
NITRITE UR-MCNC: NEGATIVE — SIGNIFICANT CHANGE UP
NRBC # BLD: 0 /100 WBCS — SIGNIFICANT CHANGE UP (ref 0–0)
PH UR: 6 — SIGNIFICANT CHANGE UP (ref 5–8)
PLATELET # BLD AUTO: 127 K/UL — LOW (ref 130–400)
POTASSIUM SERPL-MCNC: 3.9 MMOL/L — SIGNIFICANT CHANGE UP (ref 3.5–5)
POTASSIUM SERPL-SCNC: 3.9 MMOL/L — SIGNIFICANT CHANGE UP (ref 3.5–5)
PROCALCITONIN SERPL-MCNC: 7.94 NG/ML — HIGH (ref 0.02–0.1)
PROT SERPL-MCNC: 5.9 G/DL — LOW (ref 6–8)
PROT UR-MCNC: SIGNIFICANT CHANGE UP
PROTHROM AB SERPL-ACNC: 16.3 SEC — HIGH (ref 9.95–12.87)
RBC # BLD: 4.28 M/UL — LOW (ref 4.7–6.1)
RBC # FLD: 18.5 % — HIGH (ref 11.5–14.5)
RBC CASTS # UR COMP ASSIST: 3 /HPF — SIGNIFICANT CHANGE UP (ref 0–4)
SARS-COV-2 RNA SPEC QL NAA+PROBE: SIGNIFICANT CHANGE UP
SODIUM SERPL-SCNC: 138 MMOL/L — SIGNIFICANT CHANGE UP (ref 135–146)
SP GR SPEC: 1.02 — SIGNIFICANT CHANGE UP (ref 1.01–1.02)
UROBILINOGEN FLD QL: SIGNIFICANT CHANGE UP
WBC # BLD: 7.69 K/UL — SIGNIFICANT CHANGE UP (ref 4.8–10.8)
WBC # FLD AUTO: 7.69 K/UL — SIGNIFICANT CHANGE UP (ref 4.8–10.8)
WBC UR QL: 73 /HPF — HIGH (ref 0–5)

## 2020-05-06 PROCEDURE — 71045 X-RAY EXAM CHEST 1 VIEW: CPT | Mod: 26

## 2020-05-06 PROCEDURE — 99233 SBSQ HOSP IP/OBS HIGH 50: CPT

## 2020-05-06 PROCEDURE — 76705 ECHO EXAM OF ABDOMEN: CPT | Mod: 26

## 2020-05-06 RX ORDER — WARFARIN SODIUM 2.5 MG/1
3 TABLET ORAL ONCE
Refills: 0 | Status: COMPLETED | OUTPATIENT
Start: 2020-05-06 | End: 2020-05-06

## 2020-05-06 RX ORDER — PIPERACILLIN AND TAZOBACTAM 4; .5 G/20ML; G/20ML
3.38 INJECTION, POWDER, LYOPHILIZED, FOR SOLUTION INTRAVENOUS EVERY 8 HOURS
Refills: 0 | Status: DISCONTINUED | OUTPATIENT
Start: 2020-05-06 | End: 2020-05-08

## 2020-05-06 RX ORDER — PIPERACILLIN AND TAZOBACTAM 4; .5 G/20ML; G/20ML
3.38 INJECTION, POWDER, LYOPHILIZED, FOR SOLUTION INTRAVENOUS ONCE
Refills: 0 | Status: COMPLETED | OUTPATIENT
Start: 2020-05-06 | End: 2020-05-06

## 2020-05-06 RX ADMIN — PIPERACILLIN AND TAZOBACTAM 25 GRAM(S): 4; .5 INJECTION, POWDER, LYOPHILIZED, FOR SOLUTION INTRAVENOUS at 21:48

## 2020-05-06 RX ADMIN — ISOSORBIDE MONONITRATE 30 MILLIGRAM(S): 60 TABLET, EXTENDED RELEASE ORAL at 12:13

## 2020-05-06 RX ADMIN — WARFARIN SODIUM 3 MILLIGRAM(S): 2.5 TABLET ORAL at 21:49

## 2020-05-06 RX ADMIN — FINASTERIDE 5 MILLIGRAM(S): 5 TABLET, FILM COATED ORAL at 12:13

## 2020-05-06 RX ADMIN — TAMSULOSIN HYDROCHLORIDE 0.4 MILLIGRAM(S): 0.4 CAPSULE ORAL at 21:49

## 2020-05-06 RX ADMIN — Medication 1 APPLICATION(S): at 11:56

## 2020-05-06 RX ADMIN — PANTOPRAZOLE SODIUM 40 MILLIGRAM(S): 20 TABLET, DELAYED RELEASE ORAL at 06:20

## 2020-05-06 RX ADMIN — Medication 40 MILLIGRAM(S): at 17:16

## 2020-05-06 RX ADMIN — INSULIN GLARGINE 20 UNIT(S): 100 INJECTION, SOLUTION SUBCUTANEOUS at 21:48

## 2020-05-06 RX ADMIN — BUDESONIDE AND FORMOTEROL FUMARATE DIHYDRATE 2 PUFF(S): 160; 4.5 AEROSOL RESPIRATORY (INHALATION) at 08:21

## 2020-05-06 RX ADMIN — BUDESONIDE AND FORMOTEROL FUMARATE DIHYDRATE 2 PUFF(S): 160; 4.5 AEROSOL RESPIRATORY (INHALATION) at 21:49

## 2020-05-06 RX ADMIN — Medication 145 MILLIGRAM(S): at 12:13

## 2020-05-06 RX ADMIN — PIPERACILLIN AND TAZOBACTAM 200 GRAM(S): 4; .5 INJECTION, POWDER, LYOPHILIZED, FOR SOLUTION INTRAVENOUS at 13:32

## 2020-05-06 RX ADMIN — Medication 5 UNIT(S): at 17:13

## 2020-05-06 RX ADMIN — Medication 40 MILLIGRAM(S): at 06:20

## 2020-05-06 RX ADMIN — Medication 5 UNIT(S): at 11:55

## 2020-05-06 RX ADMIN — Medication 650 MILLIGRAM(S): at 14:39

## 2020-05-06 RX ADMIN — SIMVASTATIN 40 MILLIGRAM(S): 20 TABLET, FILM COATED ORAL at 21:49

## 2020-05-06 NOTE — DIETITIAN INITIAL EVALUATION ADULT. - OTHER INFO
P/w: s/p fall. Fall with head trauma. - MRI: stable intraventricular hematoma. Neurosurgery: no intervention. Syncope likely secondary to severe Aortic Stenosis. DM: insulin protocol. HTN: well controlled. CKD 4 stable.

## 2020-05-06 NOTE — PROGRESS NOTE ADULT - ASSESSMENT
80M PMHx Afib on Coumadin, HTN, DM, COPD, presents to ED s/p fall, +HT, +LOC, +AC. Pt does not recall the event, event was witnessed. Pt presents w/ posterior head laceration and abrasions to his Right wrist.  PAN scan showed CTH findings of Deanse material within Right lateral ventrile, and third ventricle, consistent w/ intraventicular hemorrhage. Neurosurgery recommended CTA head which showed: redemonstrated findings of CTH, without definite evidence of active arterial contrast extravasation. Also recommended MRI brain to evaluate for intraventricular cavernoma. 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 Syncope work up w/ MRI brain showed stable intraventricular bleed, EEG was unremarkable. Echo with EF 55-60%, with severe aortic stenosis. Carotid duplex showed greater than 80% stenosis in the left internal carotid artery. Vascular planned for CEA, not cleared by cardio. Cardio recommended structural for TAVR. Nephro recommended hydration before and after CT- TAVR protocal and identified a possible need for HD s/p scan and s/p procedure CT-TAVR protocol done 5/4 @ 1pm, f/u renal fxn. 3x loose stools last night. stopped senna. Pt developed fevers.     #Fevers r/o Infection  Febrile to 103F, multiple episodes   Leukocytosis resolved   COVID neg   f/u pancx   Zosyn for now   stool cx if diarrhea continues once off laxative 48 hours     #Traumatic Fall with Intraventricular Hemorrhage   #Syncope   #Severe Aortic stenosis  Pt found unconscious at home   Head CT showed right lateral and third ventricles densities without definite evidence of active arterial contrast extravasation.  Brain MRI showed stable intraventricular hematoma. MRV unremarkable.    Neurosurgery is ok to resume Coumadin.   Cleared by NSx and NI   Can resume coumadin   ECHO showed severe aortic stenosis.   EKG showed Atrial flutter/fibrillation with slow ventricular response, RBBB  CT angio TAVR protocol   F/u with Cardiology for TVAR o/p      #Left Internal Carotid Stenosis 80% - Vascular for CEA after cardiac clearance. Follow up with Dr. De Guzman outpatient.   # CKD stag 4 - Nephro following, s/p CTA TAVR protocol, Cr trending up mildly, holding diuretics   # Atrial flutter/fibrillation - c/w coumadin, daily INR   #COPD - Stable, on home O2, c/w inhalers     DVT ppx: coumadin   GI ppx; protonix   Actvity: IAT   Diet: Renal DASH   Disposition: Home once infection r/o   FULL CODE

## 2020-05-06 NOTE — PROGRESS NOTE ADULT - SUBJECTIVE AND OBJECTIVE BOX
Nephrology progress note    THIS IS AN INCOMPLETE NOTE . FULL NOTE TO FOLLOW SHORTLY    Patient is seen and examined, events over the last 24 h noted .    Allergies:  No Known Allergies    Hospital Medications:   MEDICATIONS  (STANDING):  budesonide 160 MICROgram(s)/formoterol 4.5 MICROgram(s) Inhaler 2 Puff(s) Inhalation two times a day  chlorhexidine 4% Liquid 1 Application(s) Topical <User Schedule>  dextrose 5%. 1000 milliLiter(s) (50 mL/Hr) IV Continuous <Continuous>  dextrose 50% Injectable 12.5 Gram(s) IV Push once  dextrose 50% Injectable 25 Gram(s) IV Push once  dextrose 50% Injectable 25 Gram(s) IV Push once  fenofibrate Tablet 145 milliGRAM(s) Oral daily  finasteride 5 milliGRAM(s) Oral daily  furosemide    Tablet 40 milliGRAM(s) Oral two times a day  insulin glargine Injectable (LANTUS) 20 Unit(s) SubCutaneous at bedtime  insulin lispro (HumaLOG) corrective regimen sliding scale   SubCutaneous three times a day before meals  insulin lispro Injectable (HumaLOG) 5 Unit(s) SubCutaneous three times a day before meals  isosorbide   mononitrate ER Tablet (IMDUR) 30 milliGRAM(s) Oral daily  metolazone 5 milliGRAM(s) Oral daily  pantoprazole    Tablet 40 milliGRAM(s) Oral before breakfast  silver sulfADIAZINE 1% Cream 1 Application(s) Topical daily  simvastatin 40 milliGRAM(s) Oral at bedtime  tamsulosin 0.4 milliGRAM(s) Oral at bedtime        VITALS:  T(F): 96.8 (05-06-20 @ 04:50), Max: 102.6 (05-05-20 @ 20:30)  HR: 61 (05-06-20 @ 04:50)  BP: 106/68 (05-06-20 @ 04:50)  RR: 20 (05-06-20 @ 04:50)  SpO2: 97% (05-05-20 @ 23:05)  Wt(kg): --    05-05 @ 07:01  -  05-06 @ 07:00  --------------------------------------------------------  IN: 350 mL / OUT: 0 mL / NET: 350 mL      Height (cm): 167.6 (05-06 @ 04:50)  Weight (kg): 84.5 (05-06 @ 04:50)  BMI (kg/m2): 30.1 (05-06 @ 04:50)  BSA (m2): 1.94 (05-06 @ 04:50)    PHYSICAL EXAM:  Constitutional: NAD  HEENT: anicteric sclera, oropharynx clear, MMM  Neck: No JVD  Respiratory: CTAB, no wheezes, rales or rhonchi  Cardiovascular: S1, S2, RRR  Gastrointestinal: BS+, soft, NT/ND  Extremities: No cyanosis or clubbing. No peripheral edema  :  No julian.   Skin: No rashes    LABS:  05-06    138  |  98  |  92<HH>  ----------------------------<  74  3.9   |  23  |  2.8<H>    Ca    9.5      06 May 2020 07:30  Mg     2.1     05-06    TPro  6.8  /  Alb  3.5  /  TBili  1.0  /  DBili      /  AST  39  /  ALT  16  /  AlkPhos  39  05-05                          12.1   7.69  )-----------( 127      ( 06 May 2020 07:30 )             39.3       Urine Studies:      RADIOLOGY & ADDITIONAL STUDIES: Nephrology progress note  Patient is seen and examined, events over the last 24 h noted .  lying in bed comfortable     Allergies:  No Known Allergies    Hospital Medications:   MEDICATIONS  (STANDING):  budesonide 160 MICROgram(s)/formoterol 4.5 MICROgram(s) Inhaler 2 Puff(s) Inhalation two times a day  dextrose 5%. 1000 milliLiter(s) (50 mL/Hr) IV Continuous <Continuous>  fenofibrate Tablet 145 milliGRAM(s) Oral daily  finasteride 5 milliGRAM(s) Oral daily  furosemide    Tablet 40 milliGRAM(s) Oral two times a day  insulin glargine Injectable (LANTUS) 20 Unit(s) SubCutaneous at bedtime  insulin lispro (HumaLOG) corrective regimen sliding scale   SubCutaneous three times a day before meals  insulin lispro Injectable (HumaLOG) 5 Unit(s) SubCutaneous three times a day before meals  isosorbide   mononitrate ER Tablet (IMDUR) 30 milliGRAM(s) Oral daily  metolazone 5 milliGRAM(s) Oral daily  pantoprazole    Tablet 40 milliGRAM(s) Oral before breakfast  silver sulfADIAZINE 1% Cream 1 Application(s) Topical daily  simvastatin 40 milliGRAM(s) Oral at bedtime  tamsulosin 0.4 milliGRAM(s) Oral at bedtime        VITALS:  T(F): 96.8 (05-06-20 @ 04:50), Max: 102.6 (05-05-20 @ 20:30)  HR: 61 (05-06-20 @ 04:50)  BP: 106/68 (05-06-20 @ 04:50)  RR: 20 (05-06-20 @ 04:50)  SpO2: 97% (05-05-20 @ 23:05)      05-05 @ 07:01  -  05-06 @ 07:00  --------------------------------------------------------  IN: 350 mL / OUT: 0 mL / NET: 350 mL      Height (cm): 167.6 (05-06 @ 04:50)  Weight (kg): 84.5 (05-06 @ 04:50)  BMI (kg/m2): 30.1 (05-06 @ 04:50)  BSA (m2): 1.94 (05-06 @ 04:50)    PHYSICAL EXAM:  Constitutional: NAD  HEENT: anicteric sclera, oropharynx clear, MMM  Neck: No JVD  Respiratory: CTAB, no wheezes, rales or rhonchi  Cardiovascular: S1, S2, RRR  Gastrointestinal: BS+, soft, NT/ND  Extremities: No cyanosis or clubbing. No peripheral edema  :  No julian.   Skin: No rashes    LABS:    05-06    138  |  98  |  92<HH>  ----------------------------<  74  3.9   |  23  |  2.8<H>    Ca    9.5      06 May 2020 07:30  Mg     2.1     05-06    TPro  6.8  /  Alb  3.5  /  TBili  1.0  /  DBili      /  AST  39  /  ALT  16  /  AlkPhos  39  05-05                          12.1   7.69  )-----------( 127      ( 06 May 2020 07:30 )             39.3       Urine Studies:      RADIOLOGY & ADDITIONAL STUDIES:

## 2020-05-06 NOTE — PROGRESS NOTE ADULT - SUBJECTIVE AND OBJECTIVE BOX
Hospital Day:  11d    Subjective:    Pt was interviewed and examined at the bedside in the AM. No acute events overnight.   Has intermittent shivering Denies any other specific complaints.       Past Medical Hx:   Afib  BPH (benign prostatic hyperplasia)  CHF (congestive heart failure)  COPD (chronic obstructive pulmonary disease)  Diabetes  HTN (hypertension)    Past Sx:  H/O heart artery stent  S/P CABG x 3    Allergies:  No Known Allergies    Current Meds:   Standng Meds:  budesonide 160 MICROgram(s)/formoterol 4.5 MICROgram(s) Inhaler 2 Puff(s) Inhalation two times a day  chlorhexidine 4% Liquid 1 Application(s) Topical <User Schedule>  dextrose 5%. 1000 milliLiter(s) (50 mL/Hr) IV Continuous <Continuous>  dextrose 50% Injectable 12.5 Gram(s) IV Push once  dextrose 50% Injectable 25 Gram(s) IV Push once  dextrose 50% Injectable 25 Gram(s) IV Push once  fenofibrate Tablet 145 milliGRAM(s) Oral daily  finasteride 5 milliGRAM(s) Oral daily  furosemide    Tablet 40 milliGRAM(s) Oral two times a day  insulin glargine Injectable (LANTUS) 20 Unit(s) SubCutaneous at bedtime  insulin lispro (HumaLOG) corrective regimen sliding scale   SubCutaneous three times a day before meals  insulin lispro Injectable (HumaLOG) 5 Unit(s) SubCutaneous three times a day before meals  isosorbide   mononitrate ER Tablet (IMDUR) 30 milliGRAM(s) Oral daily  metolazone 5 milliGRAM(s) Oral daily  pantoprazole    Tablet 40 milliGRAM(s) Oral before breakfast  piperacillin/tazobactam IVPB. 3.375 Gram(s) IV Intermittent once  piperacillin/tazobactam IVPB.. 3.375 Gram(s) IV Intermittent every 8 hours  silver sulfADIAZINE 1% Cream 1 Application(s) Topical daily  simvastatin 40 milliGRAM(s) Oral at bedtime  tamsulosin 0.4 milliGRAM(s) Oral at bedtime  warfarin 3 milliGRAM(s) Oral once    PRN Meds:  acetaminophen   Tablet .. 650 milliGRAM(s) Oral every 6 hours PRN Mild Pain (1 - 3)  acetaminophen   Tablet .. 650 milliGRAM(s) Oral every 6 hours PRN Temp greater or equal to 38C (100.4F)  dextrose 40% Gel 15 Gram(s) Oral once PRN Blood Glucose LESS THAN 70 milliGRAM(s)/deciliter  glucagon  Injectable 1 milliGRAM(s) IntraMuscular once PRN Glucose LESS THAN 70 milligrams/deciliter    HOME MEDICATIONS:  budesonide-formoterol 160 mcg-4.5 mcg/inh inhalation aerosol:  inhaled   fenofibrate 145 mg oral tablet: 1 tab(s) orally once a day  finasteride 5 mg oral tablet: 1 tab(s) orally once a day  fluticasone 50 mcg/inh nasal spray: 1 spray(s) nasal 2 times a day  furosemide 40 mg oral tablet: 1 tab(s) orally once a day  isosorbide mononitrate 30 mg oral tablet, extended release: 1 tab(s) orally once a day (in the morning)  Metoprolol Succinate ER 50 mg oral tablet, extended release: 1 tab(s) orally once a day  pantoprazole 40 mg oral delayed release tablet: 1 tab(s) orally once a day (before a meal)  silver sulfADIAZINE 1% topical cream: 1 application topically once a day      Vital Signs:   T(F): 96.8 (05-06-20 @ 04:50), Max: 102.6 (05-05-20 @ 20:30)  HR: 61 (05-06-20 @ 04:50) (61 - 99)  BP: 106/68 (05-06-20 @ 04:50) (99/51 - 136/59)  RR: 20 (05-06-20 @ 04:50) (19 - 20)  SpO2: 97% (05-05-20 @ 23:05) (97% - 98%)      05-05-20 @ 07:01  -  05-06-20 @ 07:00  --------------------------------------------------------  IN: 350 mL / OUT: 0 mL / NET: 350 mL        Physical Exam:   CONSTITUTIONAL: Chronically ill; in no acute distress, speaking in full sentences  HEAD: Normocephalic; atraumatic  EYES: PERRL, EOMI, no conjunctival erythema  NECK: Supple; non tender, FROM  CARD: +S1, S2 Regular rate and rhythm. Systolic murmur 3/6 RUSB  RESP: CTABL  ABD: soft, RUQ tenderness, nondistended, no rebound, no guarding, no rigidity  EXT: moves all extremities,   LE chronic skin changes and edema 2+  NEURO: Alert, oriented, grossly unremarkable, no focal deficits, cn ii-xii grossly intact  PSYCH: Cooperative, appropriate         Labs:                         12.1   7.69  )-----------( 127      ( 06 May 2020 07:30 )             39.3       06 May 2020 07:30    138    |  98     |  92     ----------------------------<  74     3.9     |  23     |  2.8      Ca    9.5        06 May 2020 07:30  Mg     2.1       06 May 2020 07:30    TPro  6.8    /  Alb  3.5    /  TBili  1.0    /  DBili  x      /  AST  39     /  ALT  16     /  AlkPhos  39     05 May 2020 06:02       pTT    --             ----< 1.42 INR  (05-06-20 @ 07:30)    16.30        PT    Iron --, TIBC --, %Sat -- Ferritin 640 05-05-20 @ 11:48

## 2020-05-06 NOTE — PROGRESS NOTE ADULT - ASSESSMENT
80F, PMHx Afib on Coumadin, HTN, DM, CKD 4, COPD, presents to ED s/p fall, +HT, +LOC, +AC.   Found to have L. Carotid artery stenosis >80%.     # CKD 4   - serum creatinine at baseline   - previously had HD, now off for > 2 months.   - non-oliguric   - BP on lower side. monitor BP.   -  Ca, Mg, phos at goal.   - found to have severe aortic stenosis.    - Ok to resume diuretics decrease metolazone to 2.5 mg po q24h / decrease lasix to 20 mg po q12h   # anemia chronic Hb at goal.  # Fall with head trauma  - Intracranial hemorrhage / stable intraventricular hematoma.   - neurosurgery notes appreciated.    # Carotid artery stenosis   - carotid duplex 80% stenosis of L internal carotid artery  - Vasc surgery: plan for Left CEA on Wed 4/29 canceled due to no cardiac clearance      avoid nephrotoxins   will follow

## 2020-05-06 NOTE — PROGRESS NOTE ADULT - ASSESSMENT
A 81 yo male with PMH of HTN, DM type 2, Atrial fibrillation on Coumadin and COPD was brought to ED s/p fall. Patient doesn't recall the event and he denies any loss of consciousness. As per Son patient was standing when suddenly fell back and hit his head. Patient found with posterior scalp laceration, Head CT showed intraventricular hemorrhage.      1.	S/P Fall  2.	Intracranial hemorrhage.   3.	Syncope likely due severe AS  4.	A-Fib / Flutter  5.	DM-2 / HTN  6.	COPD  7.	High grade Lt. ICA stenosis (80 %)  8.	CKD-4          PLAN: A 81 yo male with PMH of HTN, DM type 2, Atrial fibrillation on Coumadin and COPD was brought to ED s/p fall. Patient doesn't recall the event and he denies any loss of consciousness. As per Son patient was standing when suddenly fell back and hit his head. Patient found with posterior scalp laceration, Head CT showed intraventricular hemorrhage.      1.	S/P Fall  2.	Fever  3.	Intracranial hemorrhage.   4.	Syncope likely due severe AS  5.	A-Fib / Flutter  6.	DM-2 / HTN  7.	COPD  8.	High grade Lt. ICA stenosis (80 %)  9.	CKD-4          PLAN:    ·	Spiking temp  ·	CXR shows bibasilar opacities.   ·	RUQ US. Check LFT'S  ·	U/A and urine cxs  ·	COVID ruled out.   ·	Check INR. Cont coumadin  ·	Monitor FS. Cont Insulin  ·	For AS, out pt F/U with cardiology for TAVAR  ·	For Lt sided ICA stenosis out pt F/U with Vascular surgery  ·	Procal 7.94, Ferritin is 640 and CRP is 6.92  ·	Will empirically start him on IV Zosyn. If continues to spike temp, will call Penelope thomas

## 2020-05-06 NOTE — DIETITIAN INITIAL EVALUATION ADULT. - ENERGY NEEDS
calorie 1508-1659kcal (MSJ x 1-1.1 AF) for borderline obesity  protein 67-76g (0.8-0.9g/kg CBW) as above, CKD considered  fluid 1ml/kcal or per LIP

## 2020-05-06 NOTE — PROGRESS NOTE ADULT - SUBJECTIVE AND OBJECTIVE BOX
TAIWO ZAVALA  80y Male    CHIEF COMPLAINT:    Patient is a 80y old  Male who presents with a chief complaint of fall (04 May 2020 13:55)      INTERVAL HPI/OVERNIGHT EVENTS:    Patient seen and examined.    ROS: All other systems are negative.    Vital Signs:    T(F): 96.8 (20 @ 04:50), Max: 102.6 (20 @ 20:30)  HR: 61 (20 @ 04:50) (61 - 99)  BP: 106/68 (20 @ 04:50) (99/51 - 136/59)  RR: 20 (20 @ 04:50) (19 - 20)  SpO2: 97% (20 @ 23:05) (97% - 98%)  I&O's Summary    05 May 2020 07:01  -  06 May 2020 07:00  --------------------------------------------------------  IN: 350 mL / OUT: 0 mL / NET: 350 mL      Daily Height in cm: 167.64 (06 May 2020 04:50)    Daily Weight in k (05 May 2020 13:41)  CAPILLARY BLOOD GLUCOSE      POCT Blood Glucose.: 85 mg/dL (06 May 2020 07:17)  POCT Blood Glucose.: 135 mg/dL (05 May 2020 22:34)  POCT Blood Glucose.: 112 mg/dL (05 May 2020 21:13)  POCT Blood Glucose.: 198 mg/dL (05 May 2020 17:59)  POCT Blood Glucose.: 177 mg/dL (05 May 2020 16:37)      PHYSICAL EXAM:    GENERAL:  NAD  SKIN: No rashes or lesions  HENT: Atrumatic. Normocephalic. PERRL. Moist membranes.  NECK: Supple, No JVD. No lymphadenopathy.  PULMONARY: CTA B/L. No wheezing. No rales  CVS: Normal S1, S2. Rate and Rythm are regular. No murmurs.  ABDOMEN/GI: Soft, Nontender, Nondistended; BS present  EXTREMITIES: Peripheral pulses intact. No edema B/L LE.  NEUROLOGIC:  No motor or sensory deficit.  PSYCH: Alert & oriented x 3    Consultant(s) Notes Reviewed:  [x ] YES  [ ] NO  Care Discussed with Consultants/Other Providers [ x] YES  [ ] NO    EKG reviewed  Telemetry reviewed    LABS:                        12.1   7.69  )-----------( 127      ( 06 May 2020 07:30 )             39.3     05-    137  |  99  |  84<HH>  ----------------------------<  107<H>  3.7   |  23  |  2.6<H>    Ca    9.1      05 May 2020 06:02    TPro  6.8  /  Alb  3.5  /  TBili  1.0  /  DBili  x   /  AST  39  /  ALT  16  /  AlkPhos  39      PT/INR - ( 05 May 2020 06:02 )   PT: 16.50 sec;   INR: 1.43 ratio         PTT - ( 05 May 2020 06:02 )  PTT:33.4 sec          RADIOLOGY & ADDITIONAL TESTS:      Imaging or report Personally Reviewed:  [ ] YES  [ ] NO    Medications:  Standing  budesonide 160 MICROgram(s)/formoterol 4.5 MICROgram(s) Inhaler 2 Puff(s) Inhalation two times a day  chlorhexidine 4% Liquid 1 Application(s) Topical <User Schedule>  dextrose 5%. 1000 milliLiter(s) IV Continuous <Continuous>  dextrose 50% Injectable 12.5 Gram(s) IV Push once  dextrose 50% Injectable 25 Gram(s) IV Push once  dextrose 50% Injectable 25 Gram(s) IV Push once  fenofibrate Tablet 145 milliGRAM(s) Oral daily  finasteride 5 milliGRAM(s) Oral daily  furosemide    Tablet 40 milliGRAM(s) Oral two times a day  insulin glargine Injectable (LANTUS) 20 Unit(s) SubCutaneous at bedtime  insulin lispro (HumaLOG) corrective regimen sliding scale   SubCutaneous three times a day before meals  insulin lispro Injectable (HumaLOG) 5 Unit(s) SubCutaneous three times a day before meals  isosorbide   mononitrate ER Tablet (IMDUR) 30 milliGRAM(s) Oral daily  metolazone 5 milliGRAM(s) Oral daily  pantoprazole    Tablet 40 milliGRAM(s) Oral before breakfast  silver sulfADIAZINE 1% Cream 1 Application(s) Topical daily  simvastatin 40 milliGRAM(s) Oral at bedtime  tamsulosin 0.4 milliGRAM(s) Oral at bedtime    PRN Meds  acetaminophen   Tablet .. 650 milliGRAM(s) Oral every 6 hours PRN  acetaminophen   Tablet .. 650 milliGRAM(s) Oral every 6 hours PRN  dextrose 40% Gel 15 Gram(s) Oral once PRN  glucagon  Injectable 1 milliGRAM(s) IntraMuscular once PRN      Case discussed with resident    Care discussed with pt/family TAIWO ZAVALA  80y Male    CHIEF COMPLAINT:    Patient is a 80y old  Male who presents with a chief complaint of fall (04 May 2020 13:55)      INTERVAL HPI/OVERNIGHT EVENTS:    Patient seen and examined. Spiking temp. Denies any cough or sob. No urinary complaints. No abdominal pain. Poor historian.     ROS: All other systems are negative.    Vital Signs:    T(F): 96.8 (20 @ 04:50), Max: 102.6 (20 @ 20:30)  HR: 61 (20 @ 04:50) (61 - 99)  BP: 106/68 (20 @ 04:50) (99/51 - 136/59)  RR: 20 (20 @ 04:50) (19 - 20)  SpO2: 97% (20 @ 23:05) (97% - 98%)  I&O's Summary    05 May 2020 07:01  -  06 May 2020 07:00  --------------------------------------------------------  IN: 350 mL / OUT: 0 mL / NET: 350 mL      Daily Height in cm: 167.64 (06 May 2020 04:50)    Daily Weight in k (05 May 2020 13:41)  CAPILLARY BLOOD GLUCOSE      POCT Blood Glucose.: 85 mg/dL (06 May 2020 07:17)  POCT Blood Glucose.: 135 mg/dL (05 May 2020 22:34)  POCT Blood Glucose.: 112 mg/dL (05 May 2020 21:13)  POCT Blood Glucose.: 198 mg/dL (05 May 2020 17:59)  POCT Blood Glucose.: 177 mg/dL (05 May 2020 16:37)      PHYSICAL EXAM:    GENERAL:  NAD  SKIN: No rashes or lesions  HENT: Atraumatic Normocephalic. PERRL. Moist membranes.  NECK: Supple, No JVD. No lymphadenopathy.  PULMONARY: CTA B/L. No wheezing. No rales  CVS: Normal S1, S2. Rate and Rhythm are regular. No murmurs.  ABDOMEN/GI: Soft, +ve tenderness in the RUQ, Nondistended; BS present  EXTREMITIES: Peripheral pulses intact. Trace edema B/L LE.  NEUROLOGIC:  No motor or sensory deficit.  PSYCH: Alert & oriented x 3    Consultant(s) Notes Reviewed:  [x ] YES  [ ] NO  Care Discussed with Consultants/Other Providers [ x] YES  [ ] NO    EKG reviewed  Telemetry reviewed    LABS:                        12.1   7.69  )-----------( 127      ( 06 May 2020 07:30 )             39.3     05    137  |  99  |  84<HH>  ----------------------------<  107<H>   Creatinine Trend: 2.8<--, 2.6<--, 2.5<--, 2.4<--, 2.6<--, 2.9<--  3.7   |  23  |  2.6<H>    Ca    9.1      05 May 2020 06:02    TPro  6.8  /  Alb  3.5  /  TBili  1.0  /  DBili  x   /  AST  39  /  ALT  16  /  AlkPhos  39  05-05    PT/INR - ( 05 May 2020 06:02 )   PT: 16.50 sec;   INR: 1.43 ratio         PTT - ( 05 May 2020 06:02 )  PTT:33.4 sec          RADIOLOGY & ADDITIONAL TESTS:      Imaging or report Personally Reviewed:  [ ] YES  [ ] NO    Medications:  Standing  budesonide 160 MICROgram(s)/formoterol 4.5 MICROgram(s) Inhaler 2 Puff(s) Inhalation two times a day  chlorhexidine 4% Liquid 1 Application(s) Topical <User Schedule>  dextrose 5%. 1000 milliLiter(s) IV Continuous <Continuous>  dextrose 50% Injectable 12.5 Gram(s) IV Push once  dextrose 50% Injectable 25 Gram(s) IV Push once  dextrose 50% Injectable 25 Gram(s) IV Push once  fenofibrate Tablet 145 milliGRAM(s) Oral daily  finasteride 5 milliGRAM(s) Oral daily  furosemide    Tablet 40 milliGRAM(s) Oral two times a day  insulin glargine Injectable (LANTUS) 20 Unit(s) SubCutaneous at bedtime  insulin lispro (HumaLOG) corrective regimen sliding scale   SubCutaneous three times a day before meals  insulin lispro Injectable (HumaLOG) 5 Unit(s) SubCutaneous three times a day before meals  isosorbide   mononitrate ER Tablet (IMDUR) 30 milliGRAM(s) Oral daily  metolazone 5 milliGRAM(s) Oral daily  pantoprazole    Tablet 40 milliGRAM(s) Oral before breakfast  silver sulfADIAZINE 1% Cream 1 Application(s) Topical daily  simvastatin 40 milliGRAM(s) Oral at bedtime  tamsulosin 0.4 milliGRAM(s) Oral at bedtime    PRN Meds  acetaminophen   Tablet .. 650 milliGRAM(s) Oral every 6 hours PRN  acetaminophen   Tablet .. 650 milliGRAM(s) Oral every 6 hours PRN  dextrose 40% Gel 15 Gram(s) Oral once PRN  glucagon  Injectable 1 milliGRAM(s) IntraMuscular once PRN      Case discussed with resident    Care discussed with pt/family

## 2020-05-07 LAB
ALBUMIN SERPL ELPH-MCNC: 3.1 G/DL — LOW (ref 3.5–5.2)
ALP SERPL-CCNC: 32 U/L — SIGNIFICANT CHANGE UP (ref 30–115)
ALT FLD-CCNC: 20 U/L — SIGNIFICANT CHANGE UP (ref 0–41)
ANION GAP SERPL CALC-SCNC: 17 MMOL/L — HIGH (ref 7–14)
APTT BLD: 33.2 SEC — SIGNIFICANT CHANGE UP (ref 27–39.2)
AST SERPL-CCNC: 37 U/L — SIGNIFICANT CHANGE UP (ref 0–41)
BASOPHILS # BLD AUTO: 0.01 K/UL — SIGNIFICANT CHANGE UP (ref 0–0.2)
BASOPHILS NFR BLD AUTO: 0.1 % — SIGNIFICANT CHANGE UP (ref 0–1)
BILIRUB SERPL-MCNC: 1 MG/DL — SIGNIFICANT CHANGE UP (ref 0.2–1.2)
BUN SERPL-MCNC: 94 MG/DL — CRITICAL HIGH (ref 10–20)
CALCIUM SERPL-MCNC: 8.8 MG/DL — SIGNIFICANT CHANGE UP (ref 8.5–10.1)
CHLORIDE SERPL-SCNC: 95 MMOL/L — LOW (ref 98–110)
CO2 SERPL-SCNC: 21 MMOL/L — SIGNIFICANT CHANGE UP (ref 17–32)
CREAT SERPL-MCNC: 3.3 MG/DL — HIGH (ref 0.7–1.5)
EOSINOPHIL # BLD AUTO: 0.01 K/UL — SIGNIFICANT CHANGE UP (ref 0–0.7)
EOSINOPHIL NFR BLD AUTO: 0.1 % — SIGNIFICANT CHANGE UP (ref 0–8)
GLUCOSE BLDC GLUCOMTR-MCNC: 170 MG/DL — HIGH (ref 70–99)
GLUCOSE BLDC GLUCOMTR-MCNC: 172 MG/DL — HIGH (ref 70–99)
GLUCOSE BLDC GLUCOMTR-MCNC: 222 MG/DL — HIGH (ref 70–99)
GLUCOSE BLDC GLUCOMTR-MCNC: 90 MG/DL — SIGNIFICANT CHANGE UP (ref 70–99)
GLUCOSE SERPL-MCNC: 158 MG/DL — HIGH (ref 70–99)
HCT VFR BLD CALC: 31.6 % — LOW (ref 42–52)
HGB BLD-MCNC: 10.2 G/DL — LOW (ref 14–18)
IMM GRANULOCYTES NFR BLD AUTO: 0.6 % — HIGH (ref 0.1–0.3)
INR BLD: 1.9 RATIO — HIGH (ref 0.65–1.3)
LYMPHOCYTES # BLD AUTO: 0.44 K/UL — LOW (ref 1.2–3.4)
LYMPHOCYTES # BLD AUTO: 6.1 % — LOW (ref 20.5–51.1)
MAGNESIUM SERPL-MCNC: 1.9 MG/DL — SIGNIFICANT CHANGE UP (ref 1.8–2.4)
MCHC RBC-ENTMCNC: 28 PG — SIGNIFICANT CHANGE UP (ref 27–31)
MCHC RBC-ENTMCNC: 32.3 G/DL — SIGNIFICANT CHANGE UP (ref 32–37)
MCV RBC AUTO: 86.8 FL — SIGNIFICANT CHANGE UP (ref 80–94)
MONOCYTES # BLD AUTO: 0.86 K/UL — HIGH (ref 0.1–0.6)
MONOCYTES NFR BLD AUTO: 11.9 % — HIGH (ref 1.7–9.3)
NEUTROPHILS # BLD AUTO: 5.84 K/UL — SIGNIFICANT CHANGE UP (ref 1.4–6.5)
NEUTROPHILS NFR BLD AUTO: 81.2 % — HIGH (ref 42.2–75.2)
NRBC # BLD: 0 /100 WBCS — SIGNIFICANT CHANGE UP (ref 0–0)
PHOSPHATE SERPL-MCNC: 3.2 MG/DL — SIGNIFICANT CHANGE UP (ref 2.1–4.9)
PLATELET # BLD AUTO: 104 K/UL — LOW (ref 130–400)
POTASSIUM SERPL-MCNC: 3.7 MMOL/L — SIGNIFICANT CHANGE UP (ref 3.5–5)
POTASSIUM SERPL-SCNC: 3.7 MMOL/L — SIGNIFICANT CHANGE UP (ref 3.5–5)
PROT SERPL-MCNC: 6.2 G/DL — SIGNIFICANT CHANGE UP (ref 6–8)
PROTHROM AB SERPL-ACNC: 21.8 SEC — HIGH (ref 9.95–12.87)
RBC # BLD: 3.64 M/UL — LOW (ref 4.7–6.1)
RBC # FLD: 17.6 % — HIGH (ref 11.5–14.5)
SODIUM SERPL-SCNC: 133 MMOL/L — LOW (ref 135–146)
WBC # BLD: 7.2 K/UL — SIGNIFICANT CHANGE UP (ref 4.8–10.8)
WBC # FLD AUTO: 7.2 K/UL — SIGNIFICANT CHANGE UP (ref 4.8–10.8)

## 2020-05-07 PROCEDURE — 78226 HEPATOBILIARY SYSTEM IMAGING: CPT | Mod: 26

## 2020-05-07 PROCEDURE — 99233 SBSQ HOSP IP/OBS HIGH 50: CPT

## 2020-05-07 PROCEDURE — 99222 1ST HOSP IP/OBS MODERATE 55: CPT

## 2020-05-07 RX ORDER — WARFARIN SODIUM 2.5 MG/1
3 TABLET ORAL ONCE
Refills: 0 | Status: COMPLETED | OUTPATIENT
Start: 2020-05-07 | End: 2020-05-07

## 2020-05-07 RX ORDER — FUROSEMIDE 40 MG
20 TABLET ORAL
Refills: 0 | Status: DISCONTINUED | OUTPATIENT
Start: 2020-05-07 | End: 2020-05-07

## 2020-05-07 RX ORDER — ACETAMINOPHEN 500 MG
650 TABLET ORAL EVERY 6 HOURS
Refills: 0 | Status: DISCONTINUED | OUTPATIENT
Start: 2020-05-07 | End: 2020-05-13

## 2020-05-07 RX ADMIN — Medication 145 MILLIGRAM(S): at 11:23

## 2020-05-07 RX ADMIN — WARFARIN SODIUM 3 MILLIGRAM(S): 2.5 TABLET ORAL at 21:47

## 2020-05-07 RX ADMIN — Medication 1: at 07:35

## 2020-05-07 RX ADMIN — Medication 5 UNIT(S): at 07:35

## 2020-05-07 RX ADMIN — Medication 2: at 11:22

## 2020-05-07 RX ADMIN — PIPERACILLIN AND TAZOBACTAM 25 GRAM(S): 4; .5 INJECTION, POWDER, LYOPHILIZED, FOR SOLUTION INTRAVENOUS at 13:33

## 2020-05-07 RX ADMIN — PIPERACILLIN AND TAZOBACTAM 25 GRAM(S): 4; .5 INJECTION, POWDER, LYOPHILIZED, FOR SOLUTION INTRAVENOUS at 21:46

## 2020-05-07 RX ADMIN — Medication 1 APPLICATION(S): at 11:23

## 2020-05-07 RX ADMIN — ISOSORBIDE MONONITRATE 30 MILLIGRAM(S): 60 TABLET, EXTENDED RELEASE ORAL at 11:23

## 2020-05-07 RX ADMIN — BUDESONIDE AND FORMOTEROL FUMARATE DIHYDRATE 2 PUFF(S): 160; 4.5 AEROSOL RESPIRATORY (INHALATION) at 10:33

## 2020-05-07 RX ADMIN — TAMSULOSIN HYDROCHLORIDE 0.4 MILLIGRAM(S): 0.4 CAPSULE ORAL at 21:47

## 2020-05-07 RX ADMIN — CHLORHEXIDINE GLUCONATE 1 APPLICATION(S): 213 SOLUTION TOPICAL at 06:07

## 2020-05-07 RX ADMIN — Medication 1: at 16:59

## 2020-05-07 RX ADMIN — FINASTERIDE 5 MILLIGRAM(S): 5 TABLET, FILM COATED ORAL at 11:23

## 2020-05-07 RX ADMIN — Medication 40 MILLIGRAM(S): at 06:08

## 2020-05-07 RX ADMIN — SIMVASTATIN 40 MILLIGRAM(S): 20 TABLET, FILM COATED ORAL at 21:47

## 2020-05-07 RX ADMIN — PIPERACILLIN AND TAZOBACTAM 25 GRAM(S): 4; .5 INJECTION, POWDER, LYOPHILIZED, FOR SOLUTION INTRAVENOUS at 06:08

## 2020-05-07 RX ADMIN — PANTOPRAZOLE SODIUM 40 MILLIGRAM(S): 20 TABLET, DELAYED RELEASE ORAL at 06:08

## 2020-05-07 RX ADMIN — Medication 5 UNIT(S): at 11:21

## 2020-05-07 RX ADMIN — Medication 5 UNIT(S): at 16:58

## 2020-05-07 NOTE — PROGRESS NOTE ADULT - ASSESSMENT
80M PMHx Afib on Coumadin, HTN, DM, COPD, presents to ED s/p fall, +HT, +LOC, +AC. Pt does not recall the event, event was witnessed. Pt presents w/ posterior head laceration and abrasions to his Right wrist.  PAN scan showed CTH findings of Deanse material within Right lateral ventrile, and third ventricle, consistent w/ intraventicular hemorrhage. Neurosurgery recommended CTA head which showed: redemonstrated findings of CTH, without definite evidence of active arterial contrast extravasation. Also recommended MRI brain to evaluate for intraventricular cavernoma. 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 Syncope work up w/ MRI brain showed stable intraventricular bleed, EEG was unremarkable. Echo with EF 55-60%, with severe aortic stenosis. Carotid duplex showed greater than 80% stenosis in the left internal carotid artery. Vascular planned for CEA, not cleared by cardio. Cardio recommended structural for TAVR. Nephro recommended hydration before and after CT- TAVR protocal and identified a possible need for HD s/p scan and s/p procedure CT-TAVR protocol done 5/4 @ 1pm, f/u renal fxn. 3x loose stools last night. stopped senna. Pt developed fevers.     #Fevers r/o Infection  Febrile to 103F, multiple episodes   Leukocytosis resolved   COVID neg   Blood Cx neg   UA + f/u urine cx   RUQ US showed GB wall edema and gallstones   Surgery consulted, get HIDA scan  Zosyn for now   f/u ID recs     #Traumatic Fall with Intraventricular Hemorrhage   #Syncope   #Severe Aortic stenosis  Pt found unconscious at home   Head CT showed right lateral and third ventricles densities without definite evidence of active arterial contrast extravasation.  Brain MRI showed stable intraventricular hematoma. MRV unremarkable.    Neurosurgery is ok to resume Coumadin.   Cleared by NSx and NI   Can resume coumadin   ECHO showed severe aortic stenosis.   EKG showed Atrial flutter/fibrillation with slow ventricular response, RBBB  CT angio TAVR protocol   F/u with Cardiology for TVAR o/p      #Left Internal Carotid Stenosis 80% - Vascular for CEA after cardiac clearance. Follow up with Dr. De Guzman outpatient.   # CKD stag 4 - Nephro following, s/p CTA TAVR protocol, Cr trending up mildly, holding diuretics   # Atrial flutter/fibrillation - c/w coumadin, daily INR   #COPD - Stable, on home O2, c/w inhalers     DVT ppx: coumadin   GI ppx; protonix   Actvity: IAT   Diet: Renal DASH   Disposition: Home once infection r/o   FULL CODE

## 2020-05-07 NOTE — CONSULT NOTE ADULT - ASSESSMENT
ASSESSMENT:  80M with significant comorbidities and US findings equivocal for acute cholecystitis    PLAN:  Abd minimal RUQ tenderness, Plascencia (-)  WBC 7 (from 14, on zosyn x3 days already)  Fever spike to 101 on 5/6/20 @ 14:00  UA+ for bacteria, LE, likely source  Please obtain HIDA  If positive, consider IR for perc bernabe in the setting of multiple comorbidities, as above  D/w Dr. Carbone

## 2020-05-07 NOTE — PROGRESS NOTE ADULT - ASSESSMENT
A 81 yo male with PMH of HTN, DM type 2, Atrial fibrillation on Coumadin and COPD was brought to ED s/p fall. Patient doesn't recall the event and he denies any loss of consciousness. As per Son patient was standing when suddenly fell back and hit his head. Patient found with posterior scalp laceration, Head CT showed intraventricular hemorrhage.      1.	S/P Fall  2.	Fever  3.	Intracranial hemorrhage.   4.	Syncope likely due severe AS  5.	A-Fib / Flutter  6.	DM-2 / HTN  7.	COPD  8.	High grade Lt. ICA stenosis (80 %)  9.	CKD-4          PLAN:    ·	Spiking temp  ·	RUQ US is positive for acute cholecystitis  ·	Called the surgical team and spoke with them. Will see the pt today  ·	Cont IV Zosyn for now. ID eval  ·	CXR shows bibasilar opacities.   ·	U/A is positive for WBC'S and leukocyte esterase   ·	COVID ruled out.   ·	Check INR. Cont coumadin  ·	Monitor FS. Cont Insulin  ·	For AS, out pt F/U with cardiology for TAVAR  ·	For Lt sided ICA stenosis out pt F/U with Vascular surgery  ·	Procal 7.94, Ferritin is 640 and CRP is 6.92  ·	Will empirically start him on IV Zosyn. If continues to spike temp, will call iD eval A 79 yo male with PMH of HTN, DM type 2, Atrial fibrillation on Coumadin and COPD was brought to ED s/p fall. Patient doesn't recall the event and he denies any loss of consciousness. As per Son patient was standing when suddenly fell back and hit his head. Patient found with posterior scalp laceration, Head CT showed intraventricular hemorrhage.      1.	S/P Fall  2.	Possible Ac. Cholecystitis  3.	UTI  4.	Intracranial hemorrhage.   5.	Syncope likely due severe AS  6.	A-Fib / Flutter  7.	DM-2 / HTN  8.	COPD  9.	High grade Lt. ICA stenosis (80 %)  10.	CKD-4          PLAN:    ·	RUQ US is positive for possible acute cholecystitis.   ·	Surgery eval noted. Pt is not a surgical candidate as per surgery. Recommended HIDA scan. If positive call IR for per bernabe.   ·	Cont IV Zosyn for now. ID eval  ·	CXR shows bibasilar opacities.   ·	U/A is positive for WBC'S and leukocyte esterase. Check cxs  ·	COVID ruled out.   ·	INR is 1.90. Cont coumadin. Give 3 mg tonight and check INR in AM  ·	Renal function deteriorating. Hold Metolazone for now.   ·	Monitor FS. Cont Insulin  ·	For AS, out pt F/U with cardiology for TAVAR  ·	For Lt sided ICA stenosis out pt F/U with Vascular surgery  ·	Procal 7.94, Ferritin is 640 and CRP is 6.92

## 2020-05-07 NOTE — PROGRESS NOTE ADULT - SUBJECTIVE AND OBJECTIVE BOX
TAIWO ZAVALA  80y Male    CHIEF COMPLAINT:    Patient is a 80y old  Male who presents with a chief complaint of fall (04 May 2020 13:55)      INTERVAL HPI/OVERNIGHT EVENTS:    Patient seen and examined.    ROS: All other systems are negative.    Vital Signs:    T(F): 98.9 (05-07-20 @ 04:55), Max: 101 (05-06-20 @ 14:40)  HR: 102 (05-07-20 @ 04:55) (60 - 110)  BP: 110/59 (05-07-20 @ 04:55) (95/51 - 131/64)  RR: 18 (05-07-20 @ 07:55) (18 - 18)  SpO2: 96% (05-07-20 @ 07:55) (96% - 96%)  I&O's Summary    06 May 2020 07:01  -  07 May 2020 07:00  --------------------------------------------------------  IN: 720 mL / OUT: 100 mL / NET: 620 mL      Daily Height in cm: 167.6 (06 May 2020 09:05)    Daily   CAPILLARY BLOOD GLUCOSE      POCT Blood Glucose.: 172 mg/dL (07 May 2020 07:31)  POCT Blood Glucose.: 147 mg/dL (06 May 2020 21:26)  POCT Blood Glucose.: 143 mg/dL (06 May 2020 20:30)  POCT Blood Glucose.: 118 mg/dL (06 May 2020 16:36)  POCT Blood Glucose.: 109 mg/dL (06 May 2020 11:18)      PHYSICAL EXAM:    GENERAL:  NAD  SKIN: No rashes or lesions  HENT: Atrumatic. Normocephalic. PERRL. Moist membranes.  NECK: Supple, No JVD. No lymphadenopathy.  PULMONARY: CTA B/L. No wheezing. No rales  CVS: Normal S1, S2. Rate and Rythm are regular. No murmurs.  ABDOMEN/GI: Soft, Nontender, Nondistended; BS present  EXTREMITIES: Peripheral pulses intact. No edema B/L LE.  NEUROLOGIC:  No motor or sensory deficit.  PSYCH: Alert & oriented x 3    Consultant(s) Notes Reviewed:  [x ] YES  [ ] NO  Care Discussed with Consultants/Other Providers [ x] YES  [ ] NO    EKG reviewed  Telemetry reviewed    LABS:                        12.1   7.69  )-----------( 127      ( 06 May 2020 07:30 )             39.3     05-06    138  |  98  |  92<HH>  ----------------------------<  74  3.9   |  23  |  2.8<H>    Ca    9.5      06 May 2020 07:30  Mg     2.1     05-06    TPro  5.9<L>  /  Alb  3.0<L>  /  TBili  0.9  /  DBili  0.6<H>  /  AST  43<H>  /  ALT  20  /  AlkPhos  32  05-06    PT/INR - ( 06 May 2020 07:30 )   PT: 16.30 sec;   INR: 1.42 ratio                 Culture - Blood (collected 06 May 2020 00:30)  Source: .Blood Blood  Preliminary Report (07 May 2020 07:01):    No growth to date.        RADIOLOGY & ADDITIONAL TESTS:    < from: US Abdomen Limited (05.06.20 @ 14:56) >    IMPRESSION:    Gallstones and sludge with gallbladder wall thickening features suggestive of acute cholecystitis in the proper clinical setting.    Mild hepatomegaly with hepatic steatosis.    < end of copied text >    Imaging or report Personally Reviewed:  [ ] YES  [ ] NO    Medications:  Standing  budesonide 160 MICROgram(s)/formoterol 4.5 MICROgram(s) Inhaler 2 Puff(s) Inhalation two times a day  chlorhexidine 4% Liquid 1 Application(s) Topical <User Schedule>  fenofibrate Tablet 145 milliGRAM(s) Oral daily  finasteride 5 milliGRAM(s) Oral daily  furosemide    Tablet 20 milliGRAM(s) Oral two times a day  insulin glargine Injectable (LANTUS) 20 Unit(s) SubCutaneous at bedtime  insulin lispro (HumaLOG) corrective regimen sliding scale   SubCutaneous three times a day before meals  insulin lispro Injectable (HumaLOG) 5 Unit(s) SubCutaneous three times a day before meals  isosorbide   mononitrate ER Tablet (IMDUR) 30 milliGRAM(s) Oral daily  metolazone 5 milliGRAM(s) Oral daily  pantoprazole    Tablet 40 milliGRAM(s) Oral before breakfast  piperacillin/tazobactam IVPB.. 3.375 Gram(s) IV Intermittent every 8 hours  silver sulfADIAZINE 1% Cream 1 Application(s) Topical daily  simvastatin 40 milliGRAM(s) Oral at bedtime  tamsulosin 0.4 milliGRAM(s) Oral at bedtime    PRN Meds  acetaminophen   Tablet .. 650 milliGRAM(s) Oral every 6 hours PRN  acetaminophen   Tablet .. 650 milliGRAM(s) Oral every 6 hours PRN      Case discussed with resident    Care discussed with pt/family TAIWO ZAVALA  80y Male    CHIEF COMPLAINT:    Patient is a 80y old  Male who presents with a chief complaint of fall (04 May 2020 13:55)      INTERVAL HPI/OVERNIGHT EVENTS:    Patient seen and examined. C/O upper abdominal pain. No N/V. Spiking temp. No sob. No cp.     ROS: All other systems are negative.    Vital Signs:    T(F): 98.9 (05-07-20 @ 04:55), Max: 101 (05-06-20 @ 14:40)  HR: 102 (05-07-20 @ 04:55) (60 - 110)  BP: 110/59 (05-07-20 @ 04:55) (95/51 - 131/64)  RR: 18 (05-07-20 @ 07:55) (18 - 18)  SpO2: 96% (05-07-20 @ 07:55) (96% - 96%)  I&O's Summary    06 May 2020 07:01  -  07 May 2020 07:00  --------------------------------------------------------  IN: 720 mL / OUT: 100 mL / NET: 620 mL      Daily Height in cm: 167.6 (06 May 2020 09:05)    Daily   CAPILLARY BLOOD GLUCOSE      POCT Blood Glucose.: 172 mg/dL (07 May 2020 07:31)  POCT Blood Glucose.: 147 mg/dL (06 May 2020 21:26)  POCT Blood Glucose.: 143 mg/dL (06 May 2020 20:30)  POCT Blood Glucose.: 118 mg/dL (06 May 2020 16:36)  POCT Blood Glucose.: 109 mg/dL (06 May 2020 11:18)      PHYSICAL EXAM:    GENERAL:  NAD  SKIN: No rashes or lesions  HENT: Automatic Normocephalic. PERRL. Moist membranes.  NECK: Supple, No JVD. No lymphadenopathy.  PULMONARY: CTA B/L. No wheezing. No rales  CVS: Normal S1, S2. Rate and Rhythm are IRIR. No murmurs.  ABDOMEN/GI: Soft, +ve RUQ tenderness, Nondistended; BS present  EXTREMITIES: Peripheral pulses intact. No edema B/L LE.  NEUROLOGIC:  No motor or sensory deficit.  PSYCH: Alert & oriented x 3    Consultant(s) Notes Reviewed:  [x ] YES  [ ] NO  Care Discussed with Consultants/Other Providers [ x] YES  [ ] NO    EKG reviewed  Telemetry reviewed    LABS:                        12.1   7.69  )-----------( 127      ( 06 May 2020 07:30 )             39.3     05-06    138  |  98  |  92<HH>  ----------------------------<  74   Creatinine Trend: 3.3<--, 2.8<--, 2.6<--, 2.5<--, 2.4<--, 2.6<--  3.9   |  23  |  2.8<H>    Ca    9.5      06 May 2020 07:30  Mg     2.1     05-06    TPro  5.9<L>  /  Alb  3.0<L>  /  TBili  0.9  /  DBili  0.6<H>  /  AST  43<H>  /  ALT  20  /  AlkPhos  32  05-06    PT/INR - ( 06 May 2020 07:30 )   PT: 16.30 sec;   INR: 1.42 ratio                 Culture - Blood (collected 06 May 2020 00:30)  Source: .Blood Blood  Preliminary Report (07 May 2020 07:01):    No growth to date.        RADIOLOGY & ADDITIONAL TESTS:    < from: US Abdomen Limited (05.06.20 @ 14:56) >    IMPRESSION:    Gallstones and sludge with gallbladder wall thickening features suggestive of acute cholecystitis in the proper clinical setting.    Mild hepatomegaly with hepatic steatosis.    < end of copied text >    Imaging or report Personally Reviewed:  [ ] YES  [ ] NO    Medications:  Standing  budesonide 160 MICROgram(s)/formoterol 4.5 MICROgram(s) Inhaler 2 Puff(s) Inhalation two times a day  chlorhexidine 4% Liquid 1 Application(s) Topical <User Schedule>  fenofibrate Tablet 145 milliGRAM(s) Oral daily  finasteride 5 milliGRAM(s) Oral daily  furosemide    Tablet 20 milliGRAM(s) Oral two times a day  insulin glargine Injectable (LANTUS) 20 Unit(s) SubCutaneous at bedtime  insulin lispro (HumaLOG) corrective regimen sliding scale   SubCutaneous three times a day before meals  insulin lispro Injectable (HumaLOG) 5 Unit(s) SubCutaneous three times a day before meals  isosorbide   mononitrate ER Tablet (IMDUR) 30 milliGRAM(s) Oral daily  metolazone 5 milliGRAM(s) Oral daily  pantoprazole    Tablet 40 milliGRAM(s) Oral before breakfast  piperacillin/tazobactam IVPB.. 3.375 Gram(s) IV Intermittent every 8 hours  silver sulfADIAZINE 1% Cream 1 Application(s) Topical daily  simvastatin 40 milliGRAM(s) Oral at bedtime  tamsulosin 0.4 milliGRAM(s) Oral at bedtime    PRN Meds  acetaminophen   Tablet .. 650 milliGRAM(s) Oral every 6 hours PRN  acetaminophen   Tablet .. 650 milliGRAM(s) Oral every 6 hours PRN      Case discussed with resident    Care discussed with pt/family

## 2020-05-07 NOTE — PROGRESS NOTE ADULT - SUBJECTIVE AND OBJECTIVE BOX
Nephrology progress note    THIS IS AN INCOMPLETE NOTE . FULL NOTE TO FOLLOW SHORTLY    Patient is seen and examined, events over the last 24 h noted .    Allergies:  No Known Allergies    Hospital Medications:   MEDICATIONS  (STANDING):  budesonide 160 MICROgram(s)/formoterol 4.5 MICROgram(s) Inhaler 2 Puff(s) Inhalation two times a day  chlorhexidine 4% Liquid 1 Application(s) Topical <User Schedule>  fenofibrate Tablet 145 milliGRAM(s) Oral daily  finasteride 5 milliGRAM(s) Oral daily  furosemide    Tablet 20 milliGRAM(s) Oral two times a day  insulin glargine Injectable (LANTUS) 20 Unit(s) SubCutaneous at bedtime  insulin lispro (HumaLOG) corrective regimen sliding scale   SubCutaneous three times a day before meals  insulin lispro Injectable (HumaLOG) 5 Unit(s) SubCutaneous three times a day before meals  isosorbide   mononitrate ER Tablet (IMDUR) 30 milliGRAM(s) Oral daily  metolazone 5 milliGRAM(s) Oral daily  pantoprazole    Tablet 40 milliGRAM(s) Oral before breakfast  piperacillin/tazobactam IVPB.. 3.375 Gram(s) IV Intermittent every 8 hours  silver sulfADIAZINE 1% Cream 1 Application(s) Topical daily  simvastatin 40 milliGRAM(s) Oral at bedtime  tamsulosin 0.4 milliGRAM(s) Oral at bedtime        VITALS:  T(F): 98.9 (20 @ 04:55), Max: 101 (20 @ 14:40)  HR: 102 (20 @ 04:55)  BP: 110/59 (20 @ 04:55)  RR: 18 (20 @ 07:55)  SpO2: 96% (20 @ 07:55)  Wt(kg): --    :  -   07:00  --------------------------------------------------------  IN: 350 mL / OUT: 0 mL / NET: 350 mL    :01  -  05-07 @ 07:00  --------------------------------------------------------  IN: 720 mL / OUT: 100 mL / NET: 620 mL     @ 07:01  -   @ 09:21  --------------------------------------------------------  IN: 240 mL / OUT: 0 mL / NET: 240 mL          PHYSICAL EXAM:  Constitutional: NAD  HEENT: anicteric sclera, oropharynx clear, MMM  Neck: No JVD  Respiratory: CTAB, no wheezes, rales or rhonchi  Cardiovascular: S1, S2, RRR  Gastrointestinal: BS+, soft, NT/ND  Extremities: No cyanosis or clubbing. No peripheral edema  :  No julian.   Skin: No rashes    LABS:      138  |  98  |  92<HH>  ----------------------------<  74  3.9   |  23  |  2.8<H>    Ca    9.5      06 May 2020 07:30  Mg     2.1         TPro  5.9<L>  /  Alb  3.0<L>  /  TBili  0.9  /  DBili  0.6<H>  /  AST  43<H>  /  ALT  20  /  AlkPhos  32                            10.2   7.20  )-----------( 104      ( 07 May 2020 07:25 )             31.6       Urine Studies:  Urinalysis Basic - ( 06 May 2020 17:13 )    Color: Yellow / Appearance: Slightly Turbid / S.017 / pH:   Gluc:  / Ketone: Negative  / Bili: Negative / Urobili: <2 mg/dL   Blood:  / Protein: Trace / Nitrite: Negative   Leuk Esterase: Large / RBC: 3 /HPF / WBC 73 /HPF   Sq Epi:  / Non Sq Epi: 0 /HPF / Bacteria: Many        RADIOLOGY & ADDITIONAL STUDIES: Nephrology progress note  Patient is seen and examined, events over the last 24 h noted .  denied any SOB   No chest pain  Had fever overnight     Allergies:  No Known Allergies    Hospital Medications:   MEDICATIONS  (STANDING):    budesonide 160 MICROgram(s)/formoterol 4.5 MICROgram(s) Inhaler 2 Puff(s) Inhalation two times a day  fenofibrate Tablet 145 milliGRAM(s) Oral daily  finasteride 5 milliGRAM(s) Oral daily  furosemide    Tablet 20 milliGRAM(s) Oral two times a day  insulin glargine Injectable (LANTUS) 20 Unit(s) SubCutaneous at bedtime  insulin lispro (HumaLOG) corrective regimen sliding scale   SubCutaneous three times a day before meals  insulin lispro Injectable (HumaLOG) 5 Unit(s) SubCutaneous three times a day before meals  isosorbide   mononitrate ER Tablet (IMDUR) 30 milliGRAM(s) Oral daily  metolazone 5 milliGRAM(s) Oral daily  pantoprazole    Tablet 40 milliGRAM(s) Oral before breakfast  piperacillin/tazobactam IVPB.. 3.375 Gram(s) IV Intermittent every 8 hours  silver sulfADIAZINE 1% Cream 1 Application(s) Topical daily  simvastatin 40 milliGRAM(s) Oral at bedtime  tamsulosin 0.4 milliGRAM(s) Oral at bedtime        VITALS:  T(F): 98.9 (20 @ 04:55), Max: 101 (20 @ 14:40)  HR: 102 (20 @ 04:55)  BP: 110/59 (20 @ 04:55)  RR: 18 (20 @ 07:55)  SpO2: 96% (20 @ 07:55)      :01  -   @ 07:00  --------------------------------------------------------  IN: 350 mL / OUT: 0 mL / NET: 350 mL     07:01  -   @ 07:00  --------------------------------------------------------  IN: 720 mL / OUT: 100 mL / NET: 620 mL     @ 07:01  -   @ 09:21  --------------------------------------------------------  IN: 240 mL / OUT: 0 mL / NET: 240 mL          PHYSICAL EXAM:  Constitutional: NAD  HEENT: anicteric sclera, oropharynx clear, MMM  Neck: No JVD  Respiratory: CTAB, no wheezes, rales or rhonchi  Cardiovascular: S1, S2, RRR  Gastrointestinal: BS+, soft, NT/ND  Extremities: No cyanosis or clubbing. No peripheral edema  :  No julian.   Skin: No rashes    LABS:      133<L>  |  95<L>  |  94<HH>  ----------------------------<  158<H>  3.7   |  21  |  3.3<H>    Ca    8.8      07 May 2020 07:25  Phos  3.2     05-07  Mg     1.9     05-07    TPro  6.2  /  Alb  3.1<L>  /  TBili  1.0  /  DBili  x   /  AST  37  /  ALT  20  /  AlkPhos  32  05-07    05-06    138  |  98  |  92<HH>  ----------------------------<  74  3.9   |  23  |  2.8<H>    Ca    9.5      06 May 2020 07:30  Mg     2.1     05-06    TPro  5.9<L>  /  Alb  3.0<L>  /  TBili  0.9  /  DBili  0.6<H>  /  AST  43<H>  /  ALT  20  /  AlkPhos  32  05-06                          10.2   7.20  )-----------( 104      ( 07 May 2020 07:25 )             31.6       Urine Studies:  Urinalysis Basic - ( 06 May 2020 17:13 )    Color: Yellow / Appearance: Slightly Turbid / S.017 / pH:   Gluc:  / Ketone: Negative  / Bili: Negative / Urobili: <2 mg/dL   Blood:  / Protein: Trace / Nitrite: Negative   Leuk Esterase: Large / RBC: 3 /HPF / WBC 73 /HPF   Sq Epi:  / Non Sq Epi: 0 /HPF / Bacteria: Many        RADIOLOGY & ADDITIONAL STUDIES:

## 2020-05-07 NOTE — PROGRESS NOTE ADULT - SUBJECTIVE AND OBJECTIVE BOX
Hospital Day:  12d    Subjective:    Pt was interviewed and examined at the bedside in the AM. No acute events overnight.     Denies headaches, changes in vision, chest pains, SOB, n/v/d, abd pain, constipation, changes in urination, pain on urination, weakness, fatigue, joint pain or muscle pain.       Past Medical Hx:   Afib  BPH (benign prostatic hyperplasia)  CHF (congestive heart failure)  COPD (chronic obstructive pulmonary disease)  Diabetes  HTN (hypertension)    Past Sx:  H/O heart artery stent  S/P CABG x 3    Allergies:  No Known Allergies    Current Meds:   Standng Meds:  budesonide 160 MICROgram(s)/formoterol 4.5 MICROgram(s) Inhaler 2 Puff(s) Inhalation two times a day  chlorhexidine 4% Liquid 1 Application(s) Topical <User Schedule>  fenofibrate Tablet 145 milliGRAM(s) Oral daily  finasteride 5 milliGRAM(s) Oral daily  furosemide    Tablet 20 milliGRAM(s) Oral two times a day  insulin glargine Injectable (LANTUS) 20 Unit(s) SubCutaneous at bedtime  insulin lispro (HumaLOG) corrective regimen sliding scale   SubCutaneous three times a day before meals  insulin lispro Injectable (HumaLOG) 5 Unit(s) SubCutaneous three times a day before meals  isosorbide   mononitrate ER Tablet (IMDUR) 30 milliGRAM(s) Oral daily  metolazone 5 milliGRAM(s) Oral daily  pantoprazole    Tablet 40 milliGRAM(s) Oral before breakfast  piperacillin/tazobactam IVPB.. 3.375 Gram(s) IV Intermittent every 8 hours  silver sulfADIAZINE 1% Cream 1 Application(s) Topical daily  simvastatin 40 milliGRAM(s) Oral at bedtime  tamsulosin 0.4 milliGRAM(s) Oral at bedtime    PRN Meds:  acetaminophen   Tablet .. 650 milliGRAM(s) Oral every 6 hours PRN Mild Pain (1 - 3)  acetaminophen   Tablet .. 650 milliGRAM(s) Oral every 6 hours PRN Temp greater or equal to 38C (100.4F)    HOME MEDICATIONS:  budesonide-formoterol 160 mcg-4.5 mcg/inh inhalation aerosol:  inhaled   fenofibrate 145 mg oral tablet: 1 tab(s) orally once a day  finasteride 5 mg oral tablet: 1 tab(s) orally once a day  fluticasone 50 mcg/inh nasal spray: 1 spray(s) nasal 2 times a day  furosemide 40 mg oral tablet: 1 tab(s) orally once a day  isosorbide mononitrate 30 mg oral tablet, extended release: 1 tab(s) orally once a day (in the morning)  Metoprolol Succinate ER 50 mg oral tablet, extended release: 1 tab(s) orally once a day  pantoprazole 40 mg oral delayed release tablet: 1 tab(s) orally once a day (before a meal)  silver sulfADIAZINE 1% topical cream: 1 application topically once a day      Vital Signs:   T(F): 98.9 (20 @ 04:55), Max: 101 (20 @ 14:40)  HR: 102 (20 @ 04:55) (60 - 110)  BP: 110/59 (20 @ 04:55) (95/51 - 131/64)  RR: 18 (20 @ 07:55) (18 - 18)  SpO2: 96% (20 @ 07:55) (96% - 96%)      20 @ 07:01  -  20 @ 07:00  --------------------------------------------------------  IN: 720 mL / OUT: 100 mL / NET: 620 mL    20 @ 07:01  -  20 @ 10:38  --------------------------------------------------------  IN: 240 mL / OUT: 0 mL / NET: 240 mL        Physical Exam:   CONSTITUTIONAL: Chronically ill; in no acute distress, speaking in full sentences  HEAD: Normocephalic; atraumatic  EYES: PERRL, EOMI, no conjunctival erythema  NECK: Supple; non tender, FROM  CARD: +S1, S2 Regular rate and rhythm. Systolic murmur 3/6 RUSB  RESP: CTABL  ABD: soft, RUQ tenderness, nondistended, no rebound, no guarding, no rigidity  EXT: moves all extremities,   LE chronic skin changes and edema 2+  NEURO: Alert, oriented, grossly unremarkable, no focal deficits, cn ii-xii grossly intact  PSYCH: Cooperative, appropriate       Labs:                         10.2   7.20  )-----------( 104      ( 07 May 2020 07:25 )             31.6     Neutophil% 81.2, Lymphocyte% 6.1, Monocyte% 11.9, Bands% 0.6 20 @ 07:25    07 May 2020 07:25    133    |  95     |  94     ----------------------------<  158    3.7     |  21     |  3.3      Ca    8.8        07 May 2020 07:25  Phos  3.2       07 May 2020 07:25  Mg     1.9       07 May 2020 07:25    TPro  6.2    /  Alb  3.1    /  TBili  1.0    /  DBili  x      /  AST  37     /  ALT  20     /  AlkPhos  32     07 May 2020 07:25       pTT    33.2             ----< 1.90 INR  (20 @ 07:25)    21.80        PT            Iron --, TIBC --, %Sat -- Ferritin 640 20 @ 11:48      Urinalysis Basic - ( 06 May 2020 17:13 )    Color: Yellow / Appearance: Slightly Turbid / S.017 / pH: x  Gluc: x / Ketone: Negative  / Bili: Negative / Urobili: <2 mg/dL   Blood: x / Protein: Trace / Nitrite: Negative   Leuk Esterase: Large / RBC: 3 /HPF / WBC 73 /HPF   Sq Epi: x / Non Sq Epi: 0 /HPF / Bacteria: Many          Culture - Blood (collected 20 @ 00:30)  Source: .Blood Blood  Preliminary Report (20 @ 07:01):    No growth to date.        Radiology:       < from: US Abdomen Limited (20 @ 14:56) >  IMPRESSION:    Gallstones and sludge with gallbladder wall thickening features suggestive of acute cholecystitis in the proper clinical setting.    Mild hepatomegaly with hepatic steatosis.    < end of copied text >

## 2020-05-07 NOTE — CONSULT NOTE ADULT - SUBJECTIVE AND OBJECTIVE BOX
SALLY TAIWO [id number]    HPI:    HPI: 80y Male PMHx of CABGx3, BPH, HTN, Afib on Coumadin, COPD, DM, CKD4 and CHF presents to the ED s/p fall from standing, witnessed by patient's son. Patient denies recollection of memory prior, during and after fall. Patient denies increased incidence of falling. Patient's son states, he fell down and his the back of his head. Patient denies chest pain, SOB, fever/chills, cough. CT head showed intra-ventricular hemorrhage. CT neck showed basilar skull fracture. CTA of the head was performed to r/o aneurysm, scans were negative. Patient was admitted to SICU for Q 1 hour neuro checks and syncope work up.     Pt was subsequently downgraded from SICU HOD 2 and transferred to medicine HOD 3 for workup of his other comorbidities. Over the past 3 days, experienced WBC elevation to 14, spiked a fever of 101 @ 14:00 yesterday, and described mild abdominal pain. Medical team obtained US, as below and surgery consulted for concerns of acute cholecystitis today. Today, pt denies CP, SOB, abdominal pain, n/v/d, fever, chills.    PAST MEDICAL & SURGICAL HISTORY:  Afib  BPH (benign prostatic hyperplasia)  CHF (congestive heart failure)  COPD (chronic obstructive pulmonary disease)  Diabetes  HTN (hypertension)  H/O heart artery stent  S/P CABG x 3      Allergies    No Known Allergies    Intolerances (2020 00:04)      PAST MEDICAL & SURGICAL HISTORY:  Afib  BPH (benign prostatic hyperplasia)  CHF (congestive heart failure)  COPD (chronic obstructive pulmonary disease)  Diabetes  HTN (hypertension)  H/O heart artery stent  S/P CABG x 3      MEDICATIONS  (STANDING):  budesonide 160 MICROgram(s)/formoterol 4.5 MICROgram(s) Inhaler 2 Puff(s) Inhalation two times a day  chlorhexidine 4% Liquid 1 Application(s) Topical <User Schedule>  fenofibrate Tablet 145 milliGRAM(s) Oral daily  finasteride 5 milliGRAM(s) Oral daily  furosemide    Tablet 20 milliGRAM(s) Oral two times a day  insulin glargine Injectable (LANTUS) 20 Unit(s) SubCutaneous at bedtime  insulin lispro (HumaLOG) corrective regimen sliding scale   SubCutaneous three times a day before meals  insulin lispro Injectable (HumaLOG) 5 Unit(s) SubCutaneous three times a day before meals  isosorbide   mononitrate ER Tablet (IMDUR) 30 milliGRAM(s) Oral daily  metolazone 5 milliGRAM(s) Oral daily  pantoprazole    Tablet 40 milliGRAM(s) Oral before breakfast  piperacillin/tazobactam IVPB.. 3.375 Gram(s) IV Intermittent every 8 hours  silver sulfADIAZINE 1% Cream 1 Application(s) Topical daily  simvastatin 40 milliGRAM(s) Oral at bedtime  tamsulosin 0.4 milliGRAM(s) Oral at bedtime    MEDICATIONS  (PRN):  acetaminophen   Tablet .. 650 milliGRAM(s) Oral every 6 hours PRN Mild Pain (1 - 3)  acetaminophen   Tablet .. 650 milliGRAM(s) Oral every 6 hours PRN Temp greater or equal to 38C (100.4F)      Allergies    No Known Allergies      REVIEW OF SYSTEMS    [x] A ten-point review of systems was otherwise negative except as noted.  [ ] Due to altered mental status/intubation, subjective information were not able to be obtained from the patient. History was obtained, to the extent possible, from review of the chart and collateral sources of information.      Vital Signs Last 24 Hrs  T(C): 37.2 (07 May 2020 04:55), Max: 38.3 (06 May 2020 14:40)  T(F): 98.9 (07 May 2020 04:55), Max: 101 (06 May 2020 14:40)  HR: 102 (07 May 2020 04:55) (60 - 110)  BP: 110/59 (07 May 2020 04:55) (95/51 - 131/64)  BP(mean): --  RR: 18 (07 May 2020 07:55) (18 - 18)  SpO2: 96% (07 May 2020 07:55) (96% - 96%)    PHYSICAL EXAM:  GENERAL: NAD, well-appearing  CHEST/LUNG: Clear to auscultation bilaterally  HEART: Regular rate and rhythm  ABDOMEN: Soft, mild RUQ tenderness, negative Plascencia sign, Nondistended;   EXTREMITIES:  No clubbing, cyanosis, or edema      LABS:  Labs:  CAPILLARY BLOOD GLUCOSE      POCT Blood Glucose.: 172 mg/dL (07 May 2020 07:31)  POCT Blood Glucose.: 147 mg/dL (06 May 2020 21:26)  POCT Blood Glucose.: 143 mg/dL (06 May 2020 20:30)  POCT Blood Glucose.: 118 mg/dL (06 May 2020 16:36)  POCT Blood Glucose.: 109 mg/dL (06 May 2020 11:18)                          10.2   7.20  )-----------( 104      ( 07 May 2020 07:25 )             31.6       Auto Neutrophil %: 81.2 % (20 @ 07:25)  Auto Immature Granulocyte %: 0.6 % (20 @ 07:25)        138  |  98  |  92<HH>  ----------------------------<  74  3.9   |  23  |  2.8<H>          LFTs:             5.9  | 0.9  | 43       ------------------[32      ( 06 May 2020 17:23 )  3.0  | 0.6  | 20          Lipase:x      Amylase:x             Coags:     21.80  ----< 1.90    ( 07 May 2020 07:25 )     33.2        Urinalysis Basic - ( 06 May 2020 17:13 )    Color: Yellow / Appearance: Slightly Turbid / S.017 / pH: x  Gluc: x / Ketone: Negative  / Bili: Negative / Urobili: <2 mg/dL   Blood: x / Protein: Trace / Nitrite: Negative   Leuk Esterase: Large / RBC: 3 /HPF / WBC 73 /HPF   Sq Epi: x / Non Sq Epi: 0 /HPF / Bacteria: Many        Culture - Blood (collected 06 May 2020 00:30)  Source: .Blood Blood  Preliminary Report (07 May 2020 07:01):    No growth to date.        RADIOLOGY & ADDITIONAL STUDIES:    < from: US Abdomen Limited (20 @ 14:56) >  GALLBLADDER/BILIARY TREE:  Cholelithiasis and sludge. There is gallbladder wall thickening measuring 4.9 mm. No pericholecystic fluid. Negative sonographic Plascencia's sign. No intrahepatic biliary ductal dilatation. The common bile duct measures 5.8 mm, which is normal.     IMPRESSION:    Gallstones and sludge with gallbladder wall thickening features suggestive of acute cholecystitis in the proper clinical setting.    Mild hepatomegaly with hepatic steatosis.    < end of copied text >      < from: CT Angio Pelvis w/ IV Cont (20 @ 14:33) >  IMPRESSION:   1.  Annulus area is 364 mm2, bi-plane diameter of elliptical cross section 19 x 24 mm  and annulus perimeter is 70 mm.   2. The smallest vessel diameter in the pelvis is 5 mm on the right and 4 mm on the left.    3. The smallest vessel diameter in the axilla is 6 mm on the right and 5 mm on the left.    4. Prior CABG with grafts as above.  5. Interstitial pulmonary edema and associated small right pleural effusion.  6. Mildly enlarged mediastinal and right hilar lymph nodes, likely reactive.  < end of copied text >

## 2020-05-07 NOTE — CONSULT NOTE ADULT - ATTENDING COMMENTS
I reviewed the note and agree with the exam and plan of care.    This is 81 y/o male with extensive comorbidities including, CAD, A.fib (on Coumadin), COPD, Severe Aortic stenosis, Pulm. HTN, BRITTANI on CKD Gr.4. He is also S/P Syncope and fall about 2 weeks ago when was found with Right intraventricular hemorrhage. Patient has been admitted for about 2 weeks.    Patient has mild to moderate RUQ abdominal pain for the past 2 days. Had fever to 101 F yesterday. He is currently on Zosyn, tolerates solid food. No N/V.    He underwent abdominal US that shows GB stones, GBW 0.5 cm, CBD 0.6 cm.     PE:  AAO x3  Chest: bilateral breath sounds  CV : iRRR  Abdomen: + bowel sounds, soft, mildly tender to deep palpation in RUQ, no rebound.    WBC 7.2  LFT - wnl.    ASSESSMENT:  81 y/o male with Cholelithiasis. Possible Acute Cholecystitis.  Severe AS.  COPD. Pulm. HTN  BRITTANI on CKD    PLAN:  - continue iv Zosyn  - HIDA scan  Patient is poor surgical candidate due to extensive comorbidities.  Will follow up.

## 2020-05-07 NOTE — PROGRESS NOTE ADULT - ASSESSMENT
80F, PMHx Afib on Coumadin, HTN, DM, CKD 4, COPD, presents to ED s/p fall, +HT, +LOC, +AC.   Found to have L. Carotid artery stenosis >80%.     # CKD 4   - serum creatinine up today / would hold lasix and Metolazone for 24 h    - previously had HD, now off for > 2 months.   - non-oliguric   - BP on lower side. monitor BP.   -  Ca, Mg, phos at goal.   - found to have severe aortic stenosis.   # anemia chronic Hb at goal.  # Fall with head trauma  - Intracranial hemorrhage / stable intraventricular hematoma.   - neurosurgery notes appreciated.    # Carotid artery stenosis   - carotid duplex 80% stenosis of L internal carotid artery  - Vasc surgery: plan for Left CEA on Wed 4/29 canceled due to no cardiac clearance      avoid nephrotoxins   will follow

## 2020-05-08 LAB
-  AMIKACIN: SIGNIFICANT CHANGE UP
-  AMPICILLIN/SULBACTAM: SIGNIFICANT CHANGE UP
-  AMPICILLIN: SIGNIFICANT CHANGE UP
-  AZTREONAM: SIGNIFICANT CHANGE UP
-  CEFAZOLIN: SIGNIFICANT CHANGE UP
-  CEFEPIME: SIGNIFICANT CHANGE UP
-  CEFOXITIN: SIGNIFICANT CHANGE UP
-  CEFTRIAXONE: SIGNIFICANT CHANGE UP
-  CIPROFLOXACIN: SIGNIFICANT CHANGE UP
-  GENTAMICIN: SIGNIFICANT CHANGE UP
-  IMIPENEM: SIGNIFICANT CHANGE UP
-  LEVOFLOXACIN: SIGNIFICANT CHANGE UP
-  MEROPENEM: SIGNIFICANT CHANGE UP
-  NITROFURANTOIN: SIGNIFICANT CHANGE UP
-  PIPERACILLIN/TAZOBACTAM: SIGNIFICANT CHANGE UP
-  TIGECYCLINE: SIGNIFICANT CHANGE UP
-  TOBRAMYCIN: SIGNIFICANT CHANGE UP
-  TRIMETHOPRIM/SULFAMETHOXAZOLE: SIGNIFICANT CHANGE UP
ALBUMIN SERPL ELPH-MCNC: 3 G/DL — LOW (ref 3.5–5.2)
ALP SERPL-CCNC: 36 U/L — SIGNIFICANT CHANGE UP (ref 30–115)
ALT FLD-CCNC: 21 U/L — SIGNIFICANT CHANGE UP (ref 0–41)
ANION GAP SERPL CALC-SCNC: 16 MMOL/L — HIGH (ref 7–14)
APTT BLD: 36 SEC — SIGNIFICANT CHANGE UP (ref 27–39.2)
AST SERPL-CCNC: 36 U/L — SIGNIFICANT CHANGE UP (ref 0–41)
BASOPHILS # BLD AUTO: 0.03 K/UL — SIGNIFICANT CHANGE UP (ref 0–0.2)
BASOPHILS NFR BLD AUTO: 0.3 % — SIGNIFICANT CHANGE UP (ref 0–1)
BILIRUB SERPL-MCNC: 1.3 MG/DL — HIGH (ref 0.2–1.2)
BUN SERPL-MCNC: 102 MG/DL — CRITICAL HIGH (ref 10–20)
CALCIUM SERPL-MCNC: 8.6 MG/DL — SIGNIFICANT CHANGE UP (ref 8.5–10.1)
CHLORIDE SERPL-SCNC: 96 MMOL/L — LOW (ref 98–110)
CO2 SERPL-SCNC: 23 MMOL/L — SIGNIFICANT CHANGE UP (ref 17–32)
CREAT SERPL-MCNC: 3.4 MG/DL — HIGH (ref 0.7–1.5)
CULTURE RESULTS: SIGNIFICANT CHANGE UP
EOSINOPHIL # BLD AUTO: 0.02 K/UL — SIGNIFICANT CHANGE UP (ref 0–0.7)
EOSINOPHIL NFR BLD AUTO: 0.2 % — SIGNIFICANT CHANGE UP (ref 0–8)
GLUCOSE BLDC GLUCOMTR-MCNC: 182 MG/DL — HIGH (ref 70–99)
GLUCOSE BLDC GLUCOMTR-MCNC: 237 MG/DL — HIGH (ref 70–99)
GLUCOSE BLDC GLUCOMTR-MCNC: 355 MG/DL — HIGH (ref 70–99)
GLUCOSE BLDC GLUCOMTR-MCNC: 98 MG/DL — SIGNIFICANT CHANGE UP (ref 70–99)
GLUCOSE SERPL-MCNC: 121 MG/DL — HIGH (ref 70–99)
HCT VFR BLD CALC: 32 % — LOW (ref 42–52)
HGB BLD-MCNC: 10.4 G/DL — LOW (ref 14–18)
IMM GRANULOCYTES NFR BLD AUTO: 1.5 % — HIGH (ref 0.1–0.3)
INR BLD: 2.16 RATIO — HIGH (ref 0.65–1.3)
LYMPHOCYTES # BLD AUTO: 1.17 K/UL — LOW (ref 1.2–3.4)
LYMPHOCYTES # BLD AUTO: 13.1 % — LOW (ref 20.5–51.1)
MAGNESIUM SERPL-MCNC: 2 MG/DL — SIGNIFICANT CHANGE UP (ref 1.8–2.4)
MCHC RBC-ENTMCNC: 28 PG — SIGNIFICANT CHANGE UP (ref 27–31)
MCHC RBC-ENTMCNC: 32.5 G/DL — SIGNIFICANT CHANGE UP (ref 32–37)
MCV RBC AUTO: 86.3 FL — SIGNIFICANT CHANGE UP (ref 80–94)
METHOD TYPE: SIGNIFICANT CHANGE UP
MONOCYTES # BLD AUTO: 1.33 K/UL — HIGH (ref 0.1–0.6)
MONOCYTES NFR BLD AUTO: 14.9 % — HIGH (ref 1.7–9.3)
NEUTROPHILS # BLD AUTO: 6.22 K/UL — SIGNIFICANT CHANGE UP (ref 1.4–6.5)
NEUTROPHILS NFR BLD AUTO: 70 % — SIGNIFICANT CHANGE UP (ref 42.2–75.2)
NRBC # BLD: 0 /100 WBCS — SIGNIFICANT CHANGE UP (ref 0–0)
ORGANISM # SPEC MICROSCOPIC CNT: SIGNIFICANT CHANGE UP
ORGANISM # SPEC MICROSCOPIC CNT: SIGNIFICANT CHANGE UP
PHOSPHATE SERPL-MCNC: 3.6 MG/DL — SIGNIFICANT CHANGE UP (ref 2.1–4.9)
PLATELET # BLD AUTO: 91 K/UL — LOW (ref 130–400)
POTASSIUM SERPL-MCNC: 3.3 MMOL/L — LOW (ref 3.5–5)
POTASSIUM SERPL-SCNC: 3.3 MMOL/L — LOW (ref 3.5–5)
PROT SERPL-MCNC: 6 G/DL — SIGNIFICANT CHANGE UP (ref 6–8)
PROTHROM AB SERPL-ACNC: 24.8 SEC — HIGH (ref 9.95–12.87)
RBC # BLD: 3.71 M/UL — LOW (ref 4.7–6.1)
RBC # FLD: 17.6 % — HIGH (ref 11.5–14.5)
SODIUM SERPL-SCNC: 135 MMOL/L — SIGNIFICANT CHANGE UP (ref 135–146)
SPECIMEN SOURCE: SIGNIFICANT CHANGE UP
WBC # BLD: 8.9 K/UL — SIGNIFICANT CHANGE UP (ref 4.8–10.8)
WBC # FLD AUTO: 8.9 K/UL — SIGNIFICANT CHANGE UP (ref 4.8–10.8)

## 2020-05-08 PROCEDURE — 99233 SBSQ HOSP IP/OBS HIGH 50: CPT

## 2020-05-08 PROCEDURE — 99232 SBSQ HOSP IP/OBS MODERATE 35: CPT

## 2020-05-08 RX ORDER — PIPERACILLIN AND TAZOBACTAM 4; .5 G/20ML; G/20ML
2.25 INJECTION, POWDER, LYOPHILIZED, FOR SOLUTION INTRAVENOUS EVERY 6 HOURS
Refills: 0 | Status: DISCONTINUED | OUTPATIENT
Start: 2020-05-08 | End: 2020-05-09

## 2020-05-08 RX ORDER — PIPERACILLIN AND TAZOBACTAM 4; .5 G/20ML; G/20ML
3.38 INJECTION, POWDER, LYOPHILIZED, FOR SOLUTION INTRAVENOUS EVERY 12 HOURS
Refills: 0 | Status: DISCONTINUED | OUTPATIENT
Start: 2020-05-08 | End: 2020-05-08

## 2020-05-08 RX ORDER — WARFARIN SODIUM 2.5 MG/1
3 TABLET ORAL ONCE
Refills: 0 | Status: COMPLETED | OUTPATIENT
Start: 2020-05-08 | End: 2020-05-08

## 2020-05-08 RX ADMIN — PIPERACILLIN AND TAZOBACTAM 200 GRAM(S): 4; .5 INJECTION, POWDER, LYOPHILIZED, FOR SOLUTION INTRAVENOUS at 23:04

## 2020-05-08 RX ADMIN — Medication 1: at 11:32

## 2020-05-08 RX ADMIN — FINASTERIDE 5 MILLIGRAM(S): 5 TABLET, FILM COATED ORAL at 11:36

## 2020-05-08 RX ADMIN — Medication 145 MILLIGRAM(S): at 11:35

## 2020-05-08 RX ADMIN — PANTOPRAZOLE SODIUM 40 MILLIGRAM(S): 20 TABLET, DELAYED RELEASE ORAL at 06:12

## 2020-05-08 RX ADMIN — WARFARIN SODIUM 3 MILLIGRAM(S): 2.5 TABLET ORAL at 21:10

## 2020-05-08 RX ADMIN — ISOSORBIDE MONONITRATE 30 MILLIGRAM(S): 60 TABLET, EXTENDED RELEASE ORAL at 11:36

## 2020-05-08 RX ADMIN — Medication 5 UNIT(S): at 11:31

## 2020-05-08 RX ADMIN — PIPERACILLIN AND TAZOBACTAM 25 GRAM(S): 4; .5 INJECTION, POWDER, LYOPHILIZED, FOR SOLUTION INTRAVENOUS at 06:11

## 2020-05-08 RX ADMIN — CHLORHEXIDINE GLUCONATE 1 APPLICATION(S): 213 SOLUTION TOPICAL at 06:11

## 2020-05-08 RX ADMIN — TAMSULOSIN HYDROCHLORIDE 0.4 MILLIGRAM(S): 0.4 CAPSULE ORAL at 21:11

## 2020-05-08 RX ADMIN — Medication 5: at 07:58

## 2020-05-08 RX ADMIN — SIMVASTATIN 40 MILLIGRAM(S): 20 TABLET, FILM COATED ORAL at 21:10

## 2020-05-08 RX ADMIN — BUDESONIDE AND FORMOTEROL FUMARATE DIHYDRATE 2 PUFF(S): 160; 4.5 AEROSOL RESPIRATORY (INHALATION) at 23:04

## 2020-05-08 RX ADMIN — INSULIN GLARGINE 20 UNIT(S): 100 INJECTION, SOLUTION SUBCUTANEOUS at 21:15

## 2020-05-08 RX ADMIN — Medication 5 UNIT(S): at 07:57

## 2020-05-08 RX ADMIN — Medication 1 APPLICATION(S): at 21:16

## 2020-05-08 RX ADMIN — PIPERACILLIN AND TAZOBACTAM 200 GRAM(S): 4; .5 INJECTION, POWDER, LYOPHILIZED, FOR SOLUTION INTRAVENOUS at 17:11

## 2020-05-08 NOTE — PROGRESS NOTE ADULT - ASSESSMENT
80F, PMHx Afib on Coumadin, HTN, DM, CKD 4, COPD, presents to ED s/p fall, +HT, +LOC, +AC.   Found to have L. Carotid artery stenosis >80%.     # CKD 4   - serum creatinine noted stable / higher than baseline / would keep diuretics on hold for now    - previously had HD, now off for > 3 months.   - non-oliguric   - BP on lower side. monitor BP.   -  Ca, Mg, phos at goal.  - No need for RRT for now    - found to have severe aortic stenosis.   # anemia chronic Hb at goal.  # Fall with head trauma  - Intracranial hemorrhage / stable intraventricular hematoma.   - neurosurgery notes appreciated.    # Carotid artery stenosis   - carotid duplex 80% stenosis of L internal carotid artery    # UTI / started on antibx now     will follow

## 2020-05-08 NOTE — PROGRESS NOTE ADULT - ASSESSMENT
A 81 yo male with PMH of HTN, DM type 2, Atrial fibrillation on Coumadin and COPD was brought to ED s/p fall. Patient doesn't recall the event and he denies any loss of consciousness. As per Son patient was standing when suddenly fell back and hit his head. Patient found with posterior scalp laceration, Head CT showed intraventricular hemorrhage. Patient denies chest pain, SOB, palpitation or dizziness.     A/P:   Fever: likely UTI vs acute cholecystitis.   Chest CT showed pulmonary edema.   Abdomen US showed acute cholecystitis, LFTs normal, HIDA scan is negative, surgery recommended no intervention.  Continue Zosyn, pending urine and blood cx. SARS-CoV-2 is negative.     Syncope: possibly due to   Severe Aortic stenosis.   As per son he found him on the floor unconscious.   Workup showed severe aortic stenosis.   EKG showed Atrial flutter/fibrillation with slow ventricular response, RBBB  Cardiology follow up outpatient for severe aortic stenosis and CT angio TAVR protocol result.     Fall with Head Trauma:   Intracranial hemorrhage:   Head CT showed right lateral and third ventricles densities without definite evidence of active arterial contrast extravasation.  Brain MRI showed stable intraventricular hematoma. MRV unremarkable.  Neurosurgery is ok to resume Coumadin.     High grade left Internal carotid stenosis:  80%  Vascular plan for  endarterectomy after cardiac clearance. Follow up with Dr. De Guzman outpatient.     BRITTANI on CKD stag 4: stable.   Cr 3.4 from 2.6 (baseline).   s/p CT julita TAVR protocol likely ANGELLA.   Metolazone and Lasix on hold. Nephrology follow up. .      Atrial flutter/fibrillation:   Continue Coumadin, give 4mg today. Neurosurgery is ok.    COPD on home o2, lungs are clear.     #Progress Note Handoff:  Pending (specify):  monitor fever. Improving BRITTANI  Family discussion: with his son,   Disposition: home once medically clear.

## 2020-05-08 NOTE — PROGRESS NOTE ADULT - SUBJECTIVE AND OBJECTIVE BOX
HPI  Patient is a 80y old Male who presents with a chief complaint of fall (04 May 2020 13:55)    Currently admitted to medicine with the primary diagnosis of Intracranial bleed     Today is hospital day 13d.     INTERVAL HPI / OVERNIGHT EVENTS:  Patient was examined and seen at bedside. This morning he is resting comfortably in bed and reports no new issues or overnight events.     ROS: Otherwise unremarkable     PAST MEDICAL & SURGICAL HISTORY  Afib  BPH (benign prostatic hyperplasia)  CHF (congestive heart failure)  COPD (chronic obstructive pulmonary disease)  Diabetes  HTN (hypertension)  H/O heart artery stent  S/P CABG x 3    ALLERGIES  No Known Allergies    MEDICATIONS  STANDING MEDICATIONS  budesonide 160 MICROgram(s)/formoterol 4.5 MICROgram(s) Inhaler 2 Puff(s) Inhalation two times a day  chlorhexidine 4% Liquid 1 Application(s) Topical <User Schedule>  fenofibrate Tablet 145 milliGRAM(s) Oral daily  finasteride 5 milliGRAM(s) Oral daily  insulin glargine Injectable (LANTUS) 20 Unit(s) SubCutaneous at bedtime  insulin lispro (HumaLOG) corrective regimen sliding scale   SubCutaneous three times a day before meals  insulin lispro Injectable (HumaLOG) 5 Unit(s) SubCutaneous three times a day before meals  isosorbide   mononitrate ER Tablet (IMDUR) 30 milliGRAM(s) Oral daily  pantoprazole    Tablet 40 milliGRAM(s) Oral before breakfast  piperacillin/tazobactam IVPB.. 3.375 Gram(s) IV Intermittent every 8 hours  silver sulfADIAZINE 1% Cream 1 Application(s) Topical daily  simvastatin 40 milliGRAM(s) Oral at bedtime  tamsulosin 0.4 milliGRAM(s) Oral at bedtime  warfarin 3 milliGRAM(s) Oral once    PRN MEDICATIONS  acetaminophen   Tablet .. 650 milliGRAM(s) Oral every 6 hours PRN    VITALS:  T(F): 98.1  HR: 60  BP: 101/54  RR: 20  SpO2: --    PHYSICAL EXAM  CONSTITUTIONAL: Chronically ill; in no acute distress, speaking in full sentences  HEAD: Normocephalic; atraumatic  EYES: PERRL, EOMI, no conjunctival erythema  NECK: Supple; non tender, FROM  CARD: +S1, S2 Regular rate and rhythm. Systolic murmur 3/6 RUSB  RESP: CTABL  ABD: soft, RUQ tenderness, nondistended, no rebound, no guarding, no rigidity  EXT: moves all extremities,   LE chronic skin changes and edema 2+  NEURO: Alert, oriented, grossly unremarkable, no focal deficits, cn ii-xii grossly intact  PSYCH: Cooperative, appropriate     LABS                        10.4   8.90  )-----------( 91       ( 08 May 2020 06:31 )             32.0     05-08    135  |  96<L>  |  102<HH>  ----------------------------<  121<H>  3.3<L>   |  23  |  3.4<H>    Ca    8.6      08 May 2020 06:31  Phos  3.6     05-08  Mg     2.0     05-08    TPro  6.0  /  Alb  3.0<L>  /  TBili  1.3<H>  /  DBili  x   /  AST  36  /  ALT  21  /  AlkPhos  36  05-08    PT/INR - ( 08 May 2020 06:31 )   PT: 24.80 sec;   INR: 2.16 ratio         PTT - ( 08 May 2020 06:31 )  PTT:36.0 sec  Urinalysis Basic - ( 06 May 2020 17:13 )    Color: Yellow / Appearance: Slightly Turbid / S.017 / pH: x  Gluc: x / Ketone: Negative  / Bili: Negative / Urobili: <2 mg/dL   Blood: x / Protein: Trace / Nitrite: Negative   Leuk Esterase: Large / RBC: 3 /HPF / WBC 73 /HPF   Sq Epi: x / Non Sq Epi: 0 /HPF / Bacteria: Many            Culture - Urine (collected 06 May 2020 17:13)  Source: .Urine Catheterized  Preliminary Report (07 May 2020 19:37):    >100,000 CFU/ml Gram Negative Rods    Culture - Blood (collected 06 May 2020 00:30)  Source: .Blood Blood  Preliminary Report (07 May 2020 07:01):    No growth to date.          RADIOLOGY

## 2020-05-08 NOTE — PROGRESS NOTE ADULT - ASSESSMENT
80M PMHx Afib on Coumadin, HTN, DM, COPD, presents to ED s/p fall, +HT, +LOC, +AC. Pt does not recall the event, event was witnessed. Pt presents w/ posterior head laceration and abrasions to his Right wrist.  PAN scan showed CTH findings of Deanse material within Right lateral ventrile, and third ventricle, consistent w/ intraventicular hemorrhage. Neurosurgery recommended CTA head which showed: redemonstrated findings of CTH, without definite evidence of active arterial contrast extravasation. Also recommended MRI brain to evaluate for intraventricular cavernoma. 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 Syncope work up w/ MRI brain showed stable intraventricular bleed, EEG was unremarkable. Echo with EF 55-60%, with severe aortic stenosis. Carotid duplex showed greater than 80% stenosis in the left internal carotid artery. Vascular planned for CEA, not cleared by cardio. Cardio recommended structural for TAVR. Nephro recommended hydration before and after CT- TAVR protocal and identified a possible need for HD s/p scan and s/p procedure CT-TAVR protocol done 5/4 @ 1pm, f/u renal fxn. 3x loose stools last night. stopped senna. Pt developed fevers.     #Fevers r/o Infection  Febrile to 103F, multiple episodes   Leukocytosis resolved   COVID neg   Blood Cx neg   UA +, Urine culture growing GNR  RUQ US showed GB wall edema and gallstones   Surgery consulted,  HIDA scan negative, no surgical interventions  Zosyn for now, 2.25g q6hr per ID  f/u ID recs     #Traumatic Fall with Intraventricular Hemorrhage   #Syncope   #Severe Aortic stenosis  Pt found unconscious at home   Head CT showed right lateral and third ventricles densities without definite evidence of active arterial contrast extravasation.  Brain MRI showed stable intraventricular hematoma. MRV unremarkable.    Neurosurgery is ok to resume Coumadin.   Cleared by NSx and NI   Can resume coumadin   ECHO showed severe aortic stenosis.   EKG showed Atrial flutter/fibrillation with slow ventricular response, RBBB  CT angio TAVR protocol   F/u with Cardiology for TVAR o/p      #Left Internal Carotid Stenosis 80% - Vascular for CEA after cardiac clearance. Follow up with Dr. De Guzman outpatient.   # CKD stag 4 - Nephro following, s/p CTA TAVR protocol, Cr trending up mildly, holding diuretics   # Atrial flutter/fibrillation - c/w coumadin, daily INR   #COPD - Stable, on home O2, c/w inhalers     DVT ppx: coumadin   GI ppx; protonix   Actvity: IAT   Diet: Renal DASH   Disposition: Home once infection resolves, PT working with pt  FULL CODE

## 2020-05-08 NOTE — PROGRESS NOTE ADULT - SUBJECTIVE AND OBJECTIVE BOX
SUBJ:No chest pain or shortness of breath      MEDICATIONS  (STANDING):  budesonide 160 MICROgram(s)/formoterol 4.5 MICROgram(s) Inhaler 2 Puff(s) Inhalation two times a day  chlorhexidine 4% Liquid 1 Application(s) Topical <User Schedule>  fenofibrate Tablet 145 milliGRAM(s) Oral daily  finasteride 5 milliGRAM(s) Oral daily  insulin glargine Injectable (LANTUS) 20 Unit(s) SubCutaneous at bedtime  insulin lispro (HumaLOG) corrective regimen sliding scale   SubCutaneous three times a day before meals  insulin lispro Injectable (HumaLOG) 5 Unit(s) SubCutaneous three times a day before meals  isosorbide   mononitrate ER Tablet (IMDUR) 30 milliGRAM(s) Oral daily  pantoprazole    Tablet 40 milliGRAM(s) Oral before breakfast  piperacillin/tazobactam IVPB.. 2.25 Gram(s) IV Intermittent every 6 hours  silver sulfADIAZINE 1% Cream 1 Application(s) Topical daily  simvastatin 40 milliGRAM(s) Oral at bedtime  tamsulosin 0.4 milliGRAM(s) Oral at bedtime  warfarin 3 milliGRAM(s) Oral once    MEDICATIONS  (PRN):  acetaminophen   Tablet .. 650 milliGRAM(s) Oral every 6 hours PRN Temp greater or equal to 38C (100.4F), Mild Pain (1 - 3)            Vital Signs Last 24 Hrs  T(C): 36.9 (08 May 2020 13:32), Max: 36.9 (08 May 2020 13:32)  T(F): 98.4 (08 May 2020 13:32), Max: 98.4 (08 May 2020 13:32)  HR: 72 (08 May 2020 13:32) (60 - 72)  BP: 103/61 (08 May 2020 13:32) (101/54 - 108/65)  BP(mean): --  RR: 18 (08 May 2020 13:32) (18 - 20)  SpO2: --      ECG:NML    TTE:    LABS:                        10.4   8.90  )-----------( 91       ( 08 May 2020 06:31 )             32.0     05-08    135  |  96<L>  |  102<HH>  ----------------------------<  121<H>  3.3<L>   |  23  |  3.4<H>    Ca    8.6      08 May 2020 06:31  Phos  3.6     05-08  Mg     2.0     05-08    TPro  6.0  /  Alb  3.0<L>  /  TBili  1.3<H>  /  DBili  x   /  AST  36  /  ALT  21  /  AlkPhos  36  05-08        PT/INR - ( 08 May 2020 06:31 )   PT: 24.80 sec;   INR: 2.16 ratio         PTT - ( 08 May 2020 06:31 )  PTT:36.0 sec    I&O's Summary    07 May 2020 07:01  -  08 May 2020 07:00  --------------------------------------------------------  IN: 960 mL / OUT: 300 mL / NET: 660 mL      BNP

## 2020-05-08 NOTE — PROGRESS NOTE ADULT - SUBJECTIVE AND OBJECTIVE BOX
GENERAL SURGERY PROGRESS NOTE     TAIWO ZAVALA  [address] [place] [address] day :13d  POD:  Procedure:   Surgical Attending: Maribel Duff  Overnight events: No acute events overnight. Afebrile overnight.      T(F): 96.9 (20 @ 21:42), Max: 98.9 (20 @ 04:55)  HR: 60 (20 @ 21:42) (60 - 102)  BP: 102/59 (20 @ 21:42) (102/59 - 110/59)  ABP: --  ABP(mean): --  RR: 20 (20 @ 21:42) (18 - 20)  SpO2: 96% (20 @ 07:55) (96% - 96%)      20 @ 07:01  -  20 @ 07:00  --------------------------------------------------------  IN:    Oral Fluid: 720 mL  Total IN: 720 mL    OUT:    Voided: 100 mL  Total OUT: 100 mL    Total NET: 620 mL      20 @ 07:01  -  20 @ 01:51  --------------------------------------------------------  IN:    Oral Fluid: 960 mL  Total IN: 960 mL    OUT:    Voided: 300 mL  Total OUT: 300 mL    Total NET: 660 mL    GI proph:  pantoprazole    Tablet 40 milliGRAM(s) Oral before breakfast  AC/ proph:   ABx: piperacillin/tazobactam IVPB.. 3.375 Gram(s) IV Intermittent every 8 hours    PHYSICAL EXAM:  GENERAL: NAD, well-appearing  CHEST/LUNG: Clear to auscultation bilaterally  HEART: Regular rate and rhythm  ABDOMEN: Soft, nondistended, mild RUQ tenderness, no guarding or rebound   EXTREMITIES:  No clubbing, cyanosis, or edema      LABS  Labs:  CAPILLARY BLOOD GLUCOSE      POCT Blood Glucose.: 90 mg/dL (07 May 2020 21:35)  POCT Blood Glucose.: 170 mg/dL (07 May 2020 16:16)  POCT Blood Glucose.: 222 mg/dL (07 May 2020 11:10)  POCT Blood Glucose.: 172 mg/dL (07 May 2020 07:31)                          10.2   7.20  )-----------( 104      ( 07 May 2020 07:25 )             31.6       Auto Neutrophil %: 81.2 % (20 @ 07:25)  Auto Immature Granulocyte %: 0.6 % (20 @ 07:25)        133<L>  |  95<L>  |  94<HH>  ----------------------------<  158<H>  3.7   |  21  |  3.3<H>      Calcium, Total Serum: 8.8 mg/dL (20 @ 07:25)      LFTs:             6.2  | 1.0  | 37       ------------------[32      ( 07 May 2020 07:25 )  3.1  | x    | 20          Lipase:x      Amylase:x          Coags:     21.80  ----< 1.90    ( 07 May 2020 07:25 )     33.2     Urinalysis Basic - ( 06 May 2020 17:13 )    Color: Yellow / Appearance: Slightly Turbid / S.017 / pH: x  Gluc: x / Ketone: Negative  / Bili: Negative / Urobili: <2 mg/dL   Blood: x / Protein: Trace / Nitrite: Negative   Leuk Esterase: Large / RBC: 3 /HPF / WBC 73 /HPF   Sq Epi: x / Non Sq Epi: 0 /HPF / Bacteria: Many    Culture - Urine (collected 06 May 2020 17:13)  Source: .Urine Catheterized  Preliminary Report (07 May 2020 19:37):    >100,000 CFU/ml Gram Negative Rods    Culture - Blood (collected 06 May 2020 00:30)  Source: .Blood Blood  Preliminary Report (07 May 2020 07:01):    No growth to date.    RADIOLOGY & ADDITIONAL TESTS:  < from: NM Hepatobiliary Imaging (20 @ 14:31) >  IMPRESSION:     NORMAL HEPATOBILIARY IMAGES.   DEFINITE VISUALIZATION OF THE GALLBLADDER BY 10 MINUTES POST INJECTION, DEMONSTRATING PATENCY OF THE CYSTIC DUCT.      < end of copied text >

## 2020-05-08 NOTE — CONSULT NOTE ADULT - SUBJECTIVE AND OBJECTIVE BOX
TAIWO ZAVALA  80y, Male  Allergy: No Known Allergies      CHIEF COMPLAINT: fall (04 May 2020 13:55)      LOS  13d    HPI:    80F, PMHx Afib on Coumadin, HTN, DM, COPD, presents to ED s/p fall, +HT, +LOC, +AC. Pt does not recall the event, event was witnessed. Pt presents w/ posterior head laceration and abrasions to his Right wrist. Otherwise, no other complaints.   Concern for acute cholecystitis, HIDA NEGATIVE   Hospital course complicated by fever.   UCX +GNR    piperacillin/tazobactam IVPB. 5/6-present        PAST MEDICAL & SURGICAL HISTORY:  Afib  BPH (benign prostatic hyperplasia)  CHF (congestive heart failure)  COPD (chronic obstructive pulmonary disease)  Diabetes  HTN (hypertension)  H/O heart artery stent  S/P CABG x 3      FAMILY HISTORY  No pertinent family history in first degree relatives      SOCIAL HISTORY  Social History:  denies etoh/ tob/ recc drugs (2020 14:34)        ROS  General: Denies rigors, nightsweats  HEENT: Denies headache, rhinorrhea, sore throat, eye pain  CV: Denies CP, palpitations  PULM: Denies wheezing, hemoptysis  GI: as noted above   : Denies discharge, hematuria  MSK: Denies arthralgias, myalgias  SKIN: Denies rash, lesions  NEURO: Denies paresthesias, weakness  PSYCH: Denies depression, anxiety    VITALS:  T(F): 98.1, Max: 98.1 (20 @ 06:53)  HR: 60  BP: 101/54  RR: 20Vital Signs Last 24 Hrs  T(C): 36.7 (08 May 2020 06:53), Max: 36.7 (08 May 2020 06:53)  T(F): 98.1 (08 May 2020 06:53), Max: 98.1 (08 May 2020 06:53)  HR: 60 (08 May 2020 06:53) (60 - 64)  BP: 101/54 (08 May 2020 06:53) (101/54 - 108/65)  BP(mean): --  RR: 20 (08 May 2020 06:53) (20 - 20)  SpO2: --    PHYSICAL EXAM:  **    TESTS & MEASUREMENTS:                        10.4   8.90  )-----------( 91       ( 08 May 2020 06:31 )             32.0     05-    133<L>  |  95<L>  |  94<HH>  ----------------------------<  158<H>  3.7   |  21  |  3.3<H>    Ca    8.8      07 May 2020 07:25  Phos  3.2     -  Mg     1.9         TPro  6.2  /  Alb  3.1<L>  /  TBili  1.0  /  DBili  x   /  AST  37  /  ALT  20  /  AlkPhos  32  -07      LIVER FUNCTIONS - ( 07 May 2020 07:25 )  Alb: 3.1 g/dL / Pro: 6.2 g/dL / ALK PHOS: 32 U/L / ALT: 20 U/L / AST: 37 U/L / GGT: x           Urinalysis Basic - ( 06 May 2020 17:13 )    Color: Yellow / Appearance: Slightly Turbid / S.017 / pH: x  Gluc: x / Ketone: Negative  / Bili: Negative / Urobili: <2 mg/dL   Blood: x / Protein: Trace / Nitrite: Negative   Leuk Esterase: Large / RBC: 3 /HPF / WBC 73 /HPF   Sq Epi: x / Non Sq Epi: 0 /HPF / Bacteria: Many        Culture - Urine (collected 20 @ 17:13)  Source: .Urine Catheterized  Preliminary Report (20 @ 19:37):    >100,000 CFU/ml Gram Negative Rods    Culture - Blood (collected 20 @ 00:30)  Source: .Blood Blood  Preliminary Report (20 @ 07:01):    No growth to date.    Culture - Blood (collected 20 @ 11:00)  Source: .Blood Blood-Peripheral  Final Report (20 @ 23:01):    No growth at 5 days.    Culture - Blood (collected 10-28-19 @ 05:42)  Source: .Blood None  Final Report (19 @ 17:00):    No growth at 5 days.    Culture - Blood (collected 10-24-19 @ 05:53)  Source: .Blood None  Final Report (10-29-19 @ 18:00):    No growth at 5 days.    Culture - Blood (collected 10-23-19 @ 15:30)  Source: .Blood None  Final Report (10-28-19 @ 23:00):    No growth at 5 days.    Culture - Blood (collected 10-22-19 @ 05:33)  Source: .Blood None  Final Report (10-27-19 @ 18:00):    No growth at 5 days.    Culture - Blood (collected 10-19-19 @ 06:36)  Source: .Blood None  Gram Stain (10-21-19 @ 02:16):    Growth in aerobic bottle: Gram Positive Cocci in Clusters    Growth in anaerobic bottle: Gram Positive Cocci in Clusters  Final Report (10-22-19 @ 09:54):    Growth in aerobic and anaerobic bottles: Staphylococcus aureus  Organism: Staphylococcus aureus (10-22-19 @ 09:54)  Organism: Staphylococcus aureus (10-22-19 @ 09:54)      -  Ampicillin/Sulbactam: S <=8/4      -  Cefazolin: S <=4      -  Clindamycin: R 0.5 This isolate is presumed to be clindamycin resistant based on detection of inducible resistance. Clindamycin may still be effective in some patients.      -  Erythromycin: R >4      -  Gentamicin: S <=1 Should not be used as monotherapy      -  Oxacillin: S <=0.25      -  Penicillin: R >8      -  RIF- Rifampin: S <=1 Should not be used as monotherapy      -  Tetra/Doxy: S <=1      -  Trimethoprim/Sulfamethoxazole: S <=0.5/9.5      -  Vancomycin: S 2      Method Type: HAYLEE    Culture - Blood (collected 10-17-19 @ 05:15)  Source: .Blood Blood  Gram Stain (10-18-19 @ 22:50):    Growth in anaerobic bottle: Gram Positive Cocci in Clusters    Growth in aerobic bottle: Gram Positive Cocci in Clusters  Final Report (10-19-19 @ 19:35):    Growth in aerobic and anaerobic bottles: Staphylococcus aureus    See previous culture [id number]    Culture - Blood (collected 10-16-19 @ 15:11)  Source: .Blood None  Gram Stain (10-18-19 @ 00:08):    Growth in aerobic bottle: Gram Positive Cocci in Clusters    Growth in anaerobic bottle: Gram Positive Cocci in Clusters  Final Report (10-18-19 @ 19:59):    Growth in aerobic and anaerobic bottles: Staphylococcus aureus    See previous culture [id number]    Culture - Urine (collected 10-15-19 @ 11:30)  Source: .Urine Clean Catch (Midstream)  Final Report (10-16-19 @ 21:23):    <10,000 CFU/mL Normal Urogenital Felipa    Culture - Blood (collected 10-15-19 @ 07:00)  Source: .Blood Blood  Gram Stain (10-16-19 @ 11:23):    Growth in aerobic bottle: Gram Positive Cocci in Clusters    Growth in anaerobic bottle: Gram Positive Cocci in Clusters  Final Report (10-18-19 @ 09:55):    Growth in aerobic bottle: Staphylococcus aureus    "Due to technical problems, Proteus sp. will Not be reported as part of    the BCID panel until further notice"    ***Blood Panel PCR results on this specimen are available    approximately 3 hours after the Gram stain result.***    Gram stain, PCR, and/or culture results may not always    correspond due to difference in methodologies.    ************************************************************    This PCR assay was performed using [employer].    The following targets are tested for: Enterococcus,    vancomycin resistant enterococci, Listeria monocytogenes,    coagulase negative staphylococci, S. aureus,    methicillin resistant S. aureus, Streptococcus agalactiae    (Group B), S. pneumoniae, S. pyogenes (Group A),    Acinetobacter baumannii, Enterobacter cloacae, E. coli,    Klebsiella oxytoca, K. pneumoniae, Proteus sp.,    Serratia marcescens, Haemophilus influenzae,    Neisseria meningitidis, Pseudomonas aeruginosa, Candida    albicans, C. glabrata, C krusei, C parapsilosis,    C. tropicalis and the KPC resistance gene.  Organism: Blood Culture PCR  Staphylococcus aureus (10-18-19 @ 09:55)  Organism: Staphylococcus aureus (10-18-19 @ 09:55)      -  Ampicillin/Sulbactam: S <=8/4      -  Cefazolin: S <=4      -  Clindamycin: R 0.5 This isolate is presumed to be clindamycin resistant based on detection of inducible resistance. Clindamycin may still be effective in some patients.      -  Erythromycin: R >4      -  Gentamicin: S <=1 Should not be used as monotherapy      -  Oxacillin: S <=0.25      -  Penicillin: R 8      -  RIF- Rifampin: S <=1 Should not be used as monotherapy      -  Tetra/Doxy: S <=1      -  Trimethoprim/Sulfamethoxazole: S <=0.5/9.5      -  Vancomycin: S 1      Method Type: HAYLEE  Organism: Blood Culture PCR (10-18-19 @ 09:55)      -  Staphylococcus aureus: Detec Any isolate of Staphylococcus aureus from a blood culture is NOT considered a contaminant.      Method Type: PCR    Culture - Urine (collected 19 @ 02:20)  Source: .Urine Clean Catch (Midstream)  Final Report (19 @ 14:21):    <10,000 CFU/mL Normal Urogenital Felipa    Culture - Blood (collected 19 @ 00:15)  Source: .Blood Blood  Final Report (19 @ 19:01):    No growth at 5 days.    Culture - Blood (collected 19 @ 00:15)  Source: .Blood Blood  Final Report (19 @ 19:01):    No growth at 5 days.            INFECTIOUS DISEASES TESTING  Procalcitonin, Serum: 7.94 ng/mL (20 @ 11:48)  COVID-19 PCR: NotDetec (20 @ 11:00)  Hepatitis B Surface Antigen: Nonreact (10-25-19 @ 10:12)  Hepatitis C Virus Interpretation: Nonreact (10-25-19 @ 10:12)  Hepatitis B Surface Antigen: Nonreact (10-24-19 @ 15:49)  Hepatitis C Virus Interpretation: Nonreact (10-24-19 @ 15:49)  MRSA PCR Result.: Negative (10-24-19 @ 12:54)  Clostridium difficile Toxin by PCR: RESULT INTERPRETATION:    Not Detected - No Clostridium difficile toxins detected by amplified DNA  PCR    C. difficile PCR test results should be interpreted only with  consideration of the patient's clinical situation and history.  This test  will detect the presence of toxigenic C. difficle.  However it cannot be  used as the sole criteria  for the diagnosis of antibiotic associated diarrhea, antibiotic  associated colitis, or pseudomembranous colitis.  Colonization with  C.difficile may exceed 20% in hospital patients, the majority of whom are  without Toxigenic  Clostridium Diffidisease.  Testing is generally not recommended in  children below the age of 1 year, as up to half of healthy infants are  asymptomatically colonized withC.difficile.  In addition, C.difficile  PCR testing  cannot be used as a "test of cure" as dead organism nucleic acids will  persist and be detected after treatment.  Successful treatment of  C.difficile disease is determined by resolution of clinical symptoms. Method: CDPCR  TOXIGENIC CLOST.DIFFICILE NEGATIVE;  027-NAP1-B1 PRESUMPTIVE NEGATIVE.  By: Real-Time PCR (Polymerase Chain Reaction)  NOTE: This method detects the Toxin B gene (tCdB), the  binary toxin gene (CDT), and the single-base-pair  deletion at nucleotide 117 within the gene encoding  a negative regulator of toxin production (tcdC 117).  The combined presence of genes encoding Toxin B and  binary toxin and the tcdC 117 deletion has been  associated with hypervirulent C. difficile strain  known as 027/NAP1/B1, which has been associated with  severe disease outbreaks in healthcare facilities  worldwide. (10-17-19 @ 21:10)  Procalcitonin, Serum: 1.56 ng/mL (10-15-19 @ 20:01)      RADIOLOGY & ADDITIONAL TESTS:  I have personally reviewed the last Chest xray  CXR  Xray Chest 1 View- PORTABLE-Urgent:   EXAM:  XR CHEST PORTABLE URGENT 1V            PROCEDURE DATE:  2020            INTERPRETATION:  Clinical History / Reason for exam: Fever    Comparison : Chest radiograph 2020.    Technique/Positioning: Single frontal chest radiograph.    Findings:    Support devices: None.    Cardiac/mediastinum/hilum: Stable cardiomegaly. Post median sternotomy.    Lung parenchyma/Pleura: Bibasilar opacities. No pneumothorax.    Skeleton/soft tissues: Stable    Impression:      Bibasilar opacities.Radiographic follow-up recommended.                  AMY TAN M.D., ATTENDING RADIOLOGIST  This document has been electronically signed. May  6 2020 11:26AM             (20 @ 10:17)      CT      CARDIOLOGY TESTING  12 Lead ECG:   Ventricular Rate 73 BPM    Atrial Rate 250 BPM    QRS Duration 158 ms    Q-T Interval 582 ms    QTC Calculation(Bezet) 641 ms    R Axis 137 degrees    T Axis -48 degrees    Diagnosis Line *** Suspect arm lead reversal, interpretation assumes no reversal  Atrial flutter  Non-specific intra-ventricular conduction block  Right ventricular hypertrophy  Marked T wave abnormality, consider anterior ischemia  Abnormal ECG    Confirmed by Felipe Comer ([id number]) on 2020 6:49:55 PM (20 @ 14:06)  12 Lead ECG:   Systolic  mmHg    Diastolic BP 70 mmHg    Ventricular Rate 61 BPM    Atrial Rate 244 BPM    QRS Duration 126 ms    Q-T Interval 478 ms    QTC Calculation(Bezet) 481 ms    P Axis 96 degrees    R Axis 267 degrees    T Axis 76 degrees    Diagnosis Line Atrial flutter with variable A-V block with premature ventricular or  aberrantly conducted complexes  Right superior axis deviation  Non-specific intra-ventricular conduction block  Right ventricular hypertrophy Possible  Cannot rule out Septal infarct , age undetermined  Nonspecific ST and T wave abnormality  Abnormal ECG    Confirmed by OLENA GANT MD ([id number]) on 2020 10:09:49 AM (20 @ 15:20)      MEDICATIONS  budesonide 160 MICROgram(s)/formoterol 4.5 MICROgram(s) Inhaler 2  chlorhexidine 4% Liquid 1  fenofibrate Tablet 145  finasteride 5  insulin glargine Injectable (LANTUS) 20  insulin lispro (HumaLOG) corrective regimen sliding scale   insulin lispro Injectable (HumaLOG) 5  isosorbide   mononitrate ER Tablet (IMDUR) 30  pantoprazole    Tablet 40  piperacillin/tazobactam IVPB.. 3.375  silver sulfADIAZINE 1% Cream 1  simvastatin 40  tamsulosin 0.4      Weight  Weight (kg): 84.5 (20 @ 04:50)    ANTIBIOTICS:  piperacillin/tazobactam IVPB.. 3.375 Gram(s) IV Intermittent every 8 hours      ALLERGIES:  No Known Allergies SALLYTAIWO  80y, Male  Allergy: No Known Allergies      CHIEF COMPLAINT: fall (04 May 2020 13:55)      LOS  13d    HPI:    80F, PMHx Afib on Coumadin, HTN, DM, COPD, presents to ED s/p fall, +HT, +LOC, +AC. Pt does not recall the event, event was witnessed. Pt presents w/ posterior head laceration and abrasions to his Right wrist. Otherwise, no other complaints.   Concern for acute cholecystitis, HIDA NEGATIVE   Hospital course complicated by fever.   UCX +GNR    piperacillin/tazobactam IVPB. /6-present        PAST MEDICAL & SURGICAL HISTORY:  Afib  BPH (benign prostatic hyperplasia)  CHF (congestive heart failure)  COPD (chronic obstructive pulmonary disease)  Diabetes  HTN (hypertension)  H/O heart artery stent  S/P CABG x 3      FAMILY HISTORY  No pertinent family history in first degree relatives      SOCIAL HISTORY  Social History:  denies etoh/ tob/ recc drugs (2020 14:34)        ROS  General: Denies rigors, nightsweats  HEENT: Denies headache, rhinorrhea, sore throat, eye pain  CV: Denies CP, palpitations  PULM: Denies wheezing, hemoptysis  GI: as noted above   : Denies discharge, hematuria  MSK: Denies arthralgias, myalgias  SKIN: Denies rash, lesions  NEURO: Denies paresthesias, weakness  PSYCH: Denies depression, anxiety    VITALS:  T(F): 98.1, Max: 98.1 (20 @ 06:53)  HR: 60  BP: 101/54  RR: 20Vital Signs Last 24 Hrs  T(C): 36.7 (08 May 2020 06:53), Max: 36.7 (08 May 2020 06:53)  T(F): 98.1 (08 May 2020 06:53), Max: 98.1 (08 May 2020 06:53)  HR: 60 (08 May 2020 06:53) (60 - 64)  BP: 101/54 (08 May 2020 06:53) (101/54 - 108/65)  BP(mean): --  RR: 20 (08 May 2020 06:53) (20 - 20)  SpO2: --    PHYSICAL EXAM:  Gen: Awake and alert, non-toxic appearing, NAD  HEENT: NCAT. EOMI. MMM.   Neck: Supple, no cervical LAD  CV: RRR, no murmurs  Lungs: CTAB, no w/r/r  Abd: Soft. NTND, no RUQ tenderness to palpation   Extr: wwp, no edema  Skin: no rash  Neuro: No focal deficits  Lines: clean     TESTS & MEASUREMENTS:                        10.4   8.90  )-----------( 91       ( 08 May 2020 06:31 )             32.0     05-07    133<L>  |  95<L>  |  94<HH>  ----------------------------<  158<H>  3.7   |  21  |  3.3<H>    Ca    8.8      07 May 2020 07:25  Phos  3.2     -  Mg     1.9     -    TPro  6.2  /  Alb  3.1<L>  /  TBili  1.0  /  DBili  x   /  AST  37  /  ALT  20  /  AlkPhos  32        LIVER FUNCTIONS - ( 07 May 2020 07:25 )  Alb: 3.1 g/dL / Pro: 6.2 g/dL / ALK PHOS: 32 U/L / ALT: 20 U/L / AST: 37 U/L / GGT: x           Urinalysis Basic - ( 06 May 2020 17:13 )    Color: Yellow / Appearance: Slightly Turbid / S.017 / pH: x  Gluc: x / Ketone: Negative  / Bili: Negative / Urobili: <2 mg/dL   Blood: x / Protein: Trace / Nitrite: Negative   Leuk Esterase: Large / RBC: 3 /HPF / WBC 73 /HPF   Sq Epi: x / Non Sq Epi: 0 /HPF / Bacteria: Many        Culture - Urine (collected 20 @ 17:13)  Source: .Urine Catheterized  Preliminary Report (20 @ 19:37):    >100,000 CFU/ml Gram Negative Rods    Culture - Blood (collected 20 @ 00:30)  Source: .Blood Blood  Preliminary Report (20 @ 07:01):    No growth to date.    Culture - Blood (collected 20 @ 11:00)  Source: .Blood Blood-Peripheral  Final Report (20 @ 23:01):    No growth at 5 days.    Culture - Blood (collected 10-28-19 @ 05:42)  Source: .Blood None  Final Report (19 @ 17:00):    No growth at 5 days.    Culture - Blood (collected 10-24-19 @ 05:53)  Source: .Blood None  Final Report (10-29-19 @ 18:00):    No growth at 5 days.    Culture - Blood (collected 10-23-19 @ 15:30)  Source: .Blood None  Final Report (10-28-19 @ 23:00):    No growth at 5 days.    Culture - Blood (collected 10-22-19 @ 05:33)  Source: .Blood None  Final Report (10-27-19 @ 18:00):    No growth at 5 days.    Culture - Blood (collected 10-19-19 @ 06:36)  Source: .Blood None  Gram Stain (10-21-19 @ 02:16):    Growth in aerobic bottle: Gram Positive Cocci in Clusters    Growth in anaerobic bottle: Gram Positive Cocci in Clusters  Final Report (10-22-19 @ 09:54):    Growth in aerobic and anaerobic bottles: Staphylococcus aureus  Organism: Staphylococcus aureus (10-22-19 @ 09:54)  Organism: Staphylococcus aureus (10-22-19 @ 09:54)      -  Ampicillin/Sulbactam: S <=8/4      -  Cefazolin: S <=4      -  Clindamycin: R 0.5 This isolate is presumed to be clindamycin resistant based on detection of inducible resistance. Clindamycin may still be effective in some patients.      -  Erythromycin: R >4      -  Gentamicin: S <=1 Should not be used as monotherapy      -  Oxacillin: S <=0.25      -  Penicillin: R >8      -  RIF- Rifampin: S <=1 Should not be used as monotherapy      -  Tetra/Doxy: S <=1      -  Trimethoprim/Sulfamethoxazole: S <=0.5/9.5      -  Vancomycin: S 2      Method Type: HAYLEE    Culture - Blood (collected 10-17-19 @ 05:15)  Source: .Blood Blood  Gram Stain (10-18-19 @ 22:50):    Growth in anaerobic bottle: Gram Positive Cocci in Clusters    Growth in aerobic bottle: Gram Positive Cocci in Clusters  Final Report (10-19-19 @ 19:35):    Growth in aerobic and anaerobic bottles: Staphylococcus aureus    See previous culture [id number]    Culture - Blood (collected 10-16-19 @ 15:11)  Source: .Blood None  Gram Stain (10-18-19 @ 00:08):    Growth in aerobic bottle: Gram Positive Cocci in Clusters    Growth in anaerobic bottle: Gram Positive Cocci in Clusters  Final Report (10-18-19 @ 19:59):    Growth in aerobic and anaerobic bottles: Staphylococcus aureus    See previous culture [id number]    Culture - Urine (collected 10-15-19 @ 11:30)  Source: .Urine Clean Catch (Midstream)  Final Report (10-16-19 @ 21:23):    <10,000 CFU/mL Normal Urogenital Felipa    Culture - Blood (collected 10-15-19 @ 07:00)  Source: .Blood Blood  Gram Stain (10-16-19 @ 11:23):    Growth in aerobic bottle: Gram Positive Cocci in Clusters    Growth in anaerobic bottle: Gram Positive Cocci in Clusters  Final Report (10-18-19 @ 09:55):    Growth in aerobic bottle: Staphylococcus aureus    "Due to technical problems, Proteus sp. will Not be reported as part of    the BCID panel until further notice"    ***Blood Panel PCR results on this specimen are available    approximately 3 hours after the Gram stain result.***    Gram stain, PCR, and/or culture results may not always    correspond due to difference in methodologies.    ************************************************************    This PCR assay was performed using [employer].    The following targets are tested for: Enterococcus,    vancomycin resistant enterococci, Listeria monocytogenes,    coagulase negative staphylococci, S. aureus,    methicillin resistant S. aureus, Streptococcus agalactiae    (Group B), S. pneumoniae, S. pyogenes (Group A),    Acinetobacter baumannii, Enterobacter cloacae, E. coli,    Klebsiella oxytoca, K. pneumoniae, Proteus sp.,    Serratia marcescens, Haemophilus influenzae,    Neisseria meningitidis, Pseudomonas aeruginosa, Candida    albicans, C. glabrata, C krusei, C parapsilosis,    C. tropicalis and the KPC resistance gene.  Organism: Blood Culture PCR  Staphylococcus aureus (10-18-19 @ 09:55)  Organism: Staphylococcus aureus (10-18-19 @ 09:55)      -  Ampicillin/Sulbactam: S <=8/4      -  Cefazolin: S <=4      -  Clindamycin: R 0.5 This isolate is presumed to be clindamycin resistant based on detection of inducible resistance. Clindamycin may still be effective in some patients.      -  Erythromycin: R >4      -  Gentamicin: S <=1 Should not be used as monotherapy      -  Oxacillin: S <=0.25      -  Penicillin: R 8      -  RIF- Rifampin: S <=1 Should not be used as monotherapy      -  Tetra/Doxy: S <=1      -  Trimethoprim/Sulfamethoxazole: S <=0.5/9.5      -  Vancomycin: S 1      Method Type: HAYLEE  Organism: Blood Culture PCR (10-18-19 @ 09:55)      -  Staphylococcus aureus: Detec Any isolate of Staphylococcus aureus from a blood culture is NOT considered a contaminant.      Method Type: PCR    Culture - Urine (collected 19 @ 02:20)  Source: .Urine Clean Catch (Midstream)  Final Report (19 @ 14:21):    <10,000 CFU/mL Normal Urogenital Felipa    Culture - Blood (collected 19 @ 00:15)  Source: .Blood Blood  Final Report (19 @ 19:01):    No growth at 5 days.    Culture - Blood (collected 19 @ 00:15)  Source: .Blood Blood  Final Report (19 @ 19:01):    No growth at 5 days.            INFECTIOUS DISEASES TESTING  Procalcitonin, Serum: 7.94 ng/mL (20 @ 11:48)  COVID-19 PCR: NotDetec (20 @ 11:00)  Hepatitis B Surface Antigen: Nonreact (10-25-19 @ 10:12)  Hepatitis C Virus Interpretation: Nonreact (10-25-19 @ 10:12)  Hepatitis B Surface Antigen: Nonreact (10-24-19 @ 15:49)  Hepatitis C Virus Interpretation: Nonreact (10-24-19 @ 15:49)  MRSA PCR Result.: Negative (10-24-19 @ 12:54)  Clostridium difficile Toxin by PCR: RESULT INTERPRETATION:    Not Detected - No Clostridium difficile toxins detected by amplified DNA  PCR    C. difficile PCR test results should be interpreted only with  consideration of the patient's clinical situation and history.  This test  will detect the presence of toxigenic C. difficle.  However it cannot be  used as the sole criteria  for the diagnosis of antibiotic associated diarrhea, antibiotic  associated colitis, or pseudomembranous colitis.  Colonization with  C.difficile may exceed 20% in hospital patients, the majority of whom are  without Toxigenic  Clostridium Diffidisease.  Testing is generally not recommended in  children below the age of 1 year, as up to half of healthy infants are  asymptomatically colonized withC.difficile.  In addition, C.difficile  PCR testing  cannot be used as a "test of cure" as dead organism nucleic acids will  persist and be detected after treatment.  Successful treatment of  C.difficile disease is determined by resolution of clinical symptoms. Method: CDPCR  TOXIGENIC CLOST.DIFFICILE NEGATIVE;  027-NAP1-B1 PRESUMPTIVE NEGATIVE.  By: Real-Time PCR (Polymerase Chain Reaction)  NOTE: This method detects the Toxin B gene (tCdB), the  binary toxin gene (CDT), and the single-base-pair  deletion at nucleotide 117 within the gene encoding  a negative regulator of toxin production (tcdC 117).  The combined presence of genes encoding Toxin B and  binary toxin and the tcdC 117 deletion has been  associated with hypervirulent C. difficile strain  known as 027/NAP1/B1, which has been associated with  severe disease outbreaks in healthcare facilities  worldwide. (10-17-19 @ 21:10)  Procalcitonin, Serum: 1.56 ng/mL (10-15-19 @ 20:01)      RADIOLOGY & ADDITIONAL TESTS:  I have personally reviewed the last Chest xray  CXR  Xray Chest 1 View- PORTABLE-Urgent:   EXAM:  XR CHEST PORTABLE URGENT 1V            PROCEDURE DATE:  2020            INTERPRETATION:  Clinical History / Reason for exam: Fever    Comparison : Chest radiograph 2020.    Technique/Positioning: Single frontal chest radiograph.    Findings:    Support devices: None.    Cardiac/mediastinum/hilum: Stable cardiomegaly. Post median sternotomy.    Lung parenchyma/Pleura: Bibasilar opacities. No pneumothorax.    Skeleton/soft tissues: Stable    Impression:      Bibasilar opacities.Radiographic follow-up recommended.                  AMY TAN M.D., ATTENDING RADIOLOGIST  This document has been electronically signed. May  6 2020 11:26AM             (20 @ 10:17)      CT      CARDIOLOGY TESTING  12 Lead ECG:   Ventricular Rate 73 BPM    Atrial Rate 250 BPM    QRS Duration 158 ms    Q-T Interval 582 ms    QTC Calculation(Bezet) 641 ms    R Axis 137 degrees    T Axis -48 degrees    Diagnosis Line *** Suspect arm lead reversal, interpretation assumes no reversal  Atrial flutter  Non-specific intra-ventricular conduction block  Right ventricular hypertrophy  Marked T wave abnormality, consider anterior ischemia  Abnormal ECG    Confirmed by Felipe Comer ([id number]) on 2020 6:49:55 PM (20 @ 14:06)  12 Lead ECG:   Systolic  mmHg    Diastolic BP 70 mmHg    Ventricular Rate 61 BPM    Atrial Rate 244 BPM    QRS Duration 126 ms    Q-T Interval 478 ms    QTC Calculation(Bezet) 481 ms    P Axis 96 degrees    R Axis 267 degrees    T Axis 76 degrees    Diagnosis Line Atrial flutter with variable A-V block with premature ventricular or  aberrantly conducted complexes  Right superior axis deviation  Non-specific intra-ventricular conduction block  Right ventricular hypertrophy Possible  Cannot rule out Septal infarct , age undetermined  Nonspecific ST and T wave abnormality  Abnormal ECG    Confirmed by OLENA GANT MD ([id number]) on 2020 10:09:49 AM (20 @ 15:20)      MEDICATIONS  budesonide 160 MICROgram(s)/formoterol 4.5 MICROgram(s) Inhaler 2  chlorhexidine 4% Liquid 1  fenofibrate Tablet 145  finasteride 5  insulin glargine Injectable (LANTUS) 20  insulin lispro (HumaLOG) corrective regimen sliding scale   insulin lispro Injectable (HumaLOG) 5  isosorbide   mononitrate ER Tablet (IMDUR) 30  pantoprazole    Tablet 40  piperacillin/tazobactam IVPB.. 3.375  silver sulfADIAZINE 1% Cream 1  simvastatin 40  tamsulosin 0.4      Weight  Weight (kg): 84.5 (20 @ 04:50)    ANTIBIOTICS:  piperacillin/tazobactam IVPB.. 3.375 Gram(s) IV Intermittent every 8 hours      ALLERGIES:  No Known Allergies

## 2020-05-08 NOTE — CONSULT NOTE ADULT - CONSULT REASON
IVH. ?Syncopal episode   Neuro Checks Q 1 hour
left ica stenosis
CKD 4
Evaluation of severe AS
IVH s/p fall
Right IVH, questionable cavernoma
Syncope, bradycardia.
acute cholecystitis
afib
cholecystitis
head trauma
s/p fall on coumadin, +HT, +LOC, +coumadin

## 2020-05-08 NOTE — PROGRESS NOTE ADULT - SUBJECTIVE AND OBJECTIVE BOX
TAIWO ZAVALA  80y  Male      Patient is a 80y old  Male who presents with a chief complaint of Fall (08 May 2020 12:38)      INTERVAL HPI/OVERNIGHT EVENTS:  He feels ok, reports mild abdominal pain, no urinary symptoms, no fever today.   Vital Signs Last 24 Hrs  T(C): 36.9 (08 May 2020 13:32), Max: 36.9 (08 May 2020 13:32)  T(F): 98.4 (08 May 2020 13:32), Max: 98.4 (08 May 2020 13:32)  HR: 72 (08 May 2020 13:32) (60 - 72)  BP: 103/61 (08 May 2020 13:32) (101/54 - 103/61)  BP(mean): --  RR: 18 (08 May 2020 13:32) (18 - 20)  SpO2: --      20 @ 07:01  -  20 @ 07:00  --------------------------------------------------------  IN: 960 mL / OUT: 300 mL / NET: 660 mL            Consultant(s) Notes Reviewed:  [x ] YES  [ ] NO          MEDICATIONS  (STANDING):  budesonide 160 MICROgram(s)/formoterol 4.5 MICROgram(s) Inhaler 2 Puff(s) Inhalation two times a day  chlorhexidine 4% Liquid 1 Application(s) Topical <User Schedule>  fenofibrate Tablet 145 milliGRAM(s) Oral daily  finasteride 5 milliGRAM(s) Oral daily  insulin glargine Injectable (LANTUS) 20 Unit(s) SubCutaneous at bedtime  insulin lispro (HumaLOG) corrective regimen sliding scale   SubCutaneous three times a day before meals  insulin lispro Injectable (HumaLOG) 5 Unit(s) SubCutaneous three times a day before meals  isosorbide   mononitrate ER Tablet (IMDUR) 30 milliGRAM(s) Oral daily  pantoprazole    Tablet 40 milliGRAM(s) Oral before breakfast  piperacillin/tazobactam IVPB.. 2.25 Gram(s) IV Intermittent every 6 hours  silver sulfADIAZINE 1% Cream 1 Application(s) Topical daily  simvastatin 40 milliGRAM(s) Oral at bedtime  tamsulosin 0.4 milliGRAM(s) Oral at bedtime  warfarin 3 milliGRAM(s) Oral once    MEDICATIONS  (PRN):  acetaminophen   Tablet .. 650 milliGRAM(s) Oral every 6 hours PRN Temp greater or equal to 38C (100.4F), Mild Pain (1 - 3)      LABS                          10.4   8.90  )-----------( 91       ( 08 May 2020 06:31 )             32.0     05-08    135  |  96<L>  |  102<HH>  ----------------------------<  121<H>  3.3<L>   |  23  |  3.4<H>    Ca    8.6      08 May 2020 06:31  Phos  3.6     05-08  Mg     2.0     -08    TPro  6.0  /  Alb  3.0<L>  /  TBili  1.3<H>  /  DBili  x   /  AST  36  /  ALT  21  /  AlkPhos  36  -08      Urinalysis Basic - ( 06 May 2020 17:13 )    Color: Yellow / Appearance: Slightly Turbid / S.017 / pH: x  Gluc: x / Ketone: Negative  / Bili: Negative / Urobili: <2 mg/dL   Blood: x / Protein: Trace / Nitrite: Negative   Leuk Esterase: Large / RBC: 3 /HPF / WBC 73 /HPF   Sq Epi: x / Non Sq Epi: 0 /HPF / Bacteria: Many      PT/INR - ( 08 May 2020 06:31 )   PT: 24.80 sec;   INR: 2.16 ratio         PTT - ( 08 May 2020 06:31 )  PTT:36.0 sec  Lactate Trend        CAPILLARY BLOOD GLUCOSE      POCT Blood Glucose.: 182 mg/dL (08 May 2020 11:07)      Culture - Urine (collected 20 @ 17:13)  Source: .Urine Catheterized  Preliminary Report (20 @ 19:37):    >100,000 CFU/ml Gram Negative Rods    Culture - Blood (collected 20 @ 00:30)  Source: .Blood Blood  Preliminary Report (20 @ 07:01):    No growth to date.        RADIOLOGY & ADDITIONAL TESTS:    Imaging Personally Reviewed:  [ ] YES  [ ] NO    HEALTH ISSUES - PROBLEM Dx:          PHYSICAL EXAM:  GENERAL: NAD, well-developed  HEAD:  Atraumatic, Normocephalic  EYES: EOMI, PERRLA, conjunctiva and sclera clear  NECK: Supple, No JVD  CHEST/LUNG: Clear to auscultation bilaterally; No wheeze  HEART: Irregular rate and rhythm; S1 S2 Sm 3/6 on aortic area radiates to the neck.   ABDOMEN: Soft, mild RUQ tenderness, no rigidity or guarding.  EXTREMITIES:  LE chronic skin changes and edema 2+  PSYCH: AAOx3  NEUROLOGY: non-focal  SKIN: No rashes or lesions

## 2020-05-08 NOTE — PROGRESS NOTE ADULT - SUBJECTIVE AND OBJECTIVE BOX
Nephrology progress note    THIS IS AN INCOMPLETE NOTE . FULL NOTE TO FOLLOW SHORTLY    Patient is seen and examined, events over the last 24 h noted .    Allergies:  No Known Allergies    Hospital Medications:   MEDICATIONS  (STANDING):  budesonide 160 MICROgram(s)/formoterol 4.5 MICROgram(s) Inhaler 2 Puff(s) Inhalation two times a day  chlorhexidine 4% Liquid 1 Application(s) Topical <User Schedule>  fenofibrate Tablet 145 milliGRAM(s) Oral daily  finasteride 5 milliGRAM(s) Oral daily  insulin glargine Injectable (LANTUS) 20 Unit(s) SubCutaneous at bedtime  insulin lispro (HumaLOG) corrective regimen sliding scale   SubCutaneous three times a day before meals  insulin lispro Injectable (HumaLOG) 5 Unit(s) SubCutaneous three times a day before meals  isosorbide   mononitrate ER Tablet (IMDUR) 30 milliGRAM(s) Oral daily  pantoprazole    Tablet 40 milliGRAM(s) Oral before breakfast  piperacillin/tazobactam IVPB.. 3.375 Gram(s) IV Intermittent every 8 hours  silver sulfADIAZINE 1% Cream 1 Application(s) Topical daily  simvastatin 40 milliGRAM(s) Oral at bedtime  tamsulosin 0.4 milliGRAM(s) Oral at bedtime        VITALS:  T(F): 98.1 (20 @ 06:53), Max: 98.1 (20 @ 06:53)  HR: 60 (20 @ 06:53)  BP: 101/54 (20 @ 06:53)  RR: 20 (20 @ 06:53)  SpO2: --  Wt(kg): --     07:  -   @ 07:00  --------------------------------------------------------  IN: 720 mL / OUT: 100 mL / NET: 620 mL     @ 07:01  -   @ 07:00  --------------------------------------------------------  IN: 960 mL / OUT: 300 mL / NET: 660 mL          PHYSICAL EXAM:  Constitutional: NAD  HEENT: anicteric sclera, oropharynx clear, MMM  Neck: No JVD  Respiratory: CTAB, no wheezes, rales or rhonchi  Cardiovascular: S1, S2, RRR  Gastrointestinal: BS+, soft, NT/ND  Extremities: No cyanosis or clubbing. No peripheral edema  :  No julian.   Skin: No rashes    LABS:      135  |  96<L>  |  102<HH>  ----------------------------<  121<H>  3.3<L>   |  23  |  3.4<H>    Ca    8.6      08 May 2020 06:31  Phos  3.6     05-08  Mg     2.0     05-08    TPro  6.0  /  Alb  3.0<L>  /  TBili  1.3<H>  /  DBili      /  AST  36  /  ALT  21  /  AlkPhos  36  05-08                          10.4   8.90  )-----------( 91       ( 08 May 2020 06:31 )             32.0       Urine Studies:  Urinalysis Basic - ( 06 May 2020 17:13 )    Color: Yellow / Appearance: Slightly Turbid / S.017 / pH:   Gluc:  / Ketone: Negative  / Bili: Negative / Urobili: <2 mg/dL   Blood:  / Protein: Trace / Nitrite: Negative   Leuk Esterase: Large / RBC: 3 /HPF / WBC 73 /HPF   Sq Epi:  / Non Sq Epi: 0 /HPF / Bacteria: Many        RADIOLOGY & ADDITIONAL STUDIES: Nephrology progress note  Patient is seen and examined, events over the last 24 h noted .  lying in bed comfortable  on antibx for UTI     Allergies:  No Known Allergies    Hospital Medications:   MEDICATIONS  (STANDING):  budesonide 160 MICROgram(s)/formoterol 4.5 MICROgram(s) Inhaler 2 Puff(s) Inhalation two times a day  fenofibrate Tablet 145 milliGRAM(s) Oral daily  finasteride 5 milliGRAM(s) Oral daily  insulin glargine Injectable (LANTUS) 20 Unit(s) SubCutaneous at bedtime  insulin lispro (HumaLOG) corrective regimen sliding scale   SubCutaneous three times a day before meals  insulin lispro Injectable (HumaLOG) 5 Unit(s) SubCutaneous three times a day before meals  isosorbide   mononitrate ER Tablet (IMDUR) 30 milliGRAM(s) Oral daily  pantoprazole    Tablet 40 milliGRAM(s) Oral before breakfast  piperacillin/tazobactam IVPB.. 3.375 Gram(s) IV Intermittent every 8 hours  silver sulfADIAZINE 1% Cream 1 Application(s) Topical daily  simvastatin 40 milliGRAM(s) Oral at bedtime  tamsulosin 0.4 milliGRAM(s) Oral at bedtime        VITALS:  T(F): 98.1 (20 @ 06:53), Max: 98.1 (20 @ 06:53)  HR: 60 (20 @ 06:53)  BP: 101/54 (20 @ 06:53)  RR: 20 (20 @ 06:53)       07:01  -   @ 07:00  --------------------------------------------------------  IN: 720 mL / OUT: 100 mL / NET: 620 mL     @ 07:01  -   @ 07:00  --------------------------------------------------------  IN: 960 mL / OUT: 300 mL / NET: 660 mL          PHYSICAL EXAM:  Constitutional: NAD  HEENT: anicteric sclera, oropharynx clear, MMM  Neck: No JVD  Respiratory: CTAB, no wheezes, rales or rhonchi  Cardiovascular: S1, S2, RRR  Gastrointestinal: BS+, soft, NT/ND  Extremities: No cyanosis or clubbing. No peripheral edema  :  No julian.   Skin: No rashes    LABS:      135  |  96<L>  |  102<HH>  ----------------------------<  121<H>  3.3<L>   |  23  |  3.4<H>  Creatinine Trend: 3.4<--, 3.3<--, 2.8<--, 2.6<--, 2.5<--, 2.4<--  Ca    8.6      08 May 2020 06:31  Phos  3.6     -08  Mg     2.0     -08    TPro  6.0  /  Alb  3.0<L>  /  TBili  1.3<H>  /  DBili      /  AST  36  /  ALT  21  /  AlkPhos  36  05-08                          10.4   8.90  )-----------( 91       ( 08 May 2020 06:31 )             32.0       Urine Studies:  Urinalysis Basic - ( 06 May 2020 17:13 )    Color: Yellow / Appearance: Slightly Turbid / S.017 / pH:   Gluc:  / Ketone: Negative  / Bili: Negative / Urobili: <2 mg/dL   Blood:  / Protein: Trace / Nitrite: Negative   Leuk Esterase: Large / RBC: 3 /HPF / WBC 73 /HPF   Sq Epi:  / Non Sq Epi: 0 /HPF / Bacteria: Many        RADIOLOGY & ADDITIONAL STUDIES:

## 2020-05-08 NOTE — PROGRESS NOTE ADULT - ASSESSMENT
A/P:  79 y/o male with extensive comorbidities including, CAD, A.fib (on Coumadin), COPD, Severe Aortic stenosis, Pulm. HTN, BRITTANI on CKD Gr.4. He is also S/P Syncope and fall about 2 weeks ago when was found with Right intraventricular hemorrhage. Patient has been admitted for about 2 weeks and surgery was reconsulted for WBC 14, fever of 101, US with gallstones and possible thickening in setting of abdominal pain.    PLAN:   - Afebrile 24h   - WBC normalized  - No acute surgical intervention  - HIDA negative  - Not a surgical candidate  - Work up for other source of infection

## 2020-05-08 NOTE — CONSULT NOTE ADULT - CONSULT REQUESTED DATE/TIME
07-May-2020 09:22
08-May-2020 00:00
25-Apr-2020 16:51
25-Apr-2020 19:00
26-Apr-2020 13:15
27-Apr-2020 10:32
27-Apr-2020 12:26
27-Apr-2020 16:28
29-Apr-2020 09:39
30-Apr-2020 10:47
27-Apr-2020 00:21
27-Apr-2020 10:06

## 2020-05-09 LAB
ALBUMIN SERPL ELPH-MCNC: 2.8 G/DL — LOW (ref 3.5–5.2)
ALP SERPL-CCNC: 36 U/L — SIGNIFICANT CHANGE UP (ref 30–115)
ALT FLD-CCNC: 17 U/L — SIGNIFICANT CHANGE UP (ref 0–41)
ANION GAP SERPL CALC-SCNC: 16 MMOL/L — HIGH (ref 7–14)
ANION GAP SERPL CALC-SCNC: 16 MMOL/L — HIGH (ref 7–14)
APPEARANCE UR: CLEAR — SIGNIFICANT CHANGE UP
APTT BLD: 36.7 SEC — SIGNIFICANT CHANGE UP (ref 27–39.2)
AST SERPL-CCNC: 28 U/L — SIGNIFICANT CHANGE UP (ref 0–41)
BACTERIA # UR AUTO: NEGATIVE — SIGNIFICANT CHANGE UP
BASOPHILS # BLD AUTO: 0.03 K/UL — SIGNIFICANT CHANGE UP (ref 0–0.2)
BASOPHILS NFR BLD AUTO: 0.4 % — SIGNIFICANT CHANGE UP (ref 0–1)
BILIRUB SERPL-MCNC: 1.1 MG/DL — SIGNIFICANT CHANGE UP (ref 0.2–1.2)
BILIRUB UR-MCNC: NEGATIVE — SIGNIFICANT CHANGE UP
BUN SERPL-MCNC: 102 MG/DL — CRITICAL HIGH (ref 10–20)
BUN SERPL-MCNC: 99 MG/DL — CRITICAL HIGH (ref 10–20)
CALCIUM SERPL-MCNC: 8.1 MG/DL — LOW (ref 8.5–10.1)
CALCIUM SERPL-MCNC: 8.5 MG/DL — SIGNIFICANT CHANGE UP (ref 8.5–10.1)
CHLORIDE SERPL-SCNC: 93 MMOL/L — LOW (ref 98–110)
CHLORIDE SERPL-SCNC: 95 MMOL/L — LOW (ref 98–110)
CO2 SERPL-SCNC: 20 MMOL/L — SIGNIFICANT CHANGE UP (ref 17–32)
CO2 SERPL-SCNC: 21 MMOL/L — SIGNIFICANT CHANGE UP (ref 17–32)
COLOR SPEC: YELLOW — SIGNIFICANT CHANGE UP
CREAT SERPL-MCNC: 3.3 MG/DL — HIGH (ref 0.7–1.5)
CREAT SERPL-MCNC: 3.4 MG/DL — HIGH (ref 0.7–1.5)
CRP SERPL-MCNC: 11.44 MG/DL — HIGH (ref 0–0.4)
DIFF PNL FLD: NEGATIVE — SIGNIFICANT CHANGE UP
EOSINOPHIL # BLD AUTO: 0.05 K/UL — SIGNIFICANT CHANGE UP (ref 0–0.7)
EOSINOPHIL NFR BLD AUTO: 0.7 % — SIGNIFICANT CHANGE UP (ref 0–8)
EPI CELLS # UR: 1 /HPF — SIGNIFICANT CHANGE UP (ref 0–5)
GLUCOSE BLDC GLUCOMTR-MCNC: 122 MG/DL — HIGH (ref 70–99)
GLUCOSE BLDC GLUCOMTR-MCNC: 138 MG/DL — HIGH (ref 70–99)
GLUCOSE BLDC GLUCOMTR-MCNC: 175 MG/DL — HIGH (ref 70–99)
GLUCOSE BLDC GLUCOMTR-MCNC: 214 MG/DL — HIGH (ref 70–99)
GLUCOSE SERPL-MCNC: 107 MG/DL — HIGH (ref 70–99)
GLUCOSE SERPL-MCNC: 218 MG/DL — HIGH (ref 70–99)
GLUCOSE UR QL: NEGATIVE — SIGNIFICANT CHANGE UP
GRAM STN FLD: SIGNIFICANT CHANGE UP
HCT VFR BLD CALC: 30 % — LOW (ref 42–52)
HGB BLD-MCNC: 10.1 G/DL — LOW (ref 14–18)
HYALINE CASTS # UR AUTO: 1 /LPF — SIGNIFICANT CHANGE UP (ref 0–7)
IMM GRANULOCYTES NFR BLD AUTO: 1.2 % — HIGH (ref 0.1–0.3)
INR BLD: 2.58 RATIO — HIGH (ref 0.65–1.3)
KETONES UR-MCNC: NEGATIVE — SIGNIFICANT CHANGE UP
LEUKOCYTE ESTERASE UR-ACNC: ABNORMAL
LYMPHOCYTES # BLD AUTO: 0.85 K/UL — LOW (ref 1.2–3.4)
LYMPHOCYTES # BLD AUTO: 11.6 % — LOW (ref 20.5–51.1)
MAGNESIUM SERPL-MCNC: 2 MG/DL — SIGNIFICANT CHANGE UP (ref 1.8–2.4)
MCHC RBC-ENTMCNC: 29 PG — SIGNIFICANT CHANGE UP (ref 27–31)
MCHC RBC-ENTMCNC: 33.7 G/DL — SIGNIFICANT CHANGE UP (ref 32–37)
MCV RBC AUTO: 86.2 FL — SIGNIFICANT CHANGE UP (ref 80–94)
METHOD TYPE: SIGNIFICANT CHANGE UP
MONOCYTES # BLD AUTO: 0.86 K/UL — HIGH (ref 0.1–0.6)
MONOCYTES NFR BLD AUTO: 11.7 % — HIGH (ref 1.7–9.3)
MSSA DNA SPEC QL NAA+PROBE: SIGNIFICANT CHANGE UP
NEUTROPHILS # BLD AUTO: 5.47 K/UL — SIGNIFICANT CHANGE UP (ref 1.4–6.5)
NEUTROPHILS NFR BLD AUTO: 74.4 % — SIGNIFICANT CHANGE UP (ref 42.2–75.2)
NITRITE UR-MCNC: NEGATIVE — SIGNIFICANT CHANGE UP
NRBC # BLD: 0 /100 WBCS — SIGNIFICANT CHANGE UP (ref 0–0)
PH UR: 6 — SIGNIFICANT CHANGE UP (ref 5–8)
PHOSPHATE SERPL-MCNC: 3.4 MG/DL — SIGNIFICANT CHANGE UP (ref 2.1–4.9)
PLATELET # BLD AUTO: 95 K/UL — LOW (ref 130–400)
POTASSIUM SERPL-MCNC: 2.9 MMOL/L — LOW (ref 3.5–5)
POTASSIUM SERPL-MCNC: 4.2 MMOL/L — SIGNIFICANT CHANGE UP (ref 3.5–5)
POTASSIUM SERPL-SCNC: 2.9 MMOL/L — LOW (ref 3.5–5)
POTASSIUM SERPL-SCNC: 4.2 MMOL/L — SIGNIFICANT CHANGE UP (ref 3.5–5)
PROT SERPL-MCNC: 5.9 G/DL — LOW (ref 6–8)
PROT UR-MCNC: SIGNIFICANT CHANGE UP
PROTHROM AB SERPL-ACNC: 29.7 SEC — HIGH (ref 9.95–12.87)
RBC # BLD: 3.48 M/UL — LOW (ref 4.7–6.1)
RBC # FLD: 17.6 % — HIGH (ref 11.5–14.5)
RBC CASTS # UR COMP ASSIST: 1 /HPF — SIGNIFICANT CHANGE UP (ref 0–4)
SODIUM SERPL-SCNC: 130 MMOL/L — LOW (ref 135–146)
SODIUM SERPL-SCNC: 131 MMOL/L — LOW (ref 135–146)
SP GR SPEC: 1.02 — SIGNIFICANT CHANGE UP (ref 1.01–1.02)
SPECIMEN SOURCE: SIGNIFICANT CHANGE UP
UROBILINOGEN FLD QL: SIGNIFICANT CHANGE UP
WBC # BLD: 7.35 K/UL — SIGNIFICANT CHANGE UP (ref 4.8–10.8)
WBC # FLD AUTO: 7.35 K/UL — SIGNIFICANT CHANGE UP (ref 4.8–10.8)
WBC UR QL: 36 /HPF — HIGH (ref 0–5)

## 2020-05-09 PROCEDURE — 99233 SBSQ HOSP IP/OBS HIGH 50: CPT

## 2020-05-09 RX ORDER — POTASSIUM CHLORIDE 20 MEQ
40 PACKET (EA) ORAL ONCE
Refills: 0 | Status: COMPLETED | OUTPATIENT
Start: 2020-05-09 | End: 2020-05-09

## 2020-05-09 RX ORDER — CEPHALEXIN 500 MG
1 CAPSULE ORAL
Qty: 20 | Refills: 0
Start: 2020-05-09 | End: 2020-05-13

## 2020-05-09 RX ORDER — POTASSIUM CHLORIDE 20 MEQ
20 PACKET (EA) ORAL
Refills: 0 | Status: COMPLETED | OUTPATIENT
Start: 2020-05-09 | End: 2020-05-09

## 2020-05-09 RX ORDER — WARFARIN SODIUM 2.5 MG/1
1 TABLET ORAL
Qty: 14 | Refills: 0
Start: 2020-05-09 | End: 2020-05-22

## 2020-05-09 RX ORDER — CEFTRIAXONE 500 MG/1
1000 INJECTION, POWDER, FOR SOLUTION INTRAMUSCULAR; INTRAVENOUS EVERY 24 HOURS
Refills: 0 | Status: DISCONTINUED | OUTPATIENT
Start: 2020-05-09 | End: 2020-05-11

## 2020-05-09 RX ORDER — TRAMADOL HYDROCHLORIDE 50 MG/1
25 TABLET ORAL ONCE
Refills: 0 | Status: DISCONTINUED | OUTPATIENT
Start: 2020-05-09 | End: 2020-05-09

## 2020-05-09 RX ORDER — WARFARIN SODIUM 2.5 MG/1
3 TABLET ORAL AT BEDTIME
Refills: 0 | Status: COMPLETED | OUTPATIENT
Start: 2020-05-10 | End: 2020-05-10

## 2020-05-09 RX ORDER — WARFARIN SODIUM 2.5 MG/1
3 TABLET ORAL AT BEDTIME
Refills: 0 | Status: COMPLETED | OUTPATIENT
Start: 2020-05-09 | End: 2020-05-09

## 2020-05-09 RX ADMIN — PANTOPRAZOLE SODIUM 40 MILLIGRAM(S): 20 TABLET, DELAYED RELEASE ORAL at 06:19

## 2020-05-09 RX ADMIN — Medication 50 MILLIEQUIVALENT(S): at 09:46

## 2020-05-09 RX ADMIN — Medication 650 MILLIGRAM(S): at 10:20

## 2020-05-09 RX ADMIN — INSULIN GLARGINE 20 UNIT(S): 100 INJECTION, SOLUTION SUBCUTANEOUS at 21:28

## 2020-05-09 RX ADMIN — BUDESONIDE AND FORMOTEROL FUMARATE DIHYDRATE 2 PUFF(S): 160; 4.5 AEROSOL RESPIRATORY (INHALATION) at 08:04

## 2020-05-09 RX ADMIN — Medication 2: at 07:39

## 2020-05-09 RX ADMIN — Medication 145 MILLIGRAM(S): at 12:08

## 2020-05-09 RX ADMIN — Medication 5 UNIT(S): at 07:39

## 2020-05-09 RX ADMIN — TRAMADOL HYDROCHLORIDE 25 MILLIGRAM(S): 50 TABLET ORAL at 10:21

## 2020-05-09 RX ADMIN — FINASTERIDE 5 MILLIGRAM(S): 5 TABLET, FILM COATED ORAL at 12:08

## 2020-05-09 RX ADMIN — SIMVASTATIN 40 MILLIGRAM(S): 20 TABLET, FILM COATED ORAL at 21:28

## 2020-05-09 RX ADMIN — BUDESONIDE AND FORMOTEROL FUMARATE DIHYDRATE 2 PUFF(S): 160; 4.5 AEROSOL RESPIRATORY (INHALATION) at 21:28

## 2020-05-09 RX ADMIN — Medication 5 UNIT(S): at 16:45

## 2020-05-09 RX ADMIN — TAMSULOSIN HYDROCHLORIDE 0.4 MILLIGRAM(S): 0.4 CAPSULE ORAL at 21:29

## 2020-05-09 RX ADMIN — Medication 50 MILLIEQUIVALENT(S): at 16:49

## 2020-05-09 RX ADMIN — Medication 1: at 12:09

## 2020-05-09 RX ADMIN — CHLORHEXIDINE GLUCONATE 1 APPLICATION(S): 213 SOLUTION TOPICAL at 05:30

## 2020-05-09 RX ADMIN — Medication 40 MILLIEQUIVALENT(S): at 13:55

## 2020-05-09 RX ADMIN — ISOSORBIDE MONONITRATE 30 MILLIGRAM(S): 60 TABLET, EXTENDED RELEASE ORAL at 12:08

## 2020-05-09 RX ADMIN — WARFARIN SODIUM 3 MILLIGRAM(S): 2.5 TABLET ORAL at 21:28

## 2020-05-09 RX ADMIN — Medication 5 UNIT(S): at 12:09

## 2020-05-09 RX ADMIN — PIPERACILLIN AND TAZOBACTAM 200 GRAM(S): 4; .5 INJECTION, POWDER, LYOPHILIZED, FOR SOLUTION INTRAVENOUS at 05:30

## 2020-05-09 RX ADMIN — CEFTRIAXONE 100 MILLIGRAM(S): 500 INJECTION, POWDER, FOR SOLUTION INTRAMUSCULAR; INTRAVENOUS at 13:55

## 2020-05-09 NOTE — PROGRESS NOTE ADULT - SUBJECTIVE AND OBJECTIVE BOX
HPI  Patient is a 80y old Male who presents with a chief complaint of Fall (08 May 2020 12:38)    Currently admitted to medicine with the primary diagnosis of Intracranial bleed     Today is hospital day 14d.     INTERVAL HPI / OVERNIGHT EVENTS:  Patient was examined and seen at bedside. This morning he is resting comfortably in bed and reports no new issues or overnight events.     ROS: Otherwise unremarkable     PAST MEDICAL & SURGICAL HISTORY  Afib  BPH (benign prostatic hyperplasia)  CHF (congestive heart failure)  COPD (chronic obstructive pulmonary disease)  Diabetes  HTN (hypertension)  H/O heart artery stent  S/P CABG x 3    ALLERGIES  No Known Allergies    MEDICATIONS  STANDING MEDICATIONS  budesonide 160 MICROgram(s)/formoterol 4.5 MICROgram(s) Inhaler 2 Puff(s) Inhalation two times a day  chlorhexidine 4% Liquid 1 Application(s) Topical <User Schedule>  fenofibrate Tablet 145 milliGRAM(s) Oral daily  finasteride 5 milliGRAM(s) Oral daily  insulin glargine Injectable (LANTUS) 20 Unit(s) SubCutaneous at bedtime  insulin lispro (HumaLOG) corrective regimen sliding scale   SubCutaneous three times a day before meals  insulin lispro Injectable (HumaLOG) 5 Unit(s) SubCutaneous three times a day before meals  isosorbide   mononitrate ER Tablet (IMDUR) 30 milliGRAM(s) Oral daily  pantoprazole    Tablet 40 milliGRAM(s) Oral before breakfast  piperacillin/tazobactam IVPB.. 2.25 Gram(s) IV Intermittent every 6 hours  potassium chloride    Tablet ER 40 milliEquivalent(s) Oral once  potassium chloride  20 mEq/100 mL IVPB 20 milliEquivalent(s) IV Intermittent every 2 hours  silver sulfADIAZINE 1% Cream 1 Application(s) Topical daily  simvastatin 40 milliGRAM(s) Oral at bedtime  tamsulosin 0.4 milliGRAM(s) Oral at bedtime  warfarin 3 milliGRAM(s) Oral at bedtime    PRN MEDICATIONS  acetaminophen   Tablet .. 650 milliGRAM(s) Oral every 6 hours PRN    VITALS:  T(F): 96.3  HR: 72  BP: 99/49  RR: 19  SpO2: 94%    PHYSICAL EXAM  CONSTITUTIONAL: Chronically ill; in no acute distress, speaking in full sentences  HEAD: Normocephalic; atraumatic  EYES: PERRL, EOMI, no conjunctival erythema  NECK: Supple; non tender, FROM  CARD: +S1, S2 Regular rate and rhythm. Systolic murmur 3/6 RUSB  RESP: CTABL  ABD: soft, RUQ tenderness, nondistended, no rebound, no guarding, no rigidity  EXT: moves all extremities,   LE chronic skin changes and edema 2+  NEURO: Alert, oriented, grossly unremarkable, no focal deficits, cn ii-xii grossly intact  PSYCH: Cooperative, appropriate     LABS                        10.1   7.35  )-----------( 95       ( 09 May 2020 06:56 )             30.0     05-09    130<L>  |  93<L>  |  99<HH>  ----------------------------<  218<H>  2.9<L>   |  21  |  3.3<H>    Ca    8.5      09 May 2020 06:56  Phos  3.4     05-09  Mg     2.0     05-09    TPro  5.9<L>  /  Alb  2.8<L>  /  TBili  1.1  /  DBili  x   /  AST  28  /  ALT  17  /  AlkPhos  36  05-09    PT/INR - ( 09 May 2020 06:56 )   PT: 29.70 sec;   INR: 2.58 ratio         PTT - ( 09 May 2020 06:56 )  PTT:36.7 sec          Culture - Urine (collected 06 May 2020 17:13)  Source: .Urine Catheterized  Final Report (08 May 2020 16:35):    >100,000 CFU/ml Klebsiella pneumoniae  Organism: Klebsiella pneumoniae (08 May 2020 16:35)  Organism: Klebsiella pneumoniae (08 May 2020 16:35)          RADIOLOGY

## 2020-05-09 NOTE — PROGRESS NOTE ADULT - ASSESSMENT
Assessment and Plan:   · Assessment	  A 81 yo male with PMH of HTN, DM type 2, Atrial fibrillation on Coumadin and COPD was brought to ED s/p fall. Patient doesn't recall the event and he denies any loss of consciousness. As per Son patient was standing when suddenly fell back and hit his head. Patient found with posterior scalp laceration, Head CT showed intraventricular hemorrhage. Patient denies chest pain, SOB, palpitation or dizziness.     A/P:   Fever: likely UTI vs acute cholecystitis.   Chest CT showed pulmonary edema.   Abdomen US showed acute cholecystitis, LFTs normal, HIDA scan is negative, surgery recommended no intervention.  Continue Zosyn, pending urine and blood cx. SARS-CoV-2 is negative.     Syncope: possibly due to   Severe Aortic stenosis.   As per son he found him on the floor unconscious.   Workup showed severe aortic stenosis.   EKG showed Atrial flutter/fibrillation with slow ventricular response, RBBB  Cardiology follow up outpatient for severe aortic stenosis and CT angio TAVR protocol result.     Fall with Head Trauma:   Intracranial hemorrhage:   Head CT showed right lateral and third ventricles densities without definite evidence of active arterial contrast extravasation.  Brain MRI showed stable intraventricular hematoma. MRV unremarkable.  Neurosurgery is ok to resume Coumadin.     High grade left Internal carotid stenosis:  80%  Vascular plan for  endarterectomy after cardiac clearance. Follow up with Dr. De Guzman outpatient.     BRITTANI on CKD stag 4: stable.   Cr 3.3 from 2.6 (baseline).   s/p CT julita TAVR protocol likely ANGELLA.   Metolazone and Lasix on hold. Nephrology follow up. .      Atrial flutter/fibrillation:   Continue Coumadin, give 4mg today. Neurosurgery is ok.    COPD on home o2, lungs are clear.     #Progress Note Handoff:  Pending (specify): Improving BRITTANI  Family discussion: with his son,   Disposition: home possible in 24 hrs A 81 yo male with PMH of HTN, DM type 2, Atrial fibrillation on Coumadin and COPD was brought to ED s/p fall. Patient doesn't recall the event and he denies any loss of consciousness. As per Son patient was standing when suddenly fell back and hit his head. Patient found with posterior scalp laceration, Head CT showed intraventricular hemorrhage. Patient denies chest pain, SOB, palpitation or dizziness.     A/P:   UTI: acute pyelonephritis:   Urine cx is growing Klebsiella Pneumonia, sensitive to Zosyn, Possible discharge on Keflex.   Continue Zosyn, pending urine and blood cx. SARS-CoV-2 is negative.   Possible acute cholecystitis.   Abdomen US showed acute cholecystitis, LFTs normal, HIDA scan is negative, surgery recommended no intervention.    Syncope: possibly due to   Severe Aortic stenosis.   As per son he found him on the floor unconscious.   Workup showed severe aortic stenosis.   EKG showed Atrial flutter/fibrillation with slow ventricular response, RBBB  Cardiology follow up outpatient for severe aortic stenosis and CT angio TAVR protocol result.     Fall with Head Trauma:   Intracranial hemorrhage:   Head CT showed right lateral and third ventricles densities without definite evidence of active arterial contrast extravasation.  Brain MRI showed stable intraventricular hematoma. MRV unremarkable.  Neurosurgery is ok to resume Coumadin.     High grade left Internal carotid stenosis:  80%  Vascular plan for  endarterectomy after cardiac clearance. Follow up with Dr. De Guzman outpatient.     BRITTANI on CKD stag 4: stable.   Cr 3.3 from 2.6 (baseline).   s/p CT julita TAVR protocol likely ANGELLA.   Metolazone and Lasix on hold. Nephrology follow up. .      Atrial flutter/fibrillation:   Continue Coumadin, give 4mg today. Neurosurgery is ok.    COPD on home o2, lungs are clear.     #Progress Note Handoff:  Pending (specify): Improving BRITTANI  Family discussion: with his son,   Disposition: home possible in 24 hrs

## 2020-05-09 NOTE — PROGRESS NOTE ADULT - SUBJECTIVE AND OBJECTIVE BOX
PHYSICAL EXAM:  GENERAL: NAD, well-developed  HEAD:  Atraumatic, Normocephalic  EYES: EOMI, PERRLA, conjunctiva and sclera clear  NECK: Supple, No JVD  CHEST/LUNG: Clear to auscultation bilaterally; No wheeze  HEART: Irregular rate and rhythm; S1 S2 Sm 3/6 on aortic area radiates to the neck.   ABDOMEN: Soft, mild RUQ tenderness, no rigidity or guarding.  EXTREMITIES:  LE chronic skin changes and edema 2+  PSYCH: AAOx3  NEUROLOGY: non-focal  SKIN: No rashes or lesions TAIWO ZAVALA  80y  Male      Patient is a 80y old  Male who presents with a chief complaint of Ventricular Hemorrhage (09 May 2020 10:26)      INTERVAL HPI/OVERNIGHT EVENTS:  Abdominal pain improved, no diarrhea.   Vital Signs Last 24 Hrs  T(C): 35.7 (09 May 2020 13:24), Max: 37.4 (08 May 2020 20:22)  T(F): 96.3 (09 May 2020 13:24), Max: 99.3 (08 May 2020 20:22)  HR: 60 (09 May 2020 13:24) (60 - 72)  BP: 89/46 (09 May 2020 13:24) (89/46 - 99/49)  BP(mean): --  RR: 18 (09 May 2020 13:24) (18 - 19)  SpO2: 94% (08 May 2020 20:26) (94% - 94%)      20 @ 07:01  -  20 @ 07:00  --------------------------------------------------------  IN: 200 mL / OUT: 400 mL / NET: -200 mL            Consultant(s) Notes Reviewed:  [x ] YES  [ ] NO          MEDICATIONS  (STANDING):  budesonide 160 MICROgram(s)/formoterol 4.5 MICROgram(s) Inhaler 2 Puff(s) Inhalation two times a day  cefTRIAXone   IVPB 1000 milliGRAM(s) IV Intermittent every 24 hours  chlorhexidine 4% Liquid 1 Application(s) Topical <User Schedule>  fenofibrate Tablet 145 milliGRAM(s) Oral daily  finasteride 5 milliGRAM(s) Oral daily  insulin glargine Injectable (LANTUS) 20 Unit(s) SubCutaneous at bedtime  insulin lispro (HumaLOG) corrective regimen sliding scale   SubCutaneous three times a day before meals  insulin lispro Injectable (HumaLOG) 5 Unit(s) SubCutaneous three times a day before meals  isosorbide   mononitrate ER Tablet (IMDUR) 30 milliGRAM(s) Oral daily  pantoprazole    Tablet 40 milliGRAM(s) Oral before breakfast  potassium chloride  20 mEq/100 mL IVPB 20 milliEquivalent(s) IV Intermittent every 2 hours  silver sulfADIAZINE 1% Cream 1 Application(s) Topical daily  simvastatin 40 milliGRAM(s) Oral at bedtime  tamsulosin 0.4 milliGRAM(s) Oral at bedtime  warfarin 3 milliGRAM(s) Oral at bedtime    MEDICATIONS  (PRN):  acetaminophen   Tablet .. 650 milliGRAM(s) Oral every 6 hours PRN Temp greater or equal to 38C (100.4F), Mild Pain (1 - 3)      LABS                          10.1   7.35  )-----------( 95       ( 09 May 2020 06:56 )             30.0     05-09    130<L>  |  93<L>  |  99<HH>  ----------------------------<  218<H>  2.9<L>   |  21  |  3.3<H>    Ca    8.5      09 May 2020 06:56  Phos  3.4       Mg     2.0         TPro  5.9<L>  /  Alb  2.8<L>  /  TBili  1.1  /  DBili  x   /  AST  28  /  ALT  17  /  AlkPhos  36  05-09      Urinalysis Basic - ( 09 May 2020 10:45 )    Color: Yellow / Appearance: Clear / S.018 / pH: x  Gluc: x / Ketone: Negative  / Bili: Negative / Urobili: <2 mg/dL   Blood: x / Protein: Trace / Nitrite: Negative   Leuk Esterase: Large / RBC: 1 /HPF / WBC 36 /HPF   Sq Epi: x / Non Sq Epi: 1 /HPF / Bacteria: Negative      PT/INR - ( 09 May 2020 06:56 )   PT: 29.70 sec;   INR: 2.58 ratio         PTT - ( 09 May 2020 06:56 )  PTT:36.7 sec  Lactate Trend        CAPILLARY BLOOD GLUCOSE      POCT Blood Glucose.: 175 mg/dL (09 May 2020 11:18)      Culture - Urine (collected 20 @ 17:13)  Source: .Urine Catheterized  Final Report (20 @ 16:35):    >100,000 CFU/ml Klebsiella pneumoniae  Organism: Klebsiella pneumoniae (20 @ 16:35)  Organism: Klebsiella pneumoniae (20 @ 16:35)      -  Amikacin: S <=16      -  Ampicillin: R 16 These ampicillin results predict results for amoxicillin      -  Ampicillin/Sulbactam: S <=8/4 Enterobacter, Citrobacter, and Serratia may develop resistance during prolonged therapy (3-4 days)      -  Aztreonam: S <=4      -  Cefazolin: S <=8 ([id number]) For uncomplicated UTI with K. pneumoniae, E. coli, or P. mirablis: HAYLEE <=16 is sensitive and HAYLEE >=32 is resistant. This also predicts results for oral agents cefaclor, cefdinir, cefpodoxime, cefprozil, cefuroxime axetil, cephalexin and locarbef for uncomplicated UTI. Note that some isolates may be susceptible to these agents while testing resistant to cefazolin.      -  Cefepime: S <=4      -  Cefoxitin: S <=8      -  Ceftriaxone: S <=1 Enterobacter, Citrobacter, and Serratia may develop resistance during prolonged therapy      -  Ciprofloxacin: R >2      -  Gentamicin: S <=4      -  Imipenem: S <=1      -  Levofloxacin: R >4      -  Meropenem: S <=1      -  Nitrofurantoin: I 64 Should not be used to treat pyelonephritis      -  Piperacillin/Tazobactam: S <=16      -  Tigecycline: S <=2      -  Tobramycin: S <=4      -  Trimethoprim/Sulfamethoxazole: S <=2/38      Method Type: HAYLEE    Culture - Blood (collected 20 @ 00:30)  Source: .Blood Blood  Preliminary Report (20 @ 07:01):    No growth to date.        RADIOLOGY & ADDITIONAL TESTS:    Imaging Personally Reviewed:  [ ] YES  [ ] NO    HEALTH ISSUES - PROBLEM Dx:          PHYSICAL EXAM:  GENERAL: NAD, well-developed  HEAD:  Atraumatic, Normocephalic  EYES: EOMI, PERRLA, conjunctiva and sclera clear  NECK: Supple, No JVD  CHEST/LUNG: Clear to auscultation bilaterally; No wheeze  HEART: Irregular rate and rhythm; S1 S2 Sm 3/6 on aortic area radiates to the neck.   ABDOMEN: Soft, Non-tender.   EXTREMITIES:  LE chronic skin changes and edema 2+  PSYCH: AAOx3  NEUROLOGY: non-focal  SKIN: No rashes or lesions

## 2020-05-09 NOTE — PROGRESS NOTE ADULT - ASSESSMENT
80M PMHx Afib on Coumadin, HTN, DM, COPD, presents to ED s/p fall, +HT, +LOC, +AC. Pt does not recall the event, event was witnessed. Pt presents w/ posterior head laceration and abrasions to his Right wrist.  PAN scan showed CTH findings of Deanse material within Right lateral ventrile, and third ventricle, consistent w/ intraventicular hemorrhage. Neurosurgery recommended CTA head which showed: redemonstrated findings of CTH, without definite evidence of active arterial contrast extravasation. Also recommended MRI brain to evaluate for intraventricular cavernoma. 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 Syncope work up w/ MRI brain showed stable intraventricular bleed, EEG was unremarkable. Echo with EF 55-60%, with severe aortic stenosis. Carotid duplex showed greater than 80% stenosis in the left internal carotid artery. Vascular planned for CEA, not cleared by cardio. Cardio recommended structural for TAVR. Nephro recommended hydration before and after CT- TAVR protocal and identified a possible need for HD s/p scan and s/p procedure CT-TAVR protocol done 5/4 @ 1pm, f/u renal fxn. 3x loose stools last night. stopped senna. Pt developed fevers.     #Fevers r/o Infection  Febrile to 103F, multiple episodes   Leukocytosis resolved   COVID neg   Blood Cx neg   UA +, Urine culture growing klebsiella  RUQ US showed GB wall edema and gallstones   Surgery consulted,  HIDA scan negative, no surgical interventions  Zosyn stopped  Started Ceftriaxone 1g q24  f/u ID recs     #Traumatic Fall with Intraventricular Hemorrhage   #Syncope   #Severe Aortic stenosis  Pt found unconscious at home   Head CT showed right lateral and third ventricles densities without definite evidence of active arterial contrast extravasation.  Brain MRI showed stable intraventricular hematoma. MRV unremarkable.    Neurosurgery is ok to resume Coumadin.   Cleared by NSx and NI   Can resume coumadin   ECHO showed severe aortic stenosis.   EKG showed Atrial flutter/fibrillation with slow ventricular response, RBBB  CT angio TAVR protocol   F/u with Cardiology for TVAR o/p      #Left Internal Carotid Stenosis 80% - Vascular for CEA after cardiac clearance. Follow up with Dr. De Guzman outpatient.   # CKD stag 4 - Nephro following, s/p CTA TAVR protocol, Cr trending up mildly, holding diuretics   # Atrial flutter/fibrillation - c/w coumadin, daily INR   #COPD - Stable, on home O2, c/w inhalers     DVT ppx: coumadin   GI ppx; protonix   Actvity: IAT   Diet: Renal DASH   Disposition: Home once infection resolves, PT working with pt  FULL CODE 80M PMHx Afib on Coumadin, HTN, DM, COPD, presents to ED s/p fall, +HT, +LOC, +AC. Pt does not recall the event, event was witnessed. Pt presents w/ posterior head laceration and abrasions to his Right wrist.  PAN scan showed CTH findings of Deanse material within Right lateral ventrile, and third ventricle, consistent w/ intraventicular hemorrhage. Neurosurgery recommended CTA head which showed: redemonstrated findings of CTH, without definite evidence of active arterial contrast extravasation. Also recommended MRI brain to evaluate for intraventricular cavernoma. 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 Syncope work up w/ MRI brain showed stable intraventricular bleed, EEG was unremarkable. Echo with EF 55-60%, with severe aortic stenosis. Carotid duplex showed greater than 80% stenosis in the left internal carotid artery. Vascular planned for CEA, not cleared by cardio. Cardio recommended structural for TAVR. Nephro recommended hydration before and after CT- TAVR protocal and identified a possible need for HD s/p scan and s/p procedure CT-TAVR protocol done 5/4 @ 1pm, f/u renal fxn. 3x loose stools last night. stopped senna. Pt developed fevers.     #Fevers r/o Infection  Febrile to 103F, multiple episodes   Leukocytosis resolved   COVID neg   Blood Cx neg   UA +, Urine culture growing klebsiella  RUQ US showed GB wall edema and gallstones   Surgery consulted,  HIDA scan negative, no surgical interventions  Zosyn stopped  Started Ceftriaxone 1g q24  f/u ID recs     #Traumatic Fall with Intraventricular Hemorrhage   #Syncope   #Severe Aortic stenosis  Pt found unconscious at home   Head CT showed right lateral and third ventricles densities without definite evidence of active arterial contrast extravasation.  Brain MRI showed stable intraventricular hematoma. MRV unremarkable.    Neurosurgery is ok to resume Coumadin.   Cleared by NSx and NI   Can resume coumadin   ECHO showed severe aortic stenosis.   EKG showed Atrial flutter/fibrillation with slow ventricular response, RBBB  CT angio TAVR protocol   F/u with Cardiology for TVAR o/p      #Left Internal Carotid Stenosis 80% - Vascular for CEA after cardiac clearance. Follow up with Dr. De Guzman outpatient.   # CKD stag 4 - Nephro following, s/p CTA TAVR protocol, Cr trending up mildly, holding diuretics   # Atrial flutter/fibrillation - c/w coumadin, daily INR   #COPD - Stable, on home O2, c/w inhalers     DVT ppx: coumadin   GI ppx; protonix   Actvity: IAT   Diet: Renal DASH   Disposition: Home once infection resolves, PT working with pt. Anticipated for tomorrow. To go home with PO Keflex.  FULL CODE

## 2020-05-10 LAB
ALBUMIN SERPL ELPH-MCNC: 3.1 G/DL — LOW (ref 3.5–5.2)
ALP SERPL-CCNC: 46 U/L — SIGNIFICANT CHANGE UP (ref 30–115)
ALT FLD-CCNC: 18 U/L — SIGNIFICANT CHANGE UP (ref 0–41)
ANION GAP SERPL CALC-SCNC: 17 MMOL/L — HIGH (ref 7–14)
APTT BLD: 40.1 SEC — HIGH (ref 27–39.2)
AST SERPL-CCNC: 27 U/L — SIGNIFICANT CHANGE UP (ref 0–41)
BASOPHILS # BLD AUTO: 0.02 K/UL — SIGNIFICANT CHANGE UP (ref 0–0.2)
BASOPHILS NFR BLD AUTO: 0.2 % — SIGNIFICANT CHANGE UP (ref 0–1)
BILIRUB SERPL-MCNC: 1 MG/DL — SIGNIFICANT CHANGE UP (ref 0.2–1.2)
BUN SERPL-MCNC: 101 MG/DL — CRITICAL HIGH (ref 10–20)
CALCIUM SERPL-MCNC: 8.8 MG/DL — SIGNIFICANT CHANGE UP (ref 8.5–10.1)
CHLORIDE SERPL-SCNC: 95 MMOL/L — LOW (ref 98–110)
CO2 SERPL-SCNC: 22 MMOL/L — SIGNIFICANT CHANGE UP (ref 17–32)
CREAT SERPL-MCNC: 3.3 MG/DL — HIGH (ref 0.7–1.5)
CULTURE RESULTS: SIGNIFICANT CHANGE UP
EOSINOPHIL # BLD AUTO: 0.07 K/UL — SIGNIFICANT CHANGE UP (ref 0–0.7)
EOSINOPHIL NFR BLD AUTO: 0.7 % — SIGNIFICANT CHANGE UP (ref 0–8)
GLUCOSE BLDC GLUCOMTR-MCNC: 103 MG/DL — HIGH (ref 70–99)
GLUCOSE BLDC GLUCOMTR-MCNC: 118 MG/DL — HIGH (ref 70–99)
GLUCOSE BLDC GLUCOMTR-MCNC: 161 MG/DL — HIGH (ref 70–99)
GLUCOSE BLDC GLUCOMTR-MCNC: 76 MG/DL — SIGNIFICANT CHANGE UP (ref 70–99)
GLUCOSE SERPL-MCNC: 77 MG/DL — SIGNIFICANT CHANGE UP (ref 70–99)
GRAM STN FLD: SIGNIFICANT CHANGE UP
HCT VFR BLD CALC: 32.5 % — LOW (ref 42–52)
HGB BLD-MCNC: 10.8 G/DL — LOW (ref 14–18)
IMM GRANULOCYTES NFR BLD AUTO: 0.8 % — HIGH (ref 0.1–0.3)
INR BLD: 2.84 RATIO — HIGH (ref 0.65–1.3)
LYMPHOCYTES # BLD AUTO: 1.1 K/UL — LOW (ref 1.2–3.4)
LYMPHOCYTES # BLD AUTO: 11.4 % — LOW (ref 20.5–51.1)
MAGNESIUM SERPL-MCNC: 2.1 MG/DL — SIGNIFICANT CHANGE UP (ref 1.8–2.4)
MCHC RBC-ENTMCNC: 28.4 PG — SIGNIFICANT CHANGE UP (ref 27–31)
MCHC RBC-ENTMCNC: 33.2 G/DL — SIGNIFICANT CHANGE UP (ref 32–37)
MCV RBC AUTO: 85.5 FL — SIGNIFICANT CHANGE UP (ref 80–94)
MONOCYTES # BLD AUTO: 0.95 K/UL — HIGH (ref 0.1–0.6)
MONOCYTES NFR BLD AUTO: 9.9 % — HIGH (ref 1.7–9.3)
NEUTROPHILS # BLD AUTO: 7.4 K/UL — HIGH (ref 1.4–6.5)
NEUTROPHILS NFR BLD AUTO: 77 % — HIGH (ref 42.2–75.2)
NRBC # BLD: 0 /100 WBCS — SIGNIFICANT CHANGE UP (ref 0–0)
PHOSPHATE SERPL-MCNC: 3.1 MG/DL — SIGNIFICANT CHANGE UP (ref 2.1–4.9)
PLATELET # BLD AUTO: 159 K/UL — SIGNIFICANT CHANGE UP (ref 130–400)
POTASSIUM SERPL-MCNC: 3.6 MMOL/L — SIGNIFICANT CHANGE UP (ref 3.5–5)
POTASSIUM SERPL-SCNC: 3.6 MMOL/L — SIGNIFICANT CHANGE UP (ref 3.5–5)
PROT SERPL-MCNC: 6.6 G/DL — SIGNIFICANT CHANGE UP (ref 6–8)
PROTHROM AB SERPL-ACNC: 32.7 SEC — HIGH (ref 9.95–12.87)
RBC # BLD: 3.8 M/UL — LOW (ref 4.7–6.1)
RBC # FLD: 17.2 % — HIGH (ref 11.5–14.5)
SODIUM SERPL-SCNC: 134 MMOL/L — LOW (ref 135–146)
SPECIMEN SOURCE: SIGNIFICANT CHANGE UP
WBC # BLD: 9.62 K/UL — SIGNIFICANT CHANGE UP (ref 4.8–10.8)
WBC # FLD AUTO: 9.62 K/UL — SIGNIFICANT CHANGE UP (ref 4.8–10.8)

## 2020-05-10 PROCEDURE — 99233 SBSQ HOSP IP/OBS HIGH 50: CPT

## 2020-05-10 RX ORDER — VANCOMYCIN HCL 1 G
1000 VIAL (EA) INTRAVENOUS ONCE
Refills: 0 | Status: COMPLETED | OUTPATIENT
Start: 2020-05-10 | End: 2020-05-10

## 2020-05-10 RX ADMIN — Medication 1 APPLICATION(S): at 17:48

## 2020-05-10 RX ADMIN — BUDESONIDE AND FORMOTEROL FUMARATE DIHYDRATE 2 PUFF(S): 160; 4.5 AEROSOL RESPIRATORY (INHALATION) at 07:40

## 2020-05-10 RX ADMIN — Medication 250 MILLIGRAM(S): at 14:23

## 2020-05-10 RX ADMIN — CEFTRIAXONE 100 MILLIGRAM(S): 500 INJECTION, POWDER, FOR SOLUTION INTRAMUSCULAR; INTRAVENOUS at 12:34

## 2020-05-10 RX ADMIN — Medication 5 UNIT(S): at 11:22

## 2020-05-10 RX ADMIN — CHLORHEXIDINE GLUCONATE 1 APPLICATION(S): 213 SOLUTION TOPICAL at 06:08

## 2020-05-10 RX ADMIN — Medication 1: at 16:46

## 2020-05-10 RX ADMIN — INSULIN GLARGINE 20 UNIT(S): 100 INJECTION, SOLUTION SUBCUTANEOUS at 21:26

## 2020-05-10 RX ADMIN — SIMVASTATIN 40 MILLIGRAM(S): 20 TABLET, FILM COATED ORAL at 21:26

## 2020-05-10 RX ADMIN — TAMSULOSIN HYDROCHLORIDE 0.4 MILLIGRAM(S): 0.4 CAPSULE ORAL at 21:26

## 2020-05-10 RX ADMIN — FINASTERIDE 5 MILLIGRAM(S): 5 TABLET, FILM COATED ORAL at 12:27

## 2020-05-10 RX ADMIN — PANTOPRAZOLE SODIUM 40 MILLIGRAM(S): 20 TABLET, DELAYED RELEASE ORAL at 06:05

## 2020-05-10 RX ADMIN — Medication 5 UNIT(S): at 16:46

## 2020-05-10 RX ADMIN — WARFARIN SODIUM 3 MILLIGRAM(S): 2.5 TABLET ORAL at 21:26

## 2020-05-10 RX ADMIN — Medication 145 MILLIGRAM(S): at 12:27

## 2020-05-10 RX ADMIN — ISOSORBIDE MONONITRATE 30 MILLIGRAM(S): 60 TABLET, EXTENDED RELEASE ORAL at 12:27

## 2020-05-10 NOTE — OCCUPATIONAL THERAPY INITIAL EVALUATION ADULT - GENERAL OBSERVATIONS, REHAB EVAL
OT attempted to complete IE. Pt refusing to participate in OT eval. Pt stating he needs to go home to recover and get stronger. Pt provided max encouragement from therapist and educated re tx plan. Pt continually decline OT eval/tx and displayed increased agitation with therapists encouragement. OT to cont when available.

## 2020-05-10 NOTE — PROGRESS NOTE ADULT - ASSESSMENT
A 79 yo male with PMH of HTN, DM type 2, Atrial fibrillation on Coumadin and COPD was brought to ED s/p fall. Patient doesn't recall the event and he denies any loss of consciousness. As per Son patient was standing when suddenly fell back and hit his head. Patient found with posterior scalp laceration, Head CT showed intraventricular hemorrhage. Patient denies chest pain, SOB, palpitation or dizziness.     A/P:   UTI: acute pyelonephritis:   Urine cx is growing Klebsiella Pneumonia, sensitive to Zosyn and Cefazolin.   s/p Zosyn, discharge home on Keflex  Possible acute cholecystitis.   Abdomen US showed acute cholecystitis, LFTs normal, HIDA scan is negative, surgery recommended no intervention.    Syncope: possibly due to   Severe Aortic stenosis.   As per son he found him on the floor unconscious.   Workup showed severe aortic stenosis.   EKG showed Atrial flutter/fibrillation with slow ventricular response, RBBB  Cardiology follow up outpatient for severe aortic stenosis and CT angio TAVR protocol result.     Fall with Head Trauma:   Intracranial hemorrhage:   Head CT showed right lateral and third ventricles densities without definite evidence of active arterial contrast extravasation.  Brain MRI showed stable intraventricular hematoma. MRV unremarkable.  Neurosurgery is ok to resume Coumadin.     High grade left Internal carotid stenosis:  80%  Vascular plan for  endarterectomy after cardiac clearance. Follow up with Dr. De Guzman outpatient.     BRITTANI on CKD stag 4: stable.   Cr 3.3 from 2.6 (baseline).   s/p CT julita TAVR protocol likely ANGELLA.   Metolazone and Lasix on hold. Nephrology follow up outpatient.  I advised the son to resume Lasix if signs of LE edema or SOB.     Atrial flutter/fibrillation:   Continue Coumadin,. Neurosurgery is ok.    COPD on home o2, lungs are clear.     #Progress Note Handoff:  Pending (specify):   Family discussion: with his son,   Disposition: osito today A 81 yo male with PMH of HTN, DM type 2, Atrial fibrillation on Coumadin and COPD was brought to ED s/p fall. Patient doesn't recall the event and he denies any loss of consciousness. As per Son patient was standing when suddenly fell back and hit his head. Patient found with posterior scalp laceration, Head CT showed intraventricular hemorrhage. Patient denies chest pain, SOB, palpitation or dizziness.     A/P:   Bacteremia: blood cx on 5/8 is growing staph aureus. BC was negative 5/6   Source is unclear. Likely nosocomial from prolonged hospital stay.   Start on Vancomycin. ID follow up.     UTI: acute pyelonephritis:   Urine cx is growing Klebsiella Pneumonia, sensitive to Zosyn and Cefazolin.   s/p Zosyn, Continue Rocephin.   Possible acute cholecystitis.   Abdomen US showed acute cholecystitis, LFTs normal, HIDA scan is negative, surgery recommended no intervention.    Syncope: possibly due to   Severe Aortic stenosis.   As per son he found him on the floor unconscious.   Workup showed severe aortic stenosis.   EKG showed Atrial flutter/fibrillation with slow ventricular response, RBBB  Cardiology follow up outpatient for severe aortic stenosis and CT angio TAVR protocol result.     Fall with Head Trauma:   Intracranial hemorrhage:   Head CT showed right lateral and third ventricles densities without definite evidence of active arterial contrast extravasation.  Brain MRI showed stable intraventricular hematoma. MRV unremarkable.  Neurosurgery is ok to resume Coumadin.     High grade left Internal carotid stenosis:  80%  Vascular plan for endarterectomy after cardiac clearance. Follow up with Dr. De Guzman outpatient.     BRITTANI on CKD stag 4: stable.   Cr 3.3 from 2.6 (baseline).   s/p CT julita TAVR protocol likely ANGELLA.   Metolazone and Lasix on hold. Nephrology follow up outpatient.  I advised the son to resume Lasix if signs of LE edema or SOB.     Atrial flutter/fibrillation:   Continue Coumadin,. Neurosurgery is ok.    COPD on home o2, lungs are clear.     #Progress Note Handoff:  Pending (specify): improving bacteremia.   Family discussion: with his son,   Disposition: home

## 2020-05-10 NOTE — PROGRESS NOTE ADULT - SUBJECTIVE AND OBJECTIVE BOX
Nephrology progress note    THIS IS AN INCOMPLETE NOTE . FULL NOTE TO FOLLOW SHORTLY    Patient is seen and examined, events over the last 24 h noted .    Allergies:  No Known Allergies    Hospital Medications:   MEDICATIONS  (STANDING):  budesonide 160 MICROgram(s)/formoterol 4.5 MICROgram(s) Inhaler 2 Puff(s) Inhalation two times a day  cefTRIAXone   IVPB 1000 milliGRAM(s) IV Intermittent every 24 hours  chlorhexidine 4% Liquid 1 Application(s) Topical <User Schedule>  fenofibrate Tablet 145 milliGRAM(s) Oral daily  finasteride 5 milliGRAM(s) Oral daily  insulin glargine Injectable (LANTUS) 20 Unit(s) SubCutaneous at bedtime  insulin lispro (HumaLOG) corrective regimen sliding scale   SubCutaneous three times a day before meals  insulin lispro Injectable (HumaLOG) 5 Unit(s) SubCutaneous three times a day before meals  isosorbide   mononitrate ER Tablet (IMDUR) 30 milliGRAM(s) Oral daily  pantoprazole    Tablet 40 milliGRAM(s) Oral before breakfast  silver sulfADIAZINE 1% Cream 1 Application(s) Topical daily  simvastatin 40 milliGRAM(s) Oral at bedtime  tamsulosin 0.4 milliGRAM(s) Oral at bedtime  warfarin 3 milliGRAM(s) Oral at bedtime        VITALS:  T(F): 96.5 (05-10-20 @ 05:41), Max: 96.7 (20 @ 21:03)  HR: 61 (05-10-20 @ 05:41)  BP: 112/56 (05-10-20 @ 05:41)  RR: 16 (05-10-20 @ 05:41)  SpO2: --  Wt(kg): --     @ 07:01  -   @ 07:00  --------------------------------------------------------  IN: 200 mL / OUT: 400 mL / NET: -200 mL          PHYSICAL EXAM:  Constitutional: NAD  HEENT: anicteric sclera, oropharynx clear, MMM  Neck: No JVD  Respiratory: CTAB, no wheezes, rales or rhonchi  Cardiovascular: S1, S2, RRR  Gastrointestinal: BS+, soft, NT/ND  Extremities: No cyanosis or clubbing. No peripheral edema  :  No julian.   Skin: No rashes    LABS:  05-10    134<L>  |  95<L>  |  x   ----------------------------<  77  3.6   |  22  |  3.3<H>    Ca    8.8      10 May 2020 05:33  Phos  3.1     05-10  Mg     2.1     05-10    TPro  6.6  /  Alb  3.1<L>  /  TBili  1.0  /  DBili      /  AST  27  /  ALT  18  /  AlkPhos  46  05-10                          10.8   9.62  )-----------( 159      ( 10 May 2020 05:33 )             32.5       Urine Studies:  Urinalysis Basic - ( 09 May 2020 10:45 )    Color: Yellow / Appearance: Clear / S.018 / pH:   Gluc:  / Ketone: Negative  / Bili: Negative / Urobili: <2 mg/dL   Blood:  / Protein: Trace / Nitrite: Negative   Leuk Esterase: Large / RBC: 1 /HPF / WBC 36 /HPF   Sq Epi:  / Non Sq Epi: 1 /HPF / Bacteria: Negative        RADIOLOGY & ADDITIONAL STUDIES: Nephrology progress note  Patient is seen and examined, events over the last 24 h noted .  No major events   Allergies:  No Known Allergies    Hospital Medications:   MEDICATIONS  (STANDING):  budesonide 160 MICROgram(s)/formoterol 4.5 MICROgram(s) Inhaler 2 Puff(s) Inhalation two times a day  cefTRIAXone   IVPB 1000 milliGRAM(s) IV Intermittent every 24 hours  fenofibrate Tablet 145 milliGRAM(s) Oral daily  finasteride 5 milliGRAM(s) Oral daily  insulin glargine Injectable (LANTUS) 20 Unit(s) SubCutaneous at bedtime  insulin lispro (HumaLOG) corrective regimen sliding scale   SubCutaneous three times a day before meals  insulin lispro Injectable (HumaLOG) 5 Unit(s) SubCutaneous three times a day before meals  isosorbide   mononitrate ER Tablet (IMDUR) 30 milliGRAM(s) Oral daily  pantoprazole    Tablet 40 milliGRAM(s) Oral before breakfast  silver sulfADIAZINE 1% Cream 1 Application(s) Topical daily  simvastatin 40 milliGRAM(s) Oral at bedtime  tamsulosin 0.4 milliGRAM(s) Oral at bedtime  warfarin 3 milliGRAM(s) Oral at bedtime        VITALS:  T(F): 96.5 (05-10-20 @ 05:41), Max: 96.7 (20 @ 21:03)  HR: 61 (05-10-20 @ 05:41)  BP: 112/56 (05-10-20 @ 05:41)  RR: 16 (05-10-20 @ 05:41)      05-08 @ 07:01  -   @ 07:00  --------------------------------------------------------  IN: 200 mL / OUT: 400 mL / NET: -200 mL          PHYSICAL EXAM:  Constitutional: NAD  HEENT: anicteric sclera, oropharynx clear, MMM  Neck: No JVD  Respiratory: CTAB, no wheezes, rales or rhonchi  Cardiovascular: S1, S2, RRR  Gastrointestinal: BS+, soft, NT/ND  Extremities: No cyanosis or clubbing. No peripheral edema  :  No julian.   Skin: No rashes    LABS:  05-10    134<L>  |  95<L>  |  x   ----------------------------<  77  3.6   |  22  |  3.3<H>  Creatinine Trend: 3.3<--, 3.4<--, 3.3<--, 3.4<--, 3.3<--, 2.8<--  Ca    8.8      10 May 2020 05:33  Phos  3.1     05-10  Mg     2.1     05-10    TPro  6.6  /  Alb  3.1<L>  /  TBili  1.0  /  DBili      /  AST  27  /  ALT  18  /  AlkPhos  46  05-10                          10.8   9.62  )-----------( 159      ( 10 May 2020 05:33 )             32.5       Urine Studies:  Urinalysis Basic - ( 09 May 2020 10:45 )    Color: Yellow / Appearance: Clear / S.018 / pH:   Gluc:  / Ketone: Negative  / Bili: Negative / Urobili: <2 mg/dL   Blood:  / Protein: Trace / Nitrite: Negative   Leuk Esterase: Large / RBC: 1 /HPF / WBC 36 /HPF   Sq Epi:  / Non Sq Epi: 1 /HPF / Bacteria: Negative        RADIOLOGY & ADDITIONAL STUDIES:

## 2020-05-10 NOTE — CHART NOTE - NSCHARTNOTEFT_GEN_A_CORE
Registered Dietitian Follow-Up     Patient Profile Reviewed                           Yes [x]   No []     Nutrition History Previously Obtained        Yes [x]  No []       Pertinent Subjective Information: The patient consumes <50% of meals.     Pertinent Medical Interventions: Presented with s/p fall  with head trauma. - MRI: stable intraventricular hematoma. Neurosurgery: no intervention. Syncope likely secondary to severe Aortic Stenosis. DM: insulin protocol. HTN: well controlled. BRITTANI on CKD 4 stable.     Diet order: () DASH/TLC, Consistent Carbohydrate (Evening Snack)      Anthropometrics:  - Ht: 5'6"  - Wt: 84.5kg  - %wt change  - BMI: 30 (Class I obesity)  - IBW: 65kg     Pertinent Lab Data: (5/10) Na-134, K-3.6, CL-95, BUN-101, Cr-3,3, GFR-17, Glucose-77mg/dL, Ca-8.8, H/H-10.8/32.5     Pertinent Meds: Coumadin, Lantus, Humalog, Tricor, Imdur, Protonix, Zocor      Physical Findings:  - Appearance: AAOx3; Well nourished  - GI function: The patient reports having a bowel movement (5/10)  - Tubes: N/A  - Oral/Mouth cavity:  - Skin: Stage II Pressure Injury-Sacral spine (Giancarlo Score-16)     Nutrition Requirements  Weight Used: 85kg -Derived from nutrition note ()     Estimated Energy Needs    Continue [x]  Adjust []  Adjusted Energy Recommendations: 1508-1659kcal (MSJ x 1-1.1 AF) for borderline obesity -Derived from nutrition note ()     Estimated Protein Needs    Continue [x]  Adjust []  Adjusted Protein Recommendations: 67-76g (0.8-0.9g/kg CBW) as above, CKD considered -Derived from nutrition note ()      Estimated Fluid Needs        Continue [x]  Adjust []  Adjusted Fluid Recommendations: 1mL/kcal or per LIP -Derived from nutrition note ()     Nutrient Intake: PO intake is <50%       [x] Previous Nutrition Diagnosis: Inadequate Oral Intake            [x] Ongoing          [] Resolved    [] No active nutrition diagnosis identified at this time     Nutrition Diagnostic #1  Problem:  Etiology:  Statement:     Nutrition Diagnostic #2  Problem:  Etiology:  Statement:     Nutrition Intervention:  1.Meals and Snacks  2.Medical Food Supplement  3.Vitamin Supplement     Goal/Expected Outcome:  1.Consume/tolerate 75%-100% of meals in 3 days    Indicator/Monitorin.Diet order, energy intake, nutrition focused physical findings, Na, CL, renal and anemia profile    Recommendations:  1.Continue to provide a Consistent Carbohydrate, DASH/TLC diet with evening snack  2.Provide Glucerna Shake Q12hrs  3.Provide an appetite stimulant  4.Provide a MVI without minerals Q24hrs Registered Dietitian Follow-Up     Patient Profile Reviewed                           Yes [x]   No []     Nutrition History Previously Obtained        Yes [x]  No []       Pertinent Subjective Information: The patient consumes <50% of meals.     Pertinent Medical Interventions: Presented with s/p fall  with head trauma. - MRI: stable intraventricular hematoma. Neurosurgery: no intervention. Syncope likely secondary to severe Aortic Stenosis. DM: insulin protocol. HTN: well controlled. BRITTANI on CKD 4 stable.     Diet order: () DASH/TLC, Consistent Carbohydrate (Evening Snack)      Anthropometrics:  - Ht: 5'6"  - Wt: 84.5kg  - %wt change  - BMI: 30 (Class I obesity)  - IBW: 65kg     Pertinent Lab Data: (5/10) Na-134, K-3.6, CL-95, BUN-101, Cr-3,3, GFR-17, Glucose-77mg/dL, Ca-8.8, H/H-10.8/32.5     Pertinent Meds: Coumadin, Lantus, Humalog, Tricor, Imdur, Protonix, Zocor      Physical Findings:  - Appearance: AAOx3; Well nourished  - GI function: The patient reports having a bowel movement (5/10)  - Tubes: N/A  - Oral/Mouth cavity:  - Skin: Stage II Pressure Injury-Sacral spine (Giancarlo Score-16)     Nutrition Requirements  Weight Used: 85kg -Derived from nutrition note ()     Estimated Energy Needs    Continue [x]  Adjust []  Adjusted Energy Recommendations: 1508-1659kcal (MSJ x 1-1.1 AF) for borderline obesity -Derived from nutrition note ()     Estimated Protein Needs    Continue [x]  Adjust []  Adjusted Protein Recommendations: 67-76g (0.8-0.9g/kg CBW) as above, CKD considered -Derived from nutrition note ()      Estimated Fluid Needs        Continue [x]  Adjust []  Adjusted Fluid Recommendations: 1mL/kcal or per LIP -Derived from nutrition note ()     Nutrient Intake: PO intake is <50%       [x] Previous Nutrition Diagnosis: Inadequate Oral Intake            [x] Ongoing          [] Resolved    [] No active nutrition diagnosis identified at this time     Nutrition Diagnostic #1  Problem:  Etiology:  Statement:     Nutrition Diagnostic #2  Problem:  Etiology:  Statement:     Nutrition Intervention:  1.Meals and Snacks  2.Medical Food Supplement  3.Vitamin Supplement  4.Nutrition Related Medication     Goal/Expected Outcome:  1.Consume/tolerate 75%-100% of meals in 3 days    Indicator/Monitorin.Diet order, energy intake, nutrition focused physical findings, Na, CL, renal and anemia profile    Recommendations:  1.Continue to provide a Consistent Carbohydrate, DASH/TLC diet with evening snack  2.Provide Glucerna Shake Q12hrs  3.Provide an appetite stimulant  4.Provide a MVI without minerals Q24hrs

## 2020-05-10 NOTE — PROGRESS NOTE ADULT - SUBJECTIVE AND OBJECTIVE BOX
TAIWO ZAVALA  80y  Male      Patient is a 80y old  Male who presents with a chief complaint of Ventricular Hemorrhage (09 May 2020 10:26)      INTERVAL HPI/OVERNIGHT EVENTS:  He feels ok, no fever  Vital Signs Last 24 Hrs  T(C): 35.8 (10 May 2020 05:41), Max: 35.9 (09 May 2020 21:03)  T(F): 96.5 (10 May 2020 05:41), Max: 96.7 (09 May 2020 21:03)  HR: 61 (10 May 2020 05:41) (60 - 61)  BP: 112/56 (10 May 2020 05:41) (89/46 - 115/59)  BP(mean): --  RR: 16 (10 May 2020 05:41) (16 - 18)  SpO2: --            Consultant(s) Notes Reviewed:  [x ] YES  [ ] NO          MEDICATIONS  (STANDING):  budesonide 160 MICROgram(s)/formoterol 4.5 MICROgram(s) Inhaler 2 Puff(s) Inhalation two times a day  cefTRIAXone   IVPB 1000 milliGRAM(s) IV Intermittent every 24 hours  chlorhexidine 4% Liquid 1 Application(s) Topical <User Schedule>  fenofibrate Tablet 145 milliGRAM(s) Oral daily  finasteride 5 milliGRAM(s) Oral daily  insulin glargine Injectable (LANTUS) 20 Unit(s) SubCutaneous at bedtime  insulin lispro (HumaLOG) corrective regimen sliding scale   SubCutaneous three times a day before meals  insulin lispro Injectable (HumaLOG) 5 Unit(s) SubCutaneous three times a day before meals  isosorbide   mononitrate ER Tablet (IMDUR) 30 milliGRAM(s) Oral daily  pantoprazole    Tablet 40 milliGRAM(s) Oral before breakfast  silver sulfADIAZINE 1% Cream 1 Application(s) Topical daily  simvastatin 40 milliGRAM(s) Oral at bedtime  tamsulosin 0.4 milliGRAM(s) Oral at bedtime  warfarin 3 milliGRAM(s) Oral at bedtime    MEDICATIONS  (PRN):  acetaminophen   Tablet .. 650 milliGRAM(s) Oral every 6 hours PRN Temp greater or equal to 38C (100.4F), Mild Pain (1 - 3)      LABS                          10.8   9.62  )-----------( 159      ( 10 May 2020 05:33 )             32.5     05-10    134<L>  |  95<L>  |  101<HH>  ----------------------------<  77  3.6   |  22  |  3.3<H>    Ca    8.8      10 May 2020 05:33  Phos  3.1     05-10  Mg     2.1     05-10    TPro  6.6  /  Alb  3.1<L>  /  TBili  1.0  /  DBili  x   /  AST  27  /  ALT  18  /  AlkPhos  46  05-10      Urinalysis Basic - ( 09 May 2020 10:45 )    Color: Yellow / Appearance: Clear / S.018 / pH: x  Gluc: x / Ketone: Negative  / Bili: Negative / Urobili: <2 mg/dL   Blood: x / Protein: Trace / Nitrite: Negative   Leuk Esterase: Large / RBC: 1 /HPF / WBC 36 /HPF   Sq Epi: x / Non Sq Epi: 1 /HPF / Bacteria: Negative      PT/INR - ( 10 May 2020 05:33 )   PT: 32.70 sec;   INR: 2.84 ratio         PTT - ( 10 May 2020 05:33 )  PTT:40.1 sec  Lactate Trend        CAPILLARY BLOOD GLUCOSE      POCT Blood Glucose.: 118 mg/dL (10 May 2020 11:15)      Culture - Blood (collected 20 @ 11:54)  Source: .Blood None  Gram Stain (05-10-20 @ 07:32):    Growth in aerobic bottle: Gram Positive Cocci in Clusters    Growth in anaerobic bottle: Gram Positive Cocci in Clusters  Preliminary Report (05-10-20 @ 07:32):    Growth in aerobic bottle: Gram Positive Cocci in Clusters    Growth in anaerobic bottle: Gram Positive Cocci in Clusters    "Due to technical problems, Proteus sp. will Not be reported as part of    the BCID panel until further notice"    ***Blood Panel PCR results on this specimen are available    approximately 3 hours after the Gram stain result.***    Gram stain, PCR, and/or culture results may not always    correspond due to difference in methodologies.    ************************************************************    This PCR assay was performed using [employer].    The following targets are tested for: Enterococcus,    vancomycin resistant enterococci, Listeria monocytogenes,    coagulase negative staphylococci, S. aureus,    methicillin resistant S. aureus, Streptococcus agalactiae    (Group B), S. pneumoniae, S. pyogenes (Group A),    Acinetobacter baumannii, Enterobacter cloacae, E. coli,    Klebsiella oxytoca, K. pneumoniae, Proteus sp.,    Serratia marcescens, Haemophilus influenzae,    Neisseria meningitidis, Pseudomonas aeruginosa, Candida    albicans, C. glabrata, C krusei, C parapsilosis,    C. tropicalis and the KPC resistance gene.  Organism: Blood Culture PCR (20 @ 23:05)  Organism: Blood Culture PCR (20 @ 23:05)      -  Staphylococcus aureus: Detec Any isolate of Staphylococcus aureus from a blood culture is NOT considered a contaminant.      Method Type: PCR    Culture - Urine (collected 20 @ 17:13)  Source: .Urine Catheterized  Final Report (20 @ 16:35):    >100,000 CFU/ml Klebsiella pneumoniae  Organism: Klebsiella pneumoniae (20 @ 16:35)  Organism: Klebsiella pneumoniae (20 @ 16:35)      -  Amikacin: S <=16      -  Ampicillin: R 16 These ampicillin results predict results for amoxicillin      -  Ampicillin/Sulbactam: S <=8/4 Enterobacter, Citrobacter, and Serratia may develop resistance during prolonged therapy (3-4 days)      -  Aztreonam: S <=4      -  Cefazolin: S <=8 (MIC_CL_COM_ENTERIC_CEFAZU) For uncomplicated UTI with K. pneumoniae, E. coli, or P. mirablis: HAYLEE <=16 is sensitive and HAYLEE >=32 is resistant. This also predicts results for oral agents cefaclor, cefdinir, cefpodoxime, cefprozil, cefuroxime axetil, cephalexin and locarbef for uncomplicated UTI. Note that some isolates may be susceptible to these agents while testing resistant to cefazolin.      -  Cefepime: S <=4      -  Cefoxitin: S <=8      -  Ceftriaxone: S <=1 Enterobacter, Citrobacter, and Serratia may develop resistance during prolonged therapy      -  Ciprofloxacin: R >2      -  Gentamicin: S <=4      -  Imipenem: S <=1      -  Levofloxacin: R >4      -  Meropenem: S <=1      -  Nitrofurantoin: I 64 Should not be used to treat pyelonephritis      -  Piperacillin/Tazobactam: S <=16      -  Tigecycline: S <=2      -  Tobramycin: S <=4      -  Trimethoprim/Sulfamethoxazole: S <=2/38      Method Type: HAYLEE    Culture - Blood (collected 20 @ 00:30)  Source: .Blood Blood  Preliminary Report (20 @ 07:01):    No growth to date.        RADIOLOGY & ADDITIONAL TESTS:    Imaging Personally Reviewed:  [ ] YES  [ ] NO    HEALTH ISSUES - PROBLEM Dx:          PHYSICAL EXAM:  GENERAL: NAD, well-developed  HEAD:  Atraumatic, Normocephalic  EYES: EOMI, PERRLA, conjunctiva and sclera clear  NECK: Supple, No JVD  CHEST/LUNG: Clear to auscultation bilaterally; No wheeze  HEART: Irregular rate and rhythm; S1 S2 Sm 3/6 on aortic area radiates to the neck.   ABDOMEN: Soft, Non-tender.   EXTREMITIES:  LE chronic skin changes and edema 2+  PSYCH: AAOx3  NEUROLOGY: non-focal  SKIN: No rashes or lesions

## 2020-05-10 NOTE — PROGRESS NOTE ADULT - ASSESSMENT
80F, PMHx Afib on Coumadin, HTN, DM, CKD 4, COPD, presents to ED s/p fall, +HT, +LOC, +AC.   Found to have L. Carotid artery stenosis >80%.     # CKD 4   - serum creatinine noted stable / higher than baseline / would keep diuretics on hold for now    - previously had HD, now off for > 3 months.   - non-oliguric   - BP on lower side. monitor BP.   -  Ca, Mg, phos at goal.  - No need for RRT for now    - found to have severe aortic stenosis.   # anemia chronic Hb at goal.  # Fall with head trauma  - Intracranial hemorrhage / stable intraventricular hematoma.   - neurosurgery notes appreciated.    # Carotid artery stenosis   - carotid duplex 80% stenosis of L internal carotid artery    # UTI / started on antibx now     will follow 80F, PMHx Afib on Coumadin, HTN, DM, CKD 4, COPD, presents to ED s/p fall, +HT, +LOC, +AC.   Found to have L. Carotid artery stenosis >80%.     # CKD 4   - serum creatinine noted stable / higher than baseline / would keep diuretics on hold for now    - previously had HD, now off for > 3 months.   - non-oliguric   - BP on lower side. monitor BP.   -  Ca, Mg, phos at goal.  - No need for RRT for now    - found to have severe aortic stenosis.   # anemia chronic Hb at goal.  # Fall with head trauma  - Intracranial hemorrhage / stable intraventricular hematoma.   - neurosurgery notes appreciated.    # Carotid artery stenosis   - carotid duplex 80% stenosis of L internal carotid artery    # UTI / started on antibx now     will follow  If discharged OP renal follow up and to resume lasix if edema lower ext reoccurs .

## 2020-05-11 LAB
-  AMPICILLIN/SULBACTAM: SIGNIFICANT CHANGE UP
-  CEFAZOLIN: SIGNIFICANT CHANGE UP
-  CLINDAMYCIN: SIGNIFICANT CHANGE UP
-  ERYTHROMYCIN: SIGNIFICANT CHANGE UP
-  GENTAMICIN: SIGNIFICANT CHANGE UP
-  OXACILLIN: SIGNIFICANT CHANGE UP
-  PENICILLIN: SIGNIFICANT CHANGE UP
-  RIFAMPIN: SIGNIFICANT CHANGE UP
-  TETRACYCLINE: SIGNIFICANT CHANGE UP
-  TRIMETHOPRIM/SULFAMETHOXAZOLE: SIGNIFICANT CHANGE UP
-  VANCOMYCIN: SIGNIFICANT CHANGE UP
ALBUMIN SERPL ELPH-MCNC: 2.9 G/DL — LOW (ref 3.5–5.2)
ALP SERPL-CCNC: 52 U/L — SIGNIFICANT CHANGE UP (ref 30–115)
ALT FLD-CCNC: 15 U/L — SIGNIFICANT CHANGE UP (ref 0–41)
ANION GAP SERPL CALC-SCNC: 17 MMOL/L — HIGH (ref 7–14)
APTT BLD: 37.9 SEC — SIGNIFICANT CHANGE UP (ref 27–39.2)
AST SERPL-CCNC: 25 U/L — SIGNIFICANT CHANGE UP (ref 0–41)
BASOPHILS # BLD AUTO: 0.02 K/UL — SIGNIFICANT CHANGE UP (ref 0–0.2)
BASOPHILS NFR BLD AUTO: 0.2 % — SIGNIFICANT CHANGE UP (ref 0–1)
BILIRUB SERPL-MCNC: 1 MG/DL — SIGNIFICANT CHANGE UP (ref 0.2–1.2)
BUN SERPL-MCNC: 103 MG/DL — CRITICAL HIGH (ref 10–20)
CALCIUM SERPL-MCNC: 8.5 MG/DL — SIGNIFICANT CHANGE UP (ref 8.5–10.1)
CHLORIDE SERPL-SCNC: 94 MMOL/L — LOW (ref 98–110)
CO2 SERPL-SCNC: 22 MMOL/L — SIGNIFICANT CHANGE UP (ref 17–32)
CREAT SERPL-MCNC: 2.9 MG/DL — HIGH (ref 0.7–1.5)
CULTURE RESULTS: SIGNIFICANT CHANGE UP
CULTURE RESULTS: SIGNIFICANT CHANGE UP
EOSINOPHIL # BLD AUTO: 0.04 K/UL — SIGNIFICANT CHANGE UP (ref 0–0.7)
EOSINOPHIL NFR BLD AUTO: 0.4 % — SIGNIFICANT CHANGE UP (ref 0–8)
GLUCOSE BLDC GLUCOMTR-MCNC: 119 MG/DL — HIGH (ref 70–99)
GLUCOSE BLDC GLUCOMTR-MCNC: 133 MG/DL — HIGH (ref 70–99)
GLUCOSE BLDC GLUCOMTR-MCNC: 153 MG/DL — HIGH (ref 70–99)
GLUCOSE BLDC GLUCOMTR-MCNC: 89 MG/DL — SIGNIFICANT CHANGE UP (ref 70–99)
GLUCOSE SERPL-MCNC: 94 MG/DL — SIGNIFICANT CHANGE UP (ref 70–99)
HCT VFR BLD CALC: 31 % — LOW (ref 42–52)
HGB BLD-MCNC: 10 G/DL — LOW (ref 14–18)
IMM GRANULOCYTES NFR BLD AUTO: 1.2 % — HIGH (ref 0.1–0.3)
INR BLD: 2.68 RATIO — HIGH (ref 0.65–1.3)
LYMPHOCYTES # BLD AUTO: 1.41 K/UL — SIGNIFICANT CHANGE UP (ref 1.2–3.4)
LYMPHOCYTES # BLD AUTO: 12.6 % — LOW (ref 20.5–51.1)
MAGNESIUM SERPL-MCNC: 2.1 MG/DL — SIGNIFICANT CHANGE UP (ref 1.8–2.4)
MCHC RBC-ENTMCNC: 27.7 PG — SIGNIFICANT CHANGE UP (ref 27–31)
MCHC RBC-ENTMCNC: 32.3 G/DL — SIGNIFICANT CHANGE UP (ref 32–37)
MCV RBC AUTO: 85.9 FL — SIGNIFICANT CHANGE UP (ref 80–94)
METHOD TYPE: SIGNIFICANT CHANGE UP
MONOCYTES # BLD AUTO: 1.01 K/UL — HIGH (ref 0.1–0.6)
MONOCYTES NFR BLD AUTO: 9 % — SIGNIFICANT CHANGE UP (ref 1.7–9.3)
NEUTROPHILS # BLD AUTO: 8.6 K/UL — HIGH (ref 1.4–6.5)
NEUTROPHILS NFR BLD AUTO: 76.6 % — HIGH (ref 42.2–75.2)
NRBC # BLD: 0 /100 WBCS — SIGNIFICANT CHANGE UP (ref 0–0)
ORGANISM # SPEC MICROSCOPIC CNT: SIGNIFICANT CHANGE UP
PHOSPHATE SERPL-MCNC: 2.9 MG/DL — SIGNIFICANT CHANGE UP (ref 2.1–4.9)
PLATELET # BLD AUTO: 208 K/UL — SIGNIFICANT CHANGE UP (ref 130–400)
POTASSIUM SERPL-MCNC: 3.5 MMOL/L — SIGNIFICANT CHANGE UP (ref 3.5–5)
POTASSIUM SERPL-SCNC: 3.5 MMOL/L — SIGNIFICANT CHANGE UP (ref 3.5–5)
PROT SERPL-MCNC: 6.4 G/DL — SIGNIFICANT CHANGE UP (ref 6–8)
PROTHROM AB SERPL-ACNC: 30.8 SEC — HIGH (ref 9.95–12.87)
RBC # BLD: 3.61 M/UL — LOW (ref 4.7–6.1)
RBC # FLD: 17.4 % — HIGH (ref 11.5–14.5)
SODIUM SERPL-SCNC: 133 MMOL/L — LOW (ref 135–146)
SPECIMEN SOURCE: SIGNIFICANT CHANGE UP
SPECIMEN SOURCE: SIGNIFICANT CHANGE UP
VANCOMYCIN TROUGH SERPL-MCNC: 9.6 UG/ML — SIGNIFICANT CHANGE UP (ref 5–10)
WBC # BLD: 11.21 K/UL — HIGH (ref 4.8–10.8)
WBC # FLD AUTO: 11.21 K/UL — HIGH (ref 4.8–10.8)

## 2020-05-11 PROCEDURE — 76775 US EXAM ABDO BACK WALL LIM: CPT | Mod: 26

## 2020-05-11 PROCEDURE — 99233 SBSQ HOSP IP/OBS HIGH 50: CPT

## 2020-05-11 RX ORDER — FUROSEMIDE 40 MG
20 TABLET ORAL DAILY
Refills: 0 | Status: DISCONTINUED | OUTPATIENT
Start: 2020-05-11 | End: 2020-05-13

## 2020-05-11 RX ORDER — CEFAZOLIN SODIUM 1 G
VIAL (EA) INJECTION
Refills: 0 | Status: DISCONTINUED | OUTPATIENT
Start: 2020-05-11 | End: 2020-05-13

## 2020-05-11 RX ORDER — CEFAZOLIN SODIUM 1 G
2000 VIAL (EA) INJECTION EVERY 12 HOURS
Refills: 0 | Status: DISCONTINUED | OUTPATIENT
Start: 2020-05-11 | End: 2020-05-11

## 2020-05-11 RX ORDER — POTASSIUM CHLORIDE 20 MEQ
20 PACKET (EA) ORAL ONCE
Refills: 0 | Status: COMPLETED | OUTPATIENT
Start: 2020-05-11 | End: 2020-05-11

## 2020-05-11 RX ORDER — CEFAZOLIN SODIUM 1 G
2000 VIAL (EA) INJECTION EVERY 12 HOURS
Refills: 0 | Status: DISCONTINUED | OUTPATIENT
Start: 2020-05-12 | End: 2020-05-13

## 2020-05-11 RX ORDER — VANCOMYCIN HCL 1 G
1000 VIAL (EA) INTRAVENOUS ONCE
Refills: 0 | Status: COMPLETED | OUTPATIENT
Start: 2020-05-11 | End: 2020-05-11

## 2020-05-11 RX ORDER — WARFARIN SODIUM 2.5 MG/1
3 TABLET ORAL ONCE
Refills: 0 | Status: COMPLETED | OUTPATIENT
Start: 2020-05-11 | End: 2020-05-11

## 2020-05-11 RX ORDER — VANCOMYCIN HCL 1 G
VIAL (EA) INTRAVENOUS
Refills: 0 | Status: DISCONTINUED | OUTPATIENT
Start: 2020-05-11 | End: 2020-05-11

## 2020-05-11 RX ORDER — CEFAZOLIN SODIUM 1 G
2000 VIAL (EA) INJECTION ONCE
Refills: 0 | Status: COMPLETED | OUTPATIENT
Start: 2020-05-11 | End: 2020-05-11

## 2020-05-11 RX ORDER — TRAMADOL HYDROCHLORIDE 50 MG/1
25 TABLET ORAL ONCE
Refills: 0 | Status: DISCONTINUED | OUTPATIENT
Start: 2020-05-11 | End: 2020-05-11

## 2020-05-11 RX ADMIN — Medication 1 TABLET(S): at 12:56

## 2020-05-11 RX ADMIN — CEFTRIAXONE 100 MILLIGRAM(S): 500 INJECTION, POWDER, FOR SOLUTION INTRAMUSCULAR; INTRAVENOUS at 12:56

## 2020-05-11 RX ADMIN — FINASTERIDE 5 MILLIGRAM(S): 5 TABLET, FILM COATED ORAL at 12:56

## 2020-05-11 RX ADMIN — Medication 250 MILLIGRAM(S): at 11:42

## 2020-05-11 RX ADMIN — SIMVASTATIN 40 MILLIGRAM(S): 20 TABLET, FILM COATED ORAL at 22:19

## 2020-05-11 RX ADMIN — Medication 650 MILLIGRAM(S): at 23:18

## 2020-05-11 RX ADMIN — Medication 5 UNIT(S): at 16:43

## 2020-05-11 RX ADMIN — Medication 1: at 16:44

## 2020-05-11 RX ADMIN — TRAMADOL HYDROCHLORIDE 25 MILLIGRAM(S): 50 TABLET ORAL at 06:44

## 2020-05-11 RX ADMIN — PANTOPRAZOLE SODIUM 40 MILLIGRAM(S): 20 TABLET, DELAYED RELEASE ORAL at 05:50

## 2020-05-11 RX ADMIN — Medication 650 MILLIGRAM(S): at 04:54

## 2020-05-11 RX ADMIN — WARFARIN SODIUM 3 MILLIGRAM(S): 2.5 TABLET ORAL at 22:19

## 2020-05-11 RX ADMIN — Medication 100 MILLIGRAM(S): at 16:44

## 2020-05-11 RX ADMIN — BUDESONIDE AND FORMOTEROL FUMARATE DIHYDRATE 2 PUFF(S): 160; 4.5 AEROSOL RESPIRATORY (INHALATION) at 21:51

## 2020-05-11 RX ADMIN — TAMSULOSIN HYDROCHLORIDE 0.4 MILLIGRAM(S): 0.4 CAPSULE ORAL at 22:19

## 2020-05-11 RX ADMIN — Medication 5 UNIT(S): at 11:32

## 2020-05-11 RX ADMIN — Medication 145 MILLIGRAM(S): at 12:57

## 2020-05-11 RX ADMIN — Medication 20 MILLIGRAM(S): at 11:43

## 2020-05-11 RX ADMIN — Medication 1 APPLICATION(S): at 15:52

## 2020-05-11 RX ADMIN — INSULIN GLARGINE 20 UNIT(S): 100 INJECTION, SOLUTION SUBCUTANEOUS at 22:29

## 2020-05-11 NOTE — PROGRESS NOTE ADULT - SUBJECTIVE AND OBJECTIVE BOX
TAIWO ZAVALA 80y Male  MRN#: [id number]     SUBJECTIVE  Patient is a 80y old Male who presents with a chief complaint of gait dysfunction / ventricular hemorrhage (11 May 2020 09:34)  Currently admitted to medicine with the primary diagnosis of Intracranial bleed  Today is hospital day 16d, and this morning he says he has some right flank pain. Otherwise no issues.     OBJECTIVE  PAST MEDICAL & SURGICAL HISTORY  Afib  BPH (benign prostatic hyperplasia)  CHF (congestive heart failure)  COPD (chronic obstructive pulmonary disease)  Diabetes  HTN (hypertension)  H/O heart artery stent  S/P CABG x 3    ALLERGIES:  No Known Allergies    MEDICATIONS:  STANDING MEDICATIONS  budesonide 160 MICROgram(s)/formoterol 4.5 MICROgram(s) Inhaler 2 Puff(s) Inhalation two times a day  cefTRIAXone   IVPB 1000 milliGRAM(s) IV Intermittent every 24 hours  chlorhexidine 4% Liquid 1 Application(s) Topical <User Schedule>  fenofibrate Tablet 145 milliGRAM(s) Oral daily  finasteride 5 milliGRAM(s) Oral daily  furosemide   Injectable 20 milliGRAM(s) IV Push daily  insulin glargine Injectable (LANTUS) 20 Unit(s) SubCutaneous at bedtime  insulin lispro (HumaLOG) corrective regimen sliding scale   SubCutaneous three times a day before meals  insulin lispro Injectable (HumaLOG) 5 Unit(s) SubCutaneous three times a day before meals  isosorbide   mononitrate ER Tablet (IMDUR) 30 milliGRAM(s) Oral daily  multivitamin 1 Tablet(s) Oral daily  pantoprazole    Tablet 40 milliGRAM(s) Oral before breakfast  silver sulfADIAZINE 1% Cream 1 Application(s) Topical daily  simvastatin 40 milliGRAM(s) Oral at bedtime  tamsulosin 0.4 milliGRAM(s) Oral at bedtime  vancomycin  IVPB      warfarin 3 milliGRAM(s) Oral once    PRN MEDICATIONS  acetaminophen   Tablet .. 650 milliGRAM(s) Oral every 6 hours PRN      VITAL SIGNS: Last 24 Hours  T(C): 36.4 (11 May 2020 05:20), Max: 37.4 (10 May 2020 21:04)  T(F): 97.6 (11 May 2020 05:20), Max: 99.3 (10 May 2020 21:04)  HR: 59 (11 May 2020 11:53) (59 - 62)  BP: 109/55 (11 May 2020 11:53) (103/59 - 125/60)  BP(mean): --  RR: 18 (11 May 2020 05:20) (17 - 18)  SpO2: 96% (10 May 2020 19:33) (96% - 96%)    LABS:                        10.0   11.21 )-----------( 208      ( 11 May 2020 04:30 )             31.0     05-11    133<L>  |  94<L>  |  103<HH>  ----------------------------<  94  3.5   |  22  |  2.9<H>    Ca    8.5      11 May 2020 04:30  Phos  2.9     05-11  Mg     2.1     05-11    TPro  6.4  /  Alb  2.9<L>  /  TBili  1.0  /  DBili  x   /  AST  25  /  ALT  15  /  AlkPhos  52  05-11    PT/INR - ( 11 May 2020 04:30 )   PT: 30.80 sec;   INR: 2.68 ratio         PTT - ( 11 May 2020 04:30 )  PTT:37.9 sec      Culture - Urine (collected 09 May 2020 10:45)  Source: .Urine Clean Catch (Midstream)  Final Report (10 May 2020 22:19):    <10,000 CFU/mL Normal Urogenital Felipa      RADIOLOGY:    PHYSICAL EXAM:    GENERAL: NAD, well-developed, AAOx3  HEENT:  Atraumatic, Normocephalic. EOMI, PERRLA, conjunctiva and sclera clear, No JVD  PULMONARY: Clear to auscultation bilaterally; No wheeze  CARDIOVASCULAR: Regular rate and rhythm; No murmurs, rubs, or gallops  GASTROINTESTINAL: Soft, Nontender, Nondistended; Bowel sounds present  MUSCULOSKELETAL:  2+ Peripheral Pulses, No clubbing, cyanosis, or edema  NEUROLOGY: non-focal  SKIN: No rashes or lesions    ASSESSMENT & PLAN  HOSPITAL COURSE  80M PMHx Afib on Coumadin, HTN, DM, COPD, presents to ED s/p fall, with LOC and head trauma In the ED he had a posterior head laceration. His CT head showed  Right lateral ventricle and third ventricle hemorrhage  Neurosurgery recommended CTA head: redemonstrated findings of CTH, without definite evidence of active arterial contrast extravasation.   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   EEG was unremarkable. Echo with EF 55-60%, with severe aortic stenosis.   Carotid duplex showed greater than 80% stenosis in the left internal carotid artery.   Vascular was planning for CEA, but patient was not cleared by cardio due to severe AS.   Cardio recommended structural for TAVR. Nephro recommended hydration before and after CT- TAVR which happened on 5/5 Patient was planned for TAVR as an outpatient and then to follow with vascular for CEA once TAVR done.   During his hospital stay he developed fever. His Ucx grew K. pneumonia. Bcx grew staph on 5/8. He was started on vancomycin and ceftriaxone. Echo ordered to evaluate for endocarditis     # Fever: Febrile to 103F, multiple episodes, leukocytosis  K.pneumonia in urine and staph aureus in blood cultures.    COVID neg   RUQ US showed GB wall edema and gallstones, HIDA scan negative, no surgical interventions  - on ceftriaxone and vancomycin  - ID following  - Bcx x2 pending.   - pending echo to evaluate for endocarditis    #Traumatic Fall with Intraventricular Hemorrhage   Likely syncope episode due to sever aortic stenosis.   (1) intraventricular hemorrhage:   - cleared by neuro to start anticoag  - cleared by s&s  (2) Left Internal Carotid Stenosis 80% - Vascular for CEA after cardiac clearance. Follow up with Dr. De Guzman outpatient.   (3) severe AS  planned for TAVR as an outpatient     # LAURYN Romofib  - on coumadin   - rate well controlled    # CKD stag 4 - Nephro following,   - baseline cr=approx 2.5  - cr approaching baseline  - lasix 20mg IV daily   - accurate in/out    #COPD - Stable, on home O2, c/w inhalers     DVT ppx: coumadin   GI ppx; protonix   Actvity: IAT   Diet: Renal DASH   Disposition: Home once infection resolves, PT working with pt  FULL CODE TAIWO ZAVALA 80y Male  MRN#: [id number]     SUBJECTIVE  Patient is a 80y old Male who presents with a chief complaint of gait dysfunction / ventricular hemorrhage (11 May 2020 09:34)  Currently admitted to medicine with the primary diagnosis of Intracranial bleed  Today is hospital day 16d, and this morning he says he has some right flank pain. Otherwise no issues.     OBJECTIVE  PAST MEDICAL & SURGICAL HISTORY  Afib  BPH (benign prostatic hyperplasia)  CHF (congestive heart failure)  COPD (chronic obstructive pulmonary disease)  Diabetes  HTN (hypertension)  H/O heart artery stent  S/P CABG x 3    ALLERGIES:  No Known Allergies    MEDICATIONS:  STANDING MEDICATIONS  budesonide 160 MICROgram(s)/formoterol 4.5 MICROgram(s) Inhaler 2 Puff(s) Inhalation two times a day  cefTRIAXone   IVPB 1000 milliGRAM(s) IV Intermittent every 24 hours  chlorhexidine 4% Liquid 1 Application(s) Topical <User Schedule>  fenofibrate Tablet 145 milliGRAM(s) Oral daily  finasteride 5 milliGRAM(s) Oral daily  furosemide   Injectable 20 milliGRAM(s) IV Push daily  insulin glargine Injectable (LANTUS) 20 Unit(s) SubCutaneous at bedtime  insulin lispro (HumaLOG) corrective regimen sliding scale   SubCutaneous three times a day before meals  insulin lispro Injectable (HumaLOG) 5 Unit(s) SubCutaneous three times a day before meals  isosorbide   mononitrate ER Tablet (IMDUR) 30 milliGRAM(s) Oral daily  multivitamin 1 Tablet(s) Oral daily  pantoprazole    Tablet 40 milliGRAM(s) Oral before breakfast  silver sulfADIAZINE 1% Cream 1 Application(s) Topical daily  simvastatin 40 milliGRAM(s) Oral at bedtime  tamsulosin 0.4 milliGRAM(s) Oral at bedtime  vancomycin  IVPB      warfarin 3 milliGRAM(s) Oral once    PRN MEDICATIONS  acetaminophen   Tablet .. 650 milliGRAM(s) Oral every 6 hours PRN      VITAL SIGNS: Last 24 Hours  T(C): 36.4 (11 May 2020 05:20), Max: 37.4 (10 May 2020 21:04)  T(F): 97.6 (11 May 2020 05:20), Max: 99.3 (10 May 2020 21:04)  HR: 59 (11 May 2020 11:53) (59 - 62)  BP: 109/55 (11 May 2020 11:53) (103/59 - 125/60)  BP(mean): --  RR: 18 (11 May 2020 05:20) (17 - 18)  SpO2: 96% (10 May 2020 19:33) (96% - 96%)    LABS:                        10.0   11.21 )-----------( 208      ( 11 May 2020 04:30 )             31.0     05-11    133<L>  |  94<L>  |  103<HH>  ----------------------------<  94  3.5   |  22  |  2.9<H>    Ca    8.5      11 May 2020 04:30  Phos  2.9     05-11  Mg     2.1     05-11    TPro  6.4  /  Alb  2.9<L>  /  TBili  1.0  /  DBili  x   /  AST  25  /  ALT  15  /  AlkPhos  52  05-11    PT/INR - ( 11 May 2020 04:30 )   PT: 30.80 sec;   INR: 2.68 ratio         PTT - ( 11 May 2020 04:30 )  PTT:37.9 sec      Culture - Urine (collected 09 May 2020 10:45)  Source: .Urine Clean Catch (Midstream)  Final Report (10 May 2020 22:19):    <10,000 CFU/mL Normal Urogenital Felipa      RADIOLOGY:    PHYSICAL EXAM:    GENERAL: NAD, well-developed, AAOx3  HEENT:  Atraumatic, Normocephalic. EOMI, PERRLA, conjunctiva and sclera clear, No JVD  PULMONARY: Clear to auscultation bilaterally; No wheeze  CARDIOVASCULAR: Regular rate and rhythm; No murmurs, rubs, or gallops  GASTROINTESTINAL: Soft, Nontender, Nondistended; Bowel sounds present  MUSCULOSKELETAL:  2+ Peripheral Pulses, No clubbing, cyanosis, or edema  NEUROLOGY: non-focal  SKIN: No rashes or lesions      HOSPITAL COURSE  80M PMHx Afib on Coumadin, HTN, DM, COPD, presents to ED s/p fall, with LOC and head trauma In the ED he had a posterior head laceration. His CT head showed  Right lateral ventricle and third ventricle hemorrhage  Neurosurgery recommended CTA head: redemonstrated findings of CTH, without definite evidence of active arterial contrast extravasation.   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   EEG was unremarkable. Echo with EF 55-60%, with severe aortic stenosis.   Carotid duplex showed greater than 80% stenosis in the left internal carotid artery.   Vascular was planning for CEA, but patient was not cleared by cardio due to severe AS.   Cardio recommended structural for TAVR. Nephro recommended hydration before and after CT- TAVR which happened on 5/5 Patient was planned for TAVR as an outpatient and then to follow with vascular for CEA once TAVR done.   During his hospital stay he developed fever. His Ucx grew K. pneumonia. Bcx grew staph on 5/8. He was started on vancomycin and ceftriaxone. Echo ordered to evaluate for endocarditis     ASSESSMENT & PLAN    # Fever: Febrile to 103F, multiple episodes, leukocytosis  K.pneumonia in urine and staph aureus in blood cultures.    COVID neg   RUQ US showed GB wall edema and gallstones, HIDA scan negative, no surgical interventions  - on ceftriaxone and vancomycin  - ID following  - Bcx x2 pending.   - pending echo to evaluate for endocarditis  - will do US kidney     #Traumatic Fall with Intraventricular Hemorrhage   Likely syncope episode due to sever aortic stenosis.   (1) intraventricular hemorrhage:   - cleared by neuro to start anticoag  - cleared by s&s  (2) Left Internal Carotid Stenosis 80% - Vascular for CEA after cardiac clearance. Follow up with Dr. De Guzman outpatient.   (3) severe AS  planned for TAVR as an outpatient     # LAURYN Romofib  - on coumadin   - rate well controlled    # CKD stag 4 - Nephro following,   - baseline cr=approx 2.5  - cr approaching baseline  - lasix 20mg IV daily   - accurate in/out    #COPD - Stable, on home O2, c/w inhalers     DVT ppx: coumadin   GI ppx; protonix   Actvity: IAT   Diet: Renal DASH   Disposition: Home once infection resolves, PT working with pt  FULL CODE     Point of contact is his Son Deangelo #[phone].   Spoke to his son and updated him on clinical status TAIWO ZAVALA 80y Male  MRN#: [id number]     SUBJECTIVE  Patient is a 80y old Male who presents with a chief complaint of gait dysfunction / ventricular hemorrhage (11 May 2020 09:34)  Currently admitted to medicine with the primary diagnosis of Intracranial bleed  Today is hospital day 16d, and this morning he says he has some right flank pain. Otherwise no issues.     OBJECTIVE  PAST MEDICAL & SURGICAL HISTORY  Afib  BPH (benign prostatic hyperplasia)  CHF (congestive heart failure)  COPD (chronic obstructive pulmonary disease)  Diabetes  HTN (hypertension)  H/O heart artery stent  S/P CABG x 3    ALLERGIES:  No Known Allergies    MEDICATIONS:  STANDING MEDICATIONS  budesonide 160 MICROgram(s)/formoterol 4.5 MICROgram(s) Inhaler 2 Puff(s) Inhalation two times a day  cefTRIAXone   IVPB 1000 milliGRAM(s) IV Intermittent every 24 hours  chlorhexidine 4% Liquid 1 Application(s) Topical <User Schedule>  fenofibrate Tablet 145 milliGRAM(s) Oral daily  finasteride 5 milliGRAM(s) Oral daily  furosemide   Injectable 20 milliGRAM(s) IV Push daily  insulin glargine Injectable (LANTUS) 20 Unit(s) SubCutaneous at bedtime  insulin lispro (HumaLOG) corrective regimen sliding scale   SubCutaneous three times a day before meals  insulin lispro Injectable (HumaLOG) 5 Unit(s) SubCutaneous three times a day before meals  isosorbide   mononitrate ER Tablet (IMDUR) 30 milliGRAM(s) Oral daily  multivitamin 1 Tablet(s) Oral daily  pantoprazole    Tablet 40 milliGRAM(s) Oral before breakfast  silver sulfADIAZINE 1% Cream 1 Application(s) Topical daily  simvastatin 40 milliGRAM(s) Oral at bedtime  tamsulosin 0.4 milliGRAM(s) Oral at bedtime  vancomycin  IVPB      warfarin 3 milliGRAM(s) Oral once    PRN MEDICATIONS  acetaminophen   Tablet .. 650 milliGRAM(s) Oral every 6 hours PRN      VITAL SIGNS: Last 24 Hours  T(C): 36.4 (11 May 2020 05:20), Max: 37.4 (10 May 2020 21:04)  T(F): 97.6 (11 May 2020 05:20), Max: 99.3 (10 May 2020 21:04)  HR: 59 (11 May 2020 11:53) (59 - 62)  BP: 109/55 (11 May 2020 11:53) (103/59 - 125/60)  BP(mean): --  RR: 18 (11 May 2020 05:20) (17 - 18)  SpO2: 96% (10 May 2020 19:33) (96% - 96%)    LABS:                        10.0   11.21 )-----------( 208      ( 11 May 2020 04:30 )             31.0     05-11    133<L>  |  94<L>  |  103<HH>  ----------------------------<  94  3.5   |  22  |  2.9<H>    Ca    8.5      11 May 2020 04:30  Phos  2.9     05-11  Mg     2.1     05-11    TPro  6.4  /  Alb  2.9<L>  /  TBili  1.0  /  DBili  x   /  AST  25  /  ALT  15  /  AlkPhos  52  05-11    PT/INR - ( 11 May 2020 04:30 )   PT: 30.80 sec;   INR: 2.68 ratio         PTT - ( 11 May 2020 04:30 )  PTT:37.9 sec      Culture - Urine (collected 09 May 2020 10:45)  Source: .Urine Clean Catch (Midstream)  Final Report (10 May 2020 22:19):    <10,000 CFU/mL Normal Urogenital Felipa      RADIOLOGY:    PHYSICAL EXAM:    GENERAL: NAD, well-developed, AAOx3, heard of hearing  HEENT:  Atraumatic, Normocephalic. EOMI, PERRLA, conjunctiva and sclera clear, No JVD  PULMONARY: Clear to auscultation bilaterally; No wheeze  CARDIOVASCULAR: Regular rate and rhythm; No murmurs, rubs, or gallops  GASTROINTESTINAL: mild tenderness to palpation of RUQ  MUSCULOSKELETAL:  2+ Peripheral Pulses, No clubbing, cyanosis,  NEUROLOGY: non-focal  SKIN: No rashes or lesions      HOSPITAL COURSE  80M PMHx Afib on Coumadin, HTN, DM, COPD, presents to ED s/p fall, with LOC and head trauma In the ED he had a posterior head laceration. His CT head showed  Right lateral ventricle and third ventricle hemorrhage  Neurosurgery recommended CTA head: redemonstrated findings of CTH, without definite evidence of active arterial contrast extravasation.   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   EEG was unremarkable. Echo with EF 55-60%, with severe aortic stenosis.   Carotid duplex showed greater than 80% stenosis in the left internal carotid artery.   Vascular was planning for CEA, but patient was not cleared by cardio due to severe AS.   Cardio recommended structural for TAVR. Nephro recommended hydration before and after CT- TAVR which happened on 5/5 Patient was planned for TAVR as an outpatient and then to follow with vascular for CEA once TAVR done.   During his hospital stay he developed fever. His Ucx grew K. pneumonia. Bcx grew staph on 5/8. He was started on vancomycin and ceftriaxone. Echo ordered to evaluate for endocarditis     ASSESSMENT & PLAN    # Fever: Febrile to 103F, multiple episodes, leukocytosis  K.pneumonia in urine and staph aureus in blood cultures.    COVID neg   RUQ US showed GB wall edema and gallstones, HIDA scan negative, no surgical interventions  - on ceftriaxone and vancomycin  - ID following  - Bcx x2 pending.   - pending echo to evaluate for endocarditis  - will do US kidney     #Traumatic Fall with Intraventricular Hemorrhage   Likely syncope episode due to sever aortic stenosis.   (1) intraventricular hemorrhage:   - cleared by neuro to start anticoag  - cleared by s&s  (2) Left Internal Carotid Stenosis 80% - Vascular for CEA after cardiac clearance. Follow up with Dr. De Guzman outpatient.   (3) severe AS  planned for TAVR as an outpatient     # LAURYN Romofib  - on coumadin   - rate well controlled    # CKD stag 4 - Nephro following,   - baseline cr=approx 2.5  - cr approaching baseline  - lasix 20mg IV daily   - accurate in/out    #COPD - Stable, on home O2, c/w inhalers     DVT ppx: coumadin   GI ppx; protonix   Actvity: IAT   Diet: Renal DASH   Disposition: Home once infection resolves, PT working with pt  FULL CODE     Point of contact is his Son Deangelo #[phone].   Spoke to his son and updated him on clinical status TAIWO ZAVALA 80y Male  MRN#: [id number]     SUBJECTIVE  Patient is a 80y old Male who presents with a chief complaint of gait dysfunction / ventricular hemorrhage (11 May 2020 09:34)  Currently admitted to medicine with the primary diagnosis of Intracranial bleed  Today is hospital day 16d, and this morning he says he has some right flank pain. Otherwise no issues.     OBJECTIVE  PAST MEDICAL & SURGICAL HISTORY  Afib  BPH (benign prostatic hyperplasia)  CHF (congestive heart failure)  COPD (chronic obstructive pulmonary disease)  Diabetes  HTN (hypertension)  H/O heart artery stent  S/P CABG x 3    ALLERGIES:  No Known Allergies    MEDICATIONS:  STANDING MEDICATIONS  budesonide 160 MICROgram(s)/formoterol 4.5 MICROgram(s) Inhaler 2 Puff(s) Inhalation two times a day  cefTRIAXone   IVPB 1000 milliGRAM(s) IV Intermittent every 24 hours  chlorhexidine 4% Liquid 1 Application(s) Topical <User Schedule>  fenofibrate Tablet 145 milliGRAM(s) Oral daily  finasteride 5 milliGRAM(s) Oral daily  furosemide   Injectable 20 milliGRAM(s) IV Push daily  insulin glargine Injectable (LANTUS) 20 Unit(s) SubCutaneous at bedtime  insulin lispro (HumaLOG) corrective regimen sliding scale   SubCutaneous three times a day before meals  insulin lispro Injectable (HumaLOG) 5 Unit(s) SubCutaneous three times a day before meals  isosorbide   mononitrate ER Tablet (IMDUR) 30 milliGRAM(s) Oral daily  multivitamin 1 Tablet(s) Oral daily  pantoprazole    Tablet 40 milliGRAM(s) Oral before breakfast  silver sulfADIAZINE 1% Cream 1 Application(s) Topical daily  simvastatin 40 milliGRAM(s) Oral at bedtime  tamsulosin 0.4 milliGRAM(s) Oral at bedtime  vancomycin  IVPB      warfarin 3 milliGRAM(s) Oral once    PRN MEDICATIONS  acetaminophen   Tablet .. 650 milliGRAM(s) Oral every 6 hours PRN      VITAL SIGNS: Last 24 Hours  T(C): 36.4 (11 May 2020 05:20), Max: 37.4 (10 May 2020 21:04)  T(F): 97.6 (11 May 2020 05:20), Max: 99.3 (10 May 2020 21:04)  HR: 59 (11 May 2020 11:53) (59 - 62)  BP: 109/55 (11 May 2020 11:53) (103/59 - 125/60)  BP(mean): --  RR: 18 (11 May 2020 05:20) (17 - 18)  SpO2: 96% (10 May 2020 19:33) (96% - 96%)    LABS:                        10.0   11.21 )-----------( 208      ( 11 May 2020 04:30 )             31.0     05-11    133<L>  |  94<L>  |  103<HH>  ----------------------------<  94  3.5   |  22  |  2.9<H>    Ca    8.5      11 May 2020 04:30  Phos  2.9     05-11  Mg     2.1     05-11    TPro  6.4  /  Alb  2.9<L>  /  TBili  1.0  /  DBili  x   /  AST  25  /  ALT  15  /  AlkPhos  52  05-11    PT/INR - ( 11 May 2020 04:30 )   PT: 30.80 sec;   INR: 2.68 ratio         PTT - ( 11 May 2020 04:30 )  PTT:37.9 sec      Culture - Urine (collected 09 May 2020 10:45)  Source: .Urine Clean Catch (Midstream)  Final Report (10 May 2020 22:19):    <10,000 CFU/mL Normal Urogenital Felipa      RADIOLOGY:    PHYSICAL EXAM:    GENERAL: NAD, well-developed, AAOx3, heard of hearing  HEENT:  Atraumatic, Normocephalic. EOMI, PERRLA, conjunctiva and sclera clear, No JVD  PULMONARY: Clear to auscultation bilaterally; No wheeze  CARDIOVASCULAR: Regular rate and rhythm; No murmurs, rubs, or gallops  GASTROINTESTINAL: mild tenderness to palpation of RUQ  MUSCULOSKELETAL:  2+ Peripheral Pulses, No clubbing, cyanosis,  NEUROLOGY: non-focal  SKIN: No rashes or lesions      HOSPITAL COURSE  80M PMHx Afib on Coumadin, HTN, DM, COPD, presents to ED s/p fall, with LOC and head trauma In the ED he had a posterior head laceration. His CT head showed  Right lateral ventricle and third ventricle hemorrhage  Neurosurgery recommended CTA head: redemonstrated findings of CTH, without definite evidence of active arterial contrast extravasation.   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   EEG was unremarkable. Echo with EF 55-60%, with severe aortic stenosis.   Carotid duplex showed greater than 80% stenosis in the left internal carotid artery.   Vascular was planning for CEA, but patient was not cleared by cardio due to severe AS.   Cardio recommended structural for TAVR. Nephro recommended hydration before and after CT- which happened on 5/5   Patient is planned for TAVR as an outpatient and then to follow with vascular for CEA once TAVR done.   During his hospital stay he developed fever. His Ucx grew K. pneumonia. Bcx grew staph on 5/8. He was started on vancomycin and ceftriaxone. Echo ordered to evaluate for endocarditis     ASSESSMENT & PLAN    # Fever: Febrile to 103F, multiple episodes, leukocytosis  K.pneumonia in urine and staph aureus in blood cultures.    COVID neg   RUQ US showed GB wall edema and gallstones, HIDA scan negative, no surgical interventions  - on ceftriaxone and vancomycin  - ID following  - Bcx x2 pending.   - pending echo to evaluate for endocarditis  - will do US kidney     #Traumatic Fall with Intraventricular Hemorrhage   Likely syncope episode due to sever aortic stenosis.   (1) intraventricular hemorrhage:   - cleared by neuro to start anticoag  - cleared by s&s  (2) Left Internal Carotid Stenosis 80% - Vascular for CEA after cardiac clearance. Follow up with Dr. De Guzman outpatient.   (3) severe AS  planned for TAVR as an outpatient     # LAURYN Romofib  - on coumadin   - rate well controlled    # CKD stag 4 - Nephro following,   - baseline cr=approx 2.5  - cr approaching baseline  - lasix 20mg IV daily   - accurate in/out    #COPD - Stable, on home O2, c/w inhalers     DVT ppx: coumadin   GI ppx; protonix   Actvity: IAT   Diet: Renal DASH   Disposition: Home once infection resolves, PT working with pt  FULL CODE     Point of contact is his Son Deangelo #[phone].   Spoke to his son and updated him on clinical status

## 2020-05-11 NOTE — OCCUPATIONAL THERAPY INITIAL EVALUATION ADULT - PERTINENT HX OF CURRENT PROBLEM, REHAB EVAL
Pt is a right handed 79 y/o man. Pt fell at home and was brought to ER. DX: IVH and basilar skull fracture

## 2020-05-11 NOTE — OCCUPATIONAL THERAPY INITIAL EVALUATION ADULT - GENERAL OBSERVATIONS, REHAB EVAL
Pt seen 8:30-8:45, 10:35-11:00 Pt received seated in b/s chair in NAD. Pt refused OT tx at 8:30 but agreeable at 10:40

## 2020-05-11 NOTE — PROGRESS NOTE ADULT - SUBJECTIVE AND OBJECTIVE BOX
Patient seen / chart reviewed              afebrile              vss        lung : clear       function : transfer with mod assist                     ambulate 18' with rw / assist

## 2020-05-11 NOTE — PROGRESS NOTE ADULT - ASSESSMENT
80F, PMHx Afib on Coumadin, HTN, DM, CKD 4, COPD, presents to ED s/p fall, +HT, +LOC, +AC.   Found to have L. Carotid artery stenosis >80%.     # CKD 4   - serum creatinine noted stable /better today  / resume lasix 20 IV q24h    - previously had HD, now off for > 3 months.   - non-oliguric   - BP on lower side. monitor BP.   -  Ca, Mg, phos at goal.  - No need for RRT for now    - found to have severe aortic stenosis.   # anemia chronic Hb at goal.  # Fall with head trauma  - Intracranial hemorrhage / stable intraventricular hematoma.   - neurosurgery notes appreciated.    # Carotid artery stenosis   - carotid duplex 80% stenosis of L internal carotid artery    # UTI / started on antibx now   MRSA bacteriemia ID on case to follow     will follow

## 2020-05-11 NOTE — PROGRESS NOTE ADULT - ASSESSMENT
ASSESSMENT  80F, PMHx Afib on Coumadin, HTN, DM, COPD, presents to ED s/p fall, +HT, +LOC, +AC. Hospital course complicated by fever.       IMPRESSION  #MSSA bacteremia     5/8 BCX +  #Acute cystitis    UCX > >100,000 CFU/ml Klebsiella pneumoniae    No evidence of cholecystitis. HIDA NEGATIVE     Procalcitonin, Serum: 7.94 ng/mL (05-05-20 @ 11:48)    < from: US Abdomen Limited (05.06.20 @ 14:56) >Gallstones and sludge with gallbladder wall thickening features suggestive of acute cholecystitis in the proper clinical setting.    5/6 BCX NGTD   #Obesity BMI (kg/m2): 30.1  #DM Hemoglobin A1C, Whole Blood: 6.8 (01-31-20 @ 07:01)    RECOMMENDATIONS  - CHANGE abx to cefazolin 2g q12h IV crcl 25  - difficult to obtain history from patient  This is a preliminary incomplete pended note, all final recommendations to follow after interview and examination of the patient.     Spectra [phone] ASSESSMENT  80F, PMHx Afib on Coumadin, HTN, DM, COPD, presents to ED s/p fall, +HT, +LOC, +AC. Hospital course complicated by fever.       IMPRESSION  #MSSA bacteremia secondary to R wrist phlebitis    5/8 BCX +  #Acute cystitis    UCX > >100,000 CFU/ml Klebsiella pneumoniae    No evidence of cholecystitis. HIDA NEGATIVE     Procalcitonin, Serum: 7.94 ng/mL (05-05-20 @ 11:48)    < from: US Abdomen Limited (05.06.20 @ 14:56) >Gallstones and sludge with gallbladder wall thickening features suggestive of acute cholecystitis in the proper clinical setting.    5/6 BCX NGTD   #Obesity BMI (kg/m2): 30.1  #DM Hemoglobin A1C, Whole Blood: 6.8 (01-31-20 @ 07:01)    RECOMMENDATIONS  - CHANGE abx to cefazolin 2g q12h IV crcl 25  - TTE  - likely 2 weeks IV cefazolin for uncomplicated bacteremia     Spectra [phone]

## 2020-05-11 NOTE — OCCUPATIONAL THERAPY INITIAL EVALUATION ADULT - PLANNED THERAPY INTERVENTIONS, OT EVAL
balance training/cognitive, visual perceptual/ADL retraining/bed mobility training/transfer training

## 2020-05-11 NOTE — PROGRESS NOTE ADULT - ASSESSMENT
Imp : gait dysfunction / ventricular hemorrhage   Plan: continue bedside therapy          d/c to home vs subacute

## 2020-05-11 NOTE — PROGRESS NOTE ADULT - SUBJECTIVE AND OBJECTIVE BOX
SALLYTAIWO  80y, Male  Allergy: No Known Allergies      LOS  16d    CHIEF COMPLAINT: gait dysfunction / ventricular hemorrhage (11 May 2020 09:34)      INTERVAL EVENTS/HPI  - No acute events overnight, BCX MSSA  - T(F): , Max: 99.3 (05-10-20 @ 21:04)  - Denies any worsening symptoms  - Tolerating medication  - WBC Count: 11.21 (20 @ 04:30)  WBC Count: 9.62 (05-10-20 @ 05:33)  - Creatinine, Serum: 2.9 (20 @ 04:30)  Creatinine, Serum: 3.3 (05-10-20 @ 05:33)       ROS  General: Denies rigors, nightsweats  HEENT: Denies headache, rhinorrhea, sore throat, eye pain  CV: Denies CP, palpitations  PULM: Denies wheezing, hemoptysis  GI: Denies hematemesis, hematochezia, melena  : Denies discharge, hematuria  MSK: Denies arthralgias, myalgias  SKIN: Denies rash, lesions  NEURO: Denies paresthesias, weakness  PSYCH: Denies depression, anxiety    VITALS:  T(F): 97.6, Max: 99.3 (05-10-20 @ 21:04)  HR: 59  BP: 103/59  RR: 18Vital Signs Last 24 Hrs  T(C): 36.4 (11 May 2020 05:20), Max: 37.4 (10 May 2020 21:04)  T(F): 97.6 (11 May 2020 05:20), Max: 99.3 (10 May 2020 21:04)  HR: 59 (11 May 2020 05:20) (59 - 62)  BP: 103/59 (11 May 2020 05:20) (103/59 - 125/60)  BP(mean): --  RR: 18 (11 May 2020 05:20) (17 - 18)  SpO2: 96% (10 May 2020 19:33) (96% - 96%)    PHYSICAL EXAM:  Gen: Awake and alert, non-toxic appearing, NAD  HEENT: NCAT. EOMI. MMM.   Neck: Supple, no cervical LAD  CV: RRR, no murmurs  Lungs: CTAB, no w/r/r  Abd: Soft. NTND  Extr: wwp, no edema  Skin: no rash  Neuro: No focal deficits  Lines: clean     FH: Non-contributory  Social Hx: Non-contributory    TESTS & MEASUREMENTS:                        10.0   11. )-----------( 208      ( 11 May 2020 04:30 )             31.0         133<L>  |  94<L>  |  103<HH>  ----------------------------<  94  3.5   |  22  |  2.9<H>    Ca    8.5      11 May 2020 04:30  Phos  2.9       Mg     2.1         TPro  6.4  /  Alb  2.9<L>  /  TBili  1.0  /  DBili  x   /  AST  25  /  ALT  15  /  AlkPhos  52      eGFR if Non African American: 20 mL/min/1.73M2 (20 @ 04:30)  eGFR if African American: 23 mL/min/1.73M2 (20 @ 04:30)    LIVER FUNCTIONS - ( 11 May 2020 04:30 )  Alb: 2.9 g/dL / Pro: 6.4 g/dL / ALK PHOS: 52 U/L / ALT: 15 U/L / AST: 25 U/L / GGT: x           Urinalysis Basic - ( 09 May 2020 10:45 )    Color: Yellow / Appearance: Clear / S.018 / pH: x  Gluc: x / Ketone: Negative  / Bili: Negative / Urobili: <2 mg/dL   Blood: x / Protein: Trace / Nitrite: Negative   Leuk Esterase: Large / RBC: 1 /HPF / WBC 36 /HPF   Sq Epi: x / Non Sq Epi: 1 /HPF / Bacteria: Negative        Culture - Urine (collected 20 @ 10:45)  Source: .Urine Clean Catch (Midstream)  Final Report (05-10-20 @ 22:19):    <10,000 CFU/mL Normal Urogenital Felipa    Culture - Blood (collected 20 @ 11:54)  Source: .Blood None  Gram Stain (05-10-20 @ 07:32):    Growth in aerobic bottle: Gram Positive Cocci in Clusters    Growth in anaerobic bottle: Gram Positive Cocci in Clusters  Preliminary Report (05-10-20 @ 17:02):    Growth in aerobic bottle: Staphylococcus aureus    Growth in anaerobic bottle: Gram Positive Cocci in Clusters    "Due to technical problems, Proteus sp. will Not be reported as part of    the BCID panel until further notice"    ***Blood Panel PCR results on this specimen are available    approximately 3 hours after the Gram stain result.***    Gram stain, PCR, and/or culture results may not always    correspond due to difference in methodologies.    ************************************************************    This PCR assay was performed using [employer].    The following targets are tested for: Enterococcus,    vancomycin resistant enterococci, Listeria monocytogenes,    coagulase negative staphylococci, S. aureus,    methicillin resistant S. aureus, Streptococcus agalactiae    (Group B), S. pneumoniae, S. pyogenes (Group A),    Acinetobacter baumannii, Enterobacter cloacae, E. coli,    Klebsiella oxytoca, K. pneumoniae, Proteus sp.,    Serratia marcescens, Haemophilus influenzae,    Neisseria meningitidis, Pseudomonas aeruginosa, Candida    albicans, C. glabrata, C krusei, C parapsilosis,    C. tropicalis and the KPC resistance gene.  Organism: Blood Culture PCR (20 @ 23:05)  Organism: Blood Culture PCR (20 @ 23:05)      -  Staphylococcus aureus: Detec Any isolate of Staphylococcus aureus from a blood culture is NOT considered a contaminant.      Method Type: PCR    Culture - Urine (collected 20 @ 17:13)  Source: .Urine Catheterized  Final Report (20 @ 16:35):    >100,000 CFU/ml Klebsiella pneumoniae  Organism: Klebsiella pneumoniae (20 @ 16:35)  Organism: Klebsiella pneumoniae (20 @ 16:35)      -  Amikacin: S <=16      -  Ampicillin: R 16 These ampicillin results predict results for amoxicillin      -  Ampicillin/Sulbactam: S <=8/4 Enterobacter, Citrobacter, and Serratia may develop resistance during prolonged therapy (3-4 days)      -  Aztreonam: S <=4      -  Cefazolin: S <=8 (MIC_CL_COM_ENTERIC_CEFAZU) For uncomplicated UTI with K. pneumoniae, E. coli, or P. mirablis: HAYLEE <=16 is sensitive and HAYLEE >=32 is resistant. This also predicts results for oral agents cefaclor, cefdinir, cefpodoxime, cefprozil, cefuroxime axetil, cephalexin and locarbef for uncomplicated UTI. Note that some isolates may be susceptible to these agents while testing resistant to cefazolin.      -  Cefepime: S <=4      -  Cefoxitin: S <=8      -  Ceftriaxone: S <=1 Enterobacter, Citrobacter, and Serratia may develop resistance during prolonged therapy      -  Ciprofloxacin: R >2      -  Gentamicin: S <=4      -  Imipenem: S <=1      -  Levofloxacin: R >4      -  Meropenem: S <=1      -  Nitrofurantoin: I 64 Should not be used to treat pyelonephritis      -  Piperacillin/Tazobactam: S <=16      -  Tigecycline: S <=2      -  Tobramycin: S <=4      -  Trimethoprim/Sulfamethoxazole: S <=2/38      Method Type: HAYLEE    Culture - Blood (collected 20 @ 00:30)  Source: .Blood Blood  Final Report (20 @ 07:00):    No Growth Final            INFECTIOUS DISEASES TESTING  Procalcitonin, Serum: 7.94 (20 @ 11:48)  COVID-19 PCR: NotDetec (20 @ 11:00)  MRSA PCR Result.: Negative (10-24-19 @ 12:54)  RSV Result: Negative (10-15-19 @ 21:10)  Flu A Result: Negative (10-15-19 @ 21:10)  Flu B Result: Negative (10-15-19 @ 21:10)  Procalcitonin, Serum: 1.56 (10-15-19 @ 20:01)      INFLAMMATORY MARKERS  C-Reactive Protein, Serum: 11.44 mg/dL (20 @ 11:54)  C-Reactive Protein, Serum: 6.92 mg/dL (20 @ 11:48)      RADIOLOGY & ADDITIONAL TESTS:  I have personally reviewed the last available Chest xray  CXR      CT      CARDIOLOGY TESTING  12 Lead ECG:   Ventricular Rate 73 BPM    Atrial Rate 250 BPM    QRS Duration 158 ms    Q-T Interval 582 ms    QTC Calculation(Bezet) 641 ms    R Axis 137 degrees    T Axis -48 degrees    Diagnosis Line *** Suspect arm lead reversal, interpretation assumes no reversal  Atrial flutter  Non-specific intra-ventricular conduction block  Right ventricular hypertrophy  Marked T wave abnormality, consider anterior ischemia  Abnormal ECG    Confirmed by Felipe Comer ([id number]) on 2020 6:49:55 PM (20 @ 14:06)      MEDICATIONS  budesonide 160 MICROgram(s)/formoterol 4.5 MICROgram(s) Inhaler 2  cefTRIAXone   IVPB 1000  chlorhexidine 4% Liquid 1  fenofibrate Tablet 145  finasteride 5  furosemide   Injectable 20  insulin glargine Injectable (LANTUS) 20  insulin lispro (HumaLOG) corrective regimen sliding scale   insulin lispro Injectable (HumaLOG) 5  isosorbide   mononitrate ER Tablet (IMDUR) 30  multivitamin 1  pantoprazole    Tablet 40  silver sulfADIAZINE 1% Cream 1  simvastatin 40  tamsulosin 0.4  vancomycin  IVPB   vancomycin  IVPB 1000  warfarin 3      WEIGHT  Weight (kg): 84.5 (20 @ 04:50)  Creatinine, Serum: 2.9 mg/dL (20 @ 04:30)      ANTIBIOTICS:  cefTRIAXone   IVPB 1000 milliGRAM(s) IV Intermittent every 24 hours  vancomycin  IVPB      vancomycin  IVPB 1000 milliGRAM(s) IV Intermittent once      All available historical records have been reviewed SALLYTAIWO  80y, Male  Allergy: No Known Allergies      LOS  16d    CHIEF COMPLAINT: gait dysfunction / ventricular hemorrhage (11 May 2020 09:34)      INTERVAL EVENTS/HPI  - No acute events overnight, BCX MSSA  - T(F): , Max: 99.3 (05-10-20 @ 21:04)  - Denies any worsening symptoms  - Tolerating medication  - WBC Count: 11.21 (20 @ 04:30)  WBC Count: 9.62 (05-10-20 @ 05:33)  - Creatinine, Serum: 2.9 (20 @ 04:30)  Creatinine, Serum: 3.3 (05-10-20 @ 05:33)       ROS  General: Denies rigors, nightsweats  HEENT: Denies headache, rhinorrhea, sore throat, eye pain  CV: Denies CP, palpitations  PULM: Denies wheezing, hemoptysis  GI: Denies hematemesis, hematochezia, melena  : Denies discharge, hematuria  MSK: Denies arthralgias, myalgias  SKIN: Denies rash, lesions  NEURO: Denies paresthesias, weakness  PSYCH: Denies depression, anxiety    VITALS:  T(F): 97.6, Max: 99.3 (05-10-20 @ 21:04)  HR: 59  BP: 103/59  RR: 18Vital Signs Last 24 Hrs  T(C): 36.4 (11 May 2020 05:20), Max: 37.4 (10 May 2020 21:04)  T(F): 97.6 (11 May 2020 05:20), Max: 99.3 (10 May 2020 21:04)  HR: 59 (11 May 2020 05:20) (59 - 62)  BP: 103/59 (11 May 2020 05:20) (103/59 - 125/60)  BP(mean): --  RR: 18 (11 May 2020 05:20) (17 - 18)  SpO2: 96% (10 May 2020 19:33) (96% - 96%)    PHYSICAL EXAM:  Gen: Awake and alert, non-toxic appearing, NAD  HEENT: NCAT. EOMI. MMM.   Neck: Supple, no cervical LAD  CV: RRR, no murmurs  Lungs: CTAB, no w/r/r  Abd: Soft. NTND  Extr: wwp, no edema  Skin: no rash  Neuro: No focal deficits  Lines: R wrist old phlebitis    FH: Non-contributory  Social Hx: Non-contributory    TESTS & MEASUREMENTS:                        10.0   11. )-----------( 208      ( 11 May 2020 04:30 )             31.0         133<L>  |  94<L>  |  103<HH>  ----------------------------<  94  3.5   |  22  |  2.9<H>    Ca    8.5      11 May 2020 04:30  Phos  2.9       Mg     2.1         TPro  6.4  /  Alb  2.9<L>  /  TBili  1.0  /  DBili  x   /  AST  25  /  ALT  15  /  AlkPhos  52      eGFR if Non African American: 20 mL/min/1.73M2 (20 @ 04:30)  eGFR if African American: 23 mL/min/1.73M2 (20 @ 04:30)    LIVER FUNCTIONS - ( 11 May 2020 04:30 )  Alb: 2.9 g/dL / Pro: 6.4 g/dL / ALK PHOS: 52 U/L / ALT: 15 U/L / AST: 25 U/L / GGT: x           Urinalysis Basic - ( 09 May 2020 10:45 )    Color: Yellow / Appearance: Clear / S.018 / pH: x  Gluc: x / Ketone: Negative  / Bili: Negative / Urobili: <2 mg/dL   Blood: x / Protein: Trace / Nitrite: Negative   Leuk Esterase: Large / RBC: 1 /HPF / WBC 36 /HPF   Sq Epi: x / Non Sq Epi: 1 /HPF / Bacteria: Negative        Culture - Urine (collected 20 @ 10:45)  Source: .Urine Clean Catch (Midstream)  Final Report (05-10-20 @ 22:19):    <10,000 CFU/mL Normal Urogenital Felipa    Culture - Blood (collected 20 @ 11:54)  Source: .Blood None  Gram Stain (05-10-20 @ 07:32):    Growth in aerobic bottle: Gram Positive Cocci in Clusters    Growth in anaerobic bottle: Gram Positive Cocci in Clusters  Preliminary Report (05-10-20 @ 17:02):    Growth in aerobic bottle: Staphylococcus aureus    Growth in anaerobic bottle: Gram Positive Cocci in Clusters    "Due to technical problems, Proteus sp. will Not be reported as part of    the BCID panel until further notice"    ***Blood Panel PCR results on this specimen are available    approximately 3 hours after the Gram stain result.***    Gram stain, PCR, and/or culture results may not always    correspond due to difference in methodologies.    ************************************************************    This PCR assay was performed using [employer].    The following targets are tested for: Enterococcus,    vancomycin resistant enterococci, Listeria monocytogenes,    coagulase negative staphylococci, S. aureus,    methicillin resistant S. aureus, Streptococcus agalactiae    (Group B), S. pneumoniae, S. pyogenes (Group A),    Acinetobacter baumannii, Enterobacter cloacae, E. coli,    Klebsiella oxytoca, K. pneumoniae, Proteus sp.,    Serratia marcescens, Haemophilus influenzae,    Neisseria meningitidis, Pseudomonas aeruginosa, Candida    albicans, C. glabrata, C krusei, C parapsilosis,    C. tropicalis and the KPC resistance gene.  Organism: Blood Culture PCR (20 @ 23:05)  Organism: Blood Culture PCR (20 @ 23:05)      -  Staphylococcus aureus: Detec Any isolate of Staphylococcus aureus from a blood culture is NOT considered a contaminant.      Method Type: PCR    Culture - Urine (collected 20 @ 17:13)  Source: .Urine Catheterized  Final Report (20 @ 16:35):    >100,000 CFU/ml Klebsiella pneumoniae  Organism: Klebsiella pneumoniae (20 @ 16:35)  Organism: Klebsiella pneumoniae (20 @ 16:35)      -  Amikacin: S <=16      -  Ampicillin: R 16 These ampicillin results predict results for amoxicillin      -  Ampicillin/Sulbactam: S <=8/4 Enterobacter, Citrobacter, and Serratia may develop resistance during prolonged therapy (3-4 days)      -  Aztreonam: S <=4      -  Cefazolin: S <=8 (MIC_CL_COM_ENTERIC_CEFAZU) For uncomplicated UTI with K. pneumoniae, E. coli, or P. mirablis: HAYLEE <=16 is sensitive and HAYLEE >=32 is resistant. This also predicts results for oral agents cefaclor, cefdinir, cefpodoxime, cefprozil, cefuroxime axetil, cephalexin and locarbef for uncomplicated UTI. Note that some isolates may be susceptible to these agents while testing resistant to cefazolin.      -  Cefepime: S <=4      -  Cefoxitin: S <=8      -  Ceftriaxone: S <=1 Enterobacter, Citrobacter, and Serratia may develop resistance during prolonged therapy      -  Ciprofloxacin: R >2      -  Gentamicin: S <=4      -  Imipenem: S <=1      -  Levofloxacin: R >4      -  Meropenem: S <=1      -  Nitrofurantoin: I 64 Should not be used to treat pyelonephritis      -  Piperacillin/Tazobactam: S <=16      -  Tigecycline: S <=2      -  Tobramycin: S <=4      -  Trimethoprim/Sulfamethoxazole: S <=2/38      Method Type: HAYLEE    Culture - Blood (collected 20 @ 00:30)  Source: .Blood Blood  Final Report (20 @ 07:00):    No Growth Final            INFECTIOUS DISEASES TESTING  Procalcitonin, Serum: 7.94 (20 @ 11:48)  COVID-19 PCR: NotDetec (20 @ 11:00)  MRSA PCR Result.: Negative (10-24-19 @ 12:54)  RSV Result: Negative (10-15-19 @ 21:10)  Flu A Result: Negative (10-15-19 @ 21:10)  Flu B Result: Negative (10-15-19 @ 21:10)  Procalcitonin, Serum: 1.56 (10-15-19 @ 20:01)      INFLAMMATORY MARKERS  C-Reactive Protein, Serum: 11.44 mg/dL (20 @ 11:54)  C-Reactive Protein, Serum: 6.92 mg/dL (20 @ 11:48)      RADIOLOGY & ADDITIONAL TESTS:  I have personally reviewed the last available Chest xray  CXR      CT      CARDIOLOGY TESTING  12 Lead ECG:   Ventricular Rate 73 BPM    Atrial Rate 250 BPM    QRS Duration 158 ms    Q-T Interval 582 ms    QTC Calculation(Bezet) 641 ms    R Axis 137 degrees    T Axis -48 degrees    Diagnosis Line *** Suspect arm lead reversal, interpretation assumes no reversal  Atrial flutter  Non-specific intra-ventricular conduction block  Right ventricular hypertrophy  Marked T wave abnormality, consider anterior ischemia  Abnormal ECG    Confirmed by Felipe Comer ([id number]) on 2020 6:49:55 PM (20 @ 14:06)      MEDICATIONS  budesonide 160 MICROgram(s)/formoterol 4.5 MICROgram(s) Inhaler 2  cefTRIAXone   IVPB 1000  chlorhexidine 4% Liquid 1  fenofibrate Tablet 145  finasteride 5  furosemide   Injectable 20  insulin glargine Injectable (LANTUS) 20  insulin lispro (HumaLOG) corrective regimen sliding scale   insulin lispro Injectable (HumaLOG) 5  isosorbide   mononitrate ER Tablet (IMDUR) 30  multivitamin 1  pantoprazole    Tablet 40  silver sulfADIAZINE 1% Cream 1  simvastatin 40  tamsulosin 0.4  vancomycin  IVPB   vancomycin  IVPB 1000  warfarin 3      WEIGHT  Weight (kg): 84.5 (20 @ 04:50)  Creatinine, Serum: 2.9 mg/dL (20 @ 04:30)      ANTIBIOTICS:  cefTRIAXone   IVPB 1000 milliGRAM(s) IV Intermittent every 24 hours  vancomycin  IVPB      vancomycin  IVPB 1000 milliGRAM(s) IV Intermittent once      All available historical records have been reviewed

## 2020-05-11 NOTE — PROGRESS NOTE ADULT - SUBJECTIVE AND OBJECTIVE BOX
Nephrology progress note    THIS IS AN INCOMPLETE NOTE . FULL NOTE TO FOLLOW SHORTLY    Patient is seen and examined, events over the last 24 h noted .    Allergies:  No Known Allergies    Hospital Medications:   MEDICATIONS  (STANDING):  budesonide 160 MICROgram(s)/formoterol 4.5 MICROgram(s) Inhaler 2 Puff(s) Inhalation two times a day  cefTRIAXone   IVPB 1000 milliGRAM(s) IV Intermittent every 24 hours  chlorhexidine 4% Liquid 1 Application(s) Topical <User Schedule>  fenofibrate Tablet 145 milliGRAM(s) Oral daily  finasteride 5 milliGRAM(s) Oral daily  insulin glargine Injectable (LANTUS) 20 Unit(s) SubCutaneous at bedtime  insulin lispro (HumaLOG) corrective regimen sliding scale   SubCutaneous three times a day before meals  insulin lispro Injectable (HumaLOG) 5 Unit(s) SubCutaneous three times a day before meals  isosorbide   mononitrate ER Tablet (IMDUR) 30 milliGRAM(s) Oral daily  multivitamin 1 Tablet(s) Oral daily  pantoprazole    Tablet 40 milliGRAM(s) Oral before breakfast  silver sulfADIAZINE 1% Cream 1 Application(s) Topical daily  simvastatin 40 milliGRAM(s) Oral at bedtime  tamsulosin 0.4 milliGRAM(s) Oral at bedtime        VITALS:  T(F): 97.6 (20 @ 05:20), Max: 99.3 (05-10-20 @ 21:04)  HR: 59 (20 @ 05:20)  BP: 103/59 (20 @ 05:20)  RR: 18 (20 @ 05:20)  SpO2: 96% (05-10-20 @ 19:33)  Wt(kg): --        PHYSICAL EXAM:  Constitutional: NAD  HEENT: anicteric sclera, oropharynx clear, MMM  Neck: No JVD  Respiratory: CTAB, no wheezes, rales or rhonchi  Cardiovascular: S1, S2, RRR  Gastrointestinal: BS+, soft, NT/ND  Extremities: No cyanosis or clubbing. No peripheral edema  :  No julian.   Skin: No rashes    LABS:      133<L>  |  94<L>  |  103<HH>  ----------------------------<  94  3.5   |  22  |  2.9<H>    Ca    8.5      11 May 2020 04:30  Phos  2.9     05-11  Mg     2.1     05-11    TPro  6.4  /  Alb  2.9<L>  /  TBili  1.0  /  DBili      /  AST  25  /  ALT  15  /  AlkPhos  52  05-11                          10.0   11.21 )-----------( 208      ( 11 May 2020 04:30 )             31.0       Urine Studies:  Urinalysis Basic - ( 09 May 2020 10:45 )    Color: Yellow / Appearance: Clear / S.018 / pH:   Gluc:  / Ketone: Negative  / Bili: Negative / Urobili: <2 mg/dL   Blood:  / Protein: Trace / Nitrite: Negative   Leuk Esterase: Large / RBC: 1 /HPF / WBC 36 /HPF   Sq Epi:  / Non Sq Epi: 1 /HPF / Bacteria: Negative        RADIOLOGY & ADDITIONAL STUDIES: Nephrology progress note  Patient is seen and examined, events over the last 24 h noted .  no complaints     Allergies:  No Known Allergies    Hospital Medications:   MEDICATIONS  (STANDING):  budesonide 160 MICROgram(s)/formoterol 4.5 MICROgram(s) Inhaler 2 Puff(s) Inhalation two times a day  cefTRIAXone   IVPB 1000 milliGRAM(s) IV Intermittent every 24 hours  fenofibrate Tablet 145 milliGRAM(s) Oral daily  finasteride 5 milliGRAM(s) Oral daily  insulin glargine Injectable (LANTUS) 20 Unit(s) SubCutaneous at bedtime  insulin lispro (HumaLOG) corrective regimen sliding scale   SubCutaneous three times a day before meals  insulin lispro Injectable (HumaLOG) 5 Unit(s) SubCutaneous three times a day before meals  isosorbide   mononitrate ER Tablet (IMDUR) 30 milliGRAM(s) Oral daily  multivitamin 1 Tablet(s) Oral daily  pantoprazole    Tablet 40 milliGRAM(s) Oral before breakfast  silver sulfADIAZINE 1% Cream 1 Application(s) Topical daily  simvastatin 40 milliGRAM(s) Oral at bedtime  tamsulosin 0.4 milliGRAM(s) Oral at bedtime        VITALS:  T(F): 97.6 (20 @ 05:20), Max: 99.3 (05-10-20 @ 21:04)  HR: 59 (20 @ 05:20)  BP: 103/59 (20 @ 05:20)  RR: 18 (20 @ 05:20)  SpO2: 96% (05-10-20 @ 19:33)          PHYSICAL EXAM:  Constitutional: NAD  HEENT: anicteric sclera, oropharynx clear, MMM  Neck: No JVD  Respiratory: CTAB, no wheezes, rales or rhonchi  Cardiovascular: S1, S2, RRR  Gastrointestinal: BS+, soft, NT/ND  Extremities: No cyanosis or clubbing. plus one edema   :  No julian.   Skin: No rashes    LABS:      133<L>  |  94<L>  |  103<HH>  ----------------------------<  94  3.5   |  22  |  2.9<H>    Ca    8.5      11 May 2020 04:30  Phos  2.9     05-11  Mg     2.1         TPro  6.4  /  Alb  2.9<L>  /  TBili  1.0  /  DBili      /  AST  25  /  ALT  15  /  AlkPhos  52                            10.0   11.21 )-----------( 208      ( 11 May 2020 04:30 )             31.0       Urine Studies:  Urinalysis Basic - ( 09 May 2020 10:45 )    Color: Yellow / Appearance: Clear / S.018 / pH:   Gluc:  / Ketone: Negative  / Bili: Negative / Urobili: <2 mg/dL   Blood:  / Protein: Trace / Nitrite: Negative   Leuk Esterase: Large / RBC: 1 /HPF / WBC 36 /HPF   Sq Epi:  / Non Sq Epi: 1 /HPF / Bacteria: Negative    Gram Stain:   Growth in aerobic bottle: Gram Positive Cocci in Clusters  Growth in anaerobic bottle: Gram Positive Cocci in Clusters (20 @ 11:54)    -  Staphylococcus aureus: Detec Any isolate of Staphylococcus aureus from a blood culture is NOT considered a contaminant. (20 @ 11:54)        RADIOLOGY & ADDITIONAL STUDIES:

## 2020-05-11 NOTE — PROGRESS NOTE ADULT - REASON FOR ADMISSION
fall
Fall
Ventricular Hemorrhage
gait dysfunction / ventricular hemorrhage
ventricular hemorrhage
fall
afib

## 2020-05-11 NOTE — OCCUPATIONAL THERAPY INITIAL EVALUATION ADULT - LEVEL OF INDEPENDENCE:TOILET, OT EVAL
Pt declined to transfer to toilet to urinate and requested urinal. Pt requires mod assist with urinal 2* spillage observed

## 2020-05-12 LAB
ALBUMIN SERPL ELPH-MCNC: 2.8 G/DL — LOW (ref 3.5–5.2)
ALP SERPL-CCNC: 66 U/L — SIGNIFICANT CHANGE UP (ref 30–115)
ALT FLD-CCNC: 12 U/L — SIGNIFICANT CHANGE UP (ref 0–41)
ANION GAP SERPL CALC-SCNC: 13 MMOL/L — SIGNIFICANT CHANGE UP (ref 7–14)
APTT BLD: 37.8 SEC — SIGNIFICANT CHANGE UP (ref 27–39.2)
AST SERPL-CCNC: 21 U/L — SIGNIFICANT CHANGE UP (ref 0–41)
BASOPHILS # BLD AUTO: 0.04 K/UL — SIGNIFICANT CHANGE UP (ref 0–0.2)
BASOPHILS NFR BLD AUTO: 0.5 % — SIGNIFICANT CHANGE UP (ref 0–1)
BILIRUB SERPL-MCNC: 0.7 MG/DL — SIGNIFICANT CHANGE UP (ref 0.2–1.2)
BUN SERPL-MCNC: 96 MG/DL — CRITICAL HIGH (ref 10–20)
CALCIUM SERPL-MCNC: 8.6 MG/DL — SIGNIFICANT CHANGE UP (ref 8.5–10.1)
CHLORIDE SERPL-SCNC: 100 MMOL/L — SIGNIFICANT CHANGE UP (ref 98–110)
CO2 SERPL-SCNC: 23 MMOL/L — SIGNIFICANT CHANGE UP (ref 17–32)
CREAT SERPL-MCNC: 2.6 MG/DL — HIGH (ref 0.7–1.5)
EOSINOPHIL # BLD AUTO: 0.09 K/UL — SIGNIFICANT CHANGE UP (ref 0–0.7)
EOSINOPHIL NFR BLD AUTO: 1 % — SIGNIFICANT CHANGE UP (ref 0–8)
GLUCOSE BLDC GLUCOMTR-MCNC: 133 MG/DL — HIGH (ref 70–99)
GLUCOSE BLDC GLUCOMTR-MCNC: 145 MG/DL — HIGH (ref 70–99)
GLUCOSE BLDC GLUCOMTR-MCNC: 147 MG/DL — HIGH (ref 70–99)
GLUCOSE BLDC GLUCOMTR-MCNC: 227 MG/DL — HIGH (ref 70–99)
GLUCOSE SERPL-MCNC: 136 MG/DL — HIGH (ref 70–99)
HCT VFR BLD CALC: 29.7 % — LOW (ref 42–52)
HGB BLD-MCNC: 9.8 G/DL — LOW (ref 14–18)
IMM GRANULOCYTES NFR BLD AUTO: 1.4 % — HIGH (ref 0.1–0.3)
INR BLD: 2.66 RATIO — HIGH (ref 0.65–1.3)
LYMPHOCYTES # BLD AUTO: 1.12 K/UL — LOW (ref 1.2–3.4)
LYMPHOCYTES # BLD AUTO: 12.8 % — LOW (ref 20.5–51.1)
MAGNESIUM SERPL-MCNC: 2.1 MG/DL — SIGNIFICANT CHANGE UP (ref 1.8–2.4)
MCHC RBC-ENTMCNC: 28.5 PG — SIGNIFICANT CHANGE UP (ref 27–31)
MCHC RBC-ENTMCNC: 33 G/DL — SIGNIFICANT CHANGE UP (ref 32–37)
MCV RBC AUTO: 86.3 FL — SIGNIFICANT CHANGE UP (ref 80–94)
MONOCYTES # BLD AUTO: 0.73 K/UL — HIGH (ref 0.1–0.6)
MONOCYTES NFR BLD AUTO: 8.3 % — SIGNIFICANT CHANGE UP (ref 1.7–9.3)
NEUTROPHILS # BLD AUTO: 6.68 K/UL — HIGH (ref 1.4–6.5)
NEUTROPHILS NFR BLD AUTO: 76 % — HIGH (ref 42.2–75.2)
NRBC # BLD: 0 /100 WBCS — SIGNIFICANT CHANGE UP (ref 0–0)
PHOSPHATE SERPL-MCNC: 3.4 MG/DL — SIGNIFICANT CHANGE UP (ref 2.1–4.9)
PLATELET # BLD AUTO: 209 K/UL — SIGNIFICANT CHANGE UP (ref 130–400)
POTASSIUM SERPL-MCNC: 3.6 MMOL/L — SIGNIFICANT CHANGE UP (ref 3.5–5)
POTASSIUM SERPL-SCNC: 3.6 MMOL/L — SIGNIFICANT CHANGE UP (ref 3.5–5)
PROT SERPL-MCNC: 5.9 G/DL — LOW (ref 6–8)
PROTHROM AB SERPL-ACNC: 30.6 SEC — HIGH (ref 9.95–12.87)
RBC # BLD: 3.44 M/UL — LOW (ref 4.7–6.1)
RBC # FLD: 17.2 % — HIGH (ref 11.5–14.5)
SODIUM SERPL-SCNC: 136 MMOL/L — SIGNIFICANT CHANGE UP (ref 135–146)
WBC # BLD: 8.78 K/UL — SIGNIFICANT CHANGE UP (ref 4.8–10.8)
WBC # FLD AUTO: 8.78 K/UL — SIGNIFICANT CHANGE UP (ref 4.8–10.8)

## 2020-05-12 PROCEDURE — 93306 TTE W/DOPPLER COMPLETE: CPT | Mod: 26

## 2020-05-12 PROCEDURE — 99233 SBSQ HOSP IP/OBS HIGH 50: CPT

## 2020-05-12 RX ORDER — OXYCODONE AND ACETAMINOPHEN 5; 325 MG/1; MG/1
1 TABLET ORAL ONCE
Refills: 0 | Status: DISCONTINUED | OUTPATIENT
Start: 2020-05-12 | End: 2020-05-12

## 2020-05-12 RX ORDER — WARFARIN SODIUM 2.5 MG/1
3 TABLET ORAL ONCE
Refills: 0 | Status: DISCONTINUED | OUTPATIENT
Start: 2020-05-12 | End: 2020-05-12

## 2020-05-12 RX ORDER — WARFARIN SODIUM 2.5 MG/1
3 TABLET ORAL ONCE
Refills: 0 | Status: COMPLETED | OUTPATIENT
Start: 2020-05-12 | End: 2020-05-12

## 2020-05-12 RX ORDER — CEFAZOLIN SODIUM 1 G
2 VIAL (EA) INJECTION
Qty: 0 | Refills: 0 | DISCHARGE
Start: 2020-05-12

## 2020-05-12 RX ORDER — POTASSIUM CHLORIDE 20 MEQ
20 PACKET (EA) ORAL ONCE
Refills: 0 | Status: COMPLETED | OUTPATIENT
Start: 2020-05-12 | End: 2020-05-12

## 2020-05-12 RX ADMIN — Medication 5 UNIT(S): at 11:29

## 2020-05-12 RX ADMIN — Medication 145 MILLIGRAM(S): at 12:04

## 2020-05-12 RX ADMIN — Medication 5 UNIT(S): at 16:32

## 2020-05-12 RX ADMIN — Medication 20 MILLIGRAM(S): at 06:22

## 2020-05-12 RX ADMIN — Medication 1 APPLICATION(S): at 11:50

## 2020-05-12 RX ADMIN — Medication 100 MILLIGRAM(S): at 06:22

## 2020-05-12 RX ADMIN — ISOSORBIDE MONONITRATE 30 MILLIGRAM(S): 60 TABLET, EXTENDED RELEASE ORAL at 12:00

## 2020-05-12 RX ADMIN — Medication 100 MILLIGRAM(S): at 17:02

## 2020-05-12 RX ADMIN — BUDESONIDE AND FORMOTEROL FUMARATE DIHYDRATE 2 PUFF(S): 160; 4.5 AEROSOL RESPIRATORY (INHALATION) at 12:00

## 2020-05-12 RX ADMIN — Medication 1 TABLET(S): at 12:00

## 2020-05-12 RX ADMIN — OXYCODONE AND ACETAMINOPHEN 1 TABLET(S): 5; 325 TABLET ORAL at 01:41

## 2020-05-12 RX ADMIN — CHLORHEXIDINE GLUCONATE 1 APPLICATION(S): 213 SOLUTION TOPICAL at 06:21

## 2020-05-12 RX ADMIN — FINASTERIDE 5 MILLIGRAM(S): 5 TABLET, FILM COATED ORAL at 12:04

## 2020-05-12 RX ADMIN — PANTOPRAZOLE SODIUM 40 MILLIGRAM(S): 20 TABLET, DELAYED RELEASE ORAL at 06:22

## 2020-05-12 RX ADMIN — Medication 2: at 11:30

## 2020-05-12 NOTE — PROGRESS NOTE ADULT - ATTENDING COMMENTS
79yo male with HTN, DM, COPD, and atrial fibrillation admitted after fall on Coumadin, sustaining posterior head laceration, right wrist abrasion and found ot have right third and intra-ventricular hemorrhage. Neurosurgery consulted - recommending MRI head and EEG for further evaluation. Neurointerventionalist to be consulted. Hospitalist consulted for transfer. Cleared by trauma if intra-cranial finding is unlikely traumatic. Home medications, pain control, regular diet. Encourage ambulation/OOB, incentive spirometer, DVT ppx.
I reviewed the note and agree with the exam and plan of care.    Patient had HIDA negative for cholecystitis.  Patient has no abdominal pain today, Afebrile.    PE:  Abdomen: + bowel sounds, soft, nontender.    Assessment/Plan:  - low fat diet  - no surgery is needed at this time  Further management as per Medical team.
Patient seen and examined independently earlier today. Case discussed with housestaff, nursing, social work, OT, renal. I agree with most of the resident's note, physical exam, and plan except as below. Pt reports feeling well, wants to go home ASAP. No specific complaints. Denies CP, SOB, AP. Remainder ROS unremarkable. On exam, NAD, AA0x3, [place], S1S2, decr BS b/l, NT/ND/S, +b/l ACE wraps. BCx 5/8 w/ MSSA. Repeat BCx 5/10 is neg. Cont Cefazolin to cover MSSA in blood and Klebsiella in urine. Repeat echo to r/o vegetation. Resumed Lasix as per renal. PT. Possible midline in am if BCx neg. Hold Coumadin tonight. High risk pt.       #Progress Note Handoff  Pending (specify):  Consults____Clinical improvement and stability__x__Tests_TTE____, PT_____x___  Pt/Family discussion: Pt informed and agrees with the current plan  Disposition: Home______SNF_______To be determined___x_____ .     40 minutes spent on total encounter; more than 50% of the visit was spent counseling and/or coordinating care by the attending physician.
A 80 years old right handed man on OAC and Plavix, is s/p witnessed fall, who was found on CTH to have right lateral ventricle IVH is consulted by NS for atypical IVH and possible underlying vascular malformation. His CTA failed to show underlying vascular lesion in the IVH are. The requested MRI and MRA failed to show underlying vascular abnormalities. The pattern of heterogenuous IVH can be due to OAC, or underlying lesions that is masked by the bleed. The plan briefed by the ACP in above was written after rounding on patient with our NV Team.
Patient seen and examined independently earlier today. Case discussed with housestaff, nursing, social work, OT, renal. I agree with most of the resident's note, physical exam, and plan except as below. Pt is "grumpy", does not want to be bothered. No specific complaints. Denies CP, SOB, AP. Remainder ROS unremarkable. On exam, NAD, AA0x2+, [place], S1S2, decr BS b/l, NT/ND/S, +b/l ACE wraps. BCx 5/8 w/ MSSA. Changed ABx to Cefazolin to cover MSSA in blood and Klebsiella in urine. Repeat BCx daily until negative. Repeat echo to r/o vegetation. Resume Lasix as per renal. PT. Cont Coumadin as per INR. Very high risk pt. Px guarded.      #Progress Note Handoff  Pending (specify):  Consults____Clinical improvement and stability__x__Tests_TTE____, PT_____x___  Pt/Family discussion: Pt informed and agrees with the current plan  Disposition: Home______SNF_______To be determined___x_____
pt being evaluated by structural w/ plans pending.
pt for ct scan w/ plan as per structural team.
pt on ab rx. pt w/o cp,sob. plans as per structural.
I saw and examined the patient independently. I agree with above history, physical exam and plan of care which I have reviewed and edited where appropriate with following additions.     patient sitting up in chair/ awake, alert, upset he wants to go home as soon as cleared. denies any new complaints.     syncope/fall with intraventricular hemorrhage /LIC artery stenosis/ severe aortic stenosis:   Neuro surgery evaluated and suggest no surgical intervention- fu as OP for delayed scan in 4-6 weeks.   MRI brain unremarkable.   INR <2.   continue holding AC for A-Fib for now / fu neurosurgery on timing to restart AC .   vascular surgery following for LIC artery stenosis and plan for CEA for tomorrow pending cardio clearance.   Dr. Garcia , cardio supposed to follow up today for clearance.    CKD stage 4:   renal function seems to be around baseline.   c/w metolaZone/ oral lasix.   was seen by nephro inpatient in 2/20- continue OP fu.     LE venous stasis with ulcer LLE:   continue local wound care.       dvt ppx SCD's.     Progress note Handoff:   Pending: vascular procedure CEA/ cardio fu for clearance  Discussion: plan of care with  patient /  satisfied- all questions answered.  Disposition: home when stable
structural f/u w/ plan pending.
pt w/ critical as, cad. pt high risk. advise consult structural , dr whaley to further discuss treatment options prior to surgery.

## 2020-05-12 NOTE — PROGRESS NOTE ADULT - ASSESSMENT
80F, PMHx Afib on Coumadin, HTN, DM, CKD 4, COPD, presents to ED s/p fall, +HT, +LOC, +AC. Found to have MSSA bacteremia  Found to have L. Carotid artery stenosis >80%.     # CKD 4   - serum creatinine stable - cont lasix 20 IV q24h switch to 40 po qd on D/C   - previously had HD, now off for > 3 months.   - non-oliguric   - BP on lower side. monitor BP.   -  Ca, Mg, phos at goal.   - found to have severe aortic stenosis.   # anemia chronic Hb at goal.  # Fall with head trauma  - Intracranial hemorrhage / stable intraventricular hematoma.   - neurosurgery notes appreciated.  # Carotid artery stenosis   - carotid duplex 80% stenosis of L internal carotid artery    # UTI - Klebsiella  MRSA bacteriemia - on Ancef 2 gr q12  2 Rt wrist phlebitis  TTE neg 4/26    will follow

## 2020-05-12 NOTE — PROGRESS NOTE ADULT - SUBJECTIVE AND OBJECTIVE BOX
TAIWO ZAVALA 80y Male  MRN#: [id number]     SUBJECTIVE  Patient is a 80y old Male who presents with a chief complaint of gait dysfunction / ventricular hemorrhage (11 May 2020 09:34)  Currently admitted to medicine with the primary diagnosis of Intracranial bleed  Today is hospital day 17d, and this morning he is hungry. refusing to speak to me until he gets his food.     OBJECTIVE  PAST MEDICAL & SURGICAL HISTORY  Afib  BPH (benign prostatic hyperplasia)  CHF (congestive heart failure)  COPD (chronic obstructive pulmonary disease)  Diabetes  HTN (hypertension)  H/O heart artery stent  S/P CABG x 3    ALLERGIES:  No Known Allergies    MEDICATIONS:  STANDING MEDICATIONS  budesonide 160 MICROgram(s)/formoterol 4.5 MICROgram(s) Inhaler 2 Puff(s) Inhalation two times a day  ceFAZolin   IVPB 2000 milliGRAM(s) IV Intermittent every 12 hours  ceFAZolin   IVPB      chlorhexidine 4% Liquid 1 Application(s) Topical <User Schedule>  fenofibrate Tablet 145 milliGRAM(s) Oral daily  finasteride 5 milliGRAM(s) Oral daily  furosemide   Injectable 20 milliGRAM(s) IV Push daily  insulin glargine Injectable (LANTUS) 20 Unit(s) SubCutaneous at bedtime  insulin lispro (HumaLOG) corrective regimen sliding scale   SubCutaneous three times a day before meals  insulin lispro Injectable (HumaLOG) 5 Unit(s) SubCutaneous three times a day before meals  isosorbide   mononitrate ER Tablet (IMDUR) 30 milliGRAM(s) Oral daily  multivitamin 1 Tablet(s) Oral daily  pantoprazole    Tablet 40 milliGRAM(s) Oral before breakfast  silver sulfADIAZINE 1% Cream 1 Application(s) Topical daily  simvastatin 40 milliGRAM(s) Oral at bedtime  tamsulosin 0.4 milliGRAM(s) Oral at bedtime    PRN MEDICATIONS  acetaminophen   Tablet .. 650 milliGRAM(s) Oral every 6 hours PRN      VITAL SIGNS: Last 24 Hours  T(C): 36.2 (12 May 2020 05:51), Max: 36.6 (11 May 2020 14:01)  T(F): 97.1 (12 May 2020 05:51), Max: 97.9 (11 May 2020 14:01)  HR: 64 (12 May 2020 06:15) (56 - 64)  BP: 118/68 (12 May 2020 06:15) (55/- - 118/68)  BP(mean): --  RR: 18 (12 May 2020 05:51) (18 - 18)  SpO2: --    LABS:                        9.8    8.78  )-----------( 209      ( 12 May 2020 05:26 )             29.7     05-12    136  |  100  |  96<HH>  ----------------------------<  136<H>  3.6   |  23  |  2.6<H>    Ca    8.6      12 May 2020 05:26  Phos  3.4     05-12  Mg     2.1     05-12    TPro  5.9<L>  /  Alb  2.8<L>  /  TBili  0.7  /  DBili  x   /  AST  21  /  ALT  12  /  AlkPhos  66  05-12    PT/INR - ( 12 May 2020 05:26 )   PT: 30.60 sec;   INR: 2.66 ratio      PTT - ( 12 May 2020 05:26 )  PTT:37.8 sec    Culture - Blood (collected 10 May 2020 16:53)  Source: .Blood None  Preliminary Report (11 May 2020 23:01):    No growth to date.    Culture - Urine (collected 09 May 2020 10:45)  Source: .Urine Clean Catch (Midstream)  Final Report (10 May 2020 22:19):    <10,000 CFU/mL Normal Urogenital Felipa      PHYSICAL EXAM:    GENERAL: NAD, well-developed, AAOx3, heard of hearing  HEENT:  Atraumatic, Normocephalic. EOMI, PERRLA, conjunctiva and sclera clear, No JVD  PULMONARY: Clear to auscultation bilaterally; No wheeze  CARDIOVASCULAR: Regular rate and rhythm; No murmurs, rubs, or gallops  GASTROINTESTINAL: mild tenderness to palpation of RUQ  MUSCULOSKELETAL:  2+ Peripheral Pulses, No clubbing, cyanosis,  NEUROLOGY: non-focal  SKIN: No rashes or lesions    HOSPITAL COURSE  80M PMHx Afib on Coumadin, HTN, DM, COPD, presents to ED s/p fall, with LOC and head trauma In the ED he had a posterior head laceration. His CT head showed  Right lateral ventricle and third ventricle hemorrhage  Neurosurgery recommended CTA head: redemonstrated findings of CTH, without definite evidence of active arterial contrast extravasation.   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   EEG was unremarkable. Echo with EF 55-60%, with severe aortic stenosis.   Carotid duplex showed greater than 80% stenosis in the left internal carotid artery.   Vascular was planning for CEA, but patient was not cleared by cardio due to severe AS.   Cardio recommended structural for TAVR. Nephro recommended hydration before and after CT- which happened on 5/5   Patient is planned for TAVR as an outpatient and then to follow with vascular for CEA once TAVR done.   During his hospital stay he developed fever. His Ucx grew K. pneumonia. Bcx grew staph on 5/8. He was started on vancomycin and ceftriaxone and then switched to cefazolin. Echo ordered to evaluate for endocarditis     ASSESSMENT & PLAN    # Fever: Febrile to 103F, multiple episodes, leukocytosis  K.pneumonia in urine and staph aureus in blood cultures.    COVID neg   RUQ US showed GB wall edema and gallstones, HIDA scan negative, no surgical interventions  - on cefazolin (start 5/11)  - ID following. Patient will need midline for outpatient antibiotics  - Bcx x2 pending.   - pending echo to evaluate for endocarditis  - f.u US kidney result    #Traumatic Fall with Intraventricular Hemorrhage   Likely syncope episode due to sever aortic stenosis.   (1) intraventricular hemorrhage:   - cleared by neuro to start anticoag  - cleared by s&s  (2) Left Internal Carotid Stenosis 80% - Vascular for CEA after cardiac clearance. Follow up with Dr. De Guzman outpatient.   (3) severe AS  planned for TAVR as an outpatient     # LAURYN Romofib  - on coumadin   - rate well controlled    # CKD stag 4 - with BRITTANI on admission --> improving  - baseline cr=approx 2.5  - cr approaching baseline  - lasix 20mg IV daily   - accurate in/out    #COPD - Stable, on home O2, c/w inhalers     DVT ppx: coumadin   GI ppx; protonix   Actvity: IAT   Diet: Renal DASH   Disposition: Home once infection resolves, PT working with pt  FULL CODE     Point of contact is his Son Deangelo #[phone].   Spoke to his son and updated him on clinical status TAIWO ZAVALA 80y Male  MRN#: [id number]     SUBJECTIVE  Patient is a 80y old Male who presents with a chief complaint of gait dysfunction / ventricular hemorrhage (11 May 2020 09:34)  Currently admitted to medicine with the primary diagnosis of Intracranial bleed  Today is hospital day 17d, and this morning he is hungry. refusing to speak to me until he gets his food.     OBJECTIVE  PAST MEDICAL & SURGICAL HISTORY  Afib  BPH (benign prostatic hyperplasia)  CHF (congestive heart failure)  COPD (chronic obstructive pulmonary disease)  Diabetes  HTN (hypertension)  H/O heart artery stent  S/P CABG x 3    ALLERGIES:  No Known Allergies    MEDICATIONS:  STANDING MEDICATIONS  budesonide 160 MICROgram(s)/formoterol 4.5 MICROgram(s) Inhaler 2 Puff(s) Inhalation two times a day  ceFAZolin   IVPB 2000 milliGRAM(s) IV Intermittent every 12 hours  ceFAZolin   IVPB      chlorhexidine 4% Liquid 1 Application(s) Topical <User Schedule>  fenofibrate Tablet 145 milliGRAM(s) Oral daily  finasteride 5 milliGRAM(s) Oral daily  furosemide   Injectable 20 milliGRAM(s) IV Push daily  insulin glargine Injectable (LANTUS) 20 Unit(s) SubCutaneous at bedtime  insulin lispro (HumaLOG) corrective regimen sliding scale   SubCutaneous three times a day before meals  insulin lispro Injectable (HumaLOG) 5 Unit(s) SubCutaneous three times a day before meals  isosorbide   mononitrate ER Tablet (IMDUR) 30 milliGRAM(s) Oral daily  multivitamin 1 Tablet(s) Oral daily  pantoprazole    Tablet 40 milliGRAM(s) Oral before breakfast  silver sulfADIAZINE 1% Cream 1 Application(s) Topical daily  simvastatin 40 milliGRAM(s) Oral at bedtime  tamsulosin 0.4 milliGRAM(s) Oral at bedtime    PRN MEDICATIONS  acetaminophen   Tablet .. 650 milliGRAM(s) Oral every 6 hours PRN      VITAL SIGNS: Last 24 Hours  T(C): 36.2 (12 May 2020 05:51), Max: 36.6 (11 May 2020 14:01)  T(F): 97.1 (12 May 2020 05:51), Max: 97.9 (11 May 2020 14:01)  HR: 64 (12 May 2020 06:15) (56 - 64)  BP: 118/68 (12 May 2020 06:15) (55/- - 118/68)  BP(mean): --  RR: 18 (12 May 2020 05:51) (18 - 18)  SpO2: --    LABS:                        9.8    8.78  )-----------( 209      ( 12 May 2020 05:26 )             29.7     05-12    136  |  100  |  96<HH>  ----------------------------<  136<H>  3.6   |  23  |  2.6<H>    Ca    8.6      12 May 2020 05:26  Phos  3.4     05-12  Mg     2.1     05-12    TPro  5.9<L>  /  Alb  2.8<L>  /  TBili  0.7  /  DBili  x   /  AST  21  /  ALT  12  /  AlkPhos  66  05-12    PT/INR - ( 12 May 2020 05:26 )   PT: 30.60 sec;   INR: 2.66 ratio      PTT - ( 12 May 2020 05:26 )  PTT:37.8 sec    Culture - Blood (collected 10 May 2020 16:53)  Source: .Blood None  Preliminary Report (11 May 2020 23:01):    No growth to date.    Culture - Urine (collected 09 May 2020 10:45)  Source: .Urine Clean Catch (Midstream)  Final Report (10 May 2020 22:19):    <10,000 CFU/mL Normal Urogenital Felipa      PHYSICAL EXAM:    Not performed    HOSPITAL COURSE  80M PMHx Afib on Coumadin, HTN, DM, COPD, presents to ED s/p fall, with LOC and head trauma In the ED he had a posterior head laceration. His CT head showed  Right lateral ventricle and third ventricle hemorrhage  Neurosurgery recommended CTA head: redemonstrated findings of CTH, without definite evidence of active arterial contrast extravasation.   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   EEG was unremarkable. Echo with EF 55-60%, with severe aortic stenosis.   Carotid duplex showed greater than 80% stenosis in the left internal carotid artery.   Vascular was planning for CEA, but patient was not cleared by cardio due to severe AS.   Cardio recommended structural for TAVR. Nephro recommended hydration before and after CT- which happened on 5/5   Patient is planned for TAVR as an outpatient and then to follow with vascular for CEA once TAVR done.   During his hospital stay he developed fever. His Ucx grew K. pneumonia. Bcx grew staph on 5/8. He was started on vancomycin and ceftriaxone and then switched to cefazolin. Echo ordered to evaluate for endocarditis     ASSESSMENT & PLAN    # Fever: Febrile to 103F, multiple episodes, leukocytosis  K.pneumonia in urine and staph aureus in blood cultures.    COVID neg   RUQ US showed GB wall edema and gallstones, HIDA scan negative, no surgical interventions  US kidney showed no hydronephrosis or signs of inflammation  1 set if Bcx negative (from 5/10)  - on cefazolin (start 5/11)  - ID following. Patient will need midline for outpatient antibiotics. Will insert midline tomorrow 72 hours from negative Bcx  - Bcx x1 pending.   - pending echo to evaluate for endocarditis    #Traumatic Fall with Intraventricular Hemorrhage   Likely syncope episode due to sever aortic stenosis.   (1) intraventricular hemorrhage:   - cleared by neuro to start anticoag  - cleared by s&s  (2) Left Internal Carotid Stenosis 80% - Vascular for CEA after cardiac clearance. Follow up with Dr. De Guzman outpatient.   (3) severe AS  planned for TAVR as an outpatient     # LAURYN Romofib  - on coumadin   - rate well controlled    # CKD stag 4 - with BRITTANI on admission --> improving  - baseline cr=approx 2.5  - cr approaching baseline  - lasix 20mg IV daily   - accurate in/out    #COPD - Stable, on home O2, c/w inhalers     DVT ppx: coumadin   GI ppx; protonix   Actvity: IAT   Diet: Renal DASH   Disposition: Home with IV antibiotics  FULL CODE     Point of contact is his Son Deangelo #[phone].   Spoke to his son and updated him on clinical status

## 2020-05-12 NOTE — PROGRESS NOTE ADULT - ASSESSMENT
ASSESSMENT  80F, PMHx Afib on Coumadin, HTN, DM, COPD, presents to ED s/p fall, +HT, +LOC, +AC. Hospital course complicated by fever.       IMPRESSION  #MSSA bacteremia secondary to R wrist phlebitis    5/8 BCX +, 5/10 BCX NGTD   #Acute cystitis    UCX > >100,000 CFU/ml Klebsiella pneumoniae    No evidence of cholecystitis. HIDA NEGATIVE     Procalcitonin, Serum: 7.94 ng/mL (05-05-20 @ 11:48)    < from: US Abdomen Limited (05.06.20 @ 14:56) >Gallstones and sludge with gallbladder wall thickening features suggestive of acute cholecystitis in the proper clinical setting.    5/6 BCX NGTD   #Obesity BMI (kg/m2): 30.1  #DM Hemoglobin A1C, Whole Blood: 6.8 (01-31-20 @ 07:01)    RECOMMENDATIONS  - cefazolin 2g q12h IV crcl 25 end 5/23 to complete 14 days  for uncomplicated bacteremia   - 4/26 TTE no vegetation    Spectra [id number]

## 2020-05-12 NOTE — PROGRESS NOTE ADULT - SUBJECTIVE AND OBJECTIVE BOX
Nephrology progress note    Patient is seen and examined, events over the last 24 h noted .  Denies SOB difficulties voiding  Being evaluated by PT  Allergies:  No Known Allergies    Hospital Medications:   MEDICATIONS  (STANDING):  budesonide 160 MICROgram(s)/formoterol 4.5 MICROgram(s) Inhaler 2 Puff(s) Inhalation two times a day  ceFAZolin   IVPB 2000 milliGRAM(s) IV Intermittent every 12 hours  ceFAZolin   IVPB      chlorhexidine 4% Liquid 1 Application(s) Topical <User Schedule>  fenofibrate Tablet 145 milliGRAM(s) Oral daily  finasteride 5 milliGRAM(s) Oral daily  furosemide   Injectable 20 milliGRAM(s) IV Push daily  insulin glargine Injectable (LANTUS) 20 Unit(s) SubCutaneous at bedtime  insulin lispro (HumaLOG) corrective regimen sliding scale   SubCutaneous three times a day before meals  insulin lispro Injectable (HumaLOG) 5 Unit(s) SubCutaneous three times a day before meals  isosorbide   mononitrate ER Tablet (IMDUR) 30 milliGRAM(s) Oral daily  multivitamin 1 Tablet(s) Oral daily  pantoprazole    Tablet 40 milliGRAM(s) Oral before breakfast  potassium chloride   Powder 20 milliEquivalent(s) Oral once  silver sulfADIAZINE 1% Cream 1 Application(s) Topical daily  simvastatin 40 milliGRAM(s) Oral at bedtime  tamsulosin 0.4 milliGRAM(s) Oral at bedtime        VITALS:  T(F): 95.4 (20 @ 13:26), Max: 97.1 (20 @ 05:51)  HR: 67 (20 @ 13:26)  BP: 109/57 (20 @ 13:26)  RR: 18 (20 @ 13:26)  SpO2: --  Wt(kg): --     @ 07:01  -   @ 07:00  --------------------------------------------------------  IN: 0 mL / OUT: 650 mL / NET: -650 mL     @ 07:01  -   @ 14:06  --------------------------------------------------------  IN: 0 mL / OUT: 200 mL / NET: -200 mL          PHYSICAL EXAM:  Constitutional: NAD  HEENT: anicteric sclera, oropharynx clear, MMM  Neck: No JVD  Respiratory: CTA  Cardiovascular: S1, S2, RRR  Gastrointestinal: BS+, soft, NT/ND  Extremities: in ace wraps No peripheral edema  Neurological: A/O x 3,   : No CVA tenderness. No julian.   Skin: No rashes  Vascular Access:    LABS:      136  |  100  |  96<HH>  ----------------------------<  136<H>  3.6   |  23  |  2.6<H>  Creatinine Trend: 2.6<--, 2.9<--, 3.3<--, 3.4<--, 3.3<--, 3.4<--    Ca    8.6      12 May 2020 05:26  Phos  3.4     -  Mg     2.1         TPro  5.9<L>  /  Alb  2.8<L>  /  TBili  0.7  /  DBili      /  AST  21  /  ALT  12  /  AlkPhos  66  -                          9.8    8.78  )-----------( 209      ( 12 May 2020 05:26 )             29.7       Urine Studies:  Urinalysis Basic - ( 09 May 2020 10:45 )    Color: Yellow / Appearance: Clear / S.018 / pH:   Gluc:  / Ketone: Negative  / Bili: Negative / Urobili: <2 mg/dL   Blood:  / Protein: Trace / Nitrite: Negative   Leuk Esterase: Large / RBC: 1 /HPF / WBC 36 /HPF   Sq Epi:  / Non Sq Epi: 1 /HPF / Bacteria: Negative        RADIOLOGY & ADDITIONAL STUDIES:

## 2020-05-12 NOTE — PROGRESS NOTE ADULT - SUBJECTIVE AND OBJECTIVE BOX
SALLYTAIWO  80y, Male  Allergy: No Known Allergies      LOS  17d    CHIEF COMPLAINT: gait dysfunction / ventricular hemorrhage (11 May 2020 09:34)      INTERVAL EVENTS/HPI  - No acute events overnight  - T(F): , Max: 97.9 (05-11-20 @ 14:01)  - Denies any worsening symptoms  - Tolerating medication  - WBC Count: 8.78 (05-12-20 @ 05:26)  WBC Count: 11.21 (05-11-20 @ 04:30)  - Creatinine, Serum: 2.6 (05-12-20 @ 05:26)  Creatinine, Serum: 2.9 (05-11-20 @ 04:30)       ROS  General: Denies rigors, nightsweats  HEENT: Denies headache, rhinorrhea, sore throat, eye pain  CV: Denies CP, palpitations  PULM: Denies wheezing, hemoptysis  GI: Denies hematemesis, hematochezia, melena  : Denies discharge, hematuria  MSK: Denies arthralgias, myalgias  SKIN: Denies rash, lesions  NEURO: Denies paresthesias, weakness  PSYCH: Denies depression, anxiety    VITALS:  T(F): 97.1, Max: 97.9 (05-11-20 @ 14:01)  HR: 64  BP: 118/68  RR: 18Vital Signs Last 24 Hrs  T(C): 36.2 (12 May 2020 05:51), Max: 36.6 (11 May 2020 14:01)  T(F): 97.1 (12 May 2020 05:51), Max: 97.9 (11 May 2020 14:01)  HR: 64 (12 May 2020 06:15) (56 - 64)  BP: 118/68 (12 May 2020 06:15) (55/- - 118/68)  BP(mean): --  RR: 18 (12 May 2020 05:51) (18 - 18)  SpO2: --    PHYSICAL EXAM:  Gen: Awake and alert, non-toxic appearing, NAD  HEENT: NCAT. EOMI. MMM.   Neck: Supple, no cervical LAD  CV: RRR, no murmurs  Lungs: CTAB, no w/r/r  Abd: Soft. NTND  Extr: wwp, no edema  Skin: no rash  Neuro: No focal deficits  Lines: R wrist old phlebitis      FH: Non-contributory  Social Hx: Non-contributory    TESTS & MEASUREMENTS:                        9.8    8.78  )-----------( 209      ( 12 May 2020 05:26 )             29.7     05-12    136  |  100  |  96<HH>  ----------------------------<  136<H>  3.6   |  23  |  2.6<H>    Ca    8.6      12 May 2020 05:26  Phos  3.4     05-12  Mg     2.1     05-12    TPro  5.9<L>  /  Alb  2.8<L>  /  TBili  0.7  /  DBili  x   /  AST  21  /  ALT  12  /  AlkPhos  66  05-12    eGFR if Non African American: 22 mL/min/1.73M2 (05-12-20 @ 05:26)  eGFR if African American: 26 mL/min/1.73M2 (05-12-20 @ 05:26)    LIVER FUNCTIONS - ( 12 May 2020 05:26 )  Alb: 2.8 g/dL / Pro: 5.9 g/dL / ALK PHOS: 66 U/L / ALT: 12 U/L / AST: 21 U/L / GGT: x               Culture - Blood (collected 05-10-20 @ 16:53)  Source: .Blood None  Preliminary Report (05-11-20 @ 23:01):    No growth to date.    Culture - Urine (collected 05-09-20 @ 10:45)  Source: .Urine Clean Catch (Midstream)  Final Report (05-10-20 @ 22:19):    <10,000 CFU/mL Normal Urogenital Felipa    Culture - Blood (collected 05-08-20 @ 11:54)  Source: .Blood None  Gram Stain (05-10-20 @ 07:32):    Growth in aerobic bottle: Gram Positive Cocci in Clusters    Growth in anaerobic bottle: Gram Positive Cocci in Clusters  Final Report (05-11-20 @ 11:04):    Growth in aerobic and anaerobic bottles: Staphylococcus aureus    "Due to technical problems, Proteus sp. will Not be reported as part of    the BCID panel until further notice"    ***Blood Panel PCR results on this specimen are available    approximately 3 hours after the Gram stain result.***    Gram stain, PCR, and/or culture results may not always    correspond due to difference in methodologies.    ************************************************************    This PCR assay was performed using [employer].    The following targets are tested for: Enterococcus,    vancomycin resistant enterococci, Listeria monocytogenes,    coagulase negative staphylococci, S. aureus,    methicillin resistant S. aureus, Streptococcus agalactiae    (Group B), S. pneumoniae, S. pyogenes (Group A),    Acinetobacter baumannii, Enterobacter cloacae, E. coli,    Klebsiella oxytoca, K. pneumoniae, Proteus sp.,    Serratia marcescens, Haemophilus influenzae,    Neisseria meningitidis, Pseudomonas aeruginosa, Candida    albicans, C. glabrata, C krusei, C parapsilosis,    C. tropicalis and the KPC resistance gene.  Organism: Blood Culture PCR  Staphylococcus aureus (05-11-20 @ 11:04)  Organism: Staphylococcus aureus (05-11-20 @ 11:04)      -  Ampicillin/Sulbactam: S <=8/4      -  Cefazolin: S <=4      -  Clindamycin: R <=0.25 This isolate is presumed to be clindamycin resistant based on detection of inducible resistance. Clindamycin may still be effective in some patients.      -  Erythromycin: R >4      -  Gentamicin: S <=1 Should not be used as monotherapy      -  Oxacillin: S <=0.25      -  Penicillin: R >8      -  RIF- Rifampin: S <=1 Should not be used as monotherapy      -  Tetra/Doxy: S <=1      -  Trimethoprim/Sulfamethoxazole: S <=0.5/9.5      -  Vancomycin: S 1      Method Type: HAYLEE  Organism: Blood Culture PCR (05-11-20 @ 11:04)      -  Staphylococcus aureus: Detec Any isolate of Staphylococcus aureus from a blood culture is NOT considered a contaminant.      Method Type: PCR    Culture - Urine (collected 05-06-20 @ 17:13)  Source: .Urine Catheterized  Final Report (05-08-20 @ 16:35):    >100,000 CFU/ml Klebsiella pneumoniae  Organism: Klebsiella pneumoniae (05-08-20 @ 16:35)  Organism: Klebsiella pneumoniae (05-08-20 @ 16:35)      -  Amikacin: S <=16      -  Ampicillin: R 16 These ampicillin results predict results for amoxicillin      -  Ampicillin/Sulbactam: S <=8/4 Enterobacter, Citrobacter, and Serratia may develop resistance during prolonged therapy (3-4 days)      -  Aztreonam: S <=4      -  Cefazolin: S <=8 ([id number]) For uncomplicated UTI with K. pneumoniae, E. coli, or P. mirablis: HAYLEE <=16 is sensitive and HAYLEE >=32 is resistant. This also predicts results for oral agents cefaclor, cefdinir, cefpodoxime, cefprozil, cefuroxime axetil, cephalexin and locarbef for uncomplicated UTI. Note that some isolates may be susceptible to these agents while testing resistant to cefazolin.      -  Cefepime: S <=4      -  Cefoxitin: S <=8      -  Ceftriaxone: S <=1 Enterobacter, Citrobacter, and Serratia may develop resistance during prolonged therapy      -  Ciprofloxacin: R >2      -  Gentamicin: S <=4      -  Imipenem: S <=1      -  Levofloxacin: R >4      -  Meropenem: S <=1      -  Nitrofurantoin: I 64 Should not be used to treat pyelonephritis      -  Piperacillin/Tazobactam: S <=16      -  Tigecycline: S <=2      -  Tobramycin: S <=4      -  Trimethoprim/Sulfamethoxazole: S <=2/38      Method Type: HAYLEE    Culture - Blood (collected 05-06-20 @ 00:30)  Source: .Blood Blood  Final Report (05-11-20 @ 07:00):    No Growth Final            INFECTIOUS DISEASES TESTING  Procalcitonin, Serum: 7.94 (05-05-20 @ 11:48)  COVID-19 PCR: NotDetec (05-05-20 @ 11:00)  MRSA PCR Result.: Negative (10-24-19 @ 12:54)  RSV Result: Negative (10-15-19 @ 21:10)  Flu A Result: Negative (10-15-19 @ 21:10)  Flu B Result: Negative (10-15-19 @ 21:10)  Procalcitonin, Serum: 1.56 (10-15-19 @ 20:01)      INFLAMMATORY MARKERS  C-Reactive Protein, Serum: 11.44 mg/dL (05-08-20 @ 11:54)  C-Reactive Protein, Serum: 6.92 mg/dL (05-05-20 @ 11:48)      RADIOLOGY & ADDITIONAL TESTS:  I have personally reviewed the last available Chest xray  CXR      CT      CARDIOLOGY TESTING  12 Lead ECG:   Ventricular Rate 73 BPM    Atrial Rate 250 BPM    QRS Duration 158 ms    Q-T Interval 582 ms    QTC Calculation(Bezet) 641 ms    R Axis 137 degrees    T Axis -48 degrees    Diagnosis Line *** Suspect arm lead reversal, interpretation assumes no reversal  Atrial flutter  Non-specific intra-ventricular conduction block  Right ventricular hypertrophy  Marked T wave abnormality, consider anterior ischemia  Abnormal ECG    Confirmed by Felipe Comer ([id number]) on 4/29/2020 6:49:55 PM (04-29-20 @ 14:06)      MEDICATIONS  budesonide 160 MICROgram(s)/formoterol 4.5 MICROgram(s) Inhaler 2  ceFAZolin   IVPB 2000  ceFAZolin   IVPB   chlorhexidine 4% Liquid 1  fenofibrate Tablet 145  finasteride 5  furosemide   Injectable 20  insulin glargine Injectable (LANTUS) 20  insulin lispro (HumaLOG) corrective regimen sliding scale   insulin lispro Injectable (HumaLOG) 5  isosorbide   mononitrate ER Tablet (IMDUR) 30  multivitamin 1  pantoprazole    Tablet 40  silver sulfADIAZINE 1% Cream 1  simvastatin 40  tamsulosin 0.4  warfarin 3      WEIGHT  Weight (kg): 84.5 (05-06-20 @ 04:50)  Creatinine, Serum: 2.6 mg/dL (05-12-20 @ 05:26)      ANTIBIOTICS:  ceFAZolin   IVPB 2000 milliGRAM(s) IV Intermittent every 12 hours  ceFAZolin   IVPB          All available historical records have been reviewed

## 2020-05-13 VITALS
SYSTOLIC BLOOD PRESSURE: 97 MMHG | TEMPERATURE: 97 F | DIASTOLIC BLOOD PRESSURE: 50 MMHG | RESPIRATION RATE: 18 BRPM | HEART RATE: 63 BPM

## 2020-05-13 LAB
ALBUMIN SERPL ELPH-MCNC: 2.9 G/DL — LOW (ref 3.5–5.2)
ALP SERPL-CCNC: 68 U/L — SIGNIFICANT CHANGE UP (ref 30–115)
ALT FLD-CCNC: 9 U/L — SIGNIFICANT CHANGE UP (ref 0–41)
ANION GAP SERPL CALC-SCNC: 13 MMOL/L — SIGNIFICANT CHANGE UP (ref 7–14)
APTT BLD: 40 SEC — HIGH (ref 27–39.2)
AST SERPL-CCNC: 22 U/L — SIGNIFICANT CHANGE UP (ref 0–41)
BASOPHILS # BLD AUTO: 0.04 K/UL — SIGNIFICANT CHANGE UP (ref 0–0.2)
BASOPHILS NFR BLD AUTO: 0.4 % — SIGNIFICANT CHANGE UP (ref 0–1)
BILIRUB SERPL-MCNC: 0.7 MG/DL — SIGNIFICANT CHANGE UP (ref 0.2–1.2)
BUN SERPL-MCNC: 78 MG/DL — CRITICAL HIGH (ref 10–20)
CALCIUM SERPL-MCNC: 8.7 MG/DL — SIGNIFICANT CHANGE UP (ref 8.5–10.1)
CHLORIDE SERPL-SCNC: 100 MMOL/L — SIGNIFICANT CHANGE UP (ref 98–110)
CO2 SERPL-SCNC: 23 MMOL/L — SIGNIFICANT CHANGE UP (ref 17–32)
CREAT SERPL-MCNC: 2.1 MG/DL — HIGH (ref 0.7–1.5)
EOSINOPHIL # BLD AUTO: 0.07 K/UL — SIGNIFICANT CHANGE UP (ref 0–0.7)
EOSINOPHIL NFR BLD AUTO: 0.8 % — SIGNIFICANT CHANGE UP (ref 0–8)
GLUCOSE BLDC GLUCOMTR-MCNC: 104 MG/DL — HIGH (ref 70–99)
GLUCOSE BLDC GLUCOMTR-MCNC: 120 MG/DL — HIGH (ref 70–99)
GLUCOSE BLDC GLUCOMTR-MCNC: 144 MG/DL — HIGH (ref 70–99)
GLUCOSE BLDC GLUCOMTR-MCNC: 164 MG/DL — HIGH (ref 70–99)
GLUCOSE SERPL-MCNC: 183 MG/DL — HIGH (ref 70–99)
HCT VFR BLD CALC: 31.1 % — LOW (ref 42–52)
HGB BLD-MCNC: 10.2 G/DL — LOW (ref 14–18)
IMM GRANULOCYTES NFR BLD AUTO: 2.2 % — HIGH (ref 0.1–0.3)
INR BLD: 2.61 RATIO — HIGH (ref 0.65–1.3)
LYMPHOCYTES # BLD AUTO: 1.29 K/UL — SIGNIFICANT CHANGE UP (ref 1.2–3.4)
LYMPHOCYTES # BLD AUTO: 14.1 % — LOW (ref 20.5–51.1)
MAGNESIUM SERPL-MCNC: 2.1 MG/DL — SIGNIFICANT CHANGE UP (ref 1.8–2.4)
MCHC RBC-ENTMCNC: 28.6 PG — SIGNIFICANT CHANGE UP (ref 27–31)
MCHC RBC-ENTMCNC: 32.8 G/DL — SIGNIFICANT CHANGE UP (ref 32–37)
MCV RBC AUTO: 87.1 FL — SIGNIFICANT CHANGE UP (ref 80–94)
MONOCYTES # BLD AUTO: 0.86 K/UL — HIGH (ref 0.1–0.6)
MONOCYTES NFR BLD AUTO: 9.4 % — HIGH (ref 1.7–9.3)
NEUTROPHILS # BLD AUTO: 6.7 K/UL — HIGH (ref 1.4–6.5)
NEUTROPHILS NFR BLD AUTO: 73.1 % — SIGNIFICANT CHANGE UP (ref 42.2–75.2)
NRBC # BLD: 0 /100 WBCS — SIGNIFICANT CHANGE UP (ref 0–0)
PHOSPHATE SERPL-MCNC: 2.5 MG/DL — SIGNIFICANT CHANGE UP (ref 2.1–4.9)
PLATELET # BLD AUTO: 253 K/UL — SIGNIFICANT CHANGE UP (ref 130–400)
POTASSIUM SERPL-MCNC: 3.8 MMOL/L — SIGNIFICANT CHANGE UP (ref 3.5–5)
POTASSIUM SERPL-SCNC: 3.8 MMOL/L — SIGNIFICANT CHANGE UP (ref 3.5–5)
PROT SERPL-MCNC: 6.1 G/DL — SIGNIFICANT CHANGE UP (ref 6–8)
PROTHROM AB SERPL-ACNC: 30 SEC — HIGH (ref 9.95–12.87)
RBC # BLD: 3.57 M/UL — LOW (ref 4.7–6.1)
RBC # FLD: 17.2 % — HIGH (ref 11.5–14.5)
SODIUM SERPL-SCNC: 136 MMOL/L — SIGNIFICANT CHANGE UP (ref 135–146)
WBC # BLD: 9.16 K/UL — SIGNIFICANT CHANGE UP (ref 4.8–10.8)
WBC # FLD AUTO: 9.16 K/UL — SIGNIFICANT CHANGE UP (ref 4.8–10.8)

## 2020-05-13 PROCEDURE — 99232 SBSQ HOSP IP/OBS MODERATE 35: CPT

## 2020-05-13 RX ORDER — ATORVASTATIN CALCIUM 80 MG/1
1 TABLET, FILM COATED ORAL
Qty: 30 | Refills: 0
Start: 2020-05-13

## 2020-05-13 RX ORDER — OXYCODONE AND ACETAMINOPHEN 5; 325 MG/1; MG/1
1 TABLET ORAL ONCE
Refills: 0 | Status: DISCONTINUED | OUTPATIENT
Start: 2020-05-13 | End: 2020-05-13

## 2020-05-13 RX ORDER — WARFARIN SODIUM 2.5 MG/1
3 TABLET ORAL ONCE
Refills: 0 | Status: COMPLETED | OUTPATIENT
Start: 2020-05-13 | End: 2020-05-13

## 2020-05-13 RX ADMIN — Medication 1 TABLET(S): at 12:34

## 2020-05-13 RX ADMIN — Medication 20 MILLIGRAM(S): at 06:08

## 2020-05-13 RX ADMIN — Medication 5 UNIT(S): at 16:46

## 2020-05-13 RX ADMIN — Medication 5 UNIT(S): at 12:33

## 2020-05-13 RX ADMIN — Medication 1 APPLICATION(S): at 12:36

## 2020-05-13 RX ADMIN — OXYCODONE AND ACETAMINOPHEN 1 TABLET(S): 5; 325 TABLET ORAL at 03:53

## 2020-05-13 RX ADMIN — FINASTERIDE 5 MILLIGRAM(S): 5 TABLET, FILM COATED ORAL at 12:34

## 2020-05-13 RX ADMIN — Medication 100 MILLIGRAM(S): at 18:57

## 2020-05-13 RX ADMIN — ISOSORBIDE MONONITRATE 30 MILLIGRAM(S): 60 TABLET, EXTENDED RELEASE ORAL at 12:35

## 2020-05-13 RX ADMIN — Medication 1: at 07:45

## 2020-05-13 RX ADMIN — Medication 100 MILLIGRAM(S): at 09:34

## 2020-05-13 RX ADMIN — BUDESONIDE AND FORMOTEROL FUMARATE DIHYDRATE 2 PUFF(S): 160; 4.5 AEROSOL RESPIRATORY (INHALATION) at 07:47

## 2020-05-13 RX ADMIN — CHLORHEXIDINE GLUCONATE 1 APPLICATION(S): 213 SOLUTION TOPICAL at 07:47

## 2020-05-13 RX ADMIN — PANTOPRAZOLE SODIUM 40 MILLIGRAM(S): 20 TABLET, DELAYED RELEASE ORAL at 09:35

## 2020-05-13 RX ADMIN — Medication 145 MILLIGRAM(S): at 12:37

## 2020-05-13 RX ADMIN — Medication 5 UNIT(S): at 07:45

## 2020-05-13 NOTE — PROGRESS NOTE ADULT - SUBJECTIVE AND OBJECTIVE BOX
Nephrology progress note    THIS IS AN INCOMPLETE NOTE . FULL NOTE TO FOLLOW SHORTLY    Patient is seen and examined, events over the last 24 h noted .    Allergies:  No Known Allergies    Hospital Medications:   MEDICATIONS  (STANDING):  budesonide 160 MICROgram(s)/formoterol 4.5 MICROgram(s) Inhaler 2 Puff(s) Inhalation two times a day  ceFAZolin   IVPB 2000 milliGRAM(s) IV Intermittent every 12 hours  ceFAZolin   IVPB      chlorhexidine 4% Liquid 1 Application(s) Topical <User Schedule>  fenofibrate Tablet 145 milliGRAM(s) Oral daily  finasteride 5 milliGRAM(s) Oral daily  furosemide   Injectable 20 milliGRAM(s) IV Push daily  insulin glargine Injectable (LANTUS) 20 Unit(s) SubCutaneous at bedtime  insulin lispro (HumaLOG) corrective regimen sliding scale   SubCutaneous three times a day before meals  insulin lispro Injectable (HumaLOG) 5 Unit(s) SubCutaneous three times a day before meals  isosorbide   mononitrate ER Tablet (IMDUR) 30 milliGRAM(s) Oral daily  multivitamin 1 Tablet(s) Oral daily  pantoprazole    Tablet 40 milliGRAM(s) Oral before breakfast  silver sulfADIAZINE 1% Cream 1 Application(s) Topical daily  simvastatin 40 milliGRAM(s) Oral at bedtime  tamsulosin 0.4 milliGRAM(s) Oral at bedtime        VITALS:  T(F): 97.1 (20 @ 05:43), Max: 97.1 (20 @ 05:43)  HR: 59 (20 @ 05:43)  BP: 105/56 (20 @ 05:43)  RR: 18 (20 @ 05:43)  SpO2: --  Wt(kg): --     @ 07:01  -   @ 07:00  --------------------------------------------------------  IN: 0 mL / OUT: 650 mL / NET: -650 mL     @ 07:01  -   @ 07:00  --------------------------------------------------------  IN: 0 mL / OUT: 200 mL / NET: -200 mL          PHYSICAL EXAM:  Constitutional: NAD  HEENT: anicteric sclera, oropharynx clear, MMM  Neck: No JVD  Respiratory: CTAB, no wheezes, rales or rhonchi  Cardiovascular: S1, S2, RRR  Gastrointestinal: BS+, soft, NT/ND  Extremities: No cyanosis or clubbing. No peripheral edema  :  No julian.   Skin: No rashes    LABS:      136  |  100  |  78<HH>  ----------------------------<  183<H>  3.8   |  23  |  2.1<H>    Ca    8.7      13 May 2020 06:48  Phos  2.5       Mg     2.1         TPro  6.1  /  Alb  2.9<L>  /  TBili  0.7  /  DBili      /  AST  22  /  ALT  9   /  AlkPhos  68                            10.2   9.16  )-----------( 253      ( 13 May 2020 06:48 )             31.1       Urine Studies:  Urinalysis Basic - ( 09 May 2020 10:45 )    Color: Yellow / Appearance: Clear / S.018 / pH:   Gluc:  / Ketone: Negative  / Bili: Negative / Urobili: <2 mg/dL   Blood:  / Protein: Trace / Nitrite: Negative   Leuk Esterase: Large / RBC: 1 /HPF / WBC 36 /HPF   Sq Epi:  / Non Sq Epi: 1 /HPF / Bacteria: Negative        RADIOLOGY & ADDITIONAL STUDIES: Nephrology progress note  Patient is seen and examined, events over the last 24 h noted .  no complaints  for DC home today     Allergies:  No Known Allergies    Hospital Medications:   MEDICATIONS  (STANDING):  budesonide 160 MICROgram(s)/formoterol 4.5 MICROgram(s) Inhaler 2 Puff(s) Inhalation two times a day  ceFAZolin   IVPB 2000 milliGRAM(s) IV Intermittent every 12 hours  chlorhexidine 4% Liquid 1 Application(s) Topical <User Schedule>  fenofibrate Tablet 145 milliGRAM(s) Oral daily  finasteride 5 milliGRAM(s) Oral daily  furosemide   Injectable 20 milliGRAM(s) IV Push daily  insulin glargine Injectable (LANTUS) 20 Unit(s) SubCutaneous at bedtime  insulin lispro (HumaLOG) corrective regimen sliding scale   SubCutaneous three times a day before meals  insulin lispro Injectable (HumaLOG) 5 Unit(s) SubCutaneous three times a day before meals  isosorbide   mononitrate ER Tablet (IMDUR) 30 milliGRAM(s) Oral daily  multivitamin 1 Tablet(s) Oral daily  pantoprazole    Tablet 40 milliGRAM(s) Oral before breakfast  silver sulfADIAZINE 1% Cream 1 Application(s) Topical daily  simvastatin 40 milliGRAM(s) Oral at bedtime  tamsulosin 0.4 milliGRAM(s) Oral at bedtime        VITALS:  T(F): 97.1 (20 @ 05:43), Max: 97.1 (20 @ 05:43)  HR: 59 (20 @ 05:43)  BP: 105/56 (20 @ 05:43)  RR: 18 (20 @ 05:43)       @ 07:  -   @ 07:00  --------------------------------------------------------  IN: 0 mL / OUT: 650 mL / NET: -650 mL     @ 07:01  -   @ 07:00  --------------------------------------------------------  IN: 0 mL / OUT: 200 mL / NET: -200 mL          PHYSICAL EXAM:  Constitutional: NAD  HEENT: anicteric sclera, oropharynx clear, MMM  Neck: No JVD  Respiratory: CTAB, no wheezes, rales or rhonchi  Cardiovascular: S1, S2, RRR  Gastrointestinal: BS+, soft, NT/ND  Extremities: No cyanosis or clubbing. No peripheral edema  :  No julian.   Skin: No rashes    LABS:      136  |  100  |  78<HH>  ----------------------------<  183<H>  3.8   |  23  |  2.1<H>    Ca    8.7      13 May 2020 06:48  Phos  2.5       Mg     2.1         TPro  6.1  /  Alb  2.9<L>  /  TBili  0.7  /  DBili      /  AST  22  /  ALT  9   /  AlkPhos  68                            10.2   9.16  )-----------( 253      ( 13 May 2020 06:48 )             31.1       Urine Studies:  Urinalysis Basic - ( 09 May 2020 10:45 )    Color: Yellow / Appearance: Clear / S.018 / pH:   Gluc:  / Ketone: Negative  / Bili: Negative / Urobili: <2 mg/dL   Blood:  / Protein: Trace / Nitrite: Negative   Leuk Esterase: Large / RBC: 1 /HPF / WBC 36 /HPF   Sq Epi:  / Non Sq Epi: 1 /HPF / Bacteria: Negative        RADIOLOGY & ADDITIONAL STUDIES:

## 2020-05-13 NOTE — PROGRESS NOTE ADULT - SUBJECTIVE AND OBJECTIVE BOX
TAIWO ZAVALA 80y Male  MRN#: [id number]       SUBJECTIVE  Patient is a 80y old Male who presents with a chief complaint of gait dysfunction / ventricular hemorrhage (11 May 2020 09:34)  Currently admitted to medicine with the primary diagnosis of Intracranial bleed  Today is hospital day 18d, and this morning he is well. no complaints    OBJECTIVE  PAST MEDICAL & SURGICAL HISTORY  Afib  BPH (benign prostatic hyperplasia)  CHF (congestive heart failure)  COPD (chronic obstructive pulmonary disease)  Diabetes  HTN (hypertension)  H/O heart artery stent  S/P CABG x 3    ALLERGIES:  No Known Allergies    MEDICATIONS:  STANDING MEDICATIONS  budesonide 160 MICROgram(s)/formoterol 4.5 MICROgram(s) Inhaler 2 Puff(s) Inhalation two times a day  ceFAZolin   IVPB 2000 milliGRAM(s) IV Intermittent every 12 hours  ceFAZolin   IVPB      chlorhexidine 4% Liquid 1 Application(s) Topical <User Schedule>  fenofibrate Tablet 145 milliGRAM(s) Oral daily  finasteride 5 milliGRAM(s) Oral daily  furosemide   Injectable 20 milliGRAM(s) IV Push daily  insulin glargine Injectable (LANTUS) 20 Unit(s) SubCutaneous at bedtime  insulin lispro (HumaLOG) corrective regimen sliding scale   SubCutaneous three times a day before meals  insulin lispro Injectable (HumaLOG) 5 Unit(s) SubCutaneous three times a day before meals  isosorbide   mononitrate ER Tablet (IMDUR) 30 milliGRAM(s) Oral daily  multivitamin 1 Tablet(s) Oral daily  pantoprazole    Tablet 40 milliGRAM(s) Oral before breakfast  silver sulfADIAZINE 1% Cream 1 Application(s) Topical daily  simvastatin 40 milliGRAM(s) Oral at bedtime  tamsulosin 0.4 milliGRAM(s) Oral at bedtime    PRN MEDICATIONS  acetaminophen   Tablet .. 650 milliGRAM(s) Oral every 6 hours PRN      VITAL SIGNS: Last 24 Hours  T(C): 36.2 (13 May 2020 05:43), Max: 36.2 (13 May 2020 05:43)  T(F): 97.1 (13 May 2020 05:43), Max: 97.1 (13 May 2020 05:43)  HR: 59 (13 May 2020 05:43) (59 - 67)  BP: 105/56 (13 May 2020 05:43) (101/54 - 124/57)  BP(mean): --  RR: 18 (13 May 2020 05:43) (18 - 18)  SpO2: --    LABS:                       Culture - Blood (collected 11 May 2020 04:30)  Source: .Blood Blood-Peripheral  Preliminary Report (12 May 2020 12:01):    No growth to date.    Culture - Blood (collected 10 May 2020 16:53)  Source: .Blood None  Preliminary Report (11 May 2020 23:01):    No growth to date.      PHYSICAL EXAM:  PHYSICAL EXAM:  GENERAL: NAD, lying in bed comfortably  HEAD:  Atraumatic, Normocephalic  EYES: EOMI, PERRLA, conjunctiva and sclera clear  ENT: Moist mucous membranes  NECK: Supple, No JVD  CHEST/LUNG: Clear to auscultation bilaterally; No rales, rhonchi, wheezing, or rubs. Unlabored respirations  HEART: Regular rate and rhythm; No murmurs, rubs, or gallops  ABDOMEN: Bowel sounds present; Soft, Nontender, Nondistended. No hepatomegally  EXTREMITIES:  2+ Peripheral Pulses, brisk capillary refill. No clubbing, cyanosis, or edema  NERVOUS SYSTEM:  Alert & Oriented X3, speech clear. No deficits   MSK: FROM all 4 extremities, full and equal strength  SKIN: No rashes or lesions    HOSPITAL COURSE  80M PMHx Afib on Coumadin, HTN, DM, COPD, presents to ED s/p fall, with LOC and head trauma In the ED he had a posterior head laceration. His CT head showed  Right lateral ventricle and third ventricle hemorrhage  Neurosurgery recommended CTA head: redemonstrated findings of CTH, without definite evidence of active arterial contrast extravasation.   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   EEG was unremarkable. Echo with EF 55-60%, with severe aortic stenosis.   Carotid duplex showed greater than 80% stenosis in the left internal carotid artery.   Vascular was planning for CEA, but patient was not cleared by cardio due to severe AS.   Cardio recommended structural for TAVR. Nephro recommended hydration before and after CT- which happened on 5/5   Patient is planned for TAVR as an outpatient and then to follow with vascular for CEA once TAVR done.   During his hospital stay he developed fever. His Ucx grew K. pneumonia. Bcx grew staph on 5/8. He was started on vancomycin and ceftriaxone and then switched to cefazolin. Echo ordered to evaluate for endocarditis     ASSESSMENT & PLAN    # Fever: Febrile to 103F, multiple episodes, leukocytosis  K.pneumonia in urine and staph aureus in blood cultures.    COVID neg   RUQ US showed GB wall edema and gallstones, HIDA scan negative, no surgical interventions  US kidney showed no hydronephrosis or signs of inflammation  1 set if Bcx negative (from 5/10)  - on cefazolin (start 5/11)  - ID following. Patient will need midline for outpatient antibiotics. Will insert midline tomorrow 72 hours from negative Bcx  - Bcx x1 pending.   - pending echo to evaluate for endocarditis    #Traumatic Fall with Intraventricular Hemorrhage   Likely syncope episode due to sever aortic stenosis.   (1) intraventricular hemorrhage:   - cleared by neuro to start anticoag  - cleared by s&s  (2) Left Internal Carotid Stenosis 80% - Vascular for CEA after cardiac clearance. Follow up with Dr. De Guzman outpatient.   (3) severe AS  planned for TAVR as an outpatient     # LAURYN Romofib  - on coumadin   - rate well controlled    # CKD stag 4 - with BRITTANI on admission --> improving  - baseline cr=approx 2.5  - cr approaching baseline  - lasix 20mg IV daily   - accurate in/out    #COPD - Stable, on home O2, c/w inhalers     DVT ppx: coumadin   GI ppx; protonix   Actvity: IAT   Diet: Renal DASH   Disposition: Home with IV antibiotics  FULL CODE     Point of contact is his Son Deangelo #[phone].   Spoke to his son and updated him on clinical status TAIWO ZAVALA 80y Male  MRN#: [id number]       SUBJECTIVE  Patient is a 80y old Male who presents with a chief complaint of gait dysfunction / ventricular hemorrhage (11 May 2020 09:34)  Currently admitted to medicine with the primary diagnosis of Intracranial bleed  Today is hospital day 18d, and this morning he is well. no complaints    OBJECTIVE  PAST MEDICAL & SURGICAL HISTORY  Afib  BPH (benign prostatic hyperplasia)  CHF (congestive heart failure)  COPD (chronic obstructive pulmonary disease)  Diabetes  HTN (hypertension)  H/O heart artery stent  S/P CABG x 3    ALLERGIES:  No Known Allergies    MEDICATIONS:  STANDING MEDICATIONS  budesonide 160 MICROgram(s)/formoterol 4.5 MICROgram(s) Inhaler 2 Puff(s) Inhalation two times a day  ceFAZolin   IVPB 2000 milliGRAM(s) IV Intermittent every 12 hours  ceFAZolin   IVPB      chlorhexidine 4% Liquid 1 Application(s) Topical <User Schedule>  fenofibrate Tablet 145 milliGRAM(s) Oral daily  finasteride 5 milliGRAM(s) Oral daily  furosemide   Injectable 20 milliGRAM(s) IV Push daily  insulin glargine Injectable (LANTUS) 20 Unit(s) SubCutaneous at bedtime  insulin lispro (HumaLOG) corrective regimen sliding scale   SubCutaneous three times a day before meals  insulin lispro Injectable (HumaLOG) 5 Unit(s) SubCutaneous three times a day before meals  isosorbide   mononitrate ER Tablet (IMDUR) 30 milliGRAM(s) Oral daily  multivitamin 1 Tablet(s) Oral daily  pantoprazole    Tablet 40 milliGRAM(s) Oral before breakfast  silver sulfADIAZINE 1% Cream 1 Application(s) Topical daily  simvastatin 40 milliGRAM(s) Oral at bedtime  tamsulosin 0.4 milliGRAM(s) Oral at bedtime    PRN MEDICATIONS  acetaminophen   Tablet .. 650 milliGRAM(s) Oral every 6 hours PRN      VITAL SIGNS: Last 24 Hours  T(C): 36.2 (13 May 2020 05:43), Max: 36.2 (13 May 2020 05:43)  T(F): 97.1 (13 May 2020 05:43), Max: 97.1 (13 May 2020 05:43)  HR: 59 (13 May 2020 05:43) (59 - 67)  BP: 105/56 (13 May 2020 05:43) (101/54 - 124/57)  BP(mean): --  RR: 18 (13 May 2020 05:43) (18 - 18)  SpO2: --    LABS:                        10.2   9.16  )-----------( 253      ( 13 May 2020 06:48 )             31.1       Culture - Blood (collected 11 May 2020 04:30)  Source: .Blood Blood-Peripheral  Preliminary Report (12 May 2020 12:01):    No growth to date.    Culture - Blood (collected 10 May 2020 16:53)  Source: .Blood None  Preliminary Report (11 May 2020 23:01):    No growth to date.      PHYSICAL EXAM:  PHYSICAL EXAM:  GENERAL: NAD, lying in bed comfortably  HEAD:  Atraumatic, Normocephalic  EYES: EOMI, PERRLA, conjunctiva and sclera clear  ENT: Moist mucous membranes  NECK: Supple, No JVD  CHEST/LUNG: Clear to auscultation bilaterally; No rales, rhonchi, wheezing, or rubs. Unlabored respirations  HEART: Regular rate and rhythm; No murmurs, rubs, or gallops  ABDOMEN: Bowel sounds present; Soft, Nontender, Nondistended. No hepatomegally  EXTREMITIES:  2+ Peripheral Pulses, brisk capillary refill. No clubbing, cyanosis, or edema  NERVOUS SYSTEM:  Alert & Oriented X3, speech clear. No deficits   MSK: FROM all 4 extremities, full and equal strength  SKIN: No rashes or lesions    HOSPITAL COURSE  80M PMHx Afib on Coumadin, HTN, DM, COPD, presents to ED s/p fall, with LOC and head trauma In the ED he had a posterior head laceration. His CT head showed  Right lateral ventricle and third ventricle hemorrhage  Neurosurgery recommended CTA head: redemonstrated findings of CTH, without definite evidence of active arterial contrast extravasation.   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   EEG was unremarkable. Echo with EF 55-60%, with severe aortic stenosis.   Carotid duplex showed greater than 80% stenosis in the left internal carotid artery.   Vascular was planning for CEA, but patient was not cleared by cardio due to severe AS.   Cardio recommended structural for TAVR. Nephro recommended hydration before and after CT- which happened on 5/5   Patient is planned for TAVR as an outpatient and then to follow with vascular for CEA once TAVR done.   During his hospital stay he developed fever. His Ucx grew K. pneumonia. Bcx grew staph on 5/8. He was started on vancomycin and ceftriaxone and then switched to cefazolin. Echo ordered to evaluate for endocarditis     ASSESSMENT & PLAN    # Fever: Febrile to 103F, multiple episodes, leukocytosis  K.pneumonia in urine and staph aureus in blood cultures.    COVID neg   RUQ US showed GB wall edema and gallstones, HIDA scan negative, no surgical interventions  US kidney showed no hydronephrosis or signs of inflammation  2 sets of Bcx negative (from 5/10) and echo shows no signs no endocarditis.   - on cefazolin (start 5/11) and end on 5/23  - ID following. Patient will need midline for outpatient antibiotics. Insert midline today as we have 2 sets of negative blood cultures.       #Traumatic Fall with Intraventricular Hemorrhage   Likely syncope episode due to sever aortic stenosis.   (1) intraventricular hemorrhage:   - cleared by neuro to start anticoag  - cleared by s&s  (2) Left Internal Carotid Stenosis 80% - Vascular for CEA after cardiac clearance. Follow up with Dr. De Gumzan outpatient.   (3) severe AS  planned for TAVR as an outpatient     # LAURYN Romofib  - on coumadin   - rate well controlled    # CKD stag 4 - with BRITTANI on admission --> improving  - baseline cr=approx 2.5  - cr approaching baseline  - lasix 20mg IV daily   - accurate in/out    #COPD - Stable, on home O2, c/w inhalers     DVT ppx: coumadin   GI ppx; protonix   Actvity: IAT   Diet: Renal DASH   Disposition: Home with IV antibiotics  FULL CODE     Point of contact is his Son Deangelo #[phone].   Spoke to his son and updated him on clinical status TAIWO ZAVALA 80y Male  MRN#: [id number]       SUBJECTIVE  Patient is a 80y old Male who presents with a chief complaint of gait dysfunction / ventricular hemorrhage (11 May 2020 09:34)  Currently admitted to medicine with the primary diagnosis of Intracranial bleed  Today is hospital day 18d, and this morning he is well. no complaints    OBJECTIVE  PAST MEDICAL & SURGICAL HISTORY  Afib  BPH (benign prostatic hyperplasia)  CHF (congestive heart failure)  COPD (chronic obstructive pulmonary disease)  Diabetes  HTN (hypertension)  H/O heart artery stent  S/P CABG x 3    ALLERGIES:  No Known Allergies    MEDICATIONS:  STANDING MEDICATIONS  budesonide 160 MICROgram(s)/formoterol 4.5 MICROgram(s) Inhaler 2 Puff(s) Inhalation two times a day  ceFAZolin   IVPB 2000 milliGRAM(s) IV Intermittent every 12 hours  ceFAZolin   IVPB      chlorhexidine 4% Liquid 1 Application(s) Topical <User Schedule>  fenofibrate Tablet 145 milliGRAM(s) Oral daily  finasteride 5 milliGRAM(s) Oral daily  furosemide   Injectable 20 milliGRAM(s) IV Push daily  insulin glargine Injectable (LANTUS) 20 Unit(s) SubCutaneous at bedtime  insulin lispro (HumaLOG) corrective regimen sliding scale   SubCutaneous three times a day before meals  insulin lispro Injectable (HumaLOG) 5 Unit(s) SubCutaneous three times a day before meals  isosorbide   mononitrate ER Tablet (IMDUR) 30 milliGRAM(s) Oral daily  multivitamin 1 Tablet(s) Oral daily  pantoprazole    Tablet 40 milliGRAM(s) Oral before breakfast  silver sulfADIAZINE 1% Cream 1 Application(s) Topical daily  simvastatin 40 milliGRAM(s) Oral at bedtime  tamsulosin 0.4 milliGRAM(s) Oral at bedtime    PRN MEDICATIONS  acetaminophen   Tablet .. 650 milliGRAM(s) Oral every 6 hours PRN      VITAL SIGNS: Last 24 Hours  T(C): 36.2 (13 May 2020 05:43), Max: 36.2 (13 May 2020 05:43)  T(F): 97.1 (13 May 2020 05:43), Max: 97.1 (13 May 2020 05:43)  HR: 59 (13 May 2020 05:43) (59 - 67)  BP: 105/56 (13 May 2020 05:43) (101/54 - 124/57)  BP(mean): --  RR: 18 (13 May 2020 05:43) (18 - 18)  SpO2: --    LABS:                        10.2   9.16  )-----------( 253      ( 13 May 2020 06:48 )             31.1     05-13    136  |  100  |  78<HH>  ----------------------------<  183<H>  3.8   |  23  |  2.1<H>    Ca    8.7      13 May 2020 06:48  Phos  2.5     05-13  Mg     2.1     05-13    TPro  6.1  /  Alb  2.9<L>  /  TBili  0.7  /  DBili  x   /  AST  22  /  ALT  9   /  AlkPhos  68  05-13    Culture - Blood (collected 11 May 2020 04:30)  Source: .Blood Blood-Peripheral  Preliminary Report (12 May 2020 12:01):    No growth to date.    Culture - Blood (collected 10 May 2020 16:53)  Source: .Blood None  Preliminary Report (11 May 2020 23:01):    No growth to date.      PHYSICAL EXAM:  PHYSICAL EXAM:  GENERAL: NAD, lying in bed comfortably  HEAD:  Atraumatic, Normocephalic  EYES: EOMI, PERRLA, conjunctiva and sclera clear  ENT: Moist mucous membranes  NECK: Supple, No JVD  CHEST/LUNG: Clear to auscultation bilaterally; No rales, rhonchi, wheezing, or rubs. Unlabored respirations  HEART: Regular rate and rhythm; No murmurs, rubs, or gallops  ABDOMEN: Bowel sounds present; Soft, Nontender, Nondistended. No hepatomegally  EXTREMITIES:  2+ Peripheral Pulses, brisk capillary refill. No clubbing, cyanosis, or edema  NERVOUS SYSTEM:  Alert & Oriented X3, speech clear. No deficits   MSK: FROM all 4 extremities, full and equal strength  SKIN: No rashes or lesions    HOSPITAL COURSE  80M PMHx Afib on Coumadin, HTN, DM, COPD, presents to ED s/p fall, with LOC and head trauma In the ED he had a posterior head laceration. His CT head showed  Right lateral ventricle and third ventricle hemorrhage  Neurosurgery recommended CTA head: redemonstrated findings of CTH, without definite evidence of active arterial contrast extravasation.   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   EEG was unremarkable. Echo with EF 55-60%, with severe aortic stenosis.   Carotid duplex showed greater than 80% stenosis in the left internal carotid artery.   Vascular was planning for CEA, but patient was not cleared by cardio due to severe AS.   Cardio recommended structural for TAVR. Nephro recommended hydration before and after CT- which happened on 5/5   Patient is planned for TAVR as an outpatient and then to follow with vascular for CEA once TAVR done.   During his hospital stay he developed fever. His Ucx grew K. pneumonia. Bcx grew staph on 5/8. He was started on vancomycin and ceftriaxone and then switched to cefazolin. Echo ordered to evaluate for endocarditis     ASSESSMENT & PLAN    # Fever: Febrile to 103F, multiple episodes, leukocytosis  K.pneumonia in urine and staph aureus in blood cultures.    COVID neg   RUQ US showed GB wall edema and gallstones, HIDA scan negative, no surgical interventions  US kidney showed no hydronephrosis or signs of inflammation  2 sets of Bcx negative (from 5/10) and echo shows no signs no endocarditis.   - on cefazolin (start 5/11) and end on 5/23  - ID following. Patient will need midline for outpatient antibiotics. Insert midline today as we have 2 sets of negative blood cultures.       #Traumatic Fall with Intraventricular Hemorrhage   Likely syncope episode due to sever aortic stenosis.   (1) intraventricular hemorrhage:   - cleared by neuro to start anticoag  - cleared by s&s  (2) Left Internal Carotid Stenosis 80% - Vascular for CEA after cardiac clearance. Follow up with Dr. De Guzman outpatient.   (3) severe AS  planned for TAVR as an outpatient     # LAURYN Romofib  - on coumadin   - rate well controlled    # CKD stag 4 - with BRITTANI on admission --> improving  - baseline cr=approx 2.5  - cr approaching baseline  - will make lasix 40mg orally daily   - accurate in/out    #COPD - Stable, on home O2, c/w inhalers     DVT ppx: coumadin   GI ppx; protonix   Actvity: IAT   Diet: Renal DASH   Disposition: Home with IV antibiotics  FULL CODE     Point of contact is his Son Deangelo #[phone].   Spoke to his son and updated him on clinical status TAIWO ZAVALA 80y Male  MRN#: [id number]       SUBJECTIVE  Patient is a 80y old Male who presents with a chief complaint of gait dysfunction / ventricular hemorrhage (11 May 2020 09:34)  Currently admitted to medicine with the primary diagnosis of Intracranial bleed  Today is hospital day 18d, and this morning he is well. no complaints    OBJECTIVE  PAST MEDICAL & SURGICAL HISTORY  Afib  BPH (benign prostatic hyperplasia)  CHF (congestive heart failure)  COPD (chronic obstructive pulmonary disease)  Diabetes  HTN (hypertension)  H/O heart artery stent  S/P CABG x 3    ALLERGIES:  No Known Allergies    MEDICATIONS:  STANDING MEDICATIONS  budesonide 160 MICROgram(s)/formoterol 4.5 MICROgram(s) Inhaler 2 Puff(s) Inhalation two times a day  ceFAZolin   IVPB 2000 milliGRAM(s) IV Intermittent every 12 hours  ceFAZolin   IVPB      chlorhexidine 4% Liquid 1 Application(s) Topical <User Schedule>  fenofibrate Tablet 145 milliGRAM(s) Oral daily  finasteride 5 milliGRAM(s) Oral daily  furosemide   Injectable 20 milliGRAM(s) IV Push daily  insulin glargine Injectable (LANTUS) 20 Unit(s) SubCutaneous at bedtime  insulin lispro (HumaLOG) corrective regimen sliding scale   SubCutaneous three times a day before meals  insulin lispro Injectable (HumaLOG) 5 Unit(s) SubCutaneous three times a day before meals  isosorbide   mononitrate ER Tablet (IMDUR) 30 milliGRAM(s) Oral daily  multivitamin 1 Tablet(s) Oral daily  pantoprazole    Tablet 40 milliGRAM(s) Oral before breakfast  silver sulfADIAZINE 1% Cream 1 Application(s) Topical daily  simvastatin 40 milliGRAM(s) Oral at bedtime  tamsulosin 0.4 milliGRAM(s) Oral at bedtime    PRN MEDICATIONS  acetaminophen   Tablet .. 650 milliGRAM(s) Oral every 6 hours PRN      VITAL SIGNS: Last 24 Hours  T(C): 36.2 (13 May 2020 05:43), Max: 36.2 (13 May 2020 05:43)  T(F): 97.1 (13 May 2020 05:43), Max: 97.1 (13 May 2020 05:43)  HR: 59 (13 May 2020 05:43) (59 - 67)  BP: 105/56 (13 May 2020 05:43) (101/54 - 124/57)  BP(mean): --  RR: 18 (13 May 2020 05:43) (18 - 18)  SpO2: --    LABS:                        10.2   9.16  )-----------( 253      ( 13 May 2020 06:48 )             31.1     05-13    136  |  100  |  78<HH>  ----------------------------<  183<H>  3.8   |  23  |  2.1<H>    Ca    8.7      13 May 2020 06:48  Phos  2.5     05-13  Mg     2.1     05-13    TPro  6.1  /  Alb  2.9<L>  /  TBili  0.7  /  DBili  x   /  AST  22  /  ALT  9   /  AlkPhos  68  05-13    Culture - Blood (collected 11 May 2020 04:30)  Source: .Blood Blood-Peripheral  Preliminary Report (12 May 2020 12:01):    No growth to date.    Culture - Blood (collected 10 May 2020 16:53)  Source: .Blood None  Preliminary Report (11 May 2020 23:01):    No growth to date.      PHYSICAL EXAM:  PHYSICAL EXAM:  GENERAL: NAD, lying in bed comfortably  HEAD:  Atraumatic, Normocephalic  EYES: EOMI, PERRLA, conjunctiva and sclera clear  ENT: Moist mucous membranes  NECK: Supple, No JVD  CHEST/LUNG: Clear to auscultation bilaterally; No rales, rhonchi, wheezing, or rubs. Unlabored respirations  HEART: Regular rate and rhythm; No murmurs, rubs, or gallops  ABDOMEN: Bowel sounds present; Soft, Nontender, Nondistended. No hepatomegally  EXTREMITIES:  2+ Peripheral Pulses, brisk capillary refill. No clubbing, cyanosis, or edema  NERVOUS SYSTEM:  Alert & Oriented X3, speech clear. No deficits   MSK: FROM all 4 extremities, full and equal strength  SKIN: No rashes or lesions    HOSPITAL COURSE  80M PMHx Afib on Coumadin, HTN, DM, COPD, presents to ED s/p fall, with LOC and head trauma In the ED he had a posterior head laceration. His CT head showed  Right lateral ventricle and third ventricle hemorrhage  Neurosurgery recommended CTA head: redemonstrated findings of CTH, without definite evidence of active arterial contrast extravasation.   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   EEG was unremarkable. Echo with EF 55-60%, with severe aortic stenosis.   Carotid duplex showed greater than 80% stenosis in the left internal carotid artery.   Vascular was planning for CEA, but patient was not cleared by cardio due to severe AS.   Cardio recommended structural for TAVR. Nephro recommended hydration before and after CT- which happened on 5/5   Patient is planned for TAVR as an outpatient and then to follow with vascular for CEA once TAVR done.   During his hospital stay he developed fever. His Ucx grew K. pneumonia. Bcx grew staph on 5/8. He was started on vancomycin and ceftriaxone and then switched to cefazolin. Echo ordered to evaluate for endocarditis     ASSESSMENT & PLAN    # Fever: Febrile to 103F, multiple episodes, leukocytosis  K.pneumonia in urine and staph aureus in blood cultures.    COVID neg   RUQ US showed GB wall edema and gallstones, HIDA scan negative, no surgical interventions  US kidney showed no hydronephrosis or signs of inflammation  2 sets of Bcx negative (from 5/10) and echo shows no signs no endocarditis.   - on cefazolin (start 5/11) and end on 5/23  - ID following. Patient will need midline for outpatient antibiotics. Insert midline today as we have 2 sets of negative blood cultures.       #Traumatic Fall with Intraventricular Hemorrhage   Likely syncope episode due to sever aortic stenosis.   (1) intraventricular hemorrhage:   - cleared by neuro to start anticoag  - cleared by s&s  (2) Left Internal Carotid Stenosis 80% - Vascular for CEA after cardiac clearance. Follow up with Dr. De Guzman outpatient.   (3) severe AS  planned for TAVR as an outpatient     # LAURYN Romofib  - on coumadin   - rate well controlled    # CKD stag 4 - with BRITTANI on admission --> improving  - baseline cr=approx 2.5  - cr approaching baseline  - will make lasix 40mg orally daily   - accurate in/out    #COPD - Stable, on home O2, c/w inhalers     DVT ppx: coumadin   GI ppx; protonix   Actvity: IAT   Diet: Renal DASH   Disposition: Home with IV antibiotics, plan for dc today  FULL CODE     Point of contact is his Son Deangelo #[phone].   Spoke to his son and updated him on clinical status

## 2020-05-13 NOTE — PROGRESS NOTE ADULT - ASSESSMENT
80F, PMHx Afib on Coumadin, HTN, DM, CKD 4, COPD, presents to ED s/p fall, +HT, +LOC, +AC. Found to have MSSA bacteremia  Found to have L. Carotid artery stenosis >80%.     # CKD 4   - serum creatinine better  - cont lasix 20 IV q24h switch to po on DC    - previously had HD, now off for > 3 months.   - non-oliguric   - BP on lower side. monitor BP.   -  Ca, Mg, phos at goal.   - found to have severe aortic stenosis.   # anemia chronic Hb at goal.  # Fall with head trauma  - Intracranial hemorrhage / stable intraventricular hematoma.   - neurosurgery notes appreciated.  # Carotid artery stenosis   - carotid duplex 80% stenosis of L internal carotid artery    # UTI - Klebsiella  MRSA bacteriemia - on Ancef 2 gr q12  2 Rt wrist phlebitis  TTE neg 4/26    will need to follow up with Dr Grubbs as OP

## 2020-05-13 NOTE — CHART NOTE - NSCHARTNOTEFT_GEN_A_CORE
Registered Dietitian Follow-Up     Patient Profile Reviewed                           Yes [x]   No []     Nutrition History Previously Obtained        Yes []  No [x] obtained over the phone via pt son Deangelo during this assessment       Pertinent Subjective Information: Po intake ranges from 0-100% throughout LOS per EMR. Has been recorded at 100% since previously assessed.    NUTRITION HX: pt typically has a very good appetite pta per son. Pt son reports that pt has been living with him for the last 3 months and has been following a more healthful diet in relation to DM + low sodium/potassium per son. Pt takes insulin pta and also checks blood sugars 3x/d pta. Pt son reports that blood sugars range anywhere from  depending. Pt has had intentional wt loss, however pt son is unsure of pt wt prior to wt loss. Pt son reports that pt has been weighing in the 170 lbs range over the last 2 weeks. He also reports that he weighs pt daily to assess for fluid related fluctuations. Per EMR, pt wt ranges from 182-188 lbs throughout LOS, edema has been present. NKFA/intolerances. No food preferences r/t culture/[employer]. Pt takes probiotics daily pta. No chewing/swallowing difficulties. No issues with diarrhea/constipation pta.      Pertinent Medical Interventions: Pt to have midline insertion today (5/13) for abx upon d/c. RUQ US showed gallbladder wall edema and gallstones, HIDA scan was negative-- no need for surgical interventions. US kidney showed no hydronephrosis or signs of inflammation. Pt had 2 sets of negative blood cultures + ECHO shows no signs of endocarditis.      Diet order: DASH/TLC, consistent CHO w/ evening snacks, Glucerna BID     Anthropometrics:  - Ht. 66"  - Wt. dosing 84.5 kg/186 lbs; (5/9) 85.2 kg/188 lbs  - %wt change ranges from 182-188 lbs throughout LOS per EMR; edema is present + pt is on diuretics  - BMI 29.4 using 182 lbs (lowest recorded wt)  - IBW 64.5 kg/142 lbs     Pertinent Lab Data: (5/13) RBC 3.57, H/H 10.2/31.1, POC glucose 164, BUN 78, Cr 2.1, glucose 183; (4/26) A1c 6.4      Pertinent Meds: ancef, lantus, humalog, lasix, MVI, tricor, proscar, imdur, protonix, zocor, flomax     Physical Findings:  - Appearance: overweight vs obese; (5/7) +3 edema (scrotum)  - GI function: last BM on 5/10; fecal incontinence noted  - Tubes: NA  - Oral/Mouth cavity: no chewing/swallowing difficulties noted  - Skin: B/l LE venous stasis ulcers; stage II pressure ulcer (sacrum)     Nutrition Requirements  Weight Used: 85kg -Derived from nutrition note (5/6)     Estimated Energy Needs    Continue [x]    Adjusted Energy Recommendations: 1508-1659kcal (MSJ x 1-1.1 AF) for borderline obesity -Derived from nutrition note (5/6)     Estimated Protein Needs    Continue [x]    Adjusted Protein Recommendations: 67-76g (0.8-0.9g/kg CBW) as above, CKD considered -Derived from nutrition note (5/6)      Estimated Fluid Needs        Continue [x]    Adjusted Fluid Recommendations: 1mL/kcal or per LIP -Derived from nutrition note (5/6)     Nutrient Intake: 100% since previously assessed (good)        [x] Previous Nutrition Diagnosis: Inadequate oral intake             [x] Resolved    [x] No active nutrition diagnosis identified at this time     Nutrition Intervention meals and snacks, coordination of care     Goal/Expected Outcome: pt to maintain po intake >75% over the next 7 days     Indicator/Monitoring: RD to monitor diet order, energy intake, body composition, NFPF, glucose/renal profiles    Recommendation: continue current diet order + supplements, encourage po intake, provide assistance with meals PRN    Pt is no longer at risk d/t adequate po intake + good appetite. There are no further nutrition interventions at this time. Also note that pt son has knowledge of therapeutic diet order and helps pt to follow at home pta-- no need for further nutrition education for this reason.

## 2020-05-13 NOTE — GOALS OF CARE CONVERSATION - ADVANCED CARE PLANNING - CONVERSATION DETAILS
Pt wants to cont full code for now. Aware of multiple medical problems but wants a trial of intubation and resuscitation if needed.

## 2020-05-13 NOTE — PROGRESS NOTE ADULT - SUBJECTIVE AND OBJECTIVE BOX
Pt seen and examined independently. No new complaints. Feeling better. Wants to go home ASAP. Cleared by all subspecialists. D/w pt's son in details. Placed a call to pt's outside PMD Dr. Kruger to update.    Gen: NAD, AAOx3  CV: S1 S2  Resp: Decreased BS b/l  GI: NT/ND/S +BS  MS: neg c/c/e  Neuro: b/l ACE wraps (chronic venous stasis)    Discharge instructions discussed and patient/son know when to seek immediate medical attention.  Patient has proper follow up.  All results discussed and patient/son aware they may require further work up.  Stressed importance of proper follow up.  Medications prescribed and changes discussed.  All questions and concerns from patient and family addressed. Understanding of instructions verbalized.  High risk pt. Increased risk for readmission and M&M.  Time spent in completing discharge process and coordinating care 50 minutes.    Discussed with housestaff, nursing, social work, cardio, renal, ID

## 2020-05-15 DIAGNOSIS — Z79.4 LONG TERM (CURRENT) USE OF INSULIN: ICD-10-CM

## 2020-05-15 DIAGNOSIS — N18.4 CHRONIC KIDNEY DISEASE, STAGE 4 (SEVERE): ICD-10-CM

## 2020-05-15 DIAGNOSIS — B96.1 KLEBSIELLA PNEUMONIAE [K. PNEUMONIAE] AS THE CAUSE OF DISEASES CLASSIFIED ELSEWHERE: ICD-10-CM

## 2020-05-15 DIAGNOSIS — N40.0 BENIGN PROSTATIC HYPERPLASIA WITHOUT LOWER URINARY TRACT SYMPTOMS: ICD-10-CM

## 2020-05-15 DIAGNOSIS — I87.8 OTHER SPECIFIED DISORDERS OF VEINS: ICD-10-CM

## 2020-05-15 DIAGNOSIS — I48.91 UNSPECIFIED ATRIAL FIBRILLATION: ICD-10-CM

## 2020-05-15 DIAGNOSIS — Z95.1 PRESENCE OF AORTOCORONARY BYPASS GRAFT: ICD-10-CM

## 2020-05-15 DIAGNOSIS — R78.81 BACTEREMIA: ICD-10-CM

## 2020-05-15 DIAGNOSIS — D64.9 ANEMIA, UNSPECIFIED: ICD-10-CM

## 2020-05-15 DIAGNOSIS — I27.20 PULMONARY HYPERTENSION, UNSPECIFIED: ICD-10-CM

## 2020-05-15 DIAGNOSIS — I13.0 HYPERTENSIVE HEART AND CHRONIC KIDNEY DISEASE WITH HEART FAILURE AND STAGE 1 THROUGH STAGE 4 CHRONIC KIDNEY DISEASE, OR UNSPECIFIED CHRONIC KIDNEY DISEASE: ICD-10-CM

## 2020-05-15 DIAGNOSIS — E11.22 TYPE 2 DIABETES MELLITUS WITH DIABETIC CHRONIC KIDNEY DISEASE: ICD-10-CM

## 2020-05-15 DIAGNOSIS — S06.9X9A UNSPECIFIED INTRACRANIAL INJURY WITH LOSS OF CONSCIOUSNESS OF UNSPECIFIED DURATION, INITIAL ENCOUNTER: ICD-10-CM

## 2020-05-15 DIAGNOSIS — I50.32 CHRONIC DIASTOLIC (CONGESTIVE) HEART FAILURE: ICD-10-CM

## 2020-05-15 DIAGNOSIS — I35.0 NONRHEUMATIC AORTIC (VALVE) STENOSIS: ICD-10-CM

## 2020-05-15 DIAGNOSIS — J44.9 CHRONIC OBSTRUCTIVE PULMONARY DISEASE, UNSPECIFIED: ICD-10-CM

## 2020-05-15 DIAGNOSIS — N39.0 URINARY TRACT INFECTION, SITE NOT SPECIFIED: ICD-10-CM
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
## 2020-05-15 DIAGNOSIS — N10 ACUTE PYELONEPHRITIS: ICD-10-CM

## 2020-05-15 DIAGNOSIS — E78.5 HYPERLIPIDEMIA, UNSPECIFIED: ICD-10-CM

## 2020-05-15 DIAGNOSIS — K80.20 CALCULUS OF GALLBLADDER WITHOUT CHOLECYSTITIS WITHOUT OBSTRUCTION: ICD-10-CM
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
## 2020-05-15 DIAGNOSIS — R55 SYNCOPE AND COLLAPSE: ICD-10-CM

## 2020-05-15 DIAGNOSIS — Z79.01 LONG TERM (CURRENT) USE OF ANTICOAGULANTS: ICD-10-CM

## 2020-05-15 DIAGNOSIS — Z53.09 PROCEDURE AND TREATMENT NOT CARRIED OUT BECAUSE OF OTHER CONTRAINDICATION: ICD-10-CM

## 2020-05-15 DIAGNOSIS — I48.92 UNSPECIFIED ATRIAL FLUTTER: ICD-10-CM

## 2020-05-15 DIAGNOSIS — E87.6 HYPOKALEMIA: ICD-10-CM

## 2020-05-15 DIAGNOSIS — S60.811A ABRASION OF RIGHT WRIST, INITIAL ENCOUNTER: ICD-10-CM

## 2020-05-15 DIAGNOSIS — S02.109A FRACTURE OF BASE OF SKULL, UNSPECIFIED SIDE, INITIAL ENCOUNTER FOR CLOSED FRACTURE: ICD-10-CM

## 2020-05-15 DIAGNOSIS — S01.01XA LACERATION WITHOUT FOREIGN BODY OF SCALP, INITIAL ENCOUNTER: ICD-10-CM

## 2020-05-15 LAB
CULTURE RESULTS: SIGNIFICANT CHANGE UP
SPECIMEN SOURCE: SIGNIFICANT CHANGE UP

## 2020-05-16 PROBLEM — I48.91 UNSPECIFIED ATRIAL FIBRILLATION: Chronic | Status: ACTIVE | Noted: 2020-04-25

## 2020-05-16 LAB
CULTURE RESULTS: SIGNIFICANT CHANGE UP
SPECIMEN SOURCE: SIGNIFICANT CHANGE UP

## 2020-05-17 LAB
CULTURE RESULTS: SIGNIFICANT CHANGE UP
SPECIMEN SOURCE: SIGNIFICANT CHANGE UP

## 2020-05-22 ENCOUNTER — APPOINTMENT (OUTPATIENT)
Dept: CARDIOLOGY | Facility: CLINIC | Age: 80
End: 2020-05-22
Payer: MEDICARE

## 2020-05-22 ENCOUNTER — APPOINTMENT (OUTPATIENT)
Dept: CARDIOTHORACIC SURGERY | Facility: CLINIC | Age: 80
End: 2020-05-22
Payer: MEDICARE

## 2020-05-22 VITALS
SYSTOLIC BLOOD PRESSURE: 105 MMHG | HEART RATE: 48 BPM | RESPIRATION RATE: 12 BRPM | OXYGEN SATURATION: 98 % | DIASTOLIC BLOOD PRESSURE: 69 MMHG | BODY MASS INDEX: 29.25 KG/M2 | WEIGHT: 193 LBS | HEIGHT: 68 IN | TEMPERATURE: 97.5 F

## 2020-05-22 VITALS
OXYGEN SATURATION: 98 % | WEIGHT: 193 LBS | RESPIRATION RATE: 14 BRPM | BODY MASS INDEX: 29.25 KG/M2 | DIASTOLIC BLOOD PRESSURE: 69 MMHG | SYSTOLIC BLOOD PRESSURE: 105 MMHG | TEMPERATURE: 97.5 F | HEIGHT: 68 IN | HEART RATE: 50 BPM

## 2020-05-22 DIAGNOSIS — I35.0 NONRHEUMATIC AORTIC (VALVE) STENOSIS: ICD-10-CM

## 2020-05-22 PROCEDURE — 99203 OFFICE O/P NEW LOW 30 MIN: CPT

## 2020-05-22 PROCEDURE — 99214 OFFICE O/P EST MOD 30 MIN: CPT

## 2020-05-22 RX ORDER — WARFARIN SODIUM 3 MG/1
3 TABLET ORAL
Refills: 0 | Status: ACTIVE | COMMUNITY

## 2020-05-22 RX ORDER — SIMVASTATIN 40 MG/1
40 TABLET, FILM COATED ORAL DAILY
Qty: 90 | Refills: 3 | Status: DISCONTINUED | COMMUNITY
Start: 2020-02-20 | End: 2020-05-22

## 2020-05-22 RX ORDER — FENOFIBRATE 145 MG/1
145 TABLET, COATED ORAL
Refills: 0 | Status: ACTIVE | COMMUNITY

## 2020-05-22 RX ORDER — CLOPIDOGREL BISULFATE 75 MG/1
75 TABLET, FILM COATED ORAL DAILY
Qty: 90 | Refills: 3 | Status: DISCONTINUED | COMMUNITY
Start: 2020-02-20 | End: 2020-05-22

## 2020-05-22 RX ORDER — ATORVASTATIN CALCIUM 40 MG/1
40 TABLET, FILM COATED ORAL
Refills: 0 | Status: ACTIVE | COMMUNITY

## 2020-05-22 NOTE — PHYSICAL EXAM
[General Appearance - Alert] : alert [General Appearance - In No Acute Distress] : in no acute distress [] : no respiratory distress [Auscultation Breath Sounds / Voice Sounds] : lungs were clear to auscultation bilaterally [IV] : a grade 4 [___ +] : bilateral [unfilled]U+ pitting edema to the ankles [2+] : right 2+ [Oriented To Time, Place, And Person] : oriented to person, place, and time [Impaired Insight] : insight and judgment were intact [Affect] : the affect was normal

## 2020-05-22 NOTE — REVIEW OF SYSTEMS
[Feeling Fatigued] : feeling fatigued [Eyeglasses] : currently wearing eyeglasses [Loss Of Hearing] : hearing loss [Shortness Of Breath] : shortness of breath [Dyspnea on exertion] : dyspnea during exertion [Lower Ext Edema] : lower extremity edema

## 2020-05-22 NOTE — DATA REVIEWED
[FreeTextEntry1] : PREVIOUS DIAGNOSTIC TESTING:\par [ ] Echocardiogram:\par < from: Transthoracic Echocardiogram (04.26.20 @ 07:05) >\par Summary:\par  1. Left ventricular ejection fraction, by visual estimation, is 55 to 60%.\par  2. Technically suboptimal study.\par  3. LV function appears low normal on limited views.\par  4. Moderate mitral annular calcification.\par  5. Moderate mitral valve regurgitation.\par  6. Moderate thickening of the anterior and posterior mitral valve leaflets.\par  7. AV is severely calcified. Severe aortic stenosis, peak/mean PG 74/40 mmHg,\par ANGELA 0.6 cm S 2 by continuity equation.\par  8. Estimated pulmonary artery systolic pressure is 52.1 mmHg assuming a right\par atrial pressure of 3 mmHg, which is consistent with moderate pulmonary\par hypertension.\par \par Cardiac Catheterization: Feb 2017\par LIMA to LAD: luminal irregularities\par SVG to LCx: mild irregularities\par SVG to RV marginal: 80% in RV marginal\par

## 2020-05-22 NOTE — ASSESSMENT
[FreeTextEntry1] : Mr. Okeefe is a 80 male that was recently hospitalized for syncope and heart trauma. His PMH A-fib on Coumadin, AS, HTN, DM, COPD. He has moved in with his son for assistance. He is using his wheelchair more. He is on 2 l nasal cannula at home. Plan will be for possible BAV, to assist with alleviating his symptoms. \par \par JAIR Weber, [facility] am acting as the scribe for Dr. Jones\par  appears too frail even for TAVR. BAV as a bridge to decision beter option. all this was discussed in detail with patient and son.\par

## 2020-05-22 NOTE — REVIEW OF SYSTEMS
[Feeling Poorly] : feeling poorly [Loss Of Hearing] : hearing loss [Feeling Tired] : feeling tired [Shortness Of Breath] : shortness of breath [Wheezing] : wheezing [SOB on Exertion] : shortness of breath during exertion [Negative] : Cardiovascular

## 2020-05-22 NOTE — HISTORY OF PRESENT ILLNESS
[Diabetes Mellitus] : Diabetes Mellitus [Dyslipidemia] : Dyslipidemia [Hypertension] : Hypertension [FreeTextEntry1] : Mr. Okeefe is a 80 male arrives today with his son for TAVR evaluation of his known Aortic Stenosis. His PMH includes A-fib on Coumadin, AS, HTN, DM, COPD.  The patient was recently hospitalized for syncope and head trauma. During his workup in the emergency department he had a CT head which revealed right lateral ventricle and third ventricle hemorrhage. Neurosurgery recommended CTA head: re-demonstrated findings of CTH, without definite evidence of active arterial contrast extravasation. 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 EEG was unremarkable. Echo with EF 55-60%, with severe aortic stenosis. Carotid duplex showed greater than 80% stenosis in the left internal carotid artery. Vascular was planning for CEA, but patient was not cleared by cardio due to\par severe AS. Cardio recommended structural for TAVR. During his hospital stay he developed fever. His Ucx grew K. pneumonia. Bcx grew staph on 5/8. He was started on vancomycin and ceftriaxone and then switched to cefazolin. Echo ordered to evaluate for endocarditis He was planned for discharge on IV cefazolin for 2 weeks at home to follow up for TAVR and then CEA. \par \par Cardio: Dr. Ramirez\par PMD: Dr. Kruger\par ID: Liset\par Renal: El-Princess\par \par

## 2020-05-26 NOTE — REASON FOR VISIT
[Follow-Up - Clinic] : a clinic follow-up of [FreeTextEntry2] : Aortic Stenosis [FreeTextEntry1] : Mr. Okeefe is a 80 male arrives today with his son for TAVR evaluation of his known Aortic Stenosis. His PMH includes A-fib on Coumadin, AS, HTN, DM, COPD. The patient was recently hospitalized for syncope and head trauma. During his workup in the emergency department he had a CT head which revealed right lateral ventricle and third ventricle hemorrhage. Neurosurgery recommended CTA head: re-demonstrated findings of CTH, without definite evidence of active arterial contrast extravasation. 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 EEG was unremarkable. Echo with EF 55-60%, with severe aortic stenosis. Carotid duplex showed greater than 80% stenosis in the left internal carotid artery. Vascular was planning for CEA, but patient was not cleared by cardio due to severe AS. Cardio recommended structural for TAVR. During his hospital stay he developed fever. His Ucx grew K. pneumonia. Bcx grew staph on 5/8. He was started on vancomycin and ceftriaxone and then switched to cefazolin. Echo ordered to evaluate for endocarditis He was planned for discharge on IV cefazolin for 2 weeks at home to follow up for TAVR and then CEA. \par \par

## 2020-05-26 NOTE — ASSESSMENT
[FreeTextEntry1] : Mr. Okeefe is a 80 male arrives today with his son for TAVR evaluation of his known Aortic Stenosis. His PMH includes A-fib on Coumadin, AS, HTN, DM, COPD. He has moved in with his son for assistance. He is using his wheelchair more. He is on 2 l nasal cannula at home. Plan will be for possible BAV, to assist with alleviating his symptoms.

## 2020-05-26 NOTE — END OF VISIT
[FreeTextEntry3] : Seen / examined and above reviewed.\par Initially evaluated in hospital.\par \par Severe symptomatic AS (possible syncope).\par Multiple comorbidities as above.\par \par Presents with son.  Reports improvement since discharge / living with him, but now in wheelchair most of day and requires assistance with ADL's.  Also on oxygen with unclear lung disease.\par \par BP slightly low.\par Stable volume overload / LE edema.\par \par Given comorbidities, including significant CKD, and poor functional status, benefit of TAVR is not clearly greater than risk.  Will proceed with BAV in attempt to better optimize and as therapeutic trial.  Will consider TAVR if he does well.\par \par Stop Imdur.\par Continue beta-blocker and diuretics.\par \par 15-minute discussion with patient and son.  They are in agreement with plan.

## 2020-05-26 NOTE — HISTORY OF PRESENT ILLNESS
[FreeTextEntry1] : Cardio: Dr. Ramirez\par PMD: Dr. Kruger\par ID: Liset\par Renal: El-Princess\par Pulmonary Dr: Eleonora\par \par \par  EXAM: 2-D ECHO (TTE) COMPLETE \par \par \par PROCEDURE DATE: 05/12/2020 \par \par INTERPRETATION: REPORT: \par TRANSTHORACIC ECHOCARDIOGRAM REPORT \par \par \par \par Summary: \par  1. Left ventricular ejection fraction, by visual estimation, is 50 to 55%. \par  2. Normal global left ventricular systolic function. \par  3. Mild thickening of the anterior and posterior mitral valve leaflets. \par  4. Moderate mitral annular calcification. \par  5. Mild-moderate tricuspid regurgitation. \par  6. PSAP at least 40. \par  7. Mild aortic regurgitation. \par  8. Sclerotic aortic valve with decreased opening. \par  9. Peak gradient of 40 with a mean of 23 and ANGELA 1.2 c/w mod AS. \par 10. Moderate pulmonic valve regurgitation. \par \par PHYSICIAN INTERPRETATION: \par Left Ventricle: The left ventricular internal cavity size is normal. Left \par ventricular wall thickness is normal. Global LV systolic function was \par normal. Left ventricular ejection fraction, by visual estimation, is 50 to \par 55%. Normal segmental left ventricular systolic function. \par Left Atrium: Moderately enlarged left atrium. \par Right Atrium: Normal right atrial size. \par Pericardium: There is no evidence of pericardial effusion. \par Mitral Valve: Mild thickening of the anterior and posterior mitral valve \par leaflets. There is moderate mitral annular calcification. Moderate mitral \par valve regurgitation is seen. \par Tricuspid Valve: Mild-moderate tricuspid regurgitation is visualized. PSAP \par at least 40. \par Aortic Valve: The aortic valve is trileaflet. Sclerotic aortic valve with \par decreased opening. Mild aortic valve regurgitation is seen. Peak gradient of \par 40 with a mean of 23 and ANGELA 1.2 c/w mod AS. \par Pulmonic Valve: Moderate pulmonic valve regurgitation. \par Aorta: The aortic root is normal in size and structure. \par Venous: The inferior vena cava was dilated, with respiratory size variation \par less than 50%. \par

## 2020-05-26 NOTE — PHYSICAL EXAM
[Bradycardia] : bradycardic [IV] : a grade 4 [2+] : left 2+ [___ +] : bilateral [unfilled]U+ pitting edema to the ankles [] : no respiratory distress [FreeTextEntry1] : On Oxygen

## 2020-06-06 ENCOUNTER — LABORATORY RESULT (OUTPATIENT)
Age: 80
End: 2020-06-06

## 2020-06-08 ENCOUNTER — INPATIENT (INPATIENT)
Facility: HOSPITAL | Age: 80
LOS: 9 days | Discharge: NURSING, REHAB OR LONG-TERM CARE | End: 2020-06-18
Attending: INTERNAL MEDICINE | Admitting: INTERNAL MEDICINE
Payer: MEDICARE

## 2020-06-08 VITALS — WEIGHT: 192.9 LBS

## 2020-06-08 DIAGNOSIS — I35.0 NONRHEUMATIC AORTIC (VALVE) STENOSIS: ICD-10-CM

## 2020-06-08 DIAGNOSIS — Z95.1 PRESENCE OF AORTOCORONARY BYPASS GRAFT: Chronic | ICD-10-CM

## 2020-06-08 DIAGNOSIS — Z95.5 PRESENCE OF CORONARY ANGIOPLASTY IMPLANT AND GRAFT: Chronic | ICD-10-CM

## 2020-06-08 LAB
ANION GAP SERPL CALC-SCNC: 14 MMOL/L — SIGNIFICANT CHANGE UP (ref 7–14)
APTT BLD: 40.6 SEC — HIGH (ref 27–39.2)
BLD GP AB SCN SERPL QL: SIGNIFICANT CHANGE UP
BUN SERPL-MCNC: 80 MG/DL — CRITICAL HIGH (ref 10–20)
CALCIUM SERPL-MCNC: 9.5 MG/DL — SIGNIFICANT CHANGE UP (ref 8.5–10.1)
CHLORIDE SERPL-SCNC: 101 MMOL/L — SIGNIFICANT CHANGE UP (ref 98–110)
CO2 SERPL-SCNC: 28 MMOL/L — SIGNIFICANT CHANGE UP (ref 17–32)
CREAT SERPL-MCNC: 2.7 MG/DL — HIGH (ref 0.7–1.5)
GLUCOSE BLDC GLUCOMTR-MCNC: 164 MG/DL — HIGH (ref 70–99)
GLUCOSE BLDC GLUCOMTR-MCNC: 194 MG/DL — HIGH (ref 70–99)
GLUCOSE BLDC GLUCOMTR-MCNC: 211 MG/DL — HIGH (ref 70–99)
GLUCOSE BLDC GLUCOMTR-MCNC: 84 MG/DL — SIGNIFICANT CHANGE UP (ref 70–99)
GLUCOSE SERPL-MCNC: 85 MG/DL — SIGNIFICANT CHANGE UP (ref 70–99)
HCT VFR BLD CALC: 39.1 % — LOW (ref 42–52)
HGB BLD-MCNC: 12.1 G/DL — LOW (ref 14–18)
INR BLD: 1.74 RATIO — HIGH (ref 0.65–1.3)
MCHC RBC-ENTMCNC: 28.3 PG — SIGNIFICANT CHANGE UP (ref 27–31)
MCHC RBC-ENTMCNC: 30.9 G/DL — LOW (ref 32–37)
MCV RBC AUTO: 91.4 FL — SIGNIFICANT CHANGE UP (ref 80–94)
NRBC # BLD: 0 /100 WBCS — SIGNIFICANT CHANGE UP (ref 0–0)
PLATELET # BLD AUTO: 141 K/UL — SIGNIFICANT CHANGE UP (ref 130–400)
POTASSIUM SERPL-MCNC: 3.7 MMOL/L — SIGNIFICANT CHANGE UP (ref 3.5–5)
POTASSIUM SERPL-SCNC: 3.7 MMOL/L — SIGNIFICANT CHANGE UP (ref 3.5–5)
PROTHROM AB SERPL-ACNC: 20 SEC — HIGH (ref 9.95–12.87)
RBC # BLD: 4.28 M/UL — LOW (ref 4.7–6.1)
RBC # FLD: 16 % — HIGH (ref 11.5–14.5)
SODIUM SERPL-SCNC: 143 MMOL/L — SIGNIFICANT CHANGE UP (ref 135–146)
WBC # BLD: 5.99 K/UL — SIGNIFICANT CHANGE UP (ref 4.8–10.8)
WBC # FLD AUTO: 5.99 K/UL — SIGNIFICANT CHANGE UP (ref 4.8–10.8)

## 2020-06-08 RX ORDER — GLUCAGON INJECTION, SOLUTION 0.5 MG/.1ML
1 INJECTION, SOLUTION SUBCUTANEOUS ONCE
Refills: 0 | Status: DISCONTINUED | OUTPATIENT
Start: 2020-06-08 | End: 2020-06-18

## 2020-06-08 RX ORDER — DEXTROSE 50 % IN WATER 50 %
25 SYRINGE (ML) INTRAVENOUS ONCE
Refills: 0 | Status: DISCONTINUED | OUTPATIENT
Start: 2020-06-08 | End: 2020-06-18

## 2020-06-08 RX ORDER — ATORVASTATIN CALCIUM 80 MG/1
40 TABLET, FILM COATED ORAL AT BEDTIME
Refills: 0 | Status: DISCONTINUED | OUTPATIENT
Start: 2020-06-08 | End: 2020-06-18

## 2020-06-08 RX ORDER — ZINC SULFATE TAB 220 MG (50 MG ZINC EQUIVALENT) 220 (50 ZN) MG
220 TAB ORAL DAILY
Refills: 0 | Status: DISCONTINUED | OUTPATIENT
Start: 2020-06-08 | End: 2020-06-18

## 2020-06-08 RX ORDER — INSULIN LISPRO 100/ML
VIAL (ML) SUBCUTANEOUS
Refills: 0 | Status: DISCONTINUED | OUTPATIENT
Start: 2020-06-08 | End: 2020-06-18

## 2020-06-08 RX ORDER — ISOSORBIDE MONONITRATE 60 MG/1
30 TABLET, EXTENDED RELEASE ORAL DAILY
Refills: 0 | Status: DISCONTINUED | OUTPATIENT
Start: 2020-06-08 | End: 2020-06-18

## 2020-06-08 RX ORDER — FLUTICASONE PROPIONATE 50 MCG
1 SPRAY, SUSPENSION NASAL
Refills: 0 | Status: DISCONTINUED | OUTPATIENT
Start: 2020-06-08 | End: 2020-06-18

## 2020-06-08 RX ORDER — TAMSULOSIN HYDROCHLORIDE 0.4 MG/1
0.4 CAPSULE ORAL AT BEDTIME
Refills: 0 | Status: DISCONTINUED | OUTPATIENT
Start: 2020-06-08 | End: 2020-06-18

## 2020-06-08 RX ORDER — METOPROLOL TARTRATE 50 MG
50 TABLET ORAL DAILY
Refills: 0 | Status: DISCONTINUED | OUTPATIENT
Start: 2020-06-08 | End: 2020-06-18

## 2020-06-08 RX ORDER — INSULIN GLARGINE 100 [IU]/ML
20 INJECTION, SOLUTION SUBCUTANEOUS AT BEDTIME
Refills: 0 | Status: DISCONTINUED | OUTPATIENT
Start: 2020-06-08 | End: 2020-06-08

## 2020-06-08 RX ORDER — INSULIN GLARGINE 100 [IU]/ML
10 INJECTION, SOLUTION SUBCUTANEOUS AT BEDTIME
Refills: 0 | Status: DISCONTINUED | OUTPATIENT
Start: 2020-06-08 | End: 2020-06-18

## 2020-06-08 RX ORDER — FUROSEMIDE 40 MG
40 TABLET ORAL DAILY
Refills: 0 | Status: DISCONTINUED | OUTPATIENT
Start: 2020-06-08 | End: 2020-06-18

## 2020-06-08 RX ORDER — ASCORBIC ACID 60 MG
500 TABLET,CHEWABLE ORAL DAILY
Refills: 0 | Status: DISCONTINUED | OUTPATIENT
Start: 2020-06-08 | End: 2020-06-18

## 2020-06-08 RX ORDER — BUDESONIDE AND FORMOTEROL FUMARATE DIHYDRATE 160; 4.5 UG/1; UG/1
2 AEROSOL RESPIRATORY (INHALATION)
Refills: 0 | Status: DISCONTINUED | OUTPATIENT
Start: 2020-06-08 | End: 2020-06-18

## 2020-06-08 RX ORDER — SODIUM CHLORIDE 9 MG/ML
1000 INJECTION, SOLUTION INTRAVENOUS
Refills: 0 | Status: DISCONTINUED | OUTPATIENT
Start: 2020-06-08 | End: 2020-06-18

## 2020-06-08 RX ORDER — FINASTERIDE 5 MG/1
5 TABLET, FILM COATED ORAL DAILY
Refills: 0 | Status: DISCONTINUED | OUTPATIENT
Start: 2020-06-08 | End: 2020-06-18

## 2020-06-08 RX ORDER — PHYTONADIONE (VIT K1) 5 MG
5 TABLET ORAL ONCE
Refills: 0 | Status: COMPLETED | OUTPATIENT
Start: 2020-06-08 | End: 2020-06-08

## 2020-06-08 RX ORDER — DEXTROSE 50 % IN WATER 50 %
12.5 SYRINGE (ML) INTRAVENOUS ONCE
Refills: 0 | Status: DISCONTINUED | OUTPATIENT
Start: 2020-06-08 | End: 2020-06-18

## 2020-06-08 RX ORDER — PANTOPRAZOLE SODIUM 20 MG/1
40 TABLET, DELAYED RELEASE ORAL
Refills: 0 | Status: DISCONTINUED | OUTPATIENT
Start: 2020-06-08 | End: 2020-06-18

## 2020-06-08 RX ORDER — DEXTROSE 50 % IN WATER 50 %
15 SYRINGE (ML) INTRAVENOUS ONCE
Refills: 0 | Status: DISCONTINUED | OUTPATIENT
Start: 2020-06-08 | End: 2020-06-18

## 2020-06-08 RX ADMIN — INSULIN GLARGINE 10 UNIT(S): 100 INJECTION, SOLUTION SUBCUTANEOUS at 22:13

## 2020-06-08 RX ADMIN — Medication 5 MILLIGRAM(S): at 17:21

## 2020-06-08 RX ADMIN — Medication 4: at 17:15

## 2020-06-08 RX ADMIN — Medication 1 SPRAY(S): at 18:30

## 2020-06-08 RX ADMIN — ATORVASTATIN CALCIUM 40 MILLIGRAM(S): 80 TABLET, FILM COATED ORAL at 22:08

## 2020-06-08 RX ADMIN — TAMSULOSIN HYDROCHLORIDE 0.4 MILLIGRAM(S): 0.4 CAPSULE ORAL at 22:08

## 2020-06-08 NOTE — ASU PATIENT PROFILE, ADULT - PMH
Afib    Aortic stenosis    BPH (benign prostatic hyperplasia)    Cellulitis of left lower extremity    Cellulitis of right lower extremity    CHF (congestive heart failure)    COPD (chronic obstructive pulmonary disease)    Diabetes    History of renal insufficiency    HTN (hypertension)    Stenosis of left carotid artery

## 2020-06-08 NOTE — PRE-ANESTHESIA EVALUATION ADULT - NSRADCARDRESULTSFT_GEN_ALL_CORE
ECHO  Summary:   1. Left ventricular ejection fraction, by visual estimation, is 55 to 60%.   2. Technically suboptimal study.   3. LV function appears low normal on limited views.   4. Moderate mitral annular calcification.   5. Moderate mitral valve regurgitation.   6. Moderate thickening of the anterior and posterior mitral valve leaflets.   7. AV is severely calcified. Severe aortic stenosis, peak/mean PG 74/40 mmHg, ANGELA 0.6 cm^2 by continuity equation.   8. Estimated pulmonary artery systolic pressure is 52.1 mmHg assuming a right atrial pressure of 3 mmHg, which is consistent with moderate pulmonary hypertension.

## 2020-06-08 NOTE — H&P CARDIOLOGY - HISTORY OF PRESENT ILLNESS
80yM Pmhx of severe symptomatic aortic stenosis (PG 74, MG 40) and complicating factors of afib, htn, dld, COPD, chronic renal insufficiency presents for balloon valvuloplasty of aortic valve as a possible bridge to TAVR.     Pre cath note:    indication:  [ ] STEMI                [ ] NSTEMI                 [ ] Acute coronary syndrome [x] severe symptomatic aortic stenosis.                      [ ]Unstable Angina   [ ] high risk  [ ] intermediate risk  [ ] low risk                     [ ] Stable Angina     non-invasive testing:                          Date:                     result: [ ] high risk  [ ] intermediate risk  [ ] low risk    Anti- Anginal medications:                    [ ] not used                       [x ] used                   [ ] not used but strong indication not to use    Ejection Fraction                   [ ] <29            [ ] 30-39%   [ ] 40-49%     [x ]>50%    CHF                   [ ] active (within last 14 days on meds   [x ] Chronic (on meds but no exacerbation)    COPD                   [ ] mild (on chronic bronchodilators)  [ ] moderate (on chronic steroid therapy)      [x ] severe (indication for home O2 or PACO2 >50)    Other risk factors:                       [ ] Previous MI                     [ ] CVA/ stroke                    [ ] carotid stent/ CEA                    [ ] PVD/PAD- (arterial aneurysm, non-palpable pulses, tortuous vessel with inability to insert catheter, infra-renal dissection, renal or subclavian artery stenosis)                    [ ] diabetic                    [ ] previous CABG                    [x ] Renal Failure

## 2020-06-08 NOTE — CHART NOTE - NSCHARTNOTEFT_GEN_A_CORE
CARDIOLOGY NP:    BAV cancelled today due to elevated INR of 1.7, needs to be 1.5 or lower for BAV, coumadin on hold . Pt admitted to tele and plan for BAV 6/9 if INR 1.5 or less. Vitamin K 5mg po x 1 ordered stat, keep NPO for BAV in am. Plan discussed with pt and son. CARDIOLOGY NP:    BAV cancelled today due to elevated INR of 1.7, needs to be 1.5 or lower for BAV, coumadin on hold . Pt admitted to tele and plan for BAV 6/9 if INR 1.5 or less. Vitamin K 5mg po x 1 ordered stat, keep NPO for BAV in am. Plan discussed with pt and son.    ATTENDING ADDENDUM:  Seen / examined in cath lab.  Agree with above.  Plan for BAV tomorrow.

## 2020-06-09 LAB
ANION GAP SERPL CALC-SCNC: 13 MMOL/L — SIGNIFICANT CHANGE UP (ref 7–14)
APTT BLD: 37.7 SEC — SIGNIFICANT CHANGE UP (ref 27–39.2)
APTT BLD: 38.4 SEC — SIGNIFICANT CHANGE UP (ref 27–39.2)
BUN SERPL-MCNC: 81 MG/DL — CRITICAL HIGH (ref 10–20)
CALCIUM SERPL-MCNC: 9.3 MG/DL — SIGNIFICANT CHANGE UP (ref 8.5–10.1)
CHLORIDE SERPL-SCNC: 100 MMOL/L — SIGNIFICANT CHANGE UP (ref 98–110)
CO2 SERPL-SCNC: 27 MMOL/L — SIGNIFICANT CHANGE UP (ref 17–32)
CREAT SERPL-MCNC: 2.5 MG/DL — HIGH (ref 0.7–1.5)
GLUCOSE BLDC GLUCOMTR-MCNC: 121 MG/DL — HIGH (ref 70–99)
GLUCOSE BLDC GLUCOMTR-MCNC: 129 MG/DL — HIGH (ref 70–99)
GLUCOSE BLDC GLUCOMTR-MCNC: 161 MG/DL — HIGH (ref 70–99)
GLUCOSE BLDC GLUCOMTR-MCNC: 181 MG/DL — HIGH (ref 70–99)
GLUCOSE SERPL-MCNC: 131 MG/DL — HIGH (ref 70–99)
HCT VFR BLD CALC: 37.9 % — LOW (ref 42–52)
HGB BLD-MCNC: 11.7 G/DL — LOW (ref 14–18)
INR BLD: 1.7 RATIO — HIGH (ref 0.65–1.3)
INR BLD: 1.72 RATIO — HIGH (ref 0.65–1.3)
MCHC RBC-ENTMCNC: 28.2 PG — SIGNIFICANT CHANGE UP (ref 27–31)
MCHC RBC-ENTMCNC: 30.9 G/DL — LOW (ref 32–37)
MCV RBC AUTO: 91.3 FL — SIGNIFICANT CHANGE UP (ref 80–94)
NRBC # BLD: 0 /100 WBCS — SIGNIFICANT CHANGE UP (ref 0–0)
PLATELET # BLD AUTO: 136 K/UL — SIGNIFICANT CHANGE UP (ref 130–400)
POTASSIUM SERPL-MCNC: 4.1 MMOL/L — SIGNIFICANT CHANGE UP (ref 3.5–5)
POTASSIUM SERPL-SCNC: 4.1 MMOL/L — SIGNIFICANT CHANGE UP (ref 3.5–5)
PROTHROM AB SERPL-ACNC: 19.6 SEC — HIGH (ref 9.95–12.87)
PROTHROM AB SERPL-ACNC: 19.8 SEC — HIGH (ref 9.95–12.87)
RBC # BLD: 4.15 M/UL — LOW (ref 4.7–6.1)
RBC # FLD: 15.8 % — HIGH (ref 11.5–14.5)
SODIUM SERPL-SCNC: 140 MMOL/L — SIGNIFICANT CHANGE UP (ref 135–146)
WBC # BLD: 6.41 K/UL — SIGNIFICANT CHANGE UP (ref 4.8–10.8)
WBC # FLD AUTO: 6.41 K/UL — SIGNIFICANT CHANGE UP (ref 4.8–10.8)

## 2020-06-09 RX ORDER — PHYTONADIONE (VIT K1) 5 MG
5 TABLET ORAL ONCE
Refills: 0 | Status: COMPLETED | OUTPATIENT
Start: 2020-06-09 | End: 2020-06-09

## 2020-06-09 RX ORDER — PHYTONADIONE (VIT K1) 5 MG
5 TABLET ORAL ONCE
Refills: 0 | Status: DISCONTINUED | OUTPATIENT
Start: 2020-06-09 | End: 2020-06-09

## 2020-06-09 RX ADMIN — FINASTERIDE 5 MILLIGRAM(S): 5 TABLET, FILM COATED ORAL at 11:45

## 2020-06-09 RX ADMIN — ISOSORBIDE MONONITRATE 30 MILLIGRAM(S): 60 TABLET, EXTENDED RELEASE ORAL at 11:45

## 2020-06-09 RX ADMIN — Medication 1 SPRAY(S): at 06:41

## 2020-06-09 RX ADMIN — Medication 5 MILLIGRAM(S): at 10:22

## 2020-06-09 RX ADMIN — PANTOPRAZOLE SODIUM 40 MILLIGRAM(S): 20 TABLET, DELAYED RELEASE ORAL at 06:42

## 2020-06-09 RX ADMIN — Medication 50 MILLIGRAM(S): at 06:41

## 2020-06-09 RX ADMIN — BUDESONIDE AND FORMOTEROL FUMARATE DIHYDRATE 2 PUFF(S): 160; 4.5 AEROSOL RESPIRATORY (INHALATION) at 09:00

## 2020-06-09 RX ADMIN — Medication 1 APPLICATION(S): at 11:45

## 2020-06-09 RX ADMIN — Medication 2: at 17:18

## 2020-06-09 RX ADMIN — Medication 1 TABLET(S): at 11:45

## 2020-06-09 RX ADMIN — ATORVASTATIN CALCIUM 40 MILLIGRAM(S): 80 TABLET, FILM COATED ORAL at 22:31

## 2020-06-09 RX ADMIN — Medication 1 SPRAY(S): at 17:19

## 2020-06-09 RX ADMIN — ZINC SULFATE TAB 220 MG (50 MG ZINC EQUIVALENT) 220 MILLIGRAM(S): 220 (50 ZN) TAB at 11:45

## 2020-06-09 RX ADMIN — INSULIN GLARGINE 10 UNIT(S): 100 INJECTION, SOLUTION SUBCUTANEOUS at 22:31

## 2020-06-09 RX ADMIN — TAMSULOSIN HYDROCHLORIDE 0.4 MILLIGRAM(S): 0.4 CAPSULE ORAL at 22:31

## 2020-06-09 RX ADMIN — Medication 40 MILLIGRAM(S): at 06:41

## 2020-06-09 RX ADMIN — Medication 500 MILLIGRAM(S): at 11:44

## 2020-06-09 NOTE — ADVANCED PRACTICE NURSE CONSULT - ASSESSMENT
80yM Pmhx of severe symptomatic aortic stenosis (PG 74, MG 40) and complicating factors of afib, htn, dld, COPD, chronic renal insufficiency presents for balloon valvuloplasty of aortic valve as a possible bridge to TAVR.    Received patient sitting up at edge of bed, awake, A&Ox3, made aware of purpose of WOCN visit, agreeable to consult. Patient able to place himself in standing position for skin assessment. Sacral, coccyx, and buttock area intact, no pressure injuries.  Brown-maroon hyperpigmentation in oval pattern to b/l outer posterior thighs. ACE wraps to BLEs in place-currently being managed w/ Silvadene per MD recs.      Patient ambulates, able to self turn/position in bed. Continent of urine and stool. Ordered for sodium & cholesterol restricted diet, adequate intake as per reported Giancarlo score.

## 2020-06-09 NOTE — ADVANCED PRACTICE NURSE CONSULT - RECOMMEDATIONS
PI prevention:   -Continue to instruct/encourage & assist patient as needed w/ turning/positioning from side-to-side q2h while in bed, q1h when/if OOB chair, or in accordance w/ pt's plan of care. Utilize pillows as needed  to assist w/ turning/positioning in bed.    -May prophylactically apply Coloplast Sammie Protect moisture barrier cream to buttock and perineal area daily and prn after any incontinent episode.    -Continue utilizing one underpad underneath patient to wick away excess moisture from skin surface & contain any soiling; change pad when saturated/soiled.   -If patient incontinent, consider utilizing condom catheter (if patient candidate) to contain any urinary incontinence.   -Assess skin qshift, report changes to appropriate provider.     Plan of Care: Primary RN Félix at bedside & aware of above. Signing off on patient, no further needs/recs from [facility] service at this time. Staff RN to perform routine skin assessment and manage skin care. Questions or concerns or if reconsult needed, please contact [facility], [phone].

## 2020-06-09 NOTE — CHART NOTE - NSCHARTNOTEFT_GEN_A_CORE
INR 1.72 this AM. Repeat 5mg of vitamin K given this morning.  INR collected at 11AM, specimen received by lab pending result.

## 2020-06-10 LAB
ANION GAP SERPL CALC-SCNC: 14 MMOL/L — SIGNIFICANT CHANGE UP (ref 7–14)
APTT BLD: 36.4 SEC — SIGNIFICANT CHANGE UP (ref 27–39.2)
BUN SERPL-MCNC: 77 MG/DL — CRITICAL HIGH (ref 10–20)
CALCIUM SERPL-MCNC: 9.3 MG/DL — SIGNIFICANT CHANGE UP (ref 8.5–10.1)
CHLORIDE SERPL-SCNC: 101 MMOL/L — SIGNIFICANT CHANGE UP (ref 98–110)
CO2 SERPL-SCNC: 26 MMOL/L — SIGNIFICANT CHANGE UP (ref 17–32)
CREAT SERPL-MCNC: 2.4 MG/DL — HIGH (ref 0.7–1.5)
GLUCOSE BLDC GLUCOMTR-MCNC: 100 MG/DL — HIGH (ref 70–99)
GLUCOSE BLDC GLUCOMTR-MCNC: 109 MG/DL — HIGH (ref 70–99)
GLUCOSE BLDC GLUCOMTR-MCNC: 86 MG/DL — SIGNIFICANT CHANGE UP (ref 70–99)
GLUCOSE SERPL-MCNC: 111 MG/DL — HIGH (ref 70–99)
INR BLD: 1.5 RATIO — HIGH (ref 0.65–1.3)
POTASSIUM SERPL-MCNC: 4 MMOL/L — SIGNIFICANT CHANGE UP (ref 3.5–5)
POTASSIUM SERPL-SCNC: 4 MMOL/L — SIGNIFICANT CHANGE UP (ref 3.5–5)
PROTHROM AB SERPL-ACNC: 17.3 SEC — HIGH (ref 9.95–12.87)
SARS-COV-2 RNA SPEC QL NAA+PROBE: SIGNIFICANT CHANGE UP
SODIUM SERPL-SCNC: 141 MMOL/L — SIGNIFICANT CHANGE UP (ref 135–146)

## 2020-06-10 PROCEDURE — 92986 REVISION OF AORTIC VALVE: CPT

## 2020-06-10 RX ORDER — FUROSEMIDE 40 MG
20 TABLET ORAL ONCE
Refills: 0 | Status: DISCONTINUED | OUTPATIENT
Start: 2020-06-10 | End: 2020-06-10

## 2020-06-10 RX ORDER — NOREPINEPHRINE BITARTRATE/D5W 8 MG/250ML
0.02 PLASTIC BAG, INJECTION (ML) INTRAVENOUS
Qty: 8 | Refills: 0 | Status: DISCONTINUED | OUTPATIENT
Start: 2020-06-10 | End: 2020-06-13

## 2020-06-10 RX ORDER — WARFARIN SODIUM 2.5 MG/1
5 TABLET ORAL ONCE
Refills: 0 | Status: COMPLETED | OUTPATIENT
Start: 2020-06-10 | End: 2020-06-10

## 2020-06-10 RX ADMIN — FINASTERIDE 5 MILLIGRAM(S): 5 TABLET, FILM COATED ORAL at 11:35

## 2020-06-10 RX ADMIN — Medication 40 MILLIGRAM(S): at 05:47

## 2020-06-10 RX ADMIN — ZINC SULFATE TAB 220 MG (50 MG ZINC EQUIVALENT) 220 MILLIGRAM(S): 220 (50 ZN) TAB at 11:35

## 2020-06-10 RX ADMIN — ISOSORBIDE MONONITRATE 30 MILLIGRAM(S): 60 TABLET, EXTENDED RELEASE ORAL at 11:35

## 2020-06-10 RX ADMIN — Medication 1 TABLET(S): at 11:36

## 2020-06-10 RX ADMIN — Medication 3.53 MICROGRAM(S)/KG/MIN: at 21:11

## 2020-06-10 RX ADMIN — Medication 500 MILLIGRAM(S): at 11:35

## 2020-06-10 RX ADMIN — WARFARIN SODIUM 5 MILLIGRAM(S): 2.5 TABLET ORAL at 22:46

## 2020-06-10 RX ADMIN — Medication 1 APPLICATION(S): at 11:35

## 2020-06-10 RX ADMIN — PANTOPRAZOLE SODIUM 40 MILLIGRAM(S): 20 TABLET, DELAYED RELEASE ORAL at 06:26

## 2020-06-10 RX ADMIN — BUDESONIDE AND FORMOTEROL FUMARATE DIHYDRATE 2 PUFF(S): 160; 4.5 AEROSOL RESPIRATORY (INHALATION) at 08:07

## 2020-06-10 RX ADMIN — ATORVASTATIN CALCIUM 40 MILLIGRAM(S): 80 TABLET, FILM COATED ORAL at 22:47

## 2020-06-10 RX ADMIN — Medication 1 SPRAY(S): at 05:46

## 2020-06-10 RX ADMIN — TAMSULOSIN HYDROCHLORIDE 0.4 MILLIGRAM(S): 0.4 CAPSULE ORAL at 22:46

## 2020-06-10 NOTE — CHART NOTE - NSCHARTNOTEFT_GEN_A_CORE
PRE-OP DIAGNOSIS: severe symptomatic aortic stenosis    PROCEDURE: [facility] with coronary angiography    Physician: Candace  Assistant: Nadia    ANESTHESIA TYPE:  [  ]General Anesthesia  [ x ] Sedation  [ x ] Local/Regional    ESTIMATED BLOOD LOSS:    10   mL    CONDITION  [  ] Critical  [  ] Serious  [  ]Fair  [x  ]Good      SPECIMENS REMOVED (IF APPLICABLE): N/A      IV CONTRAST:     10        mL      IMPLANTS (IF APPLICABLE)      FINDINGS    Left Heart Catheterization:  LVEF%:  LVEDP: normal  [ ] Normal Coronary Arteries  [ ] Luminal Irregularities  [ ] Non-obstructive CAD      Access L femoral 12Fr Dryseal sheath, l femoral venous sheath, R femoral 4Fr arterial sheath    Procedure: AV crossed with terumo stiff angled glide and 18mm and 20 mm trueflow balloon inflations over confida wire.     Pre- mean gradient: 21mmHG, post 15mmHg for ~25% reduction.     Closure: L arterial perclose, L femoral v sutured in place, R femoral manual hold.     POST-OP DIAGNOSIS          PLAN OF CARE  [ ] D/C Home today  [ ]  D/C in AM  [x ] Return to In-patient bed  [ ] Admit for observation  [ ] Return for staged procedure:  [ ] CT Surgery consult called  [ ]  Continue DAPT, B-blocker & Statin therapy    Will restart coumadin tonight. TTE tomorrow to assess for AV gradient and AI post BAV.     Signed out to cardiology fellow on call and CCU team.     Results of procedure/ plan of care discussed with patient/  in detail. PRE-OP DIAGNOSIS:  Severe Symptomatic Aortic Stenosis    POST-OP DIAGNOSIS:  Same    PROCEDURE:  BAV, TVP    PHYSICIAN: Yevgeniy Maria MD  ASSISTANT: Fellow    ANESTHESIA TYPE:  [ X] Sedation  [ X] Local/Regional  [   ]General Anesthesia    ESTIMATED BLOOD LOSS: < 10 mL    IV CONTRAST: 10 mL    COMPLICATIONS: None    POST-OP CONDITION:  [X ] Good  [   ] Fair  [   ] Serious  [   ] Critical    ACCESS:  12F Left FA --> Perclose  4F Right FA --> Manual  7F Left FV --> Manual    FINDINGS:  Baseline MG = 25 mmHg.    INTERVENTION:  BAV (18mm x 3.5cm True Flow, and 20mm x 3.5cm True Flow)    IMPLANTS (if applicable): None    SPECIMENS REMOVED (if applicable): None    IMPRESSION:  Successful BAV  Post intervention MG = 15mmHg.    PLAN:  Routine post BAV care.

## 2020-06-10 NOTE — PROGRESS NOTE ADULT - SUBJECTIVE AND OBJECTIVE BOX
Cardiology Follow up    TAIWO ZAVALA   80y Male  PAST MEDICAL & SURGICAL HISTORY:  Cellulitis of right lower extremity  Cellulitis of left lower extremity  History of renal insufficiency  Stenosis of left carotid artery  Aortic stenosis  Afib  BPH (benign prostatic hyperplasia)  CHF (congestive heart failure)  COPD (chronic obstructive pulmonary disease)  Diabetes  HTN (hypertension)  H/O heart artery stent  S/P CABG x 3       HPI:  80yM Pmhx of severe symptomatic aortic stenosis (PG 74, MG 40) and complicating factors of afib, htn, dld, COPD, chronic renal insufficiency presents for balloon valvuloplasty of aortic valve as a possible bridge to TAVR.     Pre cath note:    indication:  [ ] STEMI                [ ] NSTEMI                 [ ] Acute coronary syndrome [x] severe symptomatic aortic stenosis.                      [ ]Unstable Angina   [ ] high risk  [ ] intermediate risk  [ ] low risk                     [ ] Stable Angina     non-invasive testing:                          Date:                     result: [ ] high risk  [ ] intermediate risk  [ ] low risk    Anti- Anginal medications:                    [ ] not used                       [x ] used                   [ ] not used but strong indication not to use    Ejection Fraction                   [ ] <29            [ ] 30-39%   [ ] 40-49%     [x ]>50%    CHF                   [ ] active (within last 14 days on meds   [x ] Chronic (on meds but no exacerbation)    COPD                   [ ] mild (on chronic bronchodilators)  [ ] moderate (on chronic steroid therapy)      [x ] severe (indication for home O2 or PACO2 >50)    Other risk factors:                       [ ] Previous MI                     [ ] CVA/ stroke                    [ ] carotid stent/ CEA                    [ ] PVD/PAD- (arterial aneurysm, non-palpable pulses, tortuous vessel with inability to insert catheter, infra-renal dissection, renal or subclavian artery stenosis)                    [ ] diabetic                    [ ] previous CABG                    [x ] Renal Failure (08 Jun 2020 09:57)    Allergies    No Known Allergies    Intolerances    Pt seen sitting in chair, Patient without complaints.  Phong (40's to 50's) on telemetry overnight    Vital Signs Last 24 Hrs  T(C): 36.2 (10 Hadley 2020 05:11), Max: 36.6 (09 Jun 2020 20:47)  T(F): 97.2 (10 Hadley 2020 05:11), Max: 97.8 (09 Jun 2020 20:47)  HR: 44 (10 Hadley 2020 05:11) (44 - 61)  BP: 114/61 (10 Hadley 2020 05:11) (114/61 - 118/52)  BP(mean): --  RR: 18 (10 Hadley 2020 05:11) (18 - 18)  SpO2: 100% (10 Hadley 2020 08:00) (100% - 100%)    MEDICATIONS  (STANDING):  ascorbic acid 500 milliGRAM(s) Oral daily  atorvastatin 40 milliGRAM(s) Oral at bedtime  budesonide 160 MICROgram(s)/formoterol 4.5 MICROgram(s) Inhaler 2 Puff(s) Inhalation two times a day  dextrose 5%. 1000 milliLiter(s) (50 mL/Hr) IV Continuous <Continuous>  dextrose 50% Injectable 12.5 Gram(s) IV Push once  dextrose 50% Injectable 25 Gram(s) IV Push once  dextrose 50% Injectable 25 Gram(s) IV Push once  finasteride 5 milliGRAM(s) Oral daily  fluticasone propionate 50 MICROgram(s)/spray Nasal Spray 1 Spray(s) Both Nostrils two times a day  furosemide    Tablet 40 milliGRAM(s) Oral daily  insulin glargine Injectable (LANTUS) 10 Unit(s) SubCutaneous at bedtime  insulin lispro (HumaLOG) corrective regimen sliding scale   SubCutaneous three times a day before meals  isosorbide   mononitrate ER Tablet (IMDUR) 30 milliGRAM(s) Oral daily  metoprolol succinate ER 50 milliGRAM(s) Oral daily  multivitamin 1 Tablet(s) Oral daily  pantoprazole    Tablet 40 milliGRAM(s) Oral before breakfast  silver sulfADIAZINE 1% Cream 1 Application(s) Topical daily  tamsulosin 0.4 milliGRAM(s) Oral at bedtime  zinc sulfate 220 milliGRAM(s) Oral daily    MEDICATIONS  (PRN):  dextrose 40% Gel 15 Gram(s) Oral once PRN Blood Glucose LESS THAN 70 milliGRAM(s)/deciliter  glucagon  Injectable 1 milliGRAM(s) IntraMuscular once PRN Glucose LESS THAN 70 milligrams/deciliter      REVIEW OF SYSTEMS:          CONSTITUTIONAL: No weakness, fevers or chills          EYES/ENT: No visual changes;  No vertigo or throat pain           NECK: No pain or stiffness          RESPIRATORY: No cough, wheezing, hemoptysis          CARDIOVASCULAR: no pain, no WU, no palpitations           GASTROINTESTINAL: No abdominal or epigastric pain. No nausea, vomiting, or hematemesis;           GENITOURINARY: No dysuria, frequency or hematuria          NEUROLOGICAL: No numbness or weakness          SKIN: No itching, rashes    PHYSICAL EXAM:           CONSTITUTIONAL: Well-developed; well-nourished; in no acute distress  	SKIN: warm, dry  	HEAD: Normocephalic; atraumatic  	EYES: PERRL.  	ENT: No nasal discharge, airway clear, mucous membranes moist  	NECK: Supple; non tender.  	CARD: +S1, +S2, + murmur LSB, gallops. irregular rate and rhythm    	RESP: No wheezes, rales or rhonchi. CTA B/L  	ABD: soft ntnd, + BS x 4 quadrants  	EXT: moves all extremities,  no clubbing, cyanosis or edema  	NEURO: Alert and oriented x3, no focal deficits, extremely [place]          PSYCH: Cooperative, appropriate          VASCULAR:  + Rad / + PTs / + DPs    LABS:                        11.7   6.41  )-----------( 136      ( 09 Jun 2020 06:41 )             37.9     06-10    141  |  101  |  77<HH>  ----------------------------<  111<H>  4.0   |  26  |  2.4<H>    Ca    9.3      10 Hadley 2020 06:15      PT/INR - ( 10 Hadley 2020 06:15 )   PT: 17.30 sec;   INR: 1.50 ratio         PTT - ( 10 Hadley 2020 06:15 )  PTT:36.4 sec    COVID-19 PCR: NotDetec: Methodology:  The Abbott Real Time SARS-CoV-2 assay is a real time reverse  transcriptase (RT) Polymerase Chain Reaction (PCR), test intended for the  qualitative detection of RNA from the SARS-CoV-2 in nasopharyngeal and  oropharyngeal swabs from patients suspected of COVID-19 infection.  The SARS-CoV-2 assay is performed on the Abbott [id number] system.  Reference Range:  Not Detected  This test has been validated by [facility]  Molecular lab. This assay is for in Vitro diagnostic testing under FDA  Emergency Use Authorization only.  This Test Was Performed At [facility], Molecular Lab,  [place], [place] [address] (06.09.20 @ 15:05)            A/P:  I discussed the case with Cardiologist Dr. Maria and recommend the following                   Pt awaiting BAV today, INR 1.5                   maintain NPO status                   continue current medical therapy                   coumadin on hold for BAV, reevaluate after procedure                    will follow

## 2020-06-11 LAB
ALBUMIN SERPL ELPH-MCNC: 3.2 G/DL — LOW (ref 3.5–5.2)
ALP SERPL-CCNC: 42 U/L — SIGNIFICANT CHANGE UP (ref 30–115)
ALT FLD-CCNC: 6 U/L — SIGNIFICANT CHANGE UP (ref 0–41)
ANION GAP SERPL CALC-SCNC: 19 MMOL/L — HIGH (ref 7–14)
AST SERPL-CCNC: 19 U/L — SIGNIFICANT CHANGE UP (ref 0–41)
BASOPHILS # BLD AUTO: 0.06 K/UL — SIGNIFICANT CHANGE UP (ref 0–0.2)
BASOPHILS NFR BLD AUTO: 0.7 % — SIGNIFICANT CHANGE UP (ref 0–1)
BILIRUB SERPL-MCNC: 0.9 MG/DL — SIGNIFICANT CHANGE UP (ref 0.2–1.2)
BUN SERPL-MCNC: 71 MG/DL — CRITICAL HIGH (ref 10–20)
CALCIUM SERPL-MCNC: 9.3 MG/DL — SIGNIFICANT CHANGE UP (ref 8.5–10.1)
CHLORIDE SERPL-SCNC: 101 MMOL/L — SIGNIFICANT CHANGE UP (ref 98–110)
CO2 SERPL-SCNC: 24 MMOL/L — SIGNIFICANT CHANGE UP (ref 17–32)
CREAT SERPL-MCNC: 2.2 MG/DL — HIGH (ref 0.7–1.5)
EOSINOPHIL # BLD AUTO: 0.1 K/UL — SIGNIFICANT CHANGE UP (ref 0–0.7)
EOSINOPHIL NFR BLD AUTO: 1.1 % — SIGNIFICANT CHANGE UP (ref 0–8)
GLUCOSE BLDC GLUCOMTR-MCNC: 128 MG/DL — HIGH (ref 70–99)
GLUCOSE BLDC GLUCOMTR-MCNC: 202 MG/DL — HIGH (ref 70–99)
GLUCOSE BLDC GLUCOMTR-MCNC: 205 MG/DL — HIGH (ref 70–99)
GLUCOSE BLDC GLUCOMTR-MCNC: 209 MG/DL — HIGH (ref 70–99)
GLUCOSE BLDC GLUCOMTR-MCNC: 94 MG/DL — SIGNIFICANT CHANGE UP (ref 70–99)
GLUCOSE SERPL-MCNC: 102 MG/DL — HIGH (ref 70–99)
HCT VFR BLD CALC: 38.2 % — LOW (ref 42–52)
HGB BLD-MCNC: 12 G/DL — LOW (ref 14–18)
IMM GRANULOCYTES NFR BLD AUTO: 0.3 % — SIGNIFICANT CHANGE UP (ref 0.1–0.3)
INR BLD: 1.39 RATIO — HIGH (ref 0.65–1.3)
LYMPHOCYTES # BLD AUTO: 1.42 K/UL — SIGNIFICANT CHANGE UP (ref 1.2–3.4)
LYMPHOCYTES # BLD AUTO: 15.6 % — LOW (ref 20.5–51.1)
MCHC RBC-ENTMCNC: 28.8 PG — SIGNIFICANT CHANGE UP (ref 27–31)
MCHC RBC-ENTMCNC: 31.4 G/DL — LOW (ref 32–37)
MCV RBC AUTO: 91.8 FL — SIGNIFICANT CHANGE UP (ref 80–94)
MONOCYTES # BLD AUTO: 1.07 K/UL — HIGH (ref 0.1–0.6)
MONOCYTES NFR BLD AUTO: 11.8 % — HIGH (ref 1.7–9.3)
NEUTROPHILS # BLD AUTO: 6.42 K/UL — SIGNIFICANT CHANGE UP (ref 1.4–6.5)
NEUTROPHILS NFR BLD AUTO: 70.5 % — SIGNIFICANT CHANGE UP (ref 42.2–75.2)
NRBC # BLD: 0 /100 WBCS — SIGNIFICANT CHANGE UP (ref 0–0)
PLATELET # BLD AUTO: 162 K/UL — SIGNIFICANT CHANGE UP (ref 130–400)
POTASSIUM SERPL-MCNC: 4.1 MMOL/L — SIGNIFICANT CHANGE UP (ref 3.5–5)
POTASSIUM SERPL-SCNC: 4.1 MMOL/L — SIGNIFICANT CHANGE UP (ref 3.5–5)
PROT SERPL-MCNC: 6.1 G/DL — SIGNIFICANT CHANGE UP (ref 6–8)
PROTHROM AB SERPL-ACNC: 16 SEC — HIGH (ref 9.95–12.87)
RBC # BLD: 4.16 M/UL — LOW (ref 4.7–6.1)
RBC # FLD: 15.8 % — HIGH (ref 11.5–14.5)
SODIUM SERPL-SCNC: 144 MMOL/L — SIGNIFICANT CHANGE UP (ref 135–146)
WBC # BLD: 9.1 K/UL — SIGNIFICANT CHANGE UP (ref 4.8–10.8)
WBC # FLD AUTO: 9.1 K/UL — SIGNIFICANT CHANGE UP (ref 4.8–10.8)

## 2020-06-11 PROCEDURE — 93306 TTE W/DOPPLER COMPLETE: CPT | Mod: 26

## 2020-06-11 PROCEDURE — 93010 ELECTROCARDIOGRAM REPORT: CPT

## 2020-06-11 RX ORDER — WARFARIN SODIUM 2.5 MG/1
5 TABLET ORAL AT BEDTIME
Refills: 0 | Status: COMPLETED | OUTPATIENT
Start: 2020-06-11 | End: 2020-06-11

## 2020-06-11 RX ORDER — CHLORHEXIDINE GLUCONATE 213 G/1000ML
1 SOLUTION TOPICAL
Refills: 0 | Status: DISCONTINUED | OUTPATIENT
Start: 2020-06-11 | End: 2020-06-18

## 2020-06-11 RX ORDER — WARFARIN SODIUM 2.5 MG/1
5 TABLET ORAL ONCE
Refills: 0 | Status: DISCONTINUED | OUTPATIENT
Start: 2020-06-11 | End: 2020-06-11

## 2020-06-11 RX ORDER — MAGNESIUM SULFATE 500 MG/ML
1 VIAL (ML) INJECTION ONCE
Refills: 0 | Status: COMPLETED | OUTPATIENT
Start: 2020-06-11 | End: 2020-06-11

## 2020-06-11 RX ORDER — SODIUM CHLORIDE 9 MG/ML
500 INJECTION INTRAMUSCULAR; INTRAVENOUS; SUBCUTANEOUS ONCE
Refills: 0 | Status: COMPLETED | OUTPATIENT
Start: 2020-06-11 | End: 2020-06-11

## 2020-06-11 RX ADMIN — TAMSULOSIN HYDROCHLORIDE 0.4 MILLIGRAM(S): 0.4 CAPSULE ORAL at 21:07

## 2020-06-11 RX ADMIN — Medication 4: at 12:16

## 2020-06-11 RX ADMIN — INSULIN GLARGINE 10 UNIT(S): 100 INJECTION, SOLUTION SUBCUTANEOUS at 22:22

## 2020-06-11 RX ADMIN — CHLORHEXIDINE GLUCONATE 1 APPLICATION(S): 213 SOLUTION TOPICAL at 06:04

## 2020-06-11 RX ADMIN — Medication 500 MILLIGRAM(S): at 11:11

## 2020-06-11 RX ADMIN — Medication 1 TABLET(S): at 11:16

## 2020-06-11 RX ADMIN — Medication 100 GRAM(S): at 20:54

## 2020-06-11 RX ADMIN — Medication 1 SPRAY(S): at 17:00

## 2020-06-11 RX ADMIN — ATORVASTATIN CALCIUM 40 MILLIGRAM(S): 80 TABLET, FILM COATED ORAL at 21:07

## 2020-06-11 RX ADMIN — BUDESONIDE AND FORMOTEROL FUMARATE DIHYDRATE 2 PUFF(S): 160; 4.5 AEROSOL RESPIRATORY (INHALATION) at 09:27

## 2020-06-11 RX ADMIN — FINASTERIDE 5 MILLIGRAM(S): 5 TABLET, FILM COATED ORAL at 11:10

## 2020-06-11 RX ADMIN — Medication 4: at 16:49

## 2020-06-11 RX ADMIN — Medication 1 APPLICATION(S): at 15:46

## 2020-06-11 RX ADMIN — Medication 40 MILLIGRAM(S): at 05:49

## 2020-06-11 RX ADMIN — Medication 1 SPRAY(S): at 05:49

## 2020-06-11 RX ADMIN — BUDESONIDE AND FORMOTEROL FUMARATE DIHYDRATE 2 PUFF(S): 160; 4.5 AEROSOL RESPIRATORY (INHALATION) at 22:23

## 2020-06-11 RX ADMIN — SODIUM CHLORIDE 500 MILLILITER(S): 9 INJECTION INTRAMUSCULAR; INTRAVENOUS; SUBCUTANEOUS at 09:30

## 2020-06-11 RX ADMIN — WARFARIN SODIUM 5 MILLIGRAM(S): 2.5 TABLET ORAL at 21:40

## 2020-06-11 RX ADMIN — ZINC SULFATE TAB 220 MG (50 MG ZINC EQUIVALENT) 220 MILLIGRAM(S): 220 (50 ZN) TAB at 11:10

## 2020-06-11 NOTE — PROGRESS NOTE ADULT - ATTENDING COMMENTS
Seen / examined and above reviewed.  s/p BAV without complication.  Off Levophed.  Hemodynamics stable.  No groin hematomas.  ECHO: nL LVSF.  Mild to Mod AI.    Cont Coumadin.  Resume beta-blocker as tolerated.  OOB / PT.  Discharge planning.

## 2020-06-11 NOTE — CDI QUERY NOTE - NSCDIOTHERTXTBX2_GEN_ALL_CORE_FT
Documentation:  ** PN cardiology 6/10: COPD  [x ] severe (indication for home O2 or PACO2 >50)  ** pre anesthesia evaluation:  Severe COPD on home oxygen 3 Lpm.     Vital:  RR 13 -20 on NC 2L                                 Based on your clinical evaluation of the patient, can you please provide us with a medical diagnosis that explains the above findings:  • Chronic respiratory failure  • Other (please specify).  • Unable to determine.

## 2020-06-11 NOTE — CHART NOTE - NSCHARTNOTEFT_GEN_A_CORE
80M PMHx Afib/aflutter on Coumadin, HTN, DM, COPD, DLD, CKD, and aortic stenosis who presented to the hospital for balloon valvuloplasty of aortic valve for severe symptomatic aortic stenosis.    CCU course:  Patient taken to CCU overnight after BAV, during that time BP was low and patient was very bradycardic (MAP 50s and HR 30s) and was started on levophed.  Patient found to have PVCs on telemetry. Levophed was weaned off. Patient sustaining good BP. Per Dr. Maria OK for downgrade to telemetry. Likely dispo tomorrow.    # Symptomatic Aortic Stenosis  - S/p BAV, ~25% reduction  - 6/11/20 echo: 50-55% EF, mod increase LV thickness, mod PVR, mild-mod AR, severe pulm HTN  - C/w 40mg po lasix qd  - F/u cardiology for disposition    # Atrial fibrillation / atrial flutter  - C/w coumadin 5mg qhs  - F/u INR daily    # HTN  - Overnight was hypotensive, required levophed  - Now BP stable  - Restart metoprolol when HR improves ~80s    # DM  - C/w 10 units lantus qhs and sliding scale    # COPD  - C/w symbicort  - C/w flonase  - Stable    # CKD  - Stable    # BPH  - C/w tamsulosin, finasteride    # DLD  - C/w atorvastatin    # DVT ppx: Coumadin  # GI ppx: protonix  # Diet: DASH, consistent carbohydrate    For follow up:  - Monitor INR daily  - F/u cardiology for disposition  - Monitor groin for hemorrhage / bleeding, s/p removal of femoral sheath 80M PMHx Afib/aflutter on Coumadin, HTN, DM, COPD, DLD, CKD, and aortic stenosis who presented to the hospital for balloon valvuloplasty of aortic valve for severe symptomatic aortic stenosis.    CCU course:  Patient taken to CCU overnight after BAV, during that time BP was low and patient was very bradycardic (MAP 50s and HR 30s) and was started on levophed.  Patient found to have PVCs on telemetry. Levophed was weaned off. Patient sustaining good BP. Per Dr. Maria, OK for downgrade to telemetry. Likely dispo tomorrow.    # Symptomatic Aortic Stenosis  - S/p BAV, ~25% reduction  - 6/11/20 echo: 50-55% EF, mod increase LV thickness, mod PVR, mild-mod AR, severe pulm HTN  - C/w 40mg po lasix qd  - F/u cardiology for disposition    # Atrial fibrillation / atrial flutter  - C/w coumadin 5mg qhs  - F/u INR daily    # HTN  - Overnight was hypotensive, required levophed  - Now BP stable  - Restart metoprolol when HR improves ~80s    # DM  - C/w 10 units lantus qhs and sliding scale    # COPD  - C/w symbicort  - C/w flonase  - Stable    # CKD  - Stable    # BPH  - C/w tamsulosin, finasteride    # DLD  - C/w atorvastatin    # DVT ppx: Coumadin  # GI ppx: protonix  # Diet: DASH, consistent carbohydrate    For follow up:  - Monitor INR daily  - F/u cardiology for disposition  - Monitor groin for hemorrhage / bleeding, s/p removal of femoral sheath    Signed out to Dr. Delgadillo [facility] [id number]

## 2020-06-11 NOTE — CDI QUERY NOTE - NSCDIOTHERTXTBX5_GEN_ALL_CORE_FT
Documentation:  **  cardiology 6/11: CHF  [x ] Chronic (on meds but no exacerbation)    Images:  ECHO 6/11/20:    1. Left ventricular ejection fraction, by visual estimation, is 50 to 55%.   2. Moderately increased LV wall thickness.   3. Mitral annular calcification.   4. Thickening and calcification of the anterior and posterior mitral valve leaflets.   5. Moderate tricuspid regurgitation.   6. Mild to moderate aortic regurgitation.   7. Moderate pulmonic valve regurgitation.   8. Estimated pulmonary artery systolic pressure is 67.2 mmHg assuming a right atrial pressure of 5 mmHg, which is consistent with severe pulmonary hypertension.   9. Peak transaortic gradient equals 46.7 mmHg, mean transaortic gradient equals 26.2 mmHg, the calculated aortic valve area equals 1.14 cm² by the continuity equation consistent with moderate aortic stenosis.  10. There is moderate aortic root calcification.    Treatment:   Lasix 40mg tab                                                        Based on your clinical evaluation of the patient, can you please specify the type of CHF:  • Chronic diastolic CHF  •  Other (please specify).  • Unable to determine.

## 2020-06-11 NOTE — CDI QUERY NOTE - NSCDIOTHERTXTBX4_GEN_ALL_CORE_FT
Documentation:  80 YOM with severe symptomatic aortic stenosis, afib, htn, dld, COPD, CKD presents for balloon valvuloplasty of aortic valve    Lab:  Creatinine range from 2.2 - 2.7 with GFR between 21 - 27.    Based on your clinical evaluation of the patient, can you please specify the stage of CKD:  • CKD stage ------------  • Other please specify.  • Unable to determine.    For guidance, CKD classification based on GFR is as follow:                  GFR                                   With                                 Without  	   (ml/min/1.73 m2)           Kidney Damage                Kidney Damage  	      >= 90                                Stage 1                             Normal  	      60-89                                Stage 2                            Decreased GFR  	      30-59                                Stage 3                            Stage 3  	      15-29                                Stage 4                             Stage 4  	      < 15                                   Stage 5                            Stage 5

## 2020-06-11 NOTE — CDI QUERY NOTE - NSCDIOTHERTXTBX_GEN_ALL_CORE_HH
Documentation:  80 YOM with severe symptomatic aortic stenosis, afib, htn, dld, COPD, CKD presents for balloon valvuloplasty of aortic valve.  ** Chart note 6/11: Patient taken to CCU overnight after BAV, during that time BP was low and patient was very bradycardic (MAP 50s and HR 30s) and was started on levophed.    Vital:  on 6/10, BP was as low as 68/41 (MPG 50) with HR 34    Treatment:    levophed infusion                                                       Based on your clinical evaluation of the patient, can you please provide us with a medical diagnosis that explains the above findings:  • Cardiogenic shock  • Other (please specify).  • Unable to determine.

## 2020-06-11 NOTE — CDI QUERY NOTE - NSCDIOTHERTXTBX3_GEN_ALL_CORE_FT
Documentation in the nurse flowsheet  mentioned that this patient has pressure injury:  • Lt & Rt 	Ischial DTI  • Lt gluteal stage I	    Nurse is taking care of these injuries by protective cream barrier.    • Based on your clinical evaluation of the patient, can you please verify the above findings and document in [place]  • Decubitus ulcer Lt & Rt ischium DTI  • Decubitus ulcer Lt gluteal stage I  • Other, please specify.  • Unable to determine Documentation in the nurse flowsheet  mentioned that this patient has pressure injury:  • Lt & Rt Ischial DTI  • Lt gluteal stage I	    Nurse is taking care of these injuries by protective cream barrier.    • Based on your clinical evaluation of the patient, can you please verify the above findings and document in [place]  • Decubitus ulcer Lt & Rt ischium DTI  • Decubitus ulcer Lt gluteal stage I  • Other, please specify.  • Unable to determine

## 2020-06-11 NOTE — PROGRESS NOTE ADULT - SUBJECTIVE AND OBJECTIVE BOX
Cardiology Follow up    TAIWO ZAVALA   80 y Male  PAST MEDICAL & SURGICAL HISTORY:  Cellulitis of right lower extremity  Cellulitis of left lower extremity  History of renal insufficiency  Stenosis of left carotid artery  Aortic stenosis  Afib  BPH (benign prostatic hyperplasia)  CHF (congestive heart failure)  COPD (chronic obstructive pulmonary disease)  Diabetes  HTN (hypertension)  H/O heart artery stent  S/P CABG x 3     HPI:  80 y M Pmhx of severe symptomatic aortic stenosis (PG 74, MG 40) and complicating factors of afib, htn, dld, COPD, chronic renal insufficiency presents for balloon valvuloplasty of aortic valve as a possible bridge to TAVR.     Pre cath note:    indication:  [ ] STEMI                [ ] NSTEMI                 [ ] Acute coronary syndrome [x] severe symptomatic aortic stenosis.                      [ ]Unstable Angina   [ ] high risk  [ ] intermediate risk  [ ] low risk                     [ ] Stable Angina     non-invasive testing:                          Date:                     result: [ ] high risk  [ ] intermediate risk  [ ] low risk    Anti- Anginal medications:                    [ ] not used                       [x ] used                   [ ] not used but strong indication not to use    Ejection Fraction                   [ ] <29            [ ] 30-39%   [ ] 40-49%     [x ]>50%    CHF                   [ ] active (within last 14 days on meds   [x ] Chronic (on meds but no exacerbation)    COPD                   [ ] mild (on chronic bronchodilators)  [ ] moderate (on chronic steroid therapy)      [x ] severe (indication for home O2 or PACO2 >50)    Other risk factors:                       [ ] Previous MI                     [ ] CVA/ stroke                    [ ] carotid stent/ CEA                    [ ] PVD/PAD- (arterial aneurysm, non-palpable pulses, tortuous vessel with inability to insert catheter, infra-renal dissection, renal or subclavian artery stenosis)                    [ ] diabetic                    [ ] previous CABG                    [x ] Renal Failure (08 Jun 2020 09:57)    Allergies    No Known Allergies    Intolerances    Patient without complaints.  Denies CP, SOB, palpitations, or dizziness  PVC's and short runs of NSVT events on telemetry, A-fib    Vital Signs Last 24 Hrs  T(C): 36.4 (11 Jun 2020 06:00), Max: 36.4 (11 Jun 2020 06:00)  T(F): 97.5 (11 Jun 2020 06:00), Max: 97.5 (11 Jun 2020 06:00)  HR: 88 (11 Jun 2020 13:00) (32 - 100)  BP: 138/53 (11 Jun 2020 13:00) (68/41 - 138/53)  BP(mean): 76 (11 Jun 2020 13:00) (50 - 86)  RR: 30 (11 Jun 2020 13:00) (9 - 30)  SpO2: 100% (11 Jun 2020 13:00) (96% - 100%)    MEDICATIONS  (STANDING):  ascorbic acid 500 milliGRAM(s) Oral daily  atorvastatin 40 milliGRAM(s) Oral at bedtime  budesonide 160 MICROgram(s)/formoterol 4.5 MICROgram(s) Inhaler 2 Puff(s) Inhalation two times a day  chlorhexidine 4% Liquid 1 Application(s) Topical <User Schedule>  dextrose 5%. 1000 milliLiter(s) (50 mL/Hr) IV Continuous <Continuous>  dextrose 50% Injectable 12.5 Gram(s) IV Push once  dextrose 50% Injectable 25 Gram(s) IV Push once  dextrose 50% Injectable 25 Gram(s) IV Push once  finasteride 5 milliGRAM(s) Oral daily  fluticasone propionate 50 MICROgram(s)/spray Nasal Spray 1 Spray(s) Both Nostrils two times a day  furosemide    Tablet 40 milliGRAM(s) Oral daily  insulin glargine Injectable (LANTUS) 10 Unit(s) SubCutaneous at bedtime  insulin lispro (HumaLOG) corrective regimen sliding scale   SubCutaneous three times a day before meals  isosorbide   mononitrate ER Tablet (IMDUR) 30 milliGRAM(s) Oral daily  metoprolol succinate ER 50 milliGRAM(s) Oral daily  multivitamin 1 Tablet(s) Oral daily  norepinephrine Infusion 0.02 MICROgram(s)/kG/Min (3.53 mL/Hr) IV Continuous <Continuous>  pantoprazole    Tablet 40 milliGRAM(s) Oral before breakfast  silver sulfADIAZINE 1% Cream 1 Application(s) Topical daily  tamsulosin 0.4 milliGRAM(s) Oral at bedtime  warfarin 5 milliGRAM(s) Oral at bedtime  zinc sulfate 220 milliGRAM(s) Oral daily    MEDICATIONS  (PRN):  dextrose 40% Gel 15 Gram(s) Oral once PRN Blood Glucose LESS THAN 70 milliGRAM(s)/deciliter  glucagon  Injectable 1 milliGRAM(s) IntraMuscular once PRN Glucose LESS THAN 70 milligrams/deciliter      REVIEW OF SYSTEMS:          CONSTITUTIONAL: No weakness, fevers or chills          EYES/ENT: No visual changes;  No vertigo or throat pain           NECK: No pain or stiffness          RESPIRATORY: No cough, wheezing, hemoptysis          CARDIOVASCULAR: no pain, no WU, no palpitations           GASTROINTESTINAL: No abdominal or epigastric pain. No nausea, vomiting, or hematemesis;           GENITOURINARY: No dysuria, frequency or hematuria          NEUROLOGICAL: No numbness or weakness          SKIN: No itching, rashes    PHYSICAL EXAM:           CONSTITUTIONAL: Well-developed; well-nourished; in no acute distress  	SKIN: warm, dry  	HEAD: Normocephalic; atraumatic  	EYES: PERRL.  	ENT: No nasal discharge, airway clear, mucous membranes moist  	NECK: Supple; non tender.  	CARD: +S1, +S2, no murmurs, gallops, or rubs. irregular rate and rhythm    	RESP: No wheezes, rales or rhonchi. CTA B/L  	ABD: soft ntnd, + BS x 4 quadrants  	EXT: moves all extremities,  no clubbing, cyanosis or edema  	NEURO: Alert and oriented x3, no focal deficits          PSYCH: Cooperative, appropriate          VASCULAR:  + Rad / + PTs / + DPs          EXTREMITY:             Right Groin: dressing removed, access site soft, no hematoma, no pain, + pulses, no sign of infection, no numbness             Left Groin: dressing removed, access site soft, no hematoma, no pain, + pulses, no sign of infection, no numbness            ECG:   < from: 12 Lead ECG (06.11.20 @ 07:25) >  Ventricular Rate 76 BPM    Atrial Rate 250 BPM    QRS Duration 134 ms    Q-T Interval 440 ms    QTC Calculation(Bezet) 495 ms    R Axis -88 degrees    T Axis 52 degrees    Diagnosis Line Atrial flutter with variable A-V block with frequent Premature ventricular  complexes  Right bundle branch block  Left anterior fascicular block  *** Bifascicular block ***  Possible Lateral infarct , age undetermined  Abnormal ECG    Confirmed by Gama Medrano ([id number]) on 6/11/2020 2:45:15 PM                                                                                    2D ECHO:  < from: Transthoracic Echocardiogram (06.11.20 @ 08:30) >   EXAM:  2-D ECHO (TTE) COMPLETE        PROCEDURE DATE:  06/11/2020      INTERPRETATION:  REPORT:    TRANSTHORACIC ECHOCARDIOGRAM REPORT         Patient Name:   TAIWO ZAVALA Accession #: [id number]  Medical Rec #:  [id number]     Height:      66.0 in 167.6 cm  YOB: 1940     Weight:      180.0 lb 81.65 kg  Patient Age:    80 years     BSA:         1.91 m²  Patient Gender: M            BP:          119/69 mmHg       Date of Exam:        6/11/2020 8:30:06 AM  Referring Physician: [id number]  Sonographer:         Kiesha Sims  Reading Physician:   Gama Medrano M.D.    Procedure:     2D Echo/Doppler/Color Doppler Complete.  Indications:   R07.9 - Chest Pain, unspecified  Diagnosis:     Chest pain, unspecified - R07.9  Study Details: Technically good study.       Summary:   1. Left ventricular ejection fraction, by visual estimation, is 50 to 55%.   2. Moderately increased LV wall thickness.   3. Mitral annular calcification.   4. Thickening and calcification of the anterior and posterior mitral valve leaflets.   5. Moderate tricuspid regurgitation.   6. Mild to moderate aortic regurgitation.   7. Moderate pulmonic valve regurgitation.   8. Estimated pulmonary artery systolic pressure is 67.2 mmHg assuming a right atrial pressure of 5 mmHg, which is consistent with severe pulmonary hypertension.   9. Peak transaortic gradient equals 46.7 mmHg, mean transaortic gradient equals 26.2 mmHg, the calculated aortic valve area equals 1.14 cm² by the continuity equation consistent with moderate aortic stenosis.  10. There is moderate aortic root calcification.    PHYSICIAN INTERPRETATION:  Left Ventricle: The left ventricular internal cavity size is normal. Left ventricular wall thickness is moderately increased. Left ventricular ejection fraction, by visual estimation, is 50 to 55%.  Right Ventricle: Normal right ventricular size and function.  Left Atrium: Moderately enlarged left atrium.  Right Atrium: Normal right atrial size.  Pericardium: There is no evidence of pericardial effusion.  Mitral Valve: Thickening and calcification of the anterior and posterior mitral valve leaflets. There is mitral annular calcification. No evidence of mitral stenosis. Moderate mitral valve regurgitation is seen.  Tricuspid Valve: Structurally normal tricuspid valve, with normal leaflet excursion. Moderate tricuspid regurgitation is visualized. Estimated pulmonary artery systolic pressure is 67.2 mmHg assuming a right atrial pressure of 5 mmHg, which is consistent with severe pulmonary hypertension.  Aortic Valve: The aortic valve is trileaflet. Peak transaortic gradient equals 46.7 mmHg, mean transaortic gradient equals 26.2 mmHg, the calculated aortic valve area equals 1.14 cm² by the continuity equation consistent with moderate aortic stenosis. Mild to moderate aortic valve regurgitation is seen.  Pulmonic Valve: The pulmonic valve was not well visualized. Moderate pulmonic valve regurgitation.  Aorta: The aortic root is normal in size and structure. There is moderate aortic root calcification.  Venous: The inferior vena cava was normal sized, with respiratory size variation less than 50%.       2D AND M-MODE MEASUREMENTS (normal ranges within parentheses):  Left Ventricle:                  Normal   Aorta/Left Atrium:             Normal  IVSd (2D):              1.38 cm (0.7-1.1) AoV Cusp Separation: 1.07 cm (1.5-2.6)  LVPWd (2D):             1.21 cm (0.7-1.1) Left Atrium (Mmode): 4.24 cm (1.9-4.0)  LVIDd (2D):             3.95 cm (3.4-5.7) Right Ventricle:  LVIDs (2D):             2.90 cm           RVd (2D):        3.18 cm  LV FS (2D):             26.7 %   (>25%)  Relative Wall Thickness  0.61    (<0.42)    SPECTRAL DOPPLER ANALYSIS:  LV DIASTOLIC FUNCTION:  MV Peak E: 1.25 m/s Decel Time: 78msec  MV Peak A: 0.62 m/s  E/A Ratio: 2.01    Aortic Valve:  AoV VMax:    3.42 m/s  AoV Area, Vmax: 1.08 cm² Vmax Indx: 0.56 cm²/m²  AoV Pk Grad: 46.7 mmHg AoV Area, VTI:  1.14 cm² VTI Indx:  0.60 cm²/m²  AoV Mn Grad: 26.2 mmHg    LVOT Vmax: 1.33 m/s  LVOT VTI:  0.28 m  LVOT Diam: 1.88 cm    Aortic Insufficiency:  AI Half-time:  435 msec  AI Decel Rate: 2.75 m/s²    Mitral Valve:  MV P1/2 Time: 22.54 msec  MV Area, PHT: 9.76 cm²    Tricuspid Valve and PA/RV Systolic Pressure: TR Max Velocity: 3.94 m/s RA Pressure: 5 mmHg RVSP/PASP: 67.2 mmHg       [id number] Gama Medrano M.D., Electronically signed on 6/11/2020 at 1:38:14 PM    LABS:                        12.0   9.10  )-----------( 162      ( 11 Jun 2020 04:32 )             38.2     06-11    144  |  101  |  71<HH>  ----------------------------<  102<H>  4.1   |  24  |  2.2<H>    Ca    9.3      11 Jun 2020 04:32    TPro  6.1  /  Alb  3.2<L>  /  TBili  0.9  /  DBili  x   /  AST  19  /  ALT  6   /  AlkPhos  42  06-11    LIVER FUNCTIONS - ( 11 Jun 2020 04:32 )  Alb: 3.2 g/dL / Pro: 6.1 g/dL / ALK PHOS: 42 U/L / ALT: 6 U/L / AST: 19 U/L / GGT: x           A/P:  I discussed the case with Cardiologist Dr. Maria and recommend the following:    S/P BAV:      	     Continue Coumadin, monitor INR                   Monitor access sites                    Keep K = 4, Mg = 2                   when HR = 80, resume BB                   Post BAV instructions, access site care and activity restrictions reviewed with patient                     Discussed with patient to return to hospital if experience chest pain, shortness breath, dizziness and site bleeding                   Aggressive risk factor modification, diet counseling, smoking cessation discussed with patient                       can transfer to TELE

## 2020-06-12 LAB
ALBUMIN SERPL ELPH-MCNC: 3.2 G/DL — LOW (ref 3.5–5.2)
ALP SERPL-CCNC: 43 U/L — SIGNIFICANT CHANGE UP (ref 30–115)
ALT FLD-CCNC: <5 U/L — SIGNIFICANT CHANGE UP (ref 0–41)
ANION GAP SERPL CALC-SCNC: 15 MMOL/L — HIGH (ref 7–14)
AST SERPL-CCNC: 18 U/L — SIGNIFICANT CHANGE UP (ref 0–41)
BASOPHILS # BLD AUTO: 0.04 K/UL — SIGNIFICANT CHANGE UP (ref 0–0.2)
BASOPHILS NFR BLD AUTO: 0.6 % — SIGNIFICANT CHANGE UP (ref 0–1)
BILIRUB SERPL-MCNC: 0.9 MG/DL — SIGNIFICANT CHANGE UP (ref 0.2–1.2)
BLD GP AB SCN SERPL QL: SIGNIFICANT CHANGE UP
BUN SERPL-MCNC: 67 MG/DL — CRITICAL HIGH (ref 10–20)
CALCIUM SERPL-MCNC: 8.8 MG/DL — SIGNIFICANT CHANGE UP (ref 8.5–10.1)
CHLORIDE SERPL-SCNC: 99 MMOL/L — SIGNIFICANT CHANGE UP (ref 98–110)
CO2 SERPL-SCNC: 24 MMOL/L — SIGNIFICANT CHANGE UP (ref 17–32)
CREAT SERPL-MCNC: 2.4 MG/DL — HIGH (ref 0.7–1.5)
EOSINOPHIL # BLD AUTO: 0.11 K/UL — SIGNIFICANT CHANGE UP (ref 0–0.7)
EOSINOPHIL NFR BLD AUTO: 1.7 % — SIGNIFICANT CHANGE UP (ref 0–8)
GLUCOSE BLDC GLUCOMTR-MCNC: 152 MG/DL — HIGH (ref 70–99)
GLUCOSE BLDC GLUCOMTR-MCNC: 165 MG/DL — HIGH (ref 70–99)
GLUCOSE BLDC GLUCOMTR-MCNC: 178 MG/DL — HIGH (ref 70–99)
GLUCOSE BLDC GLUCOMTR-MCNC: 207 MG/DL — HIGH (ref 70–99)
GLUCOSE SERPL-MCNC: 138 MG/DL — HIGH (ref 70–99)
HCT VFR BLD CALC: 35.1 % — LOW (ref 42–52)
HGB BLD-MCNC: 11 G/DL — LOW (ref 14–18)
IMM GRANULOCYTES NFR BLD AUTO: 0.5 % — HIGH (ref 0.1–0.3)
INR BLD: 1.6 RATIO — HIGH (ref 0.65–1.3)
INR BLD: 1.77 RATIO — HIGH (ref 0.65–1.3)
LYMPHOCYTES # BLD AUTO: 1.14 K/UL — LOW (ref 1.2–3.4)
LYMPHOCYTES # BLD AUTO: 17.4 % — LOW (ref 20.5–51.1)
MAGNESIUM SERPL-MCNC: 2.3 MG/DL — SIGNIFICANT CHANGE UP (ref 1.8–2.4)
MCHC RBC-ENTMCNC: 28.4 PG — SIGNIFICANT CHANGE UP (ref 27–31)
MCHC RBC-ENTMCNC: 31.3 G/DL — LOW (ref 32–37)
MCV RBC AUTO: 90.5 FL — SIGNIFICANT CHANGE UP (ref 80–94)
MONOCYTES # BLD AUTO: 0.93 K/UL — HIGH (ref 0.1–0.6)
MONOCYTES NFR BLD AUTO: 14.2 % — HIGH (ref 1.7–9.3)
NEUTROPHILS # BLD AUTO: 4.3 K/UL — SIGNIFICANT CHANGE UP (ref 1.4–6.5)
NEUTROPHILS NFR BLD AUTO: 65.6 % — SIGNIFICANT CHANGE UP (ref 42.2–75.2)
NRBC # BLD: 0 /100 WBCS — SIGNIFICANT CHANGE UP (ref 0–0)
PLATELET # BLD AUTO: 108 K/UL — LOW (ref 130–400)
POTASSIUM SERPL-MCNC: 4.2 MMOL/L — SIGNIFICANT CHANGE UP (ref 3.5–5)
POTASSIUM SERPL-SCNC: 4.2 MMOL/L — SIGNIFICANT CHANGE UP (ref 3.5–5)
PROT SERPL-MCNC: 6.1 G/DL — SIGNIFICANT CHANGE UP (ref 6–8)
PROTHROM AB SERPL-ACNC: 18.4 SEC — HIGH (ref 9.95–12.87)
PROTHROM AB SERPL-ACNC: 20.4 SEC — HIGH (ref 9.95–12.87)
RBC # BLD: 3.88 M/UL — LOW (ref 4.7–6.1)
RBC # FLD: 15.8 % — HIGH (ref 11.5–14.5)
SODIUM SERPL-SCNC: 138 MMOL/L — SIGNIFICANT CHANGE UP (ref 135–146)
WBC # BLD: 6.55 K/UL — SIGNIFICANT CHANGE UP (ref 4.8–10.8)
WBC # FLD AUTO: 6.55 K/UL — SIGNIFICANT CHANGE UP (ref 4.8–10.8)

## 2020-06-12 PROCEDURE — 99232 SBSQ HOSP IP/OBS MODERATE 35: CPT

## 2020-06-12 RX ORDER — WARFARIN SODIUM 2.5 MG/1
5 TABLET ORAL ONCE
Refills: 0 | Status: COMPLETED | OUTPATIENT
Start: 2020-06-12 | End: 2020-06-12

## 2020-06-12 RX ORDER — WARFARIN SODIUM 2.5 MG/1
5 TABLET ORAL ONCE
Refills: 0 | Status: DISCONTINUED | OUTPATIENT
Start: 2020-06-12 | End: 2020-06-12

## 2020-06-12 RX ADMIN — BUDESONIDE AND FORMOTEROL FUMARATE DIHYDRATE 2 PUFF(S): 160; 4.5 AEROSOL RESPIRATORY (INHALATION) at 08:00

## 2020-06-12 RX ADMIN — FINASTERIDE 5 MILLIGRAM(S): 5 TABLET, FILM COATED ORAL at 12:05

## 2020-06-12 RX ADMIN — Medication 50 MILLIGRAM(S): at 05:09

## 2020-06-12 RX ADMIN — TAMSULOSIN HYDROCHLORIDE 0.4 MILLIGRAM(S): 0.4 CAPSULE ORAL at 21:50

## 2020-06-12 RX ADMIN — BUDESONIDE AND FORMOTEROL FUMARATE DIHYDRATE 2 PUFF(S): 160; 4.5 AEROSOL RESPIRATORY (INHALATION) at 21:50

## 2020-06-12 RX ADMIN — Medication 1 TABLET(S): at 12:05

## 2020-06-12 RX ADMIN — Medication 2: at 07:59

## 2020-06-12 RX ADMIN — WARFARIN SODIUM 5 MILLIGRAM(S): 2.5 TABLET ORAL at 21:50

## 2020-06-12 RX ADMIN — PANTOPRAZOLE SODIUM 40 MILLIGRAM(S): 20 TABLET, DELAYED RELEASE ORAL at 05:09

## 2020-06-12 RX ADMIN — Medication 40 MILLIGRAM(S): at 05:09

## 2020-06-12 RX ADMIN — ATORVASTATIN CALCIUM 40 MILLIGRAM(S): 80 TABLET, FILM COATED ORAL at 21:50

## 2020-06-12 RX ADMIN — Medication 1 APPLICATION(S): at 12:05

## 2020-06-12 RX ADMIN — CHLORHEXIDINE GLUCONATE 1 APPLICATION(S): 213 SOLUTION TOPICAL at 05:08

## 2020-06-12 RX ADMIN — ZINC SULFATE TAB 220 MG (50 MG ZINC EQUIVALENT) 220 MILLIGRAM(S): 220 (50 ZN) TAB at 12:05

## 2020-06-12 RX ADMIN — Medication 2: at 16:52

## 2020-06-12 RX ADMIN — Medication 2: at 12:24

## 2020-06-12 RX ADMIN — INSULIN GLARGINE 10 UNIT(S): 100 INJECTION, SOLUTION SUBCUTANEOUS at 21:50

## 2020-06-12 RX ADMIN — ISOSORBIDE MONONITRATE 30 MILLIGRAM(S): 60 TABLET, EXTENDED RELEASE ORAL at 12:05

## 2020-06-12 RX ADMIN — Medication 1 SPRAY(S): at 05:08

## 2020-06-12 RX ADMIN — Medication 500 MILLIGRAM(S): at 12:05

## 2020-06-12 NOTE — PHYSICAL THERAPY INITIAL EVALUATION ADULT - PLANNED THERAPY INTERVENTIONS, PT EVAL
balance training/bed mobility training/gait training/postural re-education/neuromuscular re-education/strengthening/transfer training

## 2020-06-12 NOTE — PHYSICAL THERAPY INITIAL EVALUATION ADULT - PATIENT PROFILE REVIEW, REHAB EVAL
yes Positioning (Leave Blank If You Do Not Want): The patient was placed in a comfortable position exposing the surgical site.

## 2020-06-12 NOTE — OCCUPATIONAL THERAPY INITIAL EVALUATION ADULT - PERTINENT HX OF CURRENT PROBLEM, REHAB EVAL
80 y M Pmhx of severe symptomatic aortic stenosis (PG 74, MG 40) and complicating factors of afib, htn, dld, COPD, chronic renal insufficiency presents for balloon valvuloplasty of aortic valve as a possible bridge to TAVR.

## 2020-06-12 NOTE — PHYSICAL THERAPY INITIAL EVALUATION ADULT - IMPAIRMENTS FOUND, PT EVAL
aerobic capacity/endurance/gait, locomotion, and balance/poor safety awareness/posture/muscle strength/ergonomics and body mechanics

## 2020-06-12 NOTE — PHYSICAL THERAPY INITIAL EVALUATION ADULT - ADDITIONAL COMMENTS
pt reports living in a house with his son. pt report about 4 steps to enter and reports his son carries him up the stairs. pt reports primarily using a RW to amb and has an electric moving chair as well. pt reports amb on his own, however requires his sons help for bathing, grooming, and most ADL's

## 2020-06-12 NOTE — PHYSICAL THERAPY INITIAL EVALUATION ADULT - GENERAL OBSERVATIONS, REHAB EVAL
11:30-12:00. Chart reviewed. Pt available to be seen by PT, confirmed with nurse. pt encountered semi-reclined in bed, denies pain at rest, NC O2 2L/min in place, and would like to work with PT now.

## 2020-06-12 NOTE — PROGRESS NOTE ADULT - ASSESSMENT
80M PMHx Afib/aflutter on Coumadin, HTN, DM, COPD, DLD, CKD, and aortic stenosis who presented to the hospital for balloon valvuloplasty of aortic valve for severe symptomatic aortic stenosis.      # Symptomatic Aortic Stenosis  - S/p BAV, ~25% reduction  - 6/11/20 echo: 50-55% EF, mod increase LV thickness, mod PVR, mild-mod AR, severe pulm HTN  - C/w 40mg po lasix qd  - d/c tele   - Pending SNF placement     # Atrial fibrillation / atrial flutter  - C/w coumadin 5mg qhs  - F/u INR daily    # HTN  - Overnight was hypotensive, required levophed  - Now BP stable  - Restart metoprolol when HR improves ~80s    # DM  - C/w 10 units lantus qhs and sliding scale    # COPD  - C/w symbicort  - C/w flonase  - Stable    # CKD  - Stable    # BPH  - C/w tamsulosin, finasteride    # DLD  - C/w atorvastatin    # DVT ppx: Coumadin  # GI ppx: protonix  # Diet: DASH, consistent carbohydrate    For follow up:  - Monitor INR daily  - F/u cardiology for disposition  - Monitor groin for hemorrhage / bleeding, s/p removal of femoral sheath

## 2020-06-12 NOTE — PROGRESS NOTE ADULT - SUBJECTIVE AND OBJECTIVE BOX
Cardiology Follow up    TAIWO ZAVALA   80y Male  PAST MEDICAL & SURGICAL HISTORY:  Cellulitis of right lower extremity  Cellulitis of left lower extremity  History of renal insufficiency  Stenosis of left carotid artery  Aortic stenosis  Afib  BPH (benign prostatic hyperplasia)  CHF (congestive heart failure)  COPD (chronic obstructive pulmonary disease)  Diabetes  HTN (hypertension)  H/O heart artery stent  S/P CABG x 3       HPI:  80yM Pmhx of severe symptomatic aortic stenosis (PG 74, MG 40) and complicating factors of afib, htn, dld, COPD, chronic renal insufficiency presents for balloon valvuloplasty of aortic valve as a possible bridge to TAVR.     Pre cath note:    indication:  [ ] STEMI                [ ] NSTEMI                 [ ] Acute coronary syndrome [x] severe symptomatic aortic stenosis.                      [ ]Unstable Angina   [ ] high risk  [ ] intermediate risk  [ ] low risk                     [ ] Stable Angina     non-invasive testing:                          Date:                     result: [ ] high risk  [ ] intermediate risk  [ ] low risk    Anti- Anginal medications:                    [ ] not used                       [x ] used                   [ ] not used but strong indication not to use    Ejection Fraction                   [ ] <29            [ ] 30-39%   [ ] 40-49%     [x ]>50%    CHF                   [ ] active (within last 14 days on meds   [x ] Chronic (on meds but no exacerbation)    COPD                   [ ] mild (on chronic bronchodilators)  [ ] moderate (on chronic steroid therapy)      [x ] severe (indication for home O2 or PACO2 >50)    Other risk factors:                       [ ] Previous MI                     [ ] CVA/ stroke                    [ ] carotid stent/ CEA                    [ ] PVD/PAD- (arterial aneurysm, non-palpable pulses, tortuous vessel with inability to insert catheter, infra-renal dissection, renal or subclavian artery stenosis)                    [ ] diabetic                    [ ] previous CABG                    [x ] Renal Failure (08 Jun 2020 09:57)    Allergies    No Known Allergies    Intolerances    Patient without complaints. Pt ambulated without issues/symptoms with PT and walker  Denies CP, SOB, palpitations, or dizziness  Short runs of NSVT noted on telemetry overnight    Vital Signs Last 24 Hrs  T(C): 36.5 (12 Jun 2020 04:46), Max: 36.5 (12 Jun 2020 04:46)  T(F): 97.7 (12 Jun 2020 04:46), Max: 97.7 (12 Jun 2020 04:46)  HR: 112 (12 Jun 2020 04:46) (59 - 112)  BP: 126/70 (12 Jun 2020 04:46) (108/52 - 126/70)  BP(mean): 72 (11 Jun 2020 18:04) (69 - 72)  RR: 18 (12 Jun 2020 04:46) (17 - 25)  SpO2: 96% (11 Jun 2020 20:30) (96% - 99%)    MEDICATIONS  (STANDING):  ascorbic acid 500 milliGRAM(s) Oral daily  atorvastatin 40 milliGRAM(s) Oral at bedtime  budesonide 160 MICROgram(s)/formoterol 4.5 MICROgram(s) Inhaler 2 Puff(s) Inhalation two times a day  chlorhexidine 4% Liquid 1 Application(s) Topical <User Schedule>  dextrose 5%. 1000 milliLiter(s) (50 mL/Hr) IV Continuous <Continuous>  dextrose 50% Injectable 12.5 Gram(s) IV Push once  dextrose 50% Injectable 25 Gram(s) IV Push once  dextrose 50% Injectable 25 Gram(s) IV Push once  finasteride 5 milliGRAM(s) Oral daily  fluticasone propionate 50 MICROgram(s)/spray Nasal Spray 1 Spray(s) Both Nostrils two times a day  furosemide    Tablet 40 milliGRAM(s) Oral daily  insulin glargine Injectable (LANTUS) 10 Unit(s) SubCutaneous at bedtime  insulin lispro (HumaLOG) corrective regimen sliding scale   SubCutaneous three times a day before meals  isosorbide   mononitrate ER Tablet (IMDUR) 30 milliGRAM(s) Oral daily  metoprolol succinate ER 50 milliGRAM(s) Oral daily  multivitamin 1 Tablet(s) Oral daily  norepinephrine Infusion 0.02 MICROgram(s)/kG/Min (3.53 mL/Hr) IV Continuous <Continuous>  pantoprazole    Tablet 40 milliGRAM(s) Oral before breakfast  silver sulfADIAZINE 1% Cream 1 Application(s) Topical daily  tamsulosin 0.4 milliGRAM(s) Oral at bedtime  zinc sulfate 220 milliGRAM(s) Oral daily    MEDICATIONS  (PRN):  dextrose 40% Gel 15 Gram(s) Oral once PRN Blood Glucose LESS THAN 70 milliGRAM(s)/deciliter  glucagon  Injectable 1 milliGRAM(s) IntraMuscular once PRN Glucose LESS THAN 70 milligrams/deciliter      REVIEW OF SYSTEMS:          CONSTITUTIONAL: No weakness, fevers or chills          EYES/ENT: No visual changes;  No vertigo or throat pain           NECK: No pain or stiffness          RESPIRATORY: No cough, wheezing, hemoptysis          CARDIOVASCULAR: no pain, no WU, no palpitations           GASTROINTESTINAL: No abdominal or epigastric pain. No nausea, vomiting, or hematemesis;           GENITOURINARY: No dysuria, frequency or hematuria          NEUROLOGICAL: No numbness or weakness          SKIN: No itching, rashes    PHYSICAL EXAM:           CONSTITUTIONAL: Well-developed; well-nourished; in no acute distress  	SKIN: warm, dry  	HEAD: Normocephalic; atraumatic  	EYES: PERRL.  	ENT: No nasal discharge, airway clear, mucous membranes moist  	NECK: Supple; non tender.  	CARD: +S1, +S2, no murmurs, gallops, or rubs. irregular rate and rhythm    	RESP: No wheezes, rales or rhonchi. CTA B/L  	ABD: soft ntnd, + BS x 4 quadrants  	EXT: moves all extremities,  no clubbing, cyanosis or edema  	NEURO: Alert and oriented x3, no focal deficits          PSYCH: Cooperative, appropriate          VASCULAR:  + Rad / + PTs / + DPs          EXTREMITY:             Right Groin: dressing removed, access site soft, no hematoma, no pain, + pulses, no sign of infection, no numbness             Left Groin: dressing removed, access site soft, no hematoma, no pain, + pulses, no sign of infection, no numbness            ECG:   < from: 12 Lead ECG (06.11.20 @ 07:25) >  Ventricular Rate 76 BPM    Atrial Rate 250 BPM    QRS Duration 134 ms    Q-T Interval 440 ms    QTC Calculation(Bezet) 495 ms    R Axis -88 degrees    T Axis 52 degrees    Diagnosis Line Atrial flutter with variable A-V block with frequent Premature ventricular  complexes  Right bundle branch block  Left anterior fascicular block  *** Bifascicular block ***  Possible Lateral infarct , age undetermined  Abnormal ECG    Confirmed by Gama Medrano ([id number]) on 6/11/2020 2:45:15 PM                                                                                   2D ECHO:  < from: Transthoracic Echocardiogram (06.11.20 @ 08:30) >  Summary:   1. Left ventricular ejection fraction, by visual estimation, is 50 to 55%.   2. Moderately increased LV wall thickness.   3. Mitral annular calcification.   4. Thickening and calcification of the anterior and posterior mitral valve leaflets.   5. Moderate tricuspid regurgitation.   6. Mild to moderate aortic regurgitation.   7. Moderate pulmonic valve regurgitation.   8. Estimated pulmonary artery systolic pressure is 67.2 mmHg assuming a right atrial pressure of 5 mmHg, which is consistent with severe pulmonary hypertension.   9. Peak transaortic gradient equals 46.7 mmHg, mean transaortic gradient equals 26.2 mmHg, the calculated aortic valve area equals 1.14 cm² by the continuity equation consistent with moderate aortic stenosis.  10. There is moderate aortic root calcification.    LABS:                        11.0   6.55  )-----------( 108      ( 12 Jun 2020 04:38 )             35.1     06-12    138  |  99  |  67<HH>  ----------------------------<  138<H>  4.2   |  24  |  2.4<H>    Ca    8.8      12 Jun 2020 04:38  Mg     2.3     06-12    TPro  6.1  /  Alb  3.2<L>  /  TBili  0.9  /  DBili  x   /  AST  18  /  ALT  <5  /  AlkPhos  43  06-12  m  Magnesium, Serum: 2.3 mg/dL [1.8 - 2.4] (06-12-20 @ 04:38)  LIVER FUNCTIONS - ( 12 Jun 2020 04:38 )  Alb: 3.2 g/dL / Pro: 6.1 g/dL / ALK PHOS: 43 U/L / ALT: <5 U/L / AST: 18 U/L / GGT: x             A/P:  I discussed the case with Cardiologist Dr. Maria and recommend the following:    S/P BAV:                       Resume home dose of Coumadin                   f/u [facility]R in [facility] on Monday 6/15/2020                	     Continue home medications                    f/u with Renal as OP                   Post BAV instructions, access site care and activity restrictions reviewed with patient                     Discussed with patient to return to hospital if experience chest pain, shortness breath, dizziness and site bleeding                   Aggressive risk factor modification, diet counseling, smoking cessation discussed with patient                       Can discharge patient from cardiac standpoint                     Follow up with Cardiology Dr. Maria in one week.  Instructed to call and make an appointment

## 2020-06-12 NOTE — OCCUPATIONAL THERAPY INITIAL EVALUATION ADULT - GENERAL OBSERVATIONS, REHAB EVAL
11:30-12:02 chart reviewed, pt received in bed, +tele, 2LOvia NC agreeable to OT eval, PT present during session

## 2020-06-12 NOTE — PROGRESS NOTE ADULT - SUBJECTIVE AND OBJECTIVE BOX
SUBJECTIVE:    Patient is a 80y old  Male who presents with a chief complaint of s/p BAV (12 Jun 2020 13:20)    Currently admitted to medicine with the primary diagnosis of    Today is hospital day 4d. Patient was seen and examined at bedside. This morning he is resting comfortably in bed and reports no new issues or overnight events. Denies any current chest pain. Denies SOB, adominal pain, N/V.     PAST MEDICAL & SURGICAL HISTORY  PAST MEDICAL & SURGICAL HISTORY:  Cellulitis of right lower extremity  Cellulitis of left lower extremity  History of renal insufficiency  Stenosis of left carotid artery  Aortic stenosis  Afib  BPH (benign prostatic hyperplasia)  CHF (congestive heart failure)  COPD (chronic obstructive pulmonary disease)  Diabetes  HTN (hypertension)  H/O heart artery stent  S/P CABG x 3    SOCIAL HISTORY:    ALLERGIES:  No Known Allergies    MEDICATIONS:  STANDING MEDICATIONS  ascorbic acid 500 milliGRAM(s) Oral daily  atorvastatin 40 milliGRAM(s) Oral at bedtime  budesonide 160 MICROgram(s)/formoterol 4.5 MICROgram(s) Inhaler 2 Puff(s) Inhalation two times a day  chlorhexidine 4% Liquid 1 Application(s) Topical <User Schedule>  dextrose 5%. 1000 milliLiter(s) IV Continuous <Continuous>  dextrose 50% Injectable 12.5 Gram(s) IV Push once  dextrose 50% Injectable 25 Gram(s) IV Push once  dextrose 50% Injectable 25 Gram(s) IV Push once  finasteride 5 milliGRAM(s) Oral daily  fluticasone propionate 50 MICROgram(s)/spray Nasal Spray 1 Spray(s) Both Nostrils two times a day  furosemide    Tablet 40 milliGRAM(s) Oral daily  insulin glargine Injectable (LANTUS) 10 Unit(s) SubCutaneous at bedtime  insulin lispro (HumaLOG) corrective regimen sliding scale   SubCutaneous three times a day before meals  isosorbide   mononitrate ER Tablet (IMDUR) 30 milliGRAM(s) Oral daily  metoprolol succinate ER 50 milliGRAM(s) Oral daily  multivitamin 1 Tablet(s) Oral daily  norepinephrine Infusion 0.02 MICROgram(s)/kG/Min IV Continuous <Continuous>  pantoprazole    Tablet 40 milliGRAM(s) Oral before breakfast  silver sulfADIAZINE 1% Cream 1 Application(s) Topical daily  tamsulosin 0.4 milliGRAM(s) Oral at bedtime  warfarin 5 milliGRAM(s) Oral once  zinc sulfate 220 milliGRAM(s) Oral daily    PRN MEDICATIONS  dextrose 40% Gel 15 Gram(s) Oral once PRN  glucagon  Injectable 1 milliGRAM(s) IntraMuscular once PRN    VITALS:   T(F): 98.2  HR: 60  BP: 112/59  RR: 18  SpO2: 96%    LABS:                        11.0   6.55  )-----------( 108      ( 12 Jun 2020 04:38 )             35.1     06-12    138  |  99  |  67<HH>  ----------------------------<  138<H>  4.2   |  24  |  2.4<H>    Ca    8.8      12 Jun 2020 04:38  Mg     2.3     06-12    TPro  6.1  /  Alb  3.2<L>  /  TBili  0.9  /  DBili  x   /  AST  18  /  ALT  <5  /  AlkPhos  43  06-12    PT/INR - ( 12 Jun 2020 04:38 )   PT: 18.40 sec;   INR: 1.60 ratio                       RADIOLOGY:  < from: US Renal (05.11.20 @ 16:54) >  IMPRESSION:    No hydronephrosis.    Renal cysts.    < end of copied text >    PHYSICAL EXAM:  GENERAL: NAD, speaks in full sentences, no signs of respiratory distress  HEAD: Atraumatic  NECK: Supple  CHEST/LUNG: Clear to auscultation bilaterally  HEART: S1, S2; RRR; No murmurs, rubs, or gallops  ABDOMEN: BS+; Soft, Non-tender, Non-distended  EXTREMITIES:  2+ Peripheral Pulses

## 2020-06-12 NOTE — PROGRESS NOTE ADULT - SUBJECTIVE AND OBJECTIVE BOX
no chest pain   no sob   was admitted for aortic valvuloplasty and downgraded to to 3C from CCU  now pending snf     Vital Signs Last 24 Hrs  T(C): 36.8 (12 Jun 2020 13:41), Max: 36.8 (12 Jun 2020 13:41)  T(F): 98.2 (12 Jun 2020 13:41), Max: 98.2 (12 Jun 2020 13:41)  HR: 60 (12 Jun 2020 13:41) (59 - 112)  BP: 112/59 (12 Jun 2020 13:41) (108/52 - 126/70)  BP(mean): 72 (11 Jun 2020 18:04) (69 - 72)  RR: 18 (12 Jun 2020 13:41) (17 - 25)  SpO2: 96% (11 Jun 2020 20:30) (96% - 99%)    PHYSICAL EXAM:  GENERAL: NAD,   Pulm: Clear to auscultation bilaterally; No wheeze  CV: Regular rate and rhythm;   GI: Soft, Nontender, Nondistended; Bowel sounds present  EXTREMITIES:  2+ Peripheral Pulses, No clubbing, cyanosis, or edema  PSYCH: AAOx3  NEUROLOGY: non-focal  SKIN: No rashes or lesions                          11.0   6.55  )-----------( 108      ( 12 Jun 2020 04:38 )             35.1     06-12    138  |  99  |  67<HH>  ----------------------------<  138<H>  4.2   |  24  |  2.4<H>    Ca    8.8      12 Jun 2020 04:38  Mg     2.3     06-12    TPro  6.1  /  Alb  3.2<L>  /  TBili  0.9  /  DBili  x   /  AST  18  /  ALT  <5  /  AlkPhos  43  06-12    LIVER FUNCTIONS - ( 12 Jun 2020 04:38 )  Alb: 3.2 g/dL / Pro: 6.1 g/dL / ALK PHOS: 43 U/L / ALT: <5 U/L / AST: 18 U/L / GGT: x           PT/INR - ( 12 Jun 2020 04:38 )   PT: 18.40 sec;   INR: 1.60 ratio               MEDICATIONS  (STANDING):  ascorbic acid 500 milliGRAM(s) Oral daily  atorvastatin 40 milliGRAM(s) Oral at bedtime  budesonide 160 MICROgram(s)/formoterol 4.5 MICROgram(s) Inhaler 2 Puff(s) Inhalation two times a day  chlorhexidine 4% Liquid 1 Application(s) Topical <User Schedule>  dextrose 5%. 1000 milliLiter(s) (50 mL/Hr) IV Continuous <Continuous>  dextrose 50% Injectable 12.5 Gram(s) IV Push once  dextrose 50% Injectable 25 Gram(s) IV Push once  dextrose 50% Injectable 25 Gram(s) IV Push once  finasteride 5 milliGRAM(s) Oral daily  fluticasone propionate 50 MICROgram(s)/spray Nasal Spray 1 Spray(s) Both Nostrils two times a day  furosemide    Tablet 40 milliGRAM(s) Oral daily  insulin glargine Injectable (LANTUS) 10 Unit(s) SubCutaneous at bedtime  insulin lispro (HumaLOG) corrective regimen sliding scale   SubCutaneous three times a day before meals  isosorbide   mononitrate ER Tablet (IMDUR) 30 milliGRAM(s) Oral daily  metoprolol succinate ER 50 milliGRAM(s) Oral daily  multivitamin 1 Tablet(s) Oral daily  norepinephrine Infusion 0.02 MICROgram(s)/kG/Min (3.53 mL/Hr) IV Continuous <Continuous>  pantoprazole    Tablet 40 milliGRAM(s) Oral before breakfast  silver sulfADIAZINE 1% Cream 1 Application(s) Topical daily  tamsulosin 0.4 milliGRAM(s) Oral at bedtime  zinc sulfate 220 milliGRAM(s) Oral daily    MEDICATIONS  (PRN):  dextrose 40% Gel 15 Gram(s) Oral once PRN Blood Glucose LESS THAN 70 milliGRAM(s)/deciliter  glucagon  Injectable 1 milliGRAM(s) IntraMuscular once PRN Glucose LESS THAN 70 milligrams/deciliter

## 2020-06-12 NOTE — PROGRESS NOTE ADULT - ASSESSMENT
80M PMHx Afib/aflutter on Coumadin, HTN, DM, COPD, DLD, CKD, and aortic stenosis who presented to the hospital for balloon valvuloplasty of aortic valve for severe symptomatic aortic stenosis.    CCU course:  Patient taken to CCU overnight after BAV, during that time BP was low and patient was very bradycardic (MAP 50s and HR 30s) and was started on levophed.  Patient found to have PVCs on telemetry. Levophed was weaned off. Patient sustaining good BP.    # Symptomatic Aortic Stenosis  - S/p BAV,  - 6/11/20 echo: 50-55% EF, mod increase LV thickness, mod PVR, mild-mod AR, severe pulm HTN  - C/w 40mg po lasix qd  -on toprol   -cleared  by cardiology for discharge. d/c tele     # Atrial fibrillation   -give 5 mg tonight   - F/u INR target 2-3 was 1.6 today     # HTN  controlled and hypotension resolved c/w current meds     # DM  -c/w insulin     # COPD  C/w symbicort  C/w flonase  Stable    # CKD 4   - Stable    # BPH  - C/w tamsulosin, finasteride    # DLD  - C/w atorvastatin    #Progress Note Handoff  Pending (specify):  SNF   Family discussion: patient   Disposition: SNF

## 2020-06-12 NOTE — PROGRESS NOTE ADULT - ATTENDING COMMENTS
Seen / examined and above reviewed.  Stable post BAV.  Patient's family requesting rehab.  Discussed with Dr. Benton.  Will transfer to medicine service to make pending placement.  Continue cardiac Rx above.  OOB / PT.

## 2020-06-13 LAB
ALBUMIN SERPL ELPH-MCNC: 3.1 G/DL — LOW (ref 3.5–5.2)
ALP SERPL-CCNC: 58 U/L — SIGNIFICANT CHANGE UP (ref 30–115)
ALT FLD-CCNC: 5 U/L — SIGNIFICANT CHANGE UP (ref 0–41)
ANION GAP SERPL CALC-SCNC: 13 MMOL/L — SIGNIFICANT CHANGE UP (ref 7–14)
AST SERPL-CCNC: 21 U/L — SIGNIFICANT CHANGE UP (ref 0–41)
BILIRUB SERPL-MCNC: 0.6 MG/DL — SIGNIFICANT CHANGE UP (ref 0.2–1.2)
BUN SERPL-MCNC: 72 MG/DL — CRITICAL HIGH (ref 10–20)
CALCIUM SERPL-MCNC: 9 MG/DL — SIGNIFICANT CHANGE UP (ref 8.5–10.1)
CHLORIDE SERPL-SCNC: 100 MMOL/L — SIGNIFICANT CHANGE UP (ref 98–110)
CO2 SERPL-SCNC: 25 MMOL/L — SIGNIFICANT CHANGE UP (ref 17–32)
CREAT SERPL-MCNC: 2.5 MG/DL — HIGH (ref 0.7–1.5)
GLUCOSE BLDC GLUCOMTR-MCNC: 169 MG/DL — HIGH (ref 70–99)
GLUCOSE BLDC GLUCOMTR-MCNC: 197 MG/DL — HIGH (ref 70–99)
GLUCOSE BLDC GLUCOMTR-MCNC: 198 MG/DL — HIGH (ref 70–99)
GLUCOSE BLDC GLUCOMTR-MCNC: 213 MG/DL — HIGH (ref 70–99)
GLUCOSE SERPL-MCNC: 144 MG/DL — HIGH (ref 70–99)
HCT VFR BLD CALC: 35.7 % — LOW (ref 42–52)
HGB BLD-MCNC: 11.6 G/DL — LOW (ref 14–18)
INR BLD: 1.88 RATIO — HIGH (ref 0.65–1.3)
MAGNESIUM SERPL-MCNC: 2.3 MG/DL — SIGNIFICANT CHANGE UP (ref 1.8–2.4)
MCHC RBC-ENTMCNC: 29.7 PG — SIGNIFICANT CHANGE UP (ref 27–31)
MCHC RBC-ENTMCNC: 32.5 G/DL — SIGNIFICANT CHANGE UP (ref 32–37)
MCV RBC AUTO: 91.5 FL — SIGNIFICANT CHANGE UP (ref 80–94)
NRBC # BLD: 0 /100 WBCS — SIGNIFICANT CHANGE UP (ref 0–0)
PLATELET # BLD AUTO: 114 K/UL — LOW (ref 130–400)
POTASSIUM SERPL-MCNC: 4.2 MMOL/L — SIGNIFICANT CHANGE UP (ref 3.5–5)
POTASSIUM SERPL-SCNC: 4.2 MMOL/L — SIGNIFICANT CHANGE UP (ref 3.5–5)
PROT SERPL-MCNC: 6.2 G/DL — SIGNIFICANT CHANGE UP (ref 6–8)
PROTHROM AB SERPL-ACNC: 21.6 SEC — HIGH (ref 9.95–12.87)
RBC # BLD: 3.9 M/UL — LOW (ref 4.7–6.1)
RBC # FLD: 15.6 % — HIGH (ref 11.5–14.5)
SODIUM SERPL-SCNC: 138 MMOL/L — SIGNIFICANT CHANGE UP (ref 135–146)
WBC # BLD: 7.29 K/UL — SIGNIFICANT CHANGE UP (ref 4.8–10.8)
WBC # FLD AUTO: 7.29 K/UL — SIGNIFICANT CHANGE UP (ref 4.8–10.8)

## 2020-06-13 PROCEDURE — 99232 SBSQ HOSP IP/OBS MODERATE 35: CPT

## 2020-06-13 RX ORDER — WARFARIN SODIUM 2.5 MG/1
5 TABLET ORAL ONCE
Refills: 0 | Status: COMPLETED | OUTPATIENT
Start: 2020-06-13 | End: 2020-06-13

## 2020-06-13 RX ORDER — ACETAMINOPHEN 500 MG
650 TABLET ORAL ONCE
Refills: 0 | Status: COMPLETED | OUTPATIENT
Start: 2020-06-13 | End: 2020-06-13

## 2020-06-13 RX ADMIN — Medication 650 MILLIGRAM(S): at 06:12

## 2020-06-13 RX ADMIN — BUDESONIDE AND FORMOTEROL FUMARATE DIHYDRATE 2 PUFF(S): 160; 4.5 AEROSOL RESPIRATORY (INHALATION) at 21:08

## 2020-06-13 RX ADMIN — Medication 1 SPRAY(S): at 17:01

## 2020-06-13 RX ADMIN — Medication 2: at 07:45

## 2020-06-13 RX ADMIN — TAMSULOSIN HYDROCHLORIDE 0.4 MILLIGRAM(S): 0.4 CAPSULE ORAL at 21:32

## 2020-06-13 RX ADMIN — INSULIN GLARGINE 10 UNIT(S): 100 INJECTION, SOLUTION SUBCUTANEOUS at 21:37

## 2020-06-13 RX ADMIN — ISOSORBIDE MONONITRATE 30 MILLIGRAM(S): 60 TABLET, EXTENDED RELEASE ORAL at 11:51

## 2020-06-13 RX ADMIN — WARFARIN SODIUM 5 MILLIGRAM(S): 2.5 TABLET ORAL at 21:31

## 2020-06-13 RX ADMIN — ZINC SULFATE TAB 220 MG (50 MG ZINC EQUIVALENT) 220 MILLIGRAM(S): 220 (50 ZN) TAB at 11:51

## 2020-06-13 RX ADMIN — PANTOPRAZOLE SODIUM 40 MILLIGRAM(S): 20 TABLET, DELAYED RELEASE ORAL at 06:12

## 2020-06-13 RX ADMIN — Medication 1 TABLET(S): at 11:52

## 2020-06-13 RX ADMIN — BUDESONIDE AND FORMOTEROL FUMARATE DIHYDRATE 2 PUFF(S): 160; 4.5 AEROSOL RESPIRATORY (INHALATION) at 07:44

## 2020-06-13 RX ADMIN — Medication 40 MILLIGRAM(S): at 05:01

## 2020-06-13 RX ADMIN — FINASTERIDE 5 MILLIGRAM(S): 5 TABLET, FILM COATED ORAL at 11:51

## 2020-06-13 RX ADMIN — Medication 500 MILLIGRAM(S): at 11:51

## 2020-06-13 RX ADMIN — CHLORHEXIDINE GLUCONATE 1 APPLICATION(S): 213 SOLUTION TOPICAL at 05:01

## 2020-06-13 RX ADMIN — Medication 1 APPLICATION(S): at 11:52

## 2020-06-13 RX ADMIN — Medication 1 SPRAY(S): at 05:01

## 2020-06-13 RX ADMIN — ATORVASTATIN CALCIUM 40 MILLIGRAM(S): 80 TABLET, FILM COATED ORAL at 21:31

## 2020-06-13 RX ADMIN — Medication 50 MILLIGRAM(S): at 05:01

## 2020-06-13 RX ADMIN — Medication 4: at 11:50

## 2020-06-13 RX ADMIN — Medication 2: at 17:01

## 2020-06-13 NOTE — PROGRESS NOTE ADULT - SUBJECTIVE AND OBJECTIVE BOX
Progress Note:  Provider Speciality                            Hospitalist      TAIWO ZAVALA [id number] 80y Male     CHIEF PRESENTING COMPLAINT:  Patient is a 80y old  Male who presents with a chief complaint of s/p BAV (12 Jun 2020 13:20)        SUBJECTIVE:  Patient was seen and examined at bedside. No new complaints described by the patient  in morning round   . No significant overnight events reported.     HISTORY OF PRESENTING ILLNESS:  HPI:  80yM Pmhx of severe symptomatic aortic stenosis (PG 74, MG 40) and complicating factors of afib, htn, dld, COPD, chronic renal insufficiency presents for balloon valvuloplasty of aortic valve as a possible bridge to TAVR.      f      REVIEW OF SYSTEMS:  Patient denies any headache, any vision complaints, runny nose, fever, chills, sore throat. Denies chest pain, shortness of breath, palpitation. Denies nausea, vomiting, abdominal pain, diarrhoea, Denies urinary burning, urgency, frequency, dysuria. Denies weakness in any part of the body or numbness.   At least 10 systems were reviewed in ROS. All systems reviewed  are within normal limits except for the complaints as described in Subjective.    PAST MEDICAL & SURGICAL HISTORY:  PAST MEDICAL & SURGICAL HISTORY:  Cellulitis of right lower extremity  Cellulitis of left lower extremity  History of renal insufficiency  Stenosis of left carotid artery  Aortic stenosis  Afib  BPH (benign prostatic hyperplasia)  CHF (congestive heart failure)  COPD (chronic obstructive pulmonary disease)  Diabetes  HTN (hypertension)  H/O heart artery stent  S/P CABG x 3          VITAL SIGNS:  Vital Signs Last 24 Hrs  T(C): 36.4 (13 Jun 2020 05:24), Max: 36.8 (12 Jun 2020 13:41)  T(F): 97.6 (13 Jun 2020 05:24), Max: 98.2 (12 Jun 2020 13:41)  HR: 123 (13 Jun 2020 04:58) (59 - 123)  BP: 123/58 (13 Jun 2020 04:58) (112/59 - 123/58)  BP(mean): --  RR: 18 (12 Jun 2020 20:25) (18 - 18)  SpO2: 97% (12 Jun 2020 20:11) (95% - 97%)          PHYSICAL EXAMINATION:  Not in acute distress, lethargic  General: No pallor, no icterus  HEENT:   EOMI, no JVD,.  Heart: S1+S2 audible, +SM  Lungs: bilateral  fair air entry, no wheezing, no crepitations.  Abdomen: Soft, non-tender, non-distended , no  rigidity or guarding.  CNS: Awake alert, CN  grossly intact.  Extremities:  No edema            CONSULTS:  Consultant(s) Notes Reviewed by me.   Care Discussed with Consultants/Other Providers where required.        MEDICATIONS:  MEDICATIONS  (STANDING):  ascorbic acid 500 milliGRAM(s) Oral daily  atorvastatin 40 milliGRAM(s) Oral at bedtime  budesonide 160 MICROgram(s)/formoterol 4.5 MICROgram(s) Inhaler 2 Puff(s) Inhalation two times a day  chlorhexidine 4% Liquid 1 Application(s) Topical <User Schedule>  dextrose 5%. 1000 milliLiter(s) (50 mL/Hr) IV Continuous <Continuous>  dextrose 50% Injectable 12.5 Gram(s) IV Push once  dextrose 50% Injectable 25 Gram(s) IV Push once  dextrose 50% Injectable 25 Gram(s) IV Push once  finasteride 5 milliGRAM(s) Oral daily  fluticasone propionate 50 MICROgram(s)/spray Nasal Spray 1 Spray(s) Both Nostrils two times a day  furosemide    Tablet 40 milliGRAM(s) Oral daily  insulin glargine Injectable (LANTUS) 10 Unit(s) SubCutaneous at bedtime  insulin lispro (HumaLOG) corrective regimen sliding scale   SubCutaneous three times a day before meals  isosorbide   mononitrate ER Tablet (IMDUR) 30 milliGRAM(s) Oral daily  metoprolol succinate ER 50 milliGRAM(s) Oral daily  multivitamin 1 Tablet(s) Oral daily  pantoprazole    Tablet 40 milliGRAM(s) Oral before breakfast  silver sulfADIAZINE 1% Cream 1 Application(s) Topical daily  tamsulosin 0.4 milliGRAM(s) Oral at bedtime  zinc sulfate 220 milliGRAM(s) Oral daily    MEDICATIONS  (PRN):  dextrose 40% Gel 15 Gram(s) Oral once PRN Blood Glucose LESS THAN 70 milliGRAM(s)/deciliter  glucagon  Injectable 1 milliGRAM(s) IntraMuscular once PRN Glucose LESS THAN 70 milligrams/deciliter            ASSESSMENT:        80M PMHx Afib/aflutter on Coumadin, HTN, DM, COPD, DLD, CKD, and aortic stenosis who presented to the hospital for balloon valvuloplasty of aortic valve for severe symptomatic aortic stenosis.      Assessment/Plan:  Telemetry Monitoring   Aortic Stenosis- symptomatic,   status post balloon valvuloplasty   continue with lasix  Hypotension-resolved- off levophed  Symtomatic bradycardia- resolved  TTE-50-55% EF, mod increase LV thickness, mod PVR, mild-mod AR, severe pulm HTN  continue with toprol current dose & imdur  chronic  fib - on anti-coagulation with coumadin. INR not done today. Plz follow up INR and give coumadin accordinlgy  Insulin sliding scale with coverage wih meals and QHS   cood -stable with  current meds  Chronic renal disease stage 4- baseline  Benign Prostatic Hypertrophy - continue with tamsulosin, finasteride  Dyslipidemia  continue with statin      #Progress Note Handoff   Disposition: SNF when stable Progress Note:  Provider Speciality                            Hospitalist      TAIWO ZAVALA [id number] 80y Male     CHIEF PRESENTING COMPLAINT:  Patient is a 80y old  Male who presents with a chief complaint of s/p BAV (12 Jun 2020 13:20)        SUBJECTIVE:  Patient was seen and examined at bedside. No new complaints described by the patient  in morning round   . No significant overnight events reported.     HISTORY OF PRESENTING ILLNESS:  HPI:  80yM Pmhx of severe symptomatic aortic stenosis (PG 74, MG 40) and complicating factors of afib, htn, dld, COPD, chronic renal insufficiency presents for balloon valvuloplasty of aortic valve as a possible bridge to TAVR.      f      REVIEW OF SYSTEMS:  Patient denies any headache, any vision complaints, runny nose, fever, chills, sore throat. Denies chest pain, shortness of breath, palpitation. Denies nausea, vomiting, abdominal pain, diarrhoea, Denies urinary burning, urgency, frequency, dysuria. Denies weakness in any part of the body or numbness.   At least 10 systems were reviewed in ROS. All systems reviewed  are within normal limits except for the complaints as described in Subjective.    PAST MEDICAL & SURGICAL HISTORY:  PAST MEDICAL & SURGICAL HISTORY:  Cellulitis of right lower extremity  Cellulitis of left lower extremity  History of renal insufficiency  Stenosis of left carotid artery  Aortic stenosis  Afib  BPH (benign prostatic hyperplasia)  CHF (congestive heart failure)  COPD (chronic obstructive pulmonary disease)  Diabetes  HTN (hypertension)  H/O heart artery stent  S/P CABG x 3          VITAL SIGNS:  Vital Signs Last 24 Hrs  T(C): 36.4 (13 Jun 2020 05:24), Max: 36.8 (12 Jun 2020 13:41)  T(F): 97.6 (13 Jun 2020 05:24), Max: 98.2 (12 Jun 2020 13:41)  HR: 123 (13 Jun 2020 04:58) (59 - 123)  BP: 123/58 (13 Jun 2020 04:58) (112/59 - 123/58)  BP(mean): --  RR: 18 (12 Jun 2020 20:25) (18 - 18)  SpO2: 97% (12 Jun 2020 20:11) (95% - 97%)          PHYSICAL EXAMINATION:  Not in acute distress, lethargic  General: No pallor, no icterus  HEENT:   EOMI, no JVD,.  Heart: S1+S2 audible, +SM  Lungs: bilateral  fair air entry, no wheezing, no crepitations.  Abdomen: Soft, non-tender, non-distended , no  rigidity or guarding.  CNS: Awake alert, CN  grossly intact.  Extremities:  No edema            CONSULTS:  Consultant(s) Notes Reviewed by me.   Care Discussed with Consultants/Other Providers where required.        MEDICATIONS:  MEDICATIONS  (STANDING):  ascorbic acid 500 milliGRAM(s) Oral daily  atorvastatin 40 milliGRAM(s) Oral at bedtime  budesonide 160 MICROgram(s)/formoterol 4.5 MICROgram(s) Inhaler 2 Puff(s) Inhalation two times a day  chlorhexidine 4% Liquid 1 Application(s) Topical <User Schedule>  dextrose 5%. 1000 milliLiter(s) (50 mL/Hr) IV Continuous <Continuous>  dextrose 50% Injectable 12.5 Gram(s) IV Push once  dextrose 50% Injectable 25 Gram(s) IV Push once  dextrose 50% Injectable 25 Gram(s) IV Push once  finasteride 5 milliGRAM(s) Oral daily  fluticasone propionate 50 MICROgram(s)/spray Nasal Spray 1 Spray(s) Both Nostrils two times a day  furosemide    Tablet 40 milliGRAM(s) Oral daily  insulin glargine Injectable (LANTUS) 10 Unit(s) SubCutaneous at bedtime  insulin lispro (HumaLOG) corrective regimen sliding scale   SubCutaneous three times a day before meals  isosorbide   mononitrate ER Tablet (IMDUR) 30 milliGRAM(s) Oral daily  metoprolol succinate ER 50 milliGRAM(s) Oral daily  multivitamin 1 Tablet(s) Oral daily  pantoprazole    Tablet 40 milliGRAM(s) Oral before breakfast  silver sulfADIAZINE 1% Cream 1 Application(s) Topical daily  tamsulosin 0.4 milliGRAM(s) Oral at bedtime  zinc sulfate 220 milliGRAM(s) Oral daily    MEDICATIONS  (PRN):  dextrose 40% Gel 15 Gram(s) Oral once PRN Blood Glucose LESS THAN 70 milliGRAM(s)/deciliter  glucagon  Injectable 1 milliGRAM(s) IntraMuscular once PRN Glucose LESS THAN 70 milligrams/deciliter            ASSESSMENT:        80M PMHx Afib/aflutter on Coumadin, HTN, DM, COPD, DLD, CKD, and aortic stenosis who presented to the hospital for balloon valvuloplasty of aortic valve for severe symptomatic aortic stenosis.      Assessment/Plan:  Telemetry Monitoring   Aortic Stenosis- symptomatic,   status post balloon valvuloplasty   continue with lasix  Hypotension-resolved- off levophed  Symtomatic bradycardia- resolved  TTE-50-55% EF, mod increase LV thickness, mod PVR, mild-mod AR, severe pulm HTN  continue with toprol current dose & imdur  chronic  fib - on anti-coagulation with coumadin. INR not done today. Plz follow up INR and give coumadin accordinlgy  Insulin sliding scale with coverage wih meals and QHS   Chronic obstructive pulmonary disease  -stable with  current meds  Chronic renal disease stage 4- baseline  Benign Prostatic Hypertrophy - continue with tamsulosin, finasteride  Dyslipidemia  continue with statin      #Progress Note Handoff   Disposition: SNF when stable

## 2020-06-13 NOTE — PROGRESS NOTE ADULT - SUBJECTIVE AND OBJECTIVE BOX
SUBJECTIVE:    Patient is a 80y old  Male who presents with a chief complaint of s/p BAV (12 Jun 2020 13:20)    Currently admitted to medicine with the primary diagnosis of    Today is hospital day 5d. Patient was seen and examined at bedside. This morning he is resting comfortably in bed and reports no new issues or overnight events. No current complaints. Denies CP, SOB, abdominal pain, N/V.     PAST MEDICAL & SURGICAL HISTORY  PAST MEDICAL & SURGICAL HISTORY:  Cellulitis of right lower extremity  Cellulitis of left lower extremity  History of renal insufficiency  Stenosis of left carotid artery  Aortic stenosis  Afib  BPH (benign prostatic hyperplasia)  CHF (congestive heart failure)  COPD (chronic obstructive pulmonary disease)  Diabetes  HTN (hypertension)  H/O heart artery stent  S/P CABG x 3    SOCIAL HISTORY:    ALLERGIES:  No Known Allergies    MEDICATIONS:  STANDING MEDICATIONS  ascorbic acid 500 milliGRAM(s) Oral daily  atorvastatin 40 milliGRAM(s) Oral at bedtime  budesonide 160 MICROgram(s)/formoterol 4.5 MICROgram(s) Inhaler 2 Puff(s) Inhalation two times a day  chlorhexidine 4% Liquid 1 Application(s) Topical <User Schedule>  dextrose 5%. 1000 milliLiter(s) IV Continuous <Continuous>  dextrose 50% Injectable 12.5 Gram(s) IV Push once  dextrose 50% Injectable 25 Gram(s) IV Push once  dextrose 50% Injectable 25 Gram(s) IV Push once  finasteride 5 milliGRAM(s) Oral daily  fluticasone propionate 50 MICROgram(s)/spray Nasal Spray 1 Spray(s) Both Nostrils two times a day  furosemide    Tablet 40 milliGRAM(s) Oral daily  insulin glargine Injectable (LANTUS) 10 Unit(s) SubCutaneous at bedtime  insulin lispro (HumaLOG) corrective regimen sliding scale   SubCutaneous three times a day before meals  isosorbide   mononitrate ER Tablet (IMDUR) 30 milliGRAM(s) Oral daily  metoprolol succinate ER 50 milliGRAM(s) Oral daily  multivitamin 1 Tablet(s) Oral daily  pantoprazole    Tablet 40 milliGRAM(s) Oral before breakfast  silver sulfADIAZINE 1% Cream 1 Application(s) Topical daily  tamsulosin 0.4 milliGRAM(s) Oral at bedtime  warfarin 5 milliGRAM(s) Oral once  zinc sulfate 220 milliGRAM(s) Oral daily    PRN MEDICATIONS  dextrose 40% Gel 15 Gram(s) Oral once PRN  glucagon  Injectable 1 milliGRAM(s) IntraMuscular once PRN    VITALS:   T(F): 97.6  HR: 123  BP: 123/58  RR: 18  SpO2: 97%    LABS:                        11.6   7.29  )-----------( 114      ( 13 Jun 2020 06:11 )             35.7     06-13    138  |  100  |  72<HH>  ----------------------------<  144<H>  4.2   |  25  |  2.5<H>    Ca    9.0      13 Jun 2020 06:11  Mg     2.3     06-13    TPro  6.2  /  Alb  3.1<L>  /  TBili  0.6  /  DBili  x   /  AST  21  /  ALT  5   /  AlkPhos  58  06-13    PT/INR - ( 13 Jun 2020 12:00 )   PT: 21.60 sec;   INR: 1.88 ratio                       RADIOLOGY:  < from: US Renal (05.11.20 @ 16:54) >  IMPRESSION:    No hydronephrosis.    Renal cysts.    Trace perihepatic ascites    < end of copied text >    PHYSICAL EXAM:  GENERAL: NAD, speaks in full sentences, no signs of respiratory distress  HEAD: Atraumatic  NECK: Supple  CHEST/LUNG: Clear to auscultation bilaterally; No wheeze or crackles  HEART: S1, S2; RRR; No murmurs, rubs, or gallops  ABDOMEN: BS+; Soft, Non-tender, Non-distended  EXTREMITIES:  2+ Peripheral Pulses  PSYCH: AAOx3  NEUROLOGY: non-focal  SKIN: No rashes or lesions

## 2020-06-13 NOTE — PROGRESS NOTE ADULT - ASSESSMENT
80M PMHx Afib/aflutter on Coumadin, HTN, DM, COPD, DLD, CKD, and aortic stenosis who presented to the hospital for balloon valvuloplasty of aortic valve for severe symptomatic aortic stenosis.      # Symptomatic Aortic Stenosis  - S/p BAV, ~25% reduction  - 6/11/20 echo: 50-55% EF, mod increase LV thickness, mod PVR, mild-mod AR, severe pulm HTN  - C/w 40mg po lasix qd  - d/c tele   - Pending SNF placement     # Atrial fibrillation / atrial flutter  - C/w coumadin 5mg qhs  - F/u INR daily    # HTN  - Overnight was hypotensive, required levophed  - Now BP stable  - Restart metoprolol when HR improves ~80s    # DM  - C/w 10 units lantus qhs and sliding scale    # COPD  - C/w symbicort  - C/w flonase  - Stable    # CKD  - Stable    # BPH  - C/w tamsulosin, finasteride    # DLD  - C/w atorvastatin    # DVT ppx: Coumadin  # GI ppx: protonix  # Diet: DASH, consistent carbohydrate    For follow up:  - Monitor INR daily  - SNF placement   - Monitor groin for hemorrhage / bleeding, s/p removal of femoral sheath

## 2020-06-14 LAB
ALBUMIN SERPL ELPH-MCNC: 3 G/DL — LOW (ref 3.5–5.2)
ALP SERPL-CCNC: 61 U/L — SIGNIFICANT CHANGE UP (ref 30–115)
ALT FLD-CCNC: 7 U/L — SIGNIFICANT CHANGE UP (ref 0–41)
ANION GAP SERPL CALC-SCNC: 13 MMOL/L — SIGNIFICANT CHANGE UP (ref 7–14)
AST SERPL-CCNC: 34 U/L — SIGNIFICANT CHANGE UP (ref 0–41)
BILIRUB SERPL-MCNC: 0.7 MG/DL — SIGNIFICANT CHANGE UP (ref 0.2–1.2)
BUN SERPL-MCNC: 74 MG/DL — CRITICAL HIGH (ref 10–20)
CALCIUM SERPL-MCNC: 8.5 MG/DL — SIGNIFICANT CHANGE UP (ref 8.5–10.1)
CHLORIDE SERPL-SCNC: 100 MMOL/L — SIGNIFICANT CHANGE UP (ref 98–110)
CO2 SERPL-SCNC: 24 MMOL/L — SIGNIFICANT CHANGE UP (ref 17–32)
CREAT SERPL-MCNC: 2.7 MG/DL — HIGH (ref 0.7–1.5)
GLUCOSE BLDC GLUCOMTR-MCNC: 146 MG/DL — HIGH (ref 70–99)
GLUCOSE BLDC GLUCOMTR-MCNC: 153 MG/DL — HIGH (ref 70–99)
GLUCOSE BLDC GLUCOMTR-MCNC: 157 MG/DL — HIGH (ref 70–99)
GLUCOSE BLDC GLUCOMTR-MCNC: 188 MG/DL — HIGH (ref 70–99)
GLUCOSE SERPL-MCNC: 165 MG/DL — HIGH (ref 70–99)
HCT VFR BLD CALC: 33.9 % — LOW (ref 42–52)
HGB BLD-MCNC: 10.9 G/DL — LOW (ref 14–18)
INR BLD: 2.52 RATIO — HIGH (ref 0.65–1.3)
MAGNESIUM SERPL-MCNC: 2.1 MG/DL — SIGNIFICANT CHANGE UP (ref 1.8–2.4)
MCHC RBC-ENTMCNC: 29.6 PG — SIGNIFICANT CHANGE UP (ref 27–31)
MCHC RBC-ENTMCNC: 32.2 G/DL — SIGNIFICANT CHANGE UP (ref 32–37)
MCV RBC AUTO: 92.1 FL — SIGNIFICANT CHANGE UP (ref 80–94)
NRBC # BLD: 0 /100 WBCS — SIGNIFICANT CHANGE UP (ref 0–0)
PLATELET # BLD AUTO: 104 K/UL — LOW (ref 130–400)
POTASSIUM SERPL-MCNC: 5.5 MMOL/L — HIGH (ref 3.5–5)
POTASSIUM SERPL-SCNC: 5.5 MMOL/L — HIGH (ref 3.5–5)
PROT SERPL-MCNC: 6 G/DL — SIGNIFICANT CHANGE UP (ref 6–8)
PROTHROM AB SERPL-ACNC: 29 SEC — HIGH (ref 9.95–12.87)
RBC # BLD: 3.68 M/UL — LOW (ref 4.7–6.1)
RBC # FLD: 15.7 % — HIGH (ref 11.5–14.5)
SODIUM SERPL-SCNC: 137 MMOL/L — SIGNIFICANT CHANGE UP (ref 135–146)
WBC # BLD: 6.7 K/UL — SIGNIFICANT CHANGE UP (ref 4.8–10.8)
WBC # FLD AUTO: 6.7 K/UL — SIGNIFICANT CHANGE UP (ref 4.8–10.8)

## 2020-06-14 PROCEDURE — 99232 SBSQ HOSP IP/OBS MODERATE 35: CPT

## 2020-06-14 RX ORDER — WARFARIN SODIUM 2.5 MG/1
2 TABLET ORAL ONCE
Refills: 0 | Status: COMPLETED | OUTPATIENT
Start: 2020-06-14 | End: 2020-06-14

## 2020-06-14 RX ADMIN — INSULIN GLARGINE 10 UNIT(S): 100 INJECTION, SOLUTION SUBCUTANEOUS at 21:49

## 2020-06-14 RX ADMIN — ZINC SULFATE TAB 220 MG (50 MG ZINC EQUIVALENT) 220 MILLIGRAM(S): 220 (50 ZN) TAB at 12:38

## 2020-06-14 RX ADMIN — Medication 40 MILLIGRAM(S): at 05:33

## 2020-06-14 RX ADMIN — CHLORHEXIDINE GLUCONATE 1 APPLICATION(S): 213 SOLUTION TOPICAL at 05:33

## 2020-06-14 RX ADMIN — TAMSULOSIN HYDROCHLORIDE 0.4 MILLIGRAM(S): 0.4 CAPSULE ORAL at 21:49

## 2020-06-14 RX ADMIN — Medication 2: at 16:50

## 2020-06-14 RX ADMIN — PANTOPRAZOLE SODIUM 40 MILLIGRAM(S): 20 TABLET, DELAYED RELEASE ORAL at 05:33

## 2020-06-14 RX ADMIN — Medication 500 MILLIGRAM(S): at 12:38

## 2020-06-14 RX ADMIN — FINASTERIDE 5 MILLIGRAM(S): 5 TABLET, FILM COATED ORAL at 12:38

## 2020-06-14 RX ADMIN — Medication 1 SPRAY(S): at 05:33

## 2020-06-14 RX ADMIN — Medication 1 SPRAY(S): at 16:50

## 2020-06-14 RX ADMIN — Medication 1 TABLET(S): at 12:38

## 2020-06-14 RX ADMIN — ISOSORBIDE MONONITRATE 30 MILLIGRAM(S): 60 TABLET, EXTENDED RELEASE ORAL at 12:38

## 2020-06-14 RX ADMIN — Medication 2: at 07:33

## 2020-06-14 RX ADMIN — BUDESONIDE AND FORMOTEROL FUMARATE DIHYDRATE 2 PUFF(S): 160; 4.5 AEROSOL RESPIRATORY (INHALATION) at 07:33

## 2020-06-14 RX ADMIN — Medication 50 MILLIGRAM(S): at 05:33

## 2020-06-14 RX ADMIN — ATORVASTATIN CALCIUM 40 MILLIGRAM(S): 80 TABLET, FILM COATED ORAL at 21:49

## 2020-06-14 RX ADMIN — BUDESONIDE AND FORMOTEROL FUMARATE DIHYDRATE 2 PUFF(S): 160; 4.5 AEROSOL RESPIRATORY (INHALATION) at 21:50

## 2020-06-14 RX ADMIN — Medication 1 APPLICATION(S): at 12:38

## 2020-06-14 RX ADMIN — WARFARIN SODIUM 2 MILLIGRAM(S): 2.5 TABLET ORAL at 21:50

## 2020-06-14 NOTE — PROGRESS NOTE ADULT - SUBJECTIVE AND OBJECTIVE BOX
Progress Note:  Provider Speciality                            Hospitalist      TAIWO ZAVALA [id number] 80y Male     CHIEF PRESENTING COMPLAINT:  Patient is a 80y old  Male who presents with a chief complaint of s/p BAV (12 Jun 2020 13:20)        SUBJECTIVE:  Patient was seen and examined at bedside. No new complaints described by the patient  in morning round   . No significant overnight events reported.     HISTORY OF PRESENTING ILLNESS:  HPI:  80yM Pmhx of severe symptomatic aortic stenosis (PG 74, MG 40) and complicating factors of afib, htn, dld, COPD, chronic renal insufficiency presents for balloon valvuloplasty of aortic valve as a possible bridge to TAVR.      f      REVIEW OF SYSTEMS:  Patient denies any headache, any vision complaints, runny nose, fever, chills, sore throat. Denies chest pain, shortness of breath, palpitation. Denies nausea, vomiting, abdominal pain, diarrhoea, Denies urinary burning, urgency, frequency, dysuria. At least 10 systems were reviewed in ROS. All systems reviewed  are within normal limits except for the complaints as described in Subjective.    PAST MEDICAL & SURGICAL HISTORY:  PAST MEDICAL & SURGICAL HISTORY:  Cellulitis of right lower extremity  Cellulitis of left lower extremity  History of renal insufficiency  Stenosis of left carotid artery  Aortic stenosis  Afib  BPH (benign prostatic hyperplasia)  CHF (congestive heart failure)  COPD (chronic obstructive pulmonary disease)  Diabetes  HTN (hypertension)  H/O heart artery stent  S/P CABG x 3          VITAL SIGNS:  Vital Signs Last 24 Hrs  T(C): 36.8 (14 Jun 2020 05:33), Max: 36.8 (14 Jun 2020 05:33)  T(F): 98.2 (14 Jun 2020 05:33), Max: 98.2 (14 Jun 2020 05:33)  HR: 62 (14 Jun 2020 05:33) (50 - 62)  BP: 119/64 (14 Jun 2020 05:33) (107/56 - 125/59)  BP(mean): --  RR: 18 (14 Jun 2020 05:33) (18 - 18)  SpO2: 98% (13 Jun 2020 19:54) (98% - 98%)        PHYSICAL EXAMINATION:  Not in acute distress, lethargic  General: No pallor, no icterus  HEENT:   EOMI, no JVD,.  Heart: S1+S2 audible, +SM  Lungs: bilateral  fair air entry, no wheezing, no crepitations.  Abdomen: Soft, non-tender, non-distended , no  rigidity or guarding.  CNS: Awake alert, CN  grossly intact.  Extremities:  No edema            CONSULTS:  Consultant(s) Notes Reviewed by me.   Care Discussed with Consultants/Other Providers where required.        MEDICATIONS:  MEDICATIONS  (STANDING):  ascorbic acid 500 milliGRAM(s) Oral daily  atorvastatin 40 milliGRAM(s) Oral at bedtime  budesonide 160 MICROgram(s)/formoterol 4.5 MICROgram(s) Inhaler 2 Puff(s) Inhalation two times a day  chlorhexidine 4% Liquid 1 Application(s) Topical <User Schedule>  dextrose 5%. 1000 milliLiter(s) (50 mL/Hr) IV Continuous <Continuous>  dextrose 50% Injectable 12.5 Gram(s) IV Push once  dextrose 50% Injectable 25 Gram(s) IV Push once  dextrose 50% Injectable 25 Gram(s) IV Push once  finasteride 5 milliGRAM(s) Oral daily  fluticasone propionate 50 MICROgram(s)/spray Nasal Spray 1 Spray(s) Both Nostrils two times a day  furosemide    Tablet 40 milliGRAM(s) Oral daily  insulin glargine Injectable (LANTUS) 10 Unit(s) SubCutaneous at bedtime  insulin lispro (HumaLOG) corrective regimen sliding scale   SubCutaneous three times a day before meals  isosorbide   mononitrate ER Tablet (IMDUR) 30 milliGRAM(s) Oral daily  metoprolol succinate ER 50 milliGRAM(s) Oral daily  multivitamin 1 Tablet(s) Oral daily  pantoprazole    Tablet 40 milliGRAM(s) Oral before breakfast  silver sulfADIAZINE 1% Cream 1 Application(s) Topical daily  tamsulosin 0.4 milliGRAM(s) Oral at bedtime  zinc sulfate 220 milliGRAM(s) Oral daily    MEDICATIONS  (PRN):  dextrose 40% Gel 15 Gram(s) Oral once PRN Blood Glucose LESS THAN 70 milliGRAM(s)/deciliter  glucagon  Injectable 1 milliGRAM(s) IntraMuscular once PRN Glucose LESS THAN 70 milligrams/deciliter            ASSESSMENT:        80M PMHx Afib/aflutter on Coumadin, HTN, DM, COPD, DLD, CKD, and aortic stenosis who presented to the hospital for balloon valvuloplasty of aortic valve for severe symptomatic aortic stenosis.      Assessment/Plan:  d/c Telemetry Monitoring   Aortic Stenosis- symptomatic, status post balloon valvuloplasty   continue with lasix  Hypotension-resolved- off pressor  Symtomatic bradycardia- resolved  TTE-50-55% EF, mod increase LV thickness, mod PVR, mild-mod AR, severe pulm HTN  continue with toprol current dose & imdur  chronic  fib - on anti-coagulation with coumadin. INR not done today. Plz follow up INR and give coumadin accordinlgy  Insulin sliding scale with coverage wih meals and QHS   Chronic obstructive pulmonary disease  -stable with  current meds  Chronic renal disease stage 4- baseline  Benign Prostatic Hypertrophy - continue with tamsulosin, finasteride  Dyslipidemia  continue with statin      #Progress Note Handoff   Disposition: SNF , anticipated for tomorrow Progress Note:  Provider Speciality                            Hospitalist      TAIWO ZAVALA [id number] 80y Male     CHIEF PRESENTING COMPLAINT:  Patient is a 80y old  Male who presents with a chief complaint of s/p BAV (12 Jun 2020 13:20)        SUBJECTIVE:  Patient was seen and examined at bedside. No new complaints described by the patient  in morning round   . No significant overnight events reported.     HISTORY OF PRESENTING ILLNESS:  HPI:  80yM Pmhx of severe symptomatic aortic stenosis (PG 74, MG 40) and complicating factors of afib, htn, dld, COPD, chronic renal insufficiency presents for balloon valvuloplasty of aortic valve as a possible bridge to TAVR.      f      REVIEW OF SYSTEMS:  Patient denies any headache, any vision complaints, runny nose, fever, chills, sore throat. Denies chest pain, shortness of breath, palpitation. Denies nausea, vomiting, abdominal pain, diarrhoea, Denies urinary burning, urgency, frequency, dysuria. At least 10 systems were reviewed in ROS. All systems reviewed  are within normal limits except for the complaints as described in Subjective.    PAST MEDICAL & SURGICAL HISTORY:  PAST MEDICAL & SURGICAL HISTORY:  Cellulitis of right lower extremity  Cellulitis of left lower extremity  History of renal insufficiency  Stenosis of left carotid artery  Aortic stenosis  Afib  BPH (benign prostatic hyperplasia)  CHF (congestive heart failure)  COPD (chronic obstructive pulmonary disease)  Diabetes  HTN (hypertension)  H/O heart artery stent  S/P CABG x 3          VITAL SIGNS:  Vital Signs Last 24 Hrs  T(C): 36.8 (14 Jun 2020 05:33), Max: 36.8 (14 Jun 2020 05:33)  T(F): 98.2 (14 Jun 2020 05:33), Max: 98.2 (14 Jun 2020 05:33)  HR: 62 (14 Jun 2020 05:33) (50 - 62)  BP: 119/64 (14 Jun 2020 05:33) (107/56 - 125/59)  BP(mean): --  RR: 18 (14 Jun 2020 05:33) (18 - 18)  SpO2: 98% (13 Jun 2020 19:54) (98% - 98%)        PHYSICAL EXAMINATION:  Not in acute distress, lethargic  General: No pallor, no icterus  HEENT:   EOMI, no JVD,.  Heart: S1+S2 audible, +SM  Lungs: bilateral  fair air entry, no wheezing, no crepitations.  Abdomen: Soft, non-tender, non-distended , no  rigidity or guarding.  CNS: Awake alert, CN  grossly intact.  Extremities:  No edema            CONSULTS:  Consultant(s) Notes Reviewed by me.   Care Discussed with Consultants/Other Providers where required.        MEDICATIONS:  MEDICATIONS  (STANDING):  ascorbic acid 500 milliGRAM(s) Oral daily  atorvastatin 40 milliGRAM(s) Oral at bedtime  budesonide 160 MICROgram(s)/formoterol 4.5 MICROgram(s) Inhaler 2 Puff(s) Inhalation two times a day  chlorhexidine 4% Liquid 1 Application(s) Topical <User Schedule>  dextrose 5%. 1000 milliLiter(s) (50 mL/Hr) IV Continuous <Continuous>  dextrose 50% Injectable 12.5 Gram(s) IV Push once  dextrose 50% Injectable 25 Gram(s) IV Push once  dextrose 50% Injectable 25 Gram(s) IV Push once  finasteride 5 milliGRAM(s) Oral daily  fluticasone propionate 50 MICROgram(s)/spray Nasal Spray 1 Spray(s) Both Nostrils two times a day  furosemide    Tablet 40 milliGRAM(s) Oral daily  insulin glargine Injectable (LANTUS) 10 Unit(s) SubCutaneous at bedtime  insulin lispro (HumaLOG) corrective regimen sliding scale   SubCutaneous three times a day before meals  isosorbide   mononitrate ER Tablet (IMDUR) 30 milliGRAM(s) Oral daily  metoprolol succinate ER 50 milliGRAM(s) Oral daily  multivitamin 1 Tablet(s) Oral daily  pantoprazole    Tablet 40 milliGRAM(s) Oral before breakfast  silver sulfADIAZINE 1% Cream 1 Application(s) Topical daily  tamsulosin 0.4 milliGRAM(s) Oral at bedtime  zinc sulfate 220 milliGRAM(s) Oral daily    MEDICATIONS  (PRN):  dextrose 40% Gel 15 Gram(s) Oral once PRN Blood Glucose LESS THAN 70 milliGRAM(s)/deciliter  glucagon  Injectable 1 milliGRAM(s) IntraMuscular once PRN Glucose LESS THAN 70 milligrams/deciliter            ASSESSMENT:        80M PMHx Afib/aflutter on Coumadin, HTN, DM, COPD, DLD, CKD, and aortic stenosis who presented to the hospital for balloon valvuloplasty of aortic valve for severe symptomatic aortic stenosis.      Assessment/Plan:  d/c Telemetry Monitoring   Aortic Stenosis- symptomatic, status post balloon valvuloplasty   continue with lasix  Hypotension-resolved- off pressor  Symtomatic bradycardia- resolved  TTE-50-55% EF, mod increase LV thickness, mod PVR, mild-mod AR, severe pulm HTN  continue with toprol current dose & imdur  chronic  fib - on anti-coagulation with coumadin. INR not done today. Plz follow up INR and give coumadin accordinlgy  Insulin sliding scale with coverage wih meals and QHS   Chronic obstructive pulmonary disease  -stable with  current meds  Chronic renal disease stage 4- baseline  Benign Prostatic Hypertrophy - continue with tamsulosin, finasteride  Dyslipidemia  continue with statin      #Progress Note Handoff   Disposition: SNF , anticipated for tomorrow, covid swab  today

## 2020-06-15 ENCOUNTER — TRANSCRIPTION ENCOUNTER (OUTPATIENT)
Age: 80
End: 2020-06-15

## 2020-06-15 LAB
ALBUMIN SERPL ELPH-MCNC: 3.1 G/DL — LOW (ref 3.5–5.2)
ALP SERPL-CCNC: 59 U/L — SIGNIFICANT CHANGE UP (ref 30–115)
ALT FLD-CCNC: 7 U/L — SIGNIFICANT CHANGE UP (ref 0–41)
ANION GAP SERPL CALC-SCNC: 15 MMOL/L — HIGH (ref 7–14)
AST SERPL-CCNC: 20 U/L — SIGNIFICANT CHANGE UP (ref 0–41)
BILIRUB SERPL-MCNC: 0.5 MG/DL — SIGNIFICANT CHANGE UP (ref 0.2–1.2)
BUN SERPL-MCNC: 80 MG/DL — CRITICAL HIGH (ref 10–20)
CALCIUM SERPL-MCNC: 8.7 MG/DL — SIGNIFICANT CHANGE UP (ref 8.5–10.1)
CHLORIDE SERPL-SCNC: 100 MMOL/L — SIGNIFICANT CHANGE UP (ref 98–110)
CO2 SERPL-SCNC: 24 MMOL/L — SIGNIFICANT CHANGE UP (ref 17–32)
CREAT SERPL-MCNC: 2.6 MG/DL — HIGH (ref 0.7–1.5)
GLUCOSE BLDC GLUCOMTR-MCNC: 113 MG/DL — HIGH (ref 70–99)
GLUCOSE BLDC GLUCOMTR-MCNC: 150 MG/DL — HIGH (ref 70–99)
GLUCOSE BLDC GLUCOMTR-MCNC: 168 MG/DL — HIGH (ref 70–99)
GLUCOSE BLDC GLUCOMTR-MCNC: 259 MG/DL — HIGH (ref 70–99)
GLUCOSE SERPL-MCNC: 125 MG/DL — HIGH (ref 70–99)
HCT VFR BLD CALC: 33.8 % — LOW (ref 42–52)
HGB BLD-MCNC: 10.5 G/DL — LOW (ref 14–18)
INR BLD: 2.54 RATIO — HIGH (ref 0.65–1.3)
MAGNESIUM SERPL-MCNC: 2.2 MG/DL — SIGNIFICANT CHANGE UP (ref 1.8–2.4)
MCHC RBC-ENTMCNC: 28 PG — SIGNIFICANT CHANGE UP (ref 27–31)
MCHC RBC-ENTMCNC: 31.1 G/DL — LOW (ref 32–37)
MCV RBC AUTO: 90.1 FL — SIGNIFICANT CHANGE UP (ref 80–94)
NRBC # BLD: 0 /100 WBCS — SIGNIFICANT CHANGE UP (ref 0–0)
PLATELET # BLD AUTO: 125 K/UL — LOW (ref 130–400)
POTASSIUM SERPL-MCNC: 4.5 MMOL/L — SIGNIFICANT CHANGE UP (ref 3.5–5)
POTASSIUM SERPL-SCNC: 4.5 MMOL/L — SIGNIFICANT CHANGE UP (ref 3.5–5)
PROT SERPL-MCNC: 6 G/DL — SIGNIFICANT CHANGE UP (ref 6–8)
PROTHROM AB SERPL-ACNC: 29.2 SEC — HIGH (ref 9.95–12.87)
RBC # BLD: 3.75 M/UL — LOW (ref 4.7–6.1)
RBC # FLD: 15.4 % — HIGH (ref 11.5–14.5)
SARS-COV-2 RNA SPEC QL NAA+PROBE: SIGNIFICANT CHANGE UP
SODIUM SERPL-SCNC: 139 MMOL/L — SIGNIFICANT CHANGE UP (ref 135–146)
WBC # BLD: 6.72 K/UL — SIGNIFICANT CHANGE UP (ref 4.8–10.8)
WBC # FLD AUTO: 6.72 K/UL — SIGNIFICANT CHANGE UP (ref 4.8–10.8)

## 2020-06-15 PROCEDURE — 99232 SBSQ HOSP IP/OBS MODERATE 35: CPT

## 2020-06-15 RX ORDER — WARFARIN SODIUM 2.5 MG/1
2 TABLET ORAL ONCE
Refills: 0 | Status: COMPLETED | OUTPATIENT
Start: 2020-06-15 | End: 2020-06-15

## 2020-06-15 RX ADMIN — PANTOPRAZOLE SODIUM 40 MILLIGRAM(S): 20 TABLET, DELAYED RELEASE ORAL at 05:49

## 2020-06-15 RX ADMIN — Medication 2: at 17:15

## 2020-06-15 RX ADMIN — TAMSULOSIN HYDROCHLORIDE 0.4 MILLIGRAM(S): 0.4 CAPSULE ORAL at 21:24

## 2020-06-15 RX ADMIN — BUDESONIDE AND FORMOTEROL FUMARATE DIHYDRATE 2 PUFF(S): 160; 4.5 AEROSOL RESPIRATORY (INHALATION) at 07:51

## 2020-06-15 RX ADMIN — Medication 6: at 11:43

## 2020-06-15 RX ADMIN — ATORVASTATIN CALCIUM 40 MILLIGRAM(S): 80 TABLET, FILM COATED ORAL at 21:24

## 2020-06-15 RX ADMIN — Medication 40 MILLIGRAM(S): at 05:49

## 2020-06-15 RX ADMIN — Medication 50 MILLIGRAM(S): at 05:49

## 2020-06-15 RX ADMIN — WARFARIN SODIUM 2 MILLIGRAM(S): 2.5 TABLET ORAL at 21:24

## 2020-06-15 RX ADMIN — ZINC SULFATE TAB 220 MG (50 MG ZINC EQUIVALENT) 220 MILLIGRAM(S): 220 (50 ZN) TAB at 11:43

## 2020-06-15 RX ADMIN — Medication 1 TABLET(S): at 11:44

## 2020-06-15 RX ADMIN — Medication 1 APPLICATION(S): at 12:41

## 2020-06-15 RX ADMIN — Medication 1 SPRAY(S): at 05:49

## 2020-06-15 RX ADMIN — ISOSORBIDE MONONITRATE 30 MILLIGRAM(S): 60 TABLET, EXTENDED RELEASE ORAL at 11:43

## 2020-06-15 RX ADMIN — FINASTERIDE 5 MILLIGRAM(S): 5 TABLET, FILM COATED ORAL at 11:43

## 2020-06-15 RX ADMIN — CHLORHEXIDINE GLUCONATE 1 APPLICATION(S): 213 SOLUTION TOPICAL at 05:49

## 2020-06-15 RX ADMIN — Medication 1 SPRAY(S): at 17:15

## 2020-06-15 RX ADMIN — INSULIN GLARGINE 10 UNIT(S): 100 INJECTION, SOLUTION SUBCUTANEOUS at 21:24

## 2020-06-15 RX ADMIN — Medication 500 MILLIGRAM(S): at 11:43

## 2020-06-15 NOTE — DIETITIAN INITIAL EVALUATION ADULT. - OTHER INFO
80M PMHx Afib/aflutter on Coumadin, HTN, DM, COPD, DLD, CKD4, and aortic stenosis who presented to the hospital for balloon valvuloplasty of aortic valve for severe symptomatic aortic stenosis. 80M PMHx Afib/aflutter on Coumadin, HTN, DM, COPD, DLD, CKD4, and aortic stenosis who presented to the hospital for balloon valvuloplasty of aortic valve for severe symptomatic aortic stenosis.    Pt irritable and hard of hearing at time of initial assessment. Reports good PO PTA. Lives with son who does the cooking at home. Follows low sodium diet restrictions. Confirms NKFA. Denies difficulties chewing or swallowing. Per H&P, takes Multivitamin, vitamin C, and zinc sulfate at home. Son helps with diabetes management at home. Takes insulin at home. Checks fingersticks regularly, usual range 100-135mg/dl. Pt not amenable to nutrition education at this time.     In house appetite and PO intake is good (>75%) per pt and RN flow sheets. No c/o N+V, constipation or diarrhea. Last BM 6/15    Reports UBW 178lbs with no recent wt changes. ?accuracy as current adm dosing wt 207lbs. Past adm RD notes pt was 186.2lbs (March, 2020).

## 2020-06-15 NOTE — PROGRESS NOTE ADULT - SUBJECTIVE AND OBJECTIVE BOX
SUBJECTIVE:    Patient is a 80y old  Male who presents with a chief complaint of s/p BAV (12 Jun 2020 13:20)    Currently admitted to medicine with the primary diagnosis of Aortic stenosis     Today is hospital day 7d. Patient was seen and examined at bedside. This morning he is resting comfortably in bed and reports no new issues or overnight events. No current complaints. Denies CP, SOB, abdominal pain, n/v.     PAST MEDICAL & SURGICAL HISTORY  PAST MEDICAL & SURGICAL HISTORY:  Cellulitis of right lower extremity  Cellulitis of left lower extremity  History of renal insufficiency  Stenosis of left carotid artery  Aortic stenosis  Afib  BPH (benign prostatic hyperplasia)  CHF (congestive heart failure)  COPD (chronic obstructive pulmonary disease)  Diabetes  HTN (hypertension)  H/O heart artery stent  S/P CABG x 3    SOCIAL HISTORY:    ALLERGIES:  No Known Allergies    MEDICATIONS:  STANDING MEDICATIONS  ascorbic acid 500 milliGRAM(s) Oral daily  atorvastatin 40 milliGRAM(s) Oral at bedtime  budesonide 160 MICROgram(s)/formoterol 4.5 MICROgram(s) Inhaler 2 Puff(s) Inhalation two times a day  chlorhexidine 4% Liquid 1 Application(s) Topical <User Schedule>  dextrose 5%. 1000 milliLiter(s) IV Continuous <Continuous>  dextrose 50% Injectable 12.5 Gram(s) IV Push once  dextrose 50% Injectable 25 Gram(s) IV Push once  dextrose 50% Injectable 25 Gram(s) IV Push once  finasteride 5 milliGRAM(s) Oral daily  fluticasone propionate 50 MICROgram(s)/spray Nasal Spray 1 Spray(s) Both Nostrils two times a day  furosemide    Tablet 40 milliGRAM(s) Oral daily  insulin glargine Injectable (LANTUS) 10 Unit(s) SubCutaneous at bedtime  insulin lispro (HumaLOG) corrective regimen sliding scale   SubCutaneous three times a day before meals  isosorbide   mononitrate ER Tablet (IMDUR) 30 milliGRAM(s) Oral daily  metoprolol succinate ER 50 milliGRAM(s) Oral daily  multivitamin 1 Tablet(s) Oral daily  pantoprazole    Tablet 40 milliGRAM(s) Oral before breakfast  silver sulfADIAZINE 1% Cream 1 Application(s) Topical daily  tamsulosin 0.4 milliGRAM(s) Oral at bedtime  zinc sulfate 220 milliGRAM(s) Oral daily    PRN MEDICATIONS  dextrose 40% Gel 15 Gram(s) Oral once PRN  glucagon  Injectable 1 milliGRAM(s) IntraMuscular once PRN    VITALS:   T(F): 96.1  HR: 61  BP: 117/55  RR: 20  SpO2: --    LABS:                        10.5   6.72  )-----------( 125      ( 15 Hadley 2020 05:44 )             33.8     06-15    139  |  100  |  80<HH>  ----------------------------<  125<H>  4.5   |  24  |  2.6<H>    Ca    8.7      15 Hadley 2020 05:44  Mg     2.2     06-15    TPro  6.0  /  Alb  3.1<L>  /  TBili  0.5  /  DBili  x   /  AST  20  /  ALT  7   /  AlkPhos  59  06-15    PT/INR - ( 15 Hadley 2020 05:44 )   PT: 29.20 sec;   INR: 2.54 ratio                       RADIOLOGY:  < from: US Renal (05.11.20 @ 16:54) >  IMPRESSION:    No hydronephrosis.    Renal cysts.    Trace perihepatic ascites    < end of copied text >    PHYSICAL EXAM:  GENERAL: NAD, speaks in full sentences, no signs of respiratory distress  HEAD: Atraumatic  CHEST/LUNG: Clear to auscultation bilaterally; No wheeze or crackles  HEART: S1, S2; RRR; No murmurs, rubs, or gallops  ABDOMEN: BS+; Soft, Non-tender, Non-distended  EXTREMITIES:  2+ Peripheral Pulses  PSYCH: AAOx3

## 2020-06-15 NOTE — DISCHARGE NOTE PROVIDER - CARE PROVIDERS DIRECT ADDRESSES
[email or web address]

## 2020-06-15 NOTE — DIETITIAN INITIAL EVALUATION ADULT. - PERTINENT LABORATORY DATA
(6/15) BUN 80, Cr 2.6, , eGFR 22, A1c 6.4% (4/26)   CAPILLARY BLOOD GLUCOSE  POCT Blood Glucose.: 113 mg/dL (15 Hadley 2020 07:22)  POCT Blood Glucose.: 157 mg/dL (14 Jun 2020 21:46)  POCT Blood Glucose.: 188 mg/dL (14 Jun 2020 16:32)  POCT Blood Glucose.: 146 mg/dL (14 Jun 2020 11:32)

## 2020-06-15 NOTE — DISCHARGE NOTE PROVIDER - CARE PROVIDER_API CALL
Yevgeniy Maria  CARDIOVASCULAR DISEASE  [address] [place] [address]  Phone: [phone]  Fax: [phone]  Follow Up Time:     Carl Kruger  [facility]  [address] [place] [address]  Phone: [phone]  Fax: [phone]  Follow Up Time:

## 2020-06-15 NOTE — DIETITIAN INITIAL EVALUATION ADULT. - NAME AND PHONE
Nutrition Interventions: meals and snacks RD to monitor energy intake, NFPF, body comp, renal/glucose profile Nutrition Interventions: meals and snacks RD to monitor energy intake, NFPF, body comp, renal/glucose profile Goal: Pt to meet >75% po intake within 7 days

## 2020-06-15 NOTE — DISCHARGE NOTE PROVIDER - HOSPITAL COURSE
80M PMHx Afib/aflutter on Coumadin, HTN, DM, COPD, DLD, CKD, and aortic stenosis who presented to the hospital for balloon valvuloplasty of aortic valve for severe symptomatic aortic stenosis Went for a Ballon aortic valve reduction of about 25%. Patient tolerated the procedure and the cardiology team followed the patient. He was then transferred to medicine for nursing home placement. We continued his medication for atrial fibrillation. We monitored his INR daily.

## 2020-06-15 NOTE — PROGRESS NOTE ADULT - SUBJECTIVE AND OBJECTIVE BOX
SUBJECTIVE:    Patient is a 80y old Male who presents with a chief complaint of s/p BAV (12 Jun 2020 13:20)    Currently admitted to medicine with the primary diagnosis of    Today is hospital day 7d.     PAST MEDICAL & SURGICAL HISTORY  Cellulitis of right lower extremity  Cellulitis of left lower extremity  History of renal insufficiency  Stenosis of left carotid artery  Aortic stenosis  Afib  BPH (benign prostatic hyperplasia)  CHF (congestive heart failure)  COPD (chronic obstructive pulmonary disease)  Diabetes  HTN (hypertension)  H/O heart artery stent  S/P CABG x 3    ALLERGIES:  No Known Allergies    MEDICATIONS:  STANDING MEDICATIONS  ascorbic acid 500 milliGRAM(s) Oral daily  atorvastatin 40 milliGRAM(s) Oral at bedtime  budesonide 160 MICROgram(s)/formoterol 4.5 MICROgram(s) Inhaler 2 Puff(s) Inhalation two times a day  chlorhexidine 4% Liquid 1 Application(s) Topical <User Schedule>  dextrose 5%. 1000 milliLiter(s) IV Continuous <Continuous>  dextrose 50% Injectable 12.5 Gram(s) IV Push once  dextrose 50% Injectable 25 Gram(s) IV Push once  dextrose 50% Injectable 25 Gram(s) IV Push once  finasteride 5 milliGRAM(s) Oral daily  fluticasone propionate 50 MICROgram(s)/spray Nasal Spray 1 Spray(s) Both Nostrils two times a day  furosemide    Tablet 40 milliGRAM(s) Oral daily  insulin glargine Injectable (LANTUS) 10 Unit(s) SubCutaneous at bedtime  insulin lispro (HumaLOG) corrective regimen sliding scale   SubCutaneous three times a day before meals  isosorbide   mononitrate ER Tablet (IMDUR) 30 milliGRAM(s) Oral daily  metoprolol succinate ER 50 milliGRAM(s) Oral daily  multivitamin 1 Tablet(s) Oral daily  pantoprazole    Tablet 40 milliGRAM(s) Oral before breakfast  silver sulfADIAZINE 1% Cream 1 Application(s) Topical daily  tamsulosin 0.4 milliGRAM(s) Oral at bedtime  zinc sulfate 220 milliGRAM(s) Oral daily    PRN MEDICATIONS  dextrose 40% Gel 15 Gram(s) Oral once PRN  glucagon  Injectable 1 milliGRAM(s) IntraMuscular once PRN    VITALS:   T(F): 96.1  HR: 61  BP: 117/55  RR: 20  SpO2: --    LABS:                        10.5   6.72  )-----------( 125      ( 15 Hadley 2020 05:44 )             33.8     06-15    139  |  100  |  80<HH>  ----------------------------<  125<H>  4.5   |  24  |  2.6<H>    Ca    8.7      15 Hadley 2020 05:44  Mg     2.2     06-15    TPro  6.0  /  Alb  3.1<L>  /  TBili  0.5  /  DBili  x   /  AST  20  /  ALT  7   /  AlkPhos  59  06-15    PT/INR - ( 15 Hadley 2020 05:44 )   PT: 29.20 sec;   INR: 2.54 ratio                       RADIOLOGY:    PHYSICAL EXAM:  GEN: No acute distress  LUNGS: Clear to auscultation bilaterally   HEART: S1/S2 present. RRR.   ABD/ GI: Soft, non-tender, non-distended. Bowel sounds present  EXT: edema lower ext  NEURO: AAOX3

## 2020-06-15 NOTE — DIETITIAN INITIAL EVALUATION ADULT. - PHYSICAL APPEARANCE
other (specify) Ht: 66"    Wt: 207lbs    IBW: 142lbs  +/-10%    BMI: 33.5  kg/m2    %IBW: 146%  Skin: R+L lower leg venous ulcer per RN  flow sheets  Edema: (6/13) +2 R+L foot per RN flow sheets

## 2020-06-15 NOTE — DISCHARGE NOTE PROVIDER - NSDCCPCAREPLAN_GEN_ALL_CORE_FT
PRINCIPAL DISCHARGE DIAGNOSIS  Diagnosis: Aortic stenosis  Assessment and Plan of Treatment: You had a balloon valvuloplasty performed by our cardiology team due to severe aortic stenosis. Please follow up with your cardiologist one week from discharge. Please follow up with your primary care doctor.      SECONDARY DISCHARGE DIAGNOSES  Diagnosis: Atrial fibrillation  Assessment and Plan of Treatment: We continued your medication for atrial fibrillation. Please take as perscibed. Please follow up your INR levels with the coumadin clinic or your primary care physician.

## 2020-06-15 NOTE — DIETITIAN INITIAL EVALUATION ADULT. - ENERGY NEEDS
calories:  1775- 2130 kcal (25-30kcal/kg IBW) -- BMI >30  protein: 64-78g (0.9-1.1g/kg IBW) -- CKD4 considered   fluids: per LIP calories:  1605- 1765 kcal (MSJ x 1-1.1 AF) -- BMI >30  protein: 64-78g (0.9-1.1g/kg IBW) -- CKD4 considered   fluids: per LIP

## 2020-06-15 NOTE — DISCHARGE NOTE PROVIDER - NSDCFUSCHEDAPPT_GEN_ALL_CORE_FT
TAIWO ZAVALA ; 06/22/2020 ; [facility] [address] TAIWO Vieira ; 06/24/2020 ; [facility] [address]

## 2020-06-15 NOTE — DISCHARGE NOTE PROVIDER - NSDCMRMEDTOKEN_GEN_ALL_CORE_FT
Alcohol Prep with Benzocaine topical pad: Apply topically to affected area 4 times a day   ascorbic acid 500 mg oral tablet: 1 tab(s) orally once a day  budesonide-formoterol 160 mcg-4.5 mcg/inh inhalation aerosol:  inhaled   finasteride 5 mg oral tablet: 1 tab(s) orally once a day  fluticasone 50 mcg/inh nasal spray: 1 spray(s) nasal 2 times a day  furosemide 40 mg oral tablet: 1 tab(s) orally once a day  insulin glargine 100 units/mL subcutaneous solution: 20 unit(s) subcutaneous once (at bedtime)   insulin lispro 100 units/mL injectable solution: 2U if  - 200  4U if  - 250  6U if  - 300  8U if  - 350  10U if  - 400  12U iif BG &gt; Than 400  isosorbide mononitrate 30 mg oral tablet, extended release: 1 tab(s) orally once a day (in the morning)  Lipitor 40 mg oral tablet: 1 tab(s) orally once a day (at bedtime)   Metoprolol Succinate ER 50 mg oral tablet, extended release: 1 tab(s) orally once a day  Multiple Vitamins oral tablet: 1 tab(s) orally once a day  Protonix 40 mg oral delayed release tablet: 1 tab(s) orally once a day   silver sulfADIAZINE 1% topical cream: 1 application topically once a day  tamsulosin 0.4 mg oral capsule: 1 cap(s) orally once a day (at bedtime)  warfarin 3 mg oral tablet: 1 tab(s) orally once a day  zinc sulfate 220 mg oral capsule: 1 cap(s) orally once a day

## 2020-06-15 NOTE — PROGRESS NOTE ADULT - ASSESSMENT
80M PMHx Afib/aflutter on Coumadin, HTN, DM, COPD, DLD, CKD, and aortic stenosis who presented to the hospital for balloon valvuloplasty of aortic valve for severe symptomatic aortic stenosis.     #Aortic Stenosis- symptomatic, status post balloon valvuloplasty   continue with lasix  # Hypotension-resolved- off pressors now  # Symptomatic bradycardia- resolved,on toprol XL 50mg daily  TTE-50-55% EF, mod increase LV thickness, mod PVR, mild-mod AR, severe pulm HTN  # Chronic   afib---anticoagulation with  coumadin. INR 2.5  # DM--n sliding scale with coverage wih meals and QHS   # chronic obstructive lung disease-- stable  # CKD4 at baseline  #BPH -on tamsulosin and finasteride.  Dyslipidemia  continue with statin      pending SNF placement. COVID sent yesterday

## 2020-06-16 PROBLEM — L03.115 CELLULITIS OF RIGHT LOWER LIMB: Chronic | Status: ACTIVE | Noted: 2020-06-08

## 2020-06-16 PROBLEM — Z87.448 PERSONAL HISTORY OF OTHER DISEASES OF URINARY SYSTEM: Chronic | Status: ACTIVE | Noted: 2020-06-08

## 2020-06-16 PROBLEM — I35.0 NONRHEUMATIC AORTIC (VALVE) STENOSIS: Chronic | Status: ACTIVE | Noted: 2020-06-08

## 2020-06-16 PROBLEM — L03.116 CELLULITIS OF LEFT LOWER LIMB: Chronic | Status: ACTIVE | Noted: 2020-06-08

## 2020-06-16 PROBLEM — I65.22 OCCLUSION AND STENOSIS OF LEFT CAROTID ARTERY: Chronic | Status: ACTIVE | Noted: 2020-06-08

## 2020-06-16 LAB
ALBUMIN SERPL ELPH-MCNC: 3.2 G/DL — LOW (ref 3.5–5.2)
ALP SERPL-CCNC: 63 U/L — SIGNIFICANT CHANGE UP (ref 30–115)
ALT FLD-CCNC: 8 U/L — SIGNIFICANT CHANGE UP (ref 0–41)
ANION GAP SERPL CALC-SCNC: 14 MMOL/L — SIGNIFICANT CHANGE UP (ref 7–14)
AST SERPL-CCNC: 22 U/L — SIGNIFICANT CHANGE UP (ref 0–41)
BILIRUB SERPL-MCNC: 0.7 MG/DL — SIGNIFICANT CHANGE UP (ref 0.2–1.2)
BUN SERPL-MCNC: 86 MG/DL — CRITICAL HIGH (ref 10–20)
CALCIUM SERPL-MCNC: 8.7 MG/DL — SIGNIFICANT CHANGE UP (ref 8.5–10.1)
CHLORIDE SERPL-SCNC: 100 MMOL/L — SIGNIFICANT CHANGE UP (ref 98–110)
CO2 SERPL-SCNC: 23 MMOL/L — SIGNIFICANT CHANGE UP (ref 17–32)
CREAT SERPL-MCNC: 2.7 MG/DL — HIGH (ref 0.7–1.5)
GLUCOSE BLDC GLUCOMTR-MCNC: 134 MG/DL — HIGH (ref 70–99)
GLUCOSE BLDC GLUCOMTR-MCNC: 135 MG/DL — HIGH (ref 70–99)
GLUCOSE BLDC GLUCOMTR-MCNC: 175 MG/DL — HIGH (ref 70–99)
GLUCOSE BLDC GLUCOMTR-MCNC: 200 MG/DL — HIGH (ref 70–99)
GLUCOSE BLDC GLUCOMTR-MCNC: 201 MG/DL — HIGH (ref 70–99)
GLUCOSE SERPL-MCNC: 133 MG/DL — HIGH (ref 70–99)
HCT VFR BLD CALC: 34.8 % — LOW (ref 42–52)
HGB BLD-MCNC: 11.4 G/DL — LOW (ref 14–18)
INR BLD: 2.56 RATIO — HIGH (ref 0.65–1.3)
MAGNESIUM SERPL-MCNC: 2.2 MG/DL — SIGNIFICANT CHANGE UP (ref 1.8–2.4)
MCHC RBC-ENTMCNC: 29.7 PG — SIGNIFICANT CHANGE UP (ref 27–31)
MCHC RBC-ENTMCNC: 32.8 G/DL — SIGNIFICANT CHANGE UP (ref 32–37)
MCV RBC AUTO: 90.6 FL — SIGNIFICANT CHANGE UP (ref 80–94)
NRBC # BLD: 0 /100 WBCS — SIGNIFICANT CHANGE UP (ref 0–0)
PLATELET # BLD AUTO: 132 K/UL — SIGNIFICANT CHANGE UP (ref 130–400)
POTASSIUM SERPL-MCNC: 5.2 MMOL/L — HIGH (ref 3.5–5)
POTASSIUM SERPL-SCNC: 5.2 MMOL/L — HIGH (ref 3.5–5)
PROT SERPL-MCNC: 6 G/DL — SIGNIFICANT CHANGE UP (ref 6–8)
PROTHROM AB SERPL-ACNC: 29.4 SEC — HIGH (ref 9.95–12.87)
RBC # BLD: 3.84 M/UL — LOW (ref 4.7–6.1)
RBC # FLD: 15.6 % — HIGH (ref 11.5–14.5)
SODIUM SERPL-SCNC: 137 MMOL/L — SIGNIFICANT CHANGE UP (ref 135–146)
WBC # BLD: 6.94 K/UL — SIGNIFICANT CHANGE UP (ref 4.8–10.8)
WBC # FLD AUTO: 6.94 K/UL — SIGNIFICANT CHANGE UP (ref 4.8–10.8)

## 2020-06-16 PROCEDURE — 99232 SBSQ HOSP IP/OBS MODERATE 35: CPT

## 2020-06-16 RX ORDER — WARFARIN SODIUM 2.5 MG/1
2 TABLET ORAL ONCE
Refills: 0 | Status: COMPLETED | OUTPATIENT
Start: 2020-06-16 | End: 2020-06-16

## 2020-06-16 RX ADMIN — Medication 50 MILLIGRAM(S): at 06:23

## 2020-06-16 RX ADMIN — WARFARIN SODIUM 2 MILLIGRAM(S): 2.5 TABLET ORAL at 22:00

## 2020-06-16 RX ADMIN — BUDESONIDE AND FORMOTEROL FUMARATE DIHYDRATE 2 PUFF(S): 160; 4.5 AEROSOL RESPIRATORY (INHALATION) at 22:03

## 2020-06-16 RX ADMIN — BUDESONIDE AND FORMOTEROL FUMARATE DIHYDRATE 2 PUFF(S): 160; 4.5 AEROSOL RESPIRATORY (INHALATION) at 07:54

## 2020-06-16 RX ADMIN — Medication 1 SPRAY(S): at 16:56

## 2020-06-16 RX ADMIN — PANTOPRAZOLE SODIUM 40 MILLIGRAM(S): 20 TABLET, DELAYED RELEASE ORAL at 06:23

## 2020-06-16 RX ADMIN — Medication 500 MILLIGRAM(S): at 11:45

## 2020-06-16 RX ADMIN — Medication 1 TABLET(S): at 11:45

## 2020-06-16 RX ADMIN — Medication 4: at 11:45

## 2020-06-16 RX ADMIN — Medication 40 MILLIGRAM(S): at 06:24

## 2020-06-16 RX ADMIN — Medication 1 SPRAY(S): at 06:25

## 2020-06-16 RX ADMIN — ZINC SULFATE TAB 220 MG (50 MG ZINC EQUIVALENT) 220 MILLIGRAM(S): 220 (50 ZN) TAB at 11:45

## 2020-06-16 RX ADMIN — INSULIN GLARGINE 10 UNIT(S): 100 INJECTION, SOLUTION SUBCUTANEOUS at 22:00

## 2020-06-16 RX ADMIN — FINASTERIDE 5 MILLIGRAM(S): 5 TABLET, FILM COATED ORAL at 11:45

## 2020-06-16 RX ADMIN — Medication 1 APPLICATION(S): at 11:45

## 2020-06-16 RX ADMIN — Medication 2: at 16:56

## 2020-06-16 RX ADMIN — ISOSORBIDE MONONITRATE 30 MILLIGRAM(S): 60 TABLET, EXTENDED RELEASE ORAL at 11:45

## 2020-06-16 RX ADMIN — ATORVASTATIN CALCIUM 40 MILLIGRAM(S): 80 TABLET, FILM COATED ORAL at 22:00

## 2020-06-16 RX ADMIN — TAMSULOSIN HYDROCHLORIDE 0.4 MILLIGRAM(S): 0.4 CAPSULE ORAL at 22:00

## 2020-06-16 NOTE — PROGRESS NOTE ADULT - SUBJECTIVE AND OBJECTIVE BOX
SUBJECTIVE:    Patient is a 80y old  Male who presents with a chief complaint of s/p BAV (12 Jun 2020 13:20)    Currently admitted to medicine with the primary diagnosis of Aortic stenosis     Today is hospital day 8d. Patient was seen and examined at bedside. This morning he is resting comfortably in bed and reports no new issues or overnight events. No current complaints. Pending SNF placement.     PAST MEDICAL & SURGICAL HISTORY  PAST MEDICAL & SURGICAL HISTORY:  Cellulitis of right lower extremity  Cellulitis of left lower extremity  History of renal insufficiency  Stenosis of left carotid artery  Aortic stenosis  Afib  BPH (benign prostatic hyperplasia)  CHF (congestive heart failure)  COPD (chronic obstructive pulmonary disease)  Diabetes  HTN (hypertension)  H/O heart artery stent  S/P CABG x 3    SOCIAL HISTORY:    ALLERGIES:  No Known Allergies    MEDICATIONS:  STANDING MEDICATIONS  ascorbic acid 500 milliGRAM(s) Oral daily  atorvastatin 40 milliGRAM(s) Oral at bedtime  budesonide 160 MICROgram(s)/formoterol 4.5 MICROgram(s) Inhaler 2 Puff(s) Inhalation two times a day  chlorhexidine 4% Liquid 1 Application(s) Topical <User Schedule>  dextrose 5%. 1000 milliLiter(s) IV Continuous <Continuous>  dextrose 50% Injectable 12.5 Gram(s) IV Push once  dextrose 50% Injectable 25 Gram(s) IV Push once  dextrose 50% Injectable 25 Gram(s) IV Push once  finasteride 5 milliGRAM(s) Oral daily  fluticasone propionate 50 MICROgram(s)/spray Nasal Spray 1 Spray(s) Both Nostrils two times a day  furosemide    Tablet 40 milliGRAM(s) Oral daily  insulin glargine Injectable (LANTUS) 10 Unit(s) SubCutaneous at bedtime  insulin lispro (HumaLOG) corrective regimen sliding scale   SubCutaneous three times a day before meals  isosorbide   mononitrate ER Tablet (IMDUR) 30 milliGRAM(s) Oral daily  metoprolol succinate ER 50 milliGRAM(s) Oral daily  multivitamin 1 Tablet(s) Oral daily  pantoprazole    Tablet 40 milliGRAM(s) Oral before breakfast  silver sulfADIAZINE 1% Cream 1 Application(s) Topical daily  tamsulosin 0.4 milliGRAM(s) Oral at bedtime  zinc sulfate 220 milliGRAM(s) Oral daily    PRN MEDICATIONS  dextrose 40% Gel 15 Gram(s) Oral once PRN  glucagon  Injectable 1 milliGRAM(s) IntraMuscular once PRN    VITALS:   T(F): 97.8  HR: 56  BP: 103/46  RR: 18  SpO2: 97%    LABS:                        11.4   6.94  )-----------( 132      ( 16 Jun 2020 06:17 )             34.8     06-16    137  |  100  |  86<HH>  ----------------------------<  133<H>  5.2<H>   |  23  |  2.7<H>    Ca    8.7      16 Jun 2020 06:17  Mg     2.2     06-16    TPro  6.0  /  Alb  3.2<L>  /  TBili  0.7  /  DBili  x   /  AST  22  /  ALT  8   /  AlkPhos  63  06-16    PT/INR - ( 16 Jun 2020 06:17 )   PT: 29.40 sec;   INR: 2.56 ratio                       RADIOLOGY:    PHYSICAL EXAM:  GENERAL: NAD, speaks in full sentences, no signs of respiratory distress  HEAD: Atraumatic  NECK: Supple  CHEST/LUNG: Clear to auscultation bilaterally; No wheeze or crackles  HEART: S1, S2; RRR; No murmurs, rubs, or gallops  ABDOMEN: BS+; Soft, Non-tender, Non-distended  EXTREMITIES:  2+ Peripheral Pulses  PSYCH: AAOx3

## 2020-06-16 NOTE — PROGRESS NOTE ADULT - ASSESSMENT
80M PMHx Afib/aflutter on Coumadin, HTN, DM, COPD, DLD, CKD, and aortic stenosis who presented to the hospital for balloon valvuloplasty of aortic valve for severe symptomatic aortic stenosis.      # Symptomatic Aortic Stenosis  - S/p BAV, ~25% reduction  - 6/11/20 echo: 50-55% EF, mod increase LV thickness, mod PVR, mild-mod AR, severe pulm HTN  - C/w 40mg po lasix qd  - d/c tele   - Pending SNF placement     # Atrial fibrillation / atrial flutter  - C/w coumadin 5mg qhs  - F/u INR daily    # HTN  - Overnight was hypotensive, required levophed  - Now BP stable  - Restart metoprolol when HR improves ~80s    # DM  - C/w 10 units lantus qhs and sliding scale    # COPD  - C/w symbicort  - C/w flonase  - Stable    # CKD  - Stable    # BPH  - C/w tamsulosin, finasteride    # DLD  - C/w atorvastatin    # DVT ppx: Coumadin  # GI ppx: protonix  # Diet: DASH, consistent carbohydrate    For follow up:  - Monitor INR daily  - SNF placement 80M PMHx Afib/aflutter on Coumadin, HTN, DM, COPD, DLD, CKD, and aortic stenosis who presented to the hospital for balloon valvuloplasty of aortic valve for severe symptomatic aortic stenosis.      # Symptomatic Aortic Stenosis  - S/p BAV, ~25% reduction  - 6/11/20 echo: 50-55% EF, mod increase LV thickness, mod PVR, mild-mod AR, severe pulm HTN  - C/w 40mg po lasix qd  - d/c tele   - Pending SNF placement     # Atrial fibrillation / atrial flutter  - C/w coumadin 5mg qhs  - F/u INR daily    # HTN  - - Now BP stable  - Restart metoprolol when HR improves ~80s    # DM  - C/w 10 units lantus qhs and sliding scale    # COPD  - C/w symbicort  - C/w flonase  - Stable    # CKD  - Stable    # BPH  - C/w tamsulosin, finasteride    # DLD  - C/w atorvastatin    # DVT ppx: Coumadin  # GI ppx: protonix  # Diet: DASH, consistent carbohydrate    For follow up:  - Monitor INR daily  - SNF placement

## 2020-06-16 NOTE — PROGRESS NOTE ADULT - ATTENDING COMMENTS
Patient seen and examined independently. Agree with resident note.  # symptomatic aortic stenosis--s/p balloon valvuloplasty-- now on po lasix.  #atrial fibrillation-- on coumadin.  # s/p hypotension due to cardiogenic shock-- got levophed for 3 days on 6/10---6/13.  #Chr diastolic heart failure.  # Chr resp failure sec to COPD on home oxygen  # decubitus ulcer-- ischium of pelvis and lt gluteal region stage 2.  # CKD 4 stable.  Dc home today--spent more than 30mins. Patient seen and examined independently. Agree with resident note.  # symptomatic aortic stenosis--s/p balloon valvuloplasty-- now on po lasix. To follow up with Dr Maria as outpatient.  #atrial fibrillation-- on coumadin.  # s/p hypotension due to cardiogenic shock-- got levophed for 3 days on 6/10---6/13.  #Chr diastolic heart failure.  # Chr resp failure sec to COPD on home oxygen  # decubitus ulcer-- ischium of pelvis and lt gluteal region stage 2.  # CKD 4 stable.  Dc home today--spent more than 30mins.

## 2020-06-17 LAB
GLUCOSE BLDC GLUCOMTR-MCNC: 121 MG/DL — HIGH (ref 70–99)
GLUCOSE BLDC GLUCOMTR-MCNC: 127 MG/DL — HIGH (ref 70–99)
GLUCOSE BLDC GLUCOMTR-MCNC: 169 MG/DL — HIGH (ref 70–99)
GLUCOSE BLDC GLUCOMTR-MCNC: 172 MG/DL — HIGH (ref 70–99)
GLUCOSE BLDC GLUCOMTR-MCNC: 207 MG/DL — HIGH (ref 70–99)
INR BLD: 2.31 RATIO — HIGH (ref 0.65–1.3)
PROTHROM AB SERPL-ACNC: 26.6 SEC — HIGH (ref 9.95–12.87)

## 2020-06-17 PROCEDURE — 99233 SBSQ HOSP IP/OBS HIGH 50: CPT

## 2020-06-17 RX ORDER — WARFARIN SODIUM 2.5 MG/1
2 TABLET ORAL ONCE
Refills: 0 | Status: COMPLETED | OUTPATIENT
Start: 2020-06-17 | End: 2020-06-17

## 2020-06-17 RX ADMIN — Medication 4: at 11:35

## 2020-06-17 RX ADMIN — INSULIN GLARGINE 10 UNIT(S): 100 INJECTION, SOLUTION SUBCUTANEOUS at 21:53

## 2020-06-17 RX ADMIN — ISOSORBIDE MONONITRATE 30 MILLIGRAM(S): 60 TABLET, EXTENDED RELEASE ORAL at 11:36

## 2020-06-17 RX ADMIN — Medication 50 MILLIGRAM(S): at 05:22

## 2020-06-17 RX ADMIN — TAMSULOSIN HYDROCHLORIDE 0.4 MILLIGRAM(S): 0.4 CAPSULE ORAL at 21:53

## 2020-06-17 RX ADMIN — Medication 1 SPRAY(S): at 05:23

## 2020-06-17 RX ADMIN — ZINC SULFATE TAB 220 MG (50 MG ZINC EQUIVALENT) 220 MILLIGRAM(S): 220 (50 ZN) TAB at 11:36

## 2020-06-17 RX ADMIN — Medication 1 SPRAY(S): at 17:29

## 2020-06-17 RX ADMIN — Medication 1 APPLICATION(S): at 11:37

## 2020-06-17 RX ADMIN — Medication 1 TABLET(S): at 11:36

## 2020-06-17 RX ADMIN — BUDESONIDE AND FORMOTEROL FUMARATE DIHYDRATE 2 PUFF(S): 160; 4.5 AEROSOL RESPIRATORY (INHALATION) at 21:52

## 2020-06-17 RX ADMIN — Medication 2: at 17:29

## 2020-06-17 RX ADMIN — PANTOPRAZOLE SODIUM 40 MILLIGRAM(S): 20 TABLET, DELAYED RELEASE ORAL at 05:24

## 2020-06-17 RX ADMIN — Medication 40 MILLIGRAM(S): at 05:22

## 2020-06-17 RX ADMIN — ATORVASTATIN CALCIUM 40 MILLIGRAM(S): 80 TABLET, FILM COATED ORAL at 21:52

## 2020-06-17 RX ADMIN — Medication 500 MILLIGRAM(S): at 11:36

## 2020-06-17 RX ADMIN — WARFARIN SODIUM 2 MILLIGRAM(S): 2.5 TABLET ORAL at 21:53

## 2020-06-17 RX ADMIN — FINASTERIDE 5 MILLIGRAM(S): 5 TABLET, FILM COATED ORAL at 11:36

## 2020-06-17 RX ADMIN — BUDESONIDE AND FORMOTEROL FUMARATE DIHYDRATE 2 PUFF(S): 160; 4.5 AEROSOL RESPIRATORY (INHALATION) at 07:55

## 2020-06-17 NOTE — PROGRESS NOTE ADULT - ASSESSMENT
80M PMHx Afib/aflutter on Coumadin, HTN, DM, COPD, DLD, CKD, and aortic stenosis who presented to the hospital for balloon valvuloplasty of aortic valve for severe symptomatic aortic stenosis.    #plan  -Pending placement  - COVID 19 PCR negative 6/14  - resent covid 19 PCR swab 6/17    # Symptomatic Aortic Stenosis  - S/p BAV, ~25% reduction  - 6/11/20 echo: 50-55% EF, mod increase LV thickness, mod PVR, mild-mod AR, severe pulm HTN  - C/w 40mg po lasix qd  - d/c tele   - Pending SNF placement     # Atrial fibrillation / atrial flutter  - C/w coumadin 5mg qhs  - F/u INR daily    # HTN  - - Now BP stable  - Restart metoprolol when HR improves ~80s    # DM  - C/w 10 units lantus qhs and sliding scale    # COPD  - C/w symbicort  - C/w flonase  - Stable    # CKD  - Stable    # BPH  - C/w tamsulosin, finasteride    # DLD  - C/w atorvastatin    # DVT ppx: Coumadin  # GI ppx: protonix  # Diet: DASH, consistent carbohydrate    For follow up:  - Monitor INR daily  - SNF placement

## 2020-06-17 NOTE — PROGRESS NOTE ADULT - SUBJECTIVE AND OBJECTIVE BOX
SUBJECTIVE:    Patient is a 80y old  Male who presents with a chief complaint of s/p BAV (12 Jun 2020 13:20)    Currently admitted to medicine with the primary diagnosis of Aortic stenosis     Today is hospital day 9d. Patient was seen and examined at bedside. This morning he is resting comfortably in bed and reports no new issues or overnight events. Denies any current complaints. Denies CP, SOB, abdominal pain, n/v, or changes in bowel habits.     PAST MEDICAL & SURGICAL HISTORY  PAST MEDICAL & SURGICAL HISTORY:  Cellulitis of right lower extremity  Cellulitis of left lower extremity  History of renal insufficiency  Stenosis of left carotid artery  Aortic stenosis  Afib  BPH (benign prostatic hyperplasia)  CHF (congestive heart failure)  COPD (chronic obstructive pulmonary disease)  Diabetes  HTN (hypertension)  H/O heart artery stent  S/P CABG x 3    SOCIAL HISTORY:    ALLERGIES:  No Known Allergies    MEDICATIONS:  STANDING MEDICATIONS  ascorbic acid 500 milliGRAM(s) Oral daily  atorvastatin 40 milliGRAM(s) Oral at bedtime  budesonide 160 MICROgram(s)/formoterol 4.5 MICROgram(s) Inhaler 2 Puff(s) Inhalation two times a day  chlorhexidine 4% Liquid 1 Application(s) Topical <User Schedule>  dextrose 5%. 1000 milliLiter(s) IV Continuous <Continuous>  dextrose 50% Injectable 12.5 Gram(s) IV Push once  dextrose 50% Injectable 25 Gram(s) IV Push once  dextrose 50% Injectable 25 Gram(s) IV Push once  finasteride 5 milliGRAM(s) Oral daily  fluticasone propionate 50 MICROgram(s)/spray Nasal Spray 1 Spray(s) Both Nostrils two times a day  furosemide    Tablet 40 milliGRAM(s) Oral daily  insulin glargine Injectable (LANTUS) 10 Unit(s) SubCutaneous at bedtime  insulin lispro (HumaLOG) corrective regimen sliding scale   SubCutaneous three times a day before meals  isosorbide   mononitrate ER Tablet (IMDUR) 30 milliGRAM(s) Oral daily  metoprolol succinate ER 50 milliGRAM(s) Oral daily  multivitamin 1 Tablet(s) Oral daily  pantoprazole    Tablet 40 milliGRAM(s) Oral before breakfast  silver sulfADIAZINE 1% Cream 1 Application(s) Topical daily  tamsulosin 0.4 milliGRAM(s) Oral at bedtime  warfarin 2 milliGRAM(s) Oral once  zinc sulfate 220 milliGRAM(s) Oral daily    PRN MEDICATIONS  dextrose 40% Gel 15 Gram(s) Oral once PRN  glucagon  Injectable 1 milliGRAM(s) IntraMuscular once PRN    VITALS:   T(F): 97.6  HR: 59  BP: 137/62  RR: 18  SpO2: 98%    LABS:                        11.4   6.94  )-----------( 132      ( 16 Jun 2020 06:17 )             34.8     06-16    137  |  100  |  86<HH>  ----------------------------<  133<H>  5.2<H>   |  23  |  2.7<H>    Ca    8.7      16 Jun 2020 06:17  Mg     2.2     06-16    TPro  6.0  /  Alb  3.2<L>  /  TBili  0.7  /  DBili  x   /  AST  22  /  ALT  8   /  AlkPhos  63  06-16    PT/INR - ( 17 Jun 2020 05:14 )   PT: 26.60 sec;   INR: 2.31 ratio                       RADIOLOGY:  < from: US Renal (05.11.20 @ 16:54) >    No hydronephrosis.    Renal cysts.    Trace perihepatic ascites    < end of copied text >    PHYSICAL EXAM:  GENERAL: NAD, speaks in full sentences, no signs of respiratory distress  HEAD: Atraumatic  NECK: Supple  CHEST/LUNG: Clear to auscultation bilaterally  HEART: S1, S2; RRR; No murmurs, rubs, or gallops  ABDOMEN: BS+; Soft, Non-tender, Non-distended  EXTREMITIES:  2+ Peripheral Pulses  PSYCH: AAOx3

## 2020-06-17 NOTE — PROGRESS NOTE ADULT - ATTENDING COMMENTS
Patient seen and examined independently. I agree with the resident's note, physical exam, and plan except as below.  Vital Signs Last 24 Hrs  T(C): 36.2 (17 Jun 2020 12:47), Max: 36.4 (16 Jun 2020 20:20)  T(F): 97.1 (17 Jun 2020 12:47), Max: 97.6 (16 Jun 2020 20:20)  HR: 68 (17 Jun 2020 12:47) (57 - 68)  BP: 120/70 (17 Jun 2020 12:47) (106/54 - 137/62)  BP(mean): --  RR: 18 (17 Jun 2020 12:47) (18 - 18)  SpO2: 98% (17 Jun 2020 09:13) (98% - 98%)  PE  nad  aaox3  morbidl obese  [id number]  ctabl  softntnd+bs  edema bilat , wrapped no purulence    #Symptomatic AS - sp BAV with 25% reduction in gradient - cont lasix - asymptomatic at this time  follow up withdr maniatits outpt  #chronic afib - on couamdin - inr therapeutic   #HTn - stable -cont current meds    stable to dc from hospital - sp COVID swab this am - awaiting results to go to SNF  dc if result negative and bed available  discussed with pt and team

## 2020-06-18 ENCOUNTER — TRANSCRIPTION ENCOUNTER (OUTPATIENT)
Age: 80
End: 2020-06-18

## 2020-06-18 VITALS — SYSTOLIC BLOOD PRESSURE: 118 MMHG | DIASTOLIC BLOOD PRESSURE: 54 MMHG | HEART RATE: 61 BPM

## 2020-06-18 LAB
GLUCOSE BLDC GLUCOMTR-MCNC: 115 MG/DL — HIGH (ref 70–99)
GLUCOSE BLDC GLUCOMTR-MCNC: 164 MG/DL — HIGH (ref 70–99)
INR BLD: 2.33 RATIO — HIGH (ref 0.65–1.3)
PROTHROM AB SERPL-ACNC: 26.8 SEC — HIGH (ref 9.95–12.87)
SARS-COV-2 RNA SPEC QL NAA+PROBE: SIGNIFICANT CHANGE UP

## 2020-06-18 PROCEDURE — 99239 HOSP IP/OBS DSCHRG MGMT >30: CPT

## 2020-06-18 RX ORDER — WARFARIN SODIUM 2.5 MG/1
2 TABLET ORAL ONCE
Refills: 0 | Status: DISCONTINUED | OUTPATIENT
Start: 2020-06-18 | End: 2020-06-18

## 2020-06-18 RX ADMIN — Medication 500 MILLIGRAM(S): at 11:13

## 2020-06-18 RX ADMIN — PANTOPRAZOLE SODIUM 40 MILLIGRAM(S): 20 TABLET, DELAYED RELEASE ORAL at 05:17

## 2020-06-18 RX ADMIN — Medication 50 MILLIGRAM(S): at 05:17

## 2020-06-18 RX ADMIN — ISOSORBIDE MONONITRATE 30 MILLIGRAM(S): 60 TABLET, EXTENDED RELEASE ORAL at 11:13

## 2020-06-18 RX ADMIN — Medication 1 TABLET(S): at 11:15

## 2020-06-18 RX ADMIN — FINASTERIDE 5 MILLIGRAM(S): 5 TABLET, FILM COATED ORAL at 11:13

## 2020-06-18 RX ADMIN — Medication 1 SPRAY(S): at 05:17

## 2020-06-18 RX ADMIN — BUDESONIDE AND FORMOTEROL FUMARATE DIHYDRATE 2 PUFF(S): 160; 4.5 AEROSOL RESPIRATORY (INHALATION) at 07:59

## 2020-06-18 RX ADMIN — Medication 1 APPLICATION(S): at 11:15

## 2020-06-18 RX ADMIN — Medication 2: at 11:24

## 2020-06-18 RX ADMIN — Medication 40 MILLIGRAM(S): at 05:17

## 2020-06-18 RX ADMIN — ZINC SULFATE TAB 220 MG (50 MG ZINC EQUIVALENT) 220 MILLIGRAM(S): 220 (50 ZN) TAB at 11:13

## 2020-06-18 NOTE — PROGRESS NOTE ADULT - ATTENDING COMMENTS
Patient seen and examined independently. I agree with the resident's note, physical exam, and plan except as below.  Vital Signs Last 24 Hrs  T(C): 35.7 (18 Jun 2020 05:32), Max: 36.1 (17 Jun 2020 20:18)  T(F): 96.3 (18 Jun 2020 05:32), Max: 96.9 (17 Jun 2020 20:18)  HR: 61 (18 Jun 2020 11:23) (58 - 61)  BP: 118/54 (18 Jun 2020 11:23) (111/53 - 121/56)  BP(mean): --  RR: 20 (18 Jun 2020 07:30) (18 - 20)  SpO2: 99% (18 Jun 2020 07:30) (98% - 99%)  PE  nad  aaox3  morbidl obese  [id number]  ctabl  softntnd+bs  edema bilat , wrapped no purulence    #Symptomatic AS - sp BAV with 25% reduction in gradient - cont lasix - asymptomatic at this time  follow up withdr tammie outpt  #chronic afib - on couamdin - inr therapeutic   #HTn - stable -cont current meds    stable to dc from hospital - negative COVID swab -   meds reviewed   pt cousnlled  PMD miguel ángel  cardio maniatis   time spent 35 mins

## 2020-06-18 NOTE — PROGRESS NOTE ADULT - SUBJECTIVE AND OBJECTIVE BOX
SUBJECTIVE:    Patient is a 80y old  Male who presents with a chief complaint of s/p BAV (12 Jun 2020 13:20)    Currently admitted to medicine with the primary diagnosis of Aortic stenosis     Today is hospital day 10d. Patient was seen and examined at bedside. This morning he is resting comfortably in bed and reports no new issues or overnight events. No current complaints.     PAST MEDICAL & SURGICAL HISTORY  PAST MEDICAL & SURGICAL HISTORY:  Cellulitis of right lower extremity  Cellulitis of left lower extremity  History of renal insufficiency  Stenosis of left carotid artery  Aortic stenosis  Afib  BPH (benign prostatic hyperplasia)  CHF (congestive heart failure)  COPD (chronic obstructive pulmonary disease)  Diabetes  HTN (hypertension)  H/O heart artery stent  S/P CABG x 3    SOCIAL HISTORY:    ALLERGIES:  No Known Allergies    MEDICATIONS:  STANDING MEDICATIONS  ascorbic acid 500 milliGRAM(s) Oral daily  atorvastatin 40 milliGRAM(s) Oral at bedtime  budesonide 160 MICROgram(s)/formoterol 4.5 MICROgram(s) Inhaler 2 Puff(s) Inhalation two times a day  chlorhexidine 4% Liquid 1 Application(s) Topical <User Schedule>  dextrose 5%. 1000 milliLiter(s) IV Continuous <Continuous>  dextrose 50% Injectable 12.5 Gram(s) IV Push once  dextrose 50% Injectable 25 Gram(s) IV Push once  dextrose 50% Injectable 25 Gram(s) IV Push once  finasteride 5 milliGRAM(s) Oral daily  fluticasone propionate 50 MICROgram(s)/spray Nasal Spray 1 Spray(s) Both Nostrils two times a day  furosemide    Tablet 40 milliGRAM(s) Oral daily  insulin glargine Injectable (LANTUS) 10 Unit(s) SubCutaneous at bedtime  insulin lispro (HumaLOG) corrective regimen sliding scale   SubCutaneous three times a day before meals  isosorbide   mononitrate ER Tablet (IMDUR) 30 milliGRAM(s) Oral daily  metoprolol succinate ER 50 milliGRAM(s) Oral daily  multivitamin 1 Tablet(s) Oral daily  pantoprazole    Tablet 40 milliGRAM(s) Oral before breakfast  silver sulfADIAZINE 1% Cream 1 Application(s) Topical daily  tamsulosin 0.4 milliGRAM(s) Oral at bedtime  warfarin 2 milliGRAM(s) Oral once  zinc sulfate 220 milliGRAM(s) Oral daily    PRN MEDICATIONS  dextrose 40% Gel 15 Gram(s) Oral once PRN  glucagon  Injectable 1 milliGRAM(s) IntraMuscular once PRN    VITALS:   T(F): 96.3  HR: 59  BP: 121/56  RR: 20  SpO2: 99%    LABS:          PT/INR - ( 18 Jun 2020 05:32 )   PT: 26.80 sec;   INR: 2.33 ratio                       RADIOLOGY:  < from: US Renal (05.11.20 @ 16:54) >  No hydronephrosis.    Renal cysts.    Trace perihepatic ascites      < end of copied text >    PHYSICAL EXAM:  GENERAL: NAD, speaks in full sentences, no signs of respiratory distress  HEAD: Atraumatic  CHEST/LUNG: Clear to auscultation bilaterally; No wheeze or crackles  HEART: S1, S2; RRR; No murmurs, rubs, or gallops

## 2020-06-18 NOTE — DISCHARGE NOTE NURSING/CASE MANAGEMENT/SOCIAL WORK - PATIENT PORTAL LINK FT
You can access the FollowMyHealth Patient Portal offered by [facility] by registering at the following website: [email or web address]. By joining [employer]’s FollowMyHealth portal, you will also be able to view your health information using other applications (apps) compatible with our system.

## 2020-06-18 NOTE — PROGRESS NOTE ADULT - ASSESSMENT
80M PMHx Afib/aflutter on Coumadin, HTN, DM, COPD, DLD, CKD, and aortic stenosis who presented to the hospital for balloon valvuloplasty of aortic valve for severe symptomatic aortic stenosis.    #plan  -Pending placement  - COVID 19 PCR negative 6/14  - covid 19 PCR swab negative 6/17     # Symptomatic Aortic Stenosis  - S/p BAV, ~25% reduction  - 6/11/20 echo: 50-55% EF, mod increase LV thickness, mod PVR, mild-mod AR, severe pulm HTN  - C/w 40mg po lasix qd  - d/c tele   - Pending SNF placement     # Atrial fibrillation / atrial flutter  - C/w coumadin 5mg qhs  - F/u INR daily    # HTN  - - Now BP stable  - Restart metoprolol when HR improves ~80s    # DM  - C/w 10 units lantus qhs and sliding scale    # COPD  - C/w symbicort  - C/w flonase  - Stable    # CKD  - Stable    # BPH  - C/w tamsulosin, finasteride    # DLD  - C/w atorvastatin    # DVT ppx: Coumadin  # GI ppx: protonix  # Diet: DASH, consistent carbohydrate    For follow up:  - Monitor INR daily  - SNF placement

## 2020-06-22 ENCOUNTER — APPOINTMENT (OUTPATIENT)
Dept: NEUROSURGERY | Facility: CLINIC | Age: 80
End: 2020-06-22
Payer: MEDICARE

## 2020-06-22 DIAGNOSIS — I61.5 NONTRAUMATIC INTRACEREBRAL HEMORRHAGE, INTRAVENTRICULAR: ICD-10-CM

## 2020-06-22 PROCEDURE — 99213 OFFICE O/P EST LOW 20 MIN: CPT

## 2020-06-22 NOTE — ASSESSMENT
[FreeTextEntry1] : There is no neurosurgical intervention indicated at this time. I will see him back in the office as needed.

## 2020-06-22 NOTE — HISTORY OF PRESENT ILLNESS
[FreeTextEntry1] : Mr. Okeefe presented to the ER in April s/p fall. He was found to have an intraventricular hematoma. CTA was negative for vascular malformation. Today, his condition remains stable. He denies acute complaints. He currently resides at [facility]. He is on O2 nasal canula which he states his baseline for him.

## 2020-06-23 DIAGNOSIS — E78.5 HYPERLIPIDEMIA, UNSPECIFIED: ICD-10-CM

## 2020-06-23 DIAGNOSIS — Z87.891 PERSONAL HISTORY OF NICOTINE DEPENDENCE: ICD-10-CM

## 2020-06-23 DIAGNOSIS — I48.20 CHRONIC ATRIAL FIBRILLATION, UNSPECIFIED: ICD-10-CM

## 2020-06-23 DIAGNOSIS — N18.4 CHRONIC KIDNEY DISEASE, STAGE 4 (SEVERE): ICD-10-CM

## 2020-06-23 DIAGNOSIS — Z95.1 PRESENCE OF AORTOCORONARY BYPASS GRAFT: ICD-10-CM

## 2020-06-23 DIAGNOSIS — I35.2 NONRHEUMATIC AORTIC (VALVE) STENOSIS WITH INSUFFICIENCY: ICD-10-CM

## 2020-06-23 DIAGNOSIS — Z11.59 ENCOUNTER FOR SCREENING FOR OTHER VIRAL DISEASES: ICD-10-CM

## 2020-06-23 DIAGNOSIS — I45.2 BIFASCICULAR BLOCK: ICD-10-CM

## 2020-06-23 DIAGNOSIS — I48.92 UNSPECIFIED ATRIAL FLUTTER: ICD-10-CM

## 2020-06-23 DIAGNOSIS — Z79.01 LONG TERM (CURRENT) USE OF ANTICOAGULANTS: ICD-10-CM

## 2020-06-23 DIAGNOSIS — I37.1 NONRHEUMATIC PULMONARY VALVE INSUFFICIENCY: ICD-10-CM

## 2020-06-23 DIAGNOSIS — I65.22 OCCLUSION AND STENOSIS OF LEFT CAROTID ARTERY: ICD-10-CM

## 2020-06-23 DIAGNOSIS — L89.322 PRESSURE ULCER OF LEFT BUTTOCK, STAGE 2: ICD-10-CM

## 2020-06-23 DIAGNOSIS — J96.11 CHRONIC RESPIRATORY FAILURE WITH HYPOXIA: ICD-10-CM

## 2020-06-23 DIAGNOSIS — I25.10 ATHEROSCLEROTIC HEART DISEASE OF NATIVE CORONARY ARTERY WITHOUT ANGINA PECTORIS: ICD-10-CM

## 2020-06-23 DIAGNOSIS — I36.1 NONRHEUMATIC TRICUSPID (VALVE) INSUFFICIENCY: ICD-10-CM

## 2020-06-23 DIAGNOSIS — E66.01 MORBID (SEVERE) OBESITY DUE TO EXCESS CALORIES: ICD-10-CM

## 2020-06-23 DIAGNOSIS — L89.202 PRESSURE ULCER OF UNSPECIFIED HIP, STAGE 2: ICD-10-CM

## 2020-06-23 DIAGNOSIS — I13.0 HYPERTENSIVE HEART AND CHRONIC KIDNEY DISEASE WITH HEART FAILURE AND STAGE 1 THROUGH STAGE 4 CHRONIC KIDNEY DISEASE, OR UNSPECIFIED CHRONIC KIDNEY DISEASE: ICD-10-CM

## 2020-06-23 DIAGNOSIS — I50.32 CHRONIC DIASTOLIC (CONGESTIVE) HEART FAILURE: ICD-10-CM

## 2020-06-23 DIAGNOSIS — I27.20 PULMONARY HYPERTENSION, UNSPECIFIED: ICD-10-CM

## 2020-06-23 DIAGNOSIS — Z99.81 DEPENDENCE ON SUPPLEMENTAL OXYGEN: ICD-10-CM

## 2020-06-23 DIAGNOSIS — E11.22 TYPE 2 DIABETES MELLITUS WITH DIABETIC CHRONIC KIDNEY DISEASE: ICD-10-CM

## 2020-06-23 DIAGNOSIS — Z95.5 PRESENCE OF CORONARY ANGIOPLASTY IMPLANT AND GRAFT: ICD-10-CM

## 2020-06-23 DIAGNOSIS — R57.0 CARDIOGENIC SHOCK: ICD-10-CM

## 2020-06-23 DIAGNOSIS — Z79.4 LONG TERM (CURRENT) USE OF INSULIN: ICD-10-CM

## 2020-06-23 DIAGNOSIS — N40.0 BENIGN PROSTATIC HYPERPLASIA WITHOUT LOWER URINARY TRACT SYMPTOMS: ICD-10-CM

## 2020-06-23 DIAGNOSIS — J44.9 CHRONIC OBSTRUCTIVE PULMONARY DISEASE, UNSPECIFIED: ICD-10-CM

## 2020-06-23 DIAGNOSIS — R00.1 BRADYCARDIA, UNSPECIFIED: ICD-10-CM

## 2020-06-28 ENCOUNTER — INPATIENT (INPATIENT)
Facility: HOSPITAL | Age: 80
LOS: 16 days | Discharge: NURSING, REHAB OR LONG-TERM CARE | End: 2020-07-15
Attending: INTERNAL MEDICINE | Admitting: INTERNAL MEDICINE
Payer: MEDICARE

## 2020-06-28 VITALS
OXYGEN SATURATION: 96 % | RESPIRATION RATE: 22 BRPM | HEART RATE: 57 BPM | SYSTOLIC BLOOD PRESSURE: 112 MMHG | TEMPERATURE: 98 F | DIASTOLIC BLOOD PRESSURE: 53 MMHG

## 2020-06-28 DIAGNOSIS — E11.22 TYPE 2 DIABETES MELLITUS WITH DIABETIC CHRONIC KIDNEY DISEASE: ICD-10-CM

## 2020-06-28 DIAGNOSIS — Z95.1 PRESENCE OF AORTOCORONARY BYPASS GRAFT: Chronic | ICD-10-CM

## 2020-06-28 DIAGNOSIS — N40.0 BENIGN PROSTATIC HYPERPLASIA WITHOUT LOWER URINARY TRACT SYMPTOMS: ICD-10-CM

## 2020-06-28 DIAGNOSIS — D69.6 THROMBOCYTOPENIA, UNSPECIFIED: ICD-10-CM

## 2020-06-28 DIAGNOSIS — I48.0 PAROXYSMAL ATRIAL FIBRILLATION: ICD-10-CM

## 2020-06-28 DIAGNOSIS — L03.116 CELLULITIS OF LEFT LOWER LIMB: ICD-10-CM

## 2020-06-28 DIAGNOSIS — Z95.5 PRESENCE OF CORONARY ANGIOPLASTY IMPLANT AND GRAFT: Chronic | ICD-10-CM

## 2020-06-28 LAB
ALBUMIN SERPL ELPH-MCNC: 3.5 G/DL — SIGNIFICANT CHANGE UP (ref 3.5–5.2)
ALP SERPL-CCNC: 90 U/L — SIGNIFICANT CHANGE UP (ref 30–115)
ALT FLD-CCNC: 16 U/L — SIGNIFICANT CHANGE UP (ref 0–41)
ANION GAP SERPL CALC-SCNC: 17 MMOL/L — HIGH (ref 7–14)
AST SERPL-CCNC: 31 U/L — SIGNIFICANT CHANGE UP (ref 0–41)
BASE EXCESS BLDV CALC-SCNC: -1 MMOL/L — SIGNIFICANT CHANGE UP (ref -2–2)
BASOPHILS # BLD AUTO: 0.07 K/UL — SIGNIFICANT CHANGE UP (ref 0–0.2)
BASOPHILS NFR BLD AUTO: 0.9 % — SIGNIFICANT CHANGE UP (ref 0–1)
BILIRUB SERPL-MCNC: 0.6 MG/DL — SIGNIFICANT CHANGE UP (ref 0.2–1.2)
BUN SERPL-MCNC: 105 MG/DL — CRITICAL HIGH (ref 10–20)
CA-I SERPL-SCNC: 1.19 MMOL/L — SIGNIFICANT CHANGE UP (ref 1.12–1.3)
CALCIUM SERPL-MCNC: 9.3 MG/DL — SIGNIFICANT CHANGE UP (ref 8.5–10.1)
CHLORIDE SERPL-SCNC: 100 MMOL/L — SIGNIFICANT CHANGE UP (ref 98–110)
CO2 SERPL-SCNC: 20 MMOL/L — SIGNIFICANT CHANGE UP (ref 17–32)
CREAT SERPL-MCNC: 2.7 MG/DL — HIGH (ref 0.7–1.5)
EOSINOPHIL # BLD AUTO: 0.16 K/UL — SIGNIFICANT CHANGE UP (ref 0–0.7)
EOSINOPHIL NFR BLD AUTO: 2.1 % — SIGNIFICANT CHANGE UP (ref 0–8)
GAS PNL BLDV: 139 MMOL/L — SIGNIFICANT CHANGE UP (ref 136–145)
GAS PNL BLDV: SIGNIFICANT CHANGE UP
GAS PNL BLDV: SIGNIFICANT CHANGE UP
GLUCOSE SERPL-MCNC: 83 MG/DL — SIGNIFICANT CHANGE UP (ref 70–99)
HCO3 BLDV-SCNC: 24 MMOL/L — SIGNIFICANT CHANGE UP (ref 22–29)
HCT VFR BLD CALC: 40.4 % — LOW (ref 42–52)
HCT VFR BLDA CALC: 41.5 % — SIGNIFICANT CHANGE UP (ref 34–44)
HGB BLD CALC-MCNC: 13.5 G/DL — LOW (ref 14–18)
HGB BLD-MCNC: 13.1 G/DL — LOW (ref 14–18)
IMM GRANULOCYTES NFR BLD AUTO: 0.4 % — HIGH (ref 0.1–0.3)
LACTATE BLDV-MCNC: 1.9 MMOL/L — HIGH (ref 0.5–1.6)
LIDOCAIN IGE QN: 23 U/L — SIGNIFICANT CHANGE UP (ref 7–60)
LYMPHOCYTES # BLD AUTO: 1.29 K/UL — SIGNIFICANT CHANGE UP (ref 1.2–3.4)
LYMPHOCYTES # BLD AUTO: 16.8 % — LOW (ref 20.5–51.1)
MAGNESIUM SERPL-MCNC: 2.4 MG/DL — SIGNIFICANT CHANGE UP (ref 1.8–2.4)
MCHC RBC-ENTMCNC: 29.3 PG — SIGNIFICANT CHANGE UP (ref 27–31)
MCHC RBC-ENTMCNC: 32.4 G/DL — SIGNIFICANT CHANGE UP (ref 32–37)
MCV RBC AUTO: 90.4 FL — SIGNIFICANT CHANGE UP (ref 80–94)
MONOCYTES # BLD AUTO: 0.98 K/UL — HIGH (ref 0.1–0.6)
MONOCYTES NFR BLD AUTO: 12.8 % — HIGH (ref 1.7–9.3)
NEUTROPHILS # BLD AUTO: 5.14 K/UL — SIGNIFICANT CHANGE UP (ref 1.4–6.5)
NEUTROPHILS NFR BLD AUTO: 67 % — SIGNIFICANT CHANGE UP (ref 42.2–75.2)
NRBC # BLD: 0 /100 WBCS — SIGNIFICANT CHANGE UP (ref 0–0)
NT-PROBNP SERPL-SCNC: HIGH PG/ML (ref 0–300)
PCO2 BLDV: 42 MMHG — SIGNIFICANT CHANGE UP (ref 41–51)
PH BLDV: 7.37 — SIGNIFICANT CHANGE UP (ref 7.26–7.43)
PLATELET # BLD AUTO: 178 K/UL — SIGNIFICANT CHANGE UP (ref 130–400)
PO2 BLDV: 27 MMHG — SIGNIFICANT CHANGE UP (ref 20–40)
POTASSIUM BLDV-SCNC: 4.9 MMOL/L — SIGNIFICANT CHANGE UP (ref 3.3–5.6)
POTASSIUM SERPL-MCNC: 5.7 MMOL/L — HIGH (ref 3.5–5)
POTASSIUM SERPL-SCNC: 5.7 MMOL/L — HIGH (ref 3.5–5)
PROT SERPL-MCNC: 7 G/DL — SIGNIFICANT CHANGE UP (ref 6–8)
RBC # BLD: 4.47 M/UL — LOW (ref 4.7–6.1)
RBC # FLD: 15.6 % — HIGH (ref 11.5–14.5)
SAO2 % BLDV: 40 % — SIGNIFICANT CHANGE UP
SODIUM SERPL-SCNC: 137 MMOL/L — SIGNIFICANT CHANGE UP (ref 135–146)
TROPONIN T SERPL-MCNC: 0.13 NG/ML — CRITICAL HIGH
WBC # BLD: 7.67 K/UL — SIGNIFICANT CHANGE UP (ref 4.8–10.8)
WBC # FLD AUTO: 7.67 K/UL — SIGNIFICANT CHANGE UP (ref 4.8–10.8)

## 2020-06-28 PROCEDURE — 99285 EMERGENCY DEPT VISIT HI MDM: CPT | Mod: GC

## 2020-06-28 PROCEDURE — 71045 X-RAY EXAM CHEST 1 VIEW: CPT | Mod: 26

## 2020-06-28 PROCEDURE — 93010 ELECTROCARDIOGRAM REPORT: CPT

## 2020-06-28 PROCEDURE — 99221 1ST HOSP IP/OBS SF/LOW 40: CPT | Mod: AI,GC

## 2020-06-28 RX ORDER — FUROSEMIDE 40 MG
20 TABLET ORAL ONCE
Refills: 0 | Status: COMPLETED | OUTPATIENT
Start: 2020-06-28 | End: 2020-06-28

## 2020-06-28 RX ADMIN — Medication 20 MILLIGRAM(S): at 23:07

## 2020-06-28 NOTE — ED PROVIDER NOTE - PMH
Afib    Aortic stenosis    BPH (benign prostatic hyperplasia)    Cellulitis of left lower extremity    Cellulitis of right lower extremity    CHF (congestive heart failure)    COPD (chronic obstructive pulmonary disease)    Diabetes    History of renal insufficiency    HTN (hypertension)    Hyperlipidemia    Stenosis of left carotid artery

## 2020-06-28 NOTE — H&P ADULT - NSHPLABSRESULTS_GEN_ALL_CORE
13.1   7.67  )-----------( 178      ( 28 Jun 2020 21:52 )             40.4       06-28    137  |  100  |  105<HH>  ----------------------------<  83  5.7<H>   |  20  |  2.7<H>    Ca    9.3      28 Jun 2020 21:52  Mg     2.4     06-28    TPro  7.0  /  Alb  3.5  /  TBili  0.6  /  DBili  x   /  AST  31  /  ALT  16  /  AlkPhos  90  06-28            CARDIAC MARKERS ( 28 Jun 2020 21:52 )  x     / 0.13 ng/mL / x     / x     / x        Serum Pro-Brain Natriuretic Peptide: 95960 pg/mL (06.28.20 @ 21:52)  COVID-19 PCR: NotDetec:  (06.17.20 @ 08:55)  A1C with Estimated Average Glucose Result: [id number] @ 06:41)    < from: Transthoracic Echocardiogram (06.11.20 @ 08:30) >    1. Left ventricular ejection fraction, by visual estimation, is 50 to 55%.   2. Moderately increased LV wall thickness.   3. Mitral annular calcification.   4. Thickening and calcification of the anterior and posterior mitral valve leaflets.   5. Moderate tricuspid regurgitation.   6. Mild to moderate aortic regurgitation.   7. Moderate pulmonic valve regurgitation.   8. Estimated pulmonary artery systolic pressure is 67.2 mmHg assuming a right atrial pressure of 5 mmHg, which is consistent with severe pulmonary hypertension.   9. Peak transaortic gradient equals 46.7 mmHg, mean transaortic gradient equals 26.2 mmHg, the calculated aortic valve area equals 1.14 cm² by the continuity equation consistent with moderate aortic stenosis.  10. There is moderate aortic root calcification.    PHYSICIAN INTERPRETATION:  Left Ventricle: The left ventricular internal cavity size is normal. Left ventricular wall thickness is moderately increased. Left ventricular ejection fraction, by visual estimation, is 50 to 55%.  Right Ventricle: Normal right ventricular size and function.  Left Atrium: Moderately enlarged left atrium.  Right Atrium: Normal right atrial size.  Pericardium: There is no evidence of pericardial effusion.  Mitral Valve: Thickening and calcification of the anterior and posterior mitral valve leaflets. There is mitral annular calcification. No evidence of mitral stenosis. Moderate mitral valve regurgitation is seen.  Tricuspid Valve: Structurally normal tricuspid valve, with normal leaflet excursion. Moderate tricuspid regurgitation is visualized. Estimated pulmonary artery systolic pressure is 67.2 mmHg assuming a right atrial pressure of 5 mmHg, which is consistent with severe pulmonary hypertension.  Aortic Valve: The aortic valve is trileaflet. Peak transaortic gradient equals 46.7 mmHg, mean transaortic gradient equals 26.2 mmHg, the calculated aortic valve area equals 1.14 cm² by the continuity equation consistent with moderate aortic stenosis. Mild to moderate aortic valve regurgitation is seen.  Pulmonic Valve: The pulmonic valve was not well visualized. Moderate pulmonic valve regurgitation.

## 2020-06-28 NOTE — ED PROVIDER NOTE - OBJECTIVE STATEMENT
81 y/o male with a PMH of afib on coumadin, CABG, DM, HTN, COPD, and BPH presents to the ED with shortness of breath for 8 weeks, started after a nasal surgery 8 weeks ago, no cough no

## 2020-06-28 NOTE — H&P ADULT - ASSESSMENT
81 y/o male with a PMH of CAD s/p CABG s/p PCI , chronic diastolic CHF, severe symptomatic AS s/p BAV as a bridge to TAVR (6/10), afib/flutter on coumadin, Hx of IVH, left carotid artery stenosis (80%), DM2 , HLD,  HTN, COPD on home O2, CKD stage 4, Hx of ATN on HD for 2 months in 2018, LE cellultits requiring admission and BPH was BIBEMS from [facility] to the ED with shortness of breath for 8 weeks, started after a nasal surgery 8 weeks ago, no cough no    #SOB likely due to acute on chronic diastolic CHF exacerbation with given hx of Severe AS  - patient currently on  - patients SOB is multifactorial : chronic diastolic CHF, severe AS, severe pulm HTN  - non compliant with lasix  - EKG on admission: junctional rhythm with occasional atrial paced complexes  - CXR on admission: diffuse mild congestion; official report pending  - COVID 19 PCR pending  - pro BNP : 20k, trop : 0.13   - start IV lasix 40mg BID, c/w toprol   - LAst ECHO: 6/11: LVEF: 50-55%, mod TR, mild AR, mod MI, severe pulm HTN  - Last Cath: 6.10 s/p Balloon aortic valvuloplasty  - strict I+O, keep I<O  - daily weights  - fluid restriction to 1200ml  - c/w supplemental O2    # Hyperkalemia  - hemolyzed , fu repeat BMP    #Chronic elevated troponin likely due to CKD  - 0.13 today (0.11 in the past)  - trend trop     # COPD on home O2  - c/w duonebs, symbicort  - supplemental O2    #Hx CAD s/p CABG s/p PCI ; Severe AS s/p BAV  - c/w     #Hx of afib/a flutter  - CHADSVASC  - rate control with: torprol  - AC: coumadin : 3mg     #HX of Left Carotid Artery stenosis  - 80%, was planned for CEA but required cardio clearance as per previous note    #DM2   - last A1c: 6.4 (4/2020)  - monitor FS , start basal/bolus if FS>180  - CC diet    #Hx CKD4  - creatinine today 2.7 ( baseline seems to be around 2.5-2.6)  - monitor BMP 81 y/o male with a PMH of CAD s/p CABG s/p PCI , severe symptomatic AS s/p BAV as a bridge to TAVR (6/10), afib/flutter on coumadin, left carotid artery stenosis (80%), DM2 , HLD,  HTN, COPD on home O2 (unsure), [place] both ears, CKD stage 4, Hx of ATN on HD for 2 months in 2018, LE cellultits requiring admission and BPH was BIBEMS from [facility] to the ED with shortness of breath for 6 weeks, started after BAV. His SOB is at rest progressively worsening with edema of LLE progressing and was sent to ED Patient denies cough, chest pain, palpitations/wheeze.     In ED , VS: Bp:112/32 HR:57 RR:22 SpO2: 96% on 4L  Received 20mg IV push of lasix ,     #SOB likely due to acute on chronic diastolic CHF exacerbation with given hx of Severe AS  - patient currently on 2L saturating 100%  - admit to telelmetry  - patients SOB is multifactorial : chronic diastolic CHF, severe AS, severe pulm HTN  - non compliant with lasix  - EKG on admission: junctional rhythm with occasional atrial paced complexes  - CXR on admission: diffuse mild congestion; official report pending  - COVID 19 PCR pending  - pro BNP : 20k, trop : 0.13   - start IV lasix 40mg BID, c/w toprol   - LAst ECHO: 6/11: LVEF: 50-55%, mod TR, mild AR, mod ND, severe pulm HTN  - Last Cath: 6.10 s/p Balloon aortic valvuloplasty  - strict I+O, keep I<O  - daily weights  - fluid restriction to 1200ml  - c/w supplemental O2    # Hyperkalemia  - hemolyzed , fu repeat BMP    #Chronic elevated troponin likely due to CKD  - 0.13 today (0.11 in the past)  - trend trop     # COPD on home O2  - c/w duonebs, symbicort  - supplemental O2    #Hx CAD s/p CABG s/p PCI ; Severe AS s/p BAV  - c/w     #Hx of afib/a flutter  - CHADSVASC  - rate control with: torprol  - AC: coumadin : 3mg     #HX of Left Carotid Artery stenosis  - 80%, was planned for CEA but required cardio clearance as per previous note  - fu outpt vascular    #DM2   - last A1c: 6.4 (4/2020)  - monitor FS , start basal/bolus if FS>180  - CC diet    #Hx CKD4  - creatinine today 2.7 ( baseline seems to be around 2.5-2.6)  - monitor BMP    #MISC:  DIET: DASH, CC with 1.2 L fluid restriction  DVT ppx: heparin subq  GI ppx: protonix  CODE: Full  Acitivity: IAT  Dispo: from [facility] for STR 81 y/o male with a PMH of CAD s/p CABG s/p PCI , severe symptomatic AS s/p BAV as a bridge to TAVR (6/10), afib/flutter on coumadin, left carotid artery stenosis (80%), DM2 , HLD,  HTN, COPD on home O2 (unsure), [place] both ears, CKD stage 4, Hx of ATN on HD for 2 months in 2018, LE cellultits requiring admission and BPH was BIBEMS from [facility] to the ED with shortness of breath for 6 weeks, started after BAV. His SOB is at rest progressively worsening with edema of LLE progressing and was sent to ED Patient denies cough, chest pain, palpitations/wheeze.     In ED , VS: Bp:112/32 HR:57 RR:22 SpO2: 96% on 4L  Received 20mg IV push of lasix     History obtained from prior chart, unable to contact [facility], please reconfirm meds in am    #SOB likely due to acute on chronic diastolic CHF exacerbation with given hx of Severe AS  - patient currently on 2L saturating 100%  - admit to telelmetry  - patients SOB is multifactorial : chronic diastolic CHF, severe AS, severe pulm HTN  - non compliant with lasix  - EKG on admission: junctional rhythm with occasional atrial paced complexes  - CXR on admission: diffuse mild congestion; official report pending  - COVID 19 PCR pending  - pro BNP : 20k, trop : 0.13   - start IV lasix 40mg BID, c/w toprol , imdur  - LAst ECHO: 6/11: LVEF: 50-55%, mod TR, mild AR, mod SD, severe pulm HTN  - Last Cath: 6.10 s/p Balloon aortic valvuloplasty  - strict I+O, keep I<O  - daily weights  - fluid restriction to 1200ml  - c/w supplemental O2    # Hyperkalemia  - hemolyzed , fu repeat BMP    #Chronic elevated troponin likely due to CKD  - 0.13 today (0.11 in the past)  - trend trop     # COPD on home O2  - c/w duonebs, symbicort  - supplemental O2    #Hx CAD s/p CABG s/p PCI ; Severe AS s/p BAV  - c/w toprol, imdur; unsure if patient on ASA or plavix, please confirm med rec from [facility]    #Hx of afib/a flutter  - rate control with: torprol  - AC: coumadin : 3mg ; fu am INR and dose accordingly    #HX of Left Carotid Artery stenosis  - 80%, was planned for CEA but required cardio clearance as per previous note  - fu outpt vascular    #DM2   - last A1c: 6.4 (4/2020)  - monitor FS , start basal/bolus if FS>180  - CC diet    #Hx CKD4  - creatinine today 2.7 ( baseline seems to be around 2.5-2.6)  - monitor BMP    #MISC:  DIET: DASH, CC with 1.2 L fluid restriction  DVT ppx: heparin subq  GI ppx: protonix  CODE: Full  Acitivity: IAT  [facility]po: [facility] 81 y/o male with a PMH of CAD s/p CABG s/p PCI , severe symptomatic AS s/p BAV as a bridge to TAVR (6/10), afib/flutter on coumadin, left carotid artery stenosis (80%), DM2 , HLD,  HTN, COPD (unsure if on home O2), [place] both ears, CKD stage 4, Hx of ATN on HD for 2 months in 2018, LE cellultits requiring admission and BPH was BIBEMS from [facility] to the ED with shortness of breath for 6 weeks, started after BAV. His SOB is at rest progressively worsening with edema of LLE progressing and was sent to ED Patient denies cough, chest pain, palpitations/wheeze.     In ED , VS: Bp:112/32 HR:57 RR:22 SpO2: 96% on 4L  Received 20mg IV push of lasix     History obtained from prior chart, unable to contact [facility], please reconfirm meds in am    #SOB likely due to acute on chronic diastolic CHF exacerbation with given hx of Severe AS  - patient currently on 2L saturating 100%  - admit to telelmetry  - patients SOB is multifactorial : chronic diastolic CHF, severe AS, severe pulm HTN  - non compliant with lasix  - EKG on admission: junctional rhythm with occasional atrial paced complexes  - CXR on admission: diffuse mild congestion; official report pending  - COVID 19 PCR pending  - pro BNP : 20k, trop : 0.13   - start IV lasix 40mg BID, c/w toprol , imdur  - LAst ECHO: 6/11: LVEF: 50-55%, mod TR, mild AR, mod MI, severe pulm HTN  - Last Cath: 6.10 s/p Balloon aortic valvuloplasty  - strict I+O, keep I<O  - daily weights  - fluid restriction to 1200ml  - dietician eval  - c/w supplemental O2    # Hyperkalemia  - hemolyzed , fu repeat BMP    #Chronic elevated troponin likely due to CKD  - 0.13 today (0.11 in the past)  - trend trop     # COPD - stable   - c/w duonebs, symbicort    #Hx CAD s/p CABG s/p PCI ; Severe AS s/p BAV  - c/w toprol, imdur; unsure if patient on ASA or plavix, please confirm med rec from [facility]    #Hx of afib/a flutter  - rate control with: torprol  - AC: coumadin : 3mg ; fu am INR and dose accordingly    #HX of Left Carotid Artery stenosis  - 80%, was planned for CEA but required cardio clearance as per previous note  - fu outpt vascular    #DM2   - last A1c: 6.4 (4/2020)  - on 20 u lantus as per previous med rec; monitor FS, confirm med rec  - CC diet    #Hx CKD4  - creatinine today 2.7 ( baseline seems to be around 2.5-2.6)  - monitor BMP    #MISC:  DIET: DASH, CC with 1.2 L fluid restriction  DVT ppx: heparin subq  GI ppx: protonix  CODE: Full  Acitivity: IAT  Dispo: from [facility] for STR 81 y/o male with a PMH of CAD s/p CABG s/p PCI , severe symptomatic AS s/p BAV as a bridge to TAVR (6/10), afib/flutter on coumadin, left carotid artery stenosis (80%), DM2 , HLD,  HTN, COPD (unsure if on home O2), [place] both ears, CKD stage 4, Hx of ATN on HD for 2 months in 2018, LE cellultits requiring admission and BPH was BIBEMS from [facility] to the ED with shortness of breath for 6 weeks, started after BAV. His SOB is at rest progressively worsening with edema of LLE progressing and was sent to ED Patient denies cough, chest pain, palpitations/wheeze.     In ED , VS: Bp:112/32 HR:57 RR:22 SpO2: 96% on 4L  Received 20mg IV push of lasix     History obtained from prior chart, unable to contact [facility], please reconfirm meds in am    #SOB likely due to acute on chronic diastolic CHF exacerbation with given hx of Severe AS  - patient currently on 2L saturating 100%  - admit to telelmetry  - patients SOB is multifactorial : chronic diastolic CHF, severe AS, severe pulm HTN  - non compliant with lasix  - EKG on admission: junctional rhythm with occasional atrial paced complexes  - CXR on admission: diffuse mild congestion; official report pending  - COVID 19 PCR pending  - pro BNP : 20k, trop : 0.13   - start IV lasix 40mg BID, c/w toprol , imdur  - LAst ECHO: 6/11: LVEF: 50-55%, mod TR, mild AR, mod VA, severe pulm HTN  - Last Cath: 6.10 s/p Balloon aortic valvuloplasty  - fu cardio ,  for meds optimization  - strict I+O, keep I<O  - daily weights  - fluid restriction to 1200ml  - dietician eval  - c/w supplemental O2    # Hyperkalemia  - hemolyzed , fu repeat BMP    #Chronic elevated troponin likely due to CKD  - 0.13 today (0.11 in the past)  - trend trop     # COPD - stable   - c/w duonebs, symbicort    #Hx CAD s/p CABG s/p PCI ; Severe AS s/p BAV  - c/w toprol, imdur; unsure if patient on ASA or plavix, please confirm med rec from [facility]    #Hx of afib/a flutter  - rate control with: torprol  - AC: coumadin : 3mg ; fu am INR and dose accordingly    #HX of Left Carotid Artery stenosis  - 80%, was planned for CEA but required cardio clearance as per previous note  - fu outpt vascular    #DM2   - last A1c: 6.4 (4/2020)  - on 20 u lantus as per previous med rec; monitor FS, confirm med rec  - CC diet    #Hx CKD4  - creatinine today 2.7 ( baseline seems to be around 2.5-2.6)  - monitor BMP    #MISC:  DIET: DASH, CC with 1.2 L fluid restriction  DVT ppx: heparin subq  GI ppx: protonix  CODE: Full  Acitivity: IAT  Dispo: from [facility] for STR

## 2020-06-28 NOTE — ED PROVIDER NOTE - ATTENDING CONTRIBUTION TO CARE
Patient presents to ED for sob, no trauma.   Vitals noted  lungs: B/L decreased air entry  B/L pedal edema  A/P: Dyspnea  labs, imaging, reevaluation

## 2020-06-28 NOTE — H&P ADULT - ATTENDING COMMENTS
HPI:  79 y/o male with a PMH of CAD s/p CABG s/p PCI , severe symptomatic AS s/p BAV as a bridge to TAVR (6/10), afib/flutter on coumadin, left carotid artery stenosis (80%), DM2 , HLD,  HTN, COPD on home O2, CKD stage 4, Hx of ATN on HD for 2 months in 2018, LE cellultits requiring admission and BPH was BIBEMS from [facility] to the ED with shortness of breath for 6 weeks, started after BAV. His SOB is at rest progressively worsening with edema of LLE progressing and was sent to ED Patient denies cough, chest pain, palpitations/wheeze.     In ED , VS: Bp:112/32 HR:57 RR:22 SpO2: 96% on 4L  Received 20mg IV push of lasix , (28 Jun 2020 23:48)    REVIEW OF SYSTEMS: see cc/HPI   CONSTITUTIONAL: No weakness, fevers or chills  EYES/ENT: No visual changes;  No vertigo or throat pain   NECK: No pain or stiffness  RESPIRATORY: No cough, wheezing, hemoptysis; (+) shortness of breath  CARDIOVASCULAR: No chest pain or palpitations  GASTROINTESTINAL: No abdominal or epigastric pain. No nausea, vomiting, or hematemesis; No diarrhea or constipation. No melena or hematochezia.  GENITOURINARY: No dysuria, frequency or hematuria  NEUROLOGICAL: No numbness or weakness  SKIN: No itching, rashes    Physical Exam:  General: WN/WD NAD  Neurology: A&Ox3, nonfocal, follows commands  Eyes: PERRLA/ EOMI  ENT/Neck: Neck supple, trachea midline, No JVD  Respiratory: CTA B/L, No wheezing, rales, rhonchi  CV: Normal rate regular rhythm, S1S2, no murmurs, rubs or gallops  Abdominal: Soft, NT, ND +BS,   Extremities: No edema, + peripheral pulses  Skin: No Rashes, Hematoma, Ecchymosis  Incisions: n/a  Tubes: n/a    A/p   Acute decompensated HFpEF in setting of Pulm HTN  - Admit to tele   -serial Ashly and EKGs  -IV lasix , Is and Os ( Keep O>>I)  , daily weights , dietary eval   -fluid and salt restriction   -Cardiology eval     Hyperkalemia -hemolyzed and agree w/ repeating     H/o A. fib/ CAD / CABG    DM type II - stable   -c/w monitoring     CKD stage IV  serial BMP     DVT prophylaxis HPI:  81 y/o male with a PMH of CAD s/p CABG s/p PCI , severe symptomatic AS s/p BAV as a bridge to TAVR (6/10), afib/flutter on coumadin, left carotid artery stenosis (80%), DM2 , HLD,  HTN, COPD on home O2, CKD stage 4, Hx of ATN on HD for 2 months in 2018, LE cellultits requiring admission and BPH was BIBEMS from [facility] to the ED with shortness of breath for 6 weeks, started after BAV. His SOB is at rest progressively worsening with edema of LLE progressing and was sent to ED Patient denies cough, chest pain, palpitations/wheeze.     In ED , VS: Bp:112/32 HR:57 RR:22 SpO2: 96% on 4L  Received 20mg IV push of lasix , (28 Jun 2020 23:48)    REVIEW OF SYSTEMS: see cc/HPI   CONSTITUTIONAL: No weakness, fevers or chills  EYES/ENT: No visual changes;  No vertigo or throat pain   NECK: No pain or stiffness  RESPIRATORY: No cough, wheezing, hemoptysis; (+) shortness of breath  CARDIOVASCULAR: No chest pain or palpitations, (+) pedal edema / SOB  GASTROINTESTINAL: No abdominal or epigastric pain. No nausea, vomiting, or hematemesis; No diarrhea or constipation. No melena or hematochezia.  GENITOURINARY: No dysuria, frequency or hematuria  NEUROLOGICAL: No numbness or weakness  SKIN: No itching, rashes    Physical Exam:  General: WN/WD NAD  Neurology: A&Ox3, nonfocal, follows commands  Eyes: PERRLA/ EOMI  ENT/Neck: Neck supple, trachea midline, No JVD  Respiratory: B/L basilar rales, No wheezing, rhonchi  CV: Normal rate regular rhythm, S1S2, no murmurs, rubs or gallops  Abdominal: Soft, NT, ND +BS,   Extremities: No edema, + peripheral pulses, bilateral pedal edema (+) 3 and chronic stasis changes  Skin: No Rashes, Hematoma, Ecchymosis  Incisions: n/a  Tubes: n/a    A/p   Acute decompensated HFpEF in setting of Pulm HTN  VHD -severe AS   - Admit to tele   -serial Ashly and EKGs  -IV lasix , Is and Os ( Keep O>>I)  , daily weights , dietary eval   -fluid and salt restriction   -Cardiology eval     Hyperkalemia -hemolyzed and agree w/ repeating     H/o A. fib/ CAD / CABG  -c/w with current Rx  -Cardiology eval     COPD -stable ( Dyspnea seem more related to HF)  -PRN Albuterol MDI   -c/w Symbicort     DM type II - stable   -c/w monitoring     CKD stage IV  serial BMP while diuresing   DVT prophylaxis

## 2020-06-28 NOTE — ED PROVIDER NOTE - PR
Pt cancelled 2/8/19 appt and was seen on 3/11/19.
Pt is unable to get off work early, rescheduled appt for 2/8/19. This was originally scheduled as 40 minutes so was rescheduled as 36. Please verify that is correct. Thank you.
*

## 2020-06-28 NOTE — H&P ADULT - NSICDXPASTMEDICALHX_GEN_ALL_CORE_FT
PAST MEDICAL HISTORY:  Afib     Aortic stenosis     BPH (benign prostatic hyperplasia)     Cellulitis of left lower extremity     Cellulitis of right lower extremity     CHF (congestive heart failure)     COPD (chronic obstructive pulmonary disease)     Diabetes     History of renal insufficiency     HTN (hypertension)     Hyperlipidemia     Stenosis of left carotid artery

## 2020-06-28 NOTE — ED ADULT TRIAGE NOTE - CHIEF COMPLAINT QUOTE
BIBA from [facility] for difficulty breathing, pt sates he has "fluid on his lungs". abdomen appears to be distended in triage.  no s/s respiratory distress in triage at this time

## 2020-06-28 NOTE — H&P ADULT - NSHPPHYSICALEXAM_GEN_ALL_CORE
PHYSICAL EXAM:  GENERAL: NAD, AAO x 4, 80y M  HEAD:  Atraumatic, Normocephalic  EYES: EOMI, conjunctiva and sclera white  NECK: Supple, No JVD  CHEST/LUNG: crackeles+ bilaterally ; No wheeze; No accessory muscles used  HEART: Regular rate and rhythm; No murmurs;   ABDOMEN: Soft, Nontender, Nondistended; Bowel sounds present; No guarding, No organomegaly  EXTREMITIES:  2+ Peripheral Pulses, No cyanosis or edema  NEUROLOGY: non-focal PHYSICAL EXAM:  GENERAL: NAD, AAO x 4, 80y M  HEAD:  Atraumatic, Normocephalic  EYES: EOMI, conjunctiva and sclera white  NECK: Supple, No JVD  CHEST/LUNG: crackles+ bilaterally ; No wheeze; No accessory muscles used  HEART: Regular rate and rhythm;   ABDOMEN: Distended ,  Nontender,  Bowel sounds present; No guarding, No organomegaly  EXTREMITIES:  B/L pitting edema above thighs+ 2+ Peripheral Pulses, No cyanosis or edema  NEUROLOGY: non-focal

## 2020-06-28 NOTE — H&P ADULT - NSICDXPASTSURGICALHX_GEN_ALL_CORE_FT
PAST SURGICAL HISTORY:  H/O heart artery stent     S/P balloon aortic valvuloplasty     S/P CABG x 3

## 2020-06-28 NOTE — ED PROVIDER NOTE - CLINICAL SUMMARY MEDICAL DECISION MAKING FREE TEXT BOX
patient presented to ED for sob ,patient remained hemodynamically stable, results of the labs, imaging findings reviewed and discussed with patient, discussed with admitting physician and MAR, patient is admitted to Medicine for further evaluation and care.

## 2020-06-28 NOTE — ED PROVIDER NOTE - PHYSICAL EXAMINATION
CONSTITUTIONAL: Well-developed; well-nourished; in no acute distress.   SKIN: warm, dry  HEAD: Normocephalic; atraumatic.  EYES: PERRL, EOMI, no conjunctival erythema  ENT: No nasal discharge; airway clear.  NECK: Supple; non tender.  CARD: S1, S2 normal; no murmurs, gallops, or rubs. Regular rate and rhythm.   RESP: No wheezes, Bibasilar rales or rhonchi.  ABD: soft ntnd  EXT: + bilateral edema, Normal ROM.  No clubbing, cyanosis or edema.   LYMPH: No acute cervical adenopathy.  NEURO: Alert, oriented, grossly unremarkable  PSYCH: Cooperative, appropriate.

## 2020-06-28 NOTE — H&P ADULT - NSHPSOCIALHISTORY_GEN_ALL_CORE
Marital Status:  (   )    (   ) Single    (   )    (  )   Lives with: (  ) alone  (  ) children   (  ) spouse   (  ) parents  (  ) other  Recent Travel: No recent travel  Occupation:    Substance Use (street drugs): ( x ) never used  (  ) other:  Tobacco Usage:  ( x  ) never smoked   (   ) former smoker   (   ) current smoker  (     ) pack year  Alcohol Usage: None   Baseline mobility: Marital Status:  (  x )    (   ) Single    (   )    (  )   Lives with: (  ) alone  (  ) children   (  x) spouse   (  ) parents  (  ) other  Recent Travel: No recent travel  Occupation: retired    Substance Use (street drugs): ( x ) never used  (  ) other:  Tobacco Usage:  ( x  ) never smoked   (   ) former smoker   (   ) current smoker  (     ) pack year  Alcohol Usage: None   Baseline mobility: uses walker and electric scooter at home

## 2020-06-28 NOTE — ED PROVIDER NOTE - NS ED ROS FT
Constitutional:  See HPI.   Eyes:  No visual changes, eye pain or discharge.  ENMT:  No hearing changes, pain, discharge or infections. No neck pain or stiffness.  Cardiac:  No chest pain, + SOB without edema. No chest pain with exertion.  Respiratory:  No cough or respiratory distress. No hemoptysis.  GI:  No nausea, vomiting, diarrhea, abdominal pain.  :  No dysuria, frequency, hematuria  MS:  No joint pain or back pain, + bilateral leg swelling  Neuro:  No LOC. No headache or weakness.    Skin:  No skin rash.  Except as in HPI, all other review of systems is negative

## 2020-06-28 NOTE — H&P ADULT - HISTORY OF PRESENT ILLNESS
79 y/o male with a PMH of CAD s/p CABG s/p PCI , severe symptomatic AS s/p BAV as a bridge to TAVR (6/10), afib/flutter on coumadin, left carotid artery stenosis (80%), DM2 , HLD,  HTN, COPD on home O2, CKD stage 4, Hx of ATN on HD for 2 months in 2018, LE cellultits requiring admission and BPH was BIBEMS from [facility] to the ED with shortness of breath for 8 weeks, started after a nasal surgery 8 weeks ago, no cough no 79 y/o male with a PMH of CAD s/p CABG s/p PCI , severe symptomatic AS s/p BAV as a bridge to TAVR (6/10), afib/flutter on coumadin, left carotid artery stenosis (80%), DM2 , HLD,  HTN, COPD on home O2, CKD stage 4, Hx of ATN on HD for 2 months in 2018, LE cellultits requiring admission and BPH was BIBEMS from [facility] to the ED with shortness of breath for 6 weeks, started after BAV. His SOB is at rest progressively worsening with edema of LLE progressing and was sent to ED Patient denies cough, chest pain, palpitations/wheeze.     In ED , VS: Bp:112/32 HR:57 RR:22 SpO2: 96% on 4L  Received 20mg IV push of lasix ,

## 2020-06-29 ENCOUNTER — APPOINTMENT (OUTPATIENT)
Dept: NEUROLOGY | Facility: CLINIC | Age: 80
End: 2020-06-29

## 2020-06-29 DIAGNOSIS — Z98.890 OTHER SPECIFIED POSTPROCEDURAL STATES: Chronic | ICD-10-CM

## 2020-06-29 LAB
ANION GAP SERPL CALC-SCNC: 17 MMOL/L — HIGH (ref 7–14)
ANION GAP SERPL CALC-SCNC: 21 MMOL/L — HIGH (ref 7–14)
BASOPHILS # BLD AUTO: 0.04 K/UL — SIGNIFICANT CHANGE UP (ref 0–0.2)
BASOPHILS NFR BLD AUTO: 0.6 % — SIGNIFICANT CHANGE UP (ref 0–1)
BUN SERPL-MCNC: 106 MG/DL — CRITICAL HIGH (ref 10–20)
BUN SERPL-MCNC: 107 MG/DL — CRITICAL HIGH (ref 10–20)
CALCIUM SERPL-MCNC: 9.2 MG/DL — SIGNIFICANT CHANGE UP (ref 8.5–10.1)
CALCIUM SERPL-MCNC: 9.6 MG/DL — SIGNIFICANT CHANGE UP (ref 8.5–10.1)
CHLORIDE SERPL-SCNC: 100 MMOL/L — SIGNIFICANT CHANGE UP (ref 98–110)
CHLORIDE SERPL-SCNC: 99 MMOL/L — SIGNIFICANT CHANGE UP (ref 98–110)
CO2 SERPL-SCNC: 14 MMOL/L — LOW (ref 17–32)
CO2 SERPL-SCNC: 23 MMOL/L — SIGNIFICANT CHANGE UP (ref 17–32)
CREAT SERPL-MCNC: 2.7 MG/DL — HIGH (ref 0.7–1.5)
CREAT SERPL-MCNC: 2.7 MG/DL — HIGH (ref 0.7–1.5)
EOSINOPHIL # BLD AUTO: 0.09 K/UL — SIGNIFICANT CHANGE UP (ref 0–0.7)
EOSINOPHIL NFR BLD AUTO: 1.4 % — SIGNIFICANT CHANGE UP (ref 0–8)
GLUCOSE BLDC GLUCOMTR-MCNC: 116 MG/DL — HIGH (ref 70–99)
GLUCOSE BLDC GLUCOMTR-MCNC: 138 MG/DL — HIGH (ref 70–99)
GLUCOSE BLDC GLUCOMTR-MCNC: 198 MG/DL — HIGH (ref 70–99)
GLUCOSE BLDC GLUCOMTR-MCNC: 70 MG/DL — SIGNIFICANT CHANGE UP (ref 70–99)
GLUCOSE SERPL-MCNC: 124 MG/DL — HIGH (ref 70–99)
GLUCOSE SERPL-MCNC: 127 MG/DL — HIGH (ref 70–99)
HCT VFR BLD CALC: 37.7 % — LOW (ref 42–52)
HCT VFR BLD CALC: 38.6 % — LOW (ref 42–52)
HGB BLD-MCNC: 12 G/DL — LOW (ref 14–18)
HGB BLD-MCNC: 12.1 G/DL — LOW (ref 14–18)
IMM GRANULOCYTES NFR BLD AUTO: 0.5 % — HIGH (ref 0.1–0.3)
LYMPHOCYTES # BLD AUTO: 0.99 K/UL — LOW (ref 1.2–3.4)
LYMPHOCYTES # BLD AUTO: 15 % — LOW (ref 20.5–51.1)
MAGNESIUM SERPL-MCNC: 2.3 MG/DL — SIGNIFICANT CHANGE UP (ref 1.8–2.4)
MCHC RBC-ENTMCNC: 27.7 PG — SIGNIFICANT CHANGE UP (ref 27–31)
MCHC RBC-ENTMCNC: 28.7 PG — SIGNIFICANT CHANGE UP (ref 27–31)
MCHC RBC-ENTMCNC: 31.1 G/DL — LOW (ref 32–37)
MCHC RBC-ENTMCNC: 32.1 G/DL — SIGNIFICANT CHANGE UP (ref 32–37)
MCV RBC AUTO: 89.1 FL — SIGNIFICANT CHANGE UP (ref 80–94)
MCV RBC AUTO: 89.5 FL — SIGNIFICANT CHANGE UP (ref 80–94)
MONOCYTES # BLD AUTO: 0.91 K/UL — HIGH (ref 0.1–0.6)
MONOCYTES NFR BLD AUTO: 13.8 % — HIGH (ref 1.7–9.3)
NEUTROPHILS # BLD AUTO: 4.54 K/UL — SIGNIFICANT CHANGE UP (ref 1.4–6.5)
NEUTROPHILS NFR BLD AUTO: 68.7 % — SIGNIFICANT CHANGE UP (ref 42.2–75.2)
NRBC # BLD: 0 /100 WBCS — SIGNIFICANT CHANGE UP (ref 0–0)
NRBC # BLD: 0 /100 WBCS — SIGNIFICANT CHANGE UP (ref 0–0)
PLATELET # BLD AUTO: 151 K/UL — SIGNIFICANT CHANGE UP (ref 130–400)
PLATELET # BLD AUTO: 153 K/UL — SIGNIFICANT CHANGE UP (ref 130–400)
POTASSIUM SERPL-MCNC: 5.2 MMOL/L — HIGH (ref 3.5–5)
POTASSIUM SERPL-MCNC: 5.4 MMOL/L — HIGH (ref 3.5–5)
POTASSIUM SERPL-SCNC: 5.2 MMOL/L — HIGH (ref 3.5–5)
POTASSIUM SERPL-SCNC: 5.4 MMOL/L — HIGH (ref 3.5–5)
RBC # BLD: 4.21 M/UL — LOW (ref 4.7–6.1)
RBC # BLD: 4.33 M/UL — LOW (ref 4.7–6.1)
RBC # FLD: 15.3 % — HIGH (ref 11.5–14.5)
RBC # FLD: 15.4 % — HIGH (ref 11.5–14.5)
SARS-COV-2 RNA SPEC QL NAA+PROBE: SIGNIFICANT CHANGE UP
SODIUM SERPL-SCNC: 134 MMOL/L — LOW (ref 135–146)
SODIUM SERPL-SCNC: 140 MMOL/L — SIGNIFICANT CHANGE UP (ref 135–146)
WBC # BLD: 6.5 K/UL — SIGNIFICANT CHANGE UP (ref 4.8–10.8)
WBC # BLD: 6.6 K/UL — SIGNIFICANT CHANGE UP (ref 4.8–10.8)
WBC # FLD AUTO: 6.5 K/UL — SIGNIFICANT CHANGE UP (ref 4.8–10.8)
WBC # FLD AUTO: 6.6 K/UL — SIGNIFICANT CHANGE UP (ref 4.8–10.8)

## 2020-06-29 PROCEDURE — 93306 TTE W/DOPPLER COMPLETE: CPT | Mod: 26

## 2020-06-29 RX ORDER — HEPARIN SODIUM 5000 [USP'U]/ML
5000 INJECTION INTRAVENOUS; SUBCUTANEOUS EVERY 12 HOURS
Refills: 0 | Status: DISCONTINUED | OUTPATIENT
Start: 2020-06-29 | End: 2020-06-30

## 2020-06-29 RX ORDER — FUROSEMIDE 40 MG
40 TABLET ORAL EVERY 12 HOURS
Refills: 0 | Status: DISCONTINUED | OUTPATIENT
Start: 2020-06-29 | End: 2020-06-29

## 2020-06-29 RX ORDER — ASCORBIC ACID 60 MG
500 TABLET,CHEWABLE ORAL DAILY
Refills: 0 | Status: DISCONTINUED | OUTPATIENT
Start: 2020-06-29 | End: 2020-07-15

## 2020-06-29 RX ORDER — IPRATROPIUM/ALBUTEROL SULFATE 18-103MCG
3 AEROSOL WITH ADAPTER (GRAM) INHALATION EVERY 6 HOURS
Refills: 0 | Status: DISCONTINUED | OUTPATIENT
Start: 2020-06-29 | End: 2020-07-07

## 2020-06-29 RX ORDER — PANTOPRAZOLE SODIUM 20 MG/1
40 TABLET, DELAYED RELEASE ORAL
Refills: 0 | Status: DISCONTINUED | OUTPATIENT
Start: 2020-06-29 | End: 2020-07-15

## 2020-06-29 RX ORDER — POLYETHYLENE GLYCOL 3350 17 G/17G
17 POWDER, FOR SOLUTION ORAL DAILY
Refills: 0 | Status: DISCONTINUED | OUTPATIENT
Start: 2020-06-29 | End: 2020-07-15

## 2020-06-29 RX ORDER — BUDESONIDE AND FORMOTEROL FUMARATE DIHYDRATE 160; 4.5 UG/1; UG/1
2 AEROSOL RESPIRATORY (INHALATION)
Refills: 0 | Status: DISCONTINUED | OUTPATIENT
Start: 2020-06-29 | End: 2020-07-15

## 2020-06-29 RX ORDER — METOPROLOL TARTRATE 50 MG
50 TABLET ORAL DAILY
Refills: 0 | Status: DISCONTINUED | OUTPATIENT
Start: 2020-06-29 | End: 2020-07-15

## 2020-06-29 RX ORDER — ATORVASTATIN CALCIUM 80 MG/1
40 TABLET, FILM COATED ORAL AT BEDTIME
Refills: 0 | Status: DISCONTINUED | OUTPATIENT
Start: 2020-06-29 | End: 2020-07-15

## 2020-06-29 RX ORDER — FINASTERIDE 5 MG/1
5 TABLET, FILM COATED ORAL DAILY
Refills: 0 | Status: DISCONTINUED | OUTPATIENT
Start: 2020-06-29 | End: 2020-07-15

## 2020-06-29 RX ORDER — FUROSEMIDE 40 MG
40 TABLET ORAL
Refills: 0 | Status: DISCONTINUED | OUTPATIENT
Start: 2020-06-29 | End: 2020-06-29

## 2020-06-29 RX ORDER — LACTULOSE 10 G/15ML
20 SOLUTION ORAL
Refills: 0 | Status: DISCONTINUED | OUTPATIENT
Start: 2020-06-29 | End: 2020-07-07

## 2020-06-29 RX ORDER — INSULIN GLARGINE 100 [IU]/ML
20 INJECTION, SOLUTION SUBCUTANEOUS AT BEDTIME
Refills: 0 | Status: DISCONTINUED | OUTPATIENT
Start: 2020-06-29 | End: 2020-07-12

## 2020-06-29 RX ORDER — ISOSORBIDE MONONITRATE 60 MG/1
30 TABLET, EXTENDED RELEASE ORAL DAILY
Refills: 0 | Status: DISCONTINUED | OUTPATIENT
Start: 2020-06-29 | End: 2020-07-15

## 2020-06-29 RX ORDER — FUROSEMIDE 40 MG
40 TABLET ORAL EVERY 12 HOURS
Refills: 0 | Status: DISCONTINUED | OUTPATIENT
Start: 2020-06-29 | End: 2020-06-30

## 2020-06-29 RX ADMIN — ATORVASTATIN CALCIUM 40 MILLIGRAM(S): 80 TABLET, FILM COATED ORAL at 21:24

## 2020-06-29 RX ADMIN — Medication 500 MILLIGRAM(S): at 13:03

## 2020-06-29 RX ADMIN — ISOSORBIDE MONONITRATE 30 MILLIGRAM(S): 60 TABLET, EXTENDED RELEASE ORAL at 13:03

## 2020-06-29 RX ADMIN — Medication 3 MILLILITER(S): at 15:36

## 2020-06-29 RX ADMIN — POLYETHYLENE GLYCOL 3350 17 GRAM(S): 17 POWDER, FOR SOLUTION ORAL at 13:05

## 2020-06-29 RX ADMIN — Medication 40 MILLIGRAM(S): at 17:17

## 2020-06-29 RX ADMIN — Medication 50 MILLIGRAM(S): at 06:08

## 2020-06-29 RX ADMIN — HEPARIN SODIUM 5000 UNIT(S): 5000 INJECTION INTRAVENOUS; SUBCUTANEOUS at 17:17

## 2020-06-29 RX ADMIN — BUDESONIDE AND FORMOTEROL FUMARATE DIHYDRATE 2 PUFF(S): 160; 4.5 AEROSOL RESPIRATORY (INHALATION) at 20:00

## 2020-06-29 RX ADMIN — FINASTERIDE 5 MILLIGRAM(S): 5 TABLET, FILM COATED ORAL at 13:03

## 2020-06-29 RX ADMIN — LACTULOSE 20 GRAM(S): 10 SOLUTION ORAL at 13:04

## 2020-06-29 RX ADMIN — HEPARIN SODIUM 5000 UNIT(S): 5000 INJECTION INTRAVENOUS; SUBCUTANEOUS at 06:09

## 2020-06-29 RX ADMIN — PANTOPRAZOLE SODIUM 40 MILLIGRAM(S): 20 TABLET, DELAYED RELEASE ORAL at 06:08

## 2020-06-29 RX ADMIN — BUDESONIDE AND FORMOTEROL FUMARATE DIHYDRATE 2 PUFF(S): 160; 4.5 AEROSOL RESPIRATORY (INHALATION) at 10:51

## 2020-06-29 RX ADMIN — Medication 1 APPLICATION(S): at 13:05

## 2020-06-29 RX ADMIN — Medication 3 MILLILITER(S): at 08:42

## 2020-06-29 RX ADMIN — Medication 1 TABLET(S): at 13:03

## 2020-06-29 RX ADMIN — LACTULOSE 20 GRAM(S): 10 SOLUTION ORAL at 17:17

## 2020-06-29 RX ADMIN — Medication 3 MILLILITER(S): at 20:18

## 2020-06-29 RX ADMIN — INSULIN GLARGINE 20 UNIT(S): 100 INJECTION, SOLUTION SUBCUTANEOUS at 21:25

## 2020-06-29 RX ADMIN — Medication 40 MILLIGRAM(S): at 06:43

## 2020-06-29 NOTE — PROGRESS NOTE ADULT - SUBJECTIVE AND OBJECTIVE BOX
Patient Information:  TAIWO ZAVALA / 80y / Male / MRN#:[id number]    Hospital Day: 1d    HPI:  79 y/o male with a PMH of CAD s/p CABG s/p PCI , severe symptomatic AS s/p BAV as a bridge to TAVR (6/10), afib/flutter on coumadin, left carotid artery stenosis (80%), DM2 , HLD,  HTN, COPD on home O2, CKD stage 4, Hx of ATN on HD for 2 months in 2018, LE cellultits requiring admission and BPH was BIBEMS from [facility] to the ED with shortness of breath for 6 weeks, started after BAV. His SOB is at rest progressively worsening with edema of LLE progressing and was sent to ED Patient denies cough, chest pain, palpitations/wheeze.     In ED , VS: Bp:112/32 HR:57 RR:22 SpO2: 96% on 4L  Received 20mg IV push of lasix ,    Interval History:  Patient seen and examined at bedside. No acute events overnight.    Past Medical History:  Hyperlipidemia  Cellulitis of right lower extremity  Cellulitis of left lower extremity  History of renal insufficiency  Stenosis of left carotid artery  Aortic stenosis  Afib  BPH (benign prostatic hyperplasia)  CHF (congestive heart failure)  COPD (chronic obstructive pulmonary disease)  Diabetes  HTN (hypertension)    Past Surgical History:  S/P balloon aortic valvuloplasty  H/O heart artery stent  S/P CABG x 3    Allergies:  No Known Allergies    Medications:  PRN:    Standing:  albuterol/ipratropium for Nebulization 3 milliLiter(s) Nebulizer every 6 hours  ascorbic acid 500 milliGRAM(s) Oral daily  atorvastatin 40 milliGRAM(s) Oral at bedtime  budesonide 160 MICROgram(s)/formoterol 4.5 MICROgram(s) Inhaler 2 Puff(s) Inhalation two times a day  finasteride 5 milliGRAM(s) Oral daily  furosemide   Injectable 40 milliGRAM(s) IV Push every 12 hours  heparin   Injectable 5000 Unit(s) SubCutaneous every 12 hours  insulin glargine Injectable (LANTUS) 20 Unit(s) SubCutaneous at bedtime  isosorbide   mononitrate ER Tablet (IMDUR) 30 milliGRAM(s) Oral daily  metoprolol succinate ER 50 milliGRAM(s) Oral daily  multivitamin 1 Tablet(s) Oral daily  pantoprazole    Tablet 40 milliGRAM(s) Oral before breakfast  silver sulfADIAZINE 1% Cream 1 Application(s) Topical daily    Home:  budesonide-formoterol 160 mcg-4.5 mcg/inh inhalation aerosol:  inhaled   finasteride 5 mg oral tablet: 1 tab(s) orally once a day  fluticasone 50 mcg/inh nasal spray: 1 spray(s) nasal 2 times a day  furosemide 40 mg oral tablet: 1 tab(s) orally once a day  isosorbide mononitrate 30 mg oral tablet, extended release: 1 tab(s) orally once a day (in the morning)  Metoprolol Succinate ER 50 mg oral tablet, extended release: 1 tab(s) orally once a day  Multiple Vitamins oral tablet: 1 tab(s) orally once a day  silver sulfADIAZINE 1% topical cream: 1 application topically once a day    Vitals:  T(C): 35.8, Max: 36.6 (06-28-20 @ 21:31)  T(F): 96.4, Max: 97.8 (06-28-20 @ 21:31)  HR: 56 (56 - 67)  BP: 129/59 (112/53 - 138/61)  RR: 16 (16 - 22)  SpO2: 98% (96% - 99%)    Physical Exam:  General: Awake, Alert. Not in acute distress.  Heart: Regular rate and rhythm; S1, S2; No murmurs.  Lungs: Clear to auscultation bilaterally.  Abdomen: Soft, nontender, nondistended.  Extremities: No edema in upper or lower extremities.  Neuro: AAOx3, No focal deficits.    Labs:  CBC (06-28 @ 21:52)                        Hgb: 13.1   WBC: 7.67  )-----------------( Plts: 178                              Hct: 40.4     Chem (06-28 @ 21:52)  Na: 137  |     Cl: 100     |  BUN: 105  -----------------------------------------< Gluc: 83    K: 5.7   |    HCO3: 20  |  Cr: 2.7    Ca 9.3 (06-28 @ 21:52)  Mg 2.4 (06-28 @ 21:52)    LFTs (06-28 @ 21:52)  TPro 7.0  /  Alb 3.5  TBili 0.6  /  DBili     AST 31  /  ALT 16  /  AlkPhos 90          Microbiology:    Radiology: Patient Information:  TAIWO ZAVALA / 80y / Male / MRN#:[id number]    Hospital Day: 1d    HPI:  81 y/o male with a PMH of CAD s/p CABG s/p PCI , severe symptomatic AS s/p BAV as a bridge to TAVR (6/10), afib/flutter on coumadin, left carotid artery stenosis (80%), DM2 , HLD,  HTN, COPD on home O2, CKD stage 4, Hx of ATN on HD for 2 months in 2018, LE cellultits requiring admission and BPH was BIBEMS from [facility] to the ED with shortness of breath for 6 weeks, started after BAV. His SOB is at rest progressively worsening with edema of LLE progressing and was sent to ED Patient denies cough, chest pain, palpitations/wheeze.     In ED , VS: Bp:112/32 HR:57 RR:22 SpO2: 96% on 4L  Received 20mg IV push of lasix ,    Interval History:  Patient seen and examined at bedside. No acute events overnight.    Past Medical History:  Hyperlipidemia  Cellulitis of right lower extremity  Cellulitis of left lower extremity  History of renal insufficiency  Stenosis of left carotid artery  Aortic stenosis  Afib  BPH (benign prostatic hyperplasia)  CHF (congestive heart failure)  COPD (chronic obstructive pulmonary disease)  Diabetes  HTN (hypertension)    Past Surgical History:  S/P balloon aortic valvuloplasty  H/O heart artery stent  S/P CABG x 3    Allergies:  No Known Allergies    Medications:  PRN:    Standing:  albuterol/ipratropium for Nebulization 3 milliLiter(s) Nebulizer every 6 hours  ascorbic acid 500 milliGRAM(s) Oral daily  atorvastatin 40 milliGRAM(s) Oral at bedtime  budesonide 160 MICROgram(s)/formoterol 4.5 MICROgram(s) Inhaler 2 Puff(s) Inhalation two times a day  finasteride 5 milliGRAM(s) Oral daily  furosemide   Injectable 40 milliGRAM(s) IV Push every 12 hours  heparin   Injectable 5000 Unit(s) SubCutaneous every 12 hours  insulin glargine Injectable (LANTUS) 20 Unit(s) SubCutaneous at bedtime  isosorbide   mononitrate ER Tablet (IMDUR) 30 milliGRAM(s) Oral daily  metoprolol succinate ER 50 milliGRAM(s) Oral daily  multivitamin 1 Tablet(s) Oral daily  pantoprazole    Tablet 40 milliGRAM(s) Oral before breakfast  silver sulfADIAZINE 1% Cream 1 Application(s) Topical daily    Home:  budesonide-formoterol 160 mcg-4.5 mcg/inh inhalation aerosol:  inhaled   finasteride 5 mg oral tablet: 1 tab(s) orally once a day  fluticasone 50 mcg/inh nasal spray: 1 spray(s) nasal 2 times a day  furosemide 40 mg oral tablet: 1 tab(s) orally once a day  isosorbide mononitrate 30 mg oral tablet, extended release: 1 tab(s) orally once a day (in the morning)  Metoprolol Succinate ER 50 mg oral tablet, extended release: 1 tab(s) orally once a day  Multiple Vitamins oral tablet: 1 tab(s) orally once a day  silver sulfADIAZINE 1% topical cream: 1 application topically once a day    Vitals:  T(C): 35.8, Max: 36.6 (06-28-20 @ 21:31)  T(F): 96.4, Max: 97.8 (06-28-20 @ 21:31)  HR: 56 (56 - 67)  BP: 129/59 (112/53 - 138/61)  RR: 16 (16 - 22)  SpO2: 98% (96% - 99%)    Physical Exam:  General: Awake, Alert. Not in acute distress.  Heart: Regular rate and rhythm; no JVD  Lungs: Clear to auscultation bilaterally.  Abdomen: Soft, nontender, nondistended.  Extremities: b/l LE edema.  Neuro: No focal deficits.    Labs:  CBC (06-28 @ 21:52)                        Hgb: 13.1   WBC: 7.67  )-----------------( Plts: 178                              Hct: 40.4     Chem (06-28 @ 21:52)  Na: 137  |     Cl: 100     |  BUN: 105  -----------------------------------------< Gluc: 83    K: 5.7   |    HCO3: 20  |  Cr: 2.7    Ca 9.3 (06-28 @ 21:52)  Mg 2.4 (06-28 @ 21:52)    LFTs (06-28 @ 21:52)  TPro 7.0  /  Alb 3.5  TBili 0.6  /  DBili     AST 31  /  ALT 16  /  AlkPhos 90          Microbiology:    Radiology:    < from: Transthoracic Echocardiogram (06.11.20 @ 08:30) >   1. Left ventricular ejection fraction, by visual estimation, is 50 to 55%.   2. Moderately increased LV wall thickness.   3. Mitral annular calcification.   4. Thickening and calcification of the anterior and posterior mitral valve leaflets.   5. Moderate tricuspid regurgitation.   6. Mild to moderate aortic regurgitation.   7. Moderate pulmonic valve regurgitation.   8. Estimated pulmonary artery systolic pressure is 67.2 mmHg assuming a right atrial pressure of 5 mmHg, which is consistent with severe pulmonary hypertension.   9. Peak transaortic gradient equals 46.7 mmHg, mean transaortic gradient equals 26.2 mmHg, the calculated aortic valve area equals 1.14 cm² by the continuity equation consistent with moderate aortic stenosis.  10. There is moderate aortic root calcification.    < end of copied text > Patient Information:  TAIWO ZAVALA / 80y / Male / MRN#:[id number]    Hospital Day: 1d    HPI:  81 y/o male with a PMH of CAD s/p CABG s/p PCI , severe symptomatic AS s/p BAV as a bridge to TAVR (6/10), afib/flutter on coumadin, left carotid artery stenosis (80%), DM2 , HLD,  HTN, COPD on home O2, CKD stage 4, Hx of ATN on HD for 2 months in 2018, LE cellultits requiring admission and BPH was BIBEMS from [facility] to the ED with shortness of breath for 6 weeks, started after BAV. His SOB is at rest progressively worsening with edema of LLE progressing and was sent to ED Patient denies cough, chest pain, palpitations/wheeze.     In ED , VS: Bp:112/32 HR:57 RR:22 SpO2: 96% on 4L  Received 20mg IV push of lasix ,    Interval History:  Patient seen and examined at bedside. Admitted overnight.    Past Medical History:  Hyperlipidemia  Cellulitis of right lower extremity  Cellulitis of left lower extremity  History of renal insufficiency  Stenosis of left carotid artery  Aortic stenosis  Afib  BPH (benign prostatic hyperplasia)  CHF (congestive heart failure)  COPD (chronic obstructive pulmonary disease)  Diabetes  HTN (hypertension)    Past Surgical History:  S/P balloon aortic valvuloplasty  H/O heart artery stent  S/P CABG x 3    Allergies:  No Known Allergies    Medications:  PRN:    Standing:  albuterol/ipratropium for Nebulization 3 milliLiter(s) Nebulizer every 6 hours  ascorbic acid 500 milliGRAM(s) Oral daily  atorvastatin 40 milliGRAM(s) Oral at bedtime  budesonide 160 MICROgram(s)/formoterol 4.5 MICROgram(s) Inhaler 2 Puff(s) Inhalation two times a day  finasteride 5 milliGRAM(s) Oral daily  furosemide   Injectable 40 milliGRAM(s) IV Push every 12 hours  heparin   Injectable 5000 Unit(s) SubCutaneous every 12 hours  insulin glargine Injectable (LANTUS) 20 Unit(s) SubCutaneous at bedtime  isosorbide   mononitrate ER Tablet (IMDUR) 30 milliGRAM(s) Oral daily  metoprolol succinate ER 50 milliGRAM(s) Oral daily  multivitamin 1 Tablet(s) Oral daily  pantoprazole    Tablet 40 milliGRAM(s) Oral before breakfast  silver sulfADIAZINE 1% Cream 1 Application(s) Topical daily    Home:  budesonide-formoterol 160 mcg-4.5 mcg/inh inhalation aerosol:  inhaled   finasteride 5 mg oral tablet: 1 tab(s) orally once a day  fluticasone 50 mcg/inh nasal spray: 1 spray(s) nasal 2 times a day  furosemide 40 mg oral tablet: 1 tab(s) orally once a day  isosorbide mononitrate 30 mg oral tablet, extended release: 1 tab(s) orally once a day (in the morning)  Metoprolol Succinate ER 50 mg oral tablet, extended release: 1 tab(s) orally once a day  Multiple Vitamins oral tablet: 1 tab(s) orally once a day  silver sulfADIAZINE 1% topical cream: 1 application topically once a day    Vitals:  T(C): 35.8, Max: 36.6 (06-28-20 @ 21:31)  T(F): 96.4, Max: 97.8 (06-28-20 @ 21:31)  HR: 56 (56 - 67)  BP: 129/59 (112/53 - 138/61)  RR: 16 (16 - 22)  SpO2: 98% (96% - 99%)    Physical Exam:  General: Awake, Alert. Not in acute distress.  Heart: Regular rate and rhythm; no JVD  Lungs: Clear to auscultation bilaterally.  Abdomen: Soft, nontender, nondistended.  Extremities: b/l LE edema.  Neuro: No focal deficits.    Labs:  CBC (06-28 @ 21:52)                        Hgb: 13.1   WBC: 7.67  )-----------------( Plts: 178                              Hct: 40.4     Chem (06-28 @ 21:52)  Na: 137  |     Cl: 100     |  BUN: 105  -----------------------------------------< Gluc: 83    K: 5.7   |    HCO3: 20  |  Cr: 2.7    Ca 9.3 (06-28 @ 21:52)  Mg 2.4 (06-28 @ 21:52)    LFTs (06-28 @ 21:52)  TPro 7.0  /  Alb 3.5  TBili 0.6  /  DBili     AST 31  /  ALT 16  /  AlkPhos 90          Microbiology:    Radiology:    < from: Transthoracic Echocardiogram (06.11.20 @ 08:30) >   1. Left ventricular ejection fraction, by visual estimation, is 50 to 55%.   2. Moderately increased LV wall thickness.   3. Mitral annular calcification.   4. Thickening and calcification of the anterior and posterior mitral valve leaflets.   5. Moderate tricuspid regurgitation.   6. Mild to moderate aortic regurgitation.   7. Moderate pulmonic valve regurgitation.   8. Estimated pulmonary artery systolic pressure is 67.2 mmHg assuming a right atrial pressure of 5 mmHg, which is consistent with severe pulmonary hypertension.   9. Peak transaortic gradient equals 46.7 mmHg, mean transaortic gradient equals 26.2 mmHg, the calculated aortic valve area equals 1.14 cm² by the continuity equation consistent with moderate aortic stenosis.  10. There is moderate aortic root calcification.    < end of copied text >

## 2020-06-29 NOTE — CONSULT NOTE ADULT - SUBJECTIVE AND OBJECTIVE BOX
Date of Admission: 06-28-20    CHIEF COMPLAINT: Worsening sob     HISTORY OF PRESENT ILLNESS:   79 y/o male with a PMH of CAD s/p CABG s/p PCI , severe symptomatic AS  (PG 74, MG 40) s/p BAV (MG 22->15) as a bridge to TAVR (6/10) complicating factors of afib/flutter on coumadin, left carotid artery stenosis (80%), DM2 , HLD,  HTN, COPD on home O2, CKD stage 4, Hx of ATN on HD for 2 months in 2018, LE cellultits requiring admission and BPH was BIBEMS from [facility] to the ED with progressive worsening sob since BAV. The pt endorses a 20 lb wt gain however he is unsure of the time frame. He states his LE edema is chronic however is worsening, associated with redness.     Pt denies chest pain/pressure/back pain, palpitations/wheeze, ab pain, HA/dizziness/lightheadedness, syncope    In ED , VS: Bp:112/32 HR:57 RR:22 SpO2: 96% on 4L, Received 20mg IV push of lasix     PAST MEDICAL & SURGICAL HISTORY  Hyperlipidemia  Cellulitis of right lower extremity  Cellulitis of left lower extremity  History of renal insufficiency  Stenosis of left carotid artery  Aortic stenosis  Afib  BPH (benign prostatic hyperplasia)  CHF (congestive heart failure)  COPD (chronic obstructive pulmonary disease)  Diabetes  HTN (hypertension)  S/P balloon aortic valvuloplasty  H/O heart artery stent  S/P CABG x 3      FAMILY HISTORY:  FAMILY HISTORY:  No pertinent family history in first degree relatives      SOCIAL HISTORY:  []smoker  []Alcohol  []Drug    ROS:  Negative except as mentioned in HPI    ALLERGIES:  No Known Allergies      MEDICATIONS:  MEDICATIONS  (STANDING):  albuterol/ipratropium for Nebulization 3 milliLiter(s) Nebulizer every 6 hours  ascorbic acid 500 milliGRAM(s) Oral daily  atorvastatin 40 milliGRAM(s) Oral at bedtime  budesonide 160 MICROgram(s)/formoterol 4.5 MICROgram(s) Inhaler 2 Puff(s) Inhalation two times a day  finasteride 5 milliGRAM(s) Oral daily  furosemide   Injectable 40 milliGRAM(s) IV Push every 12 hours  heparin   Injectable 5000 Unit(s) SubCutaneous every 12 hours  insulin glargine Injectable (LANTUS) 20 Unit(s) SubCutaneous at bedtime  isosorbide   mononitrate ER Tablet (IMDUR) 30 milliGRAM(s) Oral daily  lactulose Syrup 20 Gram(s) Oral two times a day  metoprolol succinate ER 50 milliGRAM(s) Oral daily  multivitamin 1 Tablet(s) Oral daily  pantoprazole    Tablet 40 milliGRAM(s) Oral before breakfast  polyethylene glycol 3350 17 Gram(s) Oral daily  silver sulfADIAZINE 1% Cream 1 Application(s) Topical daily    MEDICATIONS  (PRN):      HOME MEDICATIONS:  Home Medications:  budesonide-formoterol 160 mcg-4.5 mcg/inh inhalation aerosol:  inhaled  (12 Jun 2020 13:17)  finasteride 5 mg oral tablet: 1 tab(s) orally once a day (12 Jun 2020 13:17)  fluticasone 50 mcg/inh nasal spray: 1 spray(s) nasal 2 times a day (12 Jun 2020 13:17)  furosemide 40 mg oral tablet: 1 tab(s) orally once a day (29 Apr 2020 16:40)  isosorbide mononitrate 30 mg oral tablet, extended release: 1 tab(s) orally once a day (in the morning) (30 Jan 2020 15:28)  Metoprolol Succinate ER 50 mg oral tablet, extended release: 1 tab(s) orally once a day (12 Feb 2020 15:22)  Multiple Vitamins oral tablet: 1 tab(s) orally once a day (13 May 2020 13:14)  silver sulfADIAZINE 1% topical cream: 1 application topically once a day (10 Feb 2020 17:44)      VITALS:   T(F): 96.4 (06-29 @ 05:42), Max: 97.8 (06-28 @ 21:31)  HR: 59 (06-29 @ 09:10) (56 - 67)  BP: 129/59 (06-29 @ 05:42) (112/53 - 138/61)  BP(mean): --  RR: 16 (06-28 @ 23:23) (16 - 22)  SpO2: 98% (06-29 @ 09:10) (96% - 99%)    I&O's Summary    29 Jun 2020 07:01  -  29 Jun 2020 13:11  --------------------------------------------------------  IN: 330 mL / OUT: 0 mL / NET: 330 mL        PHYSICAL EXAM:  GEN: pt appears unwell  HEENT: NCAT, PERRL, EOMI  LUNGS: rales b/l  CARDIOVASCULAR: RRR, S1/S2 present, no murmurs, rubs or gallops, no JVD, + PP bilaterally  ABD: Soft, non-tender, distended  EXT: 3+ pitting edema b/l, erythema, warmth, tenderness to light palpation  SKIN: Intact  NEURO: AAOx3    LABS:                        13.1   7.67  )-----------( 178      ( 28 Jun 2020 21:52 )             40.4     06-28    137  |  100  |  105<HH>  ----------------------------<  83  5.7<H>   |  20  |  2.7<H>    Ca    9.3      28 Jun 2020 21:52  Mg     2.4     06-28    TPro  7.0  /  Alb  3.5  /  TBili  0.6  /  DBili  x   /  AST  31  /  ALT  16  /  AlkPhos  90  06-28      Troponin T, Serum: 0.13 ng/mL <HH> (06-28-20 @ 21:52)    Troponin 0.13, CKMB --, CK --/ 06-28-20 @ 21:52    Serum Pro-Brain Natriuretic Peptide: 77747 pg/mL (06-28-20 @ 21:52)      Hemoglobin A1C A1C with Estimated Average Glucose Result: 6.4:  (04.26.20 @ 06:41)      Thyroid Stimulating Hormone, Serum: 0.84 uIU/mL (08.29.19 @ 06:15)    COVID neg      RADIOLOGY:  -CXR:  < from: Xray Chest 1 View-PORTABLE IMMEDIATE (06.28.20 @ 21:39) >  Impression:      Worsening bilateral lung opacities      -TTE:  < from: Transthoracic Echocardiogram (06.11.20 @ 08:30) >  Summary:   1. Left ventricular ejection fraction, by visual estimation, is 50 to 55%.   2. Moderately increased LV wall thickness.   3. Mitral annular calcification.   4. Thickening and calcification of the anterior and posterior mitral valve leaflets.   5. Moderate tricuspid regurgitation.   6. Mild to moderate aortic regurgitation.   7. Moderate pulmonic valve regurgitation.   8. Estimated pulmonary artery systolic pressure is 67.2 mmHg assuming a right atrial pressure of 5 mmHg, which is consistent with severe pulmonary hypertension.   9. Peak transaortic gradient equals 46.7 mmHg, mean transaortic gradient equals 26.2 mmHg, the calculated aortic valve area equals 1.14 cm² by the continuity equation consistent with moderate aortic stenosis.  10. There is moderate aortic root calcification.    -CCTA:   < from: CT Heart with Coronaries (05.04.20 @ 14:33) >  IMPRESSION:     1.  Annulus area is 364 mm2, bi-plane diameter of elliptical cross section 19 x 24 mm  and annulus perimeter is 70 mm.     2. The smallest vessel diameter in the pelvis is 5 mm on the right and 4 mm on the left.      3. The smallest vessel diameter in the axilla is 6 mm on the right and 5 mm on the left.      4. Prior CABG with grafts as above.    5. Interstitial pulmonary edema and associated small right pleural effusion.    6. Mildly enlarged mediastinal and right hilar lymph nodes, likely reactive.      -STRESS TEST:   -CATHETERIZATION:  < from: Cardiac Cath Lab - Adult (06.10.20 @ 17:25) >  IMPRESSIONS:    1. Successful Balloon Aortic Valvuloplasty (MG = 22mmHg --> 15mmHg).        ECG:  < from: 12 Lead ECG (06.28.20 @ 21:34) >    Diagnosis Line Junctional rhythm with occasional atrial-paced complexes  Right bundle branch block  Inferior infarct , age undetermined  Possible Anterolateral infarct , age undetermined        TELEMETRY EVENTS: Date of Admission: 06-28-20    CHIEF COMPLAINT: Worsening sob     HISTORY OF PRESENT ILLNESS:   79 y/o male with a PMH of CAD s/p CABG s/p PCI , severe symptomatic AS  (PG 74, MG 40) s/p BAV (MG 22->15) as a bridge to TAVR (6/10) complicating factors of afib/flutter on coumadin, left carotid artery stenosis (80%), DM2 , HLD,  HTN, COPD on home O2, CKD stage 4, Hx of ATN on HD for 2 months in 2018, LE cellultits requiring admission and BPH was BIBEMS from [facility] to the ED with progressive worsening sob since BAV. The pt endorses a 20 lb wt gain however he is unsure of the time frame. He states his LE edema is chronic however is worsening, associated with redness.     Pt denies chest pain/pressure/back pain, palpitations/wheeze, ab pain, HA/dizziness/lightheadedness, syncope    In ED , VS: Bp:112/32 HR:57 RR:22 SpO2: 96% on 4L, Received 20mg IV push of lasix     PAST MEDICAL & SURGICAL HISTORY  Hyperlipidemia  Cellulitis of right lower extremity  Cellulitis of left lower extremity  History of renal insufficiency  Stenosis of left carotid artery  Aortic stenosis  Afib  BPH (benign prostatic hyperplasia)  CHF (congestive heart failure)  COPD (chronic obstructive pulmonary disease)  Diabetes  HTN (hypertension)  S/P balloon aortic valvuloplasty  H/O heart artery stent  S/P CABG x 3      FAMILY HISTORY:  FAMILY HISTORY:  No pertinent family history in first degree relatives      SOCIAL HISTORY:  []smoker  []Alcohol  []Drug    ROS:  Negative except as mentioned in HPI    ALLERGIES:  No Known Allergies      MEDICATIONS:  MEDICATIONS  (STANDING):  albuterol/ipratropium for Nebulization 3 milliLiter(s) Nebulizer every 6 hours  ascorbic acid 500 milliGRAM(s) Oral daily  atorvastatin 40 milliGRAM(s) Oral at bedtime  budesonide 160 MICROgram(s)/formoterol 4.5 MICROgram(s) Inhaler 2 Puff(s) Inhalation two times a day  finasteride 5 milliGRAM(s) Oral daily  furosemide   Injectable 40 milliGRAM(s) IV Push every 12 hours  heparin   Injectable 5000 Unit(s) SubCutaneous every 12 hours  insulin glargine Injectable (LANTUS) 20 Unit(s) SubCutaneous at bedtime  isosorbide   mononitrate ER Tablet (IMDUR) 30 milliGRAM(s) Oral daily  lactulose Syrup 20 Gram(s) Oral two times a day  metoprolol succinate ER 50 milliGRAM(s) Oral daily  multivitamin 1 Tablet(s) Oral daily  pantoprazole    Tablet 40 milliGRAM(s) Oral before breakfast  polyethylene glycol 3350 17 Gram(s) Oral daily  silver sulfADIAZINE 1% Cream 1 Application(s) Topical daily    MEDICATIONS  (PRN):      HOME MEDICATIONS:  Home Medications:  budesonide-formoterol 160 mcg-4.5 mcg/inh inhalation aerosol:  inhaled  (12 Jun 2020 13:17)  finasteride 5 mg oral tablet: 1 tab(s) orally once a day (12 Jun 2020 13:17)  fluticasone 50 mcg/inh nasal spray: 1 spray(s) nasal 2 times a day (12 Jun 2020 13:17)  furosemide 40 mg oral tablet: 1 tab(s) orally once a day (29 Apr 2020 16:40)  isosorbide mononitrate 30 mg oral tablet, extended release: 1 tab(s) orally once a day (in the morning) (30 Jan 2020 15:28)  Metoprolol Succinate ER 50 mg oral tablet, extended release: 1 tab(s) orally once a day (12 Feb 2020 15:22)  Multiple Vitamins oral tablet: 1 tab(s) orally once a day (13 May 2020 13:14)  silver sulfADIAZINE 1% topical cream: 1 application topically once a day (10 Feb 2020 17:44)      VITALS:   T(F): 96.4 (06-29 @ 05:42), Max: 97.8 (06-28 @ 21:31)  HR: 59 (06-29 @ 09:10) (56 - 67)  BP: 129/59 (06-29 @ 05:42) (112/53 - 138/61)  BP(mean): --  RR: 16 (06-28 @ 23:23) (16 - 22)  SpO2: 98% (06-29 @ 09:10) (96% - 99%)    I&O's Summary    29 Jun 2020 07:01  -  29 Jun 2020 13:11  --------------------------------------------------------  IN: 330 mL / OUT: 0 mL / NET: 330 mL        PHYSICAL EXAM:  GEN: pt appears unwell  HEENT: NCAT, PERRL, EOMI  LUNGS: rales b/l  CARDIOVASCULAR: RRR, S1/S2 present, systolic murmurs, rubs or gallops, no JVD, + PP bilaterally  ABD: Soft, non-tender, distended  EXT: 3+ pitting edema b/l, erythema, warmth, tenderness to light palpation  SKIN: Intact  NEURO: AAOx3    LABS:                        13.1   7.67  )-----------( 178      ( 28 Jun 2020 21:52 )             40.4     06-28    137  |  100  |  105<HH>  ----------------------------<  83  5.7<H>   |  20  |  2.7<H>    Ca    9.3      28 Jun 2020 21:52  Mg     2.4     06-28    TPro  7.0  /  Alb  3.5  /  TBili  0.6  /  DBili  x   /  AST  31  /  ALT  16  /  AlkPhos  90  06-28      Troponin T, Serum: 0.13 ng/mL <HH> (06-28-20 @ 21:52)    Troponin 0.13, CKMB --, CK --/ 06-28-20 @ 21:52    Serum Pro-Brain Natriuretic Peptide: 92666 pg/mL (06-28-20 @ 21:52)      Hemoglobin A1C A1C with Estimated Average Glucose Result: 6.4:  (04.26.20 @ 06:41)      Thyroid Stimulating Hormone, Serum: 0.84 uIU/mL (08.29.19 @ 06:15)    COVID neg      RADIOLOGY:  -CXR:  < from: Xray Chest 1 View-PORTABLE IMMEDIATE (06.28.20 @ 21:39) >  Impression:      Worsening bilateral lung opacities      -TTE:  < from: Transthoracic Echocardiogram (06.11.20 @ 08:30) >  Summary:   1. Left ventricular ejection fraction, by visual estimation, is 50 to 55%.   2. Moderately increased LV wall thickness.   3. Mitral annular calcification.   4. Thickening and calcification of the anterior and posterior mitral valve leaflets.   5. Moderate tricuspid regurgitation.   6. Mild to moderate aortic regurgitation.   7. Moderate pulmonic valve regurgitation.   8. Estimated pulmonary artery systolic pressure is 67.2 mmHg assuming a right atrial pressure of 5 mmHg, which is consistent with severe pulmonary hypertension.   9. Peak transaortic gradient equals 46.7 mmHg, mean transaortic gradient equals 26.2 mmHg, the calculated aortic valve area equals 1.14 cm² by the continuity equation consistent with moderate aortic stenosis.  10. There is moderate aortic root calcification.    -CCTA:   < from: CT Heart with Coronaries (05.04.20 @ 14:33) >  IMPRESSION:     1.  Annulus area is 364 mm2, bi-plane diameter of elliptical cross section 19 x 24 mm  and annulus perimeter is 70 mm.     2. The smallest vessel diameter in the pelvis is 5 mm on the right and 4 mm on the left.      3. The smallest vessel diameter in the axilla is 6 mm on the right and 5 mm on the left.      4. Prior CABG with grafts as above.    5. Interstitial pulmonary edema and associated small right pleural effusion.    6. Mildly enlarged mediastinal and right hilar lymph nodes, likely reactive.      -STRESS TEST:   -CATHETERIZATION:  < from: Cardiac Cath Lab - Adult (06.10.20 @ 17:25) >  IMPRESSIONS:    1. Successful Balloon Aortic Valvuloplasty (MG = 22mmHg --> 15mmHg).        ECG:  < from: 12 Lead ECG (06.28.20 @ 21:34) >    Diagnosis Line Junctional rhythm with occasional atrial-paced complexes  Right bundle branch block  Inferior infarct , age undetermined  Possible Anterolateral infarct , age undetermined        TELEMETRY EVENTS:

## 2020-06-29 NOTE — CONSULT NOTE ADULT - ASSESSMENT
80 M with a PMH of CAD s/p CABG s/p PCI , severe symptomatic AS  (PG 74, MG 40) s/p BAV (MG 22->15) as a bridge to TAVR (6/10) complicating factors of afib/flutter on coumadin, left carotid artery stenosis (80%), DM2 , HLD,  HTN, COPD on home O2 (2LNC), CKD stage 4, Hx of ATN on HD for 2 months in 2018, LE cellultits requiring admission and BPH was BIBEMS from [facility] to the ED with shortness of breath started after BAV.     Assessment   - s/p BAV without complication   - CAD/MI s/p CABG  - Afib/Aflutter   - DM2/HLD  - HTN  - CKD Stage IV, off HD for 3 months  - chronically elevated trops      Recommendations  - rpt TTE, check AS gradient  - pt will need aggressive diuresis, on lasix 40 mg qd, pt states he was taking only half at NH. Will need lasix IV 40 mg bid, if no improvement in sxs, increased to 80 mg bid.   - Monitor daily weight, Is and Os, fluid restrict to 1 L  - Trend trops- chronic, ECG 80 M with a PMH of CAD s/p CABG s/p PCI , severe symptomatic AS  (PG 74, MG 40) s/p BAV (MG 22->15) as a bridge to TAVR (6/10) complicating factors of afib/flutter on coumadin, left carotid artery stenosis (80%), DM2 , HLD,  HTN, COPD on home O2 (2LNC), CKD stage 4, Hx of ATN on HD for 2 months in 2018, LE cellulitis requiring admission and BPH was BIBEMS from [facility] to the ED with shortness of breath started after BAV.     Assessment   - s/p BAV without complication   - CAD/MI s/p CABG  - Afib/Aflutter   - DM2/HLD  - HTN  - CKD Stage IV, off HD for 3 months  - chronically elevated trops      Recommendations  - rpt TTE, check AS gradient  - pt will need aggressive diuresis, on lasix 40 mg qd, pt states he was taking only half at NH. Will need lasix IV 40 mg bid, if no improvement in sxs, increased to 80 mg bid.   - Monitor daily weight, Is and Os, fluid restrict to 1 L  - Trend trops- chronic, ECG   - Cards consult () 80 M with a PMH of CAD s/p CABG s/p PCI , severe symptomatic AS  (PG 74, MG 40) s/p BAV (MG 22->15) as a bridge to TAVR (6/10) complicating factors of afib/flutter on coumadin, left carotid artery stenosis (80%), DM2 , HLD,  HTN, COPD on home O2 (2LNC), CKD stage 4, Hx of ATN on HD for 2 months in 2018, LE cellulitis requiring admission and BPH was BIBEMS from [facility] to the ED with shortness of breath started after BAV.     Assessment   - s/p BAV without complication   - CAD/MI s/p CABG  - Afib/Aflutter   - DM2/HLD  - HTN  - CKD Stage IV, off HD for 3 months  - chronically elevated trops  - LE erythema/edema      Recommendations  - rpt TTE, check AS gradient  - pt will need aggressive diuresis, on lasix 40 mg qd, pt states he was taking only half at NH. Will need lasix IV 40 mg bid, if no improvement in sxs, increased to 80 mg bid, keep Mg>2, K>4, monitor kidney fxn  - Monitor daily weight, Is and Os, fluid restrict to 1 L  - Trend trops- chronic, ECG   - pt follows with Dr. Ramirez  - please assess LE for poss cellulitis

## 2020-06-29 NOTE — ED ADULT NURSE NOTE - CHIEF COMPLAINT QUOTE
BIBA from [facility] for difficulty breathing, pt sates he has "fluid on his lungs". abdomen appears to be distended in triage.  no s/s respiratory distress in triage at this time

## 2020-06-29 NOTE — PROGRESS NOTE ADULT - ASSESSMENT
81 y/o male with a PMH of CAD s/p CABG s/p PCI , severe symptomatic AS s/p BAV as a bridge to TAVR (6/10), afib/flutter on coumadin, left carotid artery stenosis (80%), DM2 , HLD,  HTN, COPD (unsure if on home O2), [place] both ears, CKD stage 4, Hx of ATN on HD for 2 months in 2018, LE cellultits requiring admission and BPH was BIBEMS from [facility] to the ED with shortness of breath for 6 weeks, started after BAV. His SOB is at rest progressively worsening with edema of LLE progressing and was sent to ED Patient denies cough, chest pain, palpitations/wheeze.     In ED , VS: Bp:112/32 HR:57 RR:22 SpO2: 96% on 4L  Received 20mg IV push of lasix     History obtained from prior chart, unable to contact [facility], please reconfirm meds in am    #SOB likely due to acute on chronic diastolic CHF exacerbation with given hx of Severe AS  - patient currently on 2L saturating 100%  - admit to telelmetry  - patients SOB is multifactorial : chronic diastolic CHF, severe AS, severe pulm HTN  - non compliant with lasix  - EKG on admission: junctional rhythm with occasional atrial paced complexes  - CXR on admission: diffuse mild congestion; official report pending  - COVID 19 PCR pending  - pro BNP : 20k, trop : 0.13   - start IV lasix 40mg BID, c/w toprol , imdur  - LAst ECHO: 6/11: LVEF: 50-55%, mod TR, mild AR, mod DE, severe pulm HTN  - Last Cath: 6.10 s/p Balloon aortic valvuloplasty  - fu cardio ,  for meds optimization  - strict I+O, keep I<O  - daily weights  - fluid restriction to 1200ml  - dietician eval  - c/w supplemental O2    # Hyperkalemia  - hemolyzed , fu repeat BMP    #Chronic elevated troponin likely due to CKD  - 0.13 today (0.11 in the past)  - trend trop     # COPD - stable   - c/w duonebs, symbicort    #Hx CAD s/p CABG s/p PCI ; Severe AS s/p BAV  - c/w toprol, imdur; unsure if patient on ASA or plavix, please confirm med rec from [facility]    #Hx of afib/a flutter  - rate control with: torprol  - AC: coumadin : 3mg ; fu am INR and dose accordingly    #HX of Left Carotid Artery stenosis  - 80%, was planned for CEA but required cardio clearance as per previous note  - fu outpt vascular    #DM2   - last A1c: 6.4 (4/2020)  - on 20 u lantus as per previous med rec; monitor FS, confirm med rec  - CC diet    #Hx CKD4  - creatinine today 2.7 ( baseline seems to be around 2.5-2.6)  - monitor BMP    #MISC:  DIET: DASH, CC with 1.2 L fluid restriction  DVT ppx: heparin subq  GI ppx: protonix  CODE: Full  Acitivity: IAT  Dispo: from [facility] for STR 79 y/o male with a PMH of CAD s/p CABG s/p PCI , severe symptomatic AS s/p BAV as a bridge to TAVR (6/10), afib/flutter on coumadin, left carotid artery stenosis (80%), DM2 , HLD,  HTN, COPD (unsure if on home O2), [place] both ears, CKD stage 4, Hx of ATN on HD for 2 months in 2018, LE cellultits requiring admission and BPH was BIBEMS from [facility] to the ED with shortness of breath for 6 weeks, started after BAV. His SOB is at rest progressively worsening with edema of LLE progressing and was sent to ED Patient denies cough, chest pain, palpitations/wheeze.     In ED , VS: Bp:112/32 HR:57 RR:22 SpO2: 96% on 4L  Received 20mg IV push of lasix     History obtained from prior chart, unable to contact [facility], please reconfirm meds in am    #SOB likely due to acute on chronic diastolic CHF exacerbation with given hx of Severe AS  - patient currently on 2L saturating 100%  - admit to telelmetry  - patients SOB is multifactorial : chronic diastolic CHF, severe AS, severe pulm HTN  - Last ECHO: 6/11: LVEF: 50-55%, mod TR, mild AR, mod MT, severe pulm HTN  - Last Cath: 6.10 s/p Balloon aortic valvuloplasty  - non compliant with Lasix  - EKG on admission: junctional rhythm with occasional atrial paced complexes  - CXR on admission: diffuse mild congestion; official report pending  - COVID 19 PCR pending  - pro BNP : ~20k, trop : 0.13   - start IV Lasix 40mg BID, c/w Toprol , Imdur  - fu cardio, Dr. Maria for meds optimization  - strict I+O, keep I<O  - daily weights  - fluid restriction to 1200ml  - dietician eval  - c/w supplemental O2    # Hyperkalemia  - hemolyzed , fu repeat BMP    #Chronic elevated troponin likely due to CKD  - 0.13 today (0.11 in the past)  - trend trop     # COPD - stable   - c/w Duonebs, symbicort    #Hx CAD s/p CABG s/p PCI ; Severe AS s/p BAV  - c/w toprol, imdur; unsure if patient on ASA or plavix, will confirm med rec from [facility]    #Hx of afib/a flutter  - rate control with: Toprol  - AC: coumadin : 3mg ; fu am INR and dose accordingly    #HX of Left Carotid Artery stenosis  - 80%, was planned for CEA but required cardio clearance as per previous note  - fu outpt vascular    #DM2   - last A1c: 6.4 (4/2020)  - on 20 IU Lantus as per previous med rec; monitor FS, confirm med rec  - CC diet    #Hx CKD4  - creatinine today 2.7 ( baseline seems to be around 2.5-2.6)  - monitor BMP    #MISC:  DIET: DASH, CC with 1.2 L fluid restriction  DVT ppx: heparin subq  GI ppx: protonix  CODE: Full  Acitivity: IAT  Dispo: from [facility] for STR 79 y/o male with a PMH of CAD s/p CABG s/p PCI , severe symptomatic AS s/p BAV as a bridge to TAVR (6/10), afib/flutter on coumadin, left carotid artery stenosis (80%), DM2 , HLD,  HTN, COPD (unsure if on home O2), [place] both ears, CKD stage 4, Hx of ATN on HD for 2 months in 2018, LE cellultits requiring admission and BPH was BIBEMS from [facility] to the ED with shortness of breath for 6 weeks, started after BAV. His SOB is at rest progressively worsening with edema of LLE progressing and was sent to ED Patient denies cough, chest pain, palpitations/wheeze.     In ED , VS: Bp:112/32 HR:57 RR:22 SpO2: 96% on 4L  Received 20mg IV push of lasix     #SOB likely due to acute on chronic diastolic CHF exacerbation with given hx of Severe AS  - patient currently on 2L saturating 100%  - admit to telelmetry  - patients SOB is multifactorial : chronic diastolic CHF, severe AS, severe pulm HTN  - Last ECHO: 6/11: LVEF: 50-55%, mod TR, mild AR, mod WI, severe pulm HTN  - Last Cath: 6.10 s/p Balloon aortic valvuloplasty  - non compliant with Lasix and with fluid restriction  - EKG on admission: junctional rhythm with occasional atrial paced complexes  - CXR on admission: diffuse mild congestion; official report pending  - COVID 19 PCR pending  - pro BNP : ~20k, trop : 0.13   - start IV Lasix 40mg BID, c/w Toprol , Imdur  - fu cardio, Dr. Maria for meds optimization  - strict I+O, keep I<O, daily weights  - fluid restriction to 1200ml  - dietician eval  - c/w supplemental O2    # Hyperkalemia  - hemolyzed , fu repeat BMP    #Chronic elevated troponin likely due to CKD  - 0.13 today (0.11 in the past)  - trend trop     # COPD - stable   - c/w Duonebs, symbicort    #Hx CAD s/p CABG s/p PCI ; Severe AS s/p BAV  - c/w toprol, imdur; unsure if patient on ASA or plavix, will confirm med rec from [facility]    #Hx of afib/a flutter  - rate control with: Toprol  - AC: coumadin : 3mg ; fu INR and dose accordingly    #HX of Left Carotid Artery stenosis  - 80%, was planned for CEA but required cardio clearance as per previous note  - fu outpt vascular    #DM2   - last A1c: 6.4 (4/2020)  - on 20 IU Lantus as per previous med rec; monitor FS, confirm med rec  - CC diet    #Hx CKD4  - creatinine today 2.7 ( baseline seems to be around 2.5-2.6)  - monitor BMP    #MISC:  DIET: DASH, CC with 1.2 L fluid restriction  DVT ppx: heparin subq  GI ppx: protonix  CODE: Full  Acitivity: IAT  Dispo: from [facility] for STR 79 y/o male with a PMH of CAD s/p CABG s/p PCI , severe symptomatic AS s/p BAV as a bridge to TAVR (6/10), afib/flutter on coumadin, left carotid artery stenosis (80%), DM2 , HLD,  HTN, COPD (unsure if on home O2), [place] both ears, CKD stage 4, Hx of ATN on HD for 2 months in 2018, LE cellultits requiring admission and BPH was BIBEMS from [facility] to the ED with shortness of breath for 6 weeks, started after BAV. His SOB is at rest progressively worsening with edema of LLE progressing and was sent to ED Patient denies cough, chest pain, palpitations/wheeze.     In ED , VS: Bp:112/32 HR:57 RR:22 SpO2: 96% on 4L  Received 20mg IV push of lasix     #SOB likely due to acute on chronic diastolic CHF exacerbation with given hx of Severe AS  - patient currently on 2L saturating 100%  - pro BNP : ~20k, trop : 0.13   - patients SOB is multifactorial : chronic diastolic CHF, severe AS, severe pulm HTN  - Last ECHO: 6/11: LVEF: 50-55%, mod TR, mild AR, mod MN, severe pulm HTN  - Last Cath: 6.10 s/p Balloon aortic valvuloplasty  - non compliant with Lasix and with fluid restriction  - EKG on admission: junctional rhythm with occasional atrial paced complexes  - CXR on admission: diffuse mild congestion; official report pending  - c/w IV Lasix 40mg BID, c/w Toprol , Imdur  - f/u cardio, Dr. Maria for meds optimization  - strict I+O, keep I<O, daily weights  - fluid restriction to 1200ml  - dietician eval  - c/w supplemental O2    # Hyperkalemia  - hemolyzed , fu repeat BMP    #Chronic elevated troponin likely due to CKD  - 0.13 today (0.11 in the past)  - trend trop     # COPD - stable   - c/w Duonebs, symbicort    #Hx CAD s/p CABG s/p PCI ; Severe AS s/p BAV  - c/w Toprol, Imdur. Unsure if patient on ASA or Plavix will confirm med rec from [facility]    #Hx of afib/a flutter  - rate control with: Toprol  - AC: coumadin : 3mg ; fu INR and dose accordingly    #HX of Left Carotid Artery stenosis  - 80%, was planned for CEA but required cardio clearance as per previous note  - fu outpt vascular    #DM2   - last A1c: 6.4 (4/2020)  - on 20 IU Lantus as per previous med rec; monitor FS, confirm med rec  - CC diet    #Hx CKD4  - creatinine today 2.7 ( baseline seems to be around 2.5-2.6)  - monitor BMP    #MISC:  DIET: DASH, CC with 1.2 L fluid restriction  DVT ppx: heparin subq  GI ppx: protonix  CODE: Full  Acitivity: IAT  Dispo: from [facility] for STR

## 2020-06-29 NOTE — CHART NOTE - NSCHARTNOTEFT_GEN_A_CORE
Pt seen and examined this morning. Chart reviewed.  Admitted for acute over chronic HFpEF in context of severe AS s/p BAV 6/10  He admits to drinking too much fluid while at the NH.  He was counseled on fluid restriction and low sodium diet.  He is now on IV lasix 40mg q12hr and is urinating per PCA (incontinent at times)  Need daily wts and I's and O's  Monitor BMP, Mg level  Need labs today: BMP, Mg, INR - pt on coumadin  Cardio eval in progress  agree with repeating ECHO - s/p recent BAV  Case discussed with PGY 1 today.

## 2020-06-30 LAB
ANION GAP SERPL CALC-SCNC: 18 MMOL/L — HIGH (ref 7–14)
BUN SERPL-MCNC: 111 MG/DL — CRITICAL HIGH (ref 10–20)
CALCIUM SERPL-MCNC: 9.4 MG/DL — SIGNIFICANT CHANGE UP (ref 8.5–10.1)
CHLORIDE SERPL-SCNC: 99 MMOL/L — SIGNIFICANT CHANGE UP (ref 98–110)
CO2 SERPL-SCNC: 21 MMOL/L — SIGNIFICANT CHANGE UP (ref 17–32)
CREAT SERPL-MCNC: 2.9 MG/DL — HIGH (ref 0.7–1.5)
GLUCOSE BLDC GLUCOMTR-MCNC: 106 MG/DL — HIGH (ref 70–99)
GLUCOSE BLDC GLUCOMTR-MCNC: 111 MG/DL — HIGH (ref 70–99)
GLUCOSE BLDC GLUCOMTR-MCNC: 122 MG/DL — HIGH (ref 70–99)
GLUCOSE BLDC GLUCOMTR-MCNC: 149 MG/DL — HIGH (ref 70–99)
GLUCOSE SERPL-MCNC: 113 MG/DL — HIGH (ref 70–99)
HCT VFR BLD CALC: 37.8 % — LOW (ref 42–52)
HGB BLD-MCNC: 11.8 G/DL — LOW (ref 14–18)
INR BLD: 2.01 RATIO — HIGH (ref 0.65–1.3)
INR BLD: 2.33 RATIO — HIGH (ref 0.65–1.3)
MAGNESIUM SERPL-MCNC: 2.4 MG/DL — SIGNIFICANT CHANGE UP (ref 1.8–2.4)
MCHC RBC-ENTMCNC: 27.9 PG — SIGNIFICANT CHANGE UP (ref 27–31)
MCHC RBC-ENTMCNC: 31.2 G/DL — LOW (ref 32–37)
MCV RBC AUTO: 89.4 FL — SIGNIFICANT CHANGE UP (ref 80–94)
NRBC # BLD: 0 /100 WBCS — SIGNIFICANT CHANGE UP (ref 0–0)
NT-PROBNP SERPL-SCNC: HIGH PG/ML (ref 0–300)
PLATELET # BLD AUTO: 145 K/UL — SIGNIFICANT CHANGE UP (ref 130–400)
POTASSIUM SERPL-MCNC: 5.2 MMOL/L — HIGH (ref 3.5–5)
POTASSIUM SERPL-SCNC: 5.2 MMOL/L — HIGH (ref 3.5–5)
PROTHROM AB SERPL-ACNC: 23.1 SEC — HIGH (ref 9.95–12.87)
PROTHROM AB SERPL-ACNC: 26.8 SEC — HIGH (ref 9.95–12.87)
RBC # BLD: 4.23 M/UL — LOW (ref 4.7–6.1)
RBC # FLD: 15.5 % — HIGH (ref 11.5–14.5)
SODIUM SERPL-SCNC: 138 MMOL/L — SIGNIFICANT CHANGE UP (ref 135–146)
WBC # BLD: 6.61 K/UL — SIGNIFICANT CHANGE UP (ref 4.8–10.8)
WBC # FLD AUTO: 6.61 K/UL — SIGNIFICANT CHANGE UP (ref 4.8–10.8)

## 2020-06-30 PROCEDURE — 99221 1ST HOSP IP/OBS SF/LOW 40: CPT

## 2020-06-30 PROCEDURE — 71045 X-RAY EXAM CHEST 1 VIEW: CPT | Mod: 26

## 2020-06-30 PROCEDURE — 99233 SBSQ HOSP IP/OBS HIGH 50: CPT

## 2020-06-30 RX ORDER — OXYCODONE AND ACETAMINOPHEN 5; 325 MG/1; MG/1
1 TABLET ORAL ONCE
Refills: 0 | Status: DISCONTINUED | OUTPATIENT
Start: 2020-06-30 | End: 2020-06-30

## 2020-06-30 RX ORDER — WARFARIN SODIUM 2.5 MG/1
3 TABLET ORAL ONCE
Refills: 0 | Status: COMPLETED | OUTPATIENT
Start: 2020-06-30 | End: 2020-06-30

## 2020-06-30 RX ORDER — FUROSEMIDE 40 MG
40 TABLET ORAL EVERY 8 HOURS
Refills: 0 | Status: DISCONTINUED | OUTPATIENT
Start: 2020-06-30 | End: 2020-07-02

## 2020-06-30 RX ADMIN — INSULIN GLARGINE 20 UNIT(S): 100 INJECTION, SOLUTION SUBCUTANEOUS at 21:44

## 2020-06-30 RX ADMIN — BUDESONIDE AND FORMOTEROL FUMARATE DIHYDRATE 2 PUFF(S): 160; 4.5 AEROSOL RESPIRATORY (INHALATION) at 20:10

## 2020-06-30 RX ADMIN — LACTULOSE 20 GRAM(S): 10 SOLUTION ORAL at 05:49

## 2020-06-30 RX ADMIN — OXYCODONE AND ACETAMINOPHEN 1 TABLET(S): 5; 325 TABLET ORAL at 03:19

## 2020-06-30 RX ADMIN — Medication 40 MILLIGRAM(S): at 21:44

## 2020-06-30 RX ADMIN — POLYETHYLENE GLYCOL 3350 17 GRAM(S): 17 POWDER, FOR SOLUTION ORAL at 11:26

## 2020-06-30 RX ADMIN — Medication 3 MILLILITER(S): at 19:39

## 2020-06-30 RX ADMIN — Medication 1 TABLET(S): at 11:25

## 2020-06-30 RX ADMIN — BUDESONIDE AND FORMOTEROL FUMARATE DIHYDRATE 2 PUFF(S): 160; 4.5 AEROSOL RESPIRATORY (INHALATION) at 08:13

## 2020-06-30 RX ADMIN — WARFARIN SODIUM 3 MILLIGRAM(S): 2.5 TABLET ORAL at 21:47

## 2020-06-30 RX ADMIN — Medication 40 MILLIGRAM(S): at 13:27

## 2020-06-30 RX ADMIN — ISOSORBIDE MONONITRATE 30 MILLIGRAM(S): 60 TABLET, EXTENDED RELEASE ORAL at 11:25

## 2020-06-30 RX ADMIN — Medication 1 APPLICATION(S): at 17:32

## 2020-06-30 RX ADMIN — ATORVASTATIN CALCIUM 40 MILLIGRAM(S): 80 TABLET, FILM COATED ORAL at 21:44

## 2020-06-30 RX ADMIN — Medication 50 MILLIGRAM(S): at 05:50

## 2020-06-30 RX ADMIN — PANTOPRAZOLE SODIUM 40 MILLIGRAM(S): 20 TABLET, DELAYED RELEASE ORAL at 05:51

## 2020-06-30 RX ADMIN — FINASTERIDE 5 MILLIGRAM(S): 5 TABLET, FILM COATED ORAL at 11:25

## 2020-06-30 RX ADMIN — Medication 500 MILLIGRAM(S): at 11:25

## 2020-06-30 RX ADMIN — LACTULOSE 20 GRAM(S): 10 SOLUTION ORAL at 17:32

## 2020-06-30 RX ADMIN — Medication 40 MILLIGRAM(S): at 05:48

## 2020-06-30 RX ADMIN — HEPARIN SODIUM 5000 UNIT(S): 5000 INJECTION INTRAVENOUS; SUBCUTANEOUS at 05:49

## 2020-06-30 NOTE — CONSULT NOTE ADULT - ATTENDING COMMENTS
Seen / examined and above reviewed.    Severe AS s/p BAV  CAD s/p CABG  Chronic Diastolic CHF  AF    Multiple comorbidities as above.  Notably, CKD, COPD, and Pulmonary HTN.    Underwent BAV several weeks as therapeutic trial secondary to guarded prognosis / unclear long-term benefit of TAVR.  ECHO reviewed.  nL LVSF.  AV restricted but opening with MG = 19mmHg / ANGELA > 1cm2.  Mild to Mod AI.    Patient's family decided they can no longer care for him at home after BAV and he now resides in a NH.  Readmitted with subacute decompensated CHF / volume overload.  Breathing improved with diuresis.    Librado is no longer ambulating.  He has poor insight into his illness (accounting for hearing difficulty) and is somewhat disheveled.  He is not thriving post-BAV despite moderate gradients.  Given his poor functional capacity, multiple comorbidities, and high risk for BRITTANI with TAVR (CKD / required dialysis for BRITTANI last year), the benefit of TAVR is not clearly greater than the risk at present.  I will discuss this further with Dr. Ramirez.    At present, recommend continued gentle diuresis.  Cont IV Lasix.  Start Metolazone 5 mg q24.
I personally saw and examined the patient, reviewed the chart and available data. I discussed the situation with the patient, his nursing team and the  STAFF. I also reviewed and/or amended the note as necessary.    patient seen on the day in question. Note not reviewed and cosigned until now due to scheduling issues
pt w/ sob, failure. give iv lasix to achieve neg balance.

## 2020-06-30 NOTE — CONSULT NOTE ADULT - SUBJECTIVE AND OBJECTIVE BOX
Patient is a 80y old  Male who presents with a chief complaint of SOB (30 Jun 2020 00:02)      HPI:  81 y/o male with a PMH of CAD s/p CABG s/p PCI , severe symptomatic AS s/p BAV as a bridge to TAVR (6/10), afib/flutter on coumadin, left carotid artery stenosis (80%), DM2 , HLD,  HTN, COPD on home O2, CKD stage 4, Hx of ATN on HD for 2 months in 2018, LE cellultits requiring admission and BPH was BIBEMS from [facility] to the ED with shortness of breath for 6 weeks, started after BAV. His SOB is at rest progressively worsening with edema of LLE progressing and was sent to ED Patient denies cough, chest pain, palpitations/wheeze.     In ED , VS: Bp:112/32 HR:57 RR:22 SpO2: 96% on 4L  Received 20mg IV push of lasix , (28 Jun 2020 23:48)      PAST MEDICAL & SURGICAL HISTORY:  Hyperlipidemia  Cellulitis of right lower extremity  Cellulitis of left lower extremity  History of renal insufficiency  Stenosis of left carotid artery  Aortic stenosis  Afib  BPH (benign prostatic hyperplasia)  CHF (congestive heart failure)  COPD (chronic obstructive pulmonary disease)  Diabetes  HTN (hypertension)  S/P balloon aortic valvuloplasty  H/O heart artery stent  S/P CABG x 3                      PREVIOUS DIAGNOSTIC TESTING:      ECHO  FINDINGS:    STRESS  FINDINGS:    CATHETERIZATION  FINDINGS:    MEDICATIONS  (STANDING):  albuterol/ipratropium for Nebulization 3 milliLiter(s) Nebulizer every 6 hours  ascorbic acid 500 milliGRAM(s) Oral daily  atorvastatin 40 milliGRAM(s) Oral at bedtime  budesonide 160 MICROgram(s)/formoterol 4.5 MICROgram(s) Inhaler 2 Puff(s) Inhalation two times a day  finasteride 5 milliGRAM(s) Oral daily  furosemide   Injectable 40 milliGRAM(s) IV Push every 8 hours  heparin   Injectable 5000 Unit(s) SubCutaneous every 12 hours  insulin glargine Injectable (LANTUS) 20 Unit(s) SubCutaneous at bedtime  isosorbide   mononitrate ER Tablet (IMDUR) 30 milliGRAM(s) Oral daily  lactulose Syrup 20 Gram(s) Oral two times a day  metoprolol succinate ER 50 milliGRAM(s) Oral daily  multivitamin 1 Tablet(s) Oral daily  pantoprazole    Tablet 40 milliGRAM(s) Oral before breakfast  polyethylene glycol 3350 17 Gram(s) Oral daily  silver sulfADIAZINE 1% Cream 1 Application(s) Topical daily    MEDICATIONS  (PRN):      FAMILY HISTORY:  No pertinent family history in first degree relatives      SOCIAL HISTORY:    CIGARETTES:    ALCOHOL:        Vital Signs Last 24 Hrs  T(C): 36.1 (30 Jun 2020 05:00), Max: 36.1 (30 Jun 2020 05:00)  T(F): 96.9 (30 Jun 2020 05:00), Max: 96.9 (30 Jun 2020 05:00)  HR: 56 (30 Jun 2020 13:24) (54 - 56)  BP: 118/56 (30 Jun 2020 13:24) (111/49 - 120/56)  BP(mean): --  RR: 18 (29 Jun 2020 21:42) (18 - 18)  SpO2: 100% (30 Jun 2020 10:12) (100% - 100%)            INTERPRETATION OF TELEMETRY:    ECG:    I&O's Detail    29 Jun 2020 07:01  -  30 Jun 2020 07:00  --------------------------------------------------------  IN:    Oral Fluid: 330 mL  Total IN: 330 mL    OUT:    Voided: 440 mL  Total OUT: 440 mL    Total NET: -110 mL          LABS:                        11.8   6.61  )-----------( 145      ( 30 Jun 2020 05:57 )             37.8     06-30    138  |  99  |  111<HH>  ----------------------------<  113<H>  5.2<H>   |  21  |  2.9<H>    Ca    9.4      30 Jun 2020 05:57  Mg     2.4     06-30    TPro  7.0  /  Alb  3.5  /  TBili  0.6  /  DBili  x   /  AST  31  /  ALT  16  /  AlkPhos  90  06-28    CARDIAC MARKERS ( 28 Jun 2020 21:52 )  x     / 0.13 ng/mL / x     / x     / x          PT/INR - ( 30 Jun 2020 00:00 )   PT: 26.80 sec;   INR: 2.33 ratio             I&O's Summary    29 Jun 2020 07:01  -  30 Jun 2020 07:00  --------------------------------------------------------  IN: 330 mL / OUT: 440 mL / NET: -110 mL      BNPSerum Pro-Brain Natriuretic Peptide: 88517 pg/mL (06-30 @ 05:57)    RADIOLOGY & ADDITIONAL STUDIES:

## 2020-06-30 NOTE — PROGRESS NOTE ADULT - SUBJECTIVE AND OBJECTIVE BOX
patient is still sob   still with le swelling and abdominal distension   no chest pain   no fever   no dysuria prior to julian     Vital Signs Last 24 Hrs  T(C): 36.1 (30 Jun 2020 05:00), Max: 36.1 (30 Jun 2020 05:00)  T(F): 96.9 (30 Jun 2020 05:00), Max: 96.9 (30 Jun 2020 05:00)  HR: 56 (30 Jun 2020 13:24) (54 - 56)  BP: 118/56 (30 Jun 2020 13:24) (111/49 - 120/56)  BP(mean): --  RR: 18 (29 Jun 2020 21:42) (18 - 18)  SpO2: 100% (30 Jun 2020 10:12) (100% - 100%)    PHYSICAL EXAM:  GENERAL: NAD,   CHEST/LUNG: decreased air entry at bases   HEART: Regular rate and rhythm;   ABDOMEN: Soft, Nontender, distended; Bowel sounds present  EXTREMITIES: +++ edema  PSYCH: AAOx3  NEUROLOGY: non-focal                            11.8   6.61  )-----------( 145      ( 30 Jun 2020 05:57 )             37.8     06-30    138  |  99  |  111<HH>  ----------------------------<  113<H>  5.2<H>   |  21  |  2.9<H>    Ca    9.4      30 Jun 2020 05:57  Mg     2.4     06-30    TPro  7.0  /  Alb  3.5  /  TBili  0.6  /  DBili  x   /  AST  31  /  ALT  16  /  AlkPhos  90  06-28    LIVER FUNCTIONS - ( 28 Jun 2020 21:52 )  Alb: 3.5 g/dL / Pro: 7.0 g/dL / ALK PHOS: 90 U/L / ALT: 16 U/L / AST: 31 U/L / GGT: x           PT/INR - ( 30 Jun 2020 00:00 )   PT: 26.80 sec;   INR: 2.33 ratio           CARDIAC MARKERS ( 28 Jun 2020 21:52 )  x     / 0.13 ng/mL / x     / x     / x        MEDICATIONS  (STANDING):  albuterol/ipratropium for Nebulization 3 milliLiter(s) Nebulizer every 6 hours  ascorbic acid 500 milliGRAM(s) Oral daily  atorvastatin 40 milliGRAM(s) Oral at bedtime  budesonide 160 MICROgram(s)/formoterol 4.5 MICROgram(s) Inhaler 2 Puff(s) Inhalation two times a day  finasteride 5 milliGRAM(s) Oral daily  furosemide   Injectable 40 milliGRAM(s) IV Push every 8 hours  heparin   Injectable 5000 Unit(s) SubCutaneous every 12 hours  insulin glargine Injectable (LANTUS) 20 Unit(s) SubCutaneous at bedtime  isosorbide   mononitrate ER Tablet (IMDUR) 30 milliGRAM(s) Oral daily  lactulose Syrup 20 Gram(s) Oral two times a day  metoprolol succinate ER 50 milliGRAM(s) Oral daily  multivitamin 1 Tablet(s) Oral daily  pantoprazole    Tablet 40 milliGRAM(s) Oral before breakfast  polyethylene glycol 3350 17 Gram(s) Oral daily  silver sulfADIAZINE 1% Cream 1 Application(s) Topical daily    MEDICATIONS  (PRN):

## 2020-06-30 NOTE — PROGRESS NOTE ADULT - ASSESSMENT
79 y/o male with a PMH of CAD s/p CABG s/p PCI , severe symptomatic AS s/p BAV as a bridge to TAVR (6/10), afib/flutter on coumadin, left carotid artery stenosis (80%), DM2 , HLD,  HTN, COPD , , CKD stage 4,BPH was sent from [facility] to the ED with shortness of breath and LE edema , s/p  BAV.    A/P   #acute on chronic diastolic CHF exacerbation with given hx of Severe AS  s/p balloon valvuloplasty of aortic valve as bridge to TAVR   patient still fluid overloaded   increase lasix to IV 40 TID from BID   on toprol 50 qday   Dr Velasquez to see patient for further recs   echo this admission shows    -LV Ejection Fraction by Rangel's Method with a biplane EF of 56 %.  -The left ventricular diastolic function could not be assessed in this study.  -Mild mitral valve regurgitation.  -Thickening and calcification of the anterior and posterior mitral valve leaflets.  -Moderate tricuspid regurgitation.  -Aortic valve thickening and calcification with decreased leaflet opening.  -Mild to moderate aortic regurgitation.  -Moderate pulmonic valve regurgitation.  -Estimated pulmonary artery systolic pressure is 71.6 mmHg assuming a right atrial pressure of 15 mmHg, which is consistent with severe pulmonary hypertension.  -Peak transaortic gradient equals 36.4 mmHg, mean transaortic gradient equals 19.0 mmHg, the calculated aortic valve area equals 1.15 cm² by the continuity equation consistent with moderate aortic stenosis.  -Normal left ventricular size and wall thicknesses, with normal systolic and diastolic function.  - Right ventricular volume and pressure overload.  -LA volume Index is 26.7 ml/m² ml/m2.    #Hx CAD s/p CABG s/p PCI ; Severe AS s/p BAV  c/w toprol, imdur; patient not on antiplatelets. will neeed to clarify home meds discussed with resident to clarify home meds       #history of AFIB c/w coumadin target INR 2-3   HR 50s on tele on toprol 50     #HX of Left Carotid Artery stenosis  80%, was planned for CEA but required cardio clearance as per previous note  son asked if procedure can be done during this admission but patient is not medically optimized and will need cardiac pre-op eval when medically optimized from a CHF standpoint     #CKD 4 stable scr at baseline. son requested renal eval     # Hyperkalemia 5.2 monitor     #Chronic elevated troponin likely due to CKD    # COPD - stable   c/w duonebs, symbicort      #DM2   controlled     DVT PX on coumadin     #Progress Note Handoff  Pending (specify):  IV diuresis, cardiology DR Velasquez to see patient   Family discussion: patient   Disposition:SNF

## 2020-06-30 NOTE — CONSULT NOTE ADULT - ASSESSMENT
79 y/o male with CHF - called for difficult julian placement secondary to penile and scrotal edema  - UA, UCx  - IV abx  - Flomax  - Cont current management  - D/c catheter when no longer medically required  - Recall  prn  - Will d/w attending 79 y/o male with CHF - called for difficult julian placement secondary to penile and scrotal edema  - UA, UCx  - IV abx  - Flomax  - Cont current management  - D/c catheter when no longer medically required  - Recall  prn  - Will d/w attending    UPDATED PLAN:  - Pt seen and examined at bedside with Dr. Roper during evening rounds.   - Discussed with overnight PA - upon catheter placement, despite bedside bladder scan showing 500cc urine in bladder, only 80cc return of urine.   - If not needed for Strict I&Os, d/c julian cath for TOV as pt states he was voiding well and pt was not in retention at time of julian placement.  - No further urologic intervention at this time, recall prn. 81 y/o male with CHF - called for difficult julian placement secondary to penile and scrotal edema  - UA, UCx  - IV abx  - Flomax  - Cont current management  - D/c catheter when no longer medically required  - Recall  prn  - Will d/w attending    UPDATED PLAN:  - Pt seen and examined at bedside with Dr. Roper during evening rounds.   - Discussed with overnight PA - upon catheter placement, despite bedside bladder scan showing 500cc urine in bladder, only 80cc return of urine.   - If not needed for Strict I&Os, d/c julian cath for TOV as pt states he was voiding well enough and pt was not in retention at time of julian placement.  - No further urologic intervention at this time, recall prn.   This issue was discussed with the nurses who told us that the medical staff are quite concerned about the patient’s fluid retention throughout his body including some so severe that is developing weeping wounds. They’re going to try and diaries him and feel that the catheters necessary. They will recall is PRN, they were trying to his careful a catheter care as they can and if they wanted to trouble they will call us back. At this point urology will stop follow-up

## 2020-06-30 NOTE — PHYSICAL THERAPY INITIAL EVALUATION ADULT - GENERAL OBSERVATIONS, REHAB EVAL
10:57-11:15 18 min Chart reviewed, attempted to see pt for PT IE, pt rec in bed sleeping in NAD, pt woke to name, pt declined PT stating he is too tired, PT educated pt on the importance of PT/OOB to reduce his LE swelling and to improve strength/mobility, pt still declined, social hx obtained, pt left as found in NAD, will f/u
11:18-12:00 42min  pt rec in bed in NAD, pt agreeable to PT with encouragement, +O2 at 3L, +julian intact, pt has pocket talker on

## 2020-06-30 NOTE — PROGRESS NOTE ADULT - SUBJECTIVE AND OBJECTIVE BOX
TAIWO ZAVALA 80y Male  MRN#: [id number]   Hospital Day: 2d    SUBJECTIVE  Patient is a 80y old Male who presents with a chief complaint of SOB (30 Jun 2020 13:28)  Currently admitted to medicine with the primary diagnosis of CHF (congestive heart failure)    INTERVAL HPI AND OVERNIGHT EVENTS:  Patient was examined and seen at bedside. Patient was having trouble breathing and short of breath. Patient was also hard of hearing on the right side. Pt reported feeling swollen and wanting to get the "fluid out of him". Patient had julian placed the night prior. Pt denies headaches, chest pain, abdominal pain, blood in urine or stool.     OBJECTIVE  PAST MEDICAL & SURGICAL HISTORY  Hyperlipidemia  Cellulitis of right lower extremity  Cellulitis of left lower extremity  History of renal insufficiency  Stenosis of left carotid artery  Aortic stenosis  Afib  BPH (benign prostatic hyperplasia)  CHF (congestive heart failure)  COPD (chronic obstructive pulmonary disease)  Diabetes  HTN (hypertension)  S/P balloon aortic valvuloplasty  H/O heart artery stent  S/P CABG x 3    ALLERGIES:  No Known Allergies    MEDICATIONS:  STANDING MEDICATIONS  albuterol/ipratropium for Nebulization 3 milliLiter(s) Nebulizer every 6 hours  ascorbic acid 500 milliGRAM(s) Oral daily  atorvastatin 40 milliGRAM(s) Oral at bedtime  budesonide 160 MICROgram(s)/formoterol 4.5 MICROgram(s) Inhaler 2 Puff(s) Inhalation two times a day  finasteride 5 milliGRAM(s) Oral daily  furosemide   Injectable 40 milliGRAM(s) IV Push every 8 hours  insulin glargine Injectable (LANTUS) 20 Unit(s) SubCutaneous at bedtime  isosorbide   mononitrate ER Tablet (IMDUR) 30 milliGRAM(s) Oral daily  lactulose Syrup 20 Gram(s) Oral two times a day  metoprolol succinate ER 50 milliGRAM(s) Oral daily  multivitamin 1 Tablet(s) Oral daily  pantoprazole    Tablet 40 milliGRAM(s) Oral before breakfast  polyethylene glycol 3350 17 Gram(s) Oral daily  silver sulfADIAZINE 1% Cream 1 Application(s) Topical daily  warfarin 3 milliGRAM(s) Oral once    PRN MEDICATIONS      VITAL SIGNS: Last 24 Hours  T(C): 36.1 (30 Jun 2020 05:00), Max: 36.1 (30 Jun 2020 05:00)  T(F): 96.9 (30 Jun 2020 05:00), Max: 96.9 (30 Jun 2020 05:00)  HR: 56 (30 Jun 2020 13:24) (54 - 56)  BP: 118/56 (30 Jun 2020 13:24) (111/49 - 118/56)  BP(mean): --  RR: 18 (29 Jun 2020 21:42) (18 - 18)  SpO2: 100% (30 Jun 2020 10:12) (100% - 100%)    LABS:                        11.8   6.61  )-----------( 145      ( 30 Jun 2020 05:57 )             37.8     06-30    138  |  99  |  111<HH>  ----------------------------<  113<H>  5.2<H>   |  21  |  2.9<H>    Ca    9.4      30 Jun 2020 05:57  Mg     2.4     06-30    TPro  7.0  /  Alb  3.5  /  TBili  0.6  /  DBili  x   /  AST  31  /  ALT  16  /  AlkPhos  90  06-28    PT/INR - ( 30 Jun 2020 00:00 )   PT: 26.80 sec;   INR: 2.33 ratio                   CARDIAC MARKERS ( 28 Jun 2020 21:52 )  x     / 0.13 ng/mL / x     / x     / x        Serum Pro-Brain Natriuretic Peptide (06.30.20 @ 05:57)    Serum Pro-Brain Natriuretic Peptide: 52174 pg/mL    Serum Pro-Brain Natriuretic Peptide (06.28.20 @ 21:52)    Serum Pro-Brain Natriuretic Peptide: 29554 pg/mL          RADIOLOGY:    < from: Xray Chest 1 View- PORTABLE-Urgent (06.30.20 @ 09:58) >  Comparison : Chest radiograph 6/28/2020.    Technique/Positioning: Frontal portable chest radiograph, rotated to the left.    Findings:    Support devices: Overlying telemetry leads.    Cardiac/mediastinum/hilum: Stable cardiomegaly. Status post median sternotomy and CABG.    Lung parenchyma/Pleura: Interval increase in bibasilar opacity/effusions. No pneumothorax.    Skeleton/soft tissues: Unchanged.    Impression:      Interval increase in bibasilar opacity/effusions.    < end of copied text >    < from: Transthoracic Echocardiogram (06.29.20 @ 12:01) >  Summary:   1. LV Ejection Fraction by Rangel's Method with a biplane EF of 56 %.   2. The left ventricular diastolic function could not be assessed in this study.   3. Mild mitral valve regurgitation.   4. Thickening and calcification of the anterior and posterior mitral valve leaflets.   5. Moderate tricuspid regurgitation.   6. Aortic valve thickening and calcification with decreased leaflet opening.   7. Mild to moderate aortic regurgitation.   8. Moderate pulmonic valve regurgitation.   9. Estimated pulmonary artery systolic pressure is 71.6 mmHg assuming a right atrial pressure of 15 mmHg, which is consistent with severe pulmonary hypertension.  10. Peak transaortic gradient equals 36.4 mmHg, mean transaortic gradient equals 19.0 mmHg, the calculated aortic valve area equals 1.15 cm² by the continuity equation consistent with moderate aortic stenosis.  11. Normal left ventricular size and wall thicknesses, with normal systolic and diastolic function.  12. Right ventricular volume and pressure overload.  13. LA volume Index is 26.7 ml/m² ml/m2.    < end of copied text >      PHYSICAL EXAM:  CONSTITUTIONAL: AAOx3  HEAD: Atraumatic, normocephalic  EYES: EOM intact, PERRLA, conjunctiva and sclera clear  ENT: Supple, no masses, no thyromegaly, no bruits, no JVD; moist mucous membranes  PULMONARY: Decreased air entry at the bases  CARDIOVASCULAR: Regular rate and rhythm; no murmurs, rubs, or gallops  GASTROINTESTINAL: Soft, non-tender, distended; bowel sounds present  MUSCULOSKELETAL: 2+ peripheral pulses; no clubbing, no cyanosis, b/l edema present in the legs  NEUROLOGY: non-focal  SKIN: No rashes or lesions; warm and dry

## 2020-06-30 NOTE — PROGRESS NOTE ADULT - ASSESSMENT
81 y/o male with a PMH of CAD s/p CABG s/p PCI , severe symptomatic AS s/p BAV as a bridge to TAVR (6/10), afib/flutter on coumadin, left carotid artery stenosis (80%), DM2 , HLD,  HTN, COPD (unsure if on home O2), [place] both ears, CKD stage 4, Hx of ATN on HD for 2 months in 2018, LE cellultits requiring admission and BPH was BIBEMS from [facility] to the ED with shortness of breath for 6 weeks, started after BAV. His SOB is at rest progressively worsening with edema of LLE progressing and was sent to ED Patient denies cough, chest pain, palpitations/wheeze.     #SOB likely due to acute on chronic diastolic CHF exacerbation with given hx of Severe AS  - patient currently on 2L saturating 100%  - pro BNP : ~20k, trop : 0.13   - patients SOB is multifactorial : chronic diastolic CHF, severe AS, severe pulm HTN  - Last ECHO: 6/11: LVEF: 50-55%, mod TR, mild AR, mod PA, severe pulm HTN  - Last Cath: 6.10 s/p Balloon aortic valvuloplasty  - Was previously non compliant with Lasix and with fluid restriction  - EKG on admission: junctional rhythm with occasional atrial paced complexes  - CXR on admission: diffuse mild congestion  - Increase lasix to IV 40 TID from BID on 07/01  - c/w Toprol , Imdur  - f/u cardio, Dr. Maria for meds optimization  - strict I+O, keep I<O, daily weights  - fluid restriction to 1200ml  - dietician eval f/u  - c/w supplemental O2    # Hyperkalemia  - hemolyzed , fu repeat BMP  - K 5.2 on 06/30 poss retaining due to CKD    #Chronic elevated troponin likely due to CKD  - 0.13 on 06/28 (0.11 in the past)  - trend trop     # COPD - stable   - c/w Duonebs, symbicort    #Hx CAD s/p CABG s/p PCI ; Severe AS s/p BAV  - c/w Toprol, Imdur.   - Called pt's home pharmacy, takes Clopidogrel 75mg QD.    #history of AFIB c/w coumadin target INR 2-3- rate control with: Toprol  - AC: coumadin 3mg  -  f/u INR and dose accordingly, PT 26.80 INR 2.33 on 06/30    #HX of Left Carotid Artery stenosis  - 80%, was planned for CEA but required cardio clearance as per previous note  - fu outpt vascular    #DM2   - last A1c: 6.4 (4/2020)  - on 20 IU Lantus as per previous med rec; monitor FS  - Called home pharmacy; pt on Lantus 8units in AM, 20 units at PM.   - CC diet    #Hx CKD4  - creatinine 2.9 on 06/30 ( baseline seems to be around 2.5-2.6)  - monitor BMP    #MISC:  DIET: DASH, CC with 1.2 L fluid restriction  DVT ppx: heparin subq  GI ppx: protonix  CODE: Full  Acitivity: IAT  Dispo: from [facility] for STR

## 2020-06-30 NOTE — CONSULT NOTE ADULT - SUBJECTIVE AND OBJECTIVE BOX
HPI:  81 y/o male with a PMH of CAD s/p CABG s/p PCI , severe symptomatic AS s/p BAV as a bridge to TAVR (6/10), afib/flutter on coumadin, left carotid artery stenosis (80%), DM2 , HLD,  HTN, COPD on home O2, CKD stage 4, Hx of ATN on HD for 2 months in 2018, LE cellultits requiring admission and BPH was BIBEMS from [facility] to the ED with shortness of breath for 6 weeks, started after BAV. His SOB is at rest progressively worsening with edema of LLE progressing and was sent to ED Patient denies cough, chest pain, palpitations/wheeze.     : called to place difficult julian catheter. Pt is third spacing and has very edematous penis and scrotum. Bladder scan at bedside reveals 485cc. Concerned for overflow incontinence. Pt denies any dyuria, hematuria but does state he has difficulty voiding. No fevers, chills, N/V.     Procedure: Julian cath was placed with difficulty. Difficult to maintain sterility. 14fr cath draining 80cc clear yellow urine. Pt tolerated it well without any complications.    PAST MEDICAL & SURGICAL HISTORY:  Hyperlipidemia  Cellulitis of right lower extremity  Cellulitis of left lower extremity  History of renal insufficiency  Stenosis of left carotid artery  Aortic stenosis  Afib  BPH (benign prostatic hyperplasia)  CHF (congestive heart failure)  COPD (chronic obstructive pulmonary disease)  Diabetes  HTN (hypertension)  S/P balloon aortic valvuloplasty  H/O heart artery stent  S/P CABG x 3      REVIEW OF SYSTEMS:    CONSTITUTIONAL:  No fevers or chills  HEENT: No visual changes  ENDO: No sweating  NECK: No pain or stiffness  MUSCULOSKELETAL: No back pain, no joint pain  RESPIRATORY: No shortness of breath  CARDIOVASCULAR: No chest pain  GASTROINTESTINAL: No abdominal or epigastric pain. No nausea, vomiting,  No diarrhea or constipation.   NEUROLOGICAL: No mental status changes  PSYCH: No depression, no mood changes  SKIN: No itching      MEDICATIONS  (STANDING):  albuterol/ipratropium for Nebulization 3 milliLiter(s) Nebulizer every 6 hours  ascorbic acid 500 milliGRAM(s) Oral daily  atorvastatin 40 milliGRAM(s) Oral at bedtime  budesonide 160 MICROgram(s)/formoterol 4.5 MICROgram(s) Inhaler 2 Puff(s) Inhalation two times a day  finasteride 5 milliGRAM(s) Oral daily  furosemide   Injectable 40 milliGRAM(s) IV Push every 12 hours  heparin   Injectable 5000 Unit(s) SubCutaneous every 12 hours  insulin glargine Injectable (LANTUS) 20 Unit(s) SubCutaneous at bedtime  isosorbide   mononitrate ER Tablet (IMDUR) 30 milliGRAM(s) Oral daily  lactulose Syrup 20 Gram(s) Oral two times a day  metoprolol succinate ER 50 milliGRAM(s) Oral daily  multivitamin 1 Tablet(s) Oral daily  pantoprazole    Tablet 40 milliGRAM(s) Oral before breakfast  polyethylene glycol 3350 17 Gram(s) Oral daily  silver sulfADIAZINE 1% Cream 1 Application(s) Topical daily    MEDICATIONS  (PRN):      Allergies    No Known Allergies    Intolerances        SOCIAL HISTORY: No illicit drug use    FAMILY HISTORY:  No pertinent family history in first degree relatives      Vital Signs Last 24 Hrs  T(C): 35.7 (29 Jun 2020 21:42), Max: 36 (29 Jun 2020 14:41)  T(F): 96.2 (29 Jun 2020 21:42), Max: 96.8 (29 Jun 2020 14:41)  HR: 54 (29 Jun 2020 21:42) (54 - 59)  BP: 116/61 (29 Jun 2020 21:42) (116/61 - 129/59)  BP(mean): --  RR: 18 (29 Jun 2020 21:42) (18 - 18)  SpO2: 98% (29 Jun 2020 09:10) (98% - 98%)    PHYSICAL EXAM:    Constitutional: NAD, awake/alert  HEENT: EOMI  Neck: no pain  Back: No CVA tenderness B/L  Respiratory: No accessory respiratory muscle use  Abd: Soft, Distended abdomen likely secondary to third spacing, bladder non palpable, no suprapubic tenderness,   : + edema to penis and scrotum, no skin changes no fluctuance, no drainage  Extremities: no edema  Neurological: A/O x 3  Psychiatric: Normal mood, normal affect  Skin: No obvious rashes      I&O's Summary    29 Jun 2020 07:01  -  30 Jun 2020 00:02  --------------------------------------------------------  IN: 330 mL / OUT: 440 mL / NET: -110 mL        LABS:                        12.0   6.50  )-----------( 153      ( 29 Jun 2020 16:41 )             38.6     06-29    140  |  100  |  107<HH>  ----------------------------<  127<H>  5.2<H>   |  23  |  2.7<H>    Ca    9.6      29 Jun 2020 16:41  Mg     2.3     06-29    TPro  7.0  /  Alb  3.5  /  TBili  0.6  /  DBili  x   /  AST  31  /  ALT  16  /  AlkPhos  90  06-28 HPI:  81 y/o male with a PMH of CAD s/p CABG s/p PCI , severe symptomatic AS s/p BAV as a bridge to TAVR (6/10), afib/flutter on coumadin, left carotid artery stenosis (80%), DM2 , HLD,  HTN, COPD on home O2, CKD stage 4, Hx of ATN on HD for 2 months in 2018, LE cellultits requiring admission and BPH was BIBEMS from [facility] to the ED with shortness of breath for 6 weeks, started after BAV. His SOB is at rest progressively worsening with edema of LLE progressing and was sent to ED Patient denies cough, chest pain, palpitations/wheeze.     : called to place difficult julian catheter. Pt is third spacing and has very edematous penis and scrotum. Bladder scan at bedside reveals 485cc. Concerned for overflow incontinence. Pt denies any dyuria, hematuria but does state he has difficulty voiding. No fevers, chills, N/V.     Procedure: Julian cath was placed with difficulty. Difficult to maintain sterility. 14fr cath draining 80cc clear yellow urine. Pt tolerated it well without any complications.    PAST MEDICAL & SURGICAL HISTORY:  Hyperlipidemia  Cellulitis of right lower extremity  Cellulitis of left lower extremity  History of renal insufficiency  Stenosis of left carotid artery  Aortic stenosis  Afib  BPH (benign prostatic hyperplasia)  CHF (congestive heart failure)  COPD (chronic obstructive pulmonary disease)  Diabetes  HTN (hypertension)  S/P balloon aortic valvuloplasty  H/O heart artery stent  S/P CABG x 3      REVIEW OF SYSTEMS:    CONSTITUTIONAL:  No fevers or chills  HEENT: No visual changes  ENDO: No sweating  NECK: No pain or stiffness  MUSCULOSKELETAL: No back pain, no joint pain  RESPIRATORY:  shortness of breath  CARDIOVASCULAR: No chest pain  GASTROINTESTINAL: No abdominal or epigastric pain. No nausea, vomiting,  No diarrhea or constipation.   NEUROLOGICAL: No mental status changes  PSYCH: No depression, no mood changes  SKIN: No itching      MEDICATIONS  (STANDING):  albuterol/ipratropium for Nebulization 3 milliLiter(s) Nebulizer every 6 hours  ascorbic acid 500 milliGRAM(s) Oral daily  atorvastatin 40 milliGRAM(s) Oral at bedtime  budesonide 160 MICROgram(s)/formoterol 4.5 MICROgram(s) Inhaler 2 Puff(s) Inhalation two times a day  finasteride 5 milliGRAM(s) Oral daily  furosemide   Injectable 40 milliGRAM(s) IV Push every 12 hours  heparin   Injectable 5000 Unit(s) SubCutaneous every 12 hours  insulin glargine Injectable (LANTUS) 20 Unit(s) SubCutaneous at bedtime  isosorbide   mononitrate ER Tablet (IMDUR) 30 milliGRAM(s) Oral daily  lactulose Syrup 20 Gram(s) Oral two times a day  metoprolol succinate ER 50 milliGRAM(s) Oral daily  multivitamin 1 Tablet(s) Oral daily  pantoprazole    Tablet 40 milliGRAM(s) Oral before breakfast  polyethylene glycol 3350 17 Gram(s) Oral daily  silver sulfADIAZINE 1% Cream 1 Application(s) Topical daily    Allergies: No Known Allergies    FAMILY HISTORY:  No pertinent family history in first degree relatives    Vital Signs Last 24 Hrs  T(C): 35.7 (29 Jun 2020 21:42), Max: 36 (29 Jun 2020 14:41)  T(F): 96.2 (29 Jun 2020 21:42), Max: 96.8 (29 Jun 2020 14:41)  HR: 54 (29 Jun 2020 21:42) (54 - 59)  BP: 116/61 (29 Jun 2020 21:42) (116/61 - 129/59)  BP(mean): --  RR: 18 (29 Jun 2020 21:42) (18 - 18)  SpO2: 98% (29 Jun 2020 09:10) (98% - 98%)    PHYSICAL EXAM:    Constitutional: NAD, awake/alert  HEENT: EOMI  Neck: no pain  Back: No CVA tenderness B/L  Respiratory: No accessory respiratory muscle use  Abd: Soft, Distended abdomen likely secondary to third spacing, bladder non palpable, no suprapubic tenderness,   : + edema to penis and scrotum, no skin changes no fluctuance, no drainage  Extremities: + edema  Neurological: A/O x 3  Psychiatric: Normal mood, normal affect  Skin: No obvious rashes      I&O's Summary    29 Jun 2020 07:01  -  30 Jun 2020 00:02  --------------------------------------------------------  IN: 330 mL / OUT: 440 mL / NET: -110 mL        LABS:                        12.0   6.50  )-----------( 153      ( 29 Jun 2020 16:41 )             38.6     06-29    140  |  100  |  107<HH>  ----------------------------<  127<H>  5.2<H>   |  23  |  2.7<H>    Ca    9.6      29 Jun 2020 16:41  Mg     2.3     06-29    TPro  7.0  /  Alb  3.5  /  TBili  0.6  /  DBili  x   /  AST  31  /  ALT  16  /  AlkPhos  90  06-28 HPI:  79 y/o male with a PMH of CAD s/p CABG s/p PCI , severe symptomatic AS s/p BAV as a bridge to TAVR (6/10), afib/flutter on coumadin, left carotid artery stenosis (80%), DM2 , HLD,  HTN, COPD on home O2, CKD stage 4, Hx of ATN on HD for 2 months in 2018, LE cellultits requiring admission and BPH was BIBEMS from [facility] to the ED with shortness of breath for 6 weeks, started after BAV. His SOB is at rest progressively worsening with edema of LLE progressing and was sent to ED Patient denies cough, chest pain, palpitations/wheeze.     : called to place difficult julian catheter. Pt is third spacing and has very edematous penis and scrotum. Bladder scan at bedside reveals 485cc. Concerned for overflow incontinence. Pt denies any dyuria, hematuria but does state he has difficulty voiding. No fevers, chills, N/V.     Procedure: Julian cath was placed with difficulty. Difficult to maintain sterility. 14fr cath draining 80cc clear yellow urine. Pt tolerated it well without any complications.    PAST MEDICAL & SURGICAL HISTORY:  Hyperlipidemia  Cellulitis of right lower extremity  Cellulitis of left lower extremity  History of renal insufficiency  Stenosis of left carotid artery  Aortic stenosis  Afib  BPH (benign prostatic hyperplasia)  CHF (congestive heart failure)  COPD (chronic obstructive pulmonary disease)  Diabetes  HTN (hypertension)  S/P balloon aortic valvuloplasty  H/O heart artery stent  S/P CABG x 3      REVIEW OF SYSTEMS:    CONSTITUTIONAL:  No fevers or chills  HEENT: No visual changes  ENDO: No sweating  NECK: No pain or stiffness  MUSCULOSKELETAL: No back pain, no joint pain  RESPIRATORY:  shortness of breath  CARDIOVASCULAR: No chest pain  GASTROINTESTINAL: No abdominal or epigastric pain. No nausea, vomiting,  No diarrhea or constipation.   NEUROLOGICAL: No mental status changes  PSYCH: No depression, no mood changes  SKIN: No itching      MEDICATIONS  (STANDING):  albuterol/ipratropium for Nebulization 3 milliLiter(s) Nebulizer every 6 hours  ascorbic acid 500 milliGRAM(s) Oral daily  atorvastatin 40 milliGRAM(s) Oral at bedtime  budesonide 160 MICROgram(s)/formoterol 4.5 MICROgram(s) Inhaler 2 Puff(s) Inhalation two times a day  finasteride 5 milliGRAM(s) Oral daily  furosemide   Injectable 40 milliGRAM(s) IV Push every 12 hours  heparin   Injectable 5000 Unit(s) SubCutaneous every 12 hours  insulin glargine Injectable (LANTUS) 20 Unit(s) SubCutaneous at bedtime  isosorbide   mononitrate ER Tablet (IMDUR) 30 milliGRAM(s) Oral daily  lactulose Syrup 20 Gram(s) Oral two times a day  metoprolol succinate ER 50 milliGRAM(s) Oral daily  multivitamin 1 Tablet(s) Oral daily  pantoprazole    Tablet 40 milliGRAM(s) Oral before breakfast  polyethylene glycol 3350 17 Gram(s) Oral daily  silver sulfADIAZINE 1% Cream 1 Application(s) Topical daily    Allergies: No Known Allergies    FAMILY HISTORY:  No pertinent family history in first degree relatives    Vital Signs Last 24 Hrs  T(C): 35.7 (29 Jun 2020 21:42), Max: 36 (29 Jun 2020 14:41)  T(F): 96.2 (29 Jun 2020 21:42), Max: 96.8 (29 Jun 2020 14:41)  HR: 54 (29 Jun 2020 21:42) (54 - 59)  BP: 116/61 (29 Jun 2020 21:42) (116/61 - 129/59)  RR: 18 (29 Jun 2020 21:42) (18 - 18)  SpO2: 98% (29 Jun 2020 09:10) (98% - 98%)    PHYSICAL EXAM:    Constitutional: NAD, awake/alert  HEENT: EOMI  Neck: no pain  Back: No CVA tenderness B/L  Respiratory: No accessory respiratory muscle use  Abd: Soft, Distended abdomen likely secondary to third spacing, bladder non palpable, no suprapubic tenderness,   : severe edema to penis and scrotum, pt sitting on scrotum (nurses will put a rolled sheet) no skin changes no fluctuance, no drainage. patient very vociferous re discomfort from the catheter  Extremities: + edema  Neurological: A/O x 3  Psychiatric: Normal mood, normal affect  Skin: No obvious rashes      I&O's Summary    29 Jun 2020 07:01  -  30 Jun 2020 00:02  --------------------------------------------------------  IN: 330 mL / OUT: 440 mL / NET: -110 mL        LABS:                        12.0   6.50  )-----------( 153      ( 29 Jun 2020 16:41 )             38.6     06-29    140  |  100  |  107<HH>  ----------------------------<  127<H>  5.2<H>   |  23  |  2.7<H>    Ca    9.6      29 Jun 2020 16:41  Mg     2.3     06-29    TPro  7.0  /  Alb  3.5  /  TBili  0.6  /  DBili  x   /  AST  31  /  ALT  16  /  AlkPhos  90  06-28

## 2020-06-30 NOTE — PHYSICAL THERAPY INITIAL EVALUATION ADULT - ADDITIONAL COMMENTS
pt came from [facility] for rehab, pt stated he amb " a little bit" there, pt otherwise lives with his son
pt from NH for rehab

## 2020-07-01 LAB
ANION GAP SERPL CALC-SCNC: 11 MMOL/L — SIGNIFICANT CHANGE UP (ref 7–14)
BASOPHILS # BLD AUTO: 0.06 K/UL — SIGNIFICANT CHANGE UP (ref 0–0.2)
BASOPHILS NFR BLD AUTO: 0.9 % — SIGNIFICANT CHANGE UP (ref 0–1)
BUN SERPL-MCNC: 109 MG/DL — CRITICAL HIGH (ref 10–20)
CALCIUM SERPL-MCNC: 9.4 MG/DL — SIGNIFICANT CHANGE UP (ref 8.5–10.1)
CHLORIDE SERPL-SCNC: 103 MMOL/L — SIGNIFICANT CHANGE UP (ref 98–110)
CO2 SERPL-SCNC: 24 MMOL/L — SIGNIFICANT CHANGE UP (ref 17–32)
CREAT SERPL-MCNC: 2.8 MG/DL — HIGH (ref 0.7–1.5)
EOSINOPHIL # BLD AUTO: 0.14 K/UL — SIGNIFICANT CHANGE UP (ref 0–0.7)
EOSINOPHIL NFR BLD AUTO: 2.1 % — SIGNIFICANT CHANGE UP (ref 0–8)
GLUCOSE BLDC GLUCOMTR-MCNC: 142 MG/DL — HIGH (ref 70–99)
GLUCOSE BLDC GLUCOMTR-MCNC: 159 MG/DL — HIGH (ref 70–99)
GLUCOSE BLDC GLUCOMTR-MCNC: 162 MG/DL — HIGH (ref 70–99)
GLUCOSE BLDC GLUCOMTR-MCNC: 95 MG/DL — SIGNIFICANT CHANGE UP (ref 70–99)
GLUCOSE SERPL-MCNC: 101 MG/DL — HIGH (ref 70–99)
HCT VFR BLD CALC: 39.2 % — LOW (ref 42–52)
HGB BLD-MCNC: 12.6 G/DL — LOW (ref 14–18)
IMM GRANULOCYTES NFR BLD AUTO: 0.5 % — HIGH (ref 0.1–0.3)
INR BLD: 1.97 RATIO — HIGH (ref 0.65–1.3)
LYMPHOCYTES # BLD AUTO: 1.21 K/UL — SIGNIFICANT CHANGE UP (ref 1.2–3.4)
LYMPHOCYTES # BLD AUTO: 18.6 % — LOW (ref 20.5–51.1)
MAGNESIUM SERPL-MCNC: 2.5 MG/DL — HIGH (ref 1.8–2.4)
MCHC RBC-ENTMCNC: 29.1 PG — SIGNIFICANT CHANGE UP (ref 27–31)
MCHC RBC-ENTMCNC: 32.1 G/DL — SIGNIFICANT CHANGE UP (ref 32–37)
MCV RBC AUTO: 90.5 FL — SIGNIFICANT CHANGE UP (ref 80–94)
MONOCYTES # BLD AUTO: 0.98 K/UL — HIGH (ref 0.1–0.6)
MONOCYTES NFR BLD AUTO: 15 % — HIGH (ref 1.7–9.3)
NEUTROPHILS # BLD AUTO: 4.1 K/UL — SIGNIFICANT CHANGE UP (ref 1.4–6.5)
NEUTROPHILS NFR BLD AUTO: 62.9 % — SIGNIFICANT CHANGE UP (ref 42.2–75.2)
NRBC # BLD: 0 /100 WBCS — SIGNIFICANT CHANGE UP (ref 0–0)
PLATELET # BLD AUTO: 146 K/UL — SIGNIFICANT CHANGE UP (ref 130–400)
POTASSIUM SERPL-MCNC: 4.7 MMOL/L — SIGNIFICANT CHANGE UP (ref 3.5–5)
POTASSIUM SERPL-SCNC: 4.7 MMOL/L — SIGNIFICANT CHANGE UP (ref 3.5–5)
PROTHROM AB SERPL-ACNC: 22.6 SEC — HIGH (ref 9.95–12.87)
RBC # BLD: 4.33 M/UL — LOW (ref 4.7–6.1)
RBC # FLD: 15.3 % — HIGH (ref 11.5–14.5)
SODIUM SERPL-SCNC: 138 MMOL/L — SIGNIFICANT CHANGE UP (ref 135–146)
WBC # BLD: 6.52 K/UL — SIGNIFICANT CHANGE UP (ref 4.8–10.8)
WBC # FLD AUTO: 6.52 K/UL — SIGNIFICANT CHANGE UP (ref 4.8–10.8)

## 2020-07-01 PROCEDURE — 99233 SBSQ HOSP IP/OBS HIGH 50: CPT

## 2020-07-01 RX ORDER — WARFARIN SODIUM 2.5 MG/1
3 TABLET ORAL ONCE
Refills: 0 | Status: COMPLETED | OUTPATIENT
Start: 2020-07-01 | End: 2020-07-01

## 2020-07-01 RX ADMIN — ATORVASTATIN CALCIUM 40 MILLIGRAM(S): 80 TABLET, FILM COATED ORAL at 21:39

## 2020-07-01 RX ADMIN — WARFARIN SODIUM 3 MILLIGRAM(S): 2.5 TABLET ORAL at 21:39

## 2020-07-01 RX ADMIN — Medication 1 APPLICATION(S): at 14:51

## 2020-07-01 RX ADMIN — POLYETHYLENE GLYCOL 3350 17 GRAM(S): 17 POWDER, FOR SOLUTION ORAL at 12:22

## 2020-07-01 RX ADMIN — Medication 3 MILLILITER(S): at 14:26

## 2020-07-01 RX ADMIN — Medication 1 TABLET(S): at 12:22

## 2020-07-01 RX ADMIN — Medication 3 MILLILITER(S): at 19:53

## 2020-07-01 RX ADMIN — BUDESONIDE AND FORMOTEROL FUMARATE DIHYDRATE 2 PUFF(S): 160; 4.5 AEROSOL RESPIRATORY (INHALATION) at 07:58

## 2020-07-01 RX ADMIN — FINASTERIDE 5 MILLIGRAM(S): 5 TABLET, FILM COATED ORAL at 12:22

## 2020-07-01 RX ADMIN — BUDESONIDE AND FORMOTEROL FUMARATE DIHYDRATE 2 PUFF(S): 160; 4.5 AEROSOL RESPIRATORY (INHALATION) at 21:47

## 2020-07-01 RX ADMIN — PANTOPRAZOLE SODIUM 40 MILLIGRAM(S): 20 TABLET, DELAYED RELEASE ORAL at 05:48

## 2020-07-01 RX ADMIN — Medication 40 MILLIGRAM(S): at 05:49

## 2020-07-01 RX ADMIN — Medication 50 MILLIGRAM(S): at 05:48

## 2020-07-01 RX ADMIN — Medication 3 MILLILITER(S): at 08:28

## 2020-07-01 RX ADMIN — Medication 40 MILLIGRAM(S): at 21:39

## 2020-07-01 RX ADMIN — INSULIN GLARGINE 20 UNIT(S): 100 INJECTION, SOLUTION SUBCUTANEOUS at 21:39

## 2020-07-01 RX ADMIN — Medication 500 MILLIGRAM(S): at 12:22

## 2020-07-01 RX ADMIN — ISOSORBIDE MONONITRATE 30 MILLIGRAM(S): 60 TABLET, EXTENDED RELEASE ORAL at 12:22

## 2020-07-01 RX ADMIN — LACTULOSE 20 GRAM(S): 10 SOLUTION ORAL at 18:23

## 2020-07-01 RX ADMIN — Medication 40 MILLIGRAM(S): at 14:50

## 2020-07-01 RX ADMIN — LACTULOSE 20 GRAM(S): 10 SOLUTION ORAL at 05:50

## 2020-07-01 NOTE — PROGRESS NOTE ADULT - SUBJECTIVE AND OBJECTIVE BOX
TAIWO ZAVALA 80y Male  MRN#: [id number]   Hospital Day: 3d    SUBJECTIVE  Patient is a 80y old Male who presents with a chief complaint of SOB (01 Jul 2020 16:04)  Currently admitted to medicine with the primary diagnosis of CHF (congestive heart failure)    INTERVAL HPI AND OVERNIGHT EVENTS:  Patient was examined and seen at bedside. This morning he is resting comfortably in bed and reports no issues or overnight events. Patient reports feeling better and able to take full breaths and speak in full sentences. Pt denies any weakness, fevers, chills, chest pain, palpitations, abdominal pain, muscle pain.     OBJECTIVE  PAST MEDICAL & SURGICAL HISTORY  Hyperlipidemia  Cellulitis of right lower extremity  Cellulitis of left lower extremity  History of renal insufficiency  Stenosis of left carotid artery  Aortic stenosis  Afib  BPH (benign prostatic hyperplasia)  CHF (congestive heart failure)  COPD (chronic obstructive pulmonary disease)  Diabetes  HTN (hypertension)  S/P balloon aortic valvuloplasty  H/O heart artery stent  S/P CABG x 3    ALLERGIES:  No Known Allergies    MEDICATIONS:  STANDING MEDICATIONS  albuterol/ipratropium for Nebulization 3 milliLiter(s) Nebulizer every 6 hours  ascorbic acid 500 milliGRAM(s) Oral daily  atorvastatin 40 milliGRAM(s) Oral at bedtime  budesonide 160 MICROgram(s)/formoterol 4.5 MICROgram(s) Inhaler 2 Puff(s) Inhalation two times a day  finasteride 5 milliGRAM(s) Oral daily  furosemide   Injectable 40 milliGRAM(s) IV Push every 8 hours  insulin glargine Injectable (LANTUS) 20 Unit(s) SubCutaneous at bedtime  isosorbide   mononitrate ER Tablet (IMDUR) 30 milliGRAM(s) Oral daily  lactulose Syrup 20 Gram(s) Oral two times a day  metoprolol succinate ER 50 milliGRAM(s) Oral daily  multivitamin 1 Tablet(s) Oral daily  pantoprazole    Tablet 40 milliGRAM(s) Oral before breakfast  polyethylene glycol 3350 17 Gram(s) Oral daily  silver sulfADIAZINE 1% Cream 1 Application(s) Topical daily  warfarin 3 milliGRAM(s) Oral once    PRN MEDICATIONS      VITAL SIGNS: Last 24 Hours  T(C): 36.1 (01 Jul 2020 12:41), Max: 36.8 (30 Jun 2020 21:27)  T(F): 96.9 (01 Jul 2020 12:41), Max: 98.2 (30 Jun 2020 21:27)  HR: 57 (01 Jul 2020 14:48) (56 - 57)  BP: 114/51 (01 Jul 2020 14:48) (113/45 - 132/61)  BP(mean): --  RR: 20 (01 Jul 2020 12:41) (18 - 20)  SpO2: 100% (01 Jul 2020 12:21) (100% - 100%)    LABS:                        12.6   6.52  )-----------( 146      ( 01 Jul 2020 06:21 )             39.2     07-01    138  |  103  |  109<HH>  ----------------------------<  101<H>  4.7   |  24  |  2.8<H>    Ca    9.4      01 Jul 2020 06:21  Mg     2.5     07-01      PT/INR - ( 01 Jul 2020 06:21 )   PT: 22.60 sec;   INR: 1.97 ratio           Ca    9.4      30 Jun 2020 05:57  Mg     2.4     06-30    TPro  7.0  /  Alb  3.5  /  TBili  0.6  /  DBili  x   /  AST  31  /  ALT  16  /  AlkPhos  90  06-28    PT/INR - ( 30 Jun 2020 00:00 )   PT: 26.80 sec;   INR: 2.33 ratio         CARDIAC MARKERS ( 28 Jun 2020 21:52 )  x     / 0.13 ng/mL / x     / x     / x        TPro  7.0  /  Alb  3.5  /  TBili  0.6  /  DBili  x   /  AST  31  /  ALT  16  /  AlkPhos  90  06-28    PT/INR - ( 30 Jun 2020 00:00 )   PT: 26.80 sec;   INR: 2.33 ratio                   CARDIAC MARKERS ( 28 Jun 2020 21:52 )  x     / 0.13 ng/mL / x     / x     / x        Serum Pro-Brain Natriuretic Peptide (06.30.20 @ 05:57)    Serum Pro-Brain Natriuretic Peptide: 76973 pg/mL    Serum Pro-Brain Natriuretic Peptide (06.28.20 @ 21:52)    Serum Pro-Brain Natriuretic Peptide: 93688 pg/mL          RADIOLOGY:    < from: Xray Chest 1 View- PORTABLE-Urgent (06.30.20 @ 09:58) >  Comparison : Chest radiograph 6/28/2020.    Technique/Positioning: Frontal portable chest radiograph, rotated to the left.    Findings:    Support devices: Overlying telemetry leads.    Cardiac/mediastinum/hilum: Stable cardiomegaly. Status post median sternotomy and CABG.    Lung parenchyma/Pleura: Interval increase in bibasilar opacity/effusions. No pneumothorax.    Skeleton/soft tissues: Unchanged.    Impression:      Interval increase in bibasilar opacity/effusions.    < end of copied text >    < from: Transthoracic Echocardiogram (06.29.20 @ 12:01) >  Summary:   1. LV Ejection Fraction by Rangel's Method with a biplane EF of 56 %.   2. The left ventricular diastolic function could not be assessed in this study.   3. Mild mitral valve regurgitation.   4. Thickening and calcification of the anterior and posterior mitral valve leaflets.   5. Moderate tricuspid regurgitation.   6. Aortic valve thickening and calcification with decreased leaflet opening.   7. Mild to moderate aortic regurgitation.   8. Moderate pulmonic valve regurgitation.   9. Estimated pulmonary artery systolic pressure is 71.6 mmHg assuming a right atrial pressure of 15 mmHg, which is consistent with severe pulmonary hypertension.  10. Peak transaortic gradient equals 36.4 mmHg, mean transaortic gradient equals 19.0 mmHg, the calculated aortic valve area equals 1.15 cm² by the continuity equation consistent with moderate aortic stenosis.  11. Normal left ventricular size and wall thicknesses, with normal systolic and diastolic function.  12. Right ventricular volume and pressure overload.  13. LA volume Index is 26.7 ml/m² ml/m2.    < end of copied text >      PHYSICAL EXAM:  CONSTITUTIONAL: AAOx3  HEAD: Atraumatic, normocephalic  EYES: EOM intact, PERRLA, conjunctiva and sclera clear  ENT: Supple, no masses, no thyromegaly, no bruits, no JVD; moist mucous membranes  PULMONARY: Decreased air entry at the bases  CARDIOVASCULAR: Regular rate and rhythm; no murmurs, rubs, or gallops  GASTROINTESTINAL: Soft, non-tender, distended; bowel sounds present  MUSCULOSKELETAL: 2+ peripheral pulses; no clubbing, no cyanosis, b/l edema present in the legs  NEUROLOGY: non-focal  SKIN: No rashes or lesions; warm and dry; bandages placed in b/l legs

## 2020-07-01 NOTE — DIETITIAN INITIAL EVALUATION ADULT. - FACTORS AFF FOOD INTAKE
no GI distress or chew/swallow difficulty noted, per PCA pt. did well with breakfast today, >50% PO, no BM's doc since admit

## 2020-07-01 NOTE — DIETITIAN INITIAL EVALUATION ADULT. - ENERGY NEEDS
calorie 1613-1935kcal (25-30kcal/kg IBW)   protein 58-65g (0.9-1.0g/kg IBW) for CKD IV  fluid per LIP for CHF

## 2020-07-01 NOTE — PROGRESS NOTE ADULT - SUBJECTIVE AND OBJECTIVE BOX
Patient is a 80y old  Male who presents with a chief complaint of SOB (30 Jun 2020 17:17)    HPI:  79 y/o male with a PMH of CAD s/p CABG s/p PCI , severe symptomatic AS s/p BAV as a bridge to TAVR (6/10), afib/flutter on coumadin, left carotid artery stenosis (80%), DM2 , HLD,  HTN, COPD on home O2, CKD stage 4, Hx of ATN on HD for 2 months in 2018, LE cellultits requiring admission and BPH was BIBEMS from [facility] to the ED with shortness of breath for 6 weeks, started after BAV. His SOB is at rest progressively worsening with edema of LLE progressing and was sent to ED Patient denies cough, chest pain, palpitations/wheeze.     In ED , VS: Bp:112/32 HR:57 RR:22 SpO2: 96% on 4L  Received 20mg IV push of lasix , (28 Jun 2020 23:48)    PAST MEDICAL & SURGICAL HISTORY:  Hyperlipidemia  Cellulitis of right lower extremity  Cellulitis of left lower extremity  History of renal insufficiency  Stenosis of left carotid artery  Aortic stenosis  Afib  BPH (benign prostatic hyperplasia)  CHF (congestive heart failure)  COPD (chronic obstructive pulmonary disease)  Diabetes  HTN (hypertension)  S/P balloon aortic valvuloplasty  H/O heart artery stent  S/P CABG x 3    patient seen and examined independently on morning rounds today for the first time today, chart reviewed and discussed with the medicine resident and on interdisciplinary rounds.    no overnight events---remains on iv lasix with good urine output-    Vital Signs Last 24 Hrs  T(C): 36.1 (01 Jul 2020 12:41), Max: 36.8 (30 Jun 2020 21:27)  T(F): 96.9 (01 Jul 2020 12:41), Max: 98.2 (30 Jun 2020 21:27)  HR: 57 (01 Jul 2020 14:48) (56 - 57)  BP: 114/51 (01 Jul 2020 14:48) (113/45 - 132/61)  BP(mean): --  RR: 20 (01 Jul 2020 12:41) (18 - 20)  SpO2: 100% (01 Jul 2020 12:21) (100% - 100%)    PE:  GEN-NAD, AAOx3  PULM- decreased bs bilateral bases  CVS- +s1/s2 RRR +ENMA  GI- soft NT ND +bs, no rebound, no guarding  EXT-1+ edema-                        12.6   6.52  )-----------( 146      ( 01 Jul 2020 06:21 )             39.2     07-01    138  |  103  |  109<HH>  ----------------------------<  101<H>  4.7   |  24  |  2.8<H>    Ca    9.4      01 Jul 2020 06:21  Mg     2.5     07-01                MEDICATIONS  (STANDING):  albuterol/ipratropium for Nebulization 3 milliLiter(s) Nebulizer every 6 hours  ascorbic acid 500 milliGRAM(s) Oral daily  atorvastatin 40 milliGRAM(s) Oral at bedtime  budesonide 160 MICROgram(s)/formoterol 4.5 MICROgram(s) Inhaler 2 Puff(s) Inhalation two times a day  finasteride 5 milliGRAM(s) Oral daily  furosemide   Injectable 40 milliGRAM(s) IV Push every 8 hours  insulin glargine Injectable (LANTUS) 20 Unit(s) SubCutaneous at bedtime  isosorbide   mononitrate ER Tablet (IMDUR) 30 milliGRAM(s) Oral daily  lactulose Syrup 20 Gram(s) Oral two times a day  metoprolol succinate ER 50 milliGRAM(s) Oral daily  multivitamin 1 Tablet(s) Oral daily  pantoprazole    Tablet 40 milliGRAM(s) Oral before breakfast  polyethylene glycol 3350 17 Gram(s) Oral daily  silver sulfADIAZINE 1% Cream 1 Application(s) Topical daily  warfarin 3 milliGRAM(s) Oral once

## 2020-07-01 NOTE — DIETITIAN INITIAL EVALUATION ADULT. - OTHER INFO
P/w: P/w: SOB. SOB likely due to acute on chronic diastolic CHF exacerbation with given hx of Severe AS: IV Lasix. - strict I+O, keep I<O, daily weights. previously noted non complaint with diet and meds. Hyperkalemia: likely form CKD. COPD - stable, supplemental O2. DM2: insulin regimen. Hx CKD4: monitor BMP.     Pertinent subjective: Pt. sound asleep during visit. Pt. known to RD from previous visit. [place]. On most recent assessment ~2 weeks ago pt. reported. low Na, diabetic regular texture diet at home. Has son to help with DM management. MVI and vit C supplement. UBW ~178lbs. Stable weight trends. Good PO intake during LOS at the time. Pt. was d/luana to NH. According to paper isael doc pt. was on consistent carb, DASH/TLC, no added salt diet- texture not specified, thin liquids. No carb Prosource protein supplement daily. Also supplemented with MVI, vit C and zinc sulfate. NKFA. Weight gain noted when compare ot previous admit- likely fluid retention as pt. with edema. Noted with elevated K earlier during LOS, will rec no conc K modifier. P/w: SOB. SOB likely due to acute on chronic diastolic CHF exacerbation with given hx of Severe AS: IV Lasix. - strict I+O, keep I<O, daily weights. previously noted non complaint with diet and meds. Hyperkalemia: likely form CKD. COPD - stable, supplemental O2. DM2: insulin regimen. Hx CKD4: monitor BMP.     Pertinent subjective: Pt. sound asleep during visit. Pt. known to RD from previous visit. [place]. On most recent assessment ~2 weeks ago pt. reported. low Na, diabetic regular texture diet at home. Has son to help with DM management. MVI and vit C supplement. UBW ~178lbs. Stable weight trends. Good PO intake during LOS at the time. Pt. was d/luana to NH. According to paper [facility] doc pt. was on consistent carb, DASH/TLC, no added salt diet- texture not specified, thin liquids. No carb Prosource protein supplement daily. Also supplemented with MVI, vit C and zinc sulfate. NKFA. Weight gain noted when compare ot previous admit- likely fluid retention as pt. with edema. Noted no complaint with meds/diet however, as pt. [facility] doc pt. on low Na diet during stay. Noted with elevated K earlier during LOS, will rec no conc K modifier. P/w: SOB. SOB likely due to acute on chronic diastolic CHF exacerbation with given hx of Severe AS: IV Lasix. - strict I+O, keep I<O, daily weights. previously noted non complaint with diet and meds. Hyperkalemia: likely form CKD. COPD - stable, supplemental O2. DM2: insulin regimen. Hx CKD4: monitor BMP.     Pertinent subjective: Pt. sound asleep during visit. Pt. known to RD from previous visit. [place]. On most recent assessment ~2 weeks ago pt. reported. low Na, diabetic regular texture diet at home. Has son to help with DM management. MVI and vit C supplement. UBW ~178lbs. Stable weight trends. Good PO intake during LOS at the time. Pt. was d/luana to NH. According to paper [facility] doc pt. was on consistent carb, DASH/TLC, no added salt diet- texture not specified, thin liquids. No carb Prosource protein supplement daily. Also supplemented with MVI, vit C and zinc sulfate. NKFA. Weight gain noted when compare ot previous admit- likely fluid retention as pt. with edema. Noted no complaint with meds/fluid restr, as per [facility] doc pt. on low Na diet during stay. Noted with elevated K earlier during LOS, will rec no conc K modifier. * Addendum: went to see pt. again around lunch time- did not respond to RD. Provided written hand out.

## 2020-07-01 NOTE — DIETITIAN INITIAL EVALUATION ADULT. - CONTINUE CURRENT NUTRITION CARE PLAN
yes/Nutrition intervention: meals and snacks, medical food supplement, vitamin and mineral supplement. Continue DASH/TLC, consistent carb w/snack diet with fludi restr per LIP, add no conc K and 60g protein restr modifier

## 2020-07-01 NOTE — PROGRESS NOTE ADULT - ASSESSMENT
81 y/o male with a PMH of CAD s/p CABG s/p PCI , severe symptomatic AS s/p BAV as a bridge to TAVR (6/10), afib/flutter on coumadin, left carotid artery stenosis (80%), DM2 , HLD,  HTN, COPD (unsure if on home O2), [place] both ears, CKD stage 4, Hx of ATN on HD for 2 months in 2018, LE cellultits requiring admission and BPH was BIBEMS from [facility] to the ED with shortness of breath for 6 weeks, started after BAV. His SOB is at rest progressively worsening with edema of LLE progressing and was sent to ED Patient denies cough, chest pain, palpitations/wheeze.     #SOB likely due to acute on chronic diastolic CHF exacerbation with given hx of Severe AS  - patient currently on nasal cannula 3L saturating 100%  - pro BNP : ~20k, trop : 0.13   - patients SOB is multifactorial : chronic diastolic CHF, severe AS, severe pulm HTN  - Last ECHO: 6/11: LVEF: 50-55%, mod TR, mild AR, mod HI, severe pulm HTN  - Last Cath: 6.10 s/p Balloon aortic valvuloplasty  - Was previously non compliant with Lasix and with fluid restriction  - EKG on admission: junctional rhythm with occasional atrial paced complexes  - CXR on admission: diffuse mild congestion  - Increased lasix to IV 40 TID from BID on 07/01  - c/w Toprol , Imdur  - f/u cardio, Dr. Maria for meds optimization  - strict I+O, keep I<O, daily weights  - fluid restriction to 1200ml  - dietician eval f/u  - c/w supplemental O2  - CXR on 07/02    # Hyperkalemia, improving  - hemolyzed , fu repeat BMP  - K 4.7 on 07/01     #Chronic elevated troponin likely due to CKD  - 0.13 on 06/28 (0.11 in the past)  - trend trop     # COPD - stable   - c/w Duonebs, symbicort    #Hx CAD s/p CABG s/p PCI ; Severe AS s/p BAV  - c/w Toprol, Imdur.   - Called pt's home pharmacy, takes Clopidogrel 75mg QD.    #history of AFIB c/w coumadin target INR 2-3- rate control with: Toprol  - AC: coumadin 3mg  -  f/u INR and dose accordingly, PT 22.60 INR 1.97 on 07/01    #HX of Left Carotid Artery stenosis  - 80%, was planned for CEA but required cardio clearance as per previous note  - fu outpt vascular    #DM2   - last A1c: 6.4 (4/2020)  - on 20 IU Lantus as per previous med rec; monitor FS  - Called home pharmacy; pt on Lantus 8units in AM, 20 units at PM.   - CC diet    #Hx CKD4  - creatinine 2.8 on 07/01 ( baseline seems to be around 2.5-2.6)  - monitor BMP    #MISC:  DIET: DASH, CC with 1.2 L fluid restriction  DVT ppx: heparin subq  GI ppx: protonix  CODE: Full  Acitivity: IAT  [facility]po: [facility]

## 2020-07-01 NOTE — DIETITIAN INITIAL EVALUATION ADULT. - PHYSICAL APPEARANCE
BMI BMI 37.1 (using height obtain from previous admit 167.6cm, current dosing weight 104.2kg), alert per EMR, [place], skin: wound (BS 16), 1+ L, R arm, 3+ R, L leg edema noted obese/BMI 37.1 (using height obtain from previous admit 167.6cm, current dosing weight 104.2kg), alert per EMR, [place], skin: wound (BS 16), 1+ L, R arm, 3+ R, L leg edema noted

## 2020-07-01 NOTE — DIETITIAN INITIAL EVALUATION ADULT. - RD TO REMAIN AVAILABLE
yes/RD to monitor diet order, energy intake, body composition, NFPF, electrolyte/renal profile, glucose profile

## 2020-07-01 NOTE — DIETITIAN INITIAL EVALUATION ADULT. - DIET TYPE
Pt. sound asleep during visit. Pt. known to RD from previous visit. On most recent assessment ~2 weeks ago pt. reported. Good PO intake during LOS at the time. Pt. was d/luana to NH. According to paper isael doc pt. was on consistent carb, DASH/TLC, no added salt diet- texture not specified, thin liquids. No carb Prosource protein supplement daily. Also supplemented with MVI, vit C and zinc sulfate. NKFA. Weight gain noted when compare ot previous admit.

## 2020-07-01 NOTE — PROGRESS NOTE ADULT - ASSESSMENT
a/p:    #acute on chronic diastolic CHF exacerbation with h/o Severe AS  -s/p balloon valvuloplasty of Aortic valve (bridge to TAVR)  -cont lasix 40 mg iv tid  -cont with o2 suppl prn  -monitor i/o, daily weights and fluid restriction  -cont toprol  -f/u with cardiology recommendation  -Echo- EF 56%, severe pulm htn and moderate AS  -repeat cxr in am    #Hx CAD s/p CABG s/p PCI ; Severe AS s/p BAV, AF  -continue current mangemnt  -daily inr for coumadin dosing  -cardiology following    #HX of Left Carotid Artery stenosis  -was awaiting L Carotid endartectomy- f/u with vascular and family plan for possible future procedure when medically optimized    #CKD stage 4  -stable creatinine at baseline  -monitor bmp and renal function    # Hyperkalemia - resolved  -k 5.2--->4.7    # COPD - stable   c/w duonebs, symbicort    #DM2   controlled     DVT/GI ppx    FULL CODE

## 2020-07-01 NOTE — PROGRESS NOTE ADULT - SUBJECTIVE AND OBJECTIVE BOX
SUBJ:No chest pain or shortness of breath      MEDICATIONS  (STANDING):  albuterol/ipratropium for Nebulization 3 milliLiter(s) Nebulizer every 6 hours  ascorbic acid 500 milliGRAM(s) Oral daily  atorvastatin 40 milliGRAM(s) Oral at bedtime  budesonide 160 MICROgram(s)/formoterol 4.5 MICROgram(s) Inhaler 2 Puff(s) Inhalation two times a day  finasteride 5 milliGRAM(s) Oral daily  furosemide   Injectable 40 milliGRAM(s) IV Push every 8 hours  insulin glargine Injectable (LANTUS) 20 Unit(s) SubCutaneous at bedtime  isosorbide   mononitrate ER Tablet (IMDUR) 30 milliGRAM(s) Oral daily  lactulose Syrup 20 Gram(s) Oral two times a day  metoprolol succinate ER 50 milliGRAM(s) Oral daily  multivitamin 1 Tablet(s) Oral daily  pantoprazole    Tablet 40 milliGRAM(s) Oral before breakfast  polyethylene glycol 3350 17 Gram(s) Oral daily  silver sulfADIAZINE 1% Cream 1 Application(s) Topical daily  warfarin 3 milliGRAM(s) Oral once    MEDICATIONS  (PRN):            Vital Signs Last 24 Hrs  T(C): 36.1 (01 Jul 2020 12:41), Max: 36.8 (30 Jun 2020 21:27)  T(F): 96.9 (01 Jul 2020 12:41), Max: 98.2 (30 Jun 2020 21:27)  HR: 57 (01 Jul 2020 14:48) (56 - 57)  BP: 114/51 (01 Jul 2020 14:48) (113/45 - 132/61)  BP(mean): --  RR: 20 (01 Jul 2020 12:41) (18 - 20)  SpO2: 100% (01 Jul 2020 12:21) (100% - 100%)      ECG:NML    TTE:    LABS:                        12.6   6.52  )-----------( 146      ( 01 Jul 2020 06:21 )             39.2     07-01    138  |  103  |  109<HH>  ----------------------------<  101<H>  4.7   |  24  |  2.8<H>    Ca    9.4      01 Jul 2020 06:21  Mg     2.5     07-01          PT/INR - ( 01 Jul 2020 06:21 )   PT: 22.60 sec;   INR: 1.97 ratio             I&O's Summary    30 Jun 2020 07:01  -  01 Jul 2020 07:00  --------------------------------------------------------  IN: 570 mL / OUT: 700 mL / NET: -130 mL    01 Jul 2020 07:01  -  01 Jul 2020 16:04  --------------------------------------------------------  IN: 1140 mL / OUT: 800 mL / NET: 340 mL      BNP

## 2020-07-02 LAB
ALBUMIN SERPL ELPH-MCNC: 3.4 G/DL — LOW (ref 3.5–5.2)
ALP SERPL-CCNC: 104 U/L — SIGNIFICANT CHANGE UP (ref 30–115)
ALT FLD-CCNC: 13 U/L — SIGNIFICANT CHANGE UP (ref 0–41)
ANION GAP SERPL CALC-SCNC: 16 MMOL/L — HIGH (ref 7–14)
AST SERPL-CCNC: 22 U/L — SIGNIFICANT CHANGE UP (ref 0–41)
BASOPHILS # BLD AUTO: 0.05 K/UL — SIGNIFICANT CHANGE UP (ref 0–0.2)
BASOPHILS NFR BLD AUTO: 0.7 % — SIGNIFICANT CHANGE UP (ref 0–1)
BILIRUB SERPL-MCNC: 0.7 MG/DL — SIGNIFICANT CHANGE UP (ref 0.2–1.2)
BUN SERPL-MCNC: 109 MG/DL — CRITICAL HIGH (ref 10–20)
CALCIUM SERPL-MCNC: 9.1 MG/DL — SIGNIFICANT CHANGE UP (ref 8.5–10.1)
CHLORIDE SERPL-SCNC: 102 MMOL/L — SIGNIFICANT CHANGE UP (ref 98–110)
CO2 SERPL-SCNC: 22 MMOL/L — SIGNIFICANT CHANGE UP (ref 17–32)
CREAT SERPL-MCNC: 2.8 MG/DL — HIGH (ref 0.7–1.5)
EOSINOPHIL # BLD AUTO: 0.15 K/UL — SIGNIFICANT CHANGE UP (ref 0–0.7)
EOSINOPHIL NFR BLD AUTO: 2.2 % — SIGNIFICANT CHANGE UP (ref 0–8)
GLUCOSE BLDC GLUCOMTR-MCNC: 113 MG/DL — HIGH (ref 70–99)
GLUCOSE BLDC GLUCOMTR-MCNC: 121 MG/DL — HIGH (ref 70–99)
GLUCOSE BLDC GLUCOMTR-MCNC: 145 MG/DL — HIGH (ref 70–99)
GLUCOSE BLDC GLUCOMTR-MCNC: 209 MG/DL — HIGH (ref 70–99)
GLUCOSE SERPL-MCNC: 113 MG/DL — HIGH (ref 70–99)
HCT VFR BLD CALC: 38.5 % — LOW (ref 42–52)
HGB BLD-MCNC: 11.9 G/DL — LOW (ref 14–18)
IMM GRANULOCYTES NFR BLD AUTO: 0.4 % — HIGH (ref 0.1–0.3)
INR BLD: 1.9 RATIO — HIGH (ref 0.65–1.3)
LYMPHOCYTES # BLD AUTO: 1.06 K/UL — LOW (ref 1.2–3.4)
LYMPHOCYTES # BLD AUTO: 15.8 % — LOW (ref 20.5–51.1)
MCHC RBC-ENTMCNC: 27.7 PG — SIGNIFICANT CHANGE UP (ref 27–31)
MCHC RBC-ENTMCNC: 30.9 G/DL — LOW (ref 32–37)
MCV RBC AUTO: 89.5 FL — SIGNIFICANT CHANGE UP (ref 80–94)
MONOCYTES # BLD AUTO: 0.95 K/UL — HIGH (ref 0.1–0.6)
MONOCYTES NFR BLD AUTO: 14.2 % — HIGH (ref 1.7–9.3)
NEUTROPHILS # BLD AUTO: 4.45 K/UL — SIGNIFICANT CHANGE UP (ref 1.4–6.5)
NEUTROPHILS NFR BLD AUTO: 66.7 % — SIGNIFICANT CHANGE UP (ref 42.2–75.2)
NRBC # BLD: 0 /100 WBCS — SIGNIFICANT CHANGE UP (ref 0–0)
PLATELET # BLD AUTO: 139 K/UL — SIGNIFICANT CHANGE UP (ref 130–400)
POTASSIUM SERPL-MCNC: 4.6 MMOL/L — SIGNIFICANT CHANGE UP (ref 3.5–5)
POTASSIUM SERPL-SCNC: 4.6 MMOL/L — SIGNIFICANT CHANGE UP (ref 3.5–5)
PROT SERPL-MCNC: 6.2 G/DL — SIGNIFICANT CHANGE UP (ref 6–8)
PROTHROM AB SERPL-ACNC: 21.9 SEC — HIGH (ref 9.95–12.87)
RBC # BLD: 4.3 M/UL — LOW (ref 4.7–6.1)
RBC # FLD: 15.5 % — HIGH (ref 11.5–14.5)
SODIUM SERPL-SCNC: 140 MMOL/L — SIGNIFICANT CHANGE UP (ref 135–146)
WBC # BLD: 6.69 K/UL — SIGNIFICANT CHANGE UP (ref 4.8–10.8)
WBC # FLD AUTO: 6.69 K/UL — SIGNIFICANT CHANGE UP (ref 4.8–10.8)

## 2020-07-02 PROCEDURE — 99233 SBSQ HOSP IP/OBS HIGH 50: CPT

## 2020-07-02 PROCEDURE — 71045 X-RAY EXAM CHEST 1 VIEW: CPT | Mod: 26

## 2020-07-02 PROCEDURE — 93010 ELECTROCARDIOGRAM REPORT: CPT

## 2020-07-02 RX ORDER — FUROSEMIDE 40 MG
80 TABLET ORAL
Refills: 0 | Status: DISCONTINUED | OUTPATIENT
Start: 2020-07-02 | End: 2020-07-10

## 2020-07-02 RX ORDER — WARFARIN SODIUM 2.5 MG/1
4 TABLET ORAL ONCE
Refills: 0 | Status: COMPLETED | OUTPATIENT
Start: 2020-07-02 | End: 2020-07-02

## 2020-07-02 RX ORDER — FUROSEMIDE 40 MG
40 TABLET ORAL ONCE
Refills: 0 | Status: COMPLETED | OUTPATIENT
Start: 2020-07-02 | End: 2020-07-02

## 2020-07-02 RX ADMIN — POLYETHYLENE GLYCOL 3350 17 GRAM(S): 17 POWDER, FOR SOLUTION ORAL at 11:27

## 2020-07-02 RX ADMIN — FINASTERIDE 5 MILLIGRAM(S): 5 TABLET, FILM COATED ORAL at 11:27

## 2020-07-02 RX ADMIN — INSULIN GLARGINE 20 UNIT(S): 100 INJECTION, SOLUTION SUBCUTANEOUS at 21:08

## 2020-07-02 RX ADMIN — Medication 1 TABLET(S): at 11:27

## 2020-07-02 RX ADMIN — WARFARIN SODIUM 4 MILLIGRAM(S): 2.5 TABLET ORAL at 21:06

## 2020-07-02 RX ADMIN — LACTULOSE 20 GRAM(S): 10 SOLUTION ORAL at 05:49

## 2020-07-02 RX ADMIN — ATORVASTATIN CALCIUM 40 MILLIGRAM(S): 80 TABLET, FILM COATED ORAL at 21:06

## 2020-07-02 RX ADMIN — Medication 80 MILLIGRAM(S): at 17:13

## 2020-07-02 RX ADMIN — ISOSORBIDE MONONITRATE 30 MILLIGRAM(S): 60 TABLET, EXTENDED RELEASE ORAL at 11:27

## 2020-07-02 RX ADMIN — BUDESONIDE AND FORMOTEROL FUMARATE DIHYDRATE 2 PUFF(S): 160; 4.5 AEROSOL RESPIRATORY (INHALATION) at 21:07

## 2020-07-02 RX ADMIN — Medication 1 APPLICATION(S): at 11:27

## 2020-07-02 RX ADMIN — BUDESONIDE AND FORMOTEROL FUMARATE DIHYDRATE 2 PUFF(S): 160; 4.5 AEROSOL RESPIRATORY (INHALATION) at 07:35

## 2020-07-02 RX ADMIN — PANTOPRAZOLE SODIUM 40 MILLIGRAM(S): 20 TABLET, DELAYED RELEASE ORAL at 05:49

## 2020-07-02 RX ADMIN — Medication 40 MILLIGRAM(S): at 05:48

## 2020-07-02 RX ADMIN — Medication 500 MILLIGRAM(S): at 11:27

## 2020-07-02 RX ADMIN — Medication 50 MILLIGRAM(S): at 05:49

## 2020-07-02 RX ADMIN — Medication 3 MILLILITER(S): at 19:21

## 2020-07-02 RX ADMIN — Medication 3 MILLILITER(S): at 09:41

## 2020-07-02 RX ADMIN — Medication 40 MILLIGRAM(S): at 12:47

## 2020-07-02 RX ADMIN — LACTULOSE 20 GRAM(S): 10 SOLUTION ORAL at 17:13

## 2020-07-02 NOTE — PROGRESS NOTE ADULT - SUBJECTIVE AND OBJECTIVE BOX
TAIWO ZAVALA 80y Male  MRN#: [id number]   Hospital Day: 4d    SUBJECTIVE  Patient is a 80y old Male who presents with a chief complaint of SOB (02 Jul 2020 13:49)  Currently admitted to medicine with the primary diagnosis of CHF (congestive heart failure)    INTERVAL HPI AND OVERNIGHT EVENTS:  Patient was examined and seen at bedside. This morning patient reports having more shortness of breath than he did the night before. Otherwise feels well, denies any fevers, chills, headaches, chest pain, palpitations, abdominal pain.     OBJECTIVE  PAST MEDICAL & SURGICAL HISTORY  Hyperlipidemia  Cellulitis of right lower extremity  Cellulitis of left lower extremity  History of renal insufficiency  Stenosis of left carotid artery  Aortic stenosis  Afib  BPH (benign prostatic hyperplasia)  CHF (congestive heart failure)  COPD (chronic obstructive pulmonary disease)  Diabetes  HTN (hypertension)  S/P balloon aortic valvuloplasty  H/O heart artery stent  S/P CABG x 3    ALLERGIES:  No Known Allergies    MEDICATIONS:  STANDING MEDICATIONS  albuterol/ipratropium for Nebulization 3 milliLiter(s) Nebulizer every 6 hours  ascorbic acid 500 milliGRAM(s) Oral daily  atorvastatin 40 milliGRAM(s) Oral at bedtime  budesonide 160 MICROgram(s)/formoterol 4.5 MICROgram(s) Inhaler 2 Puff(s) Inhalation two times a day  finasteride 5 milliGRAM(s) Oral daily  furosemide   Injectable 80 milliGRAM(s) IV Push two times a day  insulin glargine Injectable (LANTUS) 20 Unit(s) SubCutaneous at bedtime  isosorbide   mononitrate ER Tablet (IMDUR) 30 milliGRAM(s) Oral daily  lactulose Syrup 20 Gram(s) Oral two times a day  metoprolol succinate ER 50 milliGRAM(s) Oral daily  multivitamin 1 Tablet(s) Oral daily  pantoprazole    Tablet 40 milliGRAM(s) Oral before breakfast  polyethylene glycol 3350 17 Gram(s) Oral daily  silver sulfADIAZINE 1% Cream 1 Application(s) Topical daily  warfarin 4 milliGRAM(s) Oral once    PRN MEDICATIONS      VITAL SIGNS: Last 24 Hours  T(C): 35.3 (02 Jul 2020 13:30), Max: 36.1 (02 Jul 2020 05:31)  T(F): 95.6 (02 Jul 2020 13:30), Max: 96.9 (02 Jul 2020 05:31)  HR: 56 (02 Jul 2020 13:30) (56 - 57)  BP: 129/56 (02 Jul 2020 13:30) (105/55 - 133/56)  BP(mean): --  RR: 20 (02 Jul 2020 13:30) (20 - 20)  SpO2: 93% (02 Jul 2020 07:39) (93% - 97%)    LABS:                        11.9   6.69  )-----------( 139      ( 02 Jul 2020 07:17 )             38.5     07-02    140  |  102  |  109<HH>  ----------------------------<  113<H>  4.6   |  22  |  2.8<H>    Ca    9.1      02 Jul 2020 07:17  Mg     2.5     07-01    TPro  6.2  /  Alb  3.4<L>  /  TBili  0.7  /  DBili  x   /  AST  22  /  ALT  13  /  AlkPhos  104  07-02    PT/INR - ( 02 Jul 2020 07:17 )   PT: 21.90 sec;   INR: 1.90 ratio      Ca    9.4      30 Jun 2020 05:57  Mg     2.4     06-30    TPro  7.0  /  Alb  3.5  /  TBili  0.6  /  DBili  x   /  AST  31  /  ALT  16  /  AlkPhos  90  06-28    PT/INR - ( 30 Jun 2020 00:00 )   PT: 26.80 sec;   INR: 2.33 ratio         CARDIAC MARKERS ( 28 Jun 2020 21:52 )  x     / 0.13 ng/mL / x     / x     / x        TPro  7.0  /  Alb  3.5  /  TBili  0.6  /  DBili  x   /  AST  31  /  ALT  16  /  AlkPhos  90  06-28    PT/INR - ( 30 Jun 2020 00:00 )   PT: 26.80 sec;   INR: 2.33 ratio        CARDIAC MARKERS ( 28 Jun 2020 21:52 )  x     / 0.13 ng/mL / x     / x     / x        Serum Pro-Brain Natriuretic Peptide (06.30.20 @ 05:57)    Serum Pro-Brain Natriuretic Peptide: 84333 pg/mL    Serum Pro-Brain Natriuretic Peptide (06.28.20 @ 21:52)    Serum Pro-Brain Natriuretic Peptide: 16656 pg/mL      RADIOLOGY:    < from: Xray Chest 1 View- PORTABLE-Urgent (06.30.20 @ 09:58) >  Comparison : Chest radiograph 6/28/2020.    Technique/Positioning: Frontal portable chest radiograph, rotated to the left.    Findings:    Support devices: Overlying telemetry leads.    Cardiac/mediastinum/hilum: Stable cardiomegaly. Status post median sternotomy and CABG.    Lung parenchyma/Pleura: Interval increase in bibasilar opacity/effusions. No pneumothorax.    Skeleton/soft tissues: Unchanged.    Impression:      Interval increase in bibasilar opacity/effusions.    < end of copied text >    < from: Transthoracic Echocardiogram (06.29.20 @ 12:01) >  Summary:   1. LV Ejection Fraction by Rangel's Method with a biplane EF of 56 %.   2. The left ventricular diastolic function could not be assessed in this study.   3. Mild mitral valve regurgitation.   4. Thickening and calcification of the anterior and posterior mitral valve leaflets.   5. Moderate tricuspid regurgitation.   6. Aortic valve thickening and calcification with decreased leaflet opening.   7. Mild to moderate aortic regurgitation.   8. Moderate pulmonic valve regurgitation.   9. Estimated pulmonary artery systolic pressure is 71.6 mmHg assuming a right atrial pressure of 15 mmHg, which is consistent with severe pulmonary hypertension.  10. Peak transaortic gradient equals 36.4 mmHg, mean transaortic gradient equals 19.0 mmHg, the calculated aortic valve area equals 1.15 cm² by the continuity equation consistent with moderate aortic stenosis.  11. Normal left ventricular size and wall thicknesses, with normal systolic and diastolic function.  12. Right ventricular volume and pressure overload.  13. LA volume Index is 26.7 ml/m² ml/m2.    < end of copied text >      PHYSICAL EXAM:  CONSTITUTIONAL: AAOx3  HEAD: Atraumatic, normocephalic  EYES: EOM intact, PERRLA, conjunctiva and sclera clear  ENT: Supple, no masses, no thyromegaly, no bruits, no JVD; moist mucous membranes  PULMONARY: Decreased air entry at the bases  CARDIOVASCULAR: Regular rate and rhythm; no murmurs, rubs, or gallops  GASTROINTESTINAL: Soft, non-tender, distended; bowel sounds present  MUSCULOSKELETAL: 2+ peripheral pulses; no clubbing, no cyanosis, b/l edema present in the legs  NEUROLOGY: non-focal  SKIN: No rashes or lesions; warm and dry; bandages placed in b/l legs      Dispo:

## 2020-07-02 NOTE — CONSULT NOTE ADULT - SUBJECTIVE AND OBJECTIVE BOX
NEPHROLOGY CONSULTATION NOTE    81 y/o male with a PMH of CAD s/p CABG s/p PCI , severe symptomatic AS  (PG 74, MG 40) s/p BAV (MG 22->15) as a bridge to TAVR (6/10) complicating factors of afib/flutter on coumadin, left carotid artery stenosis (80%), DM2 , HLD,  HTN, COPD on home O2, CKD stage 4, Hx of ATN on HD for 2 months in 2019, LE cellulitis requiring admission and BPH was BIBEMS from [facility] to the ED with progressive worsening sob since BAV. The pt endorses a 20 lb wt gain however he is unsure of the time frame. He states his LE edema is chronic however is worsening, associated with redness.     Pt denies chest pain/pressure/back pain, palpitations/wheeze, ab pain, HA/dizziness/lightheadedness, syncope.  Pt had BRITTANI end of 2019 following Staph bacteremia was on HF, taken off HD ~3 -4 mos ago.    PAST MEDICAL & SURGICAL HISTORY:  Hyperlipidemia  Cellulitis of right lower extremity  Cellulitis of left lower extremity  History of renal insufficiency  Stenosis of left carotid artery  Aortic stenosis  Afib  BPH (benign prostatic hyperplasia)  CHF (congestive heart failure)  COPD (chronic obstructive pulmonary disease)  Diabetes  HTN (hypertension)  S/P balloon aortic valvuloplasty  H/O heart artery stent  S/P CABG x 3    Allergies:  No Known Allergies    Home Medications Reviewed  Hospital Medications:   MEDICATIONS  (STANDING):  albuterol/ipratropium for Nebulization 3 milliLiter(s) Nebulizer every 6 hours  ascorbic acid 500 milliGRAM(s) Oral daily  atorvastatin 40 milliGRAM(s) Oral at bedtime  budesonide 160 MICROgram(s)/formoterol 4.5 MICROgram(s) Inhaler 2 Puff(s) Inhalation two times a day  finasteride 5 milliGRAM(s) Oral daily  furosemide   Injectable 80 milliGRAM(s) IV Push two times a day  insulin glargine Injectable (LANTUS) 20 Unit(s) SubCutaneous at bedtime  isosorbide   mononitrate ER Tablet (IMDUR) 30 milliGRAM(s) Oral daily  lactulose Syrup 20 Gram(s) Oral two times a day  metoprolol succinate ER 50 milliGRAM(s) Oral daily  multivitamin 1 Tablet(s) Oral daily  pantoprazole    Tablet 40 milliGRAM(s) Oral before breakfast  polyethylene glycol 3350 17 Gram(s) Oral daily  silver sulfADIAZINE 1% Cream 1 Application(s) Topical daily  warfarin 4 milliGRAM(s) Oral once      SOCIAL HISTORY:  Denies ETOH,Smoking,   FAMILY HISTORY:  No pertinent family history in first degree relatives        REVIEW OF SYSTEMS:  CONSTITUTIONAL: No weakness, fevers or chills  EYES/ENT: No visual changes;  No vertigo or throat pain   NECK: No pain or stiffness  RESPIRATORY: No cough, Has shortness of breath  CARDIOVASCULAR: No chest pain or palpitations.  GASTROINTESTINAL: No abdominal or epigastric pain. No nausea, vomiting, or hematemesis; No diarrhea or constipation. No melena or hematochezia.  GENITOURINARY: No dysuria, frequency,   SKIN: b/l LE ulcers, bandages  VASCULAR: has bilateral lower extremity edema.   All other review of systems is negative unless indicated above.    VITALS:  T(F): 96.9 (07-02-20 @ 05:31), Max: 96.9 (07-02-20 @ 05:31)  HR: 56 (07-02-20 @ 11:24)  BP: 120/56 (07-02-20 @ 11:24)  RR: 20 (07-02-20 @ 05:31)  SpO2: 93% (07-02-20 @ 07:39)    07-01 @ 07:01  -  07-02 @ 07:00  --------------------------------------------------------  IN: 1220 mL / OUT: 1300 mL / NET: -80 mL          07-01-20 @ 07:01  -  07-02-20 @ 07:00  --------------------------------------------------------  IN: 0 mL / OUT: 1300 mL / NET: -1300 mL      I&O's Detail    01 Jul 2020 07:01  -  02 Jul 2020 07:00  --------------------------------------------------------  IN:    Oral Fluid: 1220 mL  Total IN: 1220 mL    OUT:    Indwelling Catheter - Urethral: 1300 mL  Total OUT: 1300 mL    Total NET: -80 mL            PHYSICAL EXAM:  Constitutional: In mild respiratory distress  HEENT: anicteric sclera,  MMM  Neck: No JVD  Respiratory: decreased BS b/l  Cardiovascular: S1, S2, RRR  Gastrointestinal: BS+, soft, NT/ND  Extremities: 1+ peripheral edema, bandages below knees  Neurological: A/O x 3, [place]  Psychiatric: Normal mood, normal affect  : Has eliel.   Skin: No rashes  Vascular Access:    LABS:  07-02    140  |  102  |  109<HH>  ----------------------------<  113<H>  4.6   |  22  |  2.8<H>    Ca    9.1      02 Jul 2020 07:17  Mg     2.5     07-01    TPro  6.2  /  Alb  3.4<L>  /  TBili  0.7  /  DBili      /  AST  22  /  ALT  13  /  AlkPhos  104  07-02    Creatinine Trend: 2.8 <--, 2.8 <--, 2.9 <--, 2.7 <--, 2.7 <--, 2.7 <--, 2.7 <--, 2.6 <--, 2.7 <--, 2.5 <--, 2.4 <--, 2.2 <--, 2.4 <--, 2.5 <--, 2.7 <--                        11.9   6.69  )-----------( 139      ( 02 Jul 2020 07:17 )             38.5     Urine Studies:              RADIOLOGY & ADDITIONAL STUDIES:  < from: Xray Chest 1 View- PORTABLE-Routine (07.02.20 @ 05:36) >    Unchanged bilateral opacities/effusions.    < end of copied text >

## 2020-07-02 NOTE — CONSULT NOTE ADULT - ASSESSMENT
Pt with CKD 4, DM, HTN, AS s/p BAV, COPD on home O2, admitted with worsening SOB due to fluid overload.  CKD4 - creat is at baseline  cont [place] and strict Is and Os  - check phos , iPTH  - UA spot protein creat ratio  Fluid Overload - increase LAsix to 80 IV q12h plus Metolazone 5 mg qd to acheive neg balance  Afib on COumadin  LE cellulitis - local care, silvadene, podiatry  will follow

## 2020-07-02 NOTE — PROGRESS NOTE ADULT - SUBJECTIVE AND OBJECTIVE BOX
still with SOB   no chest pain       ROS otherwise neg     Vital Signs Last 24 Hrs  T(C): 36.1 (02 Jul 2020 05:31), Max: 36.1 (01 Jul 2020 12:41)  T(F): 96.9 (02 Jul 2020 05:31), Max: 96.9 (01 Jul 2020 12:41)  HR: 56 (02 Jul 2020 11:24) (56 - 57)  BP: 120/56 (02 Jul 2020 11:24) (105/55 - 133/56)  BP(mean): --  RR: 20 (02 Jul 2020 05:31) (20 - 20)  SpO2: 93% (02 Jul 2020 07:39) (93% - 100%)    PHYSICAL EXAM:  GENERAL: NAD  Pulm: B/L basal crackles   CV: Regular rate and rhythm;   GI: Soft, Nontender, distended; Bowel sounds present  EXTREMITIES: ++edema  PSYCH: AAOx3  NEUROLOGY: non-focal  SKIN: No rashes                           11.9   6.69  )-----------( 139      ( 02 Jul 2020 07:17 )             38.5     07-02    140  |  102  |  109<HH>  ----------------------------<  113<H>  4.6   |  22  |  2.8<H>    Ca    9.1      02 Jul 2020 07:17  Mg     2.5     07-01    TPro  6.2  /  Alb  3.4<L>  /  TBili  0.7  /  DBili  x   /  AST  22  /  ALT  13  /  AlkPhos  104  07-02    LIVER FUNCTIONS - ( 02 Jul 2020 07:17 )  Alb: 3.4 g/dL / Pro: 6.2 g/dL / ALK PHOS: 104 U/L / ALT: 13 U/L / AST: 22 U/L / GGT: x           PT/INR - ( 02 Jul 2020 07:17 )   PT: 21.90 sec;   INR: 1.90 ratio               MEDICATIONS  (STANDING):  albuterol/ipratropium for Nebulization 3 milliLiter(s) Nebulizer every 6 hours  ascorbic acid 500 milliGRAM(s) Oral daily  atorvastatin 40 milliGRAM(s) Oral at bedtime  budesonide 160 MICROgram(s)/formoterol 4.5 MICROgram(s) Inhaler 2 Puff(s) Inhalation two times a day  finasteride 5 milliGRAM(s) Oral daily  furosemide   Injectable 80 milliGRAM(s) IV Push two times a day  furosemide   Injectable 40 milliGRAM(s) IV Push once  insulin glargine Injectable (LANTUS) 20 Unit(s) SubCutaneous at bedtime  isosorbide   mononitrate ER Tablet (IMDUR) 30 milliGRAM(s) Oral daily  lactulose Syrup 20 Gram(s) Oral two times a day  metoprolol succinate ER 50 milliGRAM(s) Oral daily  multivitamin 1 Tablet(s) Oral daily  pantoprazole    Tablet 40 milliGRAM(s) Oral before breakfast  polyethylene glycol 3350 17 Gram(s) Oral daily  silver sulfADIAZINE 1% Cream 1 Application(s) Topical daily  warfarin 4 milliGRAM(s) Oral once    MEDICATIONS  (PRN):

## 2020-07-02 NOTE — PROGRESS NOTE ADULT - ASSESSMENT
81 y/o male with a PMH of CAD s/p CABG s/p PCI , severe symptomatic AS s/p BAV as a bridge to TAVR (6/10), afib/flutter on coumadin, left carotid artery stenosis (80%), DM2 , HLD,  HTN, COPD (unsure if on home O2), [place] both ears, CKD stage 4, Hx of ATN on HD for 2 months in 2018, LE cellultits requiring admission and BPH was BIBEMS from [facility] to the ED with shortness of breath for 6 weeks, started after BAV. His SOB is at rest progressively worsening with edema of LLE progressing and was sent to ED Patient denies cough, chest pain, palpitations/wheeze.     #SOB likely due to acute on chronic diastolic CHF exacerbation with given hx of Severe AS  - patient currently on nasal cannula 3L saturating 100%  - pro BNP : ~20k, trop : 0.13   - patients SOB is multifactorial : chronic diastolic CHF, severe AS, severe pulm HTN  - Last ECHO: 6/11: LVEF: 50-55%, mod TR, mild AR, mod IL, severe pulm HTN  - Last Cath: 6.10 s/p Balloon aortic valvuloplasty  - Was previously non compliant with Lasix and with fluid restriction  - EKG on admission: junctional rhythm with occasional atrial paced complexes  - CXR on admission: diffuse mild congestion  - Increased lasix to IV 40 TID from BID on 07/01  - Added Metolazone 5mg qd  - F/u recs from Dr. Velasquez   - c/w Toprol , Imdur  - f/u cardio, Dr. Maria for meds optimization  - strict I+O, keep I<O, daily weights  - fluid restriction to 1200ml  - dietician eval f/u  - c/w supplemental O2    # Hyperkalemia, improving  - hemolyzed , fu repeat BMP  - K 4.7 on 07/01     #Chronic elevated troponin likely due to CKD  - 0.13 on 06/28 (0.11 in the past)  - trend trop     # COPD - stable   - c/w Duonebs, symbicort    #Hx CAD s/p CABG s/p PCI ; Severe AS s/p BAV  - c/w Toprol, Imdur.   - Called pt's home pharmacy, takes Clopidogrel 75mg QD.    #history of AFIB c/w coumadin target INR 2-3- rate control with: Toprol  - AC: coumadin 3mg  -  f/u INR and dose accordingly, PT 21.90 INR 1.90 on 07/02    #HX of Left Carotid Artery stenosis  - 80%, was planned for CEA but required cardio clearance as per previous note  - fu outpt vascular    #DM2   - last A1c: 6.4 (4/2020)  - on 20 IU Lantus as per previous med rec; monitor FS  - Called home pharmacy; pt on Lantus 8units in AM, 20 units at PM.   - CC diet    #Hx CKD4  - creatinine 2.8 on 07/01 ( baseline seems to be around 2.5-2.6)  - monitor BMP    #MISC:  DIET: DASH, CC with 1.2 L fluid restriction  DVT ppx: heparin subq  GI ppx: protonix  CODE: Full  Acitivity: IAT  Dispo: from [facility] for STR

## 2020-07-02 NOTE — PROGRESS NOTE ADULT - ASSESSMENT
81 y/o male with a PMH of CAD s/p CABG s/p PCI , severe symptomatic AS s/p BAV as a bridge to TAVR (6/10), afib/flutter on coumadin, left carotid artery stenosis (80%), DM2 , HLD,  HTN, COPD , , CKD stage 4,BPH was sent from [facility] to the ED with shortness of breath and LE edema , s/p  BAV.    A/P   #acute on chronic diastolic CHF exacerbation   s/p balloon valvuloplasty of aortic valve as bridge to TAVR   patient still fluid overloaded   change lasix to IV 80 BID from 40 TID and add metolazone 5 mg qday   on toprol 50 qday   Dr Velasquez to see patient for further recs   echo this admission shows    -LV Ejection Fraction by Rangel's Method with a biplane EF of 56 %.  -The left ventricular diastolic function could not be assessed in this study.  -Mild mitral valve regurgitation.  -Thickening and calcification of the anterior and posterior mitral valve leaflets.  -Moderate tricuspid regurgitation.  -Aortic valve thickening and calcification with decreased leaflet opening.  -Mild to moderate aortic regurgitation.  -Moderate pulmonic valve regurgitation.  -Estimated pulmonary artery systolic pressure is 71.6 mmHg assuming a right atrial pressure of 15 mmHg, which is consistent with severe pulmonary hypertension.  -Peak transaortic gradient equals 36.4 mmHg, mean transaortic gradient equals 19.0 mmHg, the calculated aortic valve area equals 1.15 cm² by the continuity equation consistent with moderate aortic stenosis.  -Normal left ventricular size and wall thicknesses, with normal systolic and diastolic function.  - Right ventricular volume and pressure overload.  -LA volume Index is 26.7 ml/m² ml/m2.    #Hx CAD s/p CABG s/p PCI ; Severe AS s/p BAV  c/w toprol, imdur; patient not on antiplatelets. will neeed to clarify home meds discussed with resident to clarify home meds       #history of AFIB c/w coumadin target INR 2-3   HR 50s on tele on toprol 50 continue to monitor     #HX of Left Carotid Artery stenosis  80%, was planned for CEA but required cardio clearance as per previous note  son asked if procedure can be done during this admission but patient is not medically optimized and will need cardiac pre-op eval when medically optimized from a CHF standpoint     #CKD 4 stable scr at baseline.       #Chronic elevated troponin likely due to CKD    # COPD - stable   c/w duonebs, symbicort      #DM2   controlled     DVT PX on coumadin     #Progress Note Handoff  Pending (specify):  IV diuresis, cardiology DR Velasquez to see patient   Family discussion: patient   Disposition: SNF

## 2020-07-03 LAB
ANION GAP SERPL CALC-SCNC: 17 MMOL/L — HIGH (ref 7–14)
BASOPHILS # BLD AUTO: 0.04 K/UL — SIGNIFICANT CHANGE UP (ref 0–0.2)
BASOPHILS NFR BLD AUTO: 0.6 % — SIGNIFICANT CHANGE UP (ref 0–1)
BUN SERPL-MCNC: 112 MG/DL — CRITICAL HIGH (ref 10–20)
CALCIUM SERPL-MCNC: 9.4 MG/DL — SIGNIFICANT CHANGE UP (ref 8.5–10.1)
CHLORIDE SERPL-SCNC: 103 MMOL/L — SIGNIFICANT CHANGE UP (ref 98–110)
CO2 SERPL-SCNC: 21 MMOL/L — SIGNIFICANT CHANGE UP (ref 17–32)
CREAT SERPL-MCNC: 2.8 MG/DL — HIGH (ref 0.7–1.5)
EOSINOPHIL # BLD AUTO: 0.12 K/UL — SIGNIFICANT CHANGE UP (ref 0–0.7)
EOSINOPHIL NFR BLD AUTO: 1.8 % — SIGNIFICANT CHANGE UP (ref 0–8)
GLUCOSE BLDC GLUCOMTR-MCNC: 109 MG/DL — HIGH (ref 70–99)
GLUCOSE BLDC GLUCOMTR-MCNC: 145 MG/DL — HIGH (ref 70–99)
GLUCOSE BLDC GLUCOMTR-MCNC: 149 MG/DL — HIGH (ref 70–99)
GLUCOSE BLDC GLUCOMTR-MCNC: 185 MG/DL — HIGH (ref 70–99)
GLUCOSE SERPL-MCNC: 93 MG/DL — SIGNIFICANT CHANGE UP (ref 70–99)
HCT VFR BLD CALC: 38.7 % — LOW (ref 42–52)
HGB BLD-MCNC: 12.2 G/DL — LOW (ref 14–18)
IMM GRANULOCYTES NFR BLD AUTO: 0.3 % — SIGNIFICANT CHANGE UP (ref 0.1–0.3)
INR BLD: 1.86 RATIO — HIGH (ref 0.65–1.3)
LYMPHOCYTES # BLD AUTO: 1.06 K/UL — LOW (ref 1.2–3.4)
LYMPHOCYTES # BLD AUTO: 15.8 % — LOW (ref 20.5–51.1)
MCHC RBC-ENTMCNC: 27.9 PG — SIGNIFICANT CHANGE UP (ref 27–31)
MCHC RBC-ENTMCNC: 31.5 G/DL — LOW (ref 32–37)
MCV RBC AUTO: 88.6 FL — SIGNIFICANT CHANGE UP (ref 80–94)
MONOCYTES # BLD AUTO: 0.84 K/UL — HIGH (ref 0.1–0.6)
MONOCYTES NFR BLD AUTO: 12.5 % — HIGH (ref 1.7–9.3)
NEUTROPHILS # BLD AUTO: 4.63 K/UL — SIGNIFICANT CHANGE UP (ref 1.4–6.5)
NEUTROPHILS NFR BLD AUTO: 69 % — SIGNIFICANT CHANGE UP (ref 42.2–75.2)
NRBC # BLD: 0 /100 WBCS — SIGNIFICANT CHANGE UP (ref 0–0)
PLATELET # BLD AUTO: 152 K/UL — SIGNIFICANT CHANGE UP (ref 130–400)
POTASSIUM SERPL-MCNC: 4.5 MMOL/L — SIGNIFICANT CHANGE UP (ref 3.5–5)
POTASSIUM SERPL-SCNC: 4.5 MMOL/L — SIGNIFICANT CHANGE UP (ref 3.5–5)
PROTHROM AB SERPL-ACNC: 21.4 SEC — HIGH (ref 9.95–12.87)
RBC # BLD: 4.37 M/UL — LOW (ref 4.7–6.1)
RBC # FLD: 15.6 % — HIGH (ref 11.5–14.5)
SODIUM SERPL-SCNC: 141 MMOL/L — SIGNIFICANT CHANGE UP (ref 135–146)
WBC # BLD: 6.71 K/UL — SIGNIFICANT CHANGE UP (ref 4.8–10.8)
WBC # FLD AUTO: 6.71 K/UL — SIGNIFICANT CHANGE UP (ref 4.8–10.8)

## 2020-07-03 PROCEDURE — 99233 SBSQ HOSP IP/OBS HIGH 50: CPT

## 2020-07-03 PROCEDURE — 99024 POSTOP FOLLOW-UP VISIT: CPT

## 2020-07-03 RX ORDER — WARFARIN SODIUM 2.5 MG/1
5 TABLET ORAL ONCE
Refills: 0 | Status: COMPLETED | OUTPATIENT
Start: 2020-07-03 | End: 2020-07-03

## 2020-07-03 RX ORDER — ASPIRIN/CALCIUM CARB/MAGNESIUM 324 MG
81 TABLET ORAL DAILY
Refills: 0 | Status: DISCONTINUED | OUTPATIENT
Start: 2020-07-03 | End: 2020-07-15

## 2020-07-03 RX ADMIN — LACTULOSE 20 GRAM(S): 10 SOLUTION ORAL at 17:29

## 2020-07-03 RX ADMIN — FINASTERIDE 5 MILLIGRAM(S): 5 TABLET, FILM COATED ORAL at 11:04

## 2020-07-03 RX ADMIN — Medication 3 MILLILITER(S): at 20:24

## 2020-07-03 RX ADMIN — BUDESONIDE AND FORMOTEROL FUMARATE DIHYDRATE 2 PUFF(S): 160; 4.5 AEROSOL RESPIRATORY (INHALATION) at 07:21

## 2020-07-03 RX ADMIN — WARFARIN SODIUM 5 MILLIGRAM(S): 2.5 TABLET ORAL at 21:33

## 2020-07-03 RX ADMIN — Medication 1 TABLET(S): at 11:04

## 2020-07-03 RX ADMIN — Medication 80 MILLIGRAM(S): at 17:28

## 2020-07-03 RX ADMIN — Medication 1 APPLICATION(S): at 11:05

## 2020-07-03 RX ADMIN — Medication 81 MILLIGRAM(S): at 12:07

## 2020-07-03 RX ADMIN — LACTULOSE 20 GRAM(S): 10 SOLUTION ORAL at 05:32

## 2020-07-03 RX ADMIN — Medication 3 MILLILITER(S): at 13:32

## 2020-07-03 RX ADMIN — POLYETHYLENE GLYCOL 3350 17 GRAM(S): 17 POWDER, FOR SOLUTION ORAL at 11:04

## 2020-07-03 RX ADMIN — BUDESONIDE AND FORMOTEROL FUMARATE DIHYDRATE 2 PUFF(S): 160; 4.5 AEROSOL RESPIRATORY (INHALATION) at 20:16

## 2020-07-03 RX ADMIN — PANTOPRAZOLE SODIUM 40 MILLIGRAM(S): 20 TABLET, DELAYED RELEASE ORAL at 05:32

## 2020-07-03 RX ADMIN — ISOSORBIDE MONONITRATE 30 MILLIGRAM(S): 60 TABLET, EXTENDED RELEASE ORAL at 11:04

## 2020-07-03 RX ADMIN — Medication 500 MILLIGRAM(S): at 11:04

## 2020-07-03 RX ADMIN — INSULIN GLARGINE 20 UNIT(S): 100 INJECTION, SOLUTION SUBCUTANEOUS at 21:33

## 2020-07-03 RX ADMIN — Medication 50 MILLIGRAM(S): at 05:32

## 2020-07-03 RX ADMIN — Medication 3 MILLILITER(S): at 08:02

## 2020-07-03 RX ADMIN — Medication 80 MILLIGRAM(S): at 05:32

## 2020-07-03 RX ADMIN — ATORVASTATIN CALCIUM 40 MILLIGRAM(S): 80 TABLET, FILM COATED ORAL at 21:33

## 2020-07-03 NOTE — CHART NOTE - NSCHARTNOTEFT_GEN_A_CORE
Registered Dietitian Follow-Up     Patient Profile Reviewed                           Yes [x]   No []     Nutrition History Previously Obtained        Yes [x]  No []       Pertinent Subjective Information: Observed pt eating 75% of lunch tray. reports good appetite and satisfied w/ meals      Pertinent Medical Interventions: acute on chronic diastolic CHF exacerbation - lasix increased. CKD 4 stable scr at baseline. No need for RRT. DM2 controlled.      Diet order: DASH/TLC, Carb consistent, 1.2 L fluid restriction, 60 gm protein restriction, no conc potassium      Anthropometrics:  - Ht. 167.6cm,  - Wt.    (6/30) 105.2 kg  (7/2) 103.3 kg   (7/3) 96.7 kg - weights trending down, pt on diuretic therapy  - %wt change  - BMI 34.4 using CBW  - IBW 64.5kg     Pertinent Lab Data: (7/3) H/H 12.2/38.7  POCT glucose 185   Cr 2.8      Pertinent Meds: coumadin, lantus, lasix, vit C, lipitor, lactulose, MVI, protonix, miralax      Physical Findings:  - Appearance: [place], alert, 3+ edema b/l leg   - GI function: LBM 7/1   - Tubes:  - Oral/Mouth cavity: No issues   - Skin: b/l LE cellulitis      Nutrition Requirements  Weight Used: 64.5kg cont from RD initial note      calorie 1613-1935kcal (25-30kcal/kg IBW)   protein 58-65g (0.9-1.0g/kg IBW) for CKD IV  fluid per LIP for CHF     Nutrient Intake: meeting needs         [] Previous Nutrition Diagnosis: Inadequate Oral Intake            [x] Ongoing          [] Resolved    [] No active nutrition diagnosis identified at this time     Nutrition Intervention meal/snack     Goal/Expected Outcome: Pt to consume >75% of meal tray in 7 days      Indicator/Monitoring: RD to monitor energy intake, diet order, body comp NFPF, lyte/glucose profile    Recommendation:   - Cont DASH/TLC, Carb consistent, 1.2 L fluid restriction, 60 gm protein restriction, no conc potassium.

## 2020-07-03 NOTE — PROGRESS NOTE ADULT - ASSESSMENT
81 y/o male with a PMH of CAD s/p CABG s/p PCI , severe symptomatic AS s/p BAV as a bridge to TAVR (6/10), afib/flutter on coumadin, left carotid artery stenosis (80%), DM2 , HLD,  HTN, COPD , , CKD stage 4,BPH was sent from [facility] to the ED with shortness of breath and LE edema , s/p  BAV.    A/P   #acute on chronic diastolic CHF exacerbation   s/p balloon valvuloplasty of aortic valve   per cardiology patient not a candidate for tAVR   change lasix to IV 80 BID from 40 TID and metolazone 5 mg qday scr stable   on toprol 50 qday   echo this admission shows    -LV Ejection Fraction by Rangel's Method with a biplane EF of 56 %.  -The left ventricular diastolic function could not be assessed in this study.  -Mild mitral valve regurgitation.  -Thickening and calcification of the anterior and posterior mitral valve leaflets.  -Moderate tricuspid regurgitation.  -Aortic valve thickening and calcification with decreased leaflet opening.  -Mild to moderate aortic regurgitation.  -Moderate pulmonic valve regurgitation.  -Estimated pulmonary artery systolic pressure is 71.6 mmHg assuming a right atrial pressure of 15 mmHg, which is consistent with severe pulmonary hypertension.  -Peak transaortic gradient equals 36.4 mmHg, mean transaortic gradient equals 19.0 mmHg, the calculated aortic valve area equals 1.15 cm² by the continuity equation consistent with moderate aortic stenosis.  -Normal left ventricular size and wall thicknesses, with normal systolic and diastolic function.  - Right ventricular volume and pressure overload.  -LA volume Index is 26.7 ml/m² ml/m2.    #Hx CAD s/p CABG s/p PCI ; Severe AS s/p BAV    #history of AFIB c/w coumadin target INR 2-3   HR 50s on tele on toprol 50     #HX of Left Carotid Artery stenosis  80%, was planned for CEA but required cardio clearance as per previous note  son asked if procedure can be done during this admission but patient is not medically optimized and will need cardiac pre-op eval when medically optimized from a CHF standpoint     #CKD 4 stable scr at baseline.       #Chronic elevated troponin likely due to CKD    # COPD - stable   c/w duonebs, symbicort      #DM2   controlled     DVT PX on coumadin     #Progress Note Handoff  Pending (specify):  IV diuresis,, PT follow up   Family discussion: patient   Disposition: SNF 79 y/o male with a PMH of CAD s/p CABG s/p PCI , severe symptomatic AS s/p BAV as a bridge to TAVR (6/10), afib/flutter on coumadin, left carotid artery stenosis (80%), DM2 , HLD,  HTN, COPD , , CKD stage 4,BPH was sent [facility] to the ED with shortness of breath and LE edema , s/p  BAV.    A/P   #acute on chronic diastolic CHF exacerbation   s/p balloon valvuloplasty of aortic valve   per cardiology patient not a candidate for tAVR   change lasix to IV 80 BID from 40 TID and metolazone 5 mg qday scr stable   on toprol 50 qday   echo this admission shows    -LV Ejection Fraction by Rangel's Method with a biplane EF of 56 %.  -The left ventricular diastolic function could not be assessed in this study.  -Mild mitral valve regurgitation.  -Thickening and calcification of the anterior and posterior mitral valve leaflets.  -Moderate tricuspid regurgitation.  -Aortic valve thickening and calcification with decreased leaflet opening.  -Mild to moderate aortic regurgitation.  -Moderate pulmonic valve regurgitation.  -Estimated pulmonary artery systolic pressure is 71.6 mmHg assuming a right atrial pressure of 15 mmHg, which is consistent with severe pulmonary hypertension.  -Peak transaortic gradient equals 36.4 mmHg, mean transaortic gradient equals 19.0 mmHg, the calculated aortic valve area equals 1.15 cm² by the continuity equation consistent with moderate aortic stenosis.  -Normal left ventricular size and wall thicknesses, with normal systolic and diastolic function.  - Right ventricular volume and pressure overload.  -LA volume Index is 26.7 ml/m² ml/m2.    #Hx CAD s/p CABG s/p PCI ; Severe AS s/p BAV    #history of AFIB c/w coumadin target INR 2-3   HR 50s on tele on toprol 50   give 5 mg coumadin tonight     #HX of Left Carotid Artery stenosis  80%, was planned for CEA but required cardio clearance as per previous note  son asked if procedure can be done during this admission but patient is not medically optimized and will need cardiac pre-op eval when medically optimized from a CHF standpoint     #CKD 4 stable scr at baseline.       #Chronic elevated troponin likely due to CKD    # COPD - stable   c/w duonebs, symbicort      #DM2   controlled     DVT PX on coumadin     #Progress Note Handoff  Pending (specify):  IV diuresis,, PT follow up   Family discussion: patient   Disposition: SNF

## 2020-07-03 NOTE — PROGRESS NOTE ADULT - SUBJECTIVE AND OBJECTIVE BOX
Nephrology progress note    THIS IS AN INCOMPLETE NOTE . FULL NOTE TO FOLLOW SHORTLY    Patient is seen and examined, events over the last 24 h noted .    Allergies:  No Known Allergies    Hospital Medications:   MEDICATIONS  (STANDING):  albuterol/ipratropium for Nebulization 3 milliLiter(s) Nebulizer every 6 hours  ascorbic acid 500 milliGRAM(s) Oral daily  atorvastatin 40 milliGRAM(s) Oral at bedtime  budesonide 160 MICROgram(s)/formoterol 4.5 MICROgram(s) Inhaler 2 Puff(s) Inhalation two times a day  finasteride 5 milliGRAM(s) Oral daily  furosemide   Injectable 80 milliGRAM(s) IV Push two times a day  insulin glargine Injectable (LANTUS) 20 Unit(s) SubCutaneous at bedtime  isosorbide   mononitrate ER Tablet (IMDUR) 30 milliGRAM(s) Oral daily  lactulose Syrup 20 Gram(s) Oral two times a day  metolazone 5 milliGRAM(s) Oral daily  metoprolol succinate ER 50 milliGRAM(s) Oral daily  multivitamin 1 Tablet(s) Oral daily  pantoprazole    Tablet 40 milliGRAM(s) Oral before breakfast  polyethylene glycol 3350 17 Gram(s) Oral daily  silver sulfADIAZINE 1% Cream 1 Application(s) Topical daily        VITALS:  T(F): 97.6 (07-03-20 @ 06:48), Max: 97.6 (07-03-20 @ 06:48)  HR: 56 (07-03-20 @ 06:48)  BP: 115/58 (07-03-20 @ 06:48)  RR: 19 (07-03-20 @ 06:48)  SpO2: 98% (07-03-20 @ 08:15)  Wt(kg): --    07-01 @ 07:01  -  07-02 @ 07:00  --------------------------------------------------------  IN: 1220 mL / OUT: 1300 mL / NET: -80 mL    07-02 @ 07:01  -  07-03 @ 07:00  --------------------------------------------------------  IN: 450 mL / OUT: 1725 mL / NET: -1275 mL          PHYSICAL EXAM:  Constitutional: NAD  HEENT: anicteric sclera, oropharynx clear, MMM  Neck: No JVD  Respiratory: CTAB, no wheezes, rales or rhonchi  Cardiovascular: S1, S2, RRR  Gastrointestinal: BS+, soft, NT/ND  Extremities: No cyanosis or clubbing. No peripheral edema  :  No julian.   Skin: No rashes    LABS:  07-03    141  |  103  |  112<HH>  ----------------------------<  93  4.5   |  21  |  2.8<H>  Creatinine Trend: 2.8<--, 2.8<--, 2.8<--, 2.9<--, 2.7<--, 2.7<--  Ca    9.4      03 Jul 2020 05:32    TPro  6.2  /  Alb  3.4<L>  /  TBili  0.7  /  DBili      /  AST  22  /  ALT  13  /  AlkPhos  104  07-02                          12.2   6.71  )-----------( 152      ( 03 Jul 2020 05:32 )             38.7       Urine Studies:      RADIOLOGY & ADDITIONAL STUDIES: Nephrology progress note  Patient is seen and examined, events over the last 24 h noted .  Said breathing is better  still has cough  No fever no chills     Allergies:  No Known Allergies    Hospital Medications:   MEDICATIONS  (STANDING):    albuterol/ipratropium for Nebulization 3 milliLiter(s) Nebulizer every 6 hours  ascorbic acid 500 milliGRAM(s) Oral daily  atorvastatin 40 milliGRAM(s) Oral at bedtime  budesonide 160 MICROgram(s)/formoterol 4.5 MICROgram(s) Inhaler 2 Puff(s) Inhalation two times a day  finasteride 5 milliGRAM(s) Oral daily  furosemide   Injectable 80 milliGRAM(s) IV Push two times a day  insulin glargine Injectable (LANTUS) 20 Unit(s) SubCutaneous at bedtime  isosorbide   mononitrate ER Tablet (IMDUR) 30 milliGRAM(s) Oral daily  lactulose Syrup 20 Gram(s) Oral two times a day  metolazone 5 milliGRAM(s) Oral daily  metoprolol succinate ER 50 milliGRAM(s) Oral daily  multivitamin 1 Tablet(s) Oral daily  pantoprazole    Tablet 40 milliGRAM(s) Oral before breakfast  polyethylene glycol 3350 17 Gram(s) Oral daily  silver sulfADIAZINE 1% Cream 1 Application(s) Topical daily        VITALS:  T(F): 97.6 (07-03-20 @ 06:48), Max: 97.6 (07-03-20 @ 06:48)  HR: 56 (07-03-20 @ 06:48)  BP: 115/58 (07-03-20 @ 06:48)  RR: 19 (07-03-20 @ 06:48)  SpO2: 98% (07-03-20 @ 08:15)      07-01 @ 07:01  -  07-02 @ 07:00  --------------------------------------------------------  IN: 1220 mL / OUT: 1300 mL / NET: -80 mL    07-02 @ 07:01  -  07-03 @ 07:00  --------------------------------------------------------  IN: 450 mL / OUT: 1725 mL / NET: -1275 mL          PHYSICAL EXAM:  Constitutional: NAD  HEENT: anicteric sclera, oropharynx clear, MMM  Neck: No JVD  Respiratory: Crackles at base   Cardiovascular: irregular s1s2/ grade 2 murmur 2nd LICS   Gastrointestinal: BS+, soft, NT/ND  Extremities: No cyanosis or clubbing. plus one edema in dressing   Skin: No rashes    LABS:  07-03    141  |  103  |  112<HH>  ----------------------------<  93  4.5   |  21  |  2.8<H>    Creatinine Trend: 2.8<--, 2.8<--, 2.8<--, 2.9<--, 2.7<--, 2.7<--    Ca    9.4      03 Jul 2020 05:32    TPro  6.2  /  Alb  3.4<L>  /  TBili  0.7  /  DBili      /  AST  22  /  ALT  13  /  AlkPhos  104  07-02                          12.2   6.71  )-----------( 152      ( 03 Jul 2020 05:32 )             38.7       Urine Studies:      RADIOLOGY & ADDITIONAL STUDIES:

## 2020-07-03 NOTE — PROGRESS NOTE ADULT - SUBJECTIVE AND OBJECTIVE BOX
SUBJECTIVE:    Better diuresis with Metolazone.  Cr stable.    Breathing comfortable at rest.    VITALS:  T(C): 35.7 (07-03-20 @ 13:05), Max: 36.4 (07-02-20 @ 20:51)  HR: 58 (07-03-20 @ 13:05) (56 - 58)  BP: 112/54 (07-03-20 @ 13:05) (112/54 - 132/63)  RR: 18 (07-03-20 @ 13:05) (18 - 20)  SpO2: 98% (07-03-20 @ 08:15) (98% - 98%)    I&O's Summary:L    02 Jul 2020 07:01  -  03 Jul 2020 07:00  --------------------------------------------------------  IN: 450 mL / OUT: 1725 mL / NET: -1275 mL    03 Jul 2020 07:01  -  03 Jul 2020 16:21  --------------------------------------------------------  IN: 200 mL / OUT: 500 mL / NET: -300 mL    PHYSICAL:  Alert, no distress  IRIR, nL s1, audible s2, 3/6 sm, no r/g  CTA  Warm, 2+ edema    LABS:                        12.2   6.71  )-----------( 152      ( 03 Jul 2020 05:32 )             38.7     07-03    141  |  103  |  112<HH>  ----------------------------<  93  4.5   |  21  |  2.8<H>    Ca    9.4      03 Jul 2020 05:32    TPro  6.2  /  Alb  3.4<L>  /  TBili  0.7  /  DBili  x   /  AST  22  /  ALT  13  /  AlkPhos  104  07-02    PT/INR - ( 03 Jul 2020 05:32 )   PT: 21.40 sec;   INR: 1.86 ratio      MEDICATIONS  (STANDING):  albuterol/ipratropium for Nebulization 3 milliLiter(s) Nebulizer every 6 hours  ascorbic acid 500 milliGRAM(s) Oral daily  aspirin  chewable 81 milliGRAM(s) Oral daily  atorvastatin 40 milliGRAM(s) Oral at bedtime  budesonide 160 MICROgram(s)/formoterol 4.5 MICROgram(s) Inhaler 2 Puff(s) Inhalation two times a day  finasteride 5 milliGRAM(s) Oral daily  furosemide   Injectable 80 milliGRAM(s) IV Push two times a day  insulin glargine Injectable (LANTUS) 20 Unit(s) SubCutaneous at bedtime  isosorbide   mononitrate ER Tablet (IMDUR) 30 milliGRAM(s) Oral daily  lactulose Syrup 20 Gram(s) Oral two times a day  metolazone 5 milliGRAM(s) Oral daily  metoprolol succinate ER 50 milliGRAM(s) Oral daily  multivitamin 1 Tablet(s) Oral daily  pantoprazole    Tablet 40 milliGRAM(s) Oral before breakfast  polyethylene glycol 3350 17 Gram(s) Oral daily  silver sulfADIAZINE 1% Cream 1 Application(s) Topical daily  warfarin 5 milliGRAM(s) Oral once    IMPRESSION:  1) Severe AS s/p BAV:  - Moderate residual gradient.  - Moderate AI.    2) CAD s/p CABG:  - Stable.    3) AF:  - Rate controlled.    4) Acute / Chronic Diastolic CHF:  - Volume overload.    5) CKD:  - Stable.    RECOMMEND:  - Cont Lasix and Metolazone.  - Cont Toprol.  - Cont ASA and Coumadin.

## 2020-07-03 NOTE — PROGRESS NOTE ADULT - SUBJECTIVE AND OBJECTIVE BOX
SOB better   no chest pain   patient urine output increased significantly after starting metolazone     Vital Signs Last 24 Hrs  T(C): 36.4 (03 Jul 2020 06:48), Max: 36.4 (02 Jul 2020 20:51)  T(F): 97.6 (03 Jul 2020 06:48), Max: 97.6 (03 Jul 2020 06:48)  HR: 57 (03 Jul 2020 11:03) (56 - 57)  BP: 116/55 (03 Jul 2020 11:03) (115/58 - 132/63)  BP(mean): --  RR: 19 (03 Jul 2020 06:48) (19 - 20)  SpO2: 98% (03 Jul 2020 08:15) (98% - 98%)    PHYSICAL EXAM:  GENERAL: NAD,   Pulm: decreased air entry at bases   CV: Regular rate and rhythm;  GI: Soft, Nontender, distended; Bowel sounds present  EXTREMITIES:  ++edema  PSYCH: AAOx3  NEUROLOGY: non-focal                            12.2   6.71  )-----------( 152      ( 03 Jul 2020 05:32 )             38.7     07-03    141  |  103  |  112<HH>  ----------------------------<  93  4.5   |  21  |  2.8<H>    Ca    9.4      03 Jul 2020 05:32    TPro  6.2  /  Alb  3.4<L>  /  TBili  0.7  /  DBili  x   /  AST  22  /  ALT  13  /  AlkPhos  104  07-02    LIVER FUNCTIONS - ( 02 Jul 2020 07:17 )  Alb: 3.4 g/dL / Pro: 6.2 g/dL / ALK PHOS: 104 U/L / ALT: 13 U/L / AST: 22 U/L / GGT: x           PT/INR - ( 03 Jul 2020 05:32 )   PT: 21.40 sec;   INR: 1.86 ratio               MEDICATIONS  (STANDING):  albuterol/ipratropium for Nebulization 3 milliLiter(s) Nebulizer every 6 hours  ascorbic acid 500 milliGRAM(s) Oral daily  atorvastatin 40 milliGRAM(s) Oral at bedtime  budesonide 160 MICROgram(s)/formoterol 4.5 MICROgram(s) Inhaler 2 Puff(s) Inhalation two times a day  finasteride 5 milliGRAM(s) Oral daily  furosemide   Injectable 80 milliGRAM(s) IV Push two times a day  insulin glargine Injectable (LANTUS) 20 Unit(s) SubCutaneous at bedtime  isosorbide   mononitrate ER Tablet (IMDUR) 30 milliGRAM(s) Oral daily  lactulose Syrup 20 Gram(s) Oral two times a day  metolazone 5 milliGRAM(s) Oral daily  metoprolol succinate ER 50 milliGRAM(s) Oral daily  multivitamin 1 Tablet(s) Oral daily  pantoprazole    Tablet 40 milliGRAM(s) Oral before breakfast  polyethylene glycol 3350 17 Gram(s) Oral daily  silver sulfADIAZINE 1% Cream 1 Application(s) Topical daily    MEDICATIONS  (PRN):

## 2020-07-03 NOTE — PROGRESS NOTE ADULT - ASSESSMENT
Pt with CKD 4, DM, HTN, AS s/p BAV, COPD on home O2, admitted with worsening SOB due to fluid overload.    CKD4 - creat is at baseline  agree with lasix IV and metolazone po current doses  strict I and O daily weights  No need for RRT  please check phos , iPTH UA spot protein creat ratio  Afib on COumadin  LE cellulitis - local care, silvadene, podiatry  will follow

## 2020-07-04 LAB
ANION GAP SERPL CALC-SCNC: 15 MMOL/L — HIGH (ref 7–14)
BASOPHILS # BLD AUTO: 0.05 K/UL — SIGNIFICANT CHANGE UP (ref 0–0.2)
BASOPHILS NFR BLD AUTO: 0.8 % — SIGNIFICANT CHANGE UP (ref 0–1)
BUN SERPL-MCNC: 118 MG/DL — CRITICAL HIGH (ref 10–20)
CALCIUM SERPL-MCNC: 9.1 MG/DL — SIGNIFICANT CHANGE UP (ref 8.5–10.1)
CHLORIDE SERPL-SCNC: 102 MMOL/L — SIGNIFICANT CHANGE UP (ref 98–110)
CO2 SERPL-SCNC: 24 MMOL/L — SIGNIFICANT CHANGE UP (ref 17–32)
CREAT SERPL-MCNC: 2.7 MG/DL — HIGH (ref 0.7–1.5)
EOSINOPHIL # BLD AUTO: 0.19 K/UL — SIGNIFICANT CHANGE UP (ref 0–0.7)
EOSINOPHIL NFR BLD AUTO: 3.2 % — SIGNIFICANT CHANGE UP (ref 0–8)
GLUCOSE BLDC GLUCOMTR-MCNC: 107 MG/DL — HIGH (ref 70–99)
GLUCOSE BLDC GLUCOMTR-MCNC: 107 MG/DL — HIGH (ref 70–99)
GLUCOSE BLDC GLUCOMTR-MCNC: 121 MG/DL — HIGH (ref 70–99)
GLUCOSE BLDC GLUCOMTR-MCNC: 88 MG/DL — SIGNIFICANT CHANGE UP (ref 70–99)
GLUCOSE SERPL-MCNC: 96 MG/DL — SIGNIFICANT CHANGE UP (ref 70–99)
HCT VFR BLD CALC: 38.8 % — LOW (ref 42–52)
HGB BLD-MCNC: 12.4 G/DL — LOW (ref 14–18)
IMM GRANULOCYTES NFR BLD AUTO: 0.3 % — SIGNIFICANT CHANGE UP (ref 0.1–0.3)
INR BLD: 2.06 RATIO — HIGH (ref 0.65–1.3)
LYMPHOCYTES # BLD AUTO: 0.9 K/UL — LOW (ref 1.2–3.4)
LYMPHOCYTES # BLD AUTO: 15.1 % — LOW (ref 20.5–51.1)
MCHC RBC-ENTMCNC: 28.7 PG — SIGNIFICANT CHANGE UP (ref 27–31)
MCHC RBC-ENTMCNC: 32 G/DL — SIGNIFICANT CHANGE UP (ref 32–37)
MCV RBC AUTO: 89.8 FL — SIGNIFICANT CHANGE UP (ref 80–94)
MONOCYTES # BLD AUTO: 0.71 K/UL — HIGH (ref 0.1–0.6)
MONOCYTES NFR BLD AUTO: 11.9 % — HIGH (ref 1.7–9.3)
NEUTROPHILS # BLD AUTO: 4.09 K/UL — SIGNIFICANT CHANGE UP (ref 1.4–6.5)
NEUTROPHILS NFR BLD AUTO: 68.7 % — SIGNIFICANT CHANGE UP (ref 42.2–75.2)
NRBC # BLD: 0 /100 WBCS — SIGNIFICANT CHANGE UP (ref 0–0)
PLATELET # BLD AUTO: 133 K/UL — SIGNIFICANT CHANGE UP (ref 130–400)
POTASSIUM SERPL-MCNC: 3.8 MMOL/L — SIGNIFICANT CHANGE UP (ref 3.5–5)
POTASSIUM SERPL-SCNC: 3.8 MMOL/L — SIGNIFICANT CHANGE UP (ref 3.5–5)
PROTHROM AB SERPL-ACNC: 23.7 SEC — HIGH (ref 9.95–12.87)
RBC # BLD: 4.32 M/UL — LOW (ref 4.7–6.1)
RBC # FLD: 15.8 % — HIGH (ref 11.5–14.5)
SODIUM SERPL-SCNC: 141 MMOL/L — SIGNIFICANT CHANGE UP (ref 135–146)
WBC # BLD: 5.96 K/UL — SIGNIFICANT CHANGE UP (ref 4.8–10.8)
WBC # FLD AUTO: 5.96 K/UL — SIGNIFICANT CHANGE UP (ref 4.8–10.8)

## 2020-07-04 PROCEDURE — 71045 X-RAY EXAM CHEST 1 VIEW: CPT | Mod: 26

## 2020-07-04 PROCEDURE — 99233 SBSQ HOSP IP/OBS HIGH 50: CPT

## 2020-07-04 RX ORDER — WARFARIN SODIUM 2.5 MG/1
5 TABLET ORAL ONCE
Refills: 0 | Status: COMPLETED | OUTPATIENT
Start: 2020-07-04 | End: 2020-07-04

## 2020-07-04 RX ADMIN — WARFARIN SODIUM 5 MILLIGRAM(S): 2.5 TABLET ORAL at 21:36

## 2020-07-04 RX ADMIN — Medication 3 MILLILITER(S): at 19:58

## 2020-07-04 RX ADMIN — Medication 50 MILLIGRAM(S): at 11:20

## 2020-07-04 RX ADMIN — Medication 80 MILLIGRAM(S): at 17:19

## 2020-07-04 RX ADMIN — Medication 500 MILLIGRAM(S): at 11:21

## 2020-07-04 RX ADMIN — Medication 81 MILLIGRAM(S): at 11:21

## 2020-07-04 RX ADMIN — ISOSORBIDE MONONITRATE 30 MILLIGRAM(S): 60 TABLET, EXTENDED RELEASE ORAL at 11:21

## 2020-07-04 RX ADMIN — Medication 80 MILLIGRAM(S): at 05:40

## 2020-07-04 RX ADMIN — PANTOPRAZOLE SODIUM 40 MILLIGRAM(S): 20 TABLET, DELAYED RELEASE ORAL at 05:41

## 2020-07-04 RX ADMIN — LACTULOSE 20 GRAM(S): 10 SOLUTION ORAL at 17:19

## 2020-07-04 RX ADMIN — FINASTERIDE 5 MILLIGRAM(S): 5 TABLET, FILM COATED ORAL at 11:21

## 2020-07-04 RX ADMIN — LACTULOSE 20 GRAM(S): 10 SOLUTION ORAL at 05:40

## 2020-07-04 RX ADMIN — ATORVASTATIN CALCIUM 40 MILLIGRAM(S): 80 TABLET, FILM COATED ORAL at 21:36

## 2020-07-04 RX ADMIN — Medication 1 APPLICATION(S): at 11:21

## 2020-07-04 RX ADMIN — POLYETHYLENE GLYCOL 3350 17 GRAM(S): 17 POWDER, FOR SOLUTION ORAL at 11:21

## 2020-07-04 RX ADMIN — INSULIN GLARGINE 20 UNIT(S): 100 INJECTION, SOLUTION SUBCUTANEOUS at 21:37

## 2020-07-04 RX ADMIN — Medication 1 TABLET(S): at 11:21

## 2020-07-04 RX ADMIN — BUDESONIDE AND FORMOTEROL FUMARATE DIHYDRATE 2 PUFF(S): 160; 4.5 AEROSOL RESPIRATORY (INHALATION) at 21:38

## 2020-07-04 NOTE — PROGRESS NOTE ADULT - SUBJECTIVE AND OBJECTIVE BOX
SOB better   no chest pain   no acute events overnight     Vital Signs Last 24 Hrs  T(C): 35.9 (04 Jul 2020 05:00), Max: 35.9 (04 Jul 2020 05:00)  T(F): 96.6 (04 Jul 2020 05:00), Max: 96.6 (04 Jul 2020 05:00)  HR: 57 (04 Jul 2020 05:00) (57 - 58)  BP: 102/52 (04 Jul 2020 05:00) (102/52 - 127/63)      PHYSICAL EXAM:  GENERAL: NAD,  Pulm:  decreased air entry B/L  CV: Regular rate and rhythm;  GI: Soft, Nontender, Nondistended; Bowel sounds present  EXTREMITIES:+ edema   PSYCH: AAOx3  NEUROLOGY: non-focal                            12.4   5.96  )-----------( 133      ( 04 Jul 2020 06:10 )             38.8     07-04    141  |  102  |  118<HH>  ----------------------------<  96  3.8   |  24  |  2.7<H>    Ca    9.1      04 Jul 2020 06:10        PT/INR - ( 04 Jul 2020 06:10 )   PT: 23.70 sec;   INR: 2.06 ratio             MEDICATIONS  (STANDING):  albuterol/ipratropium for Nebulization 3 milliLiter(s) Nebulizer every 6 hours  ascorbic acid 500 milliGRAM(s) Oral daily  aspirin  chewable 81 milliGRAM(s) Oral daily  atorvastatin 40 milliGRAM(s) Oral at bedtime  budesonide 160 MICROgram(s)/formoterol 4.5 MICROgram(s) Inhaler 2 Puff(s) Inhalation two times a day  finasteride 5 milliGRAM(s) Oral daily  furosemide   Injectable 80 milliGRAM(s) IV Push two times a day  insulin glargine Injectable (LANTUS) 20 Unit(s) SubCutaneous at bedtime  isosorbide   mononitrate ER Tablet (IMDUR) 30 milliGRAM(s) Oral daily  lactulose Syrup 20 Gram(s) Oral two times a day  metolazone 5 milliGRAM(s) Oral daily  metoprolol succinate ER 50 milliGRAM(s) Oral daily  multivitamin 1 Tablet(s) Oral daily  pantoprazole    Tablet 40 milliGRAM(s) Oral before breakfast  polyethylene glycol 3350 17 Gram(s) Oral daily  silver sulfADIAZINE 1% Cream 1 Application(s) Topical daily  warfarin 5 milliGRAM(s) Oral once    MEDICATIONS  (PRN):

## 2020-07-04 NOTE — PROGRESS NOTE ADULT - SUBJECTIVE AND OBJECTIVE BOX
TAIWO ZAVALA 80y Male  MRN#: [id number]   Hospital Day: 6d    SUBJECTIVE  Patient is a 80y old Male who presents with a chief complaint of SOB (04 Jul 2020 10:43)  Currently admitted to medicine with the primary diagnosis of CHF (congestive heart failure)    INTERVAL HPI AND OVERNIGHT EVENTS:  Patient was examined and seen at bedside. This morning he is resting comfortably in bed and reports no issues or overnight events. Patient is eating well and has a good appetite. Patient reports of increased sneezing and increase in cough.     REVIEW OF SYMPTOMS:  CONSTITUTIONAL: No weakness, fevers or chills; No headaches  EYES: No visual changes, eye pain, or discharge  ENT: No vertigo; No ear pain or change in hearing; No sore throat or difficulty swallowing  NECK: No pain or stiffness  CARDIOVASCULAR: No chest pain or palpitations  GASTROINTESTINAL: No abdominal or epigastric pain; No nausea, vomiting, or hematemesis; No diarrhea or constipation; No melena or hematochezia  GENITOURINARY: No dysuria, frequency or hematuria  MUSCULOSKELETAL: No joint pain, no muscle pain, no weakness  NEUROLOGICAL: No numbness or weakness  SKIN: No itching or rashes    OBJECTIVE  PAST MEDICAL & SURGICAL HISTORY  Hyperlipidemia  Cellulitis of right lower extremity  Cellulitis of left lower extremity  History of renal insufficiency  Stenosis of left carotid artery  Aortic stenosis  Afib  BPH (benign prostatic hyperplasia)  CHF (congestive heart failure)  COPD (chronic obstructive pulmonary disease)  Diabetes  HTN (hypertension)  S/P balloon aortic valvuloplasty  H/O heart artery stent  S/P CABG x 3    ALLERGIES:  No Known Allergies    MEDICATIONS:  STANDING MEDICATIONS  albuterol/ipratropium for Nebulization 3 milliLiter(s) Nebulizer every 6 hours  ascorbic acid 500 milliGRAM(s) Oral daily  aspirin  chewable 81 milliGRAM(s) Oral daily  atorvastatin 40 milliGRAM(s) Oral at bedtime  budesonide 160 MICROgram(s)/formoterol 4.5 MICROgram(s) Inhaler 2 Puff(s) Inhalation two times a day  finasteride 5 milliGRAM(s) Oral daily  furosemide   Injectable 80 milliGRAM(s) IV Push two times a day  insulin glargine Injectable (LANTUS) 20 Unit(s) SubCutaneous at bedtime  isosorbide   mononitrate ER Tablet (IMDUR) 30 milliGRAM(s) Oral daily  lactulose Syrup 20 Gram(s) Oral two times a day  metolazone 5 milliGRAM(s) Oral daily  metoprolol succinate ER 50 milliGRAM(s) Oral daily  multivitamin 1 Tablet(s) Oral daily  pantoprazole    Tablet 40 milliGRAM(s) Oral before breakfast  polyethylene glycol 3350 17 Gram(s) Oral daily  silver sulfADIAZINE 1% Cream 1 Application(s) Topical daily  warfarin 5 milliGRAM(s) Oral once    PRN MEDICATIONS      VITAL SIGNS: Last 24 Hours  T(C): 35.7 (04 Jul 2020 12:42), Max: 35.9 (04 Jul 2020 05:00)  T(F): 96.2 (04 Jul 2020 12:42), Max: 96.6 (04 Jul 2020 05:00)  HR: 54 (04 Jul 2020 12:42) (54 - 57)  BP: 105/51 (04 Jul 2020 12:42) (102/52 - 127/63)  BP(mean): --  RR: 18 (04 Jul 2020 12:42) (18 - 18)  SpO2: 97% (04 Jul 2020 05:00) (97% - 97%)    LABS:                        12.4   5.96  )-----------( 133      ( 04 Jul 2020 06:10 )             38.8     07-04    141  |  102  |  118<HH>  ----------------------------<  96  3.8   |  24  |  2.7<H>    Ca    9.1      04 Jul 2020 06:10      PT/INR - ( 04 Jul 2020 06:10 )   PT: 23.70 sec;   INR: 2.06 ratio               PT/INR - ( 02 Jul 2020 07:17 )   PT: 21.90 sec;   INR: 1.90 ratio      Ca    9.4      30 Jun 2020 05:57  Mg     2.4     06-30    TPro  7.0  /  Alb  3.5  /  TBili  0.6  /  DBili  x   /  AST  31  /  ALT  16  /  AlkPhos  90  06-28    PT/INR - ( 30 Jun 2020 00:00 )   PT: 26.80 sec;   INR: 2.33 ratio         CARDIAC MARKERS ( 28 Jun 2020 21:52 )  x     / 0.13 ng/mL / x     / x     / x        TPro  7.0  /  Alb  3.5  /  TBili  0.6  /  DBili  x   /  AST  31  /  ALT  16  /  AlkPhos  90  06-28    PT/INR - ( 30 Jun 2020 00:00 )   PT: 26.80 sec;   INR: 2.33 ratio        CARDIAC MARKERS ( 28 Jun 2020 21:52 )  x     / 0.13 ng/mL / x     / x     / x        Serum Pro-Brain Natriuretic Peptide (06.30.20 @ 05:57)    Serum Pro-Brain Natriuretic Peptide: 09235 pg/mL    Serum Pro-Brain Natriuretic Peptide (06.28.20 @ 21:52)    Serum Pro-Brain Natriuretic Peptide: 35468 pg/mL      RADIOLOGY:    < from: Xray Chest 1 View- PORTABLE-Urgent (06.30.20 @ 09:58) >  Comparison : Chest radiograph 6/28/2020.    Technique/Positioning: Frontal portable chest radiograph, rotated to the left.    Findings:    Support devices: Overlying telemetry leads.    Cardiac/mediastinum/hilum: Stable cardiomegaly. Status post median sternotomy and CABG.    Lung parenchyma/Pleura: Interval increase in bibasilar opacity/effusions. No pneumothorax.    Skeleton/soft tissues: Unchanged.    Impression:      Interval increase in bibasilar opacity/effusions.    < end of copied text >    < from: Transthoracic Echocardiogram (06.29.20 @ 12:01) >  Summary:   1. LV Ejection Fraction by Rangel's Method with a biplane EF of 56 %.   2. The left ventricular diastolic function could not be assessed in this study.   3. Mild mitral valve regurgitation.   4. Thickening and calcification of the anterior and posterior mitral valve leaflets.   5. Moderate tricuspid regurgitation.   6. Aortic valve thickening and calcification with decreased leaflet opening.   7. Mild to moderate aortic regurgitation.   8. Moderate pulmonic valve regurgitation.   9. Estimated pulmonary artery systolic pressure is 71.6 mmHg assuming a right atrial pressure of 15 mmHg, which is consistent with severe pulmonary hypertension.  10. Peak transaortic gradient equals 36.4 mmHg, mean transaortic gradient equals 19.0 mmHg, the calculated aortic valve area equals 1.15 cm² by the continuity equation consistent with moderate aortic stenosis.  11. Normal left ventricular size and wall thicknesses, with normal systolic and diastolic function.  12. Right ventricular volume and pressure overload.  13. LA volume Index is 26.7 ml/m² ml/m2.    < end of copied text >      PHYSICAL EXAM:  CONSTITUTIONAL: AAOx3  HEAD: Atraumatic, normocephalic  EYES: EOM intact, PERRLA, conjunctiva and sclera clear  ENT: Supple, no masses, no thyromegaly, no bruits, no JVD; moist mucous membranes  PULMONARY: Decreased air entry at the bases  CARDIOVASCULAR: Regular rate and rhythm; no murmurs, rubs, or gallops  GASTROINTESTINAL: Soft, non-tender, distended; bowel sounds present  MUSCULOSKELETAL: 2+ peripheral pulses; no clubbing, no cyanosis, b/l edema present in the legs  NEUROLOGY: non-focal  SKIN: No rashes or lesions; warm and dry; bandages placed in b/l legs      Dispo:

## 2020-07-04 NOTE — PROGRESS NOTE ADULT - ASSESSMENT
79 y/o male with a PMH of CAD s/p CABG s/p PCI , severe symptomatic AS s/p BAV as a bridge to TAVR (6/10), afib/flutter on coumadin, left carotid artery stenosis (80%), DM2 , HLD,  HTN, COPD (unsure if on home O2), Huslia both ears, CKD stage 4, Hx of ATN on HD for 2 months in 2018, LE cellultits requiring admission and BPH was BIBEMS from [facility] to the ED with shortness of breath for 6 weeks, started after BAV. His SOB is at rest progressively worsening with edema of LLE progressing and was sent to ED Patient denies cough, chest pain, palpitations/wheeze.     #SOB likely due to acute on chronic diastolic CHF exacerbation with given hx of Severe AS  - patient currently on nasal cannula 3L saturating 100%  - pro BNP : ~20k, trop : 0.13   - patients SOB is multifactorial : chronic diastolic CHF, severe AS, severe pulm HTN  - Last ECHO: 6/11: LVEF: 50-55%, mod TR, mild AR, mod ND, severe pulm HTN  - Last Cath: 6.10 s/p Balloon aortic valvuloplasty  - Was previously non compliant with Lasix and with fluid restriction  - EKG on admission: junctional rhythm with occasional atrial paced complexes  - CXR on admission: diffuse mild congestion  - Changed Lasix IV 80 BID (7/4)   - Added Metolazone 5mg qd  - F/u recs from Cardio; per cardiology patient not a candidate for tAVR   - c/w Toprol , Imdur  - f/u cardio, Dr. Maria for meds optimization  - strict I+O, keep I<O, daily weights  - fluid restriction to 1200ml  - dietician eval f/u  - c/w supplemental O2    # Hyperkalemia, improving  - hemolyzed , fu repeat BMP  - K 3.4 on 07/04     #Chronic elevated troponin likely due to CKD  - 0.13 on 06/28 (0.11 in the past)  - trend trop     # COPD - stable   - c/w Duonebs, symbicort    #Hx CAD s/p CABG s/p PCI ; Severe AS s/p BAV  - c/w Toprol, Imdur.   - Called pt's home pharmacy, takes Clopidogrel 75mg QD.    #history of AFIB c/w coumadin target INR 2-3- rate control with: Toprol  - AC: coumadin 3mg  -  f/u INR and dose accordingly, PT 23.70 INR 2.06 on 07/02    #HX of Left Carotid Artery stenosis  - 80%, was planned for CEA but required cardio clearance as per previous note  - fu outpt vascular    #DM2   - last A1c: 6.4 (4/2020)  - on 20 IU Lantus as per previous med rec; monitor FS  - Called home pharmacy; pt on Lantus 8units in AM, 20 units at PM.   - CC diet    #Hx CKD4  - creatinine 2.8 on 07/01 ( baseline seems to be around 2.5-2.6)  - monitor BMP    #MISC:  DIET: DASH, CC with 1.2 L fluid restriction  DVT ppx: heparin subq  GI ppx: protonix  CODE: Full  Acitivity: IAT  Dispo: from [facility]

## 2020-07-04 NOTE — PROGRESS NOTE ADULT - SUBJECTIVE AND OBJECTIVE BOX
Nephrology progress note    THIS IS AN INCOMPLETE NOTE . FULL NOTE TO FOLLOW SHORTLY    Patient is seen and examined, events over the last 24 h noted .    Allergies:  No Known Allergies    Hospital Medications:   MEDICATIONS  (STANDING):  albuterol/ipratropium for Nebulization 3 milliLiter(s) Nebulizer every 6 hours  ascorbic acid 500 milliGRAM(s) Oral daily  aspirin  chewable 81 milliGRAM(s) Oral daily  atorvastatin 40 milliGRAM(s) Oral at bedtime  budesonide 160 MICROgram(s)/formoterol 4.5 MICROgram(s) Inhaler 2 Puff(s) Inhalation two times a day  finasteride 5 milliGRAM(s) Oral daily  furosemide   Injectable 80 milliGRAM(s) IV Push two times a day  insulin glargine Injectable (LANTUS) 20 Unit(s) SubCutaneous at bedtime  isosorbide   mononitrate ER Tablet (IMDUR) 30 milliGRAM(s) Oral daily  lactulose Syrup 20 Gram(s) Oral two times a day  metolazone 5 milliGRAM(s) Oral daily  metoprolol succinate ER 50 milliGRAM(s) Oral daily  multivitamin 1 Tablet(s) Oral daily  pantoprazole    Tablet 40 milliGRAM(s) Oral before breakfast  polyethylene glycol 3350 17 Gram(s) Oral daily  silver sulfADIAZINE 1% Cream 1 Application(s) Topical daily        VITALS:  T(F): 96.6 (07-04-20 @ 05:00), Max: 96.6 (07-04-20 @ 05:00)  HR: 57 (07-04-20 @ 05:00)  BP: 102/52 (07-04-20 @ 05:00)  RR: 18 (07-04-20 @ 05:00)  SpO2: 97% (07-04-20 @ 05:00)  Wt(kg): --    07-02 @ 07:01  -  07-03 @ 07:00  --------------------------------------------------------  IN: 450 mL / OUT: 1725 mL / NET: -1275 mL    07-03 @ 07:01  -  07-04 @ 07:00  --------------------------------------------------------  IN: 400 mL / OUT: 1400 mL / NET: -1000 mL          PHYSICAL EXAM:  Constitutional: NAD  HEENT: anicteric sclera, oropharynx clear, MMM  Neck: No JVD  Respiratory: CTAB, no wheezes, rales or rhonchi  Cardiovascular: S1, S2, RRR  Gastrointestinal: BS+, soft, NT/ND  Extremities: No cyanosis or clubbing. No peripheral edema  :  No julian.   Skin: No rashes    LABS:  07-04    141  |  102  |  118<HH>  ----------------------------<  96  3.8   |  24  |  2.7<H>  Creatinine Trend: 2.7<--, 2.8<--, 2.8<--, 2.8<--, 2.9<--, 2.7<--  Ca    9.1      04 Jul 2020 06:10                            12.4   5.96  )-----------( 133      ( 04 Jul 2020 06:10 )             38.8       Urine Studies:      RADIOLOGY & ADDITIONAL STUDIES: Nephrology progress note  Patient is seen and examined, events over the last 24 h noted .  feels better   Breathing better  no other complaints     Allergies:  No Known Allergies    Hospital Medications:   MEDICATIONS  (STANDING):    albuterol/ipratropium for Nebulization 3 milliLiter(s) Nebulizer every 6 hours  ascorbic acid 500 milliGRAM(s) Oral daily  aspirin  chewable 81 milliGRAM(s) Oral daily  atorvastatin 40 milliGRAM(s) Oral at bedtime  budesonide 160 MICROgram(s)/formoterol 4.5 MICROgram(s) Inhaler 2 Puff(s) Inhalation two times a day  finasteride 5 milliGRAM(s) Oral daily  furosemide   Injectable 80 milliGRAM(s) IV Push two times a day  insulin glargine Injectable (LANTUS) 20 Unit(s) SubCutaneous at bedtime  isosorbide   mononitrate ER Tablet (IMDUR) 30 milliGRAM(s) Oral daily  lactulose Syrup 20 Gram(s) Oral two times a day  metolazone 5 milliGRAM(s) Oral daily  metoprolol succinate ER 50 milliGRAM(s) Oral daily  multivitamin 1 Tablet(s) Oral daily  pantoprazole    Tablet 40 milliGRAM(s) Oral before breakfast  polyethylene glycol 3350 17 Gram(s) Oral daily  silver sulfADIAZINE 1% Cream 1 Application(s) Topical daily        VITALS:  T(F): 96.6 (07-04-20 @ 05:00), Max: 96.6 (07-04-20 @ 05:00)  HR: 57 (07-04-20 @ 05:00)  BP: 102/52 (07-04-20 @ 05:00)  RR: 18 (07-04-20 @ 05:00)  SpO2: 97% (07-04-20 @ 05:00)      07-02 @ 07:01  -  07-03 @ 07:00  --------------------------------------------------------  IN: 450 mL / OUT: 1725 mL / NET: -1275 mL    07-03 @ 07:01  -  07-04 @ 07:00  --------------------------------------------------------  IN: 400 mL / OUT: 1400 mL / NET: -1000 mL          PHYSICAL EXAM:  Constitutional: NAD  Neck: No JVD  Respiratory: Crackles fine at base   Cardiovascular: S1, S2, RRR  Gastrointestinal: BS+, soft, NT/ND  Extremities: No cyanosis or clubbing. No peripheral edema  :  No julian.   Skin: No rashes    LABS:  07-04    141  |  102  |  118<HH>  ----------------------------<  96  3.8   |  24  |  2.7<H>    Creatinine Trend: 2.7<--, 2.8<--, 2.8<--, 2.8<--, 2.9<--, 2.7<--    Ca    9.1      04 Jul 2020 06:10                            12.4   5.96  )-----------( 133      ( 04 Jul 2020 06:10 )             38.8       Urine Studies:      RADIOLOGY & ADDITIONAL STUDIES:

## 2020-07-04 NOTE — PROGRESS NOTE ADULT - ASSESSMENT
79 y/o male with a PMH of CAD s/p CABG s/p PCI , severe symptomatic AS s/p BAV as a bridge to TAVR (6/10), afib/flutter on coumadin, left carotid artery stenosis (80%), DM2 , HLD,  HTN, COPD , , CKD stage 4,BPH was sent from [facility] to the ED with shortness of breath and LE edema , s/p  BAV.    A/P   #acute on chronic diastolic CHF exacerbation   s/p balloon valvuloplasty of aortic valve   per cardiology patient not a candidate for tAVR   c/w lasix  IV 80 BID and metolazone 5 mg qday scr stable   on toprol 50 qday   echo this admission shows    -LV Ejection Fraction by Rangel's Method with a biplane EF of 56 %.  -The left ventricular diastolic function could not be assessed in this study.  -Mild mitral valve regurgitation.  -Thickening and calcification of the anterior and posterior mitral valve leaflets.  -Moderate tricuspid regurgitation.  -Aortic valve thickening and calcification with decreased leaflet opening.  -Mild to moderate aortic regurgitation.  -Moderate pulmonic valve regurgitation.  -Estimated pulmonary artery systolic pressure is 71.6 mmHg assuming a right atrial pressure of 15 mmHg, which is consistent with severe pulmonary hypertension.  -Peak transaortic gradient equals 36.4 mmHg, mean transaortic gradient equals 19.0 mmHg, the calculated aortic valve area equals 1.15 cm² by the continuity equation consistent with moderate aortic stenosis.  -Normal left ventricular size and wall thicknesses, with normal systolic and diastolic function.  - Right ventricular volume and pressure overload.  -LA volume Index is 26.7 ml/m² ml/m2.    #Hx CAD s/p CABG s/p PCI ; Severe AS s/p BAV  on asa, lipitor     #history of AFIB c/w coumadin target INR 2-3   HR 50s on tele on toprol 50   give 5 mg coumadin tonight keep inr 2-3     #HX of Left Carotid Artery stenosis  80%, was planned for CEA but required cardio clearance as per previous note  son asked if procedure can be done during this admission but patient is not medically optimized and will need cardiac pre-op eval when medically optimized from a CHF standpoint   on asa and lipitor     #CKD 4 stable scr at baseline.       #Chronic elevated troponin likely due to CKD    # COPD - stable   c/w  symbicort      #DM2   controlled     DVT PX on coumadin     #Progress Note Handoff  Pending (specify):  IV diuresis, will need to discuss goals of care with son and patient   Family discussion: patient   Disposition: SNF

## 2020-07-04 NOTE — PROGRESS NOTE ADULT - ASSESSMENT
Pt with CKD 4, DM, HTN, AS s/p BAV, COPD on home O2, admitted with worsening SOB due to fluid overload.    CKD4 - creat is at baseline  agree with lasix IV and metolazone po current doses/ no change   strict I and O daily weights  No need for RRT  please check phos , iPTH UA spot protein creat ratio  Afib on COumadin  LE cellulitis - local care, silvadene, podiatry  will follow

## 2020-07-05 LAB
ANION GAP SERPL CALC-SCNC: 20 MMOL/L — HIGH (ref 7–14)
BASE EXCESS BLDA CALC-SCNC: 1.2 MMOL/L — SIGNIFICANT CHANGE UP (ref -2–2)
BASOPHILS # BLD AUTO: 0.02 K/UL — SIGNIFICANT CHANGE UP (ref 0–0.2)
BASOPHILS NFR BLD AUTO: 0.2 % — SIGNIFICANT CHANGE UP (ref 0–1)
BUN SERPL-MCNC: 120 MG/DL — CRITICAL HIGH (ref 10–20)
CALCIUM SERPL-MCNC: 9.3 MG/DL — SIGNIFICANT CHANGE UP (ref 8.5–10.1)
CHLORIDE SERPL-SCNC: 101 MMOL/L — SIGNIFICANT CHANGE UP (ref 98–110)
CO2 SERPL-SCNC: 21 MMOL/L — SIGNIFICANT CHANGE UP (ref 17–32)
CREAT SERPL-MCNC: 2.9 MG/DL — HIGH (ref 0.7–1.5)
EOSINOPHIL # BLD AUTO: 0.01 K/UL — SIGNIFICANT CHANGE UP (ref 0–0.7)
EOSINOPHIL NFR BLD AUTO: 0.1 % — SIGNIFICANT CHANGE UP (ref 0–8)
GLUCOSE BLDC GLUCOMTR-MCNC: 100 MG/DL — HIGH (ref 70–99)
GLUCOSE BLDC GLUCOMTR-MCNC: 71 MG/DL — SIGNIFICANT CHANGE UP (ref 70–99)
GLUCOSE BLDC GLUCOMTR-MCNC: 79 MG/DL — SIGNIFICANT CHANGE UP (ref 70–99)
GLUCOSE SERPL-MCNC: 80 MG/DL — SIGNIFICANT CHANGE UP (ref 70–99)
HCO3 BLDA-SCNC: 24.8 MMOL/L — SIGNIFICANT CHANGE UP (ref 23–27)
HCT VFR BLD CALC: 40.2 % — LOW (ref 42–52)
HGB BLD-MCNC: 12.8 G/DL — LOW (ref 14–18)
IMM GRANULOCYTES NFR BLD AUTO: 0.3 % — SIGNIFICANT CHANGE UP (ref 0.1–0.3)
INR BLD: 2.64 RATIO — HIGH (ref 0.65–1.3)
LYMPHOCYTES # BLD AUTO: 0.8 K/UL — LOW (ref 1.2–3.4)
LYMPHOCYTES # BLD AUTO: 8.1 % — LOW (ref 20.5–51.1)
MAGNESIUM SERPL-MCNC: 2.4 MG/DL — SIGNIFICANT CHANGE UP (ref 1.8–2.4)
MCHC RBC-ENTMCNC: 27.9 PG — SIGNIFICANT CHANGE UP (ref 27–31)
MCHC RBC-ENTMCNC: 31.8 G/DL — LOW (ref 32–37)
MCV RBC AUTO: 87.8 FL — SIGNIFICANT CHANGE UP (ref 80–94)
MONOCYTES # BLD AUTO: 0.68 K/UL — HIGH (ref 0.1–0.6)
MONOCYTES NFR BLD AUTO: 6.9 % — SIGNIFICANT CHANGE UP (ref 1.7–9.3)
NEUTROPHILS # BLD AUTO: 8.28 K/UL — HIGH (ref 1.4–6.5)
NEUTROPHILS NFR BLD AUTO: 84.4 % — HIGH (ref 42.2–75.2)
NRBC # BLD: 0 /100 WBCS — SIGNIFICANT CHANGE UP (ref 0–0)
PCO2 BLDA: 35 MMHG — SIGNIFICANT CHANGE UP (ref 38–42)
PH BLDA: 7.46 — HIGH (ref 7.38–7.42)
PLATELET # BLD AUTO: 129 K/UL — LOW (ref 130–400)
PO2 BLDA: 57 MMHG — LOW (ref 78–95)
POTASSIUM SERPL-MCNC: 4.2 MMOL/L — SIGNIFICANT CHANGE UP (ref 3.5–5)
POTASSIUM SERPL-SCNC: 4.2 MMOL/L — SIGNIFICANT CHANGE UP (ref 3.5–5)
PROTHROM AB SERPL-ACNC: 30.4 SEC — HIGH (ref 9.95–12.87)
RBC # BLD: 4.58 M/UL — LOW (ref 4.7–6.1)
RBC # FLD: 16 % — HIGH (ref 11.5–14.5)
SAO2 % BLDA: 89 % — LOW (ref 94–98)
SODIUM SERPL-SCNC: 142 MMOL/L — SIGNIFICANT CHANGE UP (ref 135–146)
WBC # BLD: 9.82 K/UL — SIGNIFICANT CHANGE UP (ref 4.8–10.8)
WBC # FLD AUTO: 9.82 K/UL — SIGNIFICANT CHANGE UP (ref 4.8–10.8)

## 2020-07-05 PROCEDURE — 70450 CT HEAD/BRAIN W/O DYE: CPT | Mod: 26

## 2020-07-05 PROCEDURE — 71045 X-RAY EXAM CHEST 1 VIEW: CPT | Mod: 26

## 2020-07-05 PROCEDURE — 99233 SBSQ HOSP IP/OBS HIGH 50: CPT

## 2020-07-05 RX ORDER — WARFARIN SODIUM 2.5 MG/1
3 TABLET ORAL ONCE
Refills: 0 | Status: COMPLETED | OUTPATIENT
Start: 2020-07-05 | End: 2020-07-05

## 2020-07-05 RX ADMIN — Medication 80 MILLIGRAM(S): at 17:19

## 2020-07-05 RX ADMIN — Medication 3 MILLILITER(S): at 20:00

## 2020-07-05 RX ADMIN — Medication 80 MILLIGRAM(S): at 06:15

## 2020-07-05 RX ADMIN — Medication 1 APPLICATION(S): at 11:46

## 2020-07-05 RX ADMIN — LACTULOSE 20 GRAM(S): 10 SOLUTION ORAL at 06:16

## 2020-07-05 RX ADMIN — Medication 500 MILLIGRAM(S): at 11:44

## 2020-07-05 RX ADMIN — BUDESONIDE AND FORMOTEROL FUMARATE DIHYDRATE 2 PUFF(S): 160; 4.5 AEROSOL RESPIRATORY (INHALATION) at 21:24

## 2020-07-05 RX ADMIN — PANTOPRAZOLE SODIUM 40 MILLIGRAM(S): 20 TABLET, DELAYED RELEASE ORAL at 06:16

## 2020-07-05 RX ADMIN — WARFARIN SODIUM 3 MILLIGRAM(S): 2.5 TABLET ORAL at 21:23

## 2020-07-05 RX ADMIN — Medication 1 TABLET(S): at 11:44

## 2020-07-05 RX ADMIN — FINASTERIDE 5 MILLIGRAM(S): 5 TABLET, FILM COATED ORAL at 11:44

## 2020-07-05 RX ADMIN — ATORVASTATIN CALCIUM 40 MILLIGRAM(S): 80 TABLET, FILM COATED ORAL at 21:23

## 2020-07-05 RX ADMIN — LACTULOSE 20 GRAM(S): 10 SOLUTION ORAL at 17:19

## 2020-07-05 RX ADMIN — Medication 50 MILLIGRAM(S): at 11:44

## 2020-07-05 RX ADMIN — ISOSORBIDE MONONITRATE 30 MILLIGRAM(S): 60 TABLET, EXTENDED RELEASE ORAL at 11:44

## 2020-07-05 RX ADMIN — INSULIN GLARGINE 20 UNIT(S): 100 INJECTION, SOLUTION SUBCUTANEOUS at 21:23

## 2020-07-05 RX ADMIN — Medication 81 MILLIGRAM(S): at 11:44

## 2020-07-05 RX ADMIN — POLYETHYLENE GLYCOL 3350 17 GRAM(S): 17 POWDER, FOR SOLUTION ORAL at 11:45

## 2020-07-05 NOTE — PROGRESS NOTE ADULT - ASSESSMENT
Pt with CKD 4, DM, HTN, AS s/p BAV, COPD on home O2, admitted with worsening SOB due to fluid overload.    CKD4 - creat is at baseline  cont lasix IV and metolazone po current doses/ no change   strict I and O daily weights  No need for RRT  please check phos , iPTH UA spot protein creat ratio  Afib on COumadin  LE cellulitis - local care, silvadene, podiatry  will follow

## 2020-07-05 NOTE — PROGRESS NOTE ADULT - ASSESSMENT
81 y/o male with a PMH of CAD s/p CABG s/p PCI , severe symptomatic AS s/p BAV as a bridge to TAVR (6/10), afib/flutter on coumadin, left carotid artery stenosis (80%), DM2 , HLD,  HTN, COPD , , CKD stage 4,BPH was sent from [facility] to the ED with shortness of breath and LE edema , s/p  BAV.    A/P   #acute on chronic diastolic CHF exacerbation   SOB improving   repeat chest xray tomorrow    s/p balloon valvuloplasty of aortic valve   per cardiology patient not a candidate for tAVR   c/w lasix  IV 80 BID and metolazone 5 mg qday scr stable 2.9 today. patient is neg 800 cc in the last 24 hours   on toprol 50 qday   echo this admission shows    -LV Ejection Fraction by Rangel's Method with a biplane EF of 56 %.  -The left ventricular diastolic function could not be assessed in this study.  -Mild mitral valve regurgitation.  -Thickening and calcification of the anterior and posterior mitral valve leaflets.  -Moderate tricuspid regurgitation.  -Aortic valve thickening and calcification with decreased leaflet opening.  -Mild to moderate aortic regurgitation.  -Moderate pulmonic valve regurgitation.  -Estimated pulmonary artery systolic pressure is 71.6 mmHg assuming a right atrial pressure of 15 mmHg, which is consistent with severe pulmonary hypertension.  -Peak transaortic gradient equals 36.4 mmHg, mean transaortic gradient equals 19.0 mmHg, the calculated aortic valve area equals 1.15 cm² by the continuity equation consistent with moderate aortic stenosis.  -Normal left ventricular size and wall thicknesses, with normal systolic and diastolic function.  - Right ventricular volume and pressure overload.  -LA volume Index is 26.7 ml/m² ml/m2.    #Hx CAD s/p CABG s/p PCI ; Severe AS s/p BAV  on asa, lipitor     #history of AFIB c/w coumadin target INR 2-3   give 3 mg coumadin tonight keep inr 2-3 today is 2.6     #HX of Left Carotid Artery stenosis  80%, was planned for CEA but required cardio clearance as per previous note  son asked if procedure can be done during this admission but patient is not medically optimized and will need cardiac pre-op eval when medically optimized from a CHF standpoint   on asa and lipitor     #CKD 4 stable scr at baseline.       #Chronic elevated troponin likely due to CKD    # COPD - stable   c/w  symbicort      #DM2   controlled     DVT PX on coumadin     #Progress Note Handoff  Pending (specify): further  IV diuresis, will need to discuss goals of care with son and patient . was last discussed in may/2020 and patient wanted full code   Family discussion: patient   Disposition: SNF     poor prognosis

## 2020-07-05 NOTE — PROGRESS NOTE ADULT - SUBJECTIVE AND OBJECTIVE BOX
Nephrology progress note    Patient was seen and examined, events over the last 24 h noted .  Cr stable    Allergies:  No Known Allergies    Hospital Medications:   MEDICATIONS  (STANDING):  albuterol/ipratropium for Nebulization 3 milliLiter(s) Nebulizer every 6 hours  ascorbic acid 500 milliGRAM(s) Oral daily  aspirin  chewable 81 milliGRAM(s) Oral daily  atorvastatin 40 milliGRAM(s) Oral at bedtime  budesonide 160 MICROgram(s)/formoterol 4.5 MICROgram(s) Inhaler 2 Puff(s) Inhalation two times a day  finasteride 5 milliGRAM(s) Oral daily  furosemide   Injectable 80 milliGRAM(s) IV Push two times a day  insulin glargine Injectable (LANTUS) 20 Unit(s) SubCutaneous at bedtime  isosorbide   mononitrate ER Tablet (IMDUR) 30 milliGRAM(s) Oral daily  lactulose Syrup 20 Gram(s) Oral two times a day  metolazone 5 milliGRAM(s) Oral daily  metoprolol succinate ER 50 milliGRAM(s) Oral daily  multivitamin 1 Tablet(s) Oral daily  pantoprazole    Tablet 40 milliGRAM(s) Oral before breakfast  polyethylene glycol 3350 17 Gram(s) Oral daily  silver sulfADIAZINE 1% Cream 1 Application(s) Topical daily  warfarin 3 milliGRAM(s) Oral once        VITALS:  T(F): 96.1 (07-05-20 @ 12:30), Max: 96.4 (07-04-20 @ 20:30)  HR: 58 (07-05-20 @ 12:30)  BP: 109/59 (07-05-20 @ 12:30)  RR: 18 (07-05-20 @ 12:30)  SpO2: 97% (07-05-20 @ 05:00)  Wt(kg): --    07-03 @ 07:01  -  07-04 @ 07:00  --------------------------------------------------------  IN: 400 mL / OUT: 1400 mL / NET: -1000 mL    07-04 @ 07:01  -  07-05 @ 07:00  --------------------------------------------------------  IN: 50 mL / OUT: 900 mL / NET: -850 mL          PHYSICAL EXAM:  Constitutional: NAD  HEENT: anicteric sclera, oropharynx clear, MMM  Neck: No JVD  Respiratory: CTAB, no wheezes, rales or rhonchi  Cardiovascular: S1, S2, RRR  Gastrointestinal: BS+, soft, NT/ND  Extremities: No cyanosis or clubbing. No peripheral edema  :  No julian.   Skin: No rashes    LABS:  07-05    142  |  101  |  120<HH>  ----------------------------<  80  4.2   |  21  |  2.9<H>    Ca    9.3      05 Jul 2020 06:26  Mg     2.4     07-05                            12.8   9.82  )-----------( 129      ( 05 Jul 2020 06:26 )             40.2       Urine Studies:      RADIOLOGY & ADDITIONAL STUDIES:

## 2020-07-05 NOTE — PROGRESS NOTE ADULT - SUBJECTIVE AND OBJECTIVE BOX
sob better per patient   no chest pain   patient feels better overall     Vital Signs Last 24 Hrs  T(C): 35.7 (05 Jul 2020 05:00), Max: 35.8 (04 Jul 2020 20:30)  T(F): 96.2 (05 Jul 2020 05:00), Max: 96.4 (04 Jul 2020 20:30)  HR: 59 (05 Jul 2020 05:00) (54 - 60)  BP: 108/59 (05 Jul 2020 05:00) (105/51 - 147/62)  BP(mean): --  RR: 18 (05 Jul 2020 05:00) (18 - 18)  SpO2: 97% (05 Jul 2020 05:00) (97% - 97%)    PHYSICAL EXAM:  GENERAL: NAD,   Pulm decreased air entry at bases   CV: Regular rate and rhythm; No murmurs, rubs, or gallops  GI Soft, Nontender, Nondistended; Bowel sounds present  EXTREMITIES:  + edema  PSYCH: AAOx3  NEUROLOGY: non-focal  SKIN: chronic stasis in LE    MEDICATIONS  (STANDING):  albuterol/ipratropium for Nebulization 3 milliLiter(s) Nebulizer every 6 hours  ascorbic acid 500 milliGRAM(s) Oral daily  aspirin  chewable 81 milliGRAM(s) Oral daily  atorvastatin 40 milliGRAM(s) Oral at bedtime  budesonide 160 MICROgram(s)/formoterol 4.5 MICROgram(s) Inhaler 2 Puff(s) Inhalation two times a day  finasteride 5 milliGRAM(s) Oral daily  furosemide   Injectable 80 milliGRAM(s) IV Push two times a day  insulin glargine Injectable (LANTUS) 20 Unit(s) SubCutaneous at bedtime  isosorbide   mononitrate ER Tablet (IMDUR) 30 milliGRAM(s) Oral daily  lactulose Syrup 20 Gram(s) Oral two times a day  metolazone 5 milliGRAM(s) Oral daily  metoprolol succinate ER 50 milliGRAM(s) Oral daily  multivitamin 1 Tablet(s) Oral daily  pantoprazole    Tablet 40 milliGRAM(s) Oral before breakfast  polyethylene glycol 3350 17 Gram(s) Oral daily  silver sulfADIAZINE 1% Cream 1 Application(s) Topical daily  warfarin 3 milliGRAM(s) Oral once    MEDICATIONS  (PRN):                            12.8   9.82  )-----------( 129      ( 05 Jul 2020 06:26 )             40.2     07-05    142  |  101  |  120<HH>  ----------------------------<  80  4.2   |  21  |  2.9<H>    Ca    9.3      05 Jul 2020 06:26  Mg     2.4     07-05        PT/INR - ( 05 Jul 2020 06:26 )   PT: 30.40 sec;   INR: 2.64 ratio

## 2020-07-06 LAB
ALBUMIN SERPL ELPH-MCNC: 3.2 G/DL — LOW (ref 3.5–5.2)
ALP SERPL-CCNC: 85 U/L — SIGNIFICANT CHANGE UP (ref 30–115)
ALT FLD-CCNC: 11 U/L — SIGNIFICANT CHANGE UP (ref 0–41)
ANION GAP SERPL CALC-SCNC: 19 MMOL/L — HIGH (ref 7–14)
AST SERPL-CCNC: 19 U/L — SIGNIFICANT CHANGE UP (ref 0–41)
BASOPHILS # BLD AUTO: 0.01 K/UL — SIGNIFICANT CHANGE UP (ref 0–0.2)
BASOPHILS NFR BLD AUTO: 0.2 % — SIGNIFICANT CHANGE UP (ref 0–1)
BILIRUB SERPL-MCNC: 0.9 MG/DL — SIGNIFICANT CHANGE UP (ref 0.2–1.2)
BUN SERPL-MCNC: 126 MG/DL — CRITICAL HIGH (ref 10–20)
CALCIUM SERPL-MCNC: 9.3 MG/DL — SIGNIFICANT CHANGE UP (ref 8.4–10.5)
CALCIUM SERPL-MCNC: 9.5 MG/DL — SIGNIFICANT CHANGE UP (ref 8.5–10.1)
CHLORIDE SERPL-SCNC: 100 MMOL/L — SIGNIFICANT CHANGE UP (ref 98–110)
CO2 SERPL-SCNC: 23 MMOL/L — SIGNIFICANT CHANGE UP (ref 17–32)
CREAT SERPL-MCNC: 2.8 MG/DL — HIGH (ref 0.7–1.5)
EOSINOPHIL # BLD AUTO: 0.1 K/UL — SIGNIFICANT CHANGE UP (ref 0–0.7)
EOSINOPHIL NFR BLD AUTO: 1.8 % — SIGNIFICANT CHANGE UP (ref 0–8)
GLUCOSE BLDC GLUCOMTR-MCNC: 126 MG/DL — HIGH (ref 70–99)
GLUCOSE BLDC GLUCOMTR-MCNC: 136 MG/DL — HIGH (ref 70–99)
GLUCOSE BLDC GLUCOMTR-MCNC: 147 MG/DL — HIGH (ref 70–99)
GLUCOSE BLDC GLUCOMTR-MCNC: 164 MG/DL — HIGH (ref 70–99)
GLUCOSE BLDC GLUCOMTR-MCNC: 86 MG/DL — SIGNIFICANT CHANGE UP (ref 70–99)
GLUCOSE SERPL-MCNC: 88 MG/DL — SIGNIFICANT CHANGE UP (ref 70–99)
HCT VFR BLD CALC: 38.2 % — LOW (ref 42–52)
HGB BLD-MCNC: 12.5 G/DL — LOW (ref 14–18)
IMM GRANULOCYTES NFR BLD AUTO: 0.2 % — SIGNIFICANT CHANGE UP (ref 0.1–0.3)
INR BLD: 3.02 RATIO — HIGH (ref 0.65–1.3)
LYMPHOCYTES # BLD AUTO: 0.94 K/UL — LOW (ref 1.2–3.4)
LYMPHOCYTES # BLD AUTO: 17.3 % — LOW (ref 20.5–51.1)
MCHC RBC-ENTMCNC: 28.7 PG — SIGNIFICANT CHANGE UP (ref 27–31)
MCHC RBC-ENTMCNC: 32.7 G/DL — SIGNIFICANT CHANGE UP (ref 32–37)
MCV RBC AUTO: 87.8 FL — SIGNIFICANT CHANGE UP (ref 80–94)
MONOCYTES # BLD AUTO: 0.65 K/UL — HIGH (ref 0.1–0.6)
MONOCYTES NFR BLD AUTO: 11.9 % — HIGH (ref 1.7–9.3)
NEUTROPHILS # BLD AUTO: 3.73 K/UL — SIGNIFICANT CHANGE UP (ref 1.4–6.5)
NEUTROPHILS NFR BLD AUTO: 68.6 % — SIGNIFICANT CHANGE UP (ref 42.2–75.2)
NRBC # BLD: 0 /100 WBCS — SIGNIFICANT CHANGE UP (ref 0–0)
PHOSPHATE SERPL-MCNC: 6.2 MG/DL — HIGH (ref 2.1–4.9)
PLATELET # BLD AUTO: 112 K/UL — LOW (ref 130–400)
POTASSIUM SERPL-MCNC: 3.5 MMOL/L — SIGNIFICANT CHANGE UP (ref 3.5–5)
POTASSIUM SERPL-SCNC: 3.5 MMOL/L — SIGNIFICANT CHANGE UP (ref 3.5–5)
PROT SERPL-MCNC: 6.1 G/DL — SIGNIFICANT CHANGE UP (ref 6–8)
PROTHROM AB SERPL-ACNC: 34.7 SEC — HIGH (ref 9.95–12.87)
PTH-INTACT FLD-MCNC: 84 PG/ML — HIGH (ref 15–65)
RBC # BLD: 4.35 M/UL — LOW (ref 4.7–6.1)
RBC # FLD: 15.9 % — HIGH (ref 11.5–14.5)
SODIUM SERPL-SCNC: 142 MMOL/L — SIGNIFICANT CHANGE UP (ref 135–146)
WBC # BLD: 5.44 K/UL — SIGNIFICANT CHANGE UP (ref 4.8–10.8)
WBC # FLD AUTO: 5.44 K/UL — SIGNIFICANT CHANGE UP (ref 4.8–10.8)

## 2020-07-06 PROCEDURE — 99221 1ST HOSP IP/OBS SF/LOW 40: CPT

## 2020-07-06 PROCEDURE — 99233 SBSQ HOSP IP/OBS HIGH 50: CPT

## 2020-07-06 RX ORDER — POTASSIUM CHLORIDE 20 MEQ
40 PACKET (EA) ORAL ONCE
Refills: 0 | Status: COMPLETED | OUTPATIENT
Start: 2020-07-06 | End: 2020-07-06

## 2020-07-06 RX ORDER — CHLORHEXIDINE GLUCONATE 213 G/1000ML
1 SOLUTION TOPICAL ONCE
Refills: 0 | Status: DISCONTINUED | OUTPATIENT
Start: 2020-07-06 | End: 2020-07-06

## 2020-07-06 RX ORDER — SEVELAMER CARBONATE 2400 MG/1
800 POWDER, FOR SUSPENSION ORAL THREE TIMES A DAY
Refills: 0 | Status: DISCONTINUED | OUTPATIENT
Start: 2020-07-06 | End: 2020-07-07

## 2020-07-06 RX ORDER — CHLORHEXIDINE GLUCONATE 213 G/1000ML
1 SOLUTION TOPICAL ONCE
Refills: 0 | Status: DISCONTINUED | OUTPATIENT
Start: 2020-07-06 | End: 2020-07-15

## 2020-07-06 RX ADMIN — Medication 50 MILLIGRAM(S): at 11:16

## 2020-07-06 RX ADMIN — Medication 1 TABLET(S): at 11:16

## 2020-07-06 RX ADMIN — PANTOPRAZOLE SODIUM 40 MILLIGRAM(S): 20 TABLET, DELAYED RELEASE ORAL at 06:19

## 2020-07-06 RX ADMIN — Medication 80 MILLIGRAM(S): at 11:19

## 2020-07-06 RX ADMIN — ATORVASTATIN CALCIUM 40 MILLIGRAM(S): 80 TABLET, FILM COATED ORAL at 22:02

## 2020-07-06 RX ADMIN — BUDESONIDE AND FORMOTEROL FUMARATE DIHYDRATE 2 PUFF(S): 160; 4.5 AEROSOL RESPIRATORY (INHALATION) at 22:02

## 2020-07-06 RX ADMIN — BUDESONIDE AND FORMOTEROL FUMARATE DIHYDRATE 2 PUFF(S): 160; 4.5 AEROSOL RESPIRATORY (INHALATION) at 08:17

## 2020-07-06 RX ADMIN — INSULIN GLARGINE 20 UNIT(S): 100 INJECTION, SOLUTION SUBCUTANEOUS at 22:02

## 2020-07-06 RX ADMIN — Medication 1 APPLICATION(S): at 11:23

## 2020-07-06 RX ADMIN — Medication 40 MILLIEQUIVALENT(S): at 11:20

## 2020-07-06 RX ADMIN — SEVELAMER CARBONATE 800 MILLIGRAM(S): 2400 POWDER, FOR SUSPENSION ORAL at 22:02

## 2020-07-06 RX ADMIN — Medication 500 MILLIGRAM(S): at 11:16

## 2020-07-06 RX ADMIN — LACTULOSE 20 GRAM(S): 10 SOLUTION ORAL at 17:27

## 2020-07-06 RX ADMIN — Medication 80 MILLIGRAM(S): at 17:27

## 2020-07-06 RX ADMIN — POLYETHYLENE GLYCOL 3350 17 GRAM(S): 17 POWDER, FOR SOLUTION ORAL at 11:21

## 2020-07-06 RX ADMIN — Medication 81 MILLIGRAM(S): at 11:16

## 2020-07-06 RX ADMIN — ISOSORBIDE MONONITRATE 30 MILLIGRAM(S): 60 TABLET, EXTENDED RELEASE ORAL at 11:16

## 2020-07-06 RX ADMIN — SEVELAMER CARBONATE 800 MILLIGRAM(S): 2400 POWDER, FOR SUSPENSION ORAL at 17:27

## 2020-07-06 RX ADMIN — FINASTERIDE 5 MILLIGRAM(S): 5 TABLET, FILM COATED ORAL at 11:16

## 2020-07-06 RX ADMIN — Medication 3 MILLILITER(S): at 07:56

## 2020-07-06 RX ADMIN — LACTULOSE 20 GRAM(S): 10 SOLUTION ORAL at 06:19

## 2020-07-06 RX ADMIN — Medication 3 MILLILITER(S): at 19:37

## 2020-07-06 NOTE — PROGRESS NOTE ADULT - ASSESSMENT
79 y/o male with a PMH of CAD s/p CABG s/p PCI , severe symptomatic AS s/p BAV as a bridge to TAVR (6/10), afib/flutter on coumadin, left carotid artery stenosis (80%), DM2 , HLD,  HTN, COPD , , CKD stage 4,BPH was sent from [facility] to the ED with shortness of breath and LE edema , s/p  BAV.    A/P   #acute on chronic diastolic CHF exacerbation   still sob   s/p balloon valvuloplasty of aortic valve   per cardiology patient not a candidate for tAVR   c/w lasix  IV 80 BID and metolazone 5 mg qday scr stable 2.8 today.   on toprol 50 qday   echo this admission shows    spoke to DR Velasquez pt not a candidate for TAVR   spoke to patient and son delbert and both agreed on changing code status to DNR/DNI and agreed to get palliative eval on board     #Hx CAD s/p CABG s/p PCI ; Severe AS s/p BAV  on asa, lipitor     #history of AFIB c/w coumadin target INR 2-3   hold coumadin tonight INR 3     #HX of Left Carotid Artery stenosis  not medically stable for procedure at this time     #CKD 4 stable scr at baseline.       #Chronic elevated troponin likely due to CKD    # COPD - stable   c/w  symbicort      #DM2   controlled     DVT PX on coumadin     #Progress Note Handoff  Pending (specify):palliative eval   Family discussion: patient and son   Disposition: unknown

## 2020-07-06 NOTE — CONSULT NOTE ADULT - ASSESSMENT
Consult Summary: Pt is a 80 yr old male with very frequent hospitalizations over the last few years for CHF exacerbations r/t AS. Palliative care consulted after pt not a candidate for TAVR, and is symptomatic on max treatments. Pt with very poor functional status. Now DNR/DNI after attending had GOC discussion with patient and updated pt's son.     Pt reports understanding how sick he is and defers discussions to his son Deangelo whom he lives with. Pt explained that he has right sided chest pain that is worse on inhalation. Pt has c/o feeling dyspneic at rest on NC 3 liters. Increasing oxygen doesnt help. He has h/o  taking xanax 0.25mg BID and tylenol with codeine. palliative team does not recommend Codeine for the elderly. Will recommend Dilaudid IV for now GFR 21.     Pt is hospice appropriate, attending did discuss option with son. Son and pt aware he will receive symptom management for dyspnea and anxiety r/t medical condition. Palliative team to follow up with family and patient for support and symptom management. Poor prognosis       Morphine Equivalent Daily Dose (MEDD): 0    Recommendations:  DNR/DNI  ongoing medical management  Dilaudid 0.25mg IV Q 4 HRS PRN for pain or dyspnea   Xanax 0.25mg PO Q 12 hrs PRN for anxiety          Please Call [phone] PRN Consult Summary: Pt is a 80 yr old male with very frequent hospitalizations over the last few years for CHF exacerbations r/t AS. Palliative care consulted after pt not a candidate for TAVR, and is symptomatic on max treatments. Pt with very poor functional status. Now DNR/DNI after attending had GOC discussion with patient and updated pt's son.     Pt reports understanding how sick he is and defers discussions to his son Deangelo whom he lives with. Pt explained that he has right sided chest pain that is worse on inhalation. Pt has c/o feeling dyspneic at rest on NC 3 liters despite medical management. Increasing oxygen doesnt help. He has h/o  taking xanax 0.25mg BID and tylenol with codeine. palliative team does not recommend Codeine for the elderly. Will recommend Dilaudid IV for now GFR 21.     Pt is hospice appropriate, attending did discuss Hospice option with son. Son and pt aware he will receive symptom management for dyspnea and anxiety r/t medical condition. Palliative team to follow up with family and patient for support and symptom management. Poor prognosis       Morphine Equivalent Daily Dose (MEDD): 0    Recommendations:  DNR/DNI  ongoing medical management  Dilaudid 0.25mg IV Q 4 HRS PRN for pain or dyspnea   Xanax 0.25mg PO Q 12 hrs PRN for anxiety          Please Call [phone] PRN

## 2020-07-06 NOTE — PROGRESS NOTE ADULT - SUBJECTIVE AND OBJECTIVE BOX
SUBJ:No chest pain or shortness of breath      MEDICATIONS  (STANDING):  albuterol/ipratropium for Nebulization 3 milliLiter(s) Nebulizer every 6 hours  ascorbic acid 500 milliGRAM(s) Oral daily  aspirin  chewable 81 milliGRAM(s) Oral daily  atorvastatin 40 milliGRAM(s) Oral at bedtime  budesonide 160 MICROgram(s)/formoterol 4.5 MICROgram(s) Inhaler 2 Puff(s) Inhalation two times a day  chlorhexidine 4% Liquid 1 Application(s) Topical once  finasteride 5 milliGRAM(s) Oral daily  furosemide   Injectable 80 milliGRAM(s) IV Push two times a day  insulin glargine Injectable (LANTUS) 20 Unit(s) SubCutaneous at bedtime  isosorbide   mononitrate ER Tablet (IMDUR) 30 milliGRAM(s) Oral daily  lactulose Syrup 20 Gram(s) Oral two times a day  metolazone 5 milliGRAM(s) Oral daily  metoprolol succinate ER 50 milliGRAM(s) Oral daily  multivitamin 1 Tablet(s) Oral daily  pantoprazole    Tablet 40 milliGRAM(s) Oral before breakfast  polyethylene glycol 3350 17 Gram(s) Oral daily  silver sulfADIAZINE 1% Cream 1 Application(s) Topical daily    MEDICATIONS  (PRN):            Vital Signs Last 24 Hrs  T(C): 35.6 (06 Jul 2020 11:37), Max: 36.4 (06 Jul 2020 05:17)  T(F): 96.1 (06 Jul 2020 11:37), Max: 97.6 (06 Jul 2020 05:17)  HR: 58 (06 Jul 2020 11:37) (57 - 59)  BP: 124/58 (06 Jul 2020 11:37) (97/51 - 131/55)  BP(mean): --  RR: 20 (06 Jul 2020 05:17) (20 - 20)  SpO2: 95% (06 Jul 2020 11:37) (95% - 98%)      ECG:NML    TTE:    LABS:                        12.5   5.44  )-----------( 112      ( 06 Jul 2020 05:22 )             38.2     07-06    142  |  100  |  126<HH>  ----------------------------<  88  3.5   |  23  |  2.8<H>    Ca    9.5      06 Jul 2020 05:22  Phos  6.2     07-06  Mg     2.4     07-05    TPro  6.1  /  Alb  3.2<L>  /  TBili  0.9  /  DBili  x   /  AST  19  /  ALT  11  /  AlkPhos  85  07-06        PT/INR - ( 06 Jul 2020 05:22 )   PT: 34.70 sec;   INR: 3.02 ratio             I&O's Summary    05 Jul 2020 07:01  -  06 Jul 2020 07:00  --------------------------------------------------------  IN: 0 mL / OUT: 2050 mL / NET: -2050 mL    06 Jul 2020 07:01  -  06 Jul 2020 13:05  --------------------------------------------------------  IN: 540 mL / OUT: 0 mL / NET: 540 mL      BNP

## 2020-07-06 NOTE — PROGRESS NOTE ADULT - RS GEN PE MLT RESP DETAILS PC
diminished breath sounds, R/diminished breath sounds, L
diminished breath sounds, R/diminished breath sounds, L

## 2020-07-06 NOTE — PROGRESS NOTE ADULT - SUBJECTIVE AND OBJECTIVE BOX
patient still sob   no improvement   spoke to son Deangelo and plan is change code status to DNR/DNI     Vital Signs Last 24 Hrs  T(C): 35.6 (06 Jul 2020 11:37), Max: 36.4 (06 Jul 2020 05:17)  T(F): 96.1 (06 Jul 2020 11:37), Max: 97.6 (06 Jul 2020 05:17)  HR: 58 (06 Jul 2020 11:37) (57 - 59)  BP: 124/58 (06 Jul 2020 11:37) (97/51 - 131/55)  BP(mean): --  RR: 20 (06 Jul 2020 05:17) (20 - 20)  SpO2: 95% (06 Jul 2020 11:37) (95% - 98%)    PHYSICAL EXAM:  GENERAL:SOB   CHEST/LUNG: decreased air entry B/L   CV: Regular rate and rhythm; No murmurs, rubs, or gallops  GI: Soft, Nontender, Nondistended; Bowel sounds present  EXTREMITIES: +edema   PSYCH: AAOx3  NEUROLOGY: non-focal  SKIN: No rashes                           12.5   5.44  )-----------( 112      ( 06 Jul 2020 05:22 )             38.2     07-06    142  |  100  |  126<HH>  ----------------------------<  88  3.5   |  23  |  2.8<H>    Ca    9.5      06 Jul 2020 05:22  Phos  6.2     07-06  Mg     2.4     07-05    TPro  6.1  /  Alb  3.2<L>  /  TBili  0.9  /  DBili  x   /  AST  19  /  ALT  11  /  AlkPhos  85  07-06    LIVER FUNCTIONS - ( 06 Jul 2020 05:22 )  Alb: 3.2 g/dL / Pro: 6.1 g/dL / ALK PHOS: 85 U/L / ALT: 11 U/L / AST: 19 U/L / GGT: x           PT/INR - ( 06 Jul 2020 05:22 )   PT: 34.70 sec;   INR: 3.02 ratio               MEDICATIONS  (STANDING):  albuterol/ipratropium for Nebulization 3 milliLiter(s) Nebulizer every 6 hours  ascorbic acid 500 milliGRAM(s) Oral daily  aspirin  chewable 81 milliGRAM(s) Oral daily  atorvastatin 40 milliGRAM(s) Oral at bedtime  budesonide 160 MICROgram(s)/formoterol 4.5 MICROgram(s) Inhaler 2 Puff(s) Inhalation two times a day  chlorhexidine 4% Liquid 1 Application(s) Topical once  finasteride 5 milliGRAM(s) Oral daily  furosemide   Injectable 80 milliGRAM(s) IV Push two times a day  insulin glargine Injectable (LANTUS) 20 Unit(s) SubCutaneous at bedtime  isosorbide   mononitrate ER Tablet (IMDUR) 30 milliGRAM(s) Oral daily  lactulose Syrup 20 Gram(s) Oral two times a day  metolazone 5 milliGRAM(s) Oral daily  metoprolol succinate ER 50 milliGRAM(s) Oral daily  multivitamin 1 Tablet(s) Oral daily  pantoprazole    Tablet 40 milliGRAM(s) Oral before breakfast  polyethylene glycol 3350 17 Gram(s) Oral daily  sevelamer carbonate 800 milliGRAM(s) Oral three times a day  silver sulfADIAZINE 1% Cream 1 Application(s) Topical daily    MEDICATIONS  (PRN):

## 2020-07-06 NOTE — PROGRESS NOTE ADULT - SUBJECTIVE AND OBJECTIVE BOX
seen and examined  no distress   lying comfortable         PAST HISTORY  --------------------------------------------------------------------------------  No significant changes to PMH, PSH, FHx, SHx, unless otherwise noted    ALLERGIES & MEDICATIONS  --------------------------------------------------------------------------------  Allergies    No Known Allergies    Intolerances      Standing Inpatient Medications  albuterol/ipratropium for Nebulization 3 milliLiter(s) Nebulizer every 6 hours  ascorbic acid 500 milliGRAM(s) Oral daily  aspirin  chewable 81 milliGRAM(s) Oral daily  atorvastatin 40 milliGRAM(s) Oral at bedtime  budesonide 160 MICROgram(s)/formoterol 4.5 MICROgram(s) Inhaler 2 Puff(s) Inhalation two times a day  chlorhexidine 4% Liquid 1 Application(s) Topical once  finasteride 5 milliGRAM(s) Oral daily  furosemide   Injectable 80 milliGRAM(s) IV Push two times a day  insulin glargine Injectable (LANTUS) 20 Unit(s) SubCutaneous at bedtime  isosorbide   mononitrate ER Tablet (IMDUR) 30 milliGRAM(s) Oral daily  lactulose Syrup 20 Gram(s) Oral two times a day  metolazone 5 milliGRAM(s) Oral daily  metoprolol succinate ER 50 milliGRAM(s) Oral daily  multivitamin 1 Tablet(s) Oral daily  pantoprazole    Tablet 40 milliGRAM(s) Oral before breakfast  polyethylene glycol 3350 17 Gram(s) Oral daily  potassium chloride   Powder 40 milliEquivalent(s) Oral once  silver sulfADIAZINE 1% Cream 1 Application(s) Topical daily        VITALS/PHYSICAL EXAM  --------------------------------------------------------------------------------  T(C): 36.4 (07-06-20 @ 05:17), Max: 36.4 (07-06-20 @ 05:17)  HR: 57 (07-06-20 @ 08:07) (57 - 59)  BP: 131/55 (07-06-20 @ 08:07) (97/51 - 131/55)  RR: 20 (07-06-20 @ 05:17) (18 - 20)  SpO2: 98% (07-06-20 @ 08:07) (97% - 98%)  Wt(kg): --        07-05-20 @ 07:01  -  07-06-20 @ 07:00  --------------------------------------------------------  IN: 0 mL / OUT: 2050 mL / NET: -2050 mL    07-06-20 @ 07:01  -  07-06-20 @ 09:15  --------------------------------------------------------  IN: 300 mL / OUT: 0 mL / NET: 300 mL      Physical Exam:  	Gen: NAD  	Pulm:  B/L cassidy   	CV: S1S2; no rub  	Abd: +distended  	    LABS/STUDIES  --------------------------------------------------------------------------------              12.5   5.44  >-----------<  112      [07-06-20 @ 05:22]              38.2     142  |  100  |  126  ----------------------------<  88      [07-06-20 @ 05:22]  3.5   |  23  |  2.8        Ca     9.5     [07-06-20 @ 05:22]      Mg     2.4     [07-05-20 @ 06:26]      Phos  6.2     [07-06-20 @ 05:22]    TPro  6.1  /  Alb  3.2  /  TBili  0.9  /  DBili  x   /  AST  19  /  ALT  11  /  AlkPhos  85  [07-06-20 @ 05:22]    PT/INR: PT 34.70, INR 3.02       [07-06-20 @ 05:22]      Creatinine Trend:  SCr 2.8 [07-06 @ 05:22]  SCr 2.9 [07-05 @ 06:26]  SCr 2.7 [07-04 @ 06:10]  SCr 2.8 [07-03 @ 05:32]  SCr 2.8 [07-02 @ 07:17]        Ferritin 640      [05-05-20 @ 11:48]  PTH -- (Ca 7.8)      [10-21-19 @ 19:08]   278  HbA1c 6.8      [01-31-20 @ 07:01]  TSH 0.84      [08-29-19 @ 06:15]

## 2020-07-06 NOTE — CHART NOTE - NSCHARTNOTEFT_GEN_A_CORE
Palliative team met patient at bedside.  Pt is [facility], assisted with assisted hearing device.  Pt admitted for CHF exacerbation; severe AS; not a candidate for sx.  Pt complains of R lung pain, SOB.  Palliative attempted to speak to patient about what he knows about his diagnosis and he became upset and asked us to speak with his son.  Palliative will follow up with son, continue to follow to assist with symptom management/provide support/ clarify goals of care.  x [phone]

## 2020-07-06 NOTE — PROGRESS NOTE ADULT - ASSESSMENT
Pt with CKD 4, DM, HTN, AS s/p BAV, COPD on home O2, admitted with worsening SOB due to fluid overload.    CKD4 - creat  satbel   cont lasix IV and metolazone po current doses/ no change to po lasix in am   non oliguric   strict I and O daily weights  No need for RRT  ph noted , start renagel 1/1/1  mild hypokelmia,s/p k dur once   LE cellulitis - local care, silvadene, podiatry  will follow

## 2020-07-06 NOTE — CONSULT NOTE ADULT - CONSULT REASON
SOB likely due to CHF exacerbation
Difficult cath for urinary retention
Goals of care and symptom management
sob
CKD

## 2020-07-06 NOTE — CONSULT NOTE ADULT - SUBJECTIVE AND OBJECTIVE BOX
REQUESTED OF: DR Ortiz     Chart reviewed, Hospital Day 9    LIBRADO ZAVALA 80yMale  HPI:  81 y/o male with a PMH of CAD s/p CABG s/p PCI , severe symptomatic AS s/p BAV as a bridge to TAVR (6/10), afib/flutter on coumadin, left carotid artery stenosis (80%), DM2 , HLD,  HTN, COPD on home O2, CKD stage 4, Hx of ATN on HD for 2 months in 2018, LE cellultits requiring admission and BPH was BIBEMS from [facility] to the ED with shortness of breath for 6 weeks, started after BAV. His SOB is at rest progressively worsening with edema of LLE progressing and was sent to ED Patient denies cough, chest pain, palpitations/wheeze.     In ED , VS: Bp:112/32 HR:57 RR:22 SpO2: 96% on 4L  Received 20mg IV push of lasix , (28 Jun 2020 23:48)        PAST MEDICAL & SURGICAL HISTORY:  Hyperlipidemia  Cellulitis of right lower extremity  Cellulitis of left lower extremity  History of renal insufficiency  Stenosis of left carotid artery  Aortic stenosis  Afib  BPH (benign prostatic hyperplasia)  CHF (congestive heart failure)  COPD (chronic obstructive pulmonary disease)  Diabetes  HTN (hypertension)  S/P balloon aortic valvuloplasty  H/O heart artery stent  S/P CABG x 3      Subjective and Objective:  Today,  Discussed with attending doctor    Focused Palliative Care Evaluation:                   Symptoms:                                      Pain yes                                      Dyspnea yes                                      N/V none                                      Appetite ok                                      Anxiety none                                      Other _____________________                     Support Devices: nasal cannula oxygen              PHYSICAL EXAM:      Constitutional: pt appears chronically ill and declining     Respiratory: noted tachypnea and dyspnea at rest     Cardiovascular: (-) JVD     Gastrointestinal: large abdomen NT ascites   Genitourinary: voiding     Extremities: gross edema B/L LEs with vascular changes noted. Dressing covering calves.    Neurological: pt alert and oriented x 4    Skin: multiple senile purpura see nursing assessment       T(C): 35.6, Max: 36.4 (05:17)  HR: 58 (57 - 59)  BP: 124/58 (97/51 - 131/55)  RR: 20 (20 - 20)  SpO2: 95% (95% - 98%)      LABS/STUDIES:  07-06    142  |  100  |  126<HH>  ----------------------------<  88  3.5   |  23  |  2.8<H>    Ca    9.5      06 Jul 2020 05:22  Phos  6.2     07-06  Mg     2.4     07-05    TPro  6.1  /  Alb  3.2<L>  /  TBili  0.9  /  DBili  x   /  AST  19  /  ALT  11  /  AlkPhos  85  07-06                            12.5   5.44  )-----------( 112      ( 06 Jul 2020 05:22 )             38.2       MEDICATIONS  (STANDING):  albuterol/ipratropium for Nebulization 3 milliLiter(s) Nebulizer every 6 hours  ascorbic acid 500 milliGRAM(s) Oral daily  aspirin  chewable 81 milliGRAM(s) Oral daily  atorvastatin 40 milliGRAM(s) Oral at bedtime  budesonide 160 MICROgram(s)/formoterol 4.5 MICROgram(s) Inhaler 2 Puff(s) Inhalation two times a day  chlorhexidine 4% Liquid 1 Application(s) Topical once  finasteride 5 milliGRAM(s) Oral daily  furosemide   Injectable 80 milliGRAM(s) IV Push two times a day  insulin glargine Injectable (LANTUS) 20 Unit(s) SubCutaneous at bedtime  isosorbide   mononitrate ER Tablet (IMDUR) 30 milliGRAM(s) Oral daily  lactulose Syrup 20 Gram(s) Oral two times a day  metolazone 5 milliGRAM(s) Oral daily  metoprolol succinate ER 50 milliGRAM(s) Oral daily  multivitamin 1 Tablet(s) Oral daily  pantoprazole    Tablet 40 milliGRAM(s) Oral before breakfast  polyethylene glycol 3350 17 Gram(s) Oral daily  sevelamer carbonate 800 milliGRAM(s) Oral three times a day  silver sulfADIAZINE 1% Cream 1 Application(s) Topical daily    MEDICATIONS  (PRN):          iStop:   Rx Written	Rx Dispensed	Drug	Quantity	Days Supply	Prescriber Name	Payment Method	Dispenser  04/15/2020	04/15/2020	alprazolam 0.5 mg tablet	60	30	Carl Kruger MD	[facility] [id number]    Patient Name: Librado Dudley Date: 1940  Address: [address] [place] [id number]: Male  Rx Written	Rx Dispensed	Drug	Quantity	Days Supply	Prescriber Name	Payment Method	Dispenser  03/18/2020	03/18/2020	acetaminophen-cod #3 tablet	60	30	Carl Kruger MD	[facility] [id number]  02/05/2020	02/14/2020	acetaminophen-cod #3 tablet	60	30	Carl Kruger MD	[facility] [id number]  11/04/2019	12/24/2019	acetaminophen-cod #3 tablet	60	30	Carl Kruger	[facility] [id number]  08/09/2019	08/09/2019	acetaminophen-cod #3 tablet	60	30	Carl Kruger MD	Medicare	[facility] [id number]  07/10/2019	07/16/2019	alprazolam 0.5 mg tablet	60	30	Carl Kruger MD	Medicare	[facility] [id number]    Patient Name: Librado Dudley Date: 1940  Address: [address] [place] [id number]: Male  Rx Written	Rx Dispensed	Drug	Quantity	Days Supply	Prescriber Name	Payment Method	Dispenser  12/04/2019	12/04/2019	alprazolam 0.5 mg tablet	30	30	Rossana, Amir	Medicare	[facility]  11/05/2019	11/05/2019	alprazolam 0.5 mg tablet	30	30	Rossana, Amir	Cook	[facility]  11/04/2019	11/04/2019	acetaminophen-cod #3 tablet	30	10	Rossana, Amir	Cook	[facility]      PPS  Level    20%       Note PPS = Palliative Performance Scale; (c)[facility]       Range from 100% meaning Full ambulation/self-care/intake/Level of Consicous                                                                              to        10% meaning Bedbound/Unable to do any activity/extensive disease /Total Care/ No PO intake/ LOC=Full/drowsy/+/-confusion        (0% = death)                     Prior to acute illness, patient's functionality reportedly needed assistance with all ADLs and was non ambulatory

## 2020-07-07 LAB
ANION GAP SERPL CALC-SCNC: 20 MMOL/L — HIGH (ref 7–14)
BASOPHILS # BLD AUTO: 0.02 K/UL — SIGNIFICANT CHANGE UP (ref 0–0.2)
BASOPHILS NFR BLD AUTO: 0.3 % — SIGNIFICANT CHANGE UP (ref 0–1)
BUN SERPL-MCNC: 129 MG/DL — CRITICAL HIGH (ref 10–20)
CALCIUM SERPL-MCNC: 9.5 MG/DL — SIGNIFICANT CHANGE UP (ref 8.5–10.1)
CHLORIDE SERPL-SCNC: 100 MMOL/L — SIGNIFICANT CHANGE UP (ref 98–110)
CO2 SERPL-SCNC: 20 MMOL/L — SIGNIFICANT CHANGE UP (ref 17–32)
CREAT SERPL-MCNC: 2.9 MG/DL — HIGH (ref 0.7–1.5)
EOSINOPHIL # BLD AUTO: 0.1 K/UL — SIGNIFICANT CHANGE UP (ref 0–0.7)
EOSINOPHIL NFR BLD AUTO: 1.4 % — SIGNIFICANT CHANGE UP (ref 0–8)
GLUCOSE BLDC GLUCOMTR-MCNC: 148 MG/DL — HIGH (ref 70–99)
GLUCOSE BLDC GLUCOMTR-MCNC: 159 MG/DL — HIGH (ref 70–99)
GLUCOSE BLDC GLUCOMTR-MCNC: 166 MG/DL — HIGH (ref 70–99)
GLUCOSE BLDC GLUCOMTR-MCNC: 169 MG/DL — HIGH (ref 70–99)
GLUCOSE SERPL-MCNC: 126 MG/DL — HIGH (ref 70–99)
HCT VFR BLD CALC: 38.8 % — LOW (ref 42–52)
HGB BLD-MCNC: 12.5 G/DL — LOW (ref 14–18)
IMM GRANULOCYTES NFR BLD AUTO: 0.3 % — SIGNIFICANT CHANGE UP (ref 0.1–0.3)
INR BLD: 2.9 RATIO — HIGH (ref 0.65–1.3)
LYMPHOCYTES # BLD AUTO: 1.06 K/UL — LOW (ref 1.2–3.4)
LYMPHOCYTES # BLD AUTO: 15.3 % — LOW (ref 20.5–51.1)
MCHC RBC-ENTMCNC: 28 PG — SIGNIFICANT CHANGE UP (ref 27–31)
MCHC RBC-ENTMCNC: 32.2 G/DL — SIGNIFICANT CHANGE UP (ref 32–37)
MCV RBC AUTO: 87 FL — SIGNIFICANT CHANGE UP (ref 80–94)
MONOCYTES # BLD AUTO: 0.81 K/UL — HIGH (ref 0.1–0.6)
MONOCYTES NFR BLD AUTO: 11.7 % — HIGH (ref 1.7–9.3)
NEUTROPHILS # BLD AUTO: 4.9 K/UL — SIGNIFICANT CHANGE UP (ref 1.4–6.5)
NEUTROPHILS NFR BLD AUTO: 71 % — SIGNIFICANT CHANGE UP (ref 42.2–75.2)
NRBC # BLD: 0 /100 WBCS — SIGNIFICANT CHANGE UP (ref 0–0)
PLATELET # BLD AUTO: 131 K/UL — SIGNIFICANT CHANGE UP (ref 130–400)
POTASSIUM SERPL-MCNC: 3.9 MMOL/L — SIGNIFICANT CHANGE UP (ref 3.5–5)
POTASSIUM SERPL-SCNC: 3.9 MMOL/L — SIGNIFICANT CHANGE UP (ref 3.5–5)
PROTHROM AB SERPL-ACNC: 33.4 SEC — HIGH (ref 9.95–12.87)
RBC # BLD: 4.46 M/UL — LOW (ref 4.7–6.1)
RBC # FLD: 15.9 % — HIGH (ref 11.5–14.5)
SODIUM SERPL-SCNC: 140 MMOL/L — SIGNIFICANT CHANGE UP (ref 135–146)
WBC # BLD: 6.91 K/UL — SIGNIFICANT CHANGE UP (ref 4.8–10.8)
WBC # FLD AUTO: 6.91 K/UL — SIGNIFICANT CHANGE UP (ref 4.8–10.8)

## 2020-07-07 PROCEDURE — 99233 SBSQ HOSP IP/OBS HIGH 50: CPT

## 2020-07-07 PROCEDURE — 99497 ADVNCD CARE PLAN 30 MIN: CPT

## 2020-07-07 PROCEDURE — 71045 X-RAY EXAM CHEST 1 VIEW: CPT | Mod: 26

## 2020-07-07 RX ORDER — ALPRAZOLAM 0.25 MG
0.25 TABLET ORAL
Refills: 0 | Status: DISCONTINUED | OUTPATIENT
Start: 2020-07-07 | End: 2020-07-13

## 2020-07-07 RX ORDER — SEVELAMER CARBONATE 2400 MG/1
800 POWDER, FOR SUSPENSION ORAL
Refills: 0 | Status: DISCONTINUED | OUTPATIENT
Start: 2020-07-07 | End: 2020-07-15

## 2020-07-07 RX ORDER — SEVELAMER CARBONATE 2400 MG/1
800 POWDER, FOR SUSPENSION ORAL THREE TIMES A DAY
Refills: 0 | Status: DISCONTINUED | OUTPATIENT
Start: 2020-07-07 | End: 2020-07-07

## 2020-07-07 RX ORDER — HYDROMORPHONE HYDROCHLORIDE 2 MG/ML
0.25 INJECTION INTRAMUSCULAR; INTRAVENOUS; SUBCUTANEOUS EVERY 4 HOURS
Refills: 0 | Status: DISCONTINUED | OUTPATIENT
Start: 2020-07-07 | End: 2020-07-11

## 2020-07-07 RX ORDER — WARFARIN SODIUM 2.5 MG/1
2 TABLET ORAL ONCE
Refills: 0 | Status: COMPLETED | OUTPATIENT
Start: 2020-07-07 | End: 2020-07-07

## 2020-07-07 RX ADMIN — ISOSORBIDE MONONITRATE 30 MILLIGRAM(S): 60 TABLET, EXTENDED RELEASE ORAL at 11:50

## 2020-07-07 RX ADMIN — SEVELAMER CARBONATE 800 MILLIGRAM(S): 2400 POWDER, FOR SUSPENSION ORAL at 17:38

## 2020-07-07 RX ADMIN — Medication 80 MILLIGRAM(S): at 17:37

## 2020-07-07 RX ADMIN — LACTULOSE 20 GRAM(S): 10 SOLUTION ORAL at 06:17

## 2020-07-07 RX ADMIN — SEVELAMER CARBONATE 800 MILLIGRAM(S): 2400 POWDER, FOR SUSPENSION ORAL at 11:51

## 2020-07-07 RX ADMIN — PANTOPRAZOLE SODIUM 40 MILLIGRAM(S): 20 TABLET, DELAYED RELEASE ORAL at 06:16

## 2020-07-07 RX ADMIN — BUDESONIDE AND FORMOTEROL FUMARATE DIHYDRATE 2 PUFF(S): 160; 4.5 AEROSOL RESPIRATORY (INHALATION) at 21:48

## 2020-07-07 RX ADMIN — Medication 3 MILLILITER(S): at 09:19

## 2020-07-07 RX ADMIN — Medication 81 MILLIGRAM(S): at 11:51

## 2020-07-07 RX ADMIN — INSULIN GLARGINE 20 UNIT(S): 100 INJECTION, SOLUTION SUBCUTANEOUS at 21:06

## 2020-07-07 RX ADMIN — WARFARIN SODIUM 2 MILLIGRAM(S): 2.5 TABLET ORAL at 21:47

## 2020-07-07 RX ADMIN — Medication 80 MILLIGRAM(S): at 06:16

## 2020-07-07 RX ADMIN — Medication 1 APPLICATION(S): at 11:51

## 2020-07-07 RX ADMIN — HYDROMORPHONE HYDROCHLORIDE 0.25 MILLIGRAM(S): 2 INJECTION INTRAMUSCULAR; INTRAVENOUS; SUBCUTANEOUS at 14:11

## 2020-07-07 RX ADMIN — SEVELAMER CARBONATE 800 MILLIGRAM(S): 2400 POWDER, FOR SUSPENSION ORAL at 06:16

## 2020-07-07 RX ADMIN — Medication 1 TABLET(S): at 11:50

## 2020-07-07 RX ADMIN — HYDROMORPHONE HYDROCHLORIDE 0.25 MILLIGRAM(S): 2 INJECTION INTRAMUSCULAR; INTRAVENOUS; SUBCUTANEOUS at 21:02

## 2020-07-07 RX ADMIN — Medication 50 MILLIGRAM(S): at 06:16

## 2020-07-07 RX ADMIN — ATORVASTATIN CALCIUM 40 MILLIGRAM(S): 80 TABLET, FILM COATED ORAL at 21:47

## 2020-07-07 RX ADMIN — Medication 500 MILLIGRAM(S): at 11:50

## 2020-07-07 RX ADMIN — FINASTERIDE 5 MILLIGRAM(S): 5 TABLET, FILM COATED ORAL at 11:50

## 2020-07-07 NOTE — PROGRESS NOTE ADULT - SUBJECTIVE AND OBJECTIVE BOX
patient still sob   no chest pain   not much improvement with overall clinical picture     Vital Signs Last 24 Hrs  T(C): 36.1 (07 Jul 2020 05:41), Max: 36.1 (07 Jul 2020 05:41)  T(F): 96.9 (07 Jul 2020 05:41), Max: 96.9 (07 Jul 2020 05:41)  HR: 58 (07 Jul 2020 05:41) (58 - 59)  BP: 114/54 (07 Jul 2020 05:41) (114/54 - 128/60)  BP(mean): --  RR: 18 (07 Jul 2020 05:41) (18 - 18)  SpO2: 99% (07 Jul 2020 11:53) (93% - 99%)    PHYSICAL EXAM:  GENERAL: NAD,   Pulm: decreased air entry B/L   CV: Regular rate and rhythm;   GI Soft, Nontender, Nondistended; Bowel sounds present  EXTREMITIES: ++ edema  PSYCH: AAOx3  NEUROLOGY: non-focal                            12.5   6.91  )-----------( 131      ( 07 Jul 2020 05:38 )             38.8     07-07    140  |  100  |  129<HH>  ----------------------------<  126<H>  3.9   |  20  |  2.9<H>    Ca    9.5      07 Jul 2020 05:38  Phos  6.2     07-06    TPro  6.1  /  Alb  3.2<L>  /  TBili  0.9  /  DBili  x   /  AST  19  /  ALT  11  /  AlkPhos  85  07-06    LIVER FUNCTIONS - ( 06 Jul 2020 05:22 )  Alb: 3.2 g/dL / Pro: 6.1 g/dL / ALK PHOS: 85 U/L / ALT: 11 U/L / AST: 19 U/L / GGT: x           PT/INR - ( 07 Jul 2020 05:38 )   PT: 33.40 sec;   INR: 2.90 ratio               MEDICATIONS  (STANDING):  ascorbic acid 500 milliGRAM(s) Oral daily  aspirin  chewable 81 milliGRAM(s) Oral daily  atorvastatin 40 milliGRAM(s) Oral at bedtime  budesonide 160 MICROgram(s)/formoterol 4.5 MICROgram(s) Inhaler 2 Puff(s) Inhalation two times a day  chlorhexidine 4% Liquid 1 Application(s) Topical once  finasteride 5 milliGRAM(s) Oral daily  furosemide   Injectable 80 milliGRAM(s) IV Push two times a day  insulin glargine Injectable (LANTUS) 20 Unit(s) SubCutaneous at bedtime  isosorbide   mononitrate ER Tablet (IMDUR) 30 milliGRAM(s) Oral daily  metolazone 5 milliGRAM(s) Oral daily  metoprolol succinate ER 50 milliGRAM(s) Oral daily  multivitamin 1 Tablet(s) Oral daily  pantoprazole    Tablet 40 milliGRAM(s) Oral before breakfast  polyethylene glycol 3350 17 Gram(s) Oral daily  sevelamer carbonate 800 milliGRAM(s) Oral three times a day  silver sulfADIAZINE 1% Cream 1 Application(s) Topical daily    MEDICATIONS  (PRN):

## 2020-07-07 NOTE — PROGRESS NOTE ADULT - ASSESSMENT
Pt with CKD 4, DM, HTN, AS s/p BAV, COPD on home O2, admitted with worsening SOB due to fluid overload.    CKD4 - creat  satbel   cont lasix IV and metolazone po current doses/ change to po   non oliguric   strict I and O daily weights  No need for RRT  ph noted , start renagel 1/1/1  mild hypokelmia,s/p k dur once   check palliative care   LE cellulitis - local care, silvadene, podiatry  will follow

## 2020-07-07 NOTE — PROGRESS NOTE ADULT - SUBJECTIVE AND OBJECTIVE BOX
seen and examined  no distress   confused         PAST HISTORY  --------------------------------------------------------------------------------  No significant changes to PMH, PSH, FHx, SHx, unless otherwise noted    ALLERGIES & MEDICATIONS  --------------------------------------------------------------------------------  Allergies    No Known Allergies    Intolerances      Standing Inpatient Medications  albuterol/ipratropium for Nebulization 3 milliLiter(s) Nebulizer every 6 hours  ascorbic acid 500 milliGRAM(s) Oral daily  aspirin  chewable 81 milliGRAM(s) Oral daily  atorvastatin 40 milliGRAM(s) Oral at bedtime  budesonide 160 MICROgram(s)/formoterol 4.5 MICROgram(s) Inhaler 2 Puff(s) Inhalation two times a day  chlorhexidine 4% Liquid 1 Application(s) Topical once  finasteride 5 milliGRAM(s) Oral daily  furosemide   Injectable 80 milliGRAM(s) IV Push two times a day  insulin glargine Injectable (LANTUS) 20 Unit(s) SubCutaneous at bedtime  isosorbide   mononitrate ER Tablet (IMDUR) 30 milliGRAM(s) Oral daily  metolazone 5 milliGRAM(s) Oral daily  metoprolol succinate ER 50 milliGRAM(s) Oral daily  multivitamin 1 Tablet(s) Oral daily  pantoprazole    Tablet 40 milliGRAM(s) Oral before breakfast  polyethylene glycol 3350 17 Gram(s) Oral daily  sevelamer carbonate 800 milliGRAM(s) Oral three times a day  silver sulfADIAZINE 1% Cream 1 Application(s) Topical daily    PRN Inpatient Medications          VITALS/PHYSICAL EXAM  --------------------------------------------------------------------------------  T(C): 36.1 (07-07-20 @ 05:41), Max: 36.1 (07-07-20 @ 05:41)  HR: 58 (07-07-20 @ 05:41) (58 - 59)  BP: 114/54 (07-07-20 @ 05:41) (114/54 - 128/60)  RR: 18 (07-07-20 @ 05:41) (18 - 18)  SpO2: 98% (07-06-20 @ 22:21) (93% - 99%)  Wt(kg): --        07-06-20 @ 07:01  -  07-07-20 @ 07:00  --------------------------------------------------------  IN: 750 mL / OUT: 775 mL / NET: -25 mL      Physical Exam:  	Gen: NAD  	Pulm: decrease BS  B/L  	CV:  S1S2; no rub  	Abd: +distended    LABS/STUDIES  --------------------------------------------------------------------------------              12.5   6.91  >-----------<  131      [07-07-20 @ 05:38]              38.8     140  |  100  |  129  ----------------------------<  126      [07-07-20 @ 05:38]  3.9   |  20  |  2.9        Ca     9.5     [07-07-20 @ 05:38]      Phos  6.2     [07-06-20 @ 05:22]    TPro  6.1  /  Alb  3.2  /  TBili  0.9  /  DBili  x   /  AST  19  /  ALT  11  /  AlkPhos  85  [07-06-20 @ 05:22]    PT/INR: PT 33.40, INR 2.90       [07-07-20 @ 05:38]      Creatinine Trend:  SCr 2.9 [07-07 @ 05:38]  SCr 2.8 [07-06 @ 05:22]  SCr 2.9 [07-05 @ 06:26]  SCr 2.7 [07-04 @ 06:10]  SCr 2.8 [07-03 @ 05:32]        Ferritin 640      [05-05-20 @ 11:48]  PTH -- (Ca 9.3)      [07-06-20 @ 05:22]   84  PTH -- (Ca 7.8)      [10-21-19 @ 19:08]   278  HbA1c 6.8      [01-31-20 @ 07:01]  TSH 0.84      [08-29-19 @ 06:15]

## 2020-07-07 NOTE — PROGRESS NOTE ADULT - SUBJECTIVE AND OBJECTIVE BOX
80yMale with diagnosis: CHF (CONGESTIVE HEART FAILURE)      Patient seen for follow up      PHYSICAL EXAM  Pt OOB in chair - priti lifted  Pt as only mild dyspnea   (+) c/o back pain    T(C): , Max: 36.1 (05:41)  T(F): 96.1  HR: 58 (58 - 59)  BP: 115/57 (114/54 - 128/60)  RR: 20 (18 - 20)  SpO2: 99% (93% - 99%)              LABS:                          12.5   6.91  )-----------( 131      ( 07 Jul 2020 05:38 )             38.8                                                                                      07-07    140  |  100  |  129<HH>  ----------------------------<  126<H>  3.9   |  20  |  2.9<H>    Ca    9.5      07 Jul 2020 05:38  Phos  6.2     07-06    TPro  6.1  /  Alb  3.2<L>  /  TBili  0.9  /  DBili  x   /  AST  19  /  ALT  11  /  AlkPhos  85  07-06                                                      MEDICATIONS  (STANDING):  ascorbic acid 500 milliGRAM(s) Oral daily  aspirin  chewable 81 milliGRAM(s) Oral daily  atorvastatin 40 milliGRAM(s) Oral at bedtime  budesonide 160 MICROgram(s)/formoterol 4.5 MICROgram(s) Inhaler 2 Puff(s) Inhalation two times a day  chlorhexidine 4% Liquid 1 Application(s) Topical once  finasteride 5 milliGRAM(s) Oral daily  furosemide   Injectable 80 milliGRAM(s) IV Push two times a day  insulin glargine Injectable (LANTUS) 20 Unit(s) SubCutaneous at bedtime  isosorbide   mononitrate ER Tablet (IMDUR) 30 milliGRAM(s) Oral daily  metolazone 5 milliGRAM(s) Oral daily  metoprolol succinate ER 50 milliGRAM(s) Oral daily  multivitamin 1 Tablet(s) Oral daily  pantoprazole    Tablet 40 milliGRAM(s) Oral before breakfast  polyethylene glycol 3350 17 Gram(s) Oral daily  sevelamer carbonate 800 milliGRAM(s) Oral three times a day with meals  silver sulfADIAZINE 1% Cream 1 Application(s) Topical daily  silver sulfADIAZINE 1% Cream 1 Application(s) Topical daily  warfarin 2 milliGRAM(s) Oral once    MEDICATIONS  (PRN):  ALPRAZolam 0.25 milliGRAM(s) Oral two times a day PRN Anxiety  HYDROmorphone  Injectable 0.25 milliGRAM(s) IV Push every 4 hours PRN Severe Pain (7 - 10)

## 2020-07-07 NOTE — PROGRESS NOTE ADULT - ASSESSMENT
Patient has no complaints. Appreciate renal. Medical rx. Continue diuretics. Follow lytes . Daily weight. Not candidate TAVR. Palliative care.

## 2020-07-07 NOTE — PROGRESS NOTE ADULT - SUBJECTIVE AND OBJECTIVE BOX
Patient is a 80y old  Male who presents with a chief complaint of SOB (07 Jul 2020 12:11)      T(F): 96.9 (07-07-20 @ 05:41), Max: 96.9 (07-07-20 @ 05:41)  HR: 58 (07-07-20 @ 05:41)  BP: 114/54 (07-07-20 @ 05:41)  RR: 18 (07-07-20 @ 05:41)  SpO2: 99% (07-07-20 @ 11:53) (93% - 99%)    PHYSICAL EXAM:  GENERAL: NAD, well-groomed, well-developed  HEAD:  Atraumatic, Normocephalic  EYES: EOMI, PERRLA, conjunctiva and sclera clear  ENMT: No tonsillar erythema, exudates, or enlargement; Moist mucous membranes, Good dentition, No lesions  NECK: Supple, No JVD, Normal thyroid  NERVOUS SYSTEM:  Alert & Oriented X3,  Motor Strength 5/5 B/L upper and lower extremities  CHEST/LUNG: Clear to percussion bilaterally; No rales, rhonchi, wheezing, or rubs  HEART: Regular rate and rhythm; No murmurs, rubs, or gallops  ABDOMEN: Soft, Nontender, Nondistended; Bowel sounds present  EXTREMITIES:   No clubbing, cyanosis, tr edema  LYMPH: No lymphadenopathy noted  SKIN: No rashes or lesions    labs  07-07    140  |  100  |  129<HH>  ----------------------------<  126<H>  3.9   |  20  |  2.9<H>    Ca    9.5      07 Jul 2020 05:38  Phos  6.2     07-06    TPro  6.1  /  Alb  3.2<L>  /  TBili  0.9  /  DBili  x   /  AST  19  /  ALT  11  /  AlkPhos  85  07-06                          12.5   6.91  )-----------( 131      ( 07 Jul 2020 05:38 )             38.8       PT/INR - ( 07 Jul 2020 05:38 )   PT: 33.40 sec;   INR: 2.90 ratio                 ALPRAZolam 0.25 milliGRAM(s) Oral two times a day PRN  ascorbic acid 500 milliGRAM(s) Oral daily  aspirin  chewable 81 milliGRAM(s) Oral daily  atorvastatin 40 milliGRAM(s) Oral at bedtime  budesonide 160 MICROgram(s)/formoterol 4.5 MICROgram(s) Inhaler 2 Puff(s) Inhalation two times a day  chlorhexidine 4% Liquid 1 Application(s) Topical once  finasteride 5 milliGRAM(s) Oral daily  furosemide   Injectable 80 milliGRAM(s) IV Push two times a day  HYDROmorphone  Injectable 0.25 milliGRAM(s) IV Push every 4 hours PRN  insulin glargine Injectable (LANTUS) 20 Unit(s) SubCutaneous at bedtime  isosorbide   mononitrate ER Tablet (IMDUR) 30 milliGRAM(s) Oral daily  metolazone 5 milliGRAM(s) Oral daily  metoprolol succinate ER 50 milliGRAM(s) Oral daily  multivitamin 1 Tablet(s) Oral daily  pantoprazole    Tablet 40 milliGRAM(s) Oral before breakfast  polyethylene glycol 3350 17 Gram(s) Oral daily  sevelamer carbonate 800 milliGRAM(s) Oral three times a day  silver sulfADIAZINE 1% Cream 1 Application(s) Topical daily  warfarin 2 milliGRAM(s) Oral once

## 2020-07-07 NOTE — PROGRESS NOTE ADULT - ASSESSMENT
80yMale being evaluated for goals of care and symptom management. Pt has end stage HF and AS, no a TAVR candidate. Pt has poor functional status, needs total ADL assist. Pt with generalized anasarca, and LE edema and vascular changes. Pt today appeared comfortable, but complained of back pain and mild dyspnea (when seated). He asked that I call his son deangelo for any goals of care discussions.     Pt has a julian cath started for urinary retention. Pt tolerates it well. Would continue the julian catheter for comfort.     Spoke to Deangelo. Reviewed overall condition and treatment and prognosis. He is aware his father has a poor prognosis and would qualify for hospice. Discussion to clarify what he and his fathers goals are for him. The main goal is for him to live at home with quality of life for the time he has left. We discussed it's 6 months or less and may not be 6 months. We talked about comfort measures only vs continuing medical management. For now he would continue (possibly to d/c) with medical management. He will ask his father milan if he's tired of labs and other interventions. Agreed to a hospice consult to discuss what options they have.     Time spent discussing advance care planning 35min      Recommendations:  DNR/DNI  continue indwelling julian for comfort  Dilaudid 0.25mg Q 4 HRS PRN   Xanax 0.25mg BID PRN  Hospice c/s

## 2020-07-07 NOTE — PROGRESS NOTE ADULT - ASSESSMENT
81 y/o male with a PMH of CAD s/p CABG s/p PCI , severe symptomatic AS s/p BAV as a bridge to TAVR (6/10), afib/flutter on coumadin, left carotid artery stenosis (80%), DM2 , HLD,  HTN, COPD , , CKD stage 4,BPH was sent from [facility] to the ED with shortness of breath and LE edema , s/p  BAV.    A/P   #acute on chronic diastolic CHF exacerbation   still sob. no change in overall clinical picture despite maximum medical treatment   s/p balloon valvuloplasty of aortic valve recently   per cardiology patient not a candidate for tAVR   c/w lasix  IV 80 BID and metolazone 5 mg qday scr stable 2.9 today.   on toprol 50 qday   spoke to DR Velasquez pt not a candidate for TAVR   patient code status was changed ot DNR/DNI yesterday as per patient and sons wishes   palliative follow up     #Hx CAD s/p CABG s/p PCI ; Severe AS s/p BAV  on asa, lipitor     #history of AFIB c/w coumadin target INR 2-3   hold coumadin tonight INR 2.9 give 2 mg tonight couamdin was held yesterday anticipate will drop further     #HX of Left Carotid Artery stenosis  not medically stable for procedure at this time     #CKD 4 stable scr at baseline.       #Chronic elevated troponin likely due to CKD    # COPD - stable   c/w  symbicort      #DM2   controlled     DVT PX on coumadin inr 2.9     #Progress Note Handoff  Pending (specify):acute, palliative follow up   Family discussion: patient and son   Disposition: unknown

## 2020-07-08 LAB
ANION GAP SERPL CALC-SCNC: 19 MMOL/L — HIGH (ref 7–14)
BASOPHILS # BLD AUTO: 0.03 K/UL — SIGNIFICANT CHANGE UP (ref 0–0.2)
BASOPHILS NFR BLD AUTO: 0.5 % — SIGNIFICANT CHANGE UP (ref 0–1)
BUN SERPL-MCNC: 133 MG/DL — CRITICAL HIGH (ref 10–20)
CALCIUM SERPL-MCNC: 9 MG/DL — SIGNIFICANT CHANGE UP (ref 8.5–10.1)
CHLORIDE SERPL-SCNC: 98 MMOL/L — SIGNIFICANT CHANGE UP (ref 98–110)
CO2 SERPL-SCNC: 23 MMOL/L — SIGNIFICANT CHANGE UP (ref 17–32)
CREAT SERPL-MCNC: 2.9 MG/DL — HIGH (ref 0.7–1.5)
EOSINOPHIL # BLD AUTO: 0.25 K/UL — SIGNIFICANT CHANGE UP (ref 0–0.7)
EOSINOPHIL NFR BLD AUTO: 3.8 % — SIGNIFICANT CHANGE UP (ref 0–8)
GLUCOSE BLDC GLUCOMTR-MCNC: 131 MG/DL — HIGH (ref 70–99)
GLUCOSE BLDC GLUCOMTR-MCNC: 140 MG/DL — HIGH (ref 70–99)
GLUCOSE BLDC GLUCOMTR-MCNC: 67 MG/DL — LOW (ref 70–99)
GLUCOSE BLDC GLUCOMTR-MCNC: 75 MG/DL — SIGNIFICANT CHANGE UP (ref 70–99)
GLUCOSE SERPL-MCNC: 80 MG/DL — SIGNIFICANT CHANGE UP (ref 70–99)
HCT VFR BLD CALC: 40.7 % — LOW (ref 42–52)
HGB BLD-MCNC: 13 G/DL — LOW (ref 14–18)
IMM GRANULOCYTES NFR BLD AUTO: 0.3 % — SIGNIFICANT CHANGE UP (ref 0.1–0.3)
INR BLD: 2.77 RATIO — HIGH (ref 0.65–1.3)
LYMPHOCYTES # BLD AUTO: 0.96 K/UL — LOW (ref 1.2–3.4)
LYMPHOCYTES # BLD AUTO: 14.7 % — LOW (ref 20.5–51.1)
MCHC RBC-ENTMCNC: 28.4 PG — SIGNIFICANT CHANGE UP (ref 27–31)
MCHC RBC-ENTMCNC: 31.9 G/DL — LOW (ref 32–37)
MCV RBC AUTO: 88.9 FL — SIGNIFICANT CHANGE UP (ref 80–94)
MONOCYTES # BLD AUTO: 0.81 K/UL — HIGH (ref 0.1–0.6)
MONOCYTES NFR BLD AUTO: 12.4 % — HIGH (ref 1.7–9.3)
NEUTROPHILS # BLD AUTO: 4.48 K/UL — SIGNIFICANT CHANGE UP (ref 1.4–6.5)
NEUTROPHILS NFR BLD AUTO: 68.3 % — SIGNIFICANT CHANGE UP (ref 42.2–75.2)
NRBC # BLD: 0 /100 WBCS — SIGNIFICANT CHANGE UP (ref 0–0)
PLATELET # BLD AUTO: 130 K/UL — SIGNIFICANT CHANGE UP (ref 130–400)
POTASSIUM SERPL-MCNC: 3.2 MMOL/L — LOW (ref 3.5–5)
POTASSIUM SERPL-SCNC: 3.2 MMOL/L — LOW (ref 3.5–5)
PROTHROM AB SERPL-ACNC: 31.9 SEC — HIGH (ref 9.95–12.87)
RBC # BLD: 4.58 M/UL — LOW (ref 4.7–6.1)
RBC # FLD: 15.7 % — HIGH (ref 11.5–14.5)
SODIUM SERPL-SCNC: 140 MMOL/L — SIGNIFICANT CHANGE UP (ref 135–146)
WBC # BLD: 6.55 K/UL — SIGNIFICANT CHANGE UP (ref 4.8–10.8)
WBC # FLD AUTO: 6.55 K/UL — SIGNIFICANT CHANGE UP (ref 4.8–10.8)

## 2020-07-08 PROCEDURE — 99232 SBSQ HOSP IP/OBS MODERATE 35: CPT

## 2020-07-08 RX ORDER — POTASSIUM CHLORIDE 20 MEQ
40 PACKET (EA) ORAL EVERY 4 HOURS
Refills: 0 | Status: COMPLETED | OUTPATIENT
Start: 2020-07-08 | End: 2020-07-08

## 2020-07-08 RX ORDER — WARFARIN SODIUM 2.5 MG/1
3 TABLET ORAL ONCE
Refills: 0 | Status: COMPLETED | OUTPATIENT
Start: 2020-07-08 | End: 2020-07-08

## 2020-07-08 RX ADMIN — BUDESONIDE AND FORMOTEROL FUMARATE DIHYDRATE 2 PUFF(S): 160; 4.5 AEROSOL RESPIRATORY (INHALATION) at 21:28

## 2020-07-08 RX ADMIN — ISOSORBIDE MONONITRATE 30 MILLIGRAM(S): 60 TABLET, EXTENDED RELEASE ORAL at 11:33

## 2020-07-08 RX ADMIN — PANTOPRAZOLE SODIUM 40 MILLIGRAM(S): 20 TABLET, DELAYED RELEASE ORAL at 05:54

## 2020-07-08 RX ADMIN — ATORVASTATIN CALCIUM 40 MILLIGRAM(S): 80 TABLET, FILM COATED ORAL at 21:29

## 2020-07-08 RX ADMIN — WARFARIN SODIUM 3 MILLIGRAM(S): 2.5 TABLET ORAL at 21:29

## 2020-07-08 RX ADMIN — Medication 50 MILLIGRAM(S): at 05:54

## 2020-07-08 RX ADMIN — Medication 1 TABLET(S): at 11:32

## 2020-07-08 RX ADMIN — Medication 80 MILLIGRAM(S): at 16:53

## 2020-07-08 RX ADMIN — Medication 80 MILLIGRAM(S): at 05:54

## 2020-07-08 RX ADMIN — Medication 40 MILLIEQUIVALENT(S): at 14:16

## 2020-07-08 RX ADMIN — Medication 40 MILLIEQUIVALENT(S): at 16:53

## 2020-07-08 RX ADMIN — Medication 81 MILLIGRAM(S): at 11:33

## 2020-07-08 RX ADMIN — Medication 500 MILLIGRAM(S): at 11:33

## 2020-07-08 RX ADMIN — POLYETHYLENE GLYCOL 3350 17 GRAM(S): 17 POWDER, FOR SOLUTION ORAL at 11:33

## 2020-07-08 RX ADMIN — INSULIN GLARGINE 20 UNIT(S): 100 INJECTION, SOLUTION SUBCUTANEOUS at 21:27

## 2020-07-08 RX ADMIN — FINASTERIDE 5 MILLIGRAM(S): 5 TABLET, FILM COATED ORAL at 11:32

## 2020-07-08 NOTE — PROGRESS NOTE ADULT - ASSESSMENT
Pt with CKD 4, DM, HTN, AS s/p BAV, COPD on home O2, admitted with worsening SOB due to fluid overload.    CKD4 - creat  satbel   cont lasix and metolazone po current doses/ change to po lasix today   non oliguric   strict I and O daily weights  No need for RRT  ph noted , start renagel 1/1/1  mild hypokelmia,s/p k dur once   check palliative care   LE cellulitis - local care, silvadene, podiatry  Palliative care notes appreciated   will follow

## 2020-07-08 NOTE — PROGRESS NOTE ADULT - SUBJECTIVE AND OBJECTIVE BOX
TAIWO ZAVALA  80y  Male      Patient is a 80y old  Male who presents with a chief complaint of SOB.  Today seen and examined at bedside.  Seems comfortable.      INTERVAL HPI/OVERNIGHT EVENTS: None       ******************************* REVIEW OF SYSTEMS:**********************************************    resting in bed.   All other review of systems negative    *********************** VITALS ******************************************    T(F): 96 (07-08-20 @ 12:34)  HR: 57 (07-08-20 @ 12:34) (56 - 57)  BP: 129/59 (07-08-20 @ 12:34) (115/54 - 129/59)  RR: 20 (07-08-20 @ 12:34) (18 - 20)  SpO2: 97% (07-08-20 @ 07:23) (95% - 97%)    07-07-20 @ 07:01  -  07-08-20 @ 07:00  --------------------------------------------------------  IN: 670 mL / OUT: 1425 mL / NET: -755 mL    07-08-20 @ 07:01  -  07-08-20 @ 17:56  --------------------------------------------------------  IN: 450 mL / OUT: 900 mL / NET: -450 mL            07-07-20 @ 07:01  -  07-08-20 @ 07:00  --------------------------------------------------------  IN: 670 mL / OUT: 1425 mL / NET: -755 mL    07-08-20 @ 07:01  -  07-08-20 @ 17:56  --------------------------------------------------------  IN: 450 mL / OUT: 900 mL / NET: -450 mL        ******************************** PHYSICAL EXAM:**************************************************  GENERAL: NAD    PSYCH: no agitation, baseline mentation  HEENT:     NERVOUS SYSTEM:  Alert & Oriented X3,     PULMONARY: SHYANNE, fine crackles b/b  CARDIOVASCULAR: S1S2 RRR    GI: Soft, NT, ND; BS present.    EXTREMITIES:  2+ Peripheral Pulses, No clubbing, cyanosis, or edema    LYMPH: No lymphadenopathy noted    SKIN: No rashes or lesions      **************************** LABS *******************************************************                          13.0   6.55  )-----------( 130      ( 08 Jul 2020 05:51 )             40.7     07-08    140  |  98  |  133<HH>  ----------------------------<  80  3.2<L>   |  23  |  2.9<H>    Ca    9.0      08 Jul 2020 05:51          PT/INR - ( 08 Jul 2020 05:51 )   PT: 31.90 sec;   INR: 2.77 ratio           Lactate Trend        CAPILLARY BLOOD GLUCOSE      POCT Blood Glucose.: 140 mg/dL (08 Jul 2020 16:29)          **************************Active Medications *******************************************  No Known Allergies      ALPRAZolam 0.25 milliGRAM(s) Oral two times a day PRN  ascorbic acid 500 milliGRAM(s) Oral daily  aspirin  chewable 81 milliGRAM(s) Oral daily  atorvastatin 40 milliGRAM(s) Oral at bedtime  budesonide 160 MICROgram(s)/formoterol 4.5 MICROgram(s) Inhaler 2 Puff(s) Inhalation two times a day  chlorhexidine 4% Liquid 1 Application(s) Topical once  finasteride 5 milliGRAM(s) Oral daily  furosemide   Injectable 80 milliGRAM(s) IV Push two times a day  HYDROmorphone  Injectable 0.25 milliGRAM(s) IV Push every 4 hours PRN  insulin glargine Injectable (LANTUS) 20 Unit(s) SubCutaneous at bedtime  isosorbide   mononitrate ER Tablet (IMDUR) 30 milliGRAM(s) Oral daily  metolazone 5 milliGRAM(s) Oral daily  metoprolol succinate ER 50 milliGRAM(s) Oral daily  multivitamin 1 Tablet(s) Oral daily  pantoprazole    Tablet 40 milliGRAM(s) Oral before breakfast  polyethylene glycol 3350 17 Gram(s) Oral daily  sevelamer carbonate 800 milliGRAM(s) Oral three times a day with meals  silver sulfADIAZINE 1% Cream 1 Application(s) Topical daily  silver sulfADIAZINE 1% Cream 1 Application(s) Topical daily  warfarin 3 milliGRAM(s) Oral once      ***************************************************  RADIOLOGY & ADDITIONAL TESTS:    Imaging Personally Reviewed:  [ ] YES  [ ] NO    HEALTH ISSUES - PROBLEM Dx:

## 2020-07-08 NOTE — PROGRESS NOTE ADULT - ASSESSMENT
· Assessment		  81 y/o male with a PMH of CAD s/p CABG s/p PCI , severe symptomatic AS s/p BAV as a bridge to TAVR (6/10), afib/flutter on coumadin, left carotid artery stenosis (80%), DM2 , HLD,  HTN, COPD , , CKD stage 4,BPH was sent from [facility] to the ED with shortness of breath and LE edema , s/p  BAV.    A/P   # Acute on chronic diastolic CHF exacerbation    no change in overall clinical picture despite maximum medical treatment   s/p balloon valvuloplasty of aortic valve recently   per cardiology patient not a candidate for tAVR   c/w lasix  IV 80 BID and metolazone 5 mg qday scr stable 2.9 today.   patient code status was changed ot DNR/DNI yesterday as per patient and sons wishes   palliative/  Hospice on board. >> Comfort measures.   Patient and family prefers home with hospice. Hospice will see him tomorrow and speaking with his son today.    #Hx CAD s/p CABG s/p PCI ; Severe AS s/p BAV  on asa, lipitor     #history of AFIB c/w coumadin target INR 2-3   hold coumadin tonight INR 2.9 give 2 mg tonight couamdin was held yesterday anticipate will drop further     #HX of Left Carotid Artery stenosis  not medically stable for procedure at this time     #CKD 4 stable scr at baseline.       #Chronic elevated troponin likely due to CKD    # COPD - stable   c/w  symbicort      #DM2   controlled     DVT PX on coumadin inr 2.9

## 2020-07-08 NOTE — PROGRESS NOTE ADULT - SUBJECTIVE AND OBJECTIVE BOX
Nephrology progress note    THIS IS AN INCOMPLETE NOTE . FULL NOTE TO FOLLOW SHORTLY    Patient is seen and examined, events over the last 24 h noted .    Allergies:  No Known Allergies    Hospital Medications:   MEDICATIONS  (STANDING):  ascorbic acid 500 milliGRAM(s) Oral daily  aspirin  chewable 81 milliGRAM(s) Oral daily  atorvastatin 40 milliGRAM(s) Oral at bedtime  budesonide 160 MICROgram(s)/formoterol 4.5 MICROgram(s) Inhaler 2 Puff(s) Inhalation two times a day  finasteride 5 milliGRAM(s) Oral daily  furosemide   Injectable 80 milliGRAM(s) IV Push two times a day  insulin glargine Injectable (LANTUS) 20 Unit(s) SubCutaneous at bedtime  isosorbide   mononitrate ER Tablet (IMDUR) 30 milliGRAM(s) Oral daily  metolazone 5 milliGRAM(s) Oral daily  metoprolol succinate ER 50 milliGRAM(s) Oral daily  multivitamin 1 Tablet(s) Oral daily  pantoprazole    Tablet 40 milliGRAM(s) Oral before breakfast  polyethylene glycol 3350 17 Gram(s) Oral daily  sevelamer carbonate 800 milliGRAM(s) Oral three times a day with meals  silver sulfADIAZINE 1% Cream 1 Application(s) Topical daily  silver sulfADIAZINE 1% Cream 1 Application(s) Topical daily        VITALS:  T(F): 96.9 (07-08-20 @ 05:53), Max: 96.9 (07-07-20 @ 21:00)  HR: 56 (07-08-20 @ 05:53)  BP: 126/58 (07-08-20 @ 05:53)  RR: 18 (07-08-20 @ 05:53)  SpO2: 97% (07-08-20 @ 07:23)  Wt(kg): --    07-06 @ 07:01  -  07-07 @ 07:00  --------------------------------------------------------  IN: 750 mL / OUT: 775 mL / NET: -25 mL    07-07 @ 07:01  -  07-08 @ 07:00  --------------------------------------------------------  IN: 670 mL / OUT: 1425 mL / NET: -755 mL          PHYSICAL EXAM:  Constitutional: NAD  HEENT: anicteric sclera, oropharynx clear, MMM  Neck: No JVD  Respiratory: CTAB, no wheezes, rales or rhonchi  Cardiovascular: S1, S2, RRR  Gastrointestinal: BS+, soft, NT/ND  Extremities: No cyanosis or clubbing. No peripheral edema  :  No julian.   Skin: No rashes    LABS:  07-08    140  |  98  |  133<HH>  ----------------------------<  80  3.2<L>   |  23  |  2.9<H>  Creatinine Trend: 2.9<--, 2.9<--, 2.8<--, 2.9<--, 2.7<--, 2.8<--  Ca    9.0      08 Jul 2020 05:51                            13.0   6.55  )-----------( 130      ( 08 Jul 2020 05:51 )             40.7       Urine Studies:      RADIOLOGY & ADDITIONAL STUDIES: Nephrology progress note  Patient is seen and examined, events over the last 24 h noted .  lying in bed comfortable     Allergies:  No Known Allergies    Hospital Medications:   MEDICATIONS  (STANDING):    ascorbic acid 500 milliGRAM(s) Oral daily  aspirin  chewable 81 milliGRAM(s) Oral daily  atorvastatin 40 milliGRAM(s) Oral at bedtime  budesonide 160 MICROgram(s)/formoterol 4.5 MICROgram(s) Inhaler 2 Puff(s) Inhalation two times a day  finasteride 5 milliGRAM(s) Oral daily  furosemide   Injectable 80 milliGRAM(s) IV Push two times a day  insulin glargine Injectable (LANTUS) 20 Unit(s) SubCutaneous at bedtime  isosorbide   mononitrate ER Tablet (IMDUR) 30 milliGRAM(s) Oral daily  metolazone 5 milliGRAM(s) Oral daily  metoprolol succinate ER 50 milliGRAM(s) Oral daily  multivitamin 1 Tablet(s) Oral daily  pantoprazole    Tablet 40 milliGRAM(s) Oral before breakfast  polyethylene glycol 3350 17 Gram(s) Oral daily  sevelamer carbonate 800 milliGRAM(s) Oral three times a day with meals  silver sulfADIAZINE 1% Cream 1 Application(s) Topical daily  silver sulfADIAZINE 1% Cream 1 Application(s) Topical daily        VITALS:  T(F): 96.9 (07-08-20 @ 05:53), Max: 96.9 (07-07-20 @ 21:00)  HR: 56 (07-08-20 @ 05:53)  BP: 126/58 (07-08-20 @ 05:53)  RR: 18 (07-08-20 @ 05:53)  SpO2: 97% (07-08-20 @ 07:23)      07-06 @ 07:01  -  07-07 @ 07:00  --------------------------------------------------------  IN: 750 mL / OUT: 775 mL / NET: -25 mL    07-07 @ 07:01  -  07-08 @ 07:00  --------------------------------------------------------  IN: 670 mL / OUT: 1425 mL / NET: -755 mL          PHYSICAL EXAM:  Constitutional: lethargic   Neck: No JVD  Respiratory: Crackles at base   Cardiovascular: S1, S2, RRR  Gastrointestinal: BS+, soft, NT/ND  Extremities: No cyanosis or clubbing. plus one edema   :  No julian.   Skin: No rashes    LABS:  07-08    140  |  98  |  133<HH>  ----------------------------<  80  3.2<L>   |  23  |  2.9<H>    Creatinine Trend: 2.9<--, 2.9<--, 2.8<--, 2.9<--, 2.7<--, 2.8<--    Ca    9.0      08 Jul 2020 05:51                            13.0   6.55  )-----------( 130      ( 08 Jul 2020 05:51 )             40.7       Urine Studies:      RADIOLOGY & ADDITIONAL STUDIES:

## 2020-07-08 NOTE — PROGRESS NOTE ADULT - ASSESSMENT
79 y/o male with a PMH of CAD s/p CABG s/p PCI , severe symptomatic AS s/p BAV as a bridge to TAVR (6/10), afib/flutter on coumadin, left carotid artery stenosis (80%), DM2 , HLD,  HTN, COPD , , CKD stage 4,BPH was sent from [facility] to the ED with shortness of breath and LE edema , s/p  BAV.    A/P   # Acute on chronic diastolic CHF exacerbation    no change in overall clinical picture despite maximum medical treatment   s/p balloon valvuloplasty of aortic valve recently   per cardiology patient not a candidate for tAVR   c/w lasix  IV 80 BID and metolazone 5 mg qday scr stable 2.9 today.   patient code status was changed ot DNR/DNI yesterday as per patient and sons wishes   palliative/  Hospice on board. >> Comfort measures.   Patient and family prefers home with hospice.     #Hx CAD s/p CABG s/p PCI ; Severe AS s/p BAV  on asa, lipitor     #history of AFIB c/w coumadin target INR 2-3   hold coumadin tonight INR 2.9 give 2 mg tonight couamdin was held yesterday anticipate will drop further     #HX of Left Carotid Artery stenosis  not medically stable for procedure at this time     #CKD 4 stable scr at baseline.       #Chronic elevated troponin likely due to CKD    # COPD - stable   c/w  symbicort      #DM2   controlled     DVT PX on coumadin inr 2.9     #Progress Note Handoff  Pending (specify): hOSPICE   Family discussion: patient and son   Disposition: ? Home hospice

## 2020-07-08 NOTE — PROGRESS NOTE ADULT - SUBJECTIVE AND OBJECTIVE BOX
LENGTH OF HOSPITAL STAY: 10d      CHIEF COMPLAINT:   Patient is a 80y old  Male who presents with a chief complaint of SOB (08 Jul 2020 17:56)      OVER Past 24hrs:  The patient was seen and examined at bedside there were no acute events overnight.  Resting in bed.    HISTORY OF PRESENTING ILLNESS:    HPI:  81 y/o male with a PMH of CAD s/p CABG s/p PCI , severe symptomatic AS s/p BAV as a bridge to TAVR (6/10), afib/flutter on coumadin, left carotid artery stenosis (80%), DM2 , HLD,  HTN, COPD on home O2, CKD stage 4, Hx of ATN on HD for 2 months in 2018, LE cellultits requiring admission and BPH was [facility]EMS from [facility] to the ED with shortness of breath for 6 weeks, started after BAV. His SOB is at rest progressively worsening with edema of LLE progressing and was sent to ED Patient denies cough, chest pain, palpitations/wheeze.     In ED , VS: Bp:112/32 HR:57 RR:22 SpO2: 96% on 4L  Received 20mg IV push of lasix , (28 Jun 2020 23:48)    PAST MEDICAL & SURGICAL HISTORY  PAST MEDICAL & SURGICAL HISTORY:  Hyperlipidemia  Cellulitis of right lower extremity  Cellulitis of left lower extremity  History of renal insufficiency  Stenosis of left carotid artery  Aortic stenosis  Afib  BPH (benign prostatic hyperplasia)  CHF (congestive heart failure)  COPD (chronic obstructive pulmonary disease)  Diabetes  HTN (hypertension)  S/P balloon aortic valvuloplasty  H/O heart artery stent  S/P CABG x 3        REVIEW OF SYSTEMS  All other ROS neg    ALLERGIES:  No Known Allergies    MEDICATIONS:  STANDING MEDICATIONS  ascorbic acid 500 milliGRAM(s) Oral daily  aspirin  chewable 81 milliGRAM(s) Oral daily  atorvastatin 40 milliGRAM(s) Oral at bedtime  budesonide 160 MICROgram(s)/formoterol 4.5 MICROgram(s) Inhaler 2 Puff(s) Inhalation two times a day  chlorhexidine 4% Liquid 1 Application(s) Topical once  finasteride 5 milliGRAM(s) Oral daily  furosemide   Injectable 80 milliGRAM(s) IV Push two times a day  insulin glargine Injectable (LANTUS) 20 Unit(s) SubCutaneous at bedtime  isosorbide   mononitrate ER Tablet (IMDUR) 30 milliGRAM(s) Oral daily  metolazone 5 milliGRAM(s) Oral daily  metoprolol succinate ER 50 milliGRAM(s) Oral daily  multivitamin 1 Tablet(s) Oral daily  pantoprazole    Tablet 40 milliGRAM(s) Oral before breakfast  polyethylene glycol 3350 17 Gram(s) Oral daily  sevelamer carbonate 800 milliGRAM(s) Oral three times a day with meals  silver sulfADIAZINE 1% Cream 1 Application(s) Topical daily  silver sulfADIAZINE 1% Cream 1 Application(s) Topical daily  warfarin 3 milliGRAM(s) Oral once    PRN MEDICATIONS  ALPRAZolam 0.25 milliGRAM(s) Oral two times a day PRN  HYDROmorphone  Injectable 0.25 milliGRAM(s) IV Push every 4 hours PRN    VITALS:   T(F): 96  HR: 57  BP: 129/59  RR: 20  SpO2: 97%    PHYSICAL EXAM:  General: No acute distress.      HEENT: Pupils equal, reactive to light symmetrically.    PULM: Clear to auscultation bilaterally, no significant sputum production.    CVS: Regular rate and rhythm, no murmurs, rubs, or gallops.    GI: Soft, nondistended, nontender, no masses.    MSK: No edema, nontender.    SKIN: Warm and well perfused, no rashes noted.    PSYCH:     NEURO: AAOX3    LABS:                        13.0   6.55  )-----------( 130      ( 08 Jul 2020 05:51 )             40.7     07-08    140  |  98  |  133<HH>  ----------------------------<  80  3.2<L>   |  23  |  2.9<H>    Ca    9.0      08 Jul 2020 05:51      PT/INR - ( 08 Jul 2020 05:51 )   PT: 31.90 sec;   INR: 2.77 ratio                       RADIOLOGY:

## 2020-07-08 NOTE — PROGRESS NOTE ADULT - ASSESSMENT
80yMale being evaluated for goals of care and symptom management. Pt alert oriented to person and place, follows simple commands. Pt still with advanced illness and poor functional status. Pt's son considering hospice care, mainly concerned with burden of home aides and help. Has telephone meeting with hospice nurse tonight to review options.    Pt got two doses of IV Dilaudid yesterday evening. Pt said his pain is much better today has not used the PRN today. Pt appears less dyspneic. Pt and son have goal to take pt home. At this time also consider comfort measures. Palliative NP provided support          Recommendations:  DNR/DNI  ongoing medical care   Dilaudid 0.25mg Q 4 HRS PRN  hospice eval in place

## 2020-07-08 NOTE — PROGRESS NOTE ADULT - SUBJECTIVE AND OBJECTIVE BOX
80yMale with diagnosis: CHF (CONGESTIVE HEART FAILURE)      Patient seen for follow up      PHYSICAL EXAM  Pt alert eating an icy  Appears comfortable, son at bedside    T(C): , Max: 36.1 (21:00)  T(F): 96  HR: 57 (56 - 58)  BP: 129/59 (115/54 - 129/59)  RR: 20 (18 - 20)  SpO2: 97% (95% - 98%)              LABS:                          13.0   6.55  )-----------( 130      ( 08 Jul 2020 05:51 )             40.7                                                                                      07-08    140  |  98  |  133<HH>  ----------------------------<  80  3.2<L>   |  23  |  2.9<H>    Ca    9.0      08 Jul 2020 05:51                                                        MEDICATIONS  (STANDING):  ascorbic acid 500 milliGRAM(s) Oral daily  aspirin  chewable 81 milliGRAM(s) Oral daily  atorvastatin 40 milliGRAM(s) Oral at bedtime  budesonide 160 MICROgram(s)/formoterol 4.5 MICROgram(s) Inhaler 2 Puff(s) Inhalation two times a day  chlorhexidine 4% Liquid 1 Application(s) Topical once  finasteride 5 milliGRAM(s) Oral daily  furosemide   Injectable 80 milliGRAM(s) IV Push two times a day  insulin glargine Injectable (LANTUS) 20 Unit(s) SubCutaneous at bedtime  isosorbide   mononitrate ER Tablet (IMDUR) 30 milliGRAM(s) Oral daily  metolazone 5 milliGRAM(s) Oral daily  metoprolol succinate ER 50 milliGRAM(s) Oral daily  multivitamin 1 Tablet(s) Oral daily  pantoprazole    Tablet 40 milliGRAM(s) Oral before breakfast  polyethylene glycol 3350 17 Gram(s) Oral daily  potassium chloride    Tablet ER 40 milliEquivalent(s) Oral every 4 hours  sevelamer carbonate 800 milliGRAM(s) Oral three times a day with meals  silver sulfADIAZINE 1% Cream 1 Application(s) Topical daily  silver sulfADIAZINE 1% Cream 1 Application(s) Topical daily  warfarin 3 milliGRAM(s) Oral once    MEDICATIONS  (PRN):  ALPRAZolam 0.25 milliGRAM(s) Oral two times a day PRN Anxiety  HYDROmorphone  Injectable 0.25 milliGRAM(s) IV Push every 4 hours PRN Severe Pain (7 - 10)

## 2020-07-09 LAB
ANION GAP SERPL CALC-SCNC: 18 MMOL/L — HIGH (ref 7–14)
BASOPHILS # BLD AUTO: 0.03 K/UL — SIGNIFICANT CHANGE UP (ref 0–0.2)
BASOPHILS NFR BLD AUTO: 0.5 % — SIGNIFICANT CHANGE UP (ref 0–1)
BUN SERPL-MCNC: 135 MG/DL — CRITICAL HIGH (ref 10–20)
CALCIUM SERPL-MCNC: 9.2 MG/DL — SIGNIFICANT CHANGE UP (ref 8.5–10.1)
CHLORIDE SERPL-SCNC: 96 MMOL/L — LOW (ref 98–110)
CO2 SERPL-SCNC: 25 MMOL/L — SIGNIFICANT CHANGE UP (ref 17–32)
CREAT SERPL-MCNC: 2.9 MG/DL — HIGH (ref 0.7–1.5)
EOSINOPHIL # BLD AUTO: 0.3 K/UL — SIGNIFICANT CHANGE UP (ref 0–0.7)
EOSINOPHIL NFR BLD AUTO: 4.7 % — SIGNIFICANT CHANGE UP (ref 0–8)
GLUCOSE BLDC GLUCOMTR-MCNC: 130 MG/DL — HIGH (ref 70–99)
GLUCOSE BLDC GLUCOMTR-MCNC: 150 MG/DL — HIGH (ref 70–99)
GLUCOSE BLDC GLUCOMTR-MCNC: 154 MG/DL — HIGH (ref 70–99)
GLUCOSE BLDC GLUCOMTR-MCNC: 86 MG/DL — SIGNIFICANT CHANGE UP (ref 70–99)
GLUCOSE SERPL-MCNC: 91 MG/DL — SIGNIFICANT CHANGE UP (ref 70–99)
HCT VFR BLD CALC: 40.6 % — LOW (ref 42–52)
HGB BLD-MCNC: 13 G/DL — LOW (ref 14–18)
IMM GRANULOCYTES NFR BLD AUTO: 0.3 % — SIGNIFICANT CHANGE UP (ref 0.1–0.3)
INR BLD: 2.89 RATIO — HIGH (ref 0.65–1.3)
LYMPHOCYTES # BLD AUTO: 1.08 K/UL — LOW (ref 1.2–3.4)
LYMPHOCYTES # BLD AUTO: 16.8 % — LOW (ref 20.5–51.1)
MCHC RBC-ENTMCNC: 28.1 PG — SIGNIFICANT CHANGE UP (ref 27–31)
MCHC RBC-ENTMCNC: 32 G/DL — SIGNIFICANT CHANGE UP (ref 32–37)
MCV RBC AUTO: 87.9 FL — SIGNIFICANT CHANGE UP (ref 80–94)
MONOCYTES # BLD AUTO: 0.84 K/UL — HIGH (ref 0.1–0.6)
MONOCYTES NFR BLD AUTO: 13.1 % — HIGH (ref 1.7–9.3)
NEUTROPHILS # BLD AUTO: 4.15 K/UL — SIGNIFICANT CHANGE UP (ref 1.4–6.5)
NEUTROPHILS NFR BLD AUTO: 64.6 % — SIGNIFICANT CHANGE UP (ref 42.2–75.2)
NRBC # BLD: 0 /100 WBCS — SIGNIFICANT CHANGE UP (ref 0–0)
PLATELET # BLD AUTO: 136 K/UL — SIGNIFICANT CHANGE UP (ref 130–400)
POTASSIUM SERPL-MCNC: 3.2 MMOL/L — LOW (ref 3.5–5)
POTASSIUM SERPL-SCNC: 3.2 MMOL/L — LOW (ref 3.5–5)
PROTHROM AB SERPL-ACNC: 33.2 SEC — HIGH (ref 9.95–12.87)
RBC # BLD: 4.62 M/UL — LOW (ref 4.7–6.1)
RBC # FLD: 15.6 % — HIGH (ref 11.5–14.5)
SODIUM SERPL-SCNC: 139 MMOL/L — SIGNIFICANT CHANGE UP (ref 135–146)
WBC # BLD: 6.42 K/UL — SIGNIFICANT CHANGE UP (ref 4.8–10.8)
WBC # FLD AUTO: 6.42 K/UL — SIGNIFICANT CHANGE UP (ref 4.8–10.8)

## 2020-07-09 PROCEDURE — 71045 X-RAY EXAM CHEST 1 VIEW: CPT | Mod: 26

## 2020-07-09 PROCEDURE — 99233 SBSQ HOSP IP/OBS HIGH 50: CPT

## 2020-07-09 RX ORDER — POTASSIUM CHLORIDE 20 MEQ
40 PACKET (EA) ORAL ONCE
Refills: 0 | Status: COMPLETED | OUTPATIENT
Start: 2020-07-09 | End: 2020-07-09

## 2020-07-09 RX ORDER — WARFARIN SODIUM 2.5 MG/1
3 TABLET ORAL ONCE
Refills: 0 | Status: COMPLETED | OUTPATIENT
Start: 2020-07-09 | End: 2020-07-09

## 2020-07-09 RX ADMIN — BUDESONIDE AND FORMOTEROL FUMARATE DIHYDRATE 2 PUFF(S): 160; 4.5 AEROSOL RESPIRATORY (INHALATION) at 10:35

## 2020-07-09 RX ADMIN — Medication 81 MILLIGRAM(S): at 10:58

## 2020-07-09 RX ADMIN — Medication 500 MILLIGRAM(S): at 10:58

## 2020-07-09 RX ADMIN — FINASTERIDE 5 MILLIGRAM(S): 5 TABLET, FILM COATED ORAL at 10:58

## 2020-07-09 RX ADMIN — WARFARIN SODIUM 3 MILLIGRAM(S): 2.5 TABLET ORAL at 21:08

## 2020-07-09 RX ADMIN — Medication 80 MILLIGRAM(S): at 05:24

## 2020-07-09 RX ADMIN — INSULIN GLARGINE 20 UNIT(S): 100 INJECTION, SOLUTION SUBCUTANEOUS at 21:07

## 2020-07-09 RX ADMIN — Medication 50 MILLIGRAM(S): at 05:24

## 2020-07-09 RX ADMIN — ATORVASTATIN CALCIUM 40 MILLIGRAM(S): 80 TABLET, FILM COATED ORAL at 21:08

## 2020-07-09 RX ADMIN — Medication 80 MILLIGRAM(S): at 16:54

## 2020-07-09 RX ADMIN — ISOSORBIDE MONONITRATE 30 MILLIGRAM(S): 60 TABLET, EXTENDED RELEASE ORAL at 10:58

## 2020-07-09 RX ADMIN — Medication 40 MILLIEQUIVALENT(S): at 11:44

## 2020-07-09 RX ADMIN — BUDESONIDE AND FORMOTEROL FUMARATE DIHYDRATE 2 PUFF(S): 160; 4.5 AEROSOL RESPIRATORY (INHALATION) at 21:08

## 2020-07-09 RX ADMIN — POLYETHYLENE GLYCOL 3350 17 GRAM(S): 17 POWDER, FOR SOLUTION ORAL at 10:58

## 2020-07-09 RX ADMIN — Medication 1 TABLET(S): at 10:57

## 2020-07-09 RX ADMIN — PANTOPRAZOLE SODIUM 40 MILLIGRAM(S): 20 TABLET, DELAYED RELEASE ORAL at 05:24

## 2020-07-09 NOTE — PROGRESS NOTE ADULT - SUBJECTIVE AND OBJECTIVE BOX
patient still sob not much better he says   no chest pain     Vital Signs Last 24 Hrs  T(C): 36.1 (09 Jul 2020 05:13), Max: 36.1 (09 Jul 2020 05:13)  T(F): 96.9 (09 Jul 2020 05:13), Max: 96.9 (09 Jul 2020 05:13)  HR: 57 (09 Jul 2020 05:13) (57 - 57)  BP: 123/57 (09 Jul 2020 05:13) (116/56 - 123/57)  BP(mean): --  RR: 19 (09 Jul 2020 05:13) (18 - 19)  SpO2: 99% (09 Jul 2020 11:31) (99% - 99%)    PHYSICAL EXAM:  GENERAL: NAD,   Pulm decreased air entry at bases   CV:: Regular rate and rhythm; No murmurs, rubs, or gallops  GI: Soft, Nontender, Nondistended; Bowel sounds present  EXTREMITIES:  +edema  PSYCH: AAOx3  NEUROLOGY: non-focal                            13.0   6.42  )-----------( 136      ( 09 Jul 2020 06:06 )             40.6     07-09    139  |  96<L>  |  135<HH>  ----------------------------<  91  3.2<L>   |  25  |  2.9<H>    Ca    9.2      09 Jul 2020 06:06        PT/INR - ( 09 Jul 2020 06:06 )   PT: 33.20 sec;   INR: 2.89 ratio               MEDICATIONS  (STANDING):  ascorbic acid 500 milliGRAM(s) Oral daily  aspirin  chewable 81 milliGRAM(s) Oral daily  atorvastatin 40 milliGRAM(s) Oral at bedtime  budesonide 160 MICROgram(s)/formoterol 4.5 MICROgram(s) Inhaler 2 Puff(s) Inhalation two times a day  chlorhexidine 4% Liquid 1 Application(s) Topical once  finasteride 5 milliGRAM(s) Oral daily  furosemide   Injectable 80 milliGRAM(s) IV Push two times a day  insulin glargine Injectable (LANTUS) 20 Unit(s) SubCutaneous at bedtime  isosorbide   mononitrate ER Tablet (IMDUR) 30 milliGRAM(s) Oral daily  metolazone 5 milliGRAM(s) Oral daily  metoprolol succinate ER 50 milliGRAM(s) Oral daily  multivitamin 1 Tablet(s) Oral daily  pantoprazole    Tablet 40 milliGRAM(s) Oral before breakfast  polyethylene glycol 3350 17 Gram(s) Oral daily  sevelamer carbonate 800 milliGRAM(s) Oral three times a day with meals  silver sulfADIAZINE 1% Cream 1 Application(s) Topical daily  silver sulfADIAZINE 1% Cream 1 Application(s) Topical daily  warfarin 3 milliGRAM(s) Oral once    MEDICATIONS  (PRN):  ALPRAZolam 0.25 milliGRAM(s) Oral two times a day PRN Anxiety  HYDROmorphone  Injectable 0.25 milliGRAM(s) IV Push every 4 hours PRN Severe Pain (7 - 10)

## 2020-07-09 NOTE — PROGRESS NOTE ADULT - ASSESSMENT
79 y/o male with a PMH of CAD s/p CABG s/p PCI , severe symptomatic AS s/p BAV as a bridge to TAVR (6/10), afib/flutter on coumadin, left carotid artery stenosis (80%), DM2 , HLD,  HTN, COPD , , CKD stage 4,BPH was sent from [facility] to the ED with shortness of breath and LE edema , s/p  BAV.    A/P   #acute on chronic diastolic CHF exacerbation   still sob. still no change in overall clinical picture despite maximum medical treatment   s/p balloon valvuloplasty of aortic valve recently   per cardiology patient not a candidate for tAVR   c/w lasix  IV 80 BID and metolazone 5 mg qday scr stable 2.9 today while in hospital   repeat chest xray today looks same and not much change with pulm edema   on toprol 50 qday   son does not want to proceed with hospice for now his goal is to get patient to rehab     #Hx CAD s/p CABG s/p PCI ; Severe AS s/p BAV  on asa, lipitor     #history of AFIB c/w coumadin target INR 2-3     #HX of Left Carotid Artery stenosis  not medically stable for procedure at this time     #CKD 4 stable scr at baseline. on renagel recheck phos     #hypokalemia replete, check magnesium     #Chronic elevated troponin likely due to CKD    # COPD - stable     #DM2   controlled     DVT PX on coumadin     #Progress Note Handoff  Pending (specify):further diuresis   Family discussion: patient   Disposition: SNF

## 2020-07-09 NOTE — PROGRESS NOTE ADULT - ASSESSMENT
· Assessment		  79 y/o male with a PMH of CAD s/p CABG s/p PCI , severe symptomatic AS s/p BAV as a bridge to TAVR (6/10), afib/flutter on coumadin, left carotid artery stenosis (80%), DM2 , HLD,  HTN, COPD , , CKD stage 4,BPH was sent from [facility] to the ED with shortness of breath and LE edema , s/p  BAV.    A/P   #acute on chronic diastolic CHF exacerbation   still sob. still no change in overall clinical picture despite maximum medical treatment   s/p balloon valvuloplasty of aortic valve recently   per cardiology patient not a candidate for tAVR   c/w lasix  IV 80 BID and metolazone 5 mg qday scr stable 2.9 today while in hospital   repeat chest xray today looks same and not much change with pulm edema   on toprol 50 qday   son does not want to proceed with hospice for now his goal is to get patient to rehab     #Hx CAD s/p CABG s/p PCI ; Severe AS s/p BAV  on asa, lipitor     #history of AFIB c/w coumadin target INR 2-3     #HX of Left Carotid Artery stenosis  not medically stable for procedure at this time     #CKD 4 stable scr at baseline. on renagel recheck phos     #hypokalemia replete, check magnesium     #Chronic elevated troponin likely due to CKD    # COPD - stable     #DM2   controlled     DVT PX on coumadin     #Progress Note Handoff  Pending (specify):further diuresis   Family discussion: patient   Disposition: SNF 81 y/o male with a PMH of CAD s/p CABG s/p PCI , severe symptomatic AS s/p BAV as a bridge to TAVR (6/10), afib/flutter on coumadin, left carotid artery stenosis (80%), DM2 , HLD,  HTN, COPD , , CKD stage 4,BPH was sent from [facility] to the ED with shortness of breath and LE edema , s/p  BAV.    A/P   #acute on chronic diastolic CHF exacerbation   still sob. still no change in overall clinical picture despite maximum medical treatment   s/p balloon valvuloplasty of aortic valve recently   per cardiology patient not a candidate for tAVR   c/w lasix  IV 80 BID and metolazone 5 mg qday scr stable 2.9 today while in hospital   repeat chest xray today looks same and not much change with pulm edema   on toprol 50 qday   son does not want to proceed with hospice for now his goal is to get patient to rehab     #Hx CAD s/p CABG s/p PCI ; Severe AS s/p BAV  on asa, lipitor     #history of AFIB c/w coumadin target INR 2-3     #HX of Left Carotid Artery stenosis  not medically stable for procedure at this time     #CKD 4 stable scr at baseline. on renagel recheck phos     #hypokalemia replete, check magnesium     #Chronic elevated troponin likely due to CKD    # COPD - stable     #DM2   controlled     DVT PX on coumadin     #Progress Note Handoff  Pending (specify):further diuresis   Family discussion: patient   Disposition: SNF

## 2020-07-09 NOTE — PROGRESS NOTE ADULT - ASSESSMENT
Pt with CKD 4, DM, HTN, AS s/p BAV, COPD on home O2, admitted with worsening SOB due to fluid overload.    CKD4 - creat  stable, check repeat today    cont lasix and metolazone po   non oliguric   strict I and O daily weights  last ph noted ,  on renagel 1/1/1  mild hypokelmia,s/p k dur once, check repeat    LE cellulitis - local care, silvadene, podiatry  Palliative care notes appreciated / home hospice ?  will follow

## 2020-07-09 NOTE — CHART NOTE - NSCHARTNOTEFT_GEN_A_CORE
Palliative care NP spoke to hospice RN    They had 5pm meeting with pt's son Deangelo to review hospice options. Son struggles with excepting dad is hospice appropriate. Wants him to walk so he can come home and won't need so much help. He knows his father needs 24/7 aides at home or SNF placement. Currently dad is a priti life due to LE edema up to scrotum with vascular changes. Palliative team will continue to support son with understanding fathers prognosis. Hospice nurse available if any questions at [phone]. Palliative NP to follow available at [phone]

## 2020-07-09 NOTE — PROGRESS NOTE ADULT - SUBJECTIVE AND OBJECTIVE BOX
LENGTH OF HOSPITAL STAY: 11d      CHIEF COMPLAINT:   Patient is a 80y old  Male who presents with a chief complaint of SOB (09 Jul 2020 12:41)      OVER Past 24hrs:  The patient was seen and examined at bedside there were no acute overnight events.  Complains of SOB.  Denies CP, F/C, N/v.    HISTORY OF PRESENTING ILLNESS:    HPI:  79 y/o male with a PMH of CAD s/p CABG s/p PCI , severe symptomatic AS s/p BAV as a bridge to TAVR (6/10), afib/flutter on coumadin, left carotid artery stenosis (80%), DM2 , HLD,  HTN, COPD on home O2, CKD stage 4, Hx of ATN on HD for 2 months in 2018, LE cellultits requiring admission and BPH was BIBEMS from [facility] to the ED with shortness of breath for 6 weeks, started after BAV. His SOB is at rest progressively worsening with edema of LLE progressing and was sent to ED Patient denies cough, chest pain, palpitations/wheeze.     In ED , VS: Bp:112/32 HR:57 RR:22 SpO2: 96% on 4L  Received 20mg IV push of lasix , (28 Jun 2020 23:48)    PAST MEDICAL & SURGICAL HISTORY  PAST MEDICAL & SURGICAL HISTORY:  Hyperlipidemia  Cellulitis of right lower extremity  Cellulitis of left lower extremity  History of renal insufficiency  Stenosis of left carotid artery  Aortic stenosis  Afib  BPH (benign prostatic hyperplasia)  CHF (congestive heart failure)  COPD (chronic obstructive pulmonary disease)  Diabetes  HTN (hypertension)  S/P balloon aortic valvuloplasty  H/O heart artery stent  S/P CABG x 3        REVIEW OF SYSTEMS  CONSTITUTIONAL: No fever, weight loss, or fatigue  EYES: No eye pain, visual disturbances, or discharge  ENMT:  No difficulty hearing, tinnitus, vertigo; No sinus or throat pain  NECK: No pain or stiffness  RESPIRATORY: No cough, wheezing, chills or hemoptysis; No shortness of breath  CARDIOVASCULAR: No chest pain, palpitations, dizziness, or leg swelling  GASTROINTESTINAL: No abdominal or epigastric pain. No nausea, vomiting, or hematemesis; No diarrhea or constipation. No melena or hematochezia.  GENITOURINARY: No dysuria, frequency, hematuria, or incontinence  NEUROLOGICAL: No headaches, memory loss, loss of strength, numbness, or tremors  SKIN: No itching, burning, rashes, or lesions   LYMPH NODES: No enlarged glands  ENDOCRINE: No heat or cold intolerance; No hair loss  MUSCULOSKELETAL: No joint pain or swelling; No muscle, back, or extremity pain  PSYCHIATRIC: No depression, anxiety, mood swings, or difficulty sleeping  HEME/LYMPH: No easy bruising, or bleeding gums  ALLERY AND IMMUNOLOGIC: No hives or eczema    ALLERGIES:  No Known Allergies    MEDICATIONS:  STANDING MEDICATIONS  ascorbic acid 500 milliGRAM(s) Oral daily  aspirin  chewable 81 milliGRAM(s) Oral daily  atorvastatin 40 milliGRAM(s) Oral at bedtime  budesonide 160 MICROgram(s)/formoterol 4.5 MICROgram(s) Inhaler 2 Puff(s) Inhalation two times a day  chlorhexidine 4% Liquid 1 Application(s) Topical once  finasteride 5 milliGRAM(s) Oral daily  furosemide   Injectable 80 milliGRAM(s) IV Push two times a day  insulin glargine Injectable (LANTUS) 20 Unit(s) SubCutaneous at bedtime  isosorbide   mononitrate ER Tablet (IMDUR) 30 milliGRAM(s) Oral daily  metolazone 5 milliGRAM(s) Oral daily  metoprolol succinate ER 50 milliGRAM(s) Oral daily  multivitamin 1 Tablet(s) Oral daily  pantoprazole    Tablet 40 milliGRAM(s) Oral before breakfast  polyethylene glycol 3350 17 Gram(s) Oral daily  sevelamer carbonate 800 milliGRAM(s) Oral three times a day with meals  silver sulfADIAZINE 1% Cream 1 Application(s) Topical daily  silver sulfADIAZINE 1% Cream 1 Application(s) Topical daily  warfarin 3 milliGRAM(s) Oral once    PRN MEDICATIONS  ALPRAZolam 0.25 milliGRAM(s) Oral two times a day PRN  HYDROmorphone  Injectable 0.25 milliGRAM(s) IV Push every 4 hours PRN    VITALS:   T(F): 96.8  HR: 56  BP: 114/57  RR: 18  SpO2: 99%    PHYSICAL EXAM:  General: No acute distress.  Resting in bed.    HEENT: Pupils equal, reactive to light symmetrically.    PULM: Dec breath sounds, no significant sputum production.    CVS: Regular rate and rhythm, no murmurs, rubs, or gallops.    MSK: +Edema, nontender.    SKIN: Warm and well perfused, no rashes noted.    PSYCH: AAOx3    NEURO: No FND    LABS:                        13.0   6.42  )-----------( 136      ( 09 Jul 2020 06:06 )             40.6     07-09    139  |  96<L>  |  135<HH>  ----------------------------<  91  3.2<L>   |  25  |  2.9<H>    Ca    9.2      09 Jul 2020 06:06      PT/INR - ( 09 Jul 2020 06:06 )   PT: 33.20 sec;   INR: 2.89 ratio                       RADIOLOGY:

## 2020-07-09 NOTE — PROGRESS NOTE ADULT - SUBJECTIVE AND OBJECTIVE BOX
sen and examined  no distress   on 02         PAST HISTORY  --------------------------------------------------------------------------------  No significant changes to PMH, PSH, FHx, SHx, unless otherwise noted    ALLERGIES & MEDICATIONS  --------------------------------------------------------------------------------  Allergies    No Known Allergies    Intolerances      Standing Inpatient Medications  ascorbic acid 500 milliGRAM(s) Oral daily  aspirin  chewable 81 milliGRAM(s) Oral daily  atorvastatin 40 milliGRAM(s) Oral at bedtime  budesonide 160 MICROgram(s)/formoterol 4.5 MICROgram(s) Inhaler 2 Puff(s) Inhalation two times a day  chlorhexidine 4% Liquid 1 Application(s) Topical once  finasteride 5 milliGRAM(s) Oral daily  furosemide   Injectable 80 milliGRAM(s) IV Push two times a day  insulin glargine Injectable (LANTUS) 20 Unit(s) SubCutaneous at bedtime  isosorbide   mononitrate ER Tablet (IMDUR) 30 milliGRAM(s) Oral daily  metolazone 5 milliGRAM(s) Oral daily  metoprolol succinate ER 50 milliGRAM(s) Oral daily  multivitamin 1 Tablet(s) Oral daily  pantoprazole    Tablet 40 milliGRAM(s) Oral before breakfast  polyethylene glycol 3350 17 Gram(s) Oral daily  sevelamer carbonate 800 milliGRAM(s) Oral three times a day with meals  silver sulfADIAZINE 1% Cream 1 Application(s) Topical daily  silver sulfADIAZINE 1% Cream 1 Application(s) Topical daily    PRN Inpatient Medications  ALPRAZolam 0.25 milliGRAM(s) Oral two times a day PRN  HYDROmorphone  Injectable 0.25 milliGRAM(s) IV Push every 4 hours PRN          VITALS/PHYSICAL EXAM  --------------------------------------------------------------------------------  T(C): 36.1 (07-09-20 @ 05:13), Max: 36.1 (07-09-20 @ 05:13)  HR: 57 (07-09-20 @ 05:13) (57 - 57)  BP: 123/57 (07-09-20 @ 05:13) (116/56 - 129/59)  RR: 19 (07-09-20 @ 05:13) (18 - 20)  SpO2: 99% (07-08-20 @ 19:40) (99% - 99%)  Wt(kg): --        07-08-20 @ 07:01  -  07-09-20 @ 07:00  --------------------------------------------------------  IN: 630 mL / OUT: 1800 mL / NET: -1170 mL      Physical Exam:  	Gen: NAD, on o2   	Pulm:  B/L wenchi at bases   	CV:S1S2; no rub  	Abd: +distended    LABS/STUDIES  --------------------------------------------------------------------------------              13.0   6.55  >-----------<  130      [07-08-20 @ 05:51]              40.7     140  |  98  |  133  ----------------------------<  80      [07-08-20 @ 05:51]  3.2   |  23  |  2.9        Ca     9.0     [07-08-20 @ 05:51]      PT/INR: PT 31.90, INR 2.77       [07-08-20 @ 05:51]      Creatinine Trend:  SCr 2.9 [07-08 @ 05:51]  SCr 2.9 [07-07 @ 05:38]  SCr 2.8 [07-06 @ 05:22]  SCr 2.9 [07-05 @ 06:26]  SCr 2.7 [07-04 @ 06:10]        Ferritin 640      [05-05-20 @ 11:48]  PTH -- (Ca 9.3)      [07-06-20 @ 05:22]   84  PTH -- (Ca 7.8)      [10-21-19 @ 19:08]   278  HbA1c 6.8      [01-31-20 @ 07:01]  TSH 0.84      [08-29-19 @ 06:15]

## 2020-07-10 LAB
ANION GAP SERPL CALC-SCNC: 19 MMOL/L — HIGH (ref 7–14)
BASOPHILS # BLD AUTO: 0.03 K/UL — SIGNIFICANT CHANGE UP (ref 0–0.2)
BASOPHILS NFR BLD AUTO: 0.4 % — SIGNIFICANT CHANGE UP (ref 0–1)
BUN SERPL-MCNC: 138 MG/DL — CRITICAL HIGH (ref 10–20)
CALCIUM SERPL-MCNC: 9 MG/DL — SIGNIFICANT CHANGE UP (ref 8.5–10.1)
CHLORIDE SERPL-SCNC: 100 MMOL/L — SIGNIFICANT CHANGE UP (ref 98–110)
CO2 SERPL-SCNC: 24 MMOL/L — SIGNIFICANT CHANGE UP (ref 17–32)
CREAT SERPL-MCNC: 2.9 MG/DL — HIGH (ref 0.7–1.5)
EOSINOPHIL # BLD AUTO: 0.25 K/UL — SIGNIFICANT CHANGE UP (ref 0–0.7)
EOSINOPHIL NFR BLD AUTO: 3.6 % — SIGNIFICANT CHANGE UP (ref 0–8)
GLUCOSE BLDC GLUCOMTR-MCNC: 142 MG/DL — HIGH (ref 70–99)
GLUCOSE BLDC GLUCOMTR-MCNC: 165 MG/DL — HIGH (ref 70–99)
GLUCOSE BLDC GLUCOMTR-MCNC: 168 MG/DL — HIGH (ref 70–99)
GLUCOSE BLDC GLUCOMTR-MCNC: 90 MG/DL — SIGNIFICANT CHANGE UP (ref 70–99)
GLUCOSE SERPL-MCNC: 95 MG/DL — SIGNIFICANT CHANGE UP (ref 70–99)
HCT VFR BLD CALC: 40.6 % — LOW (ref 42–52)
HGB BLD-MCNC: 13.1 G/DL — LOW (ref 14–18)
IMM GRANULOCYTES NFR BLD AUTO: 0.3 % — SIGNIFICANT CHANGE UP (ref 0.1–0.3)
INR BLD: 3.12 RATIO — HIGH (ref 0.65–1.3)
LYMPHOCYTES # BLD AUTO: 1.18 K/UL — LOW (ref 1.2–3.4)
LYMPHOCYTES # BLD AUTO: 16.8 % — LOW (ref 20.5–51.1)
MAGNESIUM SERPL-MCNC: 2.4 MG/DL — SIGNIFICANT CHANGE UP (ref 1.8–2.4)
MCHC RBC-ENTMCNC: 28.2 PG — SIGNIFICANT CHANGE UP (ref 27–31)
MCHC RBC-ENTMCNC: 32.3 G/DL — SIGNIFICANT CHANGE UP (ref 32–37)
MCV RBC AUTO: 87.5 FL — SIGNIFICANT CHANGE UP (ref 80–94)
MONOCYTES # BLD AUTO: 1.03 K/UL — HIGH (ref 0.1–0.6)
MONOCYTES NFR BLD AUTO: 14.7 % — HIGH (ref 1.7–9.3)
NEUTROPHILS # BLD AUTO: 4.51 K/UL — SIGNIFICANT CHANGE UP (ref 1.4–6.5)
NEUTROPHILS NFR BLD AUTO: 64.2 % — SIGNIFICANT CHANGE UP (ref 42.2–75.2)
NRBC # BLD: 0 /100 WBCS — SIGNIFICANT CHANGE UP (ref 0–0)
PHOSPHATE SERPL-MCNC: 5.6 MG/DL — HIGH (ref 2.1–4.9)
PLATELET # BLD AUTO: 137 K/UL — SIGNIFICANT CHANGE UP (ref 130–400)
POTASSIUM SERPL-MCNC: 3 MMOL/L — LOW (ref 3.5–5)
POTASSIUM SERPL-SCNC: 3 MMOL/L — LOW (ref 3.5–5)
PROTHROM AB SERPL-ACNC: 35.9 SEC — HIGH (ref 9.95–12.87)
RBC # BLD: 4.64 M/UL — LOW (ref 4.7–6.1)
RBC # FLD: 15.6 % — HIGH (ref 11.5–14.5)
SARS-COV-2 RNA SPEC QL NAA+PROBE: SIGNIFICANT CHANGE UP
SODIUM SERPL-SCNC: 143 MMOL/L — SIGNIFICANT CHANGE UP (ref 135–146)
WBC # BLD: 7.02 K/UL — SIGNIFICANT CHANGE UP (ref 4.8–10.8)
WBC # FLD AUTO: 7.02 K/UL — SIGNIFICANT CHANGE UP (ref 4.8–10.8)

## 2020-07-10 PROCEDURE — 99233 SBSQ HOSP IP/OBS HIGH 50: CPT

## 2020-07-10 RX ORDER — FUROSEMIDE 40 MG
80 TABLET ORAL
Refills: 0 | Status: DISCONTINUED | OUTPATIENT
Start: 2020-07-10 | End: 2020-07-15

## 2020-07-10 RX ORDER — POTASSIUM CHLORIDE 20 MEQ
40 PACKET (EA) ORAL DAILY
Refills: 0 | Status: DISCONTINUED | OUTPATIENT
Start: 2020-07-11 | End: 2020-07-13

## 2020-07-10 RX ORDER — POTASSIUM CHLORIDE 20 MEQ
40 PACKET (EA) ORAL EVERY 4 HOURS
Refills: 0 | Status: COMPLETED | OUTPATIENT
Start: 2020-07-10 | End: 2020-07-10

## 2020-07-10 RX ADMIN — PANTOPRAZOLE SODIUM 40 MILLIGRAM(S): 20 TABLET, DELAYED RELEASE ORAL at 06:10

## 2020-07-10 RX ADMIN — POLYETHYLENE GLYCOL 3350 17 GRAM(S): 17 POWDER, FOR SOLUTION ORAL at 11:54

## 2020-07-10 RX ADMIN — Medication 50 MILLIGRAM(S): at 06:09

## 2020-07-10 RX ADMIN — ATORVASTATIN CALCIUM 40 MILLIGRAM(S): 80 TABLET, FILM COATED ORAL at 21:32

## 2020-07-10 RX ADMIN — INSULIN GLARGINE 20 UNIT(S): 100 INJECTION, SOLUTION SUBCUTANEOUS at 21:33

## 2020-07-10 RX ADMIN — Medication 40 MILLIEQUIVALENT(S): at 17:41

## 2020-07-10 RX ADMIN — BUDESONIDE AND FORMOTEROL FUMARATE DIHYDRATE 2 PUFF(S): 160; 4.5 AEROSOL RESPIRATORY (INHALATION) at 07:57

## 2020-07-10 RX ADMIN — FINASTERIDE 5 MILLIGRAM(S): 5 TABLET, FILM COATED ORAL at 11:52

## 2020-07-10 RX ADMIN — Medication 81 MILLIGRAM(S): at 11:52

## 2020-07-10 RX ADMIN — Medication 1 APPLICATION(S): at 11:53

## 2020-07-10 RX ADMIN — Medication 80 MILLIGRAM(S): at 17:41

## 2020-07-10 RX ADMIN — Medication 40 MILLIEQUIVALENT(S): at 11:51

## 2020-07-10 RX ADMIN — Medication 1 TABLET(S): at 11:52

## 2020-07-10 RX ADMIN — BUDESONIDE AND FORMOTEROL FUMARATE DIHYDRATE 2 PUFF(S): 160; 4.5 AEROSOL RESPIRATORY (INHALATION) at 22:05

## 2020-07-10 RX ADMIN — Medication 80 MILLIGRAM(S): at 06:09

## 2020-07-10 RX ADMIN — ISOSORBIDE MONONITRATE 30 MILLIGRAM(S): 60 TABLET, EXTENDED RELEASE ORAL at 11:52

## 2020-07-10 RX ADMIN — Medication 500 MILLIGRAM(S): at 11:52

## 2020-07-10 NOTE — PROGRESS NOTE ADULT - SUBJECTIVE AND OBJECTIVE BOX
Nephrology progress note    THIS IS AN INCOMPLETE NOTE . FULL NOTE TO FOLLOW SHORTLY    Patient is seen and examined, events over the last 24 h noted .    Allergies:  No Known Allergies    Hospital Medications:   MEDICATIONS  (STANDING):  ascorbic acid 500 milliGRAM(s) Oral daily  aspirin  chewable 81 milliGRAM(s) Oral daily  atorvastatin 40 milliGRAM(s) Oral at bedtime  budesonide 160 MICROgram(s)/formoterol 4.5 MICROgram(s) Inhaler 2 Puff(s) Inhalation two times a day  chlorhexidine 4% Liquid 1 Application(s) Topical once  finasteride 5 milliGRAM(s) Oral daily  furosemide    Tablet 80 milliGRAM(s) Oral two times a day  insulin glargine Injectable (LANTUS) 20 Unit(s) SubCutaneous at bedtime  isosorbide   mononitrate ER Tablet (IMDUR) 30 milliGRAM(s) Oral daily  metolazone 5 milliGRAM(s) Oral daily  metoprolol succinate ER 50 milliGRAM(s) Oral daily  multivitamin 1 Tablet(s) Oral daily  pantoprazole    Tablet 40 milliGRAM(s) Oral before breakfast  polyethylene glycol 3350 17 Gram(s) Oral daily  potassium chloride    Tablet ER 40 milliEquivalent(s) Oral every 4 hours  sevelamer carbonate 800 milliGRAM(s) Oral three times a day with meals  silver sulfADIAZINE 1% Cream 1 Application(s) Topical daily  silver sulfADIAZINE 1% Cream 1 Application(s) Topical daily        VITALS:  T(F): 97 (07-10-20 @ 05:28), Max: 97 (07-10-20 @ 05:28)  HR: 55 (07-10-20 @ 05:28)  BP: 131/61 (07-10-20 @ 05:28)  RR: 18 (07-10-20 @ 05:28)  SpO2: 99% (07-09-20 @ 11:31)  Wt(kg): --    07-08 @ 07:01  -  07-09 @ 07:00  --------------------------------------------------------  IN: 630 mL / OUT: 1800 mL / NET: -1170 mL    07-09 @ 07:01  -  07-10 @ 07:00  --------------------------------------------------------  IN: 775 mL / OUT: 1225 mL / NET: -450 mL    07-10 @ 07:01  -  07-10 @ 10:36  --------------------------------------------------------  IN: 25 mL / OUT: 0 mL / NET: 25 mL          PHYSICAL EXAM:  Constitutional: NAD  HEENT: anicteric sclera, oropharynx clear, MMM  Neck: No JVD  Respiratory: CTAB, no wheezes, rales or rhonchi  Cardiovascular: S1, S2, RRR  Gastrointestinal: BS+, soft, NT/ND  Extremities: No cyanosis or clubbing. No peripheral edema  :  No julian.   Skin: No rashes    LABS:  07-10    143  |  100  |  138<HH>  ----------------------------<  95  3.0<L>   |  24  |  2.9<H>    Ca    9.0      10 Jul 2020 05:43  Phos  5.6     07-10  Mg     2.4     07-10                            13.1   7.02  )-----------( 137      ( 10 Jul 2020 05:43 )             40.6       Urine Studies:      RADIOLOGY & ADDITIONAL STUDIES: Nephrology progress note  Patient is seen and examined, events over the last 24 h noted .  lying in bed comfortable  hard of hearing     Allergies:  No Known Allergies    Hospital Medications:   MEDICATIONS  (STANDING):    ascorbic acid 500 milliGRAM(s) Oral daily  aspirin  chewable 81 milliGRAM(s) Oral daily  atorvastatin 40 milliGRAM(s) Oral at bedtime  budesonide 160 MICROgram(s)/formoterol 4.5 MICROgram(s) Inhaler 2 Puff(s) Inhalation two times a day  finasteride 5 milliGRAM(s) Oral daily  furosemide    Tablet 80 milliGRAM(s) Oral two times a day  insulin glargine Injectable (LANTUS) 20 Unit(s) SubCutaneous at bedtime  isosorbide   mononitrate ER Tablet (IMDUR) 30 milliGRAM(s) Oral daily  metolazone 5 milliGRAM(s) Oral daily  metoprolol succinate ER 50 milliGRAM(s) Oral daily  multivitamin 1 Tablet(s) Oral daily  pantoprazole    Tablet 40 milliGRAM(s) Oral before breakfast  polyethylene glycol 3350 17 Gram(s) Oral daily  potassium chloride    Tablet ER 40 milliEquivalent(s) Oral every 4 hours  sevelamer carbonate 800 milliGRAM(s) Oral three times a day with meals  silver sulfADIAZINE 1% Cream 1 Application(s) Topical daily  silver sulfADIAZINE 1% Cream 1 Application(s) Topical daily        VITALS:  T(F): 97 (07-10-20 @ 05:28), Max: 97 (07-10-20 @ 05:28)  HR: 55 (07-10-20 @ 05:28)  BP: 131/61 (07-10-20 @ 05:28)  RR: 18 (07-10-20 @ 05:28)  SpO2: 99% (07-09-20 @ 11:31)      07-08 @ 07:01  -  07-09 @ 07:00  --------------------------------------------------------  IN: 630 mL / OUT: 1800 mL / NET: -1170 mL    07-09 @ 07:01  -  07-10 @ 07:00  --------------------------------------------------------  IN: 775 mL / OUT: 1225 mL / NET: -450 mL    07-10 @ 07:01  -  07-10 @ 10:36  --------------------------------------------------------  IN: 25 mL / OUT: 0 mL / NET: 25 mL          PHYSICAL EXAM:  Constitutional: NAD  HEENT: anicteric sclera, oropharynx clear, MMM  Neck: No JVD  Respiratory: CTAB, no wheezes, rales or rhonchi  Cardiovascular: S1, S2, RRR  Gastrointestinal: BS+, soft, NT/ND  Extremities: No cyanosis or clubbing. No peripheral edema  :  No julian.   Skin: No rashes    LABS:  07-10    143  |  100  |  138<HH>  ----------------------------<  95  3.0<L>   |  24  |  2.9<H>    Potassium Trend: 3.0<--, 3.2<--, 3.2<--, 3.9<--, 3.5<--    Creatinine Trend: 2.9<--, 2.9<--, 2.9<--, 2.9<--, 2.8<--, 2.9<--    Ca    9.0      10 Jul 2020 05:43  Phos  5.6     07-10  Mg     2.4     07-10                            13.1   7.02  )-----------( 137      ( 10 Jul 2020 05:43 )             40.6       Urine Studies:      RADIOLOGY & ADDITIONAL STUDIES:

## 2020-07-10 NOTE — PROGRESS NOTE ADULT - ASSESSMENT
81 y/o male with a PMH of CAD s/p CABG s/p PCI , severe symptomatic AS s/p BAV as a bridge to TAVR (6/10), afib/flutter on coumadin, left carotid artery stenosis (80%), DM2 , HLD,  HTN, COPD , , CKD stage 4,BPH was sent from [facility] to the ED with shortness of breath and LE edema , s/p  BAV of aortic valve recently     A/P   #acute on chronic diastolic CHF exacerbation   patient is a hospice candidate but son does not want to proceed with hospice. he understands poor prognosis   patient recently had BAV for severe AS. spoke to Dr Velasquez patient not a candidate for TAVR   will seitch to po lasix 80 BID today and to continue with metolazone and plan for discharge to SNF in 24 hours. spoke to son about discharge over weekend if stable on po lasix and he agrees with this plan     #Hx CAD s/p CABG s/p PCI ; Severe AS s/p BAV  on asa, lipitor     #history of AFIB c/w coumadin target INR 2-3. INR 3 today hold coumadin     #HX of Left Carotid Artery stenosis  not medically stable for procedure at this time     #CKD 4 stable scr at baseline. on renagel recheck phos     #hypokalemia replete, check magnesium     #Chronic elevated troponin likely due to CKD    # COPD - stable     #DM2   controlled     DVT PX on coumadin     #Progress Note Handoff  Pending (specify):SNF in 24 hours hours if stable on po lasix. COVID swab for snf   Family discussion: patient and son Deangleo   Disposition: SNF 81 y/o male with a PMH of CAD s/p CABG s/p PCI , severe symptomatic AS s/p BAV as a bridge to TAVR (6/10), afib/flutter on coumadin, left carotid artery stenosis (80%), DM2 , HLD,  HTN, COPD , , CKD stage 4,BPH was sent from [facility] to the ED with shortness of breath and LE edema , s/p  BAV of aortic valve recently     A/P   #acute on chronic diastolic CHF exacerbation   patient is a hospice candidate but son does not want to proceed with hospice. he understands poor prognosis   patient recently had BAV for severe AS. spoke to Dr Velasquez patient not a candidate for TAVR   will seitch to po lasix 80 BID today and to continue with metolazone and plan for discharge to SNF in 24 hours. spoke to son about discharge over weekend if stable on po lasix and he agrees with this plan     #hypokalemia replete     #Hx CAD s/p CABG s/p PCI ; Severe AS s/p BAV  on asa, lipitor     #history of AFIB c/w coumadin target INR 2-3. INR 3 today hold coumadin     #HX of Left Carotid Artery stenosis  not medically stable for procedure at this time     #CKD 4 stable scr at baseline. on renagel recheck phos     #hypokalemia replete, check magnesium     #Chronic elevated troponin likely due to CKD    # COPD - stable     #DM2   controlled     DVT PX on coumadin     #Progress Note Handoff  Pending (specify):SNF in 24 hours hours if stable on po lasix. COVID swab for snf   Family discussion: patient and son Deangelo   Disposition: SNF

## 2020-07-10 NOTE — PROGRESS NOTE ADULT - SUBJECTIVE AND OBJECTIVE BOX
no chest pain   still sob but better then before   patient is a hospice candidate but son Emily does not want to proceed with hospice and his goal is to have patient go to rehab. he understands that patient is poor prognosis     Vital Signs Last 24 Hrs  T(C): 36.1 (10 Jul 2020 05:28), Max: 36.1 (10 Jul 2020 05:28)  T(F): 97 (10 Jul 2020 05:28), Max: 97 (10 Jul 2020 05:28)  HR: 55 (10 Jul 2020 05:28) (55 - 57)  BP: 131/61 (10 Jul 2020 05:28) (114/57 - 131/61)  BP(mean): --  RR: 18 (10 Jul 2020 05:28) (18 - 18)  SpO2: 99% (09 Jul 2020 11:31) (99% - 99%)    PHYSICAL EXAM:  GENERAL: NAD,  Pulm: decreased air entry B/L   CV: Regular rate and rhythm; No murmurs, rubs, or gallops  GI: Soft, Nontender, Nondistended; Bowel sounds present  EXTREMITIES:  +edema  PSYCH: AAOx3  NEUROLOGY: non-focal  SKIN: No rashes or lesions                          13.1   7.02  )-----------( 137      ( 10 Jul 2020 05:43 )             40.6     07-10    143  |  100  |  138<HH>  ----------------------------<  95  3.0<L>   |  24  |  2.9<H>    Ca    9.0      10 Jul 2020 05:43  Phos  5.6     07-10  Mg     2.4     07-10        PT/INR - ( 10 Jul 2020 05:43 )   PT: 35.90 sec;   INR: 3.12 ratio               MEDICATIONS  (STANDING):  ascorbic acid 500 milliGRAM(s) Oral daily  aspirin  chewable 81 milliGRAM(s) Oral daily  atorvastatin 40 milliGRAM(s) Oral at bedtime  budesonide 160 MICROgram(s)/formoterol 4.5 MICROgram(s) Inhaler 2 Puff(s) Inhalation two times a day  chlorhexidine 4% Liquid 1 Application(s) Topical once  finasteride 5 milliGRAM(s) Oral daily  furosemide    Tablet 80 milliGRAM(s) Oral two times a day  insulin glargine Injectable (LANTUS) 20 Unit(s) SubCutaneous at bedtime  isosorbide   mononitrate ER Tablet (IMDUR) 30 milliGRAM(s) Oral daily  metolazone 5 milliGRAM(s) Oral daily  metoprolol succinate ER 50 milliGRAM(s) Oral daily  multivitamin 1 Tablet(s) Oral daily  pantoprazole    Tablet 40 milliGRAM(s) Oral before breakfast  polyethylene glycol 3350 17 Gram(s) Oral daily  sevelamer carbonate 800 milliGRAM(s) Oral three times a day with meals  silver sulfADIAZINE 1% Cream 1 Application(s) Topical daily  silver sulfADIAZINE 1% Cream 1 Application(s) Topical daily    MEDICATIONS  (PRN):  ALPRAZolam 0.25 milliGRAM(s) Oral two times a day PRN Anxiety  HYDROmorphone  Injectable 0.25 milliGRAM(s) IV Push every 4 hours PRN Severe Pain (7 - 10)

## 2020-07-10 NOTE — PROGRESS NOTE ADULT - ASSESSMENT
Pt with CKD 4, DM, HTN, AS s/p BAV, COPD on home O2, admitted with worsening SOB due to fluid overload.    CKD4 - creat  has been stable/ continue current diuretics/ switched to po lasix   non oliguric   last ph noted ok  ,  on renagel 1/1/1  mild hypokelmia, will need K dur 20 meq po q24h and to follow K as op in the conrext of CKD   LE cellulitis - local care, silvadene, podiatry  Palliative care notes appreciated / home hospice ?  will follow

## 2020-07-10 NOTE — PROGRESS NOTE ADULT - SUBJECTIVE AND OBJECTIVE BOX
LENGTH OF HOSPITAL STAY: 12d      CHIEF COMPLAINT:   Patient is a 80y old  Male who presents with a chief complaint of SOB (10 Jul 2020 10:35)      OVER Past 24hrs:  The patient was seen and examined at bedside there were no acute overnight events.  Still complains of SOB.  Denies CP, F/C, N/v.    HISTORY OF PRESENTING ILLNESS:    HPI:  79 y/o male with a PMH of CAD s/p CABG s/p PCI , severe symptomatic AS s/p BAV as a bridge to TAVR (6/10), afib/flutter on coumadin, left carotid artery stenosis (80%), DM2 , HLD,  HTN, COPD on home O2, CKD stage 4, Hx of ATN on HD for 2 months in 2018, LE cellultits requiring admission and BPH was BIBEMS from [facility] to the ED with shortness of breath for 6 weeks, started after BAV. His SOB is at rest progressively worsening with edema of LLE progressing and was sent to ED Patient denies cough, chest pain, palpitations/wheeze.     In ED , VS: Bp:112/32 HR:57 RR:22 SpO2: 96% on 4L  Received 20mg IV push of lasix , (28 Jun 2020 23:48)    PAST MEDICAL & SURGICAL HISTORY  PAST MEDICAL & SURGICAL HISTORY:  Hyperlipidemia  Cellulitis of right lower extremity  Cellulitis of left lower extremity  History of renal insufficiency  Stenosis of left carotid artery  Aortic stenosis  Afib  BPH (benign prostatic hyperplasia)  CHF (congestive heart failure)  COPD (chronic obstructive pulmonary disease)  Diabetes  HTN (hypertension)  S/P balloon aortic valvuloplasty  H/O heart artery stent  S/P CABG x 3        REVIEW OF SYSTEMS  All other ROS otherwise negative    ALLERGIES:  No Known Allergies    MEDICATIONS:  STANDING MEDICATIONS  ascorbic acid 500 milliGRAM(s) Oral daily  aspirin  chewable 81 milliGRAM(s) Oral daily  atorvastatin 40 milliGRAM(s) Oral at bedtime  budesonide 160 MICROgram(s)/formoterol 4.5 MICROgram(s) Inhaler 2 Puff(s) Inhalation two times a day  chlorhexidine 4% Liquid 1 Application(s) Topical once  finasteride 5 milliGRAM(s) Oral daily  furosemide    Tablet 80 milliGRAM(s) Oral two times a day  insulin glargine Injectable (LANTUS) 20 Unit(s) SubCutaneous at bedtime  isosorbide   mononitrate ER Tablet (IMDUR) 30 milliGRAM(s) Oral daily  metolazone 5 milliGRAM(s) Oral daily  metoprolol succinate ER 50 milliGRAM(s) Oral daily  multivitamin 1 Tablet(s) Oral daily  pantoprazole    Tablet 40 milliGRAM(s) Oral before breakfast  polyethylene glycol 3350 17 Gram(s) Oral daily  sevelamer carbonate 800 milliGRAM(s) Oral three times a day with meals  silver sulfADIAZINE 1% Cream 1 Application(s) Topical daily  silver sulfADIAZINE 1% Cream 1 Application(s) Topical daily    PRN MEDICATIONS  ALPRAZolam 0.25 milliGRAM(s) Oral two times a day PRN  HYDROmorphone  Injectable 0.25 milliGRAM(s) IV Push every 4 hours PRN    VITALS:   T(F): 96.3  HR: 55  BP: 104/50  RR: 19  SpO2: --    PHYSICAL EXAM:  General: No acute distress.  Resting in bed.    HEENT: Pupils equal, reactive to light symmetrically.    PULM: Dec breath sounds, no significant sputum production.    CVS: Regular rate and rhythm, no murmurs, rubs, or gallops.    MSK: +Edema, nontender.    SKIN: Warm and well perfused, no rashes noted.    PSYCH: AAOx3    NEURO: No FND    LABS:                        13.1   7.02  )-----------( 137      ( 10 Jul 2020 05:43 )             40.6     07-10    143  |  100  |  138<HH>  ----------------------------<  95  3.0<L>   |  24  |  2.9<H>    Ca    9.0      10 Jul 2020 05:43  Phos  5.6     07-10  Mg     2.4     07-10      PT/INR - ( 10 Jul 2020 05:43 )   PT: 35.90 sec;   INR: 3.12 ratio                       RADIOLOGY:    < from: Xray Chest 1 View AP/PA (07.09.20 @ 12:50) >  Comparison : Chest radiograph July 7, 2020.    Technique/Positioning: Frontal portable, low lung volumes.    Findings:    Support devices: None.    Cardiac/mediastinum/hilum: Patient is status post median sternotomy. The heart is enlarged. Bilateral opacities.    Lung parenchyma/Pleura: Bilateral opacifications and effusions.    Skeleton/soft tissues: Unchanged.    Impression:      CHF. Without difference.    < end of copied text >

## 2020-07-10 NOTE — PROGRESS NOTE ADULT - ASSESSMENT
79 y/o male with a PMH of CAD s/p CABG s/p PCI , severe symptomatic AS s/p BAV as a bridge to TAVR (6/10), afib/flutter on coumadin, left carotid artery stenosis (80%), DM2 , HLD,  HTN, COPD , , CKD stage 4,BPH was sent from [facility] to the ED with shortness of breath and LE edema , s/p  BAV.    A/P   #acute on chronic diastolic CHF exacerbation   still sob. still no change in overall clinical picture despite maximum medical treatment   Patient is a hospice candidate but he and his son do not want to proceed with plans for hospice  Cardiology deemed patient not a candidate for tAVR   c/w lasix  IV 80 BID switched to 80 PO Lasix BID     #Hx CAD s/p CABG s/p PCI ; Severe AS s/p BAV  on asa, lipitor     #history of AFIB c/w coumadin target INR 2-3     #HX of Left Carotid Artery stenosis  not medically stable for procedure at this time     #CKD 4 stable scr at baseline. on Renagel--> check Phos     #hypokalemia replete, check magnesium     # COPD - stable

## 2020-07-10 NOTE — CHART NOTE - NSCHARTNOTEFT_GEN_A_CORE
Palliative team met with patient at bedside, pt reports feeling better.  Pt remains a priti life, has yet to participate in rehab.  Hospice team spoke with son who in not in agreement with Hospice, as per report, son wants to continue with aggressive treatment and wants patient to go to rehab.  Pt may not be a candidate if he cannot participate in rehab.  Pt appears comfortable, denies pain/sob.  Palliative will follow and provide ongoing support/symptom management.  x [phone]

## 2020-07-10 NOTE — CHART NOTE - NSCHARTNOTEFT_GEN_A_CORE
Registered Dietitian Follow-Up     Patient Profile Reviewed                           Yes [x]   No []     Nutrition History Previously Obtained        Yes [x]  No []       Pertinent Subjective Information: Pt. due for limited assessment today, however pt. noted with new stg II pressure ulcer first doc 7/7. Pt. now hypokalemic after initially present with hyperkalemia. Will d/c no con K modifier.      Pertinent Medical Interventions: Acute on chronic diastolic CHF exacerbation- still sob. still no change in overall clinical picture despite maximum medical treatment. S/p hospice eval, family not agreeable to hospice. CKD 4 stable scr at baseline. on renagel recheck phos.  COPD - stable. DM2 controlled.     Diet order: DASH/TLC, consistent carb w/snack, 1200ml fluid restr, no conc K, 60g protein restr     Anthropometrics:  - Ht. 167.6cm  - Wt. 94kg on 7/10 vs.   (6/30) 105.2 kg  (7/2) 103.3 kg   (7/3) 96.7 kg - weights trending down, pt on diuretic therapy  - BMI 33.5  - IBW 64.5kg     Pertinent Lab Data: (7/10) RBC 4.64, Hg 13.1, Hct 40.6, K 3.0, , creat 2.9, eGFR 20, Phos 5.6     Pertinent Meds: Lasix, Renvela, vit C, MVI, Atorvastatin, Metoprolol, Miralax     Physical Findings:  - Appearance: confused, disoriented, very [place], 2+ L, R leg edema   - GI function: no symptoms noted, last BM 7/7  - Tubes: none noted  - Oral/Mouth cavity: no symptoms noted  - Skin: pressure ulcer stg II to L buttock      Nutrition Requirements (from RD note on 7/2)   Weight Used: 64.5kg cont from RD initial note      Estimated Energy Needs    Continue []  Adjust [x] 1935-2257kcal (30-35kcal/kg IBW) for obesity, PU  Adjusted Energy Recommendations:   kcal/day         Estimated Protein Needs    Continue []  Adjust [x] 70-77g (1.1-1.2g/kg IBW) as above + CKD IV considered  Adjusted Protein Recommendations:   gm/day        Estimated Fluid Needs        Continue [x]  Adjust []  per LIP for CHF  Adjusted Fluid Recommendations:   mL/day     Nutrient Intake:         [] Previous Nutrition Diagnosis: no previous active dx           Nutrition Diagnostic #1  Problem: Increased nutrient needs  Etiology: wound healing  Statement: pressure ulcer stg II to L buttock        Nutrition Intervention: meals and snacks, medical food supplement, vitamin and mineral supplement    Rec: Continue DASH/TLC, consistent carb w/snack diet with  fluid restr per LIP. D/c 60g protein and no conc K restrictions. Add glucerna daily. Continue MVI daily.      Goal/Expected Outcome: In 4 days pt. to consistently consume at least 75% PO and supplement     Indicator/Monitoring: diet order, energy intake, body composition, NFPF, electrolyte/renal profile, glucose profile Registered Dietitian Follow-Up     Patient Profile Reviewed                           Yes [x]   No []     Nutrition History Previously Obtained        Yes [x]  No []       Pertinent Subjective Information: Pt. due for limited assessment today, however pt. noted with new stg II pressure ulcer first doc 7/7. Pt. now hypokalemic after initially present with hyperkalemia. Will d/c no con K modifier. Pt. very [place], spoke to PCA at bedside, pt. consistently consuming ~75% PO at meals.      Pertinent Medical Interventions: Acute on chronic diastolic CHF exacerbation- still sob. still no change in overall clinical picture despite maximum medical treatment. S/p hospice eval, family not agreeable to hospice. CKD 4 stable scr at baseline. on renagel recheck phos.  COPD - stable. DM2 controlled.     Diet order: DASH/TLC, consistent carb w/snack, 1200ml fluid restr, no conc K, 60g protein restr     Anthropometrics:  - Ht. 167.6cm  - Wt. 94kg on 7/10 vs.   (6/30) 105.2 kg  (7/2) 103.3 kg   (7/3) 96.7 kg - weights trending down, pt on diuretic therapy  - BMI 33.5  - IBW 64.5kg     Pertinent Lab Data: (7/10) RBC 4.64, Hg 13.1, Hct 40.6, K 3.0, , creat 2.9, eGFR 20, Phos 5.6     Pertinent Meds: Lasix, Renvela, vit C, MVI, Atorvastatin, Metoprolol, Miralax     Physical Findings:  - Appearance: confused, disoriented, very [place], 2+ L, R leg edema   - GI function: no symptoms noted, last BM 7/7  - Tubes: none noted  - Oral/Mouth cavity: no symptoms noted  - Skin: pressure ulcer stg II to L buttock      Nutrition Requirements (from RD note on 7/2)   Weight Used: 64.5kg cont from RD initial note      Estimated Energy Needs    Continue []  Adjust [x] 1935-2257kcal (30-35kcal/kg IBW) for obesity, PU  Adjusted Energy Recommendations:   kcal/day         Estimated Protein Needs    Continue []  Adjust [x] 70-77g (1.1-1.2g/kg IBW) as above + CKD IV considered  Adjusted Protein Recommendations:   gm/day        Estimated Fluid Needs        Continue [x]  Adjust []  per LIP for CHF  Adjusted Fluid Recommendations:   mL/day     Nutrient Intake: ~75% PO at meals         [] Previous Nutrition Diagnosis: no previous active dx           Nutrition Diagnostic #1  Problem: Increased nutrient needs  Etiology: wound healing  Statement: pressure ulcer stg II to L buttock        Nutrition Intervention: meals and snacks, medical food supplement, vitamin and mineral supplement    Rec: Continue DASH/TLC, consistent carb w/snack diet with  fluid restr per LIP. D/c 60g protein and no conc K restrictions. Add Glucerna daily. Continue MVI daily.      Goal/Expected Outcome: In 4 days pt. to consistently consume at least 75% PO and supplement     Indicator/Monitoring: diet order, energy intake, body composition, NFPF, electrolyte/renal profile, glucose profile

## 2020-07-11 ENCOUNTER — TRANSCRIPTION ENCOUNTER (OUTPATIENT)
Age: 80
End: 2020-07-11

## 2020-07-11 LAB
ALBUMIN SERPL ELPH-MCNC: 3.3 G/DL — LOW (ref 3.5–5.2)
ALP SERPL-CCNC: 99 U/L — SIGNIFICANT CHANGE UP (ref 30–115)
ALT FLD-CCNC: 14 U/L — SIGNIFICANT CHANGE UP (ref 0–41)
ANION GAP SERPL CALC-SCNC: 17 MMOL/L — HIGH (ref 7–14)
AST SERPL-CCNC: 23 U/L — SIGNIFICANT CHANGE UP (ref 0–41)
BASOPHILS # BLD AUTO: 0.04 K/UL — SIGNIFICANT CHANGE UP (ref 0–0.2)
BASOPHILS NFR BLD AUTO: 0.6 % — SIGNIFICANT CHANGE UP (ref 0–1)
BILIRUB SERPL-MCNC: 0.7 MG/DL — SIGNIFICANT CHANGE UP (ref 0.2–1.2)
BUN SERPL-MCNC: 141 MG/DL — CRITICAL HIGH (ref 10–20)
CALCIUM SERPL-MCNC: 9 MG/DL — SIGNIFICANT CHANGE UP (ref 8.5–10.1)
CHLORIDE SERPL-SCNC: 100 MMOL/L — SIGNIFICANT CHANGE UP (ref 98–110)
CO2 SERPL-SCNC: 26 MMOL/L — SIGNIFICANT CHANGE UP (ref 17–32)
CREAT SERPL-MCNC: 2.9 MG/DL — HIGH (ref 0.7–1.5)
EOSINOPHIL # BLD AUTO: 0.21 K/UL — SIGNIFICANT CHANGE UP (ref 0–0.7)
EOSINOPHIL NFR BLD AUTO: 3 % — SIGNIFICANT CHANGE UP (ref 0–8)
GLUCOSE BLDC GLUCOMTR-MCNC: 111 MG/DL — HIGH (ref 70–99)
GLUCOSE BLDC GLUCOMTR-MCNC: 111 MG/DL — HIGH (ref 70–99)
GLUCOSE BLDC GLUCOMTR-MCNC: 99 MG/DL — SIGNIFICANT CHANGE UP (ref 70–99)
GLUCOSE SERPL-MCNC: 104 MG/DL — HIGH (ref 70–99)
HCT VFR BLD CALC: 42.1 % — SIGNIFICANT CHANGE UP (ref 42–52)
HGB BLD-MCNC: 13.6 G/DL — LOW (ref 14–18)
IMM GRANULOCYTES NFR BLD AUTO: 0.3 % — SIGNIFICANT CHANGE UP (ref 0.1–0.3)
INR BLD: 3.32 RATIO — HIGH (ref 0.65–1.3)
LYMPHOCYTES # BLD AUTO: 1.08 K/UL — LOW (ref 1.2–3.4)
LYMPHOCYTES # BLD AUTO: 15.7 % — LOW (ref 20.5–51.1)
MAGNESIUM SERPL-MCNC: 2.5 MG/DL — HIGH (ref 1.8–2.4)
MCHC RBC-ENTMCNC: 28.5 PG — SIGNIFICANT CHANGE UP (ref 27–31)
MCHC RBC-ENTMCNC: 32.3 G/DL — SIGNIFICANT CHANGE UP (ref 32–37)
MCV RBC AUTO: 88.3 FL — SIGNIFICANT CHANGE UP (ref 80–94)
MONOCYTES # BLD AUTO: 0.87 K/UL — HIGH (ref 0.1–0.6)
MONOCYTES NFR BLD AUTO: 12.6 % — HIGH (ref 1.7–9.3)
NEUTROPHILS # BLD AUTO: 4.67 K/UL — SIGNIFICANT CHANGE UP (ref 1.4–6.5)
NEUTROPHILS NFR BLD AUTO: 67.8 % — SIGNIFICANT CHANGE UP (ref 42.2–75.2)
NRBC # BLD: 0 /100 WBCS — SIGNIFICANT CHANGE UP (ref 0–0)
PHOSPHATE SERPL-MCNC: 5.1 MG/DL — HIGH (ref 2.1–4.9)
PLATELET # BLD AUTO: 118 K/UL — LOW (ref 130–400)
POTASSIUM SERPL-MCNC: 3.4 MMOL/L — LOW (ref 3.5–5)
POTASSIUM SERPL-SCNC: 3.4 MMOL/L — LOW (ref 3.5–5)
PROT SERPL-MCNC: 6.2 G/DL — SIGNIFICANT CHANGE UP (ref 6–8)
PROTHROM AB SERPL-ACNC: 38.2 SEC — HIGH (ref 9.95–12.87)
RBC # BLD: 4.77 M/UL — SIGNIFICANT CHANGE UP (ref 4.7–6.1)
RBC # FLD: 15.5 % — HIGH (ref 11.5–14.5)
SODIUM SERPL-SCNC: 143 MMOL/L — SIGNIFICANT CHANGE UP (ref 135–146)
WBC # BLD: 6.89 K/UL — SIGNIFICANT CHANGE UP (ref 4.8–10.8)
WBC # FLD AUTO: 6.89 K/UL — SIGNIFICANT CHANGE UP (ref 4.8–10.8)

## 2020-07-11 PROCEDURE — 99233 SBSQ HOSP IP/OBS HIGH 50: CPT

## 2020-07-11 RX ORDER — WARFARIN SODIUM 2.5 MG/1
1 TABLET ORAL
Qty: 30 | Refills: 0
Start: 2020-07-11 | End: 2020-08-09

## 2020-07-11 RX ORDER — FUROSEMIDE 40 MG
1 TABLET ORAL
Qty: 0 | Refills: 0 | DISCHARGE
Start: 2020-07-11

## 2020-07-11 RX ORDER — SEVELAMER CARBONATE 2400 MG/1
1 POWDER, FOR SUSPENSION ORAL
Qty: 0 | Refills: 0 | DISCHARGE
Start: 2020-07-11

## 2020-07-11 RX ORDER — FINASTERIDE 5 MG/1
1 TABLET, FILM COATED ORAL
Qty: 0 | Refills: 0 | DISCHARGE
Start: 2020-07-11

## 2020-07-11 RX ORDER — POTASSIUM CHLORIDE 20 MEQ
2 PACKET (EA) ORAL
Qty: 0 | Refills: 0 | DISCHARGE
Start: 2020-07-11

## 2020-07-11 RX ORDER — METOPROLOL TARTRATE 50 MG
1 TABLET ORAL
Qty: 0 | Refills: 0 | DISCHARGE
Start: 2020-07-11

## 2020-07-11 RX ORDER — METOPROLOL TARTRATE 50 MG
1 TABLET ORAL
Qty: 0 | Refills: 0 | DISCHARGE

## 2020-07-11 RX ORDER — FUROSEMIDE 40 MG
1 TABLET ORAL
Qty: 0 | Refills: 0 | DISCHARGE

## 2020-07-11 RX ADMIN — Medication 80 MILLIGRAM(S): at 06:15

## 2020-07-11 RX ADMIN — Medication 80 MILLIGRAM(S): at 17:01

## 2020-07-11 RX ADMIN — Medication 50 MILLIGRAM(S): at 06:15

## 2020-07-11 RX ADMIN — Medication 81 MILLIGRAM(S): at 11:54

## 2020-07-11 RX ADMIN — Medication 1 TABLET(S): at 11:54

## 2020-07-11 RX ADMIN — ATORVASTATIN CALCIUM 40 MILLIGRAM(S): 80 TABLET, FILM COATED ORAL at 21:25

## 2020-07-11 RX ADMIN — PANTOPRAZOLE SODIUM 40 MILLIGRAM(S): 20 TABLET, DELAYED RELEASE ORAL at 06:15

## 2020-07-11 RX ADMIN — Medication 1 APPLICATION(S): at 11:55

## 2020-07-11 RX ADMIN — ISOSORBIDE MONONITRATE 30 MILLIGRAM(S): 60 TABLET, EXTENDED RELEASE ORAL at 11:54

## 2020-07-11 RX ADMIN — Medication 40 MILLIEQUIVALENT(S): at 11:54

## 2020-07-11 RX ADMIN — INSULIN GLARGINE 20 UNIT(S): 100 INJECTION, SOLUTION SUBCUTANEOUS at 21:25

## 2020-07-11 RX ADMIN — BUDESONIDE AND FORMOTEROL FUMARATE DIHYDRATE 2 PUFF(S): 160; 4.5 AEROSOL RESPIRATORY (INHALATION) at 21:25

## 2020-07-11 RX ADMIN — BUDESONIDE AND FORMOTEROL FUMARATE DIHYDRATE 2 PUFF(S): 160; 4.5 AEROSOL RESPIRATORY (INHALATION) at 08:01

## 2020-07-11 RX ADMIN — Medication 500 MILLIGRAM(S): at 11:54

## 2020-07-11 RX ADMIN — FINASTERIDE 5 MILLIGRAM(S): 5 TABLET, FILM COATED ORAL at 11:54

## 2020-07-11 NOTE — PROGRESS NOTE ADULT - SUBJECTIVE AND OBJECTIVE BOX
seen and examined  no distress   confused         PAST HISTORY  --------------------------------------------------------------------------------  No significant changes to PMH, PSH, FHx, SHx, unless otherwise noted    ALLERGIES & MEDICATIONS  --------------------------------------------------------------------------------  Allergies    No Known Allergies    Intolerances      Standing Inpatient Medications  ascorbic acid 500 milliGRAM(s) Oral daily  aspirin  chewable 81 milliGRAM(s) Oral daily  atorvastatin 40 milliGRAM(s) Oral at bedtime  budesonide 160 MICROgram(s)/formoterol 4.5 MICROgram(s) Inhaler 2 Puff(s) Inhalation two times a day  chlorhexidine 4% Liquid 1 Application(s) Topical once  finasteride 5 milliGRAM(s) Oral daily  furosemide    Tablet 80 milliGRAM(s) Oral two times a day  insulin glargine Injectable (LANTUS) 20 Unit(s) SubCutaneous at bedtime  isosorbide   mononitrate ER Tablet (IMDUR) 30 milliGRAM(s) Oral daily  metolazone 5 milliGRAM(s) Oral daily  metoprolol succinate ER 50 milliGRAM(s) Oral daily  multivitamin 1 Tablet(s) Oral daily  pantoprazole    Tablet 40 milliGRAM(s) Oral before breakfast  polyethylene glycol 3350 17 Gram(s) Oral daily  potassium chloride    Tablet ER 40 milliEquivalent(s) Oral daily  sevelamer carbonate 800 milliGRAM(s) Oral three times a day with meals  silver sulfADIAZINE 1% Cream 1 Application(s) Topical daily  silver sulfADIAZINE 1% Cream 1 Application(s) Topical daily    PRN Inpatient Medications  ALPRAZolam 0.25 milliGRAM(s) Oral two times a day PRN  HYDROmorphone  Injectable 0.25 milliGRAM(s) IV Push every 4 hours PRN          VITALS/PHYSICAL EXAM  --------------------------------------------------------------------------------  T(C): 36.6 (07-11-20 @ 04:45), Max: 36.6 (07-11-20 @ 04:45)  HR: 57 (07-11-20 @ 04:45) (55 - 58)  BP: 125/58 (07-11-20 @ 04:45) (104/50 - 137/61)  RR: 19 (07-11-20 @ 04:45) (18 - 19)  SpO2: 99% (07-10-20 @ 20:10) (99% - 99%)  Wt(kg): --        07-10-20 @ 07:01  -  07-11-20 @ 07:00  --------------------------------------------------------  IN: 600 mL / OUT: 650 mL / NET: -50 mL      Physical Exam:  	Gen: NAD  	Pulm:  B/L cassidy at bases   	CV: S1S2; no rub  	Abd: +distended        LABS/STUDIES  --------------------------------------------------------------------------------              13.6   6.89  >-----------<  118      [07-11-20 @ 06:46]              42.1     143  |  100  |  x   ----------------------------<  104      [07-11-20 @ 06:46]  3.4   |  26  |  2.9        Ca     9.0     [07-11-20 @ 06:46]      Mg     2.5     [07-11-20 @ 06:46]      Phos  5.1     [07-11-20 @ 06:46]    TPro  6.2  /  Alb  3.3  /  TBili  0.7  /  DBili  x   /  AST  23  /  ALT  14  /  AlkPhos  99  [07-11-20 @ 06:46]    PT/INR: PT 38.20, INR 3.32       [07-11-20 @ 06:46]      Creatinine Trend:  SCr 2.9 [07-11 @ 06:46]  SCr 2.9 [07-10 @ 05:43]  SCr 2.9 [07-09 @ 06:06]  SCr 2.9 [07-08 @ 05:51]  SCr 2.9 [07-07 @ 05:38]        Ferritin 640      [05-05-20 @ 11:48]  PTH -- (Ca 9.3)      [07-06-20 @ 05:22]   84  PTH -- (Ca 7.8)      [10-21-19 @ 19:08]   278  HbA1c 6.8      [01-31-20 @ 07:01]  TSH 0.84      [08-29-19 @ 06:15]

## 2020-07-11 NOTE — DISCHARGE NOTE PROVIDER - CARE PROVIDERS DIRECT ADDRESSES
[email or web address]

## 2020-07-11 NOTE — DISCHARGE NOTE PROVIDER - NSDCMRMEDTOKEN_GEN_ALL_CORE_FT
ascorbic acid 500 mg oral tablet: 1 tab(s) orally once a day  budesonide-formoterol 160 mcg-4.5 mcg/inh inhalation aerosol:  inhaled   finasteride 5 mg oral tablet: 1 tab(s) orally once a day  fluticasone 50 mcg/inh nasal spray: 1 spray(s) nasal 2 times a day  furosemide 80 mg oral tablet: 1 tab(s) orally 2 times a day  insulin glargine 100 units/mL subcutaneous solution: 20 unit(s) subcutaneous once (at bedtime)   insulin lispro 100 units/mL injectable solution: 2U if  - 200  4U if  - 250  6U if  - 300  8U if  - 350  10U if  - 400  12U iif BG &gt; Than 400  isosorbide mononitrate 30 mg oral tablet, extended release: 1 tab(s) orally once a day (in the morning)  Lipitor 40 mg oral tablet: 1 tab(s) orally once a day (at bedtime)   metOLazone 5 mg oral tablet: 1 tab(s) orally once a day  metoprolol succinate 50 mg oral tablet, extended release: 1 tab(s) orally once a day  Multiple Vitamins oral tablet: 1 tab(s) orally once a day  potassium chloride 20 mEq oral tablet, extended release: 2 tab(s) orally once a day  Protonix 40 mg oral delayed release tablet: 1 tab(s) orally once a day   sevelamer carbonate 800 mg oral tablet: 1 tab(s) orally 3 times a day (with meals)  silver sulfADIAZINE 1% topical cream: 1 application topically once a day  warfarin 2 mg oral tablet: 1 tab(s) orally once a day  zinc sulfate 220 mg oral capsule: 1 cap(s) orally once a day budesonide-formoterol 160 mcg-4.5 mcg/inh inhalation aerosol:  inhaled   finasteride 5 mg oral tablet: 1 tab(s) orally once a day  furosemide 80 mg oral tablet: 1 tab(s) orally 2 times a day  insulin lispro 100 units/mL injectable solution: 2U if  - 200  4U if  - 250  6U if  - 300  8U if  - 350  10U if  - 400  12U iif BG &gt; Than 400  isosorbide mononitrate 30 mg oral tablet, extended release: 1 tab(s) orally once a day (in the morning)  Lipitor 40 mg oral tablet: 1 tab(s) orally once a day (at bedtime)   metOLazone 5 mg oral tablet: 1 tab(s) orally once a day  metoprolol succinate 50 mg oral tablet, extended release: 1 tab(s) orally once a day  Protonix 40 mg oral delayed release tablet: 1 tab(s) orally once a day   sevelamer carbonate 800 mg oral tablet: 1 tab(s) orally 3 times a day (with meals)  silver sulfADIAZINE 1% topical cream: 1 application topically once a day budesonide-formoterol 160 mcg-4.5 mcg/inh inhalation aerosol:  inhaled   finasteride 5 mg oral tablet: 1 tab(s) orally once a day  furosemide 80 mg oral tablet: 1 tab(s) orally 2 times a day  insulin lispro 100 units/mL injectable solution: 2U if  - 200  4U if  - 250  6U if  - 300  8U if  - 350  10U if  - 400  12U iif BG &gt; Than 400  isosorbide mononitrate 30 mg oral tablet, extended release: 1 tab(s) orally once a day (in the morning)  Lipitor 40 mg oral tablet: 1 tab(s) orally once a day (at bedtime)   metOLazone 5 mg oral tablet: 1 tab(s) orally once a day  metoprolol succinate 50 mg oral tablet, extended release: 1 tab(s) orally once a day  Protonix 40 mg oral delayed release tablet: 1 tab(s) orally once a day   sevelamer carbonate 800 mg oral tablet: 1 tab(s) orally 3 times a day (with meals)  silver sulfADIAZINE 1% topical cream: 1 application topically once a day  Xanax 0.25 mg oral tablet: 1 tab(s) orally every 12 hours, As needed, anxiety

## 2020-07-11 NOTE — DISCHARGE NOTE PROVIDER - NSDCFUSCHEDAPPT_GEN_ALL_CORE_FT
TAIWO ZAVALA ; 07/22/2020 ; [facility] TAIWO ZAVALA ; 07/22/2020 ; [facility] [address] [facility] TAIWO ZAVALA ; 07/22/2020 ; [facility] TAIWO ZAVALA ; 07/22/2020 ; [facility] Surg [facility] TAIWO ZAVALA ; 07/22/2020 ; NPP Surg Vasc [facility]

## 2020-07-11 NOTE — DISCHARGE NOTE PROVIDER - NSDCCPCAREPLAN_GEN_ALL_CORE_FT
PRINCIPAL DISCHARGE DIAGNOSIS  Diagnosis: CHF (congestive heart failure)  Assessment and Plan of Treatment: Heart failure is a condition in which the heart muscle is unable to pump enough blood to meet the body’s nutrition and oxygen needs.   Your heart pumps normally but is too stiff to fill properly, the condition is known as heart failure with preserved ejection fraction (HFpEF). This causes elevated pressures in your lungs and can cause shortness of breath. You are given water pills to keep your volume optimal and to reduce the pressures in your lungs.   Please take your medication as directed and follow with your primary.      SECONDARY DISCHARGE DIAGNOSES  Diagnosis: Aortic stenosis  Assessment and Plan of Treatment: Your aortic valve has been operated on since last admission with a balloon making the stenosis less. Follow with your primary care physician as out patient if further management is to be done.    Diagnosis: Atrial fibrillation  Assessment and Plan of Treatment: We continued your medication for atrial fibrillation. Please take as perscibed. Please follow up your INR levels with the coumadin clinic or your primary care physician. PRINCIPAL DISCHARGE DIAGNOSIS  Diagnosis: CHF (congestive heart failure)  Assessment and Plan of Treatment: Heart failure is a condition in which the heart muscle is unable to pump enough blood to meet the body’s nutrition and oxygen needs.   Your heart pumps normally but is too stiff to fill properly, the condition is known as heart failure with preserved ejection fraction (HFpEF). This causes elevated pressures in your lungs and can cause shortness of breath. You are given water pills to keep your volume optimal and to reduce the pressures in your lungs.   Please take your medication as directed and follow with your primary.  Please have your serum electrolytes, particularly potassium, checked tomorrow.      SECONDARY DISCHARGE DIAGNOSES  Diagnosis: Aortic stenosis  Assessment and Plan of Treatment: Your aortic valve has been operated on since last admission with a balloon making the stenosis less. You are not a candidate for more invasive procedures.    Diagnosis: Atrial fibrillation  Assessment and Plan of Treatment: We continued your medication for atrial fibrillation. Please take as perscibed. Please follow up your INR levels with the coumadin clinic or your primary care physician.

## 2020-07-11 NOTE — DISCHARGE NOTE PROVIDER - HOSPITAL COURSE
80 year old male patient, known CAD s/p CABG s/p PCI , severe symptomatic AS s/p BAV as a bridge to TAVR (6/10), afib/flutter on coumadin, left carotid artery stenosis (80%), DM2 , HLD,  HTN, COPD on home O2, CKD stage 4, Hx of ATN on HD for 2 months in 2018, LE cellultits requiring admission and BPH was BIBEMS from [facility] to the ED with shortness of breath for 6 weeks, started after BAV. Discussed with Dr. Maria and deemed not a candidate for TAVR. Currently admitted for acute on chronic diastolic CHF exacerbation. Patient diuresed while in the hospital, achieved  a negative balance and discharged. Patient is a hospice candidate but son does not want to proceed with hospice. He understands poor prognosis.     Concerning his other comorbidities, patient known AFIB maintained on coumadin. has a history of Left Carotid Artery stenosis however not a candidate for intervention at current time.     To be discharged to SNF.

## 2020-07-11 NOTE — PROGRESS NOTE ADULT - SUBJECTIVE AND OBJECTIVE BOX
Pt seen and examined at bedside. No complaints.     VITAL SIGNS (Last 24 hrs):  T(C): 35.7 (20 @ 20:06), Max: 36.6 (20 @ 04:45)  HR: 59 (20 @ 20:06) (57 - 59)  BP: 119/64 (20 @ 20:06) (119/64 - 142/64)  RR: 18 (20 @ 20:06) (18 - 19)  SpO2: 98% (20 @ 20:27) (98% - 98%)  Wt(kg): --  Daily     Daily Weight in k.5 (2020 04:45)    I&O's Summary    10 Jul 2020 07:  -  2020 07:00  --------------------------------------------------------  IN: 600 mL / OUT: 650 mL / NET: -50 mL    2020 07:  -  2020 22:49  --------------------------------------------------------  IN: 0 mL / OUT: 1100 mL / NET: -1100 mL        PHYSICAL EXAM:  GENERAL: NAD   HEAD:  Atraumatic, Normocephalic  EYES: EOMI, PERRLA, conjunctiva and sclera clear  NECK: Supple, No JVD  CHEST/LUNG: Clear to auscultation bilaterally; No wheeze  HEART: Regular rate and rhythm; No murmurs, rubs, or gallops  ABDOMEN: Soft, Nontender, Nondistended; Bowel sounds present  EXTREMITIES:  2+ Peripheral Pulses, No clubbing, cyanosis, or edema  NEUROLOGY: non-focal  SKIN: No rashes or lesions    Labs Reviewed  Spoke to patient in regards to abnormal labs.    CBC Full  -  ( 2020 06:46 )  WBC Count : 6.89 K/uL  Hemoglobin : 13.6 g/dL  Hematocrit : 42.1 %  Platelet Count - Automated : 118 K/uL  Mean Cell Volume : 88.3 fL  Mean Cell Hemoglobin : 28.5 pg  Mean Cell Hemoglobin Concentration : 32.3 g/dL  Auto Neutrophil # : 4.67 K/uL  Auto Lymphocyte # : 1.08 K/uL  Auto Monocyte # : 0.87 K/uL  Auto Eosinophil # : 0.21 K/uL  Auto Basophil # : 0.04 K/uL  Auto Neutrophil % : 67.8 %  Auto Lymphocyte % : 15.7 %  Auto Monocyte % : 12.6 %  Auto Eosinophil % : 3.0 %  Auto Basophil % : 0.6 %    BMP:     @ 06:46    Blood Urea Nitrogen - 141  Calcium - 9.0  Carbond Dioxide - 26  Chloride - 100  Creatinine - 2.9  Glucose - 104  Potassium - 3.4  Sodium - 143      Hemoglobin A1c -   PT/INR - ( 2020 06:46 )   PT: 38.20 sec;   INR: 3.32 ratio           MEDICATIONS  (STANDING):  ascorbic acid 500 milliGRAM(s) Oral daily  aspirin  chewable 81 milliGRAM(s) Oral daily  atorvastatin 40 milliGRAM(s) Oral at bedtime  budesonide 160 MICROgram(s)/formoterol 4.5 MICROgram(s) Inhaler 2 Puff(s) Inhalation two times a day  chlorhexidine 4% Liquid 1 Application(s) Topical once  finasteride 5 milliGRAM(s) Oral daily  furosemide    Tablet 80 milliGRAM(s) Oral two times a day  insulin glargine Injectable (LANTUS) 20 Unit(s) SubCutaneous at bedtime  isosorbide   mononitrate ER Tablet (IMDUR) 30 milliGRAM(s) Oral daily  metolazone 5 milliGRAM(s) Oral daily  metoprolol succinate ER 50 milliGRAM(s) Oral daily  multivitamin 1 Tablet(s) Oral daily  pantoprazole    Tablet 40 milliGRAM(s) Oral before breakfast  polyethylene glycol 3350 17 Gram(s) Oral daily  potassium chloride    Tablet ER 40 milliEquivalent(s) Oral daily  sevelamer carbonate 800 milliGRAM(s) Oral three times a day with meals  silver sulfADIAZINE 1% Cream 1 Application(s) Topical daily  silver sulfADIAZINE 1% Cream 1 Application(s) Topical daily    MEDICATIONS  (PRN):  ALPRAZolam 0.25 milliGRAM(s) Oral two times a day PRN Anxiety  HYDROmorphone  Injectable 0.25 milliGRAM(s) IV Push every 4 hours PRN Severe Pain (7 - 10)

## 2020-07-11 NOTE — DISCHARGE NOTE PROVIDER - CARE PROVIDER_API CALL
Yevgeniy Maria)  Cardiovascular Disease; Internal Medicine  [address] [place] [address]  Phone: [phone]  Fax: [phone]  Follow Up Time: Yevgeniy Maria)  Cardiovascular Disease; Internal Medicine  [address] [place] [address]  Phone: [phone]  Fax: [phone]  Follow Up Time:     [place] [address]  Phone: [phone]  Fax: [phone]  Follow Up Time:

## 2020-07-11 NOTE — PROGRESS NOTE ADULT - ASSESSMENT
79 y/o male with a PMH of CAD s/p CABG s/p PCI, severe symptomatic AS s/p BAV as a bridge to TAVR (6/10), afib/flutter on coumadin, left carotid artery stenosis (80%), DM2, HLD,  HTN, COPD , CKD stage 4, BPH was sent from [facility] to the ED with shortness of breath and LE edema.     #Acute on chronic diastolic CHF exacerbation   #Severe AS s/p BAV  patient is a hospice candidate but son does not want to proceed with hospice  patient recently had BAV for severe AS, not a candidate for TAVR   c/w PO Lasix and metolazone     #Hypokalemia - on 40meq daily,	replete     #Hx CAD s/p CABG s/p PCI    c/w asa, lipitor, Imdur, BB    #History of AFIB c/w coumadin target INR 2-3. Hold coumadin     #Hx of Left Carotid Artery stenosis  c/w asa, statin     #CKD 4 stable scr at baseline. BUN up trending, nephro following.     #Chronic elevated troponin likely due to CKD IV     #COPD - stable     #DM2 - c/w insulin    #Chronic thrombocytopenia - stable     #COPD - c/w LABA/ICS    #BPH - c/w finasteride, TOV today     DVT PX on coumadin     #Progress Note Handoff  Pending (specify): son refused DC today, anticipated for dc in AM, CM aware, TOV   Family discussion: dw son Deangelo over the phone   Disposition: SNF

## 2020-07-11 NOTE — PROGRESS NOTE ADULT - ASSESSMENT
Pt with CKD 4, DM, HTN, AS s/p BAV, COPD on home O2, admitted with worsening SOB due to fluid overload.    CKD4 -   creatinine sable  continue lasix   non oliguric   last ph noted   ,  on renagel 1/1/1  mild hypokelmia,improving   LE cellulitis - local care, silvadene, podiatry  Palliative care notes appreciated  will follow  ? d/c plan

## 2020-07-11 NOTE — DISCHARGE NOTE PROVIDER - PROVIDER TOKENS
PROVIDER:[TOKEN:[[id number]]] PROVIDER:[TOKEN:[[id number]]],PROVIDER:[TOKEN:[[id number]]]

## 2020-07-12 ENCOUNTER — TRANSCRIPTION ENCOUNTER (OUTPATIENT)
Age: 80
End: 2020-07-12

## 2020-07-12 LAB
ALBUMIN SERPL ELPH-MCNC: 3.3 G/DL — LOW (ref 3.5–5.2)
ALP SERPL-CCNC: 98 U/L — SIGNIFICANT CHANGE UP (ref 30–115)
ALT FLD-CCNC: 13 U/L — SIGNIFICANT CHANGE UP (ref 0–41)
ANION GAP SERPL CALC-SCNC: 20 MMOL/L — HIGH (ref 7–14)
AST SERPL-CCNC: 23 U/L — SIGNIFICANT CHANGE UP (ref 0–41)
BASOPHILS # BLD AUTO: 0.05 K/UL — SIGNIFICANT CHANGE UP (ref 0–0.2)
BASOPHILS NFR BLD AUTO: 0.6 % — SIGNIFICANT CHANGE UP (ref 0–1)
BILIRUB SERPL-MCNC: 0.8 MG/DL — SIGNIFICANT CHANGE UP (ref 0.2–1.2)
BUN SERPL-MCNC: 136 MG/DL — CRITICAL HIGH (ref 10–20)
CALCIUM SERPL-MCNC: 9.2 MG/DL — SIGNIFICANT CHANGE UP (ref 8.5–10.1)
CHLORIDE SERPL-SCNC: 99 MMOL/L — SIGNIFICANT CHANGE UP (ref 98–110)
CO2 SERPL-SCNC: 26 MMOL/L — SIGNIFICANT CHANGE UP (ref 17–32)
CREAT SERPL-MCNC: 2.6 MG/DL — HIGH (ref 0.7–1.5)
EOSINOPHIL # BLD AUTO: 0.28 K/UL — SIGNIFICANT CHANGE UP (ref 0–0.7)
EOSINOPHIL NFR BLD AUTO: 3.4 % — SIGNIFICANT CHANGE UP (ref 0–8)
GLUCOSE BLDC GLUCOMTR-MCNC: 159 MG/DL — HIGH (ref 70–99)
GLUCOSE BLDC GLUCOMTR-MCNC: 73 MG/DL — SIGNIFICANT CHANGE UP (ref 70–99)
GLUCOSE BLDC GLUCOMTR-MCNC: 89 MG/DL — SIGNIFICANT CHANGE UP (ref 70–99)
GLUCOSE BLDC GLUCOMTR-MCNC: 93 MG/DL — SIGNIFICANT CHANGE UP (ref 70–99)
GLUCOSE SERPL-MCNC: 73 MG/DL — SIGNIFICANT CHANGE UP (ref 70–99)
HCT VFR BLD CALC: 42.3 % — SIGNIFICANT CHANGE UP (ref 42–52)
HGB BLD-MCNC: 13.6 G/DL — LOW (ref 14–18)
IMM GRANULOCYTES NFR BLD AUTO: 0.2 % — SIGNIFICANT CHANGE UP (ref 0.1–0.3)
INR BLD: 2.77 RATIO — HIGH (ref 0.65–1.3)
LYMPHOCYTES # BLD AUTO: 1.49 K/UL — SIGNIFICANT CHANGE UP (ref 1.2–3.4)
LYMPHOCYTES # BLD AUTO: 18.1 % — LOW (ref 20.5–51.1)
MAGNESIUM SERPL-MCNC: 2.5 MG/DL — HIGH (ref 1.8–2.4)
MCHC RBC-ENTMCNC: 28.5 PG — SIGNIFICANT CHANGE UP (ref 27–31)
MCHC RBC-ENTMCNC: 32.2 G/DL — SIGNIFICANT CHANGE UP (ref 32–37)
MCV RBC AUTO: 88.7 FL — SIGNIFICANT CHANGE UP (ref 80–94)
MONOCYTES # BLD AUTO: 0.95 K/UL — HIGH (ref 0.1–0.6)
MONOCYTES NFR BLD AUTO: 11.6 % — HIGH (ref 1.7–9.3)
NEUTROPHILS # BLD AUTO: 5.43 K/UL — SIGNIFICANT CHANGE UP (ref 1.4–6.5)
NEUTROPHILS NFR BLD AUTO: 66.1 % — SIGNIFICANT CHANGE UP (ref 42.2–75.2)
NRBC # BLD: 0 /100 WBCS — SIGNIFICANT CHANGE UP (ref 0–0)
PHOSPHATE SERPL-MCNC: 4.9 MG/DL — SIGNIFICANT CHANGE UP (ref 2.1–4.9)
PLATELET # BLD AUTO: 132 K/UL — SIGNIFICANT CHANGE UP (ref 130–400)
POTASSIUM SERPL-MCNC: 3.6 MMOL/L — SIGNIFICANT CHANGE UP (ref 3.5–5)
POTASSIUM SERPL-SCNC: 3.6 MMOL/L — SIGNIFICANT CHANGE UP (ref 3.5–5)
PROT SERPL-MCNC: 6.3 G/DL — SIGNIFICANT CHANGE UP (ref 6–8)
PROTHROM AB SERPL-ACNC: 31.8 SEC — HIGH (ref 9.95–12.87)
RBC # BLD: 4.77 M/UL — SIGNIFICANT CHANGE UP (ref 4.7–6.1)
RBC # FLD: 15.6 % — HIGH (ref 11.5–14.5)
SODIUM SERPL-SCNC: 145 MMOL/L — SIGNIFICANT CHANGE UP (ref 135–146)
WBC # BLD: 8.22 K/UL — SIGNIFICANT CHANGE UP (ref 4.8–10.8)
WBC # FLD AUTO: 8.22 K/UL — SIGNIFICANT CHANGE UP (ref 4.8–10.8)

## 2020-07-12 PROCEDURE — 99233 SBSQ HOSP IP/OBS HIGH 50: CPT

## 2020-07-12 RX ORDER — WARFARIN SODIUM 2.5 MG/1
3 TABLET ORAL AT BEDTIME
Refills: 0 | Status: COMPLETED | OUTPATIENT
Start: 2020-07-12 | End: 2020-07-12

## 2020-07-12 RX ADMIN — FINASTERIDE 5 MILLIGRAM(S): 5 TABLET, FILM COATED ORAL at 12:01

## 2020-07-12 RX ADMIN — Medication 50 MILLIGRAM(S): at 05:46

## 2020-07-12 RX ADMIN — Medication 81 MILLIGRAM(S): at 12:01

## 2020-07-12 RX ADMIN — BUDESONIDE AND FORMOTEROL FUMARATE DIHYDRATE 2 PUFF(S): 160; 4.5 AEROSOL RESPIRATORY (INHALATION) at 08:00

## 2020-07-12 RX ADMIN — WARFARIN SODIUM 3 MILLIGRAM(S): 2.5 TABLET ORAL at 21:45

## 2020-07-12 RX ADMIN — PANTOPRAZOLE SODIUM 40 MILLIGRAM(S): 20 TABLET, DELAYED RELEASE ORAL at 05:46

## 2020-07-12 RX ADMIN — ISOSORBIDE MONONITRATE 30 MILLIGRAM(S): 60 TABLET, EXTENDED RELEASE ORAL at 12:02

## 2020-07-12 RX ADMIN — BUDESONIDE AND FORMOTEROL FUMARATE DIHYDRATE 2 PUFF(S): 160; 4.5 AEROSOL RESPIRATORY (INHALATION) at 21:45

## 2020-07-12 RX ADMIN — ATORVASTATIN CALCIUM 40 MILLIGRAM(S): 80 TABLET, FILM COATED ORAL at 21:44

## 2020-07-12 RX ADMIN — Medication 500 MILLIGRAM(S): at 12:01

## 2020-07-12 RX ADMIN — Medication 1 TABLET(S): at 12:02

## 2020-07-12 RX ADMIN — Medication 1 APPLICATION(S): at 12:02

## 2020-07-12 NOTE — DISCHARGE NOTE NURSING/CASE MANAGEMENT/SOCIAL WORK - PATIENT PORTAL LINK FT
You can access the FollowMyHealth Patient Portal offered by [facility] by registering at the following website: [email or web address]. By joining [employer]’s FollowMyHealth portal, you will also be able to view your health information using other applications (apps) compatible with our system.

## 2020-07-12 NOTE — PROGRESS NOTE ADULT - SUBJECTIVE AND OBJECTIVE BOX
Nephrology progress note    THIS IS AN INCOMPLETE NOTE . FULL NOTE TO FOLLOW SHORTLY    Patient is seen and examined, events over the last 24 h noted .    Allergies:  No Known Allergies    Hospital Medications:   MEDICATIONS  (STANDING):  ascorbic acid 500 milliGRAM(s) Oral daily  aspirin  chewable 81 milliGRAM(s) Oral daily  atorvastatin 40 milliGRAM(s) Oral at bedtime  budesonide 160 MICROgram(s)/formoterol 4.5 MICROgram(s) Inhaler 2 Puff(s) Inhalation two times a day  chlorhexidine 4% Liquid 1 Application(s) Topical once  finasteride 5 milliGRAM(s) Oral daily  furosemide    Tablet 80 milliGRAM(s) Oral two times a day  insulin glargine Injectable (LANTUS) 20 Unit(s) SubCutaneous at bedtime  isosorbide   mononitrate ER Tablet (IMDUR) 30 milliGRAM(s) Oral daily  metolazone 5 milliGRAM(s) Oral daily  metoprolol succinate ER 50 milliGRAM(s) Oral daily  multivitamin 1 Tablet(s) Oral daily  pantoprazole    Tablet 40 milliGRAM(s) Oral before breakfast  polyethylene glycol 3350 17 Gram(s) Oral daily  potassium chloride    Tablet ER 40 milliEquivalent(s) Oral daily  sevelamer carbonate 800 milliGRAM(s) Oral three times a day with meals  silver sulfADIAZINE 1% Cream 1 Application(s) Topical daily  silver sulfADIAZINE 1% Cream 1 Application(s) Topical daily        VITALS:  T(F): 96 (07-12-20 @ 05:33), Max: 96.8 (07-11-20 @ 13:00)  HR: 57 (07-12-20 @ 05:33)  BP: 119/57 (07-12-20 @ 05:33)  RR: 18 (07-12-20 @ 05:33)  SpO2: 98% (07-11-20 @ 20:27)  Wt(kg): --    07-10 @ 07:01  -  07-11 @ 07:00  --------------------------------------------------------  IN: 600 mL / OUT: 650 mL / NET: -50 mL    07-11 @ 07:01  -  07-12 @ 07:00  --------------------------------------------------------  IN: 240 mL / OUT: 1100 mL / NET: -860 mL          PHYSICAL EXAM:  Constitutional: NAD  HEENT: anicteric sclera, oropharynx clear, MMM  Neck: No JVD  Respiratory: CTAB, no wheezes, rales or rhonchi  Cardiovascular: S1, S2, RRR  Gastrointestinal: BS+, soft, NT/ND  Extremities: No cyanosis or clubbing. No peripheral edema  :  No julian.   Skin: No rashes    LABS:  07-11    143  |  100  |  141<HH>  ----------------------------<  104<H>  3.4<L>   |  26  |  2.9<H>    Ca    9.0      11 Jul 2020 06:46  Phos  5.1     07-11  Mg     2.5     07-11    TPro  6.2  /  Alb  3.3<L>  /  TBili  0.7  /  DBili      /  AST  23  /  ALT  14  /  AlkPhos  99  07-11                          13.6   6.89  )-----------( 118      ( 11 Jul 2020 06:46 )             42.1       Urine Studies:      RADIOLOGY & ADDITIONAL STUDIES: Nephrology progress note  Patient is seen and examined, events over the last 24 h noted .  lying in bed  no major changes in clinical status     Allergies:  No Known Allergies    Hospital Medications:   MEDICATIONS  (STANDING):    ascorbic acid 500 milliGRAM(s) Oral daily  aspirin  chewable 81 milliGRAM(s) Oral daily  atorvastatin 40 milliGRAM(s) Oral at bedtime  budesonide 160 MICROgram(s)/formoterol 4.5 MICROgram(s) Inhaler 2 Puff(s) Inhalation two times a day  finasteride 5 milliGRAM(s) Oral daily  furosemide    Tablet 80 milliGRAM(s) Oral two times a day  insulin glargine Injectable (LANTUS) 20 Unit(s) SubCutaneous at bedtime  isosorbide   mononitrate ER Tablet (IMDUR) 30 milliGRAM(s) Oral daily  metolazone 5 milliGRAM(s) Oral daily  metoprolol succinate ER 50 milliGRAM(s) Oral daily  multivitamin 1 Tablet(s) Oral daily  pantoprazole    Tablet 40 milliGRAM(s) Oral before breakfast  polyethylene glycol 3350 17 Gram(s) Oral daily  potassium chloride    Tablet ER 40 milliEquivalent(s) Oral daily  sevelamer carbonate 800 milliGRAM(s) Oral three times a day with meals  silver sulfADIAZINE 1% Cream 1 Application(s) Topical daily  silver sulfADIAZINE 1% Cream 1 Application(s) Topical daily        VITALS:  T(F): 96 (07-12-20 @ 05:33), Max: 96.8 (07-11-20 @ 13:00)  HR: 57 (07-12-20 @ 05:33)  BP: 119/57 (07-12-20 @ 05:33)  RR: 18 (07-12-20 @ 05:33)  SpO2: 98% (07-11-20 @ 20:27)      07-10 @ 07:01  -  07-11 @ 07:00  --------------------------------------------------------  IN: 600 mL / OUT: 650 mL / NET: -50 mL    07-11 @ 07:01  -  07-12 @ 07:00  --------------------------------------------------------  IN: 240 mL / OUT: 1100 mL / NET: -860 mL          PHYSICAL EXAM:  Constitutional: NAD hard of hearing  HEENT: anicteric sclera, oropharynx clear, MMM  Neck: No JVD  Respiratory: CTAB, no wheezes, rales or rhonchi  Cardiovascular: S1, S2, RRR  Gastrointestinal: BS+, soft, NT/ND  Extremities: No cyanosis or clubbing. No peripheral edema  :  No julian.   Skin: No rashes    LABS:  07-12    145  |  99  |  136<HH>  ----------------------------<  73  3.6   |  26  |  2.6<H>    Ca    9.2      12 Jul 2020 05:55  Phos  4.9     07-12  Mg     2.5     07-12    TPro  6.3  /  Alb  3.3<L>  /  TBili  0.8  /  DBili  x   /  AST  23  /  ALT  13  /  AlkPhos  98  07-12    07-11    143  |  100  |  141<HH>  ----------------------------<  104<H>  3.4<L>   |  26  |  2.9<H>    Ca    9.0      11 Jul 2020 06:46  Phos  5.1     07-11  Mg     2.5     07-11    TPro  6.2  /  Alb  3.3<L>  /  TBili  0.7  /  DBili      /  AST  23  /  ALT  14  /  AlkPhos  99  07-11                          13.6   6.89  )-----------( 118      ( 11 Jul 2020 06:46 )             42.1       Urine Studies:      RADIOLOGY & ADDITIONAL STUDIES:

## 2020-07-12 NOTE — PROGRESS NOTE ADULT - ASSESSMENT
Pt with CKD 4, DM, HTN, AS s/p BAV, COPD on home O2, admitted with worsening SOB due to fluid overload.    CKD4 - creat better almost at baseline   continue lasix  metolazone current   non oliguric   last ph noted   ,  on renagel 1/1/1  mild hypokelmia,improving   LE cellulitis - local care, silvadene, podiatry  Palliative care notes appreciated  will follow  ? d/c plan

## 2020-07-12 NOTE — DISCHARGE NOTE NURSING/CASE MANAGEMENT/SOCIAL WORK - NSDCPEPTCOWAR_GEN_ALL_CORE
Warfarin/Coumadin - Dietary Advice/Warfarin/Coumadin - Compliance/Warfarin/Coumadin - Potential for adverse drug reactions and interactions/Warfarin/Coumadin - Follow up monitoring

## 2020-07-12 NOTE — PROGRESS NOTE ADULT - SUBJECTIVE AND OBJECTIVE BOX
Pt seen and examined at bedside. No complaints.     VITAL SIGNS (Last 24 hrs):  T(C): 36.7 (20 @ 13:07), Max: 36.7 (20 @ 13:07)  HR: 56 (20 @ 13:07) (56 - 57)  BP: 98/53 (20 @ 13:07) (98/53 - 119/57)  RR: 18 (20 @ 13:07) (18 - 18)  SpO2: 99% (20 @ 20:14) (98% - 99%)  Wt(kg): --  Daily     Daily Weight in k.8 (2020 05:33)    I&O's Summary    2020 07:  -  2020 07:00  --------------------------------------------------------  IN: 240 mL / OUT: 1100 mL / NET: -860 mL    2020 07:01  -  2020 20:21  --------------------------------------------------------  IN: 330 mL / OUT: 0 mL / NET: 330 mL        PHYSICAL EXAM:  GENERAL: NAD   HEAD:  Atraumatic, Normocephalic  EYES: EOMI, PERRLA, conjunctiva and sclera clear  NECK: Supple, No JVD  CHEST/LUNG: Clear to auscultation bilaterally; No wheeze  HEART: Regular rate and rhythm; No murmurs, rubs, or gallops  ABDOMEN: Soft, Nontender, Nondistended; Bowel sounds present  EXTREMITIES:  2+ Peripheral Pulses, No clubbing, cyanosis, or edema  PSYCH: AAOx3  NEUROLOGY: non-focal  SKIN: No rashes or lesions    Labs Reviewed    CBC Full  -  ( 2020 05:55 )  WBC Count : 8.22 K/uL  Hemoglobin : 13.6 g/dL  Hematocrit : 42.3 %  Platelet Count - Automated : 132 K/uL  Mean Cell Volume : 88.7 fL  Mean Cell Hemoglobin : 28.5 pg  Mean Cell Hemoglobin Concentration : 32.2 g/dL  Auto Neutrophil # : 5.43 K/uL  Auto Lymphocyte # : 1.49 K/uL  Auto Monocyte # : 0.95 K/uL  Auto Eosinophil # : 0.28 K/uL  Auto Basophil # : 0.05 K/uL  Auto Neutrophil % : 66.1 %  Auto Lymphocyte % : 18.1 %  Auto Monocyte % : 11.6 %  Auto Eosinophil % : 3.4 %  Auto Basophil % : 0.6 %    BMP:     @ 05:55    Blood Urea Nitrogen - 136  Calcium - 9.2  Carbond Dioxide - 26  Chloride - 99  Creatinine - 2.6  Glucose - 73  Potassium - 3.6  Sodium - 145      Hemoglobin A1c -   PT/INR - ( 2020 05:55 )   PT: 31.80 sec;   INR: 2.77 ratio           MEDICATIONS  (STANDING):  ascorbic acid 500 milliGRAM(s) Oral daily  aspirin  chewable 81 milliGRAM(s) Oral daily  atorvastatin 40 milliGRAM(s) Oral at bedtime  budesonide 160 MICROgram(s)/formoterol 4.5 MICROgram(s) Inhaler 2 Puff(s) Inhalation two times a day  chlorhexidine 4% Liquid 1 Application(s) Topical once  finasteride 5 milliGRAM(s) Oral daily  furosemide    Tablet 80 milliGRAM(s) Oral two times a day  insulin glargine Injectable (LANTUS) 20 Unit(s) SubCutaneous at bedtime  isosorbide   mononitrate ER Tablet (IMDUR) 30 milliGRAM(s) Oral daily  metolazone 5 milliGRAM(s) Oral daily  metoprolol succinate ER 50 milliGRAM(s) Oral daily  multivitamin 1 Tablet(s) Oral daily  pantoprazole    Tablet 40 milliGRAM(s) Oral before breakfast  polyethylene glycol 3350 17 Gram(s) Oral daily  potassium chloride    Tablet ER 40 milliEquivalent(s) Oral daily  sevelamer carbonate 800 milliGRAM(s) Oral three times a day with meals  silver sulfADIAZINE 1% Cream 1 Application(s) Topical daily  silver sulfADIAZINE 1% Cream 1 Application(s) Topical daily    MEDICATIONS  (PRN):  ALPRAZolam 0.25 milliGRAM(s) Oral two times a day PRN Anxiety

## 2020-07-12 NOTE — PROGRESS NOTE ADULT - ASSESSMENT
79 y/o male with a PMH of CAD s/p CABG s/p PCI, severe symptomatic AS s/p BAV as a bridge to TAVR (6/10), afib/flutter on coumadin, left carotid artery stenosis (80%), DM2, HLD,  HTN, COPD , CKD stage 4, BPH was sent from [facility] to the ED with shortness of breath and LE edema.     #Acute on chronic diastolic CHF exacerbation   #Severe AS s/p BAV  patient is a hospice candidate but son does not want to proceed with hospice  patient recently had BAV for severe AS, not a candidate for TAVR   c/w PO Lasix and metolazone     #Hypokalemia - resolved, c/w KCl 40meq daily    #Hx CAD s/p CABG s/p PCI    c/w asa, lipitor, Imdur, BB    #History of AFIB c/w coumadin target INR 2-3. Hold coumadin     #Hx of Left Carotid Artery stenosis  c/w asa, statin     #CKD 4 stable scr at baseline. BUN up trending, nephro following.     #Chronic elevated troponin likely due to CKD IV     #COPD - stable     #DM2 - c/w insulin, reduce lantus to 15 units    #Chronic thrombocytopenia - stable     #COPD - c/w LABA/ICS    #BPH - c/w finasteride, passed TOV yesterday     DVT PX on coumadin     #Progress Note Handoff  Pending (specify): discharged, son appealed discharge   Family discussion: called multiple times, no answer   Disposition: SNF

## 2020-07-13 LAB
ALBUMIN SERPL ELPH-MCNC: 3.1 G/DL — LOW (ref 3.5–5.2)
ALP SERPL-CCNC: 90 U/L — SIGNIFICANT CHANGE UP (ref 30–115)
ALT FLD-CCNC: 15 U/L — SIGNIFICANT CHANGE UP (ref 0–41)
ANION GAP SERPL CALC-SCNC: 22 MMOL/L — HIGH (ref 7–14)
AST SERPL-CCNC: 49 U/L — HIGH (ref 0–41)
BASOPHILS # BLD AUTO: 0.05 K/UL — SIGNIFICANT CHANGE UP (ref 0–0.2)
BASOPHILS NFR BLD AUTO: 0.6 % — SIGNIFICANT CHANGE UP (ref 0–1)
BILIRUB SERPL-MCNC: 0.8 MG/DL — SIGNIFICANT CHANGE UP (ref 0.2–1.2)
BUN SERPL-MCNC: 139 MG/DL — CRITICAL HIGH (ref 10–20)
CALCIUM SERPL-MCNC: 9.1 MG/DL — SIGNIFICANT CHANGE UP (ref 8.5–10.1)
CHLORIDE SERPL-SCNC: 99 MMOL/L — SIGNIFICANT CHANGE UP (ref 98–110)
CO2 SERPL-SCNC: 19 MMOL/L — SIGNIFICANT CHANGE UP (ref 17–32)
CREAT SERPL-MCNC: 2.8 MG/DL — HIGH (ref 0.7–1.5)
EOSINOPHIL # BLD AUTO: 0.23 K/UL — SIGNIFICANT CHANGE UP (ref 0–0.7)
EOSINOPHIL NFR BLD AUTO: 3 % — SIGNIFICANT CHANGE UP (ref 0–8)
GLUCOSE BLDC GLUCOMTR-MCNC: 112 MG/DL — HIGH (ref 70–99)
GLUCOSE BLDC GLUCOMTR-MCNC: 114 MG/DL — HIGH (ref 70–99)
GLUCOSE BLDC GLUCOMTR-MCNC: 114 MG/DL — HIGH (ref 70–99)
GLUCOSE BLDC GLUCOMTR-MCNC: 127 MG/DL — HIGH (ref 70–99)
GLUCOSE BLDC GLUCOMTR-MCNC: 79 MG/DL — SIGNIFICANT CHANGE UP (ref 70–99)
GLUCOSE BLDC GLUCOMTR-MCNC: 87 MG/DL — SIGNIFICANT CHANGE UP (ref 70–99)
GLUCOSE SERPL-MCNC: 78 MG/DL — SIGNIFICANT CHANGE UP (ref 70–99)
HCT VFR BLD CALC: 44.5 % — SIGNIFICANT CHANGE UP (ref 42–52)
HGB BLD-MCNC: 14 G/DL — SIGNIFICANT CHANGE UP (ref 14–18)
IMM GRANULOCYTES NFR BLD AUTO: 0.4 % — HIGH (ref 0.1–0.3)
LYMPHOCYTES # BLD AUTO: 1.46 K/UL — SIGNIFICANT CHANGE UP (ref 1.2–3.4)
LYMPHOCYTES # BLD AUTO: 18.8 % — LOW (ref 20.5–51.1)
MAGNESIUM SERPL-MCNC: 2.4 MG/DL — SIGNIFICANT CHANGE UP (ref 1.8–2.4)
MCHC RBC-ENTMCNC: 27.7 PG — SIGNIFICANT CHANGE UP (ref 27–31)
MCHC RBC-ENTMCNC: 31.5 G/DL — LOW (ref 32–37)
MCV RBC AUTO: 87.9 FL — SIGNIFICANT CHANGE UP (ref 80–94)
MONOCYTES # BLD AUTO: 0.93 K/UL — HIGH (ref 0.1–0.6)
MONOCYTES NFR BLD AUTO: 12 % — HIGH (ref 1.7–9.3)
NEUTROPHILS # BLD AUTO: 5.07 K/UL — SIGNIFICANT CHANGE UP (ref 1.4–6.5)
NEUTROPHILS NFR BLD AUTO: 65.2 % — SIGNIFICANT CHANGE UP (ref 42.2–75.2)
NRBC # BLD: 0 /100 WBCS — SIGNIFICANT CHANGE UP (ref 0–0)
PHOSPHATE SERPL-MCNC: 5 MG/DL — HIGH (ref 2.1–4.9)
PLATELET # BLD AUTO: 129 K/UL — LOW (ref 130–400)
POTASSIUM SERPL-MCNC: 5.2 MMOL/L — HIGH (ref 3.5–5)
POTASSIUM SERPL-SCNC: 5.2 MMOL/L — HIGH (ref 3.5–5)
PROT SERPL-MCNC: 6.6 G/DL — SIGNIFICANT CHANGE UP (ref 6–8)
RBC # BLD: 5.06 M/UL — SIGNIFICANT CHANGE UP (ref 4.7–6.1)
RBC # FLD: 15.7 % — HIGH (ref 11.5–14.5)
SODIUM SERPL-SCNC: 140 MMOL/L — SIGNIFICANT CHANGE UP (ref 135–146)
WBC # BLD: 7.77 K/UL — SIGNIFICANT CHANGE UP (ref 4.8–10.8)
WBC # FLD AUTO: 7.77 K/UL — SIGNIFICANT CHANGE UP (ref 4.8–10.8)

## 2020-07-13 PROCEDURE — 99233 SBSQ HOSP IP/OBS HIGH 50: CPT

## 2020-07-13 RX ORDER — HYDROMORPHONE HYDROCHLORIDE 2 MG/ML
0.25 INJECTION INTRAMUSCULAR; INTRAVENOUS; SUBCUTANEOUS EVERY 4 HOURS
Refills: 0 | Status: DISCONTINUED | OUTPATIENT
Start: 2020-07-13 | End: 2020-07-13

## 2020-07-13 RX ADMIN — BUDESONIDE AND FORMOTEROL FUMARATE DIHYDRATE 2 PUFF(S): 160; 4.5 AEROSOL RESPIRATORY (INHALATION) at 07:40

## 2020-07-13 RX ADMIN — FINASTERIDE 5 MILLIGRAM(S): 5 TABLET, FILM COATED ORAL at 11:21

## 2020-07-13 RX ADMIN — ISOSORBIDE MONONITRATE 30 MILLIGRAM(S): 60 TABLET, EXTENDED RELEASE ORAL at 11:21

## 2020-07-13 RX ADMIN — Medication 1 APPLICATION(S): at 13:18

## 2020-07-13 RX ADMIN — Medication 500 MILLIGRAM(S): at 11:21

## 2020-07-13 RX ADMIN — PANTOPRAZOLE SODIUM 40 MILLIGRAM(S): 20 TABLET, DELAYED RELEASE ORAL at 05:47

## 2020-07-13 RX ADMIN — Medication 81 MILLIGRAM(S): at 11:21

## 2020-07-13 RX ADMIN — Medication 1 TABLET(S): at 11:21

## 2020-07-13 RX ADMIN — ATORVASTATIN CALCIUM 40 MILLIGRAM(S): 80 TABLET, FILM COATED ORAL at 21:36

## 2020-07-13 RX ADMIN — Medication 50 MILLIGRAM(S): at 05:47

## 2020-07-13 RX ADMIN — BUDESONIDE AND FORMOTEROL FUMARATE DIHYDRATE 2 PUFF(S): 160; 4.5 AEROSOL RESPIRATORY (INHALATION) at 21:15

## 2020-07-13 NOTE — PROGRESS NOTE ADULT - SUBJECTIVE AND OBJECTIVE BOX
SOB better per patient   he looks tired  no chest pain     Vital Signs Last 24 Hrs  T(C): 35.6 (13 Jul 2020 12:52), Max: 36.4 (12 Jul 2020 20:42)  T(F): 96 (13 Jul 2020 12:52), Max: 97.5 (12 Jul 2020 20:42)  HR: 56 (13 Jul 2020 12:52) (56 - 56)  BP: 104/53 (13 Jul 2020 12:52) (104/53 - 128/68)  BP(mean): --  RR: 20 (13 Jul 2020 12:52) (18 - 20)  SpO2: 95% (13 Jul 2020 05:52) (95% - 99%)    PHYSICAL EXAM:  GENERAL: NAD  Pulm: decreased air entry at bases   CV: Regular rate and rhythm; No murmurs, rubs, or gallops  GI: Soft, Nontender, Nondistended; Bowel sounds present  EXTREMITIES:  2+ Peripheral Pulses, No clubbing, cyanosis, or edema  PSYCH: AAOx3  NEUROLOGY: non-focal  SKIN: No rashes or lesions                          14.0   7.77  )-----------( 129      ( 13 Jul 2020 06:38 )             44.5     07-13    140  |  99  |  139<HH>  ----------------------------<  78  5.2<H>   |  19  |  2.8<H>    Ca    9.1      13 Jul 2020 06:38  Phos  5.0     07-13  Mg     2.4     07-13    TPro  6.6  /  Alb  3.1<L>  /  TBili  0.8  /  DBili  x   /  AST  49<H>  /  ALT  15  /  AlkPhos  90  07-13    LIVER FUNCTIONS - ( 13 Jul 2020 06:38 )  Alb: 3.1 g/dL / Pro: 6.6 g/dL / ALK PHOS: 90 U/L / ALT: 15 U/L / AST: 49 U/L / GGT: x           PT/INR - ( 12 Jul 2020 05:55 )   PT: 31.80 sec;   INR: 2.77 ratio               MEDICATIONS  (STANDING):  ascorbic acid 500 milliGRAM(s) Oral daily  aspirin  chewable 81 milliGRAM(s) Oral daily  atorvastatin 40 milliGRAM(s) Oral at bedtime  budesonide 160 MICROgram(s)/formoterol 4.5 MICROgram(s) Inhaler 2 Puff(s) Inhalation two times a day  chlorhexidine 4% Liquid 1 Application(s) Topical once  finasteride 5 milliGRAM(s) Oral daily  furosemide    Tablet 80 milliGRAM(s) Oral two times a day  insulin glargine Injectable (LANTUS) 15 Unit(s) SubCutaneous at bedtime  isosorbide   mononitrate ER Tablet (IMDUR) 30 milliGRAM(s) Oral daily  metolazone 5 milliGRAM(s) Oral daily  metoprolol succinate ER 50 milliGRAM(s) Oral daily  multivitamin 1 Tablet(s) Oral daily  pantoprazole    Tablet 40 milliGRAM(s) Oral before breakfast  polyethylene glycol 3350 17 Gram(s) Oral daily  sevelamer carbonate 800 milliGRAM(s) Oral three times a day with meals  silver sulfADIAZINE 1% Cream 1 Application(s) Topical daily  silver sulfADIAZINE 1% Cream 1 Application(s) Topical daily    MEDICATIONS  (PRN):  ALPRAZolam 0.25 milliGRAM(s) Oral two times a day PRN Anxiety  HYDROmorphone  Injectable 0.25 milliGRAM(s) IV Push every 4 hours PRN pain or dyspnea

## 2020-07-13 NOTE — PROGRESS NOTE ADULT - SUBJECTIVE AND OBJECTIVE BOX
80yMale with diagnosis: CHF (CONGESTIVE HEART FAILURE)      Patient seen for follow up      PHYSICAL EXAM  Pt alert, more confused  Pt is drowsy but easily arousable  noted with respiratory effort when sleeping, pt denies feeling SOB at this moment    T(C): , Max: 36.4 (20:42)  T(F): 96  HR: 56 (56 - 56)  BP: 104/53 (104/53 - 128/68)  RR: 20 (18 - 20)  SpO2: 95% (95% - 99%)              LABS:                          14.0   7.77  )-----------( 129      ( 13 Jul 2020 06:38 )             44.5                                                                                      07-13    140  |  99  |  139<HH>  ----------------------------<  78  5.2<H>   |  19  |  2.8<H>    Ca    9.1      13 Jul 2020 06:38  Phos  5.0     07-13  Mg     2.4     07-13    TPro  6.6  /  Alb  3.1<L>  /  TBili  0.8  /  DBili  x   /  AST  49<H>  /  ALT  15  /  AlkPhos  90  07-13                                                      MEDICATIONS  (STANDING):  ascorbic acid 500 milliGRAM(s) Oral daily  aspirin  chewable 81 milliGRAM(s) Oral daily  atorvastatin 40 milliGRAM(s) Oral at bedtime  budesonide 160 MICROgram(s)/formoterol 4.5 MICROgram(s) Inhaler 2 Puff(s) Inhalation two times a day  chlorhexidine 4% Liquid 1 Application(s) Topical once  finasteride 5 milliGRAM(s) Oral daily  furosemide    Tablet 80 milliGRAM(s) Oral two times a day  insulin glargine Injectable (LANTUS) 15 Unit(s) SubCutaneous at bedtime  isosorbide   mononitrate ER Tablet (IMDUR) 30 milliGRAM(s) Oral daily  metolazone 5 milliGRAM(s) Oral daily  metoprolol succinate ER 50 milliGRAM(s) Oral daily  multivitamin 1 Tablet(s) Oral daily  pantoprazole    Tablet 40 milliGRAM(s) Oral before breakfast  polyethylene glycol 3350 17 Gram(s) Oral daily  sevelamer carbonate 800 milliGRAM(s) Oral three times a day with meals  silver sulfADIAZINE 1% Cream 1 Application(s) Topical daily  silver sulfADIAZINE 1% Cream 1 Application(s) Topical daily    MEDICATIONS  (PRN):  ALPRAZolam 0.25 milliGRAM(s) Oral two times a day PRN Anxiety  HYDROmorphone  Injectable 0.25 milliGRAM(s) IV Push every 4 hours PRN pain or dyspnea

## 2020-07-13 NOTE — PROGRESS NOTE ADULT - SUBJECTIVE AND OBJECTIVE BOX
HPI:  79 y/o male with a PMH of CAD s/p CABG s/p PCI , severe symptomatic AS s/p BAV as a bridge to TAVR (6/10), afib/flutter on coumadin, left carotid artery stenosis (80%), DM2 , HLD,  HTN, COPD on home O2, CKD stage 4, Hx of ATN on HD for 2 months in 2018, LE cellultits requiring admission and BPH was BIBEMS from [facility] to the ED with shortness of breath for 6 weeks, started after BAV. His SOB is at rest progressively worsening with edema of LLE progressing and was sent to ED Patient denies cough, chest pain, palpitations/wheeze.     In ED , VS: Bp:112/32 HR:57 RR:22 SpO2: 96% on 4L  Received 20mg IV push of lasix , (28 Jun 2020 23:48)    Currently admitted to medicine with the primary diagnosis of CHF (congestive heart failure)     Today is hospital day 15d.     INTERVAL HPI / OVERNIGHT EVENTS:  Patient was examined and seen at bedside. This morning he is resting comfortably in bed and reports no new issues or overnight events.     ROS: Otherwise unremarkable     PAST MEDICAL & SURGICAL HISTORY  Hyperlipidemia  Cellulitis of right lower extremity  Cellulitis of left lower extremity  History of renal insufficiency  Stenosis of left carotid artery  Aortic stenosis  Afib  BPH (benign prostatic hyperplasia)  CHF (congestive heart failure)  COPD (chronic obstructive pulmonary disease)  Diabetes  HTN (hypertension)  S/P balloon aortic valvuloplasty  H/O heart artery stent  S/P CABG x 3    ALLERGIES  No Known Allergies    MEDICATIONS  STANDING MEDICATIONS  ascorbic acid 500 milliGRAM(s) Oral daily  aspirin  chewable 81 milliGRAM(s) Oral daily  atorvastatin 40 milliGRAM(s) Oral at bedtime  budesonide 160 MICROgram(s)/formoterol 4.5 MICROgram(s) Inhaler 2 Puff(s) Inhalation two times a day  chlorhexidine 4% Liquid 1 Application(s) Topical once  finasteride 5 milliGRAM(s) Oral daily  furosemide    Tablet 80 milliGRAM(s) Oral two times a day  insulin glargine Injectable (LANTUS) 15 Unit(s) SubCutaneous at bedtime  isosorbide   mononitrate ER Tablet (IMDUR) 30 milliGRAM(s) Oral daily  metolazone 5 milliGRAM(s) Oral daily  metoprolol succinate ER 50 milliGRAM(s) Oral daily  multivitamin 1 Tablet(s) Oral daily  pantoprazole    Tablet 40 milliGRAM(s) Oral before breakfast  polyethylene glycol 3350 17 Gram(s) Oral daily  potassium chloride    Tablet ER 40 milliEquivalent(s) Oral daily  sevelamer carbonate 800 milliGRAM(s) Oral three times a day with meals  silver sulfADIAZINE 1% Cream 1 Application(s) Topical daily  silver sulfADIAZINE 1% Cream 1 Application(s) Topical daily    PRN MEDICATIONS  ALPRAZolam 0.25 milliGRAM(s) Oral two times a day PRN    VITALS:  T(F): 96.7  HR: 56  BP: 119/56  RR: 18  SpO2: 99%    LABS                        13.6   8.22  )-----------( 132      ( 12 Jul 2020 05:55 )             42.3     07-12    145  |  99  |  136<HH>  ----------------------------<  73  3.6   |  26  |  2.6<H>    Ca    9.2      12 Jul 2020 05:55  Phos  4.9     07-12  Mg     2.5     07-12    TPro  6.3  /  Alb  3.3<L>  /  TBili  0.8  /  DBili  x   /  AST  23  /  ALT  13  /  AlkPhos  98  07-12    PT/INR - ( 12 Jul 2020 05:55 )   PT: 31.80 sec;   INR: 2.77 ratio      Physical exam  General: NC/AT, NAD  HEENT: Nonicteric  Pulm: Chest clear b/l, no wheezing, crackles, or rhonci. Symmetrical excursion.   Heart: RRR, Normal S1, S2, no mumers rubs or gallops  Abd: soft, nontender, nondistended, BS+ no hepatosplenomegaly  Ext: trace edema, no rash  Neuro: AAOx3, nonfocal  Phych: Well groomed, cooperative, congruent affect.    Assessment and Plan    · Assessment		  79 y/o male with a PMH of CAD s/p CABG s/p PCI, severe symptomatic AS s/p BAV as a bridge to TAVR (6/10), afib/flutter on coumadin, left carotid artery stenosis (80%), DM2, HLD,  HTN, COPD , CKD stage 4, BPH was sent from [facility] to the ED with shortness of breath and LE edema.     #Acute on chronic diastolic CHF exacerbation   #Severe AS s/p BAV  patient is a hospice candidate but son does not want to proceed with hospice  patient recently had BAV for severe AS, not a candidate for TAVR   c/w PO Lasix and metolazone       #Hypokalemia - resolved, c/w KCl 40meq daily    #Hx CAD s/p CABG s/p PCI    c/w asa, lipitor, Imdur, BB    #History of AFIB c/w coumadin target INR 2-3. Hold coumadin     #Hx of Left Carotid Artery stenosis  c/w asa, statin     #CKD 4 stable scr at baseline. BUN up trending, nephro following.     #Chronic elevated troponin likely due to CKD IV     #COPD - stable     #DM2 - c/w insulin, reduce lantus to 15 units    #Chronic thrombocytopenia - stable     #COPD - c/w LABA/ICS    #BPH - c/w finasteride, passed TOV yesterday     DVT PX on coumadin     #Progress Note Handoff  Pending (specify): discharged, son appealed discharge   Family discussion: called multiple times, no answer   Disposition: SNF

## 2020-07-13 NOTE — PROGRESS NOTE ADULT - ASSESSMENT
80yMale being evaluated for goals of care and symptom management. Pt appears to be declined since last week, mental status is slightly altered, pt more drowsy. Pt's son Deangelo is aware his father is has end stage heart failure, and is hospice appropriate. Last week he said he wanted dad to go to SNF, but had also spoken to Hospice. Over the weekend he refused d/c to SNF w rehab.     Palliative care NP assessed symptoms. Recommend to restart IV Dilaudid PRN. Spoke to attending who will try to reach out to son to discuss hospice again. Palliative care will ask hospice to follow up if needed.       Recommendations:  DNR/DNI  ongoing medical management  Dilaudid 0.25mg IV Q 4 HRS PRN for SOB/pain  Xanax 0.25mg BID PRN for anxiety  call x [phone] for palliative service if needed

## 2020-07-13 NOTE — PROGRESS NOTE ADULT - SUBJECTIVE AND OBJECTIVE BOX
Nephrology progress note    THIS IS AN INCOMPLETE NOTE . FULL NOTE TO FOLLOW SHORTLY    Patient is seen and examined, events over the last 24 h noted .    Allergies:  No Known Allergies    Hospital Medications:   MEDICATIONS  (STANDING):  ascorbic acid 500 milliGRAM(s) Oral daily  aspirin  chewable 81 milliGRAM(s) Oral daily  atorvastatin 40 milliGRAM(s) Oral at bedtime  budesonide 160 MICROgram(s)/formoterol 4.5 MICROgram(s) Inhaler 2 Puff(s) Inhalation two times a day  chlorhexidine 4% Liquid 1 Application(s) Topical once  finasteride 5 milliGRAM(s) Oral daily  furosemide    Tablet 80 milliGRAM(s) Oral two times a day  insulin glargine Injectable (LANTUS) 15 Unit(s) SubCutaneous at bedtime  isosorbide   mononitrate ER Tablet (IMDUR) 30 milliGRAM(s) Oral daily  metolazone 5 milliGRAM(s) Oral daily  metoprolol succinate ER 50 milliGRAM(s) Oral daily  multivitamin 1 Tablet(s) Oral daily  pantoprazole    Tablet 40 milliGRAM(s) Oral before breakfast  polyethylene glycol 3350 17 Gram(s) Oral daily  sevelamer carbonate 800 milliGRAM(s) Oral three times a day with meals  silver sulfADIAZINE 1% Cream 1 Application(s) Topical daily  silver sulfADIAZINE 1% Cream 1 Application(s) Topical daily        VITALS:  T(F): 96 (07-13-20 @ 12:52), Max: 97.5 (07-12-20 @ 20:42)  HR: 56 (07-13-20 @ 12:52)  BP: 104/53 (07-13-20 @ 12:52)  RR: 20 (07-13-20 @ 12:52)  SpO2: 95% (07-13-20 @ 05:52)  Wt(kg): --    07-11 @ 07:01  -  07-12 @ 07:00  --------------------------------------------------------  IN: 240 mL / OUT: 1100 mL / NET: -860 mL    07-12 @ 07:01  -  07-13 @ 07:00  --------------------------------------------------------  IN: 330 mL / OUT: 0 mL / NET: 330 mL          PHYSICAL EXAM:  Constitutional: NAD  HEENT: anicteric sclera, oropharynx clear, MMM  Neck: No JVD  Respiratory: CTAB, no wheezes, rales or rhonchi  Cardiovascular: S1, S2, RRR  Gastrointestinal: BS+, soft, NT/ND  Extremities: No cyanosis or clubbing. No peripheral edema  :  No julian.   Skin: No rashes    LABS:  07-13    140  |  99  |  139<HH>  ----------------------------<  78  5.2<H>   |  19  |  2.8<H>    Ca    9.1      13 Jul 2020 06:38  Phos  5.0     07-13  Mg     2.4     07-13    TPro  6.6  /  Alb  3.1<L>  /  TBili  0.8  /  DBili      /  AST  49<H>  /  ALT  15  /  AlkPhos  90  07-13                          14.0   7.77  )-----------( 129      ( 13 Jul 2020 06:38 )             44.5       Urine Studies:      RADIOLOGY & ADDITIONAL STUDIES: Nephrology progress note  Patient is seen and examined, events over the last 24 h noted .  has a a cough   SOB is better     Allergies:  No Known Allergies    Hospital Medications:   MEDICATIONS  (STANDING):  ascorbic acid 500 milliGRAM(s) Oral daily  aspirin  chewable 81 milliGRAM(s) Oral daily  atorvastatin 40 milliGRAM(s) Oral at bedtime  budesonide 160 MICROgram(s)/formoterol 4.5 MICROgram(s) Inhaler 2 Puff(s) Inhalation two times a day  finasteride 5 milliGRAM(s) Oral daily  furosemide    Tablet 80 milliGRAM(s) Oral two times a day  insulin glargine Injectable (LANTUS) 15 Unit(s) SubCutaneous at bedtime  isosorbide   mononitrate ER Tablet (IMDUR) 30 milliGRAM(s) Oral daily  metolazone 5 milliGRAM(s) Oral daily  metoprolol succinate ER 50 milliGRAM(s) Oral daily  multivitamin 1 Tablet(s) Oral daily  pantoprazole    Tablet 40 milliGRAM(s) Oral before breakfast  polyethylene glycol 3350 17 Gram(s) Oral daily  sevelamer carbonate 800 milliGRAM(s) Oral three times a day with meals  silver sulfADIAZINE 1% Cream 1 Application(s) Topical daily  silver sulfADIAZINE 1% Cream 1 Application(s) Topical daily        VITALS:  T(F): 96 (07-13-20 @ 12:52), Max: 97.5 (07-12-20 @ 20:42)  HR: 56 (07-13-20 @ 12:52)  BP: 104/53 (07-13-20 @ 12:52)  RR: 20 (07-13-20 @ 12:52)  SpO2: 95% (07-13-20 @ 05:52)      07-11 @ 07:01  -  07-12 @ 07:00  --------------------------------------------------------  IN: 240 mL / OUT: 1100 mL / NET: -860 mL    07-12 @ 07:01  -  07-13 @ 07:00  --------------------------------------------------------  IN: 330 mL / OUT: 0 mL / NET: 330 mL          PHYSICAL EXAM:  Constitutional: NAD  Neck: No JVD  Respiratory: Crackles fine at base   Cardiovascular: S1, S2, RRR  Gastrointestinal:  soft, NT/ND  Extremities: No cyanosis or clubbing. plus one edema   :  No julian.   Skin: No rashes    LABS:  07-13    140  |  99  |  139<HH>  ----------------------------<  78  5.2<H>   |  19  |  2.8<H>    Creatinine Trend: 2.8<--, 2.6<--, 2.9<--, 2.9<--, 2.9<--, 2.9<--    Ca    9.1      13 Jul 2020 06:38  Phos  5.0     07-13  Mg     2.4     07-13    TPro  6.6  /  Alb  3.1<L>  /  TBili  0.8  /  DBili      /  AST  49<H>  /  ALT  15  /  AlkPhos  90  07-13                          14.0   7.77  )-----------( 129      ( 13 Jul 2020 06:38 )             44.5     RADIOLOGY & ADDITIONAL STUDIES:  < from: Xray Chest 1 View AP/PA (07.09.20 @ 12:50) >    Impression:      CHF. Without difference.    < end of copied text >

## 2020-07-13 NOTE — CHART NOTE - NSCHARTNOTEFT_GEN_A_CORE
Registered Dietitian Follow-Up     Patient Profile Reviewed                           Yes [x]   No []     Nutrition History Previously Obtained        Yes [x]  No []       Pertinent Subjective Information: Observed pt eating 100% of lunch tray. No complaints from pt      Pertinent Medical Interventions: Acute on chronic diastolic CHF exacerbation. Patient is a hospice candidate but son does not want to proceed with hospice. Hypokalemia - resolved, c/w KCl 40meq daily. CKD 4 stable scr at baseline. Nephrology following.      Diet order: DASH/TLC, carb consistent (evening snack), 60gm protein, 1.2 L fluid restriction, no conc potassium      Anthropometrics:  - Ht. 167.6cm  - Wt.   (6/30) 105.2 kg  (7/2) 103.3 kg   (7/3) 96.7 kg   (7/10) 94kg - weights trending down, pt on diuretic therapy, will cont to monitor   - BMI 33.5  - IBW 64.5kg     Pertinent Lab Data: (7/13) H/H 114, 112  K 5.2 (after repletion)    CR 2.8  Phos 5.0      Pertinent Meds: lantus, lasix, renvela, vitamin C, lipitor, protonix, MVI, miralax     Physical Findings:  - Appearance: confused, 1+ edema b/l leg   - GI function: LBM 7/10   - Tubes:   - Oral/Mouth cavity: No issues chewing/swallowing  - Skin: venous ulcer b/l lower leg, excoriation of sacrum (previously documented as stage II PU)     Nutrition Requirements  Weight Used: 64.5kg cont from RD initial note     calorie 1613-1935kcal (25-30kcal/kg IBW)   protein 58-65g (0.9-1.0g/kg IBW) for CKD IV  fluid per LIP for CHF     Nutrient Intake: meeting needs         [] Previous Nutrition Diagnosis: Increased nutrient needs (discontinued as pressure ulcer not documented anymore)            [] Ongoing          [] Resolved    [x] No active nutrition diagnosis identified at this time     Nutrition Intervention meal/snack, vitamin/mineral supplementation      Goal/Expected Outcome: Pt to consume >75% of meal tray in 7 day     Indicator/Monitoring:  RD to monitor energy intake, diet order, body comp NFPF, glucose /renal/lyte profile     Recommendation:   - Remove no concentrate potassium diet modifier and add no concentrate phosphorus diet modifier  - Continue DASH/TLC, carb consistent, 60gm protein, 1.2 L fluid restriction diet  - Continue MVI w/o minerals daily

## 2020-07-13 NOTE — CHART NOTE - NSCHARTNOTESELECT_GEN_ALL_CORE
Event Note
Event Note/Palliative Care NP
Event Note/Palliative SW
Event Note/attending note
Palliative SW/Event Note

## 2020-07-13 NOTE — PROGRESS NOTE ADULT - ASSESSMENT
79 y/o male with a PMH of CAD s/p CABG s/p PCI, severe symptomatic AS s/p BAV as a bridge to TAVR (6/10), afib/flutter on coumadin, left carotid artery stenosis (80%), DM2, HLD,  HTN, COPD , CKD stage 4, BPH was sent from [facility] to the ED with shortness of breath and LE edema.     #Acute on chronic diastolic CHF exacerbation   #Severe AS s/p BAV  patient is a hospice candidate son initially did not want hospice and per case preston who spoke to Deangelo said that now he is interested in hospice. I called Deangelo multiple times today and he did not answer I also called other son and no answer   patient recently had BAV for severe AS, not a candidate for TAVR   c/w PO Lasix and metolazone     #Hypokalemia - resolved, K 5.2 d/c standing potassium     #Hx CAD s/p CABG s/p PCI    c/w asa, lipitor, Imdur, BB    #History of AFIB c/w coumadin target INR 2-3. inr 2.77 yesterday recheck and give coumadin accordingly     #Hx of Left Carotid Artery stenosis  c/w asa, statin     #CKD 4 stable scr at baseline. BUN up trending, nephro following.     #Chronic elevated troponin likely due to CKD IV     #COPD - stable     #DM2 - c/w insulin, reduce lantus to 15 units    #Chronic thrombocytopenia - stable     #COPD - c/w LABA/ICS    #BPH - c/w finasteride, passed TOV yesterday     DVT PX on coumadin     #Progress Note Handoff  Pending (specify): discharged, son appealed discharge   Family discussion: I called Deangelo and other son today but no answer   Disposition: SNF

## 2020-07-14 LAB
GLUCOSE BLDC GLUCOMTR-MCNC: 111 MG/DL — HIGH (ref 70–99)
GLUCOSE BLDC GLUCOMTR-MCNC: 76 MG/DL — SIGNIFICANT CHANGE UP (ref 70–99)
GLUCOSE BLDC GLUCOMTR-MCNC: 84 MG/DL — SIGNIFICANT CHANGE UP (ref 70–99)
GLUCOSE BLDC GLUCOMTR-MCNC: 87 MG/DL — SIGNIFICANT CHANGE UP (ref 70–99)
GLUCOSE BLDC GLUCOMTR-MCNC: 98 MG/DL — SIGNIFICANT CHANGE UP (ref 70–99)

## 2020-07-14 PROCEDURE — 99233 SBSQ HOSP IP/OBS HIGH 50: CPT

## 2020-07-14 RX ORDER — FUROSEMIDE 40 MG
20 TABLET ORAL ONCE
Refills: 0 | Status: COMPLETED | OUTPATIENT
Start: 2020-07-14 | End: 2020-07-14

## 2020-07-14 RX ADMIN — Medication 1 TABLET(S): at 12:20

## 2020-07-14 RX ADMIN — FINASTERIDE 5 MILLIGRAM(S): 5 TABLET, FILM COATED ORAL at 12:20

## 2020-07-14 RX ADMIN — Medication 20 MILLIGRAM(S): at 22:41

## 2020-07-14 RX ADMIN — ATORVASTATIN CALCIUM 40 MILLIGRAM(S): 80 TABLET, FILM COATED ORAL at 21:25

## 2020-07-14 RX ADMIN — ISOSORBIDE MONONITRATE 30 MILLIGRAM(S): 60 TABLET, EXTENDED RELEASE ORAL at 12:20

## 2020-07-14 RX ADMIN — Medication 500 MILLIGRAM(S): at 12:20

## 2020-07-14 RX ADMIN — BUDESONIDE AND FORMOTEROL FUMARATE DIHYDRATE 2 PUFF(S): 160; 4.5 AEROSOL RESPIRATORY (INHALATION) at 20:57

## 2020-07-14 RX ADMIN — Medication 81 MILLIGRAM(S): at 12:20

## 2020-07-14 RX ADMIN — POLYETHYLENE GLYCOL 3350 17 GRAM(S): 17 POWDER, FOR SOLUTION ORAL at 12:21

## 2020-07-14 RX ADMIN — BUDESONIDE AND FORMOTEROL FUMARATE DIHYDRATE 2 PUFF(S): 160; 4.5 AEROSOL RESPIRATORY (INHALATION) at 08:04

## 2020-07-14 RX ADMIN — Medication 50 MILLIGRAM(S): at 05:17

## 2020-07-14 RX ADMIN — PANTOPRAZOLE SODIUM 40 MILLIGRAM(S): 20 TABLET, DELAYED RELEASE ORAL at 05:11

## 2020-07-14 RX ADMIN — Medication 1 APPLICATION(S): at 12:21

## 2020-07-14 NOTE — PROGRESS NOTE ADULT - ASSESSMENT
80yMale being evaluated for goals of care and symptom management. Pt has end stage heart failure, at times has pain and dyspnea. Pt comfortable on PRN Dilaudid and Xanax. No family at bedside. Spoke to Hospice RN who spoke with son Michele again on 7/13 and reviewed hospice. He is does not want home hospice or SNF hospice yet. Attending attempted to call Michele yesterday and update him but there was no answer. Medical team is going to call Michele again today and give him an update. Pt overall has poor prognosis, son is aware that he is hospice appropriate.          Recommendations:  DNR/DNI  Dilaudid 0.25mg Iv Q 4 HRS PRN  Xanax 0.25mg Q 12 HRS PRN  SNF for rehab vs hospice, son has not decided

## 2020-07-14 NOTE — PROGRESS NOTE ADULT - SUBJECTIVE AND OBJECTIVE BOX
no chest pain   still sob but better per patient       T(C): 35.9 (14 Jul 2020 04:58), Max: 35.9 (14 Jul 2020 04:58)  T(F): 96.7 (14 Jul 2020 04:58), Max: 96.7 (14 Jul 2020 04:58)  HR: 54 (14 Jul 2020 04:58) (54 - 56)  BP: 128/59 (14 Jul 2020 04:58) (104/53 - 128/59)  BP(mean): --  ABP: --  ABP(mean): --  RR: 20 (14 Jul 2020 08:01) (18 - 20)  SpO2: 97% (14 Jul 2020 08:01) (97% - 98%)    PHYSICAL EXAM:  GENERAL: NAD,   Pulm: Clear to auscultation bilaterally; No wheeze  CV: Regular rate and rhythm; No murmurs, rubs, or gallops  GI: Soft, Nontender, Nondistended; Bowel sounds present  EXTREMITIES: no edema  NEUROLOGY: non-focal  SKIN: No rashes                           14.0   7.77  )-----------( 129      ( 13 Jul 2020 06:38 )             44.5     07-13    140  |  99  |  139<HH>  ----------------------------<  78  5.2<H>   |  19  |  2.8<H>    Ca    9.1      13 Jul 2020 06:38  Phos  5.0     07-13  Mg     2.4     07-13    TPro  6.6  /  Alb  3.1<L>  /  TBili  0.8  /  DBili  x   /  AST  49<H>  /  ALT  15  /  AlkPhos  90  07-13    LIVER FUNCTIONS - ( 13 Jul 2020 06:38 )  Alb: 3.1 g/dL / Pro: 6.6 g/dL / ALK PHOS: 90 U/L / ALT: 15 U/L / AST: 49 U/L / GGT: x           MEDICATIONS  (STANDING):  ascorbic acid 500 milliGRAM(s) Oral daily  aspirin  chewable 81 milliGRAM(s) Oral daily  atorvastatin 40 milliGRAM(s) Oral at bedtime  budesonide 160 MICROgram(s)/formoterol 4.5 MICROgram(s) Inhaler 2 Puff(s) Inhalation two times a day  chlorhexidine 4% Liquid 1 Application(s) Topical once  finasteride 5 milliGRAM(s) Oral daily  furosemide    Tablet 80 milliGRAM(s) Oral two times a day  insulin glargine Injectable (LANTUS) 15 Unit(s) SubCutaneous at bedtime  isosorbide   mononitrate ER Tablet (IMDUR) 30 milliGRAM(s) Oral daily  metolazone 5 milliGRAM(s) Oral daily  metoprolol succinate ER 50 milliGRAM(s) Oral daily  multivitamin 1 Tablet(s) Oral daily  pantoprazole    Tablet 40 milliGRAM(s) Oral before breakfast  polyethylene glycol 3350 17 Gram(s) Oral daily  sevelamer carbonate 800 milliGRAM(s) Oral three times a day with meals  silver sulfADIAZINE 1% Cream 1 Application(s) Topical daily  silver sulfADIAZINE 1% Cream 1 Application(s) Topical daily    MEDICATIONS  (PRN):  ALPRAZolam 0.25 milliGRAM(s) Oral two times a day PRN Anxiety  HYDROmorphone  Injectable 0.25 milliGRAM(s) IV Push every 4 hours PRN pain or dyspnea

## 2020-07-14 NOTE — PROGRESS NOTE ADULT - ASSESSMENT
79 y/o male with a PMH of CAD s/p CABG s/p PCI, severe symptomatic AS s/p BAV as a bridge to TAVR (6/10), afib/flutter on coumadin, left carotid artery stenosis (80%), DM2, HLD,  HTN, COPD , CKD stage 4, BPH was sent from [facility] to the ED with shortness of breath and LE edema.     #Acute on chronic diastolic CHF exacerbation   #Severe AS s/p BAV  patient recently had BAV for severe AS, not a candidate for TAVR   c/w PO Lasix and metolazone     #Hypokalemia - resolved, K 5.2 stopped  standing potassium     #Hx CAD s/p CABG s/p PCI    c/w asa, lipitor, Imdur, BB    #History of AFIB c/w coumadin target INR 2-3. inr 2.77 on 7/13  recheck and give coumadin accordingly     #Hx of Left Carotid Artery stenosis  c/w asa, statin     #CKD 4 stable scr at baseline. BUN up trending, nephro following.     #Chronic elevated troponin likely due to CKD IV     #COPD - stable     #DM2 - c/w insulin, reduce lantus to 15 units    #Chronic thrombocytopenia - stable     #COPD - c/w LABA/ICS    #BPH - c/w finasteride, passed TOV     DVT PX on coumadin check INR     #Progress Note Handoff  Pending (specify): need to reach son for discharge plan   Disposition: SNF vs home

## 2020-07-14 NOTE — PROGRESS NOTE ADULT - ASSESSMENT
Pt with CKD 4, DM, HTN, AS s/p BAV, COPD on home O2, admitted with worsening SOB due to fluid overload.    CKD4 - creat at baseline   non oliguric bur no UO documentation   No repeat Blood work too   last ph noted  within target for CKD on renagel 1/1/1  LE cellulitis - local care, silvadene, podiatry  Palliative care notes appreciated and Dc plan reviewed   will follow

## 2020-07-14 NOTE — PROGRESS NOTE ADULT - SUBJECTIVE AND OBJECTIVE BOX
Patient Information:  TAIWO ZAVALA / 80y / Male / MRN#:[id number]    Hospital Day: 16d    HPI:  79 y/o male with a PMH of CAD s/p CABG s/p PCI , severe symptomatic AS s/p BAV as a bridge to TAVR (6/10), afib/flutter on coumadin, left carotid artery stenosis (80%), DM2 , HLD,  HTN, COPD on home O2, CKD stage 4, Hx of ATN on HD for 2 months in 2018, LE cellultits requiring admission and BPH was BIBEMS from [facility] to the ED with shortness of breath for 6 weeks, started after BAV. His SOB is at rest progressively worsening with edema of LLE progressing and was sent to ED Patient denies cough, chest pain, palpitations/wheeze.     In ED , VS: Bp:112/32 HR:57 RR:22 SpO2: 96% on 4L  Received 20mg IV push of lasix ,    Interval History:  Patient seen and examined at bedside. No acute events overnight.    Past Medical History:  Hyperlipidemia  Cellulitis of right lower extremity  Cellulitis of left lower extremity  History of renal insufficiency  Stenosis of left carotid artery  Aortic stenosis  Afib  BPH (benign prostatic hyperplasia)  CHF (congestive heart failure)  COPD (chronic obstructive pulmonary disease)  Diabetes  HTN (hypertension)    Past Surgical History:  S/P balloon aortic valvuloplasty  H/O heart artery stent  S/P CABG x 3    Allergies:  No Known Allergies    Medications:  PRN:  ALPRAZolam 0.25 milliGRAM(s) Oral two times a day PRN Anxiety  HYDROmorphone  Injectable 0.25 milliGRAM(s) IV Push every 4 hours PRN pain or dyspnea    Standing:  ascorbic acid 500 milliGRAM(s) Oral daily  aspirin  chewable 81 milliGRAM(s) Oral daily  atorvastatin 40 milliGRAM(s) Oral at bedtime  budesonide 160 MICROgram(s)/formoterol 4.5 MICROgram(s) Inhaler 2 Puff(s) Inhalation two times a day  chlorhexidine 4% Liquid 1 Application(s) Topical once  finasteride 5 milliGRAM(s) Oral daily  furosemide    Tablet 80 milliGRAM(s) Oral two times a day  insulin glargine Injectable (LANTUS) 15 Unit(s) SubCutaneous at bedtime  isosorbide   mononitrate ER Tablet (IMDUR) 30 milliGRAM(s) Oral daily  metolazone 5 milliGRAM(s) Oral daily  metoprolol succinate ER 50 milliGRAM(s) Oral daily  multivitamin 1 Tablet(s) Oral daily  pantoprazole    Tablet 40 milliGRAM(s) Oral before breakfast  polyethylene glycol 3350 17 Gram(s) Oral daily  sevelamer carbonate 800 milliGRAM(s) Oral three times a day with meals  silver sulfADIAZINE 1% Cream 1 Application(s) Topical daily  silver sulfADIAZINE 1% Cream 1 Application(s) Topical daily    Home:  budesonide-formoterol 160 mcg-4.5 mcg/inh inhalation aerosol:  inhaled   finasteride 5 mg oral tablet: 1 tab(s) orally once a day  fluticasone 50 mcg/inh nasal spray: 1 spray(s) nasal 2 times a day  furosemide 80 mg oral tablet: 1 tab(s) orally 2 times a day  isosorbide mononitrate 30 mg oral tablet, extended release: 1 tab(s) orally once a day (in the morning)  metOLazone 5 mg oral tablet: 1 tab(s) orally once a day  metoprolol succinate 50 mg oral tablet, extended release: 1 tab(s) orally once a day  Multiple Vitamins oral tablet: 1 tab(s) orally once a day  potassium chloride 20 mEq oral tablet, extended release: 2 tab(s) orally once a day  sevelamer carbonate 800 mg oral tablet: 1 tab(s) orally 3 times a day (with meals)  silver sulfADIAZINE 1% topical cream: 1 application topically once a day    Vitals:  T(C): 35.9, Max: 35.9 (07-14-20 @ 04:58)  T(F): 96.7, Max: 96.7 (07-14-20 @ 04:58)  HR: 54 (54 - 56)  BP: 128/59 (104/53 - 128/59)  RR: 20 (18 - 20)  SpO2: 97% (97% - 98%)    Physical Exam:  General: Awake, Alert. Not in acute distress.  Heart: Regular rate and rhythm; S1, S2; No murmurs.  Lungs: Clear to auscultation bilaterally.  Abdomen: Soft, nontender, nondistended.  Extremities: No edema in upper or lower extremities.  Neuro: No focal deficits.    Labs:  CBC (07-13 @ 06:38)                        Hgb: 14.0   WBC: 7.77  )-----------------( Plts: 129                              Hct: 44.5     Chem (07-13 @ 06:38)  Na: 140  |     Cl: 99     |  BUN: 139  -----------------------------------------< Gluc: 78    K: 5.2   |    HCO3: 19  |  Cr: 2.8    Ca 9.1 (07-13 @ 06:38)  Phos 5.0 (07-13 @ 06:38)  Mg 2.4 (07-13 @ 06:38)    LFTs (07-13 @ 06:38)  TPro 6.6  /  Alb 3.1  TBili 0.8  /  DBili     AST 49  /  ALT 15  /  AlkPhos 90          Microbiology:    Radiology: Patient Information:  TAIWO ZAVALA / 80y / Male / MRN#:[id number]    Hospital Day: 16d    HPI:  79 y/o male with a PMH of CAD s/p CABG s/p PCI , severe symptomatic AS s/p BAV as a bridge to TAVR (6/10), afib/flutter on coumadin, left carotid artery stenosis (80%), DM2 , HLD,  HTN, COPD on home O2, CKD stage 4, Hx of ATN on HD for 2 months in 2018, LE cellultits requiring admission and BPH was BIBEMS from [facility] to the ED with shortness of breath for 6 weeks, started after BAV. His SOB is at rest progressively worsening with edema of LLE progressing and was sent to ED Patient denies cough, chest pain, palpitations/wheeze.     In ED , VS: Bp:112/32 HR:57 RR:22 SpO2: 96% on 4L  Received 20mg IV push of lasix ,    Interval History:  Patient seen and examined at bedside. No acute events overnight.    Past Medical History:  Hyperlipidemia  Cellulitis of right lower extremity  Cellulitis of left lower extremity  History of renal insufficiency  Stenosis of left carotid artery  Aortic stenosis  Afib  BPH (benign prostatic hyperplasia)  CHF (congestive heart failure)  COPD (chronic obstructive pulmonary disease)  Diabetes  HTN (hypertension)    Past Surgical History:  S/P balloon aortic valvuloplasty  H/O heart artery stent  S/P CABG x 3    Allergies:  No Known Allergies    Medications:  PRN:  ALPRAZolam 0.25 milliGRAM(s) Oral two times a day PRN Anxiety  HYDROmorphone  Injectable 0.25 milliGRAM(s) IV Push every 4 hours PRN pain or dyspnea    Standing:  ascorbic acid 500 milliGRAM(s) Oral daily  aspirin  chewable 81 milliGRAM(s) Oral daily  atorvastatin 40 milliGRAM(s) Oral at bedtime  budesonide 160 MICROgram(s)/formoterol 4.5 MICROgram(s) Inhaler 2 Puff(s) Inhalation two times a day  chlorhexidine 4% Liquid 1 Application(s) Topical once  finasteride 5 milliGRAM(s) Oral daily  furosemide    Tablet 80 milliGRAM(s) Oral two times a day  insulin glargine Injectable (LANTUS) 15 Unit(s) SubCutaneous at bedtime  isosorbide   mononitrate ER Tablet (IMDUR) 30 milliGRAM(s) Oral daily  metolazone 5 milliGRAM(s) Oral daily  metoprolol succinate ER 50 milliGRAM(s) Oral daily  multivitamin 1 Tablet(s) Oral daily  pantoprazole    Tablet 40 milliGRAM(s) Oral before breakfast  polyethylene glycol 3350 17 Gram(s) Oral daily  sevelamer carbonate 800 milliGRAM(s) Oral three times a day with meals  silver sulfADIAZINE 1% Cream 1 Application(s) Topical daily  silver sulfADIAZINE 1% Cream 1 Application(s) Topical daily    Home:  budesonide-formoterol 160 mcg-4.5 mcg/inh inhalation aerosol:  inhaled   finasteride 5 mg oral tablet: 1 tab(s) orally once a day  fluticasone 50 mcg/inh nasal spray: 1 spray(s) nasal 2 times a day  furosemide 80 mg oral tablet: 1 tab(s) orally 2 times a day  isosorbide mononitrate 30 mg oral tablet, extended release: 1 tab(s) orally once a day (in the morning)  metOLazone 5 mg oral tablet: 1 tab(s) orally once a day  metoprolol succinate 50 mg oral tablet, extended release: 1 tab(s) orally once a day  Multiple Vitamins oral tablet: 1 tab(s) orally once a day  potassium chloride 20 mEq oral tablet, extended release: 2 tab(s) orally once a day  sevelamer carbonate 800 mg oral tablet: 1 tab(s) orally 3 times a day (with meals)  silver sulfADIAZINE 1% topical cream: 1 application topically once a day    Vitals:  T(C): 35.9, Max: 35.9 (07-14-20 @ 04:58)  T(F): 96.7, Max: 96.7 (07-14-20 @ 04:58)  HR: 54 (54 - 56)  BP: 128/59 (104/53 - 128/59)  RR: 20 (18 - 20)  SpO2: 97% (97% - 98%)    Physical Exam:  General: Awake. Not in acute distress.  Heart: Regular rate and rhythm; S1, S2; No murmurs.  Lungs: Clear to auscultation bilaterally.  Abdomen: Soft, nontender, nondistended.  Extremities: LE wrapped.  Neuro: No focal deficits.    Labs:  CBC (07-13 @ 06:38)                        Hgb: 14.0   WBC: 7.77  )-----------------( Plts: 129                              Hct: 44.5     Chem (07-13 @ 06:38)  Na: 140  |     Cl: 99     |  BUN: 139  -----------------------------------------< Gluc: 78    K: 5.2   |    HCO3: 19  |  Cr: 2.8    Ca 9.1 (07-13 @ 06:38)  Phos 5.0 (07-13 @ 06:38)  Mg 2.4 (07-13 @ 06:38)    LFTs (07-13 @ 06:38)  TPro 6.6  /  Alb 3.1  TBili 0.8  /  DBili     AST 49  /  ALT 15  /  AlkPhos 90          Microbiology:    Radiology:

## 2020-07-14 NOTE — PROGRESS NOTE ADULT - SUBJECTIVE AND OBJECTIVE BOX
80yMale with diagnosis: CHF (CONGESTIVE HEART FAILURE)      Patient seen for follow up      PHYSICAL EXAM  Pt alert oriented x 2 to person and place, conversant  No respiratory distress, denies pain    T(C): , Max: 35.9 (04:58)  T(F): 96.1  HR: 56 (54 - 56)  BP: 119/58 (119/58 - 128/59)  RR: 20 (18 - 20)  SpO2: 97% (97% - 98%)              LABS:                          14.0   7.77  )-----------( 129      ( 13 Jul 2020 06:38 )             44.5                                                                                      07-13    140  |  99  |  139<HH>  ----------------------------<  78  5.2<H>   |  19  |  2.8<H>    Ca    9.1      13 Jul 2020 06:38  Phos  5.0     07-13  Mg     2.4     07-13    TPro  6.6  /  Alb  3.1<L>  /  TBili  0.8  /  DBili  x   /  AST  49<H>  /  ALT  15  /  AlkPhos  90  07-13                                                      MEDICATIONS  (STANDING):  ascorbic acid 500 milliGRAM(s) Oral daily  aspirin  chewable 81 milliGRAM(s) Oral daily  atorvastatin 40 milliGRAM(s) Oral at bedtime  budesonide 160 MICROgram(s)/formoterol 4.5 MICROgram(s) Inhaler 2 Puff(s) Inhalation two times a day  chlorhexidine 4% Liquid 1 Application(s) Topical once  finasteride 5 milliGRAM(s) Oral daily  furosemide    Tablet 80 milliGRAM(s) Oral two times a day  insulin glargine Injectable (LANTUS) 15 Unit(s) SubCutaneous at bedtime  isosorbide   mononitrate ER Tablet (IMDUR) 30 milliGRAM(s) Oral daily  metolazone 5 milliGRAM(s) Oral daily  metoprolol succinate ER 50 milliGRAM(s) Oral daily  multivitamin 1 Tablet(s) Oral daily  pantoprazole    Tablet 40 milliGRAM(s) Oral before breakfast  polyethylene glycol 3350 17 Gram(s) Oral daily  sevelamer carbonate 800 milliGRAM(s) Oral three times a day with meals  silver sulfADIAZINE 1% Cream 1 Application(s) Topical daily  silver sulfADIAZINE 1% Cream 1 Application(s) Topical daily    MEDICATIONS  (PRN):  ALPRAZolam 0.25 milliGRAM(s) Oral two times a day PRN Anxiety  HYDROmorphone  Injectable 0.25 milliGRAM(s) IV Push every 4 hours PRN pain or dyspnea

## 2020-07-14 NOTE — PROGRESS NOTE ADULT - ASSESSMENT
81 y/o male with a PMH of CAD s/p CABG s/p PCI, severe symptomatic AS s/p BAV as a bridge to TAVR (6/10), afib/flutter on coumadin, left carotid artery stenosis (80%), DM2, HLD,  HTN, COPD , CKD stage 4, BPH was sent from [facility] to the ED with shortness of breath and LE edema.     #Acute on chronic diastolic CHF exacerbation   #Severe AS s/p BAV  patient is a hospice candidate but son unwilling to proceed with hospice, appealing d/c  patient recently had BAV for severe AS, not a candidate for TAVR   c/w PO Lasix and metolazone     #Hx CAD s/p CABG s/p PCI: -c/w ASA, Lipitor, Imdur, Toprol    #paroxysmal Afib  INR target 2-3 > follow daily and dose accordingly    #Hypokalemia - resolved  #Hx of Left Carotid Artery stenosis: -c/w asa, statin   #CKD 4 stable scr at baseline. BUN up trending, nephro following.   #Chronic elevated troponin likely due to CKD IV   #COPD - stable   #DM2 - c/w insulin, reduce Lantus to 15 units  #Chronic thrombocytopenia - stable  #COPD - c/w LABA/ICS  #BPH - c/w finasteride, passed TOV    DVT ppx: Coumadin  GI ppx: not indicated  Diet: DASH  Code: DNR  Dispo: d/c to hospice but son appealing

## 2020-07-14 NOTE — PROGRESS NOTE ADULT - SUBJECTIVE AND OBJECTIVE BOX
Nephrology progress note    THIS IS AN INCOMPLETE NOTE . FULL NOTE TO FOLLOW SHORTLY    Patient is seen and examined, events over the last 24 h noted .    Allergies:  No Known Allergies    Hospital Medications:   MEDICATIONS  (STANDING):  ascorbic acid 500 milliGRAM(s) Oral daily  aspirin  chewable 81 milliGRAM(s) Oral daily  atorvastatin 40 milliGRAM(s) Oral at bedtime  budesonide 160 MICROgram(s)/formoterol 4.5 MICROgram(s) Inhaler 2 Puff(s) Inhalation two times a day  chlorhexidine 4% Liquid 1 Application(s) Topical once  finasteride 5 milliGRAM(s) Oral daily  furosemide    Tablet 80 milliGRAM(s) Oral two times a day  insulin glargine Injectable (LANTUS) 15 Unit(s) SubCutaneous at bedtime  isosorbide   mononitrate ER Tablet (IMDUR) 30 milliGRAM(s) Oral daily  metolazone 5 milliGRAM(s) Oral daily  metoprolol succinate ER 50 milliGRAM(s) Oral daily  multivitamin 1 Tablet(s) Oral daily  pantoprazole    Tablet 40 milliGRAM(s) Oral before breakfast  polyethylene glycol 3350 17 Gram(s) Oral daily  sevelamer carbonate 800 milliGRAM(s) Oral three times a day with meals  silver sulfADIAZINE 1% Cream 1 Application(s) Topical daily  silver sulfADIAZINE 1% Cream 1 Application(s) Topical daily        VITALS:  T(F): 96.7 (07-14-20 @ 04:58), Max: 96.7 (07-14-20 @ 04:58)  HR: 54 (07-14-20 @ 04:58)  BP: 128/59 (07-14-20 @ 04:58)  RR: 18 (07-14-20 @ 04:58)  SpO2: 98% (07-14-20 @ 06:03)  Wt(kg): --    07-12 @ 07:01  -  07-13 @ 07:00  --------------------------------------------------------  IN: 330 mL / OUT: 0 mL / NET: 330 mL    07-13 @ 07:01  -  07-14 @ 07:00  --------------------------------------------------------  IN: 240 mL / OUT: 0 mL / NET: 240 mL          PHYSICAL EXAM:  Constitutional: NAD  HEENT: anicteric sclera, oropharynx clear, MMM  Neck: No JVD  Respiratory: CTAB, no wheezes, rales or rhonchi  Cardiovascular: S1, S2, RRR  Gastrointestinal: BS+, soft, NT/ND  Extremities: No cyanosis or clubbing. No peripheral edema  :  No julian.   Skin: No rashes    LABS:  07-13    140  |  99  |  139<HH>  ----------------------------<  78  5.2<H>   |  19  |  2.8<H>    Ca    9.1      13 Jul 2020 06:38  Phos  5.0     07-13  Mg     2.4     07-13    TPro  6.6  /  Alb  3.1<L>  /  TBili  0.8  /  DBili      /  AST  49<H>  /  ALT  15  /  AlkPhos  90  07-13                          14.0   7.77  )-----------( 129      ( 13 Jul 2020 06:38 )             44.5       Urine Studies:      RADIOLOGY & ADDITIONAL STUDIES: Nephrology progress note  Patient is seen and examined, events over the last 24 h noted .  lying in bed comfortable  No events     Allergies:  No Known Allergies    Hospital Medications:   MEDICATIONS  (STANDING):  ascorbic acid 500 milliGRAM(s) Oral daily  aspirin  chewable 81 milliGRAM(s) Oral daily  atorvastatin 40 milliGRAM(s) Oral at bedtime  budesonide 160 MICROgram(s)/formoterol 4.5 MICROgram(s) Inhaler 2 Puff(s) Inhalation two times a day  finasteride 5 milliGRAM(s) Oral daily  furosemide    Tablet 80 milliGRAM(s) Oral two times a day  insulin glargine Injectable (LANTUS) 15 Unit(s) SubCutaneous at bedtime  isosorbide   mononitrate ER Tablet (IMDUR) 30 milliGRAM(s) Oral daily  metolazone 5 milliGRAM(s) Oral daily  metoprolol succinate ER 50 milliGRAM(s) Oral daily  multivitamin 1 Tablet(s) Oral daily  pantoprazole    Tablet 40 milliGRAM(s) Oral before breakfast  polyethylene glycol 3350 17 Gram(s) Oral daily  sevelamer carbonate 800 milliGRAM(s) Oral three times a day with meals  silver sulfADIAZINE 1% Cream 1 Application(s) Topical daily  silver sulfADIAZINE 1% Cream 1 Application(s) Topical daily        VITALS:  T(F): 96.7 (07-14-20 @ 04:58), Max: 96.7 (07-14-20 @ 04:58)  HR: 54 (07-14-20 @ 04:58)  BP: 128/59 (07-14-20 @ 04:58)  RR: 18 (07-14-20 @ 04:58)  SpO2: 98% (07-14-20 @ 06:03)  Wt(kg): --            PHYSICAL EXAM:  Constitutional: NAD  Neck: No JVD  Respiratory: CTAB, no wheezes, rales or rhonchi  Cardiovascular: S1, S2, RRR  Gastrointestinal: BS+, soft, NT/ND  Extremities: plus one edema   :  No julian.   Skin: No rashes    LABS:      07-13    140  |  99  |  139<HH>  ----------------------------<  78  5.2<H>   |  19  |  2.8<H>    Ca    9.1      13 Jul 2020 06:38  Phos  5.0     07-13  Mg     2.4     07-13    TPro  6.6  /  Alb  3.1<L>  /  TBili  0.8  /  DBili      /  AST  49<H>  /  ALT  15  /  AlkPhos  90  07-13                          14.0   7.77  )-----------( 129      ( 13 Jul 2020 06:38 )             44.5       Urine Studies:      RADIOLOGY & ADDITIONAL STUDIES:

## 2020-07-15 VITALS — DIASTOLIC BLOOD PRESSURE: 55 MMHG | SYSTOLIC BLOOD PRESSURE: 110 MMHG | HEART RATE: 58 BPM

## 2020-07-15 LAB
BASE EXCESS BLDA CALC-SCNC: 4.3 MMOL/L — HIGH (ref -2–2)
GLUCOSE BLDC GLUCOMTR-MCNC: 106 MG/DL — HIGH (ref 70–99)
GLUCOSE BLDC GLUCOMTR-MCNC: 123 MG/DL — HIGH (ref 70–99)
GLUCOSE BLDC GLUCOMTR-MCNC: 68 MG/DL — LOW (ref 70–99)
GLUCOSE BLDC GLUCOMTR-MCNC: 89 MG/DL — SIGNIFICANT CHANGE UP (ref 70–99)
HCO3 BLDA-SCNC: 29 MMOL/L — HIGH (ref 23–27)
INR BLD: 3.08 RATIO — HIGH (ref 0.65–1.3)
PCO2 BLDA: 41 MMHG — SIGNIFICANT CHANGE UP (ref 38–42)
PH BLDA: 7.45 — HIGH (ref 7.38–7.42)
PO2 BLDA: 101 MMHG — HIGH (ref 78–95)
PROTHROM AB SERPL-ACNC: 35.4 SEC — HIGH (ref 9.95–12.87)
SAO2 % BLDA: 98 % — SIGNIFICANT CHANGE UP (ref 94–98)
SARS-COV-2 RNA SPEC QL NAA+PROBE: SIGNIFICANT CHANGE UP

## 2020-07-15 PROCEDURE — 99239 HOSP IP/OBS DSCHRG MGMT >30: CPT

## 2020-07-15 RX ORDER — INSULIN GLARGINE 100 [IU]/ML
8 INJECTION, SOLUTION SUBCUTANEOUS
Qty: 30 | Refills: 0
Start: 2020-07-15 | End: 2020-08-13

## 2020-07-15 RX ORDER — WARFARIN SODIUM 2.5 MG/1
1 TABLET ORAL
Qty: 30 | Refills: 0
Start: 2020-07-15 | End: 2020-08-13

## 2020-07-15 RX ORDER — ALPRAZOLAM 0.25 MG
1 TABLET ORAL
Qty: 0 | Refills: 0 | DISCHARGE
Start: 2020-07-15

## 2020-07-15 RX ORDER — ALPRAZOLAM 0.25 MG
0.25 TABLET ORAL EVERY 12 HOURS
Refills: 0 | Status: DISCONTINUED | OUTPATIENT
Start: 2020-07-15 | End: 2020-07-15

## 2020-07-15 RX ADMIN — Medication 81 MILLIGRAM(S): at 11:16

## 2020-07-15 RX ADMIN — POLYETHYLENE GLYCOL 3350 17 GRAM(S): 17 POWDER, FOR SOLUTION ORAL at 11:16

## 2020-07-15 RX ADMIN — Medication 500 MILLIGRAM(S): at 11:16

## 2020-07-15 RX ADMIN — PANTOPRAZOLE SODIUM 40 MILLIGRAM(S): 20 TABLET, DELAYED RELEASE ORAL at 05:32

## 2020-07-15 RX ADMIN — ISOSORBIDE MONONITRATE 30 MILLIGRAM(S): 60 TABLET, EXTENDED RELEASE ORAL at 11:16

## 2020-07-15 RX ADMIN — Medication 1 APPLICATION(S): at 11:17

## 2020-07-15 RX ADMIN — BUDESONIDE AND FORMOTEROL FUMARATE DIHYDRATE 2 PUFF(S): 160; 4.5 AEROSOL RESPIRATORY (INHALATION) at 08:45

## 2020-07-15 RX ADMIN — Medication 1 TABLET(S): at 11:16

## 2020-07-15 RX ADMIN — FINASTERIDE 5 MILLIGRAM(S): 5 TABLET, FILM COATED ORAL at 11:16

## 2020-07-15 RX ADMIN — Medication 50 MILLIGRAM(S): at 05:30

## 2020-07-15 NOTE — PROGRESS NOTE ADULT - REASON FOR ADMISSION
CHF
SOB

## 2020-07-15 NOTE — PROGRESS NOTE ADULT - SUBJECTIVE AND OBJECTIVE BOX
80yMale with diagnosis: CHF (CONGESTIVE HEART FAILURE)      Patient seen for follow up.        PHYSICAL EXAM  Pt complained of pain, was alert able to make needs known is hard of hearing  Still with anasarca - generalized  scrotal edema     T(C): , Max: 35.6 (14:04)  T(F): 95.3  HR: 58 (56 - 97)  BP: 110/55 (110/55 - 139/62)  RR: 19 (18 - 20)  SpO2: 99% (96% - 99%)              LABS:                                                                                                                                               MEDICATIONS  (STANDING):  albuterol/ipratropium for Nebulization 3 milliLiter(s) Nebulizer every 6 hours  ascorbic acid 500 milliGRAM(s) Oral daily  aspirin  chewable 81 milliGRAM(s) Oral daily  atorvastatin 40 milliGRAM(s) Oral at bedtime  budesonide 160 MICROgram(s)/formoterol 4.5 MICROgram(s) Inhaler 2 Puff(s) Inhalation two times a day  chlorhexidine 4% Liquid 1 Application(s) Topical once  finasteride 5 milliGRAM(s) Oral daily  furosemide    Tablet 80 milliGRAM(s) Oral two times a day  insulin glargine Injectable (LANTUS) 15 Unit(s) SubCutaneous at bedtime  isosorbide   mononitrate ER Tablet (IMDUR) 30 milliGRAM(s) Oral daily  metolazone 5 milliGRAM(s) Oral daily  metoprolol succinate ER 50 milliGRAM(s) Oral daily  multivitamin 1 Tablet(s) Oral daily  pantoprazole    Tablet 40 milliGRAM(s) Oral before breakfast  polyethylene glycol 3350 17 Gram(s) Oral daily  sevelamer carbonate 800 milliGRAM(s) Oral three times a day with meals  silver sulfADIAZINE 1% Cream 1 Application(s) Topical daily  silver sulfADIAZINE 1% Cream 1 Application(s) Topical daily    MEDICATIONS  (PRN):  ALPRAZolam 0.25 milliGRAM(s) Oral every 12 hours PRN anxiety  HYDROmorphone  Injectable 0.25 milliGRAM(s) IV Push every 4 hours PRN pain or dyspnea

## 2020-07-15 NOTE — PROGRESS NOTE ADULT - SUBJECTIVE AND OBJECTIVE BOX
Patient Information:  TAIWO ZAVALA / 80y / Male / MRN#:[id number]    Hospital Day: 17d    HPI:  79 y/o male with a PMH of CAD s/p CABG s/p PCI , severe symptomatic AS s/p BAV as a bridge to TAVR (6/10), afib/flutter on coumadin, left carotid artery stenosis (80%), DM2 , HLD,  HTN, COPD on home O2, CKD stage 4, Hx of ATN on HD for 2 months in 2018, LE cellultits requiring admission and BPH was BIBEMS from [facility] to the ED with shortness of breath for 6 weeks, started after BAV. His SOB is at rest progressively worsening with edema of LLE progressing and was sent to ED Patient denies cough, chest pain, palpitations/wheeze.     In ED , VS: Bp:112/32 HR:57 RR:22 SpO2: 96% on 4L  Received 20mg IV push of lasix ,    Interval History:  Patient seen and examined at bedside. No acute events overnight.    Past Medical History:  Hyperlipidemia  Cellulitis of right lower extremity  Cellulitis of left lower extremity  History of renal insufficiency  Stenosis of left carotid artery  Aortic stenosis  Afib  BPH (benign prostatic hyperplasia)  CHF (congestive heart failure)  COPD (chronic obstructive pulmonary disease)  Diabetes  HTN (hypertension)    Past Surgical History:  S/P balloon aortic valvuloplasty  H/O heart artery stent  S/P CABG x 3    Allergies:  No Known Allergies    Medications:  PRN:  HYDROmorphone  Injectable 0.25 milliGRAM(s) IV Push every 4 hours PRN pain or dyspnea    Standing:  albuterol/ipratropium for Nebulization 3 milliLiter(s) Nebulizer every 6 hours  ascorbic acid 500 milliGRAM(s) Oral daily  aspirin  chewable 81 milliGRAM(s) Oral daily  atorvastatin 40 milliGRAM(s) Oral at bedtime  budesonide 160 MICROgram(s)/formoterol 4.5 MICROgram(s) Inhaler 2 Puff(s) Inhalation two times a day  chlorhexidine 4% Liquid 1 Application(s) Topical once  finasteride 5 milliGRAM(s) Oral daily  furosemide    Tablet 80 milliGRAM(s) Oral two times a day  insulin glargine Injectable (LANTUS) 15 Unit(s) SubCutaneous at bedtime  isosorbide   mononitrate ER Tablet (IMDUR) 30 milliGRAM(s) Oral daily  metolazone 5 milliGRAM(s) Oral daily  metoprolol succinate ER 50 milliGRAM(s) Oral daily  multivitamin 1 Tablet(s) Oral daily  pantoprazole    Tablet 40 milliGRAM(s) Oral before breakfast  polyethylene glycol 3350 17 Gram(s) Oral daily  sevelamer carbonate 800 milliGRAM(s) Oral three times a day with meals  silver sulfADIAZINE 1% Cream 1 Application(s) Topical daily  silver sulfADIAZINE 1% Cream 1 Application(s) Topical daily    Home:  budesonide-formoterol 160 mcg-4.5 mcg/inh inhalation aerosol:  inhaled   finasteride 5 mg oral tablet: 1 tab(s) orally once a day  fluticasone 50 mcg/inh nasal spray: 1 spray(s) nasal 2 times a day  furosemide 80 mg oral tablet: 1 tab(s) orally 2 times a day  isosorbide mononitrate 30 mg oral tablet, extended release: 1 tab(s) orally once a day (in the morning)  metOLazone 5 mg oral tablet: 1 tab(s) orally once a day  metoprolol succinate 50 mg oral tablet, extended release: 1 tab(s) orally once a day  Multiple Vitamins oral tablet: 1 tab(s) orally once a day  potassium chloride 20 mEq oral tablet, extended release: 2 tab(s) orally once a day  sevelamer carbonate 800 mg oral tablet: 1 tab(s) orally 3 times a day (with meals)  silver sulfADIAZINE 1% topical cream: 1 application topically once a day    Vitals:  T(C): 35.2, Max: 35.6 (07-14-20 @ 14:04)  T(F): 95.3, Max: 96.1 (07-14-20 @ 14:04)  HR: 58 (56 - 70)  BP: 138/52 (117/59 - 139/62)  RR: 20 (18 - 20)  SpO2: 96% (96% - 98%)    Physical Exam:  General: Awake, Alert. Not in acute distress.  Heart: Regular rate and rhythm; S1, S2; No murmurs.  Lungs: Clear to auscultation bilaterally.  Abdomen: Soft, nontender, nondistended.  Extremities: No edema in upper or lower extremities.  Neuro: AAOx3, No focal deficits.    Labs:          PT/INR (07-14 @ 22:52)  PT: 35.40; INR: 3.08   PTT:                ABG (07-14 @ 23:14)  pH, Arterial: 7.45 pH, Blood: X /  pCO2: 41 /  pO2: 101 / HCO3: 29 / Base Excess: 4.3 /  SaO2: 98       Microbiology:    Radiology:

## 2020-07-15 NOTE — PROGRESS NOTE ADULT - PROVIDER SPECIALTY LIST ADULT
Hospitalist
ID band present and verified. Family is in patient room.
Cardiology

## 2020-07-15 NOTE — PROGRESS NOTE ADULT - ASSESSMENT
79 y/o male with a PMH of CAD s/p CABG s/p PCI, severe symptomatic AS s/p BAV as a bridge to TAVR (6/10), afib/flutter on coumadin, left carotid artery stenosis (80%), DM2, HLD,  HTN, COPD , CKD stage 4, BPH was sent from [facility] to the ED with shortness of breath and LE edema.     #Acute on chronic diastolic CHF exacerbation   #Severe AS s/p BAV  patient is a hospice candidate but son unwilling to proceed with hospice, appealing d/c  patient recently had BAV for severe AS, not a candidate for TAVR   c/w PO Lasix and metolazone     #Hx CAD s/p CABG s/p PCI: -c/w ASA, Lipitor, Imdur, Toprol    #paroxysmal Afib  INR target 2-3 > follow daily and dose accordingly    #Hypokalemia - resolved  #Hx of Left Carotid Artery stenosis: -c/w asa, statin   #CKD 4 stable scr at baseline. BUN up trending, nephro following.   #Chronic elevated troponin likely due to CKD IV   #COPD - stable   #DM2 - c/w insulin, reduce Lantus to 15 units  #Chronic thrombocytopenia - stable  #COPD - c/w LABA/ICS  #BPH - c/w finasteride, passed TOV    DVT ppx: Coumadin  GI ppx: not indicated  Diet: DASH  Code: DNR  Dispo: d/c to hospice but son appealing

## 2020-07-15 NOTE — PROGRESS NOTE ADULT - PROVIDER SPECIALTY LIST ADULT
Cardiology
Cardiology
Hospitalist
Internal Medicine
Nephrology
Palliative Care
Cardiology
Hospitalist
Hospitalist
Internal Medicine
Nephrology
Cardiology
Hospitalist
Internal Medicine
Nephrology
Hospitalist
Cardiology

## 2020-07-15 NOTE — PROGRESS NOTE ADULT - ASSESSMENT
80yMale being evaluated for symptom management and goals of care. Pt has periods of anxiety and dyspnea well managed with Xanax and Dilaudid IV PRN. Pt's son Michele and patient together decided he was a DNR/DNI. Pt's son given option of hospice, and comfort measures only vs ongoing medical management. Son Michele stated he understood prognosis and that pt has no further tx options. He declined hospice (hospice nurse spoke with Michele extensively) and opted to send his father to NH for rehab. He is aware that he can call hospice and convert pt at any time.     Recommendations:  DNR/DNI  Continue low dose opioids for dyspnea  xanax 0.25mg for anxiety  d/c to SNF for rehab 80yMale being evaluated for symptom management and goals of care. Pt has periods of anxiety and dyspnea well managed with Xanax and Dilaudid IV PRN. Pt's son Deangelo (who pt lives with) and patient together decided he was a DNR/DNI. Pt's son given option of hospice, and comfort measures only vs ongoing medical management. Son Deangelo stated he understood prognosis and that pt has no further tx options. He declined hospice (hospice nurse spoke with Michele extensively) and opted to send his father to NH for rehab. He is aware that he can call hospice and convert pt at any time.     Recommendations:  DNR/DNI  Continue low dose opioids for dyspnea  xanax 0.25mg for anxiety  d/c to SNF for rehab

## 2020-07-15 NOTE — PROGRESS NOTE ADULT - ATTENDING COMMENTS
patient seen and examined independently     81 y/o male with a PMH of CAD s/p CABG s/p PCI, severe symptomatic AS s/p BAV as a bridge to TAVR (6/10), afib/flutter on coumadin, left carotid artery stenosis (80%), DM2, HLD,  HTN, COPD , CKD stage 4, BPH was sent from [facility] to the ED with shortness of breath and LE edema.     #Acute on chronic diastolic CHF exacerbation   #Severe AS s/p BAV  patient recently had BAV for severe AS, not a candidate for TAVR   c/w PO Lasix and metolazone     #Hypokalemia - resolved, K 5.2 stopped  standing potassium.     #Hx CAD s/p CABG s/p PCI    c/w asa, lipitor, Imdur, BB    #History of AFIB c/w coumadin target INR 2-3. inr 3 on 7/14      #Hx of Left Carotid Artery stenosis  c/w asa, statin     #CKD 4 stable scr at baseline. BUN up trending, nephro following.     #Chronic elevated troponin likely due to CKD IV     #COPD - stable     #DM2 - c/w insulin, reduce lantus to 15 units    #Chronic thrombocytopenia - stable     #COPD - c/w LABA/ICS    #BPH - c/w finasteride, passed TOV     patient lost appeal for discharge and per  confirmed to be discharged to snf today and based on that Rapid COVID test till be sent today    spent 35 min on discharge
conrt w/ lasix for neg balance and follow renal status,o2.
follow pt on meds w/ plans as per renal.

## 2020-07-20 DIAGNOSIS — Z99.81 DEPENDENCE ON SUPPLEMENTAL OXYGEN: ICD-10-CM

## 2020-07-20 DIAGNOSIS — I13.0 HYPERTENSIVE HEART AND CHRONIC KIDNEY DISEASE WITH HEART FAILURE AND STAGE 1 THROUGH STAGE 4 CHRONIC KIDNEY DISEASE, OR UNSPECIFIED CHRONIC KIDNEY DISEASE: ICD-10-CM

## 2020-07-20 DIAGNOSIS — Z79.4 LONG TERM (CURRENT) USE OF INSULIN: ICD-10-CM

## 2020-07-20 DIAGNOSIS — I50.33 ACUTE ON CHRONIC DIASTOLIC (CONGESTIVE) HEART FAILURE: ICD-10-CM

## 2020-07-20 DIAGNOSIS — E87.6 HYPOKALEMIA: ICD-10-CM

## 2020-07-20 DIAGNOSIS — Z95.1 PRESENCE OF AORTOCORONARY BYPASS GRAFT: ICD-10-CM

## 2020-07-20 DIAGNOSIS — I35.0 NONRHEUMATIC AORTIC (VALVE) STENOSIS: ICD-10-CM

## 2020-07-20 DIAGNOSIS — Z66 DO NOT RESUSCITATE: ICD-10-CM

## 2020-07-20 DIAGNOSIS — Z95.5 PRESENCE OF CORONARY ANGIOPLASTY IMPLANT AND GRAFT: ICD-10-CM

## 2020-07-20 DIAGNOSIS — Z79.899 OTHER LONG TERM (CURRENT) DRUG THERAPY: ICD-10-CM

## 2020-07-20 DIAGNOSIS — R06.02 SHORTNESS OF BREATH: ICD-10-CM

## 2020-07-20 DIAGNOSIS — I27.20 PULMONARY HYPERTENSION, UNSPECIFIED: ICD-10-CM

## 2020-07-20 DIAGNOSIS — N18.4 CHRONIC KIDNEY DISEASE, STAGE 4 (SEVERE): ICD-10-CM

## 2020-07-20 DIAGNOSIS — Z79.01 LONG TERM (CURRENT) USE OF ANTICOAGULANTS: ICD-10-CM

## 2020-07-20 DIAGNOSIS — E87.5 HYPERKALEMIA: ICD-10-CM

## 2020-07-20 DIAGNOSIS — E11.9 TYPE 2 DIABETES MELLITUS WITHOUT COMPLICATIONS: ICD-10-CM

## 2020-07-20 DIAGNOSIS — J44.9 CHRONIC OBSTRUCTIVE PULMONARY DISEASE, UNSPECIFIED: ICD-10-CM

## 2020-07-22 ENCOUNTER — APPOINTMENT (OUTPATIENT)
Dept: VASCULAR SURGERY | Facility: CLINIC | Age: 80
End: 2020-07-22

## 2020-09-25 NOTE — ED ADULT TRIAGE NOTE - TEMPERATURE IN CELSIUS (DEGREES C)
DATE OF VISIT:  02/15/2018      REASON FOR VISIT:  Nonhealing venous stasis ulcer, left leg.      HISTORY OF PRESENT ILLNESS:  The patient seems to be going the wrong   direction.  His wound is now 2 x 4 cm.  There does not appear to be any   cellulitis, but it just does not appear to be healing.  We are going to go   back to the Hydrofera Blue.  We talked to him in detail today about an   amniotic type skin graft and we will see if we can get approval through   graphics today.  We will continue with the Hydrofera and tacrolimus and he   will follow up with me in a week or 2, and hopefully we will make a   determination about grafting at that time.                  Chepe Olguin MD      DD/Chelo     DD:  02/15/2018 08:51:11 / DT:  02/15/2018 09:09:48     Job #:   [id number]        
35.1
Yes

## 2020-11-13 NOTE — ED PROVIDER NOTE - ATTENDING CONTRIBUTION TO CARE
Pt has a history of diabetes, HTN, CAD, CABG, Stents, COPD who presents with shortness of breath. Usually can walk at least a several hundred feet. Now cannot walk even a few feet. Pt was recently admitted for same. No chest pain. S1S2 2\6 systolic murmur and diastolic murmurs. Lungs clear, abdomen is obese, bilateral edema and pt has wrapping with ACE. COPD vs CHF vs both.
No

## 2020-12-10 NOTE — OCCUPATIONAL THERAPY INITIAL EVALUATION ADULT - GROOMING, PREVIOUS LEVEL OF FUNCTION, OT EVAL
[address]   Progress Note and Procedure Note      [facility] RECORD NUMBER:  [id number]  AGE: 38 y.o. GENDER: female  : 1976  EPISODE DATE:  12/10/2020    Subjective:     Chief Complaint   Patient presents with    Wound Check     left foot         HISTORY of PRESENT ILLNESS HPI     Cameron Hayes is a 38 y.o. female who presents today for wound/ulcer evaluation. History of Wound Context: left ankle non healing surgical wound after left ankle surgery 10/23/2020 with Dr Niki Mishra. Tissue culture shows normal sony   Wound/Ulcer Pain Timing/Severity: none  Quality of pain: N/A  Severity:  0 / 10   Modifying Factors: None  Associated Signs/Symptoms: none    Ulcer Identification:  Ulcer Type: non-healing surgical  Contributing Factors: decreased mobility and obesity    Wound: N/A        PAST MEDICAL HISTORY        Diagnosis Date    Anxiety     Depression     Fibromyalgia     Wears contact lenses        PAST SURGICAL HISTORY    Past Surgical History:   Procedure Laterality Date    ARTHRODESIS Left 10/23/2020    LEFT SUBTALAR FUSION, CALCANEAL OSTEOTOMY,  SETH- MARY JANE performed by Cathi Mercado MD at [facility] Right     Marker        FAMILY HISTORY    Family History   Problem Relation Age of Onset    Hypertension Mother     High Cholesterol Mother     High Blood Pressure Mother     Alcohol Abuse Mother     Diabetes Father     Other Father     Colon Cancer Brother     Heart Disease Brother     Diabetes Brother     Alcohol Abuse Brother     High Blood Pressure Brother        SOCIAL HISTORY    Social History     Tobacco Use    Smoking status: Never Smoker    Smokeless tobacco: Never Used   Substance Use Topics    Alcohol use:  Yes     Alcohol/week: 1.0 standard drinks     Types: 1 Glasses of wine per week    Drug use: No       ALLERGIES    Allergies   Allergen Reactions    Codeine Nausea Only MEDICATIONS    Current Outpatient Medications on File Prior to Encounter   Medication Sig Dispense Refill    sulfamethoxazole-trimethoprim (BACTRIM DS) 800-160 MG per tablet Take 1 tablet by mouth 2 times daily for 14 days 28 tablet 0    acetaminophen (TYLENOL) 500 MG tablet Take 2 tablets by mouth every 8 hours for 7 days Do not exceed 4g of Acetaminophen in 24 hrs (including all sources) 30 tablet 0    aspirin 325 MG EC tablet Take 1 tablet by mouth daily 42 tablet 0    citalopram (CELEXA) 40 MG tablet Take 40 mg by mouth      gabapentin (NEURONTIN) 300 MG capsule 3 tabs q HS      hydrOXYzine (VISTARIL) 25 MG capsule Take 25 mg by mouth       No current facility-administered medications on file prior to encounter.         REVIEW OF SYSTEMS    Constitutional: negative  Eyes: negative  Ears, nose, mouth, throat, and face: negative  Respiratory: negative  Cardiovascular: negative  Gastrointestinal: negative  Genitourinary:negative  Integument/breast: negative except for left ankle wound  Hematologic/lymphatic: negative  Musculoskeletal:negative  Neurological: negative  Behavioral/Psych: negative  Endocrine: negative  Allergic/Immunologic: negative    Objective:      /86   Pulse 96   Temp 98.7 °F (37.1 °C) (Tympanic)   Resp 16   Ht 5' 7\" (1.702 m)   Wt 212 lb (96.2 kg)   BMI 33.20 kg/m²     Wt Readings from Last 3 Encounters:   12/10/20 212 lb (96.2 kg)   12/04/20 216 lb (98 kg)   12/02/20 237 lb (107.5 kg)       PHYSICAL EXAM    General Appearance: alert and oriented to person, place and time, well-developed and obese, in no acute distress  Skin: warm and dry, no rash or erythema, left ankle wound   Head: normocephalic and atraumatic  Eyes: pupils equal, round, extraocular eye movements intact, and conjunctivae normal  Pulmonary/Chest: normal air movement, no respiratory distress  Extremities: no cyanosis and no clubbing  Musculoskeletal: no joint swelling, deformity or tenderness  Neurologic: using knee walker - non weight bearing to left leg, coordination normal and speech normal      Assessment:     Problem List Items Addressed This Visit     Non-healing non-surgical wound - Primary    Relevant Medications    lidocaine (XYLOCAINE) 4 % external solution    Other Relevant Orders    Supply: Wound Cleanser    Supply: Wound Dressings    Supply: Pack Wound    Supply: Cover and Secure    Supply: Edema Control    Ankle wound, left, initial encounter    Relevant Medications    lidocaine (XYLOCAINE) 4 % external solution    Other Relevant Orders    Supply: Wound Cleanser    Supply: Wound Dressings    Supply: Pack Wound    Supply: Cover and Secure    Supply: Edema Control    Class 1 obesity due to excess calories without serious comorbidity with body mass index (BMI) of 33.0 to 33.9 in adult    Relevant Medications    lidocaine (XYLOCAINE) 4 % external solution    Other Relevant Orders    Supply: Wound Cleanser    Supply: Wound Dressings    Supply: Pack Wound    Supply: Cover and Secure    Supply: Edema Control           Procedure Note  Indications:  Based on my examination of this patient's wound(s)/ulcer(s) today, debridement is required to promote healing and evaluate the wound base. Performed by: THERON Mckeon CNP    Consent obtained:  Yes    Time out taken:  Yes    Pain Control: Anesthetic  Anesthetic: 4% Lidocaine Liquid Topical       Debridement:Excisional Debridement    Using curette, scissors and forceps the wound(s)/ulcer(s) was/were sharply debrided down through and including the removal of subcutaneous tissue.         Devitalized Tissue Debrided:  fibrin, biofilm, slough and exudate    Pre Debridement Measurements:  Are located in the [place]  Documentation Flow Sheet    Wound/Ulcer #: 1 and 2    Post Debridement Measurements:  Wound/Ulcer Descriptions are Pre Debridement except measurements:    Wound 12/04/20 Foot Left;Lateral;Distal #1 (Active)   Wound Image   12/04/20 1330   Wound TREATMENT-                JVIANJJVL #     [facility] in 2 week                                                                                                   ROOM TYPE   []? CHAIR     []? BED   []? EITHER        TIME []? 45 MIN     []? 60 MIN     (Please note your next appointment above and if you are unable to keep, kindly give a 24 hour notice. Thank you.)     If you experience any of the following, please call the [facility] during business hours:  [phone]     * Increase in Pain  * Temperature over 101  * Increase in drainage from your wound  * Drainage with a foul odor  * Bleeding  * Increase in swelling  * Need for compression bandage changes due to slippage, breakthrough drainage.     If you need medical attention outside of the business hours of the [facility] please contact your PCP or go to the nearest emergency room. The information contained in the After Visit Summary has been reviewed with me, the patient and/or responsible adult, by my health care provider(s). I had the opportunity to ask questions regarding this information. I have elected to receive;      []? After Visit Summary  [x]? Comprehensive Discharge Instruction        Patient signature______________________________________Date:________    Electronically signed by THERON Simmons CNP on 12/10/2020 at 3:03 PM  Electronically signed by Aleida Guthrie RN on 12/10/2020 at 3:09 PM          Electronically signed by THERON Simmons CNP on 12/10/2020 at 3:12 PM independent

## 2020-12-15 NOTE — PROGRESS NOTE ADULT - SUBJECTIVE AND OBJECTIVE BOX
LENGTH OF HOSPITAL STAY: 16d    CHIEF COMPLAINT:   Patient is a 79y old  Male who presents with a chief complaint of CHF (26 Oct 2019 09:51)      HISTORY OF PRESENTING ILLNESS:    HPI:  Patient is a 78yo Male w/ PMH of HFpEF (EF of 55% with Grade II Diastolic dysfunction), COPD (home O2 3-3.5L as needed), DM, HTN, CAD s/p CABG and 1 stent, BPH, and recent admission for CHF (August 2019) presenting to [facility] with complaints of shortness of breath and chest pain. Patient reports he has been progressively worsening shortness of breath for the last 4-5 days prior to admission. He also reports associated chest pain on exertion.  Patient reports he has been compliant with all of his medications as well with a low salt diet. He denies any recent history of fevers, chills, cough. On day of admission, had severe dyspnea on exertion associated with a pressure like sensation in his chest that radiated to his left arm. He tried sublingual nitro for relief but it didn't help. Therefore, he came in for further evaluation. He was given Solumedrol 125mg by EMS. In the ED, patient was found to have evidence of bilateral pleural effusions on bedside sono done by ED staff. He received IV Lasix 40mg and was placed on BiPAP with major improvement in his symptoms. On my assessment, patient was speaking to me in full sentences on BiPAP and his chest pain has resolved. (10 Oct 2019 13:27)  No events overnight, pt found sitting in chair, no distress    PAST MEDICAL & SURGICAL HISTORY  PAST MEDICAL & SURGICAL HISTORY:  BPH (benign prostatic hyperplasia)  CHF (congestive heart failure)  COPD (chronic obstructive pulmonary disease)  Diabetes  HTN (hypertension)  H/O heart artery stent  S/P CABG x 3    SOCIAL HISTORY:    ALLERGIES:  No Known Allergies    MEDICATIONS:  STANDING MEDICATIONS  ALPRAZolam 0.5 milliGRAM(s) Oral at bedtime  aspirin  chewable 81 milliGRAM(s) Oral daily  buDESOnide 160 MICROgram(s)/formoterol 4.5 MICROgram(s) Inhaler 2 Puff(s) Inhalation two times a day  chlorhexidine 4% Liquid 1 Application(s) Topical daily  clopidogrel Tablet 75 milliGRAM(s) Oral daily  dextrose 5%. 1000 milliLiter(s) IV Continuous <Continuous>  dextrose 50% Injectable 12.5 Gram(s) IV Push once  dextrose 50% Injectable 25 Gram(s) IV Push once  dextrose 50% Injectable 25 Gram(s) IV Push once  fenofibrate Tablet 145 milliGRAM(s) Oral daily  finasteride 5 milliGRAM(s) Oral daily  fluticasone propionate 50 MICROgram(s)/spray Nasal Spray 1 Spray(s) Both Nostrils two times a day  heparin  Infusion 1200 Unit(s)/Hr IV Continuous <Continuous>  influenza   Vaccine 0.5 milliLiter(s) IntraMuscular once  insulin glargine Injectable (LANTUS) 12 Unit(s) SubCutaneous at bedtime  insulin lispro Injectable (HumaLOG) 7 Unit(s) SubCutaneous before breakfast  insulin lispro Injectable (HumaLOG) 7 Unit(s) SubCutaneous before lunch  insulin lispro Injectable (HumaLOG) 7 Unit(s) SubCutaneous before dinner  isosorbide   mononitrate ER Tablet (IMDUR) 30 milliGRAM(s) Oral daily  metoprolol succinate ER 75 milliGRAM(s) Oral daily  nafcillin  IVPB      nafcillin  IVPB 2 Gram(s) IV Intermittent every 4 hours  nitroglycerin    Patch 0.2 mG/Hr(s) 1 patch Transdermal daily  silver sulfADIAZINE 1% Cream 1 Application(s) Topical two times a day  simvastatin 40 milliGRAM(s) Oral at bedtime  tamsulosin 0.4 milliGRAM(s) Oral at bedtime    PRN MEDICATIONS  acetaminophen   Tablet .. 650 milliGRAM(s) Oral every 6 hours PRN  acetaminophen  Suppository .. 650 milliGRAM(s) Rectal every 6 hours PRN  dextrose 40% Gel 15 Gram(s) Oral once PRN  glucagon  Injectable 1 milliGRAM(s) IntraMuscular once PRN    VITALS:   T(F): 97.5  HR: 65  BP: 120/58  RR: 20  SpO2: 96%    LABS:                        10.8   7.41  )-----------( 241      ( 26 Oct 2019 05:49 )             32.8     10-26    140  |  96<L>  |  75<HH>  ----------------------------<  121<H>  3.7   |  23  |  6.7<HH>    Ca    8.6      26 Oct 2019 05:49    TPro  6.1  /  Alb  3.0<L>  /  TBili  1.0  /  DBili  x   /  AST  15  /  ALT  12  /  AlkPhos  36  10-26    PTT - ( 26 Oct 2019 05:49 )  PTT:62.0 sec          Culture - Blood (collected 24 Oct 2019 05:53)  Source: .Blood None  Preliminary Report (25 Oct 2019 18:01):    No growth to date.    Culture - Blood (collected 23 Oct 2019 15:30)  Source: .Blood None  Preliminary Report (24 Oct 2019 23:01):    No growth to date.          RADIOLOGY:    PHYSICAL EXAM:  GEN: No acute distress  HEENT: clear  Neck: R HD access  LUNGS: Clear to auscultation bilaterally   HEART: S1/S2 present. RRR.   ABD: Soft, non-tender, non-distended. Bowel sounds present  EXT:-c/c/e  NEURO: AAOX3 No excercise/No heavy lifting/No sports/gym/No weight bearing/Elevate extremity

## 2021-05-31 NOTE — ED ADULT TRIAGE NOTE - NSWEIGHTCALCTOOLDRUG_GEN_A_CORE
used
--EXAM--  VITAL SIGNS: I have reviewed vs documented at present.  CONSTITUTIONAL: Well-developed; well-nourished; in no acute distress.   SKIN: Warm and dry, no acute rash.     EYES: PERRL, EOM intact; conjunctiva and sclera clear. No nystagmus.  eyelid there is abrasion skin tear to left side no active bleeding   ENT: No nasal discharge; airway clear.    CARD: S1, S2, Regular rate and rhythm.   RESP: No wheezes, rales or rhonchi.

## 2021-06-21 NOTE — PROGRESS NOTE ADULT - REASON FOR ADMISSION
Telemetry Shift Summary    Rhythm SR  HR Range 80s  Ectopy none  Measurements .16/.08/.38        Normal Values  Rhythm SR  HR Range    Measurements 0.12-0.20 / 0.06-0.10  / 0.30-0.52  
hypoglycemia

## 2021-07-16 NOTE — GOALS OF CARE CONVERSATION - ADVANCED CARE PLANNING - NSGCTIMESPENT_GEN_ALL_CORE
[FreeTextEntry1] : 36 yo F w/ hx of bilateral carpal tunnel syndrome, cervicalgia due to bulging disc and lumbar disc bulge presenting to establish care. \par \par PLAN:\par - continue prenatal vitamin (for B complex benefit) \par - continue with wrist splints at night \par - MRI of C-spine/neck to evaluate for pinched nerve \par - repeat EMG here \par - serum CMP, CPK, CRP,  B12 and folate 
30

## 2021-11-07 NOTE — PHYSICAL THERAPY INITIAL EVALUATION ADULT - PRECAUTIONS/LIMITATIONS, REHAB EVAL
Telemetry Bed?: No   Admitting Physician: YANA BYRD [[id number]]   Is this a telephone or verbal order?: This is a telephone order from the admitting physician   cardiac precautions/oxygen therapy device and L/min/5 Li/min via nasal cannula/fall precautions

## 2021-12-15 NOTE — PATIENT PROFILE ADULT - REASON FOR REFUSAL (REFER PATIENT TO HEALTHCARE PROVIDER FOR FOLLOW-UP):
Pt in for fasting hemoglobin a1c and glucose. Results in epic.
Usually he doesn't take the Flu vaccine

## 2022-02-12 NOTE — PATIENT PROFILE ADULT - DO YOU FEEL THREATENED BY OTHERS?
2/11/2022 7:17 PM  Patient Room #: 2001/2001-A  Patient Name: Amilcar Marroquin    (Step 1 Done by RN if possible otherwise call Pulmonary Diagnostics)  1. Place patient on room air at rest for at least 30 minutes. If patient falls below 88% before 30 minutes then you can record the level and stop. Record room air saturation level __ %. If patient is at 88% or below, they will qualify for home oxygen and you can stop. If level does not fall below 88%, fill in level above. If indicated continue to Step 2. Signature:_____ Date: ___  (Step 2&3 Done by [facility])  2. Ambulate patient on room air until saturation falls below 89%. Record level of room air saturation with ambulation___ %. Next, place patient back on ___lpm oxygen and ambulate, record level __%. (Note:  this level must show improvement from room air level done with ambulation.)  If patients saturation on room air with ambulation is 88% or below AND patient shows improvement with oxygen during ambulation, they will qualify for home oxygen and you can stop. If patient does not drop below 89%, then patient should have an overnight oximetry trending on room air to see if level falls below 88%. Complete level in Step 3 below. 3. Room air overnight oximetry level 88 % for___  cumulative minutes. If patients room air oxygen level is < 89% for at least 5 cumulative minutes, patient will qualify for home oxygen and you can stop. (Attach Night Trending Report)    Complete order below: Diagnosis:_Covid 19___  Home oxygen at:  Length of Need: ? Lifetime ? X 3 Months     ___lpm or __%   via  [] nasal cannula  []mask  [] other: Conserving Device         []continuous []  with activity  []  Nocturnal   [] Portable Tanks []  Concentrator  [] Conserving Device        Therapist Signature:_______     Date:  ___  Physician Signature:  __Electronically Signed in EMR_    Date:___  Physician Printed Name:  ___Lázaro Gonsales MD NPI: [id number]_    [] Patient Qualifies      [] Patient Does NOT qualify no

## 2022-07-15 NOTE — CONSULT NOTE ADULT - CONSULT REASON
Aaliyah fluid overload
LE venous stasis ulcer
RBITTANI; difficulty placing julian
Stasis Right Leg w/ Drainage
post troponin
weakness
declines

## 2022-07-18 NOTE — ED ADULT NURSE NOTE - PAIN RATING/NUMBER SCALE (0-10): ACTIVITY
"ANNUAL VISIT NOTE     PRESENTING HISTORY     Reason for Visit:  Annual visit.    No chief complaint on file.      History of Present Illness & ROS: Ms. Lele Ray is a 35 y.o. female.  Annual   New to me.   No PCP.   Very pleasant young lady.   Fasting for labs today.   Request to have form completed for medical insurance today ( work credit).   Having 'chronic issues with allergies, watery eyes, sneezing and runny nose".   Noticing a variance the pattern of occurrence, 'Spring and Fall are the worse; taking Zyrtec and helps some'. Never been seen by an Allergist.   Also mentions that has some 'redness and mild swelling above right eyelid; applying warm compresses'.     Review of Systems:  Eyes: denies visual changes at this time denies floaters   ENT: no nasal congestion or sore throat  Respiratory: no cough or shorness of breath  Cardiovascular: no chest pain or palpitations  Gastrointestinal: no nausea or vomiting, no abdominal pain or change in bowel habits  Genitourinary: no hematuria or dysuria; denies frequency  Hematologic/Lymphatic: no easy bruising or lymphadenopathy  Musculoskeletal: no arthralgias or myalgias  Neurological: no seizures or tremors  Endocrine: no heat or cold intolerance    PAST HISTORY:     No past medical history on file.    No past surgical history on file.    Family History   Problem Relation Age of Onset    Diabetes Maternal Grandmother     Heart disease Maternal Grandmother     Glaucoma Maternal Grandmother     Diabetes Mother     Hypertension Mother     Sleep apnea Mother     Prostate cancer Maternal Grandfather     Heart attack Paternal Grandmother     No Known Problems Paternal Grandfather     Esophageal cancer Neg Hx     Stomach cancer Neg Hx        Social History     Socioeconomic History    Marital status: Single   Occupational History    Occupation: [employer]      Comment:     Tobacco Use    Smoking status: Never " Smoker    Smokeless tobacco: Never Used   Substance and Sexual Activity    Alcohol use: Yes     Comment: socially    Drug use: No    Sexual activity: Never       MEDICATIONS & ALLERGIES:     Current Outpatient Medications on File Prior to Visit   Medication Sig Dispense Refill    multivitamin capsule Take 1 capsule by mouth once daily.      ondansetron (ZOFRAN-ODT) 4 MG TbDL Take 1 tablet (4 mg total) by mouth every 8 (eight) hours as needed (nausea). 12 tablet 0     No current facility-administered medications on file prior to visit.        Review of patient's allergies indicates:  No Known Allergies    Medications Reconciliation:   I have reconciled the patient's home medications and discharge medications with the patient/family. I have updated all changes.  Refer to After-Visit Medication List.    OBJECTIVE:     Vital Signs:  There were no vitals filed for this visit.  Wt Readings from Last 3 Encounters:   10/15/21 0345 70.3 kg (155 lb)   11/12/20 1529 76.7 kg (169 lb)   10/01/20 0833 77.9 kg (171 lb 11.8 oz)     There is no height or weight on file to calculate BMI.   Wt Readings from Last 3 Encounters:   07/19/22 78.9 kg (173 lb 15.1 oz)   10/15/21 70.3 kg (155 lb)   11/12/20 76.7 kg (169 lb)     Temp Readings from Last 3 Encounters:   10/15/21 97.8 °F (36.6 °C) (Oral)   08/24/15 98 °F (36.7 °C) (Oral)   07/30/15 98 °F (36.7 °C) (Oral)     BP Readings from Last 3 Encounters:   07/19/22 128/84   10/15/21 130/80   11/12/20 (!) 135/93     Pulse Readings from Last 3 Encounters:   10/15/21 99   11/12/20 83   10/01/20 83       Physical Exam:  General: Well developed, well nourished. No distress.  HEENT: Head is normocephalic, atraumatic; ears are normal.    OD: upper eyelid with mild erythema, sclera spared.   OS: unremarkable   Eyes: Clear conjunctiva.  Neck: Supple, symmetrical neck; trachea midline.  Lungs: Clear to auscultation bilaterally and normal respiratory effort.  Cardiovascular: Heart with regular  rate and rhythm. No murmurs, gallops or rubs  Extremities: No LE edema. Pulses 2+ and symmetric.   Abdomen: Abdomen is soft, non-tender non-distended with normal bowel sounds.  Skin: Skin color, texture, turgor normal. No rashes.  Musculoskeletal: Normal gait.   Lymph Nodes: No cervical or supraclavicular adenopathy.  Neurologic: Normal strength and tone. No focal numbness or weakness.   Psychiatric: Not depressed.    Laboratory  Lab Results   Component Value Date    WBC 10.29 10/15/2021    HGB 12.4 10/15/2021    HCT 35.9 (L) 10/15/2021     10/15/2021    CHOL 193 10/01/2020    TRIG 49 10/01/2020    HDL 79 (H) 10/01/2020    ALT 17 10/15/2021    AST 21 10/15/2021     10/15/2021    K 3.8 10/15/2021     10/15/2021    CREATININE 0.8 10/15/2021    BUN 11 10/15/2021    CO2 21 (L) 10/15/2021    TSH 1.171 10/01/2020    HGBA1C 5.1 10/01/2020       ASSESSMENT & PLAN:   Answers for HPI/ROS submitted by the patient on 7/16/2022  activity change: No  unexpected weight change: No  neck pain: No  hearing loss: No  rhinorrhea: No  trouble swallowing: No  eye discharge: No  visual disturbance: No  chest tightness: No  wheezing: No  chest pain: No  palpitations: No  blood in stool: No  constipation: No  vomiting: No  diarrhea: No  polydipsia: No  polyuria: No  difficulty urinating: No  hematuria: No  menstrual problem: No  dysuria: No  joint swelling: No  arthralgias: No  headaches: No  weakness: No  confusion: No  dysphoric mood: No      Preventative health care  -     Comprehensive Metabolic Panel; Future; Expected date: 07/19/2022  -     CBC Auto Differential; Future; Expected date: 07/19/2022  -     Lipid Panel; Future; Expected date: 07/19/2022  -     Hemoglobin A1C; Future; Expected date: 07/19/2022  -     TSH; Future; Expected date: 07/19/2022  -     Vitamin D; Future; Expected date: 07/19/2022  -     Ambulatory referral/consult to Allergy; Future; Expected date: 07/26/2022  -     Iron and TIBC; Future;  Expected date: 07/19/2022  -     Ferritin; Future; Expected date: 07/19/2022    Allergy, subsequent encounter  -     Comprehensive Metabolic Panel; Future; Expected date: 07/19/2022  -     CBC Auto Differential; Future; Expected date: 07/19/2022  -     Lipid Panel; Future; Expected date: 07/19/2022  -     Hemoglobin A1C; Future; Expected date: 07/19/2022  -     TSH; Future; Expected date: 07/19/2022  -     Vitamin D; Future; Expected date: 07/19/2022  -     Ambulatory referral/consult to Allergy; Future; Expected date: 07/26/2022  -     Iron and TIBC; Future; Expected date: 07/19/2022  -     Ferritin; Future; Expected date: 07/19/2022    Hordeolum externum of right upper eyelid  -     Iron and TIBC; Future; Expected date: 07/19/2022  -     Ferritin; Future; Expected date: 07/19/2022    Other orders  -     cetirizine (ZYRTEC) 10 MG tablet; Take 1 tablet (10 mg total) by mouth once daily.  Dispense: 30 tablet; Refill: 0        *Informed that will perform Annual PE today and will need to follow up with one of our IM Physician Providers to be considered est'd in medical care with practice site.        Medication List          Accurate as of July 19, 2022 10:06 AM. If you have any questions, ask your nurse or doctor.            START taking these medications    cetirizine 10 MG tablet  Commonly known as: ZYRTEC  Take 1 tablet (10 mg total) by mouth once daily.  Started by: MEREDITH Short        CONTINUE taking these medications    multivitamin capsule        STOP taking these medications    ondansetron 4 MG Tbdl  Commonly known as: ZOFRAN-ODT  Stopped by: MEREDITH Short           Where to Get Your Medications      Information about where to get these medications is not yet available    Ask your nurse or doctor about these medications  · cetirizine 10 MG tablet         Signing Physician:  MEREDITH Short       0

## 2022-09-24 NOTE — PROGRESS NOTE ADULT - ASSESSMENT
1)  Severe oliguric BRITTANI superimposed on  Stage 3 CKD (creat was 1.5 mg% in August 2019).    PMH: DM, HFpEF      1) BRITTANI - due to ATN from sepsis from PNA and bacteremia,   c3c4 normal - unlikely PIGN  UO not monitored pt refusing, asked him to do strict Is and os  HD for 3 hrs today  3K bath  UF 2 L   - may start LASix 40 IV on off HD days  Will need a TEsio cath - call Dr Sher to schedule it please    2)  MSSA bacteremia - likely due to PNA vs endocarditis, on NAfcillin-> will be switched to Ancef after HD on D/C    3) SW - to set up pt for HD at [address]    will follow 6

## 2022-09-27 NOTE — GOALS OF CARE CONVERSATION - ADVANCED CARE PLANNING - DECISION MAKER
Patient no chest pain/no palpitations/no dyspnea on exertion/no orthopnea/no paroxysmal nocturnal dyspnea/no peripheral edema/no claudication

## 2022-10-04 NOTE — CONSULT NOTE ADULT - MINUTES
30 [FreeTextEntry1] : \par \par Routine Gyn Exam:\par BSE taught\par Pap/HPV conducted**\par Rx given for mammogram and breast sonogram**\par Rx TVS\par Rx DEXA Ca/Vit D 1000mcg, nutrition, 30 min weight bearing exercise discussed with patient\par \par RTO in 1 year or PRN\par

## 2022-10-23 NOTE — PROVIDER CONTACT NOTE (OTHER) - ACTION/TREATMENT ORDERED:
"Subjective:       Patient ID: Hussein Tovar is a 40 y.o. male.    Vitals:  height is 5' 11" (1.803 m) (pended) and weight is 96.6 kg (213 lb). His temperature is 97.8 °F (36.6 °C) (pended). His blood pressure is 125/82 (pended) and his pulse is 98 (pended). His respiration is 16 (pended) and oxygen saturation is 98% (pended).     Chief Complaint: Rash (/)    Patient here to get a refill on his medications for a rash which he lost on tube of medications. He has been using what is left of the cream and it started to help again.  C/o itchy burning rash to bilateral axilla after switching deodorants.  States that he had left over triamcinolone ointment which is helping but he barely has any left and can only use it sparingly.    Rash  This is a recurrent problem. The current episode started in the past 7 days. The problem is unchanged. The affected locations include the right axilla and left axilla (under the arms). Pertinent negatives include no anorexia, congestion, cough, diarrhea, eye pain, facial edema, fatigue, fever, joint pain, nail changes, rhinorrhea, shortness of breath, sore throat or vomiting. Past treatments include nothing. The treatment provided no relief.     Constitution: Negative for fatigue and fever.   HENT:  Negative for congestion and sore throat.    Eyes:  Negative for eye pain.   Respiratory:  Negative for cough and shortness of breath.    Gastrointestinal:  Negative for vomiting and diarrhea.   Skin:  Positive for rash. Negative for erythema and hives.   Allergic/Immunologic: Positive for itching. Negative for hives.     Objective:      Physical Exam   Constitutional: He is oriented to person, place, and time. He appears well-developed.  Non-toxic appearance. He does not appear ill. No distress.   HENT:   Head: Normocephalic and atraumatic. Head is without abrasion, without contusion and without laceration.   Ears:   Right Ear: External ear normal.   Left Ear: External ear normal. "   Nose: Nose normal.   Mouth/Throat: Oropharynx is clear and moist and mucous membranes are normal.   Eyes: Conjunctivae, EOM and lids are normal. Pupils are equal, round, and reactive to light.   Neck: Trachea normal and phonation normal. Neck supple.   Cardiovascular: Normal rate, regular rhythm and normal heart sounds.   Pulmonary/Chest: Effort normal and breath sounds normal. No stridor. No respiratory distress.   Musculoskeletal: Normal range of motion.         General: Normal range of motion.   Neurological: He is alert and oriented to person, place, and time.   Skin: Skin is warm, dry, intact, not diaphoretic and rash (pruritic maculopapular small rash to bilateral axilla consistent with contact dermatitis). Capillary refill takes less than 2 seconds. No abrasion, No burn, No bruising, No erythema and No ecchymosis   Psychiatric: His speech is normal and behavior is normal. Judgment and thought content normal.   Nursing note and vitals reviewed.      Assessment:       1. Irritant contact dermatitis, unspecified trigger          Plan:         Irritant contact dermatitis, unspecified trigger  -     triamcinolone acetonide 0.1% (KENALOG) 0.1 % ointment; Apply topically 2 (two) times daily.  Dispense: 80 g; Refill: 1       Patient Instructions   Stop the use of cologne type deodorants.    Stop the use of body wash.  Switch to dove sensitive skin.  Ointment as directed for 7-10 days.  Follow up with pcp and or Dermatology if symptoms persist.    Return here or go to the ER as needed for worsening condition.              aware of order

## 2022-11-15 NOTE — PATIENT PROFILE ADULT - NSPROEXTENSIONSOFSELF_GEN_A_NUR
-- DO NOT REPLY / DO NOT REPLY ALL --  -- Message is from Engagement Center Operations ([facility]) --    General Patient Message Patient sugar in the morning are low and the evening  sugar gets high over 150 and patient mother also wants to know can she get the patient his own meter cause right now she is using his brother meter please call patient mother back thank you     Caller Information       Type Contact Phone/Fax    11/14/2022 03:59 PM CST Phone (Incoming) PHUONG MCKAY (Emergency Contact) [phone]        Alternative phone number: na    Can a detailed message be left? Yes    Message Turnaround: [place]:    Refer to site's KB page for routing instructions    Please give this turnaround time to the caller:   \"You can expect to receive a response 1-3 business days after your provider's clinical team reviews the message\"              
Tried to call, got voice mail - advised to call back   Wanted to get details about the blood sugars.      Previous plan was -   After the elevated glucose at the last test we had spoken to endocrinology and the plan was to get autoimmune diabetes antibodies (GLENNA, islet cell, insulin antibody, and Zinc T8 antibody).  IF he is positive for any of these, then his risk for progression to type 1 is higher;  the more antibodies that are +, the greater the risk.  His antibodies were negative   If his antibodies are negative / normal, then his risk to develop type 1 diabetes is much lower, and future monitoring of HbA1c could be lessened (would still do one at the 6 month dee, but if Ab are negative, could consider doing only in case of symptoms if the repeat is still normal).  His las A1C is normal   At this point the plan is to only repeat labs if he is having symptoms.    
eyeglasses

## 2022-12-26 NOTE — PROCEDURAL SAFETY CHECKLIST WITH OR WITHOUT SEDATION - NSPROCEDPERFORMDFREE_GEN_ALL_CORE
Chart and labs reviewed for length of stay since admission. Pt continues to remain at low nutritional risk and intervention is not indicated. Nutrition Services to sign off. RD available by consult.   right IJ udall

## 2023-06-21 NOTE — PROCEDURE NOTE - NSANESTHESIA_GEN_A_CORE
1% lidocaine Island Pedicle Flap Text: The defect edges were debeveled with a #15 scalpel blade.  Given the location of the defect, shape of the defect and the proximity to free margins an island pedicle advancement flap was deemed most appropriate.  Using a sterile surgical marker, an appropriate advancement flap was drawn incorporating the defect, outlining the appropriate donor tissue and placing the expected incisions within the relaxed skin tension lines where possible.    The area thus outlined was incised deep to adipose tissue with a #15 scalpel blade.  The skin margins were undermined to an appropriate distance in all directions around the primary defect and laterally outward around the island pedicle utilizing iris scissors.  There was minimal undermining beneath the pedicle flap.

## 2023-07-19 NOTE — DISCHARGE NOTE PROVIDER - NSDCHHCONTACT_GEN_ALL_CORE_FT
As certified below, I, or a nurse practitioner or physician assistant working with me, had a face-to-face encounter that meets the physician face-to-face encounter requirements. Hemigard Postcare Instructions: The HEMIGARD strips are to remain completely dry for at least 5-7 days.

## 2023-11-19 NOTE — PROGRESS NOTE ADULT - SUBJECTIVE AND OBJECTIVE BOX
SALLY TAIWO  79y, Male  Allergy: No Known Allergies      CHIEF COMPLAINT: leg pain (03 Feb 2020 13:25)      INTERVAL EVENTS/HPI  - No acute events overnight  - T(F): , Max: 97 (02-03-20 @ 13:56)  - Denies any worsening symptoms  - Tolerating medication    ROS  10 system review - neg     SOCIAL HISTORY - not relevant     Substance Use (  ) never used  (  ) IVDU (  ) Other:  Tobacco Usage:  (   ) never smoked   (   ) former smoker   (   ) current smoker   Alcohol Usage: (   ) social  (   ) daily use (   ) denies  Sexual History:       FH noncontributory     VITALS:  T(F): 96.1, Max: 97 (02-03-20 @ 13:56)  HR: 60  BP: 118/69  RR: 18Vital Signs Last 24 Hrs  T(C): 35.6 (04 Feb 2020 05:06), Max: 36.1 (03 Feb 2020 13:56)  T(F): 96.1 (04 Feb 2020 05:06), Max: 97 (03 Feb 2020 13:56)  HR: 60 (04 Feb 2020 05:06) (60 - 61)  BP: 118/69 (04 Feb 2020 05:06) (112/59 - 118/69)  BP(mean): --  RR: 18 (04 Feb 2020 05:06) (16 - 18)  SpO2: 97% (03 Feb 2020 21:54) (97% - 97%)    PHYSICAL EXAM:  Gen: NAD, resting in bed  HEENT: Normocephalic, atraumatic  Neck: supple, no lymphadenopathy. R neck HD catheter  CV: s1 s 2 +   Lungs: decreased BS   Abdomen: Soft, BS present  Ext: Warm, well perfused. LE stasis - mild Left leg erythema   Neuro: non focal, awake  Skin: no rash, no erythema      TESTS & MEASUREMENTS:                        12.2   6.77  )-----------( 140      ( 03 Feb 2020 08:57 )             36.7     02-03    139  |  96<L>  |  116<HH>  ----------------------------<  213<H>  3.2<L>   |  26  |  2.8<H>    Ca    8.9      03 Feb 2020 08:57  Mg     1.8     02-03    TPro  6.2  /  Alb  3.1<L>  /  TBili  0.7  /  DBili  x   /  AST  18  /  ALT  8   /  AlkPhos  51  02-03    eGFR if : 24 mL/min/1.73M2 (02-03-20 @ 08:57)  eGFR if Non African American: 21 mL/min/1.73M2 (02-03-20 @ 08:57)    LIVER FUNCTIONS - ( 03 Feb 2020 08:57 )  Alb: 3.1 g/dL / Pro: 6.2 g/dL / ALK PHOS: 51 U/L / ALT: 8 U/L / AST: 18 U/L / GGT: x               Culture - Blood (collected 01-30-20 @ 11:00)  Source: .Blood Blood-Peripheral  Preliminary Report (01-31-20 @ 23:02):    No growth to date.        Blood Gas Venous - Lactate: 1.6 mmoL/L (01-30-20 @ 14:17)  Lactate, Blood: 1.6 mmol/L (01-30-20 @ 11:00)      INFECTIOUS DISEASES TESTING  Hepatitis B Surface Antigen: Nonreact (10-25-19 @ 10:12)  Hepatitis C Virus Interpretation: Nonreact (10-25-19 @ 10:12)  Hepatitis B Surface Antigen: Nonreact (10-24-19 @ 15:49)  Hepatitis C Virus Interpretation: Nonreact (10-24-19 @ 15:49)  MRSA PCR Result.: Negative (10-24-19 @ 12:54)      RADIOLOGY & ADDITIONAL TESTS:        CARDIOLOGY TESTING  12 Lead ECG:   Ventricular Rate 62 BPM    Atrial Rate 277 BPM    QRS Duration 122 ms    Q-T Interval 430 ms    QTC Calculation(Bezet) 436 ms    R Axis 240 degrees    T Axis 34 degrees    Diagnosis Line Atrial flutter with 2 to 1 block  Right bundle branch block  Possible Inferior infarct , age undetermined  Abnormal ECG    Confirmed by STEVE YAO MD ([id number]) on 1/30/2020 11:46:19 AM (01-30-20 @ 10:04)      MEDICATIONS  ALPRAZolam 0.5  amLODIPine   Tablet 5  ascorbic acid 500  aspirin  chewable 81  budesonide 160 MICROgram(s)/formoterol 4.5 MICROgram(s) Inhaler 2  cephalexin 250  chlorhexidine 4% Liquid 1  clopidogrel Tablet 75  clotrimazole 1% Cream 1  dextrose 5%. 1000  dextrose 50% Injectable 12.5  dextrose 50% Injectable 25  dextrose 50% Injectable 25  fenofibrate Tablet 145  finasteride 5  furosemide    Tablet 80  heparin  Injectable 5000  insulin glargine Injectable (LANTUS) 20  insulin lispro (HumaLOG) corrective regimen sliding scale   isosorbide   mononitrate ER Tablet (IMDUR) 30  metolazone 5  metoprolol succinate ER 50  pantoprazole    Tablet 40  silver sulfADIAZINE 1% Cream 1  simvastatin 40  sodium chloride 0.9% lock flush 3  tamsulosin 0.4  zinc sulfate 220      ANTIBIOTICS:  cephalexin 250 milliGRAM(s) Oral every 12 hours Private Auto Walk in

## 2023-12-11 NOTE — PROGRESS NOTE ADULT - SUBJECTIVE AND OBJECTIVE BOX
Patient is a 79y old  Male who presents with a chief complaint of CHF Exacerbation (23 Oct 2019 10:29)      T(F): 95.2 (10-23-19 @ 23:07), Max: 96.3 (10-23-19 @ 07:10)  HR: 64 (10-24-19 @ 05:30)  BP: 118/57 (10-24-19 @ 05:30)  RR: 22 (10-24-19 @ 05:30)  SpO2: 99% (10-24-19 @ 05:30) (97% - 100%)    PHYSICAL EXAM:  GENERAL: NAD, well-groomed, well-developed  HEAD:  Atraumatic, Normocephalic  EYES: EOMI, PERRLA, conjunctiva and sclera clear  ENMT: No tonsillar erythema, exudates, or enlargement; Moist mucous membranes, Good dentition, No lesions  NECK: Supple, No JVD, Normal thyroid  NERVOUS SYSTEM:  Alert & Oriented X3,  Motor Strength 5/5 B/L upper and lower extremities  CHEST/LUNG: Clear to percussion bilaterally; No rales, rhonchi, wheezing, or rubs  HEART: Regular rate and rhythm; No murmurs, rubs, or gallops  ABDOMEN: Soft, Nontender, Nondistended; Bowel sounds present  EXTREMITIES:   No clubbing, cyanosis, tr edema  LYMPH: No lymphadenopathy noted  SKIN: No rashes or lesions    labs  10-23    136  |  89<L>  |  132<HH>  ----------------------------<  208<H>  4.2   |  22  |  8.3<HH>    Ca    7.7<L>      23 Oct 2019 11:34    TPro  6.4  /  Alb  3.0<L>  /  TBili  1.1  /  DBili  x   /  AST  19  /  ALT  15  /  AlkPhos  43  10-23                          11.1   8.29  )-----------( 225      ( 24 Oct 2019 05:53 )             33.6       Culture - Blood (collected 22 Oct 2019 05:33)  Source: .Blood None  Preliminary Report (23 Oct 2019 18:01):    No growth to date.      PTT - ( 23 Oct 2019 22:20 )  PTT:72.6 sec        acetaminophen   Tablet .. 650 milliGRAM(s) Oral every 6 hours PRN  acetaminophen  Suppository .. 650 milliGRAM(s) Rectal every 6 hours PRN  ALPRAZolam 0.5 milliGRAM(s) Oral at bedtime PRN  aspirin  chewable 81 milliGRAM(s) Oral daily  buDESOnide 160 MICROgram(s)/formoterol 4.5 MICROgram(s) Inhaler 2 Puff(s) Inhalation two times a day  clopidogrel Tablet 75 milliGRAM(s) Oral daily  dextrose 40% Gel 15 Gram(s) Oral once PRN  dextrose 5%. 1000 milliLiter(s) IV Continuous <Continuous>  dextrose 50% Injectable 12.5 Gram(s) IV Push once  dextrose 50% Injectable 25 Gram(s) IV Push once  dextrose 50% Injectable 25 Gram(s) IV Push once  fenofibrate Tablet 145 milliGRAM(s) Oral daily  finasteride 5 milliGRAM(s) Oral daily  fluticasone propionate 50 MICROgram(s)/spray Nasal Spray 1 Spray(s) Both Nostrils two times a day  glucagon  Injectable 1 milliGRAM(s) IntraMuscular once PRN  heparin  Infusion 1200 Unit(s)/Hr IV Continuous <Continuous>  influenza   Vaccine 0.5 milliLiter(s) IntraMuscular once  insulin glargine Injectable (LANTUS) 21 Unit(s) SubCutaneous at bedtime  insulin lispro Injectable (HumaLOG) 7 Unit(s) SubCutaneous before breakfast  insulin lispro Injectable (HumaLOG) 7 Unit(s) SubCutaneous before lunch  insulin lispro Injectable (HumaLOG) 7 Unit(s) SubCutaneous before dinner  isosorbide   mononitrate ER Tablet (IMDUR) 30 milliGRAM(s) Oral daily  metoprolol succinate ER 75 milliGRAM(s) Oral daily  nafcillin  IVPB      nafcillin  IVPB 2 Gram(s) IV Intermittent every 4 hours  nitroglycerin    Patch 0.2 mG/Hr(s) 1 patch Transdermal daily  silver sulfADIAZINE 1% Cream 1 Application(s) Topical two times a day  simvastatin 40 milliGRAM(s) Oral at bedtime  tamsulosin 0.4 milliGRAM(s) Oral at bedtime The patient is a 40y Female complaining of fever.

## 2023-12-29 NOTE — DISCHARGE NOTE PROVIDER - NSDCHHATTENDCERT_GEN_ALL_CORE
DISCHARGE SUMMARY    Cristobal COCHRAN Michael Alarcon was seen   times for evaluation and treatment.  Patient did not return for further treatment and current status is unknown.  Due to short treatment duration, no objective or functional changes were made.  Please see goal flow sheet from episode noted date below and initial evaluation for further information.  Patient is discharged from therapy and therapy episode is resolved as of 12/29/23.      Linked Episodes   Type: Episode: Status: Noted: Resolved: Last update: Updated by:   PHYSICAL THERAPY Left hip Active 10/2/2023  10/26/2023 12:02 PM Adair Dunlap, PT      Comments:          My signature below certifies that the above stated patient is homebound and upon completion of the Face-To-Face encounter, has the need for intermittent skilled nursing, physical therapy and/or speech or occupational therapy services in their home for their current diagnosis as outlined in their initial plan of care. These services will continue to be monitored by myself or another physician.

## 2024-02-25 NOTE — PROGRESS NOTE ADULT - ASSESSMENT
On call provider contacted, stated a prescription for Paxlovid has been sent to the patient's pharmacy. Patient made aware. Care advice reviewed, instructed patient to call back with any further questions or concerns. Patient verbalized understanding.  Reason for Disposition  • HIGH RISK for severe COVID complications (e.g., weak immune system, age > 64 years, obesity with BMI > 25, pregnant, chronic lung disease or other chronic medical condition)  (Exception: Already seen by PCP and no new or worsening symptoms.)    Protocols used: Coronavirus (COVID-19) Diagnosed or Suspected-ADULT-     A 79 yo male with PMH of HTN, DM type 2, Atrial fibrillation on Coumadin and COPD was brought to ED s/p fall. Patient doesn't recall the event and he denies any loss of consciousness. As per Son patient was standing when suddenly fell back and hit his head. Patient found with posterior scalp laceration, Head CT showed intraventricular hemorrhage. Patient denies chest pain, SOB, palpitation or dizziness.     A/P:   Fall with Head Trauma:   Intracranial hemorrhage:   Head CT showed right lateral and third ventricles densities without definite evidence of active arterial contrast extravasation.  Brain MRI showed stable intraventricular hematoma. MRV unremarkable.  Neurosurgery is ok to resume Coumadin.     Syncope: possibly due to Aortic stenosis.   As per son he found him on the floor unconscious.   Workup showed severe aortic stenosis.   EKG showed Atrial flutter/fibrillation with slow ventricular response, RBBB  Pending cardiology consult for severe aortic stenosis.     High grade left Internal carotid stenosis:  80%  Vascular plan for  endarterectomy after cardiac clearance. Patient wants to go home will discuss with vascular if can done outpatient.     Atrial flutter/fibrillation:   Coumadin on hold due to intraventricular hemorrhage.   Neurosurgery is ok to resume it. Keep on hold for now for possible surgical plan.     CKD stag 4: stable.     COPD on home o2, lungs are clear.     #Progress Note Handoff:  Pending (specify):  Consults: structure cardiology, vascular follow up.   Family discussion: with his son, update the plan for surgery  Disposition: Home once medically stable. A 79 yo male with PMH of HTN, DM type 2, Atrial fibrillation on Coumadin and COPD was brought to ED s/p fall. Patient doesn't recall the event and he denies any loss of consciousness. As per Son patient was standing when suddenly fell back and hit his head. Patient found with posterior scalp laceration, Head CT showed intraventricular hemorrhage. Patient denies chest pain, SOB, palpitation or dizziness.     A/P:   Fall with Head Trauma:   Intracranial hemorrhage:   Head CT showed right lateral and third ventricles densities without definite evidence of active arterial contrast extravasation.  Brain MRI showed stable intraventricular hematoma. MRV unremarkable.  Neurosurgery is ok to resume Coumadin.     Syncope: possibly due to Aortic stenosis.   As per son he found him on the floor unconscious.   Workup showed severe aortic stenosis.   EKG showed Atrial flutter/fibrillation with slow ventricular response, RBBB  Pending cardiology consult for severe aortic stenosis. Patient wants to follow up outpatient for further management, I spoke with Dr. Maria, he will schedule the patient for appointment     High grade left Internal carotid stenosis:  80%  Vascular plan for  endarterectomy after cardiac clearance. Patient wants to go home, vascular advised to followup with Dr. De Guzman outpatient.     Atrial flutter/fibrillation:   Coumadin on hold due to intraventricular hemorrhage.   Neurosurgery is ok to resume it. Keep on hold for now for possible surgical plan.     CKD stag 4: stable.   Continue Furosemide and Metolazone, Lasix dose decreased to 40mg daily due to severe aortic stenosis.   Nephrology evaluation outpatient for TAVR, he need CT scan with IV contrast.     COPD on home o2, lungs are clear.     Patient wants to leave home, he understand the need of outpatient follow up for further evaluation.   #Progress Note Handoff:  Pending (specify):  Consults: structure cardiology, vascular follow up.   Family discussion: with his son, update the plan for surgery  Disposition: Home once medically stable.

## 2024-03-20 NOTE — DIETITIAN INITIAL EVALUATION ADULT. - NS FNS WEIGHT USED FOR CALC
103.6 kgs/current 61-year-old male here with left-sided knee pain, he had history of prior DVT many years ago and is maintained on Coumadin.  His last INR was checked and it was around 3.2, here in the ED INR is low and patient has a new left lower extremity DVT.  Admitted to [facility] medicine service for further evaluation of persistent and acute DVT on Coumadin.

## 2024-06-24 NOTE — DISCHARGE NOTE NURSING/CASE MANAGEMENT/SOCIAL WORK - NSCORESITESY/N_GEN_A_CORE_RD
Rx Refill Note  Requested Prescriptions     Pending Prescriptions Disp Refills    pantoprazole (PROTONIX) 40 MG EC tablet [Pharmacy Med Name: PANTOPRAZOLE TAB 40MG DR] 90 tablet 1     Sig: TAKE 1 TABLET DAILY    atenolol (TENORMIN) 25 MG tablet [Pharmacy Med Name: ATENOLOL TAB 25MG] 45 tablet 1     Sig: TAKE 1/2 TABLET EVERY      EVENING      Last office visit with prescribing clinician: 1/29/2024   Last telemedicine visit with prescribing clinician: Visit date not found   Next office visit with prescribing clinician: 8/5/2024                         Would you like a call back once the refill request has been completed: [] Yes [] No    If the office needs to give you a call back, can they leave a voicemail: [] Yes [] No    Calista Vaughan  06/24/24, 08:14 EDT   No

## 2024-07-03 NOTE — CDI QUERY NOTE - NSCDINOTEDOCCLARIFICATION_GEN_A_CORE
PLEASE INCLUDE MORE SPECIFIC DOCUMENTATION IN YOUR PROGRESS NOTE AND DISCHARGE SUMMARY.  The documentation in this patient's medical record requires additional clarification to ensure that we accurately capture the patients diagnosis(es), treatment and/or severity of illness. Please document to the greatest level of specificity all corresponding diagnoses (either known or suspected) and/or treatment associated with the clinical information described below.
alone

## 2024-10-14 NOTE — PROCEDURAL SAFETY CHECKLIST WITH OR WITHOUT SEDATION - NSPTSPECIFCONCERNSD_GEN_ALL_CORE
Cardiac Surgery Progress Note    Day of Surgery: 10/8/2024  CABG x 5 with LIMA pedicle-LAD, SVG-PDA-OM, SVG-diagonal , and SVG-ramus;  right GSV EVH     Subjective: Examined. Pt is seen awake and up to a chair, sating well on RA. Family present at bedside.    Objective:  Physical Exam:  Physical Exam  Constitutional:       General: He is not in acute distress.  HENT:      Head: Normocephalic.   Eyes:      Conjunctiva/sclera: Conjunctivae normal.   Cardiovascular:      Rate and Rhythm: Normal rate and regular rhythm.   Pulmonary:      Effort: Pulmonary effort is normal.      Breath sounds: Normal breath sounds.   Abdominal:      Palpations: Abdomen is soft.   Skin:     General: Skin is warm and dry.   Neurological:      General: No focal deficit present.      Mental Status: He is alert.   Psychiatric:         Mood and Affect: Mood normal.          Vitals, labs, and imaging documented over the past 24 hours have been independently reviewed.    Assessment    Patient is a 62 year old male with a past medical history of severe multivessel CAD, ischemic dilate cardiomyopathy, obesity, HTN, HLD, ASCENCION, and DM II who is now s/p CABG x5 w/ LIMA on 10/8/2024.    Plan    - Discharge planning in progress.  - IS/OOB/Pulmonary toilet.    Charting performed by rudolph Mcleod for Dr. Finnegan.    All medical record entries made by the scribe were at my direction. I have reviewed the chart and agree that the record accurately reflects my personal performance of the history, physical exam, hospital course, and assessment and plan.     no

## 2025-07-09 NOTE — DIETITIAN INITIAL EVALUATION ADULT. - NUTRITION DIAGNOSIS
Patient called back and verbalized understanding.  Referral and order for CT placed per Dr. Henao.    no...